# Patient Record
Sex: FEMALE | Race: BLACK OR AFRICAN AMERICAN | NOT HISPANIC OR LATINO | Employment: OTHER | URBAN - METROPOLITAN AREA
[De-identification: names, ages, dates, MRNs, and addresses within clinical notes are randomized per-mention and may not be internally consistent; named-entity substitution may affect disease eponyms.]

---

## 2018-01-05 ENCOUNTER — HOSPITAL ENCOUNTER (EMERGENCY)
Facility: HOSPITAL | Age: 73
Discharge: HOME | End: 2018-01-05
Attending: EMERGENCY MEDICINE
Payer: MEDICARE

## 2018-01-05 VITALS
HEART RATE: 70 BPM | RESPIRATION RATE: 18 BRPM | BODY MASS INDEX: 49.09 KG/M2 | DIASTOLIC BLOOD PRESSURE: 86 MMHG | SYSTOLIC BLOOD PRESSURE: 195 MMHG | HEIGHT: 61 IN | OXYGEN SATURATION: 96 % | WEIGHT: 260 LBS | TEMPERATURE: 98 F

## 2018-01-05 DIAGNOSIS — M54.12 CERVICAL RADICULAR PAIN: ICD-10-CM

## 2018-01-05 DIAGNOSIS — I16.0 HYPERTENSIVE URGENCY: Primary | ICD-10-CM

## 2018-01-05 DIAGNOSIS — N39.0 UTI (URINARY TRACT INFECTION) WITH PYURIA: ICD-10-CM

## 2018-01-05 LAB
ALBUMIN SERPL BCP-MCNC: 3.1 G/DL
ALP SERPL-CCNC: 143 U/L
ALT SERPL W/O P-5'-P-CCNC: 12 U/L
ANION GAP SERPL CALC-SCNC: 11 MMOL/L
AST SERPL-CCNC: 15 U/L
BACTERIA #/AREA URNS HPF: ABNORMAL /HPF
BASOPHILS # BLD AUTO: 0.03 K/UL
BASOPHILS NFR BLD: 0.3 %
BILIRUB SERPL-MCNC: 0.3 MG/DL
BILIRUB UR QL STRIP: NEGATIVE
BUN SERPL-MCNC: 24 MG/DL
CALCIUM SERPL-MCNC: 9.5 MG/DL
CHLORIDE SERPL-SCNC: 108 MMOL/L
CLARITY UR: ABNORMAL
CO2 SERPL-SCNC: 20 MMOL/L
COLOR UR: YELLOW
CREAT SERPL-MCNC: 1.6 MG/DL
DIFFERENTIAL METHOD: ABNORMAL
EOSINOPHIL # BLD AUTO: 0.5 K/UL
EOSINOPHIL NFR BLD: 4.5 %
ERYTHROCYTE [DISTWIDTH] IN BLOOD BY AUTOMATED COUNT: 14.4 %
EST. GFR  (AFRICAN AMERICAN): 37 ML/MIN/1.73 M^2
EST. GFR  (NON AFRICAN AMERICAN): 32 ML/MIN/1.73 M^2
GLUCOSE SERPL-MCNC: 189 MG/DL
GLUCOSE UR QL STRIP: ABNORMAL
HCT VFR BLD AUTO: 36.9 %
HGB BLD-MCNC: 12 G/DL
HGB UR QL STRIP: ABNORMAL
HYALINE CASTS #/AREA URNS LPF: 0 /LPF
KETONES UR QL STRIP: NEGATIVE
LEUKOCYTE ESTERASE UR QL STRIP: ABNORMAL
LYMPHOCYTES # BLD AUTO: 3.7 K/UL
LYMPHOCYTES NFR BLD: 33.9 %
MAGNESIUM SERPL-MCNC: 1.6 MG/DL
MCH RBC QN AUTO: 30.2 PG
MCHC RBC AUTO-ENTMCNC: 32.5 G/DL
MCV RBC AUTO: 93 FL
MICROSCOPIC COMMENT: ABNORMAL
MONOCYTES # BLD AUTO: 1 K/UL
MONOCYTES NFR BLD: 9.3 %
NEUTROPHILS # BLD AUTO: 5.7 K/UL
NEUTROPHILS NFR BLD: 51.7 %
NITRITE UR QL STRIP: POSITIVE
PH UR STRIP: 5 [PH] (ref 5–8)
PLATELET # BLD AUTO: 328 K/UL
PMV BLD AUTO: 9.4 FL
POCT GLUCOSE: 189 MG/DL (ref 70–110)
POTASSIUM SERPL-SCNC: 4.5 MMOL/L
PROT SERPL-MCNC: 7.9 G/DL
PROT UR QL STRIP: ABNORMAL
RBC # BLD AUTO: 3.97 M/UL
RBC #/AREA URNS HPF: 5 /HPF (ref 0–4)
SODIUM SERPL-SCNC: 139 MMOL/L
SP GR UR STRIP: 1.01 (ref 1–1.03)
TROPONIN I SERPL DL<=0.01 NG/ML-MCNC: 0.02 NG/ML
URN SPEC COLLECT METH UR: ABNORMAL
UROBILINOGEN UR STRIP-ACNC: NEGATIVE EU/DL
WBC # BLD AUTO: 10.94 K/UL
WBC #/AREA URNS HPF: 60 /HPF (ref 0–5)

## 2018-01-05 PROCEDURE — 99284 EMERGENCY DEPT VISIT MOD MDM: CPT | Mod: 25

## 2018-01-05 PROCEDURE — 93005 ELECTROCARDIOGRAM TRACING: CPT

## 2018-01-05 PROCEDURE — 96375 TX/PRO/DX INJ NEW DRUG ADDON: CPT

## 2018-01-05 PROCEDURE — 84484 ASSAY OF TROPONIN QUANT: CPT

## 2018-01-05 PROCEDURE — 96374 THER/PROPH/DIAG INJ IV PUSH: CPT

## 2018-01-05 PROCEDURE — 63600175 PHARM REV CODE 636 W HCPCS: Performed by: EMERGENCY MEDICINE

## 2018-01-05 PROCEDURE — 85025 COMPLETE CBC W/AUTO DIFF WBC: CPT

## 2018-01-05 PROCEDURE — 25000003 PHARM REV CODE 250: Performed by: EMERGENCY MEDICINE

## 2018-01-05 PROCEDURE — 80053 COMPREHEN METABOLIC PANEL: CPT

## 2018-01-05 PROCEDURE — 81000 URINALYSIS NONAUTO W/SCOPE: CPT

## 2018-01-05 PROCEDURE — 83735 ASSAY OF MAGNESIUM: CPT

## 2018-01-05 PROCEDURE — 93010 ELECTROCARDIOGRAM REPORT: CPT | Mod: ,,, | Performed by: INTERNAL MEDICINE

## 2018-01-05 RX ORDER — METHYLPREDNISOLONE 4 MG/1
TABLET ORAL
Qty: 1 PACKAGE | Refills: 0 | Status: ON HOLD | OUTPATIENT
Start: 2018-01-05 | End: 2018-06-01 | Stop reason: HOSPADM

## 2018-01-05 RX ORDER — KETOROLAC TROMETHAMINE 30 MG/ML
15 INJECTION, SOLUTION INTRAMUSCULAR; INTRAVENOUS ONCE
Status: COMPLETED | OUTPATIENT
Start: 2018-01-05 | End: 2018-01-05

## 2018-01-05 RX ORDER — DICLOFENAC SODIUM 75 MG/1
75 TABLET, DELAYED RELEASE ORAL 2 TIMES DAILY
Qty: 30 TABLET | Refills: 1 | Status: ON HOLD | OUTPATIENT
Start: 2018-01-05 | End: 2020-06-24 | Stop reason: HOSPADM

## 2018-01-05 RX ORDER — ENALAPRILAT 1.25 MG/ML
1.25 INJECTION INTRAVENOUS
Status: COMPLETED | OUTPATIENT
Start: 2018-01-05 | End: 2018-01-05

## 2018-01-05 RX ORDER — HYDRALAZINE HYDROCHLORIDE 20 MG/ML
10 INJECTION INTRAMUSCULAR; INTRAVENOUS
Status: DISCONTINUED | OUTPATIENT
Start: 2018-01-05 | End: 2018-01-05

## 2018-01-05 RX ORDER — ENALAPRILAT 1.25 MG/ML
2.5 INJECTION INTRAVENOUS
Status: COMPLETED | OUTPATIENT
Start: 2018-01-05 | End: 2018-01-05

## 2018-01-05 RX ORDER — CLONIDINE HYDROCHLORIDE 0.1 MG/1
0.1 TABLET ORAL
Status: COMPLETED | OUTPATIENT
Start: 2018-01-05 | End: 2018-01-05

## 2018-01-05 RX ORDER — ROSUVASTATIN CALCIUM 20 MG/1
20 TABLET, COATED ORAL DAILY
COMMUNITY
End: 2018-12-14 | Stop reason: SDUPTHER

## 2018-01-05 RX ADMIN — ENALAPRILAT 1.25 MG: 2.5 INJECTION INTRAVENOUS at 12:01

## 2018-01-05 RX ADMIN — CLONIDINE HYDROCHLORIDE 0.1 MG: 0.1 TABLET ORAL at 11:01

## 2018-01-05 RX ADMIN — ENALAPRILAT 2.5 MG: 2.5 INJECTION INTRAVENOUS at 11:01

## 2018-01-05 RX ADMIN — KETOROLAC TROMETHAMINE 15 MG: 30 INJECTION, SOLUTION INTRAMUSCULAR at 11:01

## 2018-01-05 NOTE — ED NOTES
"MD aware of pts BP, Dr. Leos states "pt can be discharged now, cancel the hydralazine, we do not want to blow her pressure anymore".   "

## 2018-01-05 NOTE — DISCHARGE INSTRUCTIONS
"1. your blood pressure was dangerously elevated upon presenting to the emergency department: Year chronic high blood pressures beginning to demonstrate kidneys.  It's important to taking blood pressure medicine every day.  Is also important to follow up to primary care provider sure that she'll blood pressure is tightly controlled in order to prevent further kidney and potential heart damage.    2.  Your left shoulder and left arm pain is a result of a "pinched nerve" in your neck.  Your prescribed a taper steroid dose and other anti-inflammatory medications in order to decrease pain inflammation.  Moist heat applied with a heating pad for 15-20 minute intervals every other hour will help with the pain.    3.Follow-up with your primary care doctor.  Your blood pressure and for potential referral for physical therapy for your right shoulder.  "

## 2018-01-05 NOTE — ED PROVIDER NOTES
Encounter Date: 1/5/2018    SCRIBE #1 NOTE: I, Franck Roselia, am scribing for, and in the presence of, Low Leos MD. Other sections scribed: HPI, ROS.       History     Chief Complaint   Patient presents with    Shoulder Pain     complaints of left shoulder pain radiating to back and to neck x 10 days.  denies trauma     CC: Shoulder Pain  HPI: This 72 y.o. morbidly obese female with Hx of HTN, DM, CF, CVA presents to the ED c/o severe constant L shoulder pain radiating down L arm to elbow and into L upper thoracic back that developed 10 days ago. Pt states that the pain is worse with ROM of L shoulder as well as laying down on that side. Pt denies taking her blood pressure medication this morning, but reports compliance otherwise. Pt denies fever, cough, chest pain, SOB.        The history is provided by the patient.     Review of patient's allergies indicates:   Allergen Reactions    Corticosteroids (glucocorticoids) Edema     Past Medical History:   Diagnosis Date    CHF (congestive heart failure)     Diabetes mellitus     Hypertension     Stroke     1986     Past Surgical History:   Procedure Laterality Date    right hand surgery      right knee surgery       Family History   Problem Relation Age of Onset    Heart disease Sister     Diabetes Maternal Aunt     Heart disease Maternal Aunt     Hypertension Maternal Aunt      Social History   Substance Use Topics    Smoking status: Never Smoker    Smokeless tobacco: Never Used    Alcohol use No     Review of Systems   Constitutional: Negative for chills and fever.   HENT: Negative for ear pain, rhinorrhea and sore throat.    Eyes: Negative for pain and visual disturbance.   Respiratory: Negative for cough and shortness of breath.    Cardiovascular: Negative for chest pain.   Gastrointestinal: Negative for abdominal pain, diarrhea, nausea and vomiting.   Genitourinary: Negative for dysuria and flank pain.   Musculoskeletal: Positive for back pain.         (+) L shoulder pain  (+) L upper arm pain   Skin: Negative for rash and wound.   Neurological: Negative for dizziness, syncope and headaches.       Physical Exam     Initial Vitals [01/05/18 1006]   BP Pulse Resp Temp SpO2   (!) 232/105 86 18 98.3 °F (36.8 °C) 96 %      MAP       147.33         Physical Exam    ED Course:   Procedures       Labs Reviewed   CBC W/ AUTO DIFFERENTIAL - Abnormal; Notable for the following:        Result Value    RBC 3.97 (*)     Hematocrit 36.9 (*)     All other components within normal limits   COMPREHENSIVE METABOLIC PANEL - Abnormal; Notable for the following:     CO2 20 (*)     Glucose 189 (*)     BUN, Bld 24 (*)     Creatinine 1.6 (*)     Albumin 3.1 (*)     Alkaline Phosphatase 143 (*)     eGFR if  37 (*)     eGFR if non  32 (*)     All other components within normal limits   URINALYSIS - Abnormal; Notable for the following:     Appearance, UA Hazy (*)     Protein, UA 2+ (*)     Glucose, UA 1+ (*)     Occult Blood UA 1+ (*)     Nitrite, UA Positive (*)     Leukocytes, UA 2+ (*)     All other components within normal limits   URINALYSIS MICROSCOPIC - Abnormal; Notable for the following:     RBC, UA 5 (*)     WBC, UA 60 (*)     Bacteria, UA Many (*)     All other components within normal limits   POCT GLUCOSE - Abnormal; Notable for the following:     POCT Glucose 189 (*)     All other components within normal limits   MAGNESIUM   TROPONIN I          X-Rays:   Independently Interpreted Readings:   Other Readings:  CT Cervical Spine Without Contrast   Status: Final result  [employer] Results Release     [email or web address] Status: Code Exp Results Release  PACS Images     Show images for CT Cervical Spine Without Contrast  External Result Report     External Result Report  Narrative       Cervical spine CT without contrast:    Technique: 2.5-mm axial images of the cervical spine were obtained without intravenous contrast.  Coronal and sagittal reconstructions  were generated.    Comparison: None.    FINDINGS:  Generalized osteopenia. There is nonspecific levocurvature with straightening of the usual cervical lordosis. Chronic appearing mild anterior wedge deformity of C5 vertebral body. Sagittal images demonstrate 3 mm grade 1 degenerative related retrolisthesis of C5 on 6. Well-corticated nonspecific nuchal calcifications near C6 and C7 spinous processes. No acute displaced fracture or dislocation. No prevertebral soft tissue swelling or paraspinal hematoma.    Mild degenerative change at the atlantodental intervalThere is multi-level age-related mild degenerative disc disease and uncovertebral and facet arthrosis, most prominent at C5-6 level.    C2-3: No significant spinal canal stenosis or neural foraminal narrowing.  C3-4: No significant spinal canal stenosis or neural foraminal narrowing.  C4-5: Minimal left neuroforaminal narrowing. No significant spinal canal stenosis or right neuroforaminal narrowing.  C5-6: Posterior disc osteophyte complex resulting in mild acquired canal stenosis. Mild right and moderate left neural foraminal narrowing.  C6-7: No significant spinal canal stenosis or neural foraminal narrowing.  C7-T1:   No significant spinal canal stenosis or neural foraminal narrowing.    The visualized lung apices are clear. The soft tissue structures incidentally surveyed in this noncontrast cervical spine CT demonstrates no significant findings.  Impression            1.  No acute cervical spine abnormality.    2. Mild cervical spondylosis most prominent at C5-6, as above.      Electronically signed by: SARAH NOVOA MD, MD  Date: 01/05/18  Time: 12:59           Medical Decision Making:   Initial Assessment:   My initial assessment is that her pain was myofascial or radicular in nature.  The history and physical exam was not consistent with ischemic chest pain referred to the shoulder or upper extremity.  Serendipitously discovered was urinary tract  infection and hypertensive urgency.  Differential Diagnosis:   Cervical radicular pain, myofascial pain of the shoulder, subluxation of the shoulder, occipital tendinitis  ED Management:  The patient's blood pressure was treated with a combination of calcium channel blockers, alpha blockade, beta 2 smooth muscle blockade.            Scribe Attestation:   Scribe #1: I performed the above scribed service and the documentation accurately describes the services I performed. I attest to the accuracy of the note.    Attending Attestation:           Physician Attestation for Scribe:  Physician Attestation Statement for Scribe #1: I, Low Leos MD, reviewed documentation, as scribed by Franck Veloz in my presence, and it is both accurate and complete.                 ED Course     1110 hrs.: Awaiting saline lock placement: Patient's difficult stick and IV placement.  1235 hrs.: Laboratory evaluation complete: Urinary tract infection with 60 white cells per high-power field, positive nitrites, positive leukocyte esterase is noted.  Protein wasting glomerulonephropathy as well noted.  Blood pressure continues to be treated in the department.  1320 hrs. CT next completed to show a mild to moderate C5-C6 spondyloarthropathy.  We'll treat with anti-inflammatories, heat, and referral.  Blood pressure at this time was 200/86--substantive reduction from the initial presenting blood pressure.      Clinical Impression:   The primary encounter diagnosis was Hypertensive urgency. Diagnoses of UTI (urinary tract infection) with pyuria and Cervical radicular pain were also pertinent to this visit.    Disposition:   Disposition: Discharged                        Low Leos MD  01/28/18 1919

## 2018-01-05 NOTE — ED TRIAGE NOTES
"Pt arrived to ED via personal transportation from home for c/o left sided shoulder and arms pain since the day after lloyd along with left hand numbness. Pt states "I been suppose to come to the emergency room". Pt rates pain 8 out of 10 and states its "constant and achy, I cant sleep on that side". Pt denies chest pain and SOB. Denies N/V/D/F. Denies any injuries. REU. AAO x 4. In no acute distress. Will continue to monitor.   "

## 2018-05-29 ENCOUNTER — HOSPITAL ENCOUNTER (INPATIENT)
Facility: HOSPITAL | Age: 73
LOS: 3 days | Discharge: HOME HEALTH | DRG: 872 | End: 2018-06-01
Attending: EMERGENCY MEDICINE | Admitting: HOSPITALIST
Payer: MEDICARE

## 2018-05-29 ENCOUNTER — OFFICE VISIT (OUTPATIENT)
Dept: URGENT CARE | Facility: CLINIC | Age: 73
End: 2018-05-29
Payer: MEDICARE

## 2018-05-29 VITALS
SYSTOLIC BLOOD PRESSURE: 80 MMHG | TEMPERATURE: 99 F | HEIGHT: 61 IN | HEART RATE: 74 BPM | RESPIRATION RATE: 12 BRPM | DIASTOLIC BLOOD PRESSURE: 38 MMHG | OXYGEN SATURATION: 98 % | BODY MASS INDEX: 49.09 KG/M2 | WEIGHT: 260 LBS

## 2018-05-29 DIAGNOSIS — N28.9 RENAL INSUFFICIENCY: ICD-10-CM

## 2018-05-29 DIAGNOSIS — R50.9 FEVER: ICD-10-CM

## 2018-05-29 DIAGNOSIS — R05.9 COUGH: ICD-10-CM

## 2018-05-29 DIAGNOSIS — A41.9 SEPSIS, DUE TO UNSPECIFIED ORGANISM: Primary | ICD-10-CM

## 2018-05-29 DIAGNOSIS — I95.9 HYPOTENSION, UNSPECIFIED HYPOTENSION TYPE: ICD-10-CM

## 2018-05-29 DIAGNOSIS — N39.0 URINARY TRACT INFECTION WITHOUT HEMATURIA, SITE UNSPECIFIED: ICD-10-CM

## 2018-05-29 DIAGNOSIS — R65.10 SIRS (SYSTEMIC INFLAMMATORY RESPONSE SYNDROME): ICD-10-CM

## 2018-05-29 DIAGNOSIS — R30.0 DYSURIA: Primary | ICD-10-CM

## 2018-05-29 LAB
ALBUMIN SERPL BCP-MCNC: 2.1 G/DL
ALP SERPL-CCNC: 94 U/L
ALT SERPL W/O P-5'-P-CCNC: 10 U/L
ANION GAP SERPL CALC-SCNC: 12 MMOL/L
AST SERPL-CCNC: 15 U/L
BACTERIA #/AREA URNS HPF: ABNORMAL /HPF
BASOPHILS # BLD AUTO: 0.05 K/UL
BASOPHILS NFR BLD: 0.3 %
BILIRUB SERPL-MCNC: 0.3 MG/DL
BILIRUB UR QL STRIP: NEGATIVE
BUN SERPL-MCNC: 31 MG/DL
CALCIUM SERPL-MCNC: 9.3 MG/DL
CHLORIDE SERPL-SCNC: 98 MMOL/L
CLARITY UR: ABNORMAL
CO2 SERPL-SCNC: 21 MMOL/L
COLOR UR: YELLOW
CREAT SERPL-MCNC: 2.9 MG/DL
DIFFERENTIAL METHOD: ABNORMAL
EOSINOPHIL # BLD AUTO: 0.2 K/UL
EOSINOPHIL NFR BLD: 1.4 %
ERYTHROCYTE [DISTWIDTH] IN BLOOD BY AUTOMATED COUNT: 13.4 %
EST. GFR  (AFRICAN AMERICAN): 18 ML/MIN/1.73 M^2
EST. GFR  (NON AFRICAN AMERICAN): 16 ML/MIN/1.73 M^2
GLUCOSE SERPL-MCNC: 316 MG/DL
GLUCOSE UR QL STRIP: NEGATIVE
HCT VFR BLD AUTO: 35 %
HGB BLD-MCNC: 11.5 G/DL
HGB UR QL STRIP: ABNORMAL
HYALINE CASTS #/AREA URNS LPF: 0 /LPF
KETONES UR QL STRIP: NEGATIVE
LACTATE SERPL-SCNC: 4.3 MMOL/L
LEUKOCYTE ESTERASE UR QL STRIP: ABNORMAL
LIPASE SERPL-CCNC: 65 U/L
LYMPHOCYTES # BLD AUTO: 4 K/UL
LYMPHOCYTES NFR BLD: 27.5 %
MAGNESIUM SERPL-MCNC: 1.6 MG/DL
MCH RBC QN AUTO: 29.4 PG
MCHC RBC AUTO-ENTMCNC: 32.9 G/DL
MCV RBC AUTO: 90 FL
MICROSCOPIC COMMENT: ABNORMAL
MONOCYTES # BLD AUTO: 1.7 K/UL
MONOCYTES NFR BLD: 11.5 %
NEUTROPHILS # BLD AUTO: 8.6 K/UL
NEUTROPHILS NFR BLD: 59.3 %
NITRITE UR QL STRIP: NEGATIVE
PH UR STRIP: 5 [PH] (ref 5–8)
PHOSPHATE SERPL-MCNC: 2.6 MG/DL
PLATELET # BLD AUTO: 429 K/UL
PMV BLD AUTO: 9.3 FL
POCT GLUCOSE: 282 MG/DL (ref 70–110)
POTASSIUM SERPL-SCNC: 3.9 MMOL/L
PROT SERPL-MCNC: 7.9 G/DL
PROT UR QL STRIP: ABNORMAL
RBC # BLD AUTO: 3.91 M/UL
RBC #/AREA URNS HPF: 55 /HPF (ref 0–4)
SODIUM SERPL-SCNC: 131 MMOL/L
SP GR UR STRIP: 1.02 (ref 1–1.03)
SQUAMOUS #/AREA URNS HPF: 4 /HPF
URN SPEC COLLECT METH UR: ABNORMAL
UROBILINOGEN UR STRIP-ACNC: NEGATIVE EU/DL
WBC # BLD AUTO: 14.56 K/UL
WBC #/AREA URNS HPF: >100 /HPF (ref 0–5)
WBC CLUMPS URNS QL MICRO: ABNORMAL
YEAST URNS QL MICRO: ABNORMAL

## 2018-05-29 PROCEDURE — 81000 URINALYSIS NONAUTO W/SCOPE: CPT

## 2018-05-29 PROCEDURE — 25000003 PHARM REV CODE 250: Performed by: EMERGENCY MEDICINE

## 2018-05-29 PROCEDURE — 87040 BLOOD CULTURE FOR BACTERIA: CPT | Mod: 59

## 2018-05-29 PROCEDURE — 85025 COMPLETE CBC W/AUTO DIFF WBC: CPT

## 2018-05-29 PROCEDURE — 87088 URINE BACTERIA CULTURE: CPT

## 2018-05-29 PROCEDURE — 3074F SYST BP LT 130 MM HG: CPT | Mod: CPTII,S$GLB,, | Performed by: NURSE PRACTITIONER

## 2018-05-29 PROCEDURE — 83605 ASSAY OF LACTIC ACID: CPT

## 2018-05-29 PROCEDURE — 87186 SC STD MICRODIL/AGAR DIL: CPT

## 2018-05-29 PROCEDURE — 99285 EMERGENCY DEPT VISIT HI MDM: CPT | Mod: 25

## 2018-05-29 PROCEDURE — 21400001 HC TELEMETRY ROOM

## 2018-05-29 PROCEDURE — 84100 ASSAY OF PHOSPHORUS: CPT

## 2018-05-29 PROCEDURE — 87086 URINE CULTURE/COLONY COUNT: CPT

## 2018-05-29 PROCEDURE — 3078F DIAST BP <80 MM HG: CPT | Mod: CPTII,S$GLB,, | Performed by: NURSE PRACTITIONER

## 2018-05-29 PROCEDURE — 87077 CULTURE AEROBIC IDENTIFY: CPT

## 2018-05-29 PROCEDURE — 83036 HEMOGLOBIN GLYCOSYLATED A1C: CPT

## 2018-05-29 PROCEDURE — 99204 OFFICE O/P NEW MOD 45 MIN: CPT | Mod: S$GLB,,, | Performed by: NURSE PRACTITIONER

## 2018-05-29 PROCEDURE — 99499 UNLISTED E&M SERVICE: CPT | Mod: S$GLB,,, | Performed by: NURSE PRACTITIONER

## 2018-05-29 PROCEDURE — 83690 ASSAY OF LIPASE: CPT

## 2018-05-29 PROCEDURE — 83735 ASSAY OF MAGNESIUM: CPT

## 2018-05-29 PROCEDURE — 96375 TX/PRO/DX INJ NEW DRUG ADDON: CPT

## 2018-05-29 PROCEDURE — 96365 THER/PROPH/DIAG IV INF INIT: CPT

## 2018-05-29 PROCEDURE — 83605 ASSAY OF LACTIC ACID: CPT | Mod: 91

## 2018-05-29 PROCEDURE — 82962 GLUCOSE BLOOD TEST: CPT

## 2018-05-29 PROCEDURE — 63600175 PHARM REV CODE 636 W HCPCS: Performed by: EMERGENCY MEDICINE

## 2018-05-29 PROCEDURE — 80053 COMPREHEN METABOLIC PANEL: CPT

## 2018-05-29 RX ORDER — LEVOTHYROXINE SODIUM 75 UG/1
75 TABLET ORAL DAILY
Status: DISCONTINUED | OUTPATIENT
Start: 2018-05-30 | End: 2018-06-01 | Stop reason: HOSPADM

## 2018-05-29 RX ORDER — CIPROFLOXACIN 500 MG/1
500 TABLET ORAL 2 TIMES DAILY
Qty: 14 TABLET | Refills: 0 | Status: SHIPPED | OUTPATIENT
Start: 2018-05-29 | End: 2018-05-29 | Stop reason: ALTCHOICE

## 2018-05-29 RX ORDER — BENZONATATE 100 MG/1
100 CAPSULE ORAL EVERY 6 HOURS PRN
Qty: 30 CAPSULE | Refills: 1 | Status: SHIPPED | OUTPATIENT
Start: 2018-05-29 | End: 2018-06-25 | Stop reason: ALTCHOICE

## 2018-05-29 RX ORDER — IBUPROFEN 200 MG
16 TABLET ORAL
Status: DISCONTINUED | OUTPATIENT
Start: 2018-05-29 | End: 2018-06-01 | Stop reason: HOSPADM

## 2018-05-29 RX ORDER — PHENAZOPYRIDINE HYDROCHLORIDE 200 MG/1
200 TABLET, FILM COATED ORAL 3 TIMES DAILY PRN
Qty: 20 TABLET | Refills: 0 | Status: SHIPPED | OUTPATIENT
Start: 2018-05-29 | End: 2018-05-29 | Stop reason: ALTCHOICE

## 2018-05-29 RX ORDER — GABAPENTIN 300 MG/1
300 CAPSULE ORAL 3 TIMES DAILY
Status: DISCONTINUED | OUTPATIENT
Start: 2018-05-30 | End: 2018-06-01 | Stop reason: HOSPADM

## 2018-05-29 RX ORDER — ACETAMINOPHEN 325 MG/1
650 TABLET ORAL EVERY 8 HOURS PRN
Status: DISCONTINUED | OUTPATIENT
Start: 2018-05-29 | End: 2018-06-01 | Stop reason: HOSPADM

## 2018-05-29 RX ORDER — ROSUVASTATIN CALCIUM 10 MG/1
20 TABLET, COATED ORAL DAILY
Status: DISCONTINUED | OUTPATIENT
Start: 2018-05-30 | End: 2018-06-01 | Stop reason: HOSPADM

## 2018-05-29 RX ORDER — IBUPROFEN 200 MG
24 TABLET ORAL
Status: DISCONTINUED | OUTPATIENT
Start: 2018-05-29 | End: 2018-06-01 | Stop reason: HOSPADM

## 2018-05-29 RX ORDER — LOSARTAN POTASSIUM 25 MG/1
50 TABLET ORAL DAILY
Status: DISCONTINUED | OUTPATIENT
Start: 2018-05-30 | End: 2018-05-30

## 2018-05-29 RX ORDER — CEFEPIME HYDROCHLORIDE 2 G/1
2 INJECTION, POWDER, FOR SOLUTION INTRAVENOUS
Status: COMPLETED | OUTPATIENT
Start: 2018-05-29 | End: 2018-05-29

## 2018-05-29 RX ORDER — INSULIN ASPART 100 [IU]/ML
0-5 INJECTION, SOLUTION INTRAVENOUS; SUBCUTANEOUS
Status: DISCONTINUED | OUTPATIENT
Start: 2018-05-29 | End: 2018-06-01 | Stop reason: HOSPADM

## 2018-05-29 RX ORDER — CEFEPIME HYDROCHLORIDE 2 G/1
2 INJECTION, POWDER, FOR SOLUTION INTRAVENOUS
Status: DISCONTINUED | OUTPATIENT
Start: 2018-05-30 | End: 2018-05-30 | Stop reason: DRUGHIGH

## 2018-05-29 RX ORDER — CETIRIZINE HYDROCHLORIDE 10 MG/1
10 TABLET ORAL DAILY
Status: DISCONTINUED | OUTPATIENT
Start: 2018-05-30 | End: 2018-06-01 | Stop reason: HOSPADM

## 2018-05-29 RX ORDER — ONDANSETRON 8 MG/1
8 TABLET, ORALLY DISINTEGRATING ORAL EVERY 8 HOURS PRN
Status: DISCONTINUED | OUTPATIENT
Start: 2018-05-29 | End: 2018-06-01 | Stop reason: HOSPADM

## 2018-05-29 RX ORDER — GLUCAGON 1 MG
1 KIT INJECTION
Status: DISCONTINUED | OUTPATIENT
Start: 2018-05-29 | End: 2018-06-01 | Stop reason: HOSPADM

## 2018-05-29 RX ORDER — CARVEDILOL 6.25 MG/1
25 TABLET ORAL 2 TIMES DAILY WITH MEALS
Status: DISCONTINUED | OUTPATIENT
Start: 2018-05-30 | End: 2018-05-30

## 2018-05-29 RX ORDER — FUROSEMIDE 40 MG/1
40 TABLET ORAL 2 TIMES DAILY
Status: DISCONTINUED | OUTPATIENT
Start: 2018-05-29 | End: 2018-05-30

## 2018-05-29 RX ORDER — NAPROXEN SODIUM 220 MG/1
81 TABLET, FILM COATED ORAL DAILY
Status: DISCONTINUED | OUTPATIENT
Start: 2018-05-30 | End: 2018-06-01 | Stop reason: HOSPADM

## 2018-05-29 RX ADMIN — SODIUM CHLORIDE 3402 ML: 0.9 INJECTION, SOLUTION INTRAVENOUS at 06:05

## 2018-05-29 RX ADMIN — FUROSEMIDE 40 MG: 40 TABLET ORAL at 11:05

## 2018-05-29 RX ADMIN — AZITHROMYCIN MONOHYDRATE 500 MG: 500 INJECTION, POWDER, LYOPHILIZED, FOR SOLUTION INTRAVENOUS at 06:05

## 2018-05-29 RX ADMIN — CEFEPIME HYDROCHLORIDE 2 G: 2 INJECTION, POWDER, FOR SOLUTION INTRAVENOUS at 05:05

## 2018-05-29 NOTE — ED TRIAGE NOTES
Patient presents from Urgent Care with c/o hypotension systolic in the 80s, along with blurred vision, light headedness, dizziness, and lower abdominal pain

## 2018-05-29 NOTE — PROGRESS NOTES
"Subjective:       Patient ID: Tasneem Lynn is a 72 y.o. female.    Vitals:  height is 5' 1" (1.549 m) and weight is 117.9 kg (260 lb). Her oral temperature is 98.5 °F (36.9 °C). Her blood pressure is 80/38 (abnormal) and her pulse is 74. Her respiration is 12 and oxygen saturation is 98%.     Chief Complaint: Abdominal Pain    Abdominal Pain   This is a new problem. Episode onset: 1 week. The onset quality is sudden. The problem occurs constantly. The problem has been unchanged. The pain is located in the suprapubic region. The pain is mild. The quality of the pain is burning and aching. The abdominal pain does not radiate. Associated symptoms include dysuria and frequency. Pertinent negatives include no fever, hematuria, nausea or vomiting. Nothing aggravates the pain. The pain is relieved by nothing. The treatment provided no relief.     Review of Systems   Constitution: Negative for chills and fever.   HENT: Positive for congestion.    Respiratory: Positive for cough and sputum production.    Skin: Negative for itching.   Musculoskeletal: Negative for back pain.   Gastrointestinal: Positive for abdominal pain. Negative for nausea and vomiting.   Genitourinary: Positive for dysuria, frequency and urgency. Negative for genital sores, hematuria, missed menses and non-menstrual bleeding.   All other systems reviewed and are negative.      Objective:      Physical Exam   Constitutional: She is oriented to person, place, and time. She appears well-developed and well-nourished. She is cooperative.  Non-toxic appearance. She appears ill. No distress.   PT drowsy   HENT:   Head: Normocephalic and atraumatic.   Right Ear: Hearing, tympanic membrane, external ear and ear canal normal.   Left Ear: Hearing, tympanic membrane, external ear and ear canal normal.   Nose: No mucosal edema, rhinorrhea or nasal deformity. No epistaxis. Right sinus exhibits no maxillary sinus tenderness and no frontal sinus tenderness. Left sinus " exhibits no maxillary sinus tenderness and no frontal sinus tenderness.   Mouth/Throat: Uvula is midline and mucous membranes are normal. No trismus in the jaw. Normal dentition. No uvula swelling. No posterior oropharyngeal erythema.   PND, cough   Eyes: Conjunctivae and lids are normal. No scleral icterus.   Sclera clear bilat   Neck: Trachea normal, full passive range of motion without pain and phonation normal. Neck supple.   Cardiovascular: Normal rate, regular rhythm, normal heart sounds, intact distal pulses and normal pulses.    Pulmonary/Chest: Effort normal and breath sounds normal. No respiratory distress. She has no decreased breath sounds. She has no wheezes. She has no rhonchi. She has no rales.   Abdominal: Soft. Normal appearance and bowel sounds are normal. She exhibits no distension. There is no tenderness.   Musculoskeletal: Normal range of motion. She exhibits no edema or deformity.   Neurological: She is alert and oriented to person, place, and time. She exhibits normal muscle tone. Coordination normal.   Skin: Skin is warm, dry and intact. She is not diaphoretic. No pallor.   Psychiatric: She has a normal mood and affect. Her speech is normal and behavior is normal. Judgment and thought content normal. Cognition and memory are normal.   Nursing note and vitals reviewed.      EXAMINATION:  XR CHEST PA AND LATERAL    CLINICAL HISTORY:  Cough    TECHNIQUE:  PA and lateral views of the chest were performed.    COMPARISON:  11/17/2016.    FINDINGS:  The cardiac silhouette and superior mediastinal structures are unremarkable.  Atherosclerotic calcification is present within the aortic arch.  Pulmonary vasculature is within normal limits. The lungs are well aerated and free of focal consolidations. There is no evidence for pneumothorax or pleural effusions. Bony structures are grossly intact.  There is a cervical rib identified on the right.   Impression       No acute chest disease identified.  No  detrimental changes noted when compared to prior examination dated 11/17/2016.       Assessment:       1. Dysuria    2. Cough    3. Hypotension, unspecified hypotension type        Plan:         Dysuria  -     POCT Urinalysis, Dipstick, Automated, W/O Scope    Cough  -     X-Ray Chest PA And Lateral; Future; Expected date: 05/29/2018    Hypotension, unspecified hypotension type  -     Refer to Emergency Dept.    Other orders  -     Cancel: Ambulatory referral to Orthopedics  -     Discontinue: ciprofloxacin HCl (CIPRO) 500 MG tablet; Take 1 tablet (500 mg total) by mouth 2 (two) times daily.  Dispense: 14 tablet; Refill: 0  -     Discontinue: phenazopyridine (PYRIDIUM) 200 MG tablet; Take 1 tablet (200 mg total) by mouth 3 (three) times daily as needed for Pain.  Dispense: 20 tablet; Refill: 0  -     benzonatate (TESSALON PERLES) 100 MG capsule; Take 1 capsule (100 mg total) by mouth every 6 (six) hours as needed for Cough.  Dispense: 30 capsule; Refill: 1      PT blood pressure most concerning for sepsis.  CXR to check for volume overload and infection was negative.  Pt blood pressure remains low after monitoring.  Pt unable to urinate to give sample for u/a.  Discussed plan to send pt to [facility] ED for possible sepsis.  Physician in agreement.  Report called at [id number] to [facility].      Dysuria with Uncertain Cause (Adult)    The urethra is the tube that allows urine to pass out of the body. In a woman, the urethra is the opening above the vagina. In men, the urethra is the opening on the tip of the penis. Dysuria is the feeling of pain or burning in the urethra when passing urine.  Dysuria can be caused by anything that irritates or inflames the urethra. An infection or chemical irritation can cause this reaction. A bladder infection is the most common cause of dysuria in adults. A urine test can diagnose this. A bladder infection needs antibiotic treatment.  Soaps, lotions, colognes and feminine hygiene  products can cause dysuria. So can birth control jellies, creams, and foams. It will go away 1 to 3 days after using these irritants.  Sexually transmitted diseases (STDs) such as chlamydia or gonorrhea can cause dysuria. Your healthcare provider may take a culture sample. Your provider may start you on antibiotic medicine before the culture test returns.  In women who have gone through menopause, dysuria can be from dryness in the lining of the urethra. This can be treated with hormones. Dysuria becomes long-term (chronic) when it lasts for weeks or months. You may need to see a specialist (urologist) to diagnose and treat chronic dysuria.  Home care  These home care tips may help:  · Don't use any chemicals or products that you think may be causing your symptoms.  · If you were given a prescription medicine, take as directed. Be sure to take it until it is all used up.  · If a culture was taken, don't have sex until you have been told that it is negative. This means you don't have an infection. Then follow your healthcare provider's advice to treat your condition.  If a culture was done and it is positive:  · Both you and your sexual partner may need to be treated. This is true even if your partner has no symptoms.  · Contact your healthcare provider or go to an urgent care clinic or the public health department to be looked at and treated.  · Don't have sex until both you and your partner(s) have finished all antibiotics and your healthcare provider says you are no longer contagious.  · Learn about and use safe sex practices. The safest sex is with a partner who has tested negative and only has sex with you. Condoms can prevent STDs from spreading, but they aren't a guarantee.  Follow-up care  Follow up with your healthcare provider, or as advised. If a culture was taken, you may call as directed for the results. If you have an STD, follow up with your provider or the public health department for a complete STD  screening, including HIV testing. For more information, contact CDC-INFO at 515-655-8718.  When to seek medical advice  Call your healthcare provider right away if any of these occur:  · You aren't better after 3 days of treatment  · Fever of 100.4ºF (38ºC) or higher, or as directed by your healthcare provider  · Back or belly pain that gets worse  · You can't urinate because of pain  · New discharge from the urethra, vagina, or penis  · Painful sores on the penis  · Rash or joint pain  · Painful lumps (lymph nodes) in the groin  · Testicle pain or swelling of the scrotum  Date Last Reviewed: 11/1/2016 © 2000-2017 [employer]. [address] [place] [address], [place] [address]. All rights reserved. This information is not intended as a substitute for professional medical care. Always follow your healthcare professional's instructions.        Dysuria with Uncertain Cause (Adult)    The urethra is the tube that allows urine to pass out of the body. In a woman, the urethra is the opening above the vagina. In men, the urethra is the opening on the tip of the penis. Dysuria is the feeling of pain or burning in the urethra when passing urine.  Dysuria can be caused by anything that irritates or inflames the urethra. An infection or chemical irritation can cause this reaction. A bladder infection is the most common cause of dysuria in adults. A urine test can diagnose this. A bladder infection needs antibiotic treatment.  Soaps, lotions, colognes and feminine hygiene products can cause dysuria. So can birth control jellies, creams, and foams. It will go away 1 to 3 days after using these irritants.  Sexually transmitted diseases (STDs) such as chlamydia or gonorrhea can cause dysuria. Your healthcare provider may take a culture sample. Your provider may start you on antibiotic medicine before the culture test returns.  In women who have gone through menopause, dysuria can be from dryness in the lining of the urethra.  This can be treated with hormones. Dysuria becomes long-term (chronic) when it lasts for weeks or months. You may need to see a specialist (urologist) to diagnose and treat chronic dysuria.  Home care  These home care tips may help:  · Don't use any chemicals or products that you think may be causing your symptoms.  · If you were given a prescription medicine, take as directed. Be sure to take it until it is all used up.  · If a culture was taken, don't have sex until you have been told that it is negative. This means you don't have an infection. Then follow your healthcare provider's advice to treat your condition.  If a culture was done and it is positive:  · Both you and your sexual partner may need to be treated. This is true even if your partner has no symptoms.  · Contact your healthcare provider or go to an urgent care clinic or the public health department to be looked at and treated.  · Don't have sex until both you and your partner(s) have finished all antibiotics and your healthcare provider says you are no longer contagious.  · Learn about and use safe sex practices. The safest sex is with a partner who has tested negative and only has sex with you. Condoms can prevent STDs from spreading, but they aren't a guarantee.  Follow-up care  Follow up with your healthcare provider, or as advised. If a culture was taken, you may call as directed for the results. If you have an STD, follow up with your provider or the public health department for a complete STD screening, including HIV testing. For more information, contact CDC-INFO at 722-278-6755.  When to seek medical advice  Call your healthcare provider right away if any of these occur:  · You aren't better after 3 days of treatment  · Fever of 100.4ºF (38ºC) or higher, or as directed by your healthcare provider  · Back or belly pain that gets worse  · You can't urinate because of pain  · New discharge from the urethra, vagina, or penis  · Painful sores on  the penis  · Rash or joint pain  · Painful lumps (lymph nodes) in the groin  · Testicle pain or swelling of the scrotum  Date Last Reviewed: 11/1/2016 © 2000-2017 [employer]. [address] [place] [address], [place] [address]. All rights reserved. This information is not intended as a substitute for professional medical care. Always follow your healthcare professional's instructions.    Please return here or go to the Emergency Department for any concerns or worsening of condition.  If you were prescribed antibiotics, please take them to completion.  If you were prescribed a narcotic medication, do not drive or operate heavy equipment or machinery while taking these medications.  Please follow up with your primary care doctor or specialist as needed.    If you  smoke, please stop smoking.

## 2018-05-29 NOTE — ED PROVIDER NOTES
Encounter Date: 5/29/2018  This is a SORT/MSE of a 72 y.o. female referred from urgent care for hypotension, possible sepsis from UTI.  Care will be transferred to an alternate provider when patient is roomed for a full evaluation and final disposition. Patient is aware that he/she is awaiting a room in the emergency department, where another provider will review results, evaluate and treat as needed. ANKUSH Santiago DNP  SCRIBE #1 NOTE: I, Cheryle Sands, am scribing for, and in the presence of,  Lai SANDS. I have scribed the following portions of the note - Other sections scribed: HPI, ROS, PE.       History     Chief Complaint   Patient presents with    Hypotension     reports abdominal pain x 1 wk and went to urgent care; sent for hypotension, 80 systolic    Abdominal Pain     with coughing; dysuria     CC: Hypotensive ; Abdominal Pain    71 y/o female with CHF, DM, HTN, and stroke presents to the ED for emergent evaluation of hypotension and 1 wk hx of RLQ abdominal pain. The patient presented to  [facility] c/o 1 wk hx of RLQ abdominal pain and nonproductive cough. The patient was hypotensive at the facility, so she was advised to present to the ER. The patient had a chest x-ray performed. The patient denies smoking cigarettes, illicit drug abuse, or drinking EtOH. The patient denies fever, chills, or rhinorrhea. No other symptoms reported.      The history is provided by the patient. No  was used.     Review of patient's allergies indicates:   Allergen Reactions    Corticosteroids (glucocorticoids) Edema     Past Medical History:   Diagnosis Date    CHF (congestive heart failure)     Diabetes mellitus     Hypertension     Stroke     1986     Past Surgical History:   Procedure Laterality Date    right hand surgery      right knee surgery       Family History   Problem Relation Age of Onset    Heart disease Sister     Diabetes Maternal Aunt     Heart disease Maternal Aunt      Hypertension Maternal Aunt      Social History   Substance Use Topics    Smoking status: Never Smoker    Smokeless tobacco: Never Used    Alcohol use No     Review of Systems   Constitutional: Negative for chills and fever.   HENT: Negative for congestion, ear pain, rhinorrhea and sore throat.    Eyes: Negative for redness.   Respiratory: Positive for cough (nonproductive). Negative for shortness of breath.    Cardiovascular: Negative for chest pain.   Gastrointestinal: Positive for abdominal pain (RLQ). Negative for diarrhea, nausea and vomiting.   Genitourinary: Negative for decreased urine volume, difficulty urinating, dysuria, frequency, hematuria and urgency.   Musculoskeletal: Negative for back pain and neck pain.   Skin: Negative for rash.   Neurological: Negative for headaches.       Physical Exam     Initial Vitals [05/29/18 1549]   BP Pulse Resp Temp SpO2   113/64 75 20 98.5 °F (36.9 °C) 99 %      MAP       80.33         Physical Exam    Nursing note and vitals reviewed.  Constitutional: She appears well-developed and well-nourished.   The patient is morbidly obese.   HENT:   Right Ear: External ear normal.   Left Ear: External ear normal.   Nose: Nose normal.   Mouth/Throat: Oropharynx is clear and moist.   Eyes: Conjunctivae and EOM are normal. Pupils are equal, round, and reactive to light.   Neck: Normal range of motion. Neck supple.   Cardiovascular: Normal rate, regular rhythm and normal heart sounds.   Pulmonary/Chest: Breath sounds normal.   Abdominal: Soft. Bowel sounds are normal.   Musculoskeletal: Normal range of motion.   Neurological: She is alert and oriented to person, place, and time. She has normal strength.   Skin: Skin is warm and dry.   Psychiatric: She has a normal mood and affect. Her behavior is normal. Judgment and thought content normal.         ED Course   Critical Care  Date/Time: 5/29/2018 7:48 PM  Performed by: HUMBERTO KURTZ  Authorized by: HUMBERTO KURTZ   Direct  patient critical care time: 10 minutes  Additional history critical care time: 5 minutes  Ordering / reviewing critical care time: 10 minutes  Documentation critical care time: 15 minutes  Consulting other physicians critical care time: 5 minutes  Total critical care time (exclusive of procedural time) : 45 minutes  Critical care was necessary to treat or prevent imminent or life-threatening deterioration of the following conditions: sepsis.        Labs Reviewed   CBC W/ AUTO DIFFERENTIAL - Abnormal; Notable for the following:        Result Value    WBC 14.56 (*)     RBC 3.91 (*)     Hemoglobin 11.5 (*)     Hematocrit 35.0 (*)     Platelets 429 (*)     Gran # (ANC) 8.6 (*)     Mono # 1.7 (*)     All other components within normal limits   COMPREHENSIVE METABOLIC PANEL - Abnormal; Notable for the following:     Sodium 131 (*)     CO2 21 (*)     Glucose 316 (*)     BUN, Bld 31 (*)     Creatinine 2.9 (*)     Albumin 2.1 (*)     eGFR if  18 (*)     eGFR if non  16 (*)     All other components within normal limits   LACTIC ACID, PLASMA - Abnormal; Notable for the following:     Lactate (Lactic Acid) 4.3 (*)     All other components within normal limits    Narrative:     LACTIC ACID   critical result(s) called and verbal readback obtained   from TAVARES KIRKPATRICK., 05/29/2018 17:26   URINALYSIS - Abnormal; Notable for the following:     Appearance, UA Cloudy (*)     Protein, UA 2+ (*)     Occult Blood UA 1+ (*)     Leukocytes, UA 1+ (*)     All other components within normal limits   LIPASE - Abnormal; Notable for the following:     Lipase 65 (*)     All other components within normal limits   URINALYSIS MICROSCOPIC - Abnormal; Notable for the following:     RBC, UA 55 (*)     WBC, UA >100 (*)     WBC Clumps, UA Many (*)     Bacteria, UA Many (*)     Yeast, UA Few (*)     All other components within normal limits   CULTURE, URINE   CULTURE, BLOOD   CULTURE, BLOOD             Medical Decision  Making:   History:   Old Medical Records: I decided to obtain old medical records.  Clinical Tests:   Lab Tests: Ordered and Reviewed       <> Summary of Lab: Results for FREDDY RUTLEDGE (MRN [id number]) as of 5/29/2018 19:17    1/5/2018 11:55  Specimen UA: Urine, Clean Catch  Color, UA: Yellow  pH, UA: 5.0  Specific Gravity, UA: 1.010  Appearance, UA: Hazy (A)  Protein, UA: 2+ (A)  Glucose, UA: 1+ (A)  Ketones, UA: Negative  Occult Blood UA: 1+ (A)  Nitrite, UA: Positive (A)  Urobilinogen, UA: Negative  Bilirubin (UA): Negative  Leukocytes, UA: 2+ (A)  RBC, UA: 5 (H)  WBC, UA: 60 (H)  Bacteria, UA: Many (A)  Hyaline Casts, UA: 0  Microscopic Comment: SEE COMMENT    5/29/2018 16:25  WBC: 14.56 (H)  RBC: 3.91 (L)  Hemoglobin: 11.5 (L)  Hematocrit: 35.0 (L)  MCV: 90  MCH: 29.4  MCHC: 32.9  RDW: 13.4  Platelets: 429 (H)  MPV: 9.3  Gran%: 59.3  Gran # (ANC): 8.6 (H)  Lymph%: 27.5  Lymph #: 4.0  Mono%: 11.5  Mono #: 1.7 (H)  Eosinophil%: 1.4  Eos #: 0.2  Basophil%: 0.3  Baso #: 0.05  Sodium: 131 (L)  Potassium: 3.9  Chloride: 98  CO2: 21 (L)  Anion Gap: 12  BUN, Bld: 31 (H)  Creatinine: 2.9 (H)  eGFR if non : 16 (A)  eGFR if : 18 (A)  Glucose: 316 (H)  Calcium: 9.3  Alkaline Phosphatase: 94  Total Protein: 7.9  Albumin: 2.1 (L)  Total Bilirubin: 0.3  AST: 15  ALT: 10  Lactate, Juvenal: 4.3 (HH)    5/29/2018 16:50  CULTURE, BLOOD: Rpt    5/29/2018 16:56  CULTURE, BLOOD: Rpt    5/29/2018 18:14  Specimen UA: Urine, Catheterized  Color, UA: Yellow  pH, UA: 5.0  Specific Gravity, UA: 1.020  Appearance, UA: Cloudy (A)  Protein, UA: 2+ (A)  Glucose, UA: Negative  Ketones, UA: Negative  Occult Blood UA: 1+ (A)  Nitrite, UA: Negative  Urobilinogen, UA: Negative  Bilirubin (UA): Negative  Leukocytes, UA: 1+ (A)  RBC, UA: 55 (H)  WBC, UA: >100 (H)  WBC Clumps, UA: Many (A)  Bacteria, UA: Many (A)  Yeast, UA: Few (A)  Squam Epithel, UA: 4  Hyaline Casts, UA: 0  Microscopic Comment: SEE COMMENT  CULTURE, URINE:  Rpt    Radiological Study: Ordered  ED Management:  1935h Dr. Farris admitted the patient for Dr. Orantes. As she is stable she will not be admitted to the unit this was discussed with Dr. Farris who concurred patient does not need a unit bed at this time.            Scribe Attestation:   Scribe #1: I performed the above scribed service and the documentation accurately describes the services I performed. I attest to the accuracy of the note.    Attending Attestation:           Physician Attestation for Scribe:  Physician Attestation Statement for Scribe #1: I, Lai SANDS, reviewed documentation, as scribed by Cheryle Sands in my presence, and it is both accurate and complete.           Imaging Results          X-Ray Chest AP Portable (Final result)  Result time 05/29/18 19:27:03    Final result by Anton Al MD (05/29/18 19:27:03)                 Impression:      As above      Electronically signed by: Anton Al MD  Date:    05/29/2018  Time:    19:27             Narrative:    EXAMINATION:  XR CHEST AP PORTABLE    CLINICAL HISTORY:  Fever, unspecified    TECHNIQUE:  Single frontal view of the chest was performed.    COMPARISON:  05/29/2018    FINDINGS:  There has been no significant interval detrimental change in the cardiopulmonary status since previous exam.  No pneumothorax.                               CT Abdomen Pelvis  Without Contrast (Final result)  Result time 05/29/18 17:36:30    Final result by Anton Al MD (05/29/18 17:36:30)                 Impression:      1. Bilateral periureteral inflammation noting urinary bladder wall thickening.  Correlation with urinalysis is recommended, cystitis with or without ascending infection is a consideration.  2. Possible posttraumatic or postsurgical changes of the kidneys bilaterally, as discussed above.  Lobular contour of the right kidney is nonspecific, underlying lesion is not excluded.  Consider nonemergent, outpatient  ultrasound of the kidneys for further evaluation.  3. Right retroperitoneal lymphadenopathy, nonspecific, could be reactive, correlation advised.  4. Uterine leiomyoma, and several additional findings as described above.      Electronically signed by: Anton Al MD  Date:    05/29/2018  Time:    17:36             Narrative:    EXAMINATION:  CT ABDOMEN PELVIS WITHOUT CONTRAST    CLINICAL HISTORY:  Abdominal pain, unspecified;    TECHNIQUE:  Low dose axial images, sagittal and coronal reformations were obtained from the lung bases to the pubic symphysis.  Oral contrast was not administered.    COMPARISON:  None    FINDINGS:  Images of the lower thorax are remarkable for bilateral dependent atelectasis.  There is calcification in the distribution of the coronary arteries.    The liver is hypoattenuating, suggesting steatosis, correlation with LFTs recommended.  Please note, overall evaluation of the abdomen and pelvis is limited secondary to patient motion.    The spleen, pancreas and left adrenal gland are grossly unremarkable.  There is cholelithiasis without biliary dilation.  There is a small hiatal hernia.  The left adrenal gland is suboptimally evaluated although appears grossly unremarkable.    There is a fat containing focus along the interpolar region of the left kidney, could reflect sequela of previous injury or insult, versus postsurgical change or possibly fat containing lesion such as a mL.  Calcification versus postsurgical change is noted within the kidney at the location, correlation with patient history is recommended.  There is no left hydronephrosis, and the left ureter is unremarkable along its visualized course.  There is a fat containing focus within the interpolar region of the right kidney, same differential as the left.  The right kidney has a somewhat lobular contour, solid lesion cannot be excluded noting a rounded focus in the interpolar region measuring approximately 1.9 cm.  No  convincing right nephrolithiasis.  The right ureter is grossly unremarkable along its visualized course although cannot be followed in its entirety to the urinary bladder.  There is inflammation about the ureters bilaterally.  The urinary bladder is decompressed noting a small amount of air anteriorly within the urinary bladder, correlation with any recent catheterization.  The urinary bladder walls are mildly thickened, correlation with urinalysis recommended, as this could reflect cystitis.  The uterus is multilobular in contour, noting scattered regions of calcification suggesting multiple leiomyoma.  The adnexa is otherwise grossly unremarkable.    There are several scattered colonic diverticula without inflammation or significant colonic wall thickening.  The appendix and terminal ileum are grossly unremarkable.  Scattered shotty periaortic and paracaval lymph nodes are noted.  There is an enlarged right paracaval lymph node, measuring approximately 2.2 x 1.3 cm.  Several additional prominent lymph nodes are noted along the pericaval regions, nonspecific.  There is atherosclerotic calcification of the aorta and its branches.  There is atrophy of the psoas muscles bilaterally, as well as the iliacus on the right.    Degenerative changes are noted of the lumbar spine, and sacroiliac joint without focal destructive process.  There is osteopenia.  No significant no significant inguinal lymphadenopathy.  There is a fat containing anterior abdominal wall hernia without inflammation.                                         Clinical Impression:   The primary encounter diagnosis was SIRS (systemic inflammatory response syndrome). Diagnoses of Fever, Urinary tract infection without hematuria, site unspecified, and Renal insufficiency were also pertinent to this visit.    Disposition:   Disposition: Admitted  Condition: Serious                        Lai Vela MD  05/29/18 1948       Lai Vela,  MD  05/29/18 1949

## 2018-05-29 NOTE — PROGRESS NOTES
Patient, Tasneem Lynn (MRN #[id number]), presented with a recorded BMI of 49.13 kg/m^2 consistent with the definition of morbid obesity (ICD-10 E66.01). The patient's morbid obesity was monitored, evaluated, addressed and/or treated. This addendum to the medical record is made on 05/29/2018.

## 2018-05-29 NOTE — PATIENT INSTRUCTIONS
Dysuria with Uncertain Cause (Adult)    The urethra is the tube that allows urine to pass out of the body. In a woman, the urethra is the opening above the vagina. In men, the urethra is the opening on the tip of the penis. Dysuria is the feeling of pain or burning in the urethra when passing urine.  Dysuria can be caused by anything that irritates or inflames the urethra. An infection or chemical irritation can cause this reaction. A bladder infection is the most common cause of dysuria in adults. A urine test can diagnose this. A bladder infection needs antibiotic treatment.  Soaps, lotions, colognes and feminine hygiene products can cause dysuria. So can birth control jellies, creams, and foams. It will go away 1 to 3 days after using these irritants.  Sexually transmitted diseases (STDs) such as chlamydia or gonorrhea can cause dysuria. Your healthcare provider may take a culture sample. Your provider may start you on antibiotic medicine before the culture test returns.  In women who have gone through menopause, dysuria can be from dryness in the lining of the urethra. This can be treated with hormones. Dysuria becomes long-term (chronic) when it lasts for weeks or months. You may need to see a specialist (urologist) to diagnose and treat chronic dysuria.  Home care  These home care tips may help:  · Don't use any chemicals or products that you think may be causing your symptoms.  · If you were given a prescription medicine, take as directed. Be sure to take it until it is all used up.  · If a culture was taken, don't have sex until you have been told that it is negative. This means you don't have an infection. Then follow your healthcare provider's advice to treat your condition.  If a culture was done and it is positive:  · Both you and your sexual partner may need to be treated. This is true even if your partner has no symptoms.  · Contact your healthcare provider or go to an urgent care clinic or the  public health department to be looked at and treated.  · Don't have sex until both you and your partner(s) have finished all antibiotics and your healthcare provider says you are no longer contagious.  · Learn about and use safe sex practices. The safest sex is with a partner who has tested negative and only has sex with you. Condoms can prevent STDs from spreading, but they aren't a guarantee.  Follow-up care  Follow up with your healthcare provider, or as advised. If a culture was taken, you may call as directed for the results. If you have an STD, follow up with your provider or the public health department for a complete STD screening, including HIV testing. For more information, contact CDC-INFO at 051-868-2646.  When to seek medical advice  Call your healthcare provider right away if any of these occur:  · You aren't better after 3 days of treatment  · Fever of 100.4ºF (38ºC) or higher, or as directed by your healthcare provider  · Back or belly pain that gets worse  · You can't urinate because of pain  · New discharge from the urethra, vagina, or penis  · Painful sores on the penis  · Rash or joint pain  · Painful lumps (lymph nodes) in the groin  · Testicle pain or swelling of the scrotum  Date Last Reviewed: 11/1/2016 © 2000-2017 [employer]. [address] [place] [address]. All rights reserved. This information is not intended as a substitute for professional medical care. Always follow your healthcare professional's instructions.        Dysuria with Uncertain Cause (Adult)    The urethra is the tube that allows urine to pass out of the body. In a woman, the urethra is the opening above the vagina. In men, the urethra is the opening on the tip of the penis. Dysuria is the feeling of pain or burning in the urethra when passing urine.  Dysuria can be caused by anything that irritates or inflames the urethra. An infection or chemical irritation can cause this reaction. A bladder  infection is the most common cause of dysuria in adults. A urine test can diagnose this. A bladder infection needs antibiotic treatment.  Soaps, lotions, colognes and feminine hygiene products can cause dysuria. So can birth control jellies, creams, and foams. It will go away 1 to 3 days after using these irritants.  Sexually transmitted diseases (STDs) such as chlamydia or gonorrhea can cause dysuria. Your healthcare provider may take a culture sample. Your provider may start you on antibiotic medicine before the culture test returns.  In women who have gone through menopause, dysuria can be from dryness in the lining of the urethra. This can be treated with hormones. Dysuria becomes long-term (chronic) when it lasts for weeks or months. You may need to see a specialist (urologist) to diagnose and treat chronic dysuria.  Home care  These home care tips may help:  · Don't use any chemicals or products that you think may be causing your symptoms.  · If you were given a prescription medicine, take as directed. Be sure to take it until it is all used up.  · If a culture was taken, don't have sex until you have been told that it is negative. This means you don't have an infection. Then follow your healthcare provider's advice to treat your condition.  If a culture was done and it is positive:  · Both you and your sexual partner may need to be treated. This is true even if your partner has no symptoms.  · Contact your healthcare provider or go to an urgent care clinic or the public health department to be looked at and treated.  · Don't have sex until both you and your partner(s) have finished all antibiotics and your healthcare provider says you are no longer contagious.  · Learn about and use safe sex practices. The safest sex is with a partner who has tested negative and only has sex with you. Condoms can prevent STDs from spreading, but they aren't a guarantee.  Follow-up care  Follow up with your healthcare  provider, or as advised. If a culture was taken, you may call as directed for the results. If you have an STD, follow up with your provider or the public health department for a complete STD screening, including HIV testing. For more information, contact CDC-INFO at 788-872-2404.  When to seek medical advice  Call your healthcare provider right away if any of these occur:  · You aren't better after 3 days of treatment  · Fever of 100.4ºF (38ºC) or higher, or as directed by your healthcare provider  · Back or belly pain that gets worse  · You can't urinate because of pain  · New discharge from the urethra, vagina, or penis  · Painful sores on the penis  · Rash or joint pain  · Painful lumps (lymph nodes) in the groin  · Testicle pain or swelling of the scrotum  Date Last Reviewed: 11/1/2016  © 0463-9404 [employer]. [address] [place] [address]. All rights reserved. This information is not intended as a substitute for professional medical care. Always follow your healthcare professional's instructions.    Please return here or go to the Emergency Department for any concerns or worsening of condition.  If you were prescribed antibiotics, please take them to completion.  If you were prescribed a narcotic medication, do not drive or operate heavy equipment or machinery while taking these medications.  Please follow up with your primary care doctor or specialist as needed.    If you  smoke, please stop smoking.

## 2018-05-30 PROBLEM — N17.9 ACUTE ON CHRONIC RENAL FAILURE: Status: ACTIVE | Noted: 2018-05-30

## 2018-05-30 PROBLEM — A41.9 SEPSIS SECONDARY TO UTI: Status: ACTIVE | Noted: 2018-05-30

## 2018-05-30 PROBLEM — N39.0 SEPSIS SECONDARY TO UTI: Status: ACTIVE | Noted: 2018-05-30

## 2018-05-30 PROBLEM — N18.9 ACUTE ON CHRONIC RENAL FAILURE: Status: ACTIVE | Noted: 2018-05-30

## 2018-05-30 LAB
ALBUMIN SERPL BCP-MCNC: 1.8 G/DL
ALP SERPL-CCNC: 99 U/L
ALT SERPL W/O P-5'-P-CCNC: 9 U/L
ANION GAP SERPL CALC-SCNC: 10 MMOL/L
AST SERPL-CCNC: 13 U/L
BASOPHILS # BLD AUTO: 0.04 K/UL
BASOPHILS NFR BLD: 0.2 %
BILIRUB SERPL-MCNC: 0.3 MG/DL
BUN SERPL-MCNC: 30 MG/DL
CALCIUM SERPL-MCNC: 8.6 MG/DL
CHLORIDE SERPL-SCNC: 105 MMOL/L
CHLORIDE UR-SCNC: 106 MMOL/L
CO2 SERPL-SCNC: 19 MMOL/L
CREAT SERPL-MCNC: 2.3 MG/DL
CREAT UR-MCNC: 37.4 MG/DL
CREAT UR-MCNC: 37.4 MG/DL
DIFFERENTIAL METHOD: ABNORMAL
EOSINOPHIL # BLD AUTO: 0.6 K/UL
EOSINOPHIL NFR BLD: 3.5 %
EOSINOPHIL URNS QL WRIGHT STN: ABNORMAL
ERYTHROCYTE [DISTWIDTH] IN BLOOD BY AUTOMATED COUNT: 13.3 %
EST. GFR  (AFRICAN AMERICAN): 24 ML/MIN/1.73 M^2
EST. GFR  (NON AFRICAN AMERICAN): 21 ML/MIN/1.73 M^2
ESTIMATED AVG GLUCOSE: 346 MG/DL
GLUCOSE SERPL-MCNC: 202 MG/DL
GRAM STN SPEC: NORMAL
HBA1C MFR BLD HPLC: 13.7 %
HCT VFR BLD AUTO: 35.4 %
HGB BLD-MCNC: 9.1 G/DL
HYPOCHROMIA BLD QL SMEAR: ABNORMAL
LACTATE SERPL-SCNC: 1.9 MMOL/L
LYMPHOCYTES # BLD AUTO: 3.7 K/UL
LYMPHOCYTES NFR BLD: 22.7 %
MCH RBC QN AUTO: 22.8 PG
MCHC RBC AUTO-ENTMCNC: 25.7 G/DL
MCV RBC AUTO: 89 FL
MONOCYTES # BLD AUTO: 1.7 K/UL
MONOCYTES NFR BLD: 10.4 %
NEUTROPHILS # BLD AUTO: 10 K/UL
NEUTROPHILS NFR BLD: 63.2 %
OSMOLALITY SERPL: 296 MOSM/KG
OSMOLALITY UR: 274 MOSM/KG
PLATELET # BLD AUTO: ABNORMAL K/UL
PMV BLD AUTO: ABNORMAL FL
POCT GLUCOSE: 176 MG/DL (ref 70–110)
POCT GLUCOSE: 200 MG/DL (ref 70–110)
POCT GLUCOSE: 229 MG/DL (ref 70–110)
POCT GLUCOSE: 300 MG/DL (ref 70–110)
POTASSIUM SERPL-SCNC: 4 MMOL/L
POTASSIUM UR-SCNC: 10 MMOL/L
PROT SERPL-MCNC: 6.9 G/DL
PROT UR-MCNC: 58 MG/DL
PROT/CREAT RATIO, UR: 1.55
RBC # BLD AUTO: 3.99 M/UL
SODIUM SERPL-SCNC: 134 MMOL/L
SODIUM UR-SCNC: 90 MMOL/L
UUN UR-MCNC: 125 MG/DL
WBC # BLD AUTO: 16.05 K/UL

## 2018-05-30 PROCEDURE — 63600175 PHARM REV CODE 636 W HCPCS: Performed by: HOSPITALIST

## 2018-05-30 PROCEDURE — 84540 ASSAY OF URINE/UREA-N: CPT

## 2018-05-30 PROCEDURE — 25000003 PHARM REV CODE 250: Performed by: HOSPITALIST

## 2018-05-30 PROCEDURE — 36415 COLL VENOUS BLD VENIPUNCTURE: CPT

## 2018-05-30 PROCEDURE — 80053 COMPREHEN METABOLIC PANEL: CPT

## 2018-05-30 PROCEDURE — 84300 ASSAY OF URINE SODIUM: CPT

## 2018-05-30 PROCEDURE — 87205 SMEAR GRAM STAIN: CPT

## 2018-05-30 PROCEDURE — 84133 ASSAY OF URINE POTASSIUM: CPT

## 2018-05-30 PROCEDURE — 84156 ASSAY OF PROTEIN URINE: CPT

## 2018-05-30 PROCEDURE — 83930 ASSAY OF BLOOD OSMOLALITY: CPT

## 2018-05-30 PROCEDURE — 63600175 PHARM REV CODE 636 W HCPCS: Performed by: EMERGENCY MEDICINE

## 2018-05-30 PROCEDURE — 83935 ASSAY OF URINE OSMOLALITY: CPT

## 2018-05-30 PROCEDURE — 82436 ASSAY OF URINE CHLORIDE: CPT

## 2018-05-30 PROCEDURE — 87205 SMEAR GRAM STAIN: CPT | Mod: 91

## 2018-05-30 PROCEDURE — 21400001 HC TELEMETRY ROOM

## 2018-05-30 PROCEDURE — 85025 COMPLETE CBC W/AUTO DIFF WBC: CPT

## 2018-05-30 PROCEDURE — 25000003 PHARM REV CODE 250: Performed by: EMERGENCY MEDICINE

## 2018-05-30 RX ORDER — HYDRALAZINE HYDROCHLORIDE 20 MG/ML
10 INJECTION INTRAMUSCULAR; INTRAVENOUS EVERY 8 HOURS PRN
Status: DISCONTINUED | OUTPATIENT
Start: 2018-05-30 | End: 2018-05-30

## 2018-05-30 RX ORDER — HYDRALAZINE HYDROCHLORIDE 20 MG/ML
10 INJECTION INTRAMUSCULAR; INTRAVENOUS EVERY 8 HOURS PRN
Status: DISCONTINUED | OUTPATIENT
Start: 2018-05-30 | End: 2018-06-01 | Stop reason: HOSPADM

## 2018-05-30 RX ORDER — BENZONATATE 100 MG/1
100 CAPSULE ORAL 3 TIMES DAILY PRN
Status: DISCONTINUED | OUTPATIENT
Start: 2018-05-30 | End: 2018-06-01 | Stop reason: HOSPADM

## 2018-05-30 RX ORDER — GUAIFENESIN 100 MG/5ML
100 SOLUTION ORAL ONCE
Status: COMPLETED | OUTPATIENT
Start: 2018-05-30 | End: 2018-05-30

## 2018-05-30 RX ORDER — CEFEPIME HYDROCHLORIDE 2 G/50ML
2 INJECTION, SOLUTION INTRAVENOUS
Status: DISCONTINUED | OUTPATIENT
Start: 2018-05-30 | End: 2018-06-01 | Stop reason: HOSPADM

## 2018-05-30 RX ORDER — SODIUM CHLORIDE 9 MG/ML
INJECTION, SOLUTION INTRAVENOUS CONTINUOUS
Status: DISCONTINUED | OUTPATIENT
Start: 2018-05-30 | End: 2018-06-01 | Stop reason: HOSPADM

## 2018-05-30 RX ORDER — CLONIDINE HYDROCHLORIDE 0.1 MG/1
0.1 TABLET ORAL EVERY 4 HOURS PRN
Status: DISCONTINUED | OUTPATIENT
Start: 2018-05-30 | End: 2018-06-01 | Stop reason: HOSPADM

## 2018-05-30 RX ORDER — HYDROCODONE BITARTRATE AND ACETAMINOPHEN 5; 325 MG/1; MG/1
1 TABLET ORAL EVERY 6 HOURS PRN
Status: DISCONTINUED | OUTPATIENT
Start: 2018-05-30 | End: 2018-06-01 | Stop reason: HOSPADM

## 2018-05-30 RX ORDER — HEPARIN SODIUM 5000 [USP'U]/ML
5000 INJECTION, SOLUTION INTRAVENOUS; SUBCUTANEOUS EVERY 8 HOURS
Status: DISCONTINUED | OUTPATIENT
Start: 2018-05-30 | End: 2018-06-01 | Stop reason: HOSPADM

## 2018-05-30 RX ORDER — GUAIFENESIN 100 MG/5ML
100 SOLUTION ORAL EVERY 6 HOURS PRN
Status: DISCONTINUED | OUTPATIENT
Start: 2018-05-30 | End: 2018-06-01 | Stop reason: HOSPADM

## 2018-05-30 RX ADMIN — CEFEPIME HYDROCHLORIDE 2 G: 2 INJECTION, SOLUTION INTRAVENOUS at 05:05

## 2018-05-30 RX ADMIN — ASPIRIN 81 MG 81 MG: 81 TABLET ORAL at 10:05

## 2018-05-30 RX ADMIN — HEPARIN SODIUM 5000 UNITS: 5000 INJECTION, SOLUTION INTRAVENOUS; SUBCUTANEOUS at 02:05

## 2018-05-30 RX ADMIN — HYDRALAZINE HYDROCHLORIDE 10 MG: 20 INJECTION INTRAMUSCULAR; INTRAVENOUS at 12:05

## 2018-05-30 RX ADMIN — INSULIN ASPART 3 UNITS: 100 INJECTION, SOLUTION INTRAVENOUS; SUBCUTANEOUS at 05:05

## 2018-05-30 RX ADMIN — ROSUVASTATIN CALCIUM 20 MG: 10 TABLET, FILM COATED ORAL at 10:05

## 2018-05-30 RX ADMIN — HEPARIN SODIUM 5000 UNITS: 5000 INJECTION, SOLUTION INTRAVENOUS; SUBCUTANEOUS at 10:05

## 2018-05-30 RX ADMIN — GABAPENTIN 300 MG: 300 CAPSULE ORAL at 02:05

## 2018-05-30 RX ADMIN — LEVOTHYROXINE SODIUM 75 MCG: 75 TABLET ORAL at 05:05

## 2018-05-30 RX ADMIN — SODIUM CHLORIDE: 0.9 INJECTION, SOLUTION INTRAVENOUS at 02:05

## 2018-05-30 RX ADMIN — HYDROCODONE BITARTRATE AND ACETAMINOPHEN 1 TABLET: 5; 325 TABLET ORAL at 02:05

## 2018-05-30 RX ADMIN — CETIRIZINE HYDROCHLORIDE 10 MG: 10 TABLET, FILM COATED ORAL at 10:05

## 2018-05-30 RX ADMIN — GABAPENTIN 300 MG: 300 CAPSULE ORAL at 10:05

## 2018-05-30 RX ADMIN — GUAIFENESIN 100 MG: 200 SOLUTION ORAL at 01:05

## 2018-05-30 NOTE — ED NOTES
Attempted to call report. Was told that nurse Madeline was not at station and that she will call back.

## 2018-05-30 NOTE — ED NOTES
PT is awake and alert. No s/s of obvious distress noted. Davis drainage bag attached to bedside drainage. Cloudy urine noted in bag. IVFs still infusing. Sinus rhythm on monitor. Updated pt on plan of care. Understanding verbalzied

## 2018-05-30 NOTE — PLAN OF CARE
Problem: Diabetes, Type 2 (Adult)  Intervention: Optimize Glycemic Control   05/30/18 0604   Nutrition Interventions   Glycemic Management blood glucose monitoring;oral hydration promoted         Problem: Fall Risk (Adult)  Goal: Absence of Falls  Patient will demonstrate the desired outcomes by discharge/transition of care.   Outcome: Ongoing (interventions implemented as appropriate)   05/30/18 0604   Fall Risk (Adult)   Absence of Falls making progress toward outcome

## 2018-05-30 NOTE — HPI
Tasneem Lynn is a 72 y.o. female that (in part)  has a past medical history of Arthritis; CHF (congestive heart failure); Diabetes mellitus; HLD (hyperlipidemia); Hypertension; Obese; Stroke; and Thyroid disease.  has a past surgical history that includes right hand surgery and right knee surgery (Right). Presents to [facility] complaining of weakness and fatigue and associated hypotension with abdominal pain and dysuria.  Subacute onset 1 week ago with progressive worsening.  She was seen in urgent care facility for hypotension and was directed to come to the emergency department for further evaluation.  She was given IV fluids with some relief.  Characterizes the suprapubic pain as burning and aching.  Without radiation.  Daily frequency.  Denies hematuria but endorses subjective fever, chills, and generalized malaise.     In the emergency department  routine laboratory studies and urinalysis were obtained.  There is evidence of sepsis secondary to urinary tract infection.   Cultures were obtained and she was started on IV antibiotics.  There is also evidence of acute on chronic renal failure.  Additional IV fluids were given.     Hospital medicine has been asked to admit for further evaluation and treatment.

## 2018-05-30 NOTE — PLAN OF CARE
Introduced myself to patient and filled out information on white board. Explained blue DC folder and identified help at home and that I would be assisting patient with managing care at home throughout hospitalization.  Discussed pharmacy preference as well.        05/30/18 1728   Discharge Assessment   Assessment Type Discharge Planning Assessment   Confirmed/corrected address and phone number on facesheet? Yes   Assessment information obtained from? Patient   Expected Length of Stay (days) 3   Communicated expected length of stay with patient/caregiver yes   Prior to hospitilization cognitive status: Alert/Oriented   Prior to hospitalization functional status: Assistive Equipment   Current cognitive status: Alert/Oriented   Current Functional Status: Assistive Equipment   Lives With child(jose), adult  (Noe daughter)   Able to Return to Prior Arrangements yes   Is patient able to care for self after discharge? Yes   Who are your caregiver(s) and their phone number(s)? noe   Patient's perception of discharge disposition admitted as an inpatient   Readmission Within The Last 30 Days no previous admission in last 30 days   Patient currently being followed by outpatient case management? No   Patient currently receives any other outside agency services? No   Equipment Currently Used at Home walker, rolling   Do you have any problems affording any of your prescribed medications? No   Is the patient taking medications as prescribed? yes   Does the patient have transportation home? Yes   Transportation Available family or friend will provide   Does the patient receive services at the [facility]? No   Discharge Plan A Home Health;Home with family   Discharge Plan B Home with family   Patient/Family In Agreement With Plan yes       [place] [id number]  [facility] [address] [facility] [place] [address]  Phone: [phone] Fax:  [phone]    Merrill brings to appts on mondays and tuesdays    spoek with kit at [facility] who will see pt on tomorrow

## 2018-05-30 NOTE — H&P
[facility] Hospital Medicine  History & Physical    Patient Name: Tasneem Lynn  MRN: [id number]  Admission Date: 05/30/2018  Attending Physician: Mian Farris MD, MPH      [facility]     [facility]    CC:     Chief Complaint   Patient presents with    Hypotension     reports abdominal pain x 1 wk and went to urgent care; sent for hypotension, 80 systolic    Abdominal Pain     with coughing; dysuria       HISTORY OF PRESENT ILLNESS:     Tasneem Lynn is a 72 y.o. female that (in part)  has a past medical history of Arthritis; CHF (congestive heart failure); Diabetes mellitus; HLD (hyperlipidemia); Hypertension; Obese; Stroke; and Thyroid disease.  has a past surgical history that includes right hand surgery and right knee surgery (Right). Presents to [facility] Emergency Department complaining of weakness and fatigue and associated hypotension with abdominal pain and dysuria.  Subacute onset 1 week ago with progressive worsening.  She was seen in urgent care facility for hypotension and was directed to come to the emergency department for further evaluation.  She was given IV fluids with some relief.  Characterizes the suprapubic pain as burning and aching.  Without radiation.  Daily frequency.  Denies hematuria but endorses subjective fever, chills, and generalized malaise.    In the emergency department  routine laboratory studies and urinalysis were obtained.  There is evidence of sepsis secondary to urinary tract infection.   Cultures were obtained and she was started on IV antibiotics.  There is also evidence of acute on chronic renal failure.  Additional IV fluids were given.    Hospital medicine has been asked to admit for further evaluation and treatment.       REVIEW OF SYSTEMS:     -- Constitutional: No fever or chills.  -- Eyes: No visual changes, diplopia, pain, tearing, blind spots, or discharge.   -- Ears, nose, mouth, throat, and face: congestion.   No  sore throat, epistaxis, d/c, bleeding gums, neck stiffness masses, or dental issues.  -- Respiratory:  cough with scant sputum production.  No shortness of breath, hemoptysis, stridor, wheezing, or night sweats.  -- Cardiovascular: No chest pain, WOLFE, syncope, PND, edema, cyanosis, or palpitations.   -- Gastrointestinal: suprapubic tenderness.  + No vomiting, hematemesis, melena, dyspepsia, or change in bowel habits.  -- Genitourinary: as above in history of present illness.  -- Integument/breast: No rash, pruritis, pigmentation changes, dryness, or changes in hair  -- Hematologic/lymphatic: No easy bruising or lymphadenopathy.   -- Musculoskeletal: Chronic arthralgias.   No acute arthralgias, acute myalgias, joint swelling, acute limitations of ROM, or acute muscular weakness.  -- Neurological: No seizures, headaches, incoordination, paraesthesias, ataxia, vertigo, or tremors.  -- Behavioral/Psych: No auditory or visual hallucinations, depression, or suicidal/homicidal ideations.  -- Endocrine: Elevated blood glucose levels.   No heat or cold intolerance, polydipsia, or unintentional weight gain / loss.  -- Allergy/Immunologic: No recurrent infections or adverse reaction to food, insects, or difficulty breathing.        PAST MEDICAL / SURGICAL HISTORY:     Past Medical History:   Diagnosis Date    Arthritis     Gout    CHF (congestive heart failure)     Diabetes mellitus     HLD (hyperlipidemia)     Hypertension     Obese     Stroke     1986    Thyroid disease      Past Surgical History:   Procedure Laterality Date    right hand surgery      right knee surgery Right     partial plate         FAMILY HISTORY:     Family History   Problem Relation Age of Onset    Heart disease Sister     Diabetes Maternal Aunt     Heart disease Maternal Aunt     Hypertension Maternal Aunt          SOCIAL HISTORY:     Social History     Social History    Marital status:      Spouse name: N/A    Number of  children: N/A    Years of education: N/A     Social History Main Topics    Smoking status: Never Smoker    Smokeless tobacco: Never Used    Alcohol use No    Drug use: No    Sexual activity: Not Asked     Other Topics Concern    None     Social History Narrative    None         ALLERGIES:       Review of patient's allergies indicates:   Allergen Reactions    Corticosteroids (glucocorticoids) Edema         HEALTH SCREENING:     Influenza vaccine not up-to-date for this season.  Prevnar 13 pneumonia vaccine = no evidence of previous vaccination found in the medical record      HOME MEDICATIONS:     Prior to Admission medications    Medication Sig Start Date End Date Taking? Authorizing Provider   aspirin 81 MG Chew Take 81 mg by mouth once daily.   Yes Historical Provider, MD   benzonatate (TESSALON PERLES) 100 MG capsule Take 1 capsule (100 mg total) by mouth every 6 (six) hours as needed for Cough. 5/29/18 5/29/19 Yes Ivan Chi NP   carvedilol (COREG) 25 MG tablet Take 25 mg by mouth 2 (two) times daily with meals.   Yes Historical Provider, MD   cetirizine 10 mg chewable tablet Take 10 mg by mouth once daily.   Yes Historical Provider, MD   furosemide (LASIX) 40 MG tablet Take 40 mg by mouth 2 (two) times daily.   Yes Historical Provider, MD   gabapentin (NEURONTIN) 300 MG capsule Take 1 capsule (300 mg total) by mouth 3 (three) times daily. 11/18/16 5/29/18 Yes MILES Camara   insulin NPH-insulin regular, 70/30, (NOVOLIN 70/30) 100 unit/mL (70-30) injection Inject 40 Units into the skin 2 (two) times daily.   Yes Historical Provider, MD   levothyroxine (SYNTHROID) 75 MCG tablet Take 75 mcg by mouth once daily.   Yes Historical Provider, MD   losartan (COZAAR) 50 MG tablet Take 50 mg by mouth once daily.   Yes Historical Provider, MD   metformin (GLUCOPHAGE) 1000 MG tablet Take 1,000 mg by mouth 2 (two) times daily with meals.   Yes Historical Provider, MD   allopurinol (ZYLOPRIM) 100 MG  "tablet Take 100 mg by mouth once daily.    Historical Provider, MD   diclofenac (VOLTAREN) 75 MG EC tablet Take 1 tablet (75 mg total) by mouth 2 (two) times daily. 1/5/18   Low Leos MD   methylPREDNISolone (MEDROL DOSEPACK) 4 mg tablet Take as described on the package labeling for the full course. 1/5/18   Low Leos MD   rosuvastatin (CRESTOR) 20 MG tablet Take 20 mg by mouth once daily.    Historical Provider, MD          HOSPITAL MEDICATIONS:     Scheduled Meds:    aspirin  81 mg Oral Daily    ceFEPime (MAXIPIME) IVPB  2 g Intravenous Q12H    cetirizine  10 mg Oral Daily    gabapentin  300 mg Oral TID    levothyroxine  75 mcg Oral Daily    rosuvastatin  20 mg Oral Daily     Continuous Infusions:   PRN Meds: acetaminophen, dextrose 50%, dextrose 50%, glucagon (human recombinant), glucose, glucose, hydrALAZINE, insulin aspart U-100, ondansetron      PHYSICAL EXAM:     Wt Readings from Last 1 Encounters:   05/29/18 2300 114.2 kg (251 lb 12.3 oz)   05/29/18 2015 113.4 kg (250 lb)   05/29/18 1549 113.4 kg (250 lb)     Body mass index is 46.05 kg/m².  Vitals:    05/29/18 2203 05/29/18 2206 05/29/18 2300 05/30/18 0002   BP:  (!) 216/87  (!) 207/100   BP Location:  Left arm  Left arm   Patient Position:  Lying  Lying   Pulse: 89 88  90   Resp:  18  18   Temp:  99.3 °F (37.4 °C)  99.4 °F (37.4 °C)   TempSrc:  Oral  Oral   SpO2:  97%  97%   Weight:   114.2 kg (251 lb 12.3 oz)    Height:   5' 2" (1.575 m)           -- General appearance: well developed. appears stated age   -- Head: normocephalic, atraumatic   -- Eyes: conjunctivae clear. Extraocular muscles intact  -- Nose: Nares normal. Septum midline.   -- Mouth/Throat: lips, mucosa, and tongue normal. no throat erythema.   -- Neck: supple, symmetrical, trachea midline, no JVD and thyroid not grossly enlarged, appears symmetric  -- Lungs: clear to auscultation bilaterally. normal respiratory effort. No use of accessory muscles.   -- Chest wall: no " tenderness. equal bilateral chest rise   -- Heart:  rapid rate and regular rhythm. S1, S2 normal.  no click, rub or gallop   -- Abdomen: suprapubic tenderness.  + soft, non-distended, non-tympanic; bowel sounds normal; no masses  -- Extremities: no cyanosis, clubbing or edema.   -- Pulses: bounding   2+ and symmetric   -- Skin:  decreased skin turgor.color normal, texture normal. No rashes or lesions.   -- Neurologic:  globally decreased muscle strength and tone. No focal numbness or weakness. CNII-XII intact. [place] coma scale: eyes open spontaneously-4, oriented & converses-5, obeys commands-6.      LABORATORY STUDIES:     Recent Results (from the past 36 hour(s))   CBC auto differential    Collection Time: 05/29/18  4:25 PM   Result Value Ref Range    WBC 14.56 (H) 3.90 - 12.70 K/uL    RBC 3.91 (L) 4.00 - 5.40 M/uL    Hemoglobin 11.5 (L) 12.0 - 16.0 g/dL    Hematocrit 35.0 (L) 37.0 - 48.5 %    MCV 90 82 - 98 fL    MCH 29.4 27.0 - 31.0 pg    MCHC 32.9 32.0 - 36.0 g/dL    RDW 13.4 11.5 - 14.5 %    Platelets 429 (H) 150 - 350 K/uL    MPV 9.3 9.2 - 12.9 fL    Gran # (ANC) 8.6 (H) 1.8 - 7.7 K/uL    Lymph # 4.0 1.0 - 4.8 K/uL    Mono # 1.7 (H) 0.3 - 1.0 K/uL    Eos # 0.2 0.0 - 0.5 K/uL    Baso # 0.05 0.00 - 0.20 K/uL    Gran% 59.3 38.0 - 73.0 %    Lymph% 27.5 18.0 - 48.0 %    Mono% 11.5 4.0 - 15.0 %    Eosinophil% 1.4 0.0 - 8.0 %    Basophil% 0.3 0.0 - 1.9 %    Differential Method Automated    Comprehensive metabolic panel    Collection Time: 05/29/18  4:25 PM   Result Value Ref Range    Sodium 131 (L) 136 - 145 mmol/L    Potassium 3.9 3.5 - 5.1 mmol/L    Chloride 98 95 - 110 mmol/L    CO2 21 (L) 23 - 29 mmol/L    Glucose 316 (H) 70 - 110 mg/dL    BUN, Bld 31 (H) 8 - 23 mg/dL    Creatinine 2.9 (H) 0.5 - 1.4 mg/dL    Calcium 9.3 8.7 - 10.5 mg/dL    Total Protein 7.9 6.0 - 8.4 g/dL    Albumin 2.1 (L) 3.5 - 5.2 g/dL    Total Bilirubin 0.3 0.1 - 1.0 mg/dL    Alkaline Phosphatase 94 55 - 135 U/L    AST 15 10 - 40 U/L     ALT 10 10 - 44 U/L    Anion Gap 12 8 - 16 mmol/L    eGFR if African American 18 (A) >60 mL/min/1.73 m^2    eGFR if non African American 16 (A) >60 mL/min/1.73 m^2   Lactic acid, plasma    Collection Time: 05/29/18  4:25 PM   Result Value Ref Range    Lactate (Lactic Acid) 4.3 (HH) 0.5 - 2.2 mmol/L   Lipase    Collection Time: 05/29/18  4:32 PM   Result Value Ref Range    Lipase 65 (H) 4 - 60 U/L   Phosphorus    Collection Time: 05/29/18  4:32 PM   Result Value Ref Range    Phosphorus 2.6 (L) 2.7 - 4.5 mg/dL   Magnesium    Collection Time: 05/29/18  4:32 PM   Result Value Ref Range    Magnesium 1.6 1.6 - 2.6 mg/dL   Blood Culture #1 **CANNOT BE ORDERED STAT**    Collection Time: 05/29/18  4:50 PM   Result Value Ref Range    Blood Culture, Routine No Growth to date    Blood Culture #2 **CANNOT BE ORDERED STAT**    Collection Time: 05/29/18  4:56 PM   Result Value Ref Range    Blood Culture, Routine No Growth to date    Urinalysis    Collection Time: 05/29/18  6:14 PM   Result Value Ref Range    Specimen UA Urine, Catheterized     Color, UA Yellow Yellow, Straw, Adriana    Appearance, UA Cloudy (A) Clear    pH, UA 5.0 5.0 - 8.0    Specific Gravity, UA 1.020 1.005 - 1.030    Protein, UA 2+ (A) Negative    Glucose, UA Negative Negative    Ketones, UA Negative Negative    Bilirubin (UA) Negative Negative    Occult Blood UA 1+ (A) Negative    Nitrite, UA Negative Negative    Urobilinogen, UA Negative <2.0 EU/dL    Leukocytes, UA 1+ (A) Negative   Urinalysis Microscopic    Collection Time: 05/29/18  6:14 PM   Result Value Ref Range    RBC, UA 55 (H) 0 - 4 /hpf    WBC, UA >100 (H) 0 - 5 /hpf    WBC Clumps, UA Many (A) None-Rare    Bacteria, UA Many (A) None-Occ /hpf    Yeast, UA Few (A) None    Squam Epithel, UA 4 /hpf    Hyaline Casts, UA 0 0-1/lpf /lpf    Microscopic Comment SEE COMMENT    POCT glucose    Collection Time: 05/29/18  8:36 PM   Result Value Ref Range    POCT Glucose 282 (H) 70 - 110 mg/dL   Lactic Acid, Plasma     Collection Time: 05/29/18 10:31 PM   Result Value Ref Range    Lactate (Lactic Acid) 1.9 0.5 - 2.2 mmol/L       Lab Results   Component Value Date    INR 1.1 11/17/2016     Lab Results   Component Value Date    HGBA1C 10.1 (H) 11/17/2016     Recent Labs      05/29/18 2036   POCTGLUCOSE  282*           MICROBIOLOGY DATA:     Urine Culture, Routine   Date Value Ref Range Status   06/07/2013 ESCHERICHIA COLI  Final     Comment:     >100,000 organisms/ml       Microbiology x 7d:   Microbiology Results (last 7 days)     Procedure Component Value Units Date/Time    Gram stain [[id number]]     Order Status:  No result Specimen:  Other from Urine     Blood Culture #2 **CANNOT BE ORDERED STAT** [[id number]] Collected:  05/29/18 1656    Order Status:  Completed Specimen:  Blood from Peripheral, Wrist, Left Updated:  05/30/18 0312     Blood Culture, Routine No Growth to date    Blood Culture #1 **CANNOT BE ORDERED STAT** [[id number]] Collected:  05/29/18 1650    Order Status:  Completed Specimen:  Blood from Peripheral, Hand, Right Updated:  05/30/18 0312     Blood Culture, Routine No Growth to date    Urine culture **CANNOT BE ORDERED STAT** [[id number]] Collected:  05/29/18 1635    Order Status:  Sent Specimen:  Urine from Urine, Catheterized Updated:  05/29/18 1942            IMAGING:     Imaging Results          X-Ray Chest AP Portable (Final result)  Result time 05/29/18 19:27:03    Final result by Anton Al MD (05/29/18 19:27:03)                 Impression:      As above      Electronically signed by: Anton Al MD  Date:    05/29/2018  Time:    19:27             Narrative:    EXAMINATION:  XR CHEST AP PORTABLE    CLINICAL HISTORY:  Fever, unspecified    TECHNIQUE:  Single frontal view of the chest was performed.    COMPARISON:  05/29/2018    FINDINGS:  There has been no significant interval detrimental change in the cardiopulmonary status since previous exam.  No pneumothorax.                                CT Abdomen Pelvis  Without Contrast (Final result)  Result time 05/29/18 17:36:30    Final result by Anton Al MD (05/29/18 17:36:30)                 Impression:      1. Bilateral periureteral inflammation noting urinary bladder wall thickening.  Correlation with urinalysis is recommended, cystitis with or without ascending infection is a consideration.  2. Possible posttraumatic or postsurgical changes of the kidneys bilaterally, as discussed above.  Lobular contour of the right kidney is nonspecific, underlying lesion is not excluded.  Consider nonemergent, outpatient ultrasound of the kidneys for further evaluation.  3. Right retroperitoneal lymphadenopathy, nonspecific, could be reactive, correlation advised.  4. Uterine leiomyoma, and several additional findings as described above.      Electronically signed by: Anton Al MD  Date:    05/29/2018  Time:    17:36             Narrative:    EXAMINATION:  CT ABDOMEN PELVIS WITHOUT CONTRAST    CLINICAL HISTORY:  Abdominal pain, unspecified;    TECHNIQUE:  Low dose axial images, sagittal and coronal reformations were obtained from the lung bases to the pubic symphysis.  Oral contrast was not administered.    COMPARISON:  None    FINDINGS:  Images of the lower thorax are remarkable for bilateral dependent atelectasis.  There is calcification in the distribution of the coronary arteries.    The liver is hypoattenuating, suggesting steatosis, correlation with LFTs recommended.  Please note, overall evaluation of the abdomen and pelvis is limited secondary to patient motion.    The spleen, pancreas and left adrenal gland are grossly unremarkable.  There is cholelithiasis without biliary dilation.  There is a small hiatal hernia.  The left adrenal gland is suboptimally evaluated although appears grossly unremarkable.    There is a fat containing focus along the interpolar region of the left kidney, could reflect sequela of previous injury or insult,  versus postsurgical change or possibly fat containing lesion such as a mL.  Calcification versus postsurgical change is noted within the kidney at the location, correlation with patient history is recommended.  There is no left hydronephrosis, and the left ureter is unremarkable along its visualized course.  There is a fat containing focus within the interpolar region of the right kidney, same differential as the left.  The right kidney has a somewhat lobular contour, solid lesion cannot be excluded noting a rounded focus in the interpolar region measuring approximately 1.9 cm.  No convincing right nephrolithiasis.  The right ureter is grossly unremarkable along its visualized course although cannot be followed in its entirety to the urinary bladder.  There is inflammation about the ureters bilaterally.  The urinary bladder is decompressed noting a small amount of air anteriorly within the urinary bladder, correlation with any recent catheterization.  The urinary bladder walls are mildly thickened, correlation with urinalysis recommended, as this could reflect cystitis.  The uterus is multilobular in contour, noting scattered regions of calcification suggesting multiple leiomyoma.  The adnexa is otherwise grossly unremarkable.    There are several scattered colonic diverticula without inflammation or significant colonic wall thickening.  The appendix and terminal ileum are grossly unremarkable.  Scattered shotty periaortic and paracaval lymph nodes are noted.  There is an enlarged right paracaval lymph node, measuring approximately 2.2 x 1.3 cm.  Several additional prominent lymph nodes are noted along the pericaval regions, nonspecific.  There is atherosclerotic calcification of the aorta and its branches.  There is atrophy of the psoas muscles bilaterally, as well as the iliacus on the right.    Degenerative changes are noted of the lumbar spine, and sacroiliac joint without focal destructive process.  There is  osteopenia.  No significant no significant inguinal lymphadenopathy.  There is a fat containing anterior abdominal wall hernia without inflammation.                                  CONSULTS:     IP CONSULT TO INTENSIVIST  IP CONSULT TO SOCIAL WORK/CASE MANAGEMENT       ASSESSMENT & PLAN:     Primary Diagnosis:  Sepsis secondary to UTI    Active Hospital Problems    Diagnosis  POA    *Sepsis secondary to UTI [A41.9, N39.0]  Yes     Priority: 1 - High    SIRS (systemic inflammatory response syndrome) [R65.10]  Yes     Priority: 2     Acute on chronic renal failure [N17.9, N18.9]  Yes     Priority: 3     Hypothyroidism [E03.9]  Yes     Chronic    Morbid obesity with BMI of 50.0-59.9, adult [E66.01, Z68.43]  Not Applicable     Chronic    History of CVA (cerebrovascular accident) [Z86.73]  Not Applicable     Chronic    Hyperlipidemia [E78.5]  Yes     Chronic    Type 2 diabetes mellitus, uncontrolled, with renal complications [E11.29, E11.65]  Yes     Chronic      Resolved Hospital Problems    Diagnosis Date Resolved POA   No resolved problems to display.         Sepsis secondary to Urinary tract infection, bacterial  · As evidenced by history and UA  · 3 out of 4 SIRS criteria with an initially elevated lactic acid of 4.3.  Lactate has improved with IV fluids  · Urine culture and Gram stain obtained prior to antibiotics  · Given empiric antibiotics  · Will tailor antibiotic regimen according to culture & sensitivity results  · Maintain euvolemic status with IV fluids  · Uncontrolled diabetes are likely contributory to his severe anemia or infection    Acute on chronic renal failure; likely ATN associated with sepsis and UTI as above  · As evidenced by decrease in GFR.  Baseline creatinine = approximately 1.5  · Will evaluate for pre-renal, intrarenal, and post-obstructive etiology.  · Obtain:  1.  protein/creatinine ratio  2. urine and serum osmolalities  3. urine electrolytes (Na, Cl,  K)  4. LDH  5. Complement  6. Murguia's stain for eosinophils  · Renal ultrasound  · Monitor with serial Cr / GFR levels closely with serial labs  · Avoid nephrotoxic medications such as NSAIDs, IV contrast, or RAAS blockade  · Nephrology consult in GFR does not improve with sepsis treatment and IV fluids as noted above    Uncontrolled Diabetes mellitus type 2  · BG is not in acceptable range at this time; insulin given  · Maintain w/ subcutaneous insulin management order set  · Hold oral diabetic meds  · ADA 1800 kcal diet  · BG goal while in patient is <180mg/dL  · HgA1c = Pending    Hyperlipidemia   · Lipid panel - as an outpatient  · Cardiac diet  · Continue statin    Hypertension  · Goal while inpatient is a systolic blood pressure less than 160mmHg  · BP in acceptable range at this time  · PRN antihypertensives available   Holding ACEi/ARB    Thyroid dysfunction   · Clinically, patient is euthyroid   · Chemically, undetermined  · Obtain TSH, free T3, and free T4  · Continue current regimen    Cerebrovascular disease   · No evidence of acute stroke at this time  · Maintain adequate blood pressure control  · Heart healthy diet  · Aspirin  · Statin        VTE Risk Mitigation         Ordered     Place MADISON hose  Until discontinued      05/29/18 1947     IP VTE HIGH RISK PATIENT  Once      05/29/18 1947            Adult PRN medications available   DVT prophylaxis given       DISPOSITION:     Will admit to the Hospital Medicine service for further evaluation and treatment.    Chart reviewed and updated where applicable.    High Risk Conditions:  Patient has a condition that poses threat to life and bodily function: Sepsis secondary urinary tract infection      ===============================================================    Mian Farris MD, MPH  Department of Hospital Medicine   [facility]  [phone]  (7pm - 6am)          This note is dictated using Dragon Medical 360 voice  recognition software.  There are word recognition mistakes that are occasionally missed on review.

## 2018-05-30 NOTE — PHYSICIAN QUERY
"PT Name: Tasneem Lynn  MR #: [id number]     Physician Query Form - Documentation Clarification      CDS: Toyin Cabrera RN, CCDS         Contact information :ext [phone])  [email or web address]       This form is a permanent document in the medical record.     Query Date: May 30, 2018    By submitting this query, we are merely seeking further clarification of documentation. Please utilize your independent clinical judgment when addressing the question(s) below.    The Medical record reflects the following:    Supporting Clinical Findings Location in Medical Record       "Uncontrolled Diabetes mellitus type 2  ·              BG is not in acceptable range at this time; insulin given  ·              Maintain w/ subcutaneous insulin management order set  ·              Hold oral diabetic meds  ·              ADA 1800 kcal diet  ·              BG goal while in patient is <180mg/dL  ·              HgA1c = Pending"    Glucose 316-201  HgbA1C 13.7       H&P 5/29/18                Lab 5/29-5/30/18                                                                                Doctor, Please specify diagnosis or diagnoses associated with above clinical findings.  Please clarify Uncontrolled Diabetes mellitus type 2.    Provider Use Only      _x__Type 2 Diabetes Mellitus with hyperglycemia    ___Type 2 Diabetes Mellitus with hypoglycemia    ___Other, ______                                                                                                               [  ] Clinically undetermined            "

## 2018-05-30 NOTE — PHYSICIAN QUERY
"PT Name: Tasneem Lynn  MR #: [id number]    Physician Query Form - Heart  Condition Clarification     CDS: Toyin Cabrera RN, CCDS         Contact information :ext [phone])  [email or web address]     This form is a permanent document in the medical record.     Query Date: May 30, 2018    By submitting this query, we are merely seeking further clarification of documentation. Please utilize your independent clinical judgment when addressing the question(s) below.    The medical record contains the following   Indicators     Supporting Clinical Findings Location in Medical Record    BNP      EF      Radiology findings      Echo Results      "Ascites" documented      "SOB" or "WOLFE" documented      "Hypoxia" documented     x Heart Failure documented PMH CHF(congestive hear failure) H&P    "Edema" documented     x Diuretics/Meds Lasix  Losartan  Coreg Hpme Meds per H&P    Treatment:      Other:          Provider, please specify diagnosis or diagnoses associated with above clinical findings.      [ x ] Chronic Diastolic Heart Failure (EF > 40)*    [  ] Chronic Systolic Heart Failure (EF < 40)*    [  ] Chronic Combined Systolic and Diastolic Heart Failure    [  ] Other Type of Heart Failure (please specify type): _________________________    [  ] Heart Failure Ruled Out    [  ] Other (please specify): ___________________________________    [  ] Clinically Undetermined            *American Heart Association                                                                                                          Please document in your progress notes daily for the duration of treatment until resolved and include in your discharge summary.    "

## 2018-05-30 NOTE — PHYSICIAN QUERY
"PT Name: Tasneem Lynn  MR #: [id number]    Physician Query Form - Hematology Clarification      CDS: Toyin Cabrera RN, CCDS         Contact information :ext [phone])  [email or web address]       This form is a permanent document in the medical record.      Query Date: May 30, 2018    By submitting this query, we are merely seeking further clarification of documentation. Please utilize your independent clinical judgment when addressing the question(s) below.    The Medical record contains the following:   Indicators  Supporting Clinical Findings Location in Medical Record   x "Anemia" documented "Uncontrolled diabetes are likely contributory to his severe anemia or infection" H&P   x H & H = H/H 11.5/35.0  H/H  9.1/35.4 Lab 5/29/18  Lab 5/30/28    BP =                     HR=      "GI bleeding" documented      Acute bleeding (Non GI site)      Transfusion(s)      Treatment:     x Other:  "Acute on chronic renal failure; likely ATN associated with sepsis and UTI as above  As evidenced by decrease in GFR.  Baseline creatinine = approximately 1.5" H&P      Provider, please specify diagnosis or diagnoses associated with above clinical findings.      [  ] Anemia of chronic disease ( Specify chronic disease)      [ x ] CKD (specify stage) __________________________     [  ] Other (Specify) _______________________________     [  ] Clinically Undetermined     [  ] Other Hematological Diagnosis (please specify): _________________________________    [  ] Clinically Undetermined       Please document in your progress notes daily for the duration of treatment, until resolved, and include in your discharge summary.                                                                                                      "

## 2018-05-31 PROBLEM — N18.30 ACUTE RENAL FAILURE SUPERIMPOSED ON STAGE 3 CHRONIC KIDNEY DISEASE: Status: ACTIVE | Noted: 2018-05-31

## 2018-05-31 PROBLEM — R33.9 URINARY RETENTION: Status: ACTIVE | Noted: 2018-05-31

## 2018-05-31 PROBLEM — N18.30 CKD (CHRONIC KIDNEY DISEASE), STAGE III: Status: ACTIVE | Noted: 2018-05-31

## 2018-05-31 PROBLEM — I50.32 CHRONIC DIASTOLIC CHF (CONGESTIVE HEART FAILURE): Status: ACTIVE | Noted: 2018-05-31

## 2018-05-31 PROBLEM — A41.9 SEPSIS: Status: ACTIVE | Noted: 2018-05-29

## 2018-05-31 PROBLEM — N17.9 ACUTE RENAL FAILURE SUPERIMPOSED ON STAGE 3 CHRONIC KIDNEY DISEASE: Status: ACTIVE | Noted: 2018-05-31

## 2018-05-31 LAB
ALBUMIN SERPL BCP-MCNC: 1.8 G/DL
ALP SERPL-CCNC: 92 U/L
ALT SERPL W/O P-5'-P-CCNC: 11 U/L
ANION GAP SERPL CALC-SCNC: 6 MMOL/L
AST SERPL-CCNC: 13 U/L
BACTERIA UR CULT: NORMAL
BASOPHILS # BLD AUTO: 0.03 K/UL
BASOPHILS NFR BLD: 0.3 %
BILIRUB SERPL-MCNC: 0.3 MG/DL
BUN SERPL-MCNC: 30 MG/DL
CALCIUM SERPL-MCNC: 8.5 MG/DL
CHLORIDE SERPL-SCNC: 105 MMOL/L
CO2 SERPL-SCNC: 23 MMOL/L
CREAT SERPL-MCNC: 2.1 MG/DL
DIFFERENTIAL METHOD: ABNORMAL
EOSINOPHIL # BLD AUTO: 0.4 K/UL
EOSINOPHIL NFR BLD: 3.1 %
ERYTHROCYTE [DISTWIDTH] IN BLOOD BY AUTOMATED COUNT: 13.7 %
EST. GFR  (AFRICAN AMERICAN): 27 ML/MIN/1.73 M^2
EST. GFR  (NON AFRICAN AMERICAN): 23 ML/MIN/1.73 M^2
GLUCOSE SERPL-MCNC: 284 MG/DL
HCT VFR BLD AUTO: 32.1 %
HGB BLD-MCNC: 10.4 G/DL
LYMPHOCYTES # BLD AUTO: 4.3 K/UL
LYMPHOCYTES NFR BLD: 36.4 %
MAGNESIUM SERPL-MCNC: 1.2 MG/DL
MCH RBC QN AUTO: 29.1 PG
MCHC RBC AUTO-ENTMCNC: 32.4 G/DL
MCV RBC AUTO: 90 FL
MONOCYTES # BLD AUTO: 1.3 K/UL
MONOCYTES NFR BLD: 11.4 %
NEUTROPHILS # BLD AUTO: 5.7 K/UL
NEUTROPHILS NFR BLD: 48.8 %
PHOSPHATE SERPL-MCNC: 2.9 MG/DL
PLATELET # BLD AUTO: 431 K/UL
PMV BLD AUTO: 9.4 FL
POCT GLUCOSE: 260 MG/DL (ref 70–110)
POCT GLUCOSE: 331 MG/DL (ref 70–110)
POCT GLUCOSE: 348 MG/DL (ref 70–110)
POCT GLUCOSE: 388 MG/DL (ref 70–110)
POTASSIUM SERPL-SCNC: 4.2 MMOL/L
PROT SERPL-MCNC: 6.7 G/DL
RBC # BLD AUTO: 3.57 M/UL
SODIUM SERPL-SCNC: 134 MMOL/L
WBC # BLD AUTO: 11.75 K/UL

## 2018-05-31 PROCEDURE — 97165 OT EVAL LOW COMPLEX 30 MIN: CPT

## 2018-05-31 PROCEDURE — 21400001 HC TELEMETRY ROOM

## 2018-05-31 PROCEDURE — 63600175 PHARM REV CODE 636 W HCPCS: Performed by: HOSPITALIST

## 2018-05-31 PROCEDURE — 97161 PT EVAL LOW COMPLEX 20 MIN: CPT

## 2018-05-31 PROCEDURE — G8979 MOBILITY GOAL STATUS: HCPCS | Mod: CJ

## 2018-05-31 PROCEDURE — 84100 ASSAY OF PHOSPHORUS: CPT

## 2018-05-31 PROCEDURE — 80053 COMPREHEN METABOLIC PANEL: CPT

## 2018-05-31 PROCEDURE — G8978 MOBILITY CURRENT STATUS: HCPCS | Mod: CK

## 2018-05-31 PROCEDURE — 25000003 PHARM REV CODE 250: Performed by: EMERGENCY MEDICINE

## 2018-05-31 PROCEDURE — G8989 SELF CARE D/C STATUS: HCPCS | Mod: CK

## 2018-05-31 PROCEDURE — 25000003 PHARM REV CODE 250: Performed by: HOSPITALIST

## 2018-05-31 PROCEDURE — G8987 SELF CARE CURRENT STATUS: HCPCS | Mod: CK

## 2018-05-31 PROCEDURE — G8988 SELF CARE GOAL STATUS: HCPCS | Mod: CI

## 2018-05-31 PROCEDURE — 85025 COMPLETE CBC W/AUTO DIFF WBC: CPT

## 2018-05-31 PROCEDURE — 83735 ASSAY OF MAGNESIUM: CPT

## 2018-05-31 RX ORDER — BETHANECHOL CHLORIDE 25 MG/1
50 TABLET ORAL 3 TIMES DAILY
Status: DISCONTINUED | OUTPATIENT
Start: 2018-05-31 | End: 2018-06-01

## 2018-05-31 RX ADMIN — LEVOTHYROXINE SODIUM 75 MCG: 75 TABLET ORAL at 05:05

## 2018-05-31 RX ADMIN — SODIUM CHLORIDE 100 ML: 0.9 INJECTION, SOLUTION INTRAVENOUS at 01:05

## 2018-05-31 RX ADMIN — ASPIRIN 81 MG 81 MG: 81 TABLET ORAL at 08:05

## 2018-05-31 RX ADMIN — HEPARIN SODIUM 5000 UNITS: 5000 INJECTION, SOLUTION INTRAVENOUS; SUBCUTANEOUS at 05:05

## 2018-05-31 RX ADMIN — GABAPENTIN 300 MG: 300 CAPSULE ORAL at 02:05

## 2018-05-31 RX ADMIN — INSULIN ASPART 3 UNITS: 100 INJECTION, SOLUTION INTRAVENOUS; SUBCUTANEOUS at 08:05

## 2018-05-31 RX ADMIN — INSULIN ASPART 4 UNITS: 100 INJECTION, SOLUTION INTRAVENOUS; SUBCUTANEOUS at 12:05

## 2018-05-31 RX ADMIN — GABAPENTIN 300 MG: 300 CAPSULE ORAL at 10:05

## 2018-05-31 RX ADMIN — ROSUVASTATIN CALCIUM 20 MG: 10 TABLET, FILM COATED ORAL at 08:05

## 2018-05-31 RX ADMIN — INSULIN ASPART 4 UNITS: 100 INJECTION, SOLUTION INTRAVENOUS; SUBCUTANEOUS at 05:05

## 2018-05-31 RX ADMIN — BETHANECHOL CHLORIDE 50 MG: 25 TABLET ORAL at 10:05

## 2018-05-31 RX ADMIN — HEPARIN SODIUM 5000 UNITS: 5000 INJECTION, SOLUTION INTRAVENOUS; SUBCUTANEOUS at 10:05

## 2018-05-31 RX ADMIN — SODIUM CHLORIDE: 0.9 INJECTION, SOLUTION INTRAVENOUS at 12:05

## 2018-05-31 RX ADMIN — CETIRIZINE HYDROCHLORIDE 10 MG: 10 TABLET, FILM COATED ORAL at 08:05

## 2018-05-31 RX ADMIN — CEFEPIME HYDROCHLORIDE 2 G: 2 INJECTION, SOLUTION INTRAVENOUS at 05:05

## 2018-05-31 RX ADMIN — INSULIN ASPART 3 UNITS: 100 INJECTION, SOLUTION INTRAVENOUS; SUBCUTANEOUS at 10:05

## 2018-05-31 RX ADMIN — GABAPENTIN 300 MG: 300 CAPSULE ORAL at 08:05

## 2018-05-31 RX ADMIN — BETHANECHOL CHLORIDE 50 MG: 25 TABLET ORAL at 02:05

## 2018-05-31 RX ADMIN — HEPARIN SODIUM 5000 UNITS: 5000 INJECTION, SOLUTION INTRAVENOUS; SUBCUTANEOUS at 02:05

## 2018-05-31 NOTE — PLAN OF CARE
Problem: Fall Risk (Adult)  Goal: Absence of Falls  Patient will demonstrate the desired outcomes by discharge/transition of care.    05/30/18 1933   Fall Risk (Adult)   Absence of Falls making progress toward outcome

## 2018-05-31 NOTE — SUBJECTIVE & OBJECTIVE
Interval History: afebrile at this time.    Review of Systems   Constitutional: Positive for appetite change. Negative for activity change.   HENT: Negative for congestion and dental problem.    Eyes: Negative for discharge and itching.   Respiratory: Negative for apnea and chest tightness.    Cardiovascular: Negative for chest pain and leg swelling.   Gastrointestinal: Negative for abdominal pain and anal bleeding.   Endocrine: Negative for cold intolerance and heat intolerance.   Genitourinary: Negative for difficulty urinating and dyspareunia.   Musculoskeletal: Negative for arthralgias and back pain.   Skin: Negative for color change and pallor.   Allergic/Immunologic: Negative for environmental allergies.   Neurological: Negative for dizziness.   Hematological: Negative for adenopathy.   Psychiatric/Behavioral: Negative for agitation and behavioral problems.     Objective:     Vital Signs (Most Recent):  Temp: 98 °F (36.7 °C) (05/31/18 0731)  Pulse: 95 (05/31/18 0731)  Resp: 16 (05/31/18 0731)  BP: (!) 146/67 (05/31/18 0731)  SpO2: (!) 93 % (05/31/18 0731) Vital Signs (24h Range):  Temp:  [98 °F (36.7 °C)-99.1 °F (37.3 °C)] 98 °F (36.7 °C)  Pulse:  [88-95] 95  Resp:  [16-18] 16  SpO2:  [93 %-98 %] 93 %  BP: (124-160)/(59-77) 146/67     Weight: 110.5 kg (243 lb 9.7 oz)  Body mass index is 44.56 kg/m².    Intake/Output Summary (Last 24 hours) at 05/31/18 0948  Last data filed at 05/31/18 0700   Gross per 24 hour   Intake          2008.33 ml   Output              600 ml   Net          1408.33 ml      Physical Exam   Constitutional: She is oriented to person, place, and time. No distress.   HENT:   Head: Normocephalic.   Eyes: EOM are normal. Pupils are equal, round, and reactive to light.   Neck: Normal range of motion. Neck supple.   Cardiovascular: Normal rate and regular rhythm.    Pulmonary/Chest: Effort normal and breath sounds normal.   Abdominal: Soft.   Genitourinary: Vagina normal.   Musculoskeletal:  Normal range of motion.   Neurological: She is alert and oriented to person, place, and time. No cranial nerve deficit. Coordination normal.   Skin: Skin is warm and dry. She is not diaphoretic.   Psychiatric: She has a normal mood and affect.       Significant Labs:   BMP:   Recent Labs  Lab 05/31/18  0535 05/31/18  0536   *  --    *  --    K 4.2  --      --    CO2 23  --    BUN 30*  --    CREATININE 2.1*  --    CALCIUM 8.5*  --    MG  --  1.2*     CBC:   Recent Labs  Lab 05/29/18  1625 05/30/18  0716 05/31/18  0535   WBC 14.56* 16.05* 11.75   HGB 11.5* 9.1* 10.4*   HCT 35.0* 35.4* 32.1*   * SEE COMMENT 431*       Significant Imaging:reviewed.

## 2018-05-31 NOTE — PLAN OF CARE
Problem: Diabetes, Type 2 (Adult)  Intervention: Optimize Glycemic Control   05/31/18 1401   Nutrition Interventions   Glycemic Management blood glucose monitoring;supplemental insulin given       Goal: Signs and Symptoms of Listed Potential Problems Will be Absent, Minimized or Managed (Diabetes, Type 2)  Signs and symptoms of listed potential problems will be absent, minimized or managed by discharge/transition of care (reference Diabetes, Type 2 (Adult) CPG).   Outcome: Ongoing (interventions implemented as appropriate)   05/31/18 1401   Diabetes, Type 2   Problems Assessed (Type 2 Diabetes) all   Problems Present (Type 2 Diabetes) hyperglycemia       Problem: Fall Risk (Adult)  Goal: Identify Related Risk Factors and Signs and Symptoms  Related risk factors and signs and symptoms are identified upon initiation of Human Response Clinical Practice Guideline (CPG)   Outcome: Ongoing (interventions implemented as appropriate)   05/31/18 1401   Fall Risk   Related Risk Factors (Fall Risk) environment unfamiliar;polypharmacy   Signs and Symptoms (Fall Risk) presence of risk factors     Goal: Absence of Falls  Patient will demonstrate the desired outcomes by discharge/transition of care.   Outcome: Ongoing (interventions implemented as appropriate)   05/31/18 1401   Fall Risk (Adult)   Absence of Falls making progress toward outcome

## 2018-05-31 NOTE — NURSING
Report received from night nurseAyesha. Pt. ambulating from restroom to bed. Evaluated pt. general condition.  Respirationseven and unlabored on room air. No apparent distress noted.  No verbalization of pain or discomfort. Safety measures maintained.

## 2018-05-31 NOTE — PLAN OF CARE
Problem: Occupational Therapy Goal  Goal: Occupational Therapy Goal  Goals to be met by: 6/7/2018     Patient will increase functional independence with ADLs by performing:    LE Dressing with Modified [place].  Grooming while standing at sink with Stand-by Assistance.  Toileting from toilet with Stand-by Assistance for hygiene and clothing management.   Upper extremity exercise program with assistance as needed.    Outcome: Ongoing (interventions implemented as appropriate)  Patient tolerated evaluation well, good participation.  Patient will benefit from skilled OT services to address the above mentioned goals. IGOR Carter, MS

## 2018-05-31 NOTE — HOSPITAL COURSE
Tasneem Lynn is a 72 y.o. female that (in part)  has a past medical history of Arthritis; CHF (congestive heart failure); Diabetes mellitus; HLD (hyperlipidemia); Hypertension; CKD 3,Obese; Stroke; and Thyroid disease.  has a past surgical history that includes right hand surgery and right knee surgery (Right). Presents to [facility] complaining of weakness and fatigue and associated hypotension with abdominal pain and dysuria.  Subacute onset 1 week ago with progressive worsening.  She was seen in urgent care facility for hypotension and was directed to come to the emergency department for further evaluation.  She was given IV fluids with some relief.  Characterizes the suprapubic pain as burning and aching.  Without radiation.  Daily frequency.  Denies hematuria but endorses subjective fever, chills, and generalized malaise.   In the emergency department  routine laboratory studies and urinalysis were obtained.  There is evidence of sepsis secondary to urinary tract infection.   Cultures were obtained and she was started on IV antibiotics.  There is also evidence of acute on chronic renal failure.  Additional IV fluids were given.   her CRT with IVF gradually  Improved.all nephrotoxic agents from home medication like metformin and allopurinol has be removed.daughter has been called and informed.  urine culture grow Klebsiella,  Had urinary retention,amin has been placed,later has been removed,passed voiding trial.  Patient did well with PT,Liliam nd HH at DC time has pk arranged.  Patient has marquis discharged home with Cipro and follow up with PCP.

## 2018-05-31 NOTE — PT/OT/SLP EVAL
Physical Therapy Evaluation    Patient Name:  Tasneem Lynn   MRN:  [id number]    Recommendations:     Discharge Recommendations:  home health PT (with family assistance as needed)   Discharge Equipment Recommendations: none   Barriers to discharge: limited functional mobility     Assessment:     Tasneem Lynn is a 72 y.o. female admitted with a medical diagnosis of Sepsis secondary to UTI.  She presents with the following impairments/functional limitations:  weakness, impaired endurance, impaired functional mobilty, gait instability, impaired balance, decreased lower extremity function, decreased safety awareness, pain.    Rehab Prognosis:  fair; patient would benefit from acute skilled PT services to address these deficits and reach maximum level of function.      Recent Surgery: * No surgery found *      Plan:     During this hospitalization, patient to be seen 6 x/week to address the above listed problems via gait training, therapeutic activities, therapeutic exercises  · Plan of Care Expires:  06/14/18   Plan of Care Reviewed with: patient    Subjective     Communicated with nurse Landis prior to session.  Patient found supine in bed upon PT entry to room, agreeable to evaluation.      Chief Complaint: Weakness and fatigue.   Patient comments/goals: To get stronger.   Pain/Comfort:  · Pain Rating 1: 6/10 (chronic)  · Location - Side 1: Left  · Location 1: hip  · Pain Addressed 1:  (denied need for pain medicine)    Living Environment:  Patient lives at home with her daughter. There are no concerns at the house. Prior to admission, patients level of function was ambulatory within the house with rollator. She states that she does not ambulate much within the community.  Patient has the following equipment: rollator, shower chair. Upon discharge, patient will have assistance from daughter.    Objective:     Patient found with: bed alarm, peripheral IV, amin catheter     General Precautions: Standard, fall    Orthopedic Precautions:N/A   Braces: N/A     Exams:  · Cognitive Exam:  Patient is oriented to Person, Place and Situation and follows 100% of multi step commands   · Gross Motor Coordination:  WFL  · Postural Exam:  Patient presented with the following abnormalities:    · -       Rounded shoulders  · -       Forward head  · Sensation:    · -       Intact  · Skin Integrity/Edema:      · -       Skin integrity: Visible skin intact  · RLE ROM: WFL  · RLE Strength: WFL  · LLE ROM: WFL  · LLE Strength: WFL    Functional Mobility:  · Bed Mobility:     · Supine to Sit: stand by assistance  · Sit to Supine: stand by assistance  · Transfers:     · Sit to Stand:  contact guard assistance from bed and minimum assistance from low bedside chiar with rolling walker  · Gait:  Patient ambulated 10ft x2 trials with seated rest break between and Rolling Walker and CGA using 3-point gait. Patient demonstrated decreased maggi, decreased velocity of limb motion and decreased step length during gait due to impaired balance, decreased strength and decreased endurance.    AM-PAC 6 CLICK MOBILITY  Total Score:19     Patient left supine with all lines intact, call button in reach, bed alarm on and nurse notified.    GOALS:    Physical Therapy Goals        Problem: Physical Therapy Goal    Goal Priority Disciplines Outcome Goal Variances Interventions   Physical Therapy Goal     PT/OT, PT      Description:  Goals to be met by: 18    Patient will increase functional independence with mobility by performin. Sit to stand transfer with Stand-by Assistance  2. Gait  x50 feet with Stand-by Assistance using Rolling Walker  3. Lower extremity exercise program x30 reps per handout, with supervision                    History:     Past Medical History:   Diagnosis Date    Arthritis     Gout    CHF (congestive heart failure)     Diabetes mellitus     HLD (hyperlipidemia)     Hypertension     Obese     Stroke     1986    Thyroid  disease        Past Surgical History:   Procedure Laterality Date    right hand surgery      right knee surgery Right     partial plate     Time Tracking:     PT Received On: 05/31/18  PT Start Time: 0958     PT Stop Time: 1017  PT Total Time (min): 19 min     Billable Minutes: Evaluation  19 with OT present    Makayla Thomas, PT  05/31/2018

## 2018-05-31 NOTE — PLAN OF CARE
Problem: Diabetes, Type 2 (Adult)  Intervention: Support/Optimize Psychosocial Response to Condition   05/30/18 2215   Coping/Psychosocial Interventions   Environmental Support calm environment promoted;environmental consistency promoted     Intervention: Optimize Glycemic Control   05/30/18 2215   Nutrition Interventions   Glycemic Management blood glucose monitoring;carbohydrate replacement provided;supplemental insulin given         Problem: Fall Risk (Adult)  Intervention: Reduce Risk/Promote Restraint Free Environment   05/31/18 0713   Safety Interventions   Safety Precautions emergency equipment at bedside     Intervention: Patient Rounds   05/31/18 0731   Safety Interventions   Patient Rounds bed in low position;bed wheels locked;call light in reach     Intervention: Safety Promotion/Fall Prevention   05/31/18 0731   Safety Interventions   Safety Promotion/Fall Prevention bed alarm set     Intervention: Safety Precautions   05/31/18 0713   Safety Interventions   Safety Precautions emergency equipment at bedside       Goal: Absence of Falls  Patient will demonstrate the desired outcomes by discharge/transition of care.    05/31/18 0815   Fall Risk (Adult)   Absence of Falls making progress toward outcome       Comments: No even over night. Received IV cont fluids. Covered for 300 blood sugar. No c/o pain. Able to reposition self with minimal assistance. Skin remains intact. Call light at side.

## 2018-05-31 NOTE — PLAN OF CARE
Problem: Physical Therapy Goal  Goal: Physical Therapy Goal  Goals to be met by: 18    Patient will increase functional independence with mobility by performin. Sit to stand transfer with Stand-by Assistance  2. Gait  x50 feet with Stand-by Assistance using Rolling Walker  3. Lower extremity exercise program x30 reps per handout, with supervision      Patient would continue to benefit from PT while in the hospital.

## 2018-05-31 NOTE — PT/OT/SLP EVAL
"Occupational Therapy   Evaluation    Name: Tasneem Lynn  MRN: [id number]  Admitting Diagnosis:  Sepsis secondary to UTI      Recommendations:     Discharge Recommendations: home  Discharge Equipment Recommendations:  none  Barriers to discharge:  None    History:     Occupational Profile:  Living Environment: lives with her daughter in a house with 0 step entry  Previous level of function: modified independent with rollator  Equipment Owned:  shower chair, rollator  Assistance upon Discharge: from her daughter    Past Medical History:   Diagnosis Date    Arthritis     Gout    CHF (congestive heart failure)     Diabetes mellitus     HLD (hyperlipidemia)     Hypertension     Obese     Stroke     1986    Thyroid disease        Past Surgical History:   Procedure Laterality Date    right hand surgery      right knee surgery Right     partial plate       Subjective     Chief Complaint: "I have some pain in my side."  Patient/Family stated goals: to go home  Communicated with: nurse prior to session.  Pain/Comfort:  · Pain Rating 1: 6/10  · Location - Side 1: Left  · Location 1: flank    Patients cultural, spiritual, [employer]al conflicts given the current situation:      Objective:     Patient found with:      General Precautions: Standard, fall   Orthopedic Precautions:N/A   Braces: N/A     Occupational Performance:    Bed Mobility:    · Patient completed Rolling/Turning to Left with  stand by assistance and with side rail  · Patient completed Scooting/Bridging with stand by assistance  · Patient completed Supine to Sit with stand by assistance and with side rail    Functional Mobility/Transfers:  · Patient completed Sit <> Stand Transfer with contact guard assistance  with  rolling walker   · Functional Mobility: transferred sit>stand x3 trials requiring CGA    Activities of Daily Living:  · Feeding:  independence    · UB Dressing: stand by assistance    · LB Dressing: moderate assistance      Cognitive/Visual " "Perceptual:  Cognitive/Psychosocial Skills:     -       Oriented to: Person, Place and Situation   -       Follows Commands/attention:Follows one-step commands  -       Communication: clear/fluent  -       Memory: No Deficits noted  -       Safety awareness/insight to disability: intact   -       Mood/Affect/Coping skills/emotional control: Appropriate to situation    Physical Exam:  Balance:    -       sit balance fair plus; standing balance fair plus  Postural examination/scapula alignment:    -       Rounded shoulders  Skin integrity: Visible skin intact  Upper Extremity Range of Motion:     -       Right Upper Extremity: WFL  -       Left Upper Extremity: WFL  Upper Extremity Strength:    -       Right Upper Extremity: WFL  -       Left Upper Extremity: WFL    Patient left supine with all lines intact, call button in reach and CNA present    [facility] 6 Click:  [facility] Total Score: 19    Treatment & Education:  Evaluation    Education:    Assessment:     Tasneem Lynn is a 72 y.o. female with a medical diagnosis of Sepsis secondary to UTI.  She presents with  independence for self-care and functional transfers.  Performance deficits affecting function are weakness, impaired endurance, impaired self care skills (self-limiting).      Rehab Prognosis:  Good; patient would benefit from acute skilled OT services to address these deficits and reach maximum level of function.         Clinical Decision Makin.  OT Low:  "Pt evaluation falls under low complexity for evaluation coding due to performance deficits noted in 1-3 areas as stated above and 0 co-morbities affecting current functional status. Data obtained from problem focused assessments. No modifications or assistance was required for completion of evaluation. Only brief occupational profile and history review completed."     Plan:     Patient to be seen 3 x/week to address the above listed problems via self-care/home management, therapeutic " exercises  · Plan of Care Expires: 06/07/18  · Plan of Care Reviewed with: patient    This Plan of care has been discussed with the patient who was involved in its development and understands and is in agreement with the identified goals and treatment plan    GOALS:    Occupational Therapy Goals        Problem: Occupational Therapy Goal    Goal Priority Disciplines Outcome Interventions   Occupational Therapy Goal     OT, PT/OT Ongoing (interventions implemented as appropriate)    Description:  Goals to be met by: 6/7/2018     Patient will increase functional independence with ADLs by performing:    LE Dressing with Modified [place].  Grooming while standing at sink with Stand-by Assistance.  Toileting from toilet with Stand-by Assistance for hygiene and clothing management.   Upper extremity exercise program with assistance as needed.                      Time Tracking:     OT Date of Treatment: 05/31/18  OT Start Time: 1000  OT Stop Time: 1017  OT Total Time (min): 17 min    Billable Minutes:Evaluation 17 minutes with PT    IGOR Carter, MS  5/31/2018

## 2018-06-01 VITALS
SYSTOLIC BLOOD PRESSURE: 142 MMHG | HEIGHT: 62 IN | WEIGHT: 252.63 LBS | TEMPERATURE: 99 F | RESPIRATION RATE: 18 BRPM | DIASTOLIC BLOOD PRESSURE: 86 MMHG | OXYGEN SATURATION: 99 % | BODY MASS INDEX: 46.49 KG/M2 | HEART RATE: 83 BPM

## 2018-06-01 LAB
ALBUMIN SERPL BCP-MCNC: 1.8 G/DL
ALP SERPL-CCNC: 99 U/L
ALT SERPL W/O P-5'-P-CCNC: 10 U/L
ANION GAP SERPL CALC-SCNC: 5 MMOL/L
AST SERPL-CCNC: 14 U/L
BASOPHILS # BLD AUTO: 0.04 K/UL
BASOPHILS NFR BLD: 0.4 %
BILIRUB SERPL-MCNC: 0.2 MG/DL
BUN SERPL-MCNC: 31 MG/DL
CALCIUM SERPL-MCNC: 8.7 MG/DL
CHLORIDE SERPL-SCNC: 108 MMOL/L
CO2 SERPL-SCNC: 26 MMOL/L
CREAT SERPL-MCNC: 2 MG/DL
DIFFERENTIAL METHOD: ABNORMAL
EOSINOPHIL # BLD AUTO: 0.4 K/UL
EOSINOPHIL NFR BLD: 4.2 %
ERYTHROCYTE [DISTWIDTH] IN BLOOD BY AUTOMATED COUNT: 13.6 %
EST. GFR  (AFRICAN AMERICAN): 28 ML/MIN/1.73 M^2
EST. GFR  (NON AFRICAN AMERICAN): 24 ML/MIN/1.73 M^2
GLUCOSE SERPL-MCNC: 325 MG/DL
HCT VFR BLD AUTO: 33.6 %
HGB BLD-MCNC: 10.7 G/DL
LYMPHOCYTES # BLD AUTO: 3.5 K/UL
LYMPHOCYTES NFR BLD: 33.9 %
MCH RBC QN AUTO: 29.2 PG
MCHC RBC AUTO-ENTMCNC: 31.8 G/DL
MCV RBC AUTO: 92 FL
MONOCYTES # BLD AUTO: 1.2 K/UL
MONOCYTES NFR BLD: 11.6 %
NEUTROPHILS # BLD AUTO: 5.2 K/UL
NEUTROPHILS NFR BLD: 49.9 %
PLATELET # BLD AUTO: 461 K/UL
PMV BLD AUTO: 9.6 FL
POCT GLUCOSE: 310 MG/DL (ref 70–110)
POCT GLUCOSE: 350 MG/DL (ref 70–110)
POCT GLUCOSE: 360 MG/DL (ref 70–110)
POTASSIUM SERPL-SCNC: 4.4 MMOL/L
PROT SERPL-MCNC: 7 G/DL
RBC # BLD AUTO: 3.67 M/UL
SODIUM SERPL-SCNC: 139 MMOL/L
WBC # BLD AUTO: 10.35 K/UL

## 2018-06-01 PROCEDURE — 97110 THERAPEUTIC EXERCISES: CPT

## 2018-06-01 PROCEDURE — 80053 COMPREHEN METABOLIC PANEL: CPT

## 2018-06-01 PROCEDURE — 36415 COLL VENOUS BLD VENIPUNCTURE: CPT

## 2018-06-01 PROCEDURE — 97530 THERAPEUTIC ACTIVITIES: CPT

## 2018-06-01 PROCEDURE — 85025 COMPLETE CBC W/AUTO DIFF WBC: CPT

## 2018-06-01 PROCEDURE — 25000003 PHARM REV CODE 250: Performed by: HOSPITALIST

## 2018-06-01 PROCEDURE — 63600175 PHARM REV CODE 636 W HCPCS: Performed by: HOSPITALIST

## 2018-06-01 PROCEDURE — 25000003 PHARM REV CODE 250: Performed by: EMERGENCY MEDICINE

## 2018-06-01 RX ORDER — CIPROFLOXACIN 250 MG/1
250 TABLET, FILM COATED ORAL 2 TIMES DAILY
Qty: 14 TABLET | Refills: 0 | Status: SHIPPED | OUTPATIENT
Start: 2018-06-01 | End: 2018-06-01

## 2018-06-01 RX ORDER — FUROSEMIDE 20 MG/1
20 TABLET ORAL DAILY
Qty: 30 TABLET | Refills: 0 | Status: SHIPPED | OUTPATIENT
Start: 2018-06-01 | End: 2018-08-28 | Stop reason: SDUPTHER

## 2018-06-01 RX ORDER — CIPROFLOXACIN 250 MG/1
250 TABLET, FILM COATED ORAL 2 TIMES DAILY
Qty: 14 TABLET | Refills: 0 | Status: SHIPPED | OUTPATIENT
Start: 2018-06-01 | End: 2018-06-08

## 2018-06-01 RX ADMIN — INSULIN DETEMIR 20 UNITS: 100 INJECTION, SOLUTION SUBCUTANEOUS at 10:06

## 2018-06-01 RX ADMIN — CETIRIZINE HYDROCHLORIDE 10 MG: 10 TABLET, FILM COATED ORAL at 08:06

## 2018-06-01 RX ADMIN — BETHANECHOL CHLORIDE 50 MG: 25 TABLET ORAL at 08:06

## 2018-06-01 RX ADMIN — CLONIDINE HYDROCHLORIDE 0.1 MG: 0.1 TABLET ORAL at 04:06

## 2018-06-01 RX ADMIN — SODIUM CHLORIDE: 0.9 INJECTION, SOLUTION INTRAVENOUS at 12:06

## 2018-06-01 RX ADMIN — ASPIRIN 81 MG 81 MG: 81 TABLET ORAL at 08:06

## 2018-06-01 RX ADMIN — ROSUVASTATIN CALCIUM 20 MG: 10 TABLET, FILM COATED ORAL at 08:06

## 2018-06-01 RX ADMIN — HEPARIN SODIUM 5000 UNITS: 5000 INJECTION, SOLUTION INTRAVENOUS; SUBCUTANEOUS at 05:06

## 2018-06-01 RX ADMIN — GABAPENTIN 300 MG: 300 CAPSULE ORAL at 08:06

## 2018-06-01 RX ADMIN — INSULIN ASPART 5 UNITS: 100 INJECTION, SOLUTION INTRAVENOUS; SUBCUTANEOUS at 12:06

## 2018-06-01 RX ADMIN — INSULIN ASPART 4 UNITS: 100 INJECTION, SOLUTION INTRAVENOUS; SUBCUTANEOUS at 08:06

## 2018-06-01 RX ADMIN — LEVOTHYROXINE SODIUM 75 MCG: 75 TABLET ORAL at 05:06

## 2018-06-01 NOTE — NURSING
Report given to night nurseMitzi. Pt. AAOX3, in bed. Respirations even and unlabored. NS infusing to R hand. No apparent distress noted at this time.Side rails up x 2. Bed alarm set. Call light within patient's reach. Safety measures maintained.

## 2018-06-01 NOTE — NURSING
House supervisor attempted to restart IV with ultrasound, unable to visualize at this time. Right Hand IV infiltration. Supplies at bedside.

## 2018-06-01 NOTE — PROGRESS NOTES
[facility]    WRITTEN HEALTHCARE AND DISCHARGE INFORMATION     Follow-up Information     Mary Carmen Lopez MD. Go on 6/4/2018.    Specialty:  Family Medicine  Why:  Monday at 10:00AM  Contact information:  [place] [id number]  [phone]             [facility] [place].    Specialties:  Home Health Services, Physical Therapy, Occupational Therapy  Why:  Home Health  Contact information:  [address]  [address]  [facility] [address]  [phone]                                    [facility]           [phone]  After discharge for assistance [facility] On Call Nurse Care Line 24/7  Assistance    Things You are responsible For To Manage Your Care At Home:  1.    Getting your prescriptions filled   2.    Taking your medications as directed, DO NOT MISS ANY DOSES!  3.    Going to your follow-up doctor appointment. This is important because it  allow the doctor to monitor your progress and determine if  any changes need to made to your treatment plan.     Thank you for choosing [facility] for your care.  Please answer any calls you may receive from [facility] we want to continue to support you as you manage your healthcare needs. [facility] is happy to have the opportunity to serve you.     Sincerely,  Your [facility] Team,  ENZO Morse, RN;  [phone]

## 2018-06-01 NOTE — PROGRESS NOTES
[facility] Hospital Medicine  Progress Note    Patient Name: Tasneem Lynn  MRN: [id number]  Patient Class: IP- Inpatient   Admission Date: 5/29/2018  Length of Stay: 3 days  Attending Physician: Prashanth Orantes MD  Primary Care Provider: [facility]        Subjective:     Principal Problem:Sepsis secondary to UTI    HPI:     Tasneem Lynn is a 72 y.o. female that (in part)  has a past medical history of Arthritis; CHF (congestive heart failure); Diabetes mellitus; HLD (hyperlipidemia); Hypertension; Obese; Stroke; and Thyroid disease.  has a past surgical history that includes right hand surgery and right knee surgery (Right). Presents to [facility] Emergency Department complaining of weakness and fatigue and associated hypotension with abdominal pain and dysuria.  Subacute onset 1 week ago with progressive worsening.  She was seen in urgent care facility for hypotension and was directed to come to the emergency department for further evaluation.  She was given IV fluids with some relief.  Characterizes the suprapubic pain as burning and aching.  Without radiation.  Daily frequency.  Denies hematuria but endorses subjective fever, chills, and generalized malaise.     In the emergency department  routine laboratory studies and urinalysis were obtained.  There is evidence of sepsis secondary to urinary tract infection.   Cultures were obtained and she was started on IV antibiotics.  There is also evidence of acute on chronic renal failure.  Additional IV fluids were given.     Hospital medicine has been asked to admit for further evaluation and treatment.       Hospital Course:     Tasneem Lynn is a 72 y.o. female that (in part)  has a past medical history of Arthritis; CHF (congestive heart failure); Diabetes mellitus; HLD (hyperlipidemia); Hypertension; Obese; Stroke; and Thyroid disease.  has a past surgical history that includes right hand surgery and right knee  surgery (Right). Presents to [facility] Emergency Department complaining of weakness and fatigue and associated hypotension with abdominal pain and dysuria.  Subacute onset 1 week ago with progressive worsening.  She was seen in urgent care facility for hypotension and was directed to come to the emergency department for further evaluation.  She was given IV fluids with some relief.  Characterizes the suprapubic pain as burning and aching.  Without radiation.  Daily frequency.  Denies hematuria but endorses subjective fever, chills, and generalized malaise.     In the emergency department  routine laboratory studies and urinalysis were obtained.  There is evidence of sepsis secondary to urinary tract infection.   Cultures were obtained and she was started on IV antibiotics.  There is also evidence of acute on chronic renal failure.  Additional IV fluids were given.   her CRT is improving.  Klebsiella on urine culture,  Has urinary retention,amin has been placed,DC amin,started on urecholine.      Interval History: afebrile at this time.    Review of Systems   Constitutional: Positive for appetite change. Negative for activity change.   HENT: Negative for congestion and dental problem.    Eyes: Negative for discharge and itching.   Respiratory: Negative for apnea and chest tightness.    Cardiovascular: Negative for chest pain and leg swelling.   Gastrointestinal: Negative for abdominal pain and anal bleeding.   Endocrine: Negative for cold intolerance and heat intolerance.   Genitourinary: Negative for difficulty urinating and dyspareunia.   Musculoskeletal: Negative for arthralgias and back pain.   Skin: Negative for color change and pallor.   Allergic/Immunologic: Negative for environmental allergies.   Neurological: Negative for dizziness.   Hematological: Negative for adenopathy.   Psychiatric/Behavioral: Negative for agitation and behavioral problems.     Objective:     Vital Signs  (Most Recent):  Temp: 98 °F (36.7 °C) (05/31/18 0731)  Pulse: 95 (05/31/18 0731)  Resp: 16 (05/31/18 0731)  BP: (!) 146/67 (05/31/18 0731)  SpO2: (!) 93 % (05/31/18 0731) Vital Signs (24h Range):  Temp:  [98 °F (36.7 °C)-99.1 °F (37.3 °C)] 98 °F (36.7 °C)  Pulse:  [88-95] 95  Resp:  [16-18] 16  SpO2:  [93 %-98 %] 93 %  BP: (124-160)/(59-77) 146/67     Weight: 110.5 kg (243 lb 9.7 oz)  Body mass index is 44.56 kg/m².    Intake/Output Summary (Last 24 hours) at 05/31/18 0948  Last data filed at 05/31/18 0700   Gross per 24 hour   Intake          2008.33 ml   Output              600 ml   Net          1408.33 ml      Physical Exam   Constitutional: She is oriented to person, place, and time. No distress.   HENT:   Head: Normocephalic.   Eyes: EOM are normal. Pupils are equal, round, and reactive to light.   Neck: Normal range of motion. Neck supple.   Cardiovascular: Normal rate and regular rhythm.    Pulmonary/Chest: Effort normal and breath sounds normal.   Abdominal: Soft.   Genitourinary: Vagina normal.   Musculoskeletal: Normal range of motion.   Neurological: She is alert and oriented to person, place, and time. No cranial nerve deficit. Coordination normal.   Skin: Skin is warm and dry. She is not diaphoretic.   Psychiatric: She has a normal mood and affect.       Significant Labs:   BMP:   Recent Labs  Lab 05/31/18  0535 05/31/18  0536   *  --    *  --    K 4.2  --      --    CO2 23  --    BUN 30*  --    CREATININE 2.1*  --    CALCIUM 8.5*  --    MG  --  1.2*     CBC:   Recent Labs  Lab 05/29/18  1625 05/30/18  0716 05/31/18  0535   WBC 14.56* 16.05* 11.75   HGB 11.5* 9.1* 10.4*   HCT 35.0* 35.4* 32.1*   * SEE COMMENT 431*       Significant Imaging:reviewed.    Assessment/Plan:      * Sepsis secondary to UTI    Klebsiella on urine culture,on IV Abx.          Urinary retention      On amin,will remove,voing trial,added urecholine.        CKD (chronic kidney disease), stage III    Will  be monitored.          Acute renal failure superimposed on stage 3 chronic kidney disease    On IVF and improving.          Chronic diastolic CHF (congestive heart failure)    Stable,mnot on ACE or ARB duo to ARF.          Acute on chronic renal failure    On iVF,improving.          Sepsis    With leucocytosis,elevated acid acid 4.3,on IVF and IV Abx,duo to UTI.          Hyperlipidemia      On statin.        History of CVA (cerebrovascular accident)      PT,OT secondary prevention.        Morbid obesity with BMI of 50.0-59.9, adult    Weight lose as out patient.          Hypothyroidism      On synthroid.        Type 2 diabetes mellitus, uncontrolled, with renal complications    On SSI.          VTE Risk Mitigation         Ordered     heparin (porcine) injection 5,000 Units  Every 8 hours      05/30/18 1114     Place MADISON hose  Until discontinued      05/29/18 1947     IP VTE HIGH RISK PATIENT  Once      05/29/18 1947              Prashanth Orantes MD  Department of Hospital Medicine   [facility]  5.31.18

## 2018-06-01 NOTE — PROGRESS NOTES
Called Shannan with [facility] to let her know that patient is discharging and get follow up.  She states that the patient has been noncompliant with follow up appointments.  Followed up with patient and her help at home, daughter, Merrill, and it seems that patient is not happy with [facility] and would like to switch providers (per Merrill and patient verifies).  Notified Shannan with [facility] and she stated that [facility] needs to be called and notified to have PCP changed.  She states that she has offered to do this prior with patient and patient declined.  Merrill states that they would like an [facility] MD, Dr. Mary Carmen Lopez, as PCP and patient confirms.  Called Vladislav in patient's room and placed on speakerphone.  PCP changed to Dr. Mary Carmen Lopez at the [facility] location.  Patient confirmed.  Change is active at this time.

## 2018-06-01 NOTE — PLAN OF CARE
Problem: Sepsis/Septic Shock (Adult)  Intervention: Promote Rest/Minimize Oxygen Consumption   05/30/18 2215 05/31/18 2100   Activity   Activity Type --  activity adjusted per tolerance   Coping/Psychosocial Interventions   Environmental Support calm environment promoted;environmental consistency promoted --      Intervention: Provide Oxygenation/Ventilation/Perfusion Support   05/31/18 2100 06/01/18 0509   Positioning   Head of Bed (HOB) --  HOB at 30-45 degrees   Activity   Activity Type activity adjusted per tolerance --      Intervention: Monitor/Manage Perfusion   06/01/18 0509   Safety Interventions   Medication Review/Management medications reviewed     Intervention: Prevent/Manage DVT/VTE Risk   05/31/18 2100 06/01/18 0509   OTHER   VTE Required Core Measure --  Pharmacological prophylaxis initiated/maintained   Minimize Embolism Risk   VTE Prevention/Management bleeding precautions maintained;dorsiflexion/plantar flexion performed;ROM (active) performed --      Intervention: Manage Bleeding Risk   06/01/18 0509   Safety Interventions   Bleeding Precautions blood pressure closely monitored;monitored for signs of bleeding     Intervention: Provide Initial Aggressive Fluid Resuscitation/Correction of Imbalance   06/01/18 0509   Nutrition Interventions   Fluid/Electrolyte Management fluids restricted     Intervention: Optimize Glycemic Control   06/01/18 0509   Nutrition Interventions   Glycemic Management blood glucose monitoring;supplemental insulin given       Goal: Signs and Symptoms of Listed Potential Problems Will be Absent, Minimized or Managed (Sepsis/Septic Shock)  Signs and symptoms of listed potential problems will be absent, minimized or managed by discharge/transition of care (reference Sepsis/Septic Shock (Adult) CPG).   Outcome: Ongoing (interventions implemented as appropriate)   06/01/18 0509   Sepsis/Septic Shock   Problems Assessed (Sepsis) all   Problems Present (Sepsis) glycemic control  impaired

## 2018-06-01 NOTE — PT/OT/SLP PROGRESS
Physical Therapy Treatment    Patient Name:  Tasneem Lynn   MRN:  [id number]    Recommendations:     Discharge Recommendations:  home health PT ( with family assistance )   Discharge Equipment Recommendations:   none  Barriers to discharge: decreased mobility     Assessment:     Tasneem Lynn is a 72 y.o. female admitted with a medical diagnosis of Sepsis secondary to UTI.  She presents with the following impairments/functional limitations:  weakness, impaired endurance, impaired self care skills, gait instability, impaired balance, decreased lower extremity function, decreased upper extremity function, decreased safety awareness .    Rehab Prognosis:  fair; patient would benefit from acute skilled PT services to address these deficits and reach maximum level of function.      Recent Surgery: * No surgery found *      Plan:     During this hospitalization, patient to be seen 6 x/week to address the above listed problems via gait training, therapeutic activities, therapeutic exercises  · Plan of Care Expires:  06/14/18   Plan of Care Reviewed with: patient    Subjective     Communicated with nurse Orozco prior to session.  Patient found supine in bed upon PT entry to room, agreeable to treatment.      Chief Complaint: fatigue   Patient comments/goals: to go home  Pain/Comfort:  · Pain Rating 1: 0/10  · Pain Rating Post-Intervention 1: 0/10    Patients cultural, spiritual, Church conflicts given the current situation:      Objective:     Patient found with: bed alarm, telemetry, peripheral IV     General Precautions: Standard, fall   Orthopedic Precautions:N/A   Braces: N/A     Functional Mobility:  · Bed Mobility:     · Rolling Right: stand by assistance  · Scooting: stand by assistance and contact guard assistance  · Supine to Sit: minimum assistance  · Transfers:     · Sit to Stand:  contact guard assistance with no AD and rolling walker  · Bed to Chair: contact guard assistance with  rolling walker  using  Stand  Pivot  · Toilet Transfer: contact guard assistance with  no AD  using  Stand Pivot  · Gait:  Pt ambulated ~ 4-5 steps ( no AD, CGA )  from bed <> bedside commode and bed> chair ( RW, CGA ). Noted with decreased step length , max slow maggi , R knee bucked 1 time during transfer , no LOB.  Pt refused gait training today despite max encouragement. Pt stated she is tired .   · Balance:  Good with sitting balance, fair with standing .       AM-PAC 6 CLICK MOBILITY  Turning over in bed (including adjusting bedclothes, sheets and blankets)?: 4  Sitting down on and standing up from a chair with arms (e.g., wheelchair, bedside commode, etc.): 3  Moving from lying on back to sitting on the side of the bed?: 3  Moving to and from a bed to a chair (including a wheelchair)?: 3  Need to walk in hospital room?: 3  Climbing 3-5 steps with a railing?: 3  Total Score: 19       Therapeutic Activities and Exercises:   Pt performed bed mobility, transfer and gait training as above.   Pt performed seated BLE x 15 reps : AP ,LAQ, HS, hip flexion and pillow squeezes. VC's for proper and sequence. Pt tolerated well.   Educated pt on safety awareness with all OOB mobility.     Patient left up in chair with all lines intact, call button in reach and nurse Sabiha notified..    GOALS:    Physical Therapy Goals        Problem: Physical Therapy Goal    Goal Priority Disciplines Outcome Goal Variances Interventions   Physical Therapy Goal     PT/OT, PT Ongoing (interventions implemented as appropriate)     Description:  Goals to be met by: 18    Patient will increase functional independence with mobility by performin. Sit to stand transfer with Stand-by Assistance  2. Gait  x50 feet with Stand-by Assistance using Rolling Walker  3. Lower extremity exercise program x30 reps per handout, with supervision                      Time Tracking:     PT Received On: 18  PT Start Time: 1034     PT Stop Time: 1057  PT Total Time (min):  23 min     Billable Minutes: Therapeutic Activity 11 and Therapeutic Exercise 12    Treatment Type: Treatment  PT/PTA: PTA     PTA Visit Number: 1     Serenity Montes PTA  06/01/2018

## 2018-06-01 NOTE — PLAN OF CARE
06/01/18 1402   Final Note   Assessment Type Final Discharge Note   Discharge Disposition Home-Health   What phone number can be called within the next 1-3 days to see how you are doing after discharge? [phone]   Hospital Follow Up  Appt(s) scheduled? Yes   Discharge plans and expectations educations in teach back method with documentation complete? Yes   Right Care Referral Info   Post Acute Recommendation Home-care   Referral Type    Facility Name Family      Patient accepted by Family     Introduced myself to patient and notified them that I would be reviewing the follow up appointments and educating them on signs and symptoms of sepsis and ways that they can manage the patient's care at home. Patient listening, but trying to sleep before she leaves so she is not answering many questions.  Reviewed follow up appointments and identified where the phone number was located for the 24/7 [facility] on call nurse as another resource for patient.      I provided written discharge education and patient declined teachback. Left voicemail for her daughter and help at home, Merrill     DC follow up and education placed in blue DC folder at bedside    No questions at this time. I thanked the patient for choosing [facility] and notified nurse, Sabiha, that my portion of the patient DC and teaching is completed and she can now complete the rest of the Dc teaching and Dc patient whenever she is ready and appropriate.

## 2018-06-01 NOTE — PLAN OF CARE
[facility]    HOME HEALTH ORDERS  FACE TO FACE ENCOUNTER    Patient Name: Tasneem Lynn  YOB: 1945    PCP: Kristen Singh   PCP Address: [address] [place]  PCP Phone Number: [phone]  PCP Fax: [phone]    Encounter Date: 06/01/2018    Admit to Home Health    Diagnoses:  Active Hospital Problems    Diagnosis  POA    *Sepsis secondary to UTI [A41.9, N39.0]  Yes    Chronic diastolic CHF (congestive heart failure) [I50.32]  Yes    Acute renal failure superimposed on stage 3 chronic kidney disease [N17.9, N18.3]  Yes    CKD (chronic kidney disease), stage III [N18.3]  Yes    Urinary retention [R33.9]  Yes    Acute on chronic renal failure [N17.9, N18.9]  Yes    Sepsis [A41.9]  Yes    Hypothyroidism [E03.9]  Yes     Chronic    Morbid obesity with BMI of 50.0-59.9, adult [E66.01, Z68.43]  Not Applicable     Chronic    History of CVA (cerebrovascular accident) [Z86.73]  Not Applicable     Chronic    Hyperlipidemia [E78.5]  Yes     Chronic    Type 2 diabetes mellitus, uncontrolled, with renal complications [E11.29, E11.65]  Yes     Chronic      Resolved Hospital Problems    Diagnosis Date Resolved POA   No resolved problems to display.       No future appointments.  Follow-up Information     [facility] In 1 week.    Contact information:  [id number]  [phone]                     I have seen and examined this patient face to face today. My clinical findings that support the need for the home health skilled services and home bound status are the following:  Weakness/numbness causing balance and gait disturbance due to Coronary Heart Disease, Infection and Weakness/Debility making it taxing to leave home.    Allergies:  Review of patient's allergies indicates:   Allergen Reactions    Corticosteroids (glucocorticoids) Edema       Diet: diabetic diet: 1800 calorie and 2 gram sodium diet    Activities: activity as  tolerated    Nursing:   SN to complete comprehensive assessment including routine vital signs. Instruct on disease process and s/s of complications to report to MD. Review/verify medication list sent home with the patient at time of discharge  and instruct patient/caregiver as needed. Frequency may be adjusted depending on start of care date.    Notify MD if SBP > 160 or < 90; DBP > 90 or < 50; HR > 120 or < 50; Temp > 101; Other:         CONSULTS:    Physical Therapy to evaluate and treat. Evaluate for home safety and equipment needs; Establish/upgrade home exercise program. Perform / instruct on therapeutic exercises, gait training, transfer training, and Range of Motion.  Occupational Therapy to evaluate and treat. Evaluate home environment for safety and equipment needs. Perform/Instruct on transfers, ADL training, ROM, and therapeutic exercises.    MISCELLANEOUS CARE:  Routine Skin for Bedridden Patients: Instruct patient/caregiver to apply moisture barrier cream to all skin folds and wet areas in perineal area daily and after baths and all bowel movements.    WOUND CARE ORDERS  n/a      Medications: Review discharge medications with patient and family and provide education.      Current Discharge Medication List      START taking these medications    Details   ciprofloxacin HCl (CIPRO) 250 MG tablet Take 1 tablet (250 mg total) by mouth 2 (two) times daily.  Qty: 14 tablet, Refills: 0         CONTINUE these medications which have CHANGED    Details   furosemide (LASIX) 20 MG tablet Take 1 tablet (20 mg total) by mouth once daily.  Qty: 30 tablet, Refills: 0         CONTINUE these medications which have NOT CHANGED    Details   aspirin 81 MG Chew Take 81 mg by mouth once daily.      benzonatate (TESSALON PERLES) 100 MG capsule Take 1 capsule (100 mg total) by mouth every 6 (six) hours as needed for Cough.  Qty: 30 capsule, Refills: 1      carvedilol (COREG) 25 MG tablet Take 25 mg by mouth 2 (two) times daily  with meals.      cetirizine 10 mg chewable tablet Take 10 mg by mouth once daily.      gabapentin (NEURONTIN) 300 MG capsule Take 1 capsule (300 mg total) by mouth 3 (three) times daily.  Qty: 90 capsule, Refills: 1      insulin NPH-insulin regular, 70/30, (NOVOLIN 70/30) 100 unit/mL (70-30) injection Inject 40 Units into the skin 2 (two) times daily.      levothyroxine (SYNTHROID) 75 MCG tablet Take 75 mcg by mouth once daily.      losartan (COZAAR) 50 MG tablet Take 50 mg by mouth once daily.      diclofenac (VOLTAREN) 75 MG EC tablet Take 1 tablet (75 mg total) by mouth 2 (two) times daily.  Qty: 30 tablet, Refills: 1      rosuvastatin (CRESTOR) 20 MG tablet Take 20 mg by mouth once daily.         STOP taking these medications       metformin (GLUCOPHAGE) 1000 MG tablet Comments:   Reason for Stopping:         allopurinol (ZYLOPRIM) 100 MG tablet Comments:   Reason for Stopping:         methylPREDNISolone (MEDROL DOSEPACK) 4 mg tablet Comments:   Reason for Stopping:               I certify that this patient is confined to her home and needs intermittent skilled nursing care, physical therapy and occupational therapy.

## 2018-06-01 NOTE — DISCHARGE SUMMARY
[facility] Hospital Medicine  Discharge Summary      Patient Name: Tasneem Lynn  MRN: [id number]  Admission Date: 5/29/2018  Hospital Length of Stay: 3 days  Discharge Date and Time:  06/01/2018 1:26 PM  Attending Physician: Prashanth Orantes MD   Discharging Provider: Prashanth Orantes MD  Primary Care Provider: [facility]      HPI:      Tasneem Lynn is a 72 y.o. female that (in part)  has a past medical history of Arthritis; CHF (congestive heart failure); Diabetes mellitus; HLD (hyperlipidemia); Hypertension; Obese; Stroke; and Thyroid disease.  has a past surgical history that includes right hand surgery and right knee surgery (Right). Presents to [facility] Emergency Department complaining of weakness and fatigue and associated hypotension with abdominal pain and dysuria.  Subacute onset 1 week ago with progressive worsening.  She was seen in urgent care facility for hypotension and was directed to come to the emergency department for further evaluation.  She was given IV fluids with some relief.  Characterizes the suprapubic pain as burning and aching.  Without radiation.  Daily frequency.  Denies hematuria but endorses subjective fever, chills, and generalized malaise.     In the emergency department  routine laboratory studies and urinalysis were obtained.  There is evidence of sepsis secondary to urinary tract infection.   Cultures were obtained and she was started on IV antibiotics.  There is also evidence of acute on chronic renal failure.  Additional IV fluids were given.     Hospital medicine has been asked to admit for further evaluation and treatment.       * No surgery found *      Hospital Course:      Tasneem Lynn is a 72 y.o. female that (in part)  has a past medical history of Arthritis; CHF (congestive heart failure); Diabetes mellitus; HLD (hyperlipidemia); Hypertension; CKD 3,Obese; Stroke; and Thyroid disease.  has a past surgical  history that includes right hand surgery and right knee surgery (Right). Presents to [facility] Emergency Department complaining of weakness and fatigue and associated hypotension with abdominal pain and dysuria.  Subacute onset 1 week ago with progressive worsening.  She was seen in urgent care facility for hypotension and was directed to come to the emergency department for further evaluation.  She was given IV fluids with some relief.  Characterizes the suprapubic pain as burning and aching.  Without radiation.  Daily frequency.  Denies hematuria but endorses subjective fever, chills, and generalized malaise.   In the emergency department  routine laboratory studies and urinalysis were obtained.  There is evidence of sepsis secondary to urinary tract infection.   Cultures were obtained and she was started on IV antibiotics.  There is also evidence of acute on chronic renal failure.  Additional IV fluids were given.   her CRT with IVF gradually  Improved.all nephrotoxic agents from home medication like metformin and allopurinol has be removed.daughter has been called and informed.  urine culture grow Klebsiella,  Had urinary retention,amin has been placed,later has been removed,passed voiding trial.  Patient did well with PT,[facility] nd HH at [place] time has pk arranged.  Patient has marquis discharged home with Cipro and follow up with PCP.       Consults:   Consults         Status Ordering Provider     Inpatient Consult to Critical Care  Once     Provider:  (Not yet assigned)    Acknowledged HUMBERTO KURTZ     IP consult to case management  Once     Provider:  (Not yet assigned)    Completed HUMBERTO KURTZ          No new Assessment & Plan notes have been filed under this hospital service since the last note was generated.  Service: Hospital Medicine    Final Active Diagnoses:    Diagnosis Date Noted POA    PRINCIPAL PROBLEM:  Sepsis secondary to UTI [A41.9, N39.0] 05/30/2018 Yes     Chronic diastolic CHF (congestive heart failure) [I50.32] 05/31/2018 Yes    Acute renal failure superimposed on stage 3 chronic kidney disease [N17.9, N18.3] 05/31/2018 Yes    CKD (chronic kidney disease), stage III [N18.3] 05/31/2018 Yes    Urinary retention [R33.9] 05/31/2018 Yes    Acute on chronic renal failure [N17.9, N18.9] 05/30/2018 Yes    Sepsis [A41.9] 05/29/2018 Yes    Hypothyroidism [E03.9] 11/17/2016 Yes     Chronic    Morbid obesity with BMI of 50.0-59.9, adult [E66.01, Z68.43] 11/17/2016 Not Applicable     Chronic    History of CVA (cerebrovascular accident) [Z86.73] 11/17/2016 Not Applicable     Chronic    Hyperlipidemia [E78.5] 11/17/2016 Yes     Chronic    Type 2 diabetes mellitus, uncontrolled, with renal complications [E11.29, E11.65] 05/07/2013 Yes     Chronic      Problems Resolved During this Admission:    Diagnosis Date Noted Date Resolved POA       Discharged Condition: stable    Disposition: Home or Self Care    Follow Up:  Follow-up Information     Mary Carmen Lopez MD. Go on 6/4/2018.    Specialty:  Family Medicine  Why:  Monday at 10:00AM  Contact information:  [place] [id number] [phone]                 Patient Instructions:     Activity as tolerated         Significant Diagnostic Studies: Labs:   BMP:   Recent Labs  Lab 05/31/18  0535 05/31/18  0536 06/01/18  0509   *  --  325*   *  --  139   K 4.2  --  4.4     --  108   CO2 23  --  26   BUN 30*  --  31*   CREATININE 2.1*  --  2.0*   CALCIUM 8.5*  --  8.7   MG  --  1.2*  --     and CBC   Recent Labs  Lab 05/31/18  0535 06/01/18  0509   WBC 11.75 10.35   HGB 10.4* 10.7*   HCT 32.1* 33.6*   * 461*     Microbiology:   Blood Culture   Lab Results   Component Value Date    LABBLOO No Growth to date 05/29/2018    LABBLOO No Growth to date 05/29/2018    LABBLOO No Growth to date 05/29/2018    and Urine Culture    Lab Results   Component Value Date    LABURIN KLEBSIELLA PNEUMONIAE  >100,000  cfu/ml   05/29/2018     Radiology: X-Ray: CXR: X-Ray Chest     Pending Diagnostic Studies:     None         Medications:  Reconciled Home Medications:      Medication List      START taking these medications    ciprofloxacin HCl 250 MG tablet  Commonly known as:  CIPRO  Take 1 tablet (250 mg total) by mouth 2 (two) times daily.        CHANGE how you take these medications    furosemide 20 MG tablet  Commonly known as:  LASIX  Take 1 tablet (20 mg total) by mouth once daily.  What changed:  · medication strength  · how much to take  · when to take this        CONTINUE taking these medications    aspirin 81 MG Chew  Take 81 mg by mouth once daily.     benzonatate 100 MG capsule  Commonly known as:  TESSALON PERLES  Take 1 capsule (100 mg total) by mouth every 6 (six) hours as needed for Cough.     carvedilol 25 MG tablet  Commonly known as:  COREG  Take 25 mg by mouth 2 (two) times daily with meals.     cetirizine 10 mg chewable tablet  Take 10 mg by mouth once daily.     diclofenac 75 MG EC tablet  Commonly known as:  VOLTAREN  Take 1 tablet (75 mg total) by mouth 2 (two) times daily.     gabapentin 300 MG capsule  Commonly known as:  NEURONTIN  Take 1 capsule (300 mg total) by mouth 3 (three) times daily.     insulin NPH-insulin regular (70/30) 100 unit/mL (70-30) injection  Commonly known as:  NOVOLIN 70/30  Inject 40 Units into the skin 2 (two) times daily.     levothyroxine 75 MCG tablet  Commonly known as:  SYNTHROID  Take 75 mcg by mouth once daily.     losartan 50 MG tablet  Commonly known as:  COZAAR  Take 50 mg by mouth once daily.     rosuvastatin 20 MG tablet  Commonly known as:  CRESTOR  Take 20 mg by mouth once daily.        STOP taking these medications    allopurinol 100 MG tablet  Commonly known as:  ZYLOPRIM     metFORMIN 1000 MG tablet  Commonly known as:  GLUCOPHAGE     methylPREDNISolone 4 mg tablet  Commonly known as:  MEDROL DOSEPACK            Indwelling Lines/Drains at time of discharge:    Lines/Drains/Airways          No matching active lines, drains, or airways          Time spent on the discharge of patient: more than 30  minutes  Patient was seen and examined on the date of discharge and determined to be suitable for discharge.         Prashanth Orantes MD  Department of Hospital Medicine  [facility]

## 2018-06-01 NOTE — PROGRESS NOTES
[facility]    WRITTEN HEALTHCARE AND DISCHARGE INFORMATION     Follow-up Information     Mary Carmen Lopez MD. Go on 6/4/2018.    Specialty:  Family Medicine  Why:  Monday at 10:00AM  Contact information:  [place] [id number]  [phone]                                        Help at Home           [phone]  After discharge for assistance [facility] On Call Nurse Care Line 24/7  Assistance    Things You are responsible For To Manage Your Care At Home:  1.    Getting your prescriptions filled   2.    Taking your medications as directed, DO NOT MISS ANY DOSES!  3.    Going to your follow-up doctor appointment. This is important because it  allow the doctor to monitor your progress and determine if  any changes need to made to your treatment plan.     Thank you for choosing [facility] for your care.  Please answer any calls you may receive from [facility] we want to continue to support you as you manage your healthcare needs. [facility] is happy to have the opportunity to serve you.     Sincerely,  Your [facility] Team,  ENZO Morse, RN;  [phone]

## 2018-06-01 NOTE — PROGRESS NOTES
HH PT ordered.  Patient reluctant but agreeable.  She states she doesn't care who referral goes to.  Clinicals and orders sent to Family HH via [facility]

## 2018-06-01 NOTE — NURSING
Discharge instructions given to patient and daughter at bedside. Patient verbalized understanding of instructions. Patient states willingness to comply. Saline lock removed. Tele monitoring removed.

## 2018-06-01 NOTE — PLAN OF CARE
Problem: Physical Therapy Goal  Goal: Physical Therapy Goal  Goals to be met by: 18    Patient will increase functional independence with mobility by performin. Sit to stand transfer with Stand-by Assistance  2. Gait  x50 feet with Stand-by Assistance using Rolling Walker  3. Lower extremity exercise program x30 reps per handout, with supervision     Outcome: Ongoing (interventions implemented as appropriate)  Pt will benefit from further therapy in order to get back to PLOF.

## 2018-06-03 LAB
BACTERIA BLD CULT: NORMAL
BACTERIA BLD CULT: NORMAL

## 2018-06-04 ENCOUNTER — PATIENT OUTREACH (OUTPATIENT)
Dept: ADMINISTRATIVE | Facility: CLINIC | Age: 73
End: 2018-06-04

## 2018-06-04 NOTE — PATIENT INSTRUCTIONS
Fall Prevention   Falls often occur due to slipping, tripping or losing your balance. Here are ways to reduce your risk of falling again.   Was there anything that caused your fall that can be fixed, removed or replaced?   Make your home safe by keeping walkways clear of objects you may trip over.   Use non-slip pads under rugs.   Do not walk in poorly lit areas.   Do not stand on chairs or wobbly ladders.   Use caution when reaching overhead or looking upward. This position can cause a loss of balance.   Be sure your shoes fit properly, have non-slip bottoms and are in good condition.   Be cautious when going up and down stairs, curbs, and when walking on uneven sidewalks.   If your balance is poor, consider using a cane or walker.   If your fall was related to alcohol use, stop or limit alcohol intake.   If your fall was related to use of sleeping medicines, talk to your doctor about this. You may need to reduce your dosage at bedtime if you awaken during the night to go to the bathroom.   To reduce the need for nighttime bathroom trips:   Avoid drinking fluids for several hours before going to bed   Empty your bladder before going to bed   Men can keep a urinal at the bedside  Stay as active as you can. Balance, flexibility, strength, and endurance all come from exercise. They all play a role in preventing falls. Ask your heathcare provider which types of activity are right for you.  © 7915-8269 [employer]. [address] [place] [address], [place] [address]. All rights reserved. This information is not intended as a substitute for professional medical care. Always follow your healthcare professional's instructions.

## 2018-06-07 NOTE — PHYSICIAN QUERY
PT Name: Tasneem Lynn  MR #: [id number]     Physician Query Form - Diagnosis Clarification      CDS/: Akosua Wang RN CCDS                Contact information: [email or web address]    This form is a permanent document in the medical record.     Query Date: June 7, 2018    By submitting this query, we are merely seeking further clarification of documentation.  Please utilize your independent clinical judgment when addressing the question(s) below.     The medical record contains the following:      Findings Supporting Clinical Information Location in Medical Record   ATN Acute on chronic renal failure; likely ATN associated with sepsis and UTI   · As evidenced by decrease in GFR.  Baseline creatinine = approximately 1.5  Nephrology consult in GFR does not improve with sepsis treatment and IV fluids as noted above    Acute renal failure superimposed on stage 3 chronic kidney disease     On IVF and improving.        ·  H&P 5/30                  PN 5/31     Please clarify if the_ATN_ diagnosis has been:    [  ] Ruled In  [  ] Ruled In, Now Resolved  [x  ] Ruled Out  [  ] Clinically insignificant  [  ] Clinically undetermined  [  ] Other/Clarification of findings (please specify)_______________________________    Please document in your progress notes daily for the duration of treatment, until resolved, and include in your discharge summary.

## 2018-06-07 NOTE — PHYSICIAN QUERY
PT Name: Tasneem Lynn  MR #: [id number]    Physician Query Form - Cause and Effect Relationship Clarification      CDS/: Akosua Wang RN CCDS               Contact information: [email or web address]    This form is a permanent document in the medical record.     Query Date: June 7, 2018    By submitting this query, we are merely seeking further clarification of documentation. Please utilize your independent clinical judgment when addressing the question(s) below.    The Medical record contains the following:  Supporting Clinical Findings   Location in record                                                                      Sepsis secondary to UTI                                                                                                                        DC sum 6/1     KLEBSIELLA PNEUMONIAE   >100,000 cfu/ml                                                                                                                                                                                          Urine culture 5/29         Provider, please clarify if there is any correlation between __Sepsis___ and _KLEBSIELLA PNEUMONIAE _.           Are the conditions:     [x  ] Due to or associated with each other     [  ] Unrelated to each other     [  ] Other (Please Specify): _________________________     [  ] Clinically Undetermined

## 2018-06-19 ENCOUNTER — TELEPHONE (OUTPATIENT)
Dept: FAMILY MEDICINE | Facility: CLINIC | Age: 73
End: 2018-06-19

## 2018-06-19 NOTE — TELEPHONE ENCOUNTER
Left message informing patient that due to an Emergency, Dr. Lopez will not be in tomorrow and we need  to reschedule her appt. Please contact the clinic to reschedule or reschedule on [facility]. Thanks

## 2018-06-25 ENCOUNTER — APPOINTMENT (OUTPATIENT)
Dept: RADIOLOGY | Facility: HOSPITAL | Age: 73
End: 2018-06-25
Attending: FAMILY MEDICINE
Payer: MEDICARE

## 2018-06-25 ENCOUNTER — OFFICE VISIT (OUTPATIENT)
Dept: FAMILY MEDICINE | Facility: CLINIC | Age: 73
End: 2018-06-25
Payer: MEDICARE

## 2018-06-25 VITALS
HEART RATE: 98 BPM | TEMPERATURE: 98 F | HEIGHT: 62 IN | SYSTOLIC BLOOD PRESSURE: 166 MMHG | DIASTOLIC BLOOD PRESSURE: 80 MMHG | OXYGEN SATURATION: 98 %

## 2018-06-25 DIAGNOSIS — I10 ESSENTIAL HYPERTENSION: Chronic | ICD-10-CM

## 2018-06-25 DIAGNOSIS — A41.9 SEPSIS SECONDARY TO UTI: Primary | ICD-10-CM

## 2018-06-25 DIAGNOSIS — N18.30 CKD (CHRONIC KIDNEY DISEASE), STAGE III: ICD-10-CM

## 2018-06-25 DIAGNOSIS — I50.32 CHRONIC DIASTOLIC CHF (CONGESTIVE HEART FAILURE): ICD-10-CM

## 2018-06-25 DIAGNOSIS — M15.9 OSTEOARTHRITIS INVOLVING MULTIPLE JOINTS ON BOTH SIDES OF BODY: ICD-10-CM

## 2018-06-25 DIAGNOSIS — N39.0 SEPSIS SECONDARY TO UTI: Primary | ICD-10-CM

## 2018-06-25 PROBLEM — N17.9 ACUTE RENAL FAILURE SUPERIMPOSED ON STAGE 3 CHRONIC KIDNEY DISEASE: Status: RESOLVED | Noted: 2018-05-31 | Resolved: 2018-06-25

## 2018-06-25 PROBLEM — N17.9 ACUTE ON CHRONIC RENAL FAILURE: Status: RESOLVED | Noted: 2018-05-30 | Resolved: 2018-06-25

## 2018-06-25 PROBLEM — N18.9 ACUTE ON CHRONIC RENAL FAILURE: Status: RESOLVED | Noted: 2018-05-30 | Resolved: 2018-06-25

## 2018-06-25 PROBLEM — R33.9 URINARY RETENTION: Status: RESOLVED | Noted: 2018-05-31 | Resolved: 2018-06-25

## 2018-06-25 PROCEDURE — 99999 PR PBB SHADOW E&M-EST. PATIENT-LVL IV: CPT | Mod: PBBFAC,,, | Performed by: FAMILY MEDICINE

## 2018-06-25 PROCEDURE — 99215 OFFICE O/P EST HI 40 MIN: CPT | Mod: S$GLB,,, | Performed by: FAMILY MEDICINE

## 2018-06-25 PROCEDURE — 73030 X-RAY EXAM OF SHOULDER: CPT | Mod: TC,FY,PN,LT

## 2018-06-25 PROCEDURE — 73030 X-RAY EXAM OF SHOULDER: CPT | Mod: 26,LT,, | Performed by: RADIOLOGY

## 2018-06-25 PROCEDURE — 3077F SYST BP >= 140 MM HG: CPT | Mod: CPTII,S$GLB,, | Performed by: FAMILY MEDICINE

## 2018-06-25 PROCEDURE — 3079F DIAST BP 80-89 MM HG: CPT | Mod: CPTII,S$GLB,, | Performed by: FAMILY MEDICINE

## 2018-06-25 PROCEDURE — 3046F HEMOGLOBIN A1C LEVEL >9.0%: CPT | Mod: CPTII,S$GLB,, | Performed by: FAMILY MEDICINE

## 2018-06-25 NOTE — PATIENT INSTRUCTIONS
Chronic Pain  Pain serves an important role. It lets you know something is wrong that needs your attention. When the body heals, pain normally goes away.  When pain lasts longer than six months, it is called chronic pain. This is pain that is present even after the body has healed. Chronic pain can cause mood problems and get in the way of your relationships and your daily life.  A number of conditions can cause chronic pain. Some of the more common include:  · Previous surgery  · An old injury  · Infection  · Diseases such as diabetes  · Nerve damage  · Back injury  · Arthritis  · Migraine or other headaches  · Fibromyalgia  · Cancer  Depression and stress can make chronic pain symptoms worse. In some cases, a cause for the pain cannot be found.   Treatment  Treatment can greatly reduce pain. In many cases, pain can become less severe, occur less often, and interfere less with your daily life. Chronic pain is often treated with a combination of medicines, therapies, and lifestyle changes. You will work closely with your healthcare provider to find a treatment plan that works best for you.  · Ask your healthcare provider for a referral to a pain management specialty center. These can provide the most recent and proven pain management strategies, along with emotional support and comprehensive services.  · Several different types of medicines may be prescribed for chronic pain. Work with your healthcare provider to develop a medicine plan that helps manage your pain.  · Physical therapy can be very effective in helping reduce certain types of chronic pain.  · Occupational therapy teaches you how to do routine tasks of daily living in ways that lessen your discomfort.  · Psychological therapy can help you cope better with stress and pain.  · Other therapies such as meditation, yoga, biofeedback, massage, and acupuncture can also help manage chronic pain.  · Changing certain habits can help reduce chronic pain. They  include:  ¨ Eating healthy  ¨ Developing an exercise routine  ¨ Getting enough sleep at night  ¨ Stopping smoking and limiting alcohol use  ¨ Losing excess weight  Follow-up care  Follow up with your healthcare provider as advised. Let your healthcare provider know if your current treatment plan is working or if changes are needed.  Resources  For more information, contact:  · [employer] Society www.achenet.org  · American Chronic Pain Association www.theacpa.org 657-912-3768  Date Last Reviewed: 7/26/2015 © 2000-2017 [employer]. [address] [place] [address]. All rights reserved. This information is not intended as a substitute for professional medical care. Always follow your healthcare professional's instructions.

## 2018-06-25 NOTE — PROGRESS NOTES
Chief Complaint   Patient presents with    Hospital Follow Up       HPI    Tasneem Lynn is 73 y.o. female. The primary encounter diagnosis was Sepsis secondary to UTI. Diagnoses of Essential hypertension, CKD (chronic kidney disease), stage III, Chronic diastolic CHF (congestive heart failure), Uncontrolled type 2 diabetes mellitus with stage 3 chronic kidney disease, with long-term current use of insulin, and Osteoarthritis involving multiple joints on both sides of body were also pertinent to this visit.    72-year-old female with hypertension, diabetes, heart failure, and chronic kidney disease comes to clinic for hospital follow-up.  Patient recalls the circumstances of her hospital admission.  She reports a week of dysuria.  She was evaluated and found to have unstable vital signs. She was the and admitted to the hospital with suspicion of sepsis due to urinary tract infection.    Since discharge patient has completed her antibiotic therapy.  She denies return of urinary tract symptoms.  She expresses concern regarding her blood pressure regimen.  Hypertension- patient denies checking blood pressures at home.  She notes being instructed to discontinue some blood pressure medications at discharge.  Kidney disease- patient reports that she is aware of this change.  She is concerned that it may have worsened since hospital discharge. She does not currently have a nephrologist who follows her kidney disease.  Heart failure- patient denies shortness of breath or lower extremity swelling. She does report taking a diuretic that was decreased at hospital discharge.  Diabetes - patient denies difficulty controlling her blood glucose levels.  She reports checking her levels randomly throughout the day.  She denies glucose levels greater than 150.  Arthritis- patient reports that she has a long history of chronic pain due to arthritis affecting multiple joints.  She reports back, bilateral leg, and bilateral shoulder  pain. She has been seen by an orthopedic doctor but refuses surgical intervention.  She previously saw a pain management doctor but did not agree with medication regimen use to treat her pain.      Review of Systems   Constitutional: Negative for activity change, chills, diaphoresis, fatigue and fever.   Respiratory: Positive for shortness of breath. Negative for cough, chest tightness and wheezing.    Cardiovascular: Negative for chest pain, palpitations and leg swelling.   Genitourinary: Negative for dysuria, frequency, pelvic pain and urgency.   Musculoskeletal: Positive for arthralgias, gait problem, joint swelling and myalgias.   Psychiatric/Behavioral: Negative for suicidal ideas.       Past Medical History:   Diagnosis Date    Arthritis     Gout    CHF (congestive heart failure)     Diabetes mellitus     HLD (hyperlipidemia)     Hypertension     Obese     Stroke     1986    Thyroid disease      Past Surgical History:   Procedure Laterality Date    right hand surgery      right knee surgery Right     partial plate     Family History   Problem Relation Age of Onset    Heart disease Sister     Diabetes Maternal Aunt     Heart disease Maternal Aunt     Hypertension Maternal Aunt       reports that she has never smoked. She has never used smokeless tobacco. She reports that she does not drink alcohol or use drugs.  Review of patient's allergies indicates:   Allergen Reactions    Corticosteroids (glucocorticoids) Edema         Current Outpatient Prescriptions:     aspirin 81 MG Chew, Take 81 mg by mouth once daily., Disp: , Rfl:     carvedilol (COREG) 25 MG tablet, Take 25 mg by mouth 2 (two) times daily with meals., Disp: , Rfl:     furosemide (LASIX) 20 MG tablet, Take 1 tablet (20 mg total) by mouth once daily., Disp: 30 tablet, Rfl: 0    gabapentin (NEURONTIN) 300 MG capsule, Take 1 capsule (300 mg total) by mouth 3 (three) times daily., Disp: 90 capsule, Rfl: 1    insulin NPH-insulin  "regular, 70/30, (NOVOLIN 70/30) 100 unit/mL (70-30) injection, Inject 40 Units into the skin 2 (two) times daily., Disp: , Rfl:     levothyroxine (SYNTHROID) 75 MCG tablet, Take 75 mcg by mouth once daily., Disp: , Rfl:     losartan (COZAAR) 50 MG tablet, Take 50 mg by mouth once daily., Disp: , Rfl:     rosuvastatin (CRESTOR) 20 MG tablet, Take 20 mg by mouth once daily., Disp: , Rfl:     cetirizine 10 mg chewable tablet, Take 10 mg by mouth once daily., Disp: , Rfl:     diclofenac (VOLTAREN) 75 MG EC tablet, Take 1 tablet (75 mg total) by mouth 2 (two) times daily., Disp: 30 tablet, Rfl: 1        Blood pressure (!) 166/80, pulse 98, temperature 98.1 °F (36.7 °C), temperature source Oral, height 5' 2" (1.575 m), SpO2 98 %.    Physical Exam   Constitutional: Vital signs are normal. She appears well-developed.   Cardiovascular: Normal heart sounds.    No murmur heard.  Pulmonary/Chest: Effort normal and breath sounds normal.   Psychiatric: She has a normal mood and affect. Her behavior is normal.       Lab Visit on 06/25/2018   Component Date Value Ref Range Status    Sodium 06/25/2018 141  136 - 145 mmol/L Final    Potassium 06/25/2018 4.1  3.5 - 5.1 mmol/L Final    Chloride 06/25/2018 108  95 - 110 mmol/L Final    CO2 06/25/2018 26  23 - 29 mmol/L Final    Glucose 06/25/2018 172* 70 - 110 mg/dL Final    BUN, Bld 06/25/2018 22  8 - 23 mg/dL Final    Creatinine 06/25/2018 1.7* 0.5 - 1.4 mg/dL Final    Calcium 06/25/2018 9.5  8.7 - 10.5 mg/dL Final    Total Protein 06/25/2018 7.8  6.0 - 8.4 g/dL Final    Albumin 06/25/2018 2.7* 3.5 - 5.2 g/dL Final    Total Bilirubin 06/25/2018 0.3  0.1 - 1.0 mg/dL Final    Alkaline Phosphatase 06/25/2018 97  55 - 135 U/L Final    AST 06/25/2018 16  10 - 40 U/L Final    ALT 06/25/2018 12  10 - 44 U/L Final    Anion Gap 06/25/2018 7* 8 - 16 mmol/L Final    eGFR if  06/25/2018 34* >60 mL/min/1.73 m^2 Final    eGFR if non  06/25/2018 " 30* >60 mL/min/1.73 m^2 Final   Admission on 05/29/2018, Discharged on 06/01/2018   No results displayed because visit has over 200 results.      Admission on 01/05/2018, Discharged on 01/05/2018   Component Date Value Ref Range Status    WBC 01/05/2018 10.94  3.90 - 12.70 K/uL Final    RBC 01/05/2018 3.97* 4.00 - 5.40 M/uL Final    Hemoglobin 01/05/2018 12.0  12.0 - 16.0 g/dL Final    Hematocrit 01/05/2018 36.9* 37.0 - 48.5 % Final    MCV 01/05/2018 93  82 - 98 fL Final    MCH 01/05/2018 30.2  27.0 - 31.0 pg Final    MCHC 01/05/2018 32.5  32.0 - 36.0 g/dL Final    RDW 01/05/2018 14.4  11.5 - 14.5 % Final    Platelets 01/05/2018 328  150 - 350 K/uL Final    MPV 01/05/2018 9.4  9.2 - 12.9 fL Final    Gran # (ANC) 01/05/2018 5.7  1.8 - 7.7 K/uL Final    Lymph # 01/05/2018 3.7  1.0 - 4.8 K/uL Final    Mono # 01/05/2018 1.0  0.3 - 1.0 K/uL Final    Eos # 01/05/2018 0.5  0.0 - 0.5 K/uL Final    Baso # 01/05/2018 0.03  0.00 - 0.20 K/uL Final    Gran% 01/05/2018 51.7  38.0 - 73.0 % Final    Lymph% 01/05/2018 33.9  18.0 - 48.0 % Final    Mono% 01/05/2018 9.3  4.0 - 15.0 % Final    Eosinophil% 01/05/2018 4.5  0.0 - 8.0 % Final    Basophil% 01/05/2018 0.3  0.0 - 1.9 % Final    Differential Method 01/05/2018 Automated   Final    Sodium 01/05/2018 139  136 - 145 mmol/L Final    Potassium 01/05/2018 4.5  3.5 - 5.1 mmol/L Final    Chloride 01/05/2018 108  95 - 110 mmol/L Final    CO2 01/05/2018 20* 23 - 29 mmol/L Final    Glucose 01/05/2018 189* 70 - 110 mg/dL Final    BUN, Bld 01/05/2018 24* 8 - 23 mg/dL Final    Creatinine 01/05/2018 1.6* 0.5 - 1.4 mg/dL Final    Calcium 01/05/2018 9.5  8.7 - 10.5 mg/dL Final    Total Protein 01/05/2018 7.9  6.0 - 8.4 g/dL Final    Albumin 01/05/2018 3.1* 3.5 - 5.2 g/dL Final    Total Bilirubin 01/05/2018 0.3  0.1 - 1.0 mg/dL Final    Alkaline Phosphatase 01/05/2018 143* 55 - 135 U/L Final    AST 01/05/2018 15  10 - 40 U/L Final    ALT 01/05/2018 12  10 - 44  U/L Final    Anion Gap 01/05/2018 11  8 - 16 mmol/L Final    eGFR if  01/05/2018 37* >60 mL/min/1.73 m^2 Final    eGFR if non African American 01/05/2018 32* >60 mL/min/1.73 m^2 Final    Magnesium 01/05/2018 1.6  1.6 - 2.6 mg/dL Final    Troponin I 01/05/2018 0.022  0.000 - 0.026 ng/mL Final    Specimen UA 01/05/2018 Urine, Clean Catch   Final    Color, UA 01/05/2018 Yellow  Yellow, Straw, Adriana Final    Appearance, UA 01/05/2018 Hazy* Clear Final    pH, UA 01/05/2018 5.0  5.0 - 8.0 Final    Specific Gravity, UA 01/05/2018 1.010  1.005 - 1.030 Final    Protein, UA 01/05/2018 2+* Negative Final    Glucose, UA 01/05/2018 1+* Negative Final    Ketones, UA 01/05/2018 Negative  Negative Final    Bilirubin (UA) 01/05/2018 Negative  Negative Final    Occult Blood UA 01/05/2018 1+* Negative Final    Nitrite, UA 01/05/2018 Positive* Negative Final    Urobilinogen, UA 01/05/2018 Negative  <2.0 EU/dL Final    Leukocytes, UA 01/05/2018 2+* Negative Final    RBC, UA 01/05/2018 5* 0 - 4 /hpf Final    WBC, UA 01/05/2018 60* 0 - 5 /hpf Final    Bacteria, UA 01/05/2018 Many* None-Occ /hpf Final    Hyaline Casts, UA 01/05/2018 0  0-1/lpf /lpf Final    Microscopic Comment 01/05/2018 SEE COMMENT   Final    POCT Glucose 01/05/2018 189* 70 - 110 mg/dL Final   ]    ASSESSMENT:    1. Sepsis secondary to UTI    2. Essential hypertension    3. CKD (chronic kidney disease), stage III    4. Chronic diastolic CHF (congestive heart failure)    5. Uncontrolled type 2 diabetes mellitus with stage 3 chronic kidney disease, with long-term current use of insulin    6. Osteoarthritis involving multiple joints on both sides of body        Tasneem was seen today for hospital follow up.    Diagnoses and all orders for this visit:    Sepsis secondary to UTI   -resolved.  Antibiotic therapy completed in no return of urinary tract symptoms.  Continue to monitor.    Essential hypertension  -     Comprehensive metabolic  panel; Future  - unstable.  Blood pressure elevated at today's visit.  Repeat creatinine and estimated GFR and adjust medication regimen    CKD (chronic kidney disease), stage III   -unstable.  Repeat CMP.  If severely decreased compared to previous will refer to Nephrology.    Chronic diastolic CHF (congestive heart failure)  -     2D Echo w/ Color Flow Doppler; Future  - stable.  Patient denies being informed of diagnosis.  Obtain echocardiogram to reassess heart function.    Uncontrolled type 2 diabetes mellitus with stage 3 chronic kidney disease, with long-term current use of insulin   -stable.  Continue current medication regimen.    Osteoarthritis involving multiple joints on both sides of body  -     X-Ray Shoulder 2 or More Views Left; Future  -     X-Ray Lumbar Spine Ap Lateral w/Flex Ext; Future  - unstable.  Obtain x-rays and refer back to Orthopedics based on severity or back to pain management.    A total of 50 minutes was spent with the patient during this encounter. More than 50% of the encounter was spent counseling the patient regarding treatment options, expected outcomes, and coordination of care.        FOLLOW UP: Follow-up in about 4 weeks (around 7/23/2018) for Follow up.

## 2018-07-13 DIAGNOSIS — Z12.39 BREAST CANCER SCREENING: ICD-10-CM

## 2018-07-13 DIAGNOSIS — E11.9 TYPE 2 DIABETES MELLITUS WITHOUT COMPLICATION: ICD-10-CM

## 2018-07-13 DIAGNOSIS — E11.9 TYPE 2 DIABETES MELLITUS WITHOUT COMPLICATION, UNSPECIFIED WHETHER LONG TERM INSULIN USE: ICD-10-CM

## 2018-07-26 DIAGNOSIS — Z12.11 COLON CANCER SCREENING: ICD-10-CM

## 2018-08-28 ENCOUNTER — OFFICE VISIT (OUTPATIENT)
Dept: FAMILY MEDICINE | Facility: CLINIC | Age: 73
End: 2018-08-28
Payer: MEDICARE

## 2018-08-28 VITALS
OXYGEN SATURATION: 98 % | RESPIRATION RATE: 14 BRPM | TEMPERATURE: 99 F | DIASTOLIC BLOOD PRESSURE: 77 MMHG | SYSTOLIC BLOOD PRESSURE: 162 MMHG | BODY MASS INDEX: 45.92 KG/M2 | WEIGHT: 249.56 LBS | HEART RATE: 86 BPM | HEIGHT: 62 IN

## 2018-08-28 DIAGNOSIS — I50.32 CHRONIC DIASTOLIC CHF (CONGESTIVE HEART FAILURE): ICD-10-CM

## 2018-08-28 DIAGNOSIS — M15.9 OSTEOARTHRITIS INVOLVING MULTIPLE JOINTS ON BOTH SIDES OF BODY: ICD-10-CM

## 2018-08-28 DIAGNOSIS — I10 ESSENTIAL HYPERTENSION: Chronic | ICD-10-CM

## 2018-08-28 DIAGNOSIS — N18.30 CKD (CHRONIC KIDNEY DISEASE), STAGE III: ICD-10-CM

## 2018-08-28 PROCEDURE — 99499 UNLISTED E&M SERVICE: CPT | Mod: S$GLB,,, | Performed by: FAMILY MEDICINE

## 2018-08-28 PROCEDURE — 99214 OFFICE O/P EST MOD 30 MIN: CPT | Mod: S$GLB,,, | Performed by: FAMILY MEDICINE

## 2018-08-28 PROCEDURE — 99999 PR PBB SHADOW E&M-EST. PATIENT-LVL V: CPT | Mod: PBBFAC,,, | Performed by: FAMILY MEDICINE

## 2018-08-28 PROCEDURE — 3078F DIAST BP <80 MM HG: CPT | Mod: CPTII,S$GLB,, | Performed by: FAMILY MEDICINE

## 2018-08-28 PROCEDURE — 3046F HEMOGLOBIN A1C LEVEL >9.0%: CPT | Mod: CPTII,S$GLB,, | Performed by: FAMILY MEDICINE

## 2018-08-28 PROCEDURE — 3077F SYST BP >= 140 MM HG: CPT | Mod: CPTII,S$GLB,, | Performed by: FAMILY MEDICINE

## 2018-08-28 RX ORDER — CLONIDINE HYDROCHLORIDE 0.1 MG/1
0.1 TABLET ORAL 3 TIMES DAILY
Qty: 270 TABLET | Refills: 1 | Status: SHIPPED | OUTPATIENT
Start: 2018-08-28 | End: 2018-12-03 | Stop reason: SDUPTHER

## 2018-08-28 RX ORDER — HYDROCODONE BITARTRATE AND ACETAMINOPHEN 5; 325 MG/1; MG/1
1 TABLET ORAL EVERY 12 HOURS PRN
Qty: 60 TABLET | Refills: 0 | Status: SHIPPED | OUTPATIENT
Start: 2018-08-28 | End: 2018-08-28 | Stop reason: SDUPTHER

## 2018-08-28 RX ORDER — ACETAMINOPHEN 500 MG
TABLET ORAL
Qty: 1 EACH | Refills: 0 | Status: SHIPPED | OUTPATIENT
Start: 2018-08-28 | End: 2018-08-28 | Stop reason: SDUPTHER

## 2018-08-28 RX ORDER — GABAPENTIN 300 MG/1
300 CAPSULE ORAL 3 TIMES DAILY
Qty: 270 CAPSULE | Refills: 1 | Status: SHIPPED | OUTPATIENT
Start: 2018-08-28 | End: 2018-10-12 | Stop reason: SDUPTHER

## 2018-08-28 RX ORDER — HYDROCODONE BITARTRATE AND ACETAMINOPHEN 5; 325 MG/1; MG/1
1 TABLET ORAL EVERY 12 HOURS PRN
Qty: 60 TABLET | Refills: 0 | Status: SHIPPED | OUTPATIENT
Start: 2018-08-28 | End: 2018-09-19 | Stop reason: SDUPTHER

## 2018-08-28 RX ORDER — CLONIDINE HYDROCHLORIDE 0.1 MG/1
0.1 TABLET ORAL 3 TIMES DAILY
Qty: 270 TABLET | Refills: 1 | Status: SHIPPED | OUTPATIENT
Start: 2018-08-28 | End: 2018-08-28 | Stop reason: SDUPTHER

## 2018-08-28 RX ORDER — FUROSEMIDE 20 MG/1
20 TABLET ORAL DAILY
Qty: 90 TABLET | Refills: 1 | Status: SHIPPED | OUTPATIENT
Start: 2018-08-28 | End: 2018-12-03 | Stop reason: SDUPTHER

## 2018-08-28 RX ORDER — ACETAMINOPHEN 500 MG
TABLET ORAL
Qty: 1 EACH | Refills: 0 | Status: SHIPPED | OUTPATIENT
Start: 2018-08-28 | End: 2018-08-30 | Stop reason: SDUPTHER

## 2018-08-28 NOTE — PATIENT INSTRUCTIONS
What is Arthritis?  Arthritis is a disease that affects the joints (the parts where bones meet and move). It can affect any joint in your body. There are many types of arthritis, including osteoarthritis and rheumatoid arthrtitis. If your symptoms are mild, medications may be enough to reduce pain and swelling. For more severe arthritis, surgery may be needed to improve the condition of the joint or replace the joint entirely.                  What causes arthritis?  Cartilage is a smooth substance that protects the ends of your bones and provides cushioning. When you have arthritis, this cartilage breaks down and can no longer protect your bones. The bones rub against each other, causing pain and swelling. Over time, bone spurs (small pieces of rough or splintered bone) may develop, and the joint's range of motion can become limited.  Symptoms  Some of the more common symptoms of arthritis include:  · Joint pain and stiffness. Pain and stiffness get worse with long periods of rest or using a joint too long or too hard.  · Joints that have lost normal shape and motion.  · Tender, inflamed joints. They may look red and feel warm.  · Grinding or popping noise with joint movement.   · Feeling tired all the time.  Reducing symptoms  Following a healthy lifestyle by losing weight and exercising can help reduce symptoms of osteoarthritis. Medicines can be very helpful for arthritis.     Date Last Reviewed: 9/10/2015  © 6061-5953 [employer]. [address] [place] [address], [place] [address]. All rights reserved. This information is not intended as a substitute for professional medical care. Always follow your healthcare professional's instructions.

## 2018-08-28 NOTE — PROGRESS NOTES
Chief Complaint   Patient presents with    Medication Refill    Follow-up       HPI    aTsneem Lynn is 73 y.o. female. The primary encounter diagnosis was Uncontrolled type 2 diabetes mellitus with stage 3 chronic kidney disease, with long-term current use of insulin. Diagnoses of CKD (chronic kidney disease), stage III, Osteoarthritis involving multiple joints on both sides of body, Chronic diastolic CHF (congestive heart failure), and Essential hypertension were also pertinent to this visit.     73 year old female with Diabetes, Chronic Kidney Disease, and Severe Arthritis comes to clinic for follow up.    Diabetes - patient reports eating smaller portions.  Patient reports morning glucose levels below 100.  Patient's daughter dose reports patient does continue to drink sodas.  HTN - patient reports that she has been compliant with her medication regimen.  She does not have blood pressure equipment at home.  CHF - patient denies history of heart issues.  She does admit to persistent swelling of her lower extremities.  Lasix resolves this issue reliably.  Patient does not confirm shortness of breath as mobility is decreased due to chronic joint pain.  CKD - patient reports that she has never seen Nephrology.  She is aware of decreased kidney function due to Diabetes and HTN.  Osteoarthritis - patient is aware of chronic arthritis.  She reports pain is most prominent in the lower back and bilateral shoulders.  She reports left shoulder is worse than right.  She has never been evaluated by pain management. Patient would like to pursue interventional and pharmacocologic therapy.      Review of Systems   Constitutional: Positive for fatigue. Negative for activity change, chills, diaphoresis and fever.   Respiratory: Negative for cough, chest tightness, shortness of breath and wheezing.    Cardiovascular: Positive for leg swelling. Negative for chest pain.   Musculoskeletal: Positive for arthralgias, back pain, joint  "swelling and myalgias. Negative for gait problem.   Psychiatric/Behavioral: Negative for suicidal ideas.           Current Outpatient Medications:     aspirin 81 MG Chew, Take 81 mg by mouth once daily., Disp: , Rfl:     carvedilol (COREG) 25 MG tablet, Take 25 mg by mouth 2 (two) times daily with meals., Disp: , Rfl:     furosemide (LASIX) 20 MG tablet, Take 1 tablet (20 mg total) by mouth once daily., Disp: 90 tablet, Rfl: 1    gabapentin (NEURONTIN) 300 MG capsule, Take 1 capsule (300 mg total) by mouth 3 (three) times daily., Disp: 270 capsule, Rfl: 1    losartan (COZAAR) 50 MG tablet, Take 50 mg by mouth once daily., Disp: , Rfl:     blood pressure test kit-large Kit, Use to check blood pressure daily and as needed., Disp: 1 each, Rfl: 0    cetirizine 10 mg chewable tablet, Take 10 mg by mouth once daily., Disp: , Rfl:     cloNIDine (CATAPRES) 0.1 MG tablet, Take 1 tablet (0.1 mg total) by mouth 3 (three) times daily. Blood pressure, Disp: 270 tablet, Rfl: 1    diclofenac (VOLTAREN) 75 MG EC tablet, Take 1 tablet (75 mg total) by mouth 2 (two) times daily., Disp: 30 tablet, Rfl: 1    HYDROcodone-acetaminophen (NORCO) 5-325 mg per tablet, Take 1 tablet by mouth every 12 (twelve) hours as needed (severe pain)., Disp: 60 tablet, Rfl: 0    insulin NPH-insulin regular, 70/30, (NOVOLIN 70/30) 100 unit/mL (70-30) injection, Inject 40 Units into the skin 2 (two) times daily., Disp: , Rfl:     levothyroxine (SYNTHROID) 75 MCG tablet, Take 75 mcg by mouth once daily., Disp: , Rfl:     rosuvastatin (CRESTOR) 20 MG tablet, Take 20 mg by mouth once daily., Disp: , Rfl:       Blood pressure (!) 162/77, pulse 86, temperature 98.6 °F (37 °C), temperature source Oral, resp. rate 14, height 5' 2" (1.575 m), weight 113.2 kg (249 lb 9 oz), SpO2 98 %.    Physical Exam   Constitutional: Vital signs are normal. She appears well-developed and well-nourished. She does not appear ill. No distress.   Musculoskeletal:        " Right shoulder: She exhibits decreased range of motion, crepitus, pain and decreased strength. She exhibits no swelling.        Left shoulder: She exhibits decreased range of motion, tenderness, crepitus, pain and decreased strength. She exhibits no swelling, no effusion, no deformity and no spasm.   Mobility signifcantly decreased and mostly wheelchair bound   Psychiatric: Her speech is normal. Judgment and thought content normal. Her mood appears not anxious. Her affect is blunt. She is withdrawn. Cognition and memory are normal. She does not exhibit a depressed mood.       Lab Visit on 06/25/2018   Component Date Value Ref Range Status    Sodium 06/25/2018 141  136 - 145 mmol/L Final    Potassium 06/25/2018 4.1  3.5 - 5.1 mmol/L Final    Chloride 06/25/2018 108  95 - 110 mmol/L Final    CO2 06/25/2018 26  23 - 29 mmol/L Final    Glucose 06/25/2018 172* 70 - 110 mg/dL Final    BUN, Bld 06/25/2018 22  8 - 23 mg/dL Final    Creatinine 06/25/2018 1.7* 0.5 - 1.4 mg/dL Final    Calcium 06/25/2018 9.5  8.7 - 10.5 mg/dL Final    Total Protein 06/25/2018 7.8  6.0 - 8.4 g/dL Final    Albumin 06/25/2018 2.7* 3.5 - 5.2 g/dL Final    Total Bilirubin 06/25/2018 0.3  0.1 - 1.0 mg/dL Final    Alkaline Phosphatase 06/25/2018 97  55 - 135 U/L Final    AST 06/25/2018 16  10 - 40 U/L Final    ALT 06/25/2018 12  10 - 44 U/L Final    Anion Gap 06/25/2018 7* 8 - 16 mmol/L Final    eGFR if  06/25/2018 34* >60 mL/min/1.73 m^2 Final    eGFR if non African American 06/25/2018 30* >60 mL/min/1.73 m^2 Final   Admission on 05/29/2018, Discharged on 06/01/2018   No results displayed because visit has over 200 results.      ]    Assessment:    1. Uncontrolled type 2 diabetes mellitus with stage 3 chronic kidney disease, with long-term current use of insulin    2. CKD (chronic kidney disease), stage III    3. Osteoarthritis involving multiple joints on both sides of body    4. Chronic diastolic CHF (congestive  heart failure)    5. Essential hypertension          Tasneem was seen today for medication refill and follow-up.    Diagnoses and all orders for this visit:    Uncontrolled type 2 diabetes mellitus with stage 3 chronic kidney disease, with long-term current use of insulin  -     Hemoglobin A1c; Future  - Stable. Obtain A1c prior to next 3 month follow up.  Continue portion control. Decrease soda intake.    CKD (chronic kidney disease), stage III  -     Ambulatory referral to Nephrology  -     Comprehensive metabolic panel; Future  -     Microalbumin/creatinine urine ratio  - Stable. Cr and eGFR remains stable.  Referral to Nephrology placed due to risk of progression.   - Obtain urine ACR for staging.      Osteoarthritis involving multiple joints on both sides of body  -     gabapentin (NEURONTIN) 300 MG capsule; Take 1 capsule (300 mg total) by mouth 3 (three) times daily.  -     Discontinue: HYDROcodone-acetaminophen (NORCO) 5-325 mg per tablet; Take 1 tablet by mouth every 12 (twelve) hours as needed (severe pain).  -     Ambulatory referral to Pain Clinic  - Unstable. Gabapentin refilled. Referral to Pain Management placed for evaluation.  - Patient on [place] BID.    Chronic diastolic CHF (congestive heart failure)  -     furosemide (LASIX) 20 MG tablet; Take 1 tablet (20 mg total) by mouth once daily.  - Stable. Obtain Echo to re-evaluate EF and severity of diastolic dysfunction.    Essential hypertension  -     Discontinue: blood pressure test kit-large Kit; Use to check blood pressure daily and as needed.  -     Discontinue: cloNIDine (CATAPRES) 0.1 MG tablet; Take 1 tablet (0.1 mg total) by mouth 3 (three) times daily. Blood pressure  -     blood pressure test kit-large Kit; Use to check blood pressure daily and as needed.  - Unstable. Unable to increase ARB due to CrCl.  Start low dose Clonidine and titrate as indicated.  - Blood pressure cuff ordered for home monitoring.          FOLLOW UP: Follow-up in about  3 months (around 11/28/2018) for Follow up.

## 2018-08-28 NOTE — TELEPHONE ENCOUNTER
----- Message from Staci Blanc sent at 8/28/2018  3:16 PM CDT -----  Contact: Self/ [phone]  Pt requesting HYDROCODONE and CLONIDINE Rx's be sent to [facility] on [address]. Please call with status. Thank you.    [id number] - [place] - [address]  [address]  [place] [address]  Phone: [phone] Fax: [phone]

## 2018-08-28 NOTE — TELEPHONE ENCOUNTER
Medications sent to patient' s requested pharmacy.  [facility] [place] [facility].

## 2018-08-30 DIAGNOSIS — I10 ESSENTIAL HYPERTENSION: Chronic | ICD-10-CM

## 2018-08-30 RX ORDER — ACETAMINOPHEN 500 MG
TABLET ORAL
Qty: 1 EACH | Refills: 0 | Status: SHIPPED | OUTPATIENT
Start: 2018-08-30

## 2018-09-04 ENCOUNTER — TELEPHONE (OUTPATIENT)
Dept: FAMILY MEDICINE | Facility: CLINIC | Age: 73
End: 2018-09-04

## 2018-09-04 NOTE — LETTER
September 4, 2018    Tasneem Lynn  [address]  [place] [address]             [facility]  [address] [place] [address]  Phone: [phone]  Fax: [phone] Dear Mrs. Lynn:    Sorry we were unable to contact you to schedule your Nephrology appointment. Please give the referral department a call at [phone].      If you have any questions or concerns, please don't hesitate to call.    Sincerely,        Bernardino Neves MA

## 2018-09-04 NOTE — TELEPHONE ENCOUNTER
Left message for pt to call back regarding her Nephrology and Pain Medicine referral.Letter mailed out

## 2018-09-19 ENCOUNTER — TELEPHONE (OUTPATIENT)
Dept: FAMILY MEDICINE | Facility: CLINIC | Age: 73
End: 2018-09-19

## 2018-09-19 DIAGNOSIS — M15.9 OSTEOARTHRITIS INVOLVING MULTIPLE JOINTS ON BOTH SIDES OF BODY: ICD-10-CM

## 2018-09-19 RX ORDER — HYDROCODONE BITARTRATE AND ACETAMINOPHEN 5; 325 MG/1; MG/1
1 TABLET ORAL EVERY 12 HOURS PRN
Qty: 60 TABLET | Refills: 0 | Status: SHIPPED | OUTPATIENT
Start: 2018-09-25 | End: 2018-10-31 | Stop reason: DRUGHIGH

## 2018-09-19 NOTE — TELEPHONE ENCOUNTER
Please inform patient that this medication has been filled in the last 30 days. She cannot fill it for another week.    Postdated prescription sent to [facility].

## 2018-09-19 NOTE — TELEPHONE ENCOUNTER
----- Message from Kristine Mills LPN sent at 9/19/2018  1:51 PM CDT -----  Contact: Merrill/Daughter/[phone]      ----- Message -----  From: Chaz Painter  Sent: 9/19/2018   1:21 PM  To: Jessica Lentz Staff    Refill:  HYDROcodone-acetaminophen (NORCO) 5-325 mg per tablet      [facility] [place] - [id number] [facility]  [place] [address]  Phone: [phone] Fax: [phone]    Thank you.

## 2018-10-12 DIAGNOSIS — M15.9 OSTEOARTHRITIS INVOLVING MULTIPLE JOINTS ON BOTH SIDES OF BODY: ICD-10-CM

## 2018-10-12 RX ORDER — GABAPENTIN 300 MG/1
300 CAPSULE ORAL 3 TIMES DAILY
Qty: 270 CAPSULE | Refills: 1 | Status: SHIPPED | OUTPATIENT
Start: 2018-10-12 | End: 2018-12-03 | Stop reason: SDUPTHER

## 2018-10-12 NOTE — TELEPHONE ENCOUNTER
----- Message from Katey Luong sent at 10/12/2018  2:18 PM CDT -----  Contact: Self   Patient's daughter says patient's pain medication is not working and would like a higher dose. She would also like to get a refill on her medication. Please call at [phone]      HYDROcodone-acetaminophen (NORCO) 5-325 mg per tablet  gabapentin (NEURONTIN) 300 MG capsule      [facility] [address] [place] - [address] [place] [address]

## 2018-10-12 NOTE — TELEPHONE ENCOUNTER
Please contact patient or patient's daughter and inform that it is too early to refill her pain medication.  I also cannot prescribe a higher dose.  I would recommend that she see pain management.

## 2018-10-19 ENCOUNTER — OUTPATIENT CASE MANAGEMENT (OUTPATIENT)
Dept: ADMINISTRATIVE | Facility: OTHER | Age: 73
End: 2018-10-19

## 2018-10-23 ENCOUNTER — OUTPATIENT CASE MANAGEMENT (OUTPATIENT)
Dept: ADMINISTRATIVE | Facility: OTHER | Age: 73
End: 2018-10-23

## 2018-10-24 ENCOUNTER — OUTPATIENT CASE MANAGEMENT (OUTPATIENT)
Dept: ADMINISTRATIVE | Facility: OTHER | Age: 73
End: 2018-10-24

## 2018-10-24 NOTE — LETTER
October 24, 2018    Tasneem Lynn  [address]  [place] [address]             [facility]  [address] [facility] [address] Dear MsRosalba Poncez Lynn,    Thank you for talking with me on Thursday October 24, 2018. You are enrolled in [facility].  My name is Rosalina and I am the  on your care team at [facility]. I will provide you with additional information about your diseases, medications, ant treatments. I have enclosed some information for you to read pertaining to your health.      If you have any questions or concerns please call the Office of Capitation Management at [phone]. If you choose to contact me directly my phone number is [phone].     Thank you,      Rosalina Alan, RN BSN  [facility]

## 2018-10-24 NOTE — PATIENT INSTRUCTIONS
Osteoarthritis: Coping with Pain    There are many ways to control your pain. Youre making a good start by learning about osteoarthritis and its treatments. Knowing more about this condition helps you work with your healthcare provider to find answers to problems. Keeping a positive outlook can help you manage pain from day to day. And making time each day to relax and enjoy yourself may help you control osteoarthritis pain, instead of letting it control you. Try these methods to help you cope with, and even reduce, your pain.  Take control  Relaxing may help relieve muscle aches that result from joint pain. To relax, try these techniques:  · Breathe slowly and calmly and think of a peaceful scene.  · Meditate by focusing your mind on one word, object, or idea.  Getting plenty of sleep can help reduce pain and let you function better. If pain is making it hard for you to sleep, ask your doctor about ways to control pain and ensure a good nights sleep. Cutting back on caffeine and alcohol can help you sleep better. So can going to bed and getting up at about the same time every day.  Use distraction  Getting your mind off the pain may seem hard to do. But it can actually help reduce pain. When you are in pain, try one of these ways of distracting yourself:  · Watch a funny movie with a friend.  · Listen to music you enjoy.  · Read a novel.  · Talk with friends or family.  · Go to a museum, park, or other favorite attraction.  · Arrange to do a regular activity, such as volunteer work.  Heat and cold  Using heat and cold treatments are simple ways to lessen arthritis symptoms:  · Heat soothes stiff joints and tired muscles. Heat works well before exercise, for example. Heat treatments include:  ¨ A warm shower or baths, or soak (for example, fill the sink with warm water and move your fingers, hands, and wrists around in the water)  ¨ A moist heating pad  ¨ A warm, moist wash cloth  ¨ An electric blanket or  throw  · Cold treatments help to numb painful areas and decrease swelling. Cold treatments include the following wrapped in a thin towel:  ¨ An ice pack or bag of ice  ¨ A gel-filled cold pack  ¨ A bag of frozen vegetables, like peas or corn  Be careful when using heat or cold. You can injure your skin. Each treatment should only last for 10 to 20 minutes. Your healthcare provider or therapist can give you specific instructions.  Acupuncture  Acupuncture is a 2000-year-old practice. Practitioners insert thin needles in specific parts of the body. Research shows that it can help to relieve the pain of arthritis.  For more information or to find a practitioner in your area, contact the American Academy of Medical Acupuncture. Its website is: http://www.medicalacupuncture.org/.  Massage  Therapeutic massage has many benefits. It may:  · Help you and your muscles relax  · Improve blood flow to muscles and joints  · Help joints stay more flexible  Look for a certified massage therapist. Many are trained to treat sore muscles and joint pain and stiffness.  Vitamins, supplements, and herbs  People with arthritis, or other long-term conditions that cause pain, often look for alternative ways to lessen pain. Vitamins, supplements, and herbs may or may not help you to feel better. Before you try any vitamin, supplement, or herb, make sure you ask your healthcare provider or pharmacist.  Physical therapy/occupational therapy  Evaluation by a physical therapist and or occupational therapist for assessment for limitations in activities of daily living  Assistance with developing an appropriate exercise routine for both muscle strengthening and cardiovascular health  Weight management  Studies have demonstrated that weight loss in overweight individuals can improve osteoarthritis symptoms.  Talk with your healthcare provider regarding your optimal weight and techniques for weight management if necessary.  Psychological  "treatments  Research shows that many psychological therapies or those that deal with thinking and emotions, help people cope with arthritis pain. Therapies include cognitive-behavioral therapy (CBT), pain coping skills training, biofeedback, stress management, and hypnosis. Ask your healthcare provider for more information about these therapies.  For more information about many of these methods, contact the National Center for Complementary and Alternative Medicine (NCCAM) at http://www.[facility].gov.   Date Last Reviewed: 2/14/2016 © 2000-2017 [employer]. [address] [place] [address]. All rights reserved. This information is not intended as a substitute for professional medical care. Always follow your healthcare professional's instructions.        Osteoarthritis: Tips for Daily Living     Lift items with both hands.   Making a few changes in your daily life can reduce stress on your joints. This helps protect the joints from further damage.  Your surroundings  Make your home work for you:  · Arrange cupboards, closets, desks, and drawers to reduce reaching and bending.  · Arrange furniture to make it safer and easier to get around.  · Secure or remove rugs, power cords, and other items that might make you slip or trip.  Think ahead  Plan in advance:  · Combine errands so that you make fewer trips up and down stairs.  · Break up packages so that you carry less weight with each trip. For example, ask cashiers to use more bags for your groceries.  · If you need help with chores or errands, try to arrange for it in advance.  · If you need to lift something heavy, ask for help.  · Try to use other parts of your body if you have pain in certain joints.  Use whats available  To rest your hands, back, and neck:  · Make sure that knives are sharp.  · Use a "reacher" or grasper to reach and grab.  · Use soap-on-a-rope and a long-handled scrubber in the shower or bath.  To rest your knees, " hips, and lower back:  · Wear shoes that feel good, fit well, and provide good support.  · Choose chairs with firm seats and armrests.  Assistive devices  Devices are available to help you:  · In the kitchen, use light-weight dishes, cook and bakeware.  · Attach larger handles to keys.  · Use helpful gripping devices for opening jars.   · For gardening, use a rolling bench to sit on or to hold your tools. Use tools with padded handles.  · In the bathroom, try using grab bars, a raised toilet seat, or a shower seat.  · A cane, brace, or walker may help you walk more easily. Make sure that its properly fitted and that youre trained to use it.  Date Last Reviewed: 2/14/2016 © 2000-2017 [employer]. [address] [place] [address]. All rights reserved. This information is not intended as a substitute for professional medical care. Always follow your healthcare professional's instructions.        Living with Osteoarthritis    Osteoarthritis is a chronic disease and the most common type of arthritis. But it doesnt have to keep you from leading an active life. You can help control symptoms by exercising and losing weight if you are overweight. Using special tools also helps make life easier. Be sure to see your healthcare provider for scheduled checkups and lab work. If you have questions or concerns between office visits, call your healthcare provider's office.  Make exercise part of your life  Gentle exercise can help lessen your pain. Keep the following in mind:  · Choose exercises that improve joint motion and make your muscles stronger. Your healthcare provider or a physical therapist may suggest a few.  · Stretching and flexibility activities such as yoga and katey chi may improve pain and joint motion.  · Try low-impact sports, such as walking, biking, or doing exercises in a warm pool.  · Most people should exercise for at least 30 minutes a day on most days of the week. This can be broken  up into shorter periods throughout the day.  · Dont push yourself too hard at first. Slowly build up over time.  · Make sure you warm up for 5 to 10 minutes before you exercise.  · If pain and stiffness increase, don't exercise as hard or as long.  Watch your weight  If you weigh more than you should, your weight-bearing joints are under extra pressure. This makes your symptoms worse. To reduce pain and stiffness, try shedding a few of those extra pounds. The tips below may help:  · Start a weight-loss program with the help of your healthcare provider.  · Ask your friends and family for support.  · Join a weight-loss group.  Use special tools  Even simple tasks can be hard to do when your joints hurt. Special tools called assistive devices can make things easier by reducing strain and protecting your joints. Ask your healthcare provider where to find these and other helpful tools:  · Long-handled reachers or grabbers  · Jar openers and button threaders  · Large  for pencils, garden tools, and other hand-held objects  Use mobility and other aids  People with arthritis and other joint problems often use mobility aids to help with walking. For example, they may use canes or walkers. They may also use splints or braces to support joints. Talk with your healthcare provider or physical therapist about these aids:  · A cane to reduce knee or hip pain and help prevent falls  · Splints for your wrists or other joints  · A brace to support a weak knee joint  · Orthotics for toe and foot involvement  Medical and surgical treatments  Discuss medical treatments with your healthcare provider to help reduce your pain and improve joint mobility.  · Topical medicines such as lidocaine, capsaicin, and diclofenac gel  · Oral medicines such as acetaminophen, NSAIDs (nonsteroidal anti-inflammatory medicines) such as ibuprofen and naproxen, or opioid narcotics  · Injections in affected joints such as corticosteroids in various  "joints, or hyaluronic acid in the knee joints  · Surgical repair or surgical joint replacement with artificial joints  · Complementary therapies such as heat and cold treatment, massage, acupuncture, supplements, cognitive training, meditation, and others. Discuss these options with your healthcare provider.  Date Last Reviewed: 2/14/2016  © 1349-3440 [employer]. [address] [place] [address], [place] [address]. All rights reserved. This information is not intended as a substitute for professional medical care. Always follow your healthcare professional's instructions.        Diabetes: Shopping for and Preparing Meals    Having diabetes doesnt mean you have to shop in a special aisle or look for special foods. But you will need to make choices. By comparing items and reading food labels, you can find the healthiest foods for you and your family.  Comparing items  When you shop, compare items to find the best ones for your needs. Keep these facts in mind:  · No sugar added does not mean a product is sugar-free.  · "Sugar-free" means less than 1/2 gram (g) of sugar per serving.  · Fat free means less than 1/2 g of fat per serving. This does not necessarily mean the product is low in calories.  · Low fat means 3 g fat or less per serving. Reduced fat or less fat means 25% less fat than the regular version. Some of this fat may be saturated or trans fat. And calories per serving may be similar to the regular version.  Making small changes  Dont try to change all of your eating habits at once. Here are some ideas to start with:  · Try fat-free or low-fat cheese, milk, and yogurt. Also try leaner cuts of meat. This will help you cut down on saturated fat.  · Try whole-grain breads, brown rice, and whole-wheat pasta.  · Load up on fresh or frozen vegetables. If you buy canned, choose low-sodium vegetables.  · Avoid processed foods as much as possible. They tend to be low in fiber and high in trans fats " and sodium.  · Try tofu, soymilk, or meat substitutes.?They can help you cut cholesterol and saturated fat out of your diet.  Learning to read food labels  To find healthy foods that help you control blood sugar, learn how to read food labels. Look for the Nutrition Facts label on packaged foods. It will tell you how much carbohydrate, sugar, fat, and fiber is in each serving. Then, you can decide whether or not the food fits into your meal plan.  Using the food label  So, once you have the food label, what do you do with it? The food label helps in many ways. Use it to:  · Compare items and decide which is the best for your health needs.  · Track the number of carbohydrates in your portions.  · Figure out how many servings of a food you can have and still stay within the number of carbohydrates for that meal.  Planning meals  For good blood sugar control, plan what and when youll eat. Start by creating a meal plan that includes all the food groups. Then, time your meals to help keep your blood sugar level steady. You may need to adjust your plan for special situations.  Eat from all the food groups  The basis of a healthy meal plan is variety (eating many different types of foods). Look for lean meats, fresh fruits and vegetables, whole grains, and low-fat or non-fat dairy products. Eating a wide variety of foods provides the nutrients your body needs. It can also keep you from getting bored with your meal plan.  Reduce liquid sugars  Extra calories from sodas, sports drinks, and fruit drinks make it hard to keep blood sugar in range. Cut as many liquid sugars from your meal plan as you can. This includes most fruit juices, which are often high in natural or added sugar. Instead, drink plenty of water and other sugar-free beverages.  Eat less fat  If you need to lose some weight, try to reduce the amount of fat in your diet. This can also help lower your cholesterol level to keep blood vessels healthier. Cut fat  by using only small amounts of liquid oil for cooking. Read food labels carefully to avoid foods with unhealthy trans fats.  Timing your meals  When it comes to blood sugar control, when you eat is as important as what you eat. You may need to eat several small meals spaced evenly throughout the day to stay in your target range. So dont skip breakfast or wait until late in the day to get most of your calories. Doing so can cause your blood sugar to rise too high or fall too low.  Cooking wisely  · Broil, steam, bake, or grill meats and vegetables, instead of frying.  · Instead of cream-based sauces or sugary glazes, flavor foods with vegetable purée, lemon or lime juice, or herb seasonings.  · Remove skin from chicken and turkey before serving.  · Look in cookbooks for easy, low-fat, low-sugar recipes. When making your usual recipes, cut sugar by 1/2 and fat by 1/3.  Date Last Reviewed: 8/1/2016 © 2000-2017 [employer]. [address] [place] [address]. All rights reserved. This information is not intended as a substitute for professional medical care. Always follow your healthcare professional's instructions.        Diabetes: Inspecting Your Feet    Diabetes increases your chances of developing foot problems. So inspect your feet every day. This helps you find small skin irritations before they become serious ulcers or infections. If you have trouble seeing the bottoms of your feet, use a mirror or ask a family member or friend to help.  How to check your feet  Below are tips to help you look for foot problems. Try to check your feet at the same time each day, such as when you get out of bed in the morning:  · Check the top of each foot. The tops of toes, back of the heel, and outer edge of the foot can get a lot of rubbing from poor-fitting shoes.  · Check the bottom of each foot. Daily wear and tear often leads to problems at pressure spots.  · Check the toes and nails. Fungal infections  often occur between toes. Toenail problems can also be a sign of fungal infections or lead to breaks in the skin.  · Check your shoes, too. Loose objects inside a shoe can injure the foot. Use your hand to feel inside your shoes for things like jennifer, loose stitching, or rough areas that could irritate your skin.  Warning signs  Look for any color changes in the foot. Redness with streaks can signal a severe infection, which needs immediate medical attention. Tell your healthcare provider right away if you have any of these problems:  · Swelling, sometimes with color changes, may be a sign of poor blood flow or infection. Symptoms include tenderness and an increase in the size of your foot.  · Warm or hot areas on your feet may be signs of infection. A foot that is cold may not be getting enough blood.  · Sensations such as burning, tingling, or pins and needles can be signs of a problem. Also check for areas that may be numb.  · Hot spots are caused by friction or pressure. Look for hot spots in areas that get a lot of rubbing. Hot spots can turn into blisters, calluses, or sores.  · Cracks and sores are caused by dry or irritated skin. They are a sign that the skin is breaking down, which can lead to infection.  · Toenail problems to watch for include nails growing into the skin (ingrown toenail) and causing redness or pain. Thick, yellow, or discolored nails can signal a fungal infection.  · Drainage and odor can develop from untreated sores and ulcers. Call your healthcare provider right away if you notice white or yellow drainage, bleeding, or unpleasant odor.   Date Last Reviewed: 6/1/2016 © 2000-2017 [employer]. [address] [place] [address]. All rights reserved. This information is not intended as a substitute for professional medical care. Always follow your healthcare professional's instructions.

## 2018-10-24 NOTE — PROGRESS NOTES
Summary: Pt reports that she gives permission to speak with her daughter Merrill. Pt reports that she considers her health to be poor due to the arthritis and DM. Pt reports that she considers her diabetes to be controlled. Pt reports that her blood sugars are 150 or below. Pt reports that she tries to eat two meals a day. Reviewed Hemoglobin A1c from 5/29/18 of 13.7. Pt reports that she has arthritis in knees, hip, feet, and shoulder. Pt reports that she requires assistance with some ADLs and iADLs.     Interventions:Reviewed with pt the s/s of DM.    Plan:Will review with pt diabetic diet with pt.    Todays [facility] Self-Management Care Plan was developed with the patients/caregivers input and was based on identified barriers from todays assessment.  Goals were written today with the patient/caregiver and the patient has agreed to work towards these goals to improve his/her overall well-being. Patient verbalized understanding of the care plan, goals, and all of today's instructions. Encouraged patient/caregiver to communicate with his/her physician and health care team about health conditions and the treatment plan.  Provided my contact information today and encouraged patient/caregiver to call me with any questions as needed.

## 2018-10-31 ENCOUNTER — OFFICE VISIT (OUTPATIENT)
Dept: FAMILY MEDICINE | Facility: CLINIC | Age: 73
End: 2018-10-31
Payer: MEDICARE

## 2018-10-31 VITALS
RESPIRATION RATE: 16 BRPM | HEIGHT: 62 IN | DIASTOLIC BLOOD PRESSURE: 83 MMHG | BODY MASS INDEX: 45.65 KG/M2 | TEMPERATURE: 98 F | SYSTOLIC BLOOD PRESSURE: 148 MMHG | HEART RATE: 86 BPM | OXYGEN SATURATION: 95 %

## 2018-10-31 DIAGNOSIS — Z23 NEED FOR STREPTOCOCCUS PNEUMONIAE VACCINATION: ICD-10-CM

## 2018-10-31 DIAGNOSIS — M15.9 OSTEOARTHRITIS INVOLVING MULTIPLE JOINTS ON BOTH SIDES OF BODY: ICD-10-CM

## 2018-10-31 DIAGNOSIS — Z11.59 NEED FOR HEPATITIS C SCREENING TEST: ICD-10-CM

## 2018-10-31 DIAGNOSIS — I10 ESSENTIAL HYPERTENSION: Chronic | ICD-10-CM

## 2018-10-31 DIAGNOSIS — M75.52 CHRONIC BURSITIS OF LEFT SHOULDER: Primary | ICD-10-CM

## 2018-10-31 PROCEDURE — 3046F HEMOGLOBIN A1C LEVEL >9.0%: CPT | Mod: CPTII,HCNC,S$GLB, | Performed by: FAMILY MEDICINE

## 2018-10-31 PROCEDURE — 99214 OFFICE O/P EST MOD 30 MIN: CPT | Mod: HCNC,S$GLB,, | Performed by: FAMILY MEDICINE

## 2018-10-31 PROCEDURE — 3079F DIAST BP 80-89 MM HG: CPT | Mod: CPTII,HCNC,S$GLB, | Performed by: FAMILY MEDICINE

## 2018-10-31 PROCEDURE — 99999 PR PBB SHADOW E&M-EST. PATIENT-LVL IV: CPT | Mod: PBBFAC,HCNC,, | Performed by: FAMILY MEDICINE

## 2018-10-31 PROCEDURE — 3077F SYST BP >= 140 MM HG: CPT | Mod: CPTII,HCNC,S$GLB, | Performed by: FAMILY MEDICINE

## 2018-10-31 PROCEDURE — 1101F PT FALLS ASSESS-DOCD LE1/YR: CPT | Mod: CPTII,HCNC,S$GLB, | Performed by: FAMILY MEDICINE

## 2018-10-31 RX ORDER — DEXAMETHASONE SODIUM PHOSPHATE 4 MG/ML
4 INJECTION, SOLUTION INTRA-ARTICULAR; INTRALESIONAL; INTRAMUSCULAR; INTRAVENOUS; SOFT TISSUE ONCE
Status: DISCONTINUED | OUTPATIENT
Start: 2018-10-31 | End: 2019-02-01

## 2018-10-31 RX ORDER — HYDROCODONE BITARTRATE AND ACETAMINOPHEN 7.5; 325 MG/1; MG/1
1 TABLET ORAL EVERY 12 HOURS PRN
Qty: 60 TABLET | Refills: 0 | Status: SHIPPED | OUTPATIENT
Start: 2018-10-31 | End: 2018-12-21 | Stop reason: SDUPTHER

## 2018-10-31 NOTE — PATIENT INSTRUCTIONS
What is Arthritis?  Arthritis is a disease that affects the joints (the parts where bones meet and move). It can affect any joint in your body. There are many types of arthritis, including osteoarthritis and rheumatoid arthrtitis. If your symptoms are mild, medications may be enough to reduce pain and swelling. For more severe arthritis, surgery may be needed to improve the condition of the joint or replace the joint entirely.                  What causes arthritis?  Cartilage is a smooth substance that protects the ends of your bones and provides cushioning. When you have arthritis, this cartilage breaks down and can no longer protect your bones. The bones rub against each other, causing pain and swelling. Over time, bone spurs (small pieces of rough or splintered bone) may develop, and the joint's range of motion can become limited.  Symptoms  Some of the more common symptoms of arthritis include:  · Joint pain and stiffness. Pain and stiffness get worse with long periods of rest or using a joint too long or too hard.  · Joints that have lost normal shape and motion.  · Tender, inflamed joints. They may look red and feel warm.  · Grinding or popping noise with joint movement.   · Feeling tired all the time.  Reducing symptoms  Following a healthy lifestyle by losing weight and exercising can help reduce symptoms of osteoarthritis. Medicines can be very helpful for arthritis.     Date Last Reviewed: 9/10/2015  © 1012-8552 [employer]. [address] [place] [address], [place] [address]. All rights reserved. This information is not intended as a substitute for professional medical care. Always follow your healthcare professional's instructions.

## 2018-10-31 NOTE — PROGRESS NOTES
Chief Complaint   Patient presents with    Annual Exam       HPI    Tasneem Lynn is 73 y.o. female. The primary encounter diagnosis was Chronic bursitis of left shoulder. Diagnoses of Osteoarthritis involving multiple joints on both sides of body, Essential hypertension, Type 2 diabetes mellitus, uncontrolled, with renal complications, Need for Streptococcus pneumoniae vaccination, and Need for hepatitis C screening test were also pertinent to this visit.    73 year old female comes to clinic for annual exam.  Patient reports that her joint pain remains the most limiting medical condition.  She reports pain in knees, low back, and bilateral shoulders.  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  She reports most concerning is severe pain and decreased range of motion of the left shoulder.  Patient denies recent or recurrent falls but admits to fear of falling due to severe pain and decreased mobility due to the same.    HTN - patient reports compliance with medications.  She does not check blood pressure at home.  Diabetes - patient reports compliance with medication regimen.  Patient's family members note patient has multiple dietary indiscretions.    Review of Systems   Constitutional: Negative for activity change.   HENT: Negative for congestion.    Respiratory: Negative for shortness of breath.    Cardiovascular: Negative for chest pain.   Gastrointestinal: Negative for abdominal pain.   Genitourinary: Negative for dysuria.   Musculoskeletal: Positive for arthralgias, back pain, gait problem, joint swelling, myalgias, neck pain and neck stiffness.   Skin: Negative for color change, rash and wound.   Neurological: Positive for weakness. Negative for dizziness, syncope, facial asymmetry, light-headedness and numbness.   Psychiatric/Behavioral: Negative for suicidal ideas.           Current Outpatient Medications:     aspirin 81 MG Chew, Take 81 mg by mouth once daily., Disp: , Rfl:     blood pressure test  "kit-large Kit, Use to check blood pressure daily and as needed., Disp: 1 each, Rfl: 0    carvedilol (COREG) 25 MG tablet, Take 25 mg by mouth 2 (two) times daily with meals., Disp: , Rfl:     cetirizine 10 mg chewable tablet, Take 10 mg by mouth once daily., Disp: , Rfl:     cloNIDine (CATAPRES) 0.1 MG tablet, Take 1 tablet (0.1 mg total) by mouth 3 (three) times daily. Blood pressure, Disp: 270 tablet, Rfl: 1    diclofenac (VOLTAREN) 75 MG EC tablet, Take 1 tablet (75 mg total) by mouth 2 (two) times daily., Disp: 30 tablet, Rfl: 1    furosemide (LASIX) 20 MG tablet, Take 1 tablet (20 mg total) by mouth once daily., Disp: 90 tablet, Rfl: 1    gabapentin (NEURONTIN) 300 MG capsule, Take 1 capsule (300 mg total) by mouth 3 (three) times daily., Disp: 270 capsule, Rfl: 1    HYDROcodone-acetaminophen (NORCO) 7.5-325 mg per tablet, Take 1 tablet by mouth every 12 (twelve) hours as needed (severe pain)., Disp: 60 tablet, Rfl: 0    insulin NPH-insulin regular, 70/30, (NOVOLIN 70/30) 100 unit/mL (70-30) injection, Inject 40 Units into the skin 2 (two) times daily., Disp: , Rfl:     levothyroxine (SYNTHROID) 75 MCG tablet, Take 75 mcg by mouth once daily., Disp: , Rfl:     losartan (COZAAR) 50 MG tablet, Take 50 mg by mouth once daily., Disp: , Rfl:     rosuvastatin (CRESTOR) 20 MG tablet, Take 20 mg by mouth once daily., Disp: , Rfl:     Current Facility-Administered Medications:     dexamethasone injection 4 mg, 4 mg, Intramuscular, Once, Mary Carmen Lopez MD      Blood pressure (!) 148/83, pulse 86, temperature 98.3 °F (36.8 °C), resp. rate 16, height 5' 2" (1.575 m), SpO2 95 %.    Physical Exam   Constitutional: Vital signs are normal. She appears well-developed.   Cardiovascular: Normal heart sounds.   No murmur heard.  Pulmonary/Chest: Effort normal and breath sounds normal.   Musculoskeletal:   Wheelchair bound   Psychiatric: She has a normal mood and affect. Her behavior is normal.       No visits with " results within 3 Month(s) from this visit.   Latest known visit with results is:   Lab Visit on 06/25/2018   Component Date Value Ref Range Status    Sodium 06/25/2018 141  136 - 145 mmol/L Final    Potassium 06/25/2018 4.1  3.5 - 5.1 mmol/L Final    Chloride 06/25/2018 108  95 - 110 mmol/L Final    CO2 06/25/2018 26  23 - 29 mmol/L Final    Glucose 06/25/2018 172* 70 - 110 mg/dL Final    BUN, Bld 06/25/2018 22  8 - 23 mg/dL Final    Creatinine 06/25/2018 1.7* 0.5 - 1.4 mg/dL Final    Calcium 06/25/2018 9.5  8.7 - 10.5 mg/dL Final    Total Protein 06/25/2018 7.8  6.0 - 8.4 g/dL Final    Albumin 06/25/2018 2.7* 3.5 - 5.2 g/dL Final    Total Bilirubin 06/25/2018 0.3  0.1 - 1.0 mg/dL Final    Alkaline Phosphatase 06/25/2018 97  55 - 135 U/L Final    AST 06/25/2018 16  10 - 40 U/L Final    ALT 06/25/2018 12  10 - 44 U/L Final    Anion Gap 06/25/2018 7* 8 - 16 mmol/L Final    eGFR if  06/25/2018 34* >60 mL/min/1.73 m^2 Final    eGFR if non African American 06/25/2018 30* >60 mL/min/1.73 m^2 Final   ]    Assessment:    1. Chronic bursitis of left shoulder    2. Osteoarthritis involving multiple joints on both sides of body    3. Essential hypertension    4. Type 2 diabetes mellitus, uncontrolled, with renal complications    5. Need for Streptococcus pneumoniae vaccination    6. Need for hepatitis C screening test          Tasneem was seen today for annual exam.    Diagnoses and all orders for this visit:    Chronic bursitis of left shoulder  -     HYDROcodone-acetaminophen (NORCO) 7.5-325 mg per tablet; Take 1 tablet by mouth every 12 (twelve) hours as needed (severe pain).  -     Ambulatory referral to Orthopedics  -     dexamethasone injection 4 mg  - New problem.  Steroid injection administered. Patient cautioned to monitor glucose levels.    - Pain medication increased and referral to Orthopedics placed.  - Patient urged to continue some ROM exercises to prevent frozen  shoulder.    Osteoarthritis involving multiple joints on both sides of body  -     HYDROcodone-acetaminophen (NORCO) 7.5-325 mg per tablet; Take 1 tablet by mouth every 12 (twelve) hours as needed (severe pain).  - Unstable. Medication increased.  Patient declines PT at this time.     Essential hypertension   -Unstable. Patient without blood pressure medication this AM. Take medication and return for nurse blood pressure check.   -Discuss pending cardiac work up at next visit.     Type 2 diabetes mellitus, uncontrolled, with renal complications   -Stable. Multiple dietary indiscretions reported.  Obtain labs and adjust therapy as indicated.    Need for Streptococcus pneumoniae vaccination  -     Pneumococcal Conjugate Vaccine (13 Valent) (IM)    Need for hepatitis C screening test  -     Hepatitis C antibody; Future          FOLLOW UP: Follow-up in about 3 months (around 1/31/2019) for Follow up.

## 2018-11-02 ENCOUNTER — OUTPATIENT CASE MANAGEMENT (OUTPATIENT)
Dept: ADMINISTRATIVE | Facility: OTHER | Age: 73
End: 2018-11-02

## 2018-11-02 NOTE — PROGRESS NOTES
Summary: Pt reports that she is just getting up and is unable to talk at this time. Reports that she will call this cm back.      Interventions:      Plan:

## 2018-11-07 ENCOUNTER — PATIENT OUTREACH (OUTPATIENT)
Dept: ADMINISTRATIVE | Facility: HOSPITAL | Age: 73
End: 2018-11-07

## 2018-11-07 NOTE — PROGRESS NOTES
Spoke with pt in regards to her overdue HM, pt declined her mammogram. Pt is willing to do the eye cam the day of her appointment

## 2018-11-12 DIAGNOSIS — M25.519 SHOULDER PAIN, UNSPECIFIED CHRONICITY, UNSPECIFIED LATERALITY: Primary | ICD-10-CM

## 2018-11-20 ENCOUNTER — OUTPATIENT CASE MANAGEMENT (OUTPATIENT)
Dept: ADMINISTRATIVE | Facility: OTHER | Age: 73
End: 2018-11-20

## 2018-11-20 NOTE — PROGRESS NOTES
Summary: Pt reports that she is doing well. Pt reports that her blood sugar was 125 this morning. Pt reports that it has been around 125 in the mornings. Pt reports that she can tell when her blood sugar is too low. Pt reports that she gets shaky when the blood sugar is too low. Pt reports that it is not often that her blood sugar is too low. Pt reports that she can not tell when the blood sugar is elevated. Pt reports that she received the information mailed per this cm.    Interventions:Reviewed with pt s/s of hyperglycemia.    Plan:Review hemoglobin A1c.    Todays OPCM Self-Management Care Plan was developed with the patients/caregivers input and was based on identified barriers from todays assessment.  Goals were written today with the patient/caregiver and the patient has agreed to work towards these goals to improve his/her overall well-being. Patient verbalized understanding of the care plan, goals, and all of today's instructions. Encouraged patient/caregiver to communicate with his/her physician and health care team about health conditions and the treatment plan.  Provided my contact information today and encouraged patient/caregiver to call me with any questions as needed.

## 2018-11-28 DIAGNOSIS — E11.9 TYPE 2 DIABETES MELLITUS WITHOUT COMPLICATION: ICD-10-CM

## 2018-12-02 ENCOUNTER — PATIENT MESSAGE (OUTPATIENT)
Dept: FAMILY MEDICINE | Facility: CLINIC | Age: 73
End: 2018-12-02

## 2018-12-03 DIAGNOSIS — M15.9 OSTEOARTHRITIS INVOLVING MULTIPLE JOINTS ON BOTH SIDES OF BODY: ICD-10-CM

## 2018-12-03 DIAGNOSIS — I50.32 CHRONIC DIASTOLIC CHF (CONGESTIVE HEART FAILURE): ICD-10-CM

## 2018-12-03 DIAGNOSIS — I10 ESSENTIAL HYPERTENSION: Chronic | ICD-10-CM

## 2018-12-03 RX ORDER — GABAPENTIN 300 MG/1
300 CAPSULE ORAL 3 TIMES DAILY
Qty: 270 CAPSULE | Refills: 1 | Status: SHIPPED | OUTPATIENT
Start: 2018-12-03 | End: 2019-03-20 | Stop reason: SDUPTHER

## 2018-12-03 RX ORDER — CLONIDINE HYDROCHLORIDE 0.1 MG/1
0.1 TABLET ORAL 3 TIMES DAILY
Qty: 270 TABLET | Refills: 1 | Status: ON HOLD | OUTPATIENT
Start: 2018-12-03 | End: 2020-06-24 | Stop reason: HOSPADM

## 2018-12-03 RX ORDER — GABAPENTIN 300 MG/1
300 CAPSULE ORAL 3 TIMES DAILY
Qty: 270 CAPSULE | Refills: 1 | Status: CANCELLED | OUTPATIENT
Start: 2018-12-03 | End: 2019-12-03

## 2018-12-03 RX ORDER — FUROSEMIDE 20 MG/1
20 TABLET ORAL DAILY
Qty: 90 TABLET | Refills: 1 | Status: ON HOLD | OUTPATIENT
Start: 2018-12-03 | End: 2020-06-24 | Stop reason: HOSPADM

## 2018-12-14 RX ORDER — ROSUVASTATIN CALCIUM 20 MG/1
20 TABLET, COATED ORAL DAILY
Qty: 90 TABLET | Refills: 3 | Status: SHIPPED | OUTPATIENT
Start: 2018-12-14

## 2018-12-14 RX ORDER — LOSARTAN POTASSIUM 50 MG/1
50 TABLET ORAL DAILY
Qty: 90 TABLET | Refills: 1 | Status: ON HOLD | OUTPATIENT
Start: 2018-12-14 | End: 2020-06-24 | Stop reason: HOSPADM

## 2018-12-14 NOTE — TELEPHONE ENCOUNTER
----- Message from Chaz Painter sent at 12/14/2018  1:02 PM CST -----  Contact: Merrill/Daughter/[phone]  Refill:  losartan (COZAAR) 50 MG tablet    rosuvastatin (CRESTOR) 20 MG tablet      [facility] [address] [place] - [address]  [address]  [place] [address]  Phone: [phone] Fax: [phone]    Thank you.

## 2018-12-17 DIAGNOSIS — Z11.59 NEED FOR HEPATITIS C SCREENING TEST: ICD-10-CM

## 2018-12-19 ENCOUNTER — OUTPATIENT CASE MANAGEMENT (OUTPATIENT)
Dept: ADMINISTRATIVE | Facility: OTHER | Age: 73
End: 2018-12-19

## 2018-12-19 DIAGNOSIS — E03.4 HYPOTHYROIDISM DUE TO ACQUIRED ATROPHY OF THYROID: Primary | Chronic | ICD-10-CM

## 2018-12-19 DIAGNOSIS — E55.9 VITAMIN D DEFICIENCY: ICD-10-CM

## 2018-12-19 DIAGNOSIS — J30.9 CHRONIC ALLERGIC RHINITIS: ICD-10-CM

## 2018-12-19 RX ORDER — LEVOTHYROXINE SODIUM 75 UG/1
75 TABLET ORAL
Qty: 90 TABLET | Refills: 1 | Status: SHIPPED | OUTPATIENT
Start: 2018-12-19

## 2018-12-19 RX ORDER — CETIRIZINE HYDROCHLORIDE 10 MG/1
10 TABLET, CHEWABLE ORAL DAILY
Qty: 90 TABLET | Refills: 3 | Status: SHIPPED | OUTPATIENT
Start: 2018-12-19 | End: 2018-12-21

## 2018-12-19 RX ORDER — ERGOCALCIFEROL 1.25 MG/1
50000 CAPSULE ORAL
Qty: 12 CAPSULE | Refills: 1 | Status: ON HOLD | OUTPATIENT
Start: 2018-12-19 | End: 2020-06-24 | Stop reason: HOSPADM

## 2018-12-19 NOTE — PROGRESS NOTES
Summary: Pt reports that she is doing well. Pt reports that her blood sugar this morning was 120. Pt reports that she is out of the levothyroxine 75 mcg. Pt reports that she has been out of the medication for one week. I called [facility] on [address] and was informed that the last time the pt filled the medicine was on 6/22/18.  Pt also reports that she is out of the certizine 10 mg and vitamin D. [facility] reports that new prescriptions are required for these medications.     Interventions: Call placed to [facility] for med clarification.                         Message sent to PCP's staff to notify of pt's prescription request.    Plan:    Todays OPCM Self-Management Care Plan was developed with the patients/caregivers input and was based on identified barriers from todays assessment.  Goals were written today with the patient/caregiver and the patient has agreed to work towards these goals to improve his/her overall well-being. Patient verbalized understanding of the care plan, goals, and all of today's instructions. Encouraged patient/caregiver to communicate with his/her physician and health care team about health conditions and the treatment plan.  Provided my contact information today and encouraged patient/caregiver to call me with any questions as needed.

## 2018-12-19 NOTE — TELEPHONE ENCOUNTER
----- Message from Rosalina Alan RN sent at 12/19/2018  2:28 PM CST -----  Contact: ENZO Sidhu this is Rosalina with [facility]. I just spoke with Ms. Lynn. She reports that she is out of the levothyroxine 75 mcg. Pt reports that she has been out of the medication for one week. I called [facility] on [address] and was informed that the last time the pt filled the medicine was on 6/22/18.  Pt also reports that she is out of the certizine 10 mg and vitamin D. [facility] reports that new prescriptions are required for these medications.    Please advise  Thank you

## 2018-12-21 ENCOUNTER — TELEPHONE (OUTPATIENT)
Dept: FAMILY MEDICINE | Facility: CLINIC | Age: 73
End: 2018-12-21

## 2018-12-21 DIAGNOSIS — M15.9 OSTEOARTHRITIS INVOLVING MULTIPLE JOINTS ON BOTH SIDES OF BODY: ICD-10-CM

## 2018-12-21 DIAGNOSIS — J30.9 CHRONIC ALLERGIC RHINITIS: Primary | ICD-10-CM

## 2018-12-21 DIAGNOSIS — M75.52 CHRONIC BURSITIS OF LEFT SHOULDER: ICD-10-CM

## 2018-12-21 RX ORDER — CETIRIZINE HYDROCHLORIDE 10 MG/1
10 TABLET ORAL NIGHTLY
Qty: 90 TABLET | Refills: 1 | Status: SHIPPED | OUTPATIENT
Start: 2018-12-21 | End: 2019-12-21

## 2018-12-21 RX ORDER — HYDROCODONE BITARTRATE AND ACETAMINOPHEN 7.5; 325 MG/1; MG/1
1 TABLET ORAL EVERY 12 HOURS PRN
Qty: 60 TABLET | Refills: 0 | Status: SHIPPED | OUTPATIENT
Start: 2018-12-21 | End: 2019-01-22 | Stop reason: SDUPTHER

## 2018-12-21 NOTE — TELEPHONE ENCOUNTER
----- Message from Angela Thompson sent at 12/21/2018  9:45 AM CST -----  Contact: [facility]/ [phone]  [facility] would like staff to give them a call regarding patients prescription cetirizine 10 mg chewable tablet  The cost is 287.96 it isn't covered on patients insurance. Thank you

## 2018-12-21 NOTE — TELEPHONE ENCOUNTER
----- Message from Chaz Painter sent at 12/21/2018  1:17 PM CST -----  Contact: Merrill/Daughter/[phone]  Refill:  HYDROcodone-acetaminophen (NORCO) 7.5-325 mg per tablet    .  [facility] [facility] - [place] [address]  [place] [address]  Phone: [phone] Fax: [phone]    Thank you.

## 2019-01-03 ENCOUNTER — OUTPATIENT CASE MANAGEMENT (OUTPATIENT)
Dept: ADMINISTRATIVE | Facility: OTHER | Age: 74
End: 2019-01-03

## 2019-01-03 NOTE — PROGRESS NOTES
Summary:Call placed to [phone] with message left containing contact information.      Interventions:na      Plan:na

## 2019-01-21 ENCOUNTER — OUTPATIENT CASE MANAGEMENT (OUTPATIENT)
Dept: ADMINISTRATIVE | Facility: OTHER | Age: 74
End: 2019-01-21

## 2019-01-21 NOTE — LETTER
January 21, 2019    Tasneem Lynn  [address]  [place] [address]             [facility] [address] [facility] [address] Dear MsRosalba Poncez Shane,      I attempted to reach you by telephone, but I was unsuccessful. Please call our department, [facility], so that I can review with you some of your health concerns. I will make another attempt to contact you by phone before closing your case. My phone number is [phone].    The Outpatient Case Management department can be reached at [phone] from 8:00am to 4:30pm on Monday thru Friday. [facility] also has a program where a nurse is available 24/7 to answer questions or provider medical advice. Their number is [phone].     Thanks,      Rosalina Alan, RN  Outpatient Case Management

## 2019-01-21 NOTE — PROGRESS NOTES
Summary:Call placed to [phone] with a message left containing contact information.      Interventions:Will mail a failure to contact letter.      Plan:na

## 2019-01-22 DIAGNOSIS — M75.52 CHRONIC BURSITIS OF LEFT SHOULDER: ICD-10-CM

## 2019-01-22 DIAGNOSIS — M15.9 OSTEOARTHRITIS INVOLVING MULTIPLE JOINTS ON BOTH SIDES OF BODY: ICD-10-CM

## 2019-01-22 RX ORDER — HYDROCODONE BITARTRATE AND ACETAMINOPHEN 7.5; 325 MG/1; MG/1
1 TABLET ORAL EVERY 12 HOURS PRN
Qty: 60 TABLET | Refills: 0 | Status: SHIPPED | OUTPATIENT
Start: 2019-01-22 | End: 2019-02-18 | Stop reason: SDUPTHER

## 2019-01-31 ENCOUNTER — TELEPHONE (OUTPATIENT)
Dept: FAMILY MEDICINE | Facility: CLINIC | Age: 74
End: 2019-01-31

## 2019-01-31 ENCOUNTER — OUTPATIENT CASE MANAGEMENT (OUTPATIENT)
Dept: ADMINISTRATIVE | Facility: OTHER | Age: 74
End: 2019-01-31

## 2019-01-31 NOTE — PROGRESS NOTES
Summary: Call placed to [phone] with a message left containing contact information.       Interventions: Message sent to PCP post chart review of last Hemoglobin A1c drawn on 5/29/18 with result of 13.7. Pt is scheduled for an appt on 2/1/19 with PCP.                         Will close case due to 3rd unsuccessful appt per phone.    Plan: na

## 2019-01-31 NOTE — TELEPHONE ENCOUNTER
----- Message from Rosalina Alan RN sent at 1/31/2019  3:09 PM CST -----  Contact: ENZO Sidhu this is Rosalina with [facility]. Upon chart review it was noticed that pt's last Hemoglobin A1c was on 5/29/18 with result of 13.7. Pt is scheduled for an appt on tomorrow with Dr. Lopez.    Please advise  Thank you

## 2019-02-01 ENCOUNTER — OFFICE VISIT (OUTPATIENT)
Dept: FAMILY MEDICINE | Facility: CLINIC | Age: 74
End: 2019-02-01
Payer: MEDICARE

## 2019-02-01 VITALS
DIASTOLIC BLOOD PRESSURE: 54 MMHG | HEART RATE: 87 BPM | BODY MASS INDEX: 45.56 KG/M2 | OXYGEN SATURATION: 97 % | HEIGHT: 62 IN | TEMPERATURE: 98 F | SYSTOLIC BLOOD PRESSURE: 98 MMHG | WEIGHT: 247.56 LBS

## 2019-02-01 DIAGNOSIS — M15.9 OSTEOARTHRITIS INVOLVING MULTIPLE JOINTS ON BOTH SIDES OF BODY: Primary | ICD-10-CM

## 2019-02-01 DIAGNOSIS — N18.30 CKD (CHRONIC KIDNEY DISEASE), STAGE III: ICD-10-CM

## 2019-02-01 DIAGNOSIS — H57.9 LESION OF EYE: ICD-10-CM

## 2019-02-01 DIAGNOSIS — I10 ESSENTIAL HYPERTENSION: Chronic | ICD-10-CM

## 2019-02-01 DIAGNOSIS — E66.01 MORBID OBESITY WITH BMI OF 50.0-59.9, ADULT: Chronic | ICD-10-CM

## 2019-02-01 PROCEDURE — 99499 RISK ADDL DX/OHS AUDIT: ICD-10-PCS | Mod: HCNC,S$GLB,, | Performed by: FAMILY MEDICINE

## 2019-02-01 PROCEDURE — 99499 UNLISTED E&M SERVICE: CPT | Mod: HCNC,S$GLB,, | Performed by: FAMILY MEDICINE

## 2019-02-01 PROCEDURE — 1101F PT FALLS ASSESS-DOCD LE1/YR: CPT | Mod: HCNC,CPTII,S$GLB, | Performed by: FAMILY MEDICINE

## 2019-02-01 PROCEDURE — 96372 PR INJECTION,THERAP/PROPH/DIAG2ST, IM OR SUBCUT: ICD-10-PCS | Mod: HCNC,S$GLB,, | Performed by: FAMILY MEDICINE

## 2019-02-01 PROCEDURE — 99999 PR PBB SHADOW E&M-EST. PATIENT-LVL IV: ICD-10-PCS | Mod: PBBFAC,HCNC,, | Performed by: FAMILY MEDICINE

## 2019-02-01 PROCEDURE — 3078F PR MOST RECENT DIASTOLIC BLOOD PRESSURE < 80 MM HG: ICD-10-PCS | Mod: HCNC,CPTII,S$GLB, | Performed by: FAMILY MEDICINE

## 2019-02-01 PROCEDURE — 99214 OFFICE O/P EST MOD 30 MIN: CPT | Mod: HCNC,25,S$GLB, | Performed by: FAMILY MEDICINE

## 2019-02-01 PROCEDURE — 3046F PR MOST RECENT HEMOGLOBIN A1C LEVEL > 9.0%: ICD-10-PCS | Mod: HCNC,CPTII,S$GLB, | Performed by: FAMILY MEDICINE

## 2019-02-01 PROCEDURE — 3078F DIAST BP <80 MM HG: CPT | Mod: HCNC,CPTII,S$GLB, | Performed by: FAMILY MEDICINE

## 2019-02-01 PROCEDURE — 3074F PR MOST RECENT SYSTOLIC BLOOD PRESSURE < 130 MM HG: ICD-10-PCS | Mod: HCNC,CPTII,S$GLB, | Performed by: FAMILY MEDICINE

## 2019-02-01 PROCEDURE — 3074F SYST BP LT 130 MM HG: CPT | Mod: HCNC,CPTII,S$GLB, | Performed by: FAMILY MEDICINE

## 2019-02-01 PROCEDURE — 1101F PR PT FALLS ASSESS DOC 0-1 FALLS W/OUT INJ PAST YR: ICD-10-PCS | Mod: HCNC,CPTII,S$GLB, | Performed by: FAMILY MEDICINE

## 2019-02-01 PROCEDURE — 3046F HEMOGLOBIN A1C LEVEL >9.0%: CPT | Mod: HCNC,CPTII,S$GLB, | Performed by: FAMILY MEDICINE

## 2019-02-01 PROCEDURE — 99999 PR PBB SHADOW E&M-EST. PATIENT-LVL IV: CPT | Mod: PBBFAC,HCNC,, | Performed by: FAMILY MEDICINE

## 2019-02-01 PROCEDURE — 99214 PR OFFICE/OUTPT VISIT, EST, LEVL IV, 30-39 MIN: ICD-10-PCS | Mod: HCNC,25,S$GLB, | Performed by: FAMILY MEDICINE

## 2019-02-01 PROCEDURE — 96372 THER/PROPH/DIAG INJ SC/IM: CPT | Mod: HCNC,S$GLB,, | Performed by: FAMILY MEDICINE

## 2019-02-01 RX ORDER — DEXAMETHASONE SODIUM PHOSPHATE 4 MG/ML
8 INJECTION, SOLUTION INTRA-ARTICULAR; INTRALESIONAL; INTRAMUSCULAR; INTRAVENOUS; SOFT TISSUE ONCE
Status: COMPLETED | OUTPATIENT
Start: 2019-02-01 | End: 2019-02-01

## 2019-02-01 RX ADMIN — DEXAMETHASONE SODIUM PHOSPHATE 8 MG: 4 INJECTION, SOLUTION INTRA-ARTICULAR; INTRALESIONAL; INTRAMUSCULAR; INTRAVENOUS; SOFT TISSUE at 12:02

## 2019-02-01 NOTE — PATIENT INSTRUCTIONS
What is Arthritis?  Arthritis is a disease that affects the joints (the parts where bones meet and move). It can affect any joint in your body. There are many types of arthritis, including osteoarthritis and rheumatoid arthrtitis. If your symptoms are mild, medications may be enough to reduce pain and swelling. For more severe arthritis, surgery may be needed to improve the condition of the joint or replace the joint entirely.                  What causes arthritis?  Cartilage is a smooth substance that protects the ends of your bones and provides cushioning. When you have arthritis, this cartilage breaks down and can no longer protect your bones. The bones rub against each other, causing pain and swelling. Over time, bone spurs (small pieces of rough or splintered bone) may develop, and the joint's range of motion can become limited.  Symptoms  Some of the more common symptoms of arthritis include:  · Joint pain and stiffness. Pain and stiffness get worse with long periods of rest or using a joint too long or too hard.  · Joints that have lost normal shape and motion.  · Tender, inflamed joints. They may look red and feel warm.  · Grinding or popping noise with joint movement.   · Feeling tired all the time.  Reducing symptoms  Following a healthy lifestyle by losing weight and exercising can help reduce symptoms of osteoarthritis. Medicines can be very helpful for arthritis.     Date Last Reviewed: 9/10/2015  © 7522-9664 [employer]. [address] [place] [address], [place] [address]. All rights reserved. This information is not intended as a substitute for professional medical care. Always follow your healthcare professional's instructions.

## 2019-02-01 NOTE — PROGRESS NOTES
Chief Complaint   Patient presents with    Hip Pain       HPI    Tasneem Lynn is 73 y.o. female. The primary encounter diagnosis was Osteoarthritis involving multiple joints on both sides of body. Diagnoses of Lesion of eye, Type 2 diabetes mellitus, uncontrolled, with renal complications, CKD (chronic kidney disease), stage III, Morbid obesity with BMI of 50.0-59.9, adult, and Essential hypertension were also pertinent to this visit.    73 year old female with HTN, Diabetes, and CKD Stage 3 comes to clinic with complaint of severe left hip pain.    Patient reports left hip pain has been chronic but significantly worse over an indeterminate amount of time.  She denies symptoms of radiculopathy.  She admits to left lower extremity weakness.  Patient also reports increased physical activity over the last several months.  She does reports leaning forward and to the right reduces her left hip pain.  She reports taking her pain medication without significant relief.  DM - patient reports checking her blood sugars infrequently.  Last glucose check was about 3 days ago and was 170.  She admits to hypoglycemic episodes 2-3 times per week.  Patient admits to dietary indiscretions.    HTN - patient expresses concern regarding her blood pressure today.  She reports taking medication as prescribed.      Eye - patient reports foreign body sensation for at least 1 week in the left eye only.  She reports recent or remote injury to the eye.  Patient's daughter reports a blood clot located on the outer edge of the eye.  She denies pain, tearing, or redness.  She admits to no eye exam for 3 years since her cataract extraction.      Review of Systems   Constitutional: Negative for activity change.   Eyes: Negative for pain, discharge, redness and itching.   Respiratory: Negative for shortness of breath.    Cardiovascular: Negative for chest pain.   Musculoskeletal: Positive for arthralgias, gait problem and myalgias.    Psychiatric/Behavioral: Negative for suicidal ideas.           Current Outpatient Medications:     aspirin 81 MG Chew, Take 81 mg by mouth once daily., Disp: , Rfl:     carvedilol (COREG) 25 MG tablet, Take 25 mg by mouth 2 (two) times daily with meals., Disp: , Rfl:     cloNIDine (CATAPRES) 0.1 MG tablet, Take 1 tablet (0.1 mg total) by mouth 3 (three) times daily. Blood pressure, Disp: 270 tablet, Rfl: 1    ergocalciferol (ERGOCALCIFEROL) 50,000 unit Cap, Take 1 capsule (50,000 Units total) by mouth every 7 days., Disp: 12 capsule, Rfl: 1    furosemide (LASIX) 20 MG tablet, Take 1 tablet (20 mg total) by mouth once daily., Disp: 90 tablet, Rfl: 1    gabapentin (NEURONTIN) 300 MG capsule, Take 1 capsule (300 mg total) by mouth 3 (three) times daily., Disp: 270 capsule, Rfl: 1    HYDROcodone-acetaminophen (NORCO) 7.5-325 mg per tablet, Take 1 tablet by mouth every 12 (twelve) hours as needed (severe pain)., Disp: 60 tablet, Rfl: 0    insulin NPH-insulin regular, 70/30, (NOVOLIN 70/30) 100 unit/mL (70-30) injection, Inject 40 Units into the skin 2 (two) times daily., Disp: 3 vial, Rfl: 6    levothyroxine (SYNTHROID) 75 MCG tablet, Take 1 tablet (75 mcg total) by mouth before breakfast., Disp: 90 tablet, Rfl: 1    losartan (COZAAR) 50 MG tablet, Take 1 tablet (50 mg total) by mouth once daily., Disp: 90 tablet, Rfl: 1    rosuvastatin (CRESTOR) 20 MG tablet, Take 1 tablet (20 mg total) by mouth once daily., Disp: 90 tablet, Rfl: 3    blood pressure test kit-large Kit, Use to check blood pressure daily and as needed., Disp: 1 each, Rfl: 0    cetirizine (ZYRTEC) 10 MG tablet, Take 1 tablet (10 mg total) by mouth every evening., Disp: 90 tablet, Rfl: 1    diclofenac (VOLTAREN) 75 MG EC tablet, Take 1 tablet (75 mg total) by mouth 2 (two) times daily., Disp: 30 tablet, Rfl: 1  No current facility-administered medications for this visit.       Blood pressure (!) 98/54, pulse 87, temperature 98.4 °F (36.9  "°C), temperature source Oral, height 5' 2" (1.575 m), weight 112.3 kg (247 lb 9.2 oz), SpO2 97 %.    Physical Exam   Eyes: No foreign body present in the right eye. Right conjunctiva is not injected. Right conjunctiva has no hemorrhage. Left conjunctiva is not injected. Left conjunctiva has no hemorrhage.   Light brown irregular tissue growth at the upper temporal area of the left eye.        No visits with results within 3 Month(s) from this visit.   Latest known visit with results is:   Lab Visit on 06/25/2018   Component Date Value Ref Range Status    Sodium 06/25/2018 141  136 - 145 mmol/L Final    Potassium 06/25/2018 4.1  3.5 - 5.1 mmol/L Final    Chloride 06/25/2018 108  95 - 110 mmol/L Final    CO2 06/25/2018 26  23 - 29 mmol/L Final    Glucose 06/25/2018 172* 70 - 110 mg/dL Final    BUN, Bld 06/25/2018 22  8 - 23 mg/dL Final    Creatinine 06/25/2018 1.7* 0.5 - 1.4 mg/dL Final    Calcium 06/25/2018 9.5  8.7 - 10.5 mg/dL Final    Total Protein 06/25/2018 7.8  6.0 - 8.4 g/dL Final    Albumin 06/25/2018 2.7* 3.5 - 5.2 g/dL Final    Total Bilirubin 06/25/2018 0.3  0.1 - 1.0 mg/dL Final    Alkaline Phosphatase 06/25/2018 97  55 - 135 U/L Final    AST 06/25/2018 16  10 - 40 U/L Final    ALT 06/25/2018 12  10 - 44 U/L Final    Anion Gap 06/25/2018 7* 8 - 16 mmol/L Final    eGFR if  06/25/2018 34* >60 mL/min/1.73 m^2 Final    eGFR if non African American 06/25/2018 30* >60 mL/min/1.73 m^2 Final   ]    Assessment:    1. Osteoarthritis involving multiple joints on both sides of body    2. Lesion of eye    3. Type 2 diabetes mellitus, uncontrolled, with renal complications    4. CKD (chronic kidney disease), stage III    5. Morbid obesity with BMI of 50.0-59.9, adult    6. Essential hypertension          Tasneem was seen today for hip pain.    Diagnoses and all orders for this visit:    Osteoarthritis involving multiple joints on both sides of body  -     Ambulatory referral to Pain " Clinic  -     dexamethasone injection 8 mg  - Unstable. Previous xray reviewed with patient indicating SI joint arthritis. Patient to avoid NSAIDs due to renal function.  - Steroids prescribed and risk of hyperglycemia prescribed.    Lesion of eye   -New problem.  Lesion most consistent with Pterygium.  Patient referred for evaluation.    Type 2 diabetes mellitus, uncontrolled, with renal complications  -     Hemoglobin A1c; Future  -     Ambulatory referral to Optometry  - Unstable. Previous A1c was reviewed with patient. Obtain labs. Patient referred for Diabetic eye exam.    CKD (chronic kidney disease), stage III  -     Comprehensive metabolic panel; Future  - Stable. Reviewed previous renal function tests.  Obtain labs for disease monitoring. Avoid NSAIDs for joint pain due to renal function.    Morbid obesity with BMI of 50.0-59.9, adult   -Stable. Briefly discussed weight loss to reduce joint pain.  Will consider PT referral once patient has established with Pain Management.    Essential hypertension  -     Comprehensive metabolic panel; Future  - Unstable. Blood pressure decreased. Blood pressure cuff prescribed and DME order sent to [facility].          FOLLOW UP: Follow-up in about 4 weeks (around 3/1/2019) for Follow up.

## 2019-02-13 ENCOUNTER — TELEPHONE (OUTPATIENT)
Dept: FAMILY MEDICINE | Facility: CLINIC | Age: 74
End: 2019-02-13

## 2019-02-13 NOTE — LETTER
February 13, 2019    [place] Shane  [address]  [place] [address]             [facility]  [address] [facility] [place] [address]  Phone: [phone]  Fax: [phone] Dear  Shane:    Sorry we were unable to contact you to schedule your Pain Medicine appointment. Please give the referral department a call at [phone].        If you have any questions or concerns, please don't hesitate to call.    Sincerely,        Bernardino Neves MA

## 2019-02-18 DIAGNOSIS — M15.9 OSTEOARTHRITIS INVOLVING MULTIPLE JOINTS ON BOTH SIDES OF BODY: ICD-10-CM

## 2019-02-18 DIAGNOSIS — M75.52 CHRONIC BURSITIS OF LEFT SHOULDER: ICD-10-CM

## 2019-02-19 RX ORDER — HYDROCODONE BITARTRATE AND ACETAMINOPHEN 7.5; 325 MG/1; MG/1
1 TABLET ORAL EVERY 12 HOURS PRN
Qty: 60 TABLET | Refills: 0 | Status: SHIPPED | OUTPATIENT
Start: 2019-02-21 | End: 2019-03-20 | Stop reason: SDUPTHER

## 2019-02-22 ENCOUNTER — PATIENT MESSAGE (OUTPATIENT)
Dept: ADMINISTRATIVE | Facility: HOSPITAL | Age: 74
End: 2019-02-22

## 2019-02-22 ENCOUNTER — PATIENT OUTREACH (OUTPATIENT)
Dept: ADMINISTRATIVE | Facility: HOSPITAL | Age: 74
End: 2019-02-22

## 2019-03-20 DIAGNOSIS — M15.9 OSTEOARTHRITIS INVOLVING MULTIPLE JOINTS ON BOTH SIDES OF BODY: ICD-10-CM

## 2019-03-20 DIAGNOSIS — M75.52 CHRONIC BURSITIS OF LEFT SHOULDER: ICD-10-CM

## 2019-03-20 RX ORDER — GABAPENTIN 300 MG/1
300 CAPSULE ORAL 3 TIMES DAILY
Qty: 270 CAPSULE | Refills: 1 | Status: ON HOLD | OUTPATIENT
Start: 2019-03-20 | End: 2020-06-24 | Stop reason: HOSPADM

## 2019-03-20 RX ORDER — HYDROCODONE BITARTRATE AND ACETAMINOPHEN 7.5; 325 MG/1; MG/1
1 TABLET ORAL EVERY 12 HOURS PRN
Qty: 60 TABLET | Refills: 0 | Status: ON HOLD | OUTPATIENT
Start: 2019-03-20 | End: 2020-06-24 | Stop reason: HOSPADM

## 2019-04-24 ENCOUNTER — TELEPHONE (OUTPATIENT)
Dept: FAMILY MEDICINE | Facility: CLINIC | Age: 74
End: 2019-04-24

## 2019-04-24 NOTE — TELEPHONE ENCOUNTER
I spoke to the pt daughter and advised that Dr. Lopez is no longer with [facility] and her mother would need to establish care with a new provider. Daughter requests to establish care with Dr. Reyes. Appointment made for 5/1/2019.

## 2019-04-24 NOTE — TELEPHONE ENCOUNTER
----- Message from Angela Thompson sent at 4/24/2019  2:32 PM CDT -----  Contact: Merrill/ Daughter/  [phone]  Type: RX Refill Request    Who Called: Merrill/ Daughter    Refill or New Rx:  Refill    RX Name and Strength:  HYDROcodone-acetaminophen (NORCO) 7.5-325 mg per tablet    Preferred Pharmacy with phone number:  [facility] [address]    Local or Mail Order:  Local      Best Call Back Number:  Kalyn Rick [phone]

## 2019-05-01 ENCOUNTER — OFFICE VISIT (OUTPATIENT)
Dept: FAMILY MEDICINE | Facility: CLINIC | Age: 74
End: 2019-05-01
Payer: MEDICARE

## 2019-05-01 VITALS
HEART RATE: 95 BPM | OXYGEN SATURATION: 98 % | DIASTOLIC BLOOD PRESSURE: 66 MMHG | RESPIRATION RATE: 18 BRPM | TEMPERATURE: 98 F | SYSTOLIC BLOOD PRESSURE: 146 MMHG

## 2019-05-01 DIAGNOSIS — M25.512 CHRONIC LEFT SHOULDER PAIN: ICD-10-CM

## 2019-05-01 DIAGNOSIS — E03.9 ACQUIRED HYPOTHYROIDISM: ICD-10-CM

## 2019-05-01 DIAGNOSIS — Z11.59 NEED FOR HEPATITIS C SCREENING TEST: ICD-10-CM

## 2019-05-01 DIAGNOSIS — G89.29 OTHER CHRONIC PAIN: ICD-10-CM

## 2019-05-01 DIAGNOSIS — Z86.73 HISTORY OF STROKE: ICD-10-CM

## 2019-05-01 DIAGNOSIS — I70.0 AORTIC ATHEROSCLEROSIS: ICD-10-CM

## 2019-05-01 DIAGNOSIS — M54.42 CHRONIC MIDLINE LOW BACK PAIN WITH LEFT-SIDED SCIATICA: ICD-10-CM

## 2019-05-01 DIAGNOSIS — E66.01 CLASS 3 SEVERE OBESITY DUE TO EXCESS CALORIES WITH SERIOUS COMORBIDITY AND BODY MASS INDEX (BMI) OF 45.0 TO 49.9 IN ADULT: ICD-10-CM

## 2019-05-01 DIAGNOSIS — Z74.09 LIMITED MOBILITY: ICD-10-CM

## 2019-05-01 DIAGNOSIS — E78.5 DYSLIPIDEMIA: ICD-10-CM

## 2019-05-01 DIAGNOSIS — I10 HYPERTENSION, BENIGN: ICD-10-CM

## 2019-05-01 DIAGNOSIS — Z12.11 SCREEN FOR COLON CANCER: ICD-10-CM

## 2019-05-01 DIAGNOSIS — N18.4 CKD (CHRONIC KIDNEY DISEASE) STAGE 4, GFR 15-29 ML/MIN: ICD-10-CM

## 2019-05-01 DIAGNOSIS — Z79.4 TYPE 2 DIABETES MELLITUS WITH HYPERGLYCEMIA, WITH LONG-TERM CURRENT USE OF INSULIN: Primary | ICD-10-CM

## 2019-05-01 DIAGNOSIS — G89.29 CHRONIC LEFT SHOULDER PAIN: ICD-10-CM

## 2019-05-01 DIAGNOSIS — Z12.39 SCREENING FOR BREAST CANCER: ICD-10-CM

## 2019-05-01 DIAGNOSIS — F11.90 OPIOID USE: ICD-10-CM

## 2019-05-01 DIAGNOSIS — G89.29 CHRONIC MIDLINE LOW BACK PAIN WITH LEFT-SIDED SCIATICA: ICD-10-CM

## 2019-05-01 DIAGNOSIS — E11.65 TYPE 2 DIABETES MELLITUS WITH HYPERGLYCEMIA, WITH LONG-TERM CURRENT USE OF INSULIN: Primary | ICD-10-CM

## 2019-05-01 PROCEDURE — 99999 PR PBB SHADOW E&M-EST. PATIENT-LVL V: ICD-10-PCS | Mod: PBBFAC,HCNC,, | Performed by: FAMILY MEDICINE

## 2019-05-01 PROCEDURE — 3046F HEMOGLOBIN A1C LEVEL >9.0%: CPT | Mod: HCNC,CPTII,S$GLB, | Performed by: FAMILY MEDICINE

## 2019-05-01 PROCEDURE — 1101F PT FALLS ASSESS-DOCD LE1/YR: CPT | Mod: HCNC,CPTII,S$GLB, | Performed by: FAMILY MEDICINE

## 2019-05-01 PROCEDURE — 3078F DIAST BP <80 MM HG: CPT | Mod: HCNC,CPTII,S$GLB, | Performed by: FAMILY MEDICINE

## 2019-05-01 PROCEDURE — 99215 OFFICE O/P EST HI 40 MIN: CPT | Mod: HCNC,S$GLB,, | Performed by: FAMILY MEDICINE

## 2019-05-01 PROCEDURE — 3078F PR MOST RECENT DIASTOLIC BLOOD PRESSURE < 80 MM HG: ICD-10-PCS | Mod: HCNC,CPTII,S$GLB, | Performed by: FAMILY MEDICINE

## 2019-05-01 PROCEDURE — 3046F PR MOST RECENT HEMOGLOBIN A1C LEVEL > 9.0%: ICD-10-PCS | Mod: HCNC,CPTII,S$GLB, | Performed by: FAMILY MEDICINE

## 2019-05-01 PROCEDURE — 3077F SYST BP >= 140 MM HG: CPT | Mod: HCNC,CPTII,S$GLB, | Performed by: FAMILY MEDICINE

## 2019-05-01 PROCEDURE — 99499 RISK ADDL DX/OHS AUDIT: ICD-10-PCS | Mod: S$GLB,,, | Performed by: FAMILY MEDICINE

## 2019-05-01 PROCEDURE — 3077F PR MOST RECENT SYSTOLIC BLOOD PRESSURE >= 140 MM HG: ICD-10-PCS | Mod: HCNC,CPTII,S$GLB, | Performed by: FAMILY MEDICINE

## 2019-05-01 PROCEDURE — 99999 PR PBB SHADOW E&M-EST. PATIENT-LVL V: CPT | Mod: PBBFAC,HCNC,, | Performed by: FAMILY MEDICINE

## 2019-05-01 PROCEDURE — 99215 PR OFFICE/OUTPT VISIT, EST, LEVL V, 40-54 MIN: ICD-10-PCS | Mod: HCNC,S$GLB,, | Performed by: FAMILY MEDICINE

## 2019-05-01 PROCEDURE — 99499 UNLISTED E&M SERVICE: CPT | Mod: S$GLB,,, | Performed by: FAMILY MEDICINE

## 2019-05-01 PROCEDURE — 1101F PR PT FALLS ASSESS DOC 0-1 FALLS W/OUT INJ PAST YR: ICD-10-PCS | Mod: HCNC,CPTII,S$GLB, | Performed by: FAMILY MEDICINE

## 2019-05-01 NOTE — PROGRESS NOTES
..FitKit was given to patient on 5/1/2019 2:04 PM   Patient expressed understanding but was reluctant and also re-scheduled labs stated she had to  someone.

## 2019-05-02 ENCOUNTER — PATIENT OUTREACH (OUTPATIENT)
Dept: ADMINISTRATIVE | Facility: HOSPITAL | Age: 74
End: 2019-05-02

## 2019-05-02 DIAGNOSIS — M89.9 DISORDER OF BONE AND CARTILAGE: Primary | ICD-10-CM

## 2019-05-02 DIAGNOSIS — M94.9 DISORDER OF BONE AND CARTILAGE: Primary | ICD-10-CM

## 2019-05-02 NOTE — PROGRESS NOTES
Labs scheduled for 05/02/19, mammogram and DEXA scheduled for 05/13/19. Fit kit dispensed 05/01/19.

## 2019-05-03 PROBLEM — I70.0 AORTIC ATHEROSCLEROSIS: Status: ACTIVE | Noted: 2019-05-03

## 2019-05-03 NOTE — PROGRESS NOTES
Routine Office Visit    Patient Name: Tasneem Lynn    : 1945  MRN: [id number]    Subjective:  Tasneem is a 73 y.o. female who presents today for:   Chief Complaint   Patient presents with    Medication Refill     73-year-old female with diabetes, hypertension, dyslipidemia, hypothyroidism, multiple chronic pains, and a stroke last year, comes in with a daughter for refill on opioid medication.  The patient was started on opioid medication for chronic low back pain with left-sided sciatica and for left shoulder pain last year by her previous PCP at this office, according to her.  The patient states that prior to that she had gone to pain management and was very unhappy with the treatment she was getting there.  She states that she had multiple procedures done a including injections but no that helped.  The the provider at that office had refused to give her medicines that helped.  She states that she has never seen an orthopedist or a neurosurgeon for her back, or an orthopedist for her shoulder.  She states that she does not feel like she needs to see 1 and all she needs is her medicine.  She also states that she does not want to see a pain specialist and she just wants continue on her medication.  She is also not open to having her medication changed.  There is no pain contract on file from her previous PCP, and there are no urine toxicologies on file.  When asked about this, she states that her previous PCP did not require this, and this is 1 of the reasons she did not like her previous pain management doctor, because he required 1 to be done month in both the urine and in the blood.  She felt this point less.    The patient is last lab test for her chronic medical problems were done in 2018.  The patient states that when she checks her sugars they are always good.  She states that they run in the low 100s.  However her A1c last year was 13.7.  When asked about her insulin regimen, she states that she  takes insulin 70/30, 4 units once a day and sometimes twice a day.  However, the prescription on file says 40 units.  When asked to clarify if she takes 4 units or 40 units, she states 4 units.  She does report that she often gets very low sugars.  When asked what she does for this, she states that she eats something sugary.  She does not go for the hospital for this.    Throughout the appointment, the patient's daughter kept walking in and out of the room answering different phone calls.    Past Medical History  Past Medical History:   Diagnosis Date    Arthritis     Gout    CHF (congestive heart failure)     Diabetes mellitus     HLD (hyperlipidemia)     Hypertension     Obese     Stroke     1986    Thyroid disease        Past Surgical History  Past Surgical History:   Procedure Laterality Date    right hand surgery      right knee surgery Right     partial plate        Family History  Family History   Problem Relation Age of Onset    Heart disease Sister     Diabetes Maternal Aunt     Heart disease Maternal Aunt     Hypertension Maternal Aunt        Social History  Social History     Socioeconomic History    Marital status:      Spouse name: Not on file    Number of children: Not on file    Years of education: Not on file    Highest education level: Not on file   Occupational History    Not on file   Social Needs    Financial resource strain: Not on file    Food insecurity:     Worry: Not on file     Inability: Not on file    Transportation needs:     Medical: Not on file     Non-medical: Not on file   Tobacco Use    Smoking status: Never Smoker    Smokeless tobacco: Never Used   Substance and Sexual Activity    Alcohol use: No    Drug use: No    Sexual activity: Not on file   Lifestyle    Physical activity:     Days per week: Not on file     Minutes per session: Not on file    Stress: Not on file   Relationships    Social connections:     Talks on phone: Not on file     Gets  together: Not on file     Attends Religion service: Not on file     Active member of club or organization: Not on file     Attends meetings of clubs or organizations: Not on file     Relationship status: Not on file   Other Topics Concern    Not on file   Social History Narrative    Not on file       Current Medications  Current Outpatient Medications on File Prior to Visit   Medication Sig Dispense Refill    aspirin 81 MG Chew Take 81 mg by mouth once daily.      blood pressure test kit-large Kit Use to check blood pressure daily and as needed. 1 each 0    carvedilol (COREG) 25 MG tablet Take 25 mg by mouth 2 (two) times daily with meals.      cetirizine (ZYRTEC) 10 MG tablet Take 1 tablet (10 mg total) by mouth every evening. 90 tablet 1    cloNIDine (CATAPRES) 0.1 MG tablet Take 1 tablet (0.1 mg total) by mouth 3 (three) times daily. Blood pressure 270 tablet 1    diclofenac (VOLTAREN) 75 MG EC tablet Take 1 tablet (75 mg total) by mouth 2 (two) times daily. 30 tablet 1    ergocalciferol (ERGOCALCIFEROL) 50,000 unit Cap Take 1 capsule (50,000 Units total) by mouth every 7 days. 12 capsule 1    gabapentin (NEURONTIN) 300 MG capsule Take 1 capsule (300 mg total) by mouth 3 (three) times daily. 270 capsule 1    HYDROcodone-acetaminophen (NORCO) 7.5-325 mg per tablet Take 1 tablet by mouth every 12 (twelve) hours as needed (severe pain). 60 tablet 0    insulin NPH-insulin regular, 70/30, (NOVOLIN 70/30) 100 unit/mL (70-30) injection Inject 40 Units into the skin 2 (two) times daily. 3 vial 6    levothyroxine (SYNTHROID) 75 MCG tablet Take 1 tablet (75 mcg total) by mouth before breakfast. 90 tablet 1    losartan (COZAAR) 50 MG tablet Take 1 tablet (50 mg total) by mouth once daily. 90 tablet 1    rosuvastatin (CRESTOR) 20 MG tablet Take 1 tablet (20 mg total) by mouth once daily. 90 tablet 3    furosemide (LASIX) 20 MG tablet Take 1 tablet (20 mg total) by mouth once daily. 90 tablet 1     No current  facility-administered medications on file prior to visit.        Allergies   Review of patient's allergies indicates:   Allergen Reactions    Corticosteroids (glucocorticoids) Edema     Review of Systems   Constitutional: Positive for fatigue. Negative for chills, diaphoresis and fever.   HENT: Negative for rhinorrhea, sneezing, sore throat and trouble swallowing.    Eyes: Negative for visual disturbance.   Respiratory: Negative for shortness of breath and wheezing.    Cardiovascular: Negative for chest pain and palpitations.   Gastrointestinal: Positive for constipation. Negative for abdominal pain, blood in stool, diarrhea and nausea.   Endocrine: Positive for polyuria.   Genitourinary: Positive for frequency. Negative for decreased urine volume, dysuria, hematuria, pelvic pain, urgency and vaginal discharge.   Musculoskeletal: Positive for arthralgias, back pain, gait problem (uses wheelchair), myalgias and neck pain.   Skin: Negative for rash.   Neurological: Positive for weakness. Negative for tremors, seizures, syncope, facial asymmetry and headaches.   Hematological: Does not bruise/bleed easily.   Psychiatric/Behavioral: Positive for sleep disturbance. Negative for suicidal ideas.     BP (!) 146/66 (BP Location: Left arm, Patient Position: Sitting, BP Method: Large (Manual))   Pulse 95   Temp 98.1 °F (36.7 °C) (Oral)   Resp 18   SpO2 98%     Physical Exam   Constitutional:   Morbidly obese, sitting in wheelchair   HENT:   Head: Normocephalic and atraumatic.   Right Ear: Tympanic membrane, external ear and ear canal normal.   Left Ear: Tympanic membrane, external ear and ear canal normal.   Nose: Nose normal.   Mouth/Throat: Uvula is midline, oropharynx is clear and moist and mucous membranes are normal.   Eyes: Pupils are equal, round, and reactive to light. Conjunctivae, EOM and lids are normal.   Neck: Trachea normal and normal range of motion. Neck supple. No thyroid mass present.   Cardiovascular:  Normal rate, regular rhythm and normal heart sounds.   Pulses:       Radial pulses are 2+ on the right side, and 2+ on the left side.   Pulmonary/Chest: Effort normal. She has no wheezes. She has no rhonchi. She has no rales.   Abdominal: Soft. Bowel sounds are normal. There is no tenderness. There is no rigidity, no guarding and no CVA tenderness.   Musculoskeletal:        Right shoulder: She exhibits decreased range of motion.        Left shoulder: She exhibits decreased range of motion and tenderness. She exhibits no swelling and no deformity.        Lumbar back: She exhibits decreased range of motion. She exhibits no spasm.        Right lower leg: She exhibits edema (1+).        Left lower leg: She exhibits edema (1+).   Lymphadenopathy:     She has no cervical adenopathy.   Neurological: She is alert.   Psychiatric: Her speech is not rapid and/or pressured, not delayed, not tangential and not slurred. She is agitated. She is not aggressive and not hyperactive. Thought content is not paranoid. She expresses no homicidal and no suicidal ideation. She is communicative.   Vitals reviewed.      Assessment/Plan:  Tasneem was seen today for medication refill.    Diagnoses and all orders for this visit:    Type 2 diabetes mellitus with hyperglycemia, with long-term current use of insulin  -     Comprehensive metabolic panel; Future  -     CBC auto differential; Future  -     Hemoglobin A1c; Future  -     Microalbumin/creatinine urine ratio; Future  -     Lipid panel; Future  Discussed with patient implications of uncontrolled diabetes.  Importance of a improving glucose control discussed.  Importance of a checking her fingersticks so we can probably adjust her medications discussed.  Patient's labs ordered.  The patient states that she has enough of her medications.  The tried to educate patient on proper insulin storage and administration however she states that she knows how to do this.    Hypertension,  benign  Discussed with patient that her blood pressure seems to be poorly controlled based on a her office visits.  She does not want her medication changes at this time as the main purpose for today's visit for her was for refill on her pain medication.  She states that she has enough of her blood pressure medications as she just filled them.  Discussed the patient dangers of uncontrolled blood pressure including heart attack, stroke, and death.    Dyslipidemia  Patient's lipids were ordered.    CKD (chronic kidney disease) stage 4, GFR 15-29 ml/min  -     Comprehensive metabolic panel; Future  -     CBC auto differential; Future  -     PTH, intact; Future  -     Vitamin D; Future  -     Ambulatory referral to Nephrology  Patient states that she was not aware of kidney disease.  I showed her her kidney function measured her on her several lab tests most recently.  Discussed with her that the best way to prevent progression is a to control her blood pressure and her sugars.  Furthermore discussed with her that I will be refer her to a nephrologist for an evaluation.  The patient did not to the point to see a nephrologist.    Acquired hypothyroidism  -     TSH; Future  Check TSH.    Other chronic pain  My evaluation of the patient has several concerns for a bare in behaviors including patient's unwillingness for toxicology testing and to see recommended specialists for evaluations and open to changing regimen.  Although I recognize that the use of nonsteroidal anti-inflammatory drugs is not possible given her kidney function, the risks associated with opioid medications are very high any to be done under a controlled environment.  As such, I explained to the patient that I would not be able to prescribe her opioid medication and I strongly urged her to see recommended specialists for her back, hip, and shoulders, as well as a pain management specialist.  I discussed with her that pain management will likely require  her to sign a pain management contract and require periodic toxicology testing.  I did advise them that I would get testing started so we can hopefully move things along for when she sees the required specialist.  The patient and the patient's daughter were very upset when I informed them that I would not be providing the patient opioid medications.    Chronic midline low back pain with left-sided sciatica  -     Toxicology screen, urine  -     Microalbumin/creatinine urine ratio; Future  -     Ambulatory consult to Orthopedics  -     Ambulatory referral to Pain Clinic  As above.    Chronic left shoulder pain  -     Toxicology screen, urine  -     Microalbumin/creatinine urine ratio; Future  -     Ambulatory consult to Orthopedics  -     Ambulatory referral to Pain Clinic  As above.    Aortic atherosclerosis  Patient had been prescribed a statin medication.    Screen for colon cancer  -     Fecal Immunochemical Test (iFOBT); Future  Patient states that she would not do a colonoscopy.  Offered patient fecal immunochemical testing for colon cancer screening.    Screening for breast cancer  -     Mammo Digital Screening Bilat w/ Stephon; Future    Need for hepatitis C screening test  -     Hepatitis C antibody; Future    Opioid use  See chronic pain    Class 3 severe obesity due to excess calories with serious comorbidity and body mass index (BMI) of 45.0 to 49.9 in adult  Discussed with patient that weight and immobility do worsen pain and small dietary changes can help improve weight which can potentially help with her diabetes management and her pain management.    Limited mobility  Patient dependent on wheelchair.    History of stroke  Discussed with patient importance of having her chronic medical concerns addressed to help decrease chance of recurring stroke.      After the visit was done, I was informed by my medical assistant that the patient and the patient's daughter decided not to wait to have her labs done and  left almost immediately.  They informed her that they would return the following day for the labs.        This office note has been dictated.  This dictation has been generated using [employer] Direct dictation; some phonetic errors may occur.

## 2019-05-11 ENCOUNTER — TELEPHONE (OUTPATIENT)
Dept: ADMINISTRATIVE | Facility: HOSPITAL | Age: 74
End: 2019-05-11

## 2019-11-11 ENCOUNTER — HOSPITAL ENCOUNTER (EMERGENCY)
Facility: HOSPITAL | Age: 74
Discharge: HOME | End: 2019-11-11
Attending: EMERGENCY MEDICINE
Payer: MEDICARE

## 2019-11-11 VITALS
HEART RATE: 90 BPM | RESPIRATION RATE: 29 BRPM | TEMPERATURE: 99 F | HEIGHT: 60 IN | SYSTOLIC BLOOD PRESSURE: 211 MMHG | WEIGHT: 260 LBS | OXYGEN SATURATION: 99 % | BODY MASS INDEX: 51.04 KG/M2 | DIASTOLIC BLOOD PRESSURE: 91 MMHG

## 2019-11-11 DIAGNOSIS — T14.8XXA MUSCLE STRAIN: ICD-10-CM
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
## 2019-11-11 DIAGNOSIS — S80.02XA CONTUSION OF LEFT KNEE, INITIAL ENCOUNTER: ICD-10-CM

## 2019-11-11 PROCEDURE — 99285 EMERGENCY DEPT VISIT HI MDM: CPT | Mod: 25,HCNC

## 2019-11-11 PROCEDURE — 25000003 PHARM REV CODE 250: Mod: HCNC | Performed by: EMERGENCY MEDICINE

## 2019-11-11 RX ORDER — ACETAMINOPHEN 325 MG/1
650 TABLET ORAL EVERY 6 HOURS PRN
Qty: 13 TABLET | Refills: 0 | Status: ON HOLD | OUTPATIENT
Start: 2019-11-11 | End: 2020-06-24 | Stop reason: SDUPTHER

## 2019-11-11 RX ORDER — CLONIDINE HYDROCHLORIDE 0.1 MG/1
0.1 TABLET ORAL
Status: COMPLETED | OUTPATIENT
Start: 2019-11-11 | End: 2019-11-11

## 2019-11-11 RX ORDER — BACLOFEN 5 MG/1
5 TABLET ORAL 3 TIMES DAILY
Qty: 11 TABLET | Refills: 0 | Status: ON HOLD | OUTPATIENT
Start: 2019-11-11 | End: 2020-06-24 | Stop reason: HOSPADM

## 2019-11-11 RX ORDER — ACETAMINOPHEN 325 MG/1
650 TABLET ORAL
Status: COMPLETED | OUTPATIENT
Start: 2019-11-11 | End: 2019-11-11

## 2019-11-11 RX ADMIN — CLONIDINE HYDROCHLORIDE 0.1 MG: 0.1 TABLET ORAL at 05:11

## 2019-11-11 RX ADMIN — ACETAMINOPHEN 650 MG: 325 TABLET ORAL at 04:11

## 2019-11-11 RX ADMIN — BACLOFEN 5 MG: 10 TABLET ORAL at 04:11

## 2019-11-11 NOTE — ED PROVIDER NOTES
"Encounter Date: 11/11/2019    SCRIBE #1 NOTE: I, Sharon Waller, am scribing for, and in the presence of,  Tip Ramsey MD. I have scribed the following portions of the note - Other sections scribed: HPI, ROS.       History     Chief Complaint   Patient presents with    Fatigue     EMS reports left leg pain, bilateral shoulder pain and weakness starting today after a fall while trying to ambulate to restroom. pt AAOx4 at this time, no slurred speech or dizziness reported. States "My leg just gave out"     74 year old female patient presents to the ED complaining of severe (9/10) left knee and left hip and left foot pain status post fall that occurred yesterday night when patient tried to ambulate without her cane or her walker. The patient attempted to leave her bed and walk to the bathroom without her walker and fell on her left knee. She states that she was on the ground for an hour before her brother assisted her back up. She reports that she took Tylenol for the knee and hip pain. She also complains of chronic weakness in her left knee, chronic back pain, and chronic bilateral shoulder pain. Patient states the back pain has not changed in the bilateral shoulder pain have not changed in their intensity that she has had for months.  States that she has had weakness in this left leg ever since having a stroke previously and there is no new weakness or new decreased sensation.  At baseline patient states that she has decreased sensation left lower extremity.  She denies any fever, chest pain, shortness of breath, loss of conciousness, appetite changes, or changes in bowel movements or urination. The patient reports a previous cerebrovascular accident. She denies smoking, alcohol use, or drug use. The patient reports compliance with her medications today.  No head trauma.  No neck pain.    The history is provided by the patient.     Review of patient's allergies indicates:   Allergen Reactions    Corticosteroids " (glucocorticoids) Edema     Past Medical History:   Diagnosis Date    Arthritis     Gout    CHF (congestive heart failure)     Coronary artery disease     Diabetes mellitus     HLD (hyperlipidemia)     Hypertension     Obese     Stroke     1986    Thyroid disease      Past Surgical History:   Procedure Laterality Date    right hand surgery      right knee surgery Right     partial plate     Family History   Problem Relation Age of Onset    Heart disease Sister     Diabetes Maternal Aunt     Heart disease Maternal Aunt     Hypertension Maternal Aunt      Social History     Tobacco Use    Smoking status: Never Smoker    Smokeless tobacco: Never Used   Substance Use Topics    Alcohol use: No    Drug use: No     Review of Systems   Constitutional: Negative for appetite change, chills, diaphoresis and fever.   HENT: Negative for ear pain and sore throat.    Eyes: Negative for visual disturbance.   Respiratory: Negative for cough and shortness of breath.    Cardiovascular: Negative for chest pain.   Gastrointestinal: Negative for abdominal pain, diarrhea and vomiting.   Genitourinary: Negative for decreased urine volume and dysuria.   Musculoskeletal: Positive for arthralgias (Acute left knee and left hip pain, chronic bilateral shoulder pain) and back pain (Chronic). Negative for myalgias.   Skin: Negative for rash.   Neurological: Positive for weakness (Left knee). Negative for syncope and headaches.   All other systems reviewed and are negative.      Physical Exam     Initial Vitals [11/11/19 1458]   BP Pulse Resp Temp SpO2   (!) 234/97 95 18 98.7 °F (37.1 °C) 96 %      MAP       --         Physical Exam    Nursing note and vitals reviewed.  Constitutional: She appears well-developed and well-nourished.   Morbidly obese   HENT:   Head: Normocephalic and atraumatic.   Right Ear: External ear normal.   Left Ear: External ear normal.   Mouth/Throat: Oropharynx is clear and moist.   No Orosco sign or  raccoon eyes or bleeding from the tympanic membrane   Eyes: EOM are normal. Pupils are equal, round, and reactive to light.   Neck: Normal range of motion.   No C, T, or L-spine tenderness or step-off or crepitus   Cardiovascular: Normal rate and regular rhythm.   Pulmonary/Chest: Breath sounds normal. No stridor. No respiratory distress.   Abdominal: Soft. Bowel sounds are normal.   Musculoskeletal: Normal range of motion. She exhibits no edema.   Tender to left hip, left knee, left ankle, left foot.  No major abrasions or major swelling or contusions noted.   Neurological: She is alert and oriented to person, place, and time.   Decreased strength of the left lower extremity with decreased sensation which patient states is her baseline.   Skin: Skin is warm and dry. Capillary refill takes less than 2 seconds.   Psychiatric: She has a normal mood and affect. Thought content normal.         ED Course   Procedures  Labs Reviewed - No data to display       Imaging Results          X-Ray Chest AP Portable (Final result)  Result time 11/11/19 17:33:53    Final result by Tish Marquez MD (11/11/19 17:33:53)                 Impression:      As above.      Electronically signed by: Tish Marquez MD  Date:    11/11/2019  Time:    17:33             Narrative:    EXAMINATION:  XR CHEST AP PORTABLE    CLINICAL HISTORY:  fall;    TECHNIQUE:  Single frontal view of the chest was performed.    COMPARISON:  May 2018.    FINDINGS:  Cardiac silhouette is stable in size.  Lungs are symmetrically expanded.  Increased interstitial attenuation with bilateral perihilar opacity is seen.  This is more pronounced within the lower lung zones.  Findings are nonspecific but may reflect pulmonary edema versus atypical pneumonia.  No evidence of focal consolidative process, pneumothorax, or significant effusion.  No acute osseous abnormality identified.                               X-Ray Hip 2 View Left (Final result)  Result time 11/11/19  17:29:22    Final result by Aba Guzman III, MD (11/11/19 17:29:22)                 Narrative:    EXAMINATION:  XR HIP 2 VIEW LEFT    CLINICAL HISTORY:  Unspecified fall, initial encounter    FINDINGS:  Two views left hip.    No fracture dislocation bone destruction seen.  There is baseline DJD and DISH.      Electronically signed by: Aba Guzman MD  Date:    11/11/2019  Time:    17:29                             X-Ray Knee 1 or 2 View Left (Final result)  Result time 11/11/19 17:29:51    Final result by Aba Guzman III, MD (11/11/19 17:29:51)                 Narrative:    EXAMINATION:  XR KNEE 1 OR 2 VIEW LEFT    CLINICAL HISTORY:  fall;    FINDINGS:  Two views left knee.    No fracture dislocation bone destruction seen.  There is moderate DJD and a mild varus deformity.      Electronically signed by: Aba Guzman MD  Date:    11/11/2019  Time:    17:29                             X-Ray Ankle Complete Left (Final result)  Result time 11/11/19 17:29:49    Final result by Tish Marquez MD (11/11/19 17:29:49)                 Impression:      No acute osseous abnormality identified.      Electronically signed by: Tish Marquez MD  Date:    11/11/2019  Time:    17:29             Narrative:    EXAMINATION:  XR ANKLE COMPLETE 3 VIEW LEFT; XR FOOT COMPLETE 3 VIEW LEFT    CLINICAL HISTORY:  Unspecified fall, initial encounter    TECHNIQUE:  AP, lateral and oblique views of the left ankle were performed.  Left foot three views.    COMPARISON:  None    FINDINGS:  No evidence of acute displaced fracture, dislocation, or osseous destructive process.  Ankle mortise is maintained.  There is mild hallux valgus.  Soft tissue swelling is seen at the dorsum of foot.  Posterior calcaneal spurring is noted.                               X-Ray Foot Complete Left (Final result)  Result time 11/11/19 17:29:49    Final result by Tsih Marquez MD (11/11/19 17:29:49)                 Impression:      No acute osseous  abnormality identified.      Electronically signed by: Tish Marquez MD  Date:    11/11/2019  Time:    17:29             Narrative:    EXAMINATION:  XR ANKLE COMPLETE 3 VIEW LEFT; XR FOOT COMPLETE 3 VIEW LEFT    CLINICAL HISTORY:  Unspecified fall, initial encounter    TECHNIQUE:  AP, lateral and oblique views of the left ankle were performed.  Left foot three views.    COMPARISON:  None    FINDINGS:  No evidence of acute displaced fracture, dislocation, or osseous destructive process.  Ankle mortise is maintained.  There is mild hallux valgus.  Soft tissue swelling is seen at the dorsum of foot.  Posterior calcaneal spurring is noted.                                 Medical Decision Making:   Initial Assessment:   73 yo patient presenting 2/2 fall. Patient with pain predominantly to the left lower extremity. Hemodynamically stable with a unchanged neurological exam. Given exam and history, low suspicion for major traumatic event. No Ct head due to no head trauma and no imaging of her C-spine due to no neck trauma and. Serial abdominal exams without tenderness. Observed in the ED for 2+ hours with no instability. Stable gait and tolerating po. Patient received xrays to evaluate for dislocation/fracture/other injuries. Patient received baclofen and Tylenol for pain. Patient to be discharged with baclofen and Tylenol.   Xrays showed nothing acute  Cautious return precautions discussed with patient and/or family with understanding. Prompt f/u with primary care physician discussed. All questions answered. Patient comfortable with plan.  Patient states that she is going to call her primary care to discuss her chronic shoulder and back pain. Patient states that she will try to improve using a walker.  Patient states that she will be more compliant with her blood pressure medications.  Patient to go home and take her blood pressure medications.  No signs and symptoms do think hypertensive emergency.    Clinical Tests:    Radiological Study: Ordered and Reviewed            Scribe Attestation:   Scribe #1: I performed the above scribed service and the documentation accurately describes the services I performed. I attest to the accuracy of the note.                          Clinical Impression:       ICD-10-CM ICD-9-CM   1. Fall, initial encounter W19.XXXA E888.9   2. Contusion of left knee, initial encounter S80.02XA 924.11   3. Muscle strain T14.8XXA 848.9            Scribe Attestation: I, Tip Ramsey, personally performed the services described in this documentation. All medical record entries made by the scribe were at my direction and in my presence. I have reviewed the chart and agree that the record reflects my personal performance and is accurate and complete.                 Tip Ramsey MD  11/11/19 [id number]

## 2019-11-11 NOTE — ED TRIAGE NOTES
Fell last night and left leg that is residual deficit from previous stroke, patient reports could not move the leg yesterday and is why she fell  Today could not move the leg again and fell again

## 2019-11-11 NOTE — ED NOTES
75 y/o BF present to ED with c/o s/p fall last night and this morning. Pt report she normally ambulates with a cane and attempt to go to the bathroom without it and fell. Denies dizziness before falling. Left sided weakness noted upon assessment. Pt report history of pervious stroke in the 80's. Limited range of motion noted to BLE. 3+ pitting edema noted to BLE. Pt denies SOB or chest pain. ED work up in progress. Assessment per event log. SR 90's per telemetry monitor. Safety maintain. Family at bedside. Will continue to monitor.

## 2019-12-11 DIAGNOSIS — G89.4 CHRONIC PAIN SYNDROME: Primary | ICD-10-CM

## 2019-12-11 DIAGNOSIS — M54.50 LUMBAGO: ICD-10-CM

## 2019-12-11 DIAGNOSIS — M15.9 GENERALIZED OSTEOARTHROSIS, INVOLVING MULTIPLE SITES: ICD-10-CM

## 2019-12-11 DIAGNOSIS — M25.569 PAIN IN JOINT, LOWER LEG: ICD-10-CM

## 2020-05-07 DIAGNOSIS — L97.409 DIABETIC ULCER OF HEEL ASSOCIATED WITH TYPE 2 DIABETES MELLITUS, UNSPECIFIED LATERALITY, UNSPECIFIED ULCER STAGE: Primary | ICD-10-CM

## 2020-05-07 DIAGNOSIS — E11.621 DIABETIC ULCER OF HEEL ASSOCIATED WITH TYPE 2 DIABETES MELLITUS, UNSPECIFIED LATERALITY, UNSPECIFIED ULCER STAGE: Primary | ICD-10-CM

## 2020-06-12 ENCOUNTER — HOSPITAL ENCOUNTER (INPATIENT)
Facility: HOSPITAL | Age: 75
LOS: 12 days | Discharge: NURSING, REHAB OR LONG-TERM CARE | DRG: 623 | End: 2020-06-24
Attending: EMERGENCY MEDICINE | Admitting: EMERGENCY MEDICINE
Payer: MEDICARE

## 2020-06-12 DIAGNOSIS — L97.411 DIABETIC ULCER OF RIGHT HEEL ASSOCIATED WITH DIABETES MELLITUS DUE TO UNDERLYING CONDITION, LIMITED TO BREAKDOWN OF SKIN: Primary | ICD-10-CM

## 2020-06-12 DIAGNOSIS — M86.471 CHRONIC OSTEOMYELITIS OF RIGHT FOOT WITH DRAINING SINUS: ICD-10-CM

## 2020-06-12 DIAGNOSIS — L97.509 DIABETIC FOOT ULCER: ICD-10-CM

## 2020-06-12 DIAGNOSIS — S91.301A OPEN WOUND OF RIGHT FOOT: ICD-10-CM

## 2020-06-12 DIAGNOSIS — Z86.73 HISTORY OF CVA (CEREBROVASCULAR ACCIDENT): Chronic | ICD-10-CM

## 2020-06-12 DIAGNOSIS — E11.621 DIABETIC FOOT ULCER: ICD-10-CM

## 2020-06-12 DIAGNOSIS — E08.621 DIABETIC ULCER OF RIGHT HEEL ASSOCIATED WITH DIABETES MELLITUS DUE TO UNDERLYING CONDITION, LIMITED TO BREAKDOWN OF SKIN: Primary | ICD-10-CM

## 2020-06-12 DIAGNOSIS — M86.171 OTHER ACUTE OSTEOMYELITIS OF RIGHT FOOT: ICD-10-CM

## 2020-06-12 LAB
ALBUMIN SERPL BCP-MCNC: 2.7 G/DL (ref 3.5–5.2)
ALP SERPL-CCNC: 130 U/L (ref 55–135)
ALT SERPL W/O P-5'-P-CCNC: 9 U/L (ref 10–44)
ANION GAP SERPL CALC-SCNC: 10 MMOL/L (ref 8–16)
AST SERPL-CCNC: 15 U/L (ref 10–40)
BASOPHILS # BLD AUTO: 0.06 K/UL (ref 0–0.2)
BASOPHILS NFR BLD: 0.6 % (ref 0–1.9)
BILIRUB SERPL-MCNC: 0.3 MG/DL (ref 0.1–1)
BUN SERPL-MCNC: 57 MG/DL (ref 8–23)
CALCIUM SERPL-MCNC: 9.1 MG/DL (ref 8.7–10.5)
CHLORIDE SERPL-SCNC: 105 MMOL/L (ref 95–110)
CK SERPL-CCNC: 32 U/L (ref 20–180)
CO2 SERPL-SCNC: 23 MMOL/L (ref 23–29)
CREAT SERPL-MCNC: 2.2 MG/DL (ref 0.5–1.4)
CRP SERPL-MCNC: 17.7 MG/L (ref 0–8.2)
DIFFERENTIAL METHOD: ABNORMAL
EOSINOPHIL # BLD AUTO: 0.4 K/UL (ref 0–0.5)
EOSINOPHIL NFR BLD: 3.6 % (ref 0–8)
ERYTHROCYTE [DISTWIDTH] IN BLOOD BY AUTOMATED COUNT: 14.5 % (ref 11.5–14.5)
ERYTHROCYTE [SEDIMENTATION RATE] IN BLOOD BY WESTERGREN METHOD: 108 MM/HR (ref 0–20)
EST. GFR  (AFRICAN AMERICAN): 25 ML/MIN/1.73 M^2
EST. GFR  (NON AFRICAN AMERICAN): 21 ML/MIN/1.73 M^2
GLUCOSE SERPL-MCNC: 145 MG/DL (ref 70–110)
HCT VFR BLD AUTO: 37.9 % (ref 37–48.5)
HGB BLD-MCNC: 11.2 G/DL (ref 12–16)
IMM GRANULOCYTES # BLD AUTO: 0.03 K/UL (ref 0–0.04)
IMM GRANULOCYTES NFR BLD AUTO: 0.3 % (ref 0–0.5)
LYMPHOCYTES # BLD AUTO: 5.1 K/UL (ref 1–4.8)
LYMPHOCYTES NFR BLD: 48 % (ref 18–48)
MCH RBC QN AUTO: 28.9 PG (ref 27–31)
MCHC RBC AUTO-ENTMCNC: 29.6 G/DL (ref 32–36)
MCV RBC AUTO: 98 FL (ref 82–98)
MONOCYTES # BLD AUTO: 0.8 K/UL (ref 0.3–1)
MONOCYTES NFR BLD: 7.7 % (ref 4–15)
NEUTROPHILS # BLD AUTO: 4.3 K/UL (ref 1.8–7.7)
NEUTROPHILS NFR BLD: 39.8 % (ref 38–73)
NRBC BLD-RTO: 0 /100 WBC
PLATELET # BLD AUTO: 439 K/UL (ref 150–350)
PMV BLD AUTO: 8.4 FL (ref 9.2–12.9)
POTASSIUM SERPL-SCNC: 4.5 MMOL/L (ref 3.5–5.1)
PROT SERPL-MCNC: 9.3 G/DL (ref 6–8.4)
RBC # BLD AUTO: 3.88 M/UL (ref 4–5.4)
SARS-COV-2 RDRP RESP QL NAA+PROBE: NEGATIVE
SODIUM SERPL-SCNC: 138 MMOL/L (ref 136–145)
TROPONIN I SERPL DL<=0.01 NG/ML-MCNC: <0.006 NG/ML (ref 0–0.03)
WBC # BLD AUTO: 10.68 K/UL (ref 3.9–12.7)

## 2020-06-12 PROCEDURE — 12000002 HC ACUTE/MED SURGE SEMI-PRIVATE ROOM: Mod: HCNC

## 2020-06-12 PROCEDURE — 86140 C-REACTIVE PROTEIN: CPT | Mod: HCNC

## 2020-06-12 PROCEDURE — U0002 COVID-19 LAB TEST NON-CDC: HCPCS | Mod: HCNC

## 2020-06-12 PROCEDURE — 25000003 PHARM REV CODE 250: Mod: HCNC | Performed by: EMERGENCY MEDICINE

## 2020-06-12 PROCEDURE — 85652 RBC SED RATE AUTOMATED: CPT | Mod: HCNC

## 2020-06-12 PROCEDURE — 82550 ASSAY OF CK (CPK): CPT | Mod: HCNC

## 2020-06-12 PROCEDURE — 63600175 PHARM REV CODE 636 W HCPCS: Mod: HCNC | Performed by: EMERGENCY MEDICINE

## 2020-06-12 PROCEDURE — 84484 ASSAY OF TROPONIN QUANT: CPT | Mod: HCNC

## 2020-06-12 PROCEDURE — 80053 COMPREHEN METABOLIC PANEL: CPT | Mod: HCNC

## 2020-06-12 PROCEDURE — 85025 COMPLETE CBC W/AUTO DIFF WBC: CPT | Mod: HCNC

## 2020-06-12 PROCEDURE — 99285 EMERGENCY DEPT VISIT HI MDM: CPT | Mod: 25,HCNC

## 2020-06-12 PROCEDURE — 87040 BLOOD CULTURE FOR BACTERIA: CPT | Mod: HCNC

## 2020-06-12 RX ORDER — CIPROFLOXACIN 500 MG/1
500 TABLET ORAL 2 TIMES DAILY
Qty: 28 TABLET | Refills: 0 | Status: SHIPPED | OUTPATIENT
Start: 2020-06-12 | End: 2020-06-12 | Stop reason: CLARIF

## 2020-06-12 RX ORDER — MUPIROCIN 20 MG/G
1 OINTMENT TOPICAL
Status: COMPLETED | OUTPATIENT
Start: 2020-06-12 | End: 2020-06-12

## 2020-06-12 RX ORDER — SULFAMETHOXAZOLE AND TRIMETHOPRIM 800; 160 MG/1; MG/1
1 TABLET ORAL 2 TIMES DAILY
Qty: 14 TABLET | Refills: 0 | Status: SHIPPED | OUTPATIENT
Start: 2020-06-12 | End: 2020-06-12 | Stop reason: SDUPTHER

## 2020-06-12 RX ORDER — SULFAMETHOXAZOLE AND TRIMETHOPRIM 800; 160 MG/1; MG/1
1 TABLET ORAL 2 TIMES DAILY
Qty: 28 TABLET | Refills: 0 | Status: SHIPPED | OUTPATIENT
Start: 2020-06-12 | End: 2020-06-12 | Stop reason: CLARIF

## 2020-06-12 RX ADMIN — MUPIROCIN 22 G: 20 OINTMENT TOPICAL at 10:06

## 2020-06-12 RX ADMIN — VANCOMYCIN HYDROCHLORIDE 2250 MG: 1.5 INJECTION, POWDER, LYOPHILIZED, FOR SOLUTION INTRAVENOUS at 11:06

## 2020-06-12 NOTE — ED TRIAGE NOTES
"Pt arrive to ED via EMS with chief complaint of diabetic ulcer to right heel and top of right foot. Pt reports ulcer to right heel has worsened over the last 3 months and states "I think it's infected". Pt denies pain, N/V/D. AAO x 4.   "

## 2020-06-13 PROBLEM — N18.4 CKD (CHRONIC KIDNEY DISEASE) STAGE 4, GFR 15-29 ML/MIN: Status: ACTIVE | Noted: 2018-05-31

## 2020-06-13 PROBLEM — S81.801A WOUND OF RIGHT LEG: Status: ACTIVE | Noted: 2020-06-13

## 2020-06-13 LAB
ALBUMIN SERPL BCP-MCNC: 2.6 G/DL (ref 3.5–5.2)
ALP SERPL-CCNC: 121 U/L (ref 55–135)
ALT SERPL W/O P-5'-P-CCNC: 8 U/L (ref 10–44)
ANION GAP SERPL CALC-SCNC: 8 MMOL/L (ref 8–16)
AST SERPL-CCNC: 13 U/L (ref 10–40)
BASOPHILS # BLD AUTO: 0.05 K/UL (ref 0–0.2)
BASOPHILS NFR BLD: 0.5 % (ref 0–1.9)
BILIRUB SERPL-MCNC: 0.3 MG/DL (ref 0.1–1)
BUN SERPL-MCNC: 56 MG/DL (ref 8–23)
CALCIUM SERPL-MCNC: 9.2 MG/DL (ref 8.7–10.5)
CHLORIDE SERPL-SCNC: 103 MMOL/L (ref 95–110)
CHOLEST SERPL-MCNC: 156 MG/DL (ref 120–199)
CHOLEST/HDLC SERPL: 4.9 {RATIO} (ref 2–5)
CO2 SERPL-SCNC: 26 MMOL/L (ref 23–29)
CREAT SERPL-MCNC: 2.1 MG/DL (ref 0.5–1.4)
DIFFERENTIAL METHOD: ABNORMAL
EOSINOPHIL # BLD AUTO: 0.3 K/UL (ref 0–0.5)
EOSINOPHIL NFR BLD: 3.1 % (ref 0–8)
ERYTHROCYTE [DISTWIDTH] IN BLOOD BY AUTOMATED COUNT: 14.4 % (ref 11.5–14.5)
EST. GFR  (AFRICAN AMERICAN): 26 ML/MIN/1.73 M^2
EST. GFR  (NON AFRICAN AMERICAN): 23 ML/MIN/1.73 M^2
ESTIMATED AVG GLUCOSE: 169 MG/DL (ref 68–131)
GLUCOSE SERPL-MCNC: 120 MG/DL (ref 70–110)
GRAM STN SPEC: NORMAL
GRAM STN SPEC: NORMAL
HBA1C MFR BLD HPLC: 7.5 % (ref 4–5.6)
HCT VFR BLD AUTO: 37.3 % (ref 37–48.5)
HDLC SERPL-MCNC: 32 MG/DL (ref 40–75)
HDLC SERPL: 20.5 % (ref 20–50)
HGB BLD-MCNC: 10.9 G/DL (ref 12–16)
IMM GRANULOCYTES # BLD AUTO: 0.06 K/UL (ref 0–0.04)
IMM GRANULOCYTES NFR BLD AUTO: 0.6 % (ref 0–0.5)
INR PPP: 1 (ref 0.8–1.2)
LDLC SERPL CALC-MCNC: 97.8 MG/DL (ref 63–159)
LYMPHOCYTES # BLD AUTO: 3.3 K/UL (ref 1–4.8)
LYMPHOCYTES NFR BLD: 31.7 % (ref 18–48)
MCH RBC QN AUTO: 28.5 PG (ref 27–31)
MCHC RBC AUTO-ENTMCNC: 29.2 G/DL (ref 32–36)
MCV RBC AUTO: 97 FL (ref 82–98)
MONOCYTES # BLD AUTO: 1 K/UL (ref 0.3–1)
MONOCYTES NFR BLD: 9.1 % (ref 4–15)
NEUTROPHILS # BLD AUTO: 5.8 K/UL (ref 1.8–7.7)
NEUTROPHILS NFR BLD: 55 % (ref 38–73)
NONHDLC SERPL-MCNC: 124 MG/DL
NRBC BLD-RTO: 0 /100 WBC
PLATELET # BLD AUTO: 427 K/UL (ref 150–350)
PMV BLD AUTO: 8.8 FL (ref 9.2–12.9)
POCT GLUCOSE: 145 MG/DL (ref 70–110)
POCT GLUCOSE: 184 MG/DL (ref 70–110)
POTASSIUM SERPL-SCNC: 4.7 MMOL/L (ref 3.5–5.1)
PROT SERPL-MCNC: 9.1 G/DL (ref 6–8.4)
PROTHROMBIN TIME: 10.7 SEC (ref 9–12.5)
RBC # BLD AUTO: 3.83 M/UL (ref 4–5.4)
SODIUM SERPL-SCNC: 137 MMOL/L (ref 136–145)
TRIGL SERPL-MCNC: 131 MG/DL (ref 30–150)
TSH SERPL DL<=0.005 MIU/L-ACNC: 1.21 UIU/ML (ref 0.4–4)
WBC # BLD AUTO: 10.44 K/UL (ref 3.9–12.7)

## 2020-06-13 PROCEDURE — 80053 COMPREHEN METABOLIC PANEL: CPT | Mod: HCNC

## 2020-06-13 PROCEDURE — 83036 HEMOGLOBIN GLYCOSYLATED A1C: CPT | Mod: HCNC

## 2020-06-13 PROCEDURE — 88305 TISSUE EXAM BY PATHOLOGIST: CPT | Mod: HCNC | Performed by: PATHOLOGY

## 2020-06-13 PROCEDURE — 88305 TISSUE EXAM BY PATHOLOGIST: CPT | Mod: 26,HCNC,, | Performed by: PATHOLOGY

## 2020-06-13 PROCEDURE — 11000001 HC ACUTE MED/SURG PRIVATE ROOM: Mod: HCNC

## 2020-06-13 PROCEDURE — 80061 LIPID PANEL: CPT | Mod: HCNC

## 2020-06-13 PROCEDURE — 99223 PR INITIAL HOSPITAL CARE,LEVL III: ICD-10-PCS | Mod: 25,HCNC,, | Performed by: PODIATRIST

## 2020-06-13 PROCEDURE — 87206 SMEAR FLUORESCENT/ACID STAI: CPT | Mod: HCNC

## 2020-06-13 PROCEDURE — 63600175 PHARM REV CODE 636 W HCPCS: Mod: HCNC | Performed by: NURSE PRACTITIONER

## 2020-06-13 PROCEDURE — 87116 MYCOBACTERIA CULTURE: CPT | Mod: HCNC

## 2020-06-13 PROCEDURE — 97161 PT EVAL LOW COMPLEX 20 MIN: CPT | Mod: HCNC

## 2020-06-13 PROCEDURE — 36415 COLL VENOUS BLD VENIPUNCTURE: CPT | Mod: HCNC

## 2020-06-13 PROCEDURE — 11042 PR DEBRIDEMENT, SKIN, SUB-Q TISSUE,=<20 SQ CM: ICD-10-PCS | Mod: 59,HCNC,, | Performed by: PODIATRIST

## 2020-06-13 PROCEDURE — 20220 PR BONE BIOPSY,TROCAR/NEEDLE SUPERF: ICD-10-PCS | Mod: HCNC,,, | Performed by: PODIATRIST

## 2020-06-13 PROCEDURE — 88311 PR  DECALCIFY TISSUE: ICD-10-PCS | Mod: 26,HCNC,, | Performed by: PATHOLOGY

## 2020-06-13 PROCEDURE — 88311 DECALCIFY TISSUE: CPT | Mod: HCNC | Performed by: PATHOLOGY

## 2020-06-13 PROCEDURE — 87070 CULTURE OTHR SPECIMN AEROBIC: CPT | Mod: HCNC

## 2020-06-13 PROCEDURE — 25000003 PHARM REV CODE 250: Mod: HCNC | Performed by: NURSE PRACTITIONER

## 2020-06-13 PROCEDURE — 97166 OT EVAL MOD COMPLEX 45 MIN: CPT | Mod: HCNC

## 2020-06-13 PROCEDURE — 85610 PROTHROMBIN TIME: CPT | Mod: HCNC

## 2020-06-13 PROCEDURE — 25000003 PHARM REV CODE 250: Mod: HCNC | Performed by: INTERNAL MEDICINE

## 2020-06-13 PROCEDURE — 99223 1ST HOSP IP/OBS HIGH 75: CPT | Mod: 25,HCNC,, | Performed by: PODIATRIST

## 2020-06-13 PROCEDURE — 87075 CULTR BACTERIA EXCEPT BLOOD: CPT | Mod: HCNC

## 2020-06-13 PROCEDURE — 88311 DECALCIFY TISSUE: CPT | Mod: 26,HCNC,, | Performed by: PATHOLOGY

## 2020-06-13 PROCEDURE — 87205 SMEAR GRAM STAIN: CPT | Mod: HCNC

## 2020-06-13 PROCEDURE — 84443 ASSAY THYROID STIM HORMONE: CPT | Mod: HCNC

## 2020-06-13 PROCEDURE — 85025 COMPLETE CBC W/AUTO DIFF WBC: CPT | Mod: HCNC

## 2020-06-13 PROCEDURE — 11042 DBRDMT SUBQ TIS 1ST 20SQCM/<: CPT | Mod: 59,HCNC,, | Performed by: PODIATRIST

## 2020-06-13 PROCEDURE — 20220 BONE BIOPSY TROCAR/NDL SUPFC: CPT | Mod: HCNC,,, | Performed by: PODIATRIST

## 2020-06-13 PROCEDURE — 88305 TISSUE EXAM BY PATHOLOGIST: ICD-10-PCS | Mod: 26,HCNC,, | Performed by: PATHOLOGY

## 2020-06-13 PROCEDURE — 25000003 PHARM REV CODE 250: Mod: HCNC | Performed by: EMERGENCY MEDICINE

## 2020-06-13 RX ORDER — INSULIN ASPART 100 [IU]/ML
0-5 INJECTION, SOLUTION INTRAVENOUS; SUBCUTANEOUS EVERY 6 HOURS PRN
Status: DISCONTINUED | OUTPATIENT
Start: 2020-06-13 | End: 2020-06-15

## 2020-06-13 RX ORDER — GABAPENTIN 300 MG/1
300 CAPSULE ORAL 3 TIMES DAILY
Status: DISCONTINUED | OUTPATIENT
Start: 2020-06-13 | End: 2020-06-23

## 2020-06-13 RX ORDER — NAPROXEN SODIUM 220 MG/1
81 TABLET, FILM COATED ORAL DAILY
Status: DISCONTINUED | OUTPATIENT
Start: 2020-06-13 | End: 2020-06-24 | Stop reason: HOSPADM

## 2020-06-13 RX ORDER — FUROSEMIDE 20 MG/1
20 TABLET ORAL DAILY
Status: DISCONTINUED | OUTPATIENT
Start: 2020-06-13 | End: 2020-06-18

## 2020-06-13 RX ORDER — ACETAMINOPHEN 325 MG/1
650 TABLET ORAL EVERY 8 HOURS PRN
Status: DISCONTINUED | OUTPATIENT
Start: 2020-06-13 | End: 2020-06-19

## 2020-06-13 RX ORDER — FAMOTIDINE 20 MG/1
20 TABLET, FILM COATED ORAL DAILY
Status: DISCONTINUED | OUTPATIENT
Start: 2020-06-13 | End: 2020-06-24 | Stop reason: HOSPADM

## 2020-06-13 RX ORDER — LEVOTHYROXINE SODIUM 75 UG/1
75 TABLET ORAL
Status: DISCONTINUED | OUTPATIENT
Start: 2020-06-13 | End: 2020-06-24 | Stop reason: HOSPADM

## 2020-06-13 RX ORDER — FAMOTIDINE 20 MG/1
20 TABLET, FILM COATED ORAL 2 TIMES DAILY
Status: DISCONTINUED | OUTPATIENT
Start: 2020-06-13 | End: 2020-06-13 | Stop reason: DRUGHIGH

## 2020-06-13 RX ORDER — AMOXICILLIN 250 MG
1 CAPSULE ORAL 2 TIMES DAILY
Status: DISCONTINUED | OUTPATIENT
Start: 2020-06-13 | End: 2020-06-24 | Stop reason: HOSPADM

## 2020-06-13 RX ORDER — HYDRALAZINE HYDROCHLORIDE 20 MG/ML
10 INJECTION INTRAMUSCULAR; INTRAVENOUS ONCE
Status: DISCONTINUED | OUTPATIENT
Start: 2020-06-13 | End: 2020-06-15

## 2020-06-13 RX ORDER — OXYCODONE HYDROCHLORIDE 5 MG/1
5 TABLET ORAL EVERY 4 HOURS PRN
Status: DISCONTINUED | OUTPATIENT
Start: 2020-06-13 | End: 2020-06-19

## 2020-06-13 RX ORDER — ONDANSETRON 2 MG/ML
4 INJECTION INTRAMUSCULAR; INTRAVENOUS EVERY 8 HOURS PRN
Status: DISCONTINUED | OUTPATIENT
Start: 2020-06-13 | End: 2020-06-24 | Stop reason: HOSPADM

## 2020-06-13 RX ORDER — ACETAMINOPHEN 325 MG/1
650 TABLET ORAL EVERY 8 HOURS PRN
Status: DISCONTINUED | OUTPATIENT
Start: 2020-06-13 | End: 2020-06-24 | Stop reason: HOSPADM

## 2020-06-13 RX ORDER — AMOXICILLIN 250 MG
1 CAPSULE ORAL DAILY PRN
Status: DISCONTINUED | OUTPATIENT
Start: 2020-06-13 | End: 2020-06-13

## 2020-06-13 RX ORDER — LOSARTAN POTASSIUM 25 MG/1
50 TABLET ORAL DAILY
Status: DISCONTINUED | OUTPATIENT
Start: 2020-06-13 | End: 2020-06-18

## 2020-06-13 RX ORDER — ROSUVASTATIN CALCIUM 10 MG/1
20 TABLET, COATED ORAL DAILY
Status: DISCONTINUED | OUTPATIENT
Start: 2020-06-13 | End: 2020-06-24 | Stop reason: HOSPADM

## 2020-06-13 RX ORDER — GLUCAGON 1 MG
1 KIT INJECTION
Status: DISCONTINUED | OUTPATIENT
Start: 2020-06-13 | End: 2020-06-15

## 2020-06-13 RX ORDER — SODIUM CHLORIDE 0.9 % (FLUSH) 0.9 %
10 SYRINGE (ML) INJECTION
Status: DISCONTINUED | OUTPATIENT
Start: 2020-06-13 | End: 2020-06-24 | Stop reason: HOSPADM

## 2020-06-13 RX ORDER — CARVEDILOL 6.25 MG/1
25 TABLET ORAL 2 TIMES DAILY WITH MEALS
Status: DISCONTINUED | OUTPATIENT
Start: 2020-06-13 | End: 2020-06-24 | Stop reason: HOSPADM

## 2020-06-13 RX ADMIN — LEVOTHYROXINE SODIUM 75 MCG: 75 TABLET ORAL at 05:06

## 2020-06-13 RX ADMIN — ACETAMINOPHEN 650 MG: 325 TABLET ORAL at 08:06

## 2020-06-13 RX ADMIN — CARVEDILOL 25 MG: 6.25 TABLET, FILM COATED ORAL at 05:06

## 2020-06-13 RX ADMIN — STANDARDIZED SENNA CONCENTRATE AND DOCUSATE SODIUM 1 TABLET: 8.6; 5 TABLET ORAL at 05:06

## 2020-06-13 RX ADMIN — FAMOTIDINE 20 MG: 20 TABLET ORAL at 05:06

## 2020-06-13 RX ADMIN — CEFTRIAXONE 1 G: 1 INJECTION, SOLUTION INTRAVENOUS at 05:06

## 2020-06-13 RX ADMIN — CARVEDILOL 25 MG: 6.25 TABLET, FILM COATED ORAL at 08:06

## 2020-06-13 RX ADMIN — GABAPENTIN 300 MG: 300 CAPSULE ORAL at 03:06

## 2020-06-13 RX ADMIN — GABAPENTIN 300 MG: 300 CAPSULE ORAL at 08:06

## 2020-06-13 RX ADMIN — STANDARDIZED SENNA CONCENTRATE AND DOCUSATE SODIUM 1 TABLET: 8.6; 5 TABLET ORAL at 08:06

## 2020-06-13 RX ADMIN — ROSUVASTATIN CALCIUM 20 MG: 10 TABLET, COATED ORAL at 08:06

## 2020-06-13 RX ADMIN — ASPIRIN 81 MG 81 MG: 81 TABLET ORAL at 08:06

## 2020-06-13 NOTE — CARE UPDATE
Reviewed H and P by my colleague and agree with A and P. Patient presenting with R heal wound.  X-ray suspicious for osteo. MRI ordered. Labs/vitals/other reviewed.  Stable.  Continue Abx.

## 2020-06-13 NOTE — HPI
This is a 74 y.o female with diabetes mellitus, hypertension, dyslipidemia, CHF, hypothyroidism, CKD stage 4, multiple chronic pains, obesity, and stroke (1986) who presents to the hospital with a chief complaint of worsening wound to the right heel for the last week. Patient reports her right heel wound has started 3 months ago and it seems got worse since last week with increasing bleeding. She states she believes the wound is infected. She reports she was prescribed an antibiotic (unsure name) 2 months ago by Angela James NP at an urgent care clinic but it didn't help much. She also reports another wound at her anterior right foot which is developed around 1 month ago. Patient reports she has been mostly staying on her bed and she has not been walking for 2 months due to her right heel wound. She reports the wound would bleed and have more pain if she put pressure on her right foot. She denies fever, chill, chest pain, SOB, bilateral calves/leg pain, N/V/D, cough or sick contacts at home. She reports she does not go to wound care clinic. She states the home health nurse visited her 3 times since she started having this heel wound. The last visit was today.     In ED, COVID negative. X-ray right foot can not exclude the possibility of osseous infectious involvement.  Labs with elevated sed rate and CRP, c/s with CKD stage 4 with GFR 25 and elevated BUN&sCr 57&2.2.     Patient is admitted to inpatient with hospital medicine to further evaluation and treatment of right foot wound and EMMA

## 2020-06-13 NOTE — H&P
[facility] Medicine  History & Physical    Patient Name: Tasneem Lynn  MRN: [id number]  Admission Date: 6/12/2020  Attending Physician: Carlos Sommers MD   Primary Care Provider: To Obtain Unable         Patient information was obtained from patient and ER records.     Subjective:     Principal Problem:Diabetic ulcer of right heel associated with diabetes mellitus due to underlying condition, limited to breakdown of skin    Chief Complaint:   Chief Complaint   Patient presents with    Wound Check     EMS reports pt c/o infected diabetic ulcer to the right heel, worsening x 3 months. denies pain        HPI: This is a 74 y.o female with diabetes mellitus, hypertension, dyslipidemia, CHF, hypothyroidism, CKD stage 4, multiple chronic pains, obesity, and stroke (1986) who presents to the hospital with a chief complaint of worsening wound to the right heel for the last week. Patient reports her right heel wound has started 3 months ago and it seems got worse since last week with increasing bleeding. She states she believes the wound is infected. She reports she was prescribed an antibiotic (unsure name) 2 months ago by Angela James NP at an urgent care clinic but it didn't help much. She also reports another wound at her anterior right foot which is developed around 1 month ago. Patient reports she has been mostly staying on her bed and she has not been walking for 2 months due to her right heel wound. She reports the wound would bleed and have more pain if she put pressure on her right foot. She denies fever, chill, chest pain, SOB, bilateral calves/leg pain, N/V/D, cough or sick contacts at home. She reports she does not go to wound care clinic. She states the home health nurse visited her 3 times since she started having this heel wound. The last visit was today.     In ED, COVID negative. X-ray right foot can not exclude the possibility of osseous infectious involvement.  Labs with  elevated sed rate and CRP, c/s with CKD stage 4 with GFR 25 and elevated BUN&sCr 57&2.2.     Patient is admitted to inpatient with hospital medicine to further evaluation and treatment of right foot wound and EMMA    Past Medical History:   Diagnosis Date    Arthritis     Gout    CHF (congestive heart failure)     Coronary artery disease     Diabetes mellitus     HLD (hyperlipidemia)     Hypertension     Obese     Stroke     1986    Thyroid disease        Past Surgical History:   Procedure Laterality Date    right hand surgery      right knee surgery Right     partial plate       Review of patient's allergies indicates:   Allergen Reactions    Corticosteroids (glucocorticoids) Edema       No current facility-administered medications on file prior to encounter.      Current Outpatient Medications on File Prior to Encounter   Medication Sig    carvedilol (COREG) 25 MG tablet Take 25 mg by mouth 2 (two) times daily with meals.    insulin NPH-insulin regular, 70/30, (NOVOLIN 70/30) 100 unit/mL (70-30) injection Inject 40 Units into the skin 2 (two) times daily.    levothyroxine (SYNTHROID) 75 MCG tablet Take 1 tablet (75 mcg total) by mouth before breakfast.    losartan (COZAAR) 50 MG tablet Take 1 tablet (50 mg total) by mouth once daily.    rosuvastatin (CRESTOR) 20 MG tablet Take 1 tablet (20 mg total) by mouth once daily.    acetaminophen (TYLENOL) 325 MG tablet Take 2 tablets (650 mg total) by mouth every 6 (six) hours as needed.    aspirin 81 MG Chew Take 81 mg by mouth once daily.    baclofen (LIORESAL) 5 mg Tab tablet Take 1 tablet (5 mg total) by mouth 3 (three) times daily.    blood pressure test kit-large Kit Use to check blood pressure daily and as needed.    cetirizine (ZYRTEC) 10 MG tablet Take 1 tablet (10 mg total) by mouth every evening.    cloNIDine (CATAPRES) 0.1 MG tablet Take 1 tablet (0.1 mg total) by mouth 3 (three) times daily. Blood pressure    diclofenac (VOLTAREN) 75 MG  EC tablet Take 1 tablet (75 mg total) by mouth 2 (two) times daily.    ergocalciferol (ERGOCALCIFEROL) 50,000 unit Cap Take 1 capsule (50,000 Units total) by mouth every 7 days.    furosemide (LASIX) 20 MG tablet Take 1 tablet (20 mg total) by mouth once daily.    gabapentin (NEURONTIN) 300 MG capsule Take 1 capsule (300 mg total) by mouth 3 (three) times daily.    HYDROcodone-acetaminophen (NORCO) 7.5-325 mg per tablet Take 1 tablet by mouth every 12 (twelve) hours as needed (severe pain).     Family History     Problem Relation (Age of Onset)    Diabetes Maternal Aunt    Heart disease Sister, Maternal Aunt    Hypertension Maternal Aunt        Tobacco Use    Smoking status: Never Smoker    Smokeless tobacco: Never Used   Substance and Sexual Activity    Alcohol use: No    Drug use: No    Sexual activity: Not Currently     Partners: Male     Review of Systems   Constitutional: Positive for activity change. Negative for appetite change, chills, diaphoresis, fatigue and fever.   HENT: Negative for congestion and sore throat.    Eyes: Negative for visual disturbance.   Respiratory: Negative for cough, chest tightness, shortness of breath and wheezing.    Cardiovascular: Negative for chest pain, palpitations and leg swelling.   Gastrointestinal: Negative for abdominal pain, blood in stool, diarrhea, nausea and vomiting.   Genitourinary: Positive for dysuria. Negative for flank pain.   Musculoskeletal: Negative for myalgias, neck pain and neck stiffness.   Skin: Positive for wound (Wound at right heel and anterior ankle).   Neurological: Negative for dizziness, tremors, seizures, syncope and headaches.   Psychiatric/Behavioral: Negative for confusion.     Objective:     Vital Signs (Most Recent):  Temp: 97.8 °F (36.6 °C) (06/12/20 1848)  Pulse: 93 (06/12/20 2132)  Resp: 18 (06/12/20 1848)  BP: (!) 161/70 (06/12/20 2132)  SpO2: 99 % (06/12/20 2132) Vital Signs (24h Range):  Temp:  [97.8 °F (36.6 °C)] 97.8 °F  (36.6 °C)  Pulse:  [92-97] 93  Resp:  [18] 18  SpO2:  [98 %-99 %] 99 %  BP: (135-161)/(63-70) 161/70     Weight: 117.9 kg (260 lb)  Body mass index is 47.55 kg/m².    Physical Exam  Constitutional:       General: She is not in acute distress.     Appearance: She is not diaphoretic.      Comments: obese   HENT:      Head: Normocephalic and atraumatic.   Eyes:      Pupils: Pupils are equal, round, and reactive to light.   Neck:      Musculoskeletal: Normal range of motion and neck supple.      Vascular: No JVD.   Cardiovascular:      Rate and Rhythm: Normal rate and regular rhythm.      Heart sounds: Normal heart sounds.   Pulmonary:      Effort: Pulmonary effort is normal. No respiratory distress.      Breath sounds: Normal breath sounds. No wheezing or rales.   Abdominal:      General: Bowel sounds are normal.      Palpations: Abdomen is soft.      Tenderness: There is no abdominal tenderness.   Musculoskeletal:         General: Tenderness (right foot) present. No edema.   Skin:     General: Skin is warm and dry.      Comments: Right heel with 9cm x 6cm wound red, beefy, granular and bleeding. 4cm x 4cm anterior ankle wound with a 2cm x 3cm laceration with necrotic in the middle, warmth, Minimal drainage (See media)  Thickening and dry skin shade LE   Neurological:      Mental Status: She is alert and oriented to person, place, and time.   Psychiatric:         Mood and Affect: Mood and affect normal.           CRANIAL NERVES     CN III, IV, VI   Pupils are equal, round, and reactive to light.       Significant Labs:   CBC:   Recent Labs   Lab 06/12/20 1917   WBC 10.68   HGB 11.2*   HCT 37.9   *     CMP:   Recent Labs   Lab 06/12/20 1917      K 4.5      CO2 23   *   BUN 57*   CREATININE 2.2*   CALCIUM 9.1   PROT 9.3*   ALBUMIN 2.7*   BILITOT 0.3   ALKPHOS 130   AST 15   ALT 9*   ANIONGAP 10   EGFRNONAA 21*     Cardiac Markers: No results for input(s): CKMB, MYOGLOBIN, BNP, TROPISTAT in the  last 48 hours.  POCT Glucose: No results for input(s): POCTGLUCOSE in the last 48 hours.  Prealbumin: No results for input(s): PREALBUMIN in the last 48 hours.  Troponin:   Recent Labs   Lab 06/12/20 1917   TROPONINI <0.006     Urine Studies: No results for input(s): COLORU, APPEARANCEUA, PHUR, SPECGRAV, PROTEINUA, GLUCUA, KETONESU, BILIRUBINUA, OCCULTUA, NITRITE, UROBILINOGEN, LEUKOCYTESUR, RBCUA, WBCUA, BACTERIA, SQUAMEPITHEL, HYALINECASTS in the last 48 hours.    Invalid input(s): WRIGHTSUR    Significant Imaging: I have reviewed and interpreted all pertinent imaging results/findings within the past 24 hours.    Assessment/Plan:     * Diabetic ulcer of right heel associated with diabetes mellitus due to underlying condition, limited to breakdown of skin  See media picture.  Reports right heel pressure wound not healing x 3 months. Worsen since last week with increasing bleeding. Hx DM.  Wound with red, beefy, granular and bleeding. Xray concerning osseous infection. No leukocytosis.  Afebrile. Elevated sed rate and CRP.  MRI pending    - Vancomycin started in ED - will continue with pharmacy dose  - Add Ceftriaxone  - Cultures pending (BC and Wound culture)  - ID, Wound Care, Podiatry consult          Wound of right leg  See media picture.  Right anterior ankle wound x 1 month.     - On antibiotic  - Culture pending  - See above.        CKD (chronic kidney disease) stage 4, GFR 15-29 ml/min  sCr and BUN elevated 2.2 and 57.  Baseline Cr around 2 with previous value of 1.7 on 6/2018. GFR 25 today.  Patient reports she was referred to Nephrologist long time ago but she hasn't seen anyone yet.     - Renal dose all medication. Avoid nephrotoxin if possible.   - Continue to monitor. Consider consult nephrologist if getting worse. Otherwise will see Nephrologist outpatient.    Type 2 diabetes mellitus, uncontrolled, with renal complications  Lab Results   Component Value Date    HGBA1C 13.7 (H) 05/29/2018     Patient  doesn't know recent A1C.  On insulin (70/30) 40 units BID at home. Patient reports she often has episodes of hypoglycemia of BS 60's.     - A1C pending  - POCT BS Ac/HS  - SSI with meals. Will adjust as needed    Essential hypertension  Uncontrolled. Continuel home meds BB,   - Hold ACEI due to worsening kidney function    Hyperlipidemia  Lab Results   Component Value Date    LDLCALC 97.8 06/13/2020     Continue statin      Hypothyroidism  Lab Results   Component Value Date    TSH 1.480 11/17/2016     TSH pending. Continue home med with levothyroxine    History of CVA (cerebrovascular accident)  Hx stroke 1986 with left side weakness per patient report. Reports not getting out of bed much since 4/2010 due to wound at right heel.  Patient is on ASA and statin. Continue ASA/Statin    - PO/OT evaluation      Morbid obesity with BMI of 50.0-59.9, adult  Body mass index is 46.98 kg/m².  Weight loss outpatient      Hold anticoagulants for VTE prophylactic in anticipation of surgery   VTE Risk Mitigation (From admission, onward)         Ordered     IP VTE HIGH RISK PATIENT  Once      06/13/20 0036     Place sequential compression device  Until discontinued      06/13/20 0036     Place MADISON hose  Until discontinued      06/13/20 0036                   Ulisses Mooney NP  Department of Hospital Medicine   [facility] [place]

## 2020-06-13 NOTE — PLAN OF CARE
Problem: Physical Therapy Goal  Goal: Physical Therapy Goal  Description: Goals to be met by: 20     Patient will increase functional independence with mobility by performin. Supine to sit with Set-up [place]  2. Sit to supine with Set-up [place]  3. Rolling to Left and Right with Modified [place].  4. Bed to chair transfer with Contact Guard Assistance using Slideboard  5. Wheelchair propulsion x30 feet with Supervision using bilateral uppper extremities  6. Sitting at edge of bed x20 minutes with Supervision    2020 1338 by Anton Gomez PT  Outcome: Ongoing, Progressing

## 2020-06-13 NOTE — ASSESSMENT & PLAN NOTE
Lab Results   Component Value Date    TSH 1.480 11/17/2016     TSH pending. Continue home med with levothyroxine

## 2020-06-13 NOTE — PT/OT/SLP EVAL
Physical Therapy Evaluation    Patient Name:  Tasneem Lynn   MRN:  [id number]    Recommendations:     Discharge Recommendations:  nursing facility, skilled   Discharge Equipment Recommendations: bedside commode, wheelchair, hospital bed   Barriers to discharge: Inaccessible home and Decreased caregiver support    Assessment:     Tasneem Lynn is a 74 y.o. female admitted with a medical diagnosis of Diabetic ulcer of right heel associated with diabetes mellitus due to underlying condition, limited to breakdown of skin.  She presents with the following impairments/functional limitations:  weakness, impaired endurance, impaired self care skills, impaired functional mobilty, gait instability, impaired balance, decreased coordination, decreased upper extremity function, decreased lower extremity function, decreased safety awareness, pain, decreased ROM, impaired skin, edema, impaired cardiopulmonary response to activity Due to impairments, pt is unable to perform transfers, perform bed mobility, ambulate and is at risk for falls and further skin breakdown.    Rehab Prognosis: Fair; patient would benefit from acute skilled PT services to address these deficits and reach maximum level of function.    Recent Surgery: * No surgery found *      Plan:     During this hospitalization, patient to be seen 5 x/week to address the identified rehab impairments via gait training, therapeutic activities, therapeutic exercises, neuromuscular re-education, wheelchair management/training and progress toward the following goals:    · Plan of Care Expires:  06/13/20    Subjective     Chief Complaint: pain / drainage in R heel  Patient/Family Comments/goals: Pt would like to regain strength to perform transfers/ bed mobility  Pain/Comfort:  · Pain Rating 1: 8/10  · Location - Side 1: Right  · Location 1: heel  · Pain Addressed 1: Pre-medicate for activity, Distraction, Reposition  · Pain Rating Post-Intervention 1: 8/10    Patients cultural,  spiritual, Congregation conflicts given the current situation: no    Living Environment:  Pt lives with her daughter and grandchildren in [facility]. Pt reports her daughter helps her with bathing and hygeine  Prior to admission, patients level of function was pt has been mostly bed bound at home, pt's daughter would help change her diaper and feed pt. Limited independence with ADLs and pt has not been able to walk/ perform transfers d/t foot pain Equipment used at home: walker, rolling.  DME owned (not currently used): rolling walker.  Upon discharge, patient will have assistance from daughter.    Objective:     Communicated with marilee Wright prior to session.  Patient found right sidelying with bed alarm, telemetry, peripheral IV  upon PT entry to room.    General Precautions: Standard, fall, NPO   Orthopedic Precautions:    Braces:       Exams:  · Cognitive Exam:  Patient is oriented to Person, Place, Time and Situation  · Postural Exam:  Patient presented with the following abnormalities:    · -       Rounded shoulders  · -       Forward head  · -       Kyphosis  · RLE ROM: WFL  · RLE Strength: Deficits: grossly weak: 3+/5  · LLE ROM: WFL  · LLE Strength: Deficits: grossly weak: 3+/5    Functional Mobility:  · Bed Mobility:     · Rolling Left:  moderate assistance  · Rolling Right: moderate assistance  · Scooting: maximal assistance  · Bridging: maximal assistance  · Supine to Sit: maximal assistance  · Sit to Supine: maximal assistance  · Transfers: pt unable to perform sit<>stand transfer at this time 2/2 foot pain  · Balance: fair - in sitting    Therapeutic Activities and Exercises:   Pt requires mod-max Ax2 for bed mobility and supine<>sit transfer. Pt tolerates 5 min sitting at EOB prior to c/o dizziness. Pt educated on importance of continued bed mobility to preserve skin integrity. Pt would benefit from therapy in the SNF setting to develop strength for transfers to prevent her being bed bound and to perform  bed<>commode transfer to reduce caregiver burden    AM-PAC 6 CLICK MOBILITY  Total Score:8     Patient left HOB elevated with all lines intact, call button in reach, bed alarm on and nsg notified.    GOALS:   Multidisciplinary Problems     Physical Therapy Goals        Problem: Physical Therapy Goal    Goal Priority Disciplines Outcome Goal Variances Interventions   Physical Therapy Goal     PT, PT/OT Ongoing, Progressing     Description: Goals to be met by: 20     Patient will increase functional independence with mobility by performin. Supine to sit with Set-up [place]  2. Sit to supine with Set-up [place]  3. Rolling to Left and Right with Modified [place].  4. Bed to chair transfer with Contact Guard Assistance using Slideboard  5. Wheelchair propulsion x30 feet with Supervision using bilateral uppper extremities  6. Sitting at edge of bed x20 minutes with Supervision                       History:     Past Medical History:   Diagnosis Date    Arthritis     Gout    CHF (congestive heart failure)     Coronary artery disease     Diabetes mellitus     HLD (hyperlipidemia)     Hypertension     Obese     Stroke         Thyroid disease        Past Surgical History:   Procedure Laterality Date    right hand surgery      right knee surgery Right     partial plate       Time Tracking:     PT Received On: 20  PT Start Time: 1136     PT Stop Time: 1150  PT Total Time (min): 14 min     Billable Minutes: Evaluation 14    Co-treat w/ OT    Anton Gomez, PT  2020

## 2020-06-13 NOTE — ED NOTES
Pt aware of urine specimen collection, states she does not have to void at this time. Pt instructed to use call bell for use of bedpan.

## 2020-06-13 NOTE — PT/OT/SLP EVAL
Occupational Therapy   Evaluation    Name: Tasneem Lynn  MRN: [id number]  Admitting Diagnosis:  Diabetic ulcer of right heel associated with diabetes mellitus due to underlying condition, limited to breakdown of skin      Recommendations:     Discharge Recommendations: nursing facility, skilled  Discharge Equipment Recommendations:  bedside commode, hospital bed, wheelchair  Barriers to discharge:  (has been non ambulatory since fall and right heel wound in April but would benefit from trial of therapy to see if she can get back to ambulating)    Assessment:     Tasneem Lynn is a 74 y.o. female with a medical diagnosis of Diabetic ulcer of right heel associated with diabetes mellitus due to underlying condition, limited to breakdown of skin.  She presents with decreased ability to perform ADL tasks since fall and development of right heel wound in April. Performance deficits affecting function: weakness, impaired endurance, impaired self care skills, impaired functional mobilty, gait instability, impaired balance, decreased upper extremity function, decreased lower extremity function, pain, impaired skin, impaired cardiopulmonary response to activity.     Rehab Prognosis: Fair; patient would benefit from acute skilled OT services to address these deficits and reach maximum level of function.       Plan:     Patient to be seen 4 x/week to address the above listed problems via self-care/home management, therapeutic activities, therapeutic exercises, wheelchair management/training  · Plan of Care Expires: 06/27/20  · Plan of Care Reviewed with: patient    Subjective     Chief Complaint: Right heel wound.   Patient/Family Comments/goals: To be able to get out of bed.     Occupational Profile:  Living Environment: Patient lives with her daughter and adult grandchildren in a [facility].   Previous level of function: Has been in bed since April when she fell and developed a right heel wound. Her daughter assists her with all ADL tasks  from bed level.   Roles and Routines: Mother. Grandmother.   Equipment Used at Home:  walker, rolling  Assistance upon Discharge: Daughter and grandchildren.     Pain/Comfort:  · Pain Rating 1: 8/10  · Location - Side 1: Right  · Location 1: heel(also complained on neck pain while seated on edge of bed but did not rate with number)    Patients cultural, spiritual, Mormonism conflicts given the current situation: no    Objective:     Communicated with: RN prior to session.  Patient found supine with bed alarm, peripheral IV, telemetry upon OT entry to room.    General Precautions: Standard, fall, NPO(NPO due to MRI)   Orthopedic Precautions:N/A   Braces: N/A     Occupational Performance:    Bed Mobility:    · Patient completed Rolling/Turning to Left with  moderate assistance  · Patient completed Rolling/Turning to Right with moderate assistance  · Patient completed Scooting/Bridging with maximal assistance  · Patient completed Supine to Sit with maximal assistance  · Patient completed Sit to Supine with maximal assistance    Functional Mobility/Transfers:  · Unable to perform due to right foot pain     Activities of Daily Living:  · Upper Body Dressing: moderate assistance to don second gown  · Lower Body Dressing: dependence to don socks    Cognitive/Visual Perceptual:  Cognitive/Psychosocial Skills:     -       Oriented to: Person, Place, Time and Situation   -       Follows Commands/attention:Follows one-step commands  -       Communication: clear/fluent  -       Memory: No Deficits noted  -       Safety awareness/insight to disability: intact   -       Mood/Affect/Coping skills/emotional control: Appropriate to situation    Physical Exam:  Skin integrity: dressing with drainage noted to right heel   Upper Extremity Range of Motion:   R shoulder limited more than L shoulder   -       Right Upper Extremity: WFL except limited at shoulders  -       Left Upper Extremity: WFL except  limited at shoulders  Upper  Extremity Strength:    -       Right Upper Extremity: grossly 3/5  -       Left Upper Extremity: grossly 3/5   Strength:    -       Right Upper Extremity: WFL  -       Left Upper Extremity: WFL    AMPAC 6 Click ADL:  AMPA Total Score: 12    Treatment & Education:  Patient educated OT role, POC, and goals.   Education:    Patient left supine with all lines intact, call button in reach, bed alarm on and RN notified    GOALS:   Multidisciplinary Problems     Occupational Therapy Goals        Problem: Occupational Therapy Goal    Goal Priority Disciplines Outcome Interventions   Occupational Therapy Goal     OT, PT/OT Ongoing, Progressing    Description: Goals to be met by: 6/27/2020    Patient will increase functional independence with ADLs by performing:    Grooming while seated at sink with Set-up Assistance.  Toileting from bedside commode with Minimal Assistance for hygiene and clothing management.   Sitting at edge of bed x20 minutes with Supervision.  Supine to sit with Minimal Assistance.  Stand pivot transfers with Moderate Assistance.  Toilet transfer to bedside commode with Moderate Assistance.                     History:     Past Medical History:   Diagnosis Date    Arthritis     Gout    CHF (congestive heart failure)     Coronary artery disease     Diabetes mellitus     HLD (hyperlipidemia)     Hypertension     Obese     Stroke     1986    Thyroid disease        Past Surgical History:   Procedure Laterality Date    right hand surgery      right knee surgery Right     partial plate       Time Tracking:     OT Date of Treatment: 06/13/20  OT Start Time: 1137  OT Stop Time: 1151  OT Total Time (min): 14 min    Billable Minutes:Evaluation 14    Makayla Thomas OT  6/13/2020

## 2020-06-13 NOTE — PLAN OF CARE
Patient tolerated OT session fairly. She was limited due to nausea and neck pain while seated on side of bed. She would benefit from SNF in order to get back to PLOF.     Problem: Occupational Therapy Goal  Goal: Occupational Therapy Goal  Description: Goals to be met by: 6/27/2020    Patient will increase functional independence with ADLs by performing:    Grooming while seated at sink with Set-up Assistance.  Toileting from bedside commode with Minimal Assistance for hygiene and clothing management.   Sitting at edge of bed x20 minutes with Supervision.  Supine to sit with Minimal Assistance.  Stand pivot transfers with Moderate Assistance.  Toilet transfer to bedside commode with Moderate Assistance.    Outcome: Ongoing, Progressing

## 2020-06-13 NOTE — ASSESSMENT & PLAN NOTE
Hx stroke 1986 with left side weakness per patient report. Reports not getting out of bed much since 4/2010 due to wound at right heel.  Patient is on ASA and statin. Continue ASA/Statin    - PO/OT evaluation

## 2020-06-13 NOTE — PROGRESS NOTES
Pharmacokinetic Initial Assessment: IV Vancomycin    Assessment/Plan:    Initiate intravenous vancomycin with loading dose of 2250 mg once with subsequent doses when random concentrations are less than 20 mcg/mL  Desired empiric serum trough concentration is 10 to 20 mcg/mL  Draw vancomycin random level on 6/14/2020 at 0400.  Pharmacy will continue to follow and monitor vancomycin.      Please contact pharmacy at extension [phone] with any questions regarding this assessment.     Thank you for the consult,   Keny Matute       Patient brief summary:  Tasneem Lynn is a 74 y.o. female initiated on antimicrobial therapy with IV Vancomycin for treatment of suspected skin & soft tissue infection    Drug Allergies:   Review of patient's allergies indicates:   Allergen Reactions    Corticosteroids (glucocorticoids) Edema       Actual Body Weight:   116.5 kg    Renal Function:   Estimated Creatinine Clearance: 27.2 mL/min (A) (based on SCr of 2.2 mg/dL (H)).,     Dialysis Method (if applicable):  N/A    CBC (last 72 hours):  Recent Labs   Lab Result Units 06/12/20  1917   WBC K/uL 10.68   Hemoglobin g/dL 11.2*   Hematocrit % 37.9   Platelets K/uL 439*   Gran% % 39.8   Lymph% % 48.0   Mono% % 7.7   Eosinophil% % 3.6   Basophil% % 0.6   Differential Method  Automated       Metabolic Panel (last 72 hours):  Recent Labs   Lab Result Units 06/12/20  1917   Sodium mmol/L 138   Potassium mmol/L 4.5   Chloride mmol/L 105   CO2 mmol/L 23   Glucose mg/dL 145*   BUN, Bld mg/dL 57*   Creatinine mg/dL 2.2*   Albumin g/dL 2.7*   Total Bilirubin mg/dL 0.3   Alkaline Phosphatase U/L 130   AST U/L 15   ALT U/L 9*       Drug levels (last 3 results):  No results for input(s): VANCOMYCINRA, VANCOMYCINPE, VANCOMYCINTR in the last 72 hours.    Microbiologic Results:  Microbiology Results (last 7 days)       Procedure Component Value Units Date/Time    Blood Culture #2 **CANNOT BE ORDERED STAT** [[id number]] Collected: 06/12/20 4762    Order  Status: Sent Specimen: Blood from Peripheral, Hand, Right Updated: 06/12/20 2352    Blood Culture #1 **CANNOT BE ORDERED STAT** [[id number]] Collected: 06/12/20 2325    Order Status: Sent Specimen: Blood from Peripheral, Antecubital, Right Updated: 06/12/20 2351    Blood culture [[id number]]     Order Status: Canceled Specimen: Blood     Aerobic culture [[id number]]     Order Status: No result Specimen: Wound from Foot, Right

## 2020-06-13 NOTE — ASSESSMENT & PLAN NOTE
Lab Results   Component Value Date    HGBA1C 13.7 (H) 05/29/2018     Patient doesn't know recent A1C.  On insulin (70/30) 40 units BID at home. Patient reports she often has episodes of hypoglycemia of BS 60's.     - A1C pending  - POCT BS Ac/HS  - SSI with meals. Will adjust as needed

## 2020-06-13 NOTE — SUBJECTIVE & OBJECTIVE
Past Medical History:   Diagnosis Date    Arthritis     Gout    CHF (congestive heart failure)     Coronary artery disease     Diabetes mellitus     HLD (hyperlipidemia)     Hypertension     Obese     Stroke     1986    Thyroid disease        Past Surgical History:   Procedure Laterality Date    right hand surgery      right knee surgery Right     partial plate       Review of patient's allergies indicates:   Allergen Reactions    Corticosteroids (glucocorticoids) Edema       No current facility-administered medications on file prior to encounter.      Current Outpatient Medications on File Prior to Encounter   Medication Sig    carvedilol (COREG) 25 MG tablet Take 25 mg by mouth 2 (two) times daily with meals.    insulin NPH-insulin regular, 70/30, (NOVOLIN 70/30) 100 unit/mL (70-30) injection Inject 40 Units into the skin 2 (two) times daily.    levothyroxine (SYNTHROID) 75 MCG tablet Take 1 tablet (75 mcg total) by mouth before breakfast.    losartan (COZAAR) 50 MG tablet Take 1 tablet (50 mg total) by mouth once daily.    rosuvastatin (CRESTOR) 20 MG tablet Take 1 tablet (20 mg total) by mouth once daily.    acetaminophen (TYLENOL) 325 MG tablet Take 2 tablets (650 mg total) by mouth every 6 (six) hours as needed.    aspirin 81 MG Chew Take 81 mg by mouth once daily.    baclofen (LIORESAL) 5 mg Tab tablet Take 1 tablet (5 mg total) by mouth 3 (three) times daily.    blood pressure test kit-large Kit Use to check blood pressure daily and as needed.    cetirizine (ZYRTEC) 10 MG tablet Take 1 tablet (10 mg total) by mouth every evening.    cloNIDine (CATAPRES) 0.1 MG tablet Take 1 tablet (0.1 mg total) by mouth 3 (three) times daily. Blood pressure    diclofenac (VOLTAREN) 75 MG EC tablet Take 1 tablet (75 mg total) by mouth 2 (two) times daily.    ergocalciferol (ERGOCALCIFEROL) 50,000 unit Cap Take 1 capsule (50,000 Units total) by mouth every 7 days.    furosemide (LASIX) 20 MG tablet  Take 1 tablet (20 mg total) by mouth once daily.    gabapentin (NEURONTIN) 300 MG capsule Take 1 capsule (300 mg total) by mouth 3 (three) times daily.    HYDROcodone-acetaminophen (NORCO) 7.5-325 mg per tablet Take 1 tablet by mouth every 12 (twelve) hours as needed (severe pain).     Family History     Problem Relation (Age of Onset)    Diabetes Maternal Aunt    Heart disease Sister, Maternal Aunt    Hypertension Maternal Aunt        Tobacco Use    Smoking status: Never Smoker    Smokeless tobacco: Never Used   Substance and Sexual Activity    Alcohol use: No    Drug use: No    Sexual activity: Not Currently     Partners: Male     Review of Systems   Constitutional: Positive for activity change. Negative for appetite change, chills, diaphoresis, fatigue and fever.   HENT: Negative for congestion and sore throat.    Eyes: Negative for visual disturbance.   Respiratory: Negative for cough, chest tightness, shortness of breath and wheezing.    Cardiovascular: Negative for chest pain, palpitations and leg swelling.   Gastrointestinal: Negative for abdominal pain, blood in stool, diarrhea, nausea and vomiting.   Genitourinary: Positive for dysuria. Negative for flank pain.   Musculoskeletal: Negative for myalgias, neck pain and neck stiffness.   Skin: Positive for wound (Wound at right heel and anterior ankle).   Neurological: Negative for dizziness, tremors, seizures, syncope and headaches.   Psychiatric/Behavioral: Negative for confusion.     Objective:     Vital Signs (Most Recent):  Temp: 97.8 °F (36.6 °C) (06/12/20 1848)  Pulse: 93 (06/12/20 2132)  Resp: 18 (06/12/20 1848)  BP: (!) 161/70 (06/12/20 2132)  SpO2: 99 % (06/12/20 2132) Vital Signs (24h Range):  Temp:  [97.8 °F (36.6 °C)] 97.8 °F (36.6 °C)  Pulse:  [92-97] 93  Resp:  [18] 18  SpO2:  [98 %-99 %] 99 %  BP: (135-161)/(63-70) 161/70     Weight: 117.9 kg (260 lb)  Body mass index is 47.55 kg/m².    Physical Exam  Constitutional:       General: She is  not in acute distress.     Appearance: She is not diaphoretic.      Comments: obese   HENT:      Head: Normocephalic and atraumatic.   Eyes:      Pupils: Pupils are equal, round, and reactive to light.   Neck:      Musculoskeletal: Normal range of motion and neck supple.      Vascular: No JVD.   Cardiovascular:      Rate and Rhythm: Normal rate and regular rhythm.      Heart sounds: Normal heart sounds.   Pulmonary:      Effort: Pulmonary effort is normal. No respiratory distress.      Breath sounds: Normal breath sounds. No wheezing or rales.   Abdominal:      General: Bowel sounds are normal.      Palpations: Abdomen is soft.      Tenderness: There is no abdominal tenderness.   Musculoskeletal:         General: Tenderness (right foot) present. No edema.   Skin:     General: Skin is warm and dry.      Comments: Right heel with 9cm x 6cm wound red, beefy, granular and bleeding. 4cm x 4cm anterior ankle wound with a 2cm x 3cm laceration with necrotic in the middle, warmth, Minimal drainage (See media)  Thickening and dry skin shade LE   Neurological:      Mental Status: She is alert and oriented to person, place, and time.   Psychiatric:         Mood and Affect: Mood and affect normal.           CRANIAL NERVES     CN III, IV, VI   Pupils are equal, round, and reactive to light.       Significant Labs:   CBC:   Recent Labs   Lab 06/12/20 1917   WBC 10.68   HGB 11.2*   HCT 37.9   *     CMP:   Recent Labs   Lab 06/12/20 1917      K 4.5      CO2 23   *   BUN 57*   CREATININE 2.2*   CALCIUM 9.1   PROT 9.3*   ALBUMIN 2.7*   BILITOT 0.3   ALKPHOS 130   AST 15   ALT 9*   ANIONGAP 10   EGFRNONAA 21*     Cardiac Markers: No results for input(s): CKMB, MYOGLOBIN, BNP, TROPISTAT in the last 48 hours.  POCT Glucose: No results for input(s): POCTGLUCOSE in the last 48 hours.  Prealbumin: No results for input(s): PREALBUMIN in the last 48 hours.  Troponin:   Recent Labs   Lab 06/12/20 1917   TROPONINI  <0.006     Urine Studies: No results for input(s): COLORU, APPEARANCEUA, PHUR, SPECGRAV, PROTEINUA, GLUCUA, KETONESU, BILIRUBINUA, OCCULTUA, NITRITE, UROBILINOGEN, LEUKOCYTESUR, RBCUA, WBCUA, BACTERIA, SQUAMEPITHEL, HYALINECASTS in the last 48 hours.    Invalid input(s): ELIS    Significant Imaging: I have reviewed and interpreted all pertinent imaging results/findings within the past 24 hours.

## 2020-06-13 NOTE — PLAN OF CARE
Problem: Physical Therapy Goal  Goal: Physical Therapy Goal  Description: Goals to be met by: 20     Patient will increase functional independence with mobility by performin. Supine to sit with Set-up [place]  2. Sit to supine with Set-up [place]  3. Rolling to Left and Right with Modified [place].  4. Bed to chair transfer with Contact Guard Assistance using Slideboard  5. Wheelchair propulsion x30 feet with Supervision using bilateral uppper extremities  6. Sitting at edge of bed x20 minutes with Supervision    2020 1338 by Anton Gomez, PT  Outcome: Ongoing, Progressing      Pt tolerated evaluation well. Pt limited in ability to roll and perform supine to sit transfer with Mod-max Ax1. Pt tolerated 5 min of sitting at EOB prior to dizziness. Pt educated on importance of continued bed mobility for skin integrity

## 2020-06-13 NOTE — NURSING
Patient arrived to floor via stretcher. AA&O x4. No s/s of pain/distress/sob. Able to make all needs known. Oriented to floor, call light, white board, and bed functions. No acute distress noted at this time. Will continue to monitor.

## 2020-06-13 NOTE — CONSULTS
[facility]  Podiatry  Consult Note    Patient Name: Tasneem Lynn  MRN: [id number]  Admission Date: 6/12/2020  Hospital Length of Stay: 1 days  Attending Physician: Carlos Sommers MD  Primary Care Provider: To Obtain Unable     Inpatient consult to Podiatry  Consult performed by: Bebeto Pruett MD  Consult ordered by: Ulisses Mooney NP        Subjective:     History of Present Illness:  Tasneem Lynn is a 74 y.o. female who  has a past medical history of Arthritis, CHF (congestive heart failure), Coronary artery disease, Diabetes mellitus, HLD (hyperlipidemia), Hypertension, Obese, Stroke, and Thyroid disease.    Patient presents for worsening R heel ulceration. Patient reports several month history of wound that appear after she had several falls. She has stopped walking since and only has worsening of the wound since. Denies any pain, F/N/V/C. She also has new onset anterior ankle wound. Denies smoking.    Reports seeing a provider for the wounds and he has advised her daughter to apply honey to the wounds daily. Patient is unclear of the providers name or home health.    Scheduled Meds:   aspirin  81 mg Oral Daily    carvediloL  25 mg Oral BID WM    cefTRIAXone (ROCEPHIN) IVPB  1 g Intravenous Q24H    famotidine  20 mg Oral Daily    furosemide  20 mg Oral Daily    gabapentin  300 mg Oral TID    hydrALAZINE  10 mg Intravenous Once    levothyroxine  75 mcg Oral Before breakfast    losartan  50 mg Oral Daily    rosuvastatin  20 mg Oral Daily    senna-docusate 8.6-50 mg  1 tablet Oral BID     Continuous Infusions:  PRN Meds:acetaminophen, acetaminophen, dextrose 50%, glucagon (human recombinant), insulin aspart U-100, ondansetron, oxyCODONE, sodium chloride 0.9%, Pharmacy to dose Vancomycin consult **AND** vancomycin - pharmacy to dose    Review of patient's allergies indicates:   Allergen Reactions    Corticosteroids (glucocorticoids) Edema        Past Medical History:   Diagnosis  Date    Arthritis     Gout    CHF (congestive heart failure)     Coronary artery disease     Diabetes mellitus     HLD (hyperlipidemia)     Hypertension     Obese     Stroke     1986    Thyroid disease      Past Surgical History:   Procedure Laterality Date    right hand surgery      right knee surgery Right     partial plate       Family History     Problem Relation (Age of Onset)    Diabetes Maternal Aunt    Heart disease Sister, Maternal Aunt    Hypertension Maternal Aunt        Tobacco Use    Smoking status: Never Smoker    Smokeless tobacco: Never Used   Substance and Sexual Activity    Alcohol use: No    Drug use: No    Sexual activity: Not Currently     Partners: Male     Review of Systems   Constitutional: Negative for chills and fever.   Cardiovascular: Negative for leg swelling.   Gastrointestinal: Negative for nausea and vomiting.   Musculoskeletal: Positive for gait problem.   Skin: Positive for color change and wound.     Objective:     Vital Signs (Most Recent):  Temp: 98 °F (36.7 °C) (06/13/20 1139)  Pulse: 72 (06/13/20 1139)  Resp: 18 (06/13/20 1139)  BP: (!) 142/85 (06/13/20 1139)  SpO2: 96 % (06/13/20 1139) Vital Signs (24h Range):  Temp:  [97.6 °F (36.4 °C)-98.8 °F (37.1 °C)] 98 °F (36.7 °C)  Pulse:  [72-97] 72  Resp:  [18-20] 18  SpO2:  [96 %-99 %] 96 %  BP: (135-194)/(63-88) 142/85     Weight: 116.5 kg (256 lb 13.4 oz)  Body mass index is 46.98 kg/m².    Foot Exam    Right Foot/Ankle     Inspection and Palpation  Ecchymosis: none  Tenderness: (Some tenderness to the heel)  Swelling: (Mild edema)  Skin Exam: ulcer;     Neurovascular  Dorsalis pedis: absent  Posterior tibial: 1+  Saphenous nerve sensation: diminished  Tibial nerve sensation: diminished  Superficial peroneal nerve sensation: diminished  Deep peroneal nerve sensation: diminished  Sural nerve sensation: diminished      Left Foot/Ankle      Inspection and Palpation  Ecchymosis: none  Tenderness: none   Swelling: none      Neurovascular  Dorsalis pedis: absent  Posterior tibial: 1+  Saphenous nerve sensation: diminished  Tibial nerve sensation: diminished  Superficial peroneal nerve sensation: diminished  Deep peroneal nerve sensation: diminished  Sural nerve sensation: diminished            Laboratory:  CBC:   Recent Labs   Lab 06/13/20 0513   WBC 10.44   RBC 3.83*   HGB 10.9*   HCT 37.3   *   MCV 97   MCH 28.5   MCHC 29.2*     CMP:   Recent Labs   Lab 06/13/20 0513   *   CALCIUM 9.2   ALBUMIN 2.6*   PROT 9.1*      K 4.7   CO2 26      BUN 56*   CREATININE 2.1*   ALKPHOS 121   ALT 8*   AST 13   BILITOT 0.3     CRP:   Recent Labs   Lab 06/12/20 1917   CRP 17.7*     ESR:   Recent Labs   Lab 06/12/20 1917   SEDRATE 108*       Diagnostic Results:  I have reviewed all pertinent imaging results/findings within the past 24 hours.  Imaging Results          X-Ray Foot Complete Right (Final result)  Result time 06/12/20 20:16:36   Procedure changed from X-Ray Foot Complete Left     Final result by Jacky Torres MD (06/12/20 20:16:36)                 Impression:      Findings consistent with soft tissue wound at the posterior aspect of the heel overlying the posterior calcaneus, there is subtle irregularity of the posterior and posterosuperior margin of the calcaneus underlying the aforementioned soft tissue abnormality, and the possibility of osseous infectious involvement would be difficult to exclude given this appearance.  Clinical and historical correlation otherwise needed.      Electronically signed by: Jacky Torres  Date:    06/12/2020  Time:    20:16             Narrative:    EXAMINATION:  XR FOOT COMPLETE 3 VIEW RIGHT    CLINICAL HISTORY:  diabetic foot ulcer;. Type 2 diabetes mellitus with foot ulcer    TECHNIQUE:  AP, lateral, and oblique views of the right foot were performed.    COMPARISON:  None    FINDINGS:  Radiographic examination of the right foot was performed.  3 radiographs are  submitted.  There is appearance of may relate to soft tissue injury of the soft tissues posterior to the calcaneus at the level of the heel, and there is general appearance suggesting soft tissue swelling about the foot.  On the lateral view there is some lucency associated with the posterior aspect of the calcaneus, the possibility of demineralization is considered although this lucency could relate to diminished soft tissue attenuation secondary to soft tissue wound or injury.  There is slight irregularity along the posterior and posterosuperior margin of the posterior calcaneus, given the history of diabetic foot ulcer, the possibility of osseous involvement along the posterior and posterosuperior edge of the calcaneus would be difficult to exclude, there are some curvilinear calcifications within the soft tissues adjacent to this, that may relate to osseous involvement as well.  The remainder of the visualized osseous structures demonstrate chronic change, there is no additional evidence for osseous destructive process, acute fracture or dislocation.  Vascular calcifications are noted.                                Clinical Findings:    RLE  6/12/2020 - Pictures taken prior to debridement of the anterior ankle and bone biopsy of the calcaneus.    There is a large posterior heel wound with some small portions of fibrosis. The wound is otherwise granular but is deep. No bone exposure, malodor or purulent drainage. There is some deep probing but appears to have soft tissue coverage of the bone.    Measures 8x6.4x0.5cm    The anterior ankle is granular with a loose, boggy escar.    Measures 5x4x0.2cm    LLE has no open lesions.                 Assessment/Plan:     * Diabetic ulcer of right heel associated with diabetes mellitus due to underlying condition, limited to breakdown of skin  MRI was reviewed with osteomyelitis to the majority of the right calcaneus. Options were presented to the patient including  amputation and debridement of the infected bone or bone biopsy with long term IV antibiotics. Patient is opting for antibiotics at this time.    Patient was consented and a bone biopsy was obtained today. Await cultures and pathology.  - ID consulted  - Vascular studies ordered. Patient may need Vasc Sx consult pending results.  - Continue local wound care as ordered.   - Z-flex boots given to patient. Z-flex boots at all times while in bed.  - She will need to establish follow up at  wound care upon discharge.       Diagnosis: Diabetic foot ulcers with osteomyelitis    Procedure: Excisional Debridement and bone biopsy    Supervising Provider: Dr. Elinor Coffey DPM  Performing Provider: Bebeto Pruett DPM PGY2    06/13/2020    With patient's verbal consent, a scissor and forcep was used to remove the loose eschar from the anterior right leg wound. There is still some residual fibrotic tissue. The wound was debrided to the level of subcutaneous tissue. eschar was removed to evaluate for underlying deep infection. There appears to be none. Attention to the posterior heel wound. 5 mL of 1% lidocaine was injected into the site of the bone biopsy. The area was prepped with betadine and a jam shidi needle was used to remove a portion for cultures and pathology. Patient tolerated the procedure well with no complications. The wounds were flushed with vashe before dressing with aquacel AG and DSD.    Right anterior foot wound.  Pre-op measurements: 5x4x0.2cm  Post-op measurements 5x4x0.2cm  70% debrided.    Thank you for your consult. I will follow-up with patient. Please contact us if you have any additional questions.    Bebeto Pruett MD  Podiatry  [facility]

## 2020-06-13 NOTE — ASSESSMENT & PLAN NOTE
See media picture.  Reports right heel pressure wound not healing x 3 months. Worsen since last week with increasing bleeding. Hx DM.  Wound with red, beefy, granular and bleeding. Xray concerning osseous infection. No leukocytosis.  Afebrile. Elevated sed rate and CRP.  MRI pending    - Vancomycin started in ED - will continue with pharmacy dose  - Add Ceftriaxone  - Cultures pending (BC and Wound culture)  - ID, Wound Care, Podiatry consult

## 2020-06-13 NOTE — ED PROVIDER NOTES
Encounter Date: 6/12/2020    SCRIBE #1 NOTE: IKristian, am scribing for, and in the presence of, Chun Mak MD.       History     Chief Complaint   Patient presents with    Wound Check     EMS reports pt c/o infected diabetic ulcer to the right heel, worsening x 3 months. denies pain     Tasneem Lynn is a 74 year old female who presents to the Emergency Department via EMS for bleeding of a pressure wound of her right heel worsening for the last week. The patient states that she believes the wound is infected. She states that she has not been walking, the last time she reports walking was 2 months ago. She denies fever, chills, diarrhea, or constipation. She denies any other complaints. The patient also reports that she does not go to wound care.     The history is provided by the patient. No  was used.     Review of patient's allergies indicates:   Allergen Reactions    Corticosteroids (glucocorticoids) Edema     Past Medical History:   Diagnosis Date    Arthritis     Gout    CHF (congestive heart failure)     Coronary artery disease     Diabetes mellitus     HLD (hyperlipidemia)     Hypertension     Obese     Stroke     1986    Thyroid disease      Past Surgical History:   Procedure Laterality Date    right hand surgery      right knee surgery Right     partial plate     Family History   Problem Relation Age of Onset    Heart disease Sister     Diabetes Maternal Aunt     Heart disease Maternal Aunt     Hypertension Maternal Aunt      Social History     Tobacco Use    Smoking status: Never Smoker    Smokeless tobacco: Never Used   Substance Use Topics    Alcohol use: No    Drug use: No     Review of Systems   Constitutional: Negative for chills and fever.   HENT: Negative for sore throat.    Eyes: Negative for visual disturbance.   Respiratory: Negative for shortness of breath.    Cardiovascular: Negative for chest pain.   Gastrointestinal: Negative for  constipation and diarrhea.   Genitourinary: Negative for dysuria.   Musculoskeletal: Negative for back pain.   Skin: Positive for wound.   Neurological: Negative for headaches.       Physical Exam     Initial Vitals [06/12/20 1848]   BP Pulse Resp Temp SpO2   (!) 142/68 92 18 97.8 °F (36.6 °C) 98 %      MAP       --         Physical Exam    Nursing note and vitals reviewed.  Constitutional: She appears well-developed and well-nourished. She is not diaphoretic. No distress.   HENT:   Head: Normocephalic and atraumatic.   Nose: Nose normal.   Eyes: Conjunctivae and EOM are normal. Pupils are equal, round, and reactive to light. No scleral icterus.   Neck: Normal range of motion. Neck supple.   Cardiovascular: Normal rate, regular rhythm, normal heart sounds and intact distal pulses. Exam reveals no gallop and no friction rub.    No murmur heard.  Pulmonary/Chest: Breath sounds normal. No stridor. No respiratory distress. She has no wheezes. She has no rhonchi. She has no rales.   Abdominal: Soft. Normal appearance and bowel sounds are normal. She exhibits no distension. There is no tenderness. There is no rebound and no guarding.   Musculoskeletal: Normal range of motion. She exhibits no edema or tenderness.   Neurological: She is alert and oriented to person, place, and time. She has normal strength. No cranial nerve deficit.   Skin: Skin is warm and dry. No rash noted.   9cm x 6cm wound of the heel. Wound is red, beefy, and granular. 4cm x 4cm anterior leg wound with a 2cm x 3cm laceration. No elsa-wound edema, warmth, or cellulitis.  Minimal drainage   Psychiatric: She has a normal mood and affect. Her behavior is normal.         ED Course   Procedures  Labs Reviewed   CBC W/ AUTO DIFFERENTIAL - Abnormal; Notable for the following components:       Result Value    RBC 3.88 (*)     Hemoglobin 11.2 (*)     Mean Corpuscular Hemoglobin Conc 29.6 (*)     Platelets 439 (*)     MPV 8.4 (*)     Lymph # 5.1 (*)     All  other components within normal limits   COMPREHENSIVE METABOLIC PANEL - Abnormal; Notable for the following components:    Glucose 145 (*)     BUN, Bld 57 (*)     Creatinine 2.2 (*)     Total Protein 9.3 (*)     Albumin 2.7 (*)     ALT 9 (*)     eGFR if  25 (*)     eGFR if non  21 (*)     All other components within normal limits   SEDIMENTATION RATE - Abnormal; Notable for the following components:    Sed Rate 108 (*)     All other components within normal limits   C-REACTIVE PROTEIN - Abnormal; Notable for the following components:    CRP 17.7 (*)     All other components within normal limits   CULTURE, AEROBIC  (SPECIFY SOURCE)   CULTURE, BLOOD   CULTURE, BLOOD   CK   TROPONIN I   SARS-COV-2 RNA AMPLIFICATION, QUAL   URINALYSIS, REFLEX TO URINE CULTURE          Imaging Results          X-Ray Foot Complete Right (Final result)  Result time 06/12/20 20:16:36   Procedure changed from X-Ray Foot Complete Left     Final result by Jacky Torres MD (06/12/20 20:16:36)                 Impression:      Findings consistent with soft tissue wound at the posterior aspect of the heel overlying the posterior calcaneus, there is subtle irregularity of the posterior and posterosuperior margin of the calcaneus underlying the aforementioned soft tissue abnormality, and the possibility of osseous infectious involvement would be difficult to exclude given this appearance.  Clinical and historical correlation otherwise needed.      Electronically signed by: Jacky Torres  Date:    06/12/2020  Time:    20:16             Narrative:    EXAMINATION:  XR FOOT COMPLETE 3 VIEW RIGHT    CLINICAL HISTORY:  diabetic foot ulcer;. Type 2 diabetes mellitus with foot ulcer    TECHNIQUE:  AP, lateral, and oblique views of the right foot were performed.    COMPARISON:  None    FINDINGS:  Radiographic examination of the right foot was performed.  3 radiographs are submitted.  There is appearance of may relate to  soft tissue injury of the soft tissues posterior to the calcaneus at the level of the heel, and there is general appearance suggesting soft tissue swelling about the foot.  On the lateral view there is some lucency associated with the posterior aspect of the calcaneus, the possibility of demineralization is considered although this lucency could relate to diminished soft tissue attenuation secondary to soft tissue wound or injury.  There is slight irregularity along the posterior and posterosuperior margin of the posterior calcaneus, given the history of diabetic foot ulcer, the possibility of osseous involvement along the posterior and posterosuperior edge of the calcaneus would be difficult to exclude, there are some curvilinear calcifications within the soft tissues adjacent to this, that may relate to osseous involvement as well.  The remainder of the visualized osseous structures demonstrate chronic change, there is no additional evidence for osseous destructive process, acute fracture or dislocation.  Vascular calcifications are noted.                                 Medical Decision Making:   Initial Assessment:   74-year-old female presenting with diabetic foot wound of the anterior and posterior heel.  Patient is morbidly obese.  She is otherwise well-appearing and in no acute distress.  She denies any systemic symptoms of infection.  The wound is beefy, granular, without significant evidence of periwound cellulitis or infection.  It does not probe to bone.  It is somewhat macerated and appears to be mostly due to pressure as the patient is lying in bed.  I discussed with both the patient and her daughter, Bola [phone] that the wound needs to be elevated.  Her daughter takes care of 2 elderly relatives, both of which seem to be with significant medical problems.  Her daughter has been performing wound care and has had a difficult time getting a wound care specialist or home health visits set up.  The  patient has minimal changes on x-ray which may be normal though osteomyelitis not ruled out.  There is no sign of acute infection.  It does not probe to bone however the Sed rate is significantly elevated making me concerned for acute osteomyelitis.  Patient will be started on vancomycin.  She will be admitted, MRI of the bone obtained, and ID consult placed.            Scribe Attestation:   Scribe #1: I performed the above scribed service and the documentation accurately describes the services I performed. I attest to the accuracy of the note.                          Clinical Impression:       ICD-10-CM ICD-9-CM   1. Diabetic ulcer of right heel associated with diabetes mellitus due to underlying condition, limited to breakdown of skin E08.621 249.80    L97.411 707.14   2. Diabetic foot ulcer E11.621 250.80    L97.509 707.15   3. History of CVA (cerebrovascular accident) Z86.73 V12.54   4. Other acute osteomyelitis of right foot M86.171 730.07             ED Disposition Condition    Admit            I, Chun Mak, personally performed the services described in this documentation. All medical record entries made by the scribe were at my direction and in my presence. I have reviewed the chart and agree that the record reflects my personal performance and is accurate and complete.       Chun Mak MD  06/12/20 2211

## 2020-06-13 NOTE — SUBJECTIVE & OBJECTIVE
Scheduled Meds:   aspirin  81 mg Oral Daily    carvediloL  25 mg Oral BID WM    cefTRIAXone (ROCEPHIN) IVPB  1 g Intravenous Q24H    famotidine  20 mg Oral Daily    furosemide  20 mg Oral Daily    gabapentin  300 mg Oral TID    hydrALAZINE  10 mg Intravenous Once    levothyroxine  75 mcg Oral Before breakfast    losartan  50 mg Oral Daily    rosuvastatin  20 mg Oral Daily    senna-docusate 8.6-50 mg  1 tablet Oral BID     Continuous Infusions:  PRN Meds:acetaminophen, acetaminophen, dextrose 50%, glucagon (human recombinant), insulin aspart U-100, ondansetron, oxyCODONE, sodium chloride 0.9%, Pharmacy to dose Vancomycin consult **AND** vancomycin - pharmacy to dose    Review of patient's allergies indicates:   Allergen Reactions    Corticosteroids (glucocorticoids) Edema        Past Medical History:   Diagnosis Date    Arthritis     Gout    CHF (congestive heart failure)     Coronary artery disease     Diabetes mellitus     HLD (hyperlipidemia)     Hypertension     Obese     Stroke     1986    Thyroid disease      Past Surgical History:   Procedure Laterality Date    right hand surgery      right knee surgery Right     partial plate       Family History     Problem Relation (Age of Onset)    Diabetes Maternal Aunt    Heart disease Sister, Maternal Aunt    Hypertension Maternal Aunt        Tobacco Use    Smoking status: Never Smoker    Smokeless tobacco: Never Used   Substance and Sexual Activity    Alcohol use: No    Drug use: No    Sexual activity: Not Currently     Partners: Male     Review of Systems   Constitutional: Negative for chills and fever.   Cardiovascular: Negative for leg swelling.   Gastrointestinal: Negative for nausea and vomiting.   Musculoskeletal: Positive for gait problem.   Skin: Positive for color change and wound.     Objective:     Vital Signs (Most Recent):  Temp: 98 °F (36.7 °C) (06/13/20 1139)  Pulse: 72 (06/13/20 1139)  Resp: 18 (06/13/20 1139)  BP: (!) 142/85  (06/13/20 1139)  SpO2: 96 % (06/13/20 1139) Vital Signs (24h Range):  Temp:  [97.6 °F (36.4 °C)-98.8 °F (37.1 °C)] 98 °F (36.7 °C)  Pulse:  [72-97] 72  Resp:  [18-20] 18  SpO2:  [96 %-99 %] 96 %  BP: (135-194)/(63-88) 142/85     Weight: 116.5 kg (256 lb 13.4 oz)  Body mass index is 46.98 kg/m².    Foot Exam    Right Foot/Ankle     Inspection and Palpation  Ecchymosis: none  Tenderness: (Some tenderness to the heel)  Swelling: (Mild edema)  Skin Exam: ulcer;     Neurovascular  Dorsalis pedis: absent  Posterior tibial: 1+  Saphenous nerve sensation: diminished  Tibial nerve sensation: diminished  Superficial peroneal nerve sensation: diminished  Deep peroneal nerve sensation: diminished  Sural nerve sensation: diminished      Left Foot/Ankle      Inspection and Palpation  Ecchymosis: none  Tenderness: none   Swelling: none     Neurovascular  Dorsalis pedis: absent  Posterior tibial: 1+  Saphenous nerve sensation: diminished  Tibial nerve sensation: diminished  Superficial peroneal nerve sensation: diminished  Deep peroneal nerve sensation: diminished  Sural nerve sensation: diminished            Laboratory:  CBC:   Recent Labs   Lab 06/13/20 0513   WBC 10.44   RBC 3.83*   HGB 10.9*   HCT 37.3   *   MCV 97   MCH 28.5   MCHC 29.2*     CMP:   Recent Labs   Lab 06/13/20 0513   *   CALCIUM 9.2   ALBUMIN 2.6*   PROT 9.1*      K 4.7   CO2 26      BUN 56*   CREATININE 2.1*   ALKPHOS 121   ALT 8*   AST 13   BILITOT 0.3     CRP:   Recent Labs   Lab 06/12/20 1917   CRP 17.7*     ESR:   Recent Labs   Lab 06/12/20 1917   SEDRATE 108*       Diagnostic Results:  I have reviewed all pertinent imaging results/findings within the past 24 hours.  Imaging Results          X-Ray Foot Complete Right (Final result)  Result time 06/12/20 20:16:36   Procedure changed from X-Ray Foot Complete Left     Final result by Jacky Torres MD (06/12/20 20:16:36)                 Impression:      Findings consistent  with soft tissue wound at the posterior aspect of the heel overlying the posterior calcaneus, there is subtle irregularity of the posterior and posterosuperior margin of the calcaneus underlying the aforementioned soft tissue abnormality, and the possibility of osseous infectious involvement would be difficult to exclude given this appearance.  Clinical and historical correlation otherwise needed.      Electronically signed by: Jacky Torres  Date:    06/12/2020  Time:    20:16             Narrative:    EXAMINATION:  XR FOOT COMPLETE 3 VIEW RIGHT    CLINICAL HISTORY:  diabetic foot ulcer;. Type 2 diabetes mellitus with foot ulcer    TECHNIQUE:  AP, lateral, and oblique views of the right foot were performed.    COMPARISON:  None    FINDINGS:  Radiographic examination of the right foot was performed.  3 radiographs are submitted.  There is appearance of may relate to soft tissue injury of the soft tissues posterior to the calcaneus at the level of the heel, and there is general appearance suggesting soft tissue swelling about the foot.  On the lateral view there is some lucency associated with the posterior aspect of the calcaneus, the possibility of demineralization is considered although this lucency could relate to diminished soft tissue attenuation secondary to soft tissue wound or injury.  There is slight irregularity along the posterior and posterosuperior margin of the posterior calcaneus, given the history of diabetic foot ulcer, the possibility of osseous involvement along the posterior and posterosuperior edge of the calcaneus would be difficult to exclude, there are some curvilinear calcifications within the soft tissues adjacent to this, that may relate to osseous involvement as well.  The remainder of the visualized osseous structures demonstrate chronic change, there is no additional evidence for osseous destructive process, acute fracture or dislocation.  Vascular calcifications are noted.                                 Clinical Findings:    RLE  6/12/2020 - Pictures taken prior to debridement of the anterior ankle and bone biopsy of the calcaneus.    There is a large posterior heel wound with some small portions of fibrosis. The wound is otherwise granular but is deep. No bone exposure, malodor or purulent drainage. There is some deep probing but appears to have soft tissue coverage of the bone.    Measures 8x6.4x0.5cm    The anterior ankle is granular with a loose, boggy escar.    Measures 5x4x0.2cm    LLE has no open lesions.

## 2020-06-13 NOTE — ASSESSMENT & PLAN NOTE
See media picture.  Right anterior ankle wound x 1 month.     - On antibiotic  - Culture pending  - See above.

## 2020-06-13 NOTE — HPI
Tasneem Lynn is a 74 y.o. female who  has a past medical history of Arthritis, CHF (congestive heart failure), Coronary artery disease, Diabetes mellitus, HLD (hyperlipidemia), Hypertension, Obese, Stroke, and Thyroid disease.    Patient presents for worsening R heel ulceration. Patient reports several month history of wound that appear after she had several falls. She has stopped walking since and only has worsening of the wound since. Denies any pain, F/N/V/C. She also has new onset anterior ankle wound. Denies smoking.    Reports seeing a provider for the wounds and he has advised her daughter to apply honey to the wounds daily. Patient is unclear of the providers name or home health.

## 2020-06-13 NOTE — ASSESSMENT & PLAN NOTE
MRI was reviewed with osteomyelitis to the majority of the right calcaneus. Options were presented to the patient including amputation and debridement of the infected bone or bone biopsy with long term IV antibiotics. Patient is opting for antibiotics at this time.    Patient was consented and a bone biopsy was obtained today. Await cultures and pathology.  - ID consulted  - Vascular studies ordered. Patient may need Vasc Sx consult pending results.  - Continue local wound care as ordered.   - Z-flex boots given to patient. Z-flex boots at all times while in bed.  - She will need to establish follow up at  wound care upon discharge.

## 2020-06-13 NOTE — PLAN OF CARE
"SW met with pt to complete discharge needs assessment.  SW utilized to identifiers: date of birth and home address for verification.  Pt reported that her daughter, Merrill Lynn, helps her at home. Pt self reported that she's unable to walk but she uses assistive equipment including a walker and wheelchair. Pt would like home health services prior to discharge to assist with improving her walking.  SW discussed the significance of the [facility] "My Health Packet".  Pt prefers to attend appointments in the morning.    PCP-Angela James      [id number] - [place] - [address] AT [facility]  [address]  [place] [address]  Phone: [phone] Fax: [phone]    [id number] - [place] - [address]  [address]  [place] [address]  Phone: [phone] Fax: [phone]        06/13/20 1434   Discharge Assessment   Assessment Type Discharge Planning Assessment   Confirmed/corrected address and phone number on facesheet? Yes   Assessment information obtained from? Patient   Prior to hospitilization cognitive status: Alert/Oriented   Prior to hospitalization functional status: Needs Assistance   Current cognitive status: Alert/Oriented   Current Functional Status: Needs Assistance   Lives With child(jose), adult;grandchild(jose)   Able to Return to Prior Arrangements yes   Is patient able to care for self after discharge? No   Who are your caregiver(s) and their phone number(s)? Merrill Lynn daughter [phone]   Patient's perception of discharge disposition home health   Readmission Within the Last 30 Days no previous admission in last 30 days   Patient currently being followed by outpatient case management? No   Patient currently receives any other outside agency services? No   Equipment Currently Used at Home walker, standard;cane, quad;wheelchair  (Pt reportedly uses her oldest daughter wheelchair.)   Do you have any problems affording " any of your prescribed medications? No   Is the patient taking medications as prescribed? yes   Does the patient have transportation home? No  (Pt repoted that she will require transportation home)   Does the patient receive services at the [facility]? No   DME Needed Upon Discharge  bedside commode   Patient/Family in Agreement with Plan yes

## 2020-06-13 NOTE — PROGRESS NOTES
Pharmacist Renal Dose Adjustment Note    Tasneem Lynn is a 74 y.o. female being treated with the medication Famotidine     Patient Data:    Vital Signs (Most Recent):  Temp: 97.8 °F (36.6 °C) (06/12/20 1848)  Pulse: 93 (06/12/20 2132)  Resp: 18 (06/12/20 1848)  BP: (!) 161/70 (06/12/20 2132)  SpO2: 99 % (06/12/20 2132)   Vital Signs (72h Range):  Temp:  [97.8 °F (36.6 °C)]   Pulse:  [92-97]   Resp:  [18]   BP: (135-161)/(63-70)   SpO2:  [98 %-99 %]      Recent Labs   Lab 06/12/20 1917   CREATININE 2.2*     Serum creatinine: 2.2 mg/dL (H) 06/12/20 1917  Estimated creatinine clearance: 27.3 mL/min (A)    Medication:Famotidine 20mg bid ordered.  As per Renal dose adjustment per pharmacy, dose will be adjusted to 20mg daily.    Pharmacist's Name: Keny Matute  Pharmacist's Extension: [phone]

## 2020-06-14 LAB
ANION GAP SERPL CALC-SCNC: 8 MMOL/L (ref 8–16)
BASOPHILS # BLD AUTO: 0.04 K/UL (ref 0–0.2)
BASOPHILS NFR BLD: 0.5 % (ref 0–1.9)
BUN SERPL-MCNC: 60 MG/DL (ref 8–23)
CALCIUM SERPL-MCNC: 8.5 MG/DL (ref 8.7–10.5)
CHLORIDE SERPL-SCNC: 105 MMOL/L (ref 95–110)
CO2 SERPL-SCNC: 24 MMOL/L (ref 23–29)
CREAT SERPL-MCNC: 2.2 MG/DL (ref 0.5–1.4)
DIFFERENTIAL METHOD: ABNORMAL
EOSINOPHIL # BLD AUTO: 0.4 K/UL (ref 0–0.5)
EOSINOPHIL NFR BLD: 4.2 % (ref 0–8)
ERYTHROCYTE [DISTWIDTH] IN BLOOD BY AUTOMATED COUNT: 14.3 % (ref 11.5–14.5)
EST. GFR  (AFRICAN AMERICAN): 25 ML/MIN/1.73 M^2
EST. GFR  (NON AFRICAN AMERICAN): 21 ML/MIN/1.73 M^2
GLUCOSE SERPL-MCNC: 162 MG/DL (ref 70–110)
HCT VFR BLD AUTO: 33.4 % (ref 37–48.5)
HGB BLD-MCNC: 9.8 G/DL (ref 12–16)
IMM GRANULOCYTES # BLD AUTO: 0.03 K/UL (ref 0–0.04)
IMM GRANULOCYTES NFR BLD AUTO: 0.4 % (ref 0–0.5)
LYMPHOCYTES # BLD AUTO: 3.8 K/UL (ref 1–4.8)
LYMPHOCYTES NFR BLD: 43.9 % (ref 18–48)
MCH RBC QN AUTO: 28.2 PG (ref 27–31)
MCHC RBC AUTO-ENTMCNC: 29.3 G/DL (ref 32–36)
MCV RBC AUTO: 96 FL (ref 82–98)
MONOCYTES # BLD AUTO: 0.8 K/UL (ref 0.3–1)
MONOCYTES NFR BLD: 9.8 % (ref 4–15)
NEUTROPHILS # BLD AUTO: 3.5 K/UL (ref 1.8–7.7)
NEUTROPHILS NFR BLD: 41.2 % (ref 38–73)
NRBC BLD-RTO: 0 /100 WBC
PLATELET # BLD AUTO: 385 K/UL (ref 150–350)
PMV BLD AUTO: 8.9 FL (ref 9.2–12.9)
POCT GLUCOSE: 154 MG/DL (ref 70–110)
POCT GLUCOSE: 205 MG/DL (ref 70–110)
POCT GLUCOSE: 228 MG/DL (ref 70–110)
POTASSIUM SERPL-SCNC: 4.8 MMOL/L (ref 3.5–5.1)
RBC # BLD AUTO: 3.47 M/UL (ref 4–5.4)
SODIUM SERPL-SCNC: 137 MMOL/L (ref 136–145)
VANCOMYCIN SERPL-MCNC: 23.1 UG/ML
WBC # BLD AUTO: 8.55 K/UL (ref 3.9–12.7)

## 2020-06-14 PROCEDURE — 25000003 PHARM REV CODE 250: Mod: HCNC | Performed by: EMERGENCY MEDICINE

## 2020-06-14 PROCEDURE — 36415 COLL VENOUS BLD VENIPUNCTURE: CPT | Mod: HCNC

## 2020-06-14 PROCEDURE — 25000003 PHARM REV CODE 250: Mod: HCNC | Performed by: NURSE PRACTITIONER

## 2020-06-14 PROCEDURE — 85025 COMPLETE CBC W/AUTO DIFF WBC: CPT | Mod: HCNC

## 2020-06-14 PROCEDURE — 86580 TB INTRADERMAL TEST: CPT | Mod: HCNC | Performed by: INTERNAL MEDICINE

## 2020-06-14 PROCEDURE — 25000003 PHARM REV CODE 250: Mod: HCNC | Performed by: INTERNAL MEDICINE

## 2020-06-14 PROCEDURE — 80202 ASSAY OF VANCOMYCIN: CPT | Mod: HCNC

## 2020-06-14 PROCEDURE — 80048 BASIC METABOLIC PNL TOTAL CA: CPT | Mod: HCNC

## 2020-06-14 PROCEDURE — 63600175 PHARM REV CODE 636 W HCPCS: Mod: HCNC | Performed by: NURSE PRACTITIONER

## 2020-06-14 PROCEDURE — 11000001 HC ACUTE MED/SURG PRIVATE ROOM: Mod: HCNC

## 2020-06-14 PROCEDURE — 30200315 PPD INTRADERMAL TEST REV CODE 302: Mod: HCNC | Performed by: INTERNAL MEDICINE

## 2020-06-14 RX ADMIN — LEVOTHYROXINE SODIUM 75 MCG: 75 TABLET ORAL at 05:06

## 2020-06-14 RX ADMIN — LOSARTAN POTASSIUM 50 MG: 25 TABLET ORAL at 08:06

## 2020-06-14 RX ADMIN — INSULIN ASPART 1 UNITS: 100 INJECTION, SOLUTION INTRAVENOUS; SUBCUTANEOUS at 12:06

## 2020-06-14 RX ADMIN — FUROSEMIDE 20 MG: 20 TABLET ORAL at 08:06

## 2020-06-14 RX ADMIN — CEFTRIAXONE 1 G: 1 INJECTION, SOLUTION INTRAVENOUS at 12:06

## 2020-06-14 RX ADMIN — FAMOTIDINE 20 MG: 20 TABLET ORAL at 08:06

## 2020-06-14 RX ADMIN — STANDARDIZED SENNA CONCENTRATE AND DOCUSATE SODIUM 1 TABLET: 8.6; 5 TABLET ORAL at 09:06

## 2020-06-14 RX ADMIN — INSULIN ASPART 2 UNITS: 100 INJECTION, SOLUTION INTRAVENOUS; SUBCUTANEOUS at 05:06

## 2020-06-14 RX ADMIN — GABAPENTIN 300 MG: 300 CAPSULE ORAL at 03:06

## 2020-06-14 RX ADMIN — GABAPENTIN 300 MG: 300 CAPSULE ORAL at 08:06

## 2020-06-14 RX ADMIN — STANDARDIZED SENNA CONCENTRATE AND DOCUSATE SODIUM 1 TABLET: 8.6; 5 TABLET ORAL at 08:06

## 2020-06-14 RX ADMIN — TUBERCULIN PURIFIED PROTEIN DERIVATIVE 5 UNITS: 5 INJECTION, SOLUTION INTRADERMAL at 10:06

## 2020-06-14 RX ADMIN — ASPIRIN 81 MG 81 MG: 81 TABLET ORAL at 08:06

## 2020-06-14 RX ADMIN — CARVEDILOL 25 MG: 6.25 TABLET, FILM COATED ORAL at 08:06

## 2020-06-14 RX ADMIN — ROSUVASTATIN CALCIUM 20 MG: 10 TABLET, COATED ORAL at 08:06

## 2020-06-14 RX ADMIN — GABAPENTIN 300 MG: 300 CAPSULE ORAL at 09:06

## 2020-06-14 NOTE — ASSESSMENT & PLAN NOTE
Lab Results   Component Value Date    HGBA1C 7.5 (H) 06/13/2020     Patient doesn't know recent A1C.  On insulin (70/30) 40 units BID at home. Patient reports she often has episodes of hypoglycemia of BS 60's.     - A1C pending  - POCT BS Ac/HS  - SSI with meals. Will adjust as needed

## 2020-06-14 NOTE — NURSING
Per handoff received from Adriana HERNÁNDEZ LPN. Patient off unit to testing. Awaiting patients arrival to the unit.

## 2020-06-14 NOTE — PROGRESS NOTES
Pharmacokinetic Assessment Follow Up: IV Vancomycin    Vancomycin serum concentration assessment(s):    The random level was drawn correctly and can be used to guide therapy at this time. The measurement is above the desired definitive target range of 10 to 20 mcg/mL.    Vancomycin Regimen Plan:    Re-dose when the random level is less than 20 mcg/mL, next level to be drawn at 0400 on 6/15/2020    Drug levels (last 3 results):  Recent Labs   Lab Result Units 06/14/20  0450   Vancomycin, Random ug/mL 23.1       Pharmacy will continue to follow and monitor vancomycin.    Please contact pharmacy at extension [phone] for questions regarding this assessment.    Thank you for the consult,   Keny Matute       Patient brief summary:  Tasneem Lynn is a 74 y.o. female initiated on antimicrobial therapy with IV Vancomycin for treatment of skin & soft tissue infection    The patient's current regimen is pulse dosing with a Vanco 15mg/kg dose being given when daily random Vanco level is < 20.    Drug Allergies:   Review of patient's allergies indicates:   Allergen Reactions    Corticosteroids (glucocorticoids) Edema       Actual Body Weight:   116.5 kg    Renal Function:   Estimated Creatinine Clearance: 27.2 mL/min (A) (based on SCr of 2.2 mg/dL (H)).,     Dialysis Method (if applicable):  N/A    CBC (last 72 hours):  Recent Labs   Lab Result Units 06/12/20 1917 06/13/20 0513 06/14/20 0450   WBC K/uL 10.68 10.44 8.55   Hemoglobin g/dL 11.2* 10.9* 9.8*   Hemoglobin A1C %  --  7.5*  --    Hematocrit % 37.9 37.3 33.4*   Platelets K/uL 439* 427* 385*   Gran% % 39.8 55.0 41.2   Lymph% % 48.0 31.7 43.9   Mono% % 7.7 9.1 9.8   Eosinophil% % 3.6 3.1 4.2   Basophil% % 0.6 0.5 0.5   Differential Method  Automated Automated Automated       Metabolic Panel (last 72 hours):  Recent Labs   Lab Result Units 06/12/20 1917 06/13/20 0513 06/14/20  0450   Sodium mmol/L 138 137 137   Potassium mmol/L 4.5 4.7 4.8   Chloride mmol/L 105  103 105   CO2 mmol/L 23 26 24   Glucose mg/dL 145* 120* 162*   BUN, Bld mg/dL 57* 56* 60*   Creatinine mg/dL 2.2* 2.1* 2.2*   Albumin g/dL 2.7* 2.6*  --    Total Bilirubin mg/dL 0.3 0.3  --    Alkaline Phosphatase U/L 130 121  --    AST U/L 15 13  --    ALT U/L 9* 8*  --        Vancomycin Administrations:  vancomycin given in the last 96 hours                     vancomycin (VANCOCIN) 2,250 mg in dextrose 5 % 500 mL IVPB (mg) 2,250 mg New Bag 06/12/20 2345                    Microbiologic Results:  Microbiology Results (last 7 days)       Procedure Component Value Units Date/Time    Blood Culture #1 **CANNOT BE ORDERED STAT** [[id number]] Collected: 06/12/20 2325    Order Status: Completed Specimen: Blood from Peripheral, Antecubital, Right Updated: 06/14/20 0503     Blood Culture, Routine No Growth to date      No Growth to date    Blood Culture #2 **CANNOT BE ORDERED STAT** [[id number]] Collected: 06/12/20 2339    Order Status: Completed Specimen: Blood from Peripheral, Hand, Right Updated: 06/13/20 2243     Blood Culture, Routine Gram stain aer bottle: Gram positive cocci in clusters resembling Staph       Results called to and read back by: Nataliia Weber  06/13/2020  22:41    Gram stain [[id number]] Collected: 06/13/20 1405    Order Status: Completed Specimen: Wound from Foot, Right Updated: 06/13/20 1459     Gram Stain Result No WBC's      No organisms seen    Culture, Anaerobe [[id number]] Collected: 06/13/20 1405    Order Status: Sent Specimen: Wound from Foot, Right Updated: 06/13/20 1427    Aerobic culture [[id number]] Collected: 06/13/20 1405    Order Status: Sent Specimen: Wound from Foot, Right Updated: 06/13/20 1427    AFB Culture & Smear [[id number]] Collected: 06/13/20 1405    Order Status: Sent Specimen: Wound from Foot, Right Updated: 06/13/20 1427    Aerobic culture [[id number]] Collected: 06/13/20 1405    Order Status: Canceled Specimen: Wound from Foot, Right     Blood culture [[id number]]     Order  Status: Canceled Specimen: Blood

## 2020-06-14 NOTE — PROGRESS NOTES
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 Patient reports her right heel wound has started 3 months ago and it seems got worse since last week with increasing bleeding. She states she believes the wound is infected. She reports she was prescribed an antibiotic (unsure name) 2 months ago by Angela James NP at an urgent care clinic but it didn't help much. She also reports another wound at her anterior right foot which is developed around 1 month ago. Patient reports she has been mostly staying on her bed and she has not been walking for 2 months due to her right heel wound. She reports the wound would bleed and have more pain if she put pressure on her right foot. She denies fever, chill, chest pain, SOB, bilateral calves/leg pain, N/V/D, cough or sick contacts at home. She reports she does not go to wound care clinic. She states the home health nurse visited her 3 times since she started having this heel wound. The last visit was today.     In ED, COVID negative. X-ray right foot can not exclude the possibility of osseous infectious involvement.  Labs with elevated sed rate and CRP, c/s with CKD stage 4 with GFR 25 and elevated BUN&sCr 57&2.2.     Patient is admitted to inpatient with hospital medicine to further evaluation and treatment of right  foot wound and EMMA    Overview/Hospital Course:  Patient admitted to the hospital for eval and treatment of R heal diabetic ulcer. MRI showed osteo.  Podiatry consulted. Patient had debridement and bone biopsy on 6/13.      Interval History: No new issues     Review of Systems   Constitutional: Negative for activity change.   HENT: Negative for congestion.    Respiratory: Negative for chest tightness.    Cardiovascular: Negative for chest pain.   Gastrointestinal: Negative for abdominal distention.   Genitourinary: Negative for difficulty urinating.   Musculoskeletal: Negative for arthralgias.   Psychiatric/Behavioral: Negative for agitation and behavioral problems.     Objective:     Vital Signs (Most Recent):  Temp: 98.5 °F (36.9 °C) (06/14/20 0417)  Pulse: 80 (06/14/20 0417)  Resp: 16 (06/14/20 0417)  BP: 129/69 (06/14/20 0417)  SpO2: 96 % (06/14/20 0417) Vital Signs (24h Range):  Temp:  [98 °F (36.7 °C)-98.8 °F (37.1 °C)] 98.5 °F (36.9 °C)  Pulse:  [70-84] 80  Resp:  [16-20] 16  SpO2:  [95 %-98 %] 96 %  BP: (129-190)/(65-85) 129/69     Weight: 116.5 kg (256 lb 13.4 oz)  Body mass index is 46.98 kg/m².    Intake/Output Summary (Last 24 hours) at 6/14/2020 0633  Last data filed at 6/13/2020 1713  Gross per 24 hour   Intake 360 ml   Output --   Net 360 ml      Physical Exam  Vitals signs reviewed.   Constitutional:       Appearance: She is well-developed and well-nourished.   HENT:      Head: Normocephalic and atraumatic.   Pulmonary:      Effort: No respiratory distress.   Skin:     General: Skin is warm and dry.   Psychiatric:         Mood and Affect: Mood and affect normal.         Behavior: Behavior normal.         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  Reports right heel pressure wound not healing x 3 months. Worsen since last week with increasing bleeding. Hx DM.  Wound with red, beefy, granular and bleeding. Xray concerning osseous infection. No leukocytosis.  Afebrile. Elevated sed rate and CRP.  MRI pending    - Vancomycin started in ED - will continue with pharmacy dose  - Add Ceftriaxone  - Cultures pending (BC and Wound culture)  - ID, Wound Care, Podiatry consult          Wound of right leg  See media picture.  Right anterior ankle wound x 1 month.     - On antibiotic  - Culture pending  - See above.        CKD (chronic kidney disease) stage 4, GFR 15-29 ml/min  sCr and BUN elevated 2.2 and 57.  Baseline Cr around 2 with previous value of 1.7 on 6/2018. GFR 25 today.  Patient reports she was referred to Nephrologist long time ago but she hasn't seen anyone yet.     - Renal dose all medication. Avoid nephrotoxin if possible.   - Continue to monitor. Consider consult nephrologist if getting worse. Otherwise will see Nephrologist outpatient.    Hyperlipidemia  Lab Results   Component Value Date    LDLCALC 97.8 06/13/2020     Continue statin      History of CVA (cerebrovascular accident)  Hx stroke 1986 with left side weakness per patient report. Reports not getting out of bed much since 4/2010 due to wound at right heel.  Patient is on ASA and statin. Continue ASA/Statin    - PO/OT evaluation      Morbid obesity with BMI of 50.0-59.9, adult  Body mass index is 46.98 kg/m².  Weight loss outpatient      Hypothyroidism  Lab Results   Component Value Date    TSH 1.480 11/17/2016     TSH pending. Continue home med with levothyroxine    Essential hypertension  Uncontrolled. Continuel home meds BB,   - Hold ACEI due to worsening kidney function    Type 2  diabetes mellitus, uncontrolled, with renal complications  Lab Results   Component Value Date    HGBA1C 7.5 (H) 06/13/2020     Patient doesn't know recent A1C.  On insulin (70/30) 40 units BID at home. Patient reports she often has episodes of hypoglycemia of BS 60's.     - A1C pending  - POCT BS Ac/HS  - SSI with meals. Will adjust as needed    Debility- PT/OT rec: SNF.     Bacteremia- Called lab on 6/14/20-  1/4 bottles- likely contaminant- on Vanc in any case.      VTE Risk Mitigation (From admission, onward)         Ordered     IP VTE HIGH RISK PATIENT  Once      06/13/20 0036     Place sequential compression device  Until discontinued      06/13/20 0036     Place MADISON hose  Until discontinued      06/13/20 0036                      Carlos Guevara MD  Department of Hospital Medicine   [facility] [place]

## 2020-06-14 NOTE — CONSULTS
[facility] Hospital Medicine  Telemedicine Consult Note    Patient Name: Tasneem Lynn  MRN: [id number]  Admission Date: 6/12/2020  Hospital Length of Stay: 2 days  Attending Physician: Carlos Sommers MD   Primary Care Provider: To Obtain Unable         Tasneem Lynn has been accepted for transfer to [facility] and will be followed through telemedicine services beginning 06/15/20 at 6 AM.        Lesley Myles MD  Department of Hospital Medicine   [facility]

## 2020-06-14 NOTE — HOSPITAL COURSE
Patient admitted to the hospital for eval and treatment of R heal diabetic ulcer. MRI showed osteo.  Podiatry consulted. Patient had debridement and bone biopsy on 6/13.

## 2020-06-14 NOTE — NURSING
Wound care preformed on sacral wound, Foot dressing due to be changed tomorrow. Bed bath given. No distress during shift.

## 2020-06-14 NOTE — SUBJECTIVE & OBJECTIVE
Interval History: No new issues     Review of Systems   Constitutional: Negative for activity change.   HENT: Negative for congestion.    Respiratory: Negative for chest tightness.    Cardiovascular: Negative for chest pain.   Gastrointestinal: Negative for abdominal distention.   Genitourinary: Negative for difficulty urinating.   Musculoskeletal: Negative for arthralgias.   Psychiatric/Behavioral: Negative for agitation and behavioral problems.     Objective:     Vital Signs (Most Recent):  Temp: 98.5 °F (36.9 °C) (06/14/20 0417)  Pulse: 80 (06/14/20 0417)  Resp: 16 (06/14/20 0417)  BP: 129/69 (06/14/20 0417)  SpO2: 96 % (06/14/20 0417) Vital Signs (24h Range):  Temp:  [98 °F (36.7 °C)-98.8 °F (37.1 °C)] 98.5 °F (36.9 °C)  Pulse:  [70-84] 80  Resp:  [16-20] 16  SpO2:  [95 %-98 %] 96 %  BP: (129-190)/(65-85) 129/69     Weight: 116.5 kg (256 lb 13.4 oz)  Body mass index is 46.98 kg/m².    Intake/Output Summary (Last 24 hours) at 6/14/2020 0633  Last data filed at 6/13/2020 1713  Gross per 24 hour   Intake 360 ml   Output --   Net 360 ml      Physical Exam  Vitals signs reviewed.   Constitutional:       Appearance: She is well-developed and well-nourished.   HENT:      Head: Normocephalic and atraumatic.   Pulmonary:      Effort: No respiratory distress.   Skin:     General: Skin is warm and dry.   Psychiatric:         Mood and Affect: Mood and affect normal.         Behavior: Behavior normal.         Significant Labs:   BMP:   Recent Labs   Lab 06/13/20  0513   *      K 4.7      CO2 26   BUN 56*   CREATININE 2.1*   CALCIUM 9.2     CBC:   Recent Labs   Lab 06/12/20  1917 06/13/20  0513 06/14/20  0450   WBC 10.68 10.44 8.55   HGB 11.2* 10.9* 9.8*   HCT 37.9 37.3 33.4*   * 427* 385*       Significant Imaging:

## 2020-06-14 NOTE — NURSING
Patient back to unit from scheduled testing. Complaints of headache pain noted and to be treated patient in NAD. Assessment initiated and vital signs obtained. Patient stable at this time and will continue to be monitored.

## 2020-06-15 PROBLEM — I70.234 ATHEROSCLEROSIS OF NATIVE ARTERIES OF RIGHT LEG WITH ULCERATION OF HEEL AND MIDFOOT: Status: ACTIVE | Noted: 2020-06-15

## 2020-06-15 PROBLEM — M86.471 CHRONIC OSTEOMYELITIS OF RIGHT FOOT WITH DRAINING SINUS: Status: ACTIVE | Noted: 2020-06-15

## 2020-06-15 LAB
ANION GAP SERPL CALC-SCNC: 8 MMOL/L (ref 8–16)
BASOPHILS # BLD AUTO: 0.04 K/UL (ref 0–0.2)
BASOPHILS NFR BLD: 0.4 % (ref 0–1.9)
BUN SERPL-MCNC: 68 MG/DL (ref 8–23)
CALCIUM SERPL-MCNC: 8.5 MG/DL (ref 8.7–10.5)
CHLORIDE SERPL-SCNC: 105 MMOL/L (ref 95–110)
CO2 SERPL-SCNC: 23 MMOL/L (ref 23–29)
CREAT SERPL-MCNC: 2.6 MG/DL (ref 0.5–1.4)
DIFFERENTIAL METHOD: ABNORMAL
EOSINOPHIL # BLD AUTO: 0.5 K/UL (ref 0–0.5)
EOSINOPHIL NFR BLD: 4.8 % (ref 0–8)
ERYTHROCYTE [DISTWIDTH] IN BLOOD BY AUTOMATED COUNT: 14.3 % (ref 11.5–14.5)
EST. GFR  (AFRICAN AMERICAN): 20 ML/MIN/1.73 M^2
EST. GFR  (NON AFRICAN AMERICAN): 18 ML/MIN/1.73 M^2
GLUCOSE SERPL-MCNC: 179 MG/DL (ref 70–110)
HCT VFR BLD AUTO: 32.4 % (ref 37–48.5)
HGB BLD-MCNC: 9.8 G/DL (ref 12–16)
IMM GRANULOCYTES # BLD AUTO: 0.03 K/UL (ref 0–0.04)
IMM GRANULOCYTES NFR BLD AUTO: 0.3 % (ref 0–0.5)
LYMPHOCYTES # BLD AUTO: 4.5 K/UL (ref 1–4.8)
LYMPHOCYTES NFR BLD: 48 % (ref 18–48)
MCH RBC QN AUTO: 28.7 PG (ref 27–31)
MCHC RBC AUTO-ENTMCNC: 30.2 G/DL (ref 32–36)
MCV RBC AUTO: 95 FL (ref 82–98)
MONOCYTES # BLD AUTO: 0.9 K/UL (ref 0.3–1)
MONOCYTES NFR BLD: 9.7 % (ref 4–15)
NEUTROPHILS # BLD AUTO: 3.4 K/UL (ref 1.8–7.7)
NEUTROPHILS NFR BLD: 36.8 % (ref 38–73)
NRBC BLD-RTO: 0 /100 WBC
PLATELET # BLD AUTO: 368 K/UL (ref 150–350)
PMV BLD AUTO: 9.1 FL (ref 9.2–12.9)
POCT GLUCOSE: 177 MG/DL (ref 70–110)
POCT GLUCOSE: 190 MG/DL (ref 70–110)
POCT GLUCOSE: 192 MG/DL (ref 70–110)
POCT GLUCOSE: 194 MG/DL (ref 70–110)
POCT GLUCOSE: 231 MG/DL (ref 70–110)
POCT GLUCOSE: 247 MG/DL (ref 70–110)
POTASSIUM SERPL-SCNC: 4.8 MMOL/L (ref 3.5–5.1)
RBC # BLD AUTO: 3.42 M/UL (ref 4–5.4)
SODIUM SERPL-SCNC: 136 MMOL/L (ref 136–145)
VANCOMYCIN SERPL-MCNC: 18.6 UG/ML
WBC # BLD AUTO: 9.35 K/UL (ref 3.9–12.7)

## 2020-06-15 PROCEDURE — 87040 BLOOD CULTURE FOR BACTERIA: CPT | Mod: HCNC

## 2020-06-15 PROCEDURE — 63600175 PHARM REV CODE 636 W HCPCS: Mod: HCNC | Performed by: NURSE PRACTITIONER

## 2020-06-15 PROCEDURE — 25000003 PHARM REV CODE 250: Mod: HCNC | Performed by: INTERNAL MEDICINE

## 2020-06-15 PROCEDURE — 36415 COLL VENOUS BLD VENIPUNCTURE: CPT | Mod: HCNC

## 2020-06-15 PROCEDURE — 99231 PR SUBSEQUENT HOSPITAL CARE,LEVL I: ICD-10-PCS | Mod: HCNC,,, | Performed by: INTERNAL MEDICINE

## 2020-06-15 PROCEDURE — 80048 BASIC METABOLIC PNL TOTAL CA: CPT | Mod: HCNC

## 2020-06-15 PROCEDURE — 25000003 PHARM REV CODE 250: Mod: HCNC | Performed by: NURSE PRACTITIONER

## 2020-06-15 PROCEDURE — 99223 PR INITIAL HOSPITAL CARE,LEVL III: ICD-10-PCS | Mod: HCNC,,, | Performed by: SURGERY

## 2020-06-15 PROCEDURE — 99231 SBSQ HOSP IP/OBS SF/LOW 25: CPT | Mod: HCNC,,, | Performed by: INTERNAL MEDICINE

## 2020-06-15 PROCEDURE — 99223 1ST HOSP IP/OBS HIGH 75: CPT | Mod: HCNC,,, | Performed by: SURGERY

## 2020-06-15 PROCEDURE — 85025 COMPLETE CBC W/AUTO DIFF WBC: CPT | Mod: HCNC

## 2020-06-15 PROCEDURE — 99232 PR SUBSEQUENT HOSPITAL CARE,LEVL II: ICD-10-PCS | Mod: 24,HCNC,, | Performed by: PODIATRIST

## 2020-06-15 PROCEDURE — 80202 ASSAY OF VANCOMYCIN: CPT | Mod: HCNC

## 2020-06-15 PROCEDURE — 99223 PR INITIAL HOSPITAL CARE,LEVL III: ICD-10-PCS | Mod: HCNC,,, | Performed by: INTERNAL MEDICINE

## 2020-06-15 PROCEDURE — 63600175 PHARM REV CODE 636 W HCPCS: Mod: HCNC | Performed by: INTERNAL MEDICINE

## 2020-06-15 PROCEDURE — 99223 1ST HOSP IP/OBS HIGH 75: CPT | Mod: HCNC,,, | Performed by: INTERNAL MEDICINE

## 2020-06-15 PROCEDURE — 11000001 HC ACUTE MED/SURG PRIVATE ROOM: Mod: HCNC

## 2020-06-15 PROCEDURE — 99232 SBSQ HOSP IP/OBS MODERATE 35: CPT | Mod: 24,HCNC,, | Performed by: PODIATRIST

## 2020-06-15 PROCEDURE — 25000003 PHARM REV CODE 250: Mod: HCNC | Performed by: EMERGENCY MEDICINE

## 2020-06-15 PROCEDURE — 25000003 PHARM REV CODE 250: Mod: HCNC | Performed by: STUDENT IN AN ORGANIZED HEALTH CARE EDUCATION/TRAINING PROGRAM

## 2020-06-15 RX ORDER — IBUPROFEN 200 MG
16 TABLET ORAL
Status: DISCONTINUED | OUTPATIENT
Start: 2020-06-15 | End: 2020-06-24 | Stop reason: HOSPADM

## 2020-06-15 RX ORDER — INSULIN ASPART 100 [IU]/ML
2 INJECTION, SOLUTION INTRAVENOUS; SUBCUTANEOUS
Status: DISCONTINUED | OUTPATIENT
Start: 2020-06-16 | End: 2020-06-16

## 2020-06-15 RX ORDER — IBUPROFEN 200 MG
24 TABLET ORAL
Status: DISCONTINUED | OUTPATIENT
Start: 2020-06-15 | End: 2020-06-24 | Stop reason: HOSPADM

## 2020-06-15 RX ORDER — INSULIN ASPART 100 [IU]/ML
0-5 INJECTION, SOLUTION INTRAVENOUS; SUBCUTANEOUS
Status: DISCONTINUED | OUTPATIENT
Start: 2020-06-15 | End: 2020-06-24 | Stop reason: HOSPADM

## 2020-06-15 RX ORDER — GLUCAGON 1 MG
1 KIT INJECTION
Status: DISCONTINUED | OUTPATIENT
Start: 2020-06-15 | End: 2020-06-24 | Stop reason: HOSPADM

## 2020-06-15 RX ADMIN — VANCOMYCIN HYDROCHLORIDE 500 MG: 500 INJECTION, POWDER, LYOPHILIZED, FOR SOLUTION INTRAVENOUS at 09:06

## 2020-06-15 RX ADMIN — CARVEDILOL 25 MG: 6.25 TABLET, FILM COATED ORAL at 05:06

## 2020-06-15 RX ADMIN — FAMOTIDINE 20 MG: 20 TABLET ORAL at 12:06

## 2020-06-15 RX ADMIN — ASPIRIN 81 MG 81 MG: 81 TABLET ORAL at 12:06

## 2020-06-15 RX ADMIN — LEVOTHYROXINE SODIUM 75 MCG: 75 TABLET ORAL at 06:06

## 2020-06-15 RX ADMIN — ROSUVASTATIN CALCIUM 20 MG: 10 TABLET, COATED ORAL at 12:06

## 2020-06-15 RX ADMIN — STANDARDIZED SENNA CONCENTRATE AND DOCUSATE SODIUM 1 TABLET: 8.6; 5 TABLET ORAL at 12:06

## 2020-06-15 RX ADMIN — INSULIN ASPART 2 UNITS: 100 INJECTION, SOLUTION INTRAVENOUS; SUBCUTANEOUS at 05:06

## 2020-06-15 RX ADMIN — CEFTRIAXONE 1 G: 1 INJECTION, SOLUTION INTRAVENOUS at 02:06

## 2020-06-15 RX ADMIN — FUROSEMIDE 20 MG: 20 TABLET ORAL at 12:06

## 2020-06-15 RX ADMIN — GABAPENTIN 300 MG: 300 CAPSULE ORAL at 12:06

## 2020-06-15 RX ADMIN — LOSARTAN POTASSIUM 50 MG: 25 TABLET ORAL at 12:06

## 2020-06-15 RX ADMIN — STANDARDIZED SENNA CONCENTRATE AND DOCUSATE SODIUM 1 TABLET: 8.6; 5 TABLET ORAL at 08:06

## 2020-06-15 RX ADMIN — INSULIN ASPART 1 UNITS: 100 INJECTION, SOLUTION INTRAVENOUS; SUBCUTANEOUS at 08:06

## 2020-06-15 RX ADMIN — COLLAGENASE SANTYL: 250 OINTMENT TOPICAL at 12:06

## 2020-06-15 RX ADMIN — CARVEDILOL 25 MG: 6.25 TABLET, FILM COATED ORAL at 12:06

## 2020-06-15 RX ADMIN — GABAPENTIN 300 MG: 300 CAPSULE ORAL at 08:06

## 2020-06-15 NOTE — SUBJECTIVE & OBJECTIVE
Medications Prior to Admission   Medication Sig Dispense Refill Last Dose    carvedilol (COREG) 25 MG tablet Take 25 mg by mouth 2 (two) times daily with meals.   6/12/2020    insulin NPH-insulin regular, 70/30, (NOVOLIN 70/30) 100 unit/mL (70-30) injection Inject 40 Units into the skin 2 (two) times daily. 3 vial 6 6/12/2020    levothyroxine (SYNTHROID) 75 MCG tablet Take 1 tablet (75 mcg total) by mouth before breakfast. 90 tablet 1 6/12/2020    losartan (COZAAR) 50 MG tablet Take 1 tablet (50 mg total) by mouth once daily. 90 tablet 1 6/12/2020    rosuvastatin (CRESTOR) 20 MG tablet Take 1 tablet (20 mg total) by mouth once daily. 90 tablet 3 6/12/2020    acetaminophen (TYLENOL) 325 MG tablet Take 2 tablets (650 mg total) by mouth every 6 (six) hours as needed. 13 tablet 0     aspirin 81 MG Chew Take 81 mg by mouth once daily.       baclofen (LIORESAL) 5 mg Tab tablet Take 1 tablet (5 mg total) by mouth 3 (three) times daily. 11 tablet 0     blood pressure test kit-large Kit Use to check blood pressure daily and as needed. 1 each 0     cetirizine (ZYRTEC) 10 MG tablet Take 1 tablet (10 mg total) by mouth every evening. 90 tablet 1     cloNIDine (CATAPRES) 0.1 MG tablet Take 1 tablet (0.1 mg total) by mouth 3 (three) times daily. Blood pressure 270 tablet 1     diclofenac (VOLTAREN) 75 MG EC tablet Take 1 tablet (75 mg total) by mouth 2 (two) times daily. 30 tablet 1     ergocalciferol (ERGOCALCIFEROL) 50,000 unit Cap Take 1 capsule (50,000 Units total) by mouth every 7 days. 12 capsule 1     furosemide (LASIX) 20 MG tablet Take 1 tablet (20 mg total) by mouth once daily. 90 tablet 1     gabapentin (NEURONTIN) 300 MG capsule Take 1 capsule (300 mg total) by mouth 3 (three) times daily. 270 capsule 1     HYDROcodone-acetaminophen (NORCO) 7.5-325 mg per tablet Take 1 tablet by mouth every 12 (twelve) hours as needed (severe pain). 60 tablet 0        Review of patient's allergies indicates:   Allergen  Reactions    Corticosteroids (glucocorticoids) Edema       Past Medical History:   Diagnosis Date    Arthritis     Gout    CHF (congestive heart failure)     Coronary artery disease     Diabetes mellitus     HLD (hyperlipidemia)     Hypertension     Obese     Stroke     1986    Thyroid disease      Past Surgical History:   Procedure Laterality Date    right hand surgery      right knee surgery Right     partial plate     Family History     Problem Relation (Age of Onset)    Diabetes Maternal Aunt    Heart disease Sister, Maternal Aunt    Hypertension Maternal Aunt        Tobacco Use    Smoking status: Never Smoker    Smokeless tobacco: Never Used   Substance and Sexual Activity    Alcohol use: No    Drug use: No    Sexual activity: Not Currently     Partners: Male     Review of Systems   Constitutional: Negative for chills.   HENT: Negative for congestion.    Eyes: Negative for visual disturbance.   Respiratory: Negative for shortness of breath.    Cardiovascular: Negative for chest pain.   Gastrointestinal: Negative for abdominal distention.   Endocrine: Negative for cold intolerance.   Genitourinary: Negative for flank pain.   Musculoskeletal: Negative for back pain.   Skin: Positive for color change and wound. Negative for pallor and rash.   Allergic/Immunologic: Negative for immunocompromised state.   Neurological: Negative for dizziness.   Hematological: Does not bruise/bleed easily.   Psychiatric/Behavioral: Negative for agitation.     Objective:     Vital Signs (Most Recent):  Temp: 97.9 °F (36.6 °C) (06/15/20 1131)  Pulse: 80 (06/15/20 1131)  Resp: 18 (06/15/20 1131)  BP: (!) 151/69 (06/15/20 1131)  SpO2: 99 % (06/15/20 1131) Vital Signs (24h Range):  Temp:  [97.9 °F (36.6 °C)-99.3 °F (37.4 °C)] 97.9 °F (36.6 °C)  Pulse:  [73-80] 80  Resp:  [16-20] 18  SpO2:  [92 %-99 %] 99 %  BP: (116-154)/(53-69) 151/69     Weight: 116.5 kg (256 lb 13.4 oz)  Body mass index is 46.98 kg/m².    Physical  Exam  Vitals signs reviewed.   Constitutional:       General: She is not in acute distress.     Appearance: She is well-developed and well-nourished. She is not diaphoretic.   HENT:      Head: Normocephalic and atraumatic.   Eyes:      Conjunctiva/sclera: Conjunctivae normal.   Neck:      Musculoskeletal: Neck supple.   Cardiovascular:      Rate and Rhythm: Normal rate.      Pulses:           Femoral pulses are 2+ on the right side and 2+ on the left side.       Dorsalis pedis pulses are detected w/ Doppler on the right side and detected w/ Doppler on the left side.        Posterior tibial pulses are detected w/ Doppler on the right side and detected w/ Doppler on the left side.   Pulmonary:      Effort: Pulmonary effort is normal. No respiratory distress.   Abdominal:      General: There is no distension.      Palpations: Abdomen is soft. There is no mass.      Tenderness: There is no abdominal tenderness. There is no guarding or rebound.      Hernia: No hernia is present.   Musculoskeletal: Normal range of motion.         General: Tenderness and edema present. No deformity.   Feet:      Right foot:      Skin integrity: Ulcer and skin breakdown present.   Skin:     General: Skin is warm and dry.      Capillary Refill: Capillary refill takes 2 to 3 seconds.      Coloration: Skin is not pale.      Findings: No erythema or rash.   Neurological:      Mental Status: She is alert and oriented to person, place, and time.      Sensory: Sensory deficit present.   Psychiatric:         Mood and Affect: Mood and affect normal.         Significant Labs:  All pertinent labs from the last 24 hours have been reviewed.    Significant Diagnostics:  I have reviewed all pertinent imaging results/findings within the past 24 hours.

## 2020-06-15 NOTE — HPI
Regulo Bunch MD VI                       [facility]                         06/15/2020    HPI:  Tasneem Lynn is a 74 y.o. female with   Patient Active Problem List   Diagnosis    Type 2 diabetes mellitus, uncontrolled, with renal complications    Essential hypertension    Hypothyroidism    Morbid obesity with BMI of 50.0-59.9, adult    History of CVA (cerebrovascular accident)    Chronic gout    Hyperlipidemia    CKD (chronic kidney disease) stage 4, GFR 15-29 ml/min    Osteoarthritis involving multiple joints on both sides of body    Chronic allergic rhinitis    Vitamin D deficiency    Aortic atherosclerosis    Diabetic ulcer of right heel associated with diabetes mellitus due to underlying condition, limited to breakdown of skin    Wound of right leg    Chronic osteomyelitis of right foot with draining sinus    being managed by PCP and specialists who is here today for evaluation of R heel wound.  Patient states location is R heel occurring for 3 months.  Has not ambulated or transferred since then although prev was ambulating.  Associated signs and symptoms include pain.  Quality is aching and severity is 6/10.  Symptoms began 3 mo ago.  Alleviating factors include wound care and offloading.  Worsening factors include pressure.    no MI  no Stroke  Tobacco use: denies    Past Medical History:   Diagnosis Date    Arthritis     Gout    CHF (congestive heart failure)     Coronary artery disease     Diabetes mellitus     HLD (hyperlipidemia)     Hypertension     Obese     Stroke     1986    Thyroid disease      Past Surgical History:   Procedure Laterality Date    right hand surgery      right knee surgery Right     partial plate     Family History   Problem Relation Age of Onset    Heart disease Sister     Diabetes Maternal Aunt     Heart disease Maternal Aunt     Hypertension Maternal Aunt      Social History     Socioeconomic History    Marital  status:      Spouse name: Not on file    Number of children: Not on file    Years of education: Not on file    Highest education level: Not on file   Occupational History    Not on file   Social Needs    Financial resource strain: Not on file    Food insecurity     Worry: Not on file     Inability: Not on file    Transportation needs     Medical: Not on file     Non-medical: Not on file   Tobacco Use    Smoking status: Never Smoker    Smokeless tobacco: Never Used   Substance and Sexual Activity    Alcohol use: No    Drug use: No    Sexual activity: Not Currently     Partners: Male   Lifestyle    Physical activity     Days per week: Not on file     Minutes per session: Not on file    Stress: Not on file   Relationships    Social connections     Talks on phone: Not on file     Gets together: Not on file     Attends Zoroastrianism service: Not on file     Active member of club or organization: Not on file     Attends meetings of clubs or organizations: Not on file     Relationship status: Not on file   Other Topics Concern    Not on file   Social History Narrative    Not on file       Current Facility-Administered Medications:     acetaminophen tablet 650 mg, 650 mg, Oral, Q8H PRN, Chun Mak MD    acetaminophen tablet 650 mg, 650 mg, Oral, Q8H PRN, Chun Mak MD, 650 mg at 06/13/20 2039    aspirin chewable tablet 81 mg, 81 mg, Oral, Daily, Ulisses Mooney NP, 81 mg at 06/15/20 1239    carvediloL tablet 25 mg, 25 mg, Oral, BID WM, Ulisses Mooney NP, 25 mg at 06/15/20 1223    cefTRIAXone (ROCEPHIN) 1 g/50 mL D5W IVPB, 1 g, Intravenous, Q24H, Ulisses Mooney NP, 1 g at 06/15/20 0226    collagenase ointment, , Topical (Top), Daily, Bebeto Pruett MD    dextrose 50% injection 12.5 g, 12.5 g, Intravenous, PRN, Ulisses Mooney NP    famotidine tablet 20 mg, 20 mg, Oral, Daily, hCun Mak MD, 20 mg at 06/15/20 1223    furosemide tablet 20 mg, 20 mg, Oral, Daily,  Carlos Sommers MD, 20 mg at 06/15/20 1223    gabapentin capsule 300 mg, 300 mg, Oral, TID, Carlos Sommers MD, 300 mg at 06/15/20 1222    glucagon (human recombinant) injection 1 mg, 1 mg, Intramuscular, PRN, Ulisses Mooney NP    hydrALAZINE injection 10 mg, 10 mg, Intravenous, Once, Skyla Faulkner MD, Stopped at 06/13/20 0545    insulin aspart U-100 pen 0-5 Units, 0-5 Units, Subcutaneous, Q6H PRN, Ulisses Mooney NP, 2 Units at 06/14/20 1705    levothyroxine tablet 75 mcg, 75 mcg, Oral, Before breakfast, Ulisses Mooney NP, 75 mcg at 06/15/20 0642    losartan tablet 50 mg, 50 mg, Oral, Daily, Carlos Sommers MD, 50 mg at 06/15/20 1223    ondansetron injection 4 mg, 4 mg, Intravenous, Q8H PRN, Chun Mak MD    oxyCODONE immediate release tablet 5 mg, 5 mg, Oral, Q4H PRN, Chun Mak MD    rosuvastatin tablet 20 mg, 20 mg, Oral, Daily, Ulisses Mooney NP, 20 mg at 06/15/20 1222    senna-docusate 8.6-50 mg per tablet 1 tablet, 1 tablet, Oral, BID, Chun Mak MD, 1 tablet at 06/15/20 1223    sodium chloride 0.9% flush 10 mL, 10 mL, Intravenous, PRN, Chun Mak MD    Pharmacy to dose Vancomycin consult, , , Once **AND** vancomycin - pharmacy to dose, , Intravenous, pharmacy to manage frequency, Ulisses Mooney NP

## 2020-06-15 NOTE — PT/OT/SLP PROGRESS
Physical Therapy      Patient Name:  Tasneem Lynn   MRN:  [id number]    Patient not seen today secondary to Unavailable 2 attempts  (pt is off the unit for testing ). Will follow-up as able later hour/day .    Serenity Montes, PTA

## 2020-06-15 NOTE — ASSESSMENT & PLAN NOTE
-rec toe pressures.  Risks of angiogram/revascularization discussed with pt including dialysis dependence renal failure.  Will review toe pressures and healing potential and discuss treatment options.  Wound appears to be bleeding well on images during debridement.  Alternative treatment is major amputation.  Pt desires to consider her options and discuss further with me prior to making a decision  -Cont offloading, glycemic control and Abx  -cont wound care by RN and Podiatry

## 2020-06-15 NOTE — PROGRESS NOTES
[facility] Hospital Medicine  Telemedicine Progress Note    Patient Name: Tasneem Lynn  MRN: [id number]  Patient Class: IP- Inpatient   Admission Date: 6/12/2020  Length of Stay: 3 days  Attending Physician: Lesley Myles MD  Primary Care Provider: To Obtain [facility] Medicine Team: [facility] VIRTUAL TEAM 1 Lesley Myles MD    This service was provided through telemedicine.  Start time: 1515  Chief complaint: Chronic osteomyelitis of right foot with draining sinus  The patient location is: W422/W422 A  Present with the patient at the time of the telemed/virtual assessment: telepresenter  End time: 1522  Total time spent with patient: 7 min  The attending portion of this evaluation, treatment, and documentation was performed per Lesley Myles MD via audiovisual.  I have assessed findings virtually using a telemedicine platform and with assistance of the bedside nurse or telemedicine presenter.  Additional time spent in care of the patient: coordinating care including communicating with case management, reviewing records, discussing case with consultants, or discussing the plan of care with the patient or family.    Total time involved in the care of the patient:  18 minutes.    Patient was transferred to the telemedicine service on: 6/15/2020    Subjective:     Principal Problem:Chronic osteomyelitis of right foot with draining sinus    HPI:  This is a 74 y.o female with diabetes mellitus, hypertension, dyslipidemia, CHF, hypothyroidism, CKD stage 4, multiple chronic pains, obesity, and stroke (1986) who presents to the hospital with a chief complaint of worsening wound to the right heel for the last week. Patient reports her right heel wound has started 3 months ago and it seems got worse since last week with increasing bleeding. She states she believes the wound is infected. She reports she was prescribed an antibiotic (unsure name) 2 months ago by Angela James NP at an  urgent care clinic but it didn't help much. She also reports another wound at her anterior right foot which is developed around 1 month ago. Patient reports she has been mostly staying on her bed and she has not been walking for 2 months due to her right heel wound. She reports the wound would bleed and have more pain if she put pressure on her right foot. She denies fever, chill, chest pain, SOB, bilateral calves/leg pain, N/V/D, cough or sick contacts at home. She reports she does not go to wound care clinic. She states the home health nurse visited her 3 times since she started having this heel wound. The last visit was today.     In ED, COVID negative. X-ray right foot can not exclude the possibility of osseous infectious involvement.  Labs with elevated sed rate and CRP, c/s with CKD stage 4 with GFR 25 and elevated BUN&sCr 57&2.2.     Patient is admitted to inpatient with hospital medicine to further evaluation and treatment of right foot wound and EMMA    Overview/Hospital Course:  Patient admitted to the hospital for eval and treatment of R heal diabetic ulcer. MRI showed osteo.  Podiatry consulted. Patient had debridement and bone biopsy on 6/13.      Admission CC:   Chief Complaint   Patient presents with    Wound Check     EMS reports pt c/o infected diabetic ulcer to the right heel, worsening x 3 months. denies pain     Follow up visit for: Chronic osteomyelitis of right foot with draining sinus    Interval History / Events Overnight:   The patient is able to provide adequate history. Additional history was obtained from past medical records. No significant events reported by Nursing.  Patient complains of no specific complaints. Symptoms have been unchanged since yesterday. Associated symptoms include: fatigue and malaise .. Alleviating factors include: nothing. Response to treatment since yesterday: Not measurable.    Data reviewed 6/15/2020: Lab test(s) reviewed: H&H stable    Review of Systems    Constitutional: Negative for fever.   Respiratory: Negative for shortness of breath.      Objective:     Vital Signs (Most Recent):  Temp: 97.9 °F (36.6 °C) (06/15/20 1711)  Pulse: 73 (06/15/20 1711)  Resp: 18 (06/15/20 1711)  BP: 135/64 (06/15/20 1711)  SpO2: 100 % (06/15/20 1711) Vital Signs (24h Range):  Temp:  [97.9 °F (36.6 °C)-98.6 °F (37 °C)] 97.9 °F (36.6 °C)  Pulse:  [73-80] 73  Resp:  [16-20] 18  SpO2:  [94 %-100 %] 100 %  BP: (135-154)/(60-69) 135/64     Weight: 116.5 kg (256 lb 13.4 oz)  Body mass index is 46.98 kg/m².    Intake/Output Summary (Last 24 hours) at 6/15/2020 1758  Last data filed at 6/15/2020 1300  Gross per 24 hour   Intake 240 ml   Output --   Net 240 ml      Physical Exam  Constitutional:       General: She is not in acute distress.     Appearance: She is morbidly obese. She is not diaphoretic.   Eyes:      General: Lids are normal. No scleral icterus.        Right eye: No discharge.         Left eye: No discharge.      Conjunctiva/sclera: Conjunctivae normal.   Cardiovascular:      Rate and Rhythm: Regular rhythm.   Pulmonary:      Effort: Pulmonary effort is normal. No tachypnea, accessory muscle usage or respiratory distress.   Abdominal:      General: Abdomen is protuberant. There is no distension.   Neurological:      Mental Status: She is alert and oriented to person, place, and time. She is not disoriented.   Psychiatric:         Behavior: Behavior is cooperative.       Significant Labs:   Recent Labs   Lab 06/13/20  0513 06/14/20  0450 06/15/20  0624   WBC 10.44 8.55 9.35   HGB 10.9* 9.8* 9.8*   HCT 37.3 33.4* 32.4*   * 385* 368*     Recent Labs   Lab 06/13/20  0513 06/14/20  0450 06/15/20  0624   GRAN 55.0  5.8 41.2  3.5 36.8*  3.4   LYMPH 31.7  3.3 43.9  3.8 48.0  4.5   MONO 9.1  1.0 9.8  0.8 9.7  0.9   EOS 0.3 0.4 0.5     Recent Labs   Lab 06/12/20  1917 06/13/20  0513 06/14/20  0450 06/15/20  0624    137 137 136   K 4.5 4.7 4.8 4.8    103 105 105    CO2 23 26 24 23   BUN 57* 56* 60* 68*   CREATININE 2.2* 2.1* 2.2* 2.6*   * 120* 162* 179*   CALCIUM 9.1 9.2 8.5* 8.5*   ALBUMIN 2.7* 2.6*  --   --      Recent Labs   Lab 06/12/20 1917 06/13/20  0513   ALKPHOS 130 121   ALT 9* 8*   AST 15 13   PROT 9.3* 9.1*   BILITOT 0.3 0.3   INR  --  1.0   CRP 17.7*  --      Recent Labs   Lab 06/12/20 1917   CPK 32   TROPONINI <0.006     Recent Labs   Lab 06/12/20 1917   SEDRATE 108*     SARS-CoV-2 RNA, Amplification, Qual (no units)   Date Value   06/12/2020 Negative     Blood Culture, Routine (no units)   Date Value   06/15/2020 No Growth to date   06/12/2020     Gram stain aer bottle: Gram positive cocci in clusters resembling Staph    06/12/2020     Results called to and read back by: Nataliia Weber  06/13/2020  22:41   06/12/2020 (A)    COAGULASE-NEGATIVE STAPHYLOCOCCUS SPECIES  Organism is a probable contaminant     06/12/2020 No Growth to date   06/12/2020 No Growth to date   06/12/2020 No Growth to date     Recent Labs   Lab 06/13/20  0513   HGBA1C 7.5*     Recent Labs   Lab 06/15/20  0757 06/15/20  1135 06/15/20  1715   POCTGLUCOSE 177* 192* 231*     Recent Labs   Lab 06/13/20  0513   TSH 1.213       Significant Imaging:       Assessment/Plan:      Active Hospital Problems    Diagnosis  POA    *Chronic osteomyelitis of right foot with draining sinus [M86.471]  Yes    Atherosclerosis of native arteries of right leg with ulceration of heel and midfoot [I70.234]  Yes    Wound of right leg [S81.801A]  Yes    Diabetic ulcer of right heel associated with diabetes mellitus due to underlying condition, limited to breakdown of skin [E08.621, L97.411]  Yes    CKD (chronic kidney disease) stage 4, GFR 15-29 ml/min [N18.4]  Yes    Hypothyroidism [E03.9]  Yes     Chronic    Morbid obesity with BMI of 50.0-59.9, adult [E66.01, Z68.43]  Not Applicable     Chronic    History of CVA (cerebrovascular accident) [Z86.73]  Not Applicable     Chronic    Hyperlipidemia  [E78.5]  Yes     Chronic    Essential hypertension [I10]  Yes     Chronic    Type 2 diabetes mellitus, uncontrolled, with renal complications [E11.29, E11.65]  Yes     Chronic      Resolved Hospital Problems   No resolved problems to display.       Wound of right leg  See media picture.  Right anterior ankle wound x 1 month.   - On antibiotics  - Culture pending    Diabetic ulcer of right heel associated with diabetes mellitus due to underlying condition, limited to breakdown of skin  See media picture.  Reports right heel pressure wound not healing x 3 months. Worsened since last week with increasing bleeding. Hx DM.  Wound with red, beefy, granular tissue and bleeding. Xray concerning for osseous infection. No leukocytosis.  Afebrile. Elevated sed rate and CRP.  MRI: Findings consistent with osteomyelitis of the calcaneus with complete to near complete disruption of the Achilles tendon.   - Vancomycin started in ED - continue with pharmacy dose  - Added Ceftriaxone  - Cultures pending (BC and Wound culture)  - ID, Wound Care, and Podiatry consulted    CKD (chronic kidney disease) stage 4, GFR 15-29 ml/min  sCr and BUN elevated 2.2 and 57.  Baseline sCr around 2 with previous value of 1.7 on 6/2018.   Patient reports she was referred to Nephrologist long time ago but she hasn't seen anyone yet.   - Renal dose all medication. Avoid nephrotoxin if possible.   - Continue to monitor. Consider consult nephrologist if sCr getting worse. Otherwise will see Nephrologist outpatient.    Hyperlipidemia  Continue statin    History of CVA (cerebrovascular accident)  Hx stroke 1986 with left side weakness per patient report. Reports not getting out of bed much since 4/2010 due to wound at right heel.  Patient is on ASA and statin. Continue ASA/Statin  - PT/OT evaluation    Morbid obesity with BMI of 50.0-59.9, adult  Body mass index is 46.98 kg/m².  Weight loss outpatient    Hypothyroidism  TSH wnl. Continue home management  with levothyroxine    Essential hypertension  Uncontrolled. Continuel home meds BB  - Hold ARB due to worsening kidney function    Type 2 diabetes mellitus, uncontrolled, with renal complications  On insulin (70/30) 40 units BID at home (2018). Patient reports she often has episodes of hypoglycemia of BS 60's.   The patient's hyperglycemia while IP  is managed with a SQ prandial and correction dose insulin regimen. Monitoring BGs.      Overview / ICU Course:    Tasneem Lynn is a 74 y.o. female admitted for Chronic osteomyelitis of right foot with draining sinus.    Inpatient Medications Prescribed for Management of Current Problems:     Scheduled Meds:    aspirin  81 mg Oral Daily    carvediloL  25 mg Oral BID WM    cefTRIAXone (ROCEPHIN) IVPB  1 g Intravenous Q24H    collagenase   Topical (Top) Daily    famotidine  20 mg Oral Daily    furosemide  20 mg Oral Daily    gabapentin  300 mg Oral TID    [START ON 6/16/2020] insulin aspart U-100  2 Units Subcutaneous TIDWM    levothyroxine  75 mcg Oral Before breakfast    losartan  50 mg Oral Daily    rosuvastatin  20 mg Oral Daily    senna-docusate 8.6-50 mg  1 tablet Oral BID     Continuous Infusions:   As Needed: acetaminophen, acetaminophen, dextrose 50%, dextrose 50%, glucagon (human recombinant), glucose, glucose, insulin aspart U-100, ondansetron, oxyCODONE, sodium chloride 0.9%, Pharmacy to dose Vancomycin consult **AND** vancomycin - pharmacy to dose    HIGH RISK CONDITION(S):   Patient is currently on drug therapy requiring intensive monitoring for toxicity: Vancomycin     Discharge plan and follow up  senior care Facility  [facility]   Previous admission:  11/11/19    Goals of Care:  Code Status: Full Code  Comfort Only: No  Hospice: No    Diet: Diet diabetic [facility]; 2000 Calorie; Renal; Isolation Tray - Regular [place]  GI Prophylaxis: Not indicated  Significant LDAs:   IV Access Type: Peripheral  Urinary Catheter Indication if present: Patient  Does Not Have Urinary Catheter  Other Lines/Tubes/Drains:    VTE Risk Mitigation (From admission, onward)         Ordered     IP VTE HIGH RISK PATIENT  Once      06/13/20 0036     Place sequential compression device  Until discontinued      06/13/20 0036     Place MADISON hose  Until discontinued      06/13/20 0036              The attending portion of this evaluation, treatment, and documentation was performed per Lesley Myles MD via audiovisual.      Lesley Myles MD  Department of Hospital Medicine   [facility]

## 2020-06-15 NOTE — CONSULTS
Consulted per ER for unstageable right heel; concern for acute osteo  A 74 year old female admitted 6/12/20 from home with diabetic ulcer of right heel associated with DM due to underlying condition; wound of right leg; CKD Stage 4; DM II uncontrolled; essential hypertension; HLD; hypothyroidism; history CVA-1986 with left sided weakness; morbid obesity  PMH: Arthritis (gout); CHF; CAD; DM II; HLD; HTN; obese; CVA 1986; hypothyroid  Reports right heel wound started 3 months ago and worsened last week; wound on dorsal foot developed around 1 month ago- has received HHN visits x 3 since developing wounds with last visit day of admission; reports history of several falls and then stopped walking with worsening of wound  6/15 WBC 9.35 Hgb 9.8 Hct 32.4   6/13 Alb 2.6 A1C 7.5   MRI - osteomyelitis to majority of right calcaneus  Podiatry consult- Bone biopsy and local wound care ordered; EHOB boot; ID consult; vascular studies  Wound care to foot daily with Santyl per nursing.   Photodocumentation:    Right posterior heel (imported to Media 6/15)    Right dorsal foot (imported to Media 6/15)  Plan: Will assist nursing as needed. Wound care treatment plan has been developed per Podiatry.   1530 Wearing EHOB boots bilaterally and talking on telephone.

## 2020-06-15 NOTE — CONSULTS
[facility]  Infectious Disease  Consult Note    Patient Name: Tasneem Lynn  MRN: [id number]  Admission Date: 6/12/2020  Hospital Length of Stay: 3 days  Attending Physician: Lesley Myles MD  Primary Care Provider: To Obtain Unable     Isolation Status: No active isolations    Patient information was obtained from patient, past medical records and ER records.      Inpatient consult to Infectious Diseases  Consult performed by: Adarsh Dash MD  Consult ordered by: Ulisses Mooney NP        Assessment/Plan:     * Chronic osteomyelitis of right foot with draining sinus  - culture is NGTD  - MRI c/w osteomyelitis of the calcaneus  - pathology is pending  - if cultures remain sterile, would treat with ceftriaxone and vancomycin for 6 weeks, following weekly labs      Thank you for your consult. I will follow-up with patient. Please contact us if you have any additional questions.    Adarsh Dash MD  Infectious Disease  [facility]    Subjective:     Principal Problem: Chronic osteomyelitis of right foot with draining sinus    HPI: This is a 74 y.o female with diabetes mellitus, hypertension, dyslipidemia, CHF, hypothyroidism, CKD stage 4, multiple chronic pains, obesity, and stroke (1986) who presents to the hospital with a chief complaint of worsening wound to the right heel for the last week. Patient reports her right heel wound has started 3 months ago and it seems got worse since last week with increasing bleeding. She states she believes the wound is infected. She reports she was prescribed an antibiotic (unsure name) 2 months ago by Angela James NP at an urgent care clinic but it didn't help much. She also reports another wound at her anterior right foot which is developed around 1 month ago. Patient reports she has been mostly staying on her bed and she has not been walking for 2 months due to her right heel wound. She reports the wound would bleed and have  more pain if she put pressure on her right foot. She denies fever, chill, chest pain, SOB, bilateral calves/leg pain, N/V/D, cough or sick contacts at home. She reports she does not go to wound care clinic. She states the home health nurse visited her 3 times since she started having this heel wound. The last visit was today.      In ED, COVID negative. X-ray right foot can not exclude the possibility of osseous infectious involvement.  Labs with elevated sed rate and CRP, c/s with CKD stage 4 with GFR 25 and elevated BUN&sCr 57&2.2.      Patient was admitted for further evaluation and treatment of right foot wound and EMMA. She was seen by Podiatry and an MRI demonstrated osteomyelitis of the calcaneous bone. A biopsy was performed for histopathology and culture. The culture is NGTD whilst the pathology is pending. I am consulted for abx recommendations. This morning, the patient feels well. She lives at home with her daughter. Her tetanus immunization status is unknown.       Past Medical History:   Diagnosis Date    Arthritis     Gout    CHF (congestive heart failure)     Coronary artery disease     Diabetes mellitus     HLD (hyperlipidemia)     Hypertension     Obese     Stroke     1986    Thyroid disease        Past Surgical History:   Procedure Laterality Date    right hand surgery      right knee surgery Right     partial plate       Review of patient's allergies indicates:   Allergen Reactions    Corticosteroids (glucocorticoids) Edema       Medications:  Medications Prior to Admission   Medication Sig    carvedilol (COREG) 25 MG tablet Take 25 mg by mouth 2 (two) times daily with meals.    insulin NPH-insulin regular, 70/30, (NOVOLIN 70/30) 100 unit/mL (70-30) injection Inject 40 Units into the skin 2 (two) times daily.    levothyroxine (SYNTHROID) 75 MCG tablet Take 1 tablet (75 mcg total) by mouth before breakfast.    losartan (COZAAR) 50 MG tablet Take 1 tablet (50 mg total) by mouth once  daily.    rosuvastatin (CRESTOR) 20 MG tablet Take 1 tablet (20 mg total) by mouth once daily.    acetaminophen (TYLENOL) 325 MG tablet Take 2 tablets (650 mg total) by mouth every 6 (six) hours as needed.    aspirin 81 MG Chew Take 81 mg by mouth once daily.    baclofen (LIORESAL) 5 mg Tab tablet Take 1 tablet (5 mg total) by mouth 3 (three) times daily.    blood pressure test kit-large Kit Use to check blood pressure daily and as needed.    cetirizine (ZYRTEC) 10 MG tablet Take 1 tablet (10 mg total) by mouth every evening.    cloNIDine (CATAPRES) 0.1 MG tablet Take 1 tablet (0.1 mg total) by mouth 3 (three) times daily. Blood pressure    diclofenac (VOLTAREN) 75 MG EC tablet Take 1 tablet (75 mg total) by mouth 2 (two) times daily.    ergocalciferol (ERGOCALCIFEROL) 50,000 unit Cap Take 1 capsule (50,000 Units total) by mouth every 7 days.    furosemide (LASIX) 20 MG tablet Take 1 tablet (20 mg total) by mouth once daily.    gabapentin (NEURONTIN) 300 MG capsule Take 1 capsule (300 mg total) by mouth 3 (three) times daily.    HYDROcodone-acetaminophen (NORCO) 7.5-325 mg per tablet Take 1 tablet by mouth every 12 (twelve) hours as needed (severe pain).     Antibiotics (From admission, onward)    Start     Stop Route Frequency Ordered    06/15/20 0915  vancomycin 500 mg in dextrose 5 % 100 mL IVPB (ready to mix system)      -- IV Once 06/15/20 0811    06/13/20 0145  cefTRIAXone (ROCEPHIN) 1 g/50 mL D5W IVPB      -- IV Every 24 hours (non-standard times) 06/13/20 0044    06/13/20 0141  vancomycin - pharmacy to dose  (vancomycin IVPB)      -- IV pharmacy to manage frequency 06/13/20 0041        Antifungals (From admission, onward)    None        Antivirals (From admission, onward)    None           Immunization History   Administered Date(s) Administered    PPD Test 06/14/2020       Family History     Problem Relation (Age of Onset)    Diabetes Maternal Aunt    Heart disease Sister, Maternal Aunt     Hypertension Maternal Aunt        Social History     Socioeconomic History    Marital status:      Spouse name: Not on file    Number of children: Not on file    Years of education: Not on file    Highest education level: Not on file   Occupational History    Not on file   Social Needs    Financial resource strain: Not on file    Food insecurity     Worry: Not on file     Inability: Not on file    Transportation needs     Medical: Not on file     Non-medical: Not on file   Tobacco Use    Smoking status: Never Smoker    Smokeless tobacco: Never Used   Substance and Sexual Activity    Alcohol use: No    Drug use: No    Sexual activity: Not Currently     Partners: Male   Lifestyle    Physical activity     Days per week: Not on file     Minutes per session: Not on file    Stress: Not on file   Relationships    Social connections     Talks on phone: Not on file     Gets together: Not on file     Attends [employer] service: Not on file     Active member of club or organization: Not on file     Attends meetings of clubs or organizations: Not on file     Relationship status: Not on file   Other Topics Concern    Not on file   Social History Narrative    Not on file     Review of Systems   Constitutional: Negative for chills and fever.   Skin: Positive for wound.   All other systems reviewed and are negative.    Objective:     Vital Signs (Most Recent):  Temp: 98.2 °F (36.8 °C) (06/15/20 0756)  Pulse: 75 (06/15/20 0756)  Resp: 16 (06/15/20 0756)  BP: (!) 154/68 (06/15/20 0756)  SpO2: (!) 94 % (06/15/20 0756) Vital Signs (24h Range):  Temp:  [98.2 °F (36.8 °C)-99.3 °F (37.4 °C)] 98.2 °F (36.8 °C)  Pulse:  [73-82] 75  Resp:  [16-20] 16  SpO2:  [92 %-98 %] 94 %  BP: (109-154)/(53-69) 154/68     Weight: 116.5 kg (256 lb 13.4 oz)  Body mass index is 46.98 kg/m².    Estimated Creatinine Clearance: 23 mL/min (A) (based on SCr of 2.6 mg/dL (H)).    Physical Exam  Vitals signs and nursing note reviewed.    Constitutional:       General: She is not in acute distress.     Appearance: She is well-developed and well-nourished. She is not diaphoretic.   HENT:      Head: Normocephalic and atraumatic.      Right Ear: External ear normal.      Left Ear: External ear normal.   Eyes:      Extraocular Movements: EOM normal.      Conjunctiva/sclera: Conjunctivae normal.      Pupils: Pupils are equal, round, and reactive to light.   Cardiovascular:      Rate and Rhythm: Normal rate and regular rhythm.      Pulses: Intact distal pulses.      Heart sounds: Murmur present.   Pulmonary:      Effort: Pulmonary effort is normal. No respiratory distress.      Breath sounds: Normal breath sounds. No stridor.   Abdominal:      General: Bowel sounds are normal. There is no distension.      Palpations: Abdomen is soft.      Tenderness: There is no abdominal tenderness.   Musculoskeletal: Normal range of motion.      Comments: Right foot dressed   Neurological:      Mental Status: She is alert and oriented to person, place, and time.      Cranial Nerves: No cranial nerve deficit.   Psychiatric:         Mood and Affect: Mood and affect normal.         Behavior: Behavior normal.         Thought Content: Thought content normal.         Significant Labs:   Blood Culture:   Recent Labs   Lab 06/12/20  2325 06/12/20  2339   LABBLOO No Growth to date  No Growth to date  No Growth to date Gram stain aer bottle: Gram positive cocci in clusters resembling Staph   Results called to and read back by: Nataliia Weber  06/13/2020  22:41  COAGULASE-NEGATIVE STAPHYLOCOCCUS SPECIES  Organism is a probable contaminant  *     CBC:   Recent Labs   Lab 06/14/20  0450 06/15/20  0624   WBC 8.55 9.35   HGB 9.8* 9.8*   HCT 33.4* 32.4*   * 368*     CMP:   Recent Labs   Lab 06/14/20  0450 06/15/20  0624    136   K 4.8 4.8    105   CO2 24 23   * 179*   BUN 60* 68*   CREATININE 2.2* 2.6*   CALCIUM 8.5* 8.5*   ANIONGAP 8 8   EGFRNONAA 21*  18*     Wound Culture:   Recent Labs   Lab 06/13/20  1405   LABAERO No growth       Significant Imaging: I have reviewed all pertinent imaging results/findings within the past 24 hours.

## 2020-06-15 NOTE — ASSESSMENT & PLAN NOTE
On insulin (70/30) 40 units BID at home (2018). Patient reports she often has episodes of hypoglycemia of BS 60's.   The patient's hyperglycemia while IP  is managed with a SQ prandial and correction dose insulin regimen. Monitoring BGs.

## 2020-06-15 NOTE — SUBJECTIVE & OBJECTIVE
Admission CC:   Chief Complaint   Patient presents with    Wound Check     EMS reports pt c/o infected diabetic ulcer to the right heel, worsening x 3 months. denies pain     Follow up visit for: Chronic osteomyelitis of right foot with draining sinus    Interval History / Events Overnight:   The patient is able to provide adequate history. Additional history was obtained from past medical records. No significant events reported by Nursing.  Patient complains of no specific complaints. Symptoms have been unchanged since yesterday. Associated symptoms include: fatigue and malaise .. Alleviating factors include: nothing. Response to treatment since yesterday: Not measurable.    Data reviewed 6/15/2020: Lab test(s) reviewed: H&H stable    Review of Systems   Constitutional: Negative for fever.   Respiratory: Negative for shortness of breath.      Objective:     Vital Signs (Most Recent):  Temp: 97.9 °F (36.6 °C) (06/15/20 1711)  Pulse: 73 (06/15/20 1711)  Resp: 18 (06/15/20 1711)  BP: 135/64 (06/15/20 1711)  SpO2: 100 % (06/15/20 1711) Vital Signs (24h Range):  Temp:  [97.9 °F (36.6 °C)-98.6 °F (37 °C)] 97.9 °F (36.6 °C)  Pulse:  [73-80] 73  Resp:  [16-20] 18  SpO2:  [94 %-100 %] 100 %  BP: (135-154)/(60-69) 135/64     Weight: 116.5 kg (256 lb 13.4 oz)  Body mass index is 46.98 kg/m².    Intake/Output Summary (Last 24 hours) at 6/15/2020 0838  Last data filed at 6/15/2020 1300  Gross per 24 hour   Intake 240 ml   Output --   Net 240 ml      Physical Exam  Constitutional:       General: She is not in acute distress.     Appearance: She is morbidly obese. She is not diaphoretic.   Eyes:      General: Lids are normal. No scleral icterus.        Right eye: No discharge.         Left eye: No discharge.      Conjunctiva/sclera: Conjunctivae normal.   Cardiovascular:      Rate and Rhythm: Regular rhythm.   Pulmonary:      Effort: Pulmonary effort is normal. No tachypnea, accessory muscle usage or respiratory distress.    Abdominal:      General: Abdomen is protuberant. There is no distension.   Neurological:      Mental Status: She is alert and oriented to person, place, and time. She is not disoriented.   Psychiatric:         Behavior: Behavior is cooperative.       Significant Labs:   Recent Labs   Lab 06/13/20  0513 06/14/20  0450 06/15/20  0624   WBC 10.44 8.55 9.35   HGB 10.9* 9.8* 9.8*   HCT 37.3 33.4* 32.4*   * 385* 368*     Recent Labs   Lab 06/13/20  0513 06/14/20  0450 06/15/20  0624   GRAN 55.0  5.8 41.2  3.5 36.8*  3.4   LYMPH 31.7  3.3 43.9  3.8 48.0  4.5   MONO 9.1  1.0 9.8  0.8 9.7  0.9   EOS 0.3 0.4 0.5     Recent Labs   Lab 06/12/20  1917 06/13/20  0513 06/14/20  0450 06/15/20  0624    137 137 136   K 4.5 4.7 4.8 4.8    103 105 105   CO2 23 26 24 23   BUN 57* 56* 60* 68*   CREATININE 2.2* 2.1* 2.2* 2.6*   * 120* 162* 179*   CALCIUM 9.1 9.2 8.5* 8.5*   ALBUMIN 2.7* 2.6*  --   --      Recent Labs   Lab 06/12/20 1917 06/13/20  0513   ALKPHOS 130 121   ALT 9* 8*   AST 15 13   PROT 9.3* 9.1*   BILITOT 0.3 0.3   INR  --  1.0   CRP 17.7*  --      Recent Labs   Lab 06/12/20 1917   CPK 32   TROPONINI <0.006     Recent Labs   Lab 06/12/20 1917   SEDRATE 108*     SARS-CoV-2 RNA, Amplification, Qual (no units)   Date Value   06/12/2020 Negative     Blood Culture, Routine (no units)   Date Value   06/15/2020 No Growth to date   06/12/2020     Gram stain aer bottle: Gram positive cocci in clusters resembling Staph    06/12/2020     Results called to and read back by: Nataliia Weber  06/13/2020  22:41   06/12/2020 (A)    COAGULASE-NEGATIVE STAPHYLOCOCCUS SPECIES  Organism is a probable contaminant     06/12/2020 No Growth to date   06/12/2020 No Growth to date   06/12/2020 No Growth to date     Recent Labs   Lab 06/13/20 0513   HGBA1C 7.5*     Recent Labs   Lab 06/15/20  0757 06/15/20  1135 06/15/20  1715   POCTGLUCOSE 177* 192* 231*     Recent Labs   Lab 06/13/20 0513   TSH 1.213        Significant Imaging:

## 2020-06-15 NOTE — SUBJECTIVE & OBJECTIVE
Past Medical History:   Diagnosis Date    Arthritis     Gout    CHF (congestive heart failure)     Coronary artery disease     Diabetes mellitus     HLD (hyperlipidemia)     Hypertension     Obese     Stroke     1986    Thyroid disease        Past Surgical History:   Procedure Laterality Date    right hand surgery      right knee surgery Right     partial plate       Review of patient's allergies indicates:   Allergen Reactions    Corticosteroids (glucocorticoids) Edema       Medications:  Medications Prior to Admission   Medication Sig    carvedilol (COREG) 25 MG tablet Take 25 mg by mouth 2 (two) times daily with meals.    insulin NPH-insulin regular, 70/30, (NOVOLIN 70/30) 100 unit/mL (70-30) injection Inject 40 Units into the skin 2 (two) times daily.    levothyroxine (SYNTHROID) 75 MCG tablet Take 1 tablet (75 mcg total) by mouth before breakfast.    losartan (COZAAR) 50 MG tablet Take 1 tablet (50 mg total) by mouth once daily.    rosuvastatin (CRESTOR) 20 MG tablet Take 1 tablet (20 mg total) by mouth once daily.    acetaminophen (TYLENOL) 325 MG tablet Take 2 tablets (650 mg total) by mouth every 6 (six) hours as needed.    aspirin 81 MG Chew Take 81 mg by mouth once daily.    baclofen (LIORESAL) 5 mg Tab tablet Take 1 tablet (5 mg total) by mouth 3 (three) times daily.    blood pressure test kit-large Kit Use to check blood pressure daily and as needed.    cetirizine (ZYRTEC) 10 MG tablet Take 1 tablet (10 mg total) by mouth every evening.    cloNIDine (CATAPRES) 0.1 MG tablet Take 1 tablet (0.1 mg total) by mouth 3 (three) times daily. Blood pressure    diclofenac (VOLTAREN) 75 MG EC tablet Take 1 tablet (75 mg total) by mouth 2 (two) times daily.    ergocalciferol (ERGOCALCIFEROL) 50,000 unit Cap Take 1 capsule (50,000 Units total) by mouth every 7 days.    furosemide (LASIX) 20 MG tablet Take 1 tablet (20 mg total) by mouth once daily.    gabapentin (NEURONTIN) 300 MG capsule  Take 1 capsule (300 mg total) by mouth 3 (three) times daily.    HYDROcodone-acetaminophen (NORCO) 7.5-325 mg per tablet Take 1 tablet by mouth every 12 (twelve) hours as needed (severe pain).     Antibiotics (From admission, onward)    Start     Stop Route Frequency Ordered    06/15/20 0915  vancomycin 500 mg in dextrose 5 % 100 mL IVPB (ready to mix system)      -- IV Once 06/15/20 0811    06/13/20 0145  cefTRIAXone (ROCEPHIN) 1 g/50 mL D5W IVPB      -- IV Every 24 hours (non-standard times) 06/13/20 0044    06/13/20 0141  vancomycin - pharmacy to dose  (vancomycin IVPB)      -- IV pharmacy to manage frequency 06/13/20 0041        Antifungals (From admission, onward)    None        Antivirals (From admission, onward)    None           Immunization History   Administered Date(s) Administered    PPD Test 06/14/2020       Family History     Problem Relation (Age of Onset)    Diabetes Maternal Aunt    Heart disease Sister, Maternal Aunt    Hypertension Maternal Aunt        Social History     Socioeconomic History    Marital status:      Spouse name: Not on file    Number of children: Not on file    Years of education: Not on file    Highest education level: Not on file   Occupational History    Not on file   Social Needs    Financial resource strain: Not on file    Food insecurity     Worry: Not on file     Inability: Not on file    Transportation needs     Medical: Not on file     Non-medical: Not on file   Tobacco Use    Smoking status: Never Smoker    Smokeless tobacco: Never Used   Substance and Sexual Activity    Alcohol use: No    Drug use: No    Sexual activity: Not Currently     Partners: Male   Lifestyle    Physical activity     Days per week: Not on file     Minutes per session: Not on file    Stress: Not on file   Relationships    Social connections     Talks on phone: Not on file     Gets together: Not on file     Attends [employer] service: Not on file     Active member of club or  organization: Not on file     Attends meetings of clubs or organizations: Not on file     Relationship status: Not on file   Other Topics Concern    Not on file   Social History Narrative    Not on file     Review of Systems   Constitutional: Negative for chills and fever.   Skin: Positive for wound.   All other systems reviewed and are negative.    Objective:     Vital Signs (Most Recent):  Temp: 98.2 °F (36.8 °C) (06/15/20 0756)  Pulse: 75 (06/15/20 0756)  Resp: 16 (06/15/20 0756)  BP: (!) 154/68 (06/15/20 0756)  SpO2: (!) 94 % (06/15/20 0756) Vital Signs (24h Range):  Temp:  [98.2 °F (36.8 °C)-99.3 °F (37.4 °C)] 98.2 °F (36.8 °C)  Pulse:  [73-82] 75  Resp:  [16-20] 16  SpO2:  [92 %-98 %] 94 %  BP: (109-154)/(53-69) 154/68     Weight: 116.5 kg (256 lb 13.4 oz)  Body mass index is 46.98 kg/m².    Estimated Creatinine Clearance: 23 mL/min (A) (based on SCr of 2.6 mg/dL (H)).    Physical Exam  Vitals signs and nursing note reviewed.   Constitutional:       General: She is not in acute distress.     Appearance: She is well-developed and well-nourished. She is not diaphoretic.   HENT:      Head: Normocephalic and atraumatic.      Right Ear: External ear normal.      Left Ear: External ear normal.   Eyes:      Extraocular Movements: EOM normal.      Conjunctiva/sclera: Conjunctivae normal.      Pupils: Pupils are equal, round, and reactive to light.   Cardiovascular:      Rate and Rhythm: Normal rate and regular rhythm.      Pulses: Intact distal pulses.      Heart sounds: Murmur present.   Pulmonary:      Effort: Pulmonary effort is normal. No respiratory distress.      Breath sounds: Normal breath sounds. No stridor.   Abdominal:      General: Bowel sounds are normal. There is no distension.      Palpations: Abdomen is soft.      Tenderness: There is no abdominal tenderness.   Musculoskeletal: Normal range of motion.      Comments: Right foot dressed   Neurological:      Mental Status: She is alert and oriented to  person, place, and time.      Cranial Nerves: No cranial nerve deficit.   Psychiatric:         Mood and Affect: Mood and affect normal.         Behavior: Behavior normal.         Thought Content: Thought content normal.         Significant Labs:   Blood Culture:   Recent Labs   Lab 06/12/20  2325 06/12/20  2339   LABBLOO No Growth to date  No Growth to date  No Growth to date Gram stain aer bottle: Gram positive cocci in clusters resembling Staph   Results called to and read back by: Nataliia Weber  06/13/2020  22:41  COAGULASE-NEGATIVE STAPHYLOCOCCUS SPECIES  Organism is a probable contaminant  *     CBC:   Recent Labs   Lab 06/14/20  0450 06/15/20  0624   WBC 8.55 9.35   HGB 9.8* 9.8*   HCT 33.4* 32.4*   * 368*     CMP:   Recent Labs   Lab 06/14/20  0450 06/15/20  0624    136   K 4.8 4.8    105   CO2 24 23   * 179*   BUN 60* 68*   CREATININE 2.2* 2.6*   CALCIUM 8.5* 8.5*   ANIONGAP 8 8   EGFRNONAA 21* 18*     Wound Culture:   Recent Labs   Lab 06/13/20  1405   LABAERO No growth       Significant Imaging: I have reviewed all pertinent imaging results/findings within the past 24 hours.

## 2020-06-15 NOTE — ASSESSMENT & PLAN NOTE
- culture is NGTD  - MRI c/w osteomyelitis of the calcaneus  - pathology is pending  - if cultures remain sterile, would treat with ceftriaxone and vancomycin for 6 weeks, following weekly labs

## 2020-06-15 NOTE — NURSING
Turn q2 hours, reposition for comfort; vss, afebrile.  No c/o pain or distress.  Plan of care reviewed with pt; verbalized understanding.

## 2020-06-15 NOTE — HPI
This is a 74 y.o female with diabetes mellitus, hypertension, dyslipidemia, CHF, hypothyroidism, CKD stage 4, multiple chronic pains, obesity, and stroke (1986) who presents to the hospital with a chief complaint of worsening wound to the right heel for the last week. Patient reports her right heel wound has started 3 months ago and it seems got worse since last week with increasing bleeding. She states she believes the wound is infected. She reports she was prescribed an antibiotic (unsure name) 2 months ago by Angela James NP at an urgent care clinic but it didn't help much. She also reports another wound at her anterior right foot which is developed around 1 month ago. Patient reports she has been mostly staying on her bed and she has not been walking for 2 months due to her right heel wound. She reports the wound would bleed and have more pain if she put pressure on her right foot. She denies fever, chill, chest pain, SOB, bilateral calves/leg pain, N/V/D, cough or sick contacts at home. She reports she does not go to wound care clinic. She states the home health nurse visited her 3 times since she started having this heel wound. The last visit was today.      In ED, COVID negative. X-ray right foot can not exclude the possibility of osseous infectious involvement.  Labs with elevated sed rate and CRP, c/s with CKD stage 4 with GFR 25 and elevated BUN&sCr 57&2.2.      Patient was admitted for further evaluation and treatment of right foot wound and EMMA. She was seen by Podiatry and an MRI demonstrated osteomyelitis of the calcaneous bone. A biopsy was performed for histopathology and culture. The culture is NGTD whilst the pathology is pending. I am consulted for abx recommendations. This morning, the patient feels well. She lives at home with her daughter. Her tetanus immunization status is unknown.

## 2020-06-15 NOTE — CONSULTS
[facility] [place]  Vascular Surgery  Consult Note    Inpatient consult to Vascular Surgery  Consult performed by: Regulo Bunch MD  Consult ordered by: Bebeto Pruett MD        Subjective:     Chief Complaint/Reason for Admission: R heel wound    History of Present Illness:             Regulo Bunch MD VI                       [facility]                         06/15/2020    HPI:  Tasneem Lynn is a 74 y.o. female with   Patient Active Problem List   Diagnosis    Type 2 diabetes mellitus, uncontrolled, with renal complications    Essential hypertension    Hypothyroidism    Morbid obesity with BMI of 50.0-59.9, adult    History of CVA (cerebrovascular accident)    Chronic gout    Hyperlipidemia    CKD (chronic kidney disease) stage 4, GFR 15-29 ml/min    Osteoarthritis involving multiple joints on both sides of body    Chronic allergic rhinitis    Vitamin D deficiency    Aortic atherosclerosis    Diabetic ulcer of right heel associated with diabetes mellitus due to underlying condition, limited to breakdown of skin    Wound of right leg    Chronic osteomyelitis of right foot with draining sinus    being managed by PCP and specialists who is here today for evaluation of R heel wound.  Patient states location is R heel occurring for 3 months.  Has not ambulated or transferred since then although prev was ambulating.  Associated signs and symptoms include pain.  Quality is aching and severity is 6/10.  Symptoms began 3 mo ago.  Alleviating factors include wound care and offloading.  Worsening factors include pressure.    no MI  no Stroke  Tobacco use: denies    Past Medical History:   Diagnosis Date    Arthritis     Gout    CHF (congestive heart failure)     Coronary artery disease     Diabetes mellitus     HLD (hyperlipidemia)     Hypertension     Obese     Stroke     1986    Thyroid disease      Past Surgical History:   Procedure Laterality Date     right hand surgery      right knee surgery Right     partial plate     Family History   Problem Relation Age of Onset    Heart disease Sister     Diabetes Maternal Aunt     Heart disease Maternal Aunt     Hypertension Maternal Aunt      Social History     Socioeconomic History    Marital status:      Spouse name: Not on file    Number of children: Not on file    Years of education: Not on file    Highest education level: Not on file   Occupational History    Not on file   Social Needs    Financial resource strain: Not on file    Food insecurity     Worry: Not on file     Inability: Not on file    Transportation needs     Medical: Not on file     Non-medical: Not on file   Tobacco Use    Smoking status: Never Smoker    Smokeless tobacco: Never Used   Substance and Sexual Activity    Alcohol use: No    Drug use: No    Sexual activity: Not Currently     Partners: Male   Lifestyle    Physical activity     Days per week: Not on file     Minutes per session: Not on file    Stress: Not on file   Relationships    Social connections     Talks on phone: Not on file     Gets together: Not on file     Attends Yarsanism service: Not on file     Active member of club or organization: Not on file     Attends meetings of clubs or organizations: Not on file     Relationship status: Not on file   Other Topics Concern    Not on file   Social History Narrative    Not on file       Current Facility-Administered Medications:     acetaminophen tablet 650 mg, 650 mg, Oral, Q8H PRN, Chun Mak MD    acetaminophen tablet 650 mg, 650 mg, Oral, Q8H PRN, Chun Mak MD, 650 mg at 06/13/20 2039    aspirin chewable tablet 81 mg, 81 mg, Oral, Daily, Ulisses Mooney NP, 81 mg at 06/15/20 1239    carvediloL tablet 25 mg, 25 mg, Oral, BID WM, Ulisses Mooney NP, 25 mg at 06/15/20 1223    cefTRIAXone (ROCEPHIN) 1 g/50 mL D5W IVPB, 1 g, Intravenous, Q24H, Ulisses Mooney NP, 1 g at 06/15/20 0226     collagenase ointment, , Topical (Top), Daily, Bebeto Pruett MD    dextrose 50% injection 12.5 g, 12.5 g, Intravenous, PRN, Ulisses Mooney NP    famotidine tablet 20 mg, 20 mg, Oral, Daily, Chun Mak MD, 20 mg at 06/15/20 1223    furosemide tablet 20 mg, 20 mg, Oral, Daily, Carlos Sommers MD, 20 mg at 06/15/20 1223    gabapentin capsule 300 mg, 300 mg, Oral, TID, Carlos Sommers MD, 300 mg at 06/15/20 1222    glucagon (human recombinant) injection 1 mg, 1 mg, Intramuscular, PRN, Ulisses Mooney NP    hydrALAZINE injection 10 mg, 10 mg, Intravenous, Once, Skyla Faulkner MD, Stopped at 06/13/20 0545    insulin aspart U-100 pen 0-5 Units, 0-5 Units, Subcutaneous, Q6H PRN, Ulisses Mooney NP, 2 Units at 06/14/20 1705    levothyroxine tablet 75 mcg, 75 mcg, Oral, Before breakfast, Ulisses Mooney NP, 75 mcg at 06/15/20 0642    losartan tablet 50 mg, 50 mg, Oral, Daily, Carlos Sommers MD, 50 mg at 06/15/20 1223    ondansetron injection 4 mg, 4 mg, Intravenous, Q8H PRN, Chun Mak MD    oxyCODONE immediate release tablet 5 mg, 5 mg, Oral, Q4H PRN, Chun Mak MD    rosuvastatin tablet 20 mg, 20 mg, Oral, Daily, Ulisses Mooney NP, 20 mg at 06/15/20 1222    senna-docusate 8.6-50 mg per tablet 1 tablet, 1 tablet, Oral, BID, Chun Mak MD, 1 tablet at 06/15/20 1223    sodium chloride 0.9% flush 10 mL, 10 mL, Intravenous, PRN, Chun Mak MD    Pharmacy to dose Vancomycin consult, , , Once **AND** vancomycin - pharmacy to dose, , Intravenous, pharmacy to manage frequency, Ulisses Mooney NP      Medications Prior to Admission   Medication Sig Dispense Refill Last Dose    carvedilol (COREG) 25 MG tablet Take 25 mg by mouth 2 (two) times daily with meals.   6/12/2020    insulin NPH-insulin regular, 70/30, (NOVOLIN 70/30) 100 unit/mL (70-30) injection Inject 40 Units into the skin 2 (two) times daily. 3 vial 6 6/12/2020    levothyroxine  (SYNTHROID) 75 MCG tablet Take 1 tablet (75 mcg total) by mouth before breakfast. 90 tablet 1 6/12/2020    losartan (COZAAR) 50 MG tablet Take 1 tablet (50 mg total) by mouth once daily. 90 tablet 1 6/12/2020    rosuvastatin (CRESTOR) 20 MG tablet Take 1 tablet (20 mg total) by mouth once daily. 90 tablet 3 6/12/2020    acetaminophen (TYLENOL) 325 MG tablet Take 2 tablets (650 mg total) by mouth every 6 (six) hours as needed. 13 tablet 0     aspirin 81 MG Chew Take 81 mg by mouth once daily.       baclofen (LIORESAL) 5 mg Tab tablet Take 1 tablet (5 mg total) by mouth 3 (three) times daily. 11 tablet 0     blood pressure test kit-large Kit Use to check blood pressure daily and as needed. 1 each 0     cetirizine (ZYRTEC) 10 MG tablet Take 1 tablet (10 mg total) by mouth every evening. 90 tablet 1     cloNIDine (CATAPRES) 0.1 MG tablet Take 1 tablet (0.1 mg total) by mouth 3 (three) times daily. Blood pressure 270 tablet 1     diclofenac (VOLTAREN) 75 MG EC tablet Take 1 tablet (75 mg total) by mouth 2 (two) times daily. 30 tablet 1     ergocalciferol (ERGOCALCIFEROL) 50,000 unit Cap Take 1 capsule (50,000 Units total) by mouth every 7 days. 12 capsule 1     furosemide (LASIX) 20 MG tablet Take 1 tablet (20 mg total) by mouth once daily. 90 tablet 1     gabapentin (NEURONTIN) 300 MG capsule Take 1 capsule (300 mg total) by mouth 3 (three) times daily. 270 capsule 1     HYDROcodone-acetaminophen (NORCO) 7.5-325 mg per tablet Take 1 tablet by mouth every 12 (twelve) hours as needed (severe pain). 60 tablet 0        Review of patient's allergies indicates:   Allergen Reactions    Corticosteroids (glucocorticoids) Edema       Past Medical History:   Diagnosis Date    Arthritis     Gout    CHF (congestive heart failure)     Coronary artery disease     Diabetes mellitus     HLD (hyperlipidemia)     Hypertension     Obese     Stroke     1986    Thyroid disease      Past Surgical History:   Procedure  Laterality Date    right hand surgery      right knee surgery Right     partial plate     Family History     Problem Relation (Age of Onset)    Diabetes Maternal Aunt    Heart disease Sister, Maternal Aunt    Hypertension Maternal Aunt        Tobacco Use    Smoking status: Never Smoker    Smokeless tobacco: Never Used   Substance and Sexual Activity    Alcohol use: No    Drug use: No    Sexual activity: Not Currently     Partners: Male     Review of Systems   Constitutional: Negative for chills.   HENT: Negative for congestion.    Eyes: Negative for visual disturbance.   Respiratory: Negative for shortness of breath.    Cardiovascular: Negative for chest pain.   Gastrointestinal: Negative for abdominal distention.   Endocrine: Negative for cold intolerance.   Genitourinary: Negative for flank pain.   Musculoskeletal: Negative for back pain.   Skin: Positive for color change and wound. Negative for pallor and rash.   Allergic/Immunologic: Negative for immunocompromised state.   Neurological: Negative for dizziness.   Hematological: Does not bruise/bleed easily.   Psychiatric/Behavioral: Negative for agitation.     Objective:     Vital Signs (Most Recent):  Temp: 97.9 °F (36.6 °C) (06/15/20 1131)  Pulse: 80 (06/15/20 1131)  Resp: 18 (06/15/20 1131)  BP: (!) 151/69 (06/15/20 1131)  SpO2: 99 % (06/15/20 1131) Vital Signs (24h Range):  Temp:  [97.9 °F (36.6 °C)-99.3 °F (37.4 °C)] 97.9 °F (36.6 °C)  Pulse:  [73-80] 80  Resp:  [16-20] 18  SpO2:  [92 %-99 %] 99 %  BP: (116-154)/(53-69) 151/69     Weight: 116.5 kg (256 lb 13.4 oz)  Body mass index is 46.98 kg/m².    Physical Exam  Vitals signs reviewed.   Constitutional:       General: She is not in acute distress.     Appearance: She is well-developed and well-nourished. She is not diaphoretic.   HENT:      Head: Normocephalic and atraumatic.   Eyes:      Conjunctiva/sclera: Conjunctivae normal.   Neck:      Musculoskeletal: Neck supple.   Cardiovascular:      Rate  and Rhythm: Normal rate.      Pulses:           Femoral pulses are 2+ on the right side and 2+ on the left side.       Dorsalis pedis pulses are detected w/ Doppler on the right side and detected w/ Doppler on the left side.        Posterior tibial pulses are detected w/ Doppler on the right side and detected w/ Doppler on the left side.   Pulmonary:      Effort: Pulmonary effort is normal. No respiratory distress.   Abdominal:      General: There is no distension.      Palpations: Abdomen is soft. There is no mass.      Tenderness: There is no abdominal tenderness. There is no guarding or rebound.      Hernia: No hernia is present.   Musculoskeletal: Normal range of motion.         General: Tenderness and edema present. No deformity.   Feet:      Right foot:      Skin integrity: Ulcer and skin breakdown present.   Skin:     General: Skin is warm and dry.      Capillary Refill: Capillary refill takes 2 to 3 seconds.      Coloration: Skin is not pale.      Findings: No erythema or rash.   Neurological:      Mental Status: She is alert and oriented to person, place, and time.      Sensory: Sensory deficit present.   Psychiatric:         Mood and Affect: Mood and affect normal.         Significant Labs:  All pertinent labs from the last 24 hours have been reviewed.    Significant Diagnostics:  I have reviewed all pertinent imaging results/findings within the past 24 hours.    Assessment/Plan:     Atherosclerosis of native arteries of right leg with ulceration of heel and midfoot  -rec toe pressures.  Risks of angiogram/revascularization discussed with pt including dialysis dependence renal failure.  Will review toe pressures and healing potential and discuss treatment options.  Wound appears to be bleeding well on images during debridement.  Alternative treatment is major amputation.  Pt desires to consider her options and discuss further with me prior to making a decision  -Cont offloading, glycemic control and  Abx  -cont wound care by RN and Podiatry         Thank you for your consult. I will follow-up with patient. Please contact us if you have any additional questions.    Regulo Bunch MD  Vascular Surgery  [facility]

## 2020-06-15 NOTE — PROGRESS NOTES
Pharmacokinetic Assessment Follow Up: IV Vancomycin    Vancomycin serum concentration assessment(s):    The random level was drawn correctly and can be used to guide therapy at this time. The measurement is within the desired definitive target range of 10 to 20 mcg/mL.    Vancomycin Regimen Plan:    Give 500  mg today.  Re-dose when the random level is less than 20 mcg/mL, next level to be drawn at 0400 on 6/16/2020    Drug levels (last 3 results):  Recent Labs   Lab Result Units 06/14/20  0450 06/15/20  0624   Vancomycin, Random ug/mL 23.1 18.6       Pharmacy will continue to follow and monitor vancomycin.    Please contact pharmacy at extension [phone] for questions regarding this assessment.    Thank you for the consult,   Can Samuel Jr       Patient brief summary:  Tasneem Lynn is a 74 y.o. female initiated on antimicrobial therapy with IV Vancomycin for treatment of skin & soft tissue infection    Drug Allergies:   Review of patient's allergies indicates:   Allergen Reactions    Corticosteroids (glucocorticoids) Edema       Actual Body Weight:   116.5 kg    Renal Function:   Estimated Creatinine Clearance: 23 mL/min (A) (based on SCr of 2.6 mg/dL (H)).,     Dialysis Method (if applicable):  N/A    CBC (last 72 hours):  Recent Labs   Lab Result Units 06/12/20 1917 06/13/20  0513 06/14/20  0450 06/15/20  0624   WBC K/uL 10.68 10.44 8.55 9.35   Hemoglobin g/dL 11.2* 10.9* 9.8* 9.8*   Hemoglobin A1C %  --  7.5*  --   --    Hematocrit % 37.9 37.3 33.4* 32.4*   Platelets K/uL 439* 427* 385* 368*   Gran% % 39.8 55.0 41.2 36.8*   Lymph% % 48.0 31.7 43.9 48.0   Mono% % 7.7 9.1 9.8 9.7   Eosinophil% % 3.6 3.1 4.2 4.8   Basophil% % 0.6 0.5 0.5 0.4   Differential Method  Automated Automated Automated Automated       Metabolic Panel (last 72 hours):  Recent Labs   Lab Result Units 06/12/20 1917 06/13/20  0513 06/14/20  0450 06/15/20  0624   Sodium mmol/L 138 137 137 136   Potassium mmol/L 4.5 4.7 4.8 4.8   Chloride  mmol/L 105 103 105 105   CO2 mmol/L 23 26 24 23   Glucose mg/dL 145* 120* 162* 179*   BUN, Bld mg/dL 57* 56* 60* 68*   Creatinine mg/dL 2.2* 2.1* 2.2* 2.6*   Albumin g/dL 2.7* 2.6*  --   --    Total Bilirubin mg/dL 0.3 0.3  --   --    Alkaline Phosphatase U/L 130 121  --   --    AST U/L 15 13  --   --    ALT U/L 9* 8*  --   --        Vancomycin Administrations:  vancomycin given in the last 96 hours                     vancomycin (VANCOCIN) 2,250 mg in dextrose 5 % 500 mL IVPB (mg) 2,250 mg New Bag 06/12/20 2345                    Microbiologic Results:  Microbiology Results (last 7 days)       Procedure Component Value Units Date/Time    Aerobic culture [[id number]] Collected: 06/13/20 1405    Order Status: Completed Specimen: Wound from Foot, Right Updated: 06/15/20 0753     Aerobic Bacterial Culture No growth    Blood culture [[id number]] Collected: 06/15/20 0624    Order Status: Sent Specimen: Blood from Antecubital, Right Updated: 06/15/20 0657    Blood Culture #1 **CANNOT BE ORDERED STAT** [[id number]] Collected: 06/12/20 2325    Order Status: Completed Specimen: Blood from Peripheral, Antecubital, Right Updated: 06/15/20 0503     Blood Culture, Routine No Growth to date      No Growth to date      No Growth to date    AFB Culture & Smear [[id number]] Collected: 06/13/20 1405    Order Status: Sent Specimen: Wound from Foot, Right Updated: 06/14/20 1348    Blood Culture #2 **CANNOT BE ORDERED STAT** [[id number]]  (Abnormal) Collected: 06/12/20 2339    Order Status: Completed Specimen: Blood from Peripheral, Hand, Right Updated: 06/14/20 1331     Blood Culture, Routine Gram stain aer bottle: Gram positive cocci in clusters resembling Staph       Results called to and read back by: Nataliia Weber  06/13/2020  22:41      COAGULASE-NEGATIVE STAPHYLOCOCCUS SPECIES  Organism is a probable contaminant      Gram stain [[id number]] Collected: 06/13/20 1405    Order Status: Completed Specimen: Wound from Foot, Right Updated:  06/13/20 1459     Gram Stain Result No WBC's      No organisms seen    Culture, Anaerobe [[id number]] Collected: 06/13/20 1405    Order Status: Sent Specimen: Wound from Foot, Right Updated: 06/13/20 1427    Aerobic culture [[id number]] Collected: 06/13/20 1405    Order Status: Canceled Specimen: Wound from Foot, Right     Blood culture [[id number]]     Order Status: Canceled Specimen: Blood

## 2020-06-15 NOTE — PT/OT/SLP PROGRESS
Occupational Therapy      Patient Name:  Tasneem Lynn   MRN:  [id number]    Patient not seen today secondary to Unavailable (Comment)(pt off the floor for medical testing). Will follow-up later as able.    Marie Beltre OT  6/15/2020

## 2020-06-16 ENCOUNTER — TELEPHONE (OUTPATIENT)
Dept: PODIATRY | Facility: CLINIC | Age: 75
End: 2020-06-16

## 2020-06-16 LAB
ALBUMIN SERPL BCP-MCNC: 2.4 G/DL (ref 3.5–5.2)
ALP SERPL-CCNC: 104 U/L (ref 55–135)
ALT SERPL W/O P-5'-P-CCNC: 11 U/L (ref 10–44)
ANION GAP SERPL CALC-SCNC: 7 MMOL/L (ref 8–16)
AST SERPL-CCNC: 11 U/L (ref 10–40)
BACTERIA BLD CULT: ABNORMAL
BASOPHILS # BLD AUTO: 0.06 K/UL (ref 0–0.2)
BASOPHILS NFR BLD: 0.6 % (ref 0–1.9)
BILIRUB SERPL-MCNC: 0.2 MG/DL (ref 0.1–1)
BUN SERPL-MCNC: 71 MG/DL (ref 8–23)
CALCIUM SERPL-MCNC: 8.6 MG/DL (ref 8.7–10.5)
CHLORIDE SERPL-SCNC: 105 MMOL/L (ref 95–110)
CO2 SERPL-SCNC: 23 MMOL/L (ref 23–29)
CREAT SERPL-MCNC: 2.6 MG/DL (ref 0.5–1.4)
DIFFERENTIAL METHOD: ABNORMAL
EOSINOPHIL # BLD AUTO: 0.4 K/UL (ref 0–0.5)
EOSINOPHIL NFR BLD: 4.5 % (ref 0–8)
ERYTHROCYTE [DISTWIDTH] IN BLOOD BY AUTOMATED COUNT: 14.3 % (ref 11.5–14.5)
EST. GFR  (AFRICAN AMERICAN): 20 ML/MIN/1.73 M^2
EST. GFR  (NON AFRICAN AMERICAN): 18 ML/MIN/1.73 M^2
FINAL PATHOLOGIC DIAGNOSIS: NORMAL
GLUCOSE SERPL-MCNC: 252 MG/DL (ref 70–110)
GROSS: NORMAL
HCT VFR BLD AUTO: 32.1 % (ref 37–48.5)
HGB BLD-MCNC: 9.5 G/DL (ref 12–16)
IMM GRANULOCYTES # BLD AUTO: 0.04 K/UL (ref 0–0.04)
IMM GRANULOCYTES NFR BLD AUTO: 0.4 % (ref 0–0.5)
LYMPHOCYTES # BLD AUTO: 3.9 K/UL (ref 1–4.8)
LYMPHOCYTES NFR BLD: 39.8 % (ref 18–48)
MCH RBC QN AUTO: 28.4 PG (ref 27–31)
MCHC RBC AUTO-ENTMCNC: 29.6 G/DL (ref 32–36)
MCV RBC AUTO: 96 FL (ref 82–98)
MONOCYTES # BLD AUTO: 1.1 K/UL (ref 0.3–1)
MONOCYTES NFR BLD: 11.6 % (ref 4–15)
NEUTROPHILS # BLD AUTO: 4.2 K/UL (ref 1.8–7.7)
NEUTROPHILS NFR BLD: 43.1 % (ref 38–73)
NRBC BLD-RTO: 0 /100 WBC
PLATELET # BLD AUTO: 371 K/UL (ref 150–350)
PMV BLD AUTO: 9.2 FL (ref 9.2–12.9)
POCT GLUCOSE: 262 MG/DL (ref 70–110)
POCT GLUCOSE: 268 MG/DL (ref 70–110)
POCT GLUCOSE: 281 MG/DL (ref 70–110)
POCT GLUCOSE: 288 MG/DL (ref 70–110)
POCT GLUCOSE: 333 MG/DL (ref 70–110)
POTASSIUM SERPL-SCNC: 5.2 MMOL/L (ref 3.5–5.1)
PROT SERPL-MCNC: 8 G/DL (ref 6–8.4)
RBC # BLD AUTO: 3.35 M/UL (ref 4–5.4)
SODIUM SERPL-SCNC: 135 MMOL/L (ref 136–145)
VANCOMYCIN SERPL-MCNC: 20.9 UG/ML
WBC # BLD AUTO: 9.78 K/UL (ref 3.9–12.7)

## 2020-06-16 PROCEDURE — 80053 COMPREHEN METABOLIC PANEL: CPT | Mod: HCNC

## 2020-06-16 PROCEDURE — 99231 PR SUBSEQUENT HOSPITAL CARE,LEVL I: ICD-10-PCS | Mod: HCNC,,, | Performed by: INTERNAL MEDICINE

## 2020-06-16 PROCEDURE — 25000003 PHARM REV CODE 250: Mod: HCNC | Performed by: INTERNAL MEDICINE

## 2020-06-16 PROCEDURE — 99231 SBSQ HOSP IP/OBS SF/LOW 25: CPT | Mod: HCNC,,, | Performed by: INTERNAL MEDICINE

## 2020-06-16 PROCEDURE — 85025 COMPLETE CBC W/AUTO DIFF WBC: CPT | Mod: HCNC

## 2020-06-16 PROCEDURE — 36415 COLL VENOUS BLD VENIPUNCTURE: CPT | Mod: HCNC

## 2020-06-16 PROCEDURE — C9399 UNCLASSIFIED DRUGS OR BIOLOG: HCPCS | Mod: HCNC | Performed by: INTERNAL MEDICINE

## 2020-06-16 PROCEDURE — 25000003 PHARM REV CODE 250: Mod: HCNC | Performed by: EMERGENCY MEDICINE

## 2020-06-16 PROCEDURE — A4216 STERILE WATER/SALINE, 10 ML: HCPCS | Mod: HCNC | Performed by: EMERGENCY MEDICINE

## 2020-06-16 PROCEDURE — 25000003 PHARM REV CODE 250: Mod: HCNC | Performed by: NURSE PRACTITIONER

## 2020-06-16 PROCEDURE — 97530 THERAPEUTIC ACTIVITIES: CPT | Mod: HCNC,CQ

## 2020-06-16 PROCEDURE — 99232 SBSQ HOSP IP/OBS MODERATE 35: CPT | Mod: HCNC,,, | Performed by: PODIATRIST

## 2020-06-16 PROCEDURE — 11000001 HC ACUTE MED/SURG PRIVATE ROOM: Mod: HCNC

## 2020-06-16 PROCEDURE — 99232 PR SUBSEQUENT HOSPITAL CARE,LEVL II: ICD-10-PCS | Mod: HCNC,,, | Performed by: PODIATRIST

## 2020-06-16 PROCEDURE — 63600175 PHARM REV CODE 636 W HCPCS: Mod: HCNC | Performed by: INTERNAL MEDICINE

## 2020-06-16 PROCEDURE — 63600175 PHARM REV CODE 636 W HCPCS: Mod: HCNC | Performed by: NURSE PRACTITIONER

## 2020-06-16 PROCEDURE — 80202 ASSAY OF VANCOMYCIN: CPT | Mod: HCNC

## 2020-06-16 PROCEDURE — 97535 SELF CARE MNGMENT TRAINING: CPT | Mod: HCNC

## 2020-06-16 RX ORDER — INSULIN ASPART 100 [IU]/ML
3 INJECTION, SOLUTION INTRAVENOUS; SUBCUTANEOUS
Status: DISCONTINUED | OUTPATIENT
Start: 2020-06-17 | End: 2020-06-17

## 2020-06-16 RX ADMIN — ROSUVASTATIN CALCIUM 20 MG: 10 TABLET, COATED ORAL at 08:06

## 2020-06-16 RX ADMIN — INSULIN ASPART 3 UNITS: 100 INJECTION, SOLUTION INTRAVENOUS; SUBCUTANEOUS at 05:06

## 2020-06-16 RX ADMIN — STANDARDIZED SENNA CONCENTRATE AND DOCUSATE SODIUM 1 TABLET: 8.6; 5 TABLET ORAL at 08:06

## 2020-06-16 RX ADMIN — LEVOTHYROXINE SODIUM 75 MCG: 75 TABLET ORAL at 05:06

## 2020-06-16 RX ADMIN — GABAPENTIN 300 MG: 300 CAPSULE ORAL at 05:06

## 2020-06-16 RX ADMIN — GABAPENTIN 300 MG: 300 CAPSULE ORAL at 08:06

## 2020-06-16 RX ADMIN — CARVEDILOL 25 MG: 6.25 TABLET, FILM COATED ORAL at 08:06

## 2020-06-16 RX ADMIN — FAMOTIDINE 20 MG: 20 TABLET ORAL at 08:06

## 2020-06-16 RX ADMIN — CARVEDILOL 25 MG: 6.25 TABLET, FILM COATED ORAL at 05:06

## 2020-06-16 RX ADMIN — INSULIN ASPART 1 UNITS: 100 INJECTION, SOLUTION INTRAVENOUS; SUBCUTANEOUS at 08:06

## 2020-06-16 RX ADMIN — INSULIN ASPART 2 UNITS: 100 INJECTION, SOLUTION INTRAVENOUS; SUBCUTANEOUS at 08:06

## 2020-06-16 RX ADMIN — LOSARTAN POTASSIUM 50 MG: 25 TABLET ORAL at 08:06

## 2020-06-16 RX ADMIN — CEFTRIAXONE 1 G: 1 INJECTION, SOLUTION INTRAVENOUS at 01:06

## 2020-06-16 RX ADMIN — INSULIN ASPART 3 UNITS: 100 INJECTION, SOLUTION INTRAVENOUS; SUBCUTANEOUS at 08:06

## 2020-06-16 RX ADMIN — INSULIN ASPART 2 UNITS: 100 INJECTION, SOLUTION INTRAVENOUS; SUBCUTANEOUS at 05:06

## 2020-06-16 RX ADMIN — INSULIN ASPART 2 UNITS: 100 INJECTION, SOLUTION INTRAVENOUS; SUBCUTANEOUS at 12:06

## 2020-06-16 RX ADMIN — ASPIRIN 81 MG 81 MG: 81 TABLET ORAL at 08:06

## 2020-06-16 RX ADMIN — OXYCODONE 5 MG: 5 TABLET ORAL at 08:06

## 2020-06-16 RX ADMIN — INSULIN DETEMIR 6 UNITS: 100 INJECTION, SOLUTION SUBCUTANEOUS at 08:06

## 2020-06-16 RX ADMIN — Medication 10 ML: at 08:06

## 2020-06-16 RX ADMIN — FUROSEMIDE 20 MG: 20 TABLET ORAL at 08:06

## 2020-06-16 NOTE — ASSESSMENT & PLAN NOTE
See media picture.  Reports right heel pressure wound not healing x 3 months. Worsened since last week with increasing bleeding. Hx DM.  Wound with red, beefy, granular tissue and bleeding. Xray concerning for osseous infection. No leukocytosis.  Afebrile. Elevated sed rate and CRP.  MRI: Findings consistent with osteomyelitis of the calcaneus with complete to near complete disruption of the Achilles tendon.   - Vancomycin started in ED - continue with pharmacy dose  - Added Ceftriaxone  - Cultures pending (BC and Wound culture)  - ID, Wound Care, and Podiatry consulted

## 2020-06-16 NOTE — PROGRESS NOTES
Pharmacokinetic Assessment Follow Up: IV Vancomycin    Vancomycin serum concentration assessment(s):    The random level was drawn correctly and can be used to guide therapy at this time. The measurement is above the desired definitive target range of 10 to 20 mcg/mL.    Vancomycin Regimen Plan:    No dose today.  Re-dose when the random level is less than 20 mcg/mL, next level to be drawn at 0400 on 6/17/2020    Drug levels (last 3 results):  Recent Labs   Lab Result Units 06/14/20  0450 06/15/20  0624 06/16/20  0406   Vancomycin, Random ug/mL 23.1 18.6 20.9       Pharmacy will continue to follow and monitor vancomycin.    Please contact pharmacy at extension [phone] for questions regarding this assessment.    Thank you for the consult,   Can Samuel Jr       Patient brief summary:  Tasneem Lynn is a 74 y.o. female initiated on antimicrobial therapy with IV Vancomycin for treatment of bone/joint infection    Drug Allergies:   Review of patient's allergies indicates:   Allergen Reactions    Corticosteroids (glucocorticoids) Edema       Actual Body Weight:   116.5 kg    Renal Function:   Estimated Creatinine Clearance: 23 mL/min (A) (based on SCr of 2.6 mg/dL (H)).,     Dialysis Method (if applicable):  N/A    CBC (last 72 hours):  Recent Labs   Lab Result Units 06/14/20  0450 06/15/20  0624 06/16/20  0406   WBC K/uL 8.55 9.35 9.78   Hemoglobin g/dL 9.8* 9.8* 9.5*   Hematocrit % 33.4* 32.4* 32.1*   Platelets K/uL 385* 368* 371*   Gran% % 41.2 36.8* 43.1   Lymph% % 43.9 48.0 39.8   Mono% % 9.8 9.7 11.6   Eosinophil% % 4.2 4.8 4.5   Basophil% % 0.5 0.4 0.6   Differential Method  Automated Automated Automated       Metabolic Panel (last 72 hours):  Recent Labs   Lab Result Units 06/14/20  0450 06/15/20  0624 06/16/20  0406   Sodium mmol/L 137 136 135*   Potassium mmol/L 4.8 4.8 5.2*   Chloride mmol/L 105 105 105   CO2 mmol/L 24 23 23   Glucose mg/dL 162* 179* 252*   BUN, Bld mg/dL 60* 68* 71*   Creatinine mg/dL 2.2*  2.6* 2.6*   Albumin g/dL  --   --  2.4*   Total Bilirubin mg/dL  --   --  0.2   Alkaline Phosphatase U/L  --   --  104   AST U/L  --   --  11   ALT U/L  --   --  11       Vancomycin Administrations:  vancomycin given in the last 96 hours                     vancomycin 500 mg in dextrose 5 % 100 mL IVPB (ready to mix system) (mg) 500 mg New Bag 06/15/20 0913    vancomycin (VANCOCIN) 2,250 mg in dextrose 5 % 500 mL IVPB (mg) 2,250 mg New Bag 06/12/20 2345                    Microbiologic Results:  Microbiology Results (last 7 days)       Procedure Component Value Units Date/Time    Blood culture [[id number]] Collected: 06/15/20 0624    Order Status: Completed Specimen: Blood from Antecubital, Right Updated: 06/16/20 0703     Blood Culture, Routine No Growth to date      No Growth to date    Blood Culture #1 **CANNOT BE ORDERED STAT** [[id number]] Collected: 06/12/20 2325    Order Status: Completed Specimen: Blood from Peripheral, Antecubital, Right Updated: 06/16/20 0503     Blood Culture, Routine No Growth to date      No Growth to date      No Growth to date      No Growth to date    AFB Culture & Smear [[id number]] Collected: 06/13/20 1405    Order Status: Completed Specimen: Wound from Foot, Right Updated: 06/15/20 2127     AFB Culture & Smear Culture in progress     AFB CULTURE STAIN No acid fast bacilli seen.    Culture, Anaerobe [[id number]] Collected: 06/13/20 1405    Order Status: Completed Specimen: Wound from Foot, Right Updated: 06/15/20 0846     Anaerobic Culture Culture in progress    Aerobic culture [[id number]] Collected: 06/13/20 1405    Order Status: Completed Specimen: Wound from Foot, Right Updated: 06/15/20 0753     Aerobic Bacterial Culture No growth    Blood Culture #2 **CANNOT BE ORDERED STAT** [[id number]]  (Abnormal) Collected: 06/12/20 2339    Order Status: Completed Specimen: Blood from Peripheral, Hand, Right Updated: 06/14/20 1331     Blood Culture, Routine Gram stain aer bottle: Gram positive  cocci in clusters resembling Staph       Results called to and read back by: Nataliia Weber  06/13/2020  22:41      COAGULASE-NEGATIVE STAPHYLOCOCCUS SPECIES  Organism is a probable contaminant      Gram stain [[id number]] Collected: 06/13/20 1405    Order Status: Completed Specimen: Wound from Foot, Right Updated: 06/13/20 1459     Gram Stain Result No WBC's      No organisms seen    Aerobic culture [[id number]] Collected: 06/13/20 1405    Order Status: Canceled Specimen: Wound from Foot, Right     Blood culture [[id number]]     Order Status: Canceled Specimen: Blood

## 2020-06-16 NOTE — ASSESSMENT & PLAN NOTE
Uncontrolled. Continue home meds BB  - ARB held initially due to worsening kidney function  - losartan and Lasix held due to worsening renal function.

## 2020-06-16 NOTE — PLAN OF CARE
Patient is awake alert oriented. Denies pain N/V, SOB. Right foot dressing CDI, offloading boots in place. Blood glucose monitoring. Incontinence care, turn q2 with wedge and pillows, foam dressing in place on sacrum. Free of falls, bed alarm on call light in reach. Continue with plan of care as ordered. No distress noted.

## 2020-06-16 NOTE — PROGRESS NOTES
Progress Note    Admit Date: 6/12/2020   LOS: 4 days     SUBJECTIVE:     Follow-up For:  Patient seen bedside. Heel protector boot in place to right foot only.     6/16/20: Plan for wound VAC application today. Heel protector boots in place B/L     Scheduled Meds:   aspirin  81 mg Oral Daily    carvediloL  25 mg Oral BID WM    cefTRIAXone (ROCEPHIN) IVPB  1 g Intravenous Q24H    collagenase   Topical (Top) Daily    famotidine  20 mg Oral Daily    furosemide  20 mg Oral Daily    gabapentin  300 mg Oral TID    insulin aspart U-100  2 Units Subcutaneous TIDWM    levothyroxine  75 mcg Oral Before breakfast    losartan  50 mg Oral Daily    rosuvastatin  20 mg Oral Daily    senna-docusate 8.6-50 mg  1 tablet Oral BID     Continuous Infusions:  PRN Meds:acetaminophen, acetaminophen, dextrose 50%, dextrose 50%, glucagon (human recombinant), glucose, glucose, insulin aspart U-100, ondansetron, oxyCODONE, sodium chloride 0.9%, Pharmacy to dose Vancomycin consult **AND** vancomycin - pharmacy to dose    Review of patient's allergies indicates:   Allergen Reactions    Corticosteroids (glucocorticoids) Edema       Review of Systems  Constitutional: Negative for chills and fever.   Cardiovascular: Negative for leg swelling.   Gastrointestinal: Negative for nausea and vomiting.   Musculoskeletal: Positive for gait problem.   Skin: Positive for color change and wound.     OBJECTIVE:     Vital Signs (Most Recent)  Temp: 97.5 °F (36.4 °C) (06/16/20 1623)  Pulse: 70 (06/16/20 1623)  Resp: 18 (06/16/20 1623)  BP: (!) 141/64 (06/16/20 1623)  SpO2: 100 % (06/16/20 1623)    Vital Signs Range (Last 24H):  Temp:  [97.5 °F (36.4 °C)-98.7 °F (37.1 °C)]   Pulse:  [69-79]   Resp:  [16-18]   BP: (121-164)/(60-77)   SpO2:  [96 %-100 %]     I & O (Last 24H):    Intake/Output Summary (Last 24 hours) at 6/16/2020 1705  Last data filed at 6/16/2020 1243  Gross per 24 hour   Intake 600 ml   Output --   Net 600 ml     Physical Exam:  Foot  Exam     Right Foot/Ankle      Inspection and Palpation  Ecchymosis: none  Tenderness: (Some tenderness to the heel)  Swelling: (Mild edema)  Skin Exam: ulcer;      Neurovascular  Dorsalis pedis: absent  Posterior tibial: 1+  Saphenous nerve sensation: diminished  Tibial nerve sensation: diminished  Superficial peroneal nerve sensation: diminished  Deep peroneal nerve sensation: diminished  Sural nerve sensation: diminished        Left Foot/Ankle       Inspection and Palpation  Ecchymosis: none  Tenderness: none   Swelling: none      Neurovascular  Dorsalis pedis: absent  Posterior tibial: 1+  Saphenous nerve sensation: diminished  Tibial nerve sensation: diminished  Superficial peroneal nerve sensation: diminished  Deep peroneal nerve sensation: diminished  Sural nerve sensation: diminished      6/15/20:    Wound 1: Right medial heel ulcer  Measurement: 8.0jlp7lyh4.5cm  Base: fibrogranular   Periwound skin: Rolled borders.   Drainage: sanguinous   Erythema: none   Probe: probe to bone.             Wound 2: Right dorsal foot   Measurement: 2phw1ojl6.2cm  Base: fibrogranular   Periwound skin:rolled borders   Drainage: none  Erythema: mild  Probe: none, no bone exposed                  Laboratory:  CBC:   Recent Labs   Lab 06/16/20  0406   WBC 9.78   RBC 3.35*   HGB 9.5*   HCT 32.1*   *   MCV 96   MCH 28.4   MCHC 29.6*     CMP:   Recent Labs   Lab 06/16/20  0406   *   CALCIUM 8.6*   ALBUMIN 2.4*   PROT 8.0   *   K 5.2*   CO2 23      BUN 71*   CREATININE 2.6*   ALKPHOS 104   ALT 11   AST 11   BILITOT 0.2       Diagnostic Results:  X-Ray Foot Complete Right (Final result)  Result time 06/12/20 20:16:36   Procedure changed from X-Ray Foot Complete Left                 Final result by Jacky Torres MD (06/12/20 20:16:36)                               Impression:        Findings consistent with soft tissue wound at the posterior aspect of the heel overlying the posterior calcaneus, there is  subtle irregularity of the posterior and posterosuperior margin of the calcaneus underlying the aforementioned soft tissue abnormality, and the possibility of osseous infectious involvement would be difficult to exclude given this appearance.  Clinical and historical correlation otherwise needed.        Electronically signed by:     Jacky Torres  Date:                                            06/12/2020  Time:                                            20:16                         Narrative:     EXAMINATION:  XR FOOT COMPLETE 3 VIEW RIGHT     CLINICAL HISTORY:  diabetic foot ulcer;. Type 2 diabetes mellitus with foot ulcer     TECHNIQUE:  AP, lateral, and oblique views of the right foot were performed.     COMPARISON:  None     FINDINGS:  Radiographic examination of the right foot was performed.  3 radiographs are submitted.  There is appearance of may relate to soft tissue injury of the soft tissues posterior to the calcaneus at the level of the heel, and there is general appearance suggesting soft tissue swelling about the foot.  On the lateral view there is some lucency associated with the posterior aspect of the calcaneus, the possibility of demineralization is considered although this lucency could relate to diminished soft tissue attenuation secondary to soft tissue wound or injury.  There is slight irregularity along the posterior and posterosuperior margin of the posterior calcaneus, given the history of diabetic foot ulcer, the possibility of osseous involvement along the posterior and posterosuperior edge of the calcaneus would be difficult to exclude, there are some curvilinear calcifications within the soft tissues adjacent to this, that may relate to osseous involvement as well.  The remainder of the visualized osseous structures demonstrate chronic change, there is no additional evidence for osseous destructive process, acute fracture or dislocation.  Vascular calcifications are noted.                         ASSESSMENT/PLAN:     Right heel OM  CKD stage 4  Plan:       Wound VAC to right  heel ulceration. Applied without problems with seal, adequate suction noted. Covered with dry sterile dressing. Wound vac change every 2-3 days. Set to 125 mmhg. Wound care orders below. Santyl followed by adaptic applied to right dorsal foot.     S/p bone biopsy, debridement - 6/13/20.     Vascular surgery reccs pending.     Plan for wound VAC application tomorrow.     Recommend SNF vs. LTAC for IV abx, wound care.     Abx per ID.     DSD applied.     Podiatry will follow.     Heel protector boots in place B/L     Wound care orders right foot- to be done every 2-3 days.   1. Cleanse wound with saline. Pat dry  2. Apply wound VAC to right heel. Set to 125 mmhg.   3. Apply iodosorb to right dorsal foot ulceration cover with non adherent foam.   4. Wrap with kerlix. Secure with tape.     F/u WB wound care within one week of discharge first available appt.

## 2020-06-16 NOTE — PROGRESS NOTES
Progress Note    Admit Date: 6/12/2020   LOS: 4 days     SUBJECTIVE:     Follow-up For:  Patient seen bedside. Heel protector boot in place to right foot only.     Scheduled Meds:   aspirin  81 mg Oral Daily    carvediloL  25 mg Oral BID WM    cefTRIAXone (ROCEPHIN) IVPB  1 g Intravenous Q24H    collagenase   Topical (Top) Daily    famotidine  20 mg Oral Daily    furosemide  20 mg Oral Daily    gabapentin  300 mg Oral TID    insulin aspart U-100  2 Units Subcutaneous TIDWM    levothyroxine  75 mcg Oral Before breakfast    losartan  50 mg Oral Daily    rosuvastatin  20 mg Oral Daily    senna-docusate 8.6-50 mg  1 tablet Oral BID     Continuous Infusions:  PRN Meds:acetaminophen, acetaminophen, dextrose 50%, dextrose 50%, glucagon (human recombinant), glucose, glucose, insulin aspart U-100, ondansetron, oxyCODONE, sodium chloride 0.9%, Pharmacy to dose Vancomycin consult **AND** vancomycin - pharmacy to dose    Review of patient's allergies indicates:   Allergen Reactions    Corticosteroids (glucocorticoids) Edema       Review of Systems  Constitutional: Negative for chills and fever.   Cardiovascular: Negative for leg swelling.   Gastrointestinal: Negative for nausea and vomiting.   Musculoskeletal: Positive for gait problem.   Skin: Positive for color change and wound.     OBJECTIVE:     Vital Signs (Most Recent)  Temp: 97.5 °F (36.4 °C) (06/16/20 1623)  Pulse: 70 (06/16/20 1623)  Resp: 18 (06/16/20 1623)  BP: (!) 141/64 (06/16/20 1623)  SpO2: 100 % (06/16/20 1623)    Vital Signs Range (Last 24H):  Temp:  [97.5 °F (36.4 °C)-98.7 °F (37.1 °C)]   Pulse:  [69-79]   Resp:  [16-18]   BP: (121-164)/(60-77)   SpO2:  [96 %-100 %]     I & O (Last 24H):    Intake/Output Summary (Last 24 hours) at 6/16/2020 1656  Last data filed at 6/16/2020 1243  Gross per 24 hour   Intake 600 ml   Output --   Net 600 ml     Physical Exam:  Foot Exam     Right Foot/Ankle      Inspection and Palpation  Ecchymosis:  none  Tenderness: (Some tenderness to the heel)  Swelling: (Mild edema)  Skin Exam: ulcer;      Neurovascular  Dorsalis pedis: absent  Posterior tibial: 1+  Saphenous nerve sensation: diminished  Tibial nerve sensation: diminished  Superficial peroneal nerve sensation: diminished  Deep peroneal nerve sensation: diminished  Sural nerve sensation: diminished        Left Foot/Ankle       Inspection and Palpation  Ecchymosis: none  Tenderness: none   Swelling: none      Neurovascular  Dorsalis pedis: absent  Posterior tibial: 1+  Saphenous nerve sensation: diminished  Tibial nerve sensation: diminished  Superficial peroneal nerve sensation: diminished  Deep peroneal nerve sensation: diminished  Sural nerve sensation: diminished      6/15/20:    Wound 1: Right medial heel ulcer  Measurement: 8.3egd2ltu3.5cm  Base: fibrogranular   Periwound skin: Rolled borders.   Drainage: sanguinous   Erythema: none   Probe: probe to bone.             Wound 2: Right dorsal foot   Measurement: 9xvy9iil7.2cm  Base: fibrogranular   Periwound skin:rolled borders   Drainage: none  Erythema: mild  Probe: none, no bone exposed                  Laboratory:  CBC:   Recent Labs   Lab 06/16/20  0406   WBC 9.78   RBC 3.35*   HGB 9.5*   HCT 32.1*   *   MCV 96   MCH 28.4   MCHC 29.6*     CMP:   Recent Labs   Lab 06/16/20  0406   *   CALCIUM 8.6*   ALBUMIN 2.4*   PROT 8.0   *   K 5.2*   CO2 23      BUN 71*   CREATININE 2.6*   ALKPHOS 104   ALT 11   AST 11   BILITOT 0.2       Diagnostic Results:  X-Ray Foot Complete Right (Final result)  Result time 06/12/20 20:16:36   Procedure changed from X-Ray Foot Complete Left                 Final result by Jacky Torres MD (06/12/20 20:16:36)                               Impression:        Findings consistent with soft tissue wound at the posterior aspect of the heel overlying the posterior calcaneus, there is subtle irregularity of the posterior and posterosuperior margin of the  calcaneus underlying the aforementioned soft tissue abnormality, and the possibility of osseous infectious involvement would be difficult to exclude given this appearance.  Clinical and historical correlation otherwise needed.        Electronically signed by:     Jacky Torres  Date:                                            06/12/2020  Time:                                            20:16                         Narrative:     EXAMINATION:  XR FOOT COMPLETE 3 VIEW RIGHT     CLINICAL HISTORY:  diabetic foot ulcer;. Type 2 diabetes mellitus with foot ulcer     TECHNIQUE:  AP, lateral, and oblique views of the right foot were performed.     COMPARISON:  None     FINDINGS:  Radiographic examination of the right foot was performed.  3 radiographs are submitted.  There is appearance of may relate to soft tissue injury of the soft tissues posterior to the calcaneus at the level of the heel, and there is general appearance suggesting soft tissue swelling about the foot.  On the lateral view there is some lucency associated with the posterior aspect of the calcaneus, the possibility of demineralization is considered although this lucency could relate to diminished soft tissue attenuation secondary to soft tissue wound or injury.  There is slight irregularity along the posterior and posterosuperior margin of the posterior calcaneus, given the history of diabetic foot ulcer, the possibility of osseous involvement along the posterior and posterosuperior edge of the calcaneus would be difficult to exclude, there are some curvilinear calcifications within the soft tissues adjacent to this, that may relate to osseous involvement as well.  The remainder of the visualized osseous structures demonstrate chronic change, there is no additional evidence for osseous destructive process, acute fracture or dislocation.  Vascular calcifications are noted.                        ASSESSMENT/PLAN:     Right heel OM  CKD stage 4  Plan:      Discussed two options with patient.  R BKA  vs IV abx for six weeks with aggressive wound care for several months with no guarantee of wound resolution.  Patient would like to try IV abx at this time.     S/p bone biopsy, debridement - 6/13/20.     Vascular surgery reccs pending.     Plan for wound VAC application tomorrow.     Recommend SNF vs. LTAC for IV abx, wound care.     Abx per ID.     DSD applied.     Heel protector boots applied B/L     Podiatry will follow.

## 2020-06-16 NOTE — NURSING
Clarification with Dr Myles in reference to order for PICC versus Midline and kidney function. Provided Picc line/[phone] nurse/Alexandre with Dr Myles as requested. Discontinue orders for Picc and midline as ordered per Dr Myles. Lesley/charge nurse and nursing supervisor/Jermaine aware of the above.

## 2020-06-16 NOTE — ASSESSMENT & PLAN NOTE
Hx stroke 1986 with left side weakness per patient report. Reports not getting out of bed much since 4/2010 due to wound at right heel.  Patient is on ASA and statin. Continue ASA/Statin  - PT/OT evaluation

## 2020-06-16 NOTE — TELEPHONE ENCOUNTER
----- Message from Lakisha Lanier sent at 6/16/2020  7:40 AM CDT -----  Type: Patient Call Back       What is the request in detail:  joellen calling to speak to dr castelan regarding pt      Can the clinic reply by [facility]? No       Would the patient rather a call back or a response via [facility]? Call back       Best call back number: [phone]

## 2020-06-16 NOTE — PT/OT/SLP PROGRESS
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 poor mobility; high risk for morbidity and unplanned readmission)    Assessment:     Tasneem Lynn is a 74 y.o. female with a medical diagnosis of Chronic osteomyelitis of right foot with draining sinus. Performance deficits affecting function are weakness, impaired endurance, impaired self care skills, impaired balance, decreased upper extremity function, decreased lower extremity function, decreased safety awareness, pain, decreased ROM, impaired skin, orthopedic precautions, impaired functional mobilty.     Pt required max encouragement seated EOB for functional tasks; pt showed initiation for supine> sit, requiring MIN A.     Rehab Prognosis:  Fair +; patient would benefit from acute skilled OT services to address these deficits and reach maximum level of function.       Plan:     Patient to be seen 5 x/week to address the above listed problems via self-care/home management, therapeutic activities, therapeutic exercises  · Plan of Care Expires: 06/27/20  · Plan of Care Reviewed with: patient    Subjective     Chief Complaint: feeling tired  Patients Comments/Goals: wanting to lay down after prolonged time EOB     Pain/Comfort:  · Pain Rating 1: 0/10  · Pain Rating Post-Intervention 1: 0/10    Objective:     Communicated with: NurseLesley, prior to session.  Patient found HOB elevated with SCD, pressure relief boots, bed alarm, peripheral IV, wound vac upon OT entry to room.    General Precautions: Standard, diabetic, fall   Orthopedic Precautions: treating RLE non-weight bearing 2* foot wounds   Braces:    N/A    Occupational Performance:     Bed Mobility:    · Patient completed Rolling/Turning to Left with moderate assistance and 2 persons  · Patient completed Rolling/Turning to Right with moderate assistance and 2 persons  · Patient completed Scooting anteriorly at EOB with contact guard assistance   · Bridging with dependence and of 2 persons with bed in Trendelenburg position   · Patient completed Supine to Sit with minimum assistance with HOB elevated using side rails  · Patient completed Sit to Supine with dependent and 2 persons     Functional Mobility/Transfers:  · Patient attempted Sit <> Stand Transfer with dependence and of 2 persons  with  rolling walker from elevated bed with inability to clear buttocks off bed; pt required max encouragement and verbal cueing for proper body mechanics, RLE NWB, and RW management  · Functional Mobility: Pt unable to stand at this session. Pt fatigued with time seated EOB, demo'ing lateral lean to the R requiring MIN A for trunk correction to midline. Seated EOB with time, pt c/o neck weakness. OT encouraged pt to perform cervical ROM and pt attempted x2 reps of neck flex/ext.       Activities of Daily Living:  · Grooming: SBA/CGA for facial hygiene while seated at EOB    · Upper Body Dressing: maximal assistance to don back gown while seated at EOB        [facility] 6 Click ADL:  12    Treatment & Education:  · Pt re-educ of role of OT and POC  · Performed 1 x5 reps of BUE forward punches in unsupported sit EOB; pt required demo and max cueing; fatigued quickly   · Re-edu on importance of turning every 2 hours; wear of pressure relief boots; RLE NWB 2* wounds   · Pt required max encouragement for static/dynamic sitting tolerance at EOB   · Calling for staff assistance for bed mobility purposes    Patient left right sidelying HOB elevated with all lines intact, call button in reach, bed alarm on, pressure relief boots, SCD on LLE, wound vac in place, and nurse, Lesley,  notifiedEducation:      GOALS:   Multidisciplinary Problems     Occupational Therapy Goals        Problem: Occupational Therapy Goal    Goal Priority Disciplines Outcome Interventions   Occupational Therapy Goal     OT, PT/OT Ongoing, Progressing    Description: Goals to be met by: 6/27/2020    Patient will increase functional independence with ADLs by performing:    Grooming while seated at sink with Set-up Assistance.  Toileting from bedside commode with Minimal Assistance for hygiene and clothing management.   Sitting at edge of bed x20 minutes with Supervision.  Supine to sit with Minimal Assistance.  Stand pivot transfers with Moderate Assistance.  Toilet transfer to bedside commode with Moderate Assistance.                     Time Tracking:     OT Date of Treatment: 06/16/20  OT Start Time: 1446  OT Stop Time: 1513  OT Total Time (min): 27 min    Billable Minutes:Self Care/Home Management 13 mins  Total Time 27 mins with PTA co-treat    Marie Beltre OT  6/17/2020

## 2020-06-16 NOTE — ASSESSMENT & PLAN NOTE
sCr and BUN elevated 2.2 and 57.  Baseline sCr around 2 with previous value of 1.7 on 6/2018.   Patient reports she was referred to Nephrologist long time ago but she hasn't seen anyone yet.   - Renal dose all medication. Avoid nephrotoxin if possible.   - Continue to monitor. Consider consult nephrologist if sCr getting worse. Otherwise will see Nephrologist outpatient.

## 2020-06-16 NOTE — PLAN OF CARE
Problem: Physical Therapy Goal  Goal: Physical Therapy Goal  Description: Goals to be met by: 20     Patient will increase functional independence with mobility by performin. Supine to sit with Set-up [place]  2. Sit to supine with Set-up [place]  3. Rolling to Left and Right with Modified [place].  4. Bed to chair transfer with Contact Guard Assistance using Slideboard  5. Wheelchair propulsion x30 feet with Supervision using bilateral uppper extremities  6. Sitting at edge of bed x20 minutes with Supervision      Outcome: Ongoing, Progressing   Pt will benefit from further skilled therapy in order to return to PLOF.

## 2020-06-16 NOTE — PT/OT/SLP PROGRESS
Physical Therapy Treatment    Patient Name:  Tasneem Lynn   MRN:  [id number]    Recommendations:     Discharge Recommendations:  nursing facility, skilled   Discharge Equipment Recommendations: bedside commode, wheelchair, hospital bed   Barriers to discharge: Inaccessible home and Decreased caregiver support    Assessment:     Tasneem Lynn is a 74 y.o. 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  She presents with the following impairments/functional limitations:  weakness, impaired endurance, impaired self care skills, impaired functional mobilty, gait instability, impaired balance, decreased coordination, decreased upper extremity function, decreased lower extremity function, decreased safety awareness, pain, decreased ROM, edema, impaired skin .    Rehab Prognosis: Fair; patient would benefit from acute skilled PT services to address these deficits and reach maximum level of function.    Recent Surgery: * No surgery found *      Plan:     During this hospitalization, patient to be seen 5 x/week to address the identified rehab impairments via gait training, therapeutic activities, therapeutic exercises, neuromuscular re-education, wheelchair management/training and progress toward the following goals:    · Plan of Care Expires:  06/13/20    Subjective     Chief Complaint: sleepy  Patient/Family Comments/goals: pt agreeable to treatment with encouragement   Pain/Comfort:  · Pain Rating 1: 0/10  · Pain Rating Post-Intervention 1: 0/10      Objective:     Communicated with nurse Alfaro prior to session.  Patient found HOB elevated with pressure relief boots, wound vac, bed alarm, SCD upon PT entry to room.     General Precautions: Standard, diabetic, fall   Orthopedic Precautions: pt was treated as RLE non weight bearing 2* R foot wounds   Braces:  surgical dressing on R foot     Functional Mobility:  · Bed Mobility:     · Rolling Left:  moderate assistance and of  2 persons  · Rolling Right: moderate assistance and of 2 persons  · Scooting: contact guard assistance to scoot anteriorly EOB   · Bridging: dependence and of 2 persons with bed head rails and trendelenburg position   · Supine to Sit: minimum assistance, HOB elevated , bedside rail   · Sit to Supine: dependence  and of 2 persons   · Transfers:     · Sit to Stand: from elevated bed dependence and of 2 persons with rolling walker. Unable to clear buttock, pt required max V/T cues for safety, proper technique, walker management and RLE NWB.   · Balance: pt with inconsistent sitting balance, required from SBA- MIN A to maintain sitting balance , pt with R lateral leaning, c/o neck weakness and unable to hold her head up. Pt required max V/T cues to maintain upright posture.        AM-PAC 6 CLICK MOBILITY  Turning over in bed (including adjusting bedclothes, sheets and blankets)?: 2  Sitting down on and standing up from a chair with arms (e.g., wheelchair, bedside commode, etc.): 1  Moving from lying on back to sitting on the side of the bed?: 2  Moving to and from a bed to a chair (including a wheelchair)?: 1  Need to walk in hospital room?: 1  Climbing 3-5 steps with a railing?: 1  Basic Mobility Total Score: 8       Therapeutic Activities and Exercises:   pt performed seated BLE x 10 reps : AP (shanks protocol ), LAQ. VC's for proper technique and sequence.     Patient left right sidelying with purple foam wedge , pressure relief boots placed to B heels , SCD placed to LLE  all lines intact, call button in reach, bed alarm on and nurse notified..    GOALS:   Multidisciplinary Problems     Physical Therapy Goals        Problem: Physical Therapy Goal    Goal Priority Disciplines Outcome Goal Variances Interventions   Physical Therapy Goal     PT, PT/OT Ongoing, Progressing     Description: Goals to be met by: 20     Patient will increase functional independence with mobility by performin. Supine to sit with  Set-up [place]  2. Sit to supine with Set-up [place]  3. Rolling to Left and Right with Modified [place].  4. Bed to chair transfer with Contact Guard Assistance using Slideboard  5. Wheelchair propulsion x30 feet with Supervision using bilateral uppper extremities  6. Sitting at edge of bed x20 minutes with Supervision                       Time Tracking:     PT Received On: 06/16/20  PT Start Time: 1444     PT Stop Time: 1511  PT Total Time (min): 27 min     Billable Minutes: Therapeutic Activity 14 and Total Time 27 min co-treat with OT     Treatment Type: Treatment  PT/PTA: PTA     PTA Visit Number: 1     Tram PRERNA Montes, PTA  06/16/2020

## 2020-06-16 NOTE — PLAN OF CARE
Problem: Adult Inpatient Plan of Care  Goal: Plan of Care Review  Outcome: Ongoing, Progressing  Flowsheets (Taken 6/16/2020 0355)  Plan of Care Reviewed With: patient    Patient remains free from injury and falls. Dressing to right foot changed on day shift. Patient turned every 2 hours with heels elevated in Z-flex boots. Stage 2 pressure ulcer to sacrum. Foam dressing changed. Incontinence care provided as needed. IV antibiotic administered as ordered. Blood glucose monitored ACHS and covered with sliding scale. Plan of care continued.

## 2020-06-16 NOTE — PROGRESS NOTES
Call placed to pts daughter Merrill to discuss d/c plans for pt.  At this time pts daughter states that she wants the pt to go to [facility] at time of discharge.  TN advised that IRF is not recommended nor does she have the criteria for IRF at this time.  Daughter reports she does not understand how that is because she has a family member there currently who has a trach and not able to walk and they took her.    1015- TN sent referral to [facility] as requested by pts daughter.    1023- received a denial in [facility] [employer] from [facility] stating that they are not in network with the pts insurance.    1024- call placed to pts daughter to advise of response from [facility] and to further discuss d/c plans for pt.  TN inquired if she is interested in the pt going to SNF at time of discharge.  At this time Merrill states that she does want her mother to go to a SNF and risk the chance of getting sick with Covid or anything else.  TN advised that we could set up home health and outpatient wound care if needed.  TN addressed transportation with daughter at this time and she reports that she will be able to bring her and that she can be taught and can do wound care on the other days needed.    1030- TN updated MD on conversation with daughter and d/c plan as of this time.

## 2020-06-16 NOTE — ASSESSMENT & PLAN NOTE
Hx stroke 1986 with left side weakness per patient report. Reports not getting out of bed much since 4/2010 due to wound at right heel.    - Patient is on ASA and statin. Continue ASA/Statin  - PT/OT evaluation

## 2020-06-16 NOTE — ASSESSMENT & PLAN NOTE
sCr and BUN elevated 2.2 and 57.  Baseline sCr around 2 with previous value of 1.7 on 6/2018.   Patient reports she was referred to Nephrologist a long time ago but she hasn't seen anyone yet.   - Renal dose all medication. Avoid nephrotoxin if possible.   - nephrology consult due to rising BUN/Cr.  Hold ARB and lasix; repeat urinalysis.   - Vein mapping ordered prior to line placement recommendations by Vascular Surgery

## 2020-06-16 NOTE — SUBJECTIVE & OBJECTIVE
Admission CC:   Chief Complaint   Patient presents with    Wound Check     EMS reports pt c/o infected diabetic ulcer to the right heel, worsening x 3 months. denies pain     Follow up visit for: Chronic osteomyelitis of right foot with draining sinus    Interval History / Events Overnight:   Patient denies pain to right foot currently but will have discomfort every now and again - pain med is working; patient hard of hearing      The patient is able to provide adequate history. Additional history was obtained from chart review. No significant events reported by Nursing.  Patient complains of no specific complaints. Symptoms have been unchanged since yesterday. Associated symptoms include: fatigue and malaise .. Alleviating factors include: nothing. Response to treatment since yesterday: Not measurable. Wound vac placed by Podiatry.    Data reviewed 6/17/2020: Lab test(s) reviewed: H&H stable. Cr increased    Review of Systems   Constitutional: Negative for fever.   Respiratory: Negative for cough and shortness of breath.    Cardiovascular: Negative for chest pain.   Gastrointestinal: Negative for nausea and vomiting.   Genitourinary: Negative for dysuria and hematuria.     Objective:     Vital Signs (Most Recent):  Temp: 97.6 °F (36.4 °C) (06/17/20 1653)  Pulse: 70 (06/17/20 1653)  Resp: 18 (06/17/20 1653)  BP: (!) 131/98 (06/17/20 1653)  SpO2: 98 % (06/17/20 1653) Vital Signs (24h Range):  Temp:  [97.6 °F (36.4 °C)-98.2 °F (36.8 °C)] 97.6 °F (36.4 °C)  Pulse:  [69-76] 70  Resp:  [17-19] 18  SpO2:  [96 %-100 %] 98 %  BP: (100-184)/(51-98) 131/98     Weight: 116.5 kg (256 lb 13.4 oz)  Body mass index is 46.98 kg/m².    Intake/Output Summary (Last 24 hours) at 6/17/2020 1743  Last data filed at 6/17/2020 1226  Gross per 24 hour   Intake 360 ml   Output 150 ml   Net 210 ml      Physical Exam  Constitutional:       General: She is not in acute distress.     Appearance: She is morbidly obese. She is not diaphoretic.    Eyes:      General: Lids are normal. No scleral icterus.        Right eye: No discharge.         Left eye: No discharge.      Conjunctiva/sclera: Conjunctivae normal.   Pulmonary:      Effort: Pulmonary effort is normal. No tachypnea, accessory muscle usage or respiratory distress.   Abdominal:      General: Abdomen is protuberant. There is no distension.   Musculoskeletal:         General: No edema (per telepresenter nurse).   Neurological:      Mental Status: She is alert and oriented to person, place, and time. She is not disoriented.   Psychiatric:         Mood and Affect: Mood and affect normal.         Behavior: Behavior is cooperative.       Significant Labs:   Recent Labs   Lab 06/15/20  0624 06/16/20  0406 06/17/20  0420   WBC 9.35 9.78 9.13   HGB 9.8* 9.5* 10.2*   HCT 32.4* 32.1* 34.9*   * 371* 343     Recent Labs   Lab 06/15/20  0624 06/16/20  0406 06/17/20  0420   GRAN 36.8*  3.4 43.1  4.2 39.5  3.6   LYMPH 48.0  4.5 39.8  3.9 44.2  4.0   MONO 9.7  0.9 11.6  1.1* 10.7  1.0   EOS 0.5 0.4 0.4     Recent Labs   Lab 06/16/20 0406 06/17/20  0420 06/18/20  0330   * 135* 132*   K 5.2* 5.4* 5.7*    105 102   CO2 23 22* 23   BUN 71* 72* 77*   CREATININE 2.6* 2.7* 3.3*   * 259* 330*   CALCIUM 8.6* 8.8 8.5*   ALBUMIN 2.4* 2.5* 2.4*   PHOS  --   --  5.0*     Recent Labs   Lab 06/12/20 1917 06/13/20  0513 06/16/20 0406   ALKPHOS 130 121 104   ALT 9* 8* 11   AST 15 13 11   PROT 9.3* 9.1* 8.0   BILITOT 0.3 0.3 0.2   INR  --  1.0  --    CRP 17.7*  --   --      Recent Labs   Lab 06/12/20 1917   CPK 32   TROPONINI <0.006     Recent Labs   Lab 06/12/20  1917   SEDRATE 108*     SARS-CoV-2 RNA, Amplification, Qual (no units)   Date Value   06/12/2020 Negative     Blood Culture, Routine (no units)   Date Value   06/15/2020 No Growth to date   06/15/2020 No Growth to date   06/12/2020     Gram stain aer bottle: Gram positive cocci in clusters resembling Staph    06/12/2020     Results  called to and read back by: Nataliia Weber  06/13/2020  22:41   06/12/2020 (A)    COAGULASE-NEGATIVE STAPHYLOCOCCUS SPECIES  Organism is a probable contaminant       Recent Labs   Lab 06/13/20 0513   HGBA1C 7.5*     Recent Labs   Lab 06/17/20  0742 06/17/20  1132 06/17/20  1650   POCTGLUCOSE 248* 326* 339*     Recent Labs   Lab 06/13/20 0513   TSH 1.213       Significant Imaging:

## 2020-06-16 NOTE — ASSESSMENT & PLAN NOTE
See media picture.  Reports right heel pressure wound not healing x 3 months. Worsened since last week with increasing bleeding. Hx DM.  Wound with red, beefy, granular tissue and bleeding. Xray concerning for osseous infection. No leukocytosis.  Afebrile. Elevated sed rate and CRP.  MRI: Findings consistent with osteomyelitis of the calcaneus with complete to near complete disruption of the Achilles tendon.   - Vancomycin started in ED - continue with pharmacy dose  - Added Ceftriaxone  - Cultures pending (BC and Wound culture)  - ID, Wound Care, and Podiatry consulted  - plan for IV antibiotics x 6 weeks if bone cultures negative. Wound vac.   - ID recommendations: Discharge antibiotics:           Ceftriaxone 1 gram IV q 24 hours plus  Vancomycin 500 mg IV q 48 hours  - End date of IV antibiotics: 07/27/2020  - Needs IV access for discharge; likely with IR for tunneled PICC line.

## 2020-06-16 NOTE — NURSING
Called in a re-consult to Dr Bunch's office/[phone]/Chilo in reference to tunneled IV access by Dr Myles. chilo states he is in surgery at [facility] and will give message.   (Clarified with Dr Myles that she is re-consulting Dr Bunch for port a cath, carbajal or some other line of his choosing for 6 weeks of IV antibiotics due to patient unable to get PICC or Midline due to CKD)

## 2020-06-16 NOTE — PROGRESS NOTES
[facility] Hospital Medicine  Telemedicine Progress Note    Patient Name: Tasneem Lynn  MRN: [id number]  Patient Class: IP- Inpatient   Admission Date: 6/12/2020  Length of Stay: 4 days  Attending Physician: Lesley Myles MD  Primary Care Provider: To Obtain [facility] Medicine Team: [facility] VIRTUAL TEAM 1 Lesley Myles MD    This service was provided through telemedicine.  Start time: 0923  Chief complaint: Chronic osteomyelitis of right foot with draining sinus  The patient location is: W422/W422 A  Present with the patient at the time of the telemed/virtual assessment: telepresenter  End time: 0928  Total time spent with patient: 5 min  The attending portion of this evaluation, treatment, and documentation was performed per Lesley Myles MD via audiovisual.  I have assessed findings virtually using a telemedicine platform and with assistance of the bedside nurse or telemedicine presenter.  Additional time spent in care of the patient: coordinating care including communicating with case management, reviewing records, discussing case with consultants, or discussing the plan of care with the patient or family.    Total time involved in the care of the patient:  20 minutes.    Patient was transferred to the telemedicine service on: 6/15/2020    Subjective:     Principal Problem:Chronic osteomyelitis of right foot with draining sinus    HPI:  This is a 74 y.o female with diabetes mellitus, hypertension, dyslipidemia, CHF, hypothyroidism, CKD stage 4, multiple chronic pains, obesity, and stroke (1986) who presents to the hospital with a chief complaint of worsening wound to the right heel for the last week. Patient reports her right heel wound has started 3 months ago and it seems got worse since last week with increasing bleeding. She states she believes the wound is infected. She reports she was prescribed an antibiotic (unsure name) 2 months ago by Angela James NP at an urgent  care clinic but it didn't help much. She also reports another wound at her anterior right foot which is developed around 1 month ago. Patient reports she has been mostly staying on her bed and she has not been walking for 2 months due to her right heel wound. She reports the wound would bleed and have more pain if she put pressure on her right foot. She denies fever, chill, chest pain, SOB, bilateral calves/leg pain, N/V/D, cough or sick contacts at home. She reports she does not go to wound care clinic. She states the home health nurse visited her 3 times since she started having this heel wound. The last visit was today.     In ED, COVID negative. X-ray right foot can not exclude the possibility of osseous infectious involvement.  Labs with elevated sed rate and CRP, c/s with CKD stage 4 with GFR 25 and elevated BUN&sCr 57&2.2.     Patient is admitted to inpatient with hospital medicine to further evaluation and treatment of right foot wound and EMMA    Overview/Hospital Course:  Patient admitted to the hospital for eval and treatment of R heal diabetic ulcer. MRI showed osteo.  Podiatry consulted. Patient had debridement and bone biopsy on 6/13.      Admission CC:   Chief Complaint   Patient presents with    Wound Check     EMS reports pt c/o infected diabetic ulcer to the right heel, worsening x 3 months. denies pain     Follow up visit for: Chronic osteomyelitis of right foot with draining sinus    Interval History / Events Overnight:   The patient is able to provide adequate history. Additional history was obtained from chart review. No significant events reported by Nursing.  Patient complains of no specific complaints. Symptoms have been unchanged since yesterday. Associated symptoms include: fatigue and malaise .. Alleviating factors include: nothing. Response to treatment since yesterday: Not measurable.    Data reviewed 6/16/2020: Lab test(s) reviewed: H&H stable. Hyperglycemia    Review of Systems    Constitutional: Negative for fever.   Respiratory: Negative for shortness of breath.      Objective:     Vital Signs (Most Recent):  Temp: 97.5 °F (36.4 °C) (06/16/20 1623)  Pulse: 70 (06/16/20 1623)  Resp: 18 (06/16/20 1623)  BP: (!) 141/64 (06/16/20 1623)  SpO2: 100 % (06/16/20 1623) Vital Signs (24h Range):  Temp:  [97.5 °F (36.4 °C)-98.7 °F (37.1 °C)] 97.5 °F (36.4 °C)  Pulse:  [69-79] 70  Resp:  [16-18] 18  SpO2:  [96 %-100 %] 100 %  BP: (121-164)/(60-77) 141/64     Weight: 116.5 kg (256 lb 13.4 oz)  Body mass index is 46.98 kg/m².    Intake/Output Summary (Last 24 hours) at 6/16/2020 1733  Last data filed at 6/16/2020 1243  Gross per 24 hour   Intake 600 ml   Output --   Net 600 ml      Physical Exam  Constitutional:       General: She is not in acute distress.     Appearance: She is morbidly obese. She is not diaphoretic.   Eyes:      General: Lids are normal. No scleral icterus.        Right eye: No discharge.         Left eye: No discharge.      Conjunctiva/sclera: Conjunctivae normal.   Cardiovascular:      Rate and Rhythm: Regular rhythm.   Pulmonary:      Effort: Pulmonary effort is normal. No tachypnea, accessory muscle usage or respiratory distress.   Abdominal:      General: Abdomen is protuberant. There is no distension.   Neurological:      Mental Status: She is alert and oriented to person, place, and time. She is not disoriented.   Psychiatric:         Behavior: Behavior is cooperative.       Significant Labs:   Recent Labs   Lab 06/14/20  0450 06/15/20  0624 06/16/20  0406   WBC 8.55 9.35 9.78   HGB 9.8* 9.8* 9.5*   HCT 33.4* 32.4* 32.1*   * 368* 371*     Recent Labs   Lab 06/14/20  0450 06/15/20  0624 06/16/20  0406   GRAN 41.2  3.5 36.8*  3.4 43.1  4.2   LYMPH 43.9  3.8 48.0  4.5 39.8  3.9   MONO 9.8  0.8 9.7  0.9 11.6  1.1*   EOS 0.4 0.5 0.4     Recent Labs   Lab 06/12/20  1917 06/13/20  0513 06/14/20  0450 06/15/20  0624 06/16/20  0406    137 137 136 135*   K 4.5 4.7  4.8 4.8 5.2*    103 105 105 105   CO2 23 26 24 23 23   BUN 57* 56* 60* 68* 71*   CREATININE 2.2* 2.1* 2.2* 2.6* 2.6*   * 120* 162* 179* 252*   CALCIUM 9.1 9.2 8.5* 8.5* 8.6*   ALBUMIN 2.7* 2.6*  --   --  2.4*     Recent Labs   Lab 06/12/20 1917 06/13/20  0513 06/16/20  0406   ALKPHOS 130 121 104   ALT 9* 8* 11   AST 15 13 11   PROT 9.3* 9.1* 8.0   BILITOT 0.3 0.3 0.2   INR  --  1.0  --    CRP 17.7*  --   --      Recent Labs   Lab 06/12/20 1917   CPK 32   TROPONINI <0.006     Recent Labs   Lab 06/12/20 1917   SEDRATE 108*     SARS-CoV-2 RNA, Amplification, Qual (no units)   Date Value   06/12/2020 Negative     Blood Culture, Routine (no units)   Date Value   06/15/2020 No Growth to date   06/15/2020 No Growth to date   06/12/2020     Gram stain aer bottle: Gram positive cocci in clusters resembling Staph    06/12/2020     Results called to and read back by: Nataliia Weber  06/13/2020  22:41   06/12/2020 (A)    COAGULASE-NEGATIVE STAPHYLOCOCCUS SPECIES  Organism is a probable contaminant       Recent Labs   Lab 06/13/20  0513   HGBA1C 7.5*     Recent Labs   Lab 06/16/20  0751 06/16/20  1106 06/16/20  1622   POCTGLUCOSE 268* 262* 288*     Recent Labs   Lab 06/13/20  0513   TSH 1.213       Significant Imaging:       Assessment/Plan:      Active Hospital Problems    Diagnosis  POA    *Chronic osteomyelitis of right foot with draining sinus [M86.471]  Yes    Atherosclerosis of native arteries of right leg with ulceration of heel and midfoot [I70.234]  Yes    Wound of right leg [S81.801A]  Yes    Diabetic ulcer of right heel associated with diabetes mellitus due to underlying condition, limited to breakdown of skin [E08.621, L97.411]  Yes    CKD (chronic kidney disease) stage 4, GFR 15-29 ml/min [N18.4]  Yes    Hypothyroidism [E03.9]  Yes     Chronic    Morbid obesity with BMI of 50.0-59.9, adult [E66.01, Z68.43]  Not Applicable     Chronic    History of CVA (cerebrovascular accident) [Z86.73]  Not  Applicable     Chronic    Hyperlipidemia [E78.5]  Yes     Chronic    Essential hypertension [I10]  Yes     Chronic    Type 2 diabetes mellitus, uncontrolled, with renal complications [E11.29, E11.65]  Yes     Chronic      Resolved Hospital Problems   No resolved problems to display.       Wound of right leg  See media picture.  Right anterior ankle wound x 1 month.   - On antibiotics  - Culture pending    Diabetic ulcer of right heel associated with diabetes mellitus due to underlying condition, limited to breakdown of skin  See media picture.  Reports right heel pressure wound not healing x 3 months. Worsened since last week with increasing bleeding. Hx DM.  Wound with red, beefy, granular tissue and bleeding. Xray concerning for osseous infection. No leukocytosis.  Afebrile. Elevated sed rate and CRP.  MRI: Findings consistent with osteomyelitis of the calcaneus with complete to near complete disruption of the Achilles tendon.   - Vancomycin started in ED - continue with pharmacy dose  - Added Ceftriaxone  - Cultures pending (BC and Wound culture)  - ID, Wound Care, and Podiatry consulted  - plan for IV antibiotics x 6 weeks if bone cultures negative. Wound vac. Needs tunneled IV access for discharge. (PICC contraindicated in CKD-4, and midline unsuitable for vancomycin)    CKD (chronic kidney disease) stage 4, GFR 15-29 ml/min  sCr and BUN elevated 2.2 and 57.  Baseline sCr around 2 with previous value of 1.7 on 6/2018.   Patient reports she was referred to Nephrologist long time ago but she hasn't seen anyone yet.   - Renal dose all medication. Avoid nephrotoxin if possible.   - Continue to monitor. Consider consult nephrologist if sCr worsens. Otherwise will see Nephrologist outpatient.  - Avoid PICCs    Hyperlipidemia  Continue statin    History of CVA (cerebrovascular accident)  Hx stroke 1986 with left side weakness per patient report. Reports not getting out of bed much since 4/2010 due to wound at right  heel.  Patient is on ASA and statin. Continue ASA/Statin  - PT/OT evaluation    Morbid obesity with BMI of 50.0-59.9, adult  Body mass index is 46.98 kg/m².  Weight loss outpatient    Hypothyroidism  TSH wnl. Continue home management with levothyroxine    Essential hypertension  Uncontrolled. Continuel home meds BB  - Hold ARB due to worsening kidney function    Type 2 diabetes mellitus, uncontrolled, with renal complications  On insulin (70/30) 40 units BID at home (2018). Patient reports she often has episodes of hypoglycemia of BS 60's.   The patient's hyperglycemia while IP is managed with a SQ prandial and correction dose insulin regimen. Monitoring BGs.  Increasing aspart and adding basal insulin 6/16      Overview / ICU Course:    Tasneem Lynn is a 74 y.o. female admitted for Chronic osteomyelitis of right foot with draining sinus.    Inpatient Medications Prescribed for Management of Current Problems:     Scheduled Meds:    aspirin  81 mg Oral Daily    carvediloL  25 mg Oral BID WM    cefTRIAXone (ROCEPHIN) IVPB  1 g Intravenous Q24H    collagenase   Topical (Top) Daily    famotidine  20 mg Oral Daily    furosemide  20 mg Oral Daily    gabapentin  300 mg Oral TID    [START ON 6/17/2020] insulin aspart U-100  3 Units Subcutaneous TIDWM    insulin detemir U-100  6 Units Subcutaneous QHS    levothyroxine  75 mcg Oral Before breakfast    losartan  50 mg Oral Daily    rosuvastatin  20 mg Oral Daily    senna-docusate 8.6-50 mg  1 tablet Oral BID     Continuous Infusions:   As Needed: acetaminophen, acetaminophen, dextrose 50%, dextrose 50%, glucagon (human recombinant), glucose, glucose, insulin aspart U-100, ondansetron, oxyCODONE, sodium chloride 0.9%, Pharmacy to dose Vancomycin consult **AND** vancomycin - pharmacy to dose    HIGH RISK CONDITION(S):   Patient is currently on drug therapy requiring intensive monitoring for toxicity: Vancomycin     Discharge plan and follow up  Skilled Nursing  Facility  [facility]   Previous admission:  11/11/19    Goals of Care:  Code Status: Full Code  Comfort Only: No  Hospice: No    Diet: Diet diabetic [facility]; 2000 Calorie; Renal; Isolation Tray - Regular [place]  GI Prophylaxis: Not indicated  Significant LDAs:   IV Access Type: Peripheral  Urinary Catheter Indication if present: Patient Does Not Have Urinary Catheter  Other Lines/Tubes/Drains:    VTE Risk Mitigation (From admission, onward)         Ordered     IP VTE HIGH RISK PATIENT  Once      06/13/20 0036     Place sequential compression device  Until discontinued      06/13/20 0036     Place MADISON hose  Until discontinued      06/13/20 0036              The attending portion of this evaluation, treatment, and documentation was performed per Lesley Myles MD via audiovisual.      Lesley Myles MD  Department of Hospital Medicine   [facility]

## 2020-06-16 NOTE — PROGRESS NOTES
Toe pressures reviewed. Patient appears to have adequate perfusion to heal wound. Recommend continued offloading, debridement and wound care per podiatry and optimization of comorbidities. Risks outweigh the benefits of contrast use due to dialysis dependence risk. Will continue to discuss treatment options with patient if wound worsens or does not heal. Continue Abx per ID.

## 2020-06-16 NOTE — ASSESSMENT & PLAN NOTE
On insulin (70/30) 40 units BID at home (2018). Patient reports she often has episodes of hypoglycemia of BS 60's.   The patient's hyperglycemia while IP is managed with a SQ prandial and correction dose insulin regimen. Monitoring BGs.  Increased aspart and adding basal insulin 6/16  Increased basal and prandial insulin 6/17, 6/18

## 2020-06-17 LAB
ALBUMIN SERPL BCP-MCNC: 2.5 G/DL (ref 3.5–5.2)
ALP SERPL-CCNC: 115 U/L (ref 55–135)
ALT SERPL W/O P-5'-P-CCNC: 15 U/L (ref 10–44)
ANION GAP SERPL CALC-SCNC: 8 MMOL/L (ref 8–16)
AST SERPL-CCNC: 13 U/L (ref 10–40)
BACTERIA BLD CULT: NORMAL
BACTERIA SPEC AEROBE CULT: NO GROWTH
BACTERIA SPEC ANAEROBE CULT: NORMAL
BASOPHILS # BLD AUTO: 0.04 K/UL (ref 0–0.2)
BASOPHILS NFR BLD: 0.4 % (ref 0–1.9)
BILIRUB SERPL-MCNC: 0.2 MG/DL (ref 0.1–1)
BUN SERPL-MCNC: 72 MG/DL (ref 8–23)
CALCIUM SERPL-MCNC: 8.8 MG/DL (ref 8.7–10.5)
CHLORIDE SERPL-SCNC: 105 MMOL/L (ref 95–110)
CO2 SERPL-SCNC: 22 MMOL/L (ref 23–29)
CREAT SERPL-MCNC: 2.7 MG/DL (ref 0.5–1.4)
DIFFERENTIAL METHOD: ABNORMAL
EOSINOPHIL # BLD AUTO: 0.4 K/UL (ref 0–0.5)
EOSINOPHIL NFR BLD: 4.5 % (ref 0–8)
ERYTHROCYTE [DISTWIDTH] IN BLOOD BY AUTOMATED COUNT: 14.4 % (ref 11.5–14.5)
EST. GFR  (AFRICAN AMERICAN): 19 ML/MIN/1.73 M^2
EST. GFR  (NON AFRICAN AMERICAN): 17 ML/MIN/1.73 M^2
GLUCOSE SERPL-MCNC: 259 MG/DL (ref 70–110)
HCT VFR BLD AUTO: 34.9 % (ref 37–48.5)
HGB BLD-MCNC: 10.2 G/DL (ref 12–16)
IMM GRANULOCYTES # BLD AUTO: 0.06 K/UL (ref 0–0.04)
IMM GRANULOCYTES NFR BLD AUTO: 0.7 % (ref 0–0.5)
LEFT TBI: 0.99
LEFT TOE PRESSURE: 135 MMHG
LYMPHOCYTES # BLD AUTO: 4 K/UL (ref 1–4.8)
LYMPHOCYTES NFR BLD: 44.2 % (ref 18–48)
MCH RBC QN AUTO: 28.8 PG (ref 27–31)
MCHC RBC AUTO-ENTMCNC: 29.2 G/DL (ref 32–36)
MCV RBC AUTO: 99 FL (ref 82–98)
MONOCYTES # BLD AUTO: 1 K/UL (ref 0.3–1)
MONOCYTES NFR BLD: 10.7 % (ref 4–15)
NEUTROPHILS # BLD AUTO: 3.6 K/UL (ref 1.8–7.7)
NEUTROPHILS NFR BLD: 39.5 % (ref 38–73)
NRBC BLD-RTO: 0 /100 WBC
PLATELET # BLD AUTO: 343 K/UL (ref 150–350)
PLATELET BLD QL SMEAR: ABNORMAL
PMV BLD AUTO: 9.6 FL (ref 9.2–12.9)
POCT GLUCOSE: 248 MG/DL (ref 70–110)
POCT GLUCOSE: 326 MG/DL (ref 70–110)
POCT GLUCOSE: 330 MG/DL (ref 70–110)
POCT GLUCOSE: 339 MG/DL (ref 70–110)
POTASSIUM SERPL-SCNC: 5.4 MMOL/L (ref 3.5–5.1)
PROT SERPL-MCNC: 8.4 G/DL (ref 6–8.4)
RBC # BLD AUTO: 3.54 M/UL (ref 4–5.4)
RIGHT ARM BP: 137 MMHG
RIGHT TBI: 0.85
RIGHT TOE PRESSURE: 116 MMHG
SODIUM SERPL-SCNC: 135 MMOL/L (ref 136–145)
TB INDURATION 48 - 72 HR READ: 0 MM
VANCOMYCIN SERPL-MCNC: 16.5 UG/ML
WBC # BLD AUTO: 9.13 K/UL (ref 3.9–12.7)

## 2020-06-17 PROCEDURE — 36415 COLL VENOUS BLD VENIPUNCTURE: CPT | Mod: HCNC

## 2020-06-17 PROCEDURE — 80053 COMPREHEN METABOLIC PANEL: CPT | Mod: HCNC

## 2020-06-17 PROCEDURE — 99231 PR SUBSEQUENT HOSPITAL CARE,LEVL I: ICD-10-PCS | Mod: HCNC,,, | Performed by: INTERNAL MEDICINE

## 2020-06-17 PROCEDURE — 25000003 PHARM REV CODE 250: Mod: HCNC | Performed by: INTERNAL MEDICINE

## 2020-06-17 PROCEDURE — 85025 COMPLETE CBC W/AUTO DIFF WBC: CPT | Mod: HCNC

## 2020-06-17 PROCEDURE — 99233 SBSQ HOSP IP/OBS HIGH 50: CPT | Mod: HCNC,,, | Performed by: SURGERY

## 2020-06-17 PROCEDURE — 97110 THERAPEUTIC EXERCISES: CPT | Mod: HCNC,CQ

## 2020-06-17 PROCEDURE — 63600175 PHARM REV CODE 636 W HCPCS: Mod: HCNC | Performed by: INTERNAL MEDICINE

## 2020-06-17 PROCEDURE — 99233 PR SUBSEQUENT HOSPITAL CARE,LEVL III: ICD-10-PCS | Mod: HCNC,,, | Performed by: SURGERY

## 2020-06-17 PROCEDURE — 97535 SELF CARE MNGMENT TRAINING: CPT | Mod: HCNC,CO

## 2020-06-17 PROCEDURE — 80202 ASSAY OF VANCOMYCIN: CPT | Mod: HCNC

## 2020-06-17 PROCEDURE — 99233 PR SUBSEQUENT HOSPITAL CARE,LEVL III: ICD-10-PCS | Mod: HCNC,,, | Performed by: INTERNAL MEDICINE

## 2020-06-17 PROCEDURE — 25000003 PHARM REV CODE 250: Mod: HCNC | Performed by: NURSE PRACTITIONER

## 2020-06-17 PROCEDURE — 11000001 HC ACUTE MED/SURG PRIVATE ROOM: Mod: HCNC

## 2020-06-17 PROCEDURE — 25000003 PHARM REV CODE 250: Mod: HCNC | Performed by: EMERGENCY MEDICINE

## 2020-06-17 PROCEDURE — 99231 SBSQ HOSP IP/OBS SF/LOW 25: CPT | Mod: HCNC,,, | Performed by: INTERNAL MEDICINE

## 2020-06-17 PROCEDURE — 99233 SBSQ HOSP IP/OBS HIGH 50: CPT | Mod: HCNC,,, | Performed by: INTERNAL MEDICINE

## 2020-06-17 PROCEDURE — 63600175 PHARM REV CODE 636 W HCPCS: Mod: HCNC | Performed by: NURSE PRACTITIONER

## 2020-06-17 RX ORDER — INSULIN ASPART 100 [IU]/ML
5 INJECTION, SOLUTION INTRAVENOUS; SUBCUTANEOUS
Status: DISCONTINUED | OUTPATIENT
Start: 2020-06-18 | End: 2020-06-18

## 2020-06-17 RX ADMIN — ROSUVASTATIN CALCIUM 20 MG: 10 TABLET, COATED ORAL at 08:06

## 2020-06-17 RX ADMIN — CARVEDILOL 25 MG: 6.25 TABLET, FILM COATED ORAL at 04:06

## 2020-06-17 RX ADMIN — FUROSEMIDE 20 MG: 20 TABLET ORAL at 08:06

## 2020-06-17 RX ADMIN — ASPIRIN 81 MG 81 MG: 81 TABLET ORAL at 08:06

## 2020-06-17 RX ADMIN — LOSARTAN POTASSIUM 50 MG: 25 TABLET ORAL at 08:06

## 2020-06-17 RX ADMIN — VANCOMYCIN HYDROCHLORIDE 500 MG: 500 INJECTION, POWDER, LYOPHILIZED, FOR SOLUTION INTRAVENOUS at 08:06

## 2020-06-17 RX ADMIN — STANDARDIZED SENNA CONCENTRATE AND DOCUSATE SODIUM 1 TABLET: 8.6; 5 TABLET ORAL at 08:06

## 2020-06-17 RX ADMIN — INSULIN ASPART 3 UNITS: 100 INJECTION, SOLUTION INTRAVENOUS; SUBCUTANEOUS at 04:06

## 2020-06-17 RX ADMIN — CARVEDILOL 25 MG: 6.25 TABLET, FILM COATED ORAL at 08:06

## 2020-06-17 RX ADMIN — COLLAGENASE SANTYL: 250 OINTMENT TOPICAL at 08:06

## 2020-06-17 RX ADMIN — CEFTRIAXONE 1 G: 1 INJECTION, SOLUTION INTRAVENOUS at 01:06

## 2020-06-17 RX ADMIN — INSULIN ASPART 3 UNITS: 100 INJECTION, SOLUTION INTRAVENOUS; SUBCUTANEOUS at 08:06

## 2020-06-17 RX ADMIN — GABAPENTIN 300 MG: 300 CAPSULE ORAL at 03:06

## 2020-06-17 RX ADMIN — INSULIN ASPART 3 UNITS: 100 INJECTION, SOLUTION INTRAVENOUS; SUBCUTANEOUS at 11:06

## 2020-06-17 RX ADMIN — INSULIN ASPART 2 UNITS: 100 INJECTION, SOLUTION INTRAVENOUS; SUBCUTANEOUS at 08:06

## 2020-06-17 RX ADMIN — GABAPENTIN 300 MG: 300 CAPSULE ORAL at 08:06

## 2020-06-17 RX ADMIN — FAMOTIDINE 20 MG: 20 TABLET ORAL at 08:06

## 2020-06-17 RX ADMIN — LEVOTHYROXINE SODIUM 75 MCG: 75 TABLET ORAL at 05:06

## 2020-06-17 RX ADMIN — INSULIN ASPART 4 UNITS: 100 INJECTION, SOLUTION INTRAVENOUS; SUBCUTANEOUS at 11:06

## 2020-06-17 RX ADMIN — OXYCODONE 5 MG: 5 TABLET ORAL at 08:06

## 2020-06-17 RX ADMIN — OXYCODONE 5 MG: 5 TABLET ORAL at 01:06

## 2020-06-17 NOTE — PLAN OF CARE
Patient is awake alert oriented. Medicated once for right foot pain with po narcotic as ordered; dressing to wound vac CDI no output in canister, dark red drainage in line. Tolerating po intake, turn q2 wedge, incontinence care, foam dressing on buttocks. Free of falls, call light in reach, bed alarm on. Continue with plan of care as ordered. No distress noted.

## 2020-06-17 NOTE — PT/OT/SLP PROGRESS
Occupational Therapy   Treatment    Name: Tasneem Lynn  MRN: [id number]  Admitting Diagnosis:  Chronic osteomyelitis of right foot with draining sinus       Recommendations:     Discharge Recommendations: nursing facility, skilled  Discharge Equipment Recommendations:  bedside commode, walker, rolling, hospital bed  Barriers to discharge:  Decreased caregiver support    Assessment:     Tasneem Lynn is a 74 y.o. female with a medical diagnosis of Chronic osteomyelitis of right foot with draining sinus.  She presents with the following performance deficits affecting function: weakness, impaired functional mobilty, decreased safety awareness, impaired coordination, impaired cardiopulmonary response to activity, impaired endurance, impaired self care skills, decreased lower extremity function, decreased ROM, edema, orthopedic precautions, impaired skin, impaired balance, gait instability, decreased coordination, pain.     Pt tolerated today's Tx session Fair-. Pt requiring MaxAx2 for bed mobility. Pt w/ poor EOB sitting balance and posture. Pt w/ c/o back pain while seated at EOB. Pt would cont to benefit from skilled OT to address noted goals.        Rehab Prognosis:  Fair+; patient would benefit from acute skilled OT services to address these deficits and reach maximum level of function.       Plan:     Patient to be seen 5 x/week to address the above listed problems via self-care/home management, therapeutic activities, therapeutic exercises  · Plan of Care Expires: 06/27/20  · Plan of Care Reviewed with: patient    Subjective     Pain/Comfort:  · Pain Rating 1: 0/10(Pt did not rate pain)  · Location - Side 1: Bilateral  · Pain Addressed 1: Nurse notified, Distraction, Reposition    Objective:     Communicated with: nurse Garcia prior to session.  Patient found HOB elevated with peripheral IV, pressure relief boots, wound vac, SCD upon OT entry to room.    General Precautions: Standard, diabetic, fall   Orthopedic  Precautions:(RLE non weight bearing)   Braces: N/A     Occupational Performance:     Bed Mobility:    · Patient completed Rolling/Turning to Left with  maximal assistance  · Patient completed Scooting/Bridging with Mehnaz to scoot to EOB for proper hip and feet placement. Scooting to HOB w/ LLE to assist w/ trendelenburg MaxAx2  · Patient completed Supine to Sit with maximal assistance and 2 persons  · Patient completed Sit to Supine with maximal assistance and 2 persons   · Pt w/ fair- sitting balance while seated @ EOB. 2* to back pain. Occasional CGA required    Functional Mobility/Transfers:  · N/A to today's Tx session  · Please refer to PT note(s)    Activities of Daily Living:  · Grooming: modified independence w/ setup while seated @ EOB      [facility] 6 Click ADL: 12    Treatment & Education:  Pt participated in the noted Tx session  Pt sat EOB for ~15 minutes with CGA-Min A with R lateral leaning.  Pt req max motivation throughout today's Tx session to participate    Pt re ed on role of OT per POC  Pt ed on impt of bed mobility and OOB mobility w/ assist    Patient left HOB elevated with all lines intact, call button in reach, bed alarm on, nurse Radha notified and w/ BLE pressure boots, L SCD and lunch trayEducation:      GOALS:   Multidisciplinary Problems     Occupational Therapy Goals        Problem: Occupational Therapy Goal    Goal Priority Disciplines Outcome Interventions   Occupational Therapy Goal     OT, PT/OT Ongoing, Progressing    Description: Goals to be met by: 6/27/2020    Patient will increase functional independence with ADLs by performing:    Grooming while seated at sink with Set-up Assistance.  Toileting from bedside commode with Minimal Assistance for hygiene and clothing management.   Sitting at edge of bed x20 minutes with Supervision.  Supine to sit with Minimal Assistance.  Stand pivot transfers with Moderate Assistance.  Toilet transfer to bedside commode with Moderate  Assistance.                     Time Tracking:     OT Date of Treatment: 06/17/20  OT Start Time: 1145  OT Stop Time: 1210  OT Total Time (min): 25 min    Billable Minutes:Self Care/Home Management 10  Therapeutic Activity 15 CoTx w/ PTA    TASHA Farrar  6/17/2020

## 2020-06-17 NOTE — ASSESSMENT & PLAN NOTE
-Imaging reviewed. Patient appears to have adequate perfusion to heal wound. Recommend continued offloading, debridement and wound care per podiatry and optimization of comorbidities. Risks outweigh the benefits of contrast use due to dialysis dependence risk. Will continue to discuss treatment options with patient (angiogram vs amputation)  if wound worsens or does not heal. Discussed with patient who states understanding and does not desire surgery at this time.  Continue Abx per ID.

## 2020-06-17 NOTE — PLAN OF CARE
06/17/20 1302   Post-Acute Status   Post-Acute Authorization Other  (IV Infusion)   Discharge Plan   Discharge Plan A Home Health   Patient will need abx at home at time of DC.  TN sent referral to [facility] via [employer].

## 2020-06-17 NOTE — PLAN OF CARE
Problem: Physical Therapy Goal  Goal: Physical Therapy Goal  Description: Goals to be met by: 20     Patient will increase functional independence with mobility by performin. Supine to sit with Set-up [place]  2. Sit to supine with Set-up [place]  3. Rolling to Left and Right with Modified [place].  4. Bed to chair transfer with Contact Guard Assistance using Slideboard  5. Wheelchair propulsion x30 feet with Supervision using bilateral uppper extremities  6. Sitting at edge of bed x20 minutes with Supervision      Outcome: Ongoing, Progressing   Pt making slow progress towards goals. Pt required Max A x2 for supine<>sit and sat EOB for ~15 minutes with CGA-Min A due to R lateral leaning. Pt with increased complaints of back pain sitting EOB and requesting to return supine. Pt requires encouragement throughout session. Pt would continue to benefit from skilled PT services to address pt's deficits and improve current level of function.

## 2020-06-17 NOTE — PLAN OF CARE
06/17/20 1447   Medicare Message   Important Message from Medicare regarding Discharge Appeal Rights Given to patient/caregiver;Explained to patient/caregiver;Signed/date by patient/caregiver

## 2020-06-17 NOTE — SUBJECTIVE & OBJECTIVE
Interval History: Events noted. No complaints. Awaiting line placement.    Review of Systems   Constitutional: Negative for chills and fever.   All other systems reviewed and are negative.    Objective:     Vital Signs (Most Recent):  Temp: 98.2 °F (36.8 °C) (06/17/20 0740)  Pulse: 73 (06/17/20 0740)  Resp: 17 (06/17/20 0740)  BP: (!) 100/51 (06/17/20 0740)  SpO2: 96 % (06/17/20 0740) Vital Signs (24h Range):  Temp:  [97.5 °F (36.4 °C)-98.7 °F (37.1 °C)] 98.2 °F (36.8 °C)  Pulse:  [69-79] 73  Resp:  [16-18] 17  SpO2:  [96 %-100 %] 96 %  BP: (100-184)/(51-82) 100/51     Weight: 116.5 kg (256 lb 13.4 oz)  Body mass index is 46.98 kg/m².    Estimated Creatinine Clearance: 22.1 mL/min (A) (based on SCr of 2.7 mg/dL (H)).    Physical Exam  Vitals signs and nursing note reviewed.   Constitutional:       General: She is not in acute distress.     Appearance: She is well-developed and well-nourished. She is not diaphoretic.   HENT:      Head: Normocephalic and atraumatic.      Right Ear: External ear normal.      Left Ear: External ear normal.   Eyes:      Extraocular Movements: EOM normal.      Pupils: Pupils are equal, round, and reactive to light.   Cardiovascular:      Rate and Rhythm: Normal rate and regular rhythm.      Pulses: Pulses are palpable.      Heart sounds: Normal heart sounds. No murmur.   Pulmonary:      Effort: Pulmonary effort is normal. No respiratory distress.      Breath sounds: Normal breath sounds.   Abdominal:      General: Bowel sounds are normal. There is no distension.      Palpations: Abdomen is soft.   Musculoskeletal: Normal range of motion.         General: No tenderness or deformity.   Skin:     General: Skin is warm and dry.      Findings: No erythema.   Neurological:      Mental Status: She is alert and oriented to person, place, and time.   Psychiatric:         Mood and Affect: Mood and affect normal.         Behavior: Behavior normal.         Thought Content: Thought content normal.          Judgment: Judgment normal.         Significant Labs:   Blood Culture:   Recent Labs   Lab 06/12/20  2325 06/12/20  2339 06/15/20  0624   LABBLOO No Growth after 4 days.  Gram stain aer bottle: Gram positive cocci in clusters resembling Staph   Results called to and read back by: Nataliia Weber  06/13/2020  22:41  COAGULASE-NEGATIVE STAPHYLOCOCCUS SPECIES  Organism is a probable contaminant  * No Growth to date  No Growth to date  No Growth to date     CBC:   Recent Labs   Lab 06/16/20  0406 06/17/20  0420   WBC 9.78 9.13   HGB 9.5* 10.2*   HCT 32.1* 34.9*   * 343     CMP:   Recent Labs   Lab 06/16/20  0406 06/17/20  0420   * 135*   K 5.2* 5.4*    105   CO2 23 22*   * 259*   BUN 71* 72*   CREATININE 2.6* 2.7*   CALCIUM 8.6* 8.8   PROT 8.0 8.4   ALBUMIN 2.4* 2.5*   BILITOT 0.2 0.2   ALKPHOS 104 115   AST 11 13   ALT 11 15   ANIONGAP 7* 8   EGFRNONAA 18* 17*     Wound Culture:   Recent Labs   Lab 06/13/20  1405   LABAERO No growth       Significant Imaging: I have reviewed all pertinent imaging results/findings within the past 24 hours.

## 2020-06-17 NOTE — PLAN OF CARE
Problem: Occupational Therapy Goal  Goal: Occupational Therapy Goal  Description: Goals to be met by: 6/27/2020    Patient will increase functional independence with ADLs by performing:    Grooming while seated at sink with Set-up Assistance.  Toileting from bedside commode with Minimal Assistance for hygiene and clothing management.   Sitting at edge of bed x20 minutes with Supervision.  Supine to sit with Minimal Assistance.  Stand pivot transfers with Moderate Assistance.  Toilet transfer to bedside commode with Moderate Assistance.    Outcome: Ongoing, Progressing       Pt tolerated today's Tx session Fair-. Pt requiring MaxAx2 for bed mobility. Pt w/ poor EOB sitting balance and posture. Pt w/ c/o back pain while seated at EOB. Pt would cont to benefit from skilled OT to address noted goals.

## 2020-06-17 NOTE — PROGRESS NOTES
[facility]  Infectious Disease  Progress Note    Patient Name: Tasneem Lynn  MRN: [id number]  Admission Date: 6/12/2020  Length of Stay: 5 days  Attending Physician: Lesley Myles MD  Primary Care Provider: To Obtain Unable    Isolation Status: No active isolations     Assessment/Plan:      * Chronic osteomyelitis of right foot with draining sinus  - culture is NGTD  - MRI c/w osteomyelitis of the calcaneus  - pathology confirms chronic infection  - rec treat with ceftriaxone and vancomycin for 6 weeks, following weekly labs    Discharge antibiotics:   Ceftriaxone 1 gram IV q 24 hours plus  Vancomycin 500 mg IV q 48 hours    End date of IV antibiotics: 07/27/2020    Weekly outpatient laboratory on Monday or Tuesday while on IV antibiotics.    CBC   CMP or BMP   ESR and CRP   Vancomycin trough. Target 15-20    If vancomycin trough is not at target (15-20) prior to discharge, the please perform vancomycin trough before their fourth outpatient dose.    Fax laboratory results to [facility] at [phone] with attn: Dr. Villasenor.    Outpatient Infectious Diseases clinic follow up will be arranged and found in patient calendar.    Prior to discharge, please ensure the patient's follow-up has been scheduled.  If there is still no follow-up scheduled in Infectious Diseases clinic, please send an EPIC message to Scarlett Thomas LPN in Infectious Diseases.                   I will sign off. Please contact us if you have any additional questions.    Adarsh Dash MD  Infectious Disease  [facility]    Subjective:     Principal Problem:Chronic osteomyelitis of right foot with draining sinus    HPI: This is a 74 y.o female with diabetes mellitus, hypertension, dyslipidemia, CHF, hypothyroidism, CKD stage 4, multiple chronic pains, obesity, and stroke (1986) who presents to the hospital with a chief complaint of worsening wound to the right heel for the last week. Patient  reports her right heel wound has started 3 months ago and it seems got worse since last week with increasing bleeding. She states she believes the wound is infected. She reports she was prescribed an antibiotic (unsure name) 2 months ago by Angela James NP at an urgent care clinic but it didn't help much. She also reports another wound at her anterior right foot which is developed around 1 month ago. Patient reports she has been mostly staying on her bed and she has not been walking for 2 months due to her right heel wound. She reports the wound would bleed and have more pain if she put pressure on her right foot. She denies fever, chill, chest pain, SOB, bilateral calves/leg pain, N/V/D, cough or sick contacts at home. She reports she does not go to wound care clinic. She states the home health nurse visited her 3 times since she started having this heel wound. The last visit was today.      In ED, COVID negative. X-ray right foot can not exclude the possibility of osseous infectious involvement.  Labs with elevated sed rate and CRP, c/s with CKD stage 4 with GFR 25 and elevated BUN&sCr 57&2.2.      Patient was admitted for further evaluation and treatment of right foot wound and EMMA. She was seen by Podiatry and an MRI demonstrated osteomyelitis of the calcaneous bone. A biopsy was performed for histopathology and culture. The culture is NGTD whilst the pathology is pending. I am consulted for abx recommendations. This morning, the patient feels well. She lives at home with her daughter. Her tetanus immunization status is unknown.     Interval History: Events noted. No complaints. Awaiting line placement.    Review of Systems   Constitutional: Negative for chills and fever.   All other systems reviewed and are negative.    Objective:     Vital Signs (Most Recent):  Temp: 98.2 °F (36.8 °C) (06/17/20 0740)  Pulse: 73 (06/17/20 0740)  Resp: 17 (06/17/20 0740)  BP: (!) 100/51 (06/17/20 0740)  SpO2: 96 % (06/17/20  0740) Vital Signs (24h Range):  Temp:  [97.5 °F (36.4 °C)-98.7 °F (37.1 °C)] 98.2 °F (36.8 °C)  Pulse:  [69-79] 73  Resp:  [16-18] 17  SpO2:  [96 %-100 %] 96 %  BP: (100-184)/(51-82) 100/51     Weight: 116.5 kg (256 lb 13.4 oz)  Body mass index is 46.98 kg/m².    Estimated Creatinine Clearance: 22.1 mL/min (A) (based on SCr of 2.7 mg/dL (H)).    Physical Exam  Vitals signs and nursing note reviewed.   Constitutional:       General: She is not in acute distress.     Appearance: She is well-developed and well-nourished. She is not diaphoretic.   HENT:      Head: Normocephalic and atraumatic.      Right Ear: External ear normal.      Left Ear: External ear normal.   Eyes:      Extraocular Movements: EOM normal.      Pupils: Pupils are equal, round, and reactive to light.   Cardiovascular:      Rate and Rhythm: Normal rate and regular rhythm.      Pulses: Pulses are palpable.      Heart sounds: Normal heart sounds. No murmur.   Pulmonary:      Effort: Pulmonary effort is normal. No respiratory distress.      Breath sounds: Normal breath sounds.   Abdominal:      General: Bowel sounds are normal. There is no distension.      Palpations: Abdomen is soft.   Musculoskeletal: Normal range of motion.         General: No tenderness or deformity.   Skin:     General: Skin is warm and dry.      Findings: No erythema.   Neurological:      Mental Status: She is alert and oriented to person, place, and time.   Psychiatric:         Mood and Affect: Mood and affect normal.         Behavior: Behavior normal.         Thought Content: Thought content normal.         Judgment: Judgment normal.         Significant Labs:   Blood Culture:   Recent Labs   Lab 06/12/20  2325 06/12/20  2339 06/15/20  0624   LABBLOO No Growth after 4 days.  Gram stain aer bottle: Gram positive cocci in clusters resembling Staph   Results called to and read back by: Nataliia Weber  06/13/2020  22:41  COAGULASE-NEGATIVE STAPHYLOCOCCUS SPECIES  Organism is a  probable contaminant  * No Growth to date  No Growth to date  No Growth to date     CBC:   Recent Labs   Lab 06/16/20  0406 06/17/20  0420   WBC 9.78 9.13   HGB 9.5* 10.2*   HCT 32.1* 34.9*   * 343     CMP:   Recent Labs   Lab 06/16/20  0406 06/17/20  0420   * 135*   K 5.2* 5.4*    105   CO2 23 22*   * 259*   BUN 71* 72*   CREATININE 2.6* 2.7*   CALCIUM 8.6* 8.8   PROT 8.0 8.4   ALBUMIN 2.4* 2.5*   BILITOT 0.2 0.2   ALKPHOS 104 115   AST 11 13   ALT 11 15   ANIONGAP 7* 8   EGFRNONAA 18* 17*     Wound Culture:   Recent Labs   Lab 06/13/20  1405   LABAERO No growth       Significant Imaging: I have reviewed all pertinent imaging results/findings within the past 24 hours.

## 2020-06-17 NOTE — CONSULTS
Currently following patient for right lower extremity wound.  Concern for long-term antibiotics.  Would recommend monitoring blood cultures and Infectious Disease recommendations to confirm need for long-term IV antibiotics.  If needed, recommend vein mapping for future dialysis long-term access and will make recommendations for PICC line placement at that.  Tunneled dialysis catheter presents similar risks of interfering and creating central venous stenosis for future dialysis access and the risks appear to outweigh benefits at this time.  Will continue follow patient and her vein mapping results.

## 2020-06-17 NOTE — ASSESSMENT & PLAN NOTE
- culture is NGTD  - MRI c/w osteomyelitis of the calcaneus  - pathology confirms chronic infection  - rec treat with ceftriaxone and vancomycin for 6 weeks, following weekly labs    Discharge antibiotics:   Ceftriaxone 2 grams IV q 24 hours plus  Vancomycin 500 mg IV q 48 hours    End date of IV antibiotics: 07/27/2020    Weekly outpatient laboratory on Monday or Tuesday while on IV antibiotics.    CBC   CMP or BMP   ESR and CRP   Vancomycin trough. Target 15-20    If vancomycin trough is not at target (15-20) prior to discharge, the please perform vancomycin trough before their fourth outpatient dose.    Fax laboratory results to [facility] at [phone] with attn: Dr. Villasenor.    Outpatient Infectious Diseases clinic follow up will be arranged and found in patient calendar.    Prior to discharge, please ensure the patient's follow-up has been scheduled.  If there is still no follow-up scheduled in Infectious Diseases clinic, please send an EPIC message to Scarlett Thomas LPN in Infectious Diseases.

## 2020-06-17 NOTE — SUBJECTIVE & OBJECTIVE
Medications:  Continuous Infusions:  Scheduled Meds:   aspirin  81 mg Oral Daily    carvediloL  25 mg Oral BID WM    cefTRIAXone (ROCEPHIN) IVPB  1 g Intravenous Q24H    collagenase   Topical (Top) Daily    famotidine  20 mg Oral Daily    furosemide  20 mg Oral Daily    gabapentin  300 mg Oral TID    insulin aspart U-100  3 Units Subcutaneous TIDWM    insulin detemir U-100  6 Units Subcutaneous QHS    levothyroxine  75 mcg Oral Before breakfast    losartan  50 mg Oral Daily    rosuvastatin  20 mg Oral Daily    senna-docusate 8.6-50 mg  1 tablet Oral BID    vancomycin (VANCOCIN) IVPB  500 mg Intravenous Once     PRN Meds:acetaminophen, acetaminophen, dextrose 50%, dextrose 50%, glucagon (human recombinant), glucose, glucose, insulin aspart U-100, ondansetron, oxyCODONE, sodium chloride 0.9%, Pharmacy to dose Vancomycin consult **AND** vancomycin - pharmacy to dose     Objective:     Vital Signs (Most Recent):  Temp: 98.2 °F (36.8 °C) (06/17/20 0740)  Pulse: 73 (06/17/20 0740)  Resp: 17 (06/17/20 0740)  BP: (!) 100/51 (06/17/20 0740)  SpO2: 96 % (06/17/20 0740) Vital Signs (24h Range):  Temp:  [97.5 °F (36.4 °C)-98.7 °F (37.1 °C)] 98.2 °F (36.8 °C)  Pulse:  [69-79] 73  Resp:  [16-18] 17  SpO2:  [96 %-100 %] 96 %  BP: (100-184)/(51-82) 100/51     Date 06/17/20 0700 - 06/18/20 0659   Shift 2657-2317 0336-1193 8087-2865 24 Hour Total   INTAKE   P.O. 120   120   Shift Total(mL/kg) 120(1)   120(1)   OUTPUT   Shift Total(mL/kg)       Weight (kg) 116.5 116.5 116.5 116.5       Physical Exam  Vitals signs reviewed.   Constitutional:       General: She is not in acute distress.     Appearance: She is well-developed and well-nourished. She is not diaphoretic.   HENT:      Head: Normocephalic and atraumatic.   Eyes:      Conjunctiva/sclera: Conjunctivae normal.   Neck:      Musculoskeletal: Neck supple.   Cardiovascular:      Rate and Rhythm: Normal rate.      Pulses:           Femoral pulses are 2+ on the right  side and 2+ on the left side.       Dorsalis pedis pulses are detected w/ Doppler on the right side and detected w/ Doppler on the left side.        Posterior tibial pulses are detected w/ Doppler on the right side and detected w/ Doppler on the left side.   Pulmonary:      Effort: Pulmonary effort is normal. No respiratory distress.   Abdominal:      General: There is no distension.      Palpations: Abdomen is soft. There is no mass.      Tenderness: There is no abdominal tenderness. There is no guarding or rebound.      Hernia: No hernia is present.   Musculoskeletal: Normal range of motion.         General: Tenderness and edema present. No deformity.   Feet:      Right foot:      Skin integrity: Ulcer and skin breakdown present.   Skin:     General: Skin is warm and dry.      Capillary Refill: Capillary refill takes 2 to 3 seconds.      Coloration: Skin is not pale.      Findings: No erythema or rash.   Neurological:      Mental Status: She is alert and oriented to person, place, and time.      Sensory: Sensory deficit present.   Psychiatric:         Mood and Affect: Mood and affect normal.         Significant Labs:  All pertinent labs from the last 24 hours have been reviewed.    Significant Diagnostics:  I have reviewed all pertinent imaging results/findings within the past 24 hours.

## 2020-06-17 NOTE — PLAN OF CARE
06/17/20 1237   Discharge Reassessment   Assessment Type Discharge Planning Reassessment   Provided patient/caregiver education on the expected discharge date and the discharge plan Yes   [facility] order form received and faxed to [facility] at [phone] (customer service [phone]) along with H&P and podiatry note explaining why wound vac is needed.    Patient discussed in MDT's this am.  Patient will need a tunneled cath for IV administration for home delivery.  MD placed consult on 6/16 requesting cath placement    Patient will also need a .BSC for home use.    1627:  Call received from Abbey at [facility] stating that patient is out of network

## 2020-06-17 NOTE — PLAN OF CARE
Problem: Occupational Therapy Goal  Goal: Occupational Therapy Goal  Description: Goals to be met by: 6/27/2020    Patient will increase functional independence with ADLs by performing:    Grooming while seated at sink with Set-up Assistance.  Toileting from bedside commode with Minimal Assistance for hygiene and clothing management.   Sitting at edge of bed x20 minutes with Supervision.  Supine <> sit with Minimal Assistance.  Stand pivot transfers with Moderate Assistance.  Toilet transfer to bedside commode with Moderate Assistance.    Outcome: Ongoing, Progressing     Pt required max encouragement seated EOB for functional tasks; pt showed initiation for supine> sit, requiring MIN A.

## 2020-06-17 NOTE — PLAN OF CARE
Problem: Adult Inpatient Plan of Care  Goal: Plan of Care Review  Outcome: Ongoing, Progressing  Flowsheets (Taken 6/17/2020 0552)  Plan of Care Reviewed With: patient   Patient remains free from injury and falls. Wound vac to right foot with seal intact, sanguinous drainage in cannister, unable to accurately record output.  Patient turned every 2 hours with heels elevated in Z-flex boots. Incontinence care provided as needed. Pure wick placed to aid with moisture prevention. Dressing to sacrum intact. IV antibiotic administered as ordered. Plan of care continued.

## 2020-06-17 NOTE — PLAN OF CARE
In the event patient is discharged home ,wound VAC paperwork filled out placed in chart. Otherwise VAC can be applied at SNF vs. LTAC pending placement.     Teresa Vega DPM

## 2020-06-17 NOTE — PT/OT/SLP PROGRESS
Physical Therapy Treatment    Patient Name:  Tasneem Lynn   MRN:  [id number]    Recommendations:     Discharge Recommendations:  nursing facility, skilled   Discharge Equipment Recommendations: bedside commode, walker, rolling, hospital bed   Barriers to discharge: Inaccessible home and Decreased caregiver support    Assessment:     Tasneem Lynn is a 74 y.o. 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  She presents with the following impairments/functional limitations:  weakness, impaired functional mobilty, decreased safety awareness, impaired coordination, impaired cardiopulmonary response to activity, impaired endurance, impaired self care skills, decreased lower extremity function, decreased ROM, edema, orthopedic precautions, impaired skin, impaired balance, gait instability, decreased coordination, pain.    Pt making slow progress towards goals. Pt required Max A x2 for supine<>sit and sat EOB for ~15 minutes with CGA-Min A due to R lateral leaning. Pt with increased complaints of back pain sitting EOB and requesting to return supine. Pt requires encouragement throughout session. Pt would continue to benefit from skilled PT services to address pt's deficits and improve current level of function.    Rehab Prognosis: Fair; patient would benefit from acute skilled PT services to address these deficits and reach maximum level of function.    Recent Surgery: * No surgery found *      Plan:     During this hospitalization, patient to be seen 5 x/week to address the identified rehab impairments via gait training, therapeutic activities, therapeutic exercises, neuromuscular re-education, wheelchair management/training and progress toward the following goals:    · Plan of Care Expires:  06/27/20    Subjective     Chief Complaint: back pain  Patient/Family Comments/goals: Required encouragement/education for participation.  Pain/Comfort:  · Pain Rating 1: (yes, did not  rate)  · Location - Side 1: Bilateral  · Location 1: (back)  · Pain Addressed 1: Nurse notified, Reposition, Distraction      Objective:     Communicated with pt's nurseRadha, prior to session.  Patient found HOB elevated with peripheral IV, pressure relief boots, wound vac, SCD upon PT entry to room.     General Precautions: Standard, diabetic, fall   Orthopedic Precautions:RLE non weight bearing  (treated as NWB due to wound vac)  Braces: N/A     Functional Mobility:  · Bed Mobility:     · Rolling Left:  maximal assistance  · Scooting: scooting to EOB for foot placement, Min A; scooting to HOB in trendelenburg Max A of 2 people  · Supine to Sit: maximal assistance and of 2 persons  · Sit to Supine: maximal assistance and of 2 persons  · Balance: fair-  (pt with R lateral leaning due to back pain requiring min A. Pt occasionally requiring CGA.)      AM-PAC 6 CLICK MOBILITY  Turning over in bed (including adjusting bedclothes, sheets and blankets)?: 2  Sitting down on and standing up from a chair with arms (e.g., wheelchair, bedside commode, etc.): 1  Moving from lying on back to sitting on the side of the bed?: 2  Moving to and from a bed to a chair (including a wheelchair)?: 1  Need to walk in hospital room?: 1  Climbing 3-5 steps with a railing?: 1  Basic Mobility Total Score: 8       Therapeutic Activities and Exercises:   Pt sat EOB for ~15 minutes with CGA-Min A with R lateral leaning.  Pt performed therex to BLEs x15 reps: glut sets, ankle pumps, quad sets, LAQs, marching  Requires encouragement throughout session for participation.    Patient left HOB fully elevated with lunch tray, B pressure boots, L SCD with all lines intact, call button in reach, bed alarm on and pt's nurseRadha, notified..    GOALS:   Multidisciplinary Problems     Physical Therapy Goals        Problem: Physical Therapy Goal    Goal Priority Disciplines Outcome Goal Variances Interventions   Physical Therapy Goal     PT, PT/OT  Ongoing, Progressing     Description: Goals to be met by: 20     Patient will increase functional independence with mobility by performin. Supine to sit with Set-up [place]  2. Sit to supine with Set-up [place]  3. Rolling to Left and Right with Modified [place].  4. Bed to chair transfer with Contact Guard Assistance using Slideboard  5. Wheelchair propulsion x30 feet with Supervision using bilateral uppper extremities  6. Sitting at edge of bed x20 minutes with Supervision                       Time Tracking:     PT Received On: 20  PT Start Time: 1144     PT Stop Time: 1210  PT Total Time (min): 26 min     Billable Minutes: Therapeutic Exercise 13 (Cotx with MARC)     Treatment Type: Treatment  PT/PTA: PTA     PTA Visit Number: 2     Rin Mckeon, PTA  2020

## 2020-06-17 NOTE — PROGRESS NOTES
[facility]  Vascular Surgery  Progress Note    Patient Name: Tasneem Lynn  MRN: [id number]  Admission Date: 6/12/2020  Primary Care Provider: To Obtain Unable    Subjective:     Interval History: Resting comfortably today.    Post-Op Info:  * No surgery found *           Medications:  Continuous Infusions:  Scheduled Meds:   aspirin  81 mg Oral Daily    carvediloL  25 mg Oral BID WM    cefTRIAXone (ROCEPHIN) IVPB  1 g Intravenous Q24H    collagenase   Topical (Top) Daily    famotidine  20 mg Oral Daily    furosemide  20 mg Oral Daily    gabapentin  300 mg Oral TID    insulin aspart U-100  3 Units Subcutaneous TIDWM    insulin detemir U-100  6 Units Subcutaneous QHS    levothyroxine  75 mcg Oral Before breakfast    losartan  50 mg Oral Daily    rosuvastatin  20 mg Oral Daily    senna-docusate 8.6-50 mg  1 tablet Oral BID    vancomycin (VANCOCIN) IVPB  500 mg Intravenous Once     PRN Meds:acetaminophen, acetaminophen, dextrose 50%, dextrose 50%, glucagon (human recombinant), glucose, glucose, insulin aspart U-100, ondansetron, oxyCODONE, sodium chloride 0.9%, Pharmacy to dose Vancomycin consult **AND** vancomycin - pharmacy to dose     Objective:     Vital Signs (Most Recent):  Temp: 98.2 °F (36.8 °C) (06/17/20 0740)  Pulse: 73 (06/17/20 0740)  Resp: 17 (06/17/20 0740)  BP: (!) 100/51 (06/17/20 0740)  SpO2: 96 % (06/17/20 0740) Vital Signs (24h Range):  Temp:  [97.5 °F (36.4 °C)-98.7 °F (37.1 °C)] 98.2 °F (36.8 °C)  Pulse:  [69-79] 73  Resp:  [16-18] 17  SpO2:  [96 %-100 %] 96 %  BP: (100-184)/(51-82) 100/51     Date 06/17/20 0700 - 06/18/20 0659   Shift [id number] 1740-4763 0206-3391 24 Hour Total   INTAKE   P.O. 120   120   Shift Total(mL/kg) 120(1)   120(1)   OUTPUT   Shift Total(mL/kg)       Weight (kg) 116.5 116.5 116.5 116.5       Physical Exam  Vitals signs reviewed.   Constitutional:       General: She is not in acute distress.     Appearance: She is well-developed and  well-nourished. She is not diaphoretic.   HENT:      Head: Normocephalic and atraumatic.   Eyes:      Conjunctiva/sclera: Conjunctivae normal.   Neck:      Musculoskeletal: Neck supple.   Cardiovascular:      Rate and Rhythm: Normal rate.      Pulses:           Femoral pulses are 2+ on the right side and 2+ on the left side.       Dorsalis pedis pulses are detected w/ Doppler on the right side and detected w/ Doppler on the left side.        Posterior tibial pulses are detected w/ Doppler on the right side and detected w/ Doppler on the left side.   Pulmonary:      Effort: Pulmonary effort is normal. No respiratory distress.   Abdominal:      General: There is no distension.      Palpations: Abdomen is soft. There is no mass.      Tenderness: There is no abdominal tenderness. There is no guarding or rebound.      Hernia: No hernia is present.   Musculoskeletal: Normal range of motion.         General: Tenderness and edema present. No deformity.   Feet:      Right foot:      Skin integrity: Ulcer and skin breakdown present.   Skin:     General: Skin is warm and dry.      Capillary Refill: Capillary refill takes 2 to 3 seconds.      Coloration: Skin is not pale.      Findings: No erythema or rash.   Neurological:      Mental Status: She is alert and oriented to person, place, and time.      Sensory: Sensory deficit present.   Psychiatric:         Mood and Affect: Mood and affect normal.         Significant Labs:  All pertinent labs from the last 24 hours have been reviewed.    Significant Diagnostics:  I have reviewed all pertinent imaging results/findings within the past 24 hours.    Assessment/Plan:     Atherosclerosis of native arteries of right leg with ulceration of heel and midfoot  -Imaging reviewed. Patient appears to have adequate perfusion to heal wound. Recommend continued offloading, debridement and wound care per podiatry and optimization of comorbidities. Risks outweigh the benefits of contrast use due  to dialysis dependence risk. Will continue to discuss treatment options with patient (angiogram vs amputation)  if wound worsens or does not heal. Discussed with patient who states understanding and does not desire surgery at this time.  Continue Abx per ID.        Regulo Bunch MD  Vascular Surgery  [facility]

## 2020-06-17 NOTE — PROGRESS NOTES
Pharmacokinetic Assessment Follow Up: IV Vancomycin    Vancomycin serum concentration assessment(s):    The random level was drawn correctly and can be used to guide therapy at this time. The measurement is within the desired definitive target range of 10 to 20 mcg/mL.    Vancomycin Regimen Plan:    Give 500 mg today.  Re-dose when the random level is less than 20 mcg/mL, next level to be drawn at 0400 on 6/18/2020    Drug levels (last 3 results):  Recent Labs   Lab Result Units 06/15/20  0624 06/16/20  0406 06/17/20  0421   Vancomycin, Random ug/mL 18.6 20.9 16.5       Pharmacy will continue to follow and monitor vancomycin.    Please contact pharmacy at extension [phone] for questions regarding this assessment.    Thank you for the consult,   Can Samuel Jr       Patient brief summary:  Tasneem Lynn is a 74 y.o. female initiated on antimicrobial therapy with IV Vancomycin for treatment of bone/joint infection    Drug Allergies:   Review of patient's allergies indicates:   Allergen Reactions    Corticosteroids (glucocorticoids) Edema       Actual Body Weight:   116.5 kg    Renal Function:   Estimated Creatinine Clearance: 22.1 mL/min (A) (based on SCr of 2.7 mg/dL (H)).,     Dialysis Method (if applicable):  N/A    CBC (last 72 hours):  Recent Labs   Lab Result Units 06/15/20  0624 06/16/20  0406 06/17/20  0420   WBC K/uL 9.35 9.78 9.13   Hemoglobin g/dL 9.8* 9.5* 10.2*   Hematocrit % 32.4* 32.1* 34.9*   Platelets K/uL 368* 371* 343   Gran% % 36.8* 43.1 39.5   Lymph% % 48.0 39.8 44.2   Mono% % 9.7 11.6 10.7   Eosinophil% % 4.8 4.5 4.5   Basophil% % 0.4 0.6 0.4   Differential Method  Automated Automated Automated       Metabolic Panel (last 72 hours):  Recent Labs   Lab Result Units 06/15/20  0624 06/16/20  0406 06/17/20  0420   Sodium mmol/L 136 135* 135*   Potassium mmol/L 4.8 5.2* 5.4*   Chloride mmol/L 105 105 105   CO2 mmol/L 23 23 22*   Glucose mg/dL 179* 252* 259*   BUN, Bld mg/dL 68* 71* 72*   Creatinine  mg/dL 2.6* 2.6* 2.7*   Albumin g/dL  --  2.4* 2.5*   Total Bilirubin mg/dL  --  0.2 0.2   Alkaline Phosphatase U/L  --  104 115   AST U/L  --  11 13   ALT U/L  --  11 15       Vancomycin Administrations:  vancomycin given in the last 96 hours                     vancomycin 500 mg in dextrose 5 % 100 mL IVPB (ready to mix system) (mg) 500 mg New Bag 06/15/20 0913                    Microbiologic Results:  Microbiology Results (last 7 days)       Procedure Component Value Units Date/Time    Blood culture [[id number]] Collected: 06/15/20 0624    Order Status: Completed Specimen: Blood from Antecubital, Right Updated: 06/17/20 0703     Blood Culture, Routine No Growth to date      No Growth to date      No Growth to date    Blood Culture #1 **CANNOT BE ORDERED STAT** [[id number]] Collected: 06/12/20 2325    Order Status: Completed Specimen: Blood from Peripheral, Antecubital, Right Updated: 06/17/20 0503     Blood Culture, Routine No Growth after 4 days.     Aerobic culture [[id number]] Collected: 06/13/20 1405    Order Status: Completed Specimen: Wound from Foot, Right Updated: 06/16/20 1048     Aerobic Bacterial Culture No growth    Blood Culture #2 **CANNOT BE ORDERED STAT** [[id number]]  (Abnormal) Collected: 06/12/20 2339    Order Status: Completed Specimen: Blood from Peripheral, Hand, Right Updated: 06/16/20 0748     Blood Culture, Routine Gram stain aer bottle: Gram positive cocci in clusters resembling Staph       Results called to and read back by: Nataliia Weber  06/13/2020  22:41      COAGULASE-NEGATIVE STAPHYLOCOCCUS SPECIES  Organism is a probable contaminant      AFB Culture & Smear [[id number]] Collected: 06/13/20 1405    Order Status: Completed Specimen: Wound from Foot, Right Updated: 06/15/20 2127     AFB Culture & Smear Culture in progress     AFB CULTURE STAIN No acid fast bacilli seen.    Culture, Anaerobe [[id number]] Collected: 06/13/20 1405    Order Status: Completed Specimen: Wound from Foot, Right  Updated: 06/15/20 0846     Anaerobic Culture Culture in progress    Gram stain [[id number]] Collected: 06/13/20 1405    Order Status: Completed Specimen: Wound from Foot, Right Updated: 06/13/20 1459     Gram Stain Result No WBC's      No organisms seen    Aerobic culture [[id number]] Collected: 06/13/20 1405    Order Status: Canceled Specimen: Wound from Foot, Right     Blood culture [[id number]]     Order Status: Canceled Specimen: Blood

## 2020-06-18 ENCOUNTER — TELEPHONE (OUTPATIENT)
Dept: PODIATRY | Facility: CLINIC | Age: 75
End: 2020-06-18

## 2020-06-18 PROBLEM — E87.5 HYPERKALEMIA: Status: ACTIVE | Noted: 2020-06-18

## 2020-06-18 LAB
ALBUMIN SERPL BCP-MCNC: 2.4 G/DL (ref 3.5–5.2)
ALP SERPL-CCNC: 104 U/L (ref 55–135)
ALT SERPL W/O P-5'-P-CCNC: 11 U/L (ref 10–44)
ANION GAP SERPL CALC-SCNC: 7 MMOL/L (ref 8–16)
AST SERPL-CCNC: 11 U/L (ref 10–40)
BACTERIA #/AREA URNS HPF: ABNORMAL /HPF
BASOPHILS # BLD AUTO: 0.05 K/UL (ref 0–0.2)
BASOPHILS NFR BLD: 0.5 % (ref 0–1.9)
BILIRUB SERPL-MCNC: 0.2 MG/DL (ref 0.1–1)
BILIRUB UR QL STRIP: NEGATIVE
BUN SERPL-MCNC: 77 MG/DL (ref 8–23)
CALCIUM SERPL-MCNC: 8.5 MG/DL (ref 8.7–10.5)
CHLORIDE SERPL-SCNC: 102 MMOL/L (ref 95–110)
CLARITY UR: ABNORMAL
CO2 SERPL-SCNC: 23 MMOL/L (ref 23–29)
COLOR UR: YELLOW
CREAT SERPL-MCNC: 3.3 MG/DL (ref 0.5–1.4)
CREAT UR-MCNC: 127.5 MG/DL (ref 15–325)
DIFFERENTIAL METHOD: ABNORMAL
EOSINOPHIL # BLD AUTO: 0.4 K/UL (ref 0–0.5)
EOSINOPHIL NFR BLD: 4.4 % (ref 0–8)
EOSINOPHIL URNS QL WRIGHT STN: NORMAL
ERYTHROCYTE [DISTWIDTH] IN BLOOD BY AUTOMATED COUNT: 14.6 % (ref 11.5–14.5)
EST. GFR  (AFRICAN AMERICAN): 15 ML/MIN/1.73 M^2
EST. GFR  (NON AFRICAN AMERICAN): 13 ML/MIN/1.73 M^2
GLUCOSE SERPL-MCNC: 330 MG/DL (ref 70–110)
GLUCOSE UR QL STRIP: ABNORMAL
HCT VFR BLD AUTO: 31.9 % (ref 37–48.5)
HGB BLD-MCNC: 9.2 G/DL (ref 12–16)
HGB UR QL STRIP: ABNORMAL
HYALINE CASTS #/AREA URNS LPF: 0 /LPF
IMM GRANULOCYTES # BLD AUTO: 0.04 K/UL (ref 0–0.04)
IMM GRANULOCYTES NFR BLD AUTO: 0.4 % (ref 0–0.5)
KETONES UR QL STRIP: ABNORMAL
LEUKOCYTE ESTERASE UR QL STRIP: ABNORMAL
LYMPHOCYTES # BLD AUTO: 3.9 K/UL (ref 1–4.8)
LYMPHOCYTES NFR BLD: 43.1 % (ref 18–48)
MCH RBC QN AUTO: 28.3 PG (ref 27–31)
MCHC RBC AUTO-ENTMCNC: 28.8 G/DL (ref 32–36)
MCV RBC AUTO: 98 FL (ref 82–98)
MICROSCOPIC COMMENT: ABNORMAL
MONOCYTES # BLD AUTO: 1 K/UL (ref 0.3–1)
MONOCYTES NFR BLD: 10.6 % (ref 4–15)
NEUTROPHILS # BLD AUTO: 3.8 K/UL (ref 1.8–7.7)
NEUTROPHILS NFR BLD: 41 % (ref 38–73)
NITRITE UR QL STRIP: NEGATIVE
NRBC BLD-RTO: 0 /100 WBC
PH UR STRIP: 5 [PH] (ref 5–8)
PHOSPHATE SERPL-MCNC: 5 MG/DL (ref 2.7–4.5)
PLATELET # BLD AUTO: 360 K/UL (ref 150–350)
PMV BLD AUTO: 9.3 FL (ref 9.2–12.9)
POCT GLUCOSE: 264 MG/DL (ref 70–110)
POCT GLUCOSE: 285 MG/DL (ref 70–110)
POCT GLUCOSE: 307 MG/DL (ref 70–110)
POCT GLUCOSE: 327 MG/DL (ref 70–110)
POTASSIUM SERPL-SCNC: 5 MMOL/L (ref 3.5–5.1)
POTASSIUM SERPL-SCNC: 5.7 MMOL/L (ref 3.5–5.1)
POTASSIUM SERPL-SCNC: 5.9 MMOL/L (ref 3.5–5.1)
PROT SERPL-MCNC: 7.8 G/DL (ref 6–8.4)
PROT UR QL STRIP: ABNORMAL
RBC # BLD AUTO: 3.25 M/UL (ref 4–5.4)
RBC #/AREA URNS HPF: >100 /HPF (ref 0–4)
SODIUM SERPL-SCNC: 132 MMOL/L (ref 136–145)
SODIUM UR-SCNC: 25 MMOL/L (ref 20–250)
SP GR UR STRIP: 1.01 (ref 1–1.03)
SQUAMOUS #/AREA URNS HPF: 4 /HPF
URN SPEC COLLECT METH UR: ABNORMAL
UROBILINOGEN UR STRIP-ACNC: NEGATIVE EU/DL
VANCOMYCIN SERPL-MCNC: 19.3 UG/ML
WBC # BLD AUTO: 9.15 K/UL (ref 3.9–12.7)
WBC #/AREA URNS HPF: >100 /HPF (ref 0–5)

## 2020-06-18 PROCEDURE — 87205 SMEAR GRAM STAIN: CPT | Mod: HCNC

## 2020-06-18 PROCEDURE — 63600175 PHARM REV CODE 636 W HCPCS: Mod: HCNC | Performed by: NURSE PRACTITIONER

## 2020-06-18 PROCEDURE — 84132 ASSAY OF SERUM POTASSIUM: CPT | Mod: 91,HCNC

## 2020-06-18 PROCEDURE — 99232 SBSQ HOSP IP/OBS MODERATE 35: CPT | Mod: GT,HCNC,, | Performed by: INTERNAL MEDICINE

## 2020-06-18 PROCEDURE — A4217 STERILE WATER/SALINE, 500 ML: HCPCS | Mod: HCNC | Performed by: INTERNAL MEDICINE

## 2020-06-18 PROCEDURE — 63600175 PHARM REV CODE 636 W HCPCS: Mod: HCNC | Performed by: INTERNAL MEDICINE

## 2020-06-18 PROCEDURE — 25000003 PHARM REV CODE 250: Mod: HCNC | Performed by: INTERNAL MEDICINE

## 2020-06-18 PROCEDURE — 36415 COLL VENOUS BLD VENIPUNCTURE: CPT | Mod: HCNC

## 2020-06-18 PROCEDURE — 84300 ASSAY OF URINE SODIUM: CPT | Mod: HCNC

## 2020-06-18 PROCEDURE — 82570 ASSAY OF URINE CREATININE: CPT | Mod: HCNC

## 2020-06-18 PROCEDURE — 87077 CULTURE AEROBIC IDENTIFY: CPT | Mod: HCNC

## 2020-06-18 PROCEDURE — 25000003 PHARM REV CODE 250: Mod: HCNC | Performed by: NURSE PRACTITIONER

## 2020-06-18 PROCEDURE — 97530 THERAPEUTIC ACTIVITIES: CPT | Mod: HCNC,CO

## 2020-06-18 PROCEDURE — 11000001 HC ACUTE MED/SURG PRIVATE ROOM: Mod: HCNC

## 2020-06-18 PROCEDURE — 85025 COMPLETE CBC W/AUTO DIFF WBC: CPT | Mod: HCNC

## 2020-06-18 PROCEDURE — 80053 COMPREHEN METABOLIC PANEL: CPT | Mod: HCNC

## 2020-06-18 PROCEDURE — 87186 SC STD MICRODIL/AGAR DIL: CPT | Mod: HCNC

## 2020-06-18 PROCEDURE — 87088 URINE BACTERIA CULTURE: CPT | Mod: HCNC

## 2020-06-18 PROCEDURE — 87086 URINE CULTURE/COLONY COUNT: CPT | Mod: HCNC

## 2020-06-18 PROCEDURE — 84100 ASSAY OF PHOSPHORUS: CPT | Mod: HCNC

## 2020-06-18 PROCEDURE — 80202 ASSAY OF VANCOMYCIN: CPT | Mod: HCNC

## 2020-06-18 PROCEDURE — 97110 THERAPEUTIC EXERCISES: CPT | Mod: HCNC,CQ

## 2020-06-18 PROCEDURE — 99232 PR SUBSEQUENT HOSPITAL CARE,LEVL II: ICD-10-PCS | Mod: GT,HCNC,, | Performed by: INTERNAL MEDICINE

## 2020-06-18 PROCEDURE — 25000003 PHARM REV CODE 250: Mod: HCNC | Performed by: EMERGENCY MEDICINE

## 2020-06-18 PROCEDURE — 81000 URINALYSIS NONAUTO W/SCOPE: CPT | Mod: HCNC

## 2020-06-18 RX ORDER — FUROSEMIDE 20 MG/1
20 TABLET ORAL DAILY
Status: DISCONTINUED | OUTPATIENT
Start: 2020-06-19 | End: 2020-06-18

## 2020-06-18 RX ORDER — SODIUM POLYSTYRENE SULFONATE 4.1 MEQ/G
30 POWDER, FOR SUSPENSION ORAL; RECTAL ONCE
Status: COMPLETED | OUTPATIENT
Start: 2020-06-18 | End: 2020-06-18

## 2020-06-18 RX ORDER — INSULIN ASPART 100 [IU]/ML
7 INJECTION, SOLUTION INTRAVENOUS; SUBCUTANEOUS
Status: DISCONTINUED | OUTPATIENT
Start: 2020-06-18 | End: 2020-06-20

## 2020-06-18 RX ORDER — LOSARTAN POTASSIUM 25 MG/1
50 TABLET ORAL DAILY
Status: DISCONTINUED | OUTPATIENT
Start: 2020-06-20 | End: 2020-06-18

## 2020-06-18 RX ADMIN — INSULIN ASPART 2 UNITS: 100 INJECTION, SOLUTION INTRAVENOUS; SUBCUTANEOUS at 09:06

## 2020-06-18 RX ADMIN — GABAPENTIN 300 MG: 300 CAPSULE ORAL at 09:06

## 2020-06-18 RX ADMIN — VANCOMYCIN HYDROCHLORIDE 500 MG: 500 INJECTION, POWDER, LYOPHILIZED, FOR SOLUTION INTRAVENOUS at 06:06

## 2020-06-18 RX ADMIN — SODIUM BICARBONATE: 84 INJECTION, SOLUTION INTRAVENOUS at 03:06

## 2020-06-18 RX ADMIN — INSULIN ASPART 7 UNITS: 100 INJECTION, SOLUTION INTRAVENOUS; SUBCUTANEOUS at 04:06

## 2020-06-18 RX ADMIN — CARVEDILOL 25 MG: 6.25 TABLET, FILM COATED ORAL at 09:06

## 2020-06-18 RX ADMIN — INSULIN ASPART 7 UNITS: 100 INJECTION, SOLUTION INTRAVENOUS; SUBCUTANEOUS at 11:06

## 2020-06-18 RX ADMIN — INSULIN ASPART 2 UNITS: 100 INJECTION, SOLUTION INTRAVENOUS; SUBCUTANEOUS at 06:06

## 2020-06-18 RX ADMIN — SODIUM BICARBONATE: 84 INJECTION, SOLUTION INTRAVENOUS at 11:06

## 2020-06-18 RX ADMIN — SODIUM POLYSTYRENE SULFONATE 30 G: 1 POWDER ORAL; RECTAL at 11:06

## 2020-06-18 RX ADMIN — ROSUVASTATIN CALCIUM 20 MG: 10 TABLET, COATED ORAL at 09:06

## 2020-06-18 RX ADMIN — STANDARDIZED SENNA CONCENTRATE AND DOCUSATE SODIUM 1 TABLET: 8.6; 5 TABLET ORAL at 09:06

## 2020-06-18 RX ADMIN — GABAPENTIN 300 MG: 300 CAPSULE ORAL at 03:06

## 2020-06-18 RX ADMIN — ASPIRIN 81 MG 81 MG: 81 TABLET ORAL at 09:06

## 2020-06-18 RX ADMIN — CARVEDILOL 25 MG: 6.25 TABLET, FILM COATED ORAL at 05:06

## 2020-06-18 RX ADMIN — CEFTRIAXONE 1 G: 1 INJECTION, SOLUTION INTRAVENOUS at 01:06

## 2020-06-18 RX ADMIN — FAMOTIDINE 20 MG: 20 TABLET ORAL at 09:06

## 2020-06-18 RX ADMIN — INSULIN ASPART 5 UNITS: 100 INJECTION, SOLUTION INTRAVENOUS; SUBCUTANEOUS at 07:06

## 2020-06-18 RX ADMIN — LEVOTHYROXINE SODIUM 75 MCG: 75 TABLET ORAL at 06:06

## 2020-06-18 RX ADMIN — SODIUM BICARBONATE: 84 INJECTION, SOLUTION INTRAVENOUS at 01:06

## 2020-06-18 NOTE — PROGRESS NOTES
[facility] Hospital Medicine  Telemedicine Progress Note    Patient Name: Tasneem Lynn  MRN: [id number]  Patient Class: IP- Inpatient   Admission Date: 6/12/2020  Length of Stay: 6 days  Attending Physician: Funmilayo Guillermo MD  Primary Care Provider: To Obtain Unable        Start time: 855a  Chief complaint: 955a  The patient location is: W422/W422 A  Present with the patient at the time of the telemed/virtual assessment: telemed presenter    Subjective:     Principal Problem:Chronic osteomyelitis of right foot with draining sinus        HPI:  This is a 74 y.o female with diabetes mellitus, hypertension, dyslipidemia, CHF, hypothyroidism, CKD stage 4, multiple chronic pains, obesity, and stroke (1986) who presents to the hospital with a chief complaint of worsening wound to the right heel for the last week. Patient reports her right heel wound has started 3 months ago and it seems got worse since last week with increasing bleeding. She states she believes the wound is infected. She reports she was prescribed an antibiotic (unsure name) 2 months ago by Angela James NP at an urgent care clinic but it didn't help much. She also reports another wound at her anterior right foot which is developed around 1 month ago. Patient reports she has been mostly staying on her bed and she has not been walking for 2 months due to her right heel wound. She reports the wound would bleed and have more pain if she put pressure on her right foot. She denies fever, chill, chest pain, SOB, bilateral calves/leg pain, N/V/D, cough or sick contacts at home. She reports she does not go to wound care clinic. She states the home health nurse visited her 3 times since she started having this heel wound. The last visit was today.     In ED, COVID negative. X-ray right foot can not exclude the possibility of osseous infectious involvement.  Labs with elevated sed rate and CRP, c/s with CKD stage 4 with GFR 25 and elevated  BUN&sCr 57&2.2.     Patient is admitted to inpatient with hospital medicine to further evaluation and treatment of right foot wound and EMMA    Overview/Hospital Course:  Patient admitted to the hospital for eval and treatment of R heal diabetic ulcer. MRI showed osteo.  Podiatry consulted. Patient had debridement and bone biopsy on 6/13.      Admission CC:   Chief Complaint   Patient presents with    Wound Check     EMS reports pt c/o infected diabetic ulcer to the right heel, worsening x 3 months. denies pain     Follow up visit for: Chronic osteomyelitis of right foot with draining sinus    Interval History / Events Overnight:   Patient denies pain to right foot currently but will have discomfort every now and again - pain med is working; patient hard of hearing      The patient is able to provide adequate history. Additional history was obtained from chart review. No significant events reported by Nursing.  Patient complains of no specific complaints. Symptoms have been unchanged since yesterday. Associated symptoms include: fatigue and malaise .. Alleviating factors include: nothing. Response to treatment since yesterday: Not measurable. Wound vac placed by Podiatry.    Data reviewed 6/17/2020: Lab test(s) reviewed: H&H stable. Cr increased    Review of Systems   Constitutional: Negative for fever.   Respiratory: Negative for cough and shortness of breath.    Cardiovascular: Negative for chest pain.   Gastrointestinal: Negative for nausea and vomiting.   Genitourinary: Negative for dysuria and hematuria.     Objective:     Vital Signs (Most Recent):  Temp: 97.6 °F (36.4 °C) (06/17/20 1653)  Pulse: 70 (06/17/20 1653)  Resp: 18 (06/17/20 1653)  BP: (!) 131/98 (06/17/20 1653)  SpO2: 98 % (06/17/20 1653) Vital Signs (24h Range):  Temp:  [97.6 °F (36.4 °C)-98.2 °F (36.8 °C)] 97.6 °F (36.4 °C)  Pulse:  [69-76] 70  Resp:  [17-19] 18  SpO2:  [96 %-100 %] 98 %  BP: (100-184)/(51-98) 131/98     Weight: 116.5 kg (256 lb  13.4 oz)  Body mass index is 46.98 kg/m².    Intake/Output Summary (Last 24 hours) at 6/17/2020 1743  Last data filed at 6/17/2020 1226  Gross per 24 hour   Intake 360 ml   Output 150 ml   Net 210 ml      Physical Exam  Constitutional:       General: She is not in acute distress.     Appearance: She is morbidly obese. She is not diaphoretic.   Eyes:      General: Lids are normal. No scleral icterus.        Right eye: No discharge.         Left eye: No discharge.      Conjunctiva/sclera: Conjunctivae normal.   Pulmonary:      Effort: Pulmonary effort is normal. No tachypnea, accessory muscle usage or respiratory distress.   Abdominal:      General: Abdomen is protuberant. There is no distension.   Musculoskeletal:         General: No edema (per telepresenter nurse).   Neurological:      Mental Status: She is alert and oriented to person, place, and time. She is not disoriented.   Psychiatric:         Mood and Affect: Mood and affect normal.         Behavior: Behavior is cooperative.       Significant Labs:   Recent Labs   Lab 06/15/20  0624 06/16/20  0406 06/17/20  0420   WBC 9.35 9.78 9.13   HGB 9.8* 9.5* 10.2*   HCT 32.4* 32.1* 34.9*   * 371* 343     Recent Labs   Lab 06/15/20  0624 06/16/20  0406 06/17/20  0420   GRAN 36.8*  3.4 43.1  4.2 39.5  3.6   LYMPH 48.0  4.5 39.8  3.9 44.2  4.0   MONO 9.7  0.9 11.6  1.1* 10.7  1.0   EOS 0.5 0.4 0.4     Recent Labs   Lab 06/16/20  0406 06/17/20  0420 06/18/20  0330   * 135* 132*   K 5.2* 5.4* 5.7*    105 102   CO2 23 22* 23   BUN 71* 72* 77*   CREATININE 2.6* 2.7* 3.3*   * 259* 330*   CALCIUM 8.6* 8.8 8.5*   ALBUMIN 2.4* 2.5* 2.4*   PHOS  --   --  5.0*     Recent Labs   Lab 06/12/20 1917 06/13/20  0513 06/16/20  0406   ALKPHOS 130 121 104   ALT 9* 8* 11   AST 15 13 11   PROT 9.3* 9.1* 8.0   BILITOT 0.3 0.3 0.2   INR  --  1.0  --    CRP 17.7*  --   --      Recent Labs   Lab 06/12/20 1917   CPK 32   TROPONINI <0.006     Recent Labs   Lab  06/12/20  1917   SEDRATE 108*     SARS-CoV-2 RNA, Amplification, Qual (no units)   Date Value   06/12/2020 Negative     Blood Culture, Routine (no units)   Date Value   06/15/2020 No Growth to date   06/15/2020 No Growth to date   06/12/2020     Gram stain aer bottle: Gram positive cocci in clusters resembling Staph    06/12/2020     Results called to and read back by: Nataliia Weber  06/13/2020  22:41   06/12/2020 (A)    COAGULASE-NEGATIVE STAPHYLOCOCCUS SPECIES  Organism is a probable contaminant       Recent Labs   Lab 06/13/20  0513   HGBA1C 7.5*     Recent Labs   Lab 06/17/20  0742 06/17/20  1132 06/17/20  1650   POCTGLUCOSE 248* 326* 339*     Recent Labs   Lab 06/13/20  0513   TSH 1.213       Significant Imaging:       Assessment/Plan:      Wound of right leg  See media picture.  Right anterior ankle wound x 1 month.   - On antibiotics  - Cultures NGTD    Diabetic ulcer of right heel associated with diabetes mellitus due to underlying condition, limited to breakdown of skin  See media picture.  Reports right heel pressure wound not healing x 3 months. Worsened since last week with increasing bleeding. Hx DM.  Wound with red, beefy, granular tissue and bleeding. Xray concerning for osseous infection. No leukocytosis.  Afebrile. Elevated sed rate and CRP.  MRI: Findings consistent with osteomyelitis of the calcaneus with complete to near complete disruption of the Achilles tendon.   - Vancomycin started in ED - continue with pharmacy dose  - Added Ceftriaxone  - Cultures pending (BC and Wound culture)  - ID, Wound Care, and Podiatry consulted  - plan for IV antibiotics x 6 weeks if bone cultures negative. Wound vac.   - ID recommendations: Discharge antibiotics:           Ceftriaxone 1 gram IV q 24 hours plus  Vancomycin 500 mg IV q 48 hours  - End date of IV antibiotics: 07/27/2020  - Needs IV access for discharge; likely with IR for tunneled PICC line.    CKD (chronic kidney disease) stage 4, GFR 15-29  ml/min  sCr and BUN elevated 2.2 and 57.  Baseline sCr around 2 with previous value of 1.7 on 6/2018.   Patient reports she was referred to Nephrologist a long time ago but she hasn't seen anyone yet.   - Renal dose all medication. Avoid nephrotoxin if possible.   - nephrology consult due to rising BUN/Cr.  Hold ARB and lasix; repeat urinalysis.   - Vein mapping ordered prior to line placement recommendations by Vascular Surgery    Hyperlipidemia  Continue statin    History of CVA (cerebrovascular accident)  Hx stroke 1986 with left side weakness per patient report. Reports not getting out of bed much since 4/2010 due to wound at right heel.    - Patient is on ASA and statin. Continue ASA/Statin  - PT/OT evaluation    Morbid obesity with BMI of 50.0-59.9, adult  Body mass index is 46.98 kg/m².  Weight loss outpatient    Hypothyroidism  TSH wnl. Continue home management with levothyroxine    Essential hypertension  Uncontrolled. Continue home meds BB  - ARB held initially due to worsening kidney function  - losartan and Lasix held due to worsening renal function.     Type 2 diabetes mellitus, uncontrolled, with renal complications  On insulin (70/30) 40 units BID at home (2018). Patient reports she often has episodes of hypoglycemia of BS 60's.   The patient's hyperglycemia while IP is managed with a SQ prandial and correction dose insulin regimen. Monitoring BGs.  Increased aspart and adding basal insulin 6/16  Increased basal and prandial insulin 6/17, 6/18      VTE Risk Mitigation (From admission, onward)         Ordered     IP VTE HIGH RISK PATIENT  Once      06/13/20 0036     Place sequential compression device  Until discontinued      06/13/20 0036     Place MADISON hose  Until discontinued      06/13/20 0036                      I have assessed these finding virtually using telemed platform and with assistance of bedside nurse       End time:  903q    Total time spent with patient: 10 min    The attending portion  of this evaluation, treatment, and documentation was performed per Funmilayo Guillermo MD via audiovisual.      Level 2 35737 Total visit time was 25 minutes or greater with greater than 50% of time spent in counseling and coordination of care.        Funmilayo Guillermo MD  Department of Hospital Medicine   [facility]

## 2020-06-18 NOTE — PROGRESS NOTES
[facility] vac form sent to Dr Vega today to complete and sign for home vac.  Received signed form and faxed to [facility] at [phone] along with H&P, Med list, wound dementions and podiatry progress note.    1525:  Call received from [facility] stating they had everything they needed and vac will be delivered tomorrow pending insurance acceptance.

## 2020-06-18 NOTE — PT/OT/SLP PROGRESS
Occupational Therapy   Treatment    Name: Tasneem Lynn  MRN: [id number]  Admitting Diagnosis:  Chronic osteomyelitis of right foot with draining sinus       Recommendations:     Discharge Recommendations: nursing facility, skilled  Discharge Equipment Recommendations:  bedside commode, walker, rolling, hospital bed  Barriers to discharge:       Assessment:     Tasneem Lynn is a 74 y.o. female with a medical diagnosis of Chronic osteomyelitis of right foot with draining sinus.  She presents with the following performance deficits affecting function: weakness, impaired endurance, impaired self care skills, impaired functional mobilty, gait instability, impaired balance, decreased coordination, decreased upper extremity function, decreased lower extremity function, decreased safety awareness, pain, decreased ROM.     Rehab Prognosis:  Fair; patient would benefit from acute skilled OT services to address these deficits and reach maximum level of function.       Plan:     Patient to be seen 5 x/week to address the above listed problems via self-care/home management, therapeutic activities, therapeutic exercises  · Plan of Care Expires: 06/27/20  · Plan of Care Reviewed with: patient    Subjective     Pain/Comfort:  · Pain Rating 1: 0/10    Objective:     Communicated with: Nurse prior to session.  Patient found HOB elevated with bed alarm, SCD, telemetry, wound vac upon OT entry to room with PTA present    General Precautions: Standard, diabetic, fall   Orthopedic Precautions:(RLE non weight bearing)   Braces:N/A       Occupational Performance:     Bed Mobility:    · Patient completed Rolling/Turning to Right with maximal assistance  · Patient completed Scooting/Bridging with total assistance and 2 persons     Functional Mobility/Transfers: Pt declined EOB/OOB activity; assisted pt with repositioning in bed.     Activities of Daily Living:  · Grooming: set-up A to wash face with washcloth at bed level      [facility] 6 Click ADL:  12    Treatment & Education:  · BUE AAROM x 10 reps shoulder flex/ext, forward punches, forearm pronation/supination, digit flex/ext. Encouraged pt to mobilize BUE throughout day to increase strength, ROM, and endurance needed for functional transfers and ADL's  · Pt declined EOB/OOB activity this date despite education and encouragement. Educated pt on the importance of mobilizing and participation with therapy to maximize functional independence. Pt verbalized understanding and reports she will try tomorrow. She reports fatigue from medical testing today.  · All questions/concerns answered within OT scope of practice.    Patient left right sidelying on wedge, B pressure relief boots placed, with all lines intact, call button in reach, bed alarm on and nurse notifiedEducation:      GOALS:   Multidisciplinary Problems     Occupational Therapy Goals        Problem: Occupational Therapy Goal    Goal Priority Disciplines Outcome Interventions   Occupational Therapy Goal     OT, PT/OT Ongoing, Progressing    Description: Goals to be met by: 6/27/2020    Patient will increase functional independence with ADLs by performing:    Grooming while seated at sink with Set-up Assistance.  Toileting from bedside commode with Minimal Assistance for hygiene and clothing management.   Sitting at edge of bed x20 minutes with Supervision.  Supine to sit with Minimal Assistance.  Stand pivot transfers with Moderate Assistance.  Toilet transfer to bedside commode with Moderate Assistance.                     Time Tracking:     OT Date of Treatment: 06/18/20  OT Start Time: 1521  OT Stop Time: 1529  OT Total Time (min): 8 min    Billable Minutes:Therapeutic Activity 8    TASHA Barreto  6/18/2020

## 2020-06-18 NOTE — CONSULTS
Reason for consultation:  Renal failure    HPI:  75 yo AA lady with h/o HTN, DM type 2, hypothyroidism, CKD stg 4 was admitted to the hospital for chroni osteomyelitis of the (R) foot s/p debridement with wound VAC.  Her serum creatinine has been progressively worsening, nephrology is consulted for evaluation.  At this time she denies any problems with chest pain, SOB, fever, chills, N/V.    PMH:  As above    Scheduled Meds:   aspirin  81 mg Oral Daily    carvediloL  25 mg Oral BID WM    cefTRIAXone (ROCEPHIN) IVPB  1 g Intravenous Q24H    collagenase   Topical (Top) Daily    famotidine  20 mg Oral Daily    [START ON 6/19/2020] furosemide  20 mg Oral Daily    gabapentin  300 mg Oral TID    insulin aspart U-100  7 Units Subcutaneous TIDWM    insulin detemir U-100  12 Units Subcutaneous BID    levothyroxine  75 mcg Oral Before breakfast    rosuvastatin  20 mg Oral Daily    senna-docusate 8.6-50 mg  1 tablet Oral BID       Review of patient's allergies indicates:   Allergen Reactions    Corticosteroids (glucocorticoids) Edema         Vital Signs Range (Last 24H):  Temp:  [97.6 °F (36.4 °C)-98 °F (36.7 °C)]   Pulse:  [66-73]   Resp:  [16-18]   BP: (131-173)/(67-98)   SpO2:  [94 %-100 %]     I & O (Last 24H):    Intake/Output Summary (Last 24 hours) at 6/18/2020 1205  Last data filed at 6/18/2020 0646  Gross per 24 hour   Intake 690 ml   Output 600 ml   Net 90 ml           Physical Exam:  General appearance: well developed, well nourished, no distress  Lungs:  clear to auscultation bilaterally and normal respiratory effort  Heart: regular rate and rhythm  Abdomen: soft, non-tender non-distented; bowel sounds normal; no masses,  no organomegaly  Extremities: no cyanosis or edema, or clubbing    Laboratory:  CBC:   Recent Labs   Lab 06/18/20  0330   WBC 9.15   RBC 3.25*   HGB 9.2*   HCT 31.9*   *   MCV 98   MCH 28.3   MCHC 28.8*     CMP:   Recent Labs   Lab 06/18/20  0330   *   CALCIUM 8.5*    ALBUMIN 2.4*   PROT 7.8   *   K 5.7*   CO2 23      BUN 77*   CREATININE 3.3*   ALKPHOS 104   ALT 11   AST 11   BILITOT 0.2       Impression:    EMMA on CKD stg 4 - clinically appears prerenal, possible ATN/AIN related to the infection  Hyperkalemia - related to renal failure  Chronic (R) foot osteomyelitis  HTN  DM type 2  Anemia of CKD    Discussion/Recommendations:    Continue patient with IV with bicarb, hold diuretic.  Send urine for Na, creatinine and EOS.  Obtain renal US.  Watch renal function and electrolytes closely.  We'll follow.    Thank you for the courtesy of the consultation and allowing us to participate in the patient's care.    Baylee T Patricia  6/18/2020

## 2020-06-18 NOTE — PT/OT/SLP PROGRESS
Physical Therapy Treatment    Patient Name:  Tasneem Lynn   MRN:  [id number]    Recommendations:     Discharge Recommendations:  nursing facility, skilled   Discharge Equipment Recommendations: bedside commode, walker, rolling, hospital bed   Barriers to discharge: Inaccessible home and Decreased caregiver support    Assessment:     Tasneem Lynn is a 74 y.o. 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  She presents with the following impairments/functional limitations:  weakness, impaired endurance, impaired self care skills, impaired functional mobilty, gait instability, impaired balance, decreased coordination, decreased upper extremity function, decreased lower extremity function, decreased safety awareness, pain, decreased ROM, edema, impaired skin, orthopedic precautions .    Rehab Prognosis: Fair; patient would benefit from acute skilled PT services to address these deficits and reach maximum level of function.    Recent Surgery: * No surgery found *      Plan:     During this hospitalization, patient to be seen 5 x/week to address the identified rehab impairments via gait training, therapeutic activities, therapeutic exercises, neuromuscular re-education, wheelchair management/training and progress toward the following goals:    · Plan of Care Expires:  06/27/20    Subjective     Chief Complaint: fatigue from a long test , just returned to room   Patient/Family Comments/goals:  Pt agreeable to supine BLE ex's with encouragement   Pain/Comfort:  · Pain Rating 1: 0/10  · Pain Rating Post-Intervention 1: 0/10      Objective:     Communicated with nurse Macias prior to session.  Patient found HOB elevated with SCD, wound vac, pressure relief boots, bed alarm upon PT entry to room.     General Precautions: Standard, diabetic, fall   Orthopedic Precautions: pt was treated as RLE non weight bearing 2* foot wounds   Braces: N/A       AM-PAC 6 CLICK  MOBILITY  Turning over in bed (including adjusting bedclothes, sheets and blankets)?: 2  Sitting down on and standing up from a chair with arms (e.g., wheelchair, bedside commode, etc.): 2  Moving from lying on back to sitting on the side of the bed?: 2  Moving to and from a bed to a chair (including a wheelchair)?: 1  Need to walk in hospital room?: 1  Climbing 3-5 steps with a railing?: 1  Basic Mobility Total Score: 9       Therapeutic Activities and Exercises:   Pt performed in bed BLE 10 reps (AAROM-AROM BLE with heels protector on  ):  AP, QS ,GS ,   Hip abd/add .  V/T's cues for technique and sequence. Encouraged  pt to perform BLE ex's as above throughout the day, pt verbalized understanding.     Patient left HOB elevated with pressure relief boots BLE , SCD LLE  all lines intact, call button in reach, bed alarm on and nurse notified..    GOALS:   Multidisciplinary Problems     Physical Therapy Goals        Problem: Physical Therapy Goal    Goal Priority Disciplines Outcome Goal Variances Interventions   Physical Therapy Goal     PT, PT/OT Ongoing, Progressing     Description: Goals to be met by: 20     Patient will increase functional independence with mobility by performin. Supine to sit with Set-up [place]  2. Sit to supine with Set-up Hale  3. Rolling to Left and Right with Modified Hale.  4. Bed to chair transfer with Contact Guard Assistance using Slideboard  5. Wheelchair propulsion x30 feet with Supervision using bilateral uppper extremities  6. Sitting at edge of bed x20 minutes with Supervision                       Time Tracking:     PT Received On: 20  PT Start Time: 1513     PT Stop Time: 1521  PT Total Time (min): 8 min     Billable Minutes: Therapeutic Exercise 8    Treatment Type: Treatment  PT/PTA: PTA     PTA Visit Number: 3     Serenity Montes, PTA  2020

## 2020-06-18 NOTE — PROGRESS NOTES
[facility] Hospital Medicine  Telemedicine Progress Note    Patient Name: Tasneem Lynn  MRN: [id number]  Patient Class: IP- Inpatient   Admission Date: 6/12/2020  Length of Stay: 5 days  Attending Physician: Lesley Myles MD  Primary Care Provider: To Obtain [facility] Medicine Team: [facility] VIRTUAL TEAM 1 Lesley Myles MD    This service was provided through telemedicine.  Start time: 0942  Chief complaint: Chronic osteomyelitis of right foot with draining sinus  The patient location is: W422/W422 A  Present with the patient at the time of the telemed/virtual assessment: telepresenter  End time: 0947  Total time spent with patient: 5 min  The attending portion of this evaluation, treatment, and documentation was performed per Lesley Myles MD via audiovisual.  I have assessed findings virtually using a telemedicine platform and with assistance of the bedside nurse or telemedicine presenter.  Additional time spent in care of the patient: coordinating care including communicating with case management, reviewing records, discussing case with consultants, or discussing the plan of care with the patient or family.    Total time involved in the care of the patient:  18 minutes.    Patient was transferred to the telemedicine service on: 6/15/2020    Subjective:     Principal Problem:Chronic osteomyelitis of right foot with draining sinus    HPI:  This is a 74 y.o female with diabetes mellitus, hypertension, dyslipidemia, CHF, hypothyroidism, CKD stage 4, multiple chronic pains, obesity, and stroke (1986) who presents to the hospital with a chief complaint of worsening wound to the right heel for the last week. Patient reports her right heel wound has started 3 months ago and it seems got worse since last week with increasing bleeding. She states she believes the wound is infected. She reports she was prescribed an antibiotic (unsure name) 2 months ago by Angela James NP at an urgent  care clinic but it didn't help much. She also reports another wound at her anterior right foot which is developed around 1 month ago. Patient reports she has been mostly staying on her bed and she has not been walking for 2 months due to her right heel wound. She reports the wound would bleed and have more pain if she put pressure on her right foot. She denies fever, chill, chest pain, SOB, bilateral calves/leg pain, N/V/D, cough or sick contacts at home. She reports she does not go to wound care clinic. She states the home health nurse visited her 3 times since she started having this heel wound. The last visit was today.     In ED, COVID negative. X-ray right foot can not exclude the possibility of osseous infectious involvement.  Labs with elevated sed rate and CRP, c/s with CKD stage 4 with GFR 25 and elevated BUN&sCr 57&2.2.     Patient is admitted to inpatient with hospital medicine to further evaluation and treatment of right foot wound and EMMA    Overview/Hospital Course:  Patient admitted to the hospital for eval and treatment of R heal diabetic ulcer. MRI showed osteo.  Podiatry consulted. Patient had debridement and bone biopsy on 6/13.      Admission CC:   Chief Complaint   Patient presents with    Wound Check     EMS reports pt c/o infected diabetic ulcer to the right heel, worsening x 3 months. denies pain     Follow up visit for: Chronic osteomyelitis of right foot with draining sinus    Interval History / Events Overnight:   The patient is able to provide adequate history. Additional history was obtained from chart review. No significant events reported by Nursing.  Patient complains of no specific complaints. Symptoms have been unchanged since yesterday. Associated symptoms include: fatigue and malaise .. Alleviating factors include: nothing. Response to treatment since yesterday: Not measurable. Wound vac placed by Podiatry.    Data reviewed 6/17/2020: Lab test(s) reviewed: H&H stable.  Hyperglycemia. Hyperkalemia    Review of Systems   Constitutional: Negative for fever.   Respiratory: Negative for shortness of breath.      Objective:     Vital Signs (Most Recent):  Temp: 97.6 °F (36.4 °C) (06/17/20 1653)  Pulse: 70 (06/17/20 1653)  Resp: 18 (06/17/20 1653)  BP: (!) 131/98 (06/17/20 1653)  SpO2: 98 % (06/17/20 1653) Vital Signs (24h Range):  Temp:  [97.6 °F (36.4 °C)-98.2 °F (36.8 °C)] 97.6 °F (36.4 °C)  Pulse:  [69-76] 70  Resp:  [17-19] 18  SpO2:  [96 %-100 %] 98 %  BP: (100-184)/(51-98) 131/98     Weight: 116.5 kg (256 lb 13.4 oz)  Body mass index is 46.98 kg/m².    Intake/Output Summary (Last 24 hours) at 6/17/2020 1743  Last data filed at 6/17/2020 1226  Gross per 24 hour   Intake 360 ml   Output 150 ml   Net 210 ml      Physical Exam  Constitutional:       General: She is not in acute distress.     Appearance: She is morbidly obese. She is not diaphoretic.   Eyes:      General: Lids are normal. No scleral icterus.        Right eye: No discharge.         Left eye: No discharge.      Conjunctiva/sclera: Conjunctivae normal.   Cardiovascular:      Rate and Rhythm: Regular rhythm.   Pulmonary:      Effort: Pulmonary effort is normal. No tachypnea, accessory muscle usage or respiratory distress.   Abdominal:      General: Abdomen is protuberant. There is no distension.   Neurological:      Mental Status: She is alert and oriented to person, place, and time. She is not disoriented.   Psychiatric:         Behavior: Behavior is cooperative.       Significant Labs:   Recent Labs   Lab 06/15/20  0624 06/16/20  0406 06/17/20  0420   WBC 9.35 9.78 9.13   HGB 9.8* 9.5* 10.2*   HCT 32.4* 32.1* 34.9*   * 371* 343     Recent Labs   Lab 06/15/20  0624 06/16/20  0406 06/17/20  0420   GRAN 36.8*  3.4 43.1  4.2 39.5  3.6   LYMPH 48.0  4.5 39.8  3.9 44.2  4.0   MONO 9.7  0.9 11.6  1.1* 10.7  1.0   EOS 0.5 0.4 0.4     Recent Labs   Lab 06/13/20  0513  06/15/20  0624 06/16/20  0406 06/17/20  0420       < > 136 135* 135*   K 4.7   < > 4.8 5.2* 5.4*      < > 105 105 105   CO2 26   < > 23 23 22*   BUN 56*   < > 68* 71* 72*   CREATININE 2.1*   < > 2.6* 2.6* 2.7*   *   < > 179* 252* 259*   CALCIUM 9.2   < > 8.5* 8.6* 8.8   ALBUMIN 2.6*  --   --  2.4* 2.5*    < > = values in this interval not displayed.     Recent Labs   Lab 06/12/20 1917 06/13/20  0513 06/16/20  0406   ALKPHOS 130 121 104   ALT 9* 8* 11   AST 15 13 11   PROT 9.3* 9.1* 8.0   BILITOT 0.3 0.3 0.2   INR  --  1.0  --    CRP 17.7*  --   --      Recent Labs   Lab 06/12/20 1917   CPK 32   TROPONINI <0.006     Recent Labs   Lab 06/12/20 1917   SEDRATE 108*     SARS-CoV-2 RNA, Amplification, Qual (no units)   Date Value   06/12/2020 Negative     Blood Culture, Routine (no units)   Date Value   06/15/2020 No Growth to date   06/15/2020 No Growth to date   06/12/2020     Gram stain aer bottle: Gram positive cocci in clusters resembling Staph    06/12/2020     Results called to and read back by: Nataliia Weber  06/13/2020  22:41   06/12/2020 (A)    COAGULASE-NEGATIVE STAPHYLOCOCCUS SPECIES  Organism is a probable contaminant       Recent Labs   Lab 06/13/20  0513   HGBA1C 7.5*     Recent Labs   Lab 06/17/20  0742 06/17/20  1132 06/17/20  1650   POCTGLUCOSE 248* 326* 339*     Recent Labs   Lab 06/13/20  0513   TSH 1.213       Significant Imaging:       Assessment/Plan:      Active Hospital Problems    Diagnosis  POA    *Chronic osteomyelitis of right foot with draining sinus [M86.471]  Yes    Atherosclerosis of native arteries of right leg with ulceration of heel and midfoot [I70.234]  Yes    Wound of right leg [S81.801A]  Yes    Diabetic ulcer of right heel associated with diabetes mellitus due to underlying condition, limited to breakdown of skin [E08.621, L97.411]  Yes    CKD (chronic kidney disease) stage 4, GFR 15-29 ml/min [N18.4]  Yes    Hypothyroidism [E03.9]  Yes     Chronic    Morbid obesity with BMI of 50.0-59.9, adult  [E66.01, Z68.43]  Not Applicable     Chronic    History of CVA (cerebrovascular accident) [Z86.73]  Not Applicable     Chronic    Hyperlipidemia [E78.5]  Yes     Chronic    Essential hypertension [I10]  Yes     Chronic    Type 2 diabetes mellitus, uncontrolled, with renal complications [E11.29, E11.65]  Yes     Chronic      Resolved Hospital Problems   No resolved problems to display.       Wound of right leg  See media picture.  Right anterior ankle wound x 1 month.   - On antibiotics  - Cultures NGTD    Diabetic ulcer of right heel associated with diabetes mellitus due to underlying condition, limited to breakdown of skin  See media picture.  Reports right heel pressure wound not healing x 3 months. Worsened since last week with increasing bleeding. Hx DM.  Wound with red, beefy, granular tissue and bleeding. Xray concerning for osseous infection. No leukocytosis.  Afebrile. Elevated sed rate and CRP.  MRI: Findings consistent with osteomyelitis of the calcaneus with complete to near complete disruption of the Achilles tendon.   - Vancomycin started in ED - continue with pharmacy dose  - Added Ceftriaxone  - Cultures pending (BC and Wound culture)  - ID, Wound Care, and Podiatry consulted  - plan for IV antibiotics x 6 weeks if bone cultures negative. Wound vac.   - ID recommendations: Discharge antibiotics:           Ceftriaxone 1 gram IV q 24 hours plus  Vancomycin 500 mg IV q 48 hours  - End date of IV antibiotics: 07/27/2020  - Needs IV access for discharge.     CKD (chronic kidney disease) stage 4, GFR 15-29 ml/min  sCr and BUN elevated 2.2 and 57.  Baseline sCr around 2 with previous value of 1.7 on 6/2018.   Patient reports she was referred to Nephrologist a long time ago but she hasn't seen anyone yet.   - Renal dose all medication. Avoid nephrotoxin if possible.   - Continue to monitor. Consider consult nephrologist if sCr worsens. Otherwise will see Nephrologist outpatient.  - Vein mapping ordered  prior to line placement recommendations by Vascular Surgery    Hyperlipidemia  Continue statin    History of CVA (cerebrovascular accident)  Hx stroke 1986 with left side weakness per patient report. Reports not getting out of bed much since 4/2010 due to wound at right heel.    - Patient is on ASA and statin. Continue ASA/Statin  - PT/OT evaluation    Morbid obesity with BMI of 50.0-59.9, adult  Body mass index is 46.98 kg/m².  Weight loss outpatient    Hypothyroidism  TSH wnl. Continue home management with levothyroxine    Essential hypertension  Uncontrolled. Continuel home meds BB  - ARB held initially due to worsening kidney function  - losartan and Lasix continued    Type 2 diabetes mellitus, uncontrolled, with renal complications  On insulin (70/30) 40 units BID at home (2018). Patient reports she often has episodes of hypoglycemia of BS 60's.   The patient's hyperglycemia while IP is managed with a SQ prandial and correction dose insulin regimen. Monitoring BGs.  Increased aspart and adding basal insulin 6/16  Increased basal and prandial insulin 6/17      Overview / ICU Course:    Tasneem Lynn is a 74 y.o. female admitted for Chronic osteomyelitis of right foot with draining sinus.    Inpatient Medications Prescribed for Management of Current Problems:     Scheduled Meds:    aspirin  81 mg Oral Daily    carvediloL  25 mg Oral BID WM    cefTRIAXone (ROCEPHIN) IVPB  1 g Intravenous Q24H    collagenase   Topical (Top) Daily    famotidine  20 mg Oral Daily    furosemide  20 mg Oral Daily    gabapentin  300 mg Oral TID    [START ON 6/18/2020] insulin aspart U-100  5 Units Subcutaneous TIDWM    insulin detemir U-100  8 Units Subcutaneous BID    levothyroxine  75 mcg Oral Before breakfast    losartan  50 mg Oral Daily    rosuvastatin  20 mg Oral Daily    senna-docusate 8.6-50 mg  1 tablet Oral BID     Continuous Infusions:   As Needed: acetaminophen, acetaminophen, dextrose 50%, dextrose 50%, glucagon  (human recombinant), glucose, glucose, insulin aspart U-100, ondansetron, oxyCODONE, sodium chloride 0.9%, Pharmacy to dose Vancomycin consult **AND** vancomycin - pharmacy to dose    HIGH RISK CONDITION(S):   Patient is currently on drug therapy requiring intensive monitoring for toxicity: Vancomycin     Discharge plan and follow up  IV Therapy Provider  STEPHEN   Previous admission:  11/11/19    Goals of Care:  Code Status: Full Code  Comfort Only: No  Hospice: No    Diet: Diet diabetic [facility]; 2000 Calorie; Renal, Low Potassium; Isolation Tray - Regular [place]  GI Prophylaxis: Not indicated  Significant LDAs:   IV Access Type: Peripheral  Urinary Catheter Indication if present: Patient Does Not Have Urinary Catheter  Other Lines/Tubes/Drains:  Wound Vac    VTE Risk Mitigation (From admission, onward)         Ordered     IP VTE HIGH RISK PATIENT  Once      06/13/20 0036     Place sequential compression device  Until discontinued      06/13/20 0036     Place MADISON hose  Until discontinued      06/13/20 0036              Lesley Myles MD  Department of Hospital Medicine   [facility]

## 2020-06-18 NOTE — PLAN OF CARE
Problem: Occupational Therapy Goal  Goal: Occupational Therapy Goal  Description: Goals to be met by: 6/27/2020    Patient will increase functional independence with ADLs by performing:    Grooming while seated at sink with Set-up Assistance.  Toileting from bedside commode with Minimal Assistance for hygiene and clothing management.   Sitting at edge of bed x20 minutes with Supervision.  Supine to sit with Minimal Assistance.  Stand pivot transfers with Moderate Assistance.  Toilet transfer to bedside commode with Moderate Assistance.    Outcome: Ongoing, Progressing   Pt declined OOB mobility today despite encouragement/education. Pt tolerated bed mobility and therex at bed level. Continue OT POC as indicated.

## 2020-06-18 NOTE — TELEPHONE ENCOUNTER
----- Message from Angela Thompson sent at 6/18/2020 12:10 PM CDT -----  Regarding: [employer]  /  [phone]  ext [phone]  Type: Patient Call Back    Who called:  Heike    What is the request in detail:  [employer] would like to know id wound vac is still needed for patient.  Thank you    Would the patient rather a call back or a response via [facility]?   Call back    Best call back number:  [phone]  ext [phone]

## 2020-06-18 NOTE — PT/OT/SLP PROGRESS
Physical Therapy      Patient Name:  Tasneem Lynn   MRN:  [id number]    Patient not seen today secondary to Unavailable (pt is off the unit for testing ). Will follow-up as able later hour/day .    Serenity Montes, PTA

## 2020-06-18 NOTE — PLAN OF CARE
Problem: Adult Inpatient Plan of Care  Goal: Plan of Care Review  Outcome: Ongoing, Progressing  Flowsheets (Taken 6/18/2020 0306)  Plan of Care Reviewed With: patient     Patient remains free from injury and falls. Denies pain. Turned throughout shift. Patient needs a lot of encouragement. Wound vac to right foot intact with sanguinous output. IV antibiotic administered as ordered. Plan of care continued.

## 2020-06-18 NOTE — PROGRESS NOTES
Pharmacokinetic Assessment Follow Up: IV Vancomycin    Vancomycin serum concentration assessment(s):    The random level was drawn correctly and can be used to guide therapy at this time. The measurement is within the desired definitive target range of 10 to 20 mcg/mL.    Vancomycin Regimen Plan:    Give Vanco 500mg today.    Re-dose when the random level is less than 20 mcg/mL, next level to be drawn at 0400 on 6/19/2020    Drug levels (last 3 results):  Recent Labs   Lab Result Units 06/16/20  0406 06/17/20  0421 06/18/20  0330   Vancomycin, Random ug/mL 20.9 16.5 19.3       Pharmacy will continue to follow and monitor vancomycin.    Please contact pharmacy at extension [phone] for questions regarding this assessment.    Thank you for the consult,   Keny Matute       Patient brief summary:  Tasneem Lynn is a 74 y.o. female initiated on antimicrobial therapy with IV Vancomycin for treatment of skin & soft tissue infection/bone/joint infection    The patient's current regimen is random with a dose being administered when Random level is < 20.    Drug Allergies:   Review of patient's allergies indicates:   Allergen Reactions    Corticosteroids (glucocorticoids) Edema       Actual Body Weight:   116.5 kg    Renal Function:   Estimated Creatinine Clearance: 18.1 mL/min (A) (based on SCr of 3.3 mg/dL (H)).,     Dialysis Method (if applicable):  N/A    CBC (last 72 hours):  Recent Labs   Lab Result Units 06/15/20  0624 06/16/20  0406 06/17/20  0420 06/18/20  0330   WBC K/uL 9.35 9.78 9.13 9.15   Hemoglobin g/dL 9.8* 9.5* 10.2* 9.2*   Hematocrit % 32.4* 32.1* 34.9* 31.9*   Platelets K/uL 368* 371* 343 360*   Gran% % 36.8* 43.1 39.5 41.0   Lymph% % 48.0 39.8 44.2 43.1   Mono% % 9.7 11.6 10.7 10.6   Eosinophil% % 4.8 4.5 4.5 4.4   Basophil% % 0.4 0.6 0.4 0.5   Differential Method  Automated Automated Automated Automated       Metabolic Panel (last 72 hours):  Recent Labs   Lab Result Units 06/15/20  0624  06/16/20  0406 06/17/20  0420 06/18/20  0330   Sodium mmol/L 136 135* 135* 132*   Potassium mmol/L 4.8 5.2* 5.4* 5.7*   Chloride mmol/L 105 105 105 102   CO2 mmol/L 23 23 22* 23   Glucose mg/dL 179* 252* 259* 330*   BUN, Bld mg/dL 68* 71* 72* 77*   Creatinine mg/dL 2.6* 2.6* 2.7* 3.3*   Albumin g/dL  --  2.4* 2.5* 2.4*   Total Bilirubin mg/dL  --  0.2 0.2 0.2   Alkaline Phosphatase U/L  --  104 115 104   AST U/L  --  11 13 11   ALT U/L  --  11 15 11   Phosphorus mg/dL  --   --   --  5.0*       Vancomycin Administrations:  vancomycin given in the last 96 hours                     vancomycin 500 mg in dextrose 5 % 100 mL IVPB (ready to mix system) (mg) 500 mg New Bag 06/17/20 0830    vancomycin 500 mg in dextrose 5 % 100 mL IVPB (ready to mix system) (mg) 500 mg New Bag 06/15/20 0913                    Microbiologic Results:  Microbiology Results (last 7 days)       Procedure Component Value Units Date/Time    Aerobic culture [[id number]] Collected: 06/13/20 1405    Order Status: Completed Specimen: Wound from Foot, Right Updated: 06/17/20 1108     Aerobic Bacterial Culture No growth    Culture, Anaerobe [[id number]] Collected: 06/13/20 1405    Order Status: Completed Specimen: Wound from Foot, Right Updated: 06/17/20 0810     Anaerobic Culture No anaerobes isolated    Blood culture [[id number]] Collected: 06/15/20 0624    Order Status: Completed Specimen: Blood from Antecubital, Right Updated: 06/17/20 0703     Blood Culture, Routine No Growth to date      No Growth to date      No Growth to date    Blood Culture #1 **CANNOT BE ORDERED STAT** [[id number]] Collected: 06/12/20 2325    Order Status: Completed Specimen: Blood from Peripheral, Antecubital, Right Updated: 06/17/20 0503     Blood Culture, Routine No Growth after 4 days.     Blood Culture #2 **CANNOT BE ORDERED STAT** [[id number]]  (Abnormal) Collected: 06/12/20 2339    Order Status: Completed Specimen: Blood from Peripheral, Hand, Right Updated: 06/16/20 0748      Blood Culture, Routine Gram stain aer bottle: Gram positive cocci in clusters resembling Staph       Results called to and read back by: Nataliia Weber  06/13/2020  22:41      COAGULASE-NEGATIVE STAPHYLOCOCCUS SPECIES  Organism is a probable contaminant      AFB Culture & Smear [[id number]] Collected: 06/13/20 1405    Order Status: Completed Specimen: Wound from Foot, Right Updated: 06/15/20 2127     AFB Culture & Smear Culture in progress     AFB CULTURE STAIN No acid fast bacilli seen.    Gram stain [[id number]] Collected: 06/13/20 1405    Order Status: Completed Specimen: Wound from Foot, Right Updated: 06/13/20 1459     Gram Stain Result No WBC's      No organisms seen    Aerobic culture [[id number]] Collected: 06/13/20 1405    Order Status: Canceled Specimen: Wound from Foot, Right     Blood culture [[id number]]     Order Status: Canceled Specimen: Blood

## 2020-06-19 LAB
ALBUMIN SERPL BCP-MCNC: 2.4 G/DL (ref 3.5–5.2)
ALBUMIN SERPL BCP-MCNC: 2.4 G/DL (ref 3.5–5.2)
ALP SERPL-CCNC: 121 U/L (ref 55–135)
ALT SERPL W/O P-5'-P-CCNC: 11 U/L (ref 10–44)
ANION GAP SERPL CALC-SCNC: 9 MMOL/L (ref 8–16)
ANION GAP SERPL CALC-SCNC: 9 MMOL/L (ref 8–16)
AST SERPL-CCNC: 12 U/L (ref 10–40)
BACTERIA BLD CULT: NORMAL
BASOPHILS # BLD AUTO: 0.05 K/UL (ref 0–0.2)
BASOPHILS NFR BLD: 0.5 % (ref 0–1.9)
BILIRUB SERPL-MCNC: 0.2 MG/DL (ref 0.1–1)
BUN SERPL-MCNC: 84 MG/DL (ref 8–23)
BUN SERPL-MCNC: 84 MG/DL (ref 8–23)
CALCIUM SERPL-MCNC: 8.3 MG/DL (ref 8.7–10.5)
CALCIUM SERPL-MCNC: 8.3 MG/DL (ref 8.7–10.5)
CHLORIDE SERPL-SCNC: 100 MMOL/L (ref 95–110)
CHLORIDE SERPL-SCNC: 100 MMOL/L (ref 95–110)
CO2 SERPL-SCNC: 23 MMOL/L (ref 23–29)
CO2 SERPL-SCNC: 23 MMOL/L (ref 23–29)
CREAT SERPL-MCNC: 3.1 MG/DL (ref 0.5–1.4)
CREAT SERPL-MCNC: 3.1 MG/DL (ref 0.5–1.4)
DIFFERENTIAL METHOD: ABNORMAL
EOSINOPHIL # BLD AUTO: 0.5 K/UL (ref 0–0.5)
EOSINOPHIL NFR BLD: 4.9 % (ref 0–8)
ERYTHROCYTE [DISTWIDTH] IN BLOOD BY AUTOMATED COUNT: 14.4 % (ref 11.5–14.5)
EST. GFR  (AFRICAN AMERICAN): 16 ML/MIN/1.73 M^2
EST. GFR  (AFRICAN AMERICAN): 16 ML/MIN/1.73 M^2
EST. GFR  (NON AFRICAN AMERICAN): 14 ML/MIN/1.73 M^2
EST. GFR  (NON AFRICAN AMERICAN): 14 ML/MIN/1.73 M^2
GLUCOSE SERPL-MCNC: 241 MG/DL (ref 70–110)
GLUCOSE SERPL-MCNC: 241 MG/DL (ref 70–110)
HCT VFR BLD AUTO: 31.7 % (ref 37–48.5)
HGB BLD-MCNC: 9.7 G/DL (ref 12–16)
IMM GRANULOCYTES # BLD AUTO: 0.05 K/UL (ref 0–0.04)
IMM GRANULOCYTES NFR BLD AUTO: 0.5 % (ref 0–0.5)
LYMPHOCYTES # BLD AUTO: 4.9 K/UL (ref 1–4.8)
LYMPHOCYTES NFR BLD: 47.5 % (ref 18–48)
MCH RBC QN AUTO: 28.6 PG (ref 27–31)
MCHC RBC AUTO-ENTMCNC: 30.6 G/DL (ref 32–36)
MCV RBC AUTO: 94 FL (ref 82–98)
MONOCYTES # BLD AUTO: 1.1 K/UL (ref 0.3–1)
MONOCYTES NFR BLD: 10.5 % (ref 4–15)
NEUTROPHILS # BLD AUTO: 3.7 K/UL (ref 1.8–7.7)
NEUTROPHILS NFR BLD: 36.1 % (ref 38–73)
NRBC BLD-RTO: 0 /100 WBC
PHOSPHATE SERPL-MCNC: 4.9 MG/DL (ref 2.7–4.5)
PHOSPHATE SERPL-MCNC: 4.9 MG/DL (ref 2.7–4.5)
PLATELET # BLD AUTO: 378 K/UL (ref 150–350)
PMV BLD AUTO: 9.4 FL (ref 9.2–12.9)
POCT GLUCOSE: 199 MG/DL (ref 70–110)
POCT GLUCOSE: 273 MG/DL (ref 70–110)
POCT GLUCOSE: 322 MG/DL (ref 70–110)
POTASSIUM SERPL-SCNC: 4.9 MMOL/L (ref 3.5–5.1)
POTASSIUM SERPL-SCNC: 4.9 MMOL/L (ref 3.5–5.1)
PROT SERPL-MCNC: 7.9 G/DL (ref 6–8.4)
RBC # BLD AUTO: 3.39 M/UL (ref 4–5.4)
SODIUM SERPL-SCNC: 132 MMOL/L (ref 136–145)
SODIUM SERPL-SCNC: 132 MMOL/L (ref 136–145)
VANCOMYCIN SERPL-MCNC: 20.5 UG/ML
WBC # BLD AUTO: 10.35 K/UL (ref 3.9–12.7)

## 2020-06-19 PROCEDURE — 97530 THERAPEUTIC ACTIVITIES: CPT | Mod: HCNC,CO

## 2020-06-19 PROCEDURE — 80202 ASSAY OF VANCOMYCIN: CPT | Mod: HCNC

## 2020-06-19 PROCEDURE — 99232 PR SUBSEQUENT HOSPITAL CARE,LEVL II: ICD-10-PCS | Mod: 25,HCNC,, | Performed by: PODIATRIST

## 2020-06-19 PROCEDURE — 25000003 PHARM REV CODE 250: Mod: HCNC | Performed by: INTERNAL MEDICINE

## 2020-06-19 PROCEDURE — 97112 NEUROMUSCULAR REEDUCATION: CPT | Mod: HCNC,CQ

## 2020-06-19 PROCEDURE — 85025 COMPLETE CBC W/AUTO DIFF WBC: CPT | Mod: HCNC

## 2020-06-19 PROCEDURE — 97110 THERAPEUTIC EXERCISES: CPT | Mod: HCNC,CQ

## 2020-06-19 PROCEDURE — 99233 SBSQ HOSP IP/OBS HIGH 50: CPT | Mod: GT,HCNC,, | Performed by: INTERNAL MEDICINE

## 2020-06-19 PROCEDURE — 25000003 PHARM REV CODE 250: Mod: HCNC | Performed by: NURSE PRACTITIONER

## 2020-06-19 PROCEDURE — A4217 STERILE WATER/SALINE, 500 ML: HCPCS | Mod: HCNC | Performed by: INTERNAL MEDICINE

## 2020-06-19 PROCEDURE — 99233 SBSQ HOSP IP/OBS HIGH 50: CPT | Mod: HCNC,,, | Performed by: INTERNAL MEDICINE

## 2020-06-19 PROCEDURE — 99232 SBSQ HOSP IP/OBS MODERATE 35: CPT | Mod: 25,HCNC,, | Performed by: PODIATRIST

## 2020-06-19 PROCEDURE — 63600175 PHARM REV CODE 636 W HCPCS: Mod: HCNC | Performed by: INTERNAL MEDICINE

## 2020-06-19 PROCEDURE — 36415 COLL VENOUS BLD VENIPUNCTURE: CPT | Mod: HCNC

## 2020-06-19 PROCEDURE — 25000003 PHARM REV CODE 250: Mod: HCNC | Performed by: EMERGENCY MEDICINE

## 2020-06-19 PROCEDURE — 97605 NEG PRS WND THER DME<=50SQCM: CPT | Mod: HCNC,,, | Performed by: PODIATRIST

## 2020-06-19 PROCEDURE — 80053 COMPREHEN METABOLIC PANEL: CPT | Mod: HCNC

## 2020-06-19 PROCEDURE — 63600175 PHARM REV CODE 636 W HCPCS: Mod: HCNC | Performed by: NURSE PRACTITIONER

## 2020-06-19 PROCEDURE — 97605 PR NEG PRESS WOUND THERAPY (NPWT) W/NON-DISPOSABLE WOUND VAC DEVICE (DME), <=50 CM: ICD-10-PCS | Mod: HCNC,,, | Performed by: PODIATRIST

## 2020-06-19 PROCEDURE — 80069 RENAL FUNCTION PANEL: CPT | Mod: HCNC

## 2020-06-19 PROCEDURE — 99233 PR SUBSEQUENT HOSPITAL CARE,LEVL III: ICD-10-PCS | Mod: HCNC,,, | Performed by: INTERNAL MEDICINE

## 2020-06-19 PROCEDURE — 11000001 HC ACUTE MED/SURG PRIVATE ROOM: Mod: HCNC

## 2020-06-19 PROCEDURE — 99233 PR SUBSEQUENT HOSPITAL CARE,LEVL III: ICD-10-PCS | Mod: GT,HCNC,, | Performed by: INTERNAL MEDICINE

## 2020-06-19 RX ORDER — SODIUM CHLORIDE 9 MG/ML
INJECTION, SOLUTION INTRAVENOUS CONTINUOUS
Status: ACTIVE | OUTPATIENT
Start: 2020-06-19 | End: 2020-06-22

## 2020-06-19 RX ORDER — HYDRALAZINE HYDROCHLORIDE 20 MG/ML
10 INJECTION INTRAMUSCULAR; INTRAVENOUS EVERY 6 HOURS PRN
Status: DISCONTINUED | OUTPATIENT
Start: 2020-06-19 | End: 2020-06-23

## 2020-06-19 RX ORDER — ACETAMINOPHEN 325 MG/1
650 TABLET ORAL EVERY 6 HOURS PRN
Status: DISCONTINUED | OUTPATIENT
Start: 2020-06-19 | End: 2020-06-24 | Stop reason: HOSPADM

## 2020-06-19 RX ORDER — OXYCODONE HYDROCHLORIDE 5 MG/1
5 TABLET ORAL EVERY 8 HOURS PRN
Status: DISCONTINUED | OUTPATIENT
Start: 2020-06-19 | End: 2020-06-23

## 2020-06-19 RX ADMIN — SODIUM BICARBONATE: 84 INJECTION, SOLUTION INTRAVENOUS at 06:06

## 2020-06-19 RX ADMIN — SODIUM CHLORIDE: 0.9 INJECTION, SOLUTION INTRAVENOUS at 09:06

## 2020-06-19 RX ADMIN — GABAPENTIN 300 MG: 300 CAPSULE ORAL at 09:06

## 2020-06-19 RX ADMIN — GABAPENTIN 300 MG: 300 CAPSULE ORAL at 03:06

## 2020-06-19 RX ADMIN — CARVEDILOL 25 MG: 6.25 TABLET, FILM COATED ORAL at 09:06

## 2020-06-19 RX ADMIN — CEFTRIAXONE 1 G: 1 INJECTION, SOLUTION INTRAVENOUS at 01:06

## 2020-06-19 RX ADMIN — STANDARDIZED SENNA CONCENTRATE AND DOCUSATE SODIUM 1 TABLET: 8.6; 5 TABLET ORAL at 09:06

## 2020-06-19 RX ADMIN — STANDARDIZED SENNA CONCENTRATE AND DOCUSATE SODIUM 1 TABLET: 8.6; 5 TABLET ORAL at 08:06

## 2020-06-19 RX ADMIN — OXYCODONE 5 MG: 5 TABLET ORAL at 12:06

## 2020-06-19 RX ADMIN — GABAPENTIN 300 MG: 300 CAPSULE ORAL at 08:06

## 2020-06-19 RX ADMIN — INSULIN ASPART 1 UNITS: 100 INJECTION, SOLUTION INTRAVENOUS; SUBCUTANEOUS at 08:06

## 2020-06-19 RX ADMIN — CARVEDILOL 25 MG: 6.25 TABLET, FILM COATED ORAL at 06:06

## 2020-06-19 RX ADMIN — ASPIRIN 81 MG 81 MG: 81 TABLET ORAL at 09:06

## 2020-06-19 RX ADMIN — INSULIN ASPART 7 UNITS: 100 INJECTION, SOLUTION INTRAVENOUS; SUBCUTANEOUS at 11:06

## 2020-06-19 RX ADMIN — INSULIN ASPART 7 UNITS: 100 INJECTION, SOLUTION INTRAVENOUS; SUBCUTANEOUS at 09:06

## 2020-06-19 RX ADMIN — FAMOTIDINE 20 MG: 20 TABLET ORAL at 09:06

## 2020-06-19 RX ADMIN — LEVOTHYROXINE SODIUM 75 MCG: 75 TABLET ORAL at 06:06

## 2020-06-19 RX ADMIN — INSULIN ASPART 4 UNITS: 100 INJECTION, SOLUTION INTRAVENOUS; SUBCUTANEOUS at 11:06

## 2020-06-19 RX ADMIN — INSULIN ASPART 7 UNITS: 100 INJECTION, SOLUTION INTRAVENOUS; SUBCUTANEOUS at 06:06

## 2020-06-19 RX ADMIN — COLLAGENASE SANTYL: 250 OINTMENT TOPICAL at 09:06

## 2020-06-19 RX ADMIN — ROSUVASTATIN CALCIUM 20 MG: 10 TABLET, COATED ORAL at 09:06

## 2020-06-19 NOTE — PROGRESS NOTES
Tasneem Lynn is a 74 y.o. female patient.    Follow for EMMA, CKD stg 4    No new c/o, comfortable      Scheduled Meds:   aspirin  81 mg Oral Daily    carvediloL  25 mg Oral BID WM    cefTRIAXone (ROCEPHIN) IVPB  1 g Intravenous Q24H    collagenase   Topical (Top) Daily    famotidine  20 mg Oral Daily    gabapentin  300 mg Oral TID    insulin aspart U-100  7 Units Subcutaneous TIDWM    insulin detemir U-100  12 Units Subcutaneous BID    levothyroxine  75 mcg Oral Before breakfast    rosuvastatin  20 mg Oral Daily    senna-docusate 8.6-50 mg  1 tablet Oral BID       Review of patient's allergies indicates:   Allergen Reactions    Corticosteroids (glucocorticoids) Edema         Vital Signs Range (Last 24H):  Temp:  [98 °F (36.7 °C)-98.7 °F (37.1 °C)]   Pulse:  [76-81]   Resp:  [16-18]   BP: (120-200)/(58-88)   SpO2:  [96 %-100 %]     I & O (Last 24H):    Intake/Output Summary (Last 24 hours) at 6/19/2020 1018  Last data filed at 6/18/2020 1740  Gross per 24 hour   Intake 360 ml   Output 100 ml   Net 260 ml           Physical Exam:  General appearance: well developed, well nourished, no distress  Lungs:  clear to auscultation bilaterally and normal respiratory effort  Heart: regular rate and rhythm  Abdomen: soft, non-tender non-distented; bowel sounds normal; no masses,  no organomegaly  Extremities: edema trace    Laboratory:  CBC:   Recent Labs   Lab 06/19/20  0622   WBC 10.35   RBC 3.39*   HGB 9.7*   HCT 31.7*   *   MCV 94   MCH 28.6   MCHC 30.6*     CMP:   Recent Labs   Lab 06/19/20  0622   *  241*   CALCIUM 8.3*  8.3*   ALBUMIN 2.4*  2.4*   PROT 7.9   *  132*   K 4.9  4.9   CO2 23  23     100   BUN 84*  84*   CREATININE 3.1*  3.1*   ALKPHOS 121   ALT 11   AST 12   BILITOT 0.2       Imp/Plan    EMMA - creatinine unchanged, appears plateau  CKD stg 4  (R) foot chronic osteomyelitis  HTN  DM type 2  Anemia of CKD    Continue present Rx  Watch renal  function/electrolytes  Continue IV hydration      Baylee Gomez  6/19/2020

## 2020-06-19 NOTE — PROGRESS NOTES
Vein mapping reviewed. Recommend IR consult for small caliber IJ line for IV Abx if needed long term to preserve BUE veins for future long term dialysis access.

## 2020-06-19 NOTE — PT/OT/SLP PROGRESS
Physical Therapy Treatment    Patient Name:  Tasneem Lynn   MRN:  [id number]    Recommendations:     Discharge Recommendations:  nursing facility, skilled   Discharge Equipment Recommendations: bedside commode, walker, rolling, hospital bed   Barriers to discharge: Inaccessible home and Decreased caregiver support    Assessment:     Tasneem Lynn is a 74 y.o. 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  She presents with the following impairments/functional limitations:  weakness, impaired self care skills, impaired functional mobilty, gait instability, impaired balance, decreased upper extremity function, decreased lower extremity function, decreased safety awareness, pain, decreased ROM, impaired skin, edema, orthopedic precautions, impaired endurance .    Rehab Prognosis: Fair -; patient would benefit from acute skilled PT services to address these deficits and reach maximum level of function.    Recent Surgery: * No surgery found *      Plan:     During this hospitalization, patient to be seen 5 x/week to address the identified rehab impairments via gait training, therapeutic activities, therapeutic exercises, neuromuscular re-education, wheelchair management/training and progress toward the following goals:    · Plan of Care Expires:  06/27/20    Subjective     Chief Complaint: weakness and neck pain   Patient/Family Comments/goals: pt agreeable to treatment with max encouragement and education   Pain/Comfort:  · Pain Rating 1: 0/10  · Pain Rating 2: 0/10      Objective:     Communicated with nurse  prior to session.  Patient found HOB elevated with bed alarm, wound vac, SCD, peripheral IV upon PT entry to room.     General Precautions: Standard, diabetic, fall   Orthopedic Precautions: treated as RLE non weight bearing  2* foot wounds   Braces:   n/a    Functional Mobility:  · Bed Mobility:     · Rolling Left:  moderate assistance  · Rolling Right: moderate  assistance  · Scooting: max A  to scoot anteriorly EOB   · Bridging: dependence and of 2 persons with bed head rails and trendelenburg position   · Supine to Sit: mod A x 2  HOB elevated , bedside rail   · Sit to Supine: dependence  and of 2 persons   · Balance: pt with inconsistent sitting balance, required from SBA- MIN A to maintain sitting balance , pt with R lateral/posteior  leaning, c/o neck pain  Pt required max V/T cues to maintain upright posture.      AM-PAC 6 CLICK MOBILITY  Turning over in bed (including adjusting bedclothes, sheets and blankets)?: 2  Sitting down on and standing up from a chair with arms (e.g., wheelchair, bedside commode, etc.): 2  Moving from lying on back to sitting on the side of the bed?: 2  Moving to and from a bed to a chair (including a wheelchair)?: 1  Need to walk in hospital room?: 1  Climbing 3-5 steps with a railing?: 1  Basic Mobility Total Score: 9       Therapeutic Activities and Exercises:   Pt performed seated BLE 10 reps x 2 trials:    LAQ,  Hip abd/add  V/T's cues for technique and sequence. Pt required encouragement to participate in TE   Educated pt on safety awareness with all OOB mobility    Patient left with bed in chair position with pressure relief boots placed to B heels , SCD placed to LLE  all lines intact, call button in reach, bed alarm on and NURSE present..    GOALS:   Multidisciplinary Problems     Physical Therapy Goals        Problem: Physical Therapy Goal    Goal Priority Disciplines Outcome Goal Variances Interventions   Physical Therapy Goal     PT, PT/OT Ongoing, Progressing     Description: Goals to be met by: 20     Patient will increase functional independence with mobility by performin. Supine to sit with Set-up [place]  2. Sit to supine with Set-up [place]  3. Rolling to Left and Right with Modified [place].  4. Bed to chair transfer with Contact Guard Assistance using Slideboard  5. Wheelchair propulsion x30 feet  with Supervision using bilateral uppper extremities  6. Sitting at edge of bed x20 minutes with Supervision                       Time Tracking:     PT Received On: 06/19/20  PT Start Time: 1140     PT Stop Time: 1206  PT Total Time (min): 26 min     Billable Minutes: Neuromuscular Re-education 13 and Total Time 26 MIN co-treat with OT      Treatment Type: Treatment  PT/PTA: PTA     PTA Visit Number: 4     Fleming PRERNA [facility], PTA  06/19/2020

## 2020-06-19 NOTE — PLAN OF CARE
Problem: Physical Therapy Goal  Goal: Physical Therapy Goal  Description: Goals to be met by: 20     Patient will increase functional independence with mobility by performin. Supine to sit with Set-up [place]  2. Sit to supine with Set-up [place]  3. Rolling to Left and Right with Modified [place].  4. Bed to chair transfer with Contact Guard Assistance using Slideboard  5. Wheelchair propulsion x30 feet with Supervision using bilateral uppper extremities  6. Sitting at edge of bed x20 minutes with Supervision      Outcome: Ongoing, Progressing

## 2020-06-19 NOTE — PT/OT/SLP PROGRESS
Occupational Therapy   Treatment    Name: Tasneem Lynn  MRN: [id number]  Admitting Diagnosis:  Chronic osteomyelitis of right foot with draining sinus       Recommendations:     Discharge Recommendations: nursing facility, skilled  Discharge Equipment Recommendations:  bedside commode, walker, rolling, hospital bed  Barriers to discharge:       Assessment:     Tasneem Lynn is a 74 y.o. female with a medical diagnosis of Chronic osteomyelitis of right foot with draining sinus.  She presents with the following performance deficits affecting function: weakness, impaired endurance, impaired self care skills, impaired balance, gait instability, impaired functional mobilty, decreased coordination, decreased upper extremity function, decreased lower extremity function, decreased ROM, impaired skin, decreased safety awareness, edema, orthopedic precautions.     Pt tolerated sitting EOB ~ 12 mins with inconsistent sitting balance requiring SBA<>Min A to maintain. Pt required encouragement throughout therapy session.   Pt will benefit from continued OT services to address functional deficits and maximize level of functional independence.     Rehab Prognosis:  Fair; patient would benefit from acute skilled OT services to address these deficits and reach maximum level of function.       Plan:     Patient to be seen 5 x/week to address the above listed problems via self-care/home management, therapeutic activities, therapeutic exercises  · Plan of Care Expires: 06/27/20  · Plan of Care Reviewed with: patient    Subjective     Pain/Comfort:  · Pain Rating 1: 0/10    Objective:     Communicated with: Nurse Manuel prior to session.  Patient found supine with bed alarm, wound vac, peripheral IV, SCD, telemetry, pressure relief boots upon OT entry to room.    General Precautions: Standard, diabetic, fall   Orthopedic Precautions:(RLE non weight bearing)   Braces: N/A     Occupational Performance:     Bed Mobility:    · Patient completed  Rolling/Turning to Left with  moderate assistance  · Patient completed Rolling/Turning to Right with moderate assistance  · Patient completed Scooting with maximal assistance to scoot anteriorly EOB and Bridging dependence and of 2 persons with bed in trendelenburg to HOB  · Patient completed Supine to Sit with moderate assistance and 2 persons, HOB elevated  · Patient completed Sit to Supine with dependent and 2 persons     Functional Mobility/Transfers: Not performed    Activities of Daily Living:  · Grooming: set-up A to wash face with washcloth seated EOB. Pt declined simple oral care   · Lower Body Dressing: dependence to sergo L sock  · Patient with lunch tray present, encouraged pt to eat while sitting upright EOB, however, pt refused.      [facility] 6 Click ADL: 12    Treatment & Education:   Pt educated on OT role/POC; educated pt on the importance of mobilizing and participation with therapy to maximize functional independence.   Pt tolerated ~ 12 mins sitting EOB during therapy activities with encouragement, pt requesting to return to supine within 8 mins of duration. Pt required SBA-MIN  A to maintain static/dynamic sitting balance 2* R lateral and posterior leaning. Max verbal/tactile cues for upright posture,   Pt c/o neck pain; performed x 5 reps of neck ROM in all planes while seated EOB.    Pt verbalizes understanding of all education provided this date. All questions/concerns answered within OT scope of practice      Patient left with bed in chair position , pillow underneath left hip to offload pressure, B pressure relief boots placed, SCD to LLE, with all lines intact, call button in reach, bed alarm on and nurse Mar presentEducation:      GOALS:   Multidisciplinary Problems     Occupational Therapy Goals        Problem: Occupational Therapy Goal    Goal Priority Disciplines Outcome Interventions   Occupational Therapy Goal     OT, PT/OT Ongoing, Progressing    Description: Goals to be met by:  6/27/2020    Patient will increase functional independence with ADLs by performing:    Grooming while seated at sink with Set-up Assistance.  Toileting from bedside commode with Minimal Assistance for hygiene and clothing management.   Sitting at edge of bed x20 minutes with Supervision.  Supine to sit with Minimal Assistance.  Stand pivot transfers with Moderate Assistance.  Toilet transfer to bedside commode with Moderate Assistance.                     Time Tracking:     OT Date of Treatment: 06/19/20  OT Start Time: 1140  OT Stop Time: 1206  OT Total Time (min): 26 min    Billable Minutes:Therapeutic Activity 13 (co-tx with PTA)    TASHA Barreto  6/19/2020

## 2020-06-19 NOTE — SUBJECTIVE & OBJECTIVE
Interval History: Doing well. Awaiting line placement. Denies fever or chills. Eating okay. BM today? (Usually, qod at home.). Vac on foot.    Review of Systems  Objective:     Vital Signs (Most Recent):  Temp: 98 °F (36.7 °C) (06/19/20 0912)  Pulse: 77 (06/19/20 0912)  Resp: 18 (06/19/20 0912)  BP: (!) 120/58 (06/19/20 0912)  SpO2: 96 % (06/19/20 0912) Vital Signs (24h Range):  Temp:  [98 °F (36.7 °C)-98.7 °F (37.1 °C)] 98 °F (36.7 °C)  Pulse:  [76-81] 77  Resp:  [16-18] 18  SpO2:  [96 %-100 %] 96 %  BP: (120-200)/(58-88) 120/58     Weight: 116.5 kg (256 lb 13.4 oz)  Body mass index is 46.98 kg/m².    Estimated Creatinine Clearance: 19.3 mL/min (A) (based on SCr of 3.1 mg/dL (H)).    Physical Exam  Vitals signs and nursing note reviewed.   Constitutional:       General: She is not in acute distress.     Appearance: She is well-developed and well-nourished. She is not diaphoretic.   HENT:      Head: Normocephalic and atraumatic.      Right Ear: External ear normal.      Left Ear: External ear normal.   Eyes:      Extraocular Movements: EOM normal.      Conjunctiva/sclera: Conjunctivae normal.      Pupils: Pupils are equal, round, and reactive to light.   Cardiovascular:      Rate and Rhythm: Normal rate.      Pulses: Pulses are palpable.      Heart sounds: Normal heart sounds, S1 normal and S2 normal.   Pulmonary:      Effort: Pulmonary effort is normal. No respiratory distress.      Breath sounds: Normal breath sounds.   Abdominal:      General: Bowel sounds are normal.      Palpations: Abdomen is soft.      Tenderness: There is no abdominal tenderness. There is no guarding.   Musculoskeletal: Normal range of motion.   Skin:     General: Skin is warm.      Findings: No erythema.   Neurological:      Mental Status: She is alert and oriented to person, place, and time.   Psychiatric:         Mood and Affect: Mood and affect normal.         Behavior: Behavior normal.         Thought Content: Thought content normal.          Judgment: Judgment normal.         Significant Labs:   Blood Culture:   Recent Labs   Lab 06/12/20  2325 06/12/20  2339 06/15/20  0624   LABBLOO No Growth after 4 days.  Gram stain aer bottle: Gram positive cocci in clusters resembling Staph   Results called to and read back by: Nataliia Weber  06/13/2020  22:41  COAGULASE-NEGATIVE STAPHYLOCOCCUS SPECIES  Organism is a probable contaminant  * No Growth after 4 days.      CBC:   Recent Labs   Lab 06/18/20  0330 06/19/20  0622   WBC 9.15 10.35   HGB 9.2* 9.7*   HCT 31.9* 31.7*   * 378*     CMP:   Recent Labs   Lab 06/18/20  0330 06/18/20  1407 06/18/20  1818 06/19/20  0622   *  --   --  132*  132*   K 5.7* 5.9* 5.0 4.9  4.9     --   --  100  100   CO2 23  --   --  23  23   *  --   --  241*  241*   BUN 77*  --   --  84*  84*   CREATININE 3.3*  --   --  3.1*  3.1*   CALCIUM 8.5*  --   --  8.3*  8.3*   PROT 7.8  --   --  7.9   ALBUMIN 2.4*  --   --  2.4*  2.4*   BILITOT 0.2  --   --  0.2   ALKPHOS 104  --   --  121   AST 11  --   --  12   ALT 11  --   --  11   ANIONGAP 7*  --   --  9  9   EGFRNONAA 13*  --   --  14*  14*     Wound Culture:   Recent Labs   Lab 06/13/20  1405   LABAERO No growth       Significant Imaging: I have reviewed all pertinent imaging results/findings within the past 24 hours.

## 2020-06-19 NOTE — ASSESSMENT & PLAN NOTE
- culture is NGTD  - MRI c/w osteomyelitis of the calcaneus  - pathology confirms chronic infection  - rec treat with ceftriaxone and vancomycin for 6 weeks, following weekly labs  - needs PT and wound care    Discharge antibiotics:   Ceftriaxone 2 grams IV q 24 hours plus  Vancomycin 500 mg IV q 48 hours    End date of IV antibiotics: 07/27/2020    Weekly outpatient laboratory on Monday or Tuesday while on IV antibiotics.    CBC   CMP or BMP   ESR and CRP   Vancomycin trough. Target 15-20    If vancomycin trough is not at target (15-20) prior to discharge, the please perform vancomycin trough before their fourth outpatient dose.    Fax laboratory results to [facility] at [phone] with attn: Dr. Villasenor.    Outpatient Infectious Diseases clinic follow up will be arranged and found in patient calendar.    Prior to discharge, please ensure the patient's follow-up has been scheduled.  If there is still no follow-up scheduled in Infectious Diseases clinic, please send an EPIC message to Scarlett Thomas LPN in Infectious Diseases.

## 2020-06-19 NOTE — PROGRESS NOTES
Bedside report given to oncoming nurse KEENAN albrecht noted. Pt lying in bed, Respirations even and unlabored. Safety maintained, Call light within reach.     24 hr chart check completed.

## 2020-06-19 NOTE — PROGRESS NOTES
Bedside report received from day nurse Donato, Patient care assumed. NAD noted. Respirations even and unlabored. Safety maintained. Call light within reach.

## 2020-06-19 NOTE — PROGRESS NOTES
Progress Note    Admit Date: 6/12/2020   LOS: 7 days     SUBJECTIVE:     Follow-up For:  Patient seen bedside. Heel protector boot in place to right foot only.     6/16/20: Plan for wound VAC application today. Heel protector boots in place B/L     06/19/2020  Patient seem at bedside resting comfortably.  VAC in place.  Complains of occasional heel pain and some decreased appetite.  No new pedal complaints    Scheduled Meds:   aspirin  81 mg Oral Daily    carvediloL  25 mg Oral BID WM    cefTRIAXone (ROCEPHIN) IVPB  1 g Intravenous Q24H    collagenase   Topical (Top) Daily    famotidine  20 mg Oral Daily    gabapentin  300 mg Oral TID    insulin aspart U-100  7 Units Subcutaneous TIDWM    insulin detemir U-100  12 Units Subcutaneous BID    levothyroxine  75 mcg Oral Before breakfast    rosuvastatin  20 mg Oral Daily    senna-docusate 8.6-50 mg  1 tablet Oral BID     Continuous Infusions:   sodium chloride 0.9% 75 mL/hr at 06/19/20 0925     PRN Meds:acetaminophen, acetaminophen, dextrose 50%, dextrose 50%, glucagon (human recombinant), glucose, glucose, hydrALAZINE, insulin aspart U-100, ondansetron, oxyCODONE, sodium chloride 0.9%, Pharmacy to dose Vancomycin consult **AND** vancomycin - pharmacy to dose    Review of patient's allergies indicates:   Allergen Reactions    Corticosteroids (glucocorticoids) Edema       Review of Systems  Constitutional: Negative for chills and fever.   Cardiovascular: Negative for leg swelling.   Gastrointestinal: Negative for nausea and vomiting.   Musculoskeletal: Positive for gait problem.   Skin: Positive for color change and wound.     OBJECTIVE:     Vital Signs (Most Recent)  Temp: 97.9 °F (36.6 °C) (06/19/20 1138)  Pulse: 70 (06/19/20 1138)  Resp: 18 (06/19/20 1138)  BP: (!) 114/55 (06/19/20 1138)  SpO2: 96 % (06/19/20 1138)    Vital Signs Range (Last 24H):  Temp:  [97.9 °F (36.6 °C)-98.7 °F (37.1 °C)]   Pulse:  [70-81]   Resp:  [16-18]   BP: (114-200)/(55-88)    SpO2:  [96 %-100 %]     I & O (Last 24H):    Intake/Output Summary (Last 24 hours) at 6/19/2020 1312  Last data filed at 6/18/2020 1740  Gross per 24 hour   Intake 240 ml   Output --   Net 240 ml     Foot Exam     Right Foot/Ankle      Inspection and Palpation  Ecchymosis: none  Tenderness: (Some tenderness to the heel)  Swelling: (Mild edema)  Skin Exam: ulcer;      Neurovascular  Dorsalis pedis: absent  Posterior tibial: 1+  Saphenous nerve sensation: diminished  Tibial nerve sensation: diminished  Superficial peroneal nerve sensation: diminished  Deep peroneal nerve sensation: diminished  Sural nerve sensation: diminished        Left Foot/Ankle       Inspection and Palpation  Ecchymosis: none  Tenderness: none   Swelling: none      Neurovascular  Dorsalis pedis: absent  Posterior tibial: 1+  Saphenous nerve sensation: diminished  Tibial nerve sensation: diminished  Superficial peroneal nerve sensation: diminished  Deep peroneal nerve sensation: diminished  Sural nerve sensation: diminished         Wound 1: Right medial heel ulcer  Measurement: 8.5irt9rmq3.5cm  Base: fibrogranular   Periwound skin: Rolled borders.   Drainage: sanguinous   Erythema: none   Probe: probe to bone.     Wound 2: Right dorsal foot   Measurement: 4hmh8qua2.2cm  Base: fibrogranular   Periwound skin:rolled borders   Drainage: none  Erythema: mild  Probe: none, no bone exposed      06/19/2020                 6/15/20:                  Laboratory:  CBC:   Recent Labs   Lab 06/19/20  0622   WBC 10.35   RBC 3.39*   HGB 9.7*   HCT 31.7*   *   MCV 94   MCH 28.6   MCHC 30.6*     CMP:   Recent Labs   Lab 06/19/20  0622   *  241*   CALCIUM 8.3*  8.3*   ALBUMIN 2.4*  2.4*   PROT 7.9   *  132*   K 4.9  4.9   CO2 23  23     100   BUN 84*  84*   CREATININE 3.1*  3.1*   ALKPHOS 121   ALT 11   AST 12   BILITOT 0.2     Microbiology Results (last 7 days)     Procedure Component Value Units Date/Time    Urine culture  [[id number]]  (Abnormal) Collected: 06/18/20 1115    Order Status: Completed Specimen: Urine Updated: 06/19/20 1039     Urine Culture, Routine GRAM NEGATIVE BETY  >100,000 cfu/ml  Identification and susceptibility pending      Narrative:      Preferred Collection Type->Urine, Clean Catch  Specimen Source->Urine    Blood culture [[id number]] Collected: 06/15/20 0624    Order Status: Completed Specimen: Blood from Antecubital, Right Updated: 06/19/20 0703     Blood Culture, Routine No Growth after 4 days.     Aerobic culture [[id number]] Collected: 06/13/20 1405    Order Status: Completed Specimen: Wound from Foot, Right Updated: 06/17/20 1108     Aerobic Bacterial Culture No growth    Culture, Anaerobe [[id number]] Collected: 06/13/20 1405    Order Status: Completed Specimen: Wound from Foot, Right Updated: 06/17/20 0810     Anaerobic Culture No anaerobes isolated    Blood Culture #1 **CANNOT BE ORDERED STAT** [[id number]] Collected: 06/12/20 2325    Order Status: Completed Specimen: Blood from Peripheral, Antecubital, Right Updated: 06/17/20 0503     Blood Culture, Routine No Growth after 4 days.     Blood Culture #2 **CANNOT BE ORDERED STAT** [[id number]]  (Abnormal) Collected: 06/12/20 2339    Order Status: Completed Specimen: Blood from Peripheral, Hand, Right Updated: 06/16/20 0748     Blood Culture, Routine Gram stain aer bottle: Gram positive cocci in clusters resembling Staph       Results called to and read back by: Nataliia Weber  06/13/2020  22:41      COAGULASE-NEGATIVE STAPHYLOCOCCUS SPECIES  Organism is a probable contaminant      AFB Culture & Smear [[id number]] Collected: 06/13/20 1405    Order Status: Completed Specimen: Wound from Foot, Right Updated: 06/15/20 2127     AFB Culture & Smear Culture in progress     AFB CULTURE STAIN No acid fast bacilli seen.    Gram stain [[id number]] Collected: 06/13/20 1405    Order Status: Completed Specimen: Wound from Foot, Right Updated: 06/13/20 1459     Gram Stain  Result No WBC's      No organisms seen    Aerobic culture [[id number]] Collected: 06/13/20 1405    Order Status: Canceled Specimen: Wound from Foot, Right     Blood culture [[id number]]     Order Status: Canceled Specimen: Blood       ,   Specimen (12h ago, onward)    None            Diagnostic Results:  X-Ray Foot Complete Right (Final result)  Result time 06/12/20 20:16:36   Procedure changed from X-Ray Foot Complete Left                 Final result by Jacky Torres MD (06/12/20 20:16:36)                               Impression:        Findings consistent with soft tissue wound at the posterior aspect of the heel overlying the posterior calcaneus, there is subtle irregularity of the posterior and posterosuperior margin of the calcaneus underlying the aforementioned soft tissue abnormality, and the possibility of osseous infectious involvement would be difficult to exclude given this appearance.  Clinical and historical correlation otherwise needed.        Electronically signed by:     Jacky Torres  Date:                                            06/12/2020  Time:                                            20:16                         Narrative:     EXAMINATION:  XR FOOT COMPLETE 3 VIEW RIGHT     CLINICAL HISTORY:  diabetic foot ulcer;. Type 2 diabetes mellitus with foot ulcer     TECHNIQUE:  AP, lateral, and oblique views of the right foot were performed.     COMPARISON:  None     FINDINGS:  Radiographic examination of the right foot was performed.  3 radiographs are submitted.  There is appearance of may relate to soft tissue injury of the soft tissues posterior to the calcaneus at the level of the heel, and there is general appearance suggesting soft tissue swelling about the foot.  On the lateral view there is some lucency associated with the posterior aspect of the calcaneus, the possibility of demineralization is considered although this lucency could relate to diminished soft tissue attenuation  secondary to soft tissue wound or injury.  There is slight irregularity along the posterior and posterosuperior margin of the posterior calcaneus, given the history of diabetic foot ulcer, the possibility of osseous involvement along the posterior and posterosuperior edge of the calcaneus would be difficult to exclude, there are some curvilinear calcifications within the soft tissues adjacent to this, that may relate to osseous involvement as well.  The remainder of the visualized osseous structures demonstrate chronic change, there is no additional evidence for osseous destructive process, acute fracture or dislocation.  Vascular calcifications are noted.                        ASSESSMENT/PLAN:     Right heel OM  CKD stage 4    Plan:     Wound VAC to right heel and dorsal foot ulcerations. Applied without problems with seal, adequate suction noted. Covered with dry sterile dressing. Wound vac change every 2-3 days. Set to 125 mmhg. Wound care orders below.    S/p bone biopsy, debridement - 6/13/20.     Per Vascular surgery: Patient appears to have adequate perfusion to heal wound.     Abx per ID: ceftriaxone and vancomycin for 6 weeks    SNF vs. LTAC vs  for IV abx, wound care.     Podiatry will follow.     Heel protector boots in place B/L     Wounds can be managed outpatient      Wound care orders right foot- to be done every 2-3 days.   1. Cleanse wound with saline. Pat dry  2. Apply wound VAC to right heel. Set to 125 mmhg.   3. Apply iodosorb to right dorsal foot ulceration cover with non adherent foam.   4. Wrap with kerlix. Secure with tape.     F/u WB wound care within one week of discharge first available appt.

## 2020-06-19 NOTE — PLAN OF CARE
Problem: Occupational Therapy Goal  Goal: Occupational Therapy Goal  Description: Goals to be met by: 6/27/2020    Patient will increase functional independence with ADLs by performing:    Grooming while seated at sink with Set-up Assistance.  Toileting from bedside commode with Minimal Assistance for hygiene and clothing management.   Sitting at edge of bed x20 minutes with Supervision.  Supine to sit with Minimal Assistance.  Stand pivot transfers with Moderate Assistance.  Toilet transfer to bedside commode with Moderate Assistance.    Outcome: Ongoing, Progressing     Pt tolerated sitting EOB ~ 12 mins with inconsistent sitting balance requiring SBA<>Min A to maintain. Pt required encouragement throughout therapy session. Continue OT POC as indicated.

## 2020-06-19 NOTE — PLAN OF CARE
06/19/20 1151   Discharge Reassessment   Assessment Type Discharge Planning Reassessment   Provided patient/caregiver education on the expected discharge date and the discharge plan Yes   Patient to DC home on Monday 6/22 with  (to be assigned),  [facility] wound vac and [facility] infusion for IV abx.  Per Sonam at [facility] patient's daughter taught IV administration.  Per Toyin wound vac to be delivered today.

## 2020-06-19 NOTE — PROGRESS NOTES
[facility] Hospital Medicine  Telemedicine Progress Note    Patient Name: Tasneem Lynn  MRN: [id number]  Patient Class: IP- Inpatient   Admission Date: 6/12/2020  Length of Stay: 6 days  Attending Physician: Funmilayo Guillermo MD  Primary Care Provider: To Obtain Unable      Start time: 0840a  Chief complaint: Chronic osteomyelitis of right foot with draining sinus  The patient location is: W422/W422 A  Present with the patient at the time of the telemed/virtual assessment: telemed presenter    Subjective:     Principal Problem:Chronic osteomyelitis of right foot with draining sinus        HPI:  This is a 74 y.o female with diabetes mellitus, hypertension, dyslipidemia, CHF, hypothyroidism, CKD stage 4, multiple chronic pains, obesity, and stroke (1986) who presents to the hospital with a chief complaint of worsening wound to the right heel for the last week. Patient reports her right heel wound has started 3 months ago and it seems got worse since last week with increasing bleeding. She states she believes the wound is infected. She reports she was prescribed an antibiotic (unsure name) 2 months ago by Angela James NP at an urgent care clinic but it didn't help much. She also reports another wound at her anterior right foot which is developed around 1 month ago. Patient reports she has been mostly staying on her bed and she has not been walking for 2 months due to her right heel wound. She reports the wound would bleed and have more pain if she put pressure on her right foot. She denies fever, chill, chest pain, SOB, bilateral calves/leg pain, N/V/D, cough or sick contacts at home. She reports she does not go to wound care clinic. She states the home health nurse visited her 3 times since she started having this heel wound. The last visit was today.     In ED, COVID negative. X-ray right foot can not exclude the possibility of osseous infectious involvement.  Labs with elevated sed rate and CRP,  c/s with CKD stage 4 with GFR 25 and elevated BUN&sCr 57&2.2.     Patient is admitted to inpatient with hospital medicine to further evaluation and treatment of right foot wound and EMMA    Overview/Hospital Course:  Patient admitted to the hospital for eval and treatment of R heal diabetic ulcer. MRI showed osteo.  Podiatry consulted. Patient had debridement and bone biopsy on 6/13.      Admission CC:   Chief Complaint   Patient presents with    Wound Check     EMS reports pt c/o infected diabetic ulcer to the right heel, worsening x 3 months. denies pain     Follow up visit for: Chronic osteomyelitis of right foot with draining sinus    Interval History / Events Overnight:   Patient states pain to right foot is adequately controlled; she states she is eating adequately and denies loss of appetite; patient hard of hearing; labs reviewed with creatinine mildly improved to 3.1 but BUN remains high at 84    The patient is able to provide adequate history. Additional history was obtained from chart review. No significant events reported by Nursing.  Patient complains of no specific complaints. Symptoms have been unchanged since yesterday. Associated symptoms include: fatigue and malaise .. Alleviating factors include: nothing. Response to treatment since yesterday: Not measurable. Wound vac placed by Podiatry.    Data reviewed 6/17/2020: Lab test(s) reviewed: H&H stable. Cr increased but stable    Review of Systems   Constitutional: Negative for fever.   Respiratory: Negative for cough and shortness of breath.    Cardiovascular: Negative for chest pain.   Gastrointestinal: Negative for nausea and vomiting.   Genitourinary: Negative for dysuria and hematuria.     Objective:     Vital Signs (Most Recent):  Temp: 97.6 °F (36.4 °C) (06/17/20 1653)  Pulse: 70 (06/17/20 1653)  Resp: 18 (06/17/20 1653)  BP: (!) 131/98 (06/17/20 1653)  SpO2: 98 % (06/17/20 1653) Vital Signs (24h Range):  Temp:  [97.6 °F (36.4 °C)-98.2 °F (36.8  °C)] 97.6 °F (36.4 °C)  Pulse:  [69-76] 70  Resp:  [17-19] 18  SpO2:  [96 %-100 %] 98 %  BP: (100-184)/(51-98) 131/98     Weight: 116.5 kg (256 lb 13.4 oz)  Body mass index is 46.98 kg/m².    Intake/Output Summary (Last 24 hours) at 6/17/2020 1743  Last data filed at 6/17/2020 1226  Gross per 24 hour   Intake 360 ml   Output 150 ml   Net 210 ml      Physical Exam  Constitutional:       General: She is not in acute distress.     Appearance: She is morbidly obese. She is not diaphoretic.   Eyes:      General: Lids are normal. No scleral icterus.        Right eye: No discharge.         Left eye: No discharge.      Conjunctiva/sclera: Conjunctivae normal.   Pulmonary:      Effort: Pulmonary effort is normal. No tachypnea, accessory muscle usage or respiratory distress.   Abdominal:      General: Abdomen is protuberant. There is no distension.   Musculoskeletal:         General: No edema (per telepresenter nurse).   Neurological:      Mental Status: She is alert and oriented to person, place, and time. She is not disoriented.   Psychiatric:         Mood and Affect: Mood and affect normal.         Behavior: Behavior is cooperative.       Significant Labs:   Recent Results (from the past 24 hour(s))   POCT glucose    Collection Time: 06/18/20  8:21 PM   Result Value Ref Range    POCT Glucose 327 (H) 70 - 110 mg/dL   Vancomycin, random    Collection Time: 06/19/20  6:22 AM   Result Value Ref Range    Vancomycin, Random 20.5 Not established ug/mL   CBC auto differential    Collection Time: 06/19/20  6:22 AM   Result Value Ref Range    WBC 10.35 3.90 - 12.70 K/uL    RBC 3.39 (L) 4.00 - 5.40 M/uL    Hemoglobin 9.7 (L) 12.0 - 16.0 g/dL    Hematocrit 31.7 (L) 37.0 - 48.5 %    Mean Corpuscular Volume 94 82 - 98 fL    Mean Corpuscular Hemoglobin 28.6 27.0 - 31.0 pg    Mean Corpuscular Hemoglobin Conc 30.6 (L) 32.0 - 36.0 g/dL    RDW 14.4 11.5 - 14.5 %    Platelets 378 (H) 150 - 350 K/uL    MPV 9.4 9.2 - 12.9 fL    Immature  Granulocytes 0.5 0.0 - 0.5 %    Gran # (ANC) 3.7 1.8 - 7.7 K/uL    Immature Grans (Abs) 0.05 (H) 0.00 - 0.04 K/uL    Lymph # 4.9 (H) 1.0 - 4.8 K/uL    Mono # 1.1 (H) 0.3 - 1.0 K/uL    Eos # 0.5 0.0 - 0.5 K/uL    Baso # 0.05 0.00 - 0.20 K/uL    nRBC 0 0 /100 WBC    Gran% 36.1 (L) 38.0 - 73.0 %    Lymph% 47.5 18.0 - 48.0 %    Mono% 10.5 4.0 - 15.0 %    Eosinophil% 4.9 0.0 - 8.0 %    Basophil% 0.5 0.0 - 1.9 %    Differential Method Automated    Phosphorus    Collection Time: 06/19/20  6:22 AM   Result Value Ref Range    Phosphorus 4.9 (H) 2.7 - 4.5 mg/dL   Renal function panel    Collection Time: 06/19/20  6:22 AM   Result Value Ref Range    Glucose 241 (H) 70 - 110 mg/dL    Sodium 132 (L) 136 - 145 mmol/L    Potassium 4.9 3.5 - 5.1 mmol/L    Chloride 100 95 - 110 mmol/L    CO2 23 23 - 29 mmol/L    BUN, Bld 84 (H) 8 - 23 mg/dL    Calcium 8.3 (L) 8.7 - 10.5 mg/dL    Creatinine 3.1 (H) 0.5 - 1.4 mg/dL    Albumin 2.4 (L) 3.5 - 5.2 g/dL    Phosphorus 4.9 (H) 2.7 - 4.5 mg/dL    eGFR if African American 16 (A) >60 mL/min/1.73 m^2    eGFR if non African American 14 (A) >60 mL/min/1.73 m^2    Anion Gap 9 8 - 16 mmol/L   Comprehensive metabolic panel    Collection Time: 06/19/20  6:22 AM   Result Value Ref Range    Sodium 132 (L) 136 - 145 mmol/L    Potassium 4.9 3.5 - 5.1 mmol/L    Chloride 100 95 - 110 mmol/L    CO2 23 23 - 29 mmol/L    Glucose 241 (H) 70 - 110 mg/dL    BUN, Bld 84 (H) 8 - 23 mg/dL    Creatinine 3.1 (H) 0.5 - 1.4 mg/dL    Calcium 8.3 (L) 8.7 - 10.5 mg/dL    Total Protein 7.9 6.0 - 8.4 g/dL    Albumin 2.4 (L) 3.5 - 5.2 g/dL    Total Bilirubin 0.2 0.1 - 1.0 mg/dL    Alkaline Phosphatase 121 55 - 135 U/L    AST 12 10 - 40 U/L    ALT 11 10 - 44 U/L    Anion Gap 9 8 - 16 mmol/L    eGFR if African American 16 (A) >60 mL/min/1.73 m^2    eGFR if non African American 14 (A) >60 mL/min/1.73 m^2   POCT glucose    Collection Time: 06/19/20  4:02 PM   Result Value Ref Range    POCT Glucose 199 (H) 70 - 110 mg/dL      Recent Labs   Lab 06/17/20  0420 06/18/20  0330 06/18/20  1407 06/18/20  1818 06/19/20  0622   * 132*  --   --  132*  132*   K 5.4* 5.7* 5.9* 5.0 4.9  4.9    102  --   --  100  100   CO2 22* 23  --   --  23  23   BUN 72* 77*  --   --  84*  84*   CREATININE 2.7* 3.3*  --   --  3.1*  3.1*   CALCIUM 8.8 8.5*  --   --  8.3*  8.3*   PROT 8.4 7.8  --   --  7.9   BILITOT 0.2 0.2  --   --  0.2   ALKPHOS 115 104  --   --  121   ALT 15 11  --   --  11   AST 13 11  --   --  12     Recent Labs   Lab 06/17/20  0420 06/18/20  0330 06/19/20  0622   WBC 9.13 9.15 10.35   HGB 10.2* 9.2* 9.7*   HCT 34.9* 31.9* 31.7*    360* 378*   MONO 10.7  1.0 10.6  1.0 10.5  1.1*         Significant Imaging:       Assessment/Plan:      Wound of right leg  See media picture.  Right anterior ankle wound x 1 month.   - On antibiotics  - Cultures NGTD    Diabetic ulcer of right heel associated with diabetes mellitus due to underlying condition, limited to breakdown of skin  Chronic osteomyelitis of the right foot with a draining sinus  See media picture.  Reports right heel pressure wound not healing x 3 months. Worsened since last week with increasing bleeding. Hx DM.  Wound with red, beefy, granular tissue and bleeding. Xray concerning for osseous infection. No leukocytosis.  Afebrile. Elevated sed rate and CRP.  MRI: Findings consistent with osteomyelitis of the calcaneus with complete to near complete disruption of the Achilles tendon.   - Vancomycin started in ED - continue with pharmacy dose  - Added Ceftriaxone  - Cultures pending (BC and Wound culture)  - ID, Wound Care, and Podiatry consulted  - plan for IV antibiotics x 6 weeks if bone cultures negative. Wound vac.   - ID recommendations: Discharge antibiotics:           Ceftriaxone 1 gram IV q 24 hours plus  Vancomycin 500 mg IV q 48 hours  - End date of IV antibiotics: 07/27/2020  - Needs IV access for discharge; likely with IR for tunneled PICC  line.    CKD (chronic kidney disease) stage 4, GFR 15-29 ml/min  sCr and BUN elevated 2.2 and 57.  Baseline sCr around 2 with previous value of 1.7 on 6/2018.   Patient reports she was referred to Nephrologist a long time ago but she hasn't seen anyone yet.   - Renal dose all medication. Avoid nephrotoxin if possible.   - nephrology consult due to rising BUN/Cr.  Hold ARB and lasix; repeat urinalysis reflexed to culture with GNR present.   - Vein mapping ordered prior to line placement recommendations by Vascular Surgery    Hyperlipidemia  Continue statin    History of CVA (cerebrovascular accident)  Hx stroke 1986 with left side weakness per patient report. Reports not getting out of bed much since 4/2010 due to wound at right heel.    - Patient is on ASA and statin. Continue ASA/Statin  - PT/OT evaluation    Morbid obesity   Body mass index is 46.98 kg/m²..  Weight loss outpatient    Hypothyroidism  TSH wnl. Continue home management with levothyroxine    Essential hypertension  Uncontrolled. Continue home meds BB  - ARB held initially due to worsening kidney function  - losartan and Lasix held due to worsening renal function.     Type 2 diabetes mellitus, uncontrolled, with renal complications  On insulin (70/30) 40 units BID at home (2018). Patient reports she often has episodes of hypoglycemia of BS 60's.   The patient's hyperglycemia while IP is managed with a SQ prandial and correction dose insulin regimen. Monitoring BGs.  Increased aspart and adding basal insulin 6/16  Increased basal and prandial insulin 6/17, 6/18    High Risk Conditions:    Patient is currently on drug therapy requiring intensive monitoring for toxicity: Vancomycin    VTE Risk Mitigation (From admission, onward)         Ordered     IP VTE HIGH RISK PATIENT  Once      06/13/20 0036     Place sequential compression device  Until discontinued      06/13/20 0036     Place MADISON hose  Until discontinued      06/13/20 0036                  I have  assessed these finding virtually using telemed platform and with assistance of bedside nurse       End time:  850a    Total time spent with patient: 10 min    The attending portion of this evaluation, treatment, and documentation was performed per Funmilayo Guillermo MD via audiovisual.      Funmilayo Guillermo MD  Department of Hospital Medicine   [facility]

## 2020-06-20 LAB
ALBUMIN SERPL BCP-MCNC: 2.3 G/DL (ref 3.5–5.2)
ALBUMIN SERPL BCP-MCNC: 2.3 G/DL (ref 3.5–5.2)
ALP SERPL-CCNC: 109 U/L (ref 55–135)
ALT SERPL W/O P-5'-P-CCNC: 12 U/L (ref 10–44)
ANION GAP SERPL CALC-SCNC: 7 MMOL/L (ref 8–16)
ANION GAP SERPL CALC-SCNC: 7 MMOL/L (ref 8–16)
AST SERPL-CCNC: 13 U/L (ref 10–40)
BACTERIA UR CULT: ABNORMAL
BACTERIA UR CULT: ABNORMAL
BASOPHILS # BLD AUTO: 0.05 K/UL (ref 0–0.2)
BASOPHILS NFR BLD: 0.5 % (ref 0–1.9)
BILIRUB SERPL-MCNC: 0.1 MG/DL (ref 0.1–1)
BUN SERPL-MCNC: 86 MG/DL (ref 8–23)
BUN SERPL-MCNC: 86 MG/DL (ref 8–23)
CALCIUM SERPL-MCNC: 8.1 MG/DL (ref 8.7–10.5)
CALCIUM SERPL-MCNC: 8.1 MG/DL (ref 8.7–10.5)
CHLORIDE SERPL-SCNC: 100 MMOL/L (ref 95–110)
CHLORIDE SERPL-SCNC: 100 MMOL/L (ref 95–110)
CO2 SERPL-SCNC: 25 MMOL/L (ref 23–29)
CO2 SERPL-SCNC: 25 MMOL/L (ref 23–29)
CREAT SERPL-MCNC: 2.9 MG/DL (ref 0.5–1.4)
CREAT SERPL-MCNC: 2.9 MG/DL (ref 0.5–1.4)
DIFFERENTIAL METHOD: ABNORMAL
EOSINOPHIL # BLD AUTO: 0.5 K/UL (ref 0–0.5)
EOSINOPHIL NFR BLD: 4.5 % (ref 0–8)
ERYTHROCYTE [DISTWIDTH] IN BLOOD BY AUTOMATED COUNT: 14.4 % (ref 11.5–14.5)
EST. GFR  (AFRICAN AMERICAN): 18 ML/MIN/1.73 M^2
EST. GFR  (AFRICAN AMERICAN): 18 ML/MIN/1.73 M^2
EST. GFR  (NON AFRICAN AMERICAN): 15 ML/MIN/1.73 M^2
EST. GFR  (NON AFRICAN AMERICAN): 15 ML/MIN/1.73 M^2
GLUCOSE SERPL-MCNC: 226 MG/DL (ref 70–110)
GLUCOSE SERPL-MCNC: 226 MG/DL (ref 70–110)
HCT VFR BLD AUTO: 28.9 % (ref 37–48.5)
HGB BLD-MCNC: 8.8 G/DL (ref 12–16)
IMM GRANULOCYTES # BLD AUTO: 0.05 K/UL (ref 0–0.04)
IMM GRANULOCYTES NFR BLD AUTO: 0.5 % (ref 0–0.5)
LYMPHOCYTES # BLD AUTO: 4.4 K/UL (ref 1–4.8)
LYMPHOCYTES NFR BLD: 43.1 % (ref 18–48)
MCH RBC QN AUTO: 29 PG (ref 27–31)
MCHC RBC AUTO-ENTMCNC: 30.4 G/DL (ref 32–36)
MCV RBC AUTO: 95 FL (ref 82–98)
MONOCYTES # BLD AUTO: 1 K/UL (ref 0.3–1)
MONOCYTES NFR BLD: 9.9 % (ref 4–15)
NEUTROPHILS # BLD AUTO: 4.2 K/UL (ref 1.8–7.7)
NEUTROPHILS NFR BLD: 41.5 % (ref 38–73)
NRBC BLD-RTO: 0 /100 WBC
PHOSPHATE SERPL-MCNC: 5 MG/DL (ref 2.7–4.5)
PHOSPHATE SERPL-MCNC: 5 MG/DL (ref 2.7–4.5)
PLATELET # BLD AUTO: 366 K/UL (ref 150–350)
PMV BLD AUTO: 9.1 FL (ref 9.2–12.9)
POCT GLUCOSE: 217 MG/DL (ref 70–110)
POCT GLUCOSE: 225 MG/DL (ref 70–110)
POCT GLUCOSE: 235 MG/DL (ref 70–110)
POCT GLUCOSE: 285 MG/DL (ref 70–110)
POTASSIUM SERPL-SCNC: 4.2 MMOL/L (ref 3.5–5.1)
POTASSIUM SERPL-SCNC: 4.2 MMOL/L (ref 3.5–5.1)
PROT SERPL-MCNC: 7.3 G/DL (ref 6–8.4)
RBC # BLD AUTO: 3.03 M/UL (ref 4–5.4)
SODIUM SERPL-SCNC: 132 MMOL/L (ref 136–145)
SODIUM SERPL-SCNC: 132 MMOL/L (ref 136–145)
VANCOMYCIN SERPL-MCNC: 18.1 UG/ML
WBC # BLD AUTO: 10.09 K/UL (ref 3.9–12.7)

## 2020-06-20 PROCEDURE — 85025 COMPLETE CBC W/AUTO DIFF WBC: CPT | Mod: HCNC

## 2020-06-20 PROCEDURE — 25000003 PHARM REV CODE 250: Mod: HCNC | Performed by: NURSE PRACTITIONER

## 2020-06-20 PROCEDURE — 99233 PR SUBSEQUENT HOSPITAL CARE,LEVL III: ICD-10-PCS | Mod: GT,HCNC,, | Performed by: INTERNAL MEDICINE

## 2020-06-20 PROCEDURE — 11000001 HC ACUTE MED/SURG PRIVATE ROOM: Mod: HCNC

## 2020-06-20 PROCEDURE — 25000003 PHARM REV CODE 250: Mod: HCNC | Performed by: INTERNAL MEDICINE

## 2020-06-20 PROCEDURE — 63600175 PHARM REV CODE 636 W HCPCS: Mod: HCNC | Performed by: INTERNAL MEDICINE

## 2020-06-20 PROCEDURE — 80069 RENAL FUNCTION PANEL: CPT | Mod: HCNC

## 2020-06-20 PROCEDURE — 36415 COLL VENOUS BLD VENIPUNCTURE: CPT | Mod: HCNC

## 2020-06-20 PROCEDURE — 99233 SBSQ HOSP IP/OBS HIGH 50: CPT | Mod: GT,HCNC,, | Performed by: INTERNAL MEDICINE

## 2020-06-20 PROCEDURE — 80053 COMPREHEN METABOLIC PANEL: CPT | Mod: HCNC

## 2020-06-20 PROCEDURE — 63600175 PHARM REV CODE 636 W HCPCS: Mod: HCNC | Performed by: NURSE PRACTITIONER

## 2020-06-20 PROCEDURE — 80202 ASSAY OF VANCOMYCIN: CPT | Mod: HCNC

## 2020-06-20 PROCEDURE — 25000003 PHARM REV CODE 250: Mod: HCNC | Performed by: EMERGENCY MEDICINE

## 2020-06-20 RX ORDER — INSULIN ASPART 100 [IU]/ML
9 INJECTION, SOLUTION INTRAVENOUS; SUBCUTANEOUS
Status: DISCONTINUED | OUTPATIENT
Start: 2020-06-20 | End: 2020-06-22

## 2020-06-20 RX ADMIN — LEVOTHYROXINE SODIUM 75 MCG: 75 TABLET ORAL at 05:06

## 2020-06-20 RX ADMIN — GABAPENTIN 300 MG: 300 CAPSULE ORAL at 08:06

## 2020-06-20 RX ADMIN — INSULIN ASPART 2 UNITS: 100 INJECTION, SOLUTION INTRAVENOUS; SUBCUTANEOUS at 05:06

## 2020-06-20 RX ADMIN — INSULIN ASPART 3 UNITS: 100 INJECTION, SOLUTION INTRAVENOUS; SUBCUTANEOUS at 12:06

## 2020-06-20 RX ADMIN — OXYCODONE 5 MG: 5 TABLET ORAL at 03:06

## 2020-06-20 RX ADMIN — FAMOTIDINE 20 MG: 20 TABLET ORAL at 08:06

## 2020-06-20 RX ADMIN — ROSUVASTATIN CALCIUM 20 MG: 10 TABLET, COATED ORAL at 08:06

## 2020-06-20 RX ADMIN — CARVEDILOL 25 MG: 6.25 TABLET, FILM COATED ORAL at 08:06

## 2020-06-20 RX ADMIN — CARVEDILOL 25 MG: 6.25 TABLET, FILM COATED ORAL at 05:06

## 2020-06-20 RX ADMIN — INSULIN ASPART 2 UNITS: 100 INJECTION, SOLUTION INTRAVENOUS; SUBCUTANEOUS at 09:06

## 2020-06-20 RX ADMIN — GABAPENTIN 300 MG: 300 CAPSULE ORAL at 04:06

## 2020-06-20 RX ADMIN — STANDARDIZED SENNA CONCENTRATE AND DOCUSATE SODIUM 1 TABLET: 8.6; 5 TABLET ORAL at 08:06

## 2020-06-20 RX ADMIN — CEFTRIAXONE 1 G: 1 INJECTION, SOLUTION INTRAVENOUS at 03:06

## 2020-06-20 RX ADMIN — INSULIN ASPART 9 UNITS: 100 INJECTION, SOLUTION INTRAVENOUS; SUBCUTANEOUS at 12:06

## 2020-06-20 RX ADMIN — INSULIN ASPART 9 UNITS: 100 INJECTION, SOLUTION INTRAVENOUS; SUBCUTANEOUS at 05:06

## 2020-06-20 RX ADMIN — ASPIRIN 81 MG 81 MG: 81 TABLET ORAL at 08:06

## 2020-06-20 RX ADMIN — COLLAGENASE SANTYL: 250 OINTMENT TOPICAL at 09:06

## 2020-06-20 RX ADMIN — VANCOMYCIN HYDROCHLORIDE 500 MG: 500 INJECTION, POWDER, LYOPHILIZED, FOR SOLUTION INTRAVENOUS at 09:06

## 2020-06-20 RX ADMIN — SODIUM CHLORIDE: 0.9 INJECTION, SOLUTION INTRAVENOUS at 09:06

## 2020-06-20 NOTE — PROGRESS NOTES
Pharmacokinetic Assessment Follow Up: IV Vancomycin    Vancomycin serum concentration assessment(s):    The random level was drawn correctly and can be used to guide therapy at this time. The measurement is within the desired definitive target range of 10 to 20 mcg/mL.    Vancomycin Regimen Plan:    Re-dose when the random level is less than 20 mcg/mL, next level to be drawn at 0400 on 6/21/2020    Drug levels (last 3 results):  Recent Labs   Lab Result Units 06/18/20  0330 06/19/20  0622 06/20/20  0538   Vancomycin, Random ug/mL 19.3 20.5 18.1       Pharmacy will continue to follow and monitor vancomycin.    Please contact pharmacy at extension [phone] for questions regarding this assessment.    Thank you for the consult,   Keny Matute       Patient brief summary:  Tasneem Lynn is a 74 y.o. female initiated on antimicrobial therapy with IV Vancomycin for treatment of skin & soft tissue infection    The patient's current regimen is Random based upon random levels    Drug Allergies:   Review of patient's allergies indicates:   Allergen Reactions    Corticosteroids (glucocorticoids) Edema       Actual Body Weight:   116.5 kg    Renal Function:   Estimated Creatinine Clearance: 19.3 mL/min (A) (based on SCr of 3.1 mg/dL (H)).,     Dialysis Method (if applicable):  N/A    CBC (last 72 hours):  Recent Labs   Lab Result Units 06/18/20  0330 06/19/20  0622 06/20/20  0538   WBC K/uL 9.15 10.35 10.09   Hemoglobin g/dL 9.2* 9.7* 8.8*   Hematocrit % 31.9* 31.7* 28.9*   Platelets K/uL 360* 378* 366*   Gran% % 41.0 36.1* 41.5   Lymph% % 43.1 47.5 43.1   Mono% % 10.6 10.5 9.9   Eosinophil% % 4.4 4.9 4.5   Basophil% % 0.5 0.5 0.5   Differential Method  Automated Automated Automated       Metabolic Panel (last 72 hours):  Recent Labs   Lab Result Units 06/18/20  0330 06/18/20  1115 06/18/20  1407 06/18/20  1818 06/19/20  0622   Sodium mmol/L 132*  --   --   --  132*  132*   Sodium Urine Random mmol/L  --  25  --   --   --     Potassium mmol/L 5.7*  --  5.9* 5.0 4.9  4.9   Chloride mmol/L 102  --   --   --  100  100   CO2 mmol/L 23  --   --   --  23  23   Glucose mg/dL 330*  --   --   --  241*  241*   Glucose, UA   --  1+*  --   --   --    BUN, Bld mg/dL 77*  --   --   --  84*  84*   Creatinine mg/dL 3.3*  --   --   --  3.1*  3.1*   Creatinine, Random Ur mg/dL  --  127.5  --   --   --    Albumin g/dL 2.4*  --   --   --  2.4*  2.4*   Total Bilirubin mg/dL 0.2  --   --   --  0.2   Alkaline Phosphatase U/L 104  --   --   --  121   AST U/L 11  --   --   --  12   ALT U/L 11  --   --   --  11   Phosphorus mg/dL 5.0*  --   --   --  4.9*  4.9*       Vancomycin Administrations:  vancomycin given in the last 96 hours                     vancomycin 500 mg in dextrose 5 % 100 mL IVPB (ready to mix system) (mg) 500 mg New Bag 06/18/20 0646    vancomycin 500 mg in dextrose 5 % 100 mL IVPB (ready to mix system) (mg) 500 mg New Bag 06/17/20 0830                    Microbiologic Results:  Microbiology Results (last 7 days)       Procedure Component Value Units Date/Time    Urine culture [[id number]]  (Abnormal) Collected: 06/18/20 1115    Order Status: Completed Specimen: Urine Updated: 06/19/20 1039     Urine Culture, Routine GRAM NEGATIVE BETY  >100,000 cfu/ml  Identification and susceptibility pending      Narrative:      Preferred Collection Type->Urine, Clean Catch  Specimen Source->Urine    Blood culture [[id number]] Collected: 06/15/20 0624    Order Status: Completed Specimen: Blood from Antecubital, Right Updated: 06/19/20 0703     Blood Culture, Routine No Growth after 4 days.     Aerobic culture [[id number]] Collected: 06/13/20 1405    Order Status: Completed Specimen: Wound from Foot, Right Updated: 06/17/20 1108     Aerobic Bacterial Culture No growth    Culture, Anaerobe [[id number]] Collected: 06/13/20 1405    Order Status: Completed Specimen: Wound from Foot, Right Updated: 06/17/20 0810     Anaerobic Culture No anaerobes isolated     Blood Culture #1 **CANNOT BE ORDERED STAT** [[id number]] Collected: 06/12/20 2325    Order Status: Completed Specimen: Blood from Peripheral, Antecubital, Right Updated: 06/17/20 0503     Blood Culture, Routine No Growth after 4 days.     Blood Culture #2 **CANNOT BE ORDERED STAT** [[id number]]  (Abnormal) Collected: 06/12/20 2339    Order Status: Completed Specimen: Blood from Peripheral, Hand, Right Updated: 06/16/20 0748     Blood Culture, Routine Gram stain aer bottle: Gram positive cocci in clusters resembling Staph       Results called to and read back by: Nataliia Weber  06/13/2020  22:41      COAGULASE-NEGATIVE STAPHYLOCOCCUS SPECIES  Organism is a probable contaminant      AFB Culture & Smear [[id number]] Collected: 06/13/20 1405    Order Status: Completed Specimen: Wound from Foot, Right Updated: 06/15/20 2127     AFB Culture & Smear Culture in progress     AFB CULTURE STAIN No acid fast bacilli seen.    Gram stain [[id number]] Collected: 06/13/20 1405    Order Status: Completed Specimen: Wound from Foot, Right Updated: 06/13/20 1459     Gram Stain Result No WBC's      No organisms seen    Aerobic culture [[id number]] Collected: 06/13/20 1405    Order Status: Canceled Specimen: Wound from Foot, Right

## 2020-06-20 NOTE — PROGRESS NOTES
[facility] Hospital Medicine  Telemedicine Progress Note    Patient Name: Tasneem Lynn  MRN: [id number]  Patient Class: IP- Inpatient   Admission Date: 6/12/2020  Length of Stay: 6 days  Attending Physician: Funmilayo Guillermo MD  Primary Care Provider: To Obtain Unable      Start time: 0840a  Chief complaint: Chronic osteomyelitis of right foot with draining sinus  The patient location is: W422/W422 A  Present with the patient at the time of the telemed/virtual assessment: telemed presenter    Subjective:     Principal Problem:Chronic osteomyelitis of right foot with draining sinus        HPI:  This is a 74 y.o female with diabetes mellitus, hypertension, dyslipidemia, CHF, hypothyroidism, CKD stage 4, multiple chronic pains, obesity, and stroke (1986) who presents to the hospital with a chief complaint of worsening wound to the right heel for the last week. Patient reports her right heel wound has started 3 months ago and it seems got worse since last week with increasing bleeding. She states she believes the wound is infected. She reports she was prescribed an antibiotic (unsure name) 2 months ago by Angela James NP at an urgent care clinic but it didn't help much. She also reports another wound at her anterior right foot which is developed around 1 month ago. Patient reports she has been mostly staying on her bed and she has not been walking for 2 months due to her right heel wound. She reports the wound would bleed and have more pain if she put pressure on her right foot. She denies fever, chill, chest pain, SOB, bilateral calves/leg pain, N/V/D, cough or sick contacts at home. She reports she does not go to wound care clinic. She states the home health nurse visited her 3 times since she started having this heel wound. The last visit was today.     In ED, COVID negative. X-ray right foot can not exclude the possibility of osseous infectious involvement.  Labs with elevated sed rate and CRP,  c/s with CKD stage 4 with GFR 25 and elevated BUN&sCr 57&2.2.     Patient is admitted to inpatient with hospital medicine to further evaluation and treatment of right foot wound and EMMA    Overview/Hospital Course:  Patient admitted to the hospital for eval and treatment of R heal diabetic ulcer. MRI showed osteo.  Podiatry consulted. Patient had debridement and bone biopsy on 6/13.      Admission CC:   Chief Complaint   Patient presents with    Wound Check     EMS reports pt c/o infected diabetic ulcer to the right heel, worsening x 3 months. denies pain     Follow up visit for: Chronic osteomyelitis of right foot with draining sinus    Interval History / Events Overnight:   Creatinine improved to 0.9 BUN remains elevated; patient denies complaints; appetite has been improving.    The patient is able to provide adequate history. Additional history was obtained from chart review. No significant events reported by Nursing.  Patient complains of no specific complaints. Symptoms have been unchanged since yesterday. Associated symptoms include: fatigue and malaise .. Alleviating factors include: nothing. Response to treatment since yesterday: Not measurable. Wound vac placed by Podiatry.      Review of Systems   Constitutional: Negative for fever.   Respiratory: Negative for cough and shortness of breath.    Cardiovascular: Negative for chest pain.   Gastrointestinal: Negative for nausea and vomiting.   Genitourinary: Negative for dysuria and hematuria.     Objective:     Vital Signs (Most Recent):  Temp: 97.6 °F (36.4 °C) (06/17/20 1653)  Pulse: 70 (06/17/20 1653)  Resp: 18 (06/17/20 1653)  BP: (!) 131/98 (06/17/20 1653)  SpO2: 98 % (06/17/20 1653) Vital Signs (24h Range):  Temp:  [97.6 °F (36.4 °C)-98.2 °F (36.8 °C)] 97.6 °F (36.4 °C)  Pulse:  [69-76] 70  Resp:  [17-19] 18  SpO2:  [96 %-100 %] 98 %  BP: (100-184)/(51-98) 131/98     Weight: 116.5 kg (256 lb 13.4 oz)  Body mass index is 46.98 kg/m².    Intake/Output  Summary (Last 24 hours) at 6/17/2020 0593  Last data filed at 6/17/2020 1226  Gross per 24 hour   Intake 360 ml   Output 150 ml   Net 210 ml      Physical Exam  Constitutional:       General: She is not in acute distress.     Appearance: She is morbidly obese. She is not diaphoretic.   Eyes:      General: Lids are normal. No scleral icterus.        Right eye: No discharge.         Left eye: No discharge.      Conjunctiva/sclera: Conjunctivae normal.   Pulmonary:      Effort: Pulmonary effort is normal. No tachypnea, accessory muscle usage or respiratory distress.   Abdominal:      General: Abdomen is protuberant. There is no distension.   Musculoskeletal:         General: No edema (per telepresenter nurse).   Neurological:      Mental Status: She is alert and oriented to person, place, and time. She is not disoriented.   Psychiatric:         Mood and Affect: Mood and affect normal.         Behavior: Behavior is cooperative.       Significant Labs:   Recent Results (from the past 24 hour(s))   POCT glucose    Collection Time: 06/19/20  7:22 PM   Result Value Ref Range    POCT Glucose 273 (H) 70 - 110 mg/dL   Phosphorus    Collection Time: 06/20/20  5:37 AM   Result Value Ref Range    Phosphorus 5.0 (H) 2.7 - 4.5 mg/dL   Renal function panel    Collection Time: 06/20/20  5:37 AM   Result Value Ref Range    Glucose 226 (H) 70 - 110 mg/dL    Sodium 132 (L) 136 - 145 mmol/L    Potassium 4.2 3.5 - 5.1 mmol/L    Chloride 100 95 - 110 mmol/L    CO2 25 23 - 29 mmol/L    BUN, Bld 86 (H) 8 - 23 mg/dL    Calcium 8.1 (L) 8.7 - 10.5 mg/dL    Creatinine 2.9 (H) 0.5 - 1.4 mg/dL    Albumin 2.3 (L) 3.5 - 5.2 g/dL    Phosphorus 5.0 (H) 2.7 - 4.5 mg/dL    eGFR if African American 18 (A) >60 mL/min/1.73 m^2    eGFR if non African American 15 (A) >60 mL/min/1.73 m^2    Anion Gap 7 (L) 8 - 16 mmol/L   Comprehensive metabolic panel    Collection Time: 06/20/20  5:37 AM   Result Value Ref Range    Sodium 132 (L) 136 - 145 mmol/L     Potassium 4.2 3.5 - 5.1 mmol/L    Chloride 100 95 - 110 mmol/L    CO2 25 23 - 29 mmol/L    Glucose 226 (H) 70 - 110 mg/dL    BUN, Bld 86 (H) 8 - 23 mg/dL    Creatinine 2.9 (H) 0.5 - 1.4 mg/dL    Calcium 8.1 (L) 8.7 - 10.5 mg/dL    Total Protein 7.3 6.0 - 8.4 g/dL    Albumin 2.3 (L) 3.5 - 5.2 g/dL    Total Bilirubin 0.1 0.1 - 1.0 mg/dL    Alkaline Phosphatase 109 55 - 135 U/L    AST 13 10 - 40 U/L    ALT 12 10 - 44 U/L    Anion Gap 7 (L) 8 - 16 mmol/L    eGFR if African American 18 (A) >60 mL/min/1.73 m^2    eGFR if non African American 15 (A) >60 mL/min/1.73 m^2   Vancomycin, random    Collection Time: 06/20/20  5:38 AM   Result Value Ref Range    Vancomycin, Random 18.1 Not established ug/mL   CBC auto differential    Collection Time: 06/20/20  5:38 AM   Result Value Ref Range    WBC 10.09 3.90 - 12.70 K/uL    RBC 3.03 (L) 4.00 - 5.40 M/uL    Hemoglobin 8.8 (L) 12.0 - 16.0 g/dL    Hematocrit 28.9 (L) 37.0 - 48.5 %    Mean Corpuscular Volume 95 82 - 98 fL    Mean Corpuscular Hemoglobin 29.0 27.0 - 31.0 pg    Mean Corpuscular Hemoglobin Conc 30.4 (L) 32.0 - 36.0 g/dL    RDW 14.4 11.5 - 14.5 %    Platelets 366 (H) 150 - 350 K/uL    MPV 9.1 (L) 9.2 - 12.9 fL    Immature Granulocytes 0.5 0.0 - 0.5 %    Gran # (ANC) 4.2 1.8 - 7.7 K/uL    Immature Grans (Abs) 0.05 (H) 0.00 - 0.04 K/uL    Lymph # 4.4 1.0 - 4.8 K/uL    Mono # 1.0 0.3 - 1.0 K/uL    Eos # 0.5 0.0 - 0.5 K/uL    Baso # 0.05 0.00 - 0.20 K/uL    nRBC 0 0 /100 WBC    Gran% 41.5 38.0 - 73.0 %    Lymph% 43.1 18.0 - 48.0 %    Mono% 9.9 4.0 - 15.0 %    Eosinophil% 4.5 0.0 - 8.0 %    Basophil% 0.5 0.0 - 1.9 %    Differential Method Automated    POCT glucose    Collection Time: 06/20/20  7:47 AM   Result Value Ref Range    POCT Glucose 217 (H) 70 - 110 mg/dL   POCT glucose    Collection Time: 06/20/20 11:54 AM   Result Value Ref Range    POCT Glucose 285 (H) 70 - 110 mg/dL   POCT glucose    Collection Time: 06/20/20  5:08 PM   Result Value Ref Range    POCT Glucose 235  (H) 70 - 110 mg/dL     Recent Labs   Lab 06/18/20  0330  06/18/20  1818 06/19/20  0622 06/20/20  0537   *  --   --  132*  132* 132*  132*   K 5.7*   < > 5.0 4.9  4.9 4.2  4.2     --   --  100  100 100  100   CO2 23  --   --  23  23 25  25   BUN 77*  --   --  84*  84* 86*  86*   CREATININE 3.3*  --   --  3.1*  3.1* 2.9*  2.9*   CALCIUM 8.5*  --   --  8.3*  8.3* 8.1*  8.1*   PROT 7.8  --   --  7.9 7.3   BILITOT 0.2  --   --  0.2 0.1   ALKPHOS 104  --   --  121 109   ALT 11  --   --  11 12   AST 11  --   --  12 13    < > = values in this interval not displayed.     Recent Labs   Lab 06/18/20  0330 06/19/20  0622 06/20/20  0538   WBC 9.15 10.35 10.09   HGB 9.2* 9.7* 8.8*   HCT 31.9* 31.7* 28.9*   * 378* 366*   MONO 10.6  1.0 10.5  1.1* 9.9  1.0         Significant Imaging:       Assessment/Plan:      Wound of right leg  See media picture.  Right anterior ankle wound x 1 month.   - On antibiotics  - Cultures NGTD    Diabetic ulcer of right heel associated with diabetes mellitus due to underlying condition, limited to breakdown of skin  Chronic osteomyelitis of the right foot with a draining sinus  See media picture.  Reports right heel pressure wound not healing x 3 months. Worsened since last week with increasing bleeding. Hx DM.  Wound with red, beefy, granular tissue and bleeding. Xray concerning for osseous infection. No leukocytosis.  Afebrile. Elevated sed rate and CRP.  MRI: Findings consistent with osteomyelitis of the calcaneus with complete to near complete disruption of the Achilles tendon.   - Vancomycin started in ED - continue with pharmacy dose  - Added Ceftriaxone  - Cultures pending (BC and Wound culture)  - ID, Wound Care, and Podiatry consulted  - plan for IV antibiotics x 6 weeks if bone cultures negative. Wound vac.   - ID recommendations: Discharge antibiotics:           Ceftriaxone 1 gram IV q 24 hours plus  Vancomycin 500 mg IV q 48 hours  - End date of IV  antibiotics: 07/27/2020  - Needs IV access for discharge; likely with IR for tunneled PICC line.    CKD (chronic kidney disease) stage 4, GFR 15-29 ml/min  sCr and BUN elevated 2.2 and 57.  Baseline sCr around 2 with previous value of 1.7 on 6/2018.   Patient reports she was referred to Nephrologist a long time ago but she hasn't seen anyone yet.   - Renal dose all medication. Avoid nephrotoxin if possible.   - nephrology consult due to rising BUN/Cr.  Hold ARB and lasix; repeat urinalysis reflexed to culture with E. Coli ESBL not sensistive to rocephin - will discuss abarca antibiotics with ID   - Vein mapping ordered prior to line placement recommendations by Vascular Surgery  -patient receiving IVF - monitor for volume overload    Hyperlipidemia  Continue statin    History of CVA (cerebrovascular accident)  Hx stroke 1986 with left side weakness per patient report. Reports not getting out of bed much since 4/2010 due to wound at right heel.    - Patient is on ASA and statin. Continue ASA/Statin  - PT/OT evaluation    Morbid obesity   Body mass index is 46.98 kg/m²..  Weight loss outpatient    Hypothyroidism  TSH wnl. Continue home management with levothyroxine    Essential hypertension  Uncontrolled. Continue home meds BB  - ARB held initially due to worsening kidney function  - losartan and Lasix held due to worsening renal function.     Type 2 diabetes mellitus, uncontrolled, with renal complications  On insulin (70/30) 40 units BID at home (2018). Patient reports she often has episodes of hypoglycemia of BS 60's.   The patient's hyperglycemia while IP is managed with a SQ prandial and correction dose insulin regimen. Monitoring BGs.  Increased aspart and adding basal insulin 6/16  Increased basal and prandial insulin 6/17, 6/18, 6/20    High Risk Conditions:    Patient is currently on drug therapy requiring intensive monitoring for toxicity: Vancomycin    VTE Risk Mitigation (From admission, onward)          Ordered     IP VTE HIGH RISK PATIENT  Once      06/13/20 0036     Place sequential compression device  Until discontinued      06/13/20 0036     Place MADISON hose  Until discontinued      06/13/20 0036                  I have assessed these finding virtually using telemed platform and with assistance of bedside nurse       End time:  834a    Total time spent with patient: 11 min    The attending portion of this evaluation, treatment, and documentation was performed per Funmilayo Guillermo MD via audiovisual.      Funmilayo Guillermo MD  Department of Hospital Medicine   [facility]

## 2020-06-20 NOTE — PLAN OF CARE
Problem: Fall Injury Risk  Goal: Absence of Fall and Fall-Related Injury  Outcome: Ongoing, Progressing     Problem: Skin Injury Risk Increased  Goal: Skin Health and Integrity  Outcome: Ongoing, Progressing     Problem: Adult Inpatient Plan of Care  Goal: Plan of Care Review  Outcome: Ongoing, Progressing  Goal: Patient-Specific Goal (Individualization)  Outcome: Ongoing, Progressing  Goal: Absence of Hospital-Acquired Illness or Injury  Outcome: Ongoing, Progressing  Goal: Optimal Comfort and Wellbeing  Outcome: Ongoing, Progressing  Goal: Readiness for Transition of Care  Outcome: Ongoing, Progressing  Goal: Rounds/Family Conference  Outcome: Ongoing, Progressing     Problem: Bariatric Environmental Safety  Goal: Safety Maintained with Care  Outcome: Ongoing, Progressing     Problem: Wound  Goal: Optimal Wound Healing  Outcome: Ongoing, Progressing     Problem: Diabetes Comorbidity  Goal: Blood Glucose Level Within Desired Range  Outcome: Ongoing, Progressing     Problem: Infection  Goal: Infection Symptom Resolution  Outcome: Ongoing, Progressing   No falls this shift, safety maintained. Wound vac changed today by Dr. Ballesteros. Wound vac supplies delivered to room (for DC readiness). Pain managed with prn narcotic, given once before wound care.  BG managed with basal and SS novolog.

## 2020-06-20 NOTE — PROGRESS NOTES
Tasneem Lynn is a 74 y.o. female patient.    Follow for EMMA, CKD stg 4    No new c/o, reportedly feeling OK  Comfortable    Scheduled Meds:   aspirin  81 mg Oral Daily    carvediloL  25 mg Oral BID WM    cefTRIAXone (ROCEPHIN) IVPB  1 g Intravenous Q24H    collagenase   Topical (Top) Daily    famotidine  20 mg Oral Daily    gabapentin  300 mg Oral TID    insulin aspart U-100  9 Units Subcutaneous TIDWM    insulin detemir U-100  15 Units Subcutaneous BID    levothyroxine  75 mcg Oral Before breakfast    rosuvastatin  20 mg Oral Daily    senna-docusate 8.6-50 mg  1 tablet Oral BID       Review of patient's allergies indicates:   Allergen Reactions    Corticosteroids (glucocorticoids) Edema         Vital Signs Range (Last 24H):  Temp:  [97.9 °F (36.6 °C)-98.6 °F (37 °C)]   Pulse:  [70-78]   Resp:  [18]   BP: (127-158)/(60-68)   SpO2:  [96 %-100 %]     I & O (Last 24H):    Intake/Output Summary (Last 24 hours) at 6/20/2020 1310  Last data filed at 6/20/2020 0824  Gross per 24 hour   Intake 240 ml   Output 500 ml   Net -260 ml           Physical Exam:  General appearance: well developed, well nourished, no distress  Lungs:  clear to auscultation bilaterally and normal respiratory effort  Heart: regular rate and rhythm  Abdomen: soft, non-tender non-distented; bowel sounds normal; no masses,  no organomegaly  Extremities: no cyanosis or edema, or clubbing    Laboratory:  CBC:   Recent Labs   Lab 06/20/20  0538   WBC 10.09   RBC 3.03*   HGB 8.8*   HCT 28.9*   *   MCV 95   MCH 29.0   MCHC 30.4*     CMP:   Recent Labs   Lab 06/20/20  0537   *  226*   CALCIUM 8.1*  8.1*   ALBUMIN 2.3*  2.3*   PROT 7.3   *  132*   K 4.2  4.2   CO2 25  25     100   BUN 86*  86*   CREATININE 2.9*  2.9*   ALKPHOS 109   ALT 12   AST 13   BILITOT 0.1     Recent Labs   Lab 06/18/20  1115   COLORU Yellow   SPECGRAV 1.015   PHUR 5.0   PROTEINUA 2+*   BACTERIA Moderate*   NITRITE Negative    LEUKOCYTESUR 2+*   UROBILINOGEN Negative   HYALINECASTS 0       Imp/Plan    EMMA - continue improving  CKD stg 4  (R) foot chronic cellulitis  HTN  DM type 2  Anemia of CKD    Continue present Rx  CMP in am      Trac T Le  6/20/2020

## 2020-06-21 LAB
ALBUMIN SERPL BCP-MCNC: 2.5 G/DL (ref 3.5–5.2)
ALP SERPL-CCNC: 108 U/L (ref 55–135)
ALT SERPL W/O P-5'-P-CCNC: 13 U/L (ref 10–44)
ANION GAP SERPL CALC-SCNC: 9 MMOL/L (ref 8–16)
AST SERPL-CCNC: 13 U/L (ref 10–40)
BASOPHILS # BLD AUTO: 0.04 K/UL (ref 0–0.2)
BASOPHILS NFR BLD: 0.4 % (ref 0–1.9)
BILIRUB SERPL-MCNC: 0.1 MG/DL (ref 0.1–1)
BUN SERPL-MCNC: 81 MG/DL (ref 8–23)
CALCIUM SERPL-MCNC: 8.5 MG/DL (ref 8.7–10.5)
CHLORIDE SERPL-SCNC: 104 MMOL/L (ref 95–110)
CO2 SERPL-SCNC: 20 MMOL/L (ref 23–29)
CREAT SERPL-MCNC: 2.8 MG/DL (ref 0.5–1.4)
DACRYOCYTES BLD QL SMEAR: ABNORMAL
DIFFERENTIAL METHOD: ABNORMAL
EOSINOPHIL # BLD AUTO: 0.4 K/UL (ref 0–0.5)
EOSINOPHIL NFR BLD: 4.3 % (ref 0–8)
ERYTHROCYTE [DISTWIDTH] IN BLOOD BY AUTOMATED COUNT: 14.3 % (ref 11.5–14.5)
EST. GFR  (AFRICAN AMERICAN): 18 ML/MIN/1.73 M^2
EST. GFR  (NON AFRICAN AMERICAN): 16 ML/MIN/1.73 M^2
GLUCOSE SERPL-MCNC: 237 MG/DL (ref 70–110)
HCT VFR BLD AUTO: 34.3 % (ref 37–48.5)
HGB BLD-MCNC: 10.2 G/DL (ref 12–16)
HYPOCHROMIA BLD QL SMEAR: ABNORMAL
IMM GRANULOCYTES # BLD AUTO: 0.03 K/UL (ref 0–0.04)
IMM GRANULOCYTES NFR BLD AUTO: 0.3 % (ref 0–0.5)
LYMPHOCYTES # BLD AUTO: 3.6 K/UL (ref 1–4.8)
LYMPHOCYTES NFR BLD: 38.8 % (ref 18–48)
MCH RBC QN AUTO: 28.1 PG (ref 27–31)
MCHC RBC AUTO-ENTMCNC: 29.7 G/DL (ref 32–36)
MCV RBC AUTO: 95 FL (ref 82–98)
MONOCYTES # BLD AUTO: 0.8 K/UL (ref 0.3–1)
MONOCYTES NFR BLD: 8.6 % (ref 4–15)
NEUTROPHILS # BLD AUTO: 4.4 K/UL (ref 1.8–7.7)
NEUTROPHILS NFR BLD: 47.6 % (ref 38–73)
NRBC BLD-RTO: 0 /100 WBC
PHOSPHATE SERPL-MCNC: 4.7 MG/DL (ref 2.7–4.5)
PLATELET # BLD AUTO: ABNORMAL K/UL (ref 150–350)
PLATELET BLD QL SMEAR: ABNORMAL
PMV BLD AUTO: ABNORMAL FL (ref 9.2–12.9)
POCT GLUCOSE: 163 MG/DL (ref 70–110)
POCT GLUCOSE: 183 MG/DL (ref 70–110)
POCT GLUCOSE: 200 MG/DL (ref 70–110)
POCT GLUCOSE: 211 MG/DL (ref 70–110)
POIKILOCYTOSIS BLD QL SMEAR: SLIGHT
POLYCHROMASIA BLD QL SMEAR: ABNORMAL
POTASSIUM SERPL-SCNC: 4.6 MMOL/L (ref 3.5–5.1)
PROT SERPL-MCNC: 7.7 G/DL (ref 6–8.4)
RBC # BLD AUTO: 3.63 M/UL (ref 4–5.4)
SODIUM SERPL-SCNC: 133 MMOL/L (ref 136–145)
VANCOMYCIN SERPL-MCNC: 20.5 UG/ML
WBC # BLD AUTO: 9.17 K/UL (ref 3.9–12.7)

## 2020-06-21 PROCEDURE — 25000003 PHARM REV CODE 250: Mod: HCNC | Performed by: NURSE PRACTITIONER

## 2020-06-21 PROCEDURE — 36415 COLL VENOUS BLD VENIPUNCTURE: CPT | Mod: HCNC

## 2020-06-21 PROCEDURE — 25000003 PHARM REV CODE 250: Mod: HCNC | Performed by: INTERNAL MEDICINE

## 2020-06-21 PROCEDURE — 11000001 HC ACUTE MED/SURG PRIVATE ROOM: Mod: HCNC

## 2020-06-21 PROCEDURE — 99232 PR SUBSEQUENT HOSPITAL CARE,LEVL II: ICD-10-PCS | Mod: GT,HCNC,, | Performed by: INTERNAL MEDICINE

## 2020-06-21 PROCEDURE — 80202 ASSAY OF VANCOMYCIN: CPT | Mod: HCNC

## 2020-06-21 PROCEDURE — 63600175 PHARM REV CODE 636 W HCPCS: Mod: HCNC | Performed by: HOSPITALIST

## 2020-06-21 PROCEDURE — 99232 SBSQ HOSP IP/OBS MODERATE 35: CPT | Mod: GT,HCNC,, | Performed by: INTERNAL MEDICINE

## 2020-06-21 PROCEDURE — 85025 COMPLETE CBC W/AUTO DIFF WBC: CPT | Mod: HCNC

## 2020-06-21 PROCEDURE — 80053 COMPREHEN METABOLIC PANEL: CPT | Mod: HCNC

## 2020-06-21 PROCEDURE — 25000003 PHARM REV CODE 250: Mod: HCNC | Performed by: EMERGENCY MEDICINE

## 2020-06-21 PROCEDURE — 84100 ASSAY OF PHOSPHORUS: CPT | Mod: HCNC

## 2020-06-21 PROCEDURE — 63600175 PHARM REV CODE 636 W HCPCS: Mod: HCNC | Performed by: NURSE PRACTITIONER

## 2020-06-21 RX ORDER — LIDOCAINE 50 MG/G
2 PATCH TOPICAL
Status: DISCONTINUED | OUTPATIENT
Start: 2020-06-21 | End: 2020-06-24 | Stop reason: HOSPADM

## 2020-06-21 RX ORDER — AMLODIPINE BESYLATE 2.5 MG/1
2.5 TABLET ORAL DAILY
Status: DISCONTINUED | OUTPATIENT
Start: 2020-06-21 | End: 2020-06-24 | Stop reason: HOSPADM

## 2020-06-21 RX ADMIN — INSULIN ASPART 9 UNITS: 100 INJECTION, SOLUTION INTRAVENOUS; SUBCUTANEOUS at 12:06

## 2020-06-21 RX ADMIN — HYDRALAZINE HYDROCHLORIDE 10 MG: 20 INJECTION INTRAMUSCULAR; INTRAVENOUS at 08:06

## 2020-06-21 RX ADMIN — GABAPENTIN 300 MG: 300 CAPSULE ORAL at 08:06

## 2020-06-21 RX ADMIN — CARVEDILOL 25 MG: 6.25 TABLET, FILM COATED ORAL at 05:06

## 2020-06-21 RX ADMIN — GABAPENTIN 300 MG: 300 CAPSULE ORAL at 02:06

## 2020-06-21 RX ADMIN — OXYCODONE 5 MG: 5 TABLET ORAL at 02:06

## 2020-06-21 RX ADMIN — STANDARDIZED SENNA CONCENTRATE AND DOCUSATE SODIUM 1 TABLET: 8.6; 5 TABLET ORAL at 09:06

## 2020-06-21 RX ADMIN — ASPIRIN 81 MG 81 MG: 81 TABLET ORAL at 08:06

## 2020-06-21 RX ADMIN — LEVOTHYROXINE SODIUM 75 MCG: 75 TABLET ORAL at 05:06

## 2020-06-21 RX ADMIN — LIDOCAINE 2 PATCH: 50 PATCH TOPICAL at 03:06

## 2020-06-21 RX ADMIN — STANDARDIZED SENNA CONCENTRATE AND DOCUSATE SODIUM 1 TABLET: 8.6; 5 TABLET ORAL at 08:06

## 2020-06-21 RX ADMIN — INSULIN ASPART 9 UNITS: 100 INJECTION, SOLUTION INTRAVENOUS; SUBCUTANEOUS at 05:06

## 2020-06-21 RX ADMIN — FAMOTIDINE 20 MG: 20 TABLET ORAL at 08:06

## 2020-06-21 RX ADMIN — INSULIN ASPART 9 UNITS: 100 INJECTION, SOLUTION INTRAVENOUS; SUBCUTANEOUS at 08:06

## 2020-06-21 RX ADMIN — HYDRALAZINE HYDROCHLORIDE 10 MG: 20 INJECTION INTRAMUSCULAR; INTRAVENOUS at 02:06

## 2020-06-21 RX ADMIN — SODIUM CHLORIDE: 0.9 INJECTION, SOLUTION INTRAVENOUS at 01:06

## 2020-06-21 RX ADMIN — AMLODIPINE BESYLATE 2.5 MG: 2.5 TABLET ORAL at 03:06

## 2020-06-21 RX ADMIN — COLLAGENASE SANTYL: 250 OINTMENT TOPICAL at 08:06

## 2020-06-21 RX ADMIN — CARVEDILOL 25 MG: 6.25 TABLET, FILM COATED ORAL at 08:06

## 2020-06-21 RX ADMIN — SODIUM CHLORIDE: 0.9 INJECTION, SOLUTION INTRAVENOUS at 02:06

## 2020-06-21 RX ADMIN — INSULIN ASPART 2 UNITS: 100 INJECTION, SOLUTION INTRAVENOUS; SUBCUTANEOUS at 08:06

## 2020-06-21 RX ADMIN — CEFTRIAXONE 1 G: 1 INJECTION, SOLUTION INTRAVENOUS at 01:06

## 2020-06-21 RX ADMIN — ROSUVASTATIN CALCIUM 20 MG: 10 TABLET, COATED ORAL at 08:06

## 2020-06-21 NOTE — PLAN OF CARE
Problem: Fall Injury Risk  Goal: Absence of Fall and Fall-Related Injury  Outcome: Ongoing, Progressing     Problem: Skin Injury Risk Increased  Goal: Skin Health and Integrity  Outcome: Ongoing, Progressing     Problem: Adult Inpatient Plan of Care  Goal: Plan of Care Review  Outcome: Ongoing, Progressing  Goal: Patient-Specific Goal (Individualization)  Outcome: Ongoing, Progressing  Goal: Absence of Hospital-Acquired Illness or Injury  Outcome: Ongoing, Progressing  Goal: Optimal Comfort and Wellbeing  Outcome: Ongoing, Progressing  Goal: Readiness for Transition of Care  Outcome: Ongoing, Progressing  Goal: Rounds/Family Conference  Outcome: Ongoing, Progressing     Problem: Bariatric Environmental Safety  Goal: Safety Maintained with Care  Outcome: Ongoing, Progressing     NPO after midnight for possible tunnel cath placement

## 2020-06-21 NOTE — PROGRESS NOTES
Tasneem Lynn is a 74 y.o. female patient.    Follow for EMMA, CKD stg 4    No new c/o, comfortable    Scheduled Meds:   aspirin  81 mg Oral Daily    carvediloL  25 mg Oral BID WM    cefTRIAXone (ROCEPHIN) IVPB  1 g Intravenous Q24H    collagenase   Topical (Top) Daily    famotidine  20 mg Oral Daily    gabapentin  300 mg Oral TID    insulin aspart U-100  9 Units Subcutaneous TIDWM    insulin detemir U-100  15 Units Subcutaneous BID    levothyroxine  75 mcg Oral Before breakfast    rosuvastatin  20 mg Oral Daily    senna-docusate 8.6-50 mg  1 tablet Oral BID       Review of patient's allergies indicates:   Allergen Reactions    Corticosteroids (glucocorticoids) Edema         Vital Signs Range (Last 24H):  Temp:  [97.8 °F (36.6 °C)-98.3 °F (36.8 °C)]   Pulse:  [70-80]   Resp:  [17-19]   BP: (116-185)/(56-78)   SpO2:  [95 %-100 %]     I & O (Last 24H):    Intake/Output Summary (Last 24 hours) at 6/21/2020 0826  Last data filed at 6/21/2020 0600  Gross per 24 hour   Intake 530 ml   Output 825 ml   Net -295 ml           Physical Exam:  General appearance: well developed, well nourished, no distress  Lungs:  clear to auscultation bilaterally and normal respiratory effort  Heart: regular rate and rhythm  Abdomen: soft, non-tender non-distented; bowel sounds normal; no masses,  no organomegaly  Extremities: no cyanosis or edema, or clubbing    Laboratory:  CBC:   Recent Labs   Lab 06/21/20  0538   WBC 9.17   RBC 3.63*   HGB 10.2*   HCT 34.3*   PLT SEE COMMENT   MCV 95   MCH 28.1   MCHC 29.7*     CMP:   Recent Labs   Lab 06/21/20  0538   *   CALCIUM 8.5*   ALBUMIN 2.5*   PROT 7.7   *   K 4.6   CO2 20*      BUN 81*   CREATININE 2.8*   ALKPHOS 108   ALT 13   AST 13   BILITOT 0.1       Imp/Plan    EMMA - creatinine stable  CKD stg 4  (R) foot chronic osteomyelitis  HTN  DM type 2  Anemia of cKD    Continue present Rx  Watch renal function/electrolytes  CMP in am      Trac T Patricia  6/21/2020

## 2020-06-21 NOTE — PLAN OF CARE
06/21/20 0902   Medicare Message   Important Message from Medicare regarding Discharge Appeal Rights Given to patient/caregiver;Explained to patient/caregiver;Signed/date by patient/caregiver   Date IMM was signed 06/21/20   Time IMM was signed 0852

## 2020-06-21 NOTE — PLAN OF CARE
Problem: Fall Injury Risk  Goal: Absence of Fall and Fall-Related Injury  Outcome: Ongoing, Progressing  Intervention: Identify and Manage Contributors to Fall Injury Risk  Flowsheets (Taken 6/21/2020 0207)  Self-Care Promotion: independence encouraged  Medication Review/Management: medications reviewed  Intervention: Promote Injury-Free Environment  Flowsheets (Taken 6/21/2020 0207)  Safety Promotion/Fall Prevention:   assistive device/personal item within reach   bed alarm set   Fall Risk reviewed with patient/family   Fall Risk signage in place   in recliner, wheels locked   lighting adjusted   nonskid shoes/socks when out of bed   room near unit station   side rails raised x 2   medications reviewed   instructed to call staff for mobility  Environmental Safety Modification:   assistive device/personal items within reach   clutter free environment maintained   lighting adjusted   room near unit station   room organization consistent     Problem: Skin Injury Risk Increased  Goal: Skin Health and Integrity  Outcome: Ongoing, Progressing  Intervention: Optimize Skin Protection  Flowsheets (Taken 6/21/2020 0207)  Pressure Reduction Techniques: weight shift assistance provided  Pressure Reduction Devices:   foam padding utilized   heel offloading device utilized   positioning supports utilized  Skin Protection:   incontinence pads utilized   silicone foam dressing in place   tubing/devices free from skin contact  Head of Bed (HOB): HOB at 30-45 degrees     Problem: Adult Inpatient Plan of Care  Goal: Plan of Care Review  Outcome: Ongoing, Progressing  Goal: Patient-Specific Goal (Individualization)  Outcome: Ongoing, Progressing  Goal: Absence of Hospital-Acquired Illness or Injury  Outcome: Ongoing, Progressing  Goal: Optimal Comfort and Wellbeing  Outcome: Ongoing, Progressing  Goal: Readiness for Transition of Care  Outcome: Ongoing, Progressing  Goal: Rounds/Family Conference  Outcome: Ongoing, Progressing      Problem: Bariatric Environmental Safety  Goal: Safety Maintained with Care  Outcome: Ongoing, Progressing     Problem: Wound  Goal: Optimal Wound Healing  Outcome: Ongoing, Progressing  Intervention: Promote Effective Wound Healing  Flowsheets (Taken 6/21/2020 0207)  Oral Nutrition Promotion: rest periods promoted  Sleep/Rest Enhancement: regular sleep/rest pattern promoted  Pain Management Interventions:   care clustered   pillow support provided   position adjusted     Problem: Diabetes Comorbidity  Goal: Blood Glucose Level Within Desired Range  Outcome: Ongoing, Progressing  Intervention: Maintain Glycemic Control  Flowsheets (Taken 6/21/2020 0207)  Glycemic Management:   blood glucose monitoring   supplemental insulin given     Problem: Infection  Goal: Infection Symptom Resolution  Outcome: Ongoing, Progressing

## 2020-06-21 NOTE — PROGRESS NOTES
[facility] Hospital Medicine  Telemedicine Progress Note    Patient Name: Tasneem Lynn  MRN: [id number]  Patient Class: IP- Inpatient   Admission Date: 6/12/2020  Length of Stay: 6 days  Attending Physician: Funmilayo Guillermo MD  Primary Care Provider: To Obtain Unable      Start time: 915a  Chief complaint: Chronic osteomyelitis of right foot with draining sinus  The patient location is: W422/W422 A  Present with the patient at the time of the telemed/virtual assessment: telemed presenter    Subjective:     Principal Problem:Chronic osteomyelitis of right foot with draining sinus        HPI:  This is a 74 y.o female with diabetes mellitus, hypertension, dyslipidemia, CHF, hypothyroidism, CKD stage 4, multiple chronic pains, obesity, and stroke (1986) who presents to the hospital with a chief complaint of worsening wound to the right heel for the last week. Patient reports her right heel wound has started 3 months ago and it seems got worse since last week with increasing bleeding. She states she believes the wound is infected. She reports she was prescribed an antibiotic (unsure name) 2 months ago by Angela James NP at an urgent care clinic but it didn't help much. She also reports another wound at her anterior right foot which is developed around 1 month ago. Patient reports she has been mostly staying on her bed and she has not been walking for 2 months due to her right heel wound. She reports the wound would bleed and have more pain if she put pressure on her right foot. She denies fever, chill, chest pain, SOB, bilateral calves/leg pain, N/V/D, cough or sick contacts at home. She reports she does not go to wound care clinic. She states the home health nurse visited her 3 times since she started having this heel wound. The last visit was today.     In ED, COVID negative. X-ray right foot can not exclude the possibility of osseous infectious involvement.  Labs with elevated sed rate and CRP,  c/s with CKD stage 4 with GFR 25 and elevated BUN&sCr 57&2.2.     Patient is admitted to inpatient with hospital medicine to further evaluation and treatment of right foot wound and EMMA    Overview/Hospital Course:  Patient admitted to the hospital for eval and treatment of R heal diabetic ulcer. MRI showed osteo.  Podiatry consulted. Patient had debridement and bone biopsy on 6/13.      Admission CC:   Chief Complaint   Patient presents with    Wound Check     EMS reports pt c/o infected diabetic ulcer to the right heel, worsening x 3 months. denies pain     Follow up visit for: Chronic osteomyelitis of right foot with draining sinus    Interval History / Events Overnight:   Creatinine improving slowly; she denies complaints to provider but tells her daughter about foot pain and shoulder pain; her daughter states she gets confused with oxycodone so patient with tylenol first     Patient's daughter updated about plan of care.     The patient is able to provide adequate history. Additional history was obtained from chart review. No significant events reported by Nursing.  Patient complains of no specific complaints. Symptoms have been unchanged since yesterday. Associated symptoms include: fatigue and malaise . Alleviating factors include: nothing. Response to treatment since yesterday: Not measurable. Wound vac placed by Podiatry.      Review of Systems   Constitutional: Negative for fever.   Respiratory: Negative for cough and shortness of breath.    Cardiovascular: Negative for chest pain.   Gastrointestinal: Negative for nausea and vomiting.   Genitourinary: Negative for dysuria and hematuria.     Objective:     Vital Signs (Most Recent):  Temp: 97.6 °F (36.4 °C) (06/17/20 1653)  Pulse: 70 (06/17/20 1653)  Resp: 18 (06/17/20 1653)  BP: (!) 131/98 (06/17/20 1653)  SpO2: 98 % (06/17/20 1653) Vital Signs (24h Range):  Temp:  [97.6 °F (36.4 °C)-98.2 °F (36.8 °C)] 97.6 °F (36.4 °C)  Pulse:  [69-76] 70  Resp:  [17-19]  18  SpO2:  [96 %-100 %] 98 %  BP: (100-184)/(51-98) 131/98     Weight: 116.5 kg (256 lb 13.4 oz)  Body mass index is 46.98 kg/m².    Intake/Output Summary (Last 24 hours) at 6/17/2020 1743  Last data filed at 6/17/2020 1226  Gross per 24 hour   Intake 360 ml   Output 150 ml   Net 210 ml      Physical Exam  Constitutional:       General: She is not in acute distress.     Appearance: She is morbidly obese. She is not diaphoretic.   Eyes:      General: Lids are normal. No scleral icterus.        Right eye: No discharge.         Left eye: No discharge.      Conjunctiva/sclera: Conjunctivae normal.   Pulmonary:      Effort: Pulmonary effort is normal. No tachypnea, accessory muscle usage or respiratory distress.   Abdominal:      General: Abdomen is protuberant. There is no distension.   Musculoskeletal:         General: No edema (per telepresenter nurse).   Neurological:      Mental Status: She is alert and oriented to person, place, and time. She is not disoriented.   Psychiatric:         Mood and Affect: Mood and affect normal.         Behavior: Behavior is cooperative.       Significant Labs:   Recent Results (from the past 24 hour(s))   POCT glucose    Collection Time: 06/20/20  5:08 PM   Result Value Ref Range    POCT Glucose 235 (H) 70 - 110 mg/dL   POCT glucose    Collection Time: 06/20/20  8:31 PM   Result Value Ref Range    POCT Glucose 225 (H) 70 - 110 mg/dL   CBC auto differential    Collection Time: 06/21/20  5:38 AM   Result Value Ref Range    WBC 9.17 3.90 - 12.70 K/uL    RBC 3.63 (L) 4.00 - 5.40 M/uL    Hemoglobin 10.2 (L) 12.0 - 16.0 g/dL    Hematocrit 34.3 (L) 37.0 - 48.5 %    Mean Corpuscular Volume 95 82 - 98 fL    Mean Corpuscular Hemoglobin 28.1 27.0 - 31.0 pg    Mean Corpuscular Hemoglobin Conc 29.7 (L) 32.0 - 36.0 g/dL    RDW 14.3 11.5 - 14.5 %    Platelets SEE COMMENT 150 - 350 K/uL    MPV SEE COMMENT 9.2 - 12.9 fL    Immature Granulocytes 0.3 0.0 - 0.5 %    Gran # (ANC) 4.4 1.8 - 7.7 K/uL     Immature Grans (Abs) 0.03 0.00 - 0.04 K/uL    Lymph # 3.6 1.0 - 4.8 K/uL    Mono # 0.8 0.3 - 1.0 K/uL    Eos # 0.4 0.0 - 0.5 K/uL    Baso # 0.04 0.00 - 0.20 K/uL    nRBC 0 0 /100 WBC    Gran% 47.6 38.0 - 73.0 %    Lymph% 38.8 18.0 - 48.0 %    Mono% 8.6 4.0 - 15.0 %    Eosinophil% 4.3 0.0 - 8.0 %    Basophil% 0.4 0.0 - 1.9 %    Platelet Estimate Clumped (A)     Poik Slight     Poly Occasional     Hypo Occasional     Tear Drop Cells Occasional     Differential Method Automated    Comprehensive metabolic panel    Collection Time: 06/21/20  5:38 AM   Result Value Ref Range    Sodium 133 (L) 136 - 145 mmol/L    Potassium 4.6 3.5 - 5.1 mmol/L    Chloride 104 95 - 110 mmol/L    CO2 20 (L) 23 - 29 mmol/L    Glucose 237 (H) 70 - 110 mg/dL    BUN, Bld 81 (H) 8 - 23 mg/dL    Creatinine 2.8 (H) 0.5 - 1.4 mg/dL    Calcium 8.5 (L) 8.7 - 10.5 mg/dL    Total Protein 7.7 6.0 - 8.4 g/dL    Albumin 2.5 (L) 3.5 - 5.2 g/dL    Total Bilirubin 0.1 0.1 - 1.0 mg/dL    Alkaline Phosphatase 108 55 - 135 U/L    AST 13 10 - 40 U/L    ALT 13 10 - 44 U/L    Anion Gap 9 8 - 16 mmol/L    eGFR if African American 18 (A) >60 mL/min/1.73 m^2    eGFR if non African American 16 (A) >60 mL/min/1.73 m^2   Phosphorus    Collection Time: 06/21/20  5:38 AM   Result Value Ref Range    Phosphorus 4.7 (H) 2.7 - 4.5 mg/dL   Vancomycin, random    Collection Time: 06/21/20  5:39 AM   Result Value Ref Range    Vancomycin, Random 20.5 Not established ug/mL   POCT glucose    Collection Time: 06/21/20  7:19 AM   Result Value Ref Range    POCT Glucose 211 (H) 70 - 110 mg/dL   POCT glucose    Collection Time: 06/21/20 11:24 AM   Result Value Ref Range    POCT Glucose 200 (H) 70 - 110 mg/dL     Recent Labs   Lab 06/19/20  0622 06/20/20  0537 06/21/20  0538   *  132* 132*  132* 133*   K 4.9  4.9 4.2  4.2 4.6     100 100  100 104   CO2 23  23 25  25 20*   BUN 84*  84* 86*  86* 81*   CREATININE 3.1*  3.1* 2.9*  2.9* 2.8*   CALCIUM 8.3*  8.3* 8.1*   8.1* 8.5*   PROT 7.9 7.3 7.7   BILITOT 0.2 0.1 0.1   ALKPHOS 121 109 108   ALT 11 12 13   AST 12 13 13     Recent Labs   Lab 06/19/20  0622 06/20/20  0538 06/21/20  0538   WBC 10.35 10.09 9.17   HGB 9.7* 8.8* 10.2*   HCT 31.7* 28.9* 34.3*   * 366* SEE COMMENT   MONO 10.5  1.1* 9.9  1.0 8.6  0.8         Significant Imaging:       Assessment/Plan:      Wound of right leg  See media picture.  Right anterior ankle wound x 1 month.   - On antibiotics  - Cultures NGTD    Diabetic ulcer of right heel associated with diabetes mellitus due to underlying condition, limited to breakdown of skin  Chronic osteomyelitis of the right foot with a draining sinus  See media picture.  Reports right heel pressure wound not healing x 3 months. Worsened since last week with increasing bleeding. Hx DM.  Wound with red, beefy, granular tissue and bleeding. Xray concerning for osseous infection. No leukocytosis.  Afebrile. Elevated sed rate and CRP.  MRI: Findings consistent with osteomyelitis of the calcaneus with complete to near complete disruption of the Achilles tendon.   - Vancomycin started in ED - continue with pharmacy dose  - Added Ceftriaxone  - Cultures pending (BC and Wound culture)  - ID, Wound Care, and Podiatry consulted  - plan for IV antibiotics x 6 weeks if bone cultures negative. Wound vac.   - ID recommendations: Discharge antibiotics:           Ceftriaxone 1 gram IV q 24 hours plus  Vancomycin 500 mg IV q 48 hours  - End date of IV antibiotics: 07/27/2020  - Needs IV access for discharge; likely with IR for tunneled catheter for 6/22; patient NPO.    CKD (chronic kidney disease) stage 4, GFR 15-29 ml/min  sCr and BUN elevated 2.2 and 57.  Baseline sCr around 2 with previous value of 1.7 on 6/2018.   Patient reports she was referred to Nephrologist a long time ago but she hasn't seen anyone yet.   - Renal dose all medication. Avoid nephrotoxin if possible.   - nephrology consult due to rising BUN/Cr.  Hold ARB  and lasix; repeat urinalysis reflexed to culture with E. Coli ESBL not sensistive to rocephin - will discuss abarca antibiotics with ID   - Vein mapping ordered prior to line placement recommendations by Vascular Surgery  -patient receiving IVF - monitor for volume overload    Hyperlipidemia  Continue statin    History of CVA (cerebrovascular accident)  Hx stroke 1986 with left side weakness per patient report. Reports not getting out of bed much since 4/2010 due to wound at right heel.    - Patient is on ASA and statin. Continue ASA/Statin  - PT/OT evaluation    Morbid obesity   Body mass index is 46.98 kg/m²..  Weight loss outpatient    Hypothyroidism  TSH wnl. Continue home management with levothyroxine    Essential hypertension  Uncontrolled. Continue home meds BB  - ARB held initially due to worsening kidney function  - losartan and Lasix held due to worsening renal function.   -add norvasce 6/21 due to elevated BP    Type 2 diabetes mellitus, uncontrolled, with renal complications  On insulin (70/30) 40 units BID at home (2018). Patient reports she often has episodes of hypoglycemia of BS 60's.   The patient's hyperglycemia while IP is managed with a SQ prandial and correction dose insulin regimen. Monitoring BGs.  Increased aspart and adding basal insulin 6/16  Increased basal and prandial insulin 6/17, 6/18, 6/20    High Risk Conditions:    Patient is currently on drug therapy requiring intensive monitoring for toxicity: Vancomycin    VTE Risk Mitigation (From admission, onward)         Ordered     IP VTE HIGH RISK PATIENT  Once      06/13/20 0036     Place sequential compression device  Until discontinued      06/13/20 0036     Place MADISON hose  Until discontinued      06/13/20 0036                  I have assessed these finding virtually using telemed platform and with assistance of bedside nurse       End time:  809l    Total time spent with patient: 10 min    The attending portion of this evaluation,  treatment, and documentation was performed per Funmilayo Guillermo MD via audiovisual.      Funmilayo Guillermo MD  Department of [place] [facility]   [facility]  [phone]

## 2020-06-22 LAB
ALBUMIN SERPL BCP-MCNC: 2.4 G/DL (ref 3.5–5.2)
ALP SERPL-CCNC: 97 U/L (ref 55–135)
ALP SERPL-CCNC: 97 U/L (ref 55–135)
ALT SERPL W/O P-5'-P-CCNC: 11 U/L (ref 10–44)
ALT SERPL W/O P-5'-P-CCNC: 11 U/L (ref 10–44)
ANION GAP SERPL CALC-SCNC: 8 MMOL/L (ref 8–16)
ANION GAP SERPL CALC-SCNC: 8 MMOL/L (ref 8–16)
AST SERPL-CCNC: 12 U/L (ref 10–40)
AST SERPL-CCNC: 12 U/L (ref 10–40)
BASOPHILS # BLD AUTO: 0.04 K/UL (ref 0–0.2)
BASOPHILS NFR BLD: 0.4 % (ref 0–1.9)
BILIRUB DIRECT SERPL-MCNC: 0.1 MG/DL (ref 0.1–0.3)
BILIRUB SERPL-MCNC: 0.1 MG/DL (ref 0.1–1)
BILIRUB SERPL-MCNC: 0.1 MG/DL (ref 0.1–1)
BUN SERPL-MCNC: 77 MG/DL (ref 8–23)
BUN SERPL-MCNC: 77 MG/DL (ref 8–23)
CALCIUM SERPL-MCNC: 8.5 MG/DL (ref 8.7–10.5)
CALCIUM SERPL-MCNC: 8.5 MG/DL (ref 8.7–10.5)
CHLORIDE SERPL-SCNC: 108 MMOL/L (ref 95–110)
CHLORIDE SERPL-SCNC: 108 MMOL/L (ref 95–110)
CO2 SERPL-SCNC: 21 MMOL/L (ref 23–29)
CO2 SERPL-SCNC: 21 MMOL/L (ref 23–29)
CREAT SERPL-MCNC: 2.5 MG/DL (ref 0.5–1.4)
CREAT SERPL-MCNC: 2.5 MG/DL (ref 0.5–1.4)
DIFFERENTIAL METHOD: ABNORMAL
EOSINOPHIL # BLD AUTO: 0.4 K/UL (ref 0–0.5)
EOSINOPHIL NFR BLD: 4.7 % (ref 0–8)
ERYTHROCYTE [DISTWIDTH] IN BLOOD BY AUTOMATED COUNT: 15 % (ref 11.5–14.5)
EST. GFR  (AFRICAN AMERICAN): 21 ML/MIN/1.73 M^2
EST. GFR  (AFRICAN AMERICAN): 21 ML/MIN/1.73 M^2
EST. GFR  (NON AFRICAN AMERICAN): 18 ML/MIN/1.73 M^2
EST. GFR  (NON AFRICAN AMERICAN): 18 ML/MIN/1.73 M^2
GLUCOSE SERPL-MCNC: 186 MG/DL (ref 70–110)
GLUCOSE SERPL-MCNC: 186 MG/DL (ref 70–110)
HCT VFR BLD AUTO: 31 % (ref 37–48.5)
HGB BLD-MCNC: 9.3 G/DL (ref 12–16)
IMM GRANULOCYTES # BLD AUTO: 0.02 K/UL (ref 0–0.04)
IMM GRANULOCYTES NFR BLD AUTO: 0.2 % (ref 0–0.5)
LYMPHOCYTES # BLD AUTO: 3.7 K/UL (ref 1–4.8)
LYMPHOCYTES NFR BLD: 40.8 % (ref 18–48)
MCH RBC QN AUTO: 28.8 PG (ref 27–31)
MCHC RBC AUTO-ENTMCNC: 30 G/DL (ref 32–36)
MCV RBC AUTO: 96 FL (ref 82–98)
MONOCYTES # BLD AUTO: 0.9 K/UL (ref 0.3–1)
MONOCYTES NFR BLD: 10.2 % (ref 4–15)
NEUTROPHILS # BLD AUTO: 3.9 K/UL (ref 1.8–7.7)
NEUTROPHILS NFR BLD: 43.7 % (ref 38–73)
NRBC BLD-RTO: 0 /100 WBC
PHOSPHATE SERPL-MCNC: 4.7 MG/DL (ref 2.7–4.5)
PLATELET # BLD AUTO: 408 K/UL (ref 150–350)
PMV BLD AUTO: 9.2 FL (ref 9.2–12.9)
POCT GLUCOSE: 200 MG/DL (ref 70–110)
POCT GLUCOSE: 214 MG/DL (ref 70–110)
POCT GLUCOSE: 242 MG/DL (ref 70–110)
POCT GLUCOSE: 295 MG/DL (ref 70–110)
POTASSIUM SERPL-SCNC: 4.4 MMOL/L (ref 3.5–5.1)
POTASSIUM SERPL-SCNC: 4.4 MMOL/L (ref 3.5–5.1)
PROT SERPL-MCNC: 7.3 G/DL (ref 6–8.4)
PROT SERPL-MCNC: 7.3 G/DL (ref 6–8.4)
RBC # BLD AUTO: 3.23 M/UL (ref 4–5.4)
SODIUM SERPL-SCNC: 137 MMOL/L (ref 136–145)
SODIUM SERPL-SCNC: 137 MMOL/L (ref 136–145)
VANCOMYCIN SERPL-MCNC: 16 UG/ML
WBC # BLD AUTO: 8.98 K/UL (ref 3.9–12.7)

## 2020-06-22 PROCEDURE — 99231 SBSQ HOSP IP/OBS SF/LOW 25: CPT | Mod: GT,HCNC,, | Performed by: INTERNAL MEDICINE

## 2020-06-22 PROCEDURE — 11042 PR DEBRIDEMENT, SKIN, SUB-Q TISSUE,=<20 SQ CM: ICD-10-PCS | Mod: HCNC,,, | Performed by: PODIATRIST

## 2020-06-22 PROCEDURE — 80202 ASSAY OF VANCOMYCIN: CPT | Mod: HCNC

## 2020-06-22 PROCEDURE — 80069 RENAL FUNCTION PANEL: CPT | Mod: HCNC

## 2020-06-22 PROCEDURE — 99231 PR SUBSEQUENT HOSPITAL CARE,LEVL I: ICD-10-PCS | Mod: GT,HCNC,, | Performed by: INTERNAL MEDICINE

## 2020-06-22 PROCEDURE — 82247 BILIRUBIN TOTAL: CPT | Mod: HCNC

## 2020-06-22 PROCEDURE — 85025 COMPLETE CBC W/AUTO DIFF WBC: CPT | Mod: HCNC

## 2020-06-22 PROCEDURE — 25000003 PHARM REV CODE 250: Mod: HCNC | Performed by: NURSE PRACTITIONER

## 2020-06-22 PROCEDURE — 63600175 PHARM REV CODE 636 W HCPCS: Mod: HCNC | Performed by: NURSE PRACTITIONER

## 2020-06-22 PROCEDURE — 11042 DBRDMT SUBQ TIS 1ST 20SQCM/<: CPT | Mod: HCNC,,, | Performed by: PODIATRIST

## 2020-06-22 PROCEDURE — 63600175 PHARM REV CODE 636 W HCPCS: Mod: HCNC | Performed by: INTERNAL MEDICINE

## 2020-06-22 PROCEDURE — 25000003 PHARM REV CODE 250: Mod: HCNC | Performed by: INTERNAL MEDICINE

## 2020-06-22 PROCEDURE — 25000003 PHARM REV CODE 250: Mod: HCNC | Performed by: EMERGENCY MEDICINE

## 2020-06-22 PROCEDURE — 11000001 HC ACUTE MED/SURG PRIVATE ROOM: Mod: HCNC

## 2020-06-22 PROCEDURE — 63600175 PHARM REV CODE 636 W HCPCS: Mod: HCNC | Performed by: HOSPITALIST

## 2020-06-22 PROCEDURE — 63600175 PHARM REV CODE 636 W HCPCS: Mod: HCNC | Performed by: RADIOLOGY

## 2020-06-22 PROCEDURE — 36415 COLL VENOUS BLD VENIPUNCTURE: CPT | Mod: HCNC

## 2020-06-22 PROCEDURE — 84075 ASSAY ALKALINE PHOSPHATASE: CPT | Mod: HCNC

## 2020-06-22 RX ORDER — FENTANYL CITRATE 50 UG/ML
INJECTION, SOLUTION INTRAMUSCULAR; INTRAVENOUS CODE/TRAUMA/SEDATION MEDICATION
Status: COMPLETED | OUTPATIENT
Start: 2020-06-22 | End: 2020-06-22

## 2020-06-22 RX ORDER — INSULIN ASPART 100 [IU]/ML
10 INJECTION, SOLUTION INTRAVENOUS; SUBCUTANEOUS
Status: DISCONTINUED | OUTPATIENT
Start: 2020-06-22 | End: 2020-06-24 | Stop reason: HOSPADM

## 2020-06-22 RX ORDER — MIDAZOLAM HYDROCHLORIDE 1 MG/ML
INJECTION INTRAMUSCULAR; INTRAVENOUS CODE/TRAUMA/SEDATION MEDICATION
Status: COMPLETED | OUTPATIENT
Start: 2020-06-22 | End: 2020-06-22

## 2020-06-22 RX ADMIN — STANDARDIZED SENNA CONCENTRATE AND DOCUSATE SODIUM 1 TABLET: 8.6; 5 TABLET ORAL at 10:06

## 2020-06-22 RX ADMIN — ROSUVASTATIN CALCIUM 20 MG: 10 TABLET, COATED ORAL at 10:06

## 2020-06-22 RX ADMIN — LEVOTHYROXINE SODIUM 75 MCG: 75 TABLET ORAL at 05:06

## 2020-06-22 RX ADMIN — CEFTRIAXONE 1 G: 1 INJECTION, SOLUTION INTRAVENOUS at 04:06

## 2020-06-22 RX ADMIN — INSULIN ASPART 3 UNITS: 100 INJECTION, SOLUTION INTRAVENOUS; SUBCUTANEOUS at 05:06

## 2020-06-22 RX ADMIN — MIDAZOLAM HYDROCHLORIDE 1 MG: 1 INJECTION, SOLUTION INTRAMUSCULAR; INTRAVENOUS at 09:06

## 2020-06-22 RX ADMIN — COLLAGENASE SANTYL: 250 OINTMENT TOPICAL at 11:06

## 2020-06-22 RX ADMIN — AMLODIPINE BESYLATE 2.5 MG: 2.5 TABLET ORAL at 10:06

## 2020-06-22 RX ADMIN — CARVEDILOL 25 MG: 6.25 TABLET, FILM COATED ORAL at 05:06

## 2020-06-22 RX ADMIN — FENTANYL CITRATE 50 MCG: 50 INJECTION, SOLUTION INTRAMUSCULAR; INTRAVENOUS at 09:06

## 2020-06-22 RX ADMIN — VANCOMYCIN HYDROCHLORIDE 500 MG: 500 INJECTION, POWDER, LYOPHILIZED, FOR SOLUTION INTRAVENOUS at 02:06

## 2020-06-22 RX ADMIN — CARVEDILOL 25 MG: 6.25 TABLET, FILM COATED ORAL at 10:06

## 2020-06-22 RX ADMIN — FAMOTIDINE 20 MG: 20 TABLET ORAL at 10:06

## 2020-06-22 RX ADMIN — GABAPENTIN 300 MG: 300 CAPSULE ORAL at 02:06

## 2020-06-22 RX ADMIN — OXYCODONE 5 MG: 5 TABLET ORAL at 05:06

## 2020-06-22 RX ADMIN — INSULIN ASPART 9 UNITS: 100 INJECTION, SOLUTION INTRAVENOUS; SUBCUTANEOUS at 12:06

## 2020-06-22 RX ADMIN — INSULIN ASPART 10 UNITS: 100 INJECTION, SOLUTION INTRAVENOUS; SUBCUTANEOUS at 05:06

## 2020-06-22 RX ADMIN — GABAPENTIN 300 MG: 300 CAPSULE ORAL at 09:06

## 2020-06-22 RX ADMIN — SODIUM CHLORIDE: 0.9 INJECTION, SOLUTION INTRAVENOUS at 04:06

## 2020-06-22 RX ADMIN — ASPIRIN 81 MG 81 MG: 81 TABLET ORAL at 10:06

## 2020-06-22 RX ADMIN — INSULIN ASPART 1 UNITS: 100 INJECTION, SOLUTION INTRAVENOUS; SUBCUTANEOUS at 09:06

## 2020-06-22 RX ADMIN — STANDARDIZED SENNA CONCENTRATE AND DOCUSATE SODIUM 1 TABLET: 8.6; 5 TABLET ORAL at 09:06

## 2020-06-22 RX ADMIN — GABAPENTIN 300 MG: 300 CAPSULE ORAL at 10:06

## 2020-06-22 RX ADMIN — HYDRALAZINE HYDROCHLORIDE 10 MG: 20 INJECTION INTRAMUSCULAR; INTRAVENOUS at 04:06

## 2020-06-22 RX ADMIN — LIDOCAINE 2 PATCH: 50 PATCH TOPICAL at 02:06

## 2020-06-22 NOTE — PT/OT/SLP PROGRESS
Physical Therapy      Patient Name:  Tasneem Lynn   MRN:  [id number]    Patient not seen today secondary to Bed rest following prcedure in AM and Nursing(wound) care in PM. Will follow-up as time allows.    Hansa Benavides, PT

## 2020-06-22 NOTE — PLAN OF CARE
Problem: Fall Injury Risk  Goal: Absence of Fall and Fall-Related Injury  Outcome: Ongoing, Progressing     Problem: Skin Injury Risk Increased  Goal: Skin Health and Integrity  Outcome: Ongoing, Progressing     Problem: Adult Inpatient Plan of Care  Goal: Plan of Care Review  Outcome: Ongoing, Progressing     Problem: Adult Inpatient Plan of Care  Goal: Absence of Hospital-Acquired Illness or Injury  Outcome: Ongoing, Progressing     Problem: Infection  Goal: Infection Symptom Resolution  Outcome: Ongoing, Progressing     Problem: Diabetes Comorbidity  Goal: Blood Glucose Level Within Desired Range  Outcome: Ongoing, Progressing

## 2020-06-22 NOTE — NURSING
Patient refused 4am vital signs, lab draw and to be turned. Patient was educated on the need for all three measures but again refused

## 2020-06-22 NOTE — SEDATION DOCUMENTATION
Pt tolerated procedure well. No s/s of complications noted. Vss throughout procedure. Report given to Lesli.

## 2020-06-22 NOTE — PROGRESS NOTES
Awake alert oriented NAD    Denies CNS ENT CP GI  RHEUM OR DERM SX  Past Medical History:   Diagnosis Date    Arthritis     Gout    CHF (congestive heart failure)     Coronary artery disease     Diabetes mellitus     HLD (hyperlipidemia)     Hypertension     Obese     Stroke     1986    Thyroid disease      Review of patient's allergies indicates:   Allergen Reactions    Corticosteroids (glucocorticoids) Edema       Current Facility-Administered Medications   Medication    acetaminophen tablet 650 mg    acetaminophen tablet 650 mg    amLODIPine tablet 2.5 mg    aspirin chewable tablet 81 mg    carvediloL tablet 25 mg    cefTRIAXone (ROCEPHIN) 1 g/50 mL D5W IVPB    collagenase ointment    dextrose 50% injection 12.5 g    dextrose 50% injection 25 g    famotidine tablet 20 mg    gabapentin capsule 300 mg    glucagon (human recombinant) injection 1 mg    glucose chewable tablet 16 g    glucose chewable tablet 24 g    hydrALAZINE injection 10 mg    insulin aspart U-100 pen 0-5 Units    insulin aspart U-100 pen 9 Units    insulin detemir U-100 pen 15 Units    levothyroxine tablet 75 mcg    lidocaine 5 % patch 2 patch    ondansetron injection 4 mg    oxyCODONE immediate release tablet 5 mg    rosuvastatin tablet 20 mg    senna-docusate 8.6-50 mg per tablet 1 tablet    sodium chloride 0.9% flush 10 mL    vancomycin - pharmacy to dose       LABS    Recent Results (from the past 24 hour(s))   POCT glucose    Collection Time: 06/21/20  4:46 PM   Result Value Ref Range    POCT Glucose 163 (H) 70 - 110 mg/dL   POCT glucose    Collection Time: 06/21/20  7:10 PM   Result Value Ref Range    POCT Glucose 183 (H) 70 - 110 mg/dL   POCT glucose    Collection Time: 06/22/20  7:24 AM   Result Value Ref Range    POCT Glucose 214 (H) 70 - 110 mg/dL   ]    I/O last 3 completed shifts:  In: 1815 [P.O.:720; I.V.:995; IV Piggyback:100]  Out: 2885 [Urine:2885]    Vitals:    06/22/20 0925 06/22/20 0930  06/22/20 0935 06/22/20 1034   BP: (!) 148/65 (!) 158/67 (!) 153/77 (!) 164/69   Pulse: 60 (!) 59 68 80   Resp: 18 18 18 18   Temp:    98 °F (36.7 °C)   TempSrc:    Oral   SpO2: 100% 100% 100% 100%   Weight:       Height:           No Jvd, Thyromegaly or Lymphadenopathy  Lungs: Fairly clear anteriorly and laterally  Cor: RRR no G or rubs  Abd: Soft benign good bowel sounds non tender  Ext: Trace edema    A)  Non Oliguric EMMA with great UO and creat down a notch  CKD 4  (R) foot chronic osteomyelitis  HTN  DMT2  Anemia of cKD    P)  Renal Diet  Home meds  Adjust all meds to the degree of renal fx  Close follow up I/O and weights  Maintain Hydration

## 2020-06-22 NOTE — PROGRESS NOTES
[facility] Hospital Medicine  Telemedicine Progress Note    Patient Name: Tasneem Lynn  MRN: [id number]  Patient Class: IP- Inpatient   Admission Date: 6/12/2020  Length of Stay: 6 days  Attending Physician: Funmilayo Guillermo MD  Primary Care Provider: To Obtain Unable      Start time: 915a  Chief complaint: Chronic osteomyelitis of right foot with draining sinus  The patient location is: W422/W422 A  Present with the patient at the time of the telemed/virtual assessment: telemed presenter    Subjective:     Principal Problem:Chronic osteomyelitis of right foot with draining sinus        HPI:  This is a 74 y.o female with diabetes mellitus, hypertension, dyslipidemia, CHF, hypothyroidism, CKD stage 4, multiple chronic pains, obesity, and stroke (1986) who presents to the hospital with a chief complaint of worsening wound to the right heel for the last week. Patient reports her right heel wound has started 3 months ago and it seems got worse since last week with increasing bleeding. She states she believes the wound is infected. She reports she was prescribed an antibiotic (unsure name) 2 months ago by Angela James NP at an urgent care clinic but it didn't help much. She also reports another wound at her anterior right foot which is developed around 1 month ago. Patient reports she has been mostly staying on her bed and she has not been walking for 2 months due to her right heel wound. She reports the wound would bleed and have more pain if she put pressure on her right foot. She denies fever, chill, chest pain, SOB, bilateral calves/leg pain, N/V/D, cough or sick contacts at home. She reports she does not go to wound care clinic. She states the home health nurse visited her 3 times since she started having this heel wound. The last visit was today.   In ED, COVID negative. X-ray right foot can not exclude the possibility of osseous infectious involvement.  Labs with elevated sed rate and CRP,  c/s with CKD stage 4 with GFR 25 and elevated BUN&sCr 57&2.2.   Patient is admitted to inpatient with hospital medicine to further evaluation and treatment of right foot wound and EMMA    Overview/Hospital Course:  Patient admitted to the hospital for eval and treatment of R heal diabetic ulcer. MRI showed osteo.  Podiatry consulted. Patient had debridement and bone biopsy on 6/13.  IVF adminstered with improvement of BUN/Cr.     Admission CC:   Chief Complaint   Patient presents with    Wound Check     EMS reports pt c/o infected diabetic ulcer to the right heel, worsening x 3 months. denies pain     Follow up visit for: Chronic osteomyelitis of right foot with draining sinus    Interval History / Events Overnight:   Patient denies complaints in state shoulder and foot pain are controlled; she wishes to go to a skilled nursing facility for daily therapy her daughter wishes for her to come home due to COVID risk in a nursing home; they are discussing with case management; encouraged patient to continue oral hydration and will discontinue IV fluids today      Review of Systems   Constitutional: Negative for fever.   Respiratory: Negative for cough and shortness of breath.    Cardiovascular: Negative for chest pain.   Gastrointestinal: Negative for nausea and vomiting.   Genitourinary: Negative for dysuria and hematuria.     Objective:     Vital Signs (Most Recent):  Temp: 97.6 °F (36.4 °C) (06/17/20 1653)  Pulse: 70 (06/17/20 1653)  Resp: 18 (06/17/20 1653)  BP: (!) 131/98 (06/17/20 1653)  SpO2: 98 % (06/17/20 1653) Vital Signs (24h Range):  Temp:  [97.6 °F (36.4 °C)-98.2 °F (36.8 °C)] 97.6 °F (36.4 °C)  Pulse:  [69-76] 70  Resp:  [17-19] 18  SpO2:  [96 %-100 %] 98 %  BP: (100-184)/(51-98) 131/98     Weight: 116.5 kg (256 lb 13.4 oz)  Body mass index is 46.98 kg/m².    Intake/Output Summary (Last 24 hours) at 6/17/2020 0976  Last data filed at 6/17/2020 1226  Gross per 24 hour   Intake 360 ml   Output 150 ml   Net  210 ml      Physical Exam  Constitutional:       General: She is not in acute distress.     Appearance: She is morbidly obese. She is not diaphoretic.   Eyes:      General: Lids are normal. No scleral icterus.        Right eye: No discharge.         Left eye: No discharge.      Conjunctiva/sclera: Conjunctivae normal.   Pulmonary:      Effort: Pulmonary effort is normal. No tachypnea, accessory muscle usage or respiratory distress.   Abdominal:      General: Abdomen is protuberant. There is no distension.   Musculoskeletal:         General: No edema (per telepresenter nurse).   Neurological:      Mental Status: She is alert and oriented to person, place, and time. She is not disoriented.   Psychiatric:         Mood and Affect: Mood and affect normal.         Behavior: Behavior is cooperative.       Significant Labs:   Recent Results (from the past 24 hour(s))   POCT glucose    Collection Time: 06/21/20  4:46 PM   Result Value Ref Range    POCT Glucose 163 (H) 70 - 110 mg/dL   POCT glucose    Collection Time: 06/21/20  7:10 PM   Result Value Ref Range    POCT Glucose 183 (H) 70 - 110 mg/dL   POCT glucose    Collection Time: 06/22/20  7:24 AM   Result Value Ref Range    POCT Glucose 214 (H) 70 - 110 mg/dL   Vancomycin, random    Collection Time: 06/22/20 11:54 AM   Result Value Ref Range    Vancomycin, Random 16.0 Not established ug/mL   CBC auto differential    Collection Time: 06/22/20 11:54 AM   Result Value Ref Range    WBC 8.98 3.90 - 12.70 K/uL    RBC 3.23 (L) 4.00 - 5.40 M/uL    Hemoglobin 9.3 (L) 12.0 - 16.0 g/dL    Hematocrit 31.0 (L) 37.0 - 48.5 %    Mean Corpuscular Volume 96 82 - 98 fL    Mean Corpuscular Hemoglobin 28.8 27.0 - 31.0 pg    Mean Corpuscular Hemoglobin Conc 30.0 (L) 32.0 - 36.0 g/dL    RDW 15.0 (H) 11.5 - 14.5 %    Platelets 408 (H) 150 - 350 K/uL    MPV 9.2 9.2 - 12.9 fL    Immature Granulocytes 0.2 0.0 - 0.5 %    Gran # (ANC) 3.9 1.8 - 7.7 K/uL    Immature Grans (Abs) 0.02 0.00 - 0.04 K/uL     Lymph # 3.7 1.0 - 4.8 K/uL    Mono # 0.9 0.3 - 1.0 K/uL    Eos # 0.4 0.0 - 0.5 K/uL    Baso # 0.04 0.00 - 0.20 K/uL    nRBC 0 0 /100 WBC    Gran% 43.7 38.0 - 73.0 %    Lymph% 40.8 18.0 - 48.0 %    Mono% 10.2 4.0 - 15.0 %    Eosinophil% 4.7 0.0 - 8.0 %    Basophil% 0.4 0.0 - 1.9 %    Differential Method Automated    Hepatic function panel    Collection Time: 06/22/20 11:54 AM   Result Value Ref Range    Total Protein 7.3 6.0 - 8.4 g/dL    Albumin 2.4 (L) 3.5 - 5.2 g/dL    Total Bilirubin 0.1 0.1 - 1.0 mg/dL    Bilirubin, Direct 0.1 0.1 - 0.3 mg/dL    AST 12 10 - 40 U/L    ALT 11 10 - 44 U/L    Alkaline Phosphatase 97 55 - 135 U/L   Renal function panel    Collection Time: 06/22/20 11:54 AM   Result Value Ref Range    Glucose 186 (H) 70 - 110 mg/dL    Sodium 137 136 - 145 mmol/L    Potassium 4.4 3.5 - 5.1 mmol/L    Chloride 108 95 - 110 mmol/L    CO2 21 (L) 23 - 29 mmol/L    BUN, Bld 77 (H) 8 - 23 mg/dL    Calcium 8.5 (L) 8.7 - 10.5 mg/dL    Creatinine 2.5 (H) 0.5 - 1.4 mg/dL    Albumin 2.4 (L) 3.5 - 5.2 g/dL    Phosphorus 4.7 (H) 2.7 - 4.5 mg/dL    eGFR if African American 21 (A) >60 mL/min/1.73 m^2    eGFR if non African American 18 (A) >60 mL/min/1.73 m^2    Anion Gap 8 8 - 16 mmol/L   Comprehensive metabolic panel    Collection Time: 06/22/20 11:54 AM   Result Value Ref Range    Sodium 137 136 - 145 mmol/L    Potassium 4.4 3.5 - 5.1 mmol/L    Chloride 108 95 - 110 mmol/L    CO2 21 (L) 23 - 29 mmol/L    Glucose 186 (H) 70 - 110 mg/dL    BUN, Bld 77 (H) 8 - 23 mg/dL    Creatinine 2.5 (H) 0.5 - 1.4 mg/dL    Calcium 8.5 (L) 8.7 - 10.5 mg/dL    Total Protein 7.3 6.0 - 8.4 g/dL    Albumin 2.4 (L) 3.5 - 5.2 g/dL    Total Bilirubin 0.1 0.1 - 1.0 mg/dL    Alkaline Phosphatase 97 55 - 135 U/L    AST 12 10 - 40 U/L    ALT 11 10 - 44 U/L    Anion Gap 8 8 - 16 mmol/L    eGFR if African American 21 (A) >60 mL/min/1.73 m^2    eGFR if non African American 18 (A) >60 mL/min/1.73 m^2   POCT glucose    Collection Time: 06/22/20  12:27 PM   Result Value Ref Range    POCT Glucose 200 (H) 70 - 110 mg/dL     Recent Labs   Lab 06/20/20  0537 06/21/20  0538 06/22/20  1154   *  132* 133* 137  137   K 4.2  4.2 4.6 4.4  4.4     100 104 108  108   CO2 25  25 20* 21*  21*   BUN 86*  86* 81* 77*  77*   CREATININE 2.9*  2.9* 2.8* 2.5*  2.5*   CALCIUM 8.1*  8.1* 8.5* 8.5*  8.5*   PROT 7.3 7.7 7.3  7.3   BILITOT 0.1 0.1 0.1  0.1   ALKPHOS 109 108 97  97   ALT 12 13 11  11   AST 13 13 12  12     Recent Labs   Lab 06/20/20  0538 06/21/20  0538 06/22/20  1154   WBC 10.09 9.17 8.98   HGB 8.8* 10.2* 9.3*   HCT 28.9* 34.3* 31.0*   * SEE COMMENT 408*   MONO 9.9  1.0 8.6  0.8 10.2  0.9         Significant Imaging:       Assessment/Plan:      Wound of right leg  See media picture.  Right anterior ankle wound x 1 month.   - On antibiotics  - Cultures NGTD    Diabetic ulcer of right heel associated with diabetes mellitus due to underlying condition, limited to breakdown of skin  Chronic osteomyelitis of the right foot with a draining sinus  See media picture.  Reports right heel pressure wound not healing x 3 months. Worsened since last week with increasing bleeding. Hx DM.  Wound with red, beefy, granular tissue and bleeding. Xray concerning for osseous infection. No leukocytosis.  Afebrile. Elevated sed rate and CRP.  MRI: Findings consistent with osteomyelitis of the calcaneus with complete to near complete disruption of the Achilles tendon.   - Vancomycin started in ED - continue with pharmacy dose  - Added Ceftriaxone  - Cultures pending (BC and Wound culture)  - ID, Wound Care, and Podiatry consulted  - plan for IV antibiotics x 6 weeks if bone cultures negative. Wound vac.   - ID recommendations: Discharge antibiotics:           Ceftriaxone 1 gram IV q 24 hours plus  Vancomycin 500 mg IV q 48 hours  - End date of IV antibiotics: 07/27/2020  - tunnel catheter placed for IV antibiotic administration on 01/06/22 by  IR    CKD (chronic kidney disease) stage 4, GFR 15-29 ml/min  sCr and BUN elevated 2.2 and 57.  Baseline sCr around 2 with previous value of 1.7 on 6/2018.   Patient reports she was referred to Nephrologist a long time ago but she hasn't seen anyone yet.   - Renal dose all medication. Avoid nephrotoxin if possible.   - nephrology consult due to rising BUN/Cr.  Hold ARB and lasix; repeat urinalysis reflexed to culture with E. Coli ESBL not sensistive to rocephin - discuss with ID who feel it is an asymptomatic bacteriuria  - Vein mapping done prior to line placement recommendations by Vascular Surgery      Hyperlipidemia  Continue statin    History of CVA (cerebrovascular accident)  Hx stroke 1986 with left side weakness per patient report. Reports not getting out of bed much since 4/2010 due to wound at right heel.    - Patient is on ASA and statin. Continue ASA/Statin  - PT/OT evaluation    Morbid obesity   Body mass index is 46.98 kg/m²..  Weight loss outpatient    Hypothyroidism  TSH wnl. Continue home management with levothyroxine    Essential hypertension  Uncontrolled. Continue home meds BB  - ARB held initially due to worsening kidney function  - losartan and Lasix held due to worsening renal function.   -add norvasc 6/21 due to elevated BP    Type 2 diabetes mellitus, uncontrolled, with renal complications  On insulin (70/30) 40 units BID at home (2018). Patient reports she often has episodes of hypoglycemia of BS 60's.   The patient's hyperglycemia while IP is managed with a SQ prandial and correction dose insulin regimen. Monitoring BGs.  Increased aspart and adding basal insulin 6/16  Increased basal and prandial insulin 6/17, 6/18, 6/20, 6/21    High Risk Conditions:    Patient is currently on drug therapy requiring intensive monitoring for toxicity: Vancomycin    VTE Risk Mitigation (From admission, onward)         Ordered     IP VTE HIGH RISK PATIENT  Once      06/13/20 0036     Place sequential  compression device  Until discontinued      06/13/20 0036     Place MADISON hose  Until discontinued      06/13/20 0036                  I have assessed these finding virtually using telemed platform and with assistance of bedside nurse       End time:  120p    Total time spent with patient: 5 min    The attending portion of this evaluation, treatment, and documentation was performed per Funmilayo Guillermo MD via audiovisual.    Discharge disposition:  Home health versus skilled nursing facility with a wound VAC; patient is medically ready for discharge    Level 1 11165 Total visit time was 15 minutes or greater with greater than 50% of time spent in counseling and coordination of care.      Funmilayo Guillermo MD  Department of Hospital Medicine   [facility]  [phone]

## 2020-06-22 NOTE — PLAN OF CARE
"Chart reviewed to address discharge needs and to update discharge plan; current discharge plan is for patient to discharge home with home health and IV infusion per daughter; does not want SNF placement out of fear for Covid; TN spoke with patient to give update on plan; reiterated difference in care between HH and SNF; informed that in SNF, care, therapy and IV abx will be done daily by professionals vs Home Health where care would need to be taught; pt stated that she prefers SNF for daily care; called daughter on phone who still insist on Home because of Covid; TN spoke with daughter while on phone; acknowledged her concerns and educated on probability of "catching covid" is present in all situations, hospital, community, SNF and even home with home health; TN informed patient that she will allow her to decide wth her daughter on best plan for patient and will revisit later for final decision; MD made aware 0 changes in previously given information      Dispo: pending patient's decision on HH vs SNF; referrals sent [facility]; pending review       06/22/20 4744   Discharge Reassessment   Assessment Type Discharge Planning Reassessment   Provided patient/caregiver education on the expected discharge date and the discharge plan Yes   Do you have any problems affording any of your prescribed medications? TBD   Discharge Plan A Skilled Nursing Facility   Discharge Plan B Home Health   DME Needed Upon Discharge  walker, rolling;bedside commode;hospital bed   Patient choice form signed by patient/caregiver Yes   Anticipated Discharge Disposition   (TBD)   Can the patient/caregiver answer the patient profile reliably? Yes, cognitively intact   How does the patient rate their overall health at the present time? Fair   Describe the patient's ability to walk at the present time. Major restrictions/daily assistance from another person   How often would a person be available to care for the patient? Whenever needed "   During the past month, has the patient often been bothered by feeling down, depressed or hopeless? No   During the past month, has the patient often been bothered by little interest or pleasure in doing things? No   Post-Acute Status   Post-Acute Authorization Placement  (SNF)   Post-Acute Placement Status Referrals Sent   Patient choice form signed by patient/caregiver List from System Post-Acute Care   Discharge Delays None known at this time

## 2020-06-22 NOTE — PROGRESS NOTES
Progress Note    Admit Date: 6/12/2020   LOS: 10 days     SUBJECTIVE:     Follow-up For:  Patient seen bedside. Heel protector boot in place to right foot only.     6/16/20: Plan for wound VAC application today. Heel protector boots in place B/L     06/19/2020  Patient seem at bedside resting comfortably.  VAC in place.  Complains of occasional heel pain and some decreased appetite.  No new pedal complaints    6/22/20: Patient seen bedside. Heel protector boots in place.     Scheduled Meds:   amLODIPine  2.5 mg Oral Daily    aspirin  81 mg Oral Daily    carvediloL  25 mg Oral BID WM    cefTRIAXone (ROCEPHIN) IVPB  1 g Intravenous Q24H    collagenase   Topical (Top) Daily    famotidine  20 mg Oral Daily    gabapentin  300 mg Oral TID    insulin aspart U-100  10 Units Subcutaneous TIDWM    insulin detemir U-100  16 Units Subcutaneous BID    levothyroxine  75 mcg Oral Before breakfast    lidocaine  2 patch Transdermal Q24H    rosuvastatin  20 mg Oral Daily    senna-docusate 8.6-50 mg  1 tablet Oral BID     Continuous Infusions:    PRN Meds:acetaminophen, acetaminophen, dextrose 50%, dextrose 50%, glucagon (human recombinant), glucose, glucose, hydrALAZINE, insulin aspart U-100, ondansetron, oxyCODONE, sodium chloride 0.9%, Pharmacy to dose Vancomycin consult **AND** vancomycin - pharmacy to dose    Review of patient's allergies indicates:   Allergen Reactions    Corticosteroids (glucocorticoids) Edema       Review of Systems  Constitutional: Negative for chills and fever.   Cardiovascular: Negative for leg swelling.   Gastrointestinal: Negative for nausea and vomiting.   Musculoskeletal: Positive for gait problem.   Skin: Positive for color change and wound.     OBJECTIVE:     Vital Signs (Most Recent)  Temp: 98.4 °F (36.9 °C) (06/22/20 1657)  Pulse: 77 (06/22/20 1657)  Resp: 17 (06/22/20 1657)  BP: (!) 142/59 (06/22/20 1657)  SpO2: (!) 93 % (06/22/20 1657)    Vital Signs Range (Last 24H):  Temp:  [97.1  °F (36.2 °C)-98.6 °F (37 °C)]   Pulse:  [59-87]   Resp:  [16-22]   BP: (100-178)/(46-89)   SpO2:  [93 %-100 %]     I & O (Last 24H):    Intake/Output Summary (Last 24 hours) at 6/22/2020 1715  Last data filed at 6/22/2020 1500  Gross per 24 hour   Intake 1525 ml   Output 1460 ml   Net 65 ml     Foot Exam     Right Foot/Ankle      Inspection and Palpation  Ecchymosis: none  Tenderness: (Some tenderness to the heel)  Swelling: (Mild edema)  Skin Exam: ulcer;      Neurovascular  Dorsalis pedis: absent  Posterior tibial: 1+  Saphenous nerve sensation: diminished  Tibial nerve sensation: diminished  Superficial peroneal nerve sensation: diminished  Deep peroneal nerve sensation: diminished  Sural nerve sensation: diminished        Left Foot/Ankle       Inspection and Palpation  Ecchymosis: none  Tenderness: none   Swelling: none      Neurovascular  Dorsalis pedis: absent  Posterior tibial: 1+  Saphenous nerve sensation: diminished  Tibial nerve sensation: diminished  Superficial peroneal nerve sensation: diminished  Deep peroneal nerve sensation: diminished  Sural nerve sensation: diminished         Wound 1: Right medial heel ulcer  Measurement: 8.7rhb5hgq5.5cm  Base: fibrogranular   Periwound skin: Rolled borders.   Drainage: sanguinous   Erythema: none   Probe: probe to bone.     Wound 2: Right dorsal foot   Measurement: 1fcb7thy2.2cm  Base: fibrogranular   Periwound skin:rolled borders   Drainage: none  Erythema: mild  Probe: none, no bone exposed    6/22/20:    S/p debridement                   06/19/2020                 6/15/20:                  Laboratory:  CBC:   Recent Labs   Lab 06/22/20  1154   WBC 8.98   RBC 3.23*   HGB 9.3*   HCT 31.0*   *   MCV 96   MCH 28.8   MCHC 30.0*     CMP:   Recent Labs   Lab 06/22/20  1154   *  186*   CALCIUM 8.5*  8.5*   ALBUMIN 2.4*  2.4*  2.4*   PROT 7.3  7.3     137   K 4.4  4.4   CO2 21*  21*     108   BUN 77*  77*   CREATININE 2.5*  2.5*    ALKPHOS 97  97   ALT 11  11   AST 12  12   BILITOT 0.1  0.1     Microbiology Results (last 7 days)     Procedure Component Value Units Date/Time    Urine culture [[id number]]  (Abnormal)  (Susceptibility) Collected: 06/18/20 1115    Order Status: Completed Specimen: Urine Updated: 06/20/20 1120     Urine Culture, Routine ESBL Results called to and read back by:ELY SOTO 06/20/2020  09:33      KLEBSIELLA PNEUMONIAE ESBL  >100,000 cfu/ml      Narrative:      Preferred Collection Type->Urine, Clean Catch  Specimen Source->Urine    Blood culture [[id number]] Collected: 06/15/20 0624    Order Status: Completed Specimen: Blood from Antecubital, Right Updated: 06/19/20 0703     Blood Culture, Routine No Growth after 4 days.     Aerobic culture [[id number]] Collected: 06/13/20 1405    Order Status: Completed Specimen: Wound from Foot, Right Updated: 06/17/20 1108     Aerobic Bacterial Culture No growth    Culture, Anaerobe [[id number]] Collected: 06/13/20 1405    Order Status: Completed Specimen: Wound from Foot, Right Updated: 06/17/20 0810     Anaerobic Culture No anaerobes isolated    Blood Culture #1 **CANNOT BE ORDERED STAT** [[id number]] Collected: 06/12/20 2325    Order Status: Completed Specimen: Blood from Peripheral, Antecubital, Right Updated: 06/17/20 0503     Blood Culture, Routine No Growth after 4 days.     Blood Culture #2 **CANNOT BE ORDERED STAT** [[id number]]  (Abnormal) Collected: 06/12/20 2339    Order Status: Completed Specimen: Blood from Peripheral, Hand, Right Updated: 06/16/20 0748     Blood Culture, Routine Gram stain aer bottle: Gram positive cocci in clusters resembling Staph       Results called to and read back by: Nataliia Weber  06/13/2020  22:41      COAGULASE-NEGATIVE STAPHYLOCOCCUS SPECIES  Organism is a probable contaminant      AFB Culture & Smear [[id number]] Collected: 06/13/20 1405    Order Status: Completed Specimen: Wound from Foot, Right Updated: 06/15/20 2127     AFB Culture &  Smear Culture in progress     AFB CULTURE STAIN No acid fast bacilli seen.      ,   Specimen (12h ago, onward)    None            Diagnostic Results:  X-Ray Foot Complete Right (Final result)  Result time 06/12/20 20:16:36   Procedure changed from X-Ray Foot Complete Left                 Final result by Jacky Torres MD (06/12/20 20:16:36)                               Impression:        Findings consistent with soft tissue wound at the posterior aspect of the heel overlying the posterior calcaneus, there is subtle irregularity of the posterior and posterosuperior margin of the calcaneus underlying the aforementioned soft tissue abnormality, and the possibility of osseous infectious involvement would be difficult to exclude given this appearance.  Clinical and historical correlation otherwise needed.        Electronically signed by:     Jacky Torres  Date:                                            06/12/2020  Time:                                            20:16                         Narrative:     EXAMINATION:  XR FOOT COMPLETE 3 VIEW RIGHT     CLINICAL HISTORY:  diabetic foot ulcer;. Type 2 diabetes mellitus with foot ulcer     TECHNIQUE:  AP, lateral, and oblique views of the right foot were performed.     COMPARISON:  None     FINDINGS:  Radiographic examination of the right foot was performed.  3 radiographs are submitted.  There is appearance of may relate to soft tissue injury of the soft tissues posterior to the calcaneus at the level of the heel, and there is general appearance suggesting soft tissue swelling about the foot.  On the lateral view there is some lucency associated with the posterior aspect of the calcaneus, the possibility of demineralization is considered although this lucency could relate to diminished soft tissue attenuation secondary to soft tissue wound or injury.  There is slight irregularity along the posterior and posterosuperior margin of the posterior calcaneus, given the  history of diabetic foot ulcer, the possibility of osseous involvement along the posterior and posterosuperior edge of the calcaneus would be difficult to exclude, there are some curvilinear calcifications within the soft tissues adjacent to this, that may relate to osseous involvement as well.  The remainder of the visualized osseous structures demonstrate chronic change, there is no additional evidence for osseous destructive process, acute fracture or dislocation.  Vascular calcifications are noted.                        ASSESSMENT/PLAN:     Right heel OM  CKD stage 4    Plan:      After verbal consent, a sterile curette  was used to excisionally debride viable and non viable tissue on the plantar aspect of the right heel and dorsal foot. Tissue debrided through epidermis, dermis, and subcutaneous tissue. Tissue excised included epidermis, dermis, sucutaneous tissue, fibrin, biofilm, and callus.. The wound was cleansed with saline.     Wound VAC to right heel and dorsal foot ulcerations. Applied without problems with seal, adequate suction noted. Covered with dry sterile dressing. Wound vac change every 2-3 days. Set to 125 mmhg. Wound care orders below.    S/p bone biopsy, debridement - 6/13/20.     Per Vascular surgery: Patient appears to have adequate perfusion to heal wound.     Abx per ID: ceftriaxone and vancomycin for 6 weeks    SNF vs. LTAC vs HH for IV abx, wound care.     Podiatry will follow.     Heel protector boots in place B/L     Wounds can be managed outpatient    Plan for wound VAC change on 6/24/20    Wound care orders right foot- to be done every 2-3 days.   1. Cleanse wound with saline. Pat dry  2. Apply wound VAC to right heel. Set to 125 mmhg.   3. Apply iodosorb to right dorsal foot ulceration cover with non adherent foam.   4. Wrap with kerlix. Secure with tape.     F/u WB wound care within one week of discharge first available appt.

## 2020-06-22 NOTE — PROGRESS NOTES
Pharmacokinetic Assessment Follow Up: IV Vancomycin    Vancomycin serum concentration assessment(s):    The random level was drawn correctly and can be used to guide therapy at this time. The measurement is within the desired definitive target range of 10 to 20 mcg/mL.    Vancomycin Regimen Plan:    Re-dose when the random level is less than 20 mcg/mL, next level to be drawn at AM Labs on 6/23/2020    Drug levels (last 3 results):  Recent Labs   Lab Result Units 06/20/20  0538 06/21/20  0539 06/22/20  1154   Vancomycin, Random ug/mL 18.1 20.5 16.0       Pharmacy will continue to follow and monitor vancomycin.    Please contact pharmacy at extension [phone] for questions regarding this assessment.    Thank you for the consult,   Kasie Mooney       Patient brief summary:  Tasneem Lynn is a 74 y.o. female initiated on antimicrobial therapy with IV Vancomycin for treatment of skin & soft tissue infection    The patient's current regimen is pulse dose     Drug Allergies:   Review of patient's allergies indicates:   Allergen Reactions    Corticosteroids (glucocorticoids) Edema       Actual Body Weight:   116.5 kg     Renal Function:   Estimated Creatinine Clearance: 23.9 mL/min (A) (based on SCr of 2.5 mg/dL (H)).,     Dialysis Method (if applicable):  N/A    CBC (last 72 hours):  Recent Labs   Lab Result Units 06/20/20  0538 06/21/20  0538 06/22/20  1154   WBC K/uL 10.09 9.17 8.98   Hemoglobin g/dL 8.8* 10.2* 9.3*   Hematocrit % 28.9* 34.3* 31.0*   Platelets K/uL 366* SEE COMMENT 408*   Gran% % 41.5 47.6 43.7   Lymph% % 43.1 38.8 40.8   Mono% % 9.9 8.6 10.2   Eosinophil% % 4.5 4.3 4.7   Basophil% % 0.5 0.4 0.4   Differential Method  Automated Automated Automated       Metabolic Panel (last 72 hours):  Recent Labs   Lab Result Units 06/20/20  0537 06/21/20  0538 06/22/20  1154   Sodium mmol/L 132*  132* 133* 137  137   Potassium mmol/L 4.2  4.2 4.6 4.4  4.4   Chloride mmol/L 100  100 104 108  108   CO2 mmol/L 25   25 20* 21*  21*   Glucose mg/dL 226*  226* 237* 186*  186*   BUN, Bld mg/dL 86*  86* 81* 77*  77*   Creatinine mg/dL 2.9*  2.9* 2.8* 2.5*  2.5*   Albumin g/dL 2.3*  2.3* 2.5* 2.4*  2.4*  2.4*   Total Bilirubin mg/dL 0.1 0.1 0.1  0.1   Alkaline Phosphatase U/L 109 108 97  97   AST U/L 13 13 12  12   ALT U/L 12 13 11  11   Phosphorus mg/dL 5.0*  5.0* 4.7* 4.7*       Vancomycin Administrations:  vancomycin given in the last 96 hours                   vancomycin 500 mg in dextrose 5 % 100 mL IVPB (ready to mix system) (mg) 500 mg New Bag 06/20/20 0952                Microbiologic Results:  Microbiology Results (last 7 days)     Procedure Component Value Units Date/Time    Urine culture [[id number]]  (Abnormal)  (Susceptibility) Collected: 06/18/20 1115    Order Status: Completed Specimen: Urine Updated: 06/20/20 1120     Urine Culture, Routine ESBL Results called to and read back by:ELY SOTO 06/20/2020  09:33      KLEBSIELLA PNEUMONIAE ESBL  >100,000 cfu/ml      Narrative:      Preferred Collection Type->Urine, Clean Catch  Specimen Source->Urine    Blood culture [[id number]] Collected: 06/15/20 0624    Order Status: Completed Specimen: Blood from Antecubital, Right Updated: 06/19/20 0703     Blood Culture, Routine No Growth after 4 days.     Aerobic culture [[id number]] Collected: 06/13/20 1405    Order Status: Completed Specimen: Wound from Foot, Right Updated: 06/17/20 1108     Aerobic Bacterial Culture No growth    Culture, Anaerobe [[id number]] Collected: 06/13/20 1405    Order Status: Completed Specimen: Wound from Foot, Right Updated: 06/17/20 0810     Anaerobic Culture No anaerobes isolated    Blood Culture #1 **CANNOT BE ORDERED STAT** [[id number]] Collected: 06/12/20 2325    Order Status: Completed Specimen: Blood from Peripheral, Antecubital, Right Updated: 06/17/20 0503     Blood Culture, Routine No Growth after 4 days.     Blood Culture #2 **CANNOT BE ORDERED STAT** [[id number]]  (Abnormal)  Collected: 06/12/20 2339    Order Status: Completed Specimen: Blood from Peripheral, Hand, Right Updated: 06/16/20 0748     Blood Culture, Routine Gram stain aer bottle: Gram positive cocci in clusters resembling Staph       Results called to and read back by: Nataliia Weber  06/13/2020  22:41      COAGULASE-NEGATIVE STAPHYLOCOCCUS SPECIES  Organism is a probable contaminant      AFB Culture & Smear [[id number]] Collected: 06/13/20 1405    Order Status: Completed Specimen: Wound from Foot, Right Updated: 06/15/20 2417     AFB Culture & Smear Culture in progress     AFB CULTURE STAIN No acid fast bacilli seen.

## 2020-06-22 NOTE — PT/OT/SLP PROGRESS
Occupational Therapy      Patient Name:  Tasneem Lynn   MRN:  [id number]    Patient not seen today secondary to Unavailable (Pt off unit to IR for procedure). Will follow-up as able.    13:36 2nd attempt: Pt unavailable 2* wound care. Will follow-up as able.    TASHA Barreto  6/22/2020

## 2020-06-22 NOTE — BRIEF OP NOTE
Radiology Post-Procedure Note    Pre Op Diagnosis: Chronic osteomyelitis  Post Op Diagnosis: Same    Procedure: US and fluoroscopic-guided placement of a 26.0-cm RIJV-approach TCVC    Procedure performed by: Dre De La Rosa MD    Written Informed Consent Obtained: Yes  Specimen Removed: NO  Estimated Blood Loss: Minimal    Findings:   Successful US and fluoroscopic-guided placement of a 26.0-cm RIJV-approach TCVC under moderate conscious sedation. Patient tolerated the procedure well. No immediate post-procedural complications noted.     Keep HOB elevated 30 degrees or more for 2 hours post-op bed rest. Catheter is ready for use immediately.     Thank you for considering IR for the care of your patient.     Dre De La Rosa MD  Interventional Radiology

## 2020-06-22 NOTE — PLAN OF CARE
Pt AAOx4, VSS, denies pain, remains free of fall, voids per purewick, cardiac monitoring, wound vac set on cont. Intact.    Problem: Fall Injury Risk  Goal: Absence of Fall and Fall-Related Injury  Outcome: Ongoing, Progressing  Intervention: Identify and Manage Contributors to Fall Injury Risk  Flowsheets (Taken 6/22/2020 1530)  Self-Care Promotion:   independence encouraged   BADL personal objects within reach   BADL personal routines maintained  Medication Review/Management: medications reviewed  Intervention: Promote Injury-Free Environment  Flowsheets (Taken 6/22/2020 1530)  Safety Promotion/Fall Prevention:   assistive device/personal item within reach   bed alarm set   side rails raised x 3   room near unit station   medications reviewed   nonskid shoes/socks when out of bed   Fall Risk reviewed with patient/family  Environmental Safety Modification:   assistive device/personal items within reach   clutter free environment maintained     Problem: Wound  Goal: Optimal Wound Healing  Outcome: Ongoing, Progressing  Intervention: Promote Effective Wound Healing  Flowsheets (Taken 6/22/2020 1530)  Pain Management Interventions:   quiet environment facilitated   pillow support provided   pain management plan reviewed with patient/caregiver   care clustered     Problem: Diabetes Comorbidity  Goal: Blood Glucose Level Within Desired Range  Outcome: Ongoing, Progressing  Intervention: Maintain Glycemic Control  Flowsheets (Taken 6/22/2020 1530)  Glycemic Management: blood glucose monitoring     Problem: Infection  Goal: Infection Symptom Resolution  Outcome: Ongoing, Progressing  Intervention: Prevent or Manage Infection  Flowsheets (Taken 6/22/2020 1530)  Fever Reduction/Comfort Measures: medication administered  Infection Management: aseptic technique maintained

## 2020-06-22 NOTE — CONSULTS
Inpatient Radiology Pre-procedure Note    History of Present Illness:  Tasneem Lynn is a 74 y.o. female with PMHx of HLPD, CHF, HTN, DMII and stage IV CKD and admitted with chronic osteomyelitis of right calcaneous requiring long-term central venous access for 6 wks outapatient IV ABx therapy recommended by ID. Peripheral venous access being avoiding given stage IV CKD in order to preserve future access for HD if needed.     A new inpatient IR consult placed to assess for placcement of a tunneled CVC.    Admission H&P reviewed.  Past Medical History:   Diagnosis Date    Arthritis     Gout    CHF (congestive heart failure)     Coronary artery disease     Diabetes mellitus     HLD (hyperlipidemia)     Hypertension     Obese     Stroke     1986    Thyroid disease      Past Surgical History:   Procedure Laterality Date    right hand surgery      right knee surgery Right     partial plate     Review of Systems:   As documented in primary team H&P    Home Meds:   Prior to Admission medications    Medication Sig Start Date End Date Taking? Authorizing Provider   carvedilol (COREG) 25 MG tablet Take 25 mg by mouth 2 (two) times daily with meals.   Yes Historical Provider, MD   insulin NPH-insulin regular, 70/30, (NOVOLIN 70/30) 100 unit/mL (70-30) injection Inject 40 Units into the skin 2 (two) times daily. 12/3/18  Yes Mary Carmen Lopez MD   levothyroxine (SYNTHROID) 75 MCG tablet Take 1 tablet (75 mcg total) by mouth before breakfast. 12/19/18  Yes Mary Carmen Lopez MD   losartan (COZAAR) 50 MG tablet Take 1 tablet (50 mg total) by mouth once daily. 12/14/18  Yes Mary Carmen Lopez MD   rosuvastatin (CRESTOR) 20 MG tablet Take 1 tablet (20 mg total) by mouth once daily. 12/14/18  Yes Mary Carmen Lopez MD   acetaminophen (TYLENOL) 325 MG tablet Take 2 tablets (650 mg total) by mouth every 6 (six) hours as needed. 11/11/19   Tip Ramsey MD   aspirin 81 MG Chew Take 81 mg by mouth once daily.    Historical Provider,  MD   baclofen (LIORESAL) 5 mg Tab tablet Take 1 tablet (5 mg total) by mouth 3 (three) times daily. 11/11/19   Tip Ramsey MD   blood pressure test kit-large Kit Use to check blood pressure daily and as needed. 8/30/18   Mary Carmen Lopez MD   cetirizine (ZYRTEC) 10 MG tablet Take 1 tablet (10 mg total) by mouth every evening. 12/21/18 12/21/19  Mary Carmen Lopez MD   cloNIDine (CATAPRES) 0.1 MG tablet Take 1 tablet (0.1 mg total) by mouth 3 (three) times daily. Blood pressure 12/3/18 12/3/19  Mary Carmen Lopez MD   diclofenac (VOLTAREN) 75 MG EC tablet Take 1 tablet (75 mg total) by mouth 2 (two) times daily. 1/5/18   Low Leos MD   ergocalciferol (ERGOCALCIFEROL) 50,000 unit Cap Take 1 capsule (50,000 Units total) by mouth every 7 days. 12/19/18   Mary Carmen Lopez MD   furosemide (LASIX) 20 MG tablet Take 1 tablet (20 mg total) by mouth once daily. 12/3/18 11/11/19  Mary Carmen Lopez MD   gabapentin (NEURONTIN) 300 MG capsule Take 1 capsule (300 mg total) by mouth 3 (three) times daily. 3/20/19 3/19/20  Mary Carmen Lopez MD   HYDROcodone-acetaminophen (NORCO) 7.5-325 mg per tablet Take 1 tablet by mouth every 12 (twelve) hours as needed (severe pain). 3/20/19   Mary Carmen Lopez MD     Scheduled Meds:    amLODIPine  2.5 mg Oral Daily    aspirin  81 mg Oral Daily    carvediloL  25 mg Oral BID WM    cefTRIAXone (ROCEPHIN) IVPB  1 g Intravenous Q24H    collagenase   Topical (Top) Daily    famotidine  20 mg Oral Daily    gabapentin  300 mg Oral TID    insulin aspart U-100  9 Units Subcutaneous TIDWM    insulin detemir U-100  15 Units Subcutaneous BID    levothyroxine  75 mcg Oral Before breakfast    lidocaine  2 patch Transdermal Q24H    rosuvastatin  20 mg Oral Daily    senna-docusate 8.6-50 mg  1 tablet Oral BID     Continuous Infusions:   PRN Meds:acetaminophen, acetaminophen, dextrose 50%, dextrose 50%, glucagon (human recombinant), glucose, glucose, hydrALAZINE, insulin aspart U-100,  ondansetron, oxyCODONE, sodium chloride 0.9%, Pharmacy to dose Vancomycin consult **AND** vancomycin - pharmacy to dose     Anticoagulants/Antiplatelets: no anticoagulation    Allergies:   Review of patient's allergies indicates:   Allergen Reactions    Corticosteroids (glucocorticoids) Edema     Sedation Hx: have not been any systemic reactions    Labs:  No results for input(s): INR in the last 168 hours.    Invalid input(s):  PT,  PTT    Recent Labs   Lab 06/21/20 0538   WBC 9.17   HGB 10.2*   HCT 34.3*   MCV 95   PLT SEE COMMENT      Recent Labs   Lab 06/21/20 0538   *   *   K 4.6      CO2 20*   BUN 81*   CREATININE 2.8*   CALCIUM 8.5*   ALT 13   AST 13   ALBUMIN 2.5*   BILITOT 0.1     Vitals:  Temp: 98.3 °F (36.8 °C) (06/22/20 0724)  Pulse: 87 (06/22/20 0724)  Resp: 18 (06/22/20 0724)  BP: (!) 156/67 (06/22/20 0724)  SpO2: 100 % (06/22/20 0724)     Physical Exam:  ASA: III  Mallampati: II    General: no acute distress  Mental Status: alert and oriented to person, place and time  HEENT: normocephalic, atraumatic  Chest: unlabored breathing  Heart: regular heart rate  Abdomen: nondistended  Extremity: moves all extremities    A/P:  74 y.o. female with PMHx of HLPD, CHF, HTN, DMII and stage IV CKD and admitted with chronic osteomyelitis of right calcaneous requiring long-term central venous access for 6 wks outapatient IV ABx therapy recommended by ID. Peripheral venous access being avoiding given stage IV CKD in order to preserve future access for HD if needed.     1. Chronic osteomyelitis with plans for outpatient IV ABx therapy - Will attempt placcement of a RIJV-approach tunneled CVC under moderate conscious sedation.    Risks (including, but not limited to, pain, bleeding, infection, damage to nearby structures, failure to obtain sufficient material for a diagnosis, the need for additional procedures, and death), benefits, and alternatives were discussed with the patient. All questions  were answered to the best of my abilities. The patient wishes to proceed with the procedure. Written informed consent was obtained.    Thank you for considering IR for the care of your patient.     Dre De La Rosa MD  Interventional Radiology

## 2020-06-23 PROBLEM — E87.5 HYPERKALEMIA: Status: RESOLVED | Noted: 2020-06-18 | Resolved: 2020-06-23

## 2020-06-23 LAB
ALBUMIN SERPL BCP-MCNC: 2.2 G/DL (ref 3.5–5.2)
ANION GAP SERPL CALC-SCNC: 5 MMOL/L (ref 8–16)
BASOPHILS # BLD AUTO: 0.04 K/UL (ref 0–0.2)
BASOPHILS NFR BLD: 0.4 % (ref 0–1.9)
BUN SERPL-MCNC: 82 MG/DL (ref 8–23)
CALCIUM SERPL-MCNC: 8.2 MG/DL (ref 8.7–10.5)
CHLORIDE SERPL-SCNC: 107 MMOL/L (ref 95–110)
CO2 SERPL-SCNC: 24 MMOL/L (ref 23–29)
CREAT SERPL-MCNC: 2.8 MG/DL (ref 0.5–1.4)
DIFFERENTIAL METHOD: ABNORMAL
EOSINOPHIL # BLD AUTO: 0.4 K/UL (ref 0–0.5)
EOSINOPHIL NFR BLD: 4.3 % (ref 0–8)
ERYTHROCYTE [DISTWIDTH] IN BLOOD BY AUTOMATED COUNT: 15.2 % (ref 11.5–14.5)
EST. GFR  (AFRICAN AMERICAN): 18 ML/MIN/1.73 M^2
EST. GFR  (NON AFRICAN AMERICAN): 16 ML/MIN/1.73 M^2
GLUCOSE SERPL-MCNC: 126 MG/DL (ref 70–110)
HCT VFR BLD AUTO: 27.8 % (ref 37–48.5)
HGB BLD-MCNC: 8.2 G/DL (ref 12–16)
IMM GRANULOCYTES # BLD AUTO: 0.02 K/UL (ref 0–0.04)
IMM GRANULOCYTES NFR BLD AUTO: 0.2 % (ref 0–0.5)
LYMPHOCYTES # BLD AUTO: 4.1 K/UL (ref 1–4.8)
LYMPHOCYTES NFR BLD: 44.9 % (ref 18–48)
MCH RBC QN AUTO: 28.2 PG (ref 27–31)
MCHC RBC AUTO-ENTMCNC: 29.5 G/DL (ref 32–36)
MCV RBC AUTO: 96 FL (ref 82–98)
MONOCYTES # BLD AUTO: 1.1 K/UL (ref 0.3–1)
MONOCYTES NFR BLD: 12.3 % (ref 4–15)
NEUTROPHILS # BLD AUTO: 3.5 K/UL (ref 1.8–7.7)
NEUTROPHILS NFR BLD: 37.9 % (ref 38–73)
NRBC BLD-RTO: 0 /100 WBC
PHOSPHATE SERPL-MCNC: 4.9 MG/DL (ref 2.7–4.5)
PLATELET # BLD AUTO: 355 K/UL (ref 150–350)
PMV BLD AUTO: 9.4 FL (ref 9.2–12.9)
POCT GLUCOSE: 109 MG/DL (ref 70–110)
POCT GLUCOSE: 154 MG/DL (ref 70–110)
POCT GLUCOSE: 199 MG/DL (ref 70–110)
POCT GLUCOSE: 226 MG/DL (ref 70–110)
POTASSIUM SERPL-SCNC: 4.7 MMOL/L (ref 3.5–5.1)
RBC # BLD AUTO: 2.91 M/UL (ref 4–5.4)
SODIUM SERPL-SCNC: 136 MMOL/L (ref 136–145)
VANCOMYCIN SERPL-MCNC: 21.9 UG/ML
WBC # BLD AUTO: 9.13 K/UL (ref 3.9–12.7)

## 2020-06-23 PROCEDURE — 99233 SBSQ HOSP IP/OBS HIGH 50: CPT | Mod: HCNC,,, | Performed by: SURGERY

## 2020-06-23 PROCEDURE — 99233 PR SUBSEQUENT HOSPITAL CARE,LEVL III: ICD-10-PCS | Mod: HCNC,,, | Performed by: SURGERY

## 2020-06-23 PROCEDURE — 97803 MED NUTRITION INDIV SUBSEQ: CPT | Mod: HCNC

## 2020-06-23 PROCEDURE — 99233 PR SUBSEQUENT HOSPITAL CARE,LEVL III: ICD-10-PCS | Mod: GT,HCNC,, | Performed by: INTERNAL MEDICINE

## 2020-06-23 PROCEDURE — 25000003 PHARM REV CODE 250: Mod: HCNC | Performed by: INTERNAL MEDICINE

## 2020-06-23 PROCEDURE — 25000003 PHARM REV CODE 250: Mod: HCNC | Performed by: EMERGENCY MEDICINE

## 2020-06-23 PROCEDURE — 80069 RENAL FUNCTION PANEL: CPT | Mod: HCNC

## 2020-06-23 PROCEDURE — 25000003 PHARM REV CODE 250: Mod: HCNC | Performed by: NURSE PRACTITIONER

## 2020-06-23 PROCEDURE — 97110 THERAPEUTIC EXERCISES: CPT | Mod: HCNC,CQ

## 2020-06-23 PROCEDURE — 11000001 HC ACUTE MED/SURG PRIVATE ROOM: Mod: HCNC

## 2020-06-23 PROCEDURE — 85025 COMPLETE CBC W/AUTO DIFF WBC: CPT | Mod: HCNC

## 2020-06-23 PROCEDURE — 80202 ASSAY OF VANCOMYCIN: CPT | Mod: HCNC

## 2020-06-23 PROCEDURE — 97110 THERAPEUTIC EXERCISES: CPT | Mod: HCNC

## 2020-06-23 PROCEDURE — 63600175 PHARM REV CODE 636 W HCPCS: Mod: HCNC | Performed by: NURSE PRACTITIONER

## 2020-06-23 PROCEDURE — 99233 SBSQ HOSP IP/OBS HIGH 50: CPT | Mod: GT,HCNC,, | Performed by: INTERNAL MEDICINE

## 2020-06-23 RX ORDER — OXYCODONE HCL 5 MG/5 ML
2.5 SOLUTION, ORAL ORAL EVERY 8 HOURS PRN
Status: DISCONTINUED | OUTPATIENT
Start: 2020-06-23 | End: 2020-06-24 | Stop reason: HOSPADM

## 2020-06-23 RX ORDER — SODIUM CHLORIDE 9 MG/ML
INJECTION, SOLUTION INTRAVENOUS CONTINUOUS
Status: ACTIVE | OUTPATIENT
Start: 2020-06-23 | End: 2020-06-23

## 2020-06-23 RX ADMIN — ACETAMINOPHEN 650 MG: 325 TABLET ORAL at 06:06

## 2020-06-23 RX ADMIN — CEFTRIAXONE 1 G: 1 INJECTION, SOLUTION INTRAVENOUS at 01:06

## 2020-06-23 RX ADMIN — CARVEDILOL 25 MG: 6.25 TABLET, FILM COATED ORAL at 08:06

## 2020-06-23 RX ADMIN — ROSUVASTATIN CALCIUM 20 MG: 10 TABLET, COATED ORAL at 08:06

## 2020-06-23 RX ADMIN — GABAPENTIN 300 MG: 300 CAPSULE ORAL at 08:06

## 2020-06-23 RX ADMIN — FAMOTIDINE 20 MG: 20 TABLET ORAL at 08:06

## 2020-06-23 RX ADMIN — SODIUM CHLORIDE: 0.9 INJECTION, SOLUTION INTRAVENOUS at 11:06

## 2020-06-23 RX ADMIN — COLLAGENASE SANTYL: 250 OINTMENT TOPICAL at 08:06

## 2020-06-23 RX ADMIN — LEVOTHYROXINE SODIUM 75 MCG: 75 TABLET ORAL at 06:06

## 2020-06-23 RX ADMIN — INSULIN ASPART 10 UNITS: 100 INJECTION, SOLUTION INTRAVENOUS; SUBCUTANEOUS at 05:06

## 2020-06-23 RX ADMIN — INSULIN ASPART 10 UNITS: 100 INJECTION, SOLUTION INTRAVENOUS; SUBCUTANEOUS at 08:06

## 2020-06-23 RX ADMIN — INSULIN ASPART 10 UNITS: 100 INJECTION, SOLUTION INTRAVENOUS; SUBCUTANEOUS at 01:06

## 2020-06-23 RX ADMIN — INSULIN ASPART 2 UNITS: 100 INJECTION, SOLUTION INTRAVENOUS; SUBCUTANEOUS at 05:06

## 2020-06-23 RX ADMIN — SODIUM CHLORIDE: 0.9 INJECTION, SOLUTION INTRAVENOUS at 05:06

## 2020-06-23 RX ADMIN — STANDARDIZED SENNA CONCENTRATE AND DOCUSATE SODIUM 1 TABLET: 8.6; 5 TABLET ORAL at 08:06

## 2020-06-23 RX ADMIN — ASPIRIN 81 MG 81 MG: 81 TABLET ORAL at 08:06

## 2020-06-23 RX ADMIN — AMLODIPINE BESYLATE 2.5 MG: 2.5 TABLET ORAL at 08:06

## 2020-06-23 RX ADMIN — LIDOCAINE 2 PATCH: 50 PATCH TOPICAL at 04:06

## 2020-06-23 NOTE — PROGRESS NOTES
[facility] Hospital Medicine  Telemedicine Progress Note    Patient Name: Tasneem Lynn  MRN: [id number]  Patient Class: IP- Inpatient   Admission Date: 6/12/2020  Length of Stay: 6 days  Attending Physician: Funmilayo Guillermo MD  Primary Care Provider: To Obtain Unable      Start time: 915a End time:  1056a   Total time spent with patient: 11 min  Chief complaint: Chronic osteomyelitis of right foot with draining sinus  The patient location is: W422/W422 A  Present with the patient at the time of the telemed/virtual assessment: telemed presenter  I have assessed these finding virtually using telemed platform and with assistance of bedside nurse   The attending portion of this evaluation, treatment, and documentation was performed per Funmilayo Guillermo MD via audiovisual.    Subjective:     Principal Problem:Chronic osteomyelitis of right foot with draining sinus    HPI:  This is a 74 y.o female with diabetes mellitus, hypertension, dyslipidemia, CHF, hypothyroidism, CKD stage 4, multiple chronic pains, obesity, and stroke (1986) who presents to the hospital with a chief complaint of worsening wound to the right heel for the last week. Patient reports her right heel wound has started 3 months ago and it seems got worse since last week with increasing bleeding. She states she believes the wound is infected. She reports she was prescribed an antibiotic (unsure name) 2 months ago by Angela James NP at an urgent care clinic but it didn't help much. She also reports another wound at her anterior right foot which is developed around 1 month ago. Patient reports she has been mostly staying on her bed and she has not been walking for 2 months due to her right heel wound. She reports the wound would bleed and have more pain if she put pressure on her right foot. She denies fever, chill, chest pain, SOB, bilateral calves/leg pain, N/V/D, cough or sick contacts at home. She reports she does not go to wound  care clinic. She states the home health nurse visited her 3 times since she started having this heel wound. The last visit was today.   In ED, COVID negative. X-ray right foot can not exclude the possibility of osseous infectious involvement.  Labs with elevated sed rate and CRP, c/s with CKD stage 4 with GFR 25 and elevated BUN&sCr 57&2.2.   Patient is admitted to inpatient with hospital medicine to further evaluation and treatment of right foot wound and EMMA    Overview/Hospital Course:  Patient admitted to the hospital for eval and treatment of R heal diabetic ulcer. MRI showed osteo.  Podiatry consulted. Patient had debridement and bone biopsy on 6/13.  IVF adminstered with improvement of BUN/Cr.     Admission CC:   Chief Complaint   Patient presents with    Wound Check     EMS reports pt c/o infected diabetic ulcer to the right heel, worsening x 3 months. denies pain     Follow up visit for: Chronic osteomyelitis of right foot with draining sinus    Interval History / Events Overnight:   Confused after oxycodone overnight and does not wish to take again; continue with tylenol for pain  encouraged patient to continue oral hydration and will resume IV fluids today due to BUN/Cr elevation  -she wishes to go to a skilled nursing facility for daily therapy her daughter wishes for her to come home due to COVID risk in a nursing home; they are discussing with case management      Review of Systems   Constitutional: Negative for fever.   Respiratory: Negative for cough and shortness of breath.    Cardiovascular: Negative for chest pain.   Gastrointestinal: Negative for nausea and vomiting.   Genitourinary: Negative for dysuria and hematuria.     Objective:   Temp:  [97.8 °F (36.6 °C)-98.4 °F (36.9 °C)] 98.4 °F (36.9 °C)  Pulse:  [70-77] 70  Resp:  [17-19] 19  SpO2:  [93 %-100 %] 93 %  BP: ()/(50-81) 117/55     Wt Readings from Last 1 Encounters:   06/13/20 1300 116.5 kg (256 lb 13.4 oz)   06/13/20 0150 116.5 kg  (256 lb 13.4 oz)   06/12/20 1848 117.9 kg (260 lb)       Intake/Output Summary (Last 24 hours) at 6/23/2020 1505  Last data filed at 6/23/2020 1253  Gross per 24 hour   Intake 770 ml   Output 410 ml   Net 360 ml        Physical Exam  Constitutional:       General: She is not in acute distress.     Appearance: She is morbidly obese. She is not diaphoretic.   Eyes:      General: Lids are normal. No scleral icterus.        Right eye: No discharge.         Left eye: No discharge.      Conjunctiva/sclera: Conjunctivae normal.   Pulmonary:      Effort: Pulmonary effort is normal. No tachypnea, accessory muscle usage or respiratory distress.   Abdominal:      General: Abdomen is protuberant. There is no distension.   Musculoskeletal:         General: No edema to LE or buttocks/thighs (per telepresenter nurse).   Neurological:      Mental Status: She is alert and oriented to person, place, and time. She is not disoriented.   Psychiatric:         Mood and Affect: Mood and affect normal.         Behavior: Behavior is cooperative.       Significant Labs:   Recent Results (from the past 24 hour(s))   POCT glucose    Collection Time: 06/22/20  4:57 PM   Result Value Ref Range    POCT Glucose 295 (H) 70 - 110 mg/dL   POCT glucose    Collection Time: 06/22/20  8:31 PM   Result Value Ref Range    POCT Glucose 242 (H) 70 - 110 mg/dL   Vancomycin, random    Collection Time: 06/23/20  5:11 AM   Result Value Ref Range    Vancomycin, Random 21.9 Not established ug/mL   CBC auto differential    Collection Time: 06/23/20  5:11 AM   Result Value Ref Range    WBC 9.13 3.90 - 12.70 K/uL    RBC 2.91 (L) 4.00 - 5.40 M/uL    Hemoglobin 8.2 (L) 12.0 - 16.0 g/dL    Hematocrit 27.8 (L) 37.0 - 48.5 %    Mean Corpuscular Volume 96 82 - 98 fL    Mean Corpuscular Hemoglobin 28.2 27.0 - 31.0 pg    Mean Corpuscular Hemoglobin Conc 29.5 (L) 32.0 - 36.0 g/dL    RDW 15.2 (H) 11.5 - 14.5 %    Platelets 355 (H) 150 - 350 K/uL    MPV 9.4 9.2 - 12.9 fL     Immature Granulocytes 0.2 0.0 - 0.5 %    Gran # (ANC) 3.5 1.8 - 7.7 K/uL    Immature Grans (Abs) 0.02 0.00 - 0.04 K/uL    Lymph # 4.1 1.0 - 4.8 K/uL    Mono # 1.1 (H) 0.3 - 1.0 K/uL    Eos # 0.4 0.0 - 0.5 K/uL    Baso # 0.04 0.00 - 0.20 K/uL    nRBC 0 0 /100 WBC    Gran% 37.9 (L) 38.0 - 73.0 %    Lymph% 44.9 18.0 - 48.0 %    Mono% 12.3 4.0 - 15.0 %    Eosinophil% 4.3 0.0 - 8.0 %    Basophil% 0.4 0.0 - 1.9 %    Differential Method Automated    Renal function panel    Collection Time: 06/23/20  5:11 AM   Result Value Ref Range    Glucose 126 (H) 70 - 110 mg/dL    Sodium 136 136 - 145 mmol/L    Potassium 4.7 3.5 - 5.1 mmol/L    Chloride 107 95 - 110 mmol/L    CO2 24 23 - 29 mmol/L    BUN, Bld 82 (H) 8 - 23 mg/dL    Calcium 8.2 (L) 8.7 - 10.5 mg/dL    Creatinine 2.8 (H) 0.5 - 1.4 mg/dL    Albumin 2.2 (L) 3.5 - 5.2 g/dL    Phosphorus 4.9 (H) 2.7 - 4.5 mg/dL    eGFR if African American 18 (A) >60 mL/min/1.73 m^2    eGFR if non African American 16 (A) >60 mL/min/1.73 m^2    Anion Gap 5 (L) 8 - 16 mmol/L   POCT glucose    Collection Time: 06/23/20  7:33 AM   Result Value Ref Range    POCT Glucose 109 70 - 110 mg/dL   POCT glucose    Collection Time: 06/23/20 11:50 AM   Result Value Ref Range    POCT Glucose 199 (H) 70 - 110 mg/dL     Recent Labs   Lab 06/20/20  0537 06/21/20  0538 06/22/20  1154 06/23/20  0511   *  132* 133* 137  137 136   K 4.2  4.2 4.6 4.4  4.4 4.7     100 104 108  108 107   CO2 25  25 20* 21*  21* 24   BUN 86*  86* 81* 77*  77* 82*   CREATININE 2.9*  2.9* 2.8* 2.5*  2.5* 2.8*   CALCIUM 8.1*  8.1* 8.5* 8.5*  8.5* 8.2*   PROT 7.3 7.7 7.3  7.3  --    BILITOT 0.1 0.1 0.1  0.1  --    ALKPHOS 109 108 97  97  --    ALT 12 13 11  11  --    AST 13 13 12  12  --      Recent Labs   Lab 06/21/20  0538 06/22/20  1154 06/23/20  0511   WBC 9.17 8.98 9.13   HGB 10.2* 9.3* 8.2*   HCT 34.3* 31.0* 27.8*   PLT SEE COMMENT 408* 355*   MONO 8.6  0.8 10.2  0.9 12.3  1.1*         Significant  Imaging:       Assessment/Plan:        Patient Active Problem List   Diagnosis    Type 2 diabetes mellitus, uncontrolled, with renal complications    Essential hypertension    Hypothyroidism    Morbid obesity with BMI of 50.0-59.9, adult    History of CVA (cerebrovascular accident)    Chronic gout    Hyperlipidemia    CKD (chronic kidney disease) stage 4, GFR 15-29 ml/min    Osteoarthritis involving multiple joints on both sides of body    Chronic allergic rhinitis    Vitamin D deficiency    Aortic atherosclerosis    Diabetic ulcer of right heel associated with diabetes mellitus due to underlying condition, limited to breakdown of skin    Wound of right leg    Chronic osteomyelitis of right foot with draining sinus    Atherosclerosis of native arteries of right leg with ulceration of heel and midfoot    Hyperkalemia       Wound of right leg  See media picture.  Right anterior ankle wound x 1 month.   - On antibiotics  - Cultures NGTD    Diabetic ulcer of right heel associated with diabetes mellitus due to underlying condition, limited to breakdown of skin  Chronic osteomyelitis of the right foot with a draining sinus  See media picture.  Reports right heel pressure wound not healing x 3 months. Worsened since last week with increasing bleeding. Hx DM.  Wound with red, beefy, granular tissue and bleeding. Xray concerning for osseous infection. No leukocytosis.  Afebrile. Elevated sed rate and CRP.  MRI: Findings consistent with osteomyelitis of the calcaneus with complete to near complete disruption of the Achilles tendon.   - Vancomycin started in ED - continue with pharmacy dose  - Added Ceftriaxone  - Cultures pending (BC and Wound culture)  - ID, Wound Care, and Podiatry consulted  - plan for IV antibiotics x 6 weeks if bone cultures negative. Wound vac.   - ID recommendations: Discharge antibiotics:           Ceftriaxone 1 gram IV q 24 hours plus  Vancomycin 500 mg IV q 48 hours  - End date of  IV antibiotics: 07/27/2020  - tunnel catheter placed for IV antibiotic administration on 01/06/22 by IR    EMMA on CKD (chronic kidney disease) stage 4, GFR 15-29 ml/min  sCr and BUN elevated 2.2 and 57.  Baseline sCr around 2 with previous value of 1.7 on 6/2018.   Patient reports she was referred to Nephrologist a long time ago but she hasn't seen anyone yet.   - Renal dose all medication. Avoid nephrotoxin if possible.   - Vein mapping done prior to line placement recommendations by Vascular Surgery  - nephrology consult due to rising BUN/Cr.  Hold ARB and lasix; maintain hydration; received IVF with improvement but worsened when stopped so additional 1 liter given; patient states she is trying to drink; avoid hypotension  -repeat urinalysis reflexed to culture with E. Coli ESBL not sensistive to rocephin - discuss with ID who feel it is an asymptomatic bacteriuria    Hyperlipidemia  Continue statin    History of CVA (cerebrovascular accident)  Hx stroke 1986 with left side weakness per patient report. Reports not getting out of bed much since 4/2010 due to wound at right heel.    - Patient is on ASA and statin. Continue ASA/Statin  - PT/OT evaluation    Morbid obesity   Body mass index is 46.98 kg/m²..  Weight loss outpatient    Hypothyroidism  TSH wnl. Continue home management with levothyroxine    Essential hypertension  Uncontrolled. Continue home meds BB  - ARB held initially due to worsening kidney function  - losartan and Lasix held due to worsening renal function.   -add norvasc 6/21 due to elevated BP  -hold BP meds for SBP < 130    Type 2 diabetes mellitus, uncontrolled, with renal complications  On insulin (70/30) 40 units BID at home (2018). Patient reports she often has episodes of hypoglycemia of BS 60's.   The patient's hyperglycemia while IP is managed with a SQ prandial and correction dose insulin regimen. Monitoring BGs.  Increased aspart and adding basal insulin 6/16  Increased basal and  prandial insulin 6/17, 6/18, 6/20, 6/21 with improvement during stay    High Risk Conditions:  Patient is currently on drug therapy requiring intensive monitoring for toxicity: Vancomycin    VTE Risk Mitigation (From admission, onward)         Ordered     IP VTE HIGH RISK PATIENT  Once      06/13/20 0036     Place sequential compression device  Until discontinued      06/13/20 0036     Place MADISON hose  Until discontinued      06/13/20 0036                Discharge disposition:  Home health versus skilled nursing facility with a wound VAC; patient is not medically ready for discharge due to EMMA        Funmilayo Guillermo MD  Department of Hospital Medicine   [facility]  [phone]

## 2020-06-23 NOTE — PLAN OF CARE
Important Message from Medicare and discharge appeal process reviewed with pt; pt was given opportunity to ask questions; after which, verbalized understanding of rights; copy was placed in medical record chart and one copy given to pt for her review and records        06/23/20 8596   Medicare Message   Important Message from Medicare regarding Discharge Appeal Rights Given to patient/caregiver;Explained to patient/caregiver;Signed/date by patient/caregiver   Date IMM was signed 06/23/20   Time IMM was signed 1100

## 2020-06-23 NOTE — PLAN OF CARE
"  Problem: Occupational Therapy Goal  Goal: Occupational Therapy Goal  Description: Goals to be met by: 6/27/2020    Patient will increase functional independence with ADLs by performing:    Grooming while seated at sink with Set-up Assistance.  Toileting from bedside commode with Minimal Assistance for hygiene and clothing management.   Sitting at edge of bed x20 minutes with Supervision.  Supine to sit with Minimal Assistance.  Stand pivot transfers with Moderate Assistance.  Toilet transfer to bedside commode with Moderate Assistance.    Outcome: Ongoing, Not Progressing     Self-limiting behavior 2* low back pain and pain "all over." Pt sat EOB with sitting balance advancing from MAX A with posterior lean to SBA/CGA- for 4.5 min.   "

## 2020-06-23 NOTE — PROGRESS NOTES
"[facility]  Adult Nutrition  Progress Note    SUMMARY       Recommendations    1. Add 30 mL promod BID, Add 2 packets of Jarek daily  2. Offer Boost Glucose Control if intake < 50%.   2. Continue current diabetic diet.   3. Encourage good diet compliance.    Goals: Pt to consume > 50% of meals.  Nutrition Goal Status: new  Communication of RUPAL Recs: (plan of care)    Reason for Assessment    Reason For Assessment: length of stay  Diagnosis: (Diabetic foot ulcer, osteomyelitis of R foot)  Relevant Medical History: Gout, CHF, CAD, DM, HLD, HTN, Obese, Stroke, Thyroid disease  General Information Comments: Pt admitted to hospital with non-healing wounds. Pt reports fair appetite and intake consuming 50-75% of meals. Possible discharge tomorrow. NFPE 6/23-Pt appears nourished but at risk 2/2 wounds. BMI 47.  Nutrition Discharge Planning: Adequate po intake via diabetic diet.    Nutrition Risk Screen    Nutrition Risk Screen: no indicators present    Nutrition/Diet History    Spiritual, Cultural Beliefs, Holiness Practices, Values that Affect Care: no  Factors Affecting Nutritional Intake: decreased appetite    Anthropometrics    Temp: 98.4 °F (36.9 °C)  Height Method: Stated  Height: 5' 2" (157.5 cm)  Height (inches): 62 in  Weight Method: Bed Scale  Weight: 116.5 kg (256 lb 13.4 oz)  Weight (lb): 256.84 lb  Ideal Body Weight (IBW), Female: 110 lb  % Ideal Body Weight, Female (lb): 233.49 %  BMI (Calculated): 47  BMI Grade: greater than 40 - morbid obesity     Lab/Procedures/Meds    Pertinent Labs Comments: BUN 77, Crea 2.5, GFR 21, Glu 186, Codey 8.5, Phos 4.7, Alb 2.4, A1C 7.5  Pertinent Medications Comments: famotidine, insulin, levothyroxine, statin, senna, vancomycin    Estimated/Assessed Needs    Weight Used For Calorie Calculations: 116.5 kg (256 lb 13.4 oz)  Energy Calorie Requirements (kcal): 1618 kcal daily  Energy Need Method: [place]-St Jeor(x 1.0 for obesity)  Protein Requirements: 140 gm " daily  Weight Used For Protein Calculations: 116.5 kg (256 lb 13.4 oz)(1.2 gm/kg)  Fluid Requirements (mL): 1 mL/kcal or per MD  Estimated Fluid Requirement Method: RDA Method  RDA Method (mL): 1618  CHO Requirement: 202 gm daily    Nutrition Prescription Ordered    Current Diet Order: Diabetic    Evaluation of Received Nutrient/Fluid Intake    Comments: LBM 6/21  Tolerance: tolerating  % Intake of Estimated Energy Needs: 50 - 75 %  % Meal Intake: 50 - 75 %    Nutrition Risk    Level of Risk/Frequency of Follow-up: low(f/u 1x/weekly)     Assessment and Plan    Nutrition Problem  Increased nutrient needs, protein    Related to (etiology):   Wound healing    Signs and Symptoms (as evidenced by):   Non-healing wounds    Interventions(treatment strategy):  Collaboration with other providers    Nutrition Diagnosis Status:   New    Monitor and Evaluation    Food and Nutrient Intake: energy intake  Food and Nutrient Adminstration: diet order  Knowledge/Beliefs/Attitudes: food and nutrition knowledge/skill  Physical Activity and Function: nutrition-related ADLs and IADLs  Anthropometric Measurements: weight change, weight  Biochemical Data, Medical Tests and Procedures: electrolyte and renal panel, gastrointestinal profile, glucose/endocrine profile, inflammatory profile, lipid profile  Nutrition-Focused Physical Findings: overall appearance     Nutrition Follow-Up    RD Follow-up?: Yes

## 2020-06-23 NOTE — PLAN OF CARE
Recommendations    1. Add 30 mL promod BID, Add 2 packets of Jarek daily  2. Offer Boost Glucose Control if intake < 50%.   2. Continue current diabetic diet.   3. Encourage good diet compliance.    Goals: Pt to consume > 50% of meals.  Nutrition Goal Status: new

## 2020-06-23 NOTE — PROGRESS NOTES
Pharmacokinetic Assessment Follow Up: IV Vancomycin    Vancomycin serum concentration assessment(s):    The random level was drawn correctly and can be used to guide therapy at this time. The measurement is above the desired definitive target range of 10 to 20 mcg/mL.    Vancomycin Regimen Plan:    No dose today.  Re-dose when the random level is less than 20 mcg/mL, next level to be drawn at 0400 on 6/24/2020    Drug levels (last 3 results):  Recent Labs   Lab Result Units 06/21/20  0539 06/22/20  1154 06/23/20  0511   Vancomycin, Random ug/mL 20.5 16.0 21.9       Pharmacy will continue to follow and monitor vancomycin.    Please contact pharmacy at extension [phone] for questions regarding this assessment.    Thank you for the consult,   Can Samuel Jr       Patient brief summary:  Tasneem Lynn is a 74 y.o. female initiated on antimicrobial therapy with IV Vancomycin for treatment of bone/joint infection    Drug Allergies:   Review of patient's allergies indicates:   Allergen Reactions    Corticosteroids (glucocorticoids) Edema       Actual Body Weight:   116.5 kg    Renal Function:   Estimated Creatinine Clearance: 21.3 mL/min (A) (based on SCr of 2.8 mg/dL (H)).,     Dialysis Method (if applicable):  N/A    CBC (last 72 hours):  Recent Labs   Lab Result Units 06/21/20  0538 06/22/20  1154 06/23/20  0511   WBC K/uL 9.17 8.98 9.13   Hemoglobin g/dL 10.2* 9.3* 8.2*   Hematocrit % 34.3* 31.0* 27.8*   Platelets K/uL SEE COMMENT 408* 355*   Gran% % 47.6 43.7 37.9*   Lymph% % 38.8 40.8 44.9   Mono% % 8.6 10.2 12.3   Eosinophil% % 4.3 4.7 4.3   Basophil% % 0.4 0.4 0.4   Differential Method  Automated Automated Automated       Metabolic Panel (last 72 hours):  Recent Labs   Lab Result Units 06/21/20  0538 06/22/20  1154 06/23/20  0511   Sodium mmol/L 133* 137  137 136   Potassium mmol/L 4.6 4.4  4.4 4.7   Chloride mmol/L 104 108  108 107   CO2 mmol/L 20* 21*  21* 24   Glucose mg/dL 237* 186*  186* 126*   BUN, Bld  mg/dL 81* 77*  77* 82*   Creatinine mg/dL 2.8* 2.5*  2.5* 2.8*   Albumin g/dL 2.5* 2.4*  2.4*  2.4* 2.2*   Total Bilirubin mg/dL 0.1 0.1  0.1  --    Alkaline Phosphatase U/L 108 97  97  --    AST U/L 13 12  12  --    ALT U/L 13 11  11  --    Phosphorus mg/dL 4.7* 4.7* 4.9*       Vancomycin Administrations:  vancomycin given in the last 96 hours                     vancomycin 500 mg in dextrose 5 % 100 mL IVPB (ready to mix system) (mg) 500 mg New Bag 06/22/20 1448    vancomycin 500 mg in dextrose 5 % 100 mL IVPB (ready to mix system) (mg) 500 mg New Bag 06/20/20 0952                    Microbiologic Results:  Microbiology Results (last 7 days)       Procedure Component Value Units Date/Time    Urine culture [[id number]]  (Abnormal)  (Susceptibility) Collected: 06/18/20 1115    Order Status: Completed Specimen: Urine Updated: 06/20/20 1120     Urine Culture, Routine ESBL Results called to and read back by:ELY SOTO 06/20/2020  09:33      KLEBSIELLA PNEUMONIAE ESBL  >100,000 cfu/ml      Narrative:      Preferred Collection Type->Urine, Clean Catch  Specimen Source->Urine    Blood culture [[id number]] Collected: 06/15/20 0624    Order Status: Completed Specimen: Blood from Antecubital, Right Updated: 06/19/20 0703     Blood Culture, Routine No Growth after 4 days.     Aerobic culture [[id number]] Collected: 06/13/20 1405    Order Status: Completed Specimen: Wound from Foot, Right Updated: 06/17/20 1108     Aerobic Bacterial Culture No growth    Culture, Anaerobe [[id number]] Collected: 06/13/20 1405    Order Status: Completed Specimen: Wound from Foot, Right Updated: 06/17/20 0810     Anaerobic Culture No anaerobes isolated    Blood Culture #1 **CANNOT BE ORDERED STAT** [[id number]] Collected: 06/12/20 2325    Order Status: Completed Specimen: Blood from Peripheral, Antecubital, Right Updated: 06/17/20 0503     Blood Culture, Routine No Growth after 4 days.     Blood Culture #2 **CANNOT BE ORDERED STAT**  [[id number]]  (Abnormal) Collected: 06/12/20 8276    Order Status: Completed Specimen: Blood from Peripheral, Hand, Right Updated: 06/16/20 0748     Blood Culture, Routine Gram stain aer bottle: Gram positive cocci in clusters resembling Staph       Results called to and read back by: Nataliia Weber  06/13/2020  22:41      COAGULASE-NEGATIVE STAPHYLOCOCCUS SPECIES  Organism is a probable contaminant

## 2020-06-23 NOTE — PT/OT/SLP PROGRESS
Physical Therapy Treatment    Patient Name:  Tasneem Lynn   MRN:  [id number]    Recommendations:     Discharge Recommendations:  nursing facility, skilled   Discharge Equipment Recommendations: bedside commode, walker, rolling, hospital bed   Barriers to discharge: Inaccessible home and Decreased caregiver support, decreased mobility (required 3 people assistance for bed mobility today )    Assessment:     Tasneem Lynn is a 74 y.o. 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  She presents with the following impairments/functional limitations:  weakness, impaired endurance, impaired self care skills, impaired functional mobilty, gait instability, impaired balance, impaired cognition, decreased coordination, decreased upper extremity function, decreased lower extremity function, decreased safety awareness, pain, decreased ROM, impaired skin, edema, orthopedic precautions .    Rehab Prognosis: Fair-; patient would benefit from acute skilled PT services to address these deficits and reach maximum level of function.    Recent Surgery: * No surgery found *      Plan:     During this hospitalization, patient to be seen 5 x/week to address the identified rehab impairments via gait training, therapeutic activities, therapeutic exercises, neuromuscular re-education, wheelchair management/training and progress toward the following goals:    · Plan of Care Expires:  06/27/20    Subjective     Chief Complaint: fatigue and lower back pain   Patient/Family Comments/goals: pt agreeable to treatment with max encouragement and education   Pain/Comfort:  · Location - Orientation 1: lower  · Location 1: back  · Pain Addressed 1: Pre-medicate for activity, Reposition, Cessation of Activity, Nurse notified      Objective:     Communicated with nurse Ballesteros prior to session.  Patient found HOB elevated with pressure relief boots, wound vac upon PT entry to room.     General Precautions:  Standard, diabetic, fall   Orthopedic Precautions:RLE non weight bearing   Braces: N/A     Functional Mobility:  · Bed Mobility:     · Rolling Left:  max A x 2   · Rolling Right: max A x 2   · Scooting: max A x 2 to scoot anteriorly EOB , dependent x 3 to scoot laterally to HOB and to HOB in trendelenburg position   · Supine to Sit: max A x 2  HOB elevated , bedside rail   · Sit to Supine: dependence  and of 3 persons   · Balance: pt with inconsistent sitting balance, required MAX A x 2 upon sitting EOB however improve with time  (only sat for 4.5 min ). Pt required max V/T cues to maintain upright posture.        AM-PAC 6 CLICK MOBILITY  Turning over in bed (including adjusting bedclothes, sheets and blankets)?: 2  Sitting down on and standing up from a chair with arms (e.g., wheelchair, bedside commode, etc.): 1  Moving from lying on back to sitting on the side of the bed?: 2  Moving to and from a bed to a chair (including a wheelchair)?: 1  Need to walk in hospital room?: 1  Climbing 3-5 steps with a railing?: 1  Basic Mobility Total Score: 8       Therapeutic Activities and Exercises:   Pt performed seated BLE 10 reps x 2 trials:   AP, LAQ, Hip abd/add , Hip flexion (PROM BLE x 10 reps) . V/T's cues for technique and sequence.   Performed supine BLE x 10 reps : QS, AP (with heels protector on). Encourage pt to perform BLE ex's while in bed throughout the day.   Educated pt on safety awareness with all OOB mobility .    Patient left right sidelying with foam wedge , pressure relief boots placed, SCD placed to LLE  all lines intact, call button in reach, bed alarm on and nurse notified..    GOALS:   Multidisciplinary Problems     Physical Therapy Goals        Problem: Physical Therapy Goal    Goal Priority Disciplines Outcome Goal Variances Interventions   Physical Therapy Goal     PT, PT/OT Ongoing, Progressing     Description: Goals to be met by: 6/27/20     Patient will increase functional independence with  mobility by performin. Supine to sit with Set-up [place]  2. Sit to supine with Set-up [place]  3. Rolling to Left and Right with Modified [place].  4. Bed to chair transfer with Contact Guard Assistance using Slideboard  5. Wheelchair propulsion x30 feet with Supervision using bilateral uppper extremities  6. Sitting at edge of bed x20 minutes with Supervision                       Time Tracking:     PT Received On: 20  PT Start Time: 1335     PT Stop Time: 1400  PT Total Time (min): 25 min     Billable Minutes: Therapeutic Exercise 13 and Total Time 25 min co-treat with OT    Treatment Type: Treatment  PT/PTA: PTA     PTA Visit Number: 5     Tram T Jyoti, PTA  2020

## 2020-06-23 NOTE — PLAN OF CARE
Pt AAOx4, VSS, pain management reviewed with pt, voids per purewick, cardiac monitoring, RLE wound vac intact, LLE SCDs in place, remains free of fall.     Problem: Fall Injury Risk  Goal: Absence of Fall and Fall-Related Injury  Outcome: Ongoing, Progressing  Intervention: Identify and Manage Contributors to Fall Injury Risk  Flowsheets (Taken 6/23/2020 1731)  Self-Care Promotion:   independence encouraged   BADL personal objects within reach   BADL personal routines maintained  Medication Review/Management: medications reviewed  Intervention: Promote Injury-Free Environment  Flowsheets (Taken 6/23/2020 1731)  Safety Promotion/Fall Prevention:   assistive device/personal item within reach   bed alarm set   side rails raised x 3   room near unit station   nonskid shoes/socks when out of bed   medications reviewed   Fall Risk reviewed with patient/family  Environmental Safety Modification:   assistive device/personal items within reach   clutter free environment maintained     Problem: Skin Injury Risk Increased  Goal: Skin Health and Integrity  Outcome: Ongoing, Progressing  Intervention: Optimize Skin Protection  Flowsheets (Taken 6/23/2020 1731)  Pressure Reduction Techniques: weight shift assistance provided  Pressure Reduction Devices:   heel offloading device utilized   positioning supports utilized  Skin Protection:   adhesive use limited   electrode sites changed   incontinence pads utilized  Head of Bed (HOB): HOB at 20-30 degrees     Problem: Wound  Goal: Optimal Wound Healing  Outcome: Ongoing, Progressing  Intervention: Promote Effective Wound Healing  Flowsheets (Taken 6/23/2020 1731)  Pain Management Interventions:   quiet environment facilitated   pain management plan reviewed with patient/caregiver   pillow support provided     Problem: Diabetes Comorbidity  Goal: Blood Glucose Level Within Desired Range  Outcome: Ongoing, Progressing  Intervention: Maintain Glycemic Control  Flowsheets (Taken  6/23/2020 1731)  Glycemic Management: blood glucose monitoring     Problem: Infection  Goal: Infection Symptom Resolution  Outcome: Ongoing, Progressing  Intervention: Prevent or Manage Infection  Flowsheets (Taken 6/23/2020 1731)  Infection Management: aseptic technique maintained

## 2020-06-23 NOTE — PROGRESS NOTES
Awake alert oriented NAD    Denies CNS ENT CP GI  RHEUM OR DERM SX  Past Medical History:   Diagnosis Date    Arthritis     Gout    CHF (congestive heart failure)     Coronary artery disease     Diabetes mellitus     HLD (hyperlipidemia)     Hypertension     Obese     Stroke     1986    Thyroid disease      Review of patient's allergies indicates:   Allergen Reactions    Corticosteroids (glucocorticoids) Edema       Current Facility-Administered Medications   Medication    acetaminophen tablet 650 mg    acetaminophen tablet 650 mg    amLODIPine tablet 2.5 mg    aspirin chewable tablet 81 mg    carvediloL tablet 25 mg    cefTRIAXone (ROCEPHIN) 1 g/50 mL D5W IVPB    collagenase ointment    dextrose 50% injection 12.5 g    dextrose 50% injection 25 g    famotidine tablet 20 mg    gabapentin capsule 300 mg    glucagon (human recombinant) injection 1 mg    glucose chewable tablet 16 g    glucose chewable tablet 24 g    hydrALAZINE injection 10 mg    insulin aspart U-100 pen 0-5 Units    insulin aspart U-100 pen 10 Units    insulin detemir U-100 pen 16 Units    levothyroxine tablet 75 mcg    lidocaine 5 % patch 2 patch    ondansetron injection 4 mg    oxyCODONE immediate release tablet 5 mg    rosuvastatin tablet 20 mg    senna-docusate 8.6-50 mg per tablet 1 tablet    sodium chloride 0.9% flush 10 mL    vancomycin - pharmacy to dose       LABS    Recent Results (from the past 24 hour(s))   Vancomycin, random    Collection Time: 06/22/20 11:54 AM   Result Value Ref Range    Vancomycin, Random 16.0 Not established ug/mL   CBC auto differential    Collection Time: 06/22/20 11:54 AM   Result Value Ref Range    WBC 8.98 3.90 - 12.70 K/uL    RBC 3.23 (L) 4.00 - 5.40 M/uL    Hemoglobin 9.3 (L) 12.0 - 16.0 g/dL    Hematocrit 31.0 (L) 37.0 - 48.5 %    Mean Corpuscular Volume 96 82 - 98 fL    Mean Corpuscular Hemoglobin 28.8 27.0 - 31.0 pg    Mean Corpuscular Hemoglobin Conc 30.0 (L) 32.0 -  36.0 g/dL    RDW 15.0 (H) 11.5 - 14.5 %    Platelets 408 (H) 150 - 350 K/uL    MPV 9.2 9.2 - 12.9 fL    Immature Granulocytes 0.2 0.0 - 0.5 %    Gran # (ANC) 3.9 1.8 - 7.7 K/uL    Immature Grans (Abs) 0.02 0.00 - 0.04 K/uL    Lymph # 3.7 1.0 - 4.8 K/uL    Mono # 0.9 0.3 - 1.0 K/uL    Eos # 0.4 0.0 - 0.5 K/uL    Baso # 0.04 0.00 - 0.20 K/uL    nRBC 0 0 /100 WBC    Gran% 43.7 38.0 - 73.0 %    Lymph% 40.8 18.0 - 48.0 %    Mono% 10.2 4.0 - 15.0 %    Eosinophil% 4.7 0.0 - 8.0 %    Basophil% 0.4 0.0 - 1.9 %    Differential Method Automated    Hepatic function panel    Collection Time: 06/22/20 11:54 AM   Result Value Ref Range    Total Protein 7.3 6.0 - 8.4 g/dL    Albumin 2.4 (L) 3.5 - 5.2 g/dL    Total Bilirubin 0.1 0.1 - 1.0 mg/dL    Bilirubin, Direct 0.1 0.1 - 0.3 mg/dL    AST 12 10 - 40 U/L    ALT 11 10 - 44 U/L    Alkaline Phosphatase 97 55 - 135 U/L   Renal function panel    Collection Time: 06/22/20 11:54 AM   Result Value Ref Range    Glucose 186 (H) 70 - 110 mg/dL    Sodium 137 136 - 145 mmol/L    Potassium 4.4 3.5 - 5.1 mmol/L    Chloride 108 95 - 110 mmol/L    CO2 21 (L) 23 - 29 mmol/L    BUN, Bld 77 (H) 8 - 23 mg/dL    Calcium 8.5 (L) 8.7 - 10.5 mg/dL    Creatinine 2.5 (H) 0.5 - 1.4 mg/dL    Albumin 2.4 (L) 3.5 - 5.2 g/dL    Phosphorus 4.7 (H) 2.7 - 4.5 mg/dL    eGFR if African American 21 (A) >60 mL/min/1.73 m^2    eGFR if non African American 18 (A) >60 mL/min/1.73 m^2    Anion Gap 8 8 - 16 mmol/L   Comprehensive metabolic panel    Collection Time: 06/22/20 11:54 AM   Result Value Ref Range    Sodium 137 136 - 145 mmol/L    Potassium 4.4 3.5 - 5.1 mmol/L    Chloride 108 95 - 110 mmol/L    CO2 21 (L) 23 - 29 mmol/L    Glucose 186 (H) 70 - 110 mg/dL    BUN, Bld 77 (H) 8 - 23 mg/dL    Creatinine 2.5 (H) 0.5 - 1.4 mg/dL    Calcium 8.5 (L) 8.7 - 10.5 mg/dL    Total Protein 7.3 6.0 - 8.4 g/dL    Albumin 2.4 (L) 3.5 - 5.2 g/dL    Total Bilirubin 0.1 0.1 - 1.0 mg/dL    Alkaline Phosphatase 97 55 - 135 U/L    AST 12  10 - 40 U/L    ALT 11 10 - 44 U/L    Anion Gap 8 8 - 16 mmol/L    eGFR if African American 21 (A) >60 mL/min/1.73 m^2    eGFR if non African American 18 (A) >60 mL/min/1.73 m^2   POCT glucose    Collection Time: 06/22/20 12:27 PM   Result Value Ref Range    POCT Glucose 200 (H) 70 - 110 mg/dL   POCT glucose    Collection Time: 06/22/20  4:57 PM   Result Value Ref Range    POCT Glucose 295 (H) 70 - 110 mg/dL   POCT glucose    Collection Time: 06/22/20  8:31 PM   Result Value Ref Range    POCT Glucose 242 (H) 70 - 110 mg/dL   Vancomycin, random    Collection Time: 06/23/20  5:11 AM   Result Value Ref Range    Vancomycin, Random 21.9 Not established ug/mL   CBC auto differential    Collection Time: 06/23/20  5:11 AM   Result Value Ref Range    WBC 9.13 3.90 - 12.70 K/uL    RBC 2.91 (L) 4.00 - 5.40 M/uL    Hemoglobin 8.2 (L) 12.0 - 16.0 g/dL    Hematocrit 27.8 (L) 37.0 - 48.5 %    Mean Corpuscular Volume 96 82 - 98 fL    Mean Corpuscular Hemoglobin 28.2 27.0 - 31.0 pg    Mean Corpuscular Hemoglobin Conc 29.5 (L) 32.0 - 36.0 g/dL    RDW 15.2 (H) 11.5 - 14.5 %    Platelets 355 (H) 150 - 350 K/uL    MPV 9.4 9.2 - 12.9 fL    Immature Granulocytes 0.2 0.0 - 0.5 %    Gran # (ANC) 3.5 1.8 - 7.7 K/uL    Immature Grans (Abs) 0.02 0.00 - 0.04 K/uL    Lymph # 4.1 1.0 - 4.8 K/uL    Mono # 1.1 (H) 0.3 - 1.0 K/uL    Eos # 0.4 0.0 - 0.5 K/uL    Baso # 0.04 0.00 - 0.20 K/uL    nRBC 0 0 /100 WBC    Gran% 37.9 (L) 38.0 - 73.0 %    Lymph% 44.9 18.0 - 48.0 %    Mono% 12.3 4.0 - 15.0 %    Eosinophil% 4.3 0.0 - 8.0 %    Basophil% 0.4 0.0 - 1.9 %    Differential Method Automated    Renal function panel    Collection Time: 06/23/20  5:11 AM   Result Value Ref Range    Glucose 126 (H) 70 - 110 mg/dL    Sodium 136 136 - 145 mmol/L    Potassium 4.7 3.5 - 5.1 mmol/L    Chloride 107 95 - 110 mmol/L    CO2 24 23 - 29 mmol/L    BUN, Bld 82 (H) 8 - 23 mg/dL    Calcium 8.2 (L) 8.7 - 10.5 mg/dL    Creatinine 2.8 (H) 0.5 - 1.4 mg/dL    Albumin 2.2 (L)  3.5 - 5.2 g/dL    Phosphorus 4.9 (H) 2.7 - 4.5 mg/dL    eGFR if African American 18 (A) >60 mL/min/1.73 m^2    eGFR if non African American 16 (A) >60 mL/min/1.73 m^2    Anion Gap 5 (L) 8 - 16 mmol/L   POCT glucose    Collection Time: 06/23/20  7:33 AM   Result Value Ref Range    POCT Glucose 109 70 - 110 mg/dL   ]    I/O last 3 completed shifts:  In: 1815 [P.O.:720; I.V.:995; IV Piggyback:100]  Out: 1670 [Urine:1660; Other:10]    Vitals:    06/22/20 2034 06/23/20 0029 06/23/20 0400 06/23/20 0700   BP: (!) 91/50 (!) 158/70 (!) 122/58 123/81   Pulse: 73 74 74 76   Resp: 17 17 17 18   Temp: 97.8 °F (36.6 °C) 98.4 °F (36.9 °C) 98.3 °F (36.8 °C) 98.2 °F (36.8 °C)   TempSrc: Oral Oral Oral Oral   SpO2: (!) 93% (!) 94% 100% (!) 93%   Weight:       Height:           No Jvd, Thyromegaly or Lymphadenopathy  Lungs: Fairly clear anteriorly and laterally  Cor: RRR no G or rubs  Abd: Soft benign good bowel sounds non tender  Ext: Pos edema    A)    Non Oliguric EMMA with great UO but creat up a notch  CKD 4  (R) foot chronic osteomyelitis  HTN lowish now would hold all bp meds for systolic < 130  DMT2  Anemia of cKD     P)  Renal Diet  Home meds  Adjust all meds to the degree of renal fx  Close follow up I/O and weights  Maintain Hydration   Would hold gabapentin for now

## 2020-06-23 NOTE — PT/OT/SLP PROGRESS
"Occupational Therapy   Treatment    Name: Tasneem Lynn  MRN: [id number]  Admitting Diagnosis:  Chronic osteomyelitis of right foot with draining sinus       Recommendations:     Discharge Recommendations: nursing facility, skilled  Discharge Equipment Recommendations:  hospital bed, wheelchair, bedside commode(cindy lift)  Barriers to discharge:  Decreased caregiver support(RLE NWB; dependent x2 or 3 staff for bed mobility)    Assessment:     Tasneem Lynn is a 74 y.o. female with a medical diagnosis of Chronic osteomyelitis of right foot with draining sinus.  Performance deficits affecting function are weakness, impaired endurance, decreased ROM, decreased coordination, orthopedic precautions, impaired self care skills, decreased upper extremity function, impaired functional mobilty, decreased lower extremity function, impaired skin, gait instability, decreased safety awareness, edema, impaired balance, pain.     Self-limiting behavior 2* low back pain and pain "all over." Pt sat EOB with sitting balance advancing from MAX A with posterior lean to SBA/CGA- for 4.5 min.     Rehab Prognosis:  Fair -; patient would benefit from acute skilled OT services to address these deficits and reach maximum level of function.       Plan:     Patient to be seen 5 x/week to address the above listed problems via self-care/home management, therapeutic exercises, therapeutic activities  · Plan of Care Expires: 06/27/20  · Plan of Care Reviewed with: patient    Subjective     Chief complaint: pain all over and wanting to lay back down despite max encouragement   Patient's comments/goals: "I talked to 3 doctors and they said I need to sit up in the chair" -time taken to edu pt on goals and first goal is to tolerate sitting EOB and scooting with ultimate goal to safely transfer to and from a chair.     Pain/Comfort:  · Pain Rating 1: ("pain all over" - did not rate)  · Pain Addressed 1: Reposition, Distraction  · Pain Rating Post-Intervention " 1: (pt appeared comfortable at rest)    Objective:     Communicated with: nurseLesli, prior to session.  Patient found HOB elevated with bed alarm, wound vac, peripheral IV, SCD, telemetry, pressure relief boots, PureWick, central line upon OT entry to room.    General Precautions: Standard, diabetic, fall   Orthopedic Precautions:RLE non weight bearing   Braces: N/A     Occupational Performance:     Bed Mobility:    · Patient completed Rolling/Turning to Left with  dependent, 2 persons and with side rail  · Patient completed Rolling/Turning to Right with dependent, 2 persons and with side rail  · Patient completed Scooting laterally at EOB with dependent and 3 persons  · Patient completed Bridging with dependent and 3 persons with bed in Trendelenburg position  · Patient completed Supine to Sit with maximal assistance, 2 persons, with side rail and HOB elevated  · Patient completed Sit to Supine with dependent and 3 persons     Functional Mobility/Transfers:  · Functional Mobility: Not appropriate to assess at this time.     Activities of Daily Living:  · Grooming: attempted to distract pt while seated EOB to brush her teeth, but pt declined        AMPAC 6 Click ADL: 12    Treatment & Education:  · Pt re-educated on OT role/POC.   · Importance of EOB activity with staff assistance.  · Safety during bed mobility   · Pt presents with BUE mild to moderate BUE edema; OT edu pt on edema management and BUE AROM  · Pt completed 1x15 BUE AROM: digits flexion/extension, 1x10 elbow flexion/extension, 1x5 LUE shoulder flexion/extension, pt resistive with R shoulder movement d/t central line placement yesterday/pain in the area. Pt encouraged to complete these exercises throughout the day, daily   · Multiple self-care tasks/functional mobility completed- assistance level noted above   · All questions/concerns answered within OT scope of practice       Patient left HOB elevated L sidelying with RUE elevated on pillow, SCDs  on, pressure relief boots on, with Purewick in place, wound vac in place, all lines intact, call button in reach, bed alarm on, nurseLesli, notified and all needs met.Education:      GOALS:   Multidisciplinary Problems     Occupational Therapy Goals        Problem: Occupational Therapy Goal    Goal Priority Disciplines Outcome Interventions   Occupational Therapy Goal     OT, PT/OT Ongoing, Not Progressing    Description: Goals to be met by: 6/27/2020    Patient will increase functional independence with ADLs by performing:    Grooming while seated at sink with Set-up Assistance.  Toileting from bedside commode with Minimal Assistance for hygiene and clothing management.   Sitting at edge of bed x20 minutes with Supervision.  Supine to sit with Minimal Assistance.  Stand pivot transfers with Moderate Assistance.  Toilet transfer to bedside commode with Moderate Assistance.                     Time Tracking:     OT Date of Treatment: 06/23/20  OT Start Time: 1327  OT Stop Time: 1350  OT Total Time (min): 23 min    Billable Minutes:Therapeutic Exercise 10 min  Total Time 23 min (co-treat with PTA)    Marie Beltre OT  6/23/2020

## 2020-06-23 NOTE — PLAN OF CARE
Problem: Physical Therapy Goal  Goal: Physical Therapy Goal  Description: Goals to be met by: 20     Patient will increase functional independence with mobility by performin. Supine to sit with Set-up [place]  2. Sit to supine with Set-up [place]  3. Rolling to Left and Right with Modified [place].  4. Bed to chair transfer with Contact Guard Assistance using Slideboard  5. Wheelchair propulsion x30 feet with Supervision using bilateral uppper extremities  6. Sitting at edge of bed x20 minutes with Supervision      Outcome: Ongoing, Not Progressing

## 2020-06-23 NOTE — PLAN OF CARE
Problem: Fall Injury Risk  Goal: Absence of Fall and Fall-Related Injury  Outcome: Ongoing, Progressing     Problem: Skin Injury Risk Increased  Goal: Skin Health and Integrity  Outcome: Ongoing, Progressing     Problem: Adult Inpatient Plan of Care  Goal: Plan of Care Review  Outcome: Ongoing, Progressing     Problem: Wound  Goal: Optimal Wound Healing  Outcome: Ongoing, Progressing     Problem: Diabetes Comorbidity  Goal: Blood Glucose Level Within Desired Range  Outcome: Ongoing, Progressing     Problem: Infection  Goal: Infection Symptom Resolution  Outcome: Ongoing, Progressing   No falls, trauma or injury this shift. Blood glucose monitored before meals and at hour of sleep. Wound vac in place with suction at 125. Continue with plan of care.

## 2020-06-24 VITALS
OXYGEN SATURATION: 97 % | SYSTOLIC BLOOD PRESSURE: 174 MMHG | DIASTOLIC BLOOD PRESSURE: 78 MMHG | RESPIRATION RATE: 20 BRPM | BODY MASS INDEX: 50.91 KG/M2 | WEIGHT: 276.69 LBS | HEIGHT: 62 IN | TEMPERATURE: 99 F | HEART RATE: 79 BPM

## 2020-06-24 LAB
ALBUMIN SERPL BCP-MCNC: 2.2 G/DL (ref 3.5–5.2)
ANION GAP SERPL CALC-SCNC: 5 MMOL/L (ref 8–16)
BASOPHILS # BLD AUTO: 0.04 K/UL (ref 0–0.2)
BASOPHILS NFR BLD: 0.4 % (ref 0–1.9)
BUN SERPL-MCNC: 81 MG/DL (ref 8–23)
CALCIUM SERPL-MCNC: 8.2 MG/DL (ref 8.7–10.5)
CHLORIDE SERPL-SCNC: 109 MMOL/L (ref 95–110)
CO2 SERPL-SCNC: 22 MMOL/L (ref 23–29)
CREAT SERPL-MCNC: 2.7 MG/DL (ref 0.5–1.4)
DIFFERENTIAL METHOD: ABNORMAL
EOSINOPHIL # BLD AUTO: 0.4 K/UL (ref 0–0.5)
EOSINOPHIL NFR BLD: 4.3 % (ref 0–8)
ERYTHROCYTE [DISTWIDTH] IN BLOOD BY AUTOMATED COUNT: 15 % (ref 11.5–14.5)
EST. GFR  (AFRICAN AMERICAN): 19 ML/MIN/1.73 M^2
EST. GFR  (NON AFRICAN AMERICAN): 17 ML/MIN/1.73 M^2
GLUCOSE SERPL-MCNC: 205 MG/DL (ref 70–110)
HCT VFR BLD AUTO: 28.1 % (ref 37–48.5)
HGB BLD-MCNC: 8.3 G/DL (ref 12–16)
IMM GRANULOCYTES # BLD AUTO: 0.03 K/UL (ref 0–0.04)
IMM GRANULOCYTES NFR BLD AUTO: 0.3 % (ref 0–0.5)
LYMPHOCYTES # BLD AUTO: 4.1 K/UL (ref 1–4.8)
LYMPHOCYTES NFR BLD: 41.4 % (ref 18–48)
MCH RBC QN AUTO: 28.3 PG (ref 27–31)
MCHC RBC AUTO-ENTMCNC: 29.5 G/DL (ref 32–36)
MCV RBC AUTO: 96 FL (ref 82–98)
MONOCYTES # BLD AUTO: 0.9 K/UL (ref 0.3–1)
MONOCYTES NFR BLD: 9.5 % (ref 4–15)
NEUTROPHILS # BLD AUTO: 4.4 K/UL (ref 1.8–7.7)
NEUTROPHILS NFR BLD: 44.1 % (ref 38–73)
NRBC BLD-RTO: 0 /100 WBC
PHOSPHATE SERPL-MCNC: 4.4 MG/DL (ref 2.7–4.5)
PLATELET # BLD AUTO: 349 K/UL (ref 150–350)
PMV BLD AUTO: 9.3 FL (ref 9.2–12.9)
POCT GLUCOSE: 179 MG/DL (ref 70–110)
POCT GLUCOSE: 200 MG/DL (ref 70–110)
POCT GLUCOSE: 221 MG/DL (ref 70–110)
POTASSIUM SERPL-SCNC: 4.8 MMOL/L (ref 3.5–5.1)
RBC # BLD AUTO: 2.93 M/UL (ref 4–5.4)
SODIUM SERPL-SCNC: 136 MMOL/L (ref 136–145)
VANCOMYCIN SERPL-MCNC: 17.8 UG/ML
WBC # BLD AUTO: 9.93 K/UL (ref 3.9–12.7)

## 2020-06-24 PROCEDURE — 97530 THERAPEUTIC ACTIVITIES: CPT | Mod: HCNC

## 2020-06-24 PROCEDURE — 99239 HOSP IP/OBS DSCHRG MGMT >30: CPT | Mod: HCNC,,, | Performed by: INTERNAL MEDICINE

## 2020-06-24 PROCEDURE — 97535 SELF CARE MNGMENT TRAINING: CPT | Mod: HCNC,CO

## 2020-06-24 PROCEDURE — 25000003 PHARM REV CODE 250: Mod: HCNC | Performed by: EMERGENCY MEDICINE

## 2020-06-24 PROCEDURE — 99232 PR SUBSEQUENT HOSPITAL CARE,LEVL II: ICD-10-PCS | Mod: HCNC,,, | Performed by: PODIATRIST

## 2020-06-24 PROCEDURE — 25000003 PHARM REV CODE 250: Mod: HCNC | Performed by: INTERNAL MEDICINE

## 2020-06-24 PROCEDURE — 94761 N-INVAS EAR/PLS OXIMETRY MLT: CPT | Mod: HCNC

## 2020-06-24 PROCEDURE — 63600175 PHARM REV CODE 636 W HCPCS: Mod: HCNC | Performed by: NURSE PRACTITIONER

## 2020-06-24 PROCEDURE — 85025 COMPLETE CBC W/AUTO DIFF WBC: CPT | Mod: HCNC

## 2020-06-24 PROCEDURE — 25000003 PHARM REV CODE 250: Mod: HCNC | Performed by: NURSE PRACTITIONER

## 2020-06-24 PROCEDURE — 80202 ASSAY OF VANCOMYCIN: CPT | Mod: HCNC

## 2020-06-24 PROCEDURE — 80069 RENAL FUNCTION PANEL: CPT | Mod: HCNC

## 2020-06-24 PROCEDURE — 99239 PR HOSPITAL DISCHARGE DAY,>30 MIN: ICD-10-PCS | Mod: HCNC,,, | Performed by: INTERNAL MEDICINE

## 2020-06-24 PROCEDURE — 99232 SBSQ HOSP IP/OBS MODERATE 35: CPT | Mod: HCNC,,, | Performed by: PODIATRIST

## 2020-06-24 RX ORDER — AMOXICILLIN 250 MG
1 CAPSULE ORAL 2 TIMES DAILY
Start: 2020-06-24

## 2020-06-24 RX ORDER — ACETAMINOPHEN 325 MG/1
650 TABLET ORAL EVERY 6 HOURS PRN
Qty: 13 TABLET | Refills: 0
Start: 2020-06-24

## 2020-06-24 RX ORDER — LIDOCAINE 50 MG/G
2 PATCH TOPICAL DAILY
Refills: 0 | Status: ON HOLD
Start: 2020-06-24 | End: 2020-07-24 | Stop reason: HOSPADM

## 2020-06-24 RX ORDER — AMLODIPINE BESYLATE 2.5 MG/1
2.5 TABLET ORAL DAILY
Qty: 30 TABLET | Refills: 11
Start: 2020-06-25 | End: 2021-06-25

## 2020-06-24 RX ORDER — FAMOTIDINE 20 MG/1
20 TABLET, FILM COATED ORAL DAILY
Qty: 30 TABLET | Refills: 11 | Status: ON HOLD
Start: 2020-06-25 | End: 2020-07-24 | Stop reason: HOSPADM

## 2020-06-24 RX ORDER — INSULIN ASPART 100 [IU]/ML
10 INJECTION, SOLUTION INTRAVENOUS; SUBCUTANEOUS
Refills: 0 | Status: ON HOLD
Start: 2020-06-24 | End: 2020-07-24 | Stop reason: HOSPADM

## 2020-06-24 RX ADMIN — STANDARDIZED SENNA CONCENTRATE AND DOCUSATE SODIUM 1 TABLET: 8.6; 5 TABLET ORAL at 08:06

## 2020-06-24 RX ADMIN — CEFTRIAXONE 1 G: 1 INJECTION, SOLUTION INTRAVENOUS at 02:06

## 2020-06-24 RX ADMIN — INSULIN ASPART 10 UNITS: 100 INJECTION, SOLUTION INTRAVENOUS; SUBCUTANEOUS at 11:06

## 2020-06-24 RX ADMIN — ACETAMINOPHEN 650 MG: 325 TABLET ORAL at 07:06

## 2020-06-24 RX ADMIN — AMLODIPINE BESYLATE 2.5 MG: 2.5 TABLET ORAL at 08:06

## 2020-06-24 RX ADMIN — INSULIN ASPART 2 UNITS: 100 INJECTION, SOLUTION INTRAVENOUS; SUBCUTANEOUS at 05:06

## 2020-06-24 RX ADMIN — LIDOCAINE 2 PATCH: 50 PATCH TOPICAL at 05:06

## 2020-06-24 RX ADMIN — CARVEDILOL 25 MG: 6.25 TABLET, FILM COATED ORAL at 08:06

## 2020-06-24 RX ADMIN — INSULIN ASPART 10 UNITS: 100 INJECTION, SOLUTION INTRAVENOUS; SUBCUTANEOUS at 05:06

## 2020-06-24 RX ADMIN — INSULIN ASPART 10 UNITS: 100 INJECTION, SOLUTION INTRAVENOUS; SUBCUTANEOUS at 08:06

## 2020-06-24 RX ADMIN — FAMOTIDINE 20 MG: 20 TABLET ORAL at 08:06

## 2020-06-24 RX ADMIN — LEVOTHYROXINE SODIUM 75 MCG: 75 TABLET ORAL at 05:06

## 2020-06-24 RX ADMIN — ASPIRIN 81 MG 81 MG: 81 TABLET ORAL at 08:06

## 2020-06-24 RX ADMIN — CARVEDILOL 25 MG: 6.25 TABLET, FILM COATED ORAL at 05:06

## 2020-06-24 RX ADMIN — COLLAGENASE SANTYL: 250 OINTMENT TOPICAL at 08:06

## 2020-06-24 RX ADMIN — ROSUVASTATIN CALCIUM 20 MG: 10 TABLET, COATED ORAL at 08:06

## 2020-06-24 NOTE — CONSULTS
SW referral received for SNF [facility]; [place] had [facility] [place] and was [facility]; pt has been accepted to [facility] thus far; information was discussed with patient and daughter Merrill

## 2020-06-24 NOTE — PLAN OF CARE
Problem: Fall Injury Risk  Goal: Absence of Fall and Fall-Related Injury  Outcome: Ongoing, Progressing     Problem: Skin Injury Risk Increased  Goal: Skin Health and Integrity  Outcome: Ongoing, Progressing     Problem: Adult Inpatient Plan of Care  Goal: Plan of Care Review  Outcome: Ongoing, Progressing     Problem: Bariatric Environmental Safety  Goal: Safety Maintained with Care  Outcome: Ongoing, Progressing     Problem: Wound  Goal: Optimal Wound Healing  Outcome: Ongoing, Progressing     Problem: Diabetes Comorbidity  Goal: Blood Glucose Level Within Desired Range  Outcome: Ongoing, Progressing     Problem: Infection  Goal: Infection Symptom Resolution  Outcome: Ongoing, Progressing

## 2020-06-24 NOTE — UM SECONDARY REVIEW
Physician Advisor Internal    IP Extended Stay > 10       Verbally discussed during LLOS meeting for hospital day 8 or greater.    LOS: approved an agreement with D/C plan          Pt prefers SNF placement at discharge, preferably not located in [place]. [facility] declined.  Accepted to 2 SNFs, only 1 will take with wound vac.  SW to discuss accepting SNF with patient, if she refuses, plan for Home with HH, IVABx and Wound Vac.  Will need to obtain auth from insurance if pt opts for SNF placement.

## 2020-06-24 NOTE — PLAN OF CARE
Problem: Occupational Therapy Goal  Goal: Occupational Therapy Goal  Description: Goals to be met by: 6/27/2020    Patient will increase functional independence with ADLs by performing:    Grooming while seated at sink with Set-up Assistance.  Toileting from bedside commode with Minimal Assistance for hygiene and clothing management.   Sitting at edge of bed x20 minutes with Supervision.  Supine to sit with Minimal Assistance.  Stand pivot transfers with Moderate Assistance.  Toilet transfer to bedside commode with Moderate Assistance.    Outcome: Ongoing, Progressing   Pt tolerated increased EOB activity today ~15 mins with SBA. Pt progressing and will benefit from continued OT services to address functional deficits. Continue OT POC as indicated.

## 2020-06-24 NOTE — PROGRESS NOTES
Awake alert oriented NAD    Denies CNS ENT CP GI  RHEUM OR DERM SX  Past Medical History:   Diagnosis Date    Arthritis     Gout    CHF (congestive heart failure)     Coronary artery disease     Diabetes mellitus     HLD (hyperlipidemia)     Hypertension     Obese     Stroke     1986    Thyroid disease      Review of patient's allergies indicates:   Allergen Reactions    Corticosteroids (glucocorticoids) Edema       Current Facility-Administered Medications   Medication    acetaminophen tablet 650 mg    acetaminophen tablet 650 mg    amLODIPine tablet 2.5 mg    aspirin chewable tablet 81 mg    carvediloL tablet 25 mg    cefTRIAXone (ROCEPHIN) 1 g/50 mL D5W IVPB    collagenase ointment    dextrose 50% injection 12.5 g    dextrose 50% injection 25 g    famotidine tablet 20 mg    glucagon (human recombinant) injection 1 mg    glucose chewable tablet 16 g    glucose chewable tablet 24 g    insulin aspart U-100 pen 0-5 Units    insulin aspart U-100 pen 10 Units    insulin detemir U-100 pen 15 Units    levothyroxine tablet 75 mcg    lidocaine 5 % patch 2 patch    ondansetron injection 4 mg    oxyCODONE 5 mg/5 mL solution 2.5 mg    rosuvastatin tablet 20 mg    senna-docusate 8.6-50 mg per tablet 1 tablet    sodium chloride 0.9% flush 10 mL    vancomycin - pharmacy to dose    vancomycin 500 mg in dextrose 5 % 100 mL IVPB (ready to mix system)       LABS    Recent Results (from the past 24 hour(s))   POCT glucose    Collection Time: 06/23/20 11:50 AM   Result Value Ref Range    POCT Glucose 199 (H) 70 - 110 mg/dL   POCT glucose    Collection Time: 06/23/20  5:14 PM   Result Value Ref Range    POCT Glucose 226 (H) 70 - 110 mg/dL   POCT glucose    Collection Time: 06/23/20  8:01 PM   Result Value Ref Range    POCT Glucose 154 (H) 70 - 110 mg/dL   Vancomycin, random    Collection Time: 06/24/20  4:59 AM   Result Value Ref Range    Vancomycin, Random 17.8 Not established ug/mL   CBC auto  differential    Collection Time: 06/24/20  4:59 AM   Result Value Ref Range    WBC 9.93 3.90 - 12.70 K/uL    RBC 2.93 (L) 4.00 - 5.40 M/uL    Hemoglobin 8.3 (L) 12.0 - 16.0 g/dL    Hematocrit 28.1 (L) 37.0 - 48.5 %    Mean Corpuscular Volume 96 82 - 98 fL    Mean Corpuscular Hemoglobin 28.3 27.0 - 31.0 pg    Mean Corpuscular Hemoglobin Conc 29.5 (L) 32.0 - 36.0 g/dL    RDW 15.0 (H) 11.5 - 14.5 %    Platelets 349 150 - 350 K/uL    MPV 9.3 9.2 - 12.9 fL    Immature Granulocytes 0.3 0.0 - 0.5 %    Gran # (ANC) 4.4 1.8 - 7.7 K/uL    Immature Grans (Abs) 0.03 0.00 - 0.04 K/uL    Lymph # 4.1 1.0 - 4.8 K/uL    Mono # 0.9 0.3 - 1.0 K/uL    Eos # 0.4 0.0 - 0.5 K/uL    Baso # 0.04 0.00 - 0.20 K/uL    nRBC 0 0 /100 WBC    Gran% 44.1 38.0 - 73.0 %    Lymph% 41.4 18.0 - 48.0 %    Mono% 9.5 4.0 - 15.0 %    Eosinophil% 4.3 0.0 - 8.0 %    Basophil% 0.4 0.0 - 1.9 %    Differential Method Automated    Renal function panel    Collection Time: 06/24/20  4:59 AM   Result Value Ref Range    Glucose 205 (H) 70 - 110 mg/dL    Sodium 136 136 - 145 mmol/L    Potassium 4.8 3.5 - 5.1 mmol/L    Chloride 109 95 - 110 mmol/L    CO2 22 (L) 23 - 29 mmol/L    BUN, Bld 81 (H) 8 - 23 mg/dL    Calcium 8.2 (L) 8.7 - 10.5 mg/dL    Creatinine 2.7 (H) 0.5 - 1.4 mg/dL    Albumin 2.2 (L) 3.5 - 5.2 g/dL    Phosphorus 4.4 2.7 - 4.5 mg/dL    eGFR if African American 19 (A) >60 mL/min/1.73 m^2    eGFR if non African American 17 (A) >60 mL/min/1.73 m^2    Anion Gap 5 (L) 8 - 16 mmol/L   POCT glucose    Collection Time: 06/24/20  7:48 AM   Result Value Ref Range    POCT Glucose 179 (H) 70 - 110 mg/dL   ]    I/O last 3 completed shifts:  In: 1453.3 [P.O.:720; I.V.:683.3; IV Piggyback:50]  Out: 1520 [Urine:1500; Other:20]    Vitals:    06/24/20 0526 06/24/20 0751 06/24/20 0849 06/24/20 1001   BP: (!) 171/74 (!) 177/74 (!) 159/57    Pulse: 83 89 87    Resp: 18 20     Temp: 98.4 °F (36.9 °C) 98.5 °F (36.9 °C)     TempSrc: Oral Oral     SpO2: (!) 94% 95%  97%   Weight:  125.5 kg (276 lb 10.8 oz)      Height:           No Jvd, Thyromegaly or Lymphadenopathy  Lungs: Fairly clear anteriorly and laterally  Cor: RRR no G or rubs  Abd: Soft benign good bowel sounds non tender  Ext: No E C C    A)    Non Oliguric EMMA with great UO but creat up and down  CKD 4  (R) foot chronic osteomyelitis  HTN lowish now would hold all bp meds for systolic < 130  DMT2  Anemia of cKD     P)  Renal Diet  Home meds  Adjust all meds to the degree of renal fx  Close follow up I/O and weights  Maintain Hydration   Would hold gabapentin for now

## 2020-06-24 NOTE — PROGRESS NOTES
[facility] called for an ETA for pt pickup for discharge. Dispatcher states that due to a high call volume they are approximately 1.5 hours behind.

## 2020-06-24 NOTE — PROGRESS NOTES
ADT 30 order placed for [employer].  Requested  time: 1800  If transportation does not arrive at ETA time nurse will be instructed to follow protocol for transportation below:   How can I get in touch directly with dispatch, if needed?                 · Non-emergent (stretcher): [phone]    · Emergent dispatch: [phone]    ++NURSING:  If Stretcher does not arrive at requested time please call the above Non Emergent Dispatcher.  If issue not resolved please escalate to your charge nurse for further instructions.    Patient to be transferred to [place]  Will be admitted to Room # 145A  Nurse to call report to [address] floor nurse station @ [phone]  Transportation time scheduled 1800

## 2020-06-24 NOTE — PROGRESS NOTES
[facility]  Vascular Surgery  Progress Note    Patient Name: Tasneem Lynn  MRN: [id number]  Admission Date: 6/12/2020  Primary Care Provider: To Obtain Unable    Subjective:     Interval History: s/p debridement R heel wound.    Post-Op Info:  * No surgery found *           Medications:  Continuous Infusions:  Scheduled Meds:   amLODIPine  2.5 mg Oral Daily    aspirin  81 mg Oral Daily    carvediloL  25 mg Oral BID WM    cefTRIAXone (ROCEPHIN) IVPB  1 g Intravenous Q24H    collagenase   Topical (Top) Daily    famotidine  20 mg Oral Daily    insulin aspart U-100  10 Units Subcutaneous TIDWM    insulin detemir U-100  15 Units Subcutaneous BID    levothyroxine  75 mcg Oral Before breakfast    lidocaine  2 patch Transdermal Q24H    rosuvastatin  20 mg Oral Daily    senna-docusate 8.6-50 mg  1 tablet Oral BID     PRN Meds:acetaminophen, acetaminophen, dextrose 50%, dextrose 50%, glucagon (human recombinant), glucose, glucose, insulin aspart U-100, ondansetron, oxyCODONE, sodium chloride 0.9%, Pharmacy to dose Vancomycin consult **AND** vancomycin - pharmacy to dose     Objective:     Vital Signs (Most Recent):  Temp: 97.9 °F (36.6 °C) (06/23/20 2000)  Pulse: 73 (06/23/20 2000)  Resp: 18 (06/23/20 2000)  BP: 127/60 (06/23/20 2000)  SpO2: 96 % (06/23/20 2000) Vital Signs (24h Range):  Temp:  [97.9 °F (36.6 °C)-98.4 °F (36.9 °C)] 97.9 °F (36.6 °C)  Pulse:  [70-78] 73  Resp:  [17-19] 18  SpO2:  [93 %-100 %] 96 %  BP: (117-158)/(55-81) 127/60     Date 06/23/20 0700 - 06/24/20 0659   Shift 1118-2352 9407-0435 9592-1439 24 Hour Total   INTAKE   P.O. 480 240  720   I.V.(mL/kg) 160(1.4) 523.3(4.5)  683.3(5.9)   Shift Total(mL/kg) 640(5.5) 763.3(6.6)  1403.3(12)   OUTPUT   Urine(mL/kg/hr) 200(0.2) 400  600   Other  10  10   Shift Total(mL/kg) 200(1.7) 410(3.5)  610(5.2)   Weight (kg) 116.5 116.5 116.5 116.5       Physical Exam  Vitals signs reviewed.   Constitutional:       General: She is not in acute  distress.     Appearance: She is well-developed and well-nourished. She is not diaphoretic.   HENT:      Head: Normocephalic and atraumatic.   Eyes:      Conjunctiva/sclera: Conjunctivae normal.   Neck:      Musculoskeletal: Neck supple.   Cardiovascular:      Rate and Rhythm: Normal rate.      Pulses:           Femoral pulses are 2+ on the right side and 2+ on the left side.       Dorsalis pedis pulses are detected w/ Doppler on the right side and detected w/ Doppler on the left side.        Posterior tibial pulses are detected w/ Doppler on the right side and detected w/ Doppler on the left side.   Pulmonary:      Effort: Pulmonary effort is normal. No respiratory distress.   Abdominal:      General: There is no distension.      Palpations: Abdomen is soft. There is no mass.      Tenderness: There is no abdominal tenderness. There is no guarding or rebound.      Hernia: No hernia is present.   Musculoskeletal: Normal range of motion.         General: Tenderness and edema present. No deformity.   Feet:      Right foot:      Skin integrity: Ulcer and skin breakdown present.   Skin:     General: Skin is warm and dry.      Capillary Refill: Capillary refill takes 2 to 3 seconds.      Coloration: Skin is not pale.      Findings: No erythema or rash.   Neurological:      Mental Status: She is alert and oriented to person, place, and time.      Sensory: Sensory deficit present.   Psychiatric:         Mood and Affect: Mood and affect normal.         Significant Labs:  All pertinent labs from the last 24 hours have been reviewed.    Significant Diagnostics:  I have reviewed all pertinent imaging results/findings within the past 24 hours.    Assessment/Plan:     Atherosclerosis of native arteries of right leg with ulceration of heel and midfoot  -Imaging reviewed. Patient appears to have adequate perfusion to heal wound. Recommend continued offloading, debridement and wound care per podiatry and optimization of  comorbidities. Risks outweigh the benefits of contrast use due to dialysis dependence risk. Will continue to discuss treatment options with patient (angiogram vs amputation)  if wound worsens or does not heal. Discussed with patient who states understanding.  Continue Abx per ID.        Regulo Bunch MD  Vascular Surgery  [facility]

## 2020-06-24 NOTE — PLAN OF CARE
Problem: Physical Therapy Goal  Goal: Physical Therapy Goal  Description: Goals to be met by: 20     Patient will increase functional independence with mobility by performin. Supine to sit with Set-up [place]  2. Sit to supine with Set-up [place]  3. Rolling to Left and Right with Modified [place].  4. Sitting at edge of bed x 30 minutes Modified Independent      Outcome: Ongoing, Progressing    5x. SNF.

## 2020-06-24 NOTE — ASSESSMENT & PLAN NOTE
-Imaging reviewed. Patient appears to have adequate perfusion to heal wound. Recommend continued offloading, debridement and wound care per podiatry and optimization of comorbidities. Risks outweigh the benefits of contrast use due to dialysis dependence risk. Will continue to discuss treatment options with patient (angiogram vs amputation)  if wound worsens or does not heal. Discussed with patient who states understanding.  Continue Abx per ID.

## 2020-06-24 NOTE — PLAN OF CARE
Patient ordered to transfer to [facility]; [place] confirmed that all orders and clinicals have been received by facility; follow up appointment list forwarded to facility via [facility]; call report information forwarded to nurse Magallanes; transportation has been arranged for 1800; Nurse Magallanes informed that all discharge planning needs have been met and patient can discharge from Case Management standpoint       06/24/20 1263   Final Note   Assessment Type Final Discharge Note   Anticipated Discharge Disposition SNF   Hospital Follow Up  Appt(s) scheduled? Yes   Discharge plans and expectations educations in teach back method with documentation complete? Yes   Right Care Referral Info   Post Acute Recommendation SNF / Sub-Acute Rehab   Referral Type SNF   Facility Name [facility]   Post-Acute Status   Post-Acute Authorization Placement   Post-Acute Placement Status Set-up Complete   Discharge Delays None known at this time

## 2020-06-24 NOTE — PT/OT/SLP PROGRESS
Occupational Therapy   Treatment    Name: Tasneem Lynn  MRN: [id number]  Admitting Diagnosis:  Chronic osteomyelitis of right foot with draining sinus       Recommendations:     Discharge Recommendations: nursing facility, skilled  Discharge Equipment Recommendations:  hospital bed, wheelchair, bedside commode(cindy lift)  Barriers to discharge:       Assessment:     Tasneem Lynn is a 74 y.o. female with a medical diagnosis of Chronic osteomyelitis of right foot with draining sinus.  She presents with the following performance deficits affecting function: weakness, impaired endurance, impaired self care skills, impaired functional mobilty, decreased upper extremity function, decreased lower extremity function, decreased ROM, edema, impaired skin, pain, orthopedic precautions, decreased safety awareness, gait instability, impaired balance.     Pt demos increased activity tolerance and participation today. Pt able to tolerate ~15 mins sitting EOB during ADL's with SBA for static/dynamic sitting balance. Pt progressing slowly and will benefit from continued OT services to address functional deficits and maximize level of functional independence.      Rehab Prognosis:  Fair; patient would benefit from acute skilled OT services to address these deficits and reach maximum level of function.       Plan:     Patient to be seen 5 x/week to address the above listed problems via self-care/home management, therapeutic exercises, therapeutic activities  · Plan of Care Expires: 06/27/20  · Plan of Care Reviewed with: patient    Subjective   Pt agreeable to therapy.    Pain/Comfort:  · Pain Rating 1: (did not rate with number)  · Location 1: neck, no other significant pain complaints  · Pain Addressed 1: Distraction, Reposition    Objective:     Communicated with: Nurse prior to session.  Patient found HOB elevated with PICC line, PureWick, pressure relief boots, telemetry, SCD, wound vac upon OT entry to room.    General Precautions:  Standard, diabetic, fall   Orthopedic Precautions:RLE non weight bearing   Braces:N/A       Occupational Performance:     · Bed Mobility:  Pt required verbal/tactile cues for proper techniques and hand placement to assist with bed mobility tasks  · Patient completed Rolling/Turning to Left with  maximal assistance  · Patient completed Rolling/Turning to Right with maximal assistance  · Patient completed Scooting/Bridging with maximal assistance to scoot anteriorly to EOB, dependent x 2 persons to scoot to HOB with bed in trendelenburg  · Patient completed Supine to Sit with moderate assistance  · Patient completed Sit to Supine with moderate assistance      Functional Mobility/Transfers: Not performed    Activities of Daily Living:  · Grooming: stand by assistance with set-up to perform oral care and wash face seated EOB, SBA for dynamic sitting balance during task  · Upper Body Dressing: maximal assistance to sergo/doff front and back gowns seated EOB      AMPA 6 Click ADL: 12    Treatment & Education:   Pt educated on OT role/POC and importance/benefits of participation with acute therapy services and EOB activity as able.   Importance of performing UE/LE therex throughout day to increase strength and endurance   Neck stretches performed in all planes 2* c/o pain and tightness, x 10 reps scapular protraction/retraction, shoulder shrugs, and fist pumps   Pt tolerated static/dynamic sitting balance sitting EOB ~ 15 with SBA during ADL's and therex. No LOB   Multiple self-care tasks/functional mobility completed- assistance level noted above    Pt verbalizes understanding of all education provided this date. All questions/concerns answered within OT scope of practice      Patient left right sidelying on wedge, B pressure relief boots placed, L SCD, pillow underneath RUE with all lines intact, call button in reach, bed alarm on and Nurse notifiedEducation:      GOALS:   Multidisciplinary Problems      Occupational Therapy Goals        Problem: Occupational Therapy Goal    Goal Priority Disciplines Outcome Interventions   Occupational Therapy Goal     OT, PT/OT Ongoing, Progressing    Description: Goals to be met by: 6/27/2020    Patient will increase functional independence with ADLs by performing:    Grooming while seated at sink with Set-up Assistance.  Toileting from bedside commode with Minimal Assistance for hygiene and clothing management.   Sitting at edge of bed x20 minutes with Supervision.  Supine to sit with Minimal Assistance.  Stand pivot transfers with Moderate Assistance.  Toilet transfer to bedside commode with Moderate Assistance.                     Time Tracking:     OT Date of Treatment: 06/24/20  OT Start Time: 0944  OT Stop Time: 1012  OT Total Time (min): 28 min    Billable Minutes:Self Care/Home Management 14 (co-tx with PT)    TASHA Barreto  6/24/2020

## 2020-06-24 NOTE — PROGRESS NOTES
Pharmacokinetic Assessment Follow Up: IV Vancomycin    Vancomycin serum concentration assessment(s):    The random level was drawn correctly and can be used to guide therapy at this time. The measurement is within the desired definitive target range of 10 to 20 mcg/mL.    Vancomycin Regimen Plan:    Give 500 mg today.  Re-dose when the random level is less than 20 mcg/mL, next level to be drawn at 0400 on 6/25/2020    Drug levels (last 3 results):  Recent Labs   Lab Result Units 06/22/20  1154 06/23/20  0511 06/24/20  0459   Vancomycin, Random ug/mL 16.0 21.9 17.8       Pharmacy will continue to follow and monitor vancomycin.    Please contact pharmacy at extension [phone] for questions regarding this assessment.    Thank you for the consult,   Can Samuel Jr       Patient brief summary:  Tasneem Lynn is a 74 y.o. female initiated on antimicrobial therapy with IV Vancomycin for treatment of bone/joint infection    Drug Allergies:   Review of patient's allergies indicates:   Allergen Reactions    Corticosteroids (glucocorticoids) Edema       Actual Body Weight:   125.5 kg    Renal Function:   Estimated Creatinine Clearance: 22.3 mL/min (A) (based on SCr of 2.8 mg/dL (H)).,     Dialysis Method (if applicable):  N/A    CBC (last 72 hours):  Recent Labs   Lab Result Units 06/22/20  1154 06/23/20  0511 06/24/20  0459   WBC K/uL 8.98 9.13 9.93   Hemoglobin g/dL 9.3* 8.2* 8.3*   Hematocrit % 31.0* 27.8* 28.1*   Platelets K/uL 408* 355* 349   Gran% % 43.7 37.9* 44.1   Lymph% % 40.8 44.9 41.4   Mono% % 10.2 12.3 9.5   Eosinophil% % 4.7 4.3 4.3   Basophil% % 0.4 0.4 0.4   Differential Method  Automated Automated Automated       Metabolic Panel (last 72 hours):  Recent Labs   Lab Result Units 06/22/20  1154 06/23/20  0511   Sodium mmol/L 137  137 136   Potassium mmol/L 4.4  4.4 4.7   Chloride mmol/L 108  108 107   CO2 mmol/L 21*  21* 24   Glucose mg/dL 186*  186* 126*   BUN, Bld mg/dL 77*  77* 82*   Creatinine mg/dL  2.5*  2.5* 2.8*   Albumin g/dL 2.4*  2.4*  2.4* 2.2*   Total Bilirubin mg/dL 0.1  0.1  --    Alkaline Phosphatase U/L 97  97  --    AST U/L 12  12  --    ALT U/L 11  11  --    Phosphorus mg/dL 4.7* 4.9*       Vancomycin Administrations:  vancomycin given in the last 96 hours                     vancomycin 500 mg in dextrose 5 % 100 mL IVPB (ready to mix system) (mg) 500 mg New Bag 06/22/20 1448    vancomycin 500 mg in dextrose 5 % 100 mL IVPB (ready to mix system) (mg) 500 mg New Bag 06/20/20 0952                    Microbiologic Results:  Microbiology Results (last 7 days)       Procedure Component Value Units Date/Time    Urine culture [[id number]]  (Abnormal)  (Susceptibility) Collected: 06/18/20 1115    Order Status: Completed Specimen: Urine Updated: 06/20/20 1120     Urine Culture, Routine ESBL Results called to and read back by:ELY SOTO 06/20/2020  09:33      KLEBSIELLA PNEUMONIAE ESBL  >100,000 cfu/ml      Narrative:      Preferred Collection Type->Urine, Clean Catch  Specimen Source->Urine    Blood culture [[id number]] Collected: 06/15/20 0624    Order Status: Completed Specimen: Blood from Antecubital, Right Updated: 06/19/20 0703     Blood Culture, Routine No Growth after 4 days.     Aerobic culture [[id number]] Collected: 06/13/20 1405    Order Status: Completed Specimen: Wound from Foot, Right Updated: 06/17/20 1108     Aerobic Bacterial Culture No growth    Culture, Anaerobe [[id number]] Collected: 06/13/20 1405    Order Status: Completed Specimen: Wound from Foot, Right Updated: 06/17/20 0810     Anaerobic Culture No anaerobes isolated

## 2020-06-24 NOTE — PROGRESS NOTES
Progress Note    Admit Date: 6/12/2020   LOS: 12 days     SUBJECTIVE:     Follow-up For:  Patient seen bedside. Heel protector boot in place to right foot only.     6/16/20: Plan for wound VAC application today. Heel protector boots in place B/L     06/19/2020  Patient seem at bedside resting comfortably.  VAC in place.  Complains of occasional heel pain and some decreased appetite.  No new pedal complaints    6/22/20: Patient seen bedside. Heel protector boots in place.     6/24/20: Patient seen bedside. Heel protector boots in place. VAC intact no leaks noted.     Scheduled Meds:   amLODIPine  2.5 mg Oral Daily    aspirin  81 mg Oral Daily    carvediloL  25 mg Oral BID WM    cefTRIAXone (ROCEPHIN) IVPB  1 g Intravenous Q24H    collagenase   Topical (Top) Daily    famotidine  20 mg Oral Daily    insulin aspart U-100  10 Units Subcutaneous TIDWM    insulin detemir U-100  15 Units Subcutaneous BID    levothyroxine  75 mcg Oral Before breakfast    lidocaine  2 patch Transdermal Q24H    rosuvastatin  20 mg Oral Daily    senna-docusate 8.6-50 mg  1 tablet Oral BID    vancomycin (VANCOCIN) IVPB  500 mg Intravenous Once     Continuous Infusions:    PRN Meds:acetaminophen, acetaminophen, dextrose 50%, dextrose 50%, glucagon (human recombinant), glucose, glucose, insulin aspart U-100, ondansetron, oxyCODONE, sodium chloride 0.9%, Pharmacy to dose Vancomycin consult **AND** vancomycin - pharmacy to dose    Review of patient's allergies indicates:   Allergen Reactions    Corticosteroids (glucocorticoids) Edema       Review of Systems  Constitutional: Negative for chills and fever.   Cardiovascular: Negative for leg swelling.   Gastrointestinal: Negative for nausea and vomiting.   Musculoskeletal: Positive for gait problem.   Skin: Positive for color change and wound.     OBJECTIVE:     Vital Signs (Most Recent)  Temp: 98.5 °F (36.9 °C) (06/24/20 0751)  Pulse: 89 (06/24/20 0751)  Resp: 20 (06/24/20 0751)  BP: (!)  177/74 (06/24/20 0751)  SpO2: 95 % (06/24/20 0751)    Vital Signs Range (Last 24H):  Temp:  [97.9 °F (36.6 °C)-98.5 °F (36.9 °C)]   Pulse:  [70-89]   Resp:  [18-20]   BP: (117-177)/(55-74)   SpO2:  [93 %-96 %]     I & O (Last 24H):    Intake/Output Summary (Last 24 hours) at 6/24/2020 0756  Last data filed at 6/24/2020 0618  Gross per 24 hour   Intake 1403.33 ml   Output 1310 ml   Net 93.33 ml     Foot Exam     Right Foot/Ankle      Inspection and Palpation  Ecchymosis: none  Tenderness: (Some tenderness to the heel)  Swelling: (Mild edema)  Skin Exam: ulcer;      Neurovascular  Dorsalis pedis: absent  Posterior tibial: 1+  Saphenous nerve sensation: diminished  Tibial nerve sensation: diminished  Superficial peroneal nerve sensation: diminished  Deep peroneal nerve sensation: diminished  Sural nerve sensation: diminished        Left Foot/Ankle       Inspection and Palpation  Ecchymosis: none  Tenderness: none   Swelling: none      Neurovascular  Dorsalis pedis: absent  Posterior tibial: 1+  Saphenous nerve sensation: diminished  Tibial nerve sensation: diminished  Superficial peroneal nerve sensation: diminished  Deep peroneal nerve sensation: diminished  Sural nerve sensation: diminished         Wound 1: Right medial heel ulcer  Measurement: 8.2zqx3quh0.5cm  Base: fibrogranular   Periwound skin: Rolled borders.   Drainage: sanguinous   Erythema: none   Probe: probe to bone.     Wound 2: Right dorsal foot   Measurement: 6dgg0efl3.2cm  Base: fibrogranular   Periwound skin:rolled borders   Drainage: none  Erythema: mild  Probe: none, no bone exposed    6/24/20:                6/22/20:    S/p debridement                   06/19/2020                 6/15/20:                  Laboratory:  CBC:   Recent Labs   Lab 06/24/20  0459   WBC 9.93   RBC 2.93*   HGB 8.3*   HCT 28.1*      MCV 96   MCH 28.3   MCHC 29.5*     CMP:   Recent Labs   Lab 06/22/20  1154 06/23/20  0511   *  186* 126*   CALCIUM 8.5*  8.5*  8.2*   ALBUMIN 2.4*  2.4*  2.4* 2.2*   PROT 7.3  7.3  --      137 136   K 4.4  4.4 4.7   CO2 21*  21* 24     108 107   BUN 77*  77* 82*   CREATININE 2.5*  2.5* 2.8*   ALKPHOS 97  97  --    ALT 11  11  --    AST 12  12  --    BILITOT 0.1  0.1  --      Microbiology Results (last 7 days)     Procedure Component Value Units Date/Time    Urine culture [[id number]]  (Abnormal)  (Susceptibility) Collected: 06/18/20 1115    Order Status: Completed Specimen: Urine Updated: 06/20/20 1120     Urine Culture, Routine ESBL Results called to and read back by:ELY SOTO 06/20/2020  09:33      KLEBSIELLA PNEUMONIAE ESBL  >100,000 cfu/ml      Narrative:      Preferred Collection Type->Urine, Clean Catch  Specimen Source->Urine    Blood culture [[id number]] Collected: 06/15/20 0624    Order Status: Completed Specimen: Blood from Antecubital, Right Updated: 06/19/20 0703     Blood Culture, Routine No Growth after 4 days.     Aerobic culture [[id number]] Collected: 06/13/20 1405    Order Status: Completed Specimen: Wound from Foot, Right Updated: 06/17/20 1108     Aerobic Bacterial Culture No growth    Culture, Anaerobe [[id number]] Collected: 06/13/20 1405    Order Status: Completed Specimen: Wound from Foot, Right Updated: 06/17/20 0810     Anaerobic Culture No anaerobes isolated      ,   Specimen (12h ago, onward)    None            Diagnostic Results:  X-Ray Foot Complete Right (Final result)  Result time 06/12/20 20:16:36   Procedure changed from X-Ray Foot Complete Left                 Final result by Jacky Torres MD (06/12/20 20:16:36)                               Impression:        Findings consistent with soft tissue wound at the posterior aspect of the heel overlying the posterior calcaneus, there is subtle irregularity of the posterior and posterosuperior margin of the calcaneus underlying the aforementioned soft tissue abnormality, and the possibility of osseous infectious involvement would be  difficult to exclude given this appearance.  Clinical and historical correlation otherwise needed.        Electronically signed by:     Jacky Torres  Date:                                            06/12/2020  Time:                                            20:16                         Narrative:     EXAMINATION:  XR FOOT COMPLETE 3 VIEW RIGHT     CLINICAL HISTORY:  diabetic foot ulcer;. Type 2 diabetes mellitus with foot ulcer     TECHNIQUE:  AP, lateral, and oblique views of the right foot were performed.     COMPARISON:  None     FINDINGS:  Radiographic examination of the right foot was performed.  3 radiographs are submitted.  There is appearance of may relate to soft tissue injury of the soft tissues posterior to the calcaneus at the level of the heel, and there is general appearance suggesting soft tissue swelling about the foot.  On the lateral view there is some lucency associated with the posterior aspect of the calcaneus, the possibility of demineralization is considered although this lucency could relate to diminished soft tissue attenuation secondary to soft tissue wound or injury.  There is slight irregularity along the posterior and posterosuperior margin of the posterior calcaneus, given the history of diabetic foot ulcer, the possibility of osseous involvement along the posterior and posterosuperior edge of the calcaneus would be difficult to exclude, there are some curvilinear calcifications within the soft tissues adjacent to this, that may relate to osseous involvement as well.  The remainder of the visualized osseous structures demonstrate chronic change, there is no additional evidence for osseous destructive process, acute fracture or dislocation.  Vascular calcifications are noted.                        ASSESSMENT/PLAN:     Right heel OM  CKD stage 4    Plan:     Wound VAC to right heel and dorsal foot ulcerations. Applied without problems with seal, adequate suction noted. Covered with  dry sterile dressing. Wound vac change every 2-3 days. Set to 125 mmhg. Wound care orders below.    S/p bone biopsy, debridement - 6/13/20.     Per Vascular surgery: Patient appears to have adequate perfusion to heal wound.     Abx per ID: ceftriaxone and vancomycin for 6 weeks    SNF vs. LTAC vs HH for IV abx, wound care.     Podiatry will follow.     Heel protector boots in place B/L     Wounds can be managed outpatient    Plan for wound VAC change on 6/26/20    Wound care orders right foot- to be done every 2-3 days.   1. Cleanse wound with saline. Pat dry  2. Apply wound VAC to right heel. Set to 125 mmhg.   3. Apply santyl to right dorsal foot ulceration cover with adaptic, cover with ABD pad.   4. Wrap with kerlix. Secure with tape.     F/u WB wound care within one week of discharge first available appt.

## 2020-06-24 NOTE — SUBJECTIVE & OBJECTIVE
Medications:  Continuous Infusions:  Scheduled Meds:   amLODIPine  2.5 mg Oral Daily    aspirin  81 mg Oral Daily    carvediloL  25 mg Oral BID WM    cefTRIAXone (ROCEPHIN) IVPB  1 g Intravenous Q24H    collagenase   Topical (Top) Daily    famotidine  20 mg Oral Daily    insulin aspart U-100  10 Units Subcutaneous TIDWM    insulin detemir U-100  15 Units Subcutaneous BID    levothyroxine  75 mcg Oral Before breakfast    lidocaine  2 patch Transdermal Q24H    rosuvastatin  20 mg Oral Daily    senna-docusate 8.6-50 mg  1 tablet Oral BID     PRN Meds:acetaminophen, acetaminophen, dextrose 50%, dextrose 50%, glucagon (human recombinant), glucose, glucose, insulin aspart U-100, ondansetron, oxyCODONE, sodium chloride 0.9%, Pharmacy to dose Vancomycin consult **AND** vancomycin - pharmacy to dose     Objective:     Vital Signs (Most Recent):  Temp: 97.9 °F (36.6 °C) (06/23/20 2000)  Pulse: 73 (06/23/20 2000)  Resp: 18 (06/23/20 2000)  BP: 127/60 (06/23/20 2000)  SpO2: 96 % (06/23/20 2000) Vital Signs (24h Range):  Temp:  [97.9 °F (36.6 °C)-98.4 °F (36.9 °C)] 97.9 °F (36.6 °C)  Pulse:  [70-78] 73  Resp:  [17-19] 18  SpO2:  [93 %-100 %] 96 %  BP: (117-158)/(55-81) 127/60     Date 06/23/20 0700 - 06/24/20 0659   Shift 9901-5522 6050-5860 6490-1845 24 Hour Total   INTAKE   P.O. 480 240  720   I.V.(mL/kg) 160(1.4) 523.3(4.5)  683.3(5.9)   Shift Total(mL/kg) 640(5.5) 763.3(6.6)  1403.3(12)   OUTPUT   Urine(mL/kg/hr) 200(0.2) 400  600   Other  10  10   Shift Total(mL/kg) 200(1.7) 410(3.5)  610(5.2)   Weight (kg) 116.5 116.5 116.5 116.5       Physical Exam  Vitals signs reviewed.   Constitutional:       General: She is not in acute distress.     Appearance: She is well-developed and well-nourished. She is not diaphoretic.   HENT:      Head: Normocephalic and atraumatic.   Eyes:      Conjunctiva/sclera: Conjunctivae normal.   Neck:      Musculoskeletal: Neck supple.   Cardiovascular:      Rate and Rhythm: Normal  rate.      Pulses:           Femoral pulses are 2+ on the right side and 2+ on the left side.       Dorsalis pedis pulses are detected w/ Doppler on the right side and detected w/ Doppler on the left side.        Posterior tibial pulses are detected w/ Doppler on the right side and detected w/ Doppler on the left side.   Pulmonary:      Effort: Pulmonary effort is normal. No respiratory distress.   Abdominal:      General: There is no distension.      Palpations: Abdomen is soft. There is no mass.      Tenderness: There is no abdominal tenderness. There is no guarding or rebound.      Hernia: No hernia is present.   Musculoskeletal: Normal range of motion.         General: Tenderness and edema present. No deformity.   Feet:      Right foot:      Skin integrity: Ulcer and skin breakdown present.   Skin:     General: Skin is warm and dry.      Capillary Refill: Capillary refill takes 2 to 3 seconds.      Coloration: Skin is not pale.      Findings: No erythema or rash.   Neurological:      Mental Status: She is alert and oriented to person, place, and time.      Sensory: Sensory deficit present.   Psychiatric:         Mood and Affect: Mood and affect normal.         Significant Labs:  All pertinent labs from the last 24 hours have been reviewed.    Significant Diagnostics:  I have reviewed all pertinent imaging results/findings within the past 24 hours.

## 2020-06-24 NOTE — PLAN OF CARE
[facility]     Department of Hospital Medicine            NURSING HOME ORDERS    06/24/2020    Admit to Nursing Home:      Skilled Bed                                       Diagnoses:  Active Hospital Problems    Diagnosis  POA    *Chronic osteomyelitis of right foot with draining sinus [M86.471]  Yes    Atherosclerosis of native arteries of right leg with ulceration of heel and midfoot [I70.234]  Yes    Wound of right leg [S81.801A]  Yes    Diabetic ulcer of right heel associated with diabetes mellitus due to underlying condition, limited to breakdown of skin [E08.621, L97.411]  Yes    CKD (chronic kidney disease) stage 4, GFR 15-29 ml/min [N18.4]  Yes    Hypothyroidism [E03.9]  Yes     Chronic    Morbid obesity with BMI of 50.0-59.9, adult [E66.01, Z68.43]  Not Applicable     Chronic    History of CVA (cerebrovascular accident) [Z86.73]  Not Applicable     Chronic    Hyperlipidemia [E78.5]  Yes     Chronic    Essential hypertension [I10]  Yes     Chronic    Type 2 diabetes mellitus, uncontrolled, with renal complications [E11.29, E11.65]  Yes     Chronic      Resolved Hospital Problems    Diagnosis Date Resolved POA    Hyperkalemia [E87.5] 06/23/2020 Yes       Patient is homebound due to:  Chronic osteomyelitis of right foot with draining sinus    Allergies:  Review of patient's allergies indicates:   Allergen Reactions    Corticosteroids (glucocorticoids) Edema       Vitals:       Every shift (Skilled Nursing patients)    Diet: 2000 jenny diabetic, low Na   Supplement:  1 can every three times a day with meals                         Type:    Glucerna       Acitivities:      - Up in a chair each morning as tolerated   - Ambulate with assistance to bathroom   - Scheduled walks once each shift (every 8 hours)   - May ambulate independently   - May use walker, cane, or self-propelled wheelchair   - Weight bearing: as tolerated    LABS:  -CBC, BMP, mag, phos twice  weekly   -Hepatic function panel, ESR and CRP every Monday   -Vancomycin trough twice weekly with Target level 15-20     -Fax laboratory results to [facility] at [phone] with attn: Dr. Villasenor.    Nursing Precautions:     - Aspiration precautions:             - Total assistance with meals            -  Upright 90 degrees before during and after meals      - Fall precautions per nursing home protocol   - Decubitus precautions:        -  for positioning   - Pressure reducing foam mattress   - Turn patient every two hours. Use wedge pillows to anchor patient    CONSULTS:      Physical Therapy to evaluate and treat     Occupational Therapy to evaluate and treat     Speech Therapy  to evaluate and treat     Nutrition to evaluate and recommend diet     Wound care      MISCELLANEOUS CARE:      Tunneled catheter care daily             Colostomy Care:  Empty bag every shift and prn                                             Change and clean site every 48 hours     PEG Care:  Clean site every 24 hours     Davis Care: Empty Davis bag every shift.  Change Davis every month     Routine Skin for Bedridden Patients:  Apply moisture barrier cream to all    skin folds and wet areas in perineal area daily and after baths and                           all bowel movements.    -Float heels or z-flex boots except for ambulation or transfers.    WOUND CARE:  Wound spray or saline for wound cleaning with all dressing changes.    All wounds to be measured with first dressing changes and every week.    Wound care orders right foot- to be done every M,W,F beginning 6/26.  1. Cleanse wound with saline. Pat dry  2. Apply wound VAC to right heel. Set to 125 mmhg.   3. Apply santyl to right dorsal foot ulceration cover with adaptic, cover with ABD pad.   4. Wrap with kerlix. Secure with tape.                      DIABETES CARE:      Check blood sugar:       Fingerstick blood sugar AC and HS   Fingerstick blood sugar every 6  hours if unable to eat      Report CBG < 60 or > 400 to physician.                                          Insulin Sliding Scale          Glucose  Novolog Insulin Subcutaneous        0 - 60   Orange juice or glucose tablet, hold insulin      No insulin   201-250  2 units   251-300  4 units   301-350  6 units   351-400  8 units   >400   10 units then call physician      Medications: Discontinue all previous medication orders, if any. See new list below.      Tasneem Lynn   Home Medication Instructions BRIAN:[id number]    Printed on:06/24/20 6365   Medication Information                      acetaminophen (TYLENOL) 325 MG tablet  Take 2 tablets (650 mg total) by mouth every 6 (six) hours as needed for Pain or Temperature greater than (100.5).             amLODIPine (NORVASC) 2.5 MG tablet  Take 1 tablet (2.5 mg total) by mouth once daily.             aspirin 81 MG Chew  Take 81 mg by mouth once daily.             blood pressure test kit-large Kit  Use to check blood pressure daily and as needed.             carvedilol (COREG) 25 MG tablet  Take 25 mg by mouth 2 (two) times daily with meals.             cefTRIAXone (ROCEPHIN) 1 g/50 mL PgBk IVPB  Inject 50 mLs (1 g total) into the vein once daily. To end date of 7/27             cetirizine (ZYRTEC) 10 MG tablet  Take 1 tablet (10 mg total) by mouth every evening.             collagenase (SANTYL) ointment  Apply topically once daily.             famotidine (PEPCID) 20 MG tablet  Take 1 tablet (20 mg total) by mouth once daily.             insulin aspart U-100 (NOVOLOG) 100 unit/mL (3 mL) InPn pen  Inject 10 Units into the skin 3 (three) times daily with meals.             insulin detemir U-100 (LEVEMIR FLEXTOUCH) 100 unit/mL (3 mL) SubQ InPn pen  Inject 15 Units into the skin 2 (two) times daily.             levothyroxine (SYNTHROID) 75 MCG tablet  Take 1 tablet (75 mcg total) by mouth before breakfast.             lidocaine (LIDODERM) 5 %  Place 2 patches onto  the skin once daily. Remove & Discard patch within 12 hours or as directed by MD to right shoulder             rosuvastatin (CRESTOR) 20 MG tablet  Take 1 tablet (20 mg total) by mouth once daily.             senna-docusate 8.6-50 mg (PERICOLACE) 8.6-50 mg per tablet  Take 1 tablet by mouth 2 (two) times daily.             vancomycin HCl (VANCOMYCIN 500 MG/100 ML D5W, READY TO MIX SYSTEM,)  Inject 100 mLs (500 mg total) into the vein every 48 hours.                         _________________________________  Funmilayo Guillermo MD  06/24/2020

## 2020-06-25 NOTE — NURSING
Patient transported off floor via [facility] to [facility]. All supplies with patient.

## 2020-06-25 NOTE — PT/OT/SLP DISCHARGE
Occupational Therapy Discharge Summary    Tasneem Lynn  MRN: [id number]   Principal Problem: Chronic osteomyelitis of right foot with draining sinus      Patient Discharged from acute Occupational Therapy on 06/24/20.  Please refer to prior OT notes for functional status.    Assessment:      Patient appropriate for care in another setting.    Objective:     GOALS:   Multidisciplinary Problems     Occupational Therapy Goals        Problem: Occupational Therapy Goal    Goal Priority Disciplines Outcome Interventions   Occupational Therapy Goal     OT, PT/OT Ongoing, Progressing    Description: Goals to be met by: 6/27/2020    Patient will increase functional independence with ADLs by performing:    Grooming while seated at sink with Set-up Assistance.  Toileting from bedside commode with Minimal Assistance for hygiene and clothing management.   Sitting at edge of bed x20 minutes with Supervision.  Supine to sit with Minimal Assistance.  Stand pivot transfers with Moderate Assistance.  Toilet transfer to bedside commode with Moderate Assistance.                     Reasons for Discontinuation of Therapy Services  Transfer to alternate level of care.      Plan:     Patient Discharged to: Skilled Nursing Facility ([facility])     Marie Beltre OT  6/25/2020

## 2020-06-25 NOTE — PROGRESS NOTES
Wound vac and dressing removed and wet to dry dressing applied to right heel. Portable wound vac and supplies  to go with patient to [place] for vac placement upon arrival.

## 2020-06-25 NOTE — DISCHARGE SUMMARY
[facility] Hospital Medicine  Discharge Summary    This visit was performed using telemed modalities.  Total time spent with patient: 11 min  Chief complaint: Chronic osteomyelitis of right foot with draining sinus  The patient location is: W422/W422 A  Present with the patient at the time of the telemed/virtual assessment: telemed presenter  I have assessed these finding virtually using telemed platform and with assistance of bedside nurse   The attending portion of this evaluation, treatment, and documentation was performed per Funmilayo Guillermo MD via audiovisual.       Patient Name: Tasneem Lynn  MRN: [id number]  Admission Date: 6/12/2020  Hospital Length of Stay: 12 days  Discharge Date and Time: 6/24/2020  9:00 PM  Attending Physician: No att. providers found   Discharging Provider: Funmilayo Guillermo MD  Primary Care Provider: To Obtain Unable        HPI:   This is a 74 y.o female with diabetes mellitus, hypertension, dyslipidemia, CHF, hypothyroidism, CKD stage 4, multiple chronic pains, obesity, and stroke (1986) who presents to the hospital with a chief complaint of worsening wound to the right heel for the last week. Patient reports her right heel wound has started 3 months ago and it seems got worse since last week with increasing bleeding. She states she believes the wound is infected. She reports she was prescribed an antibiotic (unsure name) 2 months ago by Angela James NP at an urgent care clinic but it didn't help much. She also reports another wound at her anterior right foot which is developed around 1 month ago. Patient reports she has been mostly staying on her bed and she has not been walking for 2 months due to her right heel wound. She reports the wound would bleed and have more pain if she put pressure on her right foot. She denies fever, chill, chest pain, SOB, bilateral calves/leg pain, N/V/D, cough or sick contacts at home. She reports she does not go to wound care clinic. She  states the home health nurse visited her 3 times since she started having this heel wound. The last visit was today.      In ED, COVID negative. X-ray right foot can not exclude the possibility of osseous infectious involvement.  Labs with elevated sed rate and CRP, c/s with CKD stage 4 with GFR 25 and elevated BUN&sCr 57&2.2.      Patient is admitted to inpatient with hospital medicine to further evaluation and treatment of right foot wound and EMMA       * No surgery found *      Hospital Course:   Wound of right leg  See media picture.  Right anterior ankle wound x 1 month.   - On antibiotics  - Cultures NGTD     Diabetic ulcer of right heel associated with diabetes mellitus due to underlying condition, limited to breakdown of skin  Chronic osteomyelitis of the right foot with a draining sinus  See media picture.  Reports right heel pressure wound not healing x 3 months. Worsened since last week with increasing bleeding. Hx DM.  Wound with red, beefy, granular tissue and bleeding. Xray concerning for osseous infection. No leukocytosis.  Afebrile. Elevated sed rate and CRP.  MRI: Findings consistent with osteomyelitis of the calcaneus with complete to near complete disruption of the Achilles tendon.   - Vancomycin started in ED - continue with pharmacy dose  - Added Ceftriaxone  - Cultures pending (BC and Wound culture)  - ID, Wound Care, and Podiatry consulted  - plan for IV antibiotics x 6 weeks if bone cultures negative. Wound vac.   - ID recommendations: Discharge antibiotics:           Ceftriaxone 1 gram IV q 24 hours plus  Vancomycin 500 mg IV q 48 hours (goal trough is 15-20)  - End date of IV antibiotics: 07/27/2020  - tunnel catheter placed for IV antibiotic administration on 01/06/22 by IR     EMMA on CKD (chronic kidney disease) stage 4, GFR 15-29 ml/min  sCr and BUN elevated 2.2 and 57.  Baseline sCr around 2 with previous value of 1.7 on 6/2018.   Patient reports she was referred to Nephrologist a long  time ago but she hasn't seen anyone yet.   - Renal dose all medication. Avoid nephrotoxin if possible.   - Vein mapping done prior to line placement recommendations by Vascular Surgery  - nephrology consult due to rising BUN/Cr.  Hold ARB and lasix; maintain hydration; received IVF with improvement but worsened when stopped so additional 1 liter given; patient states she is trying to drink; avoid hypotension  -repeat urinalysis reflexed to culture with E. Coli ESBL not sensistive to rocephin - discuss with ID who feel it is an asymptomatic bacteriuria  -patient with stable Cr on discharge but higher than usual baseline - cr will need to be followed closely and Vanc levels monitored     Hyperlipidemia  Continue statin     History of CVA (cerebrovascular accident)  Hx stroke 1986 with left side weakness per patient report. Reports not getting out of bed much since 4/2010 due to wound at right heel.    - Patient is on ASA and statin. Continue ASA/Statin  - PT/OT evaluation     Morbid obesity   Body mass index is 46.98 kg/m²..  Weight loss outpatient     Hypothyroidism  TSH wnl. Continue home management with levothyroxine     Essential hypertension  Uncontrolled. Continue home meds BB  - ARB held initially due to worsening kidney function  - losartan and Lasix held due to worsening renal function.   -add norvasc 6/21 due to elevated BP  -hold BP meds for SBP < 130     Type 2 diabetes mellitus, uncontrolled, with renal complications  On insulin (70/30) 40 units BID at home (2018). Patient reports she often has episodes of hypoglycemia of BS 60's.   The patient's hyperglycemia while IP is managed with a SQ prandial and correction dose insulin regimen. Monitoring BGs.  Increased aspart and adding basal insulin 6/16  Increased basal and prandial insulin 6/17, 6/18, 6/20, 6/21 with improvement during stay         Consults:   Consults (From admission, onward)        Status Ordering Provider     Inpatient consult to Infectious  Diseases  Once     Provider:  Helen Carpio MD    Completed MORIAH VITALE     Inpatient consult to Nephrology  Once     Provider:  Baylee Gomez MD    Completed WILL DAMON     Inpatient consult to Podiatry  Once     Provider:  Dru Kc DPM    Completed MORIAH VITALE     Inpatient consult to [facility]  Once     Provider:  (Not yet assigned)    Completed WILL DAMON     Inpatient consult to Social Work  Once     Provider:  (Not yet assigned)    Completed SHEY KAY     Inpatient consult to Social Work  Once     Provider:  (Not yet assigned)    Completed EMILE TALAMANTES     Inpatient consult to Vascular Surgery  Once     Provider:  Regulo Bunch MD    Completed RAMÍREZ DAVIS     Inpatient consult to Vascular Surgery  Once     Provider:  Regulo Bunch MD    Completed YASMEEN PARNELL     Inpatient virtual consult to Hospital Medicine  Once     Provider:  (Not yet assigned)    Completed EMILE TALAMANTES          Final Active Diagnoses:    Diagnosis Date Noted POA    PRINCIPAL PROBLEM:  Chronic osteomyelitis of right foot with draining sinus [M86.471] 06/15/2020 Yes    Atherosclerosis of native arteries of right leg with ulceration of heel and midfoot [I70.234] 06/15/2020 Yes    Wound of right leg [S81.801A] 06/13/2020 Yes    Diabetic ulcer of right heel associated with diabetes mellitus due to underlying condition, limited to breakdown of skin [E08.621, L97.411] 06/12/2020 Yes    CKD (chronic kidney disease) stage 4, GFR 15-29 ml/min [N18.4] 05/31/2018 Yes    Hypothyroidism [E03.9] 11/17/2016 Yes     Chronic    Morbid obesity with BMI of 50.0-59.9, adult [E66.01, Z68.43] 11/17/2016 Not Applicable     Chronic    History of CVA (cerebrovascular accident) [Z86.73] 11/17/2016 Not Applicable     Chronic    Hyperlipidemia [E78.5] 11/17/2016 Yes     Chronic    Essential hypertension [I10] 05/07/2013 Yes     Chronic    Type 2 diabetes mellitus, uncontrolled, with  renal complications [E11.29, E11.65] 05/07/2013 Yes     Chronic      Problems Resolved During this Admission:    Diagnosis Date Noted Date Resolved POA    Hyperkalemia [E87.5] 06/18/2020 06/23/2020 Yes      Discharged Condition: stable    Disposition: Skilled Nursing Facility    Follow Up:  Follow-up Information     Teresa Vega DPM.    Specialties: Podiatry, Wound Care  Why: Patient needs to followup in outpt wound care clinic per Dr Vega (Podiatry) when discharged from SNF; please call [phone] to make appt   Contact information:  [address] [facility] [place] [address]  [phone]             Baylee Gomez MD.    Specialty: Nephrology  Why: Please have patient to call office to arrange follow up visit with Nephrology once discharged from SNF  Contact information:  [address] [place] [address] [address]  [place] [address]  [phone]             Regulo Bunch MD.    Specialties: Vascular Surgery, Surgery  Why: Please have patient/family call to arrange follow up visit with Vascular Surgery once discharged from SNF  Contact information:  [address]  [address]  [place] [address]  [phone]             Adarsh Dash MD.    Specialties: Infectious Diseases, Hospice and Palliative Medicine  Why: Have patient/family to arrange follow up appointment with Infectious Disease once discharged from SNF  Contact information:  [address]  [facility] [address]  [phone]                 Patient Instructions:   No discharge procedures on file.  Medications:  Reconciled Home Medications:      Medication List      START taking these medications    amLODIPine 2.5 MG tablet  Commonly known as: NORVASC  Take 1 tablet (2.5 mg total) by mouth once daily.     cefTRIAXone 1 g/50 mL Pgbk IVPB  Commonly known as: ROCEPHIN  Inject 50 mLs (1 g total) into the vein once daily. To end date of 7/27     collagenase ointment  Commonly known as: SANTYL  Apply topically once daily.     famotidine 20 MG tablet  Commonly  known as: PEPCID  Take 1 tablet (20 mg total) by mouth once daily.     insulin aspart U-100 100 unit/mL (3 mL) Inpn pen  Commonly known as: NovoLOG  Inject 10 Units into the skin 3 (three) times daily with meals.     insulin detemir U-100 100 unit/mL (3 mL) Inpn pen  Commonly known as: LEVEMIR FLEXTOUCH  Inject 15 Units into the skin 2 (two) times daily.     lidocaine 5 %  Commonly known as: LIDODERM  Place 2 patches onto the skin once daily. Remove & Discard patch within 12 hours or as directed by MD to right shoulder     senna-docusate 8.6-50 mg 8.6-50 mg per tablet  Commonly known as: PERICOLACE  Take 1 tablet by mouth 2 (two) times daily.     VANCOMYCIN 500 MG/100 ML D5W (READY TO MIX SYSTEM)  Inject 100 mLs (500 mg total) into the vein every 48 hours.  Start taking on: June 26, 2020        CHANGE how you take these medications    acetaminophen 325 MG tablet  Commonly known as: TYLENOL  Take 2 tablets (650 mg total) by mouth every 6 (six) hours as needed for Pain or Temperature greater than (100.5).  What changed: reasons to take this        CONTINUE taking these medications    aspirin 81 MG Chew  Take 81 mg by mouth once daily.     blood pressure test kit-large Kit  Use to check blood pressure daily and as needed.     carvediloL 25 MG tablet  Commonly known as: COREG  Take 25 mg by mouth 2 (two) times daily with meals.     cetirizine 10 MG tablet  Commonly known as: ZYRTEC  Take 1 tablet (10 mg total) by mouth every evening.     levothyroxine 75 MCG tablet  Commonly known as: SYNTHROID  Take 1 tablet (75 mcg total) by mouth before breakfast.     rosuvastatin 20 MG tablet  Commonly known as: CRESTOR  Take 1 tablet (20 mg total) by mouth once daily.        STOP taking these medications    baclofen 5 mg Tab tablet  Commonly known as: LIORESAL     cloNIDine 0.1 MG tablet  Commonly known as: CATAPRES     diclofenac 75 MG EC tablet  Commonly known as: VOLTAREN     ergocalciferol 50,000 unit Cap  Commonly known as:  ERGOCALCIFEROL     furosemide 20 MG tablet  Commonly known as: LASIX     gabapentin 300 MG capsule  Commonly known as: NEURONTIN     HYDROcodone-acetaminophen 7.5-325 mg per tablet  Commonly known as: NORCO     insulin NPH-insulin regular (70/30) 100 unit/mL (70-30) injection  Commonly known as: NOVOLIN 70/30     losartan 50 MG tablet  Commonly known as: COZAAR            Significant Diagnostic Studies:   Recent Labs   Lab 06/20/20  0537 06/21/20  0538 06/22/20  1154 06/23/20  0511 06/24/20  0459   *  132* 133* 137  137 136 136   K 4.2  4.2 4.6 4.4  4.4 4.7 4.8     100 104 108  108 107 109   CO2 25  25 20* 21*  21* 24 22*   BUN 86*  86* 81* 77*  77* 82* 81*   CREATININE 2.9*  2.9* 2.8* 2.5*  2.5* 2.8* 2.7*   CALCIUM 8.1*  8.1* 8.5* 8.5*  8.5* 8.2* 8.2*   PROT 7.3 7.7 7.3  7.3  --   --    BILITOT 0.1 0.1 0.1  0.1  --   --    ALKPHOS 109 108 97  97  --   --    ALT 12 13 11  11  --   --    AST 13 13 12  12  --   --          Pending Diagnostic Studies:     None        Indwelling Lines/Drains at time of discharge:   Lines/Drains/Airways     Central Venous Catheter Line            Tunneled Central Line Insertion/Assessment - Double Lumen  06/22/20 0920 right internal jugular 3 days          Drain            Female External Urinary Catheter 06/17/20 0020 8 days                Time spent on the discharge of patient: 45 minutes  Patient was seen on the date of discharge and determined to be suitable for discharge. The patient ultimately decided she wished to go to Skilled nursing even though her daughter wished for her to go home with home health.  Skilled nursing was recommended by PT/OT.      Funmilayo Guillermo MD  Department of Hospital Medicine  [facility]

## 2020-06-25 NOTE — PROGRESS NOTES
Follow-up Information     Teresa Vega DPM.    Specialties: Podiatry, Wound Care  Why: Patient needs to followup in outpt wound care clinic per Dr Vega (Podiatry) when discharged from SNF; please call [phone] to make appt   Contact information:  [address] [facility] [place] [address]  [phone]             Baylee Gomez MD.    Specialty: Nephrology  Why: Please have patient to call office to arrange follow up visit with Nephrology once discharged from SNF  Contact information:  [address] [place] [address] [address]  [place] [id number] [phone]             Regulo Bunch MD.    Specialties: Vascular Surgery, Surgery  Why: Please have patient/family call to arrange follow up visit with Vascular Surgery once discharged from SNF  Contact information:  [address]  [address]  [place] [id number] [phone]             Adarsh Dash MD.    Specialties: Infectious Diseases, Hospice and Palliative Medicine  Why: Have patient/family to arrange follow up appointment with Infectious Disease once discharged from SNF  Contact information:  [address]  [facility] [address]  [phone]                 Information forwarded to Catalina via [employer] message sent to Scarlett Thomas (ID) to inform that no appointment was noted in calendar per instructions

## 2020-06-25 NOTE — PT/OT/SLP DISCHARGE
Physical Therapy Discharge Summary    Name: Tasneem Lynn  MRN: [id number]   Principal Problem: Chronic osteomyelitis of right foot with draining sinus     Patient Discharged from acute Physical Therapy on .  Please refer to prior PT noted date on  for functional status.     Assessment:     Patient appropriate for care in another setting.    Objective:     GOALS:   Multidisciplinary Problems     Physical Therapy Goals        Problem: Physical Therapy Goal    Goal Priority Disciplines Outcome Goal Variances Interventions   Physical Therapy Goal     PT, PT/OT Ongoing, Progressing     Description: Goals to be met by: 20     Patient will increase functional independence with mobility by performin. Supine to sit with Set-up [place]  2. Sit to supine with Set-up [place]  3. Rolling to Left and Right with Modified [place].  4. Bed to chair transfer with Contact Guard Assistance using Slideboard  5. Wheelchair propulsion x30 feet with Supervision using bilateral uppper extremities  6. Sitting at edge of bed x20 minutes with Supervision                       Reasons for Discontinuation of Therapy Services  Transfer to alternate level of care.      Plan:     Patient Discharged to: Skilled Nursing Facility.    Funmilayo Rush, PT  2020

## 2020-07-06 ENCOUNTER — TELEPHONE (OUTPATIENT)
Dept: INFECTIOUS DISEASES | Facility: CLINIC | Age: 75
End: 2020-07-06

## 2020-07-09 ENCOUNTER — TELEPHONE (OUTPATIENT)
Dept: INFECTIOUS DISEASES | Facility: CLINIC | Age: 75
End: 2020-07-09

## 2020-07-20 ENCOUNTER — HOSPITAL ENCOUNTER (INPATIENT)
Facility: HOSPITAL | Age: 75
LOS: 4 days | Discharge: NURSING, REHAB OR LONG-TERM CARE | DRG: 871 | End: 2020-07-24
Attending: EMERGENCY MEDICINE | Admitting: HOSPITALIST
Payer: MEDICARE

## 2020-07-20 DIAGNOSIS — D62 ACUTE BLOOD LOSS ANEMIA: ICD-10-CM

## 2020-07-20 DIAGNOSIS — N39.0 URINARY TRACT INFECTION WITHOUT HEMATURIA, SITE UNSPECIFIED: ICD-10-CM

## 2020-07-20 DIAGNOSIS — N18.9 CHRONIC KIDNEY DISEASE, UNSPECIFIED CKD STAGE: ICD-10-CM

## 2020-07-20 DIAGNOSIS — R46.89 ALTERED BEHAVIOR: ICD-10-CM

## 2020-07-20 DIAGNOSIS — R09.02 HYPOXIA: Primary | ICD-10-CM

## 2020-07-20 DIAGNOSIS — E87.5 HYPERKALEMIA: ICD-10-CM

## 2020-07-20 DIAGNOSIS — I50.9 CHF (CONGESTIVE HEART FAILURE): ICD-10-CM

## 2020-07-20 DIAGNOSIS — E08.621 DIABETIC ULCER OF RIGHT HEEL ASSOCIATED WITH DIABETES MELLITUS DUE TO UNDERLYING CONDITION, LIMITED TO BREAKDOWN OF SKIN: ICD-10-CM

## 2020-07-20 DIAGNOSIS — J18.9 PNEUMONIA DUE TO INFECTIOUS ORGANISM, UNSPECIFIED LATERALITY, UNSPECIFIED PART OF LUNG: ICD-10-CM

## 2020-07-20 DIAGNOSIS — J96.02 ACUTE RESPIRATORY FAILURE WITH HYPOXIA AND HYPERCARBIA: ICD-10-CM

## 2020-07-20 DIAGNOSIS — L97.411 DIABETIC ULCER OF RIGHT HEEL ASSOCIATED WITH DIABETES MELLITUS DUE TO UNDERLYING CONDITION, LIMITED TO BREAKDOWN OF SKIN: ICD-10-CM

## 2020-07-20 DIAGNOSIS — J96.01 ACUTE RESPIRATORY FAILURE WITH HYPOXIA AND HYPERCARBIA: ICD-10-CM

## 2020-07-20 LAB
ALBUMIN SERPL BCP-MCNC: 2.8 G/DL (ref 3.5–5.2)
ALLENS TEST: ABNORMAL
ALP SERPL-CCNC: 99 U/L (ref 55–135)
ALT SERPL W/O P-5'-P-CCNC: 15 U/L (ref 10–44)
ANION GAP SERPL CALC-SCNC: 15 MMOL/L (ref 8–16)
ANION GAP SERPL CALC-SCNC: 6 MMOL/L (ref 8–16)
AST SERPL-CCNC: 14 U/L (ref 10–40)
AV INDEX (PROSTH): 0.58
AV MEAN GRADIENT: 5 MMHG
AV PEAK GRADIENT: 10 MMHG
AV VALVE AREA: 2.38 CM2
AV VELOCITY RATIO: 0.61
BACTERIA #/AREA URNS HPF: ABNORMAL /HPF
BASOPHILS # BLD AUTO: 0.01 K/UL (ref 0–0.2)
BASOPHILS NFR BLD: 0.1 % (ref 0–1.9)
BILIRUB SERPL-MCNC: 0.2 MG/DL (ref 0.1–1)
BILIRUB UR QL STRIP: NEGATIVE
BNP SERPL-MCNC: 387 PG/ML (ref 0–99)
BSA FOR ECHO PROCEDURE: 2.44 M2
BUN SERPL-MCNC: 113 MG/DL (ref 8–23)
BUN SERPL-MCNC: 125 MG/DL (ref 6–30)
CALCIUM SERPL-MCNC: 8.9 MG/DL (ref 8.7–10.5)
CHLORIDE SERPL-SCNC: 110 MMOL/L (ref 95–110)
CHLORIDE SERPL-SCNC: 110 MMOL/L (ref 95–110)
CK SERPL-CCNC: 84 U/L (ref 20–180)
CLARITY UR: ABNORMAL
CO2 SERPL-SCNC: 25 MMOL/L (ref 23–29)
COLOR UR: YELLOW
CREAT SERPL-MCNC: 1.8 MG/DL (ref 0.5–1.4)
CREAT SERPL-MCNC: 1.9 MG/DL (ref 0.5–1.4)
CRP SERPL-MCNC: 97.1 MG/L (ref 0–8.2)
CV ECHO LV RWT: 0.34 CM
D DIMER PPP IA.FEU-MCNC: 2.02 MG/L FEU
DELSYS: ABNORMAL
DIFFERENTIAL METHOD: ABNORMAL
DOP CALC AO PEAK VEL: 1.62 M/S
DOP CALC AO VTI: 36.54 CM
DOP CALC LVOT AREA: 4.1 CM2
DOP CALC LVOT DIAMETER: 2.28 CM
DOP CALC LVOT PEAK VEL: 0.99 M/S
DOP CALC LVOT STROKE VOLUME: 86.96 CM3
DOP CALCLVOT PEAK VEL VTI: 21.31 CM
E WAVE DECELERATION TIME: 251.71 MSEC
E/A RATIO: 1.14
ECHO LV POSTERIOR WALL: 0.81 CM (ref 0.6–1.1)
EOSINOPHIL # BLD AUTO: 0 K/UL (ref 0–0.5)
EOSINOPHIL NFR BLD: 0.5 % (ref 0–8)
EP: 5
EP: 6
ERYTHROCYTE [DISTWIDTH] IN BLOOD BY AUTOMATED COUNT: 15.5 % (ref 11.5–14.5)
ERYTHROCYTE [SEDIMENTATION RATE] IN BLOOD BY WESTERGREN METHOD: 5 MM/H
EST. GFR  (AFRICAN AMERICAN): 29 ML/MIN/1.73 M^2
EST. GFR  (NON AFRICAN AMERICAN): 25 ML/MIN/1.73 M^2
FERRITIN SERPL-MCNC: 200 NG/ML (ref 20–300)
FIO2: 28
FIO2: 35
FRACTIONAL SHORTENING: 19 % (ref 28–44)
GLUCOSE SERPL-MCNC: 142 MG/DL (ref 70–110)
GLUCOSE SERPL-MCNC: 146 MG/DL (ref 70–110)
GLUCOSE UR QL STRIP: NEGATIVE
HCO3 UR-SCNC: 23.9 MMOL/L (ref 24–28)
HCO3 UR-SCNC: 26.3 MMOL/L (ref 24–28)
HCO3 UR-SCNC: 26.5 MMOL/L (ref 24–28)
HCT VFR BLD AUTO: 26.3 % (ref 37–48.5)
HCT VFR BLD CALC: 26 %PCV (ref 36–54)
HGB BLD-MCNC: 7.6 G/DL (ref 12–16)
HGB UR QL STRIP: ABNORMAL
HYALINE CASTS #/AREA URNS LPF: 0 /LPF
IMM GRANULOCYTES # BLD AUTO: 0.02 K/UL (ref 0–0.04)
IMM GRANULOCYTES NFR BLD AUTO: 0.2 % (ref 0–0.5)
INTERVENTRICULAR SEPTUM: 0.84 CM (ref 0.6–1.1)
IP: 10
IP: 18
IRON SERPL-MCNC: 19 UG/DL (ref 30–160)
IVRT: 76.12 MSEC
KETONES UR QL STRIP: ABNORMAL
LACTATE SERPL-SCNC: 0.6 MMOL/L (ref 0.5–2.2)
LACTATE SERPL-SCNC: 0.9 MMOL/L (ref 0.5–2.2)
LACTATE SERPL-SCNC: 1.1 MMOL/L (ref 0.5–2.2)
LDH SERPL L TO P-CCNC: 299 U/L (ref 110–260)
LEFT ATRIUM SIZE: 3 CM
LEFT INTERNAL DIMENSION IN SYSTOLE: 3.79 CM (ref 2.1–4)
LEFT VENTRICLE DIASTOLIC VOLUME INDEX: 45.03 ML/M2
LEFT VENTRICLE DIASTOLIC VOLUME: 102.27 ML
LEFT VENTRICLE MASS INDEX: 56 G/M2
LEFT VENTRICLE SYSTOLIC VOLUME INDEX: 27.1 ML/M2
LEFT VENTRICLE SYSTOLIC VOLUME: 61.55 ML
LEFT VENTRICULAR INTERNAL DIMENSION IN DIASTOLE: 4.7 CM (ref 3.5–6)
LEFT VENTRICULAR MASS: 127.25 G
LEUKOCYTE ESTERASE UR QL STRIP: ABNORMAL
LYMPHOCYTES # BLD AUTO: 1.9 K/UL (ref 1–4.8)
LYMPHOCYTES NFR BLD: 21.6 % (ref 18–48)
MCH RBC QN AUTO: 28.5 PG (ref 27–31)
MCHC RBC AUTO-ENTMCNC: 28.9 G/DL (ref 32–36)
MCV RBC AUTO: 99 FL (ref 82–98)
MICROSCOPIC COMMENT: ABNORMAL
MODE: ABNORMAL
MONOCYTES # BLD AUTO: 0.6 K/UL (ref 0.3–1)
MONOCYTES NFR BLD: 6.5 % (ref 4–15)
MV PEAK A VEL: 1.05 M/S
MV PEAK E VEL: 1.2 M/S
MV STENOSIS PRESSURE HALF TIME: 65.97 MS
MV VALVE AREA P 1/2 METHOD: 3.33 CM2
NEUTROPHILS # BLD AUTO: 6.2 K/UL (ref 1.8–7.7)
NEUTROPHILS NFR BLD: 71.1 % (ref 38–73)
NITRITE UR QL STRIP: NEGATIVE
NRBC BLD-RTO: 0 /100 WBC
PCO2 BLDA: 55.4 MMHG (ref 35–45)
PCO2 BLDA: 61.6 MMHG (ref 35–45)
PCO2 BLDA: 65.6 MMHG (ref 35–45)
PH SMN: 7.21 [PH] (ref 7.35–7.45)
PH SMN: 7.24 [PH] (ref 7.35–7.45)
PH SMN: 7.24 [PH] (ref 7.35–7.45)
PH UR STRIP: 5 [PH] (ref 5–8)
PISA TR MAX VEL: 2.52 M/S
PLATELET # BLD AUTO: 298 K/UL (ref 150–350)
PMV BLD AUTO: 9.5 FL (ref 9.2–12.9)
PO2 BLDA: 65 MMHG (ref 80–100)
PO2 BLDA: 94 MMHG (ref 80–100)
PO2 BLDA: 97 MMHG (ref 80–100)
POC BE: -1 MMOL/L
POC BE: -2 MMOL/L
POC BE: -3 MMOL/L
POC IONIZED CALCIUM: 1.35 MMOL/L (ref 1.06–1.42)
POC SATURATED O2: 88 % (ref 95–100)
POC SATURATED O2: 95 % (ref 95–100)
POC SATURATED O2: 96 % (ref 95–100)
POC TCO2 (MEASURED): 26 MMOL/L (ref 23–29)
POC TCO2: 26 MMOL/L (ref 23–27)
POC TCO2: 28 MMOL/L (ref 23–27)
POC TCO2: 28 MMOL/L (ref 23–27)
POCT GLUCOSE: 162 MG/DL (ref 70–110)
POCT GLUCOSE: 171 MG/DL (ref 70–110)
POCT GLUCOSE: 181 MG/DL (ref 70–110)
POCT GLUCOSE: 259 MG/DL (ref 70–110)
POTASSIUM BLD-SCNC: 6.2 MMOL/L (ref 3.5–5.1)
POTASSIUM SERPL-SCNC: 6.7 MMOL/L (ref 3.5–5.1)
PROCALCITONIN SERPL IA-MCNC: 0.2 NG/ML
PROT SERPL-MCNC: 8.7 G/DL (ref 6–8.4)
PROT UR QL STRIP: ABNORMAL
RBC # BLD AUTO: 2.67 M/UL (ref 4–5.4)
RBC #/AREA URNS HPF: 1 /HPF (ref 0–4)
RETICS/RBC NFR AUTO: 3.4 % (ref 0.5–2.5)
SAMPLE: ABNORMAL
SARS-COV-2 RDRP RESP QL NAA+PROBE: NEGATIVE
SATURATED IRON: 8 % (ref 20–50)
SINUS: 2.69 CM
SITE: ABNORMAL
SODIUM BLD-SCNC: 143 MMOL/L (ref 136–145)
SODIUM SERPL-SCNC: 141 MMOL/L (ref 136–145)
SP GR UR STRIP: 1.01 (ref 1–1.03)
SP02: 96
SP02: 99
SPONT RATE: 16
STJ: 2.57 CM
TOTAL IRON BINDING CAPACITY: 253 UG/DL (ref 250–450)
TR MAX PG: 25 MMHG
TRANSFERRIN SERPL-MCNC: 171 MG/DL (ref 200–375)
TRICUSPID ANNULAR PLANE SYSTOLIC EXCURSION: 1.85 CM
TROPONIN I SERPL DL<=0.01 NG/ML-MCNC: <0.006 NG/ML (ref 0–0.03)
TSH SERPL DL<=0.005 MIU/L-ACNC: 1.33 UIU/ML (ref 0.4–4)
URN SPEC COLLECT METH UR: ABNORMAL
UROBILINOGEN UR STRIP-ACNC: NEGATIVE EU/DL
WBC # BLD AUTO: 8.65 K/UL (ref 3.9–12.7)
WBC #/AREA URNS HPF: 50 /HPF (ref 0–5)
WBC CLUMPS URNS QL MICRO: ABNORMAL

## 2020-07-20 PROCEDURE — 83880 ASSAY OF NATRIURETIC PEPTIDE: CPT | Mod: HCNC

## 2020-07-20 PROCEDURE — 25000003 PHARM REV CODE 250: Mod: HCNC | Performed by: INTERNAL MEDICINE

## 2020-07-20 PROCEDURE — 82803 BLOOD GASES ANY COMBINATION: CPT | Mod: HCNC

## 2020-07-20 PROCEDURE — 93010 ELECTROCARDIOGRAM REPORT: CPT | Mod: HCNC,,, | Performed by: INTERNAL MEDICINE

## 2020-07-20 PROCEDURE — 36415 COLL VENOUS BLD VENIPUNCTURE: CPT | Mod: HCNC

## 2020-07-20 PROCEDURE — 63600175 PHARM REV CODE 636 W HCPCS: Mod: HCNC | Performed by: HOSPITALIST

## 2020-07-20 PROCEDURE — 99900035 HC TECH TIME PER 15 MIN (STAT): Mod: HCNC

## 2020-07-20 PROCEDURE — 85379 FIBRIN DEGRADATION QUANT: CPT | Mod: HCNC

## 2020-07-20 PROCEDURE — 93010 EKG 12-LEAD: ICD-10-PCS | Mod: HCNC,,, | Performed by: INTERNAL MEDICINE

## 2020-07-20 PROCEDURE — 84145 PROCALCITONIN (PCT): CPT | Mod: HCNC

## 2020-07-20 PROCEDURE — 85025 COMPLETE CBC W/AUTO DIFF WBC: CPT | Mod: HCNC

## 2020-07-20 PROCEDURE — 84484 ASSAY OF TROPONIN QUANT: CPT | Mod: HCNC

## 2020-07-20 PROCEDURE — 36600 WITHDRAWAL OF ARTERIAL BLOOD: CPT | Mod: HCNC

## 2020-07-20 PROCEDURE — 96365 THER/PROPH/DIAG IV INF INIT: CPT | Mod: HCNC

## 2020-07-20 PROCEDURE — 96367 TX/PROPH/DG ADDL SEQ IV INF: CPT | Mod: HCNC

## 2020-07-20 PROCEDURE — 94761 N-INVAS EAR/PLS OXIMETRY MLT: CPT | Mod: HCNC

## 2020-07-20 PROCEDURE — 82330 ASSAY OF CALCIUM: CPT | Mod: HCNC

## 2020-07-20 PROCEDURE — 80053 COMPREHEN METABOLIC PANEL: CPT | Mod: HCNC

## 2020-07-20 PROCEDURE — 99292 CRITICAL CARE ADDL 30 MIN: CPT | Mod: HCNC

## 2020-07-20 PROCEDURE — 25000003 PHARM REV CODE 250: Mod: HCNC | Performed by: EMERGENCY MEDICINE

## 2020-07-20 PROCEDURE — 27000221 HC OXYGEN, UP TO 24 HOURS: Mod: HCNC

## 2020-07-20 PROCEDURE — 96375 TX/PRO/DX INJ NEW DRUG ADDON: CPT | Mod: XS,HCNC

## 2020-07-20 PROCEDURE — 87186 SC STD MICRODIL/AGAR DIL: CPT | Mod: HCNC

## 2020-07-20 PROCEDURE — 82728 ASSAY OF FERRITIN: CPT | Mod: HCNC

## 2020-07-20 PROCEDURE — 51702 INSERT TEMP BLADDER CATH: CPT | Mod: HCNC

## 2020-07-20 PROCEDURE — 84443 ASSAY THYROID STIM HORMONE: CPT | Mod: HCNC

## 2020-07-20 PROCEDURE — 87040 BLOOD CULTURE FOR BACTERIA: CPT | Mod: HCNC

## 2020-07-20 PROCEDURE — 87077 CULTURE AEROBIC IDENTIFY: CPT | Mod: HCNC

## 2020-07-20 PROCEDURE — 86140 C-REACTIVE PROTEIN: CPT | Mod: HCNC

## 2020-07-20 PROCEDURE — 87086 URINE CULTURE/COLONY COUNT: CPT | Mod: HCNC

## 2020-07-20 PROCEDURE — 87088 URINE BACTERIA CULTURE: CPT | Mod: HCNC

## 2020-07-20 PROCEDURE — 99223 1ST HOSP IP/OBS HIGH 75: CPT | Mod: HCNC,,, | Performed by: INTERNAL MEDICINE

## 2020-07-20 PROCEDURE — U0002 COVID-19 LAB TEST NON-CDC: HCPCS | Mod: HCNC

## 2020-07-20 PROCEDURE — 93005 ELECTROCARDIOGRAM TRACING: CPT | Mod: HCNC

## 2020-07-20 PROCEDURE — 85045 AUTOMATED RETICULOCYTE COUNT: CPT | Mod: HCNC

## 2020-07-20 PROCEDURE — 27000190 HC CPAP FULL FACE MASK W/VALVE: Mod: HCNC

## 2020-07-20 PROCEDURE — 84132 ASSAY OF SERUM POTASSIUM: CPT | Mod: HCNC

## 2020-07-20 PROCEDURE — C9113 INJ PANTOPRAZOLE SODIUM, VIA: HCPCS | Mod: HCNC | Performed by: HOSPITALIST

## 2020-07-20 PROCEDURE — 81000 URINALYSIS NONAUTO W/SCOPE: CPT | Mod: HCNC

## 2020-07-20 PROCEDURE — 96374 THER/PROPH/DIAG INJ IV PUSH: CPT | Mod: XS,HCNC

## 2020-07-20 PROCEDURE — 83605 ASSAY OF LACTIC ACID: CPT | Mod: 91,HCNC

## 2020-07-20 PROCEDURE — 83010 ASSAY OF HAPTOGLOBIN QUANT: CPT | Mod: HCNC

## 2020-07-20 PROCEDURE — 25000003 PHARM REV CODE 250: Mod: HCNC | Performed by: HOSPITALIST

## 2020-07-20 PROCEDURE — 82962 GLUCOSE BLOOD TEST: CPT | Mod: HCNC

## 2020-07-20 PROCEDURE — C9399 UNCLASSIFIED DRUGS OR BIOLOG: HCPCS | Mod: HCNC | Performed by: HOSPITALIST

## 2020-07-20 PROCEDURE — 99291 CRITICAL CARE FIRST HOUR: CPT | Mod: 25,HCNC

## 2020-07-20 PROCEDURE — 99223 PR INITIAL HOSPITAL CARE,LEVL III: ICD-10-PCS | Mod: HCNC,,, | Performed by: INTERNAL MEDICINE

## 2020-07-20 PROCEDURE — 84295 ASSAY OF SERUM SODIUM: CPT | Mod: HCNC

## 2020-07-20 PROCEDURE — 85014 HEMATOCRIT: CPT | Mod: HCNC

## 2020-07-20 PROCEDURE — 82550 ASSAY OF CK (CPK): CPT | Mod: HCNC

## 2020-07-20 PROCEDURE — 80202 ASSAY OF VANCOMYCIN: CPT | Mod: HCNC

## 2020-07-20 PROCEDURE — 83615 LACTATE (LD) (LDH) ENZYME: CPT | Mod: HCNC

## 2020-07-20 PROCEDURE — 63600175 PHARM REV CODE 636 W HCPCS: Mod: HCNC | Performed by: EMERGENCY MEDICINE

## 2020-07-20 PROCEDURE — 20000000 HC ICU ROOM: Mod: HCNC

## 2020-07-20 PROCEDURE — 82565 ASSAY OF CREATININE: CPT | Mod: HCNC

## 2020-07-20 PROCEDURE — 83540 ASSAY OF IRON: CPT | Mod: HCNC

## 2020-07-20 PROCEDURE — 94660 CPAP INITIATION&MGMT: CPT | Mod: HCNC

## 2020-07-20 RX ORDER — GLUCAGON 1 MG
1 KIT INJECTION
Status: DISCONTINUED | OUTPATIENT
Start: 2020-07-20 | End: 2020-07-21

## 2020-07-20 RX ORDER — DEXTROSE 50 % IN WATER (D50W) INTRAVENOUS SYRINGE
50
Status: COMPLETED | OUTPATIENT
Start: 2020-07-20 | End: 2020-07-20

## 2020-07-20 RX ORDER — FUROSEMIDE 10 MG/ML
80 INJECTION INTRAMUSCULAR; INTRAVENOUS
Status: COMPLETED | OUTPATIENT
Start: 2020-07-20 | End: 2020-07-20

## 2020-07-20 RX ORDER — CARVEDILOL 12.5 MG/1
25 TABLET ORAL 2 TIMES DAILY WITH MEALS
Status: DISCONTINUED | OUTPATIENT
Start: 2020-07-20 | End: 2020-07-25 | Stop reason: HOSPADM

## 2020-07-20 RX ORDER — INDOMETHACIN 25 MG/1
50 CAPSULE ORAL
Status: COMPLETED | OUTPATIENT
Start: 2020-07-20 | End: 2020-07-20

## 2020-07-20 RX ORDER — FUROSEMIDE 10 MG/ML
40 INJECTION INTRAMUSCULAR; INTRAVENOUS
Status: DISCONTINUED | OUTPATIENT
Start: 2020-07-20 | End: 2020-07-20

## 2020-07-20 RX ORDER — ROSUVASTATIN CALCIUM 10 MG/1
20 TABLET, COATED ORAL DAILY
Status: DISCONTINUED | OUTPATIENT
Start: 2020-07-21 | End: 2020-07-25 | Stop reason: HOSPADM

## 2020-07-20 RX ORDER — PANTOPRAZOLE SODIUM 40 MG/10ML
40 INJECTION, POWDER, LYOPHILIZED, FOR SOLUTION INTRAVENOUS 2 TIMES DAILY
Status: DISCONTINUED | OUTPATIENT
Start: 2020-07-20 | End: 2020-07-25 | Stop reason: HOSPADM

## 2020-07-20 RX ORDER — SODIUM CHLORIDE 0.9 % (FLUSH) 0.9 %
10 SYRINGE (ML) INJECTION
Status: DISCONTINUED | OUTPATIENT
Start: 2020-07-20 | End: 2020-07-25 | Stop reason: HOSPADM

## 2020-07-20 RX ORDER — NALOXONE HCL 0.4 MG/ML
2 VIAL (ML) INJECTION
Status: COMPLETED | OUTPATIENT
Start: 2020-07-20 | End: 2020-07-20

## 2020-07-20 RX ORDER — DEXMEDETOMIDINE HYDROCHLORIDE 4 UG/ML
0.2 INJECTION, SOLUTION INTRAVENOUS CONTINUOUS
Status: DISCONTINUED | OUTPATIENT
Start: 2020-07-20 | End: 2020-07-21

## 2020-07-20 RX ORDER — SODIUM CHLORIDE 9 MG/ML
1000 INJECTION, SOLUTION INTRAVENOUS CONTINUOUS
Status: DISCONTINUED | OUTPATIENT
Start: 2020-07-20 | End: 2020-07-20

## 2020-07-20 RX ORDER — INSULIN ASPART 100 [IU]/ML
1-10 INJECTION, SOLUTION INTRAVENOUS; SUBCUTANEOUS
Status: DISCONTINUED | OUTPATIENT
Start: 2020-07-20 | End: 2020-07-21

## 2020-07-20 RX ORDER — FUROSEMIDE 10 MG/ML
80 INJECTION INTRAMUSCULAR; INTRAVENOUS
Status: DISCONTINUED | OUTPATIENT
Start: 2020-07-21 | End: 2020-07-22

## 2020-07-20 RX ORDER — INDOMETHACIN 25 MG/1
50 CAPSULE ORAL ONCE
Status: COMPLETED | OUTPATIENT
Start: 2020-07-20 | End: 2020-07-20

## 2020-07-20 RX ORDER — NAPROXEN SODIUM 220 MG/1
81 TABLET, FILM COATED ORAL DAILY
Status: DISCONTINUED | OUTPATIENT
Start: 2020-07-21 | End: 2020-07-20

## 2020-07-20 RX ORDER — AMLODIPINE BESYLATE 2.5 MG/1
2.5 TABLET ORAL DAILY
Status: DISCONTINUED | OUTPATIENT
Start: 2020-07-21 | End: 2020-07-25 | Stop reason: HOSPADM

## 2020-07-20 RX ORDER — IBUPROFEN 200 MG
16 TABLET ORAL
Status: DISCONTINUED | OUTPATIENT
Start: 2020-07-20 | End: 2020-07-21

## 2020-07-20 RX ORDER — ONDANSETRON 2 MG/ML
4 INJECTION INTRAMUSCULAR; INTRAVENOUS EVERY 6 HOURS PRN
Status: DISCONTINUED | OUTPATIENT
Start: 2020-07-20 | End: 2020-07-25 | Stop reason: HOSPADM

## 2020-07-20 RX ORDER — MEROPENEM AND SODIUM CHLORIDE 1 G/50ML
1 INJECTION, SOLUTION INTRAVENOUS
Status: DISCONTINUED | OUTPATIENT
Start: 2020-07-20 | End: 2020-07-23

## 2020-07-20 RX ORDER — HYDRALAZINE HYDROCHLORIDE 20 MG/ML
10 INJECTION INTRAMUSCULAR; INTRAVENOUS EVERY 8 HOURS PRN
Status: DISCONTINUED | OUTPATIENT
Start: 2020-07-20 | End: 2020-07-25 | Stop reason: HOSPADM

## 2020-07-20 RX ORDER — IBUPROFEN 200 MG
24 TABLET ORAL
Status: DISCONTINUED | OUTPATIENT
Start: 2020-07-20 | End: 2020-07-21

## 2020-07-20 RX ORDER — LEVOTHYROXINE SODIUM 75 UG/1
75 TABLET ORAL
Status: DISCONTINUED | OUTPATIENT
Start: 2020-07-21 | End: 2020-07-25 | Stop reason: HOSPADM

## 2020-07-20 RX ORDER — ACETAMINOPHEN 325 MG/1
650 TABLET ORAL EVERY 6 HOURS PRN
Status: DISCONTINUED | OUTPATIENT
Start: 2020-07-20 | End: 2020-07-25 | Stop reason: HOSPADM

## 2020-07-20 RX ORDER — FAMOTIDINE 20 MG/50ML
20 INJECTION, SOLUTION INTRAVENOUS DAILY
Status: DISCONTINUED | OUTPATIENT
Start: 2020-07-21 | End: 2020-07-20

## 2020-07-20 RX ORDER — SODIUM POLYSTYRENE SULFONATE 15 G/60ML
15 SUSPENSION ORAL; RECTAL ONCE
Status: COMPLETED | OUTPATIENT
Start: 2020-07-20 | End: 2020-07-20

## 2020-07-20 RX ADMIN — SODIUM CHLORIDE 1000 ML: 0.9 INJECTION, SOLUTION INTRAVENOUS at 02:07

## 2020-07-20 RX ADMIN — INSULIN ASPART 6 UNITS: 100 INJECTION, SOLUTION INTRAVENOUS; SUBCUTANEOUS at 04:07

## 2020-07-20 RX ADMIN — PANTOPRAZOLE SODIUM 40 MG: 40 INJECTION, POWDER, FOR SOLUTION INTRAVENOUS at 09:07

## 2020-07-20 RX ADMIN — CALCIUM GLUCONATE 1 G: 98 INJECTION, SOLUTION INTRAVENOUS at 12:07

## 2020-07-20 RX ADMIN — FUROSEMIDE 40 MG: 10 INJECTION, SOLUTION INTRAMUSCULAR; INTRAVENOUS at 04:07

## 2020-07-20 RX ADMIN — DEXTROSE MONOHYDRATE 50 G: 25 INJECTION, SOLUTION INTRAVENOUS at 01:07

## 2020-07-20 RX ADMIN — INSULIN HUMAN 6 UNITS: 100 INJECTION, SOLUTION PARENTERAL at 01:07

## 2020-07-20 RX ADMIN — MEROPENEM AND SODIUM CHLORIDE 1 G: 1 INJECTION, SOLUTION INTRAVENOUS at 04:07

## 2020-07-20 RX ADMIN — MEROPENEM AND SODIUM CHLORIDE 1 G: 1 INJECTION, SOLUTION INTRAVENOUS at 11:07

## 2020-07-20 RX ADMIN — SODIUM BICARBONATE 50 MEQ: 84 INJECTION, SOLUTION INTRAVENOUS at 01:07

## 2020-07-20 RX ADMIN — FUROSEMIDE 80 MG: 10 INJECTION, SOLUTION INTRAVENOUS at 11:07

## 2020-07-20 RX ADMIN — NALOXONE HYDROCHLORIDE 2 MG: 0.4 INJECTION, SOLUTION INTRAMUSCULAR; INTRAVENOUS; SUBCUTANEOUS at 09:07

## 2020-07-20 RX ADMIN — PIPERACILLIN AND TAZOBACTAM 4.5 G: 4; .5 INJECTION, POWDER, LYOPHILIZED, FOR SOLUTION INTRAVENOUS; PARENTERAL at 11:07

## 2020-07-20 RX ADMIN — INSULIN ASPART 1 UNITS: 100 INJECTION, SOLUTION INTRAVENOUS; SUBCUTANEOUS at 09:07

## 2020-07-20 RX ADMIN — INSULIN DETEMIR 15 UNITS: 100 INJECTION, SOLUTION SUBCUTANEOUS at 09:07

## 2020-07-20 RX ADMIN — SODIUM POLYSTYRENE SULFONATE 15 G: 15 SUSPENSION ORAL; RECTAL at 12:07

## 2020-07-20 RX ADMIN — DEXMEDETOMIDINE HYDROCHLORIDE 0.2 MCG/KG/HR: 4 INJECTION, SOLUTION INTRAVENOUS at 05:07

## 2020-07-20 RX ADMIN — VANCOMYCIN HYDROCHLORIDE 2000 MG: 10 INJECTION, POWDER, LYOPHILIZED, FOR SOLUTION INTRAVENOUS at 03:07

## 2020-07-20 RX ADMIN — SODIUM BICARBONATE 50 MEQ: 84 INJECTION, SOLUTION INTRAVENOUS at 04:07

## 2020-07-20 NOTE — ASSESSMENT & PLAN NOTE
Hemoglobin A1C   Date Value Ref Range Status   06/13/2020 7.5 (H) 4.0 - 5.6 % Final   Will hold outpatient regimen  Start low-dose basal insulin 15 units detemir and sliding scale  Will further adjust insulin regimen as necessary to achieve goals between 140 - 180

## 2020-07-20 NOTE — ASSESSMENT & PLAN NOTE
/trop negative on admission.     --Echo with EF 55%; limited echo d/t body habitus  --continue diuresis prn

## 2020-07-20 NOTE — ASSESSMENT & PLAN NOTE
Noted drop in H&H compared to previous levels along with Hemoccult-positive stool  Also uremia out of proportion with creatinine concerning for GI bleed  Will stop aspirin and start Protonix IV q.12  Monitor with serial H&Hs  Consult GI

## 2020-07-20 NOTE — HPI
75 year old female with CHF, HTN, HLP, DM2, hypothyroidism, CKD stage 4, chronic pain, obesity, and h/o recent admission from 6/12 - 6/24/20 for right foot osteomyelitis treated with Rocephin and vancomycin planned for until 7/27/20 and CVA (1986) who presented from nursing home for altered mental status that started sometime yesterday.  EMS was called out, noted sats of 85% on room air upon their arrival and status post treatment with Narcan with no improvement in her mentation.  In the ER, she was noted to be hypertensive and was hypoxic with treatment rendered with BiPAP.  Workup with head CT that was unremarkable, chest x-ray with pulmonary edema along with questionable right infiltrate, UA showed 3+ leukocyte many bacteria, H&H 7.6 and 26.3 and hemoccult that was positive.  BUN and creatinine 125/1.8. CMP remarkable for a potassium of 6.2 which was repeated - potassium 6.7.  Emergent treatment rendered with nebulizer treatment, insulin, calcium gluconate, and bicarb.  Procalcitonin 0.2, lactic acid 0.6, COVID-19 negative, D-dimer 2.2, .

## 2020-07-20 NOTE — H&P
[facility]  Pulmonology  H&P    Patient Name: Tasneem Lynn  MRN: [id number]  Admission Date: 7/20/2020  Code Status: Prior  Primary Care Provider: To Obtain Unable   Principal Problem: Acute respiratory failure with hypoxia and hypercarbia    Subjective:     HPI:  Ms Lynn is a 74 yo w/ h/o DMII, HTN, morbid obesity (BMI 54), CHF? (no recent TTE), CKD IV, and chronic osteo on IV Vanc/ceftriaxone via tunneled RIJ catheter who presented to  ED today w/ mental status change over the last 24 hours. Medics found patient saturating 86% on room air and responsive only to pain. History is limited as patient is on Bipap at the time of exam.     In the ED, labwork showed an elevated d-dimer, elevated BNP, K 6.7 without EKG changes, UA c/w UTI and a pH of 7.24/pcO2 of 55 on a VBG, and a Hgb of 7.6 which is down a gram from 1mn/a. She was placed on Bipap. CTA held 2/2 CKD. She was given Zosyn, lasix, and hyperkalemia cocktail. Hemoccult positive. Mental status improved while in the ED, but a repeat ABG showed a pH of 7.21/pCO2 65. She was then admitted to the ICU for further management.    Past Medical History:   Diagnosis Date    Arthritis     Gout    CHF (congestive heart failure)     Coronary artery disease     Diabetes mellitus     HLD (hyperlipidemia)     Hypertension     Obese     Stroke     1986    Thyroid disease        Past Surgical History:   Procedure Laterality Date    right hand surgery      right knee surgery Right     partial plate       Review of patient's allergies indicates:   Allergen Reactions    Corticosteroids (glucocorticoids) Edema       Family History     Problem Relation (Age of Onset)    Diabetes Maternal Aunt    Heart disease Sister, Maternal Aunt    Hypertension Maternal Aunt        Tobacco Use    Smoking status: Never Smoker    Smokeless tobacco: Never Used   Substance and Sexual Activity    Alcohol use: No    Drug use: No    Sexual activity: Not Currently      Partners: Male         Review of Systems   Unable to perform ROS: Patient unresponsive     Objective:     Vital Signs (Most Recent):  Temp: 97.4 °F (36.3 °C) (07/20/20 1145)  Pulse: 70 (07/20/20 1452)  Resp: (!) 22 (07/20/20 1452)  BP: (!) 178/72 (07/20/20 1452)  SpO2: 99 % (07/20/20 1452) Vital Signs (24h Range):  Temp:  [97.4 °F (36.3 °C)-98.4 °F (36.9 °C)] 97.4 °F (36.3 °C)  Pulse:  [53-74] 70  Resp:  [21-26] 22  SpO2:  [95 %-100 %] 99 %  BP: (153-185)/(69-86) 178/72     Weight: 136.1 kg (300 lb)  Body mass index is 54.87 kg/m².      Intake/Output Summary (Last 24 hours) at 7/20/2020 1559  Last data filed at 7/20/2020 1250  Gross per 24 hour   Intake 200 ml   Output --   Net 200 ml       Physical Exam  Vitals signs and nursing note reviewed.   Constitutional:       Appearance: She is obese. She is not diaphoretic.   HENT:      Head: Normocephalic and atraumatic.      Mouth/Throat:      Comments: Bipap mask in place  Eyes:      General: No scleral icterus.     Extraocular Movements: Extraocular movements intact.   Neck:      Musculoskeletal: No neck rigidity.   Cardiovascular:      Rate and Rhythm: Normal rate and regular rhythm.      Pulses: Normal pulses.      Heart sounds: Normal heart sounds. No murmur. No friction rub. No gallop.    Pulmonary:      Effort: No respiratory distress.      Breath sounds: No stridor. Rales present. No wheezing or rhonchi.   Abdominal:      Tenderness: There is no abdominal tenderness. There is no guarding.   Musculoskeletal:      Right lower leg: Edema present.      Left lower leg: Edema present.   Skin:     General: Skin is warm and dry.      Capillary Refill: Capillary refill takes less than 2 seconds.      Findings: No erythema or rash.   Neurological:      Comments: Responds to voice   Psychiatric:      Comments: Unable to obtain 2/2 clinical condition         Vents:  Oxygen Concentration (%): 35 (07/20/20 1446)    Lines/Drains/Airways     Central Venous Catheter Line             Tunneled Central Line Insertion/Assessment - Double Lumen  06/22/20 0920 right internal jugular 28 days          Drain            Female External Urinary Catheter 06/17/20 0020 33 days         Urethral Catheter 07/20/20 1030 Non-latex 16 Fr. less than 1 day          Peripheral Intravenous Line                 Peripheral IV - Single Lumen 07/20/20 1440 18 G Right Forearm less than 1 day         Peripheral IV - Single Lumen 07/20/20 20 G Left Forearm less than 1 day                Significant Labs:    CBC/Anemia Profile:  Recent Labs   Lab 07/20/20  0951 07/20/20  1140   WBC 8.65  --    HGB 7.6*  --    HCT 26.3* 26*     --    MCV 99*  --    RDW 15.5*  --    IRON 19*  --    FERRITIN 200  --    RETIC 3.4*  --         Chemistries:  Recent Labs   Lab 07/20/20  0951      K 6.7*      CO2 25   *   CREATININE 1.9*   CALCIUM 8.9   ALBUMIN 2.8*   PROT 8.7*   BILITOT 0.2   ALKPHOS 99   ALT 15   AST 14       All pertinent labs within the past 24 hours have been reviewed.    Significant Imaging:   I have reviewed and interpreted all pertinent imaging results/findings within the past 24 hours.     CXR 7/20/20- Images personally reviewed.   Bilateral, dense opacities extending from the lillian with cephalization of the vasculature. Poor quality film due to body habitus and small inspiratory effort. RIJ catheter in place.       Assessment/Plan:     * Acute respiratory failure with hypoxia and hypercarbia  Patient with hx of morbid obesity, no known pulmonary d/o's. Receives home O2 per RT. Mental status improved while in the ED, but a repeat ABG showed a pH of 7.21/pCO2 65.    --continue BiPAP with prn ABGs; wean BiPAP as tolerated.  --continue diuresis; 80 mg Lasix IV x 1 (7/20) on admission  --no wheezing present  --CXR (7/20 on admission) with opacity in the right lung base, suspicious for a right basilar pneumonia or atelectasis with evidence of pulmonary edema  --COVID-19 test negative  --sputum cxs  sent; plan to f/up  --currently on meropenem and vanc for UTI; adequate coverage for CAP.    Acute on chronic congestive heart failure  /trop negative on admission.     --Echo with EF 55%; limited echo d/t body habitus  --continue diuresis prn    CKD (chronic kidney disease) stage 4, GFR 15-29 ml/min  Cr on admission 1.9. Baseline Cr 1.7    --place amin  --strict I/o's  --daily BMP  --diurese PRN for net negative goal             Kapil Weiss NP  Pulmonology  [facility] [place]

## 2020-07-20 NOTE — PROGRESS NOTES
Pharmacokinetic Initial Assessment: IV Vancomycin    Assessment/Plan:    Initiate intravenous vancomycin with loading dose of 2000 mg once with subsequent doses when random concentrations are less than 20 mcg/mL  Desired empiric serum trough concentration is 10 to 20 mcg/mL  Draw vancomycin random level on 7/21/2020 at 0400.  Pharmacy will continue to follow and monitor vancomycin.      Please contact pharmacy at extension [phone] with any questions regarding this assessment.     Thank you for the consult,   Kasie Mooney       Patient brief summary:  Tasneem Lynn is a 75 y.o. female initiated on antimicrobial therapy with IV Vancomycin for treatment of suspected skin & soft tissue infection    Drug Allergies:   Review of patient's allergies indicates:   Allergen Reactions    Corticosteroids (glucocorticoids) Edema       Actual Body Weight:   136.1 kg     Renal Function:   Estimated Creatinine Clearance: 34.1 mL/min (A) (based on SCr of 1.9 mg/dL (H)).,     Dialysis Method (if applicable):  N/A    CBC (last 72 hours):  Recent Labs   Lab Result Units 07/20/20  0951   WBC K/uL 8.65   Hemoglobin g/dL 7.6*   Hematocrit % 26.3*   Platelets K/uL 298   Gran% % 71.1   Lymph% % 21.6   Mono% % 6.5   Eosinophil% % 0.5   Basophil% % 0.1   Differential Method  Automated       Metabolic Panel (last 72 hours):  Recent Labs   Lab Result Units 07/20/20  0951 07/20/20  1031   Sodium mmol/L 141  --    Potassium mmol/L 6.7*  --    Chloride mmol/L 110  --    CO2 mmol/L 25  --    Glucose mg/dL 146*  --    Glucose, UA   --  Negative   BUN, Bld mg/dL 113*  --    Creatinine mg/dL 1.9*  --    Albumin g/dL 2.8*  --    Total Bilirubin mg/dL 0.2  --    Alkaline Phosphatase U/L 99  --    AST U/L 14  --    ALT U/L 15  --        Drug levels (last 3 results):  No results for input(s): VANCOMYCINRA, VANCOMYCINPE, VANCOMYCINTR in the last 72 hours.    Microbiologic Results:  Microbiology Results (last 7 days)       Procedure Component Value Units  Date/Time    Culture, Respiratory with Gram Stain [[id number]]     Order Status: No result Specimen: Respiratory from Endotracheal Aspirate     Blood culture x two cultures. Draw prior to antibiotics. [[id number]] Collected: 07/20/20 1122    Order Status: Sent Specimen: Blood from Line, Subclavian, Right Updated: 07/20/20 1138    Urine culture [[id number]] Collected: 07/20/20 1031    Order Status: No result Specimen: Urine Updated: 07/20/20 1126    Blood culture [[id number]] Collected: 07/20/20 1025    Order Status: Sent Specimen: Blood from Peripheral, Hand, Right Updated: 07/20/20 1056    Blood culture x two cultures. Draw prior to antibiotics. [[id number]] Collected: 07/20/20 0951    Order Status: Sent Specimen: Blood from Peripheral, Forearm, Right Updated: 07/20/20 1017

## 2020-07-20 NOTE — ASSESSMENT & PLAN NOTE
Chronic right heel pressure wound not healing x 3 months.   MRI showed osteomyelitis of the calcaneus with complete to near complete disruption of the Achilles tendon  Patient was seen by Podiatry, Vascular and ID, along with wound care on last hospitalization  She was discharged to SNF on regimen of Ceftriaxone 1 gram IV q 24 hours plus Vancomycin 500 mg IV q 48 hours (goal trough is 15-20) with end date of 7/27/2020  (Tunnel catheter placed for IV antibiotic administration on 01/06/22 by IR)  Antibiotics adjusted as above  Will consult podiatry, wound care, and ID

## 2020-07-20 NOTE — ASSESSMENT & PLAN NOTE
Patient unlikely p.o. at this time  Will resume home regimen of amlodipine, carvedilol when able  P.r.n. hydralazine

## 2020-07-20 NOTE — H&P
[facility] Medicine  History & Physical    Patient Name: Tasneem Lynn  MRN: [id number]  Admission Date: 7/20/2020  Attending Physician: Emilia Hendrix MD   Primary Care Provider: To Obtain Unable         Patient information was obtained from past medical records and ER records.     Subjective:     Principal Problem:Acute respiratory failure with hypoxia and hypercarbia    Chief Complaint:   Chief Complaint   Patient presents with    Altered Mental Status     EMS reports pt with altered mental status starting sometime yesterday. upon arrival pt RA O2 85%. placed on 2L O2  via nasal cannula with improvement. 1mg narcan given with EMS with no change.         HPI: 75 year old female with CHF, HTN, HLP, DM2, hypothyroidism, CKD stage 4, chronic pain, obesity, and h/o recent admission from 6/12 - 6/24/20 for right foot osteomyelitis treated with Rocephin and vancomycin planned for until 7/27/20 and CVA (1986) who presented from nursing home for altered mental status that started sometime yesterday.  EMS was called out, noted sats of 85% on room air upon their arrival and status post treatment with Narcan with no improvement in her mentation.  In the ER, she was noted to be hypertensive and was hypoxic with treatment rendered with BiPAP.  Workup with head CT that was unremarkable, chest x-ray with pulmonary edema along with questionable right infiltrate, UA showed 3+ leukocyte many bacteria, H&H 7.6 and 26.3 and hemoccult that was positive.  BUN and creatinine 125/1.8. CMP remarkable for a potassium of 6.2 which was repeated - potassium 6.7.  Emergent treatment rendered with nebulizer treatment, insulin, calcium gluconate, and bicarb.  Procalcitonin 0.2, lactic acid 0.6, COVID-19 negative, D-dimer 2.2, .    Past Medical History:   Diagnosis Date    Arthritis     Gout    CHF (congestive heart failure)     Coronary artery disease     Diabetes mellitus     HLD (hyperlipidemia)      Hypertension     Obese     Stroke     1986    Thyroid disease        Past Surgical History:   Procedure Laterality Date    right hand surgery      right knee surgery Right     partial plate       Review of patient's allergies indicates:   Allergen Reactions    Corticosteroids (glucocorticoids) Edema       No current facility-administered medications on file prior to encounter.      Current Outpatient Medications on File Prior to Encounter   Medication Sig    acetaminophen (TYLENOL) 325 MG tablet Take 2 tablets (650 mg total) by mouth every 6 (six) hours as needed for Pain or Temperature greater than (100.5).    amLODIPine (NORVASC) 2.5 MG tablet Take 1 tablet (2.5 mg total) by mouth once daily.    aspirin 81 MG Chew Take 81 mg by mouth once daily.    blood pressure test kit-large Kit Use to check blood pressure daily and as needed.    carvedilol (COREG) 25 MG tablet Take 25 mg by mouth 2 (two) times daily with meals.    cefTRIAXone (ROCEPHIN) 1 g/50 mL PgBk IVPB Inject 50 mLs (1 g total) into the vein once daily. To end date of 7/27    cetirizine (ZYRTEC) 10 MG tablet Take 1 tablet (10 mg total) by mouth every evening.    collagenase (SANTYL) ointment Apply topically once daily.    famotidine (PEPCID) 20 MG tablet Take 1 tablet (20 mg total) by mouth once daily.    insulin aspart U-100 (NOVOLOG) 100 unit/mL (3 mL) InPn pen Inject 10 Units into the skin 3 (three) times daily with meals.    insulin detemir U-100 (LEVEMIR FLEXTOUCH) 100 unit/mL (3 mL) SubQ InPn pen Inject 15 Units into the skin 2 (two) times daily.    levothyroxine (SYNTHROID) 75 MCG tablet Take 1 tablet (75 mcg total) by mouth before breakfast.    lidocaine (LIDODERM) 5 % Place 2 patches onto the skin once daily. Remove & Discard patch within 12 hours or as directed by MD to right shoulder    rosuvastatin (CRESTOR) 20 MG tablet Take 1 tablet (20 mg total) by mouth once daily.    senna-docusate 8.6-50 mg (PERICOLACE) 8.6-50 mg per  tablet Take 1 tablet by mouth 2 (two) times daily.    vancomycin HCl (VANCOMYCIN 500 MG/100 ML D5W, READY TO MIX SYSTEM,) Inject 100 mLs (500 mg total) into the vein every 48 hours.     Family History     Problem Relation (Age of Onset)    Diabetes Maternal Aunt    Heart disease Sister, Maternal Aunt    Hypertension Maternal Aunt        Tobacco Use    Smoking status: Never Smoker    Smokeless tobacco: Never Used   Substance and Sexual Activity    Alcohol use: No    Drug use: No    Sexual activity: Not Currently     Partners: Male     Review of Systems   Unable to perform ROS: Mental status change     Objective:     Vital Signs (Most Recent):  Temp: 97.4 °F (36.3 °C) (07/20/20 1145)  Pulse: 70 (07/20/20 1452)  Resp: (!) 22 (07/20/20 1452)  BP: (!) 178/72 (07/20/20 1452)  SpO2: 99 % (07/20/20 1452) Vital Signs (24h Range):  Temp:  [97.4 °F (36.3 °C)-98.4 °F (36.9 °C)] 97.4 °F (36.3 °C)  Pulse:  [53-74] 70  Resp:  [21-26] 22  SpO2:  [95 %-100 %] 99 %  BP: (153-185)/(69-86) 178/72     Weight: 136.1 kg (300 lb)  Body mass index is 54.87 kg/m².    Physical Exam  Constitutional:       General: She is in acute distress.      Appearance: She is obese.      Comments: Lethargic   HENT:      Head: Normocephalic.      Comments: BIPAP mask in place     Nose: Nose normal.   Eyes:      Conjunctiva/sclera: Conjunctivae normal.   Neck:      Musculoskeletal: Normal range of motion.      Vascular: No JVD.   Cardiovascular:      Rate and Rhythm: Normal rate and regular rhythm.      Heart sounds: Normal heart sounds.      Comments: R subclavian tunneled line present without signs of infection  Pulmonary:      Effort: Respiratory distress present.      Comments: Tachypneic, increased work of breathing, breath sounds are distant and overall decreased but worse in dependent portions, no appreciable wheezing  Abdominal:      General: Bowel sounds are normal.      Palpations: Abdomen is soft.      Tenderness: There is no abdominal  tenderness.   Genitourinary:     Rectum: Guaiac result positive.      Comments: Davis catheter in place with yellow urine  Musculoskeletal:         General: Swelling and tenderness (right foot) present.   Skin:     General: Skin is dry.      Capillary Refill: Capillary refill takes 2 to 3 seconds.      Comments: Right heel wound about 9cm x 6cm wound with granulation tissue; approximately 4cm x 4cm anterior ankle wound with foul odor and dressing adherent. Bilateral LE with trace edema, dry scaly skin with hyperpigmentation from foot to below knees. Dystrophic toenails.   Neurological:      Comments: Response to verbal and tactile stimulus, lethargic, moves all extremities spontaneously   Psychiatric:         Attention and Perception: She is inattentive.         Speech: She is noncommunicative.         Behavior: Behavior is slowed.         Cognition and Memory: Cognition is impaired. Memory is impaired.             Significant Labs: All pertinent labs within the past 24 hours have been reviewed.    Significant Imaging: I have reviewed and interpreted all pertinent imaging results/findings within the past 24 hours.    Assessment/Plan:     * Acute respiratory failure with hypoxia and hypercarbia  Patient with acute respiratory decompensation likely might multifactorial in nature  Signs of volume overload with pulmonary edema noted on chest x-ray along with high BNP  There is also concern for possible pneumonia given questionable right infiltrate which is plausible for possible aspiration pneumonia given her change in mental status  Additionally, patient likely has CHARITY/OHS greatly contributing to her retain pCO2  And possible acute drop in H&H resulting in some component of symptomatic anemia may be playing a factor  D-dimer elevated but suspicion low for PE, will obtain bilateral lower extremity ultrasound  Will treat empirically for infectious process with meropenem and vancomycin given recent hospitalization with  known ESBL organism and osteomyelitis  Will diurese with IV Lasix 40 mg IV q.12 and obtained ECHO  Will monitor with daily weights and strict I&Os  Will monitor with serial CBCs and transfuse if necessary  Consult placed to Pulmonary and GI  Continue supportive care with BiPAP for now and reassess if patient will need intubation    Acute on chronic congestive heart failure  Patient with signs of volume overload with pulmonary edema noted on exam  BNP elevated in a patient with morbid obesity which decreases sensitivity  Will diurese as discussed above with IV Lasix  And obtain a stat echo    Acute blood loss anemia  Noted drop in H&H compared to previous levels along with Hemoccult-positive stool  Also uremia out of proportion with creatinine concerning for GI bleed  Will stop aspirin and start Protonix IV q.12  Monitor with serial H&Hs  Consult GI    CKD (chronic kidney disease) stage 4, GFR 15-29 ml/min  Baseline creatinine approximately 2.0  Creatinine better than baseline, but patient more uremic and with hyperkalemia  Labs were repeated twice in the ER which confirmed hyperkalemia was not and our along with elevated BUN  Patient with hemoccult positive stools along with drop in H/H, concerning GI bleed contributing to uremia  Treatment rendered for hyperkalemia with calcium gluconate, insulin, Lasix, nebulizer treatments   Given patient's complicated medical condition, will consult Nephrology   Will repeat labs again later today to reassess    Hyperkalemia  Status post emergency treatment rendered in the ER  Will repeat laboratory testing    Chronic osteomyelitis of right foot with draining sinus  Chronic right heel pressure wound not healing x 3 months.   MRI showed osteomyelitis of the calcaneus with complete to near complete disruption of the Achilles tendon  Patient was seen by Podiatry, Vascular and ID, along with wound care on last hospitalization  She was discharged to SNF on regimen of Ceftriaxone 1  gram IV q 24 hours plus Vancomycin 500 mg IV q 48 hours (goal trough is 15-20) with end date of 7/27/2020  (Tunnel catheter placed for IV antibiotic administration on 01/06/22 by IR)  Antibiotics adjusted as above  Will consult podiatry, wound care, and ID    Type 2 diabetes mellitus, uncontrolled, with renal complications  Hemoglobin A1C   Date Value Ref Range Status   06/13/2020 7.5 (H) 4.0 - 5.6 % Final   Will hold outpatient regimen  Start low-dose basal insulin 15 units detemir and sliding scale  Will further adjust insulin regimen as necessary to achieve goals between 140 - 180    Wound of right leg  Discussed above    Hyperlipidemia  Continue rosuvastatin    History of CVA (cerebrovascular accident)  CVA in 1986 with left side weakness per patient report.   Reports not getting out of bed much since 4/2010 due to wound at right heel.    Continue statin, but aspirin hold due to GI bleed    Morbid obesity with BMI of 50.0-59.9, adult  To be addressed as outpatient after acute issues are addressed    Hypothyroidism  Continue levothyroxine  Recent TSH was normal and will not be repeated    Essential hypertension  Patient unlikely p.o. at this time  Will resume home regimen of amlodipine, carvedilol when able  P.r.n. hydralazine      VTE Risk Mitigation (From admission, onward)    None        Critical care time spent on the evaluation and treatment of severe organ dysfunction, review of pertinent labs and imaging studies, discussions with consulting providers and discussions with patient/family: 90 minutes.     Emilia Hendrix MD  Department of Hospital Medicine   [facility]

## 2020-07-20 NOTE — ASSESSMENT & PLAN NOTE
Patient with hx of morbid obesity, no known pulmonary d/o's. Receives home O2 per RT. Mental status improved while in the ED, but a repeat ABG showed a pH of 7.21/pCO2 65.    --continue BiPAP with prn ABGs; wean BiPAP as tolerated.  --continue diuresis; 80 mg Lasix IV x 1 (7/20) on admission  --no wheezing present  --CXR (7/20 on admission) with opacity in the right lung base, suspicious for a right basilar pneumonia or atelectasis with evidence of pulmonary edema  --COVID-19 test negative  --sputum cxs sent; plan to f/up  --currently on meropenem and vanc for UTI; adequate coverage for CAP.

## 2020-07-20 NOTE — ASSESSMENT & PLAN NOTE
Patient with signs of volume overload with pulmonary edema noted on exam  BNP elevated in a patient with morbid obesity which decreases sensitivity  Will diurese as discussed above with IV Lasix  And obtain a stat echo

## 2020-07-20 NOTE — ASSESSMENT & PLAN NOTE
Cr on admission 1.9. Baseline Cr 1.7    --place amin  --strict I/o's  --daily BMP  --diurese PRN for net negative goal

## 2020-07-20 NOTE — SUBJECTIVE & OBJECTIVE
Past Medical History:   Diagnosis Date    Arthritis     Gout    CHF (congestive heart failure)     Coronary artery disease     Diabetes mellitus     HLD (hyperlipidemia)     Hypertension     Obese     Stroke     1986    Thyroid disease        Past Surgical History:   Procedure Laterality Date    right hand surgery      right knee surgery Right     partial plate       Review of patient's allergies indicates:   Allergen Reactions    Corticosteroids (glucocorticoids) Edema       No current facility-administered medications on file prior to encounter.      Current Outpatient Medications on File Prior to Encounter   Medication Sig    acetaminophen (TYLENOL) 325 MG tablet Take 2 tablets (650 mg total) by mouth every 6 (six) hours as needed for Pain or Temperature greater than (100.5).    amLODIPine (NORVASC) 2.5 MG tablet Take 1 tablet (2.5 mg total) by mouth once daily.    aspirin 81 MG Chew Take 81 mg by mouth once daily.    blood pressure test kit-large Kit Use to check blood pressure daily and as needed.    carvedilol (COREG) 25 MG tablet Take 25 mg by mouth 2 (two) times daily with meals.    cefTRIAXone (ROCEPHIN) 1 g/50 mL PgBk IVPB Inject 50 mLs (1 g total) into the vein once daily. To end date of 7/27    cetirizine (ZYRTEC) 10 MG tablet Take 1 tablet (10 mg total) by mouth every evening.    collagenase (SANTYL) ointment Apply topically once daily.    famotidine (PEPCID) 20 MG tablet Take 1 tablet (20 mg total) by mouth once daily.    insulin aspart U-100 (NOVOLOG) 100 unit/mL (3 mL) InPn pen Inject 10 Units into the skin 3 (three) times daily with meals.    insulin detemir U-100 (LEVEMIR FLEXTOUCH) 100 unit/mL (3 mL) SubQ InPn pen Inject 15 Units into the skin 2 (two) times daily.    levothyroxine (SYNTHROID) 75 MCG tablet Take 1 tablet (75 mcg total) by mouth before breakfast.    lidocaine (LIDODERM) 5 % Place 2 patches onto the skin once daily. Remove & Discard patch within 12 hours or  as directed by MD to right shoulder    rosuvastatin (CRESTOR) 20 MG tablet Take 1 tablet (20 mg total) by mouth once daily.    senna-docusate 8.6-50 mg (PERICOLACE) 8.6-50 mg per tablet Take 1 tablet by mouth 2 (two) times daily.    vancomycin HCl (VANCOMYCIN 500 MG/100 ML D5W, READY TO MIX SYSTEM,) Inject 100 mLs (500 mg total) into the vein every 48 hours.     Family History     Problem Relation (Age of Onset)    Diabetes Maternal Aunt    Heart disease Sister, Maternal Aunt    Hypertension Maternal Aunt        Tobacco Use    Smoking status: Never Smoker    Smokeless tobacco: Never Used   Substance and Sexual Activity    Alcohol use: No    Drug use: No    Sexual activity: Not Currently     Partners: Male     Review of Systems   Unable to perform ROS: Mental status change     Objective:     Vital Signs (Most Recent):  Temp: 97.4 °F (36.3 °C) (07/20/20 1145)  Pulse: 70 (07/20/20 1452)  Resp: (!) 22 (07/20/20 1452)  BP: (!) 178/72 (07/20/20 1452)  SpO2: 99 % (07/20/20 1452) Vital Signs (24h Range):  Temp:  [97.4 °F (36.3 °C)-98.4 °F (36.9 °C)] 97.4 °F (36.3 °C)  Pulse:  [53-74] 70  Resp:  [21-26] 22  SpO2:  [95 %-100 %] 99 %  BP: (153-185)/(69-86) 178/72     Weight: 136.1 kg (300 lb)  Body mass index is 54.87 kg/m².    Physical Exam  Constitutional:       General: She is in acute distress.      Appearance: She is obese.      Comments: Lethargic   HENT:      Head: Normocephalic.      Comments: BIPAP mask in place     Nose: Nose normal.   Eyes:      Conjunctiva/sclera: Conjunctivae normal.   Neck:      Musculoskeletal: Normal range of motion.      Vascular: No JVD.   Cardiovascular:      Rate and Rhythm: Normal rate and regular rhythm.      Heart sounds: Normal heart sounds.      Comments: R subclavian tunneled line present without signs of infection  Pulmonary:      Effort: Respiratory distress present.      Comments: Tachypneic, increased work of breathing, breath sounds are distant and overall decreased but  worse in dependent portions, no appreciable wheezing  Abdominal:      General: Bowel sounds are normal.      Palpations: Abdomen is soft.      Tenderness: There is no abdominal tenderness.   Genitourinary:     Rectum: Guaiac result positive.      Comments: Davis catheter in place with yellow urine  Musculoskeletal:         General: Swelling and tenderness (right foot) present.   Skin:     General: Skin is dry.      Capillary Refill: Capillary refill takes 2 to 3 seconds.      Comments: Right heel wound about 9cm x 6cm wound with granulation tissue; approximately 4cm x 4cm anterior ankle wound with foul odor and dressing adherent. Bilateral LE with trace edema, dry scaly skin with hyperpigmentation from foot to below knees. Dystrophic toenails.   Neurological:      Comments: Response to verbal and tactile stimulus, lethargic, moves all extremities spontaneously   Psychiatric:         Attention and Perception: She is inattentive.         Speech: She is noncommunicative.         Behavior: Behavior is slowed.         Cognition and Memory: Cognition is impaired. Memory is impaired.             Significant Labs: All pertinent labs within the past 24 hours have been reviewed.    Significant Imaging: I have reviewed and interpreted all pertinent imaging results/findings within the past 24 hours.

## 2020-07-20 NOTE — ASSESSMENT & PLAN NOTE
Patient with acute respiratory decompensation likely might multifactorial in nature  Signs of volume overload with pulmonary edema noted on chest x-ray along with high BNP  There is also concern for possible pneumonia given questionable right infiltrate which is plausible for possible aspiration pneumonia given her change in mental status  Additionally, patient likely has CHARITY/OHS greatly contributing to her retain pCO2  And possible acute drop in H&H resulting in some component of symptomatic anemia may be playing a factor  D-dimer elevated but suspicion low for PE, will obtain bilateral lower extremity ultrasound  Will treat empirically for infectious process with meropenem and vancomycin given recent hospitalization with known ESBL organism and osteomyelitis  Will diurese with IV Lasix 40 mg IV q.12 and obtained ECHO  Will monitor with daily weights and strict I&Os  Will monitor with serial CBCs and transfuse if necessary  Consult placed to Pulmonary and GI  Continue supportive care with BiPAP for now and reassess if patient will need intubation

## 2020-07-20 NOTE — ED PROVIDER NOTES
Encounter Date: 7/20/2020       History     Chief Complaint   Patient presents with    Altered Mental Status     EMS reports pt with altered mental status starting sometime yesterday. upon arrival pt RA O2 85%. placed on 2L O2  via nasal cannula with improvement. 1mg narcan given with EMS with no change.      75 y.o. female Past Medical History:  No date: Arthritis      Comment:  Gout  No date: CHF (congestive heart failure)  No date: Coronary artery disease  No date: Diabetes mellitus  No date: HLD (hyperlipidemia)  No date: Hypertension  No date: Obese  No date: Stroke      Comment:  1986  No date: Thyroid disease     Chronic osteo    Currently being treated for osteo R heel with IV vanc/ceftriaxone through R chest central line, CKD4, sent in by RN facility for altered mental status since yesterday per medic report. Medics state they found pt with o2 sat 86% on room air, responsive to pain. Medics gave 1mg narcan IV without improvement        Review of patient's allergies indicates:   Allergen Reactions    Corticosteroids (glucocorticoids) Edema     Past Medical History:   Diagnosis Date    Arthritis     Gout    CHF (congestive heart failure)     Coronary artery disease     Diabetes mellitus     HLD (hyperlipidemia)     Hypertension     Obese     Stroke     1986    Thyroid disease      Past Surgical History:   Procedure Laterality Date    right hand surgery      right knee surgery Right     partial plate     Family History   Problem Relation Age of Onset    Heart disease Sister     Diabetes Maternal Aunt     Heart disease Maternal Aunt     Hypertension Maternal Aunt      Social History     Tobacco Use    Smoking status: Never Smoker    Smokeless tobacco: Never Used   Substance Use Topics    Alcohol use: No    Drug use: No     Review of Systems   Constitutional: Negative for fever.   HENT: Negative for sore throat.    Respiratory: Negative for shortness of breath.    Cardiovascular: Negative  for chest pain.   Gastrointestinal: Negative for nausea.   Genitourinary: Negative for dysuria.   Musculoskeletal: Negative for back pain.   Skin: Negative for rash.   Neurological: Negative for weakness.   Hematological: Does not bruise/bleed easily.   All other systems reviewed and are negative.      Physical Exam     Initial Vitals [07/20/20 0917]   BP Pulse Resp Temp SpO2   (!) 153/69 (!) 53 (!) 26 -- 100 %      MAP       --         Physical Exam    Nursing note and vitals reviewed.  Constitutional: She appears well-developed and well-nourished.   HENT:   Head: Normocephalic and atraumatic.   Eyes: Conjunctivae and EOM are normal. Pupils are equal, round, and reactive to light.   Neck: Normal range of motion.   Cardiovascular:   Slightly yenny   Pulmonary/Chest: No respiratory distress.   Abdominal: Soft. She exhibits no distension. There is no abdominal tenderness. There is no rebound and no guarding.   Morbidly obese   Musculoskeletal: Normal range of motion.   Neurological: She is alert. No cranial nerve deficit. GCS score is 15. GCS eye subscore is 4. GCS verbal subscore is 5. GCS motor subscore is 6.   Skin: Skin is warm and dry.   Psychiatric: She has a normal mood and affect. Thought content normal.     +coarse breath sounds  No jvd  R heel wound, clean, granular  R dorsal foot wound: +purulence  R chest central line: clean, no purulence or erythema  Pinpoint pupils    Rectal done with female RN chaperon in room: stool in vault, brown trace heme + stool  ED Course   Procedures  Labs Reviewed   CULTURE, BLOOD   CULTURE, BLOOD   CBC W/ AUTO DIFFERENTIAL   COMPREHENSIVE METABOLIC PANEL   LACTIC ACID, PLASMA   URINALYSIS, REFLEX TO URINE CULTURE   C-REACTIVE PROTEIN   FERRITIN   LACTATE DEHYDROGENASE   CK   SARS-COV-2 RNA AMPLIFICATION, QUAL   TROPONIN I   PROCALCITONIN   B-TYPE NATRIURETIC PEPTIDE   VANCOMYCIN, RANDOM   POCT GLUCOSE MONITORING CONTINUOUS     EKG Readings: (Independently Interpreted)   Hr 63,  sinus, nl axis/intervals, no bambi, isolated twi III, no stemi.       Imaging Results    None                       Attending Attestation:         Attending Critical Care:   Critical Care Times:   Direct Patient Care (initial evaluation, reassessments, and time considering the case)................................................................60 minutes.   Additional History from reviewing old medical records or taking additional history from the family, EMS, PCP, etc.......................10 minutes.   Ordering, Reviewing, and Interpreting Diagnostic Studies...............................................................................................................10 minutes.   Documentation..................................................................................................................................................................................10 minutes.   Consultation with other Physicians. .................................................................................................................................................10 minutes.   ==============================================================  · Total Critical Care Time - exclusive of procedural time: 100 minutes.  ==============================================================                screening labs, ct head, cxr, covid test, eval for pna/sepsis. Pt is already on broad abx. Will give a dose of zosyn.       hgb has dropped 1 point in the last 30 days    D-dimer is elevated. Cannot get cta due to CKD and hesitant to anticoagulate given trace heme+stool and hgb drop. Pt has multiple infectious sources which could explain elevated d-dimer, have d/w medicine team and will hold off on imaging. Awaiting ct head.    Potassium is elevated, may represent hemolysis as Cr is not very elevated. No peaked T waves on ekg. Have ordered istat chem 8 to verity    Pt is now starting to wake up and answer questions.    Trial of removal  of bipap attempted and pt desat to 88% on 2L nc  Clinical Impression:       ICD-10-CM ICD-9-CM   1. Hypoxia  R09.02 799.02   2. Altered behavior  R46.89 312.9   3. Pneumonia due to infectious organism, unspecified laterality, unspecified part of lung  J18.9 136.9     484.8   4. Urinary tract infection without hematuria, site unspecified  N39.0 599.0   5. Hyperkalemia  E87.5 276.7   6. Chronic kidney disease, unspecified CKD stage  N18.9 585.9                                 Shamar Seo MD  07/20/20 1435       Shamar Seo MD  07/20/20 1439

## 2020-07-20 NOTE — SUBJECTIVE & OBJECTIVE
Past Medical History:   Diagnosis Date    Arthritis     Gout    CHF (congestive heart failure)     Coronary artery disease     Diabetes mellitus     HLD (hyperlipidemia)     Hypertension     Obese     Stroke     1986    Thyroid disease        Past Surgical History:   Procedure Laterality Date    right hand surgery      right knee surgery Right     partial plate       Review of patient's allergies indicates:   Allergen Reactions    Corticosteroids (glucocorticoids) Edema       Family History     Problem Relation (Age of Onset)    Diabetes Maternal Aunt    Heart disease Sister, Maternal Aunt    Hypertension Maternal Aunt        Tobacco Use    Smoking status: Never Smoker    Smokeless tobacco: Never Used   Substance and Sexual Activity    Alcohol use: No    Drug use: No    Sexual activity: Not Currently     Partners: Male         Review of Systems   Unable to perform ROS: Patient unresponsive     Objective:     Vital Signs (Most Recent):  Temp: 97.4 °F (36.3 °C) (07/20/20 1145)  Pulse: 70 (07/20/20 1452)  Resp: (!) 22 (07/20/20 1452)  BP: (!) 178/72 (07/20/20 1452)  SpO2: 99 % (07/20/20 1452) Vital Signs (24h Range):  Temp:  [97.4 °F (36.3 °C)-98.4 °F (36.9 °C)] 97.4 °F (36.3 °C)  Pulse:  [53-74] 70  Resp:  [21-26] 22  SpO2:  [95 %-100 %] 99 %  BP: (153-185)/(69-86) 178/72     Weight: 136.1 kg (300 lb)  Body mass index is 54.87 kg/m².      Intake/Output Summary (Last 24 hours) at 7/20/2020 1559  Last data filed at 7/20/2020 1250  Gross per 24 hour   Intake 200 ml   Output --   Net 200 ml       Physical Exam  Vitals signs and nursing note reviewed.   Constitutional:       Appearance: She is obese. She is not diaphoretic.   HENT:      Head: Normocephalic and atraumatic.      Mouth/Throat:      Comments: Bipap mask in place  Eyes:      General: No scleral icterus.     Extraocular Movements: Extraocular movements intact.   Neck:      Musculoskeletal: No neck rigidity.   Cardiovascular:      Rate and  Rhythm: Normal rate and regular rhythm.      Pulses: Normal pulses.      Heart sounds: Normal heart sounds. No murmur. No friction rub. No gallop.    Pulmonary:      Effort: No respiratory distress.      Breath sounds: No stridor. Rales present. No wheezing or rhonchi.   Abdominal:      Tenderness: There is no abdominal tenderness. There is no guarding.   Musculoskeletal:      Right lower leg: Edema present.      Left lower leg: Edema present.   Skin:     General: Skin is warm and dry.      Capillary Refill: Capillary refill takes less than 2 seconds.      Findings: No erythema or rash.   Neurological:      Comments: Responds to voice   Psychiatric:      Comments: Unable to obtain 2/2 clinical condition         Vents:  Oxygen Concentration (%): 35 (07/20/20 1446)    Lines/Drains/Airways     Central Venous Catheter Line            Tunneled Central Line Insertion/Assessment - Double Lumen  06/22/20 0920 right internal jugular 28 days          Drain            Female External Urinary Catheter 06/17/20 0020 33 days         Urethral Catheter 07/20/20 1030 Non-latex 16 Fr. less than 1 day          Peripheral Intravenous Line                 Peripheral IV - Single Lumen 07/20/20 1440 18 G Right Forearm less than 1 day         Peripheral IV - Single Lumen 07/20/20 20 G Left Forearm less than 1 day                Significant Labs:    CBC/Anemia Profile:  Recent Labs   Lab 07/20/20  0951 07/20/20  1140   WBC 8.65  --    HGB 7.6*  --    HCT 26.3* 26*     --    MCV 99*  --    RDW 15.5*  --    IRON 19*  --    FERRITIN 200  --    RETIC 3.4*  --         Chemistries:  Recent Labs   Lab 07/20/20  0951      K 6.7*      CO2 25   *   CREATININE 1.9*   CALCIUM 8.9   ALBUMIN 2.8*   PROT 8.7*   BILITOT 0.2   ALKPHOS 99   ALT 15   AST 14       All pertinent labs within the past 24 hours have been reviewed.    Significant Imaging:   I have reviewed and interpreted all pertinent imaging results/findings within the  past 24 hours.     CXR 7/20/20- Images personally reviewed.   Bilateral, dense opacities extending from the lillian with cephalization of the vasculature. Poor quality film due to body habitus and small inspiratory effort. RIJ catheter in place.

## 2020-07-20 NOTE — ASSESSMENT & PLAN NOTE
Patient with hx of morbid obesity, no known pulmonary d/o's. Receives home O2 per RT. Mental status improved while in the ED, but a repeat ABG showed a pH of 7.21/pCO2 65.    --continue BiPAP with prn ABGs; wean BiPAP as tolerated.  --continue diuresis; 80 mg Lasix IV BID; aim for net negative to euvolemic status.  --no wheezing present  --CXR (7/20 on admission) with opacity in the right lung base, suspicious for a right basilar pneumonia or atelectasis with evidence of pulmonary edema.  --COVID-19 test negative  --sputum cxs ordered, not yet obtained; plan to f/up  --currently on meropenem and vanc for UTI; adequate coverage for CAP.

## 2020-07-20 NOTE — ASSESSMENT & PLAN NOTE
Baseline creatinine approximately 2.0  Creatinine better than baseline, but patient more uremic and with hyperkalemia  Labs were repeated twice in the ER which confirmed hyperkalemia was not and our along with elevated BUN  Patient with hemoccult positive stools along with drop in H/H, concerning GI bleed contributing to uremia  Treatment rendered for hyperkalemia with calcium gluconate, insulin, Lasix, nebulizer treatments   Given patient's complicated medical condition, will consult Nephrology   Will repeat labs again later today to reassess

## 2020-07-20 NOTE — PLAN OF CARE
Disharge planning assessment completed with assistance from patient's daughter, Merrill Lynn, via telephone.  Prior to admit, patient was receiving IVABX at [facility].  Patient's IVBX began on 6/24/2020 and is to end on 7/27/2020.  Per patient's daughter, patient was non-weight bearing at SNF and was using a wheelchair.  Patient will be admitted today. Case management will continue to assess and assist with discharge planning.       07/20/20 1529   Discharge Assessment   Assessment Type Discharge Planning Assessment   Confirmed/corrected address and phone number on facesheet? Yes   Assessment information obtained from? Caregiver  (Daughter; Lavern Lynn;  [phone])   Expected Length of Stay (days) 2   Communicated expected length of stay with patient/caregiver no  (Assessment and treatment ongoing.)   Prior to hospitilization cognitive status: Alert/Oriented   Prior to hospitalization functional status: Assistive Equipment;Needs Assistance  (SNF/Non-weight bearing status/wheelchair use.)   Current cognitive status: Unable to Assess   Current Functional Status: Needs Assistance   Facility Arrived From: [facility]   Lives With other (see comments)  (Receiving skilled therapy (IVABX) @ [facility].)   Able to Return to Prior Arrangements other (see comments)  (IVABX end date:  07/27/20)   Is patient able to care for self after discharge? No   Who are your caregiver(s) and their phone number(s)? Merrill Lynn; [phone]   Patient's perception of discharge disposition skilled nursing facility   Readmission Within the Last 30 Days no previous admission in last 30 days   Patient currently being followed by outpatient case management? No   Patient currently receives any other outside agency services? No   Equipment Currently Used at Home wheelchair  (Patient non-weight bearing per daughter.)   Part D Coverage [facility] Medicare O   Do you have any problems affording any of your prescribed  medications? No   Is the patient taking medications as prescribed? yes   Does the patient have transportation home?   (To Be Arranged depending on discharge disposition.)   Dialysis Name and Scheduled days n/a   Does the patient receive services at the [facility]? No   Discharge Plan A Skilled Nursing Facility  ([facility])   Discharge Plan B Other  (TBD)   DME Needed Upon Discharge  none  (No DME needed if patient is returning to SNF.)   Patient/Family in Agreement with Plan yes   Carolann Coyle LMSW, CCM  7/20/20

## 2020-07-20 NOTE — HPI
Ms Lynn is a 76 yo w/ h/o DMII, HTN, morbid obesity (BMI 54), CHF? (no recent TTE), CKD IV, and chronic osteo on IV Vanc/ceftriaxone via tunneled RIJ catheter who presented to [facility] ED today w/ mental status change over the last 24 hours. Medics found patient saturating 86% on room air and responsive only to pain. History is limited as patient is on Bipap at the time of exam.     In the ED, labwork showed an elevated d-dimer, elevated BNP, K 6.7 without EKG changes, UA c/w UTI and a pH of 7.24/pcO2 of 55 on a VBG, and a Hgb of 7.6 which is down a gram from 1mn/a. She was placed on Bipap. CTA held 2/2 CKD. She was given Zosyn, lasix, and hyperkalemia cocktail. Hemoccult positive. Mental status improved while in the ED, but a repeat ABG showed a pH of 7.21/pCO2 65. She was then admitted to the ICU for further management.

## 2020-07-21 PROBLEM — J18.9 PNEUMONIA OF RIGHT LOWER LOBE DUE TO INFECTIOUS ORGANISM: Status: ACTIVE | Noted: 2020-07-21

## 2020-07-21 PROBLEM — N30.00 ACUTE CYSTITIS WITHOUT HEMATURIA: Status: ACTIVE | Noted: 2020-07-21

## 2020-07-21 LAB
ALBUMIN SERPL BCP-MCNC: 2.5 G/DL (ref 3.5–5.2)
ALBUMIN SERPL BCP-MCNC: 2.5 G/DL (ref 3.5–5.2)
ALLENS TEST: ABNORMAL
ALLENS TEST: ABNORMAL
ALP SERPL-CCNC: 77 U/L (ref 55–135)
ALP SERPL-CCNC: 82 U/L (ref 55–135)
ALT SERPL W/O P-5'-P-CCNC: 10 U/L (ref 10–44)
ALT SERPL W/O P-5'-P-CCNC: 12 U/L (ref 10–44)
ANION GAP SERPL CALC-SCNC: 7 MMOL/L (ref 8–16)
ANION GAP SERPL CALC-SCNC: 8 MMOL/L (ref 8–16)
AST SERPL-CCNC: 11 U/L (ref 10–40)
AST SERPL-CCNC: 12 U/L (ref 10–40)
BASOPHILS # BLD AUTO: 0.01 K/UL (ref 0–0.2)
BASOPHILS NFR BLD: 0.1 % (ref 0–1.9)
BILIRUB SERPL-MCNC: 0.2 MG/DL (ref 0.1–1)
BILIRUB SERPL-MCNC: 0.2 MG/DL (ref 0.1–1)
BUN SERPL-MCNC: 108 MG/DL (ref 8–23)
BUN SERPL-MCNC: 111 MG/DL (ref 8–23)
CALCIUM SERPL-MCNC: 8.5 MG/DL (ref 8.7–10.5)
CALCIUM SERPL-MCNC: 8.6 MG/DL (ref 8.7–10.5)
CHLORIDE SERPL-SCNC: 110 MMOL/L (ref 95–110)
CHLORIDE SERPL-SCNC: 111 MMOL/L (ref 95–110)
CO2 SERPL-SCNC: 27 MMOL/L (ref 23–29)
CO2 SERPL-SCNC: 28 MMOL/L (ref 23–29)
CREAT SERPL-MCNC: 2 MG/DL (ref 0.5–1.4)
CREAT SERPL-MCNC: 2.3 MG/DL (ref 0.5–1.4)
CREAT UR-MCNC: 26.9 MG/DL (ref 15–325)
DELSYS: ABNORMAL
DELSYS: ABNORMAL
DIFFERENTIAL METHOD: ABNORMAL
EOSINOPHIL # BLD AUTO: 0.2 K/UL (ref 0–0.5)
EOSINOPHIL NFR BLD: 1.8 % (ref 0–8)
EP: 6
ERYTHROCYTE [DISTWIDTH] IN BLOOD BY AUTOMATED COUNT: 15.4 % (ref 11.5–14.5)
ERYTHROCYTE [SEDIMENTATION RATE] IN BLOOD BY WESTERGREN METHOD: 25 MM/H
ERYTHROCYTE [SEDIMENTATION RATE] IN BLOOD BY WESTERGREN METHOD: 8 MM/H
EST. GFR  (AFRICAN AMERICAN): 23 ML/MIN/1.73 M^2
EST. GFR  (AFRICAN AMERICAN): 28 ML/MIN/1.73 M^2
EST. GFR  (NON AFRICAN AMERICAN): 20 ML/MIN/1.73 M^2
EST. GFR  (NON AFRICAN AMERICAN): 24 ML/MIN/1.73 M^2
FIO2: 28
FLOW: 7
GLUCOSE SERPL-MCNC: 104 MG/DL (ref 70–110)
GLUCOSE SERPL-MCNC: 97 MG/DL (ref 70–110)
HAPTOGLOB SERPL-MCNC: 300 MG/DL (ref 30–250)
HCO3 UR-SCNC: 27.6 MMOL/L (ref 24–28)
HCO3 UR-SCNC: 28.9 MMOL/L (ref 24–28)
HCT VFR BLD AUTO: 24.1 % (ref 37–48.5)
HGB BLD-MCNC: 7 G/DL (ref 12–16)
IMM GRANULOCYTES # BLD AUTO: 0.02 K/UL (ref 0–0.04)
IMM GRANULOCYTES NFR BLD AUTO: 0.2 % (ref 0–0.5)
IP: 18
LYMPHOCYTES # BLD AUTO: 1.1 K/UL (ref 1–4.8)
LYMPHOCYTES NFR BLD: 13.2 % (ref 18–48)
MAGNESIUM SERPL-MCNC: 2.3 MG/DL (ref 1.6–2.6)
MCH RBC QN AUTO: 28.2 PG (ref 27–31)
MCHC RBC AUTO-ENTMCNC: 29 G/DL (ref 32–36)
MCV RBC AUTO: 97 FL (ref 82–98)
MIN VOL: 10
MODE: ABNORMAL
MODE: ABNORMAL
MONOCYTES # BLD AUTO: 0.9 K/UL (ref 0.3–1)
MONOCYTES NFR BLD: 11.1 % (ref 4–15)
NEUTROPHILS # BLD AUTO: 6 K/UL (ref 1.8–7.7)
NEUTROPHILS NFR BLD: 73.6 % (ref 38–73)
NRBC BLD-RTO: 0 /100 WBC
PCO2 BLDA: 49.3 MMHG (ref 35–45)
PCO2 BLDA: 51.1 MMHG (ref 35–45)
PH SMN: 7.34 [PH] (ref 7.35–7.45)
PH SMN: 7.38 [PH] (ref 7.35–7.45)
PHOSPHATE SERPL-MCNC: 4.1 MG/DL (ref 2.7–4.5)
PHOSPHATE SERPL-MCNC: 4.4 MG/DL (ref 2.7–4.5)
PLATELET # BLD AUTO: 274 K/UL (ref 150–350)
PMV BLD AUTO: 8.9 FL (ref 9.2–12.9)
PO2 BLDA: 175 MMHG (ref 80–100)
PO2 BLDA: 90 MMHG (ref 80–100)
POC BE: 2 MMOL/L
POC BE: 3 MMOL/L
POC SATURATED O2: 100 % (ref 95–100)
POC SATURATED O2: 96 % (ref 95–100)
POC TCO2: 29 MMOL/L (ref 23–27)
POC TCO2: 30 MMOL/L (ref 23–27)
POCT GLUCOSE: 102 MG/DL (ref 70–110)
POCT GLUCOSE: 106 MG/DL (ref 70–110)
POCT GLUCOSE: 113 MG/DL (ref 70–110)
POCT GLUCOSE: 95 MG/DL (ref 70–110)
POTASSIUM SERPL-SCNC: 4.9 MMOL/L (ref 3.5–5.1)
POTASSIUM SERPL-SCNC: 5.4 MMOL/L (ref 3.5–5.1)
PROT SERPL-MCNC: 7.6 G/DL (ref 6–8.4)
PROT SERPL-MCNC: 7.8 G/DL (ref 6–8.4)
PROT UR-MCNC: 69 MG/DL
PROT/CREAT UR: 2.57 MG/G{CREAT} (ref 0–0.2)
RBC # BLD AUTO: 2.48 M/UL (ref 4–5.4)
SAMPLE: ABNORMAL
SAMPLE: ABNORMAL
SITE: ABNORMAL
SITE: ABNORMAL
SODIUM SERPL-SCNC: 145 MMOL/L (ref 136–145)
SODIUM SERPL-SCNC: 146 MMOL/L (ref 136–145)
SP02: 100
SP02: 99
SPONT RATE: 13
VANCOMYCIN SERPL-MCNC: 13.1 UG/ML
VANCOMYCIN SERPL-MCNC: 34.8 UG/ML
WBC # BLD AUTO: 8.21 K/UL (ref 3.9–12.7)

## 2020-07-21 PROCEDURE — 84100 ASSAY OF PHOSPHORUS: CPT | Mod: HCNC

## 2020-07-21 PROCEDURE — 84100 ASSAY OF PHOSPHORUS: CPT | Mod: 91,HCNC

## 2020-07-21 PROCEDURE — 80202 ASSAY OF VANCOMYCIN: CPT | Mod: HCNC

## 2020-07-21 PROCEDURE — 83970 ASSAY OF PARATHORMONE: CPT | Mod: HCNC

## 2020-07-21 PROCEDURE — 97802 MEDICAL NUTRITION INDIV IN: CPT | Mod: HCNC

## 2020-07-21 PROCEDURE — 36415 COLL VENOUS BLD VENIPUNCTURE: CPT | Mod: HCNC

## 2020-07-21 PROCEDURE — 36600 WITHDRAWAL OF ARTERIAL BLOOD: CPT | Mod: HCNC

## 2020-07-21 PROCEDURE — 99222 PR INITIAL HOSPITAL CARE,LEVL II: ICD-10-PCS | Mod: HCNC,,, | Performed by: PODIATRIST

## 2020-07-21 PROCEDURE — 99223 1ST HOSP IP/OBS HIGH 75: CPT | Mod: HCNC,,, | Performed by: INTERNAL MEDICINE

## 2020-07-21 PROCEDURE — 63600175 PHARM REV CODE 636 W HCPCS: Mod: HCNC | Performed by: HOSPITALIST

## 2020-07-21 PROCEDURE — 63600175 PHARM REV CODE 636 W HCPCS: Mod: HCNC | Performed by: INTERNAL MEDICINE

## 2020-07-21 PROCEDURE — 99900035 HC TECH TIME PER 15 MIN (STAT): Mod: HCNC

## 2020-07-21 PROCEDURE — 85025 COMPLETE CBC W/AUTO DIFF WBC: CPT | Mod: HCNC

## 2020-07-21 PROCEDURE — 84156 ASSAY OF PROTEIN URINE: CPT | Mod: HCNC

## 2020-07-21 PROCEDURE — 99232 SBSQ HOSP IP/OBS MODERATE 35: CPT | Mod: HCNC,,, | Performed by: NURSE PRACTITIONER

## 2020-07-21 PROCEDURE — 99223 PR INITIAL HOSPITAL CARE,LEVL III: ICD-10-PCS | Mod: HCNC,,, | Performed by: INTERNAL MEDICINE

## 2020-07-21 PROCEDURE — 94761 N-INVAS EAR/PLS OXIMETRY MLT: CPT | Mod: HCNC

## 2020-07-21 PROCEDURE — 94660 CPAP INITIATION&MGMT: CPT | Mod: HCNC

## 2020-07-21 PROCEDURE — 27000221 HC OXYGEN, UP TO 24 HOURS: Mod: HCNC

## 2020-07-21 PROCEDURE — 99232 PR SUBSEQUENT HOSPITAL CARE,LEVL II: ICD-10-PCS | Mod: HCNC,,, | Performed by: NURSE PRACTITIONER

## 2020-07-21 PROCEDURE — 20000000 HC ICU ROOM: Mod: HCNC

## 2020-07-21 PROCEDURE — 25000003 PHARM REV CODE 250: Mod: HCNC | Performed by: HOSPITALIST

## 2020-07-21 PROCEDURE — C9113 INJ PANTOPRAZOLE SODIUM, VIA: HCPCS | Mod: HCNC | Performed by: HOSPITALIST

## 2020-07-21 PROCEDURE — 99222 1ST HOSP IP/OBS MODERATE 55: CPT | Mod: HCNC,,, | Performed by: PODIATRIST

## 2020-07-21 PROCEDURE — 80053 COMPREHEN METABOLIC PANEL: CPT | Mod: 91,HCNC

## 2020-07-21 PROCEDURE — 82803 BLOOD GASES ANY COMBINATION: CPT | Mod: HCNC

## 2020-07-21 PROCEDURE — 83735 ASSAY OF MAGNESIUM: CPT | Mod: HCNC

## 2020-07-21 RX ADMIN — ACETAMINOPHEN 650 MG: 325 TABLET ORAL at 09:07

## 2020-07-21 RX ADMIN — CARVEDILOL 25 MG: 12.5 TABLET, FILM COATED ORAL at 05:07

## 2020-07-21 RX ADMIN — FUROSEMIDE 80 MG: 10 INJECTION, SOLUTION INTRAVENOUS at 03:07

## 2020-07-21 RX ADMIN — INSULIN DETEMIR 15 UNITS: 100 INJECTION, SOLUTION SUBCUTANEOUS at 09:07

## 2020-07-21 RX ADMIN — HYDRALAZINE HYDROCHLORIDE 10 MG: 20 INJECTION INTRAMUSCULAR; INTRAVENOUS at 02:07

## 2020-07-21 RX ADMIN — MEROPENEM AND SODIUM CHLORIDE 1 G: 1 INJECTION, SOLUTION INTRAVENOUS at 05:07

## 2020-07-21 RX ADMIN — MEROPENEM AND SODIUM CHLORIDE 1 G: 1 INJECTION, SOLUTION INTRAVENOUS at 09:07

## 2020-07-21 RX ADMIN — FUROSEMIDE 80 MG: 10 INJECTION, SOLUTION INTRAVENOUS at 05:07

## 2020-07-21 RX ADMIN — PANTOPRAZOLE SODIUM 40 MG: 40 INJECTION, POWDER, FOR SOLUTION INTRAVENOUS at 09:07

## 2020-07-21 RX ADMIN — EPOETIN ALFA-EPBX 10000 UNITS: 10000 INJECTION, SOLUTION INTRAVENOUS; SUBCUTANEOUS at 06:07

## 2020-07-21 NOTE — CARE UPDATE
[facility]  ICU Shift Summary  Date: 7/20/2020      COVID Test: (--)  Isolation: No active isolations     Prehospitalization: Nursing Home  Admit Date / LOS : 7/20/2020/ 0 days    Diagnosis: Acute respiratory failure with hypoxia and hypercarbia    Consults:        Active: GI, ID, Nephro, Pulm CC and Wound Care Podiatry        Needed: N/A     Code Status: Full Code   Advanced Directive: <no information>    LDA: Robin Hugger, BIPAP, Davis, PICC and PIV       Central Lines/Site/Justification:Unable to Obtain/Maintain PIV       Urinary Cath/Order/Justification:Critically Ill in the ICU and requiring intensive monitoring    Vasopressors/Infusions:    dexmedetomidine (PRECEDEX) infusion 0.2 mcg/kg/hr (07/20/20 1900)          GOALS: Volume/ Hemodynamic: MAP >65                     RASS: -1  awakes to voice (eye opening/contact) > 10 seconds    Pain Management: none       Pain Controlled: not applicable     Rhythm: NSR    Respiratory Device: Bipap    Oxygen Concentration (%):  [35] 35             Most Recent SBT/ SAT: Did not perform       MOVE Screen: N/A    VTE Prophylaxis: N/A  Mobility: Bedrest  Stress Ulcer Prophylaxis: Yes    Dietary: NPO  Tolerance: not applicable  /  Advancement: no    I & O (24h):    Intake/Output Summary (Last 24 hours) at 7/20/2020 1947  Last data filed at 7/20/2020 1900  Gross per 24 hour   Intake 760.77 ml   Output 1085 ml   Net -324.23 ml        Restraints: Yes    Significant Dates:  Post Op Date: N/A  Rescue Date: N/A  Imaging/ Diagnostics: N/A    Noteworthy Labs:  K- 6.7    CBC/Anemia Labs: Coags:    Recent Labs   Lab 07/20/20  0951 07/20/20  1140   WBC 8.65  --    HGB 7.6*  --    HCT 26.3* 26*     --    MCV 99*  --    RDW 15.5*  --    IRON 19*  --    FERRITIN 200  --    RETIC 3.4*  --     No results for input(s): PT, INR, APTT in the last 168 hours.     Chemistries:   Recent Labs   Lab 07/20/20  0951      K 6.7*      CO2 25   *   CREATININE 1.9*    CALCIUM 8.9   PROT 8.7*   BILITOT 0.2   ALKPHOS 99   ALT 15   AST 14        Cardiac Enzymes: Ejection Fractions:    Recent Labs     07/20/20  0951   CPK 84   TROPONINI <0.006    No results found for: EF     POCT Glucose: HbA1c:    Recent Labs   Lab 07/20/20  1008 07/20/20  1336 07/20/20  1639   POCTGLUCOSE 162* 181* 259*    Hemoglobin A1C   Date Value Ref Range Status   06/13/2020 7.5 (H) 4.0 - 5.6 % Final     Comment:     ADA Screening Guidelines:  5.7-6.4%  Consistent with prediabetes  >or=6.5%  Consistent with diabetes  High levels of fetal hemoglobin interfere with the HbA1C  assay. Heterozygous hemoglobin variants (HbS, HgC, etc)do  not significantly interfere with this assay.   However, presence of multiple variants may affect accuracy.             ICU LOS 3h  Level of Care: Critical Care    Shift Summary/Plan for the shift: Patient is disoriented x4 & currently on Bipap. Currently infusing with Precedex. Patient has an Ultrasound on lower extremities. Also has received lasix & antibiotics during shift.

## 2020-07-21 NOTE — ASSESSMENT & PLAN NOTE
Wound appear stable. No gross evidence of bony exposure. Chronic osteomyelitis not usually cured by long term antibiotics.   · Local care as per Podiatry recommendations.

## 2020-07-21 NOTE — RESPIRATORY THERAPY
Results for FREDDY RUTLEDGE (MRN [id number]) as of 7/21/2020 05:55   Ref. Range 7/21/2020 04:30   POC PH Latest Ref Range: 7.35 - 7.45  7.341 (L)   POC PCO2 Latest Ref Range: 35 - 45 mmHg 51.1 (H)   POC PO2 Latest Ref Range: 80 - 100 mmHg 90   POC BE Latest Ref Range: -2 to 2 mmol/L 2   POC HCO3 Latest Ref Range: 24 - 28 mmol/L 27.6   POC SATURATED O2 Latest Ref Range: 95 - 100 % 96   POC TCO2 Latest Ref Range: 23 - 27 mmol/L 29 (H)   FiO2 Unknown 28   Sample Unknown ARTERIAL   DelSys Unknown CPAP/BiPAP   Allens Test Unknown Pass   Site Unknown RR   Mode Unknown BiPAP   Rate Unknown 8

## 2020-07-21 NOTE — CONSULTS
[facility]  Infectious Disease  Consult Note    Patient Name: Tasneem Lynn  MRN: [id number]  Admission Date: 7/20/2020  Hospital Length of Stay: 1 days  Attending Physician: Ariana Stoner MD  Primary Care Provider: To Obtain Unable     Isolation Status: No active isolations    Patient information was obtained from past medical records and ER records.      Inpatient consult to Infectious Diseases  Consult performed by: Ksenia Sanabria MD  Consult ordered by: Shamar Seo MD        Assessment/Plan:     * Acute respiratory failure with hypoxia and hypercarbia  On NIPPV.   · Defer management to ICU.     Possible pneumonia of right lower lobe due to infectious organism  RLL pneumonia on CXR vs other etiology for airspace disease. No past sputum cultures however urine cultures in past with ESBL Klebsiella.   · Agree with empiric meropenem and vancomycin for now.   If able to get samples today this would help guide antibiotic therapy.    Chronic osteomyelitis of right foot with draining sinus  Wound appear stable. No gross evidence of bony exposure. Chronic osteomyelitis not usually cured by long term antibiotics.   · Local care as per Podiatry recommendations.     Acute cystitis without hematuria  Unclear if patient had LUTS. Has mild pyuria and urine culture with GNR.  · Continue meropenem for now.   · Recommend contact isolation precautions as patient has GNR and prior ESBL in urine.       Thank you for your consult. I will follow-up with patient. Please contact us if you have any additional questions.    Ksenia Sanabria MD  Infectious Disease  [facility]    Subjective:     Principal Problem: Acute respiratory failure with hypoxia and hypercarbia    HPI: A 75-year-old man with HTN, HFpEF 55%, past stroke, hypothyroidism, CKD 4, chronic osteomyelitis of the right foot with draining sinus, and on a 6 week course of ceftriaxone plus vancomycin who was noted to be  altered or lethargic while at his nursing home. Upon EMS evaluation he was noted to be hypoxemic with oxygen saturation rate of 86%. He was given Narcan and transferred to  ED.  Upon evaluation he was hypertensive, hypoxemic, and afebrile. Laboratory work up revealed normal WBC, and elevated creatinine however consistent with his baseline. Urinalysis had pyuria of 50 WBC. He was admitted to Hospital Medicine Service for further management and started on meropenem plus vancomycin.     Infectious Diseases consulted for management recommendations.     Past Medical History:   Diagnosis Date    Arthritis     Gout    CHF (congestive heart failure)     Coronary artery disease     Diabetes mellitus     HLD (hyperlipidemia)     Hypertension     Obese     Stroke     1986    Thyroid disease        Past Surgical History:   Procedure Laterality Date    right hand surgery      right knee surgery Right     partial plate       Review of patient's allergies indicates:   Allergen Reactions    Corticosteroids (glucocorticoids) Edema       Medications:  Medications Prior to Admission   Medication Sig    acetaminophen (TYLENOL) 325 MG tablet Take 2 tablets (650 mg total) by mouth every 6 (six) hours as needed for Pain or Temperature greater than (100.5).    amLODIPine (NORVASC) 2.5 MG tablet Take 1 tablet (2.5 mg total) by mouth once daily.    aspirin 81 MG Chew Take 81 mg by mouth once daily.    blood pressure test kit-large Kit Use to check blood pressure daily and as needed.    carvedilol (COREG) 25 MG tablet Take 25 mg by mouth 2 (two) times daily with meals.    cefTRIAXone (ROCEPHIN) 1 g/50 mL PgBk IVPB Inject 50 mLs (1 g total) into the vein once daily. To end date of 7/27    cetirizine (ZYRTEC) 10 MG tablet Take 1 tablet (10 mg total) by mouth every evening.    collagenase (SANTYL) ointment Apply topically once daily.    famotidine (PEPCID) 20 MG tablet Take 1 tablet (20 mg total) by mouth once daily.     insulin aspart U-100 (NOVOLOG) 100 unit/mL (3 mL) InPn pen Inject 10 Units into the skin 3 (three) times daily with meals.    insulin detemir U-100 (LEVEMIR FLEXTOUCH) 100 unit/mL (3 mL) SubQ InPn pen Inject 15 Units into the skin 2 (two) times daily.    levothyroxine (SYNTHROID) 75 MCG tablet Take 1 tablet (75 mcg total) by mouth before breakfast.    lidocaine (LIDODERM) 5 % Place 2 patches onto the skin once daily. Remove & Discard patch within 12 hours or as directed by MD to right shoulder    rosuvastatin (CRESTOR) 20 MG tablet Take 1 tablet (20 mg total) by mouth once daily.    senna-docusate 8.6-50 mg (PERICOLACE) 8.6-50 mg per tablet Take 1 tablet by mouth 2 (two) times daily.    vancomycin HCl (VANCOMYCIN 500 MG/100 ML D5W, READY TO MIX SYSTEM,) Inject 100 mLs (500 mg total) into the vein every 48 hours.     Antibiotics (From admission, onward)    Start     Stop Route Frequency Ordered    07/20/20 1615  meropenem-0.9% sodium chloride 1 g/50 mL IVPB      -- IV Every 8 hours (non-standard times) 07/20/20 1502    07/20/20 1602  vancomycin - pharmacy to dose  (vancomycin IVPB)      -- IV pharmacy to manage frequency 07/20/20 1502        Antifungals (From admission, onward)    None        Antivirals (From admission, onward)    None           Immunization History   Administered Date(s) Administered    PPD Test 06/14/2020       Family History     Problem Relation (Age of Onset)    Diabetes Maternal Aunt    Heart disease Sister, Maternal Aunt    Hypertension Maternal Aunt        Social History     Socioeconomic History    Marital status:      Spouse name: Not on file    Number of children: Not on file    Years of education: Not on file    Highest education level: Not on file   Occupational History    Not on file   Social Needs    Financial resource strain: Not on file    Food insecurity     Worry: Not on file     Inability: Not on file    Transportation needs     Medical: Not on file      Non-medical: Not on file   Tobacco Use    Smoking status: Never Smoker    Smokeless tobacco: Never Used   Substance and Sexual Activity    Alcohol use: No    Drug use: No    Sexual activity: Not Currently     Partners: Male   Lifestyle    Physical activity     Days per week: Not on file     Minutes per session: Not on file    Stress: Not on file   Relationships    Social connections     Talks on phone: Not on file     Gets together: Not on file     Attends [employer] service: Not on file     Active member of club or organization: Not on file     Attends meetings of clubs or organizations: Not on file     Relationship status: Not on file   Other Topics Concern    Not on file   Social History Narrative    Not on file     Review of Systems   Unable to perform ROS: Severe respiratory distress     Objective:     Vital Signs (Most Recent):  Temp: 99.5 °F (37.5 °C) (07/21/20 0715)  Pulse: 73 (07/21/20 0930)  Resp: 16 (07/21/20 0930)  BP: 136/60 (07/21/20 0930)  SpO2: 98 % (07/21/20 0930) Vital Signs (24h Range):  Temp:  [94 °F (34.4 °C)-99.5 °F (37.5 °C)] 99.5 °F (37.5 °C)  Pulse:  [56-76] 73  Resp:  [10-27] 16  SpO2:  [94 %-100 %] 98 %  BP: (115-185)/() 136/60     Weight: 125.2 kg (276 lb 0.3 oz)  Body mass index is 50.47 kg/m².    Estimated Creatinine Clearance: 30.7 mL/min (A) (based on SCr of 2 mg/dL (H)).    Physical Exam  Vitals signs and nursing note reviewed.   Constitutional:       General: She is not in acute distress.     Appearance: She is well-developed. She is not diaphoretic.      Interventions: Face mask in place.      Comments: On NIPPV   HENT:      Head: Normocephalic and atraumatic.      Right Ear: Hearing and external ear normal.      Left Ear: Hearing and external ear normal.      Mouth/Throat:      Pharynx: Uvula midline.   Eyes:      General: No scleral icterus.        Right eye: No discharge.         Left eye: No discharge.      Conjunctiva/sclera: Conjunctivae normal.   Neck:       Musculoskeletal: Normal range of motion and neck supple.      Thyroid: No thyromegaly.      Trachea: No tracheal deviation.   Cardiovascular:      Rate and Rhythm: Normal rate and regular rhythm.      Heart sounds: Normal heart sounds. No murmur. No friction rub. No gallop.    Pulmonary:      Effort: Pulmonary effort is normal. No respiratory distress.      Breath sounds: No stridor. Examination of the right-upper field reveals rales. Examination of the left-upper field reveals rales. Examination of the right-middle field reveals decreased breath sounds. Examination of the left-middle field reveals decreased breath sounds. Examination of the right-lower field reveals decreased breath sounds. Examination of the left-lower field reveals decreased breath sounds. Decreased breath sounds and rales present. No wheezing.   Chest:      Chest wall: No tenderness.   Abdominal:      General: Bowel sounds are normal. There is no distension.      Palpations: Abdomen is soft.      Tenderness: There is no abdominal tenderness. There is no guarding or rebound.   Musculoskeletal:         General: Swelling present. No tenderness.      Comments: Right foot covered with gauze dressing.    Lymphadenopathy:      Cervical: No cervical adenopathy.   Skin:     General: Skin is warm and dry.      Coloration: Skin is not pale.      Findings: No erythema or rash.   Neurological:      Mental Status: She is oriented to person, place, and time and easily aroused. She is lethargic.         Significant Labs:   Blood Culture:   Recent Labs   Lab 06/12/20  2339 06/15/20  0624 07/20/20  0951 07/20/20  1025 07/20/20  1122   LABBLOO Gram stain aer bottle: Gram positive cocci in clusters resembling Staph   Results called to and read back by: Nataliia Weber  06/13/2020  22:41  COAGULASE-NEGATIVE STAPHYLOCOCCUS SPECIES  Organism is a probable contaminant  * No Growth after 4 days.  Gram stain aer bottle: Gram positive cocci in clusters resembling Staph    Results called to and read back by: Reshma Quiles 07/21/2020  11:15 No Growth to date No Growth to date     BMP:   Recent Labs   Lab 07/21/20  0320         K 5.4*      CO2 27   *   CREATININE 2.0*   CALCIUM 8.5*   MG 2.3     CBC:   Recent Labs   Lab 07/20/20  0951 07/20/20  1140 07/21/20  0320   WBC 8.65  --  8.21   HGB 7.6*  --  7.0*   HCT 26.3* 26* 24.1*     --  274     Respiratory Culture: No results for input(s): GSRESP, RESPIRATORYC in the last 4320 hours.  Urine Culture:   Recent Labs   Lab 06/18/20  1115 07/20/20  1031   LABURIN ESBL Results called to and read back by:ELY SOTO 06/20/2020  09:33  KLEBSIELLA PNEUMONIAE ESBL  >100,000 cfu/ml  * GRAM NEGATIVE BETY  >100,000 cfu/ml  Identification and susceptibility pending  *     Urine Studies:   Recent Labs   Lab 06/18/20  1115 07/20/20  1031   COLORU Yellow Yellow   APPEARANCEUA Cloudy* Cloudy*   PHUR 5.0 5.0   SPECGRAV 1.015 1.015   PROTEINUA 2+* 2+*   GLUCUA 1+* Negative   KETONESU Trace* Trace*   BILIRUBINUA Negative Negative   OCCULTUA 2+* 1+*   NITRITE Negative Negative   UROBILINOGEN Negative Negative   LEUKOCYTESUR 2+* 3+*   RBCUA >100* 1   WBCUA >100* 50*   BACTERIA Moderate* Many*   SQUAMEPITHEL 4  --    HYALINECASTS 0 0     Wound Culture:   Recent Labs   Lab 06/13/20  1405   LABAERO No growth       Significant Imaging: I have reviewed all pertinent imaging results/findings within the past 24 hours.

## 2020-07-21 NOTE — PLAN OF CARE
Patient is disoriented x4 & currently on Bipap. Currently infusing with Precedex. Patient has an Ultrasound on lower extremities. Also has received lasix & antibiotics during shift.

## 2020-07-21 NOTE — NURSING
[facility]  ICU Shift Summary  Date: 7/21/2020      COVID Test: (--)  Isolation: Contact     Prehospitalization: Rehab  Admit Date / LOS : 7/20/2020/ 1 days    Diagnosis: Acute respiratory failure with hypoxia and hypercarbia    Consults:        Active: Nephro, Pulm CC and Wound Care       Needed: N/A     Code Status: Full Code   Advanced Directive: <no information>    LDA: BIPAP, Davis and PIV       Central Lines/Site/Justification:Multiple GTTS       Urinary Cath/Order/Justification:Critically Ill in the ICU and requiring intensive monitoring    Vasopressors/Infusions:    dexmedetomidine (PRECEDEX) infusion Stopped (07/20/20 2004)          GOALS: Volume/ Hemodynamic: N/A                     RASS: N/A    Pain Management: none       Pain Controlled: yes     Rhythm: NSR    Respiratory Device: Nasal Cannula    Oxygen Concentration (%):  [28] 28             Most Recent SBT/ SAT: Does not meet criteria       MOVE Screen: PT Consult    VTE Prophylaxis: Mechanical  Mobility: Bedrest  Stress Ulcer Prophylaxis: Yes    Dietary: NPO  Tolerance: not applicable  /  Advancement:     I & O (24h):    Intake/Output Summary (Last 24 hours) at 7/21/2020 1840  Last data filed at 7/21/2020 1800  Gross per 24 hour   Intake 163.6 ml   Output 2290 ml   Net -2126.4 ml        Restraints: NO     Significant Dates:  Post Op Date:   Rescue Date:   Imaging/ Diagnostics:     Noteworthy Labs:  none    CBC/Anemia Labs: Coags:    Recent Labs   Lab 07/20/20  0951 07/20/20  1140 07/21/20  0320   WBC 8.65  --  8.21   HGB 7.6*  --  7.0*   HCT 26.3* 26* 24.1*     --  274   MCV 99*  --  97   RDW 15.5*  --  15.4*   IRON 19*  --   --    FERRITIN 200  --   --    RETIC 3.4*  --   --     No results for input(s): PT, INR, APTT in the last 168 hours.     Chemistries:   Recent Labs   Lab 07/20/20  0951 07/21/20  0320 07/21/20  1552    145  --    K 6.7* 5.4*  --     110  --    CO2 25 27  --    * 111*  --    CREATININE  1.9* 2.0*  --    CALCIUM 8.9 8.5*  --    PROT 8.7* 7.8  --    BILITOT 0.2 0.2  --    ALKPHOS 99 82  --    ALT 15 12  --    AST 14 12  --    MG  --  2.3  --    PHOS  --  4.4 4.1        Cardiac Enzymes: Ejection Fractions:    Recent Labs     07/20/20  0951   CPK 84   TROPONINI <0.006    No results found for: EF     POCT Glucose: HbA1c:    Recent Labs   Lab 07/20/20  2124 07/21/20  0609 07/21/20  1437   POCTGLUCOSE 171* 113* 102    Hemoglobin A1C   Date Value Ref Range Status   06/13/2020 7.5 (H) 4.0 - 5.6 % Final     Comment:     ADA Screening Guidelines:  5.7-6.4%  Consistent with prediabetes  >or=6.5%  Consistent with diabetes  High levels of fetal hemoglobin interfere with the HbA1C  assay. Heterozygous hemoglobin variants (HbS, HgC, etc)do  not significantly interfere with this assay.   However, presence of multiple variants may affect accuracy.             ICU LOS 1d 2h  Level of Care: Critical Care    Shift Summary/Plan for the shift: Transition from bipap to high flow, continue plan of care including wound care, abx and bipap PRN - consider swallow study before diet.

## 2020-07-21 NOTE — PLAN OF CARE
Recommendations    1. When medically able, order renal diet.   2. If intake < 50% of meal order novasource renal BID for added kcal and protein   3. weigh pt weekly.    Goals: Initiate diet order within 72 hours  Nutrition Goal Status: new  Communication of RD Recs: (plan of care)    Thanks for the consult.

## 2020-07-21 NOTE — ASSESSMENT & PLAN NOTE
RLL pneumonia on CXR vs other etiology for airspace disease. No past sputum cultures however urine cultures in past with ESBL Klebsiella.   · Agree with empiric meropenem and vancomycin for now.   If able to get samples today this would help guide antibiotic therapy.   Metronidazole Counseling:  I discussed with the patient the risks of metronidazole including but not limited to seizures, nausea/vomiting, a metallic taste in the mouth, nausea/vomiting and severe allergy.

## 2020-07-21 NOTE — SUBJECTIVE & OBJECTIVE
Interval History: No acute events overnight. Patient remains on BiPAP, continuing diuresis.    Objective:     Vital Signs (Most Recent):  Temp: 99.5 °F (37.5 °C) (07/21/20 0715)  Pulse: 73 (07/21/20 0930)  Resp: 16 (07/21/20 0930)  BP: 136/60 (07/21/20 0930)  SpO2: 98 % (07/21/20 0930) Vital Signs (24h Range):  Temp:  [94 °F (34.4 °C)-99.5 °F (37.5 °C)] 99.5 °F (37.5 °C)  Pulse:  [56-76] 73  Resp:  [10-27] 16  SpO2:  [94 %-100 %] 98 %  BP: (115-182)/() 136/60     Weight: 125.2 kg (276 lb 0.3 oz)  Body mass index is 50.47 kg/m².      Intake/Output Summary (Last 24 hours) at 7/21/2020 1151  Last data filed at 7/21/2020 0916  Gross per 24 hour   Intake 817.57 ml   Output 2215 ml   Net -1397.43 ml       Physical Exam  Vitals signs and nursing note reviewed.   Constitutional:       General: She is not in acute distress.     Appearance: She is well-developed. She is obese. She is not diaphoretic.      Interventions: Face mask in place.      Comments: On NIPPV   HENT:      Head: Normocephalic and atraumatic.      Right Ear: Hearing and external ear normal.      Left Ear: Hearing and external ear normal.      Mouth/Throat:      Pharynx: Uvula midline.      Comments: Bipap mask in place  Eyes:      General: No scleral icterus.        Right eye: No discharge.         Left eye: No discharge.      Extraocular Movements: Extraocular movements intact.      Conjunctiva/sclera: Conjunctivae normal.   Neck:      Musculoskeletal: Normal range of motion and neck supple. No neck rigidity.      Thyroid: No thyromegaly.      Trachea: No tracheal deviation.   Cardiovascular:      Rate and Rhythm: Normal rate and regular rhythm.      Heart sounds: Normal heart sounds. No murmur. No friction rub. No gallop.    Pulmonary:      Effort: Pulmonary effort is normal. No respiratory distress.      Breath sounds: No stridor. Examination of the right-middle field reveals decreased breath sounds. Examination of the left-middle field reveals  decreased breath sounds. Examination of the right-lower field reveals decreased breath sounds and rales. Examination of the left-lower field reveals decreased breath sounds and rales. Decreased breath sounds and rales present. No wheezing or rhonchi.   Chest:      Chest wall: No tenderness.   Abdominal:      General: Bowel sounds are normal. There is no distension.      Palpations: Abdomen is soft.      Tenderness: There is no abdominal tenderness. There is no guarding or rebound.   Musculoskeletal:         General: Swelling present. No tenderness.      Right lower leg: Edema present.      Left lower leg: Edema present.      Comments: Right foot covered with gauze dressing.    Lymphadenopathy:      Cervical: No cervical adenopathy.   Skin:     General: Skin is warm and dry.      Capillary Refill: Capillary refill takes less than 2 seconds.      Coloration: Skin is not pale.      Findings: No erythema or rash.   Neurological:      Mental Status: She is oriented to person, place, and time and easily aroused. She is lethargic.      Comments: Responds to voice. More alert than previous.   Psychiatric:      Comments: Unable to obtain 2/2 clinical condition         Vents:  Oxygen Concentration (%): 28 (07/21/20 0800)    Lines/Drains/Airways     Central Venous Catheter Line            Tunneled Central Line Insertion/Assessment - Double Lumen  06/22/20 right subclavian 29 days          Drain                 Urethral Catheter 07/20/20 1030 Non-latex 16 Fr. 1 day          Peripheral Intravenous Line                 Peripheral IV - Single Lumen 07/20/20 1440 18 G Right Forearm less than 1 day                Significant Labs:    CBC/Anemia Profile:  Recent Labs   Lab 07/20/20  0951 07/20/20  1140 07/21/20  0320   WBC 8.65  --  8.21   HGB 7.6*  --  7.0*   HCT 26.3* 26* 24.1*     --  274   MCV 99*  --  97   RDW 15.5*  --  15.4*   IRON 19*  --   --    FERRITIN 200  --   --    RETIC 3.4*  --   --         Chemistries:  Recent  Labs   Lab 07/20/20  0951 07/21/20  0320    145   K 6.7* 5.4*    110   CO2 25 27   * 111*   CREATININE 1.9* 2.0*   CALCIUM 8.9 8.5*   ALBUMIN 2.8* 2.5*   PROT 8.7* 7.8   BILITOT 0.2 0.2   ALKPHOS 99 82   ALT 15 12   AST 14 12   MG  --  2.3   PHOS  --  4.4       All pertinent labs within the past 24 hours have been reviewed.    Significant Imaging:  I have reviewed all pertinent imaging results/findings within the past 24 hours.

## 2020-07-21 NOTE — CONSULTS
[facility]  Podiatry  Consult Note    Patient Name: Tasneem Lynn  MRN: [id number]  Admission Date: 7/20/2020  Hospital Length of Stay: 1 days  Attending Physician: Ariana Stoner MD  Primary Care Provider: To Obtain Unable     Inpatient consult to Podiatry  Consult performed by: Annalisa Ballesteros DPM  Consult ordered by: Emilia Hendrix MD  Reason for consult: Foot osteomyelitis  Assessment/Recommendations:   Reviewed all previous imaging and photo documentation.  Wound is stable and improved from prior admission.  Heel ulcer no longer probes to bone.  Wound bed is granular.    No surgical intervention or procedure required.    All sites cleansed with sterile normal saline and dry sterile dressing applied.  Verbal and written nursing wound care orders for vashe wet to dry twice daily while inpatient.    We will keep this patient on our census and  chart check however we will sign off on daily care (nursing will do daily dressing changes).         Subjective:     History of Present Illness:   Tasneem Lynn is a 75 y.o. female with  has a past medical history of Arthritis, CHF (congestive heart failure), Coronary artery disease, Diabetes mellitus, HLD (hyperlipidemia), Hypertension, Obese, Stroke, and Thyroid disease.  Consult to Podiatry for evaluation and treatment of right foot wounds.   Location:  Posterior heel and dorsal foot.  Patient is familiar to our service.  She was seen for this wound on her previous admit.  Bone biopsy was performed on 06/13/2020 and patient was being treated with wound VAC.    Chief Complaint   Patient presents with    Altered Mental Status     EMS reports pt with altered mental status starting sometime yesterday. upon arrival pt RA O2 85%. placed on 2L O2  via nasal cannula with improvement. 1mg narcan given with EMS with no change.        Hemoglobin A1C   Date Value Ref Range Status   06/13/2020 7.5 (H) 4.0 - 5.6 % Final     Comment:     ADA Screening Guidelines:  5.7-6.4%   Consistent with prediabetes  >or=6.5%  Consistent with diabetes  High levels of fetal hemoglobin interfere with the HbA1C  assay. Heterozygous hemoglobin variants (HbS, HgC, etc)do  not significantly interfere with this assay.   However, presence of multiple variants may affect accuracy.     05/29/2018 13.7 (H) 4.0 - 5.6 % Final     Comment:     ADA Screening Guidelines:  5.7-6.4%  Consistent with prediabetes  >or=6.5%  Consistent with diabetes  High levels of fetal hemoglobin interfere with the HbA1C  assay. Heterozygous hemoglobin variants (HbS, HgC, etc)do  not significantly interfere with this assay.   However, presence of multiple variants may affect accuracy.     11/17/2016 10.1 (H) 4.5 - 6.2 % Final     Comment:     According to ADA guidelines, hemoglobin A1C <7.0% represents  optimal control in non-pregnant diabetic patients.  Different  metrics may apply to specific populations.   Standards of Medical Care in Diabetes - 2016.  For the purpose of screening for the presence of diabetes:  <5.7%     Consistent with the absence of diabetes  5.7-6.4%  Consistent with increasing risk for diabetes   (prediabetes)  >or=6.5%  Consistent with diabetes  Currently no consensus exists for use of hemoglobin A1C  for diagnosis of diabetes for children.             Scheduled Meds:   amLODIPine  2.5 mg Oral Daily    carvediloL  25 mg Oral BID WM    furosemide (LASIX) IV  80 mg Intravenous Q12H    insulin detemir U-100  15 Units Subcutaneous BID    levothyroxine  75 mcg Oral Before breakfast    meropenem (MERREM) IVPB  1 g Intravenous Q8H    pantoprazole  40 mg Intravenous BID    rosuvastatin  20 mg Oral Daily     Continuous Infusions:   dexmedetomidine (PRECEDEX) infusion Stopped (07/20/20 2004)     PRN Meds:acetaminophen, [COMPLETED] calcium gluconate IVPB **AND** calcium gluconate IVPB, dextrose 50%, dextrose 50%, glucagon (human recombinant), glucose, glucose, hydrALAZINE, insulin aspart U-100, ondansetron,  sodium chloride 0.9%, Pharmacy to dose Vancomycin consult **AND** vancomycin - pharmacy to dose    Review of patient's allergies indicates:   Allergen Reactions    Corticosteroids (glucocorticoids) Edema        Past Medical History:   Diagnosis Date    Arthritis     Gout    CHF (congestive heart failure)     Coronary artery disease     Diabetes mellitus     HLD (hyperlipidemia)     Hypertension     Obese     Stroke     1986    Thyroid disease      Past Surgical History:   Procedure Laterality Date    right hand surgery      right knee surgery Right     partial plate       Family History     Problem Relation (Age of Onset)    Diabetes Maternal Aunt    Heart disease Sister, Maternal Aunt    Hypertension Maternal Aunt        Tobacco Use    Smoking status: Never Smoker    Smokeless tobacco: Never Used   Substance and Sexual Activity    Alcohol use: No    Drug use: No    Sexual activity: Not Currently     Partners: Male     Review of Systems   Unable to perform ROS: Mental status change     Objective:     Vital Signs (Most Recent):  Temp: 97.3 °F (36.3 °C) (07/21/20 0315)  Pulse: 68 (07/21/20 0630)  Resp: 16 (07/21/20 0630)  BP: (!) 143/64 (07/21/20 0630)  SpO2: 98 % (07/21/20 0630) Vital Signs (24h Range):  Temp:  [94 °F (34.4 °C)-98.4 °F (36.9 °C)] 97.3 °F (36.3 °C)  Pulse:  [53-74] 68  Resp:  [10-27] 16  SpO2:  [94 %-100 %] 98 %  BP: (115-185)/() 143/64     Weight: 125.2 kg (276 lb 0.3 oz)  Body mass index is 50.47 kg/m².      Foot Exam     Right Foot/Ankle      Inspection and Palpation  Ecchymosis: none  Tenderness: (Some tenderness to the heel)  Swelling: (Mild edema)  Skin Exam: ulcer;      Neurovascular  Dorsalis pedis: absent  Posterior tibial: 1+  Saphenous nerve sensation: diminished  Tibial nerve sensation: diminished  Superficial peroneal nerve sensation: diminished  Deep peroneal nerve sensation: diminished  Sural nerve sensation: diminished        Left Foot/Ankle       Inspection and  Palpation  Ecchymosis: none  Tenderness: none   Swelling: none      Neurovascular  Dorsalis pedis: absent  Posterior tibial: 1+  Saphenous nerve sensation: diminished  Tibial nerve sensation: diminished  Superficial peroneal nerve sensation: diminished  Deep peroneal nerve sensation: diminished  Sural nerve sensation: diminished         Wound 1: Right medial heel ulcer  Measurement: 7.1 cmx 4cmx 0.3 cm  Base: fibrogranular   Periwound skin: Rolled borders.   Drainage: sanguinous   Erythema: none   Probe: none, no bone exposed      Wound 2: Right dorsal foot   Measurement: 3.8 cmx3.2 cmx0.2cm  Base: fibrogranular   Periwound skin:rolled borders   Drainage: none  Erythema: mild  Probe: none, no bone exposed     07/21/2020 6/24/20:                  Laboratory:  A1C:   Recent Labs   Lab 06/13/20  0513   HGBA1C 7.5*     CBC:   Recent Labs   Lab 07/21/20  0320   WBC 8.21   RBC 2.48*   HGB 7.0*   HCT 24.1*      MCV 97   MCH 28.2   MCHC 29.0*     CMP:   Recent Labs   Lab 07/21/20  0320      CALCIUM 8.5*   ALBUMIN 2.5*   PROT 7.8      K 5.4*   CO2 27      *   CREATININE 2.0*   ALKPHOS 82   ALT 12   AST 12   BILITOT 0.2     CRP:   Recent Labs   Lab 07/20/20  0951   CRP 97.1*     ESR: No results for input(s): SEDRATE in the last 168 hours.  Microbiology Results (last 7 days)     Procedure Component Value Units Date/Time    Blood culture x two cultures. Draw prior to antibiotics. [[id number]] Collected: 07/20/20 1122    Order Status: Completed Specimen: Blood from Line, Subclavian, Right Updated: 07/20/20 1912     Blood Culture, Routine No Growth to date    Narrative:      Aerobic and anaerobic    Blood culture [[id number]] Collected: 07/20/20 1025    Order Status: Completed Specimen: Blood from Peripheral, Hand, Right Updated: 07/20/20 1912     Blood Culture, Routine No Growth to date    Narrative:      From central line R chest    Blood culture x two cultures. Draw prior to  antibiotics. [[id number]] Collected: 07/20/20 0951    Order Status: Completed Specimen: Blood from Peripheral, Forearm, Right Updated: 07/20/20 1712     Blood Culture, Routine No Growth to date    Narrative:      Aerobic and anaerobic    Culture, Respiratory with Gram Stain [[id number]]     Order Status: No result Specimen: Respiratory from Endotracheal Aspirate     Urine culture [[id number]] Collected: 07/20/20 1031    Order Status: No result Specimen: Urine Updated: 07/20/20 1126          Diagnostic Results:  Imaging Results          US Lower Extremity Veins Bilateral (Final result)  Result time 07/20/20 18:50:29    Final result by Tish Marquez MD (07/20/20 18:50:29)                 Impression:      No evidence of lower extremity deep venous thrombosis.      Electronically signed by: Tish Marquez MD  Date:    07/20/2020  Time:    18:50             Narrative:    EXAMINATION:  US LOWER EXTREMITY VEINS BILATERAL    CLINICAL HISTORY:  r/o DVT;    TECHNIQUE:  Duplex and color flow Doppler evaluation of the bilateral lower extremity veins was performed.    COMPARISON:  None    FINDINGS:  No evidence of clot involving the bilateral common femoral, greater saphenous, femoral, popliteal, peroneal, anterior and posterior tibial veins.  All venous structures demonstrate normal respiratory phasicity and augment adequately.  No evidence of soft tissue mass or Baker's cyst.                               CT Head Without Contrast (Final result)  Result time 07/20/20 13:53:52    Final result by Suhail Deshpande MD (07/20/20 13:53:52)                 Impression:      No acute large vascular territory infarct or intracranial hemorrhage identified.  Further evaluation/follow-up as warranted.    Generalized cerebral volume loss and mild chronic small vessel ischemic change.      Electronically signed by: Suhail Deshpande MD  Date:    07/20/2020  Time:    13:53             Narrative:    EXAMINATION:  CT HEAD WITHOUT CONTRAST    CLINICAL  HISTORY:  Altered mental status; Pneumonia, unspecified organism    TECHNIQUE:  Low dose axial CT images obtained throughout the head without intravenous contrast. Sagittal and coronal reconstructions were performed.    COMPARISON:  Head CT 04/29/2009    FINDINGS:  Patient is slightly rotated and tilted within the scanner.  The study is somewhat limited by patient motion with beam hardening and streak artifact which mostly resolved with repeat scanning.    Intracranial compartment: Age-appropriate mild generalized cerebral volume loss.    The ventricles are midline and mildly prominent without distortion by mass effect or acute hydrocephalus noting cavum septum pellucidum.  No extra-axial blood or fluid collections.    Mild degree of patchy and confluent hypoattenuation of the subcortical and periventricular white matter, likely sequela of chronic small vessel ischemic change, somewhat progressed 04/22/2009 exam.  No parenchymal mass, hemorrhage, edema or major vascular distribution infarct.    Skull/extracranial contents (limited evaluation): No fracture. Mastoid air cells and paranasal sinuses are essentially clear.  Grossly stable small polypoid type soft tissue density at the paramedian right frontal scalp which could represent a mole.  Correlate clinically.                               X-Ray Chest AP Portable (Final result)  Result time 07/20/20 11:12:06    Final result by Russell Torrez MD (07/20/20 11:12:06)                 Impression:      1. Airspace opacity in the right lung base, suspicious for a right basilar pneumonia or atelectasis.  2. Pulmonary vascular congestion associated with interstitial edema as above.      Electronically signed by: Russell Torrez MD  Date:    07/20/2020  Time:    11:12             Narrative:    EXAMINATION:  XR CHEST AP PORTABLE    CLINICAL HISTORY:  Sepsis;    TECHNIQUE:  Single frontal view of the chest was performed.    COMPARISON:  Chest 11/11/2019    FINDINGS:  Patient is  rotated to the left.  The radiograph is under penetrated, likely related to the body habitus of the patient.    Since the previous study there is a right IJ catheter the tip of which extends into the right atrium.    There is poor inspiratory effort when compared to the previous study.  There is pulmonary vascular congestion and interstitial edema.  In addition there is also an airspace opacity in the right lung base, likely representing a superimposed pneumonia or atelectasis.  Questionable layering of right pleural effusion.  Apices are clear.    Heart size is not evaluated well due to the obliquity of the film.  There are cardiac monitoring leads over the chest.                                  Assessment/Plan:     Active Diagnoses:    Diagnosis Date Noted POA    PRINCIPAL PROBLEM:  Acute respiratory failure with hypoxia and hypercarbia [J96.01, J96.02] 07/20/2020 Yes    Acute on chronic congestive heart failure [I50.9] 07/20/2020 Yes    Acute blood loss anemia [D62] 07/20/2020 Yes    Hyperkalemia [E87.5] 06/18/2020 Yes    Chronic osteomyelitis of right foot with draining sinus [M86.471] 06/15/2020 Yes    Wound of right leg [S81.801A] 06/13/2020 Yes    CKD (chronic kidney disease) stage 4, GFR 15-29 ml/min [N18.4] 05/31/2018 Yes    Hypothyroidism [E03.9] 11/17/2016 Yes     Chronic    Morbid obesity with BMI of 50.0-59.9, adult [E66.01, Z68.43] 11/17/2016 Not Applicable     Chronic    History of CVA (cerebrovascular accident) [Z86.73] 11/17/2016 Not Applicable     Chronic    Hyperlipidemia [E78.5] 11/17/2016 Yes     Chronic    Type 2 diabetes mellitus, uncontrolled, with renal complications [E11.29, E11.65] 05/07/2013 Yes     Chronic    Essential hypertension [I10] 05/07/2013 Yes     Chronic      Problems Resolved During this Admission:       Reviewed all previous imaging and photo documentation.  Wound is stable and improved from prior admission.  Heel ulcer no longer probes to bone.  Wound bed is  granular.    No surgical intervention or procedure required.    All sites cleansed with sterile normal saline and dry sterile dressing applied.  Verbal and written nursing wound care orders for vashe wet to dry twice daily while inpatient.    We will keep this patient on our census and  chart check however we will sign off on daily care (nursing will do daily dressing changes).     We continue to work in partnership with treatment team on how to best treat patient concern and diagnosis.      Thank you for your consult.   Annalisa Ballesteros DPM  Podiatry  [facility]

## 2020-07-21 NOTE — ASSESSMENT & PLAN NOTE
Unclear if patient had LUTS. Has mild pyuria and urine culture with GNR.  · Continue meropenem for now.   · Recommend contact isolation precautions as patient has GNR and prior ESBL in urine.

## 2020-07-21 NOTE — CONSULTS
.[facility]  Gastroenterology  Consult Note    Patient Name: Tasneem Lynn  MRN: [id number]  Admission Date: 7/20/2020  Hospital Length of Stay: 1 days  Code Status: Full Code   Primary Care Physician: To Obtain Unable  Principal Problem:Acute respiratory failure with hypoxia and hypercarbia    Consults  Subjective:     Reason for consult: Worsening anemia.    HPI: The patient is a 75 year old female with a history of HTN, DM, CKD, CHF, CVA, hypothyroidism and hyperlipidemia presenting with acute respiratory failure, altered mental status, chronic osteomyelitis and acute on chronic anemia.  History is obtained from the chart and nursing staff as the patient is on BIPAP and does not respond to questioning.  She presented from the nursing home for recent change in her mental status.  She was found to be hypoxic on exam and was placed on BIPAP.  GI consulted for worsening normocytic anemia over the last 3-4 weeks with heme positive stool.  No overt bleeding, such as melena or hematochezia.  It is unclear if she takes NSAIDs regularly.  Her endoscopic history is unknown.    Past medical history:  Hypertension.  Diabetes mellitus, type 2.  Chronic kidney disease, stage 4.  Congestive heart failure.  Cerebrovascular accident.  Hypothyroidism.  Hyperlipidemia.    Past surgical history:  Right knee surgery.  Right hand surgery.    Social history:  No tobacco, alcohol or illicit drug use.    Family history:  Unable to obtain.    Medications:  Medications Prior to Admission   Medication Sig Dispense Refill Last Dose    acetaminophen (TYLENOL) 325 MG tablet Take 2 tablets (650 mg total) by mouth every 6 (six) hours as needed for Pain or Temperature greater than (100.5). 13 tablet 0     amLODIPine (NORVASC) 2.5 MG tablet Take 1 tablet (2.5 mg total) by mouth once daily. 30 tablet 11     aspirin 81 MG Chew Take 81 mg by mouth once daily.       blood pressure test kit-large Kit Use to check blood pressure daily  and as needed. 1 each 0     carvedilol (COREG) 25 MG tablet Take 25 mg by mouth 2 (two) times daily with meals.       cefTRIAXone (ROCEPHIN) 1 g/50 mL PgBk IVPB Inject 50 mLs (1 g total) into the vein once daily. To end date of 7/27 30 each 1     cetirizine (ZYRTEC) 10 MG tablet Take 1 tablet (10 mg total) by mouth every evening. 90 tablet 1     collagenase (SANTYL) ointment Apply topically once daily.  0     famotidine (PEPCID) 20 MG tablet Take 1 tablet (20 mg total) by mouth once daily. 30 tablet 11     insulin aspart U-100 (NOVOLOG) 100 unit/mL (3 mL) InPn pen Inject 10 Units into the skin 3 (three) times daily with meals.  0     insulin detemir U-100 (LEVEMIR FLEXTOUCH) 100 unit/mL (3 mL) SubQ InPn pen Inject 15 Units into the skin 2 (two) times daily.  0     levothyroxine (SYNTHROID) 75 MCG tablet Take 1 tablet (75 mcg total) by mouth before breakfast. 90 tablet 1     lidocaine (LIDODERM) 5 % Place 2 patches onto the skin once daily. Remove & Discard patch within 12 hours or as directed by MD to right shoulder  0     rosuvastatin (CRESTOR) 20 MG tablet Take 1 tablet (20 mg total) by mouth once daily. 90 tablet 3     senna-docusate 8.6-50 mg (PERICOLACE) 8.6-50 mg per tablet Take 1 tablet by mouth 2 (two) times daily.       vancomycin HCl (VANCOMYCIN 500 MG/100 ML D5W, READY TO MIX SYSTEM,) Inject 100 mLs (500 mg total) into the vein every 48 hours. 1600 mL 0        Allergies:  Corticosteroids.    Review of systems:  Unable to obtain due to clinical condition.    Objective:     Vital Signs (Most Recent):  Temp: 99.5 °F (37.5 °C) (07/21/20 0715)  Pulse: 71 (07/21/20 0830)  Resp: 12 (07/21/20 0830)  BP: (!) 126/58 (07/21/20 0830)  SpO2: 98 % (07/21/20 0830) Vital Signs (24h Range):  Temp:  [94 °F (34.4 °C)-99.5 °F (37.5 °C)] 99.5 °F (37.5 °C)  Pulse:  [56-75] 71  Resp:  [10-27] 12  SpO2:  [94 %-100 %] 98 %  BP: (115-185)/() 126/58     Physical examination:  General: Obese AAF in no acute  distress. On BIPAP.  HENT: NCAT, atraumatic, hearing grossly intact, no visible or palpable thyroid mass  Eyes: PERRL, EOMI, anicteric sclera  Cardiovascular: Regular rate and rhythm. No peripheral edema.   Lungs: Tachypneic. Breath sounds equal.   Abdomen: soft, NTND, normoactive BS  Extremities: No C/C, 2+ dorsalis pedis pulses bilaterally  Neuro: Unresponsive.  Psych: Unable to assess.  Skin: No jaundice, rashes or lesions  Musculoskeletal: 5/5 strength bilaterally    CBC:   Recent Labs   Lab 07/20/20  0951 07/20/20  1140 07/21/20  0320   WBC 8.65  --  8.21   HGB 7.6*  --  7.0*   HCT 26.3* 26* 24.1*     --  274     CMP:   Recent Labs   Lab 07/21/20  0320      CALCIUM 8.5*   ALBUMIN 2.5*   PROT 7.8      K 5.4*   CO2 27      *   CREATININE 2.0*   ALKPHOS 82   ALT 12   AST 12   BILITOT 0.2       Imaging:  Chest x-ray (7/20/20):  Impression:  1. Airspace opacity in the right lung base, suspicious for a right basilar pneumonia or atelectasis.  2. Pulmonary vascular congestion associated with interstitial edema as above.      Assessment:   75 year old female with a history of HTN, DM, CKD, CHF, CVA, hypothyroidism and hyperlipidemia presenting with acute respiratory failure, altered mental status, chronic osteomyelitis and acute on chronic anemia.  H/H slowly trending downward over the last month, though no overt blood loss reported.  Anemia is likely multifactorial, in part, related to renal insufficiency.  BUN above baseline but vitals stable, suggesting against active/brisk UGIB.    Plan:   1.  Monitor CBC & transfuse if Hb < 7g/dL.  2.  Empiric PPI BID.  3.  EGD can be pursued once patient is off BIPAP and K is <5 mmol/L (discussed with anesthesiology).  4.  Will follow.    Thank you for your consult.     Lesly Woods PA-C  Gastroenterology  [facility]

## 2020-07-21 NOTE — CARE UPDATE
[facility]  ICU Shift Summary  Date: 7/21/2020      COVID Test: (--)  Isolation: No active isolations     Prehospitalization: Nursing Home  Admit Date / LOS : 7/20/2020/ 1 days    Diagnosis: Acute respiratory failure with hypoxia and hypercarbia    Consults:        Active: GI, ID, Nephro and Wound Care       Needed: N/A     Code Status: Full Code   Advanced Directive: <no information>    LDA: BIPAP, Davis and PICC       Central Lines/Site/Justification:tunneled PICC present upon admit for IV antibx at nursing home       Urinary Cath/Order/Justification:Critically Ill in the ICU and requiring intensive monitoring    Vasopressors/Infusions:    dexmedetomidine (PRECEDEX) infusion Stopped (07/20/20 2004)          GOALS: Volume/ Hemodynamic: N/A                     RASS: -1  awakes to voice (eye opening/contact) > 10 seconds    Pain Management: none       Pain Controlled: not applicable     Rhythm: NSR and SB    Respiratory Device: Bipap    Oxygen Concentration (%):  [28-35] 28             Most Recent SBT/ SAT: N/A       MOVE Screen: PASS    VTE Prophylaxis: Mechanical  Mobility: Bedrest  Stress Ulcer Prophylaxis: Yes    Dietary: NPO    I & O (24h):    Intake/Output Summary (Last 24 hours) at 7/21/2020 0451  Last data filed at 7/21/2020 0400  Gross per 24 hour   Intake 817.57 ml   Output 1690 ml   Net -872.43 ml        Restraints: No    Significant Dates:  Post Op Date: N/A  Rescue Date: N/A  Imaging/ Diagnostics: N/A    Noteworthy Labs:  none    CBC/Anemia Labs: Coags:    Recent Labs   Lab 07/20/20  0951 07/20/20  1140 07/21/20  0320   WBC 8.65  --  8.21   HGB 7.6*  --  7.0*   HCT 26.3* 26* 24.1*     --  274   MCV 99*  --  97   RDW 15.5*  --  15.4*   IRON 19*  --   --    FERRITIN 200  --   --    RETIC 3.4*  --   --     No results for input(s): PT, INR, APTT in the last 168 hours.     Chemistries:   Recent Labs   Lab 07/20/20  0951 07/21/20  0320    145   K 6.7* 5.4*    110   CO2 25  27   * 111*   CREATININE 1.9* 2.0*   CALCIUM 8.9 8.5*   PROT 8.7* 7.8   BILITOT 0.2 0.2   ALKPHOS 99 82   ALT 15 12   AST 14 12   MG  --  2.3   PHOS  --  4.4        Cardiac Enzymes: Ejection Fractions:    Recent Labs     07/20/20  0951   CPK 84   TROPONINI <0.006    No results found for: EF     POCT Glucose: HbA1c:    Recent Labs   Lab 07/20/20  1336 07/20/20  1639 07/20/20  2124   POCTGLUCOSE 181* 259* 171*    Hemoglobin A1C   Date Value Ref Range Status   06/13/2020 7.5 (H) 4.0 - 5.6 % Final     Comment:     ADA Screening Guidelines:  5.7-6.4%  Consistent with prediabetes  >or=6.5%  Consistent with diabetes  High levels of fetal hemoglobin interfere with the HbA1C  assay. Heterozygous hemoglobin variants (HbS, HgC, etc)do  not significantly interfere with this assay.   However, presence of multiple variants may affect accuracy.             ICU LOS 12h  Level of Care: OK to Transfer    Shift Summary/Plan for the shift: Pt remains drowsy but awakens to voice, oriented to self only, follows commands occasionally.  Precedex off.  Restraints safely removed.  Vitals stable.  Davis intact with good urine output.  No BM overnight.  Wound to right foot cleansed with normal saline and dressed with wet-to-dry gauze until wound care nurse able to evaluate.  No further skin breakdown observed, safety precautions maintained.

## 2020-07-21 NOTE — SUBJECTIVE & OBJECTIVE
Past Medical History:   Diagnosis Date    Arthritis     Gout    CHF (congestive heart failure)     Coronary artery disease     Diabetes mellitus     HLD (hyperlipidemia)     Hypertension     Obese     Stroke     1986    Thyroid disease        Past Surgical History:   Procedure Laterality Date    right hand surgery      right knee surgery Right     partial plate       Review of patient's allergies indicates:   Allergen Reactions    Corticosteroids (glucocorticoids) Edema       Medications:  Medications Prior to Admission   Medication Sig    acetaminophen (TYLENOL) 325 MG tablet Take 2 tablets (650 mg total) by mouth every 6 (six) hours as needed for Pain or Temperature greater than (100.5).    amLODIPine (NORVASC) 2.5 MG tablet Take 1 tablet (2.5 mg total) by mouth once daily.    aspirin 81 MG Chew Take 81 mg by mouth once daily.    blood pressure test kit-large Kit Use to check blood pressure daily and as needed.    carvedilol (COREG) 25 MG tablet Take 25 mg by mouth 2 (two) times daily with meals.    cefTRIAXone (ROCEPHIN) 1 g/50 mL PgBk IVPB Inject 50 mLs (1 g total) into the vein once daily. To end date of 7/27    cetirizine (ZYRTEC) 10 MG tablet Take 1 tablet (10 mg total) by mouth every evening.    collagenase (SANTYL) ointment Apply topically once daily.    famotidine (PEPCID) 20 MG tablet Take 1 tablet (20 mg total) by mouth once daily.    insulin aspart U-100 (NOVOLOG) 100 unit/mL (3 mL) InPn pen Inject 10 Units into the skin 3 (three) times daily with meals.    insulin detemir U-100 (LEVEMIR FLEXTOUCH) 100 unit/mL (3 mL) SubQ InPn pen Inject 15 Units into the skin 2 (two) times daily.    levothyroxine (SYNTHROID) 75 MCG tablet Take 1 tablet (75 mcg total) by mouth before breakfast.    lidocaine (LIDODERM) 5 % Place 2 patches onto the skin once daily. Remove & Discard patch within 12 hours or as directed by MD to right shoulder    rosuvastatin (CRESTOR) 20 MG tablet Take 1 tablet  (20 mg total) by mouth once daily.    senna-docusate 8.6-50 mg (PERICOLACE) 8.6-50 mg per tablet Take 1 tablet by mouth 2 (two) times daily.    vancomycin HCl (VANCOMYCIN 500 MG/100 ML D5W, READY TO MIX SYSTEM,) Inject 100 mLs (500 mg total) into the vein every 48 hours.     Antibiotics (From admission, onward)    Start     Stop Route Frequency Ordered    07/20/20 1615  meropenem-0.9% sodium chloride 1 g/50 mL IVPB      -- IV Every 8 hours (non-standard times) 07/20/20 1502    07/20/20 1602  vancomycin - pharmacy to dose  (vancomycin IVPB)      -- IV pharmacy to manage frequency 07/20/20 1502        Antifungals (From admission, onward)    None        Antivirals (From admission, onward)    None           Immunization History   Administered Date(s) Administered    PPD Test 06/14/2020       Family History     Problem Relation (Age of Onset)    Diabetes Maternal Aunt    Heart disease Sister, Maternal Aunt    Hypertension Maternal Aunt        Social History     Socioeconomic History    Marital status:      Spouse name: Not on file    Number of children: Not on file    Years of education: Not on file    Highest education level: Not on file   Occupational History    Not on file   Social Needs    Financial resource strain: Not on file    Food insecurity     Worry: Not on file     Inability: Not on file    Transportation needs     Medical: Not on file     Non-medical: Not on file   Tobacco Use    Smoking status: Never Smoker    Smokeless tobacco: Never Used   Substance and Sexual Activity    Alcohol use: No    Drug use: No    Sexual activity: Not Currently     Partners: Male   Lifestyle    Physical activity     Days per week: Not on file     Minutes per session: Not on file    Stress: Not on file   Relationships    Social connections     Talks on phone: Not on file     Gets together: Not on file     Attends Sabianism service: Not on file     Active member of club or organization: Not on file      Attends meetings of clubs or organizations: Not on file     Relationship status: Not on file   Other Topics Concern    Not on file   Social History Narrative    Not on file     Review of Systems   Unable to perform ROS: Severe respiratory distress     Objective:     Vital Signs (Most Recent):  Temp: 99.5 °F (37.5 °C) (07/21/20 0715)  Pulse: 73 (07/21/20 0930)  Resp: 16 (07/21/20 0930)  BP: 136/60 (07/21/20 0930)  SpO2: 98 % (07/21/20 0930) Vital Signs (24h Range):  Temp:  [94 °F (34.4 °C)-99.5 °F (37.5 °C)] 99.5 °F (37.5 °C)  Pulse:  [56-76] 73  Resp:  [10-27] 16  SpO2:  [94 %-100 %] 98 %  BP: (115-185)/() 136/60     Weight: 125.2 kg (276 lb 0.3 oz)  Body mass index is 50.47 kg/m².    Estimated Creatinine Clearance: 30.7 mL/min (A) (based on SCr of 2 mg/dL (H)).    Physical Exam  Vitals signs and nursing note reviewed.   Constitutional:       General: She is not in acute distress.     Appearance: She is well-developed. She is not diaphoretic.      Interventions: Face mask in place.      Comments: On NIPPV   HENT:      Head: Normocephalic and atraumatic.      Right Ear: Hearing and external ear normal.      Left Ear: Hearing and external ear normal.      Mouth/Throat:      Pharynx: Uvula midline.   Eyes:      General: No scleral icterus.        Right eye: No discharge.         Left eye: No discharge.      Conjunctiva/sclera: Conjunctivae normal.   Neck:      Musculoskeletal: Normal range of motion and neck supple.      Thyroid: No thyromegaly.      Trachea: No tracheal deviation.   Cardiovascular:      Rate and Rhythm: Normal rate and regular rhythm.      Heart sounds: Normal heart sounds. No murmur. No friction rub. No gallop.    Pulmonary:      Effort: Pulmonary effort is normal. No respiratory distress.      Breath sounds: No stridor. Examination of the right-upper field reveals rales. Examination of the left-upper field reveals rales. Examination of the right-middle field reveals decreased breath sounds.  Examination of the left-middle field reveals decreased breath sounds. Examination of the right-lower field reveals decreased breath sounds. Examination of the left-lower field reveals decreased breath sounds. Decreased breath sounds and rales present. No wheezing.   Chest:      Chest wall: No tenderness.   Abdominal:      General: Bowel sounds are normal. There is no distension.      Palpations: Abdomen is soft.      Tenderness: There is no abdominal tenderness. There is no guarding or rebound.   Musculoskeletal:         General: Swelling present. No tenderness.      Comments: Right foot covered with gauze dressing.    Lymphadenopathy:      Cervical: No cervical adenopathy.   Skin:     General: Skin is warm and dry.      Coloration: Skin is not pale.      Findings: No erythema or rash.   Neurological:      Mental Status: She is oriented to person, place, and time and easily aroused. She is lethargic.         Significant Labs:   Blood Culture:   Recent Labs   Lab 06/12/20  2339 06/15/20  0624 07/20/20  0951 07/20/20  1025 07/20/20  1122   LABBLOO Gram stain aer bottle: Gram positive cocci in clusters resembling Staph   Results called to and read back by: Nataliia Weber  06/13/2020  22:41  COAGULASE-NEGATIVE STAPHYLOCOCCUS SPECIES  Organism is a probable contaminant  * No Growth after 4 days.  Gram stain aer bottle: Gram positive cocci in clusters resembling Staph   Results called to and read back by: Reshma Quiles 07/21/2020  11:15 No Growth to date No Growth to date     BMP:   Recent Labs   Lab 07/21/20  0320         K 5.4*      CO2 27   *   CREATININE 2.0*   CALCIUM 8.5*   MG 2.3     CBC:   Recent Labs   Lab 07/20/20  0951 07/20/20  1140 07/21/20  0320   WBC 8.65  --  8.21   HGB 7.6*  --  7.0*   HCT 26.3* 26* 24.1*     --  274     Respiratory Culture: No results for input(s): GSRESP, RESPIRATORYC in the last 4320 hours.  Urine Culture:   Recent Labs   Lab 06/18/20  1115  07/20/20  1031   LABURIN ESBL Results called to and read back by:ELY SOTO 06/20/2020  09:33  KLEBSIELLA PNEUMONIAE ESBL  >100,000 cfu/ml  * GRAM NEGATIVE BETY  >100,000 cfu/ml  Identification and susceptibility pending  *     Urine Studies:   Recent Labs   Lab 06/18/20  1115 07/20/20  1031   COLORU Yellow Yellow   APPEARANCEUA Cloudy* Cloudy*   PHUR 5.0 5.0   SPECGRAV 1.015 1.015   PROTEINUA 2+* 2+*   GLUCUA 1+* Negative   KETONESU Trace* Trace*   BILIRUBINUA Negative Negative   OCCULTUA 2+* 1+*   NITRITE Negative Negative   UROBILINOGEN Negative Negative   LEUKOCYTESUR 2+* 3+*   RBCUA >100* 1   WBCUA >100* 50*   BACTERIA Moderate* Many*   SQUAMEPITHEL 4  --    HYALINECASTS 0 0     Wound Culture:   Recent Labs   Lab 06/13/20  1405   LABAERO No growth       Significant Imaging: I have reviewed all pertinent imaging results/findings within the past 24 hours.

## 2020-07-21 NOTE — PROGRESS NOTES
Pharmacokinetic Assessment Follow Up: IV Vancomycin    Vancomycin serum concentration assessment(s):    The random level was drawn correctly and can be used to guide therapy at this time. The measurement is above the desired definitive target range of 10 to 20 mcg/mL.    Vancomycin Regimen Plan:    No dose today.  Re-dose when the random level is less than 20 mcg/mL, next level to be drawn at 0300 on 7/22/2020    Drug levels (last 3 results):  Recent Labs   Lab Result Units 07/20/20  0951 07/21/20  0320   Vancomycin, Random ug/mL 13.1 34.8       Pharmacy will continue to follow and monitor vancomycin.    Please contact pharmacy at extension [phone] for questions regarding this assessment.    Thank you for the consult,   Can Samuel Jr       Patient brief summary:  Tasneem Lynn is a 75 y.o. female initiated on antimicrobial therapy with IV Vancomycin for treatment of  pneumonia    Drug Allergies:   Review of patient's allergies indicates:   Allergen Reactions    Corticosteroids (glucocorticoids) Edema       Actual Body Weight:   125.2 kg    Renal Function:   Estimated Creatinine Clearance: 30.7 mL/min (A) (based on SCr of 2 mg/dL (H)).,     Dialysis Method (if applicable):  N/A    CBC (last 72 hours):  Recent Labs   Lab Result Units 07/20/20  0951 07/21/20  0320   WBC K/uL 8.65 8.21   Hemoglobin g/dL 7.6* 7.0*   Hematocrit % 26.3* 24.1*   Platelets K/uL 298 274   Gran% % 71.1 73.6*   Lymph% % 21.6 13.2*   Mono% % 6.5 11.1   Eosinophil% % 0.5 1.8   Basophil% % 0.1 0.1   Differential Method  Automated Automated       Metabolic Panel (last 72 hours):  Recent Labs   Lab Result Units 07/20/20  0951 07/20/20  1031 07/21/20  0320   Sodium mmol/L 141  --  145   Potassium mmol/L 6.7*  --  5.4*   Chloride mmol/L 110  --  110   CO2 mmol/L 25  --  27   Glucose mg/dL 146*  --  104   Glucose, UA   --  Negative  --    BUN, Bld mg/dL 113*  --  111*   Creatinine mg/dL 1.9*  --  2.0*   Albumin g/dL 2.8*  --  2.5*   Total Bilirubin  mg/dL 0.2  --  0.2   Alkaline Phosphatase U/L 99  --  82   AST U/L 14  --  12   ALT U/L 15  --  12   Magnesium mg/dL  --   --  2.3   Phosphorus mg/dL  --   --  4.4       Vancomycin Administrations:  vancomycin given in the last 96 hours                     vancomycin (VANCOCIN) 2,000 mg in dextrose 5 % 500 mL IVPB (mg) 2,000 mg New Bag 07/20/20 1558                    Microbiologic Results:  Microbiology Results (last 7 days)       Procedure Component Value Units Date/Time    Blood culture x two cultures. Draw prior to antibiotics. [[id number]] Collected: 07/20/20 1122    Order Status: Completed Specimen: Blood from Line, Subclavian, Right Updated: 07/20/20 1912     Blood Culture, Routine No Growth to date    Narrative:      Aerobic and anaerobic    Blood culture [[id number]] Collected: 07/20/20 1025    Order Status: Completed Specimen: Blood from Peripheral, Hand, Right Updated: 07/20/20 1912     Blood Culture, Routine No Growth to date    Narrative:      From central line R chest    Blood culture x two cultures. Draw prior to antibiotics. [[id number]] Collected: 07/20/20 0951    Order Status: Completed Specimen: Blood from Peripheral, Forearm, Right Updated: 07/20/20 1712     Blood Culture, Routine No Growth to date    Narrative:      Aerobic and anaerobic    Culture, Respiratory with Gram Stain [[id number]]     Order Status: No result Specimen: Respiratory from Endotracheal Aspirate     Urine culture [[id number]] Collected: 07/20/20 1031    Order Status: No result Specimen: Urine Updated: 07/20/20 1126

## 2020-07-21 NOTE — PLAN OF CARE
Pt remains drowsy but awakens to voice, oriented to self only, intermittently follows commands.  Precedex off.  Restraints safely removed.  Vitals stable.  Davis intact with good urine output.  No BM overnight.  Wound to right foot cleansed with normal saline and dressed with wet-to-dry gauze until wound care nurse able to evaluate.  No further skin breakdown observed, safety precautions maintained.

## 2020-07-21 NOTE — CONSULTS
"  [facility] [place]  Adult Nutrition  Consult Note    SUMMARY     Recommendations    1. When medically able, order renal diet.   2. If intake < 50% of meal order novasource renal BID for added kcal and protein   3. weigh pt weekly.    Goals: Initiate diet order within 72 hours  Nutrition Goal Status: new  Communication of RD Recs: (plan of care)    Thanks for the consult.     Reason for Assessment    Reason For Assessment: consult(prompt by epic)  Diagnosis: pulmonary disease(acute respiratory failure with hypoxia and hypercarbia)  Relevant Medical History: T2DM, HTN, CHF, stroke, thyroid disease, arthritis, obesity, HLD, CAD  Interdisciplinary Rounds: did not attend    General Information Comments: 75 y.o. female represented to the ED from [facility] with reports of altered mental status. Pt lying in bed sleeping at visit on bipap for respiratory support. Pt NPO. Noted 6 % weight loss in 7 months. Pt appears well-nourished, no NFPE warranted.     Nutrition Discharge Planning: Adequate intake on renal diet    Nutrition Risk Screen    Nutrition Risk Screen: large or nonhealing wound, burn or pressure injury    Nutrition/Diet History    Patient Reported Diet/Restrictions/Preferences: diabetic diet  Spiritual, Cultural Beliefs, Gnosticism Practices, Values that Affect Care: no  Food Allergies: NKFA  Factors Affecting Nutritional Intake: NPO    Anthropometrics    Temp: 99.5 °F (37.5 °C)  Height Method: Estimated  Height: 5' 2.01" (157.5 cm)  Height (inches): 62.01 in  Weight Method: Estimated  Weight: 125.2 kg (276 lb 0.3 oz)  Weight (lb): 276.02 lb  Ideal Body Weight (IBW), Female: 110.05 lb  % Ideal Body Weight, Female (lb): 250.81 %  BMI (Calculated): 50.5  BMI Grade: greater than 40 - morbid obesity  Weight Loss: unintentional  Usual Body Weight (UBW), k.9 kg(2019)  % Usual Body Weight: 106.41  % Weight Change From Usual Weight: 6.19 %    Lab/Procedures/Meds    Pertinent Labs Reviewed: " reviewed  Pertinent Labs Comments: H/H 7/24.1, K 5.4, , Creat 2, Ca 8.5, Alb 2.5, trace ketones  Pertinent Medications Reviewed: reviewed  Pertinent Medications Comments: furosemide, insulin, pantoprazole    Estimated/Assessed Needs    Weight Used For Calorie Calculations: 125.2 kg (276 lb 0.3 oz)  Energy Calorie Requirements (kcal): 1700 kcal  Energy Need Method: Durham-St Jeor  Protein Requirements:  g (0.6-0.8 g/kg)  Weight Used For Protein Calculations: 125.2 kg (276 lb 0.3 oz)  Fluid Requirements (mL): 1 ml/kcal or per MD  Estimated Fluid Requirement Method: RDA Method  RDA Method (mL): 1700  CHO Requirement: 210 g daily    Nutrition Prescription Ordered    Current Diet Order: NPO    Evaluation of Received Nutrient/Fluid Intake    I/O: 211/9580  Comments: Last BM 7/20  % Intake of Estimated Energy Needs: 0 %  % Meal Intake: NPO    Nutrition Risk    Level of Risk/Frequency of Follow-up: moderate - high(2x/week)     Assessment and Plan  Nutrition Problem  Inadequate oral intake    Related to (etiology):   NPO     Signs and Symptoms (as evidenced by):   NPO with no other means of nutrition     Interventions (treatment strategy):  Nutrient modified diet  Collaboration with other providers    Nutrition Diagnosis Status:   New    Monitor and Evaluation    Food and Nutrient Intake: energy intake  Food and Nutrient Adminstration: diet order  Anthropometric Measurements: weight change  Biochemical Data, Medical Tests and Procedures: electrolyte and renal panel  Nutrition-Focused Physical Findings: overall appearance, skin     Malnutrition Assessment  Energy Intake: (NPO day 2)  Skin (Micronutrient): (Mo score-11, diabetic ulcer on right heel and anterior)  Teeth (Micronutrient): (mising some)       Weight Loss (Malnutrition):   11/11/19 117.9 kg   7/21/20 125.2 kg - 6% weight loss      Edema (Fluid Accumulation): 2-->mild   Subcutaneous Fat Loss (Final Summary): well nourished  Muscle Loss Evaluation  (Final Summary): well nourished       Nutrition Follow-Up    RD Follow-up?: Yes

## 2020-07-21 NOTE — CONSULTS
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

## 2020-07-21 NOTE — HPI
A 75-year-old man with HTN, HFpEF 55%, past stroke, hypothyroidism, CKD 4, chronic osteomyelitis of the right foot with draining sinus, and on a 6 week course of ceftriaxone plus vancomycin who was noted to be altered or lethargic while at his nursing home. Upon EMS evaluation he was noted to be hypoxemic with oxygen saturation rate of 86%. He was given Narcan and transferred to  ED.  Upon evaluation he was hypertensive, hypoxemic, and afebrile. Laboratory work up revealed normal WBC, and elevated creatinine however consistent with his baseline. Urinalysis had pyuria of 50 WBC. He was admitted to Hospital Medicine Service for further management and started on meropenem plus vancomycin.     Infectious Diseases consulted for management recommendations.

## 2020-07-21 NOTE — CONSULTS
Unfortunate 75 y o AAF admitted with AMS and resp failure. She has hx of chf, htn, HLP, T2DM, hypothyroid and CKD4 for which renal consult was requested.    Pt on BiPap unab;le to provide an adequate HPI/ROS    Past Medical History:   Diagnosis Date    Arthritis     Gout    CHF (congestive heart failure)     Coronary artery disease     Diabetes mellitus     HLD (hyperlipidemia)     Hypertension     Obese     Stroke     1986    Thyroid disease      Past Surgical History:   Procedure Laterality Date    right hand surgery      right knee surgery Right     partial plate     Social History     Socioeconomic History    Marital status:      Spouse name: Not on file    Number of children: Not on file    Years of education: Not on file    Highest education level: Not on file   Occupational History    Not on file   Social Needs    Financial resource strain: Not on file    Food insecurity     Worry: Not on file     Inability: Not on file    Transportation needs     Medical: Not on file     Non-medical: Not on file   Tobacco Use    Smoking status: Never Smoker    Smokeless tobacco: Never Used   Substance and Sexual Activity    Alcohol use: No    Drug use: No    Sexual activity: Not Currently     Partners: Male   Lifestyle    Physical activity     Days per week: Not on file     Minutes per session: Not on file    Stress: Not on file   Relationships    Social connections     Talks on phone: Not on file     Gets together: Not on file     Attends [employer]ism service: Not on file     Active member of club or organization: Not on file     Attends meetings of clubs or organizations: Not on file     Relationship status: Not on file   Other Topics Concern    Not on file   Social History Narrative    Not on file     Family History   Problem Relation Age of Onset    Heart disease Sister     Diabetes Maternal Aunt     Heart disease Maternal Aunt     Hypertension Maternal Aunt        Review of patient's  allergies indicates:   Allergen Reactions    Corticosteroids (glucocorticoids) Edema       Current Facility-Administered Medications   Medication    acetaminophen tablet 650 mg    amLODIPine tablet 2.5 mg    calcium gluconate 1g in dextrose 5% 100mL (ready to mix system)    carvediloL tablet 25 mg    dexmedetomidine (PRECEDEX) 400mcg/100mL 0.9% NaCL infusion    furosemide injection 80 mg    hydrALAZINE injection 10 mg    insulin detemir U-100 pen 15 Units    levothyroxine tablet 75 mcg    meropenem-0.9% sodium chloride 1 g/50 mL IVPB    ondansetron injection 4 mg    pantoprazole injection 40 mg    rosuvastatin tablet 20 mg    sodium chloride 0.9% flush 10 mL    vancomycin - pharmacy to dose       LABS    Recent Results (from the past 24 hour(s))   Lactic acid, plasma    Collection Time: 07/20/20  2:35 PM   Result Value Ref Range    Lactate (Lactic Acid) 1.1 0.5 - 2.2 mmol/L   ISTAT PROCEDURE    Collection Time: 07/20/20  2:46 PM   Result Value Ref Range    POC PH 7.211 (LL) 7.35 - 7.45    POC PCO2 65.6 (HH) 35 - 45 mmHg    POC PO2 97 80 - 100 mmHg    POC HCO3 26.3 24 - 28 mmol/L    POC BE -2 -2 to 2 mmol/L    POC SATURATED O2 95 95 - 100 %    POC TCO2 28 (H) 23 - 27 mmol/L    Rate 5     Sample ARTERIAL     Site RR     Allens Test Pass     DelSys CPAP/BiPAP     Mode BiPAP     FiO2 35     IP 10     EP 5    Echo Color Flow Doppler? Yes; Bubble Contrast? No    Collection Time: 07/20/20  3:46 PM   Result Value Ref Range    BSA 2.44 m2    LVIDD 4.70 3.5 - 6.0 cm    IVS 0.84 0.6 - 1.1 cm    PW 0.81 0.6 - 1.1 cm    LVIDS 3.79 2.1 - 4.0 cm    FS 19 28 - 44 %    Sinus 2.69 cm    STJ 2.57 cm    LV mass 127.25 g    LA size 3.00 cm    TAPSE 1.85 cm    Left Ventricle Relative Wall Thickness 0.34 cm    AV mean gradient 5 mmHg    AV valve area 2.38 cm2    AV Velocity Ratio 0.61     AV index (prosthetic) 0.58     MV valve area p 1/2 method 3.33 cm2    E/A ratio 1.14     E wave decelartion time 251.71 msec    IVRT  76.12 msec    LVOT diameter 2.28 cm    LVOT area 4.1 cm2    LVOT peak tung 0.99 m/s    LVOT peak VTI 21.31 cm    Ao peak tung 1.62 m/s    Ao VTI 36.54 cm    LVOT stroke volume 86.96 cm3    AV peak gradient 10 mmHg    MV Peak E Tung 1.20 m/s    TR Max Tung 2.52 m/s    MV stenosis pressure 1/2 time 65.97 ms    MV Peak A Tung 1.05 m/s    LV Systolic Volume 61.55 mL    LV Systolic Volume Index 27.1 mL/m2    LV Diastolic Volume 102.27 mL    LV Diastolic Volume Index 45.03 mL/m2    LV Mass Index 56 g/m2    Triscuspid Valve Regurgitation Peak Gradient 25 mmHg   POCT glucose    Collection Time: 07/20/20  4:39 PM   Result Value Ref Range    POCT Glucose 259 (H) 70 - 110 mg/dL   ISTAT PROCEDURE    Collection Time: 07/20/20  5:52 PM   Result Value Ref Range    POC PH 7.241 (LL) 7.35 - 7.45    POC PCO2 61.6 (HH) 35 - 45 mmHg    POC PO2 94 80 - 100 mmHg    POC HCO3 26.5 24 - 28 mmol/L    POC BE -1 -2 to 2 mmol/L    POC SATURATED O2 96 95 - 100 %    POC TCO2 28 (H) 23 - 27 mmol/L    Sample ARTERIAL     Site LB     Allens Test Pass     DelSys CPAP/BiPAP     Mode BiPAP     FiO2 28     Spont Rate 16     Sp02 99     IP 18     EP 6    Lactic Acid, Plasma    Collection Time: 07/20/20  6:16 PM   Result Value Ref Range    Lactate (Lactic Acid) 0.9 0.5 - 2.2 mmol/L   POCT glucose    Collection Time: 07/20/20  9:24 PM   Result Value Ref Range    POCT Glucose 171 (H) 70 - 110 mg/dL   Comprehensive metabolic panel    Collection Time: 07/21/20  3:20 AM   Result Value Ref Range    Sodium 145 136 - 145 mmol/L    Potassium 5.4 (H) 3.5 - 5.1 mmol/L    Chloride 110 95 - 110 mmol/L    CO2 27 23 - 29 mmol/L    Glucose 104 70 - 110 mg/dL    BUN, Bld 111 (H) 8 - 23 mg/dL    Creatinine 2.0 (H) 0.5 - 1.4 mg/dL    Calcium 8.5 (L) 8.7 - 10.5 mg/dL    Total Protein 7.8 6.0 - 8.4 g/dL    Albumin 2.5 (L) 3.5 - 5.2 g/dL    Total Bilirubin 0.2 0.1 - 1.0 mg/dL    Alkaline Phosphatase 82 55 - 135 U/L    AST 12 10 - 40 U/L    ALT 12 10 - 44 U/L    Anion Gap 8 8 - 16  mmol/L    eGFR if African American 28 (A) >60 mL/min/1.73 m^2    eGFR if non African American 24 (A) >60 mL/min/1.73 m^2   Magnesium    Collection Time: 07/21/20  3:20 AM   Result Value Ref Range    Magnesium 2.3 1.6 - 2.6 mg/dL   Phosphorus    Collection Time: 07/21/20  3:20 AM   Result Value Ref Range    Phosphorus 4.4 2.7 - 4.5 mg/dL   CBC auto differential    Collection Time: 07/21/20  3:20 AM   Result Value Ref Range    WBC 8.21 3.90 - 12.70 K/uL    RBC 2.48 (L) 4.00 - 5.40 M/uL    Hemoglobin 7.0 (L) 12.0 - 16.0 g/dL    Hematocrit 24.1 (L) 37.0 - 48.5 %    Mean Corpuscular Volume 97 82 - 98 fL    Mean Corpuscular Hemoglobin 28.2 27.0 - 31.0 pg    Mean Corpuscular Hemoglobin Conc 29.0 (L) 32.0 - 36.0 g/dL    RDW 15.4 (H) 11.5 - 14.5 %    Platelets 274 150 - 350 K/uL    MPV 8.9 (L) 9.2 - 12.9 fL    Immature Granulocytes 0.2 0.0 - 0.5 %    Gran # (ANC) 6.0 1.8 - 7.7 K/uL    Immature Grans (Abs) 0.02 0.00 - 0.04 K/uL    Lymph # 1.1 1.0 - 4.8 K/uL    Mono # 0.9 0.3 - 1.0 K/uL    Eos # 0.2 0.0 - 0.5 K/uL    Baso # 0.01 0.00 - 0.20 K/uL    nRBC 0 0 /100 WBC    Gran% 73.6 (H) 38.0 - 73.0 %    Lymph% 13.2 (L) 18.0 - 48.0 %    Mono% 11.1 4.0 - 15.0 %    Eosinophil% 1.8 0.0 - 8.0 %    Basophil% 0.1 0.0 - 1.9 %    Differential Method Automated    Vancomycin, random    Collection Time: 07/21/20  3:20 AM   Result Value Ref Range    Vancomycin, Random 34.8 Not established ug/mL   ISTAT PROCEDURE    Collection Time: 07/21/20  4:30 AM   Result Value Ref Range    POC PH 7.341 (L) 7.35 - 7.45    POC PCO2 51.1 (H) 35 - 45 mmHg    POC PO2 90 80 - 100 mmHg    POC HCO3 27.6 24 - 28 mmol/L    POC BE 2 -2 to 2 mmol/L    POC SATURATED O2 96 95 - 100 %    POC TCO2 29 (H) 23 - 27 mmol/L    Rate 8     Sample ARTERIAL     Site RR     Allens Test Pass     DelSys CPAP/BiPAP     Mode BiPAP     FiO2 28     Spont Rate 13     Min Vol 10     Sp02 99     IP 18     EP 6    ]    I/O last 3 completed shifts:  In: 817.6 [I.V.:17.6; IV  Piggyback:800]  Out: 1965 [Urine:1965]    Vitals:    07/21/20 1145 07/21/20 1200 07/21/20 1308 07/21/20 1340   BP:  (!) 170/72     Pulse: 77 78  73   Resp: 13 12  14   Temp:       TempSrc:       SpO2: 98% 99%  99%   Weight:   125.2 kg (276 lb 0.3 oz)    Height:           No Jvd, Thyromegaly or Lymphadenopathy  Lungs: Fairly clear anteriorly and laterally  Cor: RRR no G or rubs  Abd: Soft benign good bowel sounds non tender  Ext: Pos edema  Noted Right leg wound dressed at this time.    A)  Advanced renal disease likely related to DM,HTN,CHF  Marquis on ckd4 with small kidneys  Resp insuf  Anemia of acute and chronic disease  HyperK  DM with porteinuria  CHF  Protein malnutrition POA  Bladder infection with GNR  Obesity  DJD  and gout  CAD  Hx of cva    P)  Renal Diet  Home meds  Protect non dominant arm for future access  EPO   Binders PRN  Check pthi  Document degree of proteinruia  Adjust all meds to the degree of renal fx  Close follow up I/O and weights  Maintain Hydration   No need for HD or K bx at this time

## 2020-07-22 PROBLEM — B96.89 URINARY TRACT INFECTION DUE TO EXTENDED-SPECTRUM BETA LACTAMASE (ESBL)-PRODUCING KLEBSIELLA: Status: ACTIVE | Noted: 2020-07-21

## 2020-07-22 PROBLEM — N39.0 URINARY TRACT INFECTION DUE TO EXTENDED-SPECTRUM BETA LACTAMASE (ESBL)-PRODUCING KLEBSIELLA: Status: ACTIVE | Noted: 2020-07-21

## 2020-07-22 LAB
ABO + RH BLD: NORMAL
ALBUMIN SERPL BCP-MCNC: 2.3 G/DL (ref 3.5–5.2)
ALP SERPL-CCNC: 79 U/L (ref 55–135)
ALT SERPL W/O P-5'-P-CCNC: 7 U/L (ref 10–44)
ANION GAP SERPL CALC-SCNC: 8 MMOL/L (ref 8–16)
AST SERPL-CCNC: 12 U/L (ref 10–40)
BACTERIA BLD CULT: ABNORMAL
BACTERIA UR CULT: ABNORMAL
BASOPHILS # BLD AUTO: 0.02 K/UL (ref 0–0.2)
BASOPHILS NFR BLD: 0.2 % (ref 0–1.9)
BILIRUB SERPL-MCNC: 0.2 MG/DL (ref 0.1–1)
BLD GP AB SCN CELLS X3 SERPL QL: NORMAL
BLD PROD TYP BPU: NORMAL
BLOOD UNIT EXPIRATION DATE: NORMAL
BLOOD UNIT TYPE CODE: 7300
BLOOD UNIT TYPE: NORMAL
BUN SERPL-MCNC: 112 MG/DL (ref 8–23)
C DIFF GDH STL QL: NEGATIVE
C DIFF TOX A+B STL QL IA: NEGATIVE
CALCIUM SERPL-MCNC: 8.3 MG/DL (ref 8.7–10.5)
CHLORIDE SERPL-SCNC: 110 MMOL/L (ref 95–110)
CO2 SERPL-SCNC: 28 MMOL/L (ref 23–29)
CODING SYSTEM: NORMAL
CREAT SERPL-MCNC: 2.3 MG/DL (ref 0.5–1.4)
DIFFERENTIAL METHOD: ABNORMAL
DISPENSE STATUS: NORMAL
EOSINOPHIL # BLD AUTO: 0.3 K/UL (ref 0–0.5)
EOSINOPHIL NFR BLD: 3.1 % (ref 0–8)
ERYTHROCYTE [DISTWIDTH] IN BLOOD BY AUTOMATED COUNT: 15.8 % (ref 11.5–14.5)
EST. GFR  (AFRICAN AMERICAN): 23 ML/MIN/1.73 M^2
EST. GFR  (NON AFRICAN AMERICAN): 20 ML/MIN/1.73 M^2
GLUCOSE SERPL-MCNC: 87 MG/DL (ref 70–110)
HCT VFR BLD AUTO: 23.9 % (ref 37–48.5)
HGB BLD-MCNC: 6.8 G/DL (ref 12–16)
IMM GRANULOCYTES # BLD AUTO: 0.03 K/UL (ref 0–0.04)
IMM GRANULOCYTES NFR BLD AUTO: 0.3 % (ref 0–0.5)
LYMPHOCYTES # BLD AUTO: 2.8 K/UL (ref 1–4.8)
LYMPHOCYTES NFR BLD: 28.8 % (ref 18–48)
MAGNESIUM SERPL-MCNC: 2.2 MG/DL (ref 1.6–2.6)
MCH RBC QN AUTO: 28 PG (ref 27–31)
MCHC RBC AUTO-ENTMCNC: 28.5 G/DL (ref 32–36)
MCV RBC AUTO: 98 FL (ref 82–98)
MONOCYTES # BLD AUTO: 1 K/UL (ref 0.3–1)
MONOCYTES NFR BLD: 10.5 % (ref 4–15)
NEUTROPHILS # BLD AUTO: 5.5 K/UL (ref 1.8–7.7)
NEUTROPHILS NFR BLD: 57.1 % (ref 38–73)
NRBC BLD-RTO: 0 /100 WBC
PLATELET # BLD AUTO: 284 K/UL (ref 150–350)
PMV BLD AUTO: 9 FL (ref 9.2–12.9)
POCT GLUCOSE: 110 MG/DL (ref 70–110)
POCT GLUCOSE: 135 MG/DL (ref 70–110)
POCT GLUCOSE: 176 MG/DL (ref 70–110)
POCT GLUCOSE: 80 MG/DL (ref 70–110)
POTASSIUM SERPL-SCNC: 4.9 MMOL/L (ref 3.5–5.1)
PROT SERPL-MCNC: 7.5 G/DL (ref 6–8.4)
PTH-INTACT SERPL-MCNC: 191.1 PG/ML (ref 9–77)
RBC # BLD AUTO: 2.43 M/UL (ref 4–5.4)
SODIUM SERPL-SCNC: 146 MMOL/L (ref 136–145)
TRANS ERYTHROCYTES VOL PATIENT: NORMAL ML
VANCOMYCIN SERPL-MCNC: 27.1 UG/ML
WBC # BLD AUTO: 9.55 K/UL (ref 3.9–12.7)

## 2020-07-22 PROCEDURE — 83735 ASSAY OF MAGNESIUM: CPT | Mod: HCNC

## 2020-07-22 PROCEDURE — 99233 SBSQ HOSP IP/OBS HIGH 50: CPT | Mod: HCNC,,, | Performed by: INTERNAL MEDICINE

## 2020-07-22 PROCEDURE — 80202 ASSAY OF VANCOMYCIN: CPT | Mod: HCNC

## 2020-07-22 PROCEDURE — 94761 N-INVAS EAR/PLS OXIMETRY MLT: CPT | Mod: HCNC

## 2020-07-22 PROCEDURE — 63600175 PHARM REV CODE 636 W HCPCS: Mod: HCNC | Performed by: HOSPITALIST

## 2020-07-22 PROCEDURE — C9113 INJ PANTOPRAZOLE SODIUM, VIA: HCPCS | Mod: HCNC | Performed by: HOSPITALIST

## 2020-07-22 PROCEDURE — 25000003 PHARM REV CODE 250: Mod: HCNC | Performed by: HOSPITALIST

## 2020-07-22 PROCEDURE — 99900035 HC TECH TIME PER 15 MIN (STAT): Mod: HCNC

## 2020-07-22 PROCEDURE — 21400001 HC TELEMETRY ROOM: Mod: HCNC

## 2020-07-22 PROCEDURE — 36430 TRANSFUSION BLD/BLD COMPNT: CPT

## 2020-07-22 PROCEDURE — P9021 RED BLOOD CELLS UNIT: HCPCS | Mod: HCNC

## 2020-07-22 PROCEDURE — 87449 NOS EACH ORGANISM AG IA: CPT | Mod: HCNC

## 2020-07-22 PROCEDURE — 85025 COMPLETE CBC W/AUTO DIFF WBC: CPT | Mod: HCNC

## 2020-07-22 PROCEDURE — 80053 COMPREHEN METABOLIC PANEL: CPT | Mod: HCNC

## 2020-07-22 PROCEDURE — 99233 PR SUBSEQUENT HOSPITAL CARE,LEVL III: ICD-10-PCS | Mod: HCNC,,, | Performed by: INTERNAL MEDICINE

## 2020-07-22 PROCEDURE — 86850 RBC ANTIBODY SCREEN: CPT | Mod: HCNC

## 2020-07-22 PROCEDURE — 36415 COLL VENOUS BLD VENIPUNCTURE: CPT | Mod: HCNC

## 2020-07-22 PROCEDURE — 63600175 PHARM REV CODE 636 W HCPCS: Mod: HCNC | Performed by: INTERNAL MEDICINE

## 2020-07-22 PROCEDURE — 87324 CLOSTRIDIUM AG IA: CPT | Mod: HCNC

## 2020-07-22 PROCEDURE — 94660 CPAP INITIATION&MGMT: CPT | Mod: HCNC

## 2020-07-22 PROCEDURE — 86920 COMPATIBILITY TEST SPIN: CPT | Mod: HCNC

## 2020-07-22 RX ORDER — HYDROCODONE BITARTRATE AND ACETAMINOPHEN 500; 5 MG/1; MG/1
TABLET ORAL
Status: DISCONTINUED | OUTPATIENT
Start: 2020-07-22 | End: 2020-07-25 | Stop reason: HOSPADM

## 2020-07-22 RX ADMIN — PANTOPRAZOLE SODIUM 40 MG: 40 INJECTION, POWDER, FOR SOLUTION INTRAVENOUS at 09:07

## 2020-07-22 RX ADMIN — FUROSEMIDE 80 MG: 10 INJECTION, SOLUTION INTRAVENOUS at 04:07

## 2020-07-22 RX ADMIN — AMLODIPINE BESYLATE 2.5 MG: 2.5 TABLET ORAL at 09:07

## 2020-07-22 RX ADMIN — CARVEDILOL 25 MG: 12.5 TABLET, FILM COATED ORAL at 05:07

## 2020-07-22 RX ADMIN — LEVOTHYROXINE SODIUM 75 MCG: 75 TABLET ORAL at 06:07

## 2020-07-22 RX ADMIN — MEROPENEM AND SODIUM CHLORIDE 1 G: 1 INJECTION, SOLUTION INTRAVENOUS at 12:07

## 2020-07-22 RX ADMIN — PANTOPRAZOLE SODIUM 40 MG: 40 INJECTION, POWDER, FOR SOLUTION INTRAVENOUS at 10:07

## 2020-07-22 RX ADMIN — MEROPENEM AND SODIUM CHLORIDE 1 G: 1 INJECTION, SOLUTION INTRAVENOUS at 09:07

## 2020-07-22 RX ADMIN — HYDRALAZINE HYDROCHLORIDE 10 MG: 20 INJECTION INTRAMUSCULAR; INTRAVENOUS at 09:07

## 2020-07-22 RX ADMIN — ROSUVASTATIN CALCIUM 20 MG: 10 TABLET, COATED ORAL at 09:07

## 2020-07-22 RX ADMIN — MEROPENEM AND SODIUM CHLORIDE 1 G: 1 INJECTION, SOLUTION INTRAVENOUS at 04:07

## 2020-07-22 RX ADMIN — CARVEDILOL 25 MG: 12.5 TABLET, FILM COATED ORAL at 08:07

## 2020-07-22 NOTE — ASSESSMENT & PLAN NOTE
Does are responding to IV diuresis however with hyperkalemia and uremia  Hyperkalemia has improved but I believe uremia is symptomatic  Patient does require IV diuresis given respiratory failure  Cardiac monitoring.  Repeat CMP now and in a.m.  Continue strict I&Os  Nephrology on board.  Appreciate further recommendations

## 2020-07-22 NOTE — ASSESSMENT & PLAN NOTE
Chronic right heel pressure wound not healing x 3 months.   MRI showed osteomyelitis of the calcaneus with complete to near complete disruption of the Achilles tendon  She was discharged to SNF on regimen of Ceftriaxone 1 gram IV q 24 hours plus Vancomycin 500 mg IV q 48 hours (goal trough is 15-20) with end date of 7/27/2020. Now on meropenem with vancomycin.   (Tunnel catheter placed for IV antibiotic administration on 01/06/22 by IR)

## 2020-07-22 NOTE — SUBJECTIVE & OBJECTIVE
Interval History: oxygenation improved. Now on high flow NC and stable but appears to have jerking movements and somewhat disoriented.     Review of Systems   Unable to perform ROS: Mental status change     Objective:     Vital Signs (Most Recent):  Temp: 98.8 °F (37.1 °C) (07/21/20 1515)  Pulse: 74 (07/21/20 1900)  Resp: 19 (07/21/20 1900)  BP: (!) 151/67 (07/21/20 1900)  SpO2: 100 % (07/21/20 1900) Vital Signs (24h Range):  Temp:  [96.5 °F (35.8 °C)-99.5 °F (37.5 °C)] 98.8 °F (37.1 °C)  Pulse:  [56-83] 74  Resp:  [10-40] 19  SpO2:  [94 %-100 %] 100 %  BP: (124-182)/(58-79) 151/67     Weight: 125.2 kg (276 lb 0.3 oz)  Body mass index is 50.47 kg/m².    Intake/Output Summary (Last 24 hours) at 7/21/2020 1946  Last data filed at 7/21/2020 1800  Gross per 24 hour   Intake 156.8 ml   Output 2030 ml   Net -1873.2 ml      Physical Exam  Vitals signs and nursing note reviewed.   Constitutional:       Appearance: She is obese. She is ill-appearing.   Cardiovascular:      Rate and Rhythm: Normal rate and regular rhythm.   Pulmonary:      Effort: Pulmonary effort is normal.      Breath sounds: No wheezing or rales.   Abdominal:      General: Abdomen is flat.   Musculoskeletal:         General: Swelling present.   Neurological:      Comments: Drowsy but easily arousable         Significant Labs: All pertinent labs within the past 24 hours have been reviewed.    Significant Imaging: I have reviewed all pertinent imaging results/findings within the past 24 hours.  I have reviewed and interpreted all pertinent imaging results/findings within the past 24 hours.

## 2020-07-22 NOTE — ASSESSMENT & PLAN NOTE
Noted drop in H&H compared to previous levels along with Hemoccult-positive stool  Also uremia out of proportion with creatinine concerning for GI bleed  Stopped aspirin and continuing on Protonix IV q.12  No melena nor blood in stool while in ICU  Mechanical DVT prophylaxis  Tentative plan for EGD.

## 2020-07-22 NOTE — ASSESSMENT & PLAN NOTE
RLL pneumonia on CXR vs other etiology for airspace disease. Is afebrile and without leukocytosis. Urine cultures with ESBL Klebsiella.   · Continue meropenem for now.   · Consider discontinuing vancomycin.    · If able to get samples today this would help guide antibiotic therapy.

## 2020-07-22 NOTE — PROGRESS NOTES
[facility] Hospital Medicine  Progress Note    Patient Name: Tasneem Lynn  MRN: [id number]  Patient Class: IP- Inpatient   Admission Date: 7/20/2020  Length of Stay: 2 days  Attending Physician: Ariana Stoner MD  Primary Care Provider: To Obtain Unable        Subjective:     Principal Problem:Acute respiratory failure with hypoxia and hypercarbia        HPI:  75 year old female with CHF, HTN, HLP, DM2, hypothyroidism, CKD stage 4, chronic pain, obesity, and h/o recent admission from 6/12 - 6/24/20 for right foot osteomyelitis treated with Rocephin and vancomycin planned for until 7/27/20 and CVA (1986) who presented from nursing home for altered mental status that started sometime yesterday.  EMS was called out, noted sats of 85% on room air upon their arrival and status post treatment with Narcan with no improvement in her mentation.  In the ER, she was noted to be hypertensive and was hypoxic with treatment rendered with BiPAP.  Workup with head CT that was unremarkable, chest x-ray with pulmonary edema along with questionable right infiltrate, UA showed 3+ leukocyte many bacteria, H&H 7.6 and 26.3 and hemoccult that was positive.  BUN and creatinine 125/1.8. CMP remarkable for a potassium of 6.2 which was repeated - potassium 6.7.  Emergent treatment rendered with nebulizer treatment, insulin, calcium gluconate, and bicarb.  Procalcitonin 0.2, lactic acid 0.6, COVID-19 negative, D-dimer 2.2, .    Overview/Hospital Course:  Ms Lynn presented with acute respiratory failure with hypoxia and acute encephalopathy. Admitted to ICU, placed on BiPAP and on precedex for hyperactive encephalopathy, and initiated on IV diuresis. Hgb noted to be lower than baseline with a positive guaiac stool in the ED and BUN higher than expected for level of Cr. Placed on pantoprazole IV and gastroenterology consulted. No melena or blood in stool while in the ICU. Did not require vasopressor support. Did  require one unit of PRBCs for Hgb that slowly trended to < 7 g/dL. Vancomycin continued for known osteomyelitis of the calcaneus (with complete to near complete disruption of the Achilles tendon) as well as on ceftriaxone. Bone biopsy when diagnosed was negative. Ceftriaxone stopped and placed on meropenem for known history ESBL urinary tract infection. Continued on vancomycin. Could not obtain sputum sample. Urine culture grew > 100,000 cfu/mL Klebsiella pneumoniae ESBL. 1 out of 6 blood culture bottles with coag negative Staph likely contaminant. Infectious disease recommends continuing meropenem for now with plans for ertapenem on 7/23. In general, patient clinically improved from respiratory standpoint and de-escalated from BiPAP to high flow then ultimately low flow. Started on a diet. Holding furosemide IV temporarily. Tentative plan for endoscopy prior to discharge. PT/OT consulted. Stepped down to floor on 7/22.    Interval History: improved from respiratory standpoint. No GI bleed noted. Hgb low. Getting one unit of blood now.     Review of Systems   Respiratory: Negative.    Cardiovascular: Negative.    Gastrointestinal: Negative.      Objective:     Vital Signs (Most Recent):  Temp: 97.8 °F (36.6 °C) (07/22/20 0700)  Pulse: 72 (07/22/20 0700)  Resp: 13 (07/22/20 0700)  BP: (!) 167/76 (07/22/20 0700)  SpO2: 100 % (07/22/20 0700) Vital Signs (24h Range):  Temp:  [97.8 °F (36.6 °C)-98.8 °F (37.1 °C)] 97.8 °F (36.6 °C)  Pulse:  [67-83] 72  Resp:  [11-40] 13  SpO2:  [94 %-100 %] 100 %  BP: (122-184)/(56-84) 167/76     Weight: 125.2 kg (276 lb 0.3 oz)  Body mass index is 50.47 kg/m².    Intake/Output Summary (Last 24 hours) at 7/22/2020 1059  Last data filed at 7/22/2020 0900  Gross per 24 hour   Intake 340 ml   Output 2610 ml   Net -2270 ml      Physical Exam  Vitals signs and nursing note reviewed.   Constitutional:       General: She is not in acute distress.     Appearance: She is not toxic-appearing.    HENT:      Head: Normocephalic and atraumatic.   Cardiovascular:      Rate and Rhythm: Normal rate and regular rhythm.   Pulmonary:      Effort: Pulmonary effort is normal.      Breath sounds: No wheezing or rales.   Abdominal:      Palpations: Abdomen is soft.   Skin:     Capillary Refill: Capillary refill takes less than 2 seconds.      Comments: Dressing applied to foot   Neurological:      Mental Status: She is alert.      Comments: Oriented to person and place. Able to interact. I bit slow to answer but thought process is adequate   Psychiatric:         Cognition and Memory: She exhibits impaired recent memory.         Significant Labs: All pertinent labs within the past 24 hours have been reviewed.    Significant Imaging: I have reviewed all pertinent imaging results/findings within the past 24 hours.  I have reviewed and interpreted all pertinent imaging results/findings within the past 24 hours.      Assessment/Plan:      * Acute respiratory failure with hypoxia and hypercarbia  Likely 2/2 pulmonary edema as she improved with IV diuresis.   Pneumonia could not be ruled out. No sputum sample obtained as none was produced for sampling  Is on meropenem for ESBL Klebsiella UTI and vancomycin for osteomyelitis  On low flow nasal cannula and BiPAP nightly as she may have underlying obesity hypoventilation syndrome  PT/OT consulted for mobilization  Start diet. Aspiration precautions.         Urinary tract infection due to extended-spectrum beta lactamase (ESBL)-producing Klebsiella  ESBL Klebsiella  On meropenem. Plan to de-escalate to ertapenem in AM, per ID  Appreciate further recs  Will remove amin catheter today      Possible pneumonia of right lower lobe due to infectious organism  As above      Acute blood loss anemia  Noted drop in H&H compared to previous levels along with Hemoccult-positive stool  Also uremia out of proportion with creatinine concerning for GI bleed  Stopped aspirin and continuing on  Protonix IV q.12  No melena nor blood in stool while in ICU  Mechanical DVT prophylaxis  Tentative plan for EGD.     Acute on chronic congestive heart failure  Patient with signs of volume overload with pulmonary edema noted on exam  As above    Hyperkalemia  Resolved     Chronic osteomyelitis of right foot with draining sinus  Chronic right heel pressure wound not healing x 3 months.   MRI showed osteomyelitis of the calcaneus with complete to near complete disruption of the Achilles tendon  She was discharged to SNF on regimen of Ceftriaxone 1 gram IV q 24 hours plus Vancomycin 500 mg IV q 48 hours (goal trough is 15-20) with end date of 7/27/2020. Now on meropenem with vancomycin.   (Tunnel catheter placed for IV antibiotic administration on 01/06/22 by IR)      Wound of right leg  Discussed above    CKD (chronic kidney disease) stage 4, GFR 15-29 ml/min  Does are responding to IV diuresis however with hyperkalemia and uremia  Hyperkalemia resolved. Hyperuremia still present but asymptomatic right now  Will hold IV furosemide temporarily but will need to restart if requiring more supplemental O2  Cardiac monitoring.  Remove amin (has a UTI)  Nephrology on board.  Appreciate further recommendations    Hyperlipidemia  Continue rosuvastatin    History of CVA (cerebrovascular accident)  CVA in 1986 with left side weakness per patient report.   Reports not getting out of bed much since 4/2010 due to wound at right heel.    Continue statin, but aspirin hold due to GI bleed    Morbid obesity with BMI of 50.0-59.9, adult  To be addressed as outpatient after acute issues are addressed    Hypothyroidism  Continue levothyroxine  Recent TSH was normal and will not be repeated    Essential hypertension  Continue amlodipine and carvedilol for now  P.r.n. hydralazine    Type 2 diabetes mellitus, uncontrolled, with renal complications  Hemoglobin A1C   Date Value Ref Range Status   06/13/2020 7.5 (H) 4.0 - 5.6 % Final   Will  hold outpatient regimen  Decrease long acting insulin to avoid hypoglycemic event  PO intake as tolerated given mental state  Will further adjust insulin regimen as necessary to achieve goals between 140 - 180    VTE Risk Mitigation (From admission, onward)         Ordered     IP VTE HIGH RISK PATIENT  Once      07/20/20 1923     Place sequential compression device  Until discontinued      07/20/20 1923              Will call family for an update. Stable for step down to floor today.     Critical care time spent on the evaluation and treatment of severe organ dysfunction, review of pertinent labs and imaging studies, discussions with consulting providers and discussions with patient/family: > 45 minutes.      Ariana Hirsch MD  Department of Hospital Medicine   [facility]

## 2020-07-22 NOTE — ASSESSMENT & PLAN NOTE
Unclear if patient had LUTS. Has mild pyuria and urine culture with GNR.  · Continue meropenem for now.   · Continue isolation precautions.   · Will plan to de-escalate to ertapenem tomorrow.

## 2020-07-22 NOTE — HOSPITAL COURSE
Ms Lynn presented with acute respiratory failure with hypoxia and acute encephalopathy. Admitted to ICU, placed on BiPAP and on precedex for hyperactive encephalopathy, and initiated on IV diuresis as pulmonary edema highly suspected 2/2 diastolic HF. Hgb noted to be lower than baseline with a positive guaiac stool in the ED and BUN higher than expected for level of Cr. Placed on pantoprazole IV and gastroenterology consulted. No melena or blood in stool while in the ICU. Did not require vasopressor support. Did require one unit of PRBCs for Hgb that slowly trended to < 7 g/dL. Vancomycin continued for known osteomyelitis of the calcaneus (with complete to near complete disruption of the Achilles tendon) as well as on ceftriaxone. Bone biopsy when diagnosed was negative. Ceftriaxone stopped and placed on meropenem for known history ESBL urinary tract infection. Continued on vancomycin. Could not obtain sputum sample. Urine culture grew > 100,000 cfu/mL Klebsiella pneumoniae ESBL. 1 out of 6 blood culture bottles with coag negative Staph likely contaminant. Infectious disease recommends continuing meropenem for now with plans for ertapenem on 7/23. Vanc and Rocephin have been stopped.  In general, patient clinically improved from respiratory standpoint and de-escalated from BiPAP to high flow then ultimately low flow. Started on a diet. Holding furosemide IV temporarily. Tentative plan for endoscopy prior to discharge. PT/OT consulted. Stepped down to floor on 7/22.  Patient underwent EGD on 7/23 that was unremarkable with no evidence of bleeding.  H/H has remained stable post transfusion.  Patient has remained afebrile and hemodynamically stable.  She is currently doing well on RA.  She will be discharged back to SNF to complete 6 more days of Ertapenem.

## 2020-07-22 NOTE — ASSESSMENT & PLAN NOTE
Likely 2/2 pulmonary edema as she improved with IV diuresis.   Pneumonia could not be ruled out. No sputum sample obtained as none was produced for sampling  Is on meropenem for ESBL Klebsiella UTI and vancomycin for osteomyelitis  On low flow nasal cannula and BiPAP nightly as she may have underlying obesity hypoventilation syndrome  PT/OT consulted for mobilization  Start diet. Aspiration precautions.

## 2020-07-22 NOTE — PROGRESS NOTES
[facility]  Infectious Disease  Progress Note    Patient Name: Tasneem Lynn  MRN: [id number]  Admission Date: 7/20/2020  Length of Stay: 2 days  Attending Physician: Ariana Stoner MD  Primary Care Provider: To Obtain Unable    Isolation Status: Contact  Assessment/Plan:      * Acute respiratory failure with hypoxia and hypercarbia  On NIPPV.   · Defer management to ICU.     Possible pneumonia of right lower lobe due to infectious organism  RLL pneumonia on CXR vs other etiology for airspace disease. Is afebrile and without leukocytosis. Urine cultures with ESBL Klebsiella.   · Continue meropenem for now.   · Consider discontinuing vancomycin.    · If able to get samples today this would help guide antibiotic therapy.    Chronic osteomyelitis of right foot with draining sinus  Wound appear stable. No gross evidence of bony exposure. Chronic osteomyelitis not usually cured by long term antibiotics.   · Local care as per Podiatry recommendations.     Urinary tract infection due to extended-spectrum beta lactamase (ESBL)-producing Klebsiella  Unclear if patient had LUTS. Has mild pyuria and urine culture with GNR.  · Continue meropenem for now.   · Continue isolation precautions.   · Will plan to de-escalate to ertapenem tomorrow.      Anticipated Disposition: per primary    Thank you for your consult. I will follow-up with patient. Please contact us if you have any additional questions.    Ksenia Sanabria MD  Infectious Disease  [facility]    Subjective:     Principal Problem:Acute respiratory failure with hypoxia and hypercarbia    HPI: A 75-year-old man with HTN, HFpEF 55%, past stroke, hypothyroidism, CKD 4, chronic osteomyelitis of the right foot with draining sinus, and on a 6 week course of ceftriaxone plus vancomycin who was noted to be altered or lethargic while at his nursing home. Upon EMS evaluation he was noted to be hypoxemic with oxygen saturation rate of 86%. He  "was given Narcan and transferred to  ED.  Upon evaluation he was hypertensive, hypoxemic, and afebrile. Laboratory work up revealed normal WBC, and elevated creatinine however consistent with his baseline. Urinalysis had pyuria of 50 WBC. He was admitted to Hospital Medicine Service for further management and started on meropenem plus vancomycin.     Infectious Diseases consulted for management recommendations.   Interval History: "my legs hurt". Patient admitted for encephalopathy due to possible RLL pneumonia vs UTI. Patient unable to provide symptom history. Urinalysis with pyuria and culture with ESBL Klebsiella. On meropenem.     Review of Systems   Constitutional: Negative for chills, fatigue, fever and unexpected weight change.   HENT: Negative for ear pain, facial swelling, hearing loss, mouth sores, nosebleeds, rhinorrhea, sinus pressure, sore throat, tinnitus, trouble swallowing and voice change.    Eyes: Negative for photophobia, pain, redness and visual disturbance.   Respiratory: Positive for shortness of breath. Negative for cough, chest tightness and wheezing.    Cardiovascular: Negative for chest pain, palpitations and leg swelling.   Gastrointestinal: Negative for abdominal pain, blood in stool, constipation, diarrhea, nausea and vomiting.   Endocrine: Negative for cold intolerance, heat intolerance, polydipsia, polyphagia and polyuria.   Genitourinary: Negative for decreased urine volume, dysuria, flank pain, frequency, hematuria, menstrual problem, urgency, vaginal bleeding, vaginal discharge and vaginal pain.   Musculoskeletal: Positive for myalgias. Negative for arthralgias, back pain, joint swelling and neck pain.   Skin: Positive for wound. Negative for rash.   Allergic/Immunologic: Negative for environmental allergies, food allergies and immunocompromised state.   Neurological: Negative for dizziness, seizures, syncope, weakness, light-headedness, numbness and headaches.   Hematological: " Negative for adenopathy. Does not bruise/bleed easily.   Psychiatric/Behavioral: Negative for confusion, hallucinations, self-injury, sleep disturbance and suicidal ideas. The patient is not nervous/anxious.      Objective:     Vital Signs (Most Recent):  Temp: 97.8 °F (36.6 °C) (07/22/20 0700)  Pulse: 72 (07/22/20 0700)  Resp: 13 (07/22/20 0700)  BP: (!) 167/76 (07/22/20 0700)  SpO2: 100 % (07/22/20 0700) Vital Signs (24h Range):  Temp:  [97.8 °F (36.6 °C)-98.8 °F (37.1 °C)] 97.8 °F (36.6 °C)  Pulse:  [67-83] 72  Resp:  [11-40] 13  SpO2:  [94 %-100 %] 100 %  BP: (122-184)/(56-84) 167/76     Weight: 125.2 kg (276 lb 0.3 oz)  Body mass index is 50.47 kg/m².    Estimated Creatinine Clearance: 26.7 mL/min (A) (based on SCr of 2.3 mg/dL (H)).    Physical Exam  Vitals signs and nursing note reviewed.   Constitutional:       General: She is not in acute distress.     Appearance: She is well-developed. She is obese. She is not diaphoretic.      Comments: On NIPPV   HENT:      Head: Normocephalic and atraumatic.      Right Ear: Hearing and external ear normal.      Left Ear: Hearing and external ear normal.      Mouth/Throat:      Pharynx: Uvula midline.   Eyes:      General: No scleral icterus.        Right eye: No discharge.         Left eye: No discharge.      Conjunctiva/sclera: Conjunctivae normal.   Neck:      Musculoskeletal: Normal range of motion and neck supple.      Thyroid: No thyromegaly.      Trachea: No tracheal deviation.   Cardiovascular:      Rate and Rhythm: Normal rate and regular rhythm.      Heart sounds: Normal heart sounds. No murmur. No friction rub. No gallop.    Pulmonary:      Effort: Pulmonary effort is normal. No respiratory distress.      Breath sounds: No stridor. Examination of the right-middle field reveals decreased breath sounds. Examination of the left-middle field reveals decreased breath sounds. Examination of the right-lower field reveals decreased breath sounds. Examination of the  left-lower field reveals decreased breath sounds. Decreased breath sounds present. No wheezing or rales.   Chest:      Chest wall: No tenderness.   Abdominal:      General: Bowel sounds are normal. There is no distension.      Palpations: Abdomen is soft.      Tenderness: There is no abdominal tenderness. There is no guarding or rebound.   Musculoskeletal:         General: Swelling present. No tenderness.      Comments: Right foot covered with gauze dressing.    Lymphadenopathy:      Cervical: No cervical adenopathy.   Skin:     General: Skin is warm and dry.      Coloration: Skin is not pale.      Findings: No erythema or rash.   Neurological:      Mental Status: She is alert, oriented to person, place, and time and easily aroused.         Significant Labs:   Blood Culture:   Recent Labs   Lab 06/12/20  2339 06/15/20  0624 07/20/20  0951 07/20/20  1025 07/20/20  1122   LABBLOO Gram stain aer bottle: Gram positive cocci in clusters resembling Staph   Results called to and read back by: Nataliia Weber  06/13/2020  22:41  COAGULASE-NEGATIVE STAPHYLOCOCCUS SPECIES  Organism is a probable contaminant  * No Growth after 4 days.  Gram stain aer bottle: Gram positive cocci in clusters resembling Staph   Results called to and read back by: Reshma Quiles 07/21/2020  11:15  COAGULASE-NEGATIVE STAPHYLOCOCCUS SPECIES  Organism is a probable contaminant  * No Growth to date  No Growth to date No Growth to date  No Growth to date     BMP:   Recent Labs   Lab 07/22/20  0215   GLU 87   *   K 4.9      CO2 28   *   CREATININE 2.3*   CALCIUM 8.3*   MG 2.2     CBC:   Recent Labs   Lab 07/20/20  1140 07/21/20  0320 07/22/20  0215   WBC  --  8.21 9.55   HGB  --  7.0* 6.8*   HCT 26* 24.1* 23.9*   PLT  --  274 284     Respiratory Culture: No results for input(s): GSRESP, RESPIRATORYC in the last 4320 hours.  Urine Culture:   Recent Labs   Lab 06/18/20  1115 07/20/20  1031   LABURIN ESBL Results called to and read  back by:4W ALFONSO SOTO 06/20/2020  09:33  KLEBSIELLA PNEUMONIAE ESBL  >100,000 cfu/ml  * KLEBSIELLA PNEUMONIAE ESBL  >100,000 cfu/ml  *     Urine Studies:   Recent Labs   Lab 06/18/20  1115 07/20/20  1031   COLORU Yellow Yellow   APPEARANCEUA Cloudy* Cloudy*   PHUR 5.0 5.0   SPECGRAV 1.015 1.015   PROTEINUA 2+* 2+*   GLUCUA 1+* Negative   KETONESU Trace* Trace*   BILIRUBINUA Negative Negative   OCCULTUA 2+* 1+*   NITRITE Negative Negative   UROBILINOGEN Negative Negative   LEUKOCYTESUR 2+* 3+*   RBCUA >100* 1   WBCUA >100* 50*   BACTERIA Moderate* Many*   SQUAMEPITHEL 4  --    HYALINECASTS 0 0       Significant Imaging: I have reviewed all pertinent imaging results/findings within the past 24 hours.

## 2020-07-22 NOTE — SUBJECTIVE & OBJECTIVE
"Interval History: "my legs hurt". Patient admitted for encephalopathy due to possible RLL pneumonia vs UTI. Patient unable to provide symptom history. Urinalysis with pyuria and culture with ESBL Klebsiella. On meropenem.     Review of Systems   Constitutional: Negative for chills, fatigue, fever and unexpected weight change.   HENT: Negative for ear pain, facial swelling, hearing loss, mouth sores, nosebleeds, rhinorrhea, sinus pressure, sore throat, tinnitus, trouble swallowing and voice change.    Eyes: Negative for photophobia, pain, redness and visual disturbance.   Respiratory: Positive for shortness of breath. Negative for cough, chest tightness and wheezing.    Cardiovascular: Negative for chest pain, palpitations and leg swelling.   Gastrointestinal: Negative for abdominal pain, blood in stool, constipation, diarrhea, nausea and vomiting.   Endocrine: Negative for cold intolerance, heat intolerance, polydipsia, polyphagia and polyuria.   Genitourinary: Negative for decreased urine volume, dysuria, flank pain, frequency, hematuria, menstrual problem, urgency, vaginal bleeding, vaginal discharge and vaginal pain.   Musculoskeletal: Positive for myalgias. Negative for arthralgias, back pain, joint swelling and neck pain.   Skin: Positive for wound. Negative for rash.   Allergic/Immunologic: Negative for environmental allergies, food allergies and immunocompromised state.   Neurological: Negative for dizziness, seizures, syncope, weakness, light-headedness, numbness and headaches.   Hematological: Negative for adenopathy. Does not bruise/bleed easily.   Psychiatric/Behavioral: Negative for confusion, hallucinations, self-injury, sleep disturbance and suicidal ideas. The patient is not nervous/anxious.      Objective:     Vital Signs (Most Recent):  Temp: 97.8 °F (36.6 °C) (07/22/20 0700)  Pulse: 72 (07/22/20 0700)  Resp: 13 (07/22/20 0700)  BP: (!) 167/76 (07/22/20 0700)  SpO2: 100 % (07/22/20 0700) Vital Signs " (24h Range):  Temp:  [97.8 °F (36.6 °C)-98.8 °F (37.1 °C)] 97.8 °F (36.6 °C)  Pulse:  [67-83] 72  Resp:  [11-40] 13  SpO2:  [94 %-100 %] 100 %  BP: (122-184)/(56-84) 167/76     Weight: 125.2 kg (276 lb 0.3 oz)  Body mass index is 50.47 kg/m².    Estimated Creatinine Clearance: 26.7 mL/min (A) (based on SCr of 2.3 mg/dL (H)).    Physical Exam  Vitals signs and nursing note reviewed.   Constitutional:       General: She is not in acute distress.     Appearance: She is well-developed. She is obese. She is not diaphoretic.      Comments: On NIPPV   HENT:      Head: Normocephalic and atraumatic.      Right Ear: Hearing and external ear normal.      Left Ear: Hearing and external ear normal.      Mouth/Throat:      Pharynx: Uvula midline.   Eyes:      General: No scleral icterus.        Right eye: No discharge.         Left eye: No discharge.      Conjunctiva/sclera: Conjunctivae normal.   Neck:      Musculoskeletal: Normal range of motion and neck supple.      Thyroid: No thyromegaly.      Trachea: No tracheal deviation.   Cardiovascular:      Rate and Rhythm: Normal rate and regular rhythm.      Heart sounds: Normal heart sounds. No murmur. No friction rub. No gallop.    Pulmonary:      Effort: Pulmonary effort is normal. No respiratory distress.      Breath sounds: No stridor. Examination of the right-middle field reveals decreased breath sounds. Examination of the left-middle field reveals decreased breath sounds. Examination of the right-lower field reveals decreased breath sounds. Examination of the left-lower field reveals decreased breath sounds. Decreased breath sounds present. No wheezing or rales.   Chest:      Chest wall: No tenderness.   Abdominal:      General: Bowel sounds are normal. There is no distension.      Palpations: Abdomen is soft.      Tenderness: There is no abdominal tenderness. There is no guarding or rebound.   Musculoskeletal:         General: Swelling present. No tenderness.      Comments:  Right foot covered with gauze dressing.    Lymphadenopathy:      Cervical: No cervical adenopathy.   Skin:     General: Skin is warm and dry.      Coloration: Skin is not pale.      Findings: No erythema or rash.   Neurological:      Mental Status: She is alert, oriented to person, place, and time and easily aroused.         Significant Labs:   Blood Culture:   Recent Labs   Lab 06/12/20  2339 06/15/20  0624 07/20/20  0951 07/20/20  1025 07/20/20  1122   LABBLOO Gram stain aer bottle: Gram positive cocci in clusters resembling Staph   Results called to and read back by: Nataliia Weber  06/13/2020  22:41  COAGULASE-NEGATIVE STAPHYLOCOCCUS SPECIES  Organism is a probable contaminant  * No Growth after 4 days.  Gram stain aer bottle: Gram positive cocci in clusters resembling Staph   Results called to and read back by: Reshma Quiles 07/21/2020  11:15  COAGULASE-NEGATIVE STAPHYLOCOCCUS SPECIES  Organism is a probable contaminant  * No Growth to date  No Growth to date No Growth to date  No Growth to date     BMP:   Recent Labs   Lab 07/22/20  0215   GLU 87   *   K 4.9      CO2 28   *   CREATININE 2.3*   CALCIUM 8.3*   MG 2.2     CBC:   Recent Labs   Lab 07/20/20  1140 07/21/20  0320 07/22/20  0215   WBC  --  8.21 9.55   HGB  --  7.0* 6.8*   HCT 26* 24.1* 23.9*   PLT  --  274 284     Respiratory Culture: No results for input(s): GSRESP, RESPIRATORYC in the last 4320 hours.  Urine Culture:   Recent Labs   Lab 06/18/20  1115 07/20/20  1031   LABURIN ESBL Results called to and read back by:LEY SOTO 06/20/2020  09:33  KLEBSIELLA PNEUMONIAE ESBL  >100,000 cfu/ml  * KLEBSIELLA PNEUMONIAE ESBL  >100,000 cfu/ml  *     Urine Studies:   Recent Labs   Lab 06/18/20  1115 07/20/20  1031   COLORU Yellow Yellow   APPEARANCEUA Cloudy* Cloudy*   PHUR 5.0 5.0   SPECGRAV 1.015 1.015   PROTEINUA 2+* 2+*   GLUCUA 1+* Negative   KETONESU Trace* Trace*   BILIRUBINUA Negative Negative   OCCULTUA 2+* 1+*   NITRITE  Negative Negative   UROBILINOGEN Negative Negative   LEUKOCYTESUR 2+* 3+*   RBCUA >100* 1   WBCUA >100* 50*   BACTERIA Moderate* Many*   SQUAMEPITHEL 4  --    HYALINECASTS 0 0       Significant Imaging: I have reviewed all pertinent imaging results/findings within the past 24 hours.

## 2020-07-22 NOTE — PROGRESS NOTES
[facility]  Gastroenterology  Progress Note    Patient Name: Tasneem Lynn  MRN: [id number]  Admission Date: 7/20/2020  Hospital Length of Stay: 2 days  Code Status: Full Code   Attending Provider: Ariana Stoner MD  Primary Care Physician: To Obtain Unable  Principal Problem: Acute respiratory failure with hypoxia and hypercarbia    Subjective:     CC= anemia    Interval History:     Patient has remained off of BIPAP since 6 pm yesterday, per RN.  More alert this morning, though slow to answer questions.  No overt bleeding reported from the nursing staff.  Patient denies abdominal pain, nausea or vomiting.    Review of systems:  General: Negative for fevers, chills.  Cardiovascular: Negative for chest pain, shortness of breath     Objective:     Vital Signs (Most Recent):  Temp: 97.8 °F (36.6 °C) (07/22/20 0700)  Pulse: 72 (07/22/20 0700)  Resp: 13 (07/22/20 0700)  BP: (!) 167/76 (07/22/20 0700)  SpO2: 100 % (07/22/20 0700) Vital Signs (24h Range):  Temp:  [97.8 °F (36.6 °C)-98.8 °F (37.1 °C)] 97.8 °F (36.6 °C)  Pulse:  [67-83] 72  Resp:  [11-40] 13  SpO2:  [94 %-100 %] 100 %  BP: (122-184)/(56-84) 167/76     Physical examination:  GEN: Obese AAF in no acute distress.  HENT: Normocephalic, anicteric sclera   Cardiovascular: Regular rate and rhythm. No murmurs appreciated.   Chest: Non-labored respirations. Breath sounds equal   Abdomen: Soft, NTND, normoactive BS  Psych: Appropriate mood and affect.   Extermities: No C/C/E. 2+ dorsalis pedis pulses bilaterally    CBC:   Recent Labs   Lab 07/20/20  0951 07/20/20  1140 07/21/20  0320 07/22/20  0215   WBC 8.65  --  8.21 9.55   HGB 7.6*  --  7.0* 6.8*   HCT 26.3* 26* 24.1* 23.9*     --  274 284     BMP:   Recent Labs   Lab 07/22/20  0215   GLU 87   *   K 4.9      CO2 28   *   CREATININE 2.3*   CALCIUM 8.3*   MG 2.2         Imaging:  Chest x-ray (7/20/20):  Impression:  1. Airspace opacity in the right lung base, suspicious for  a right basilar pneumonia or atelectasis.  2. Pulmonary vascular congestion associated with interstitial edema as above.    Assessment:   75 year old female with a history of HTN, DM, CKD, CHF, CVA, hypothyroidism and hyperlipidemia presenting with acute respiratory failure, altered mental status, chronic osteomyelitis, a UTI and acute on chronic anemia.  H/H slowly trending downward over the last month, though no history of overt bleeding. Appears to be anemia of chronic anemia, likely exacerbated by recent infection.    Plan:   1.  Transfuse 1 unit PRBC today to achieve Hb > 7 g/dL.  2.  Monitor H/H closely.  3.  Continue IV PPI BID.  4.  Tentative EGD tomorrow if remains off BIPAP and H/H corrects.  5.  NPO after MN.    Lesly Woods PA-C  Gastroenterology  [facility] [place]

## 2020-07-22 NOTE — PROGRESS NOTES
Pharmacokinetic Assessment Follow Up: IV Vancomycin    Vancomycin serum concentration assessment(s):    The random level was drawn correctly and can be used to guide therapy at this time. The measurement is above the desired definitive target range of 10 to 20 mcg/mL.    Vancomycin Regimen Plan:    No dose today.  Re-dose when the random level is less than 20 mcg/mL, next level to be drawn at 0300 on 7/23/2020    Drug levels (last 3 results):  Recent Labs   Lab Result Units 07/20/20  0951 07/21/20  0320 07/22/20  0215   Vancomycin, Random ug/mL 13.1 34.8 27.1       Pharmacy will continue to follow and monitor vancomycin.    Please contact pharmacy at extension [phone] for questions regarding this assessment.    Thank you for the consult,   Can Samuel Jr       Patient brief summary:  Tasneem Lynn is a 75 y.o. female initiated on antimicrobial therapy with IV Vancomycin for treatment of  pneumonia    Drug Allergies:   Review of patient's allergies indicates:   Allergen Reactions    Corticosteroids (glucocorticoids) Edema       Actual Body Weight:   125.2 kg    Renal Function:   Estimated Creatinine Clearance: 26.7 mL/min (A) (based on SCr of 2.3 mg/dL (H)).,     Dialysis Method (if applicable):  N/A    CBC (last 72 hours):  Recent Labs   Lab Result Units 07/20/20  0951 07/21/20  0320 07/22/20  0215   WBC K/uL 8.65 8.21 9.55   Hemoglobin g/dL 7.6* 7.0* 6.8*   Hematocrit % 26.3* 24.1* 23.9*   Platelets K/uL 298 274 284   Gran% % 71.1 73.6* 57.1   Lymph% % 21.6 13.2* 28.8   Mono% % 6.5 11.1 10.5   Eosinophil% % 0.5 1.8 3.1   Basophil% % 0.1 0.1 0.2   Differential Method  Automated Automated Automated       Metabolic Panel (last 72 hours):  Recent Labs   Lab Result Units 07/20/20  0951 07/20/20  1031 07/21/20  0320 07/21/20  1430 07/21/20  1552 07/21/20  2105 07/22/20  0215   Sodium mmol/L 141  --  145  --   --  146* 146*   Potassium mmol/L 6.7*  --  5.4*  --   --  4.9 4.9   Chloride mmol/L 110  --  110  --   --  111*  110   CO2 mmol/L 25  --  27  --   --  28 28   Glucose mg/dL 146*  --  104  --   --  97 87   Glucose, UA   --  Negative  --   --   --   --   --    BUN, Bld mg/dL 113*  --  111*  --   --  108* 112*   Creatinine mg/dL 1.9*  --  2.0*  --   --  2.3* 2.3*   Creatinine, Random Ur mg/dL  --   --   --  26.9  --   --   --    Albumin g/dL 2.8*  --  2.5*  --   --  2.5* 2.3*   Total Bilirubin mg/dL 0.2  --  0.2  --   --  0.2 0.2   Alkaline Phosphatase U/L 99  --  82  --   --  77 79   AST U/L 14  --  12  --   --  11 12   ALT U/L 15  --  12  --   --  10 7*   Magnesium mg/dL  --   --  2.3  --   --   --  2.2   Phosphorus mg/dL  --   --  4.4  --  4.1  --   --        Vancomycin Administrations:  vancomycin given in the last 96 hours                     vancomycin (VANCOCIN) 2,000 mg in dextrose 5 % 500 mL IVPB (mg) 2,000 mg New Bag 07/20/20 1559                    Microbiologic Results:  Microbiology Results (last 7 days)       Procedure Component Value Units Date/Time    Blood culture [[id number]] Collected: 07/20/20 1025    Order Status: Completed Specimen: Blood from Peripheral, Hand, Right Updated: 07/21/20 1303     Blood Culture, Routine No Growth to date      No Growth to date    Narrative:      From central line R chest    Blood culture x two cultures. Draw prior to antibiotics. [[id number]] Collected: 07/20/20 1122    Order Status: Completed Specimen: Blood from Line, Subclavian, Right Updated: 07/21/20 1303     Blood Culture, Routine No Growth to date      No Growth to date    Narrative:      Aerobic and anaerobic    Blood culture x two cultures. Draw prior to antibiotics. [[id number]] Collected: 07/20/20 0951    Order Status: Completed Specimen: Blood from Peripheral, Forearm, Right Updated: 07/21/20 1116     Blood Culture, Routine Gram stain aer bottle: Gram positive cocci in clusters resembling Staph       Results called to and read back by: Reshma Quiles 07/21/2020  11:15    Narrative:      Aerobic and anaerobic    Urine  culture [[id number]]  (Abnormal) Collected: 07/20/20 1031    Order Status: Completed Specimen: Urine Updated: 07/21/20 0957     Urine Culture, Routine GRAM NEGATIVE BETY  >100,000 cfu/ml  Identification and susceptibility pending      Narrative:      Specimen Source->Urine    Culture, Respiratory with Gram Stain [[id number]]     Order Status: No result Specimen: Respiratory from Endotracheal Aspirate

## 2020-07-22 NOTE — NURSING
Received patient from ICU via stretcher. Patient is alert and oriented to self, place, and situation. Forgetful. Reoriented to time/date. Denies any pain. Respirations 24 minute unlabored. Oxygen 2l nasal carlos intact. Right chest wall double lumen intact. involuntary tremors noted to left upper extremityRemains on contact isolation. Relative at bedside and instructed on contact isolation precautions. Room orientation done.

## 2020-07-22 NOTE — ASSESSMENT & PLAN NOTE
ESBL Klebsiella  On meropenem. Plan to de-escalate to ertapenem in AM, per ID  Appreciate further recs  Will remove amin catheter today

## 2020-07-22 NOTE — PLAN OF CARE
Pt awake, alert, and oriented, following commands.  Vitals stable.  Refused to wear bipap during night, but tolerating 2L O2 nasal cannula without distress.  Denies SOB or pain.  Davis intact with good urine output.  No BM overnight.  H/H low this AM without overt signs of bleeding - 1 unit PRBCs ordered to be transfused, awaiting unit to be prepared by lab.  Wound care done to right foot as ordered, no new skin breakdown observed.  Contact precautions maintained.

## 2020-07-22 NOTE — SUBJECTIVE & OBJECTIVE
Interval History: improved from respiratory standpoint. No GI bleed noted. Hgb low. Getting one unit of blood now.     Review of Systems   Respiratory: Negative.    Cardiovascular: Negative.    Gastrointestinal: Negative.      Objective:     Vital Signs (Most Recent):  Temp: 97.8 °F (36.6 °C) (07/22/20 0700)  Pulse: 72 (07/22/20 0700)  Resp: 13 (07/22/20 0700)  BP: (!) 167/76 (07/22/20 0700)  SpO2: 100 % (07/22/20 0700) Vital Signs (24h Range):  Temp:  [97.8 °F (36.6 °C)-98.8 °F (37.1 °C)] 97.8 °F (36.6 °C)  Pulse:  [67-83] 72  Resp:  [11-40] 13  SpO2:  [94 %-100 %] 100 %  BP: (122-184)/(56-84) 167/76     Weight: 125.2 kg (276 lb 0.3 oz)  Body mass index is 50.47 kg/m².    Intake/Output Summary (Last 24 hours) at 7/22/2020 1059  Last data filed at 7/22/2020 0900  Gross per 24 hour   Intake 340 ml   Output 2610 ml   Net -2270 ml      Physical Exam  Vitals signs and nursing note reviewed.   Constitutional:       General: She is not in acute distress.     Appearance: She is not toxic-appearing.   HENT:      Head: Normocephalic and atraumatic.   Cardiovascular:      Rate and Rhythm: Normal rate and regular rhythm.   Pulmonary:      Effort: Pulmonary effort is normal.      Breath sounds: No wheezing or rales.   Abdominal:      Palpations: Abdomen is soft.   Skin:     Capillary Refill: Capillary refill takes less than 2 seconds.      Comments: Dressing applied to foot   Neurological:      Mental Status: She is alert.      Comments: Oriented to person and place. Able to interact. I bit slow to answer but thought process is adequate   Psychiatric:         Cognition and Memory: She exhibits impaired recent memory.         Significant Labs: All pertinent labs within the past 24 hours have been reviewed.    Significant Imaging: I have reviewed all pertinent imaging results/findings within the past 24 hours.  I have reviewed and interpreted all pertinent imaging results/findings within the past 24 hours.

## 2020-07-22 NOTE — ASSESSMENT & PLAN NOTE
As above     “You can access the [facility] Patient Portal, offered by [facility], by registering with the following website: [email or web address]”

## 2020-07-22 NOTE — RESPIRATORY THERAPY
Patient placed on bipap with charted settings 18/6, rate 8, FI02 28% with all alarms on, set and functioning. sp02 100%. Patient resting well. Will continue to monitor.

## 2020-07-22 NOTE — ASSESSMENT & PLAN NOTE
Hemoglobin A1C   Date Value Ref Range Status   06/13/2020 7.5 (H) 4.0 - 5.6 % Final   Will hold outpatient regimen  Decrease long acting insulin to avoid hypoglycemic event  PO intake as tolerated given mental state  Will further adjust insulin regimen as necessary to achieve goals between 140 - 180

## 2020-07-22 NOTE — ASSESSMENT & PLAN NOTE
Noted drop in H&H compared to previous levels along with Hemoccult-positive stool  Also uremia out of proportion with creatinine concerning for GI bleed  Stopped aspirin and start Protonix IV q.12  Mechanical DVT prophylaxis  Management as above

## 2020-07-22 NOTE — ASSESSMENT & PLAN NOTE
Patient with acute respiratory decompensation likely might multifactorial in nature  Signs of volume overload with pulmonary edema noted on chest x-ray along with high BNP  There is also concern for possible pneumonia given questionable right infiltrate which is plausible for possible aspiration pneumonia given her change in mental status  Additionally, patient likely has CHARITY/OHS greatly contributing to her retain pCO2  And possible acute drop in H&H resulting in some component of symptomatic anemia may be playing a factor  D-dimer elevated but suspicion low for PE. LE US negative for DVT  Will treat empirically for infectious process with meropenem and vancomycin given recent hospitalization with known ESBL organism and osteomyelitis. ID in agreement. Contact isolation in place  Transfuse if Hgb < 7 g/dL. Continue Abx. De-escalated to high flow NC. Continue BiPAP nightly  Pantoprazole IV in case of GI although not evident at this time. Possible endoscopy once respiratory status optimized.   Diuresis as guided by nephrology. Appreciate further recs from Pulm, GI and ID

## 2020-07-22 NOTE — PROGRESS NOTES
[facility] Hospital Medicine  Progress Note    Patient Name: Tasneem Lynn  MRN: [id number]  Patient Class: IP- Inpatient   Admission Date: 7/20/2020  Length of Stay: 1 days  Attending Physician: Ariana Stoner MD  Primary Care Provider: To Obtain Unable        Subjective:     Principal Problem:Acute respiratory failure with hypoxia and hypercarbia        HPI:  75 year old female with CHF, HTN, HLP, DM2, hypothyroidism, CKD stage 4, chronic pain, obesity, and h/o recent admission from 6/12 - 6/24/20 for right foot osteomyelitis treated with Rocephin and vancomycin planned for until 7/27/20 and CVA (1986) who presented from nursing home for altered mental status that started sometime yesterday.  EMS was called out, noted sats of 85% on room air upon their arrival and status post treatment with Narcan with no improvement in her mentation.  In the ER, she was noted to be hypertensive and was hypoxic with treatment rendered with BiPAP.  Workup with head CT that was unremarkable, chest x-ray with pulmonary edema along with questionable right infiltrate, UA showed 3+ leukocyte many bacteria, H&H 7.6 and 26.3 and hemoccult that was positive.  BUN and creatinine 125/1.8. CMP remarkable for a potassium of 6.2 which was repeated - potassium 6.7.  Emergent treatment rendered with nebulizer treatment, insulin, calcium gluconate, and bicarb.  Procalcitonin 0.2, lactic acid 0.6, COVID-19 negative, D-dimer 2.2, .    Overview/Hospital Course:  No notes on file    Interval History: oxygenation improved. Now on high flow NC and stable but appears to have jerking movements and somewhat disoriented.     Review of Systems   Unable to perform ROS: Mental status change     Objective:     Vital Signs (Most Recent):  Temp: 98.8 °F (37.1 °C) (07/21/20 1515)  Pulse: 74 (07/21/20 1900)  Resp: 19 (07/21/20 1900)  BP: (!) 151/67 (07/21/20 1900)  SpO2: 100 % (07/21/20 1900) Vital Signs (24h Range):  Temp:  [96.5 °F (35.8  °C)-99.5 °F (37.5 °C)] 98.8 °F (37.1 °C)  Pulse:  [56-83] 74  Resp:  [10-40] 19  SpO2:  [94 %-100 %] 100 %  BP: (124-182)/(58-79) 151/67     Weight: 125.2 kg (276 lb 0.3 oz)  Body mass index is 50.47 kg/m².    Intake/Output Summary (Last 24 hours) at 7/21/2020 1946  Last data filed at 7/21/2020 1800  Gross per 24 hour   Intake 156.8 ml   Output 2030 ml   Net -1873.2 ml      Physical Exam  Vitals signs and nursing note reviewed.   Constitutional:       Appearance: She is obese. She is ill-appearing.   Cardiovascular:      Rate and Rhythm: Normal rate and regular rhythm.   Pulmonary:      Effort: Pulmonary effort is normal.      Breath sounds: No wheezing or rales.   Abdominal:      General: Abdomen is flat.   Musculoskeletal:         General: Swelling present.   Neurological:      Comments: Drowsy but easily arousable         Significant Labs: All pertinent labs within the past 24 hours have been reviewed.    Significant Imaging: I have reviewed all pertinent imaging results/findings within the past 24 hours.  I have reviewed and interpreted all pertinent imaging results/findings within the past 24 hours.      Assessment/Plan:      * Acute respiratory failure with hypoxia and hypercarbia  Patient with acute respiratory decompensation likely might multifactorial in nature  Signs of volume overload with pulmonary edema noted on chest x-ray along with high BNP  There is also concern for possible pneumonia given questionable right infiltrate which is plausible for possible aspiration pneumonia given her change in mental status  Additionally, patient likely has CHARITY/OHS greatly contributing to her retain pCO2  And possible acute drop in H&H resulting in some component of symptomatic anemia may be playing a factor  D-dimer elevated but suspicion low for PE. LE US negative for DVT  Will treat empirically for infectious process with meropenem and vancomycin given recent hospitalization with known ESBL organism and  osteomyelitis. ID in agreement. Contact isolation in place  Transfuse if Hgb < 7 g/dL. Continue Abx. De-escalated to high flow NC. Continue BiPAP nightly  Pantoprazole IV in case of GI although not evident at this time. Possible endoscopy once respiratory status optimized.   Diuresis as guided by nephrology. Appreciate further recs from Pulm, GI and ID      Acute cystitis without hematuria  Prior ESBL   On empiric treatment  F/u cultures      Possible pneumonia of right lower lobe due to infectious organism  As above      Acute blood loss anemia  Noted drop in H&H compared to previous levels along with Hemoccult-positive stool  Also uremia out of proportion with creatinine concerning for GI bleed  Stopped aspirin and start Protonix IV q.12  Mechanical DVT prophylaxis  Management as above    Acute on chronic congestive heart failure  Patient with signs of volume overload with pulmonary edema noted on exam  As above    Hyperkalemia  As above    Chronic osteomyelitis of right foot with draining sinus  Chronic right heel pressure wound not healing x 3 months.   MRI showed osteomyelitis of the calcaneus with complete to near complete disruption of the Achilles tendon  Patient was seen by Podiatry, Vascular and ID, along with wound care on last hospitalization  She was discharged to SNF on regimen of Ceftriaxone 1 gram IV q 24 hours plus Vancomycin 500 mg IV q 48 hours (goal trough is 15-20) with end date of 7/27/2020  (Tunnel catheter placed for IV antibiotic administration on 01/06/22 by IR)  Antibiotics adjusted as above      Wound of right leg  Discussed above    CKD (chronic kidney disease) stage 4, GFR 15-29 ml/min  Does are responding to IV diuresis however with hyperkalemia and uremia  Hyperkalemia has improved but I believe uremia is symptomatic  Patient does require IV diuresis given respiratory failure  Cardiac monitoring.  Repeat CMP now and in a.m.  Continue strict I&Os  Nephrology on board.  Appreciate  further recommendations    Hyperlipidemia  Continue rosuvastatin    History of CVA (cerebrovascular accident)  CVA in 1986 with left side weakness per patient report.   Reports not getting out of bed much since 4/2010 due to wound at right heel.    Continue statin, but aspirin hold due to GI bleed    Morbid obesity with BMI of 50.0-59.9, adult  To be addressed as outpatient after acute issues are addressed    Hypothyroidism  Continue levothyroxine  Recent TSH was normal and will not be repeated    Essential hypertension  Continue amlodipine and carvedilol for now  P.r.n. hydralazine    Type 2 diabetes mellitus, uncontrolled, with renal complications  Hemoglobin A1C   Date Value Ref Range Status   06/13/2020 7.5 (H) 4.0 - 5.6 % Final   Will hold outpatient regimen  Decrease long acting insulin to avoid hypoglycemic event  PO intake as tolerated given mental state  Will further adjust insulin regimen as necessary to achieve goals between 140 - 180      VTE Risk Mitigation (From admission, onward)         Ordered     IP VTE HIGH RISK PATIENT  Once      07/20/20 1923     Place sequential compression device  Until discontinued      07/20/20 1923              Pulm service has updated family over the phone    Critical care time spent on the evaluation and treatment of severe organ dysfunction, review of pertinent labs and imaging studies, discussions with consulting providers and discussions with patient/family: > 35 minutes.      Ariana Hirsch MD  Department of Hospital Medicine   [facility]

## 2020-07-22 NOTE — PROGRESS NOTES
Awake alert oriented x 2 (maybe) NAD    Denies CNS ENT CP GI  RHEUM OR DERM SX  Past Medical History:   Diagnosis Date    Arthritis     Gout    CHF (congestive heart failure)     Coronary artery disease     Diabetes mellitus     HLD (hyperlipidemia)     Hypertension     Obese     Stroke     1986    Thyroid disease      Review of patient's allergies indicates:   Allergen Reactions    Corticosteroids (glucocorticoids) Edema       Current Facility-Administered Medications   Medication    0.9%  NaCl infusion (for blood administration)    acetaminophen tablet 650 mg    amLODIPine tablet 2.5 mg    calcium gluconate 1g in dextrose 5% 100mL (ready to mix system)    carvediloL tablet 25 mg    epoetin juan-epbx injection 10,000 Units    hydrALAZINE injection 10 mg    insulin detemir U-100 pen 10 Units    levothyroxine tablet 75 mcg    meropenem-0.9% sodium chloride 1 g/50 mL IVPB    ondansetron injection 4 mg    pantoprazole injection 40 mg    rosuvastatin tablet 20 mg    sodium chloride 0.9% flush 10 mL    vancomycin - pharmacy to dose       LABS    Recent Results (from the past 24 hour(s))   Protein / creatinine ratio, urine    Collection Time: 07/21/20  2:30 PM   Result Value Ref Range    Protein, Urine Random 69 mg/dL    Creatinine, Random Ur 26.9 15.0 - 325.0 mg/dL    Prot/Creat Ratio, Ur 2.57 (H) 0.00 - 0.20   POCT glucose    Collection Time: 07/21/20  2:37 PM   Result Value Ref Range    POCT Glucose 102 70 - 110 mg/dL   Phosphorus    Collection Time: 07/21/20  3:52 PM   Result Value Ref Range    Phosphorus 4.1 2.7 - 4.5 mg/dL   PTH, intact    Collection Time: 07/21/20  3:52 PM   Result Value Ref Range    PTH, Intact 191.1 (H) 9.0 - 77.0 pg/mL   ISTAT PROCEDURE    Collection Time: 07/21/20  4:13 PM   Result Value Ref Range    POC PH 7.376 7.35 - 7.45    POC PCO2 49.3 (H) 35 - 45 mmHg    POC PO2 175 (H) 80 - 100 mmHg    POC HCO3 28.9 (H) 24 - 28 mmol/L    POC BE 3 -2 to 2 mmol/L    POC  SATURATED O2 100 95 - 100 %    POC TCO2 30 (H) 23 - 27 mmol/L    Rate 25     Sample ARTERIAL     Site RR     Allens Test Pass     DelSys Nasal Can     Mode SPONT     Flow 7     Sp02 100    POCT glucose    Collection Time: 07/21/20  6:51 PM   Result Value Ref Range    POCT Glucose 95 70 - 110 mg/dL   Comprehensive metabolic panel    Collection Time: 07/21/20  9:05 PM   Result Value Ref Range    Sodium 146 (H) 136 - 145 mmol/L    Potassium 4.9 3.5 - 5.1 mmol/L    Chloride 111 (H) 95 - 110 mmol/L    CO2 28 23 - 29 mmol/L    Glucose 97 70 - 110 mg/dL    BUN, Bld 108 (H) 8 - 23 mg/dL    Creatinine 2.3 (H) 0.5 - 1.4 mg/dL    Calcium 8.6 (L) 8.7 - 10.5 mg/dL    Total Protein 7.6 6.0 - 8.4 g/dL    Albumin 2.5 (L) 3.5 - 5.2 g/dL    Total Bilirubin 0.2 0.1 - 1.0 mg/dL    Alkaline Phosphatase 77 55 - 135 U/L    AST 11 10 - 40 U/L    ALT 10 10 - 44 U/L    Anion Gap 7 (L) 8 - 16 mmol/L    eGFR if African American 23 (A) >60 mL/min/1.73 m^2    eGFR if non African American 20 (A) >60 mL/min/1.73 m^2   POCT glucose    Collection Time: 07/21/20  9:07 PM   Result Value Ref Range    POCT Glucose 106 70 - 110 mg/dL   Comprehensive metabolic panel    Collection Time: 07/22/20  2:15 AM   Result Value Ref Range    Sodium 146 (H) 136 - 145 mmol/L    Potassium 4.9 3.5 - 5.1 mmol/L    Chloride 110 95 - 110 mmol/L    CO2 28 23 - 29 mmol/L    Glucose 87 70 - 110 mg/dL    BUN, Bld 112 (H) 8 - 23 mg/dL    Creatinine 2.3 (H) 0.5 - 1.4 mg/dL    Calcium 8.3 (L) 8.7 - 10.5 mg/dL    Total Protein 7.5 6.0 - 8.4 g/dL    Albumin 2.3 (L) 3.5 - 5.2 g/dL    Total Bilirubin 0.2 0.1 - 1.0 mg/dL    Alkaline Phosphatase 79 55 - 135 U/L    AST 12 10 - 40 U/L    ALT 7 (L) 10 - 44 U/L    Anion Gap 8 8 - 16 mmol/L    eGFR if African American 23 (A) >60 mL/min/1.73 m^2    eGFR if non African American 20 (A) >60 mL/min/1.73 m^2   Magnesium    Collection Time: 07/22/20  2:15 AM   Result Value Ref Range    Magnesium 2.2 1.6 - 2.6 mg/dL   CBC auto differential     Collection Time: 07/22/20  2:15 AM   Result Value Ref Range    WBC 9.55 3.90 - 12.70 K/uL    RBC 2.43 (L) 4.00 - 5.40 M/uL    Hemoglobin 6.8 (L) 12.0 - 16.0 g/dL    Hematocrit 23.9 (L) 37.0 - 48.5 %    Mean Corpuscular Volume 98 82 - 98 fL    Mean Corpuscular Hemoglobin 28.0 27.0 - 31.0 pg    Mean Corpuscular Hemoglobin Conc 28.5 (L) 32.0 - 36.0 g/dL    RDW 15.8 (H) 11.5 - 14.5 %    Platelets 284 150 - 350 K/uL    MPV 9.0 (L) 9.2 - 12.9 fL    Immature Granulocytes 0.3 0.0 - 0.5 %    Gran # (ANC) 5.5 1.8 - 7.7 K/uL    Immature Grans (Abs) 0.03 0.00 - 0.04 K/uL    Lymph # 2.8 1.0 - 4.8 K/uL    Mono # 1.0 0.3 - 1.0 K/uL    Eos # 0.3 0.0 - 0.5 K/uL    Baso # 0.02 0.00 - 0.20 K/uL    nRBC 0 0 /100 WBC    Gran% 57.1 38.0 - 73.0 %    Lymph% 28.8 18.0 - 48.0 %    Mono% 10.5 4.0 - 15.0 %    Eosinophil% 3.1 0.0 - 8.0 %    Basophil% 0.2 0.0 - 1.9 %    Differential Method Automated    Vancomycin, random    Collection Time: 07/22/20  2:15 AM   Result Value Ref Range    Vancomycin, Random 27.1 Not established ug/mL   Type & Screen    Collection Time: 07/22/20  2:15 AM   Result Value Ref Range    Group & Rh B POS     Indirect Espinoza NEG    Prepare RBC 1 Unit    Collection Time: 07/22/20  2:15 AM   Result Value Ref Range    UNIT NUMBER [id number]     Product Code F8034X67     DISPENSE STATUS ISSUED     CODING SYSTEM [id number]     Unit Blood Type Code 7300     Unit Blood Type B POS     Unit Expiration [id number]    ]    I/O last 3 completed shifts:  In: 446.8 [P.O.:240; I.V.:6.8; IV Piggyback:200]  Out: 3290 [Urine:3290]    Vitals:    07/22/20 0945 07/22/20 1000 07/22/20 1045 07/22/20 1100   BP:  (!) 148/76 (!) 155/71 (!) 168/74   Pulse: 84 96 85 86   Resp: (!) 23 19 19 16   Temp:    98.2 °F (36.8 °C)   TempSrc:    Oral   SpO2: 100% (!) 90% 99% 99%   Weight:       Height:           No Jvd, Thyromegaly or Lymphadenopathy  Lungs: Fairly clear anteriorly and laterally  Cor: RRR no G or rubs  Abd: Soft benign good bowel sounds non  tender  Ext: No E C C    A)    Advanced renal disease likely related to DM,HTN,CHF  Marquis on ckd4 with small kidneys but with great UO stable creat  Resp insuf  Anemia of acute and chronic disease  HyperK  DM with porteinuria  CHF  Protein malnutrition POA  Bladder infection with KLEBSIELLA PNEUMONIAE ESBL   Obesity  DJD  and gout  CAD  Hx of cva     P)  Renal Diet  Home meds and antibiotics  Protect non dominant arm for future access  EPO   Binders PRN  Check pthi  Document degree of proteinruia  Adjust all meds to the degree of renal fx  Close follow up I/O and weights  Maintain Hydration   No need for HD or K bx at this time

## 2020-07-22 NOTE — ASSESSMENT & PLAN NOTE
Does are responding to IV diuresis however with hyperkalemia and uremia  Hyperkalemia resolved. Hyperuremia still present but asymptomatic right now  Will hold IV furosemide temporarily but will need to restart if requiring more supplemental O2  Cardiac monitoring.  Remove eloisa (has a UTI)  Nephrology on board.  Appreciate further recommendations

## 2020-07-23 ENCOUNTER — ANESTHESIA (OUTPATIENT)
Dept: ENDOSCOPY | Facility: HOSPITAL | Age: 75
DRG: 871 | End: 2020-07-23
Payer: MEDICARE

## 2020-07-23 ENCOUNTER — ANESTHESIA EVENT (OUTPATIENT)
Dept: ENDOSCOPY | Facility: HOSPITAL | Age: 75
DRG: 871 | End: 2020-07-23
Payer: MEDICARE

## 2020-07-23 PROBLEM — E87.5 HYPERKALEMIA: Status: RESOLVED | Noted: 2020-06-18 | Resolved: 2020-07-23

## 2020-07-23 LAB
ALBUMIN SERPL BCP-MCNC: 2.4 G/DL (ref 3.5–5.2)
ALP SERPL-CCNC: 82 U/L (ref 55–135)
ALT SERPL W/O P-5'-P-CCNC: 10 U/L (ref 10–44)
ANION GAP SERPL CALC-SCNC: 7 MMOL/L (ref 8–16)
AST SERPL-CCNC: 16 U/L (ref 10–40)
BASOPHILS # BLD AUTO: 0.01 K/UL (ref 0–0.2)
BASOPHILS NFR BLD: 0.1 % (ref 0–1.9)
BILIRUB SERPL-MCNC: 0.2 MG/DL (ref 0.1–1)
BUN SERPL-MCNC: 104 MG/DL (ref 8–23)
CALCIUM SERPL-MCNC: 8.1 MG/DL (ref 8.7–10.5)
CHLORIDE SERPL-SCNC: 107 MMOL/L (ref 95–110)
CO2 SERPL-SCNC: 28 MMOL/L (ref 23–29)
CREAT SERPL-MCNC: 2.5 MG/DL (ref 0.5–1.4)
DIFFERENTIAL METHOD: ABNORMAL
EOSINOPHIL # BLD AUTO: 0.3 K/UL (ref 0–0.5)
EOSINOPHIL NFR BLD: 2.8 % (ref 0–8)
ERYTHROCYTE [DISTWIDTH] IN BLOOD BY AUTOMATED COUNT: 15.4 % (ref 11.5–14.5)
EST. GFR  (AFRICAN AMERICAN): 21 ML/MIN/1.73 M^2
EST. GFR  (NON AFRICAN AMERICAN): 18 ML/MIN/1.73 M^2
GLUCOSE SERPL-MCNC: 131 MG/DL (ref 70–110)
HCT VFR BLD AUTO: 28.5 % (ref 37–48.5)
HGB BLD-MCNC: 8.2 G/DL (ref 12–16)
IMM GRANULOCYTES # BLD AUTO: 0.05 K/UL (ref 0–0.04)
IMM GRANULOCYTES NFR BLD AUTO: 0.5 % (ref 0–0.5)
LYMPHOCYTES # BLD AUTO: 2.7 K/UL (ref 1–4.8)
LYMPHOCYTES NFR BLD: 24.9 % (ref 18–48)
MAGNESIUM SERPL-MCNC: 2.2 MG/DL (ref 1.6–2.6)
MCH RBC QN AUTO: 27.8 PG (ref 27–31)
MCHC RBC AUTO-ENTMCNC: 28.8 G/DL (ref 32–36)
MCV RBC AUTO: 97 FL (ref 82–98)
MONOCYTES # BLD AUTO: 1.2 K/UL (ref 0.3–1)
MONOCYTES NFR BLD: 10.8 % (ref 4–15)
NEUTROPHILS # BLD AUTO: 6.5 K/UL (ref 1.8–7.7)
NEUTROPHILS NFR BLD: 60.9 % (ref 38–73)
NRBC BLD-RTO: 0 /100 WBC
PLATELET # BLD AUTO: 284 K/UL (ref 150–350)
PMV BLD AUTO: 9.4 FL (ref 9.2–12.9)
POCT GLUCOSE: 112 MG/DL (ref 70–110)
POCT GLUCOSE: 122 MG/DL (ref 70–110)
POCT GLUCOSE: 138 MG/DL (ref 70–110)
POCT GLUCOSE: 181 MG/DL (ref 70–110)
POTASSIUM SERPL-SCNC: 4.7 MMOL/L (ref 3.5–5.1)
PROT SERPL-MCNC: 7.5 G/DL (ref 6–8.4)
RBC # BLD AUTO: 2.95 M/UL (ref 4–5.4)
SODIUM SERPL-SCNC: 142 MMOL/L (ref 136–145)
VANCOMYCIN SERPL-MCNC: 23.5 UG/ML
WBC # BLD AUTO: 10.65 K/UL (ref 3.9–12.7)

## 2020-07-23 PROCEDURE — 21400001 HC TELEMETRY ROOM: Mod: HCNC

## 2020-07-23 PROCEDURE — 94761 N-INVAS EAR/PLS OXIMETRY MLT: CPT | Mod: HCNC

## 2020-07-23 PROCEDURE — 63600175 PHARM REV CODE 636 W HCPCS: Mod: HCNC | Performed by: HOSPITALIST

## 2020-07-23 PROCEDURE — 99232 PR SUBSEQUENT HOSPITAL CARE,LEVL II: ICD-10-PCS | Mod: HCNC,,, | Performed by: INTERNAL MEDICINE

## 2020-07-23 PROCEDURE — 25000003 PHARM REV CODE 250: Mod: HCNC | Performed by: INTERNAL MEDICINE

## 2020-07-23 PROCEDURE — 63600175 PHARM REV CODE 636 W HCPCS: Mod: HCNC | Performed by: NURSE ANESTHETIST, CERTIFIED REGISTERED

## 2020-07-23 PROCEDURE — 83735 ASSAY OF MAGNESIUM: CPT | Mod: HCNC

## 2020-07-23 PROCEDURE — 63600175 PHARM REV CODE 636 W HCPCS: Mod: HCNC | Performed by: INTERNAL MEDICINE

## 2020-07-23 PROCEDURE — C9113 INJ PANTOPRAZOLE SODIUM, VIA: HCPCS | Mod: HCNC | Performed by: INTERNAL MEDICINE

## 2020-07-23 PROCEDURE — 85025 COMPLETE CBC W/AUTO DIFF WBC: CPT | Mod: HCNC

## 2020-07-23 PROCEDURE — 27000221 HC OXYGEN, UP TO 24 HOURS: Mod: HCNC

## 2020-07-23 PROCEDURE — 25000003 PHARM REV CODE 250: Mod: HCNC

## 2020-07-23 PROCEDURE — 97166 OT EVAL MOD COMPLEX 45 MIN: CPT | Mod: HCNC

## 2020-07-23 PROCEDURE — 37000008 HC ANESTHESIA 1ST 15 MINUTES: Mod: HCNC | Performed by: INTERNAL MEDICINE

## 2020-07-23 PROCEDURE — D9220A PRA ANESTHESIA: ICD-10-PCS | Mod: HCNC,CRNA,, | Performed by: NURSE ANESTHETIST, CERTIFIED REGISTERED

## 2020-07-23 PROCEDURE — D9220A PRA ANESTHESIA: Mod: HCNC,CRNA,, | Performed by: NURSE ANESTHETIST, CERTIFIED REGISTERED

## 2020-07-23 PROCEDURE — 37000009 HC ANESTHESIA EA ADD 15 MINS: Mod: HCNC | Performed by: INTERNAL MEDICINE

## 2020-07-23 PROCEDURE — 99232 SBSQ HOSP IP/OBS MODERATE 35: CPT | Mod: HCNC,,, | Performed by: INTERNAL MEDICINE

## 2020-07-23 PROCEDURE — D9220A PRA ANESTHESIA: Mod: HCNC,ANES,, | Performed by: ANESTHESIOLOGY

## 2020-07-23 PROCEDURE — 80053 COMPREHEN METABOLIC PANEL: CPT | Mod: HCNC

## 2020-07-23 PROCEDURE — 97161 PT EVAL LOW COMPLEX 20 MIN: CPT | Mod: HCNC

## 2020-07-23 PROCEDURE — 80202 ASSAY OF VANCOMYCIN: CPT | Mod: HCNC

## 2020-07-23 PROCEDURE — 43235 EGD DIAGNOSTIC BRUSH WASH: CPT | Mod: HCNC | Performed by: INTERNAL MEDICINE

## 2020-07-23 PROCEDURE — D9220A PRA ANESTHESIA: ICD-10-PCS | Mod: HCNC,ANES,, | Performed by: ANESTHESIOLOGY

## 2020-07-23 PROCEDURE — 25000003 PHARM REV CODE 250: Mod: HCNC | Performed by: NURSE ANESTHETIST, CERTIFIED REGISTERED

## 2020-07-23 RX ORDER — SODIUM CHLORIDE 9 MG/ML
INJECTION, SOLUTION INTRAVENOUS CONTINUOUS PRN
Status: DISCONTINUED | OUTPATIENT
Start: 2020-07-23 | End: 2020-07-23

## 2020-07-23 RX ORDER — INSULIN ASPART 100 [IU]/ML
1-10 INJECTION, SOLUTION INTRAVENOUS; SUBCUTANEOUS
Status: DISCONTINUED | OUTPATIENT
Start: 2020-07-23 | End: 2020-07-25 | Stop reason: HOSPADM

## 2020-07-23 RX ORDER — IBUPROFEN 200 MG
16 TABLET ORAL
Status: DISCONTINUED | OUTPATIENT
Start: 2020-07-23 | End: 2020-07-25 | Stop reason: HOSPADM

## 2020-07-23 RX ORDER — PROPOFOL 10 MG/ML
INJECTION, EMULSION INTRAVENOUS
Status: DISCONTINUED
Start: 2020-07-23 | End: 2020-07-23 | Stop reason: WASHOUT

## 2020-07-23 RX ORDER — IBUPROFEN 200 MG
24 TABLET ORAL
Status: DISCONTINUED | OUTPATIENT
Start: 2020-07-23 | End: 2020-07-25 | Stop reason: HOSPADM

## 2020-07-23 RX ORDER — PROPOFOL 10 MG/ML
INJECTION, EMULSION INTRAVENOUS
Status: COMPLETED
Start: 2020-07-23 | End: 2020-07-23

## 2020-07-23 RX ORDER — GLUCAGON 1 MG
1 KIT INJECTION
Status: DISCONTINUED | OUTPATIENT
Start: 2020-07-23 | End: 2020-07-25 | Stop reason: HOSPADM

## 2020-07-23 RX ORDER — GLYCOPYRROLATE 0.2 MG/ML
INJECTION INTRAMUSCULAR; INTRAVENOUS
Status: COMPLETED
Start: 2020-07-23 | End: 2020-07-23

## 2020-07-23 RX ORDER — LIDOCAINE HYDROCHLORIDE 20 MG/ML
INJECTION, SOLUTION INFILTRATION; PERINEURAL
Status: COMPLETED
Start: 2020-07-23 | End: 2020-07-23

## 2020-07-23 RX ORDER — PROPOFOL 10 MG/ML
VIAL (ML) INTRAVENOUS
Status: DISCONTINUED | OUTPATIENT
Start: 2020-07-23 | End: 2020-07-23

## 2020-07-23 RX ORDER — LIDOCAINE HYDROCHLORIDE 20 MG/ML
INJECTION INTRAVENOUS
Status: DISCONTINUED | OUTPATIENT
Start: 2020-07-23 | End: 2020-07-23

## 2020-07-23 RX ADMIN — LIDOCAINE HYDROCHLORIDE: 20 INJECTION, SOLUTION INFILTRATION; PERINEURAL at 01:07

## 2020-07-23 RX ADMIN — CARVEDILOL 25 MG: 12.5 TABLET, FILM COATED ORAL at 05:07

## 2020-07-23 RX ADMIN — SODIUM CHLORIDE: 0.9 INJECTION, SOLUTION INTRAVENOUS at 01:07

## 2020-07-23 RX ADMIN — MEROPENEM AND SODIUM CHLORIDE 1 G: 1 INJECTION, SOLUTION INTRAVENOUS at 12:07

## 2020-07-23 RX ADMIN — PANTOPRAZOLE SODIUM 40 MG: 40 INJECTION, POWDER, FOR SOLUTION INTRAVENOUS at 09:07

## 2020-07-23 RX ADMIN — MEROPENEM AND SODIUM CHLORIDE 1 G: 1 INJECTION, SOLUTION INTRAVENOUS at 09:07

## 2020-07-23 RX ADMIN — PROPOFOL 50 MG: 10 INJECTION, EMULSION INTRAVENOUS at 01:07

## 2020-07-23 RX ADMIN — INSULIN ASPART 1 UNITS: 100 INJECTION, SOLUTION INTRAVENOUS; SUBCUTANEOUS at 09:07

## 2020-07-23 RX ADMIN — Medication 100 MG: at 01:07

## 2020-07-23 RX ADMIN — GLYCOPYRROLATE: 0.2 INJECTION INTRAMUSCULAR; INTRAVENOUS at 01:07

## 2020-07-23 RX ADMIN — ERTAPENEM 0.5 G: 1 INJECTION INTRAMUSCULAR; INTRAVENOUS at 05:07

## 2020-07-23 RX ADMIN — EPOETIN ALFA-EPBX 10000 UNITS: 10000 INJECTION, SOLUTION INTRAVENOUS; SUBCUTANEOUS at 07:07

## 2020-07-23 NOTE — SUBJECTIVE & OBJECTIVE
"Interval History: "OK". Patient admitted for encephalopathy due to possible RLL pneumonia vs UTI. Patient unable to provide symptom history. Urinalysis with pyuria and culture with ESBL Klebsiella. On meropenem. Vancomycin discontinued on 7/22.     Review of Systems   Constitutional: Negative for chills, fatigue, fever and unexpected weight change.   HENT: Negative for ear pain, facial swelling, hearing loss, mouth sores, nosebleeds, rhinorrhea, sinus pressure, sore throat, tinnitus, trouble swallowing and voice change.    Eyes: Negative for photophobia, pain, redness and visual disturbance.   Respiratory: Negative for cough, chest tightness, shortness of breath and wheezing.    Cardiovascular: Negative for chest pain, palpitations and leg swelling.   Gastrointestinal: Negative for abdominal pain, blood in stool, constipation, diarrhea, nausea and vomiting.   Endocrine: Negative for cold intolerance, heat intolerance, polydipsia, polyphagia and polyuria.   Genitourinary: Negative for decreased urine volume, dysuria, flank pain, frequency, hematuria, menstrual problem, urgency, vaginal bleeding, vaginal discharge and vaginal pain.   Musculoskeletal: Positive for myalgias. Negative for arthralgias, back pain, joint swelling and neck pain.   Skin: Positive for wound. Negative for rash.   Allergic/Immunologic: Negative for environmental allergies, food allergies and immunocompromised state.   Neurological: Negative for dizziness, seizures, syncope, weakness, light-headedness, numbness and headaches.   Hematological: Negative for adenopathy. Does not bruise/bleed easily.   Psychiatric/Behavioral: Negative for confusion, hallucinations, self-injury, sleep disturbance and suicidal ideas. The patient is not nervous/anxious.      Objective:     Vital Signs (Most Recent):  Temp: 98.1 °F (36.7 °C) (07/23/20 0452)  Pulse: 78 (07/23/20 0452)  Resp: 20 (07/23/20 0452)  BP: (!) 150/70 (07/23/20 0500)  SpO2: 98 % (07/23/20 0500) " Vital Signs (24h Range):  Temp:  [97.8 °F (36.6 °C)-98.2 °F (36.8 °C)] 98.1 °F (36.7 °C)  Pulse:  [71-96] 78  Resp:  [13-26] 20  SpO2:  [90 %-100 %] 98 %  BP: (128-200)/(62-84) 150/70     Weight: 128 kg (282 lb 3 oz)  Body mass index is 51.6 kg/m².    Estimated Creatinine Clearance: 27.1 mL/min (A) (based on SCr of 2.3 mg/dL (H)).    Physical Exam  Vitals signs and nursing note reviewed.   Constitutional:       General: She is not in acute distress.     Appearance: She is well-developed. She is obese. She is not diaphoretic.      Comments: On NIPPV   HENT:      Head: Normocephalic and atraumatic.      Right Ear: Hearing and external ear normal.      Left Ear: Hearing and external ear normal.      Mouth/Throat:      Pharynx: Uvula midline.   Eyes:      General: No scleral icterus.        Right eye: No discharge.         Left eye: No discharge.      Conjunctiva/sclera: Conjunctivae normal.   Neck:      Musculoskeletal: Normal range of motion and neck supple.      Thyroid: No thyromegaly.      Trachea: No tracheal deviation.   Cardiovascular:      Rate and Rhythm: Normal rate and regular rhythm.      Heart sounds: Normal heart sounds. No murmur. No friction rub. No gallop.    Pulmonary:      Effort: Pulmonary effort is normal. No respiratory distress.      Breath sounds: No stridor. Examination of the right-middle field reveals decreased breath sounds. Examination of the left-middle field reveals decreased breath sounds. Examination of the right-lower field reveals decreased breath sounds. Examination of the left-lower field reveals decreased breath sounds. Decreased breath sounds present. No wheezing or rales.   Chest:      Chest wall: No tenderness.   Abdominal:      General: Bowel sounds are normal. There is no distension.      Palpations: Abdomen is soft.      Tenderness: There is no abdominal tenderness. There is no guarding or rebound.   Musculoskeletal:         General: Swelling present. No tenderness.       Comments: Right foot covered with gauze dressing.    Lymphadenopathy:      Cervical: No cervical adenopathy.   Skin:     General: Skin is warm and dry.      Coloration: Skin is not pale.      Findings: No erythema or rash.   Neurological:      Mental Status: She is alert, oriented to person, place, and time and easily aroused.         Significant Labs:   Blood Culture:   Recent Labs   Lab 06/12/20  2339 06/15/20  0624 07/20/20  0951 07/20/20  1025 07/20/20  1122   LABBLOO Gram stain aer bottle: Gram positive cocci in clusters resembling Staph   Results called to and read back by: Nataliia Weber  06/13/2020  22:41  COAGULASE-NEGATIVE STAPHYLOCOCCUS SPECIES  Organism is a probable contaminant  * No Growth after 4 days.  Gram stain aer bottle: Gram positive cocci in clusters resembling Staph   Results called to and read back by: Reshma Quiles 07/21/2020  11:15  COAGULASE-NEGATIVE STAPHYLOCOCCUS SPECIES  Organism is a probable contaminant  * No Growth to date  No Growth to date  No Growth to date No Growth to date  No Growth to date  No Growth to date     BMP:   Recent Labs   Lab 07/22/20  0215   GLU 87   *   K 4.9      CO2 28   *   CREATININE 2.3*   CALCIUM 8.3*   MG 2.2     CBC:   Recent Labs   Lab 07/22/20  0215 07/23/20  0411   WBC 9.55 10.65   HGB 6.8* 8.2*   HCT 23.9* 28.5*    284     Respiratory Culture: No results for input(s): GSRESP, RESPIRATORYC in the last 4320 hours.  Urine Culture:   Recent Labs   Lab 06/18/20  1115 07/20/20  1031   LABURIN ESBL Results called to and read back by:ELY SOTO 06/20/2020  09:33  KLEBSIELLA PNEUMONIAE ESBL  >100,000 cfu/ml  * KLEBSIELLA PNEUMONIAE ESBL  >100,000 cfu/ml  *     Urine Studies:   Recent Labs   Lab 06/18/20  1115 07/20/20  1031   COLORU Yellow Yellow   APPEARANCEUA Cloudy* Cloudy*   PHUR 5.0 5.0   SPECGRAV 1.015 1.015   PROTEINUA 2+* 2+*   GLUCUA 1+* Negative   KETONESU Trace* Trace*   BILIRUBINUA Negative Negative   OCCULTUA  2+* 1+*   NITRITE Negative Negative   UROBILINOGEN Negative Negative   LEUKOCYTESUR 2+* 3+*   RBCUA >100* 1   WBCUA >100* 50*   BACTERIA Moderate* Many*   SQUAMEPITHEL 4  --    HYALINECASTS 0 0       Significant Imaging: I have reviewed all pertinent imaging results/findings within the past 24 hours.

## 2020-07-23 NOTE — ASSESSMENT & PLAN NOTE
Likely 2/2 pulmonary edema as she improved with IV diuresis.   Pneumonia could not be ruled out. No sputum sample obtained as none was produced for sampling  Is on meropenem for ESBL Klebsiella UTI and vancomycin for osteomyelitis  On low flow nasal cannula and BiPAP nightly as she may have underlying obesity hypoventilation syndrome  PT/OT consulted for mobilization  Started diet. Aspiration precautions. Refusing nightly Bipap.

## 2020-07-23 NOTE — PROGRESS NOTES
[facility]  Infectious Disease  Progress Note    Patient Name: Tasneem Lynn  MRN: [id number]  Admission Date: 7/20/2020  Length of Stay: 3 days  Attending Physician: Neel Trejo MD  Primary Care Provider: To Obtain Unable    Isolation Status: Special Contact  Assessment/Plan:      * Acute respiratory failure with hypoxia and hypercarbia  Currently off oxygen.  · Defer management to primary service.     Possible pneumonia of right lower lobe due to infectious organism  RLL pneumonia on CXR vs other etiology for airspace disease. Is afebrile and without leukocytosis. Urine cultures with ESBL Klebsiella.   · Discontinue meropenem for now.   · Start ertapenem 1 gm daily.   · Treat for at least 7 days.    Chronic osteomyelitis of right foot with draining sinus  Wound appear stable. No gross evidence of bony exposure. Chronic osteomyelitis not usually cured by long term antibiotics.   · Local care as per Podiatry recommendations.     Urinary tract infection due to extended-spectrum beta lactamase (ESBL)-producing Klebsiella  Unclear if patient had LUTS. Has mild pyuria and urine culture with GNR.  · De-escalate to ertapenem.   · Treat for 7 days from today.       Anticipated Disposition: per primary    Thank you for your consult. I will sign off. Please contact us if you have any additional questions.    Ksenia Sanabria MD  Infectious Disease  [facility]    Subjective:     Principal Problem:Acute respiratory failure with hypoxia and hypercarbia    HPI: A 75-year-old man with HTN, HFpEF 55%, past stroke, hypothyroidism, CKD 4, chronic osteomyelitis of the right foot with draining sinus, and on a 6 week course of ceftriaxone plus vancomycin who was noted to be altered or lethargic while at his nursing home. Upon EMS evaluation he was noted to be hypoxemic with oxygen saturation rate of 86%. He was given Narcan and transferred to  ED.  Upon evaluation he was hypertensive,  "hypoxemic, and afebrile. Laboratory work up revealed normal WBC, and elevated creatinine however consistent with his baseline. Urinalysis had pyuria of 50 WBC. He was admitted to Hospital Medicine Service for further management and started on meropenem plus vancomycin.     Infectious Diseases consulted for management recommendations.   Interval History: "OK". Patient admitted for encephalopathy due to possible RLL pneumonia vs UTI. Patient unable to provide symptom history. Urinalysis with pyuria and culture with ESBL Klebsiella. On meropenem. Vancomycin discontinued on 7/22.     Review of Systems   Constitutional: Negative for chills, fatigue, fever and unexpected weight change.   HENT: Negative for ear pain, facial swelling, hearing loss, mouth sores, nosebleeds, rhinorrhea, sinus pressure, sore throat, tinnitus, trouble swallowing and voice change.    Eyes: Negative for photophobia, pain, redness and visual disturbance.   Respiratory: Negative for cough, chest tightness, shortness of breath and wheezing.    Cardiovascular: Negative for chest pain, palpitations and leg swelling.   Gastrointestinal: Negative for abdominal pain, blood in stool, constipation, diarrhea, nausea and vomiting.   Endocrine: Negative for cold intolerance, heat intolerance, polydipsia, polyphagia and polyuria.   Genitourinary: Negative for decreased urine volume, dysuria, flank pain, frequency, hematuria, menstrual problem, urgency, vaginal bleeding, vaginal discharge and vaginal pain.   Musculoskeletal: Positive for myalgias. Negative for arthralgias, back pain, joint swelling and neck pain.   Skin: Positive for wound. Negative for rash.   Allergic/Immunologic: Negative for environmental allergies, food allergies and immunocompromised state.   Neurological: Negative for dizziness, seizures, syncope, weakness, light-headedness, numbness and headaches.   Hematological: Negative for adenopathy. Does not bruise/bleed easily. "   Psychiatric/Behavioral: Negative for confusion, hallucinations, self-injury, sleep disturbance and suicidal ideas. The patient is not nervous/anxious.      Objective:     Vital Signs (Most Recent):  Temp: 98.1 °F (36.7 °C) (07/23/20 0452)  Pulse: 78 (07/23/20 0452)  Resp: 20 (07/23/20 0452)  BP: (!) 150/70 (07/23/20 0500)  SpO2: 98 % (07/23/20 0500) Vital Signs (24h Range):  Temp:  [97.8 °F (36.6 °C)-98.2 °F (36.8 °C)] 98.1 °F (36.7 °C)  Pulse:  [71-96] 78  Resp:  [13-26] 20  SpO2:  [90 %-100 %] 98 %  BP: (128-200)/(62-84) 150/70     Weight: 128 kg (282 lb 3 oz)  Body mass index is 51.6 kg/m².    Estimated Creatinine Clearance: 27.1 mL/min (A) (based on SCr of 2.3 mg/dL (H)).    Physical Exam  Vitals signs and nursing note reviewed.   Constitutional:       General: She is not in acute distress.     Appearance: She is well-developed. She is obese. She is not diaphoretic.      Comments: On NIPPV   HENT:      Head: Normocephalic and atraumatic.      Right Ear: Hearing and external ear normal.      Left Ear: Hearing and external ear normal.      Mouth/Throat:      Pharynx: Uvula midline.   Eyes:      General: No scleral icterus.        Right eye: No discharge.         Left eye: No discharge.      Conjunctiva/sclera: Conjunctivae normal.   Neck:      Musculoskeletal: Normal range of motion and neck supple.      Thyroid: No thyromegaly.      Trachea: No tracheal deviation.   Cardiovascular:      Rate and Rhythm: Normal rate and regular rhythm.      Heart sounds: Normal heart sounds. No murmur. No friction rub. No gallop.    Pulmonary:      Effort: Pulmonary effort is normal. No respiratory distress.      Breath sounds: No stridor. Examination of the right-middle field reveals decreased breath sounds. Examination of the left-middle field reveals decreased breath sounds. Examination of the right-lower field reveals decreased breath sounds. Examination of the left-lower field reveals decreased breath sounds. Decreased  breath sounds present. No wheezing or rales.   Chest:      Chest wall: No tenderness.   Abdominal:      General: Bowel sounds are normal. There is no distension.      Palpations: Abdomen is soft.      Tenderness: There is no abdominal tenderness. There is no guarding or rebound.   Musculoskeletal:         General: Swelling present. No tenderness.      Comments: Right foot covered with gauze dressing.    Lymphadenopathy:      Cervical: No cervical adenopathy.   Skin:     General: Skin is warm and dry.      Coloration: Skin is not pale.      Findings: No erythema or rash.   Neurological:      Mental Status: She is alert, oriented to person, place, and time and easily aroused.         Significant Labs:   Blood Culture:   Recent Labs   Lab 06/12/20  2339 06/15/20  0624 07/20/20  0951 07/20/20  1025 07/20/20  1122   LABBLOO Gram stain aer bottle: Gram positive cocci in clusters resembling Staph   Results called to and read back by: Nataliia Weber  06/13/2020  22:41  COAGULASE-NEGATIVE STAPHYLOCOCCUS SPECIES  Organism is a probable contaminant  * No Growth after 4 days.  Gram stain aer bottle: Gram positive cocci in clusters resembling Staph   Results called to and read back by: Reshma Quilse 07/21/2020  11:15  COAGULASE-NEGATIVE STAPHYLOCOCCUS SPECIES  Organism is a probable contaminant  * No Growth to date  No Growth to date  No Growth to date No Growth to date  No Growth to date  No Growth to date     BMP:   Recent Labs   Lab 07/22/20  0215   GLU 87   *   K 4.9      CO2 28   *   CREATININE 2.3*   CALCIUM 8.3*   MG 2.2     CBC:   Recent Labs   Lab 07/22/20  0215 07/23/20  0411   WBC 9.55 10.65   HGB 6.8* 8.2*   HCT 23.9* 28.5*    284     Respiratory Culture: No results for input(s): GSRESP, RESPIRATORYC in the last 4320 hours.  Urine Culture:   Recent Labs   Lab 06/18/20  1115 07/20/20  1031   LABURIN ESBL Results called to and read back by:ELY SOTO 06/20/2020  09:33  KLEBSIELLA  PNEUMONIAE ESBL  >100,000 cfu/ml  * KLEBSIELLA PNEUMONIAE ESBL  >100,000 cfu/ml  *     Urine Studies:   Recent Labs   Lab 06/18/20  1115 07/20/20  1031   COLORU Yellow Yellow   APPEARANCEUA Cloudy* Cloudy*   PHUR 5.0 5.0   SPECGRAV 1.015 1.015   PROTEINUA 2+* 2+*   GLUCUA 1+* Negative   KETONESU Trace* Trace*   BILIRUBINUA Negative Negative   OCCULTUA 2+* 1+*   NITRITE Negative Negative   UROBILINOGEN Negative Negative   LEUKOCYTESUR 2+* 3+*   RBCUA >100* 1   WBCUA >100* 50*   BACTERIA Moderate* Many*   SQUAMEPITHEL 4  --    HYALINECASTS 0 0       Significant Imaging: I have reviewed all pertinent imaging results/findings within the past 24 hours.     (0) understands/communicates without difficulty

## 2020-07-23 NOTE — SUBJECTIVE & OBJECTIVE
Interval History: No acute events overnight.    Review of Systems   HENT: Negative for ear discharge and ear pain.    Eyes: Negative for discharge and itching.   Endocrine: Negative for cold intolerance and heat intolerance.   Neurological: Negative for seizures and syncope.     Objective:     Vital Signs (Most Recent):  Temp: 97.6 °F (36.4 °C) (07/23/20 1120)  Pulse: 75 (07/23/20 1120)  Resp: 18 (07/23/20 1120)  BP: (!) 152/67 (07/23/20 1120)  SpO2: 97 % (07/23/20 1120) Vital Signs (24h Range):  Temp:  [97.6 °F (36.4 °C)-98.6 °F (37 °C)] 97.6 °F (36.4 °C)  Pulse:  [74-85] 75  Resp:  [17-26] 18  SpO2:  [92 %-100 %] 97 %  BP: (126-181)/() 152/67     Weight: 128 kg (282 lb 3 oz)  Body mass index is 51.6 kg/m².    Intake/Output Summary (Last 24 hours) at 7/23/2020 1247  Last data filed at 7/23/2020 0452  Gross per 24 hour   Intake 0 ml   Output 200 ml   Net -200 ml      Physical Exam  Vitals signs and nursing note reviewed.   Constitutional:       General: She is not in acute distress.     Appearance: She is not toxic-appearing.   HENT:      Head: Normocephalic and atraumatic.   Cardiovascular:      Rate and Rhythm: Normal rate and regular rhythm.   Pulmonary:      Effort: Pulmonary effort is normal.      Breath sounds: No wheezing or rales.   Abdominal:      Palpations: Abdomen is soft.   Skin:     Capillary Refill: Capillary refill takes less than 2 seconds.      Comments: Dressing applied to foot   Neurological:      Mental Status: She is alert.      Comments: Oriented to person and place. Able to interact. I bit slow to answer but thought process is adequate   Psychiatric:         Cognition and Memory: She exhibits impaired recent memory.         Significant Labs:   BMP:   Recent Labs   Lab 07/23/20 0411   *      K 4.7      CO2 28   *   CREATININE 2.5*   CALCIUM 8.1*   MG 2.2     CBC:   Recent Labs   Lab 07/22/20  0215 07/23/20  0411   WBC 9.55 10.65   HGB 6.8* 8.2*   HCT 23.9*  28.5*    881       Significant Imaging: I have reviewed all pertinent imaging results/findings within the past 24 hours.

## 2020-07-23 NOTE — NURSING
1901 Received bedside report from ENZO Jorgensen. Patient alert and disoriented to time and situation. No pain. No sign of distress. Comfortably lying in bed.Cardiac monitor in place. On oxygen at 2L. Call bell given on her reach, instructed to call for nurse attention all the time. Bed on lowest position. Bed alarm on. Safety maintained.

## 2020-07-23 NOTE — PROGRESS NOTES
Awake alert oriented x 2 NAD    Denies CNS ENT CP GI  RHEUM OR DERM SX  Past Medical History:   Diagnosis Date    Arthritis     Gout    CHF (congestive heart failure)     Coronary artery disease     Diabetes mellitus     HLD (hyperlipidemia)     Hypertension     Obese     Stroke     1986    Thyroid disease      Review of patient's allergies indicates:   Allergen Reactions    Corticosteroids (glucocorticoids) Edema       Current Facility-Administered Medications   Medication    0.9%  NaCl infusion (for blood administration)    acetaminophen tablet 650 mg    amLODIPine tablet 2.5 mg    calcium gluconate 1g in dextrose 5% 100mL (ready to mix system)    carvediloL tablet 25 mg    epoetin juan-epbx injection 10,000 Units    ertapenem (INVANZ) 0.5 g in sodium chloride 0.9% 100 mL IVPB    hydrALAZINE injection 10 mg    insulin detemir U-100 pen 5 Units    levothyroxine tablet 75 mcg    ondansetron injection 4 mg    pantoprazole injection 40 mg    rosuvastatin tablet 20 mg    sodium chloride 0.9% flush 10 mL       LABS    Recent Results (from the past 24 hour(s))   POCT glucose    Collection Time: 07/22/20 12:44 PM   Result Value Ref Range    POCT Glucose 135 (H) 70 - 110 mg/dL   POCT glucose    Collection Time: 07/22/20  4:06 PM   Result Value Ref Range    POCT Glucose 110 70 - 110 mg/dL   POCT glucose    Collection Time: 07/22/20  8:25 PM   Result Value Ref Range    POCT Glucose 176 (H) 70 - 110 mg/dL   Comprehensive metabolic panel    Collection Time: 07/23/20  4:11 AM   Result Value Ref Range    Sodium 142 136 - 145 mmol/L    Potassium 4.7 3.5 - 5.1 mmol/L    Chloride 107 95 - 110 mmol/L    CO2 28 23 - 29 mmol/L    Glucose 131 (H) 70 - 110 mg/dL    BUN, Bld 104 (H) 8 - 23 mg/dL    Creatinine 2.5 (H) 0.5 - 1.4 mg/dL    Calcium 8.1 (L) 8.7 - 10.5 mg/dL    Total Protein 7.5 6.0 - 8.4 g/dL    Albumin 2.4 (L) 3.5 - 5.2 g/dL    Total Bilirubin 0.2 0.1 - 1.0 mg/dL    Alkaline Phosphatase 82 55 - 135  U/L    AST 16 10 - 40 U/L    ALT 10 10 - 44 U/L    Anion Gap 7 (L) 8 - 16 mmol/L    eGFR if African American 21 (A) >60 mL/min/1.73 m^2    eGFR if non African American 18 (A) >60 mL/min/1.73 m^2   Magnesium    Collection Time: 07/23/20  4:11 AM   Result Value Ref Range    Magnesium 2.2 1.6 - 2.6 mg/dL   CBC auto differential    Collection Time: 07/23/20  4:11 AM   Result Value Ref Range    WBC 10.65 3.90 - 12.70 K/uL    RBC 2.95 (L) 4.00 - 5.40 M/uL    Hemoglobin 8.2 (L) 12.0 - 16.0 g/dL    Hematocrit 28.5 (L) 37.0 - 48.5 %    Mean Corpuscular Volume 97 82 - 98 fL    Mean Corpuscular Hemoglobin 27.8 27.0 - 31.0 pg    Mean Corpuscular Hemoglobin Conc 28.8 (L) 32.0 - 36.0 g/dL    RDW 15.4 (H) 11.5 - 14.5 %    Platelets 284 150 - 350 K/uL    MPV 9.4 9.2 - 12.9 fL    Immature Granulocytes 0.5 0.0 - 0.5 %    Gran # (ANC) 6.5 1.8 - 7.7 K/uL    Immature Grans (Abs) 0.05 (H) 0.00 - 0.04 K/uL    Lymph # 2.7 1.0 - 4.8 K/uL    Mono # 1.2 (H) 0.3 - 1.0 K/uL    Eos # 0.3 0.0 - 0.5 K/uL    Baso # 0.01 0.00 - 0.20 K/uL    nRBC 0 0 /100 WBC    Gran% 60.9 38.0 - 73.0 %    Lymph% 24.9 18.0 - 48.0 %    Mono% 10.8 4.0 - 15.0 %    Eosinophil% 2.8 0.0 - 8.0 %    Basophil% 0.1 0.0 - 1.9 %    Differential Method Automated    Vancomycin, random    Collection Time: 07/23/20  4:11 AM   Result Value Ref Range    Vancomycin, Random 23.5 Not established ug/mL   POCT glucose    Collection Time: 07/23/20  7:52 AM   Result Value Ref Range    POCT Glucose 138 (H) 70 - 110 mg/dL   POCT glucose    Collection Time: 07/23/20 11:19 AM   Result Value Ref Range    POCT Glucose 112 (H) 70 - 110 mg/dL   ]    I/O last 3 completed shifts:  In: 541.3 [P.O.:240; Blood:201.3; IV Piggyback:100]  Out: 2035 [Urine:2035]    Vitals:    07/23/20 0500 07/23/20 0720 07/23/20 0757 07/23/20 1120   BP: (!) 150/70  (!) 126/105 (!) 152/67   Pulse:   81 75   Resp:   17 18   Temp:   98.6 °F (37 °C) 97.6 °F (36.4 °C)   TempSrc:   Oral Oral   SpO2: 98% 97% 96% 97%   Weight:        Height:           No Jvd, Thyromegaly or Lymphadenopathy  Lungs: Fairly clear anteriorly and laterally  Cor: RRR no G or rubs  Abd: Soft benign good bowel sounds non tender  Ext: No E C C    A)    Advanced renal disease likely related to DM,HTN,CHF  Marquis on ckd4 with small kidneys but with great UO stable creat  Resp insuf  Anemia of acute and chronic disease  HyperK better  DM with porteinuria  CHF  Protein malnutrition POA  Bladder infection with KLEBSIELLA PNEUMONIAE ESBL   Obesity  DJD  and gout  CAD  Hx of cva     P)  Renal Diet  Home meds and antibiotics  Protect non dominant arm for future access  EPO   Binders PRN  Adjust all meds to the degree of renal fx  Close follow up I/O and weights  Maintain Hydration   No need for HD or K bx at this time  Antibiotx

## 2020-07-23 NOTE — ASSESSMENT & PLAN NOTE
RLL pneumonia on CXR vs other etiology for airspace disease. Is afebrile and without leukocytosis. Urine cultures with ESBL Klebsiella.   · Discontinue meropenem for now.   · Start ertapenem 1 gm daily.   · Treat for at least 7 days.

## 2020-07-23 NOTE — PLAN OF CARE
Problem: Wound  Goal: Optimal Wound Healing  Outcome: Ongoing, Progressing  Intervention: Promote Effective Wound Healing  Flowsheets (Taken 7/23/2020 5536)  Oral Nutrition Promotion: rest periods promoted  Sleep/Rest Enhancement: awakenings minimized  Pain Management Interventions:   pillow support provided   position adjusted

## 2020-07-23 NOTE — PT/OT/SLP EVAL
Occupational Therapy   Evaluation    Name: Tasneem Lynn  MRN: [id number]  Admitting Diagnosis:  Acute respiratory failure with hypoxia and hypercarbia Day of Surgery    Recommendations:     Discharge Recommendations: (detention NH vs. HHOT with 24 hour care)  Discharge Equipment Recommendations:  (hospital bed, w/c, cindy lift if not already in place)  Barriers to discharge:  (dependent x2 supine<>sit)    Assessment:     Tasneem Lynn is a 75 y.o. female with a medical diagnosis of Acute respiratory failure with hypoxia and hypercarbia.Performance deficits affecting function: weakness, gait instability, decreased upper extremity function, decreased ROM, impaired cardiopulmonary response to activity, impaired endurance, impaired balance, decreased lower extremity function, decreased safety awareness, impaired self care skills, impaired cognition, pain, impaired fine motor, impaired functional mobilty, edema, decreased coordination.      MAX A x2 to dependent for bed mobility. Pt known to rehab dept from last admission with discharge to SNF 6/24/20- pt was self-limiting then and OT discussed with nurse at [facility], Ms. Jaramillo, pt is known to refuse therapy at SNF. OT rec detention NH placement vs. HHOT with 24 hour care with hospital bed, cindy lift, and wheelchair    Rehab Prognosis: Fair -; patient would benefit from acute skilled OT services to address these deficits and reach maximum level of function.       Plan:     Patient to be seen (2-3x/week) to address the above listed problems via self-care/home management, therapeutic activities, therapeutic exercises  · Plan of Care Expires: 08/06/20  · Plan of Care Reviewed with: patient    Subjective     Chief Complaint: confused at the end asking about a test   Patient/Family Comments/goals: agreeable to sit EOB     Occupational Profile: Pt is a poor historian- per last eval on 06/13/20:   Living Environment: Prior to last admission, pt lived in a SS house with her  daughter and adult grandchildren- pt discharged from [facility]- 6/24/20 to [facility] and t/f from there back to [facility] for this admit  Previous level of function: Prior to 6/13/20, pt was bed-bound since April 2020 d/t R heel wound. Pt was self-limiting with therapy sessions that admission. OT called pt's nurse at [facility], Ms. Jaramillo, who reported that pt is known to refuse therapy at [facility]. Pt feeds herself and completes grooming, but she is unsure of her mobility level with therapy.   Roles and Routines: ?  Equipment Used at Home:  (per chart, OT rec hospital bed, BSC, w/c last admit)  Assistance upon Discharge: pt will require 24 hour care (family vs. NH staff)     Pain/Comfort:  Pain Rating 1: 0/10    Patients cultural, spiritual, Evangelical conflicts given the current situation: no    Objective:     Communicated with: nurse, Nellie, prior to session.  Patient found HOB elevated with bed alarm, bowel management system, telemetry, SCD, peripheral IV, oxygen, pressure relief boots, PureWick, central line upon OT entry to room. Pt was not wearing her 3L O2 NC upon entry (on her bed). Therapy assist donning back on with edu on reasoning for why to wear it.     General Precautions: Standard, fall, respiratory   Orthopedic Precautions:RLE non weight bearing   Braces: N/A     Occupational Performance:    Bed Mobility:    · Patient completed Scooting anteriorly with dependent x2  · Patient completed Bridging with bed in Trendelenburg position with dependent x2  · Patient completed Supine to Sit with maximal assistance and 2 persons  · Patient completed Sit to Supine with dependent and 2 persons    Functional Mobility/Transfers:  · Functional Mobility: Not appropriate to assess at this time     Activities of Daily Living:  · Upper Body Dressing: maximal assistance to don gown     Cognitive/Visual Perceptual:  Cognitive/Psychosocial Skills:     -       Oriented to: Person    -       Follows  Commands/attention:confused/distracted at times; followed some simple commands  -       Communication: clear  -       Memory: Impaired STM, Impaired LTM and Poor immediate recall  -       Safety awareness/insight to disability: impaired   -       Mood/Affect/Coping skills/emotional control: Appropriate to situation  Visual/Perceptual:      -NT; limited 2* confusion      Physical Exam:  Balance:    -       static sit: MIN A   Postural examination/scapula alignment:    -       Rounded shoulders  -       Forward head  -       Obese  Skin integrity: dressing to R foot intact  Edema:  Mild to moderate BUE   Upper Extremity Range of Motion:  B/L UE elbow flexion AROM WFL; shoulder flexion AROM gravity eliminated ~115*  Upper Extremity Strength: elbow flexion 3/5; shoulder flexion 2/5   Strength: B/L WFL  Gross motor coordination: impaired 2* weakness and confusion    AMPAC 6 Click ADL:  AMPAC Total Score: 10    Treatment & Education:  · Pt educated on OT role/POC.   · Importance of EOB activity with staff assistance.  · Safety during bed mobility while incorporating RLE NWB   · White board updated   · Multiple self-care tasks/functional mobility completed- assistance level noted above   · All questions/concerns answered within OT scope of practice     Education:    Patient left with bed in chair position with pressure relief boots on, SCDs on, all lines intact, call button in reach, bed alarm on and nurse, yumiko Balladr    GOALS:   Multidisciplinary Problems     Occupational Therapy Goals        Problem: Occupational Therapy Goal    Goal Priority Disciplines Outcome Interventions   Occupational Therapy Goal     OT, PT/OT Ongoing, Progressing    Description: Goals to be met by: 08/06/20    Patient will increase functional independence with ADLs by performing:    Feeding with Set-up Assistance.  UE Dressing with Minimal Assistance.  Grooming while supported position with Set-up Assistance.  Sitting at edge of bed x10  minutes with Stand-by Assistance.  Rolling to Bilateral with Moderate Assistance.   Supine to sit with Moderate Assistance.  Upper extremity exercise program x10 reps per handout, with assistance as needed.                     History:     Past Medical History:   Diagnosis Date    Arthritis     Gout    CHF (congestive heart failure)     Coronary artery disease     Diabetes mellitus     HLD (hyperlipidemia)     Hypertension     Obese     Stroke     1986    Thyroid disease          Past Surgical History:   Procedure Laterality Date    right hand surgery      right knee surgery Right     partial plate       Time Tracking:     OT Date of Treatment: 07/23/20  OT Start Time: 0925  OT Stop Time: 0944  OT Total Time (min): 19 min    Billable Minutes:Evaluation 19 min (co-eval with PT)    Marie Beltre OT  7/23/2020

## 2020-07-23 NOTE — PLAN OF CARE
Pt admitted from [facility]; per previous SW note, was there under SNF care for IV abx; plan for patient to return requiring 7 days IV abx; TN forwarded updated clinicals and abx recs to [facility] via  to determine eligibility to return; pending review        07/23/20 1023   Post-Acute Status   Post-Acute Authorization Placement  (SNF)   Post-Acute Placement Status Referrals Sent  ([facility])   Discharge Delays None known at this time   Discharge Plan   Discharge Plan A Skilled Nursing Facility

## 2020-07-23 NOTE — ASSESSMENT & PLAN NOTE
Chronic right heel pressure wound not healing x 3 months.   MRI showed osteomyelitis of the calcaneus with complete to near complete disruption of the Achilles tendon  She was discharged to SNF on regimen of Ceftriaxone 1 gram IV q 24 hours plus Vancomycin 500 mg IV q 48 hours (goal trough is 15-20) with end date of 7/27/2020.  (Tunnel catheter placed for IV antibiotic administration on 01/06/22 by IR).  Appreciate ID input.  Vanc stopped.

## 2020-07-23 NOTE — PLAN OF CARE
Problem: Occupational Therapy Goal  Goal: Occupational Therapy Goal  Description: Goals to be met by: 08/06/20    Patient will increase functional independence with ADLs by performing:    Feeding with Set-up Assistance.  UE Dressing with Minimal Assistance.  Grooming while supported position with Set-up Assistance.  Sitting at edge of bed x10 minutes with Stand-by Assistance.  Rolling to Bilateral with Moderate Assistance.   Supine to sit with Moderate Assistance.  Upper extremity exercise program x10 reps per handout, with assistance as needed.    Outcome: Ongoing, Progressing     MAX A x2 for most aspects of bed mobility. Pt known to rehab dept from last admission with discharge to SNF 6/24/20- pt was self-limiting then and OT discussed with nurse at [facility], Ms. Jaramillo, pt is known to refuse therapy at SNF. OT rec halfway NH placement vs. HHOT with 24 hour care with hospital bed, cindy lift, and wheelchair.

## 2020-07-23 NOTE — ASSESSMENT & PLAN NOTE
Unclear if patient had LUTS. Has mild pyuria and urine culture with GNR.  · De-escalate to ertapenem.   · Treat for 7 days from today.

## 2020-07-23 NOTE — PROGRESS NOTES
[facility] Hospital Medicine  Progress Note    Patient Name: Tasneem Lynn  MRN: [id number]  Patient Class: IP- Inpatient   Admission Date: 7/20/2020  Length of Stay: 3 days  Attending Physician: Neel Trejo MD  Primary Care Provider: To Obtain Unable        Subjective:     Principal Problem:Acute respiratory failure with hypoxia and hypercarbia        HPI:  75 year old female with CHF, HTN, HLP, DM2, hypothyroidism, CKD stage 4, chronic pain, obesity, and h/o recent admission from 6/12 - 6/24/20 for right foot osteomyelitis treated with Rocephin and vancomycin planned for until 7/27/20 and CVA (1986) who presented from nursing home for altered mental status that started sometime yesterday.  EMS was called out, noted sats of 85% on room air upon their arrival and status post treatment with Narcan with no improvement in her mentation.  In the ER, she was noted to be hypertensive and was hypoxic with treatment rendered with BiPAP.  Workup with head CT that was unremarkable, chest x-ray with pulmonary edema along with questionable right infiltrate, UA showed 3+ leukocyte many bacteria, H&H 7.6 and 26.3 and hemoccult that was positive.  BUN and creatinine 125/1.8. CMP remarkable for a potassium of 6.2 which was repeated - potassium 6.7.  Emergent treatment rendered with nebulizer treatment, insulin, calcium gluconate, and bicarb.  Procalcitonin 0.2, lactic acid 0.6, COVID-19 negative, D-dimer 2.2, .    Overview/Hospital Course:  Ms Lynn presented with acute respiratory failure with hypoxia and acute encephalopathy. Admitted to ICU, placed on BiPAP and on precedex for hyperactive encephalopathy, and initiated on IV diuresis as pulmonary edema highly suspected 2/2 diastolic HF. Hgb noted to be lower than baseline with a positive guaiac stool in the ED and BUN higher than expected for level of Cr. Placed on pantoprazole IV and gastroenterology consulted. No melena or blood in stool while in  the ICU. Did not require vasopressor support. Did require one unit of PRBCs for Hgb that slowly trended to < 7 g/dL. Vancomycin continued for known osteomyelitis of the calcaneus (with complete to near complete disruption of the Achilles tendon) as well as on ceftriaxone. Bone biopsy when diagnosed was negative. Ceftriaxone stopped and placed on meropenem for known history ESBL urinary tract infection. Continued on vancomycin. Could not obtain sputum sample. Urine culture grew > 100,000 cfu/mL Klebsiella pneumoniae ESBL. 1 out of 6 blood culture bottles with coag negative Staph likely contaminant. Infectious disease recommends continuing meropenem for now with plans for ertapenem on 7/23. In general, patient clinically improved from respiratory standpoint and de-escalated from BiPAP to high flow then ultimately low flow. Started on a diet. Holding furosemide IV temporarily. Tentative plan for endoscopy prior to discharge. PT/OT consulted. Stepped down to floor on 7/22.    Interval History: No acute events overnight.    Review of Systems   HENT: Negative for ear discharge and ear pain.    Eyes: Negative for discharge and itching.   Endocrine: Negative for cold intolerance and heat intolerance.   Neurological: Negative for seizures and syncope.     Objective:     Vital Signs (Most Recent):  Temp: 97.6 °F (36.4 °C) (07/23/20 1120)  Pulse: 75 (07/23/20 1120)  Resp: 18 (07/23/20 1120)  BP: (!) 152/67 (07/23/20 1120)  SpO2: 97 % (07/23/20 1120) Vital Signs (24h Range):  Temp:  [97.6 °F (36.4 °C)-98.6 °F (37 °C)] 97.6 °F (36.4 °C)  Pulse:  [74-85] 75  Resp:  [17-26] 18  SpO2:  [92 %-100 %] 97 %  BP: (126-181)/() 152/67     Weight: 128 kg (282 lb 3 oz)  Body mass index is 51.6 kg/m².    Intake/Output Summary (Last 24 hours) at 7/23/2020 2770  Last data filed at 7/23/2020 0452  Gross per 24 hour   Intake 0 ml   Output 200 ml   Net -200 ml      Physical Exam  Vitals signs and nursing note reviewed.   Constitutional:        General: She is not in acute distress.     Appearance: She is not toxic-appearing.   HENT:      Head: Normocephalic and atraumatic.   Cardiovascular:      Rate and Rhythm: Normal rate and regular rhythm.   Pulmonary:      Effort: Pulmonary effort is normal.      Breath sounds: No wheezing or rales.   Abdominal:      Palpations: Abdomen is soft.   Skin:     Capillary Refill: Capillary refill takes less than 2 seconds.      Comments: Dressing applied to foot   Neurological:      Mental Status: She is alert.      Comments: Oriented to person and place. Able to interact. I bit slow to answer but thought process is adequate   Psychiatric:         Cognition and Memory: She exhibits impaired recent memory.         Significant Labs:   BMP:   Recent Labs   Lab 07/23/20  0411   *      K 4.7      CO2 28   *   CREATININE 2.5*   CALCIUM 8.1*   MG 2.2     CBC:   Recent Labs   Lab 07/22/20  0215 07/23/20  0411   WBC 9.55 10.65   HGB 6.8* 8.2*   HCT 23.9* 28.5*    284       Significant Imaging: I have reviewed all pertinent imaging results/findings within the past 24 hours.      Assessment/Plan:      * Acute respiratory failure with hypoxia and hypercarbia  Likely 2/2 pulmonary edema as she improved with IV diuresis.   Pneumonia could not be ruled out. No sputum sample obtained as none was produced for sampling  Is on meropenem for ESBL Klebsiella UTI and vancomycin for osteomyelitis  On low flow nasal cannula and BiPAP nightly as she may have underlying obesity hypoventilation syndrome  PT/OT consulted for mobilization  Started diet. Aspiration precautions. Refusing nightly Bipap.        Urinary tract infection due to extended-spectrum beta lactamase (ESBL)-producing Klebsiella  ESBL Klebsiella  ABx changed to Ertapenem.  Appreciate ID recs.      Possible pneumonia of right lower lobe due to infectious organism        Acute blood loss anemia  Noted drop in H&H compared to previous levels along  with Hemoccult-positive stool  Also uremia out of proportion with creatinine concerning for GI bleed  Stopped aspirin and continuing on Protonix IV q.12  No melena nor blood in stool while in ICU  Mechanical DVT prophylaxis  Responded appropriately to transfusion.  EGD today.    Acute on chronic congestive heart failure  Patient with signs of volume overload with pulmonary edema noted on exam  Resolved with IV diuresis.    Chronic osteomyelitis of right foot with draining sinus  Chronic right heel pressure wound not healing x 3 months.   MRI showed osteomyelitis of the calcaneus with complete to near complete disruption of the Achilles tendon  She was discharged to SNF on regimen of Ceftriaxone 1 gram IV q 24 hours plus Vancomycin 500 mg IV q 48 hours (goal trough is 15-20) with end date of 7/27/2020.  (Tunnel catheter placed for IV antibiotic administration on 01/06/22 by IR).  Appreciate ID input.  Vanc stopped.      Wound of right leg  Discussed above    CKD (chronic kidney disease) stage 4, GFR 15-29 ml/min  Hyperkalemia resolved. Hyperuremia still present but asymptomatic right now  Will hold IV furosemide temporarily but will need to restart if requiring more supplemental O2  Cardiac monitoring.    Nephrology on board.  Appreciate further recommendations.  Slight increase in Creat.  Stop Vanc.    Hyperlipidemia  Continue rosuvastatin    History of CVA (cerebrovascular accident)  CVA in 1986 with left side weakness per patient report.   Reports not getting out of bed much since 4/2010 due to wound at right heel.    Continue statin, but aspirin hold due to possible GI bleed    Morbid obesity with BMI of 50.0-59.9, adult  To be addressed as outpatient after acute issues are addressed    Hypothyroidism  Continue levothyroxine  Recent TSH was normal and will not be repeated    Essential hypertension  Continue amlodipine and carvedilol for now  P.r.n. hydralazine    Type 2 diabetes mellitus, uncontrolled, with renal  complications  Hemoglobin A1C   Date Value Ref Range Status   06/13/2020 7.5 (H) 4.0 - 5.6 % Final   Will hold outpatient regimen  Decrease long acting insulin to avoid hypoglycemic event  PO intake as tolerated given mental state  Will further adjust insulin regimen as necessary to achieve goals between 140 - 180      VTE Risk Mitigation (From admission, onward)         Ordered     IP VTE HIGH RISK PATIENT  Once      07/20/20 1923     Place sequential compression device  Until discontinued      07/20/20 1923                      Neel Trejo MD  Department of Hospital Medicine   [facility]

## 2020-07-23 NOTE — ASSESSMENT & PLAN NOTE
Noted drop in H&H compared to previous levels along with Hemoccult-positive stool  Also uremia out of proportion with creatinine concerning for GI bleed  Stopped aspirin and continuing on Protonix IV q.12  No melena nor blood in stool while in ICU  Mechanical DVT prophylaxis  Responded appropriately to transfusion.  EGD today.

## 2020-07-23 NOTE — RESPIRATORY THERAPY
Pt. Constantly taking off BIPAP mask.  Pt. States that she does not want to wear BIPAP.  Pt. Was explained the importance of BIPAP therapy however she still refused. Pt. Placed back on NC 3LPM; SpO2 98%, RR 17.  PTs. RN notified. BIPAP on standby in pts. Room.

## 2020-07-23 NOTE — NURSING
Dressing changed over Rt foot - anterior side  and heels. Minimal drainage noted - yellowish and clear with no odor. Cleansed with N. Saline. VAshe applied wet to dry and kerlix to hold.

## 2020-07-23 NOTE — [employer] PATIENT INSTRUCTIONS
Discharge Summary/Instructions after an Endoscopic Procedure  Patient Name: Tasneem Lynn  Patient MRN: [id number]  Patient YOB: 1945 Thursday, July 23, 2020  Halle Hughes MD  RESTRICTIONS:  During your procedure today, you received medications for sedation.  These   medications may affect your judgment, balance and coordination.  Therefore,   for 24 hours, you have the following restrictions:   - DO NOT drive a car, operate machinery, make legal/financial decisions,   sign important papers or drink alcohol.    ACTIVITY:  Today: no heavy lifting, straining or running due to procedural   sedation/anesthesia.  The following day: return to full activity including work.  DIET:  Eat and drink normally unless instructed otherwise.     TREATMENT FOR COMMON SIDE EFFECTS:  - Mild abdominal pain, nausea, belching, bloating or excessive gas:  rest,   eat lightly and use a heating pad.  - Sore Throat: treat with throat lozenges and/or gargle with warm salt   water.  - Because air was used during the procedure, expelling large amounts of air   from your rectum or belching is normal.  - If a bowel prep was taken, you may not have a bowel movement for 1-3 days.    This is normal.  SYMPTOMS TO WATCH FOR AND REPORT TO YOUR PHYSICIAN:  1. Abdominal pain or bloating, other than gas cramps.  2. Chest pain.  3. Back pain.  4. Signs of infection such as: chills or fever occurring within 24 hours   after the procedure.  5. Rectal bleeding, which would show as bright red, maroon, or black stools.   (A tablespoon of blood from the rectum is not serious, especially if   hemorrhoids are present.)  6. Vomiting.  7. Weakness or dizziness.  GO DIRECTLY TO THE NEAREST EMERGENCY ROOM IF YOU HAVE ANY OF THE FOLLOWING:      Difficulty breathing              Chills and/or fever over 101 F   Persistent vomiting and/or vomiting blood   Severe abdominal pain   Severe chest pain   Black, tarry stools   Bleeding- more than one  tablespoon   Any other symptom or condition that you feel may need urgent attention  Your doctor recommends these additional instructions:  If any biopsies were taken, your doctors clinic will contact you in 1 to 2   weeks with any results.  - Return patient to hospital [facility] for ongoing care.   - Resume previous diet.   - Elective colonoscopy.   - F/U Office in 2- 3 weeks ([phone])  For questions, problems or results please call your physician - Halle Hughes MD at Work:  [phone].  [facility] can be reached at [phone]     IF A COMPLICATION OR EMERGENCY SITUATION ARISES AND YOU ARE UNABLE TO REACH   YOUR PHYSICIAN - GO DIRECTLY TO THE EMERGENCY ROOM.  Halle Hughes MD  7/23/2020 1:50:17 PM  This report has been verified and signed electronically.  [employer]

## 2020-07-23 NOTE — ASSESSMENT & PLAN NOTE
Patient with signs of volume overload with pulmonary edema noted on exam  Resolved with IV diuresis.

## 2020-07-23 NOTE — PROGRESS NOTES
Pharmacokinetic Assessment Follow Up: IV Vancomycin    Vancomycin serum concentration assessment(s):    The random level was drawn correctly and can be used to guide therapy at this time. The measurement is above the desired definitive target range of 10 to 20 mcg/mL.    Vancomycin Regimen Plan:    No dose today.  Re-dose when the random level is less than 20 mcg/mL, next level to be drawn at 0400 on 7/24/2020    Drug levels (last 3 results):  Recent Labs   Lab Result Units 07/21/20  0320 07/22/20  0215 07/23/20  0411   Vancomycin, Random ug/mL 34.8 27.1 23.5       Pharmacy will continue to follow and monitor vancomycin.    Please contact pharmacy at extension [phone] for questions regarding this assessment.    Thank you for the consult,   Can Samuel Jr       Patient brief summary:  Tasneem Lynn is a 75 y.o. female initiated on antimicrobial therapy with IV Vancomycin for treatment of  pneumonia    Drug Allergies:   Review of patient's allergies indicates:   Allergen Reactions    Corticosteroids (glucocorticoids) Edema       Actual Body Weight:   128 kg    Renal Function:   Estimated Creatinine Clearance: 25 mL/min (A) (based on SCr of 2.5 mg/dL (H)).,     Dialysis Method (if applicable):  N/A    CBC (last 72 hours):  Recent Labs   Lab Result Units 07/20/20  0951 07/21/20  0320 07/22/20  0215 07/23/20  0411   WBC K/uL 8.65 8.21 9.55 10.65   Hemoglobin g/dL 7.6* 7.0* 6.8* 8.2*   Hematocrit % 26.3* 24.1* 23.9* 28.5*   Platelets K/uL 298 274 284 284   Gran% % 71.1 73.6* 57.1 60.9   Lymph% % 21.6 13.2* 28.8 24.9   Mono% % 6.5 11.1 10.5 10.8   Eosinophil% % 0.5 1.8 3.1 2.8   Basophil% % 0.1 0.1 0.2 0.1   Differential Method  Automated Automated Automated Automated       Metabolic Panel (last 72 hours):  Recent Labs   Lab Result Units 07/20/20  0951 07/20/20  1031 07/21/20  0320 07/21/20  1430 07/21/20  1552 07/21/20  2105 07/22/20  0215 07/23/20  0411   Sodium mmol/L 141  --  145  --   --  146* 146* 142   Potassium  mmol/L 6.7*  --  5.4*  --   --  4.9 4.9 4.7   Chloride mmol/L 110  --  110  --   --  111* 110 107   CO2 mmol/L 25  --  27  --   --  28 28 28   Glucose mg/dL 146*  --  104  --   --  97 87 131*   Glucose, UA   --  Negative  --   --   --   --   --   --    BUN, Bld mg/dL 113*  --  111*  --   --  108* 112* 104*   Creatinine mg/dL 1.9*  --  2.0*  --   --  2.3* 2.3* 2.5*   Creatinine, Random Ur mg/dL  --   --   --  26.9  --   --   --   --    Albumin g/dL 2.8*  --  2.5*  --   --  2.5* 2.3* 2.4*   Total Bilirubin mg/dL 0.2  --  0.2  --   --  0.2 0.2 0.2   Alkaline Phosphatase U/L 99  --  82  --   --  77 79 82   AST U/L 14  --  12  --   --  11 12 16   ALT U/L 15  --  12  --   --  10 7* 10   Magnesium mg/dL  --   --  2.3  --   --   --  2.2 2.2   Phosphorus mg/dL  --   --  4.4  --  4.1  --   --   --        Vancomycin Administrations:  vancomycin given in the last 96 hours                     vancomycin (VANCOCIN) 2,000 mg in dextrose 5 % 500 mL IVPB (mg) 2,000 mg New Bag 07/20/20 2497                    Microbiologic Results:  Microbiology Results (last 7 days)       Procedure Component Value Units Date/Time    Clostridium difficile EIA [[id number]] Collected: 07/22/20 1119    Order Status: Completed Specimen: Stool Updated: 07/22/20 1343     C. diff Antigen Negative     C difficile Toxins A+B, EIA Negative     Comment: Testing not recommended for children <24 months old.       Blood culture x two cultures. Draw prior to antibiotics. [[id number]] Collected: 07/20/20 1122    Order Status: Completed Specimen: Blood from Line, Subclavian, Right Updated: 07/22/20 1303     Blood Culture, Routine No Growth to date      No Growth to date      No Growth to date    Narrative:      Aerobic and anaerobic    Blood culture [[id number]] Collected: 07/20/20 1025    Order Status: Completed Specimen: Blood from Peripheral, Hand, Right Updated: 07/22/20 1303     Blood Culture, Routine No Growth to date      No Growth to date      No Growth to date     Narrative:      From central line R chest    Urine culture [[id number]]  (Abnormal)  (Susceptibility) Collected: 07/20/20 1031    Order Status: Completed Specimen: Urine Updated: 07/22/20 0817     Urine Culture, Routine KLEBSIELLA PNEUMONIAE ESBL  >100,000 cfu/ml      Narrative:      Specimen Source->Urine    Blood culture x two cultures. Draw prior to antibiotics. [[id number]]  (Abnormal) Collected: 07/20/20 0951    Order Status: Completed Specimen: Blood from Peripheral, Forearm, Right Updated: 07/22/20 0751     Blood Culture, Routine Gram stain aer bottle: Gram positive cocci in clusters resembling Staph       Results called to and read back by: Reshma Quiles 07/21/2020  11:15      COAGULASE-NEGATIVE STAPHYLOCOCCUS SPECIES  Organism is a probable contaminant      Narrative:      Aerobic and anaerobic    Culture, Respiratory with Gram Stain [[id number]]     Order Status: No result Specimen: Respiratory from Endotracheal Aspirate

## 2020-07-23 NOTE — PLAN OF CARE
Chart reviewed to address discharge needs and to update discharge plan; pt in with acute respiratory failure and UTI; currently requiring IV abx; pt admitted from [facility] and needs to return for additional IV abx; pending response from [facility] to see if patient can return under skilled care; [place] called and spoke with daughter Merrill [phone] informed of plans and need for additional days of IV abx; in agreement for patient to return back to [facility]; 0 changes in previously given information      Dispo: Transfer back to [facility] once clinically stable       07/23/20 1036   Discharge Assessment   Assessment Type Discharge Planning Reassessment   Confirmed/corrected address and phone number on facesheet? Yes   Assessment information obtained from? Caregiver   Expected Length of Stay (days) 2   Communicated expected length of stay with patient/caregiver yes   Prior to hospitilization cognitive status: Alert/Oriented   Prior to hospitalization functional status: Assistive Equipment;Needs Assistance   Current cognitive status: Unable to Assess   Current Functional Status: Needs Assistance;Assistive Equipment   Facility Arrived From: [facility]   Lives With child(jose), adult   Able to Return to Prior Arrangements yes   Is patient able to care for self after discharge? Unable to determine at this time (comments)   Who are your caregiver(s) and their phone number(s)? Merrill Lnyn [phone]   Patient's perception of discharge disposition skilled nursing facility   Patient currently being followed by outpatient case management? No   Patient currently receives any other outside agency services? No   Equipment Currently Used at Home wheelchair   Part D Coverage [employer]   Do you have any problems affording any of your prescribed medications? No   Is the patient taking medications as prescribed? yes   Does the patient have transportation home? Yes   Transportation Anticipated agency   Does the  patient receive services at the [facility]? No   Discharge Plan A Skilled Nursing Facility   DME Needed Upon Discharge  none   Patient/Family in Agreement with Plan yes

## 2020-07-23 NOTE — ASSESSMENT & PLAN NOTE
Hyperkalemia resolved. Hyperuremia still present but asymptomatic right now  Will hold IV furosemide temporarily but will need to restart if requiring more supplemental O2  Cardiac monitoring.    Nephrology on board.  Appreciate further recommendations.  Slight increase in Creat.  Stop Vanc.

## 2020-07-23 NOTE — ASSESSMENT & PLAN NOTE
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   Reports not getting out of bed much since 4/2010 due to wound at right heel.    Continue statin, but aspirin hold due to possible GI bleed

## 2020-07-23 NOTE — PT/OT/SLP EVAL
Physical Therapy Evaluation    Patient Name:  Tasneem Lynn   MRN:  [id number]    Recommendations:     Discharge Recommendations:  home health PT, nursing facility, basic   Discharge Equipment Recommendations: wheelchair, lift device, hospital bed   Barriers to discharge: None    Assessment:     Tasneem Lynn is a 75 y.o. female admitted with a medical diagnosis of Acute respiratory failure with hypoxia and hypercarbia.  She presents with the following impairments/functional limitations:  weakness, gait instability, decreased upper extremity function, decreased ROM, impaired cardiopulmonary response to activity, impaired endurance, impaired balance, decreased lower extremity function, impaired coordination, impaired joint extensibility, decreased safety awareness, impaired fine motor, impaired muscle length, impaired self care skills, impaired cognition, impaired skin, orthopedic precautions, impaired functional mobilty, decreased coordination, edema .    Pt was bedbound in June prior to last admit due to R foot pain and now bedbound due to osteo in R foot and NWB status. She will need cindy lift for w/c transfers at NH or home.  She is currently confused. Weighs 282 lbs (majority of it around midsection which renders moving to sit on edge of bed problematic for pt and therapy staff). Historically has made no progress with PT and per SNF pt did not participate much in therapy (she also needed max encouragement to participate during last acute admit). Progressive Mobility Level 1: In-bed activity. Positioning, ROM, and turning as tolerated.     Rehab Prognosis: Poor; patient would benefit from acute skilled PT services to address these deficits and reach maximum level of function.    Recent Surgery: Procedure(s) (LRB):  EGD (ESOPHAGOGASTRODUODENOSCOPY) (N/A)      Plan:     During this hospitalization, patient to be seen 3 x/week to address the identified rehab impairments via therapeutic activities, therapeutic exercises,  neuromuscular re-education and progress toward the following goals:    · Plan of Care Expires:  08/06/20    Subjective     Chief Complaint: none  Patient/Family Comments/goals: does not state  Pain/Comfort:  · Pain Rating 1: 0/10    Patients cultural, spiritual, Quaker conflicts given the current situation: no    Living Environment:  From [facility]. Prior to that was mostly bedbound at home with family.  Prior to admission, patients level of function was uknown. Assume a cindy transfer to / due to NWB RLE and morbid obesity combined with gross generalized weakness.  Equipment used at home:  .  DME owned (not currently used): none.  Upon discharge, patient will have assistance from n/a.    Objective:     Communicated with RN prior to session.  Patient found with gown off, HOB elevated. with bed alarm, telemetry, peripheral IV, oxygen, pressure relief boots, PICC line, bowel management system  upon PT entry to room.  Pt found with O2 NC removed from nares. Educated pt on need for O2 and replaced in her nares.     General Precautions: Standard, fall, respiratory   Orthopedic Precautions:RLE non weight bearing   Braces: N/A     Exams:  · answers simple questions. follows simple motor commands  · Cognitive Exam:  Patient is oriented to Person and Place; confused to age, time, and situation  · Gross Motor Coordination:  WFL  · Postural Exam:  Patient presented with the following abnormalities:    · -       Rounded shoulders  · -       Forward head  · Skin Integrity/Edema:      · -       Skin integrity: Wound R foot wrapped with kerlex  · RLE ROM: Deficits: decreased hip and knee ROM 2/2 adipose.   · RLE Strength: Deficits: grossly 3/5  · LLE ROM: Deficits: as RLE  · LLE Strength: as RLE    Functional Mobility:  · Bed Mobility:     · Supine to Sit: dependence and of 2 persons  · Pt initiates via advancing legs and reaching for PT's HHA   · Sit to Supine: maximal assistance and of 2 persons  · Balance: fair  seated at EOB; unable to safely scoot to EOB to place feet on floor  · Sat EOB <5 min with Ever for static balance  · Unable to lean forward to achieve neutral sitting posture despite cues and assist (due to protuberant abdomen vs confusion?)  · Pt uncomfortable appearing at EOB, grimacing. Attempts to scoot to EOB unsuccessful     Therapeutic Activities and Exercises:   n/a    AM-PAC 6 CLICK MOBILITY  Total Score:7     Patient left HOB elevated with call button in reach and bed alarm on.    GOALS:   Multidisciplinary Problems     Physical Therapy Goals        Problem: Physical Therapy Goal    Goal Priority Disciplines Outcome Goal Variances Interventions   Physical Therapy Goal     PT, PT/OT Adequate for Care Transition     Description: Goals to be met by:      Patient will increase functional independence with mobility by performin. Supine to sit with Moderate Assistance  2. Sit to supine with Moderate Assistance  3. Rolling to Left and Right with Stand-by Assistance.  4. Lower extremity exercise program x20 reps per handout, with supervision                     History:     Past Medical History:   Diagnosis Date    Arthritis     Gout    CHF (congestive heart failure)     Coronary artery disease     Diabetes mellitus     HLD (hyperlipidemia)     Hypertension     Obese     Stroke     1986    Thyroid disease        Past Surgical History:   Procedure Laterality Date    right hand surgery      right knee surgery Right     partial plate       Time Tracking:     PT Received On: 20  PT Start Time: 925     PT Stop Time: 945  PT Total Time (min): 20 min     Billable Minutes: Evaluation 20      Funmilayo Rush, PT  2020

## 2020-07-23 NOTE — TRANSFER OF CARE
"Anesthesia Transfer of Care Note    Patient: Tasneem Lynn    Procedure(s) Performed: Procedure(s) (LRB):  EGD (ESOPHAGOGASTRODUODENOSCOPY) (N/A)    Patient location: GI    Anesthesia Type: general    Transport from OR: Transported from OR on room air with adequate spontaneous ventilation    Post pain: adequate analgesia    Post assessment: no apparent anesthetic complications    Post vital signs: stable    Level of consciousness: alert, awake and responds to stimulation    Nausea/Vomiting: no nausea/vomiting    Complications: none    Transfer of care protocol was followed      Last vitals:   Visit Vitals  BP (!) 132/92 (BP Location: Right arm, Patient Position: Lying)   Pulse 82   Temp 36.7 °C (98.1 °F) (Oral)   Resp 16   Ht 5' 2.01" (1.575 m)   Wt 128 kg (282 lb 3 oz)   SpO2 100%   Breastfeeding No   BMI 51.60 kg/m²     "

## 2020-07-23 NOTE — PLAN OF CARE
Important Message from Medicare and discharge appeal process reviewed with patient's daughter, Merrill Lynn via phone due to patient's cognitive limitations; Ms Lynn was allowed the opportunity to ask questions, after which verbalized understanding; Ms Lynn requested her copy be emailed to [email or web address]       07/23/20 1033   Medicare Message   Important Message from Medicare regarding Discharge Appeal Rights Given to patient/caregiver;Explained to patient/caregiver;Signed/date by patient/caregiver   Date IMM was signed 07/23/20   Time IMM was signed 1031

## 2020-07-24 VITALS
HEART RATE: 83 BPM | DIASTOLIC BLOOD PRESSURE: 72 MMHG | WEIGHT: 285.5 LBS | BODY MASS INDEX: 52.54 KG/M2 | HEIGHT: 62 IN | OXYGEN SATURATION: 98 % | TEMPERATURE: 98 F | SYSTOLIC BLOOD PRESSURE: 164 MMHG | RESPIRATION RATE: 18 BRPM

## 2020-07-24 PROBLEM — D62 ACUTE BLOOD LOSS ANEMIA: Status: RESOLVED | Noted: 2020-07-20 | Resolved: 2020-07-24

## 2020-07-24 PROBLEM — J96.02 ACUTE RESPIRATORY FAILURE WITH HYPOXIA AND HYPERCARBIA: Status: RESOLVED | Noted: 2020-07-20 | Resolved: 2020-07-24

## 2020-07-24 PROBLEM — D63.8 ANEMIA OF CHRONIC DISORDER: Chronic | Status: ACTIVE | Noted: 2020-07-24

## 2020-07-24 PROBLEM — J96.01 ACUTE RESPIRATORY FAILURE WITH HYPOXIA AND HYPERCARBIA: Status: RESOLVED | Noted: 2020-07-20 | Resolved: 2020-07-24

## 2020-07-24 PROBLEM — I50.9 ACUTE ON CHRONIC CONGESTIVE HEART FAILURE: Status: RESOLVED | Noted: 2020-07-20 | Resolved: 2020-07-24

## 2020-07-24 PROBLEM — J18.9 PNEUMONIA OF RIGHT LOWER LOBE DUE TO INFECTIOUS ORGANISM: Status: RESOLVED | Noted: 2020-07-21 | Resolved: 2020-07-24

## 2020-07-24 LAB
ALBUMIN SERPL BCP-MCNC: 2.5 G/DL (ref 3.5–5.2)
ALP SERPL-CCNC: 85 U/L (ref 55–135)
ALT SERPL W/O P-5'-P-CCNC: 13 U/L (ref 10–44)
ANION GAP SERPL CALC-SCNC: 5 MMOL/L (ref 8–16)
AST SERPL-CCNC: 12 U/L (ref 10–40)
BASOPHILS # BLD AUTO: 0.04 K/UL (ref 0–0.2)
BASOPHILS NFR BLD: 0.4 % (ref 0–1.9)
BILIRUB SERPL-MCNC: 0.3 MG/DL (ref 0.1–1)
BUN SERPL-MCNC: 92 MG/DL (ref 8–23)
CALCIUM SERPL-MCNC: 8.8 MG/DL (ref 8.7–10.5)
CHLORIDE SERPL-SCNC: 108 MMOL/L (ref 95–110)
CO2 SERPL-SCNC: 29 MMOL/L (ref 23–29)
CREAT SERPL-MCNC: 2.3 MG/DL (ref 0.5–1.4)
DIFFERENTIAL METHOD: ABNORMAL
EOSINOPHIL # BLD AUTO: 0.4 K/UL (ref 0–0.5)
EOSINOPHIL NFR BLD: 3.4 % (ref 0–8)
ERYTHROCYTE [DISTWIDTH] IN BLOOD BY AUTOMATED COUNT: 15.6 % (ref 11.5–14.5)
EST. GFR  (AFRICAN AMERICAN): 23 ML/MIN/1.73 M^2
EST. GFR  (NON AFRICAN AMERICAN): 20 ML/MIN/1.73 M^2
GLUCOSE SERPL-MCNC: 111 MG/DL (ref 70–110)
HCT VFR BLD AUTO: 30.7 % (ref 37–48.5)
HGB BLD-MCNC: 8.9 G/DL (ref 12–16)
IMM GRANULOCYTES # BLD AUTO: 0.07 K/UL (ref 0–0.04)
IMM GRANULOCYTES NFR BLD AUTO: 0.7 % (ref 0–0.5)
LYMPHOCYTES # BLD AUTO: 2.4 K/UL (ref 1–4.8)
LYMPHOCYTES NFR BLD: 22.5 % (ref 18–48)
MAGNESIUM SERPL-MCNC: 2.2 MG/DL (ref 1.6–2.6)
MCH RBC QN AUTO: 28.2 PG (ref 27–31)
MCHC RBC AUTO-ENTMCNC: 29 G/DL (ref 32–36)
MCV RBC AUTO: 97 FL (ref 82–98)
MONOCYTES # BLD AUTO: 1.1 K/UL (ref 0.3–1)
MONOCYTES NFR BLD: 10.5 % (ref 4–15)
NEUTROPHILS # BLD AUTO: 6.7 K/UL (ref 1.8–7.7)
NEUTROPHILS NFR BLD: 62.5 % (ref 38–73)
NRBC BLD-RTO: 0 /100 WBC
PLATELET # BLD AUTO: 303 K/UL (ref 150–350)
PMV BLD AUTO: 9.2 FL (ref 9.2–12.9)
POCT GLUCOSE: 108 MG/DL (ref 70–110)
POCT GLUCOSE: 140 MG/DL (ref 70–110)
POCT GLUCOSE: 141 MG/DL (ref 70–110)
POTASSIUM SERPL-SCNC: 4.8 MMOL/L (ref 3.5–5.1)
PROT SERPL-MCNC: 8.1 G/DL (ref 6–8.4)
RBC # BLD AUTO: 3.16 M/UL (ref 4–5.4)
SODIUM SERPL-SCNC: 142 MMOL/L (ref 136–145)
WBC # BLD AUTO: 10.69 K/UL (ref 3.9–12.7)

## 2020-07-24 PROCEDURE — 27000221 HC OXYGEN, UP TO 24 HOURS: Mod: HCNC

## 2020-07-24 PROCEDURE — 85025 COMPLETE CBC W/AUTO DIFF WBC: CPT | Mod: HCNC

## 2020-07-24 PROCEDURE — 83735 ASSAY OF MAGNESIUM: CPT | Mod: HCNC

## 2020-07-24 PROCEDURE — 63600175 PHARM REV CODE 636 W HCPCS: Mod: HCNC | Performed by: INTERNAL MEDICINE

## 2020-07-24 PROCEDURE — 36415 COLL VENOUS BLD VENIPUNCTURE: CPT | Mod: HCNC

## 2020-07-24 PROCEDURE — 94761 N-INVAS EAR/PLS OXIMETRY MLT: CPT | Mod: HCNC

## 2020-07-24 PROCEDURE — C9113 INJ PANTOPRAZOLE SODIUM, VIA: HCPCS | Mod: HCNC | Performed by: INTERNAL MEDICINE

## 2020-07-24 PROCEDURE — 80053 COMPREHEN METABOLIC PANEL: CPT | Mod: HCNC

## 2020-07-24 PROCEDURE — 25000003 PHARM REV CODE 250: Mod: HCNC | Performed by: INTERNAL MEDICINE

## 2020-07-24 RX ORDER — LOPERAMIDE HYDROCHLORIDE 2 MG/1
2 CAPSULE ORAL 4 TIMES DAILY PRN
Refills: 0
Start: 2020-07-24 | End: 2020-08-03

## 2020-07-24 RX ORDER — PANTOPRAZOLE SODIUM 40 MG/1
40 TABLET, DELAYED RELEASE ORAL DAILY
Qty: 30 TABLET | Refills: 11
Start: 2020-07-24 | End: 2021-07-24

## 2020-07-24 RX ADMIN — PANTOPRAZOLE SODIUM 40 MG: 40 INJECTION, POWDER, FOR SOLUTION INTRAVENOUS at 09:07

## 2020-07-24 RX ADMIN — CARVEDILOL 25 MG: 12.5 TABLET, FILM COATED ORAL at 06:07

## 2020-07-24 RX ADMIN — LEVOTHYROXINE SODIUM 75 MCG: 75 TABLET ORAL at 06:07

## 2020-07-24 RX ADMIN — ROSUVASTATIN CALCIUM 20 MG: 10 TABLET, COATED ORAL at 09:07

## 2020-07-24 RX ADMIN — AMLODIPINE BESYLATE 2.5 MG: 2.5 TABLET ORAL at 09:07

## 2020-07-24 RX ADMIN — ERTAPENEM 0.5 G: 1 INJECTION INTRAMUSCULAR; INTRAVENOUS at 04:07

## 2020-07-24 RX ADMIN — CARVEDILOL 25 MG: 12.5 TABLET, FILM COATED ORAL at 09:07

## 2020-07-24 NOTE — PROGRESS NOTES
WRITTEN HEALTHCARE DISCHARGE INFORMATION      Things that YOU are responsible for to Manage Your Care At Home:  1. Getting your prescriptions filled.  2. Taking you medications as directed. DO NOT MISS ANY DOSES!  3. Going to your follow-up doctor appointments. This is important because it allows the doctor to monitor your progress and to determine if any changes need to be made to your treatment plan.     If you are unable to make your follow up appointments, please call the number listed and reschedule this appointment.      After discharge, if you need assistance, you can call [facility] On Call Nurse Care Line for 24/7 assistance at [phone]     If you are experience any signs or symptoms, Call your doctor or Call 911 and come to your nearest Emergency Room.     Thank you for choosing [facility] and allowing us to care for you.        You should receive a call from [facility] Discharge Department within 48-72 hours to help manage your care after discharge. Please try to make sure that you answer your phone for this important phone call.     Follow-up Information     Annalisa Ballesteros DPM On 7/30/2020.    Specialties: Podiatry, Wound Care  Why: Hospital discharge follow up, appointment scheduled July 30th @ 0930  Contact information:  [address] [place] [id number]  [phone]

## 2020-07-24 NOTE — PHYSICIAN QUERY
PT Name: Tasneem Lynn  MR #: [id number]     Diabetic Condition Clarification     CDS/: Demetrice Walker RN CDI      Contact information: [email or web address]  This form is a permanent document in the medical record.     Query Date: July 24, 2020    By submitting this query, we are merely seeking further clarification of documentation to reflect the severity of illness of your patient. Please utilize your independent clinical judgment when addressing the question(s) below.    The medical record reflects the following:     Indicators   Supporting Clinical Findings Location in Medical Record   X Diabetes uncontrolled documented  Type 2 diabetes mellitus, uncontrolled, with renal complications    Hemoglobin A1C  06/13/2020 7.5 (H)      Hospital medicine 7/20   E Simeon NICOLAS   X Lab Value(s), POCT glucose value(s)               glucose  7/20     146  7/21     104  7/22      87  7/23     131  7/24     111 Lab results   X Treatment/Medication Will hold outpatient regimen  Start low-dose basal insulin 15 units detemir and sliding scale  Will further adjust insulin regimen as necessary to achieve goals between 140 - 180 [facility] medicine 7/20   REBEL Hendrix MD    Other       Provider, please specify the meaning of the term uncontrolled:    [  x ] Diabetes mellitus Type 2 with hyperglycemia     [   ] Other diabetes complication (please specify): ____________     [   ]  Clinically Undetermined     Present on admission (POA) status:   [   ] Yes (Y)                          [  ] Clinically Undetermined (W)  [   ] No (N)                            [   ] Documentation insufficient to determine if condition is POA (U)     Please document in your progress notes daily for the duration of treatment until resolved, and include in your discharge summary.

## 2020-07-24 NOTE — PROGRESS NOTES
Awake alert oriented x 1 NAD    Denies CNS ENT CP GI  RHEUM OR DERM SX ( how accurate is the ros??)  Past Medical History:   Diagnosis Date    Arthritis     Gout    CHF (congestive heart failure)     Coronary artery disease     Diabetes mellitus     HLD (hyperlipidemia)     Hypertension     Obese     Stroke     1986    Thyroid disease      Review of patient's allergies indicates:   Allergen Reactions    Corticosteroids (glucocorticoids) Edema       Current Facility-Administered Medications   Medication    0.9%  NaCl infusion (for blood administration)    acetaminophen tablet 650 mg    amLODIPine tablet 2.5 mg    calcium gluconate 1g in dextrose 5% 100mL (ready to mix system)    carvediloL tablet 25 mg    dextrose 50% injection 12.5 g    dextrose 50% injection 25 g    epoetin juan-epbx injection 10,000 Units    ertapenem (INVANZ) 0.5 g in sodium chloride 0.9% 100 mL IVPB    glucagon (human recombinant) injection 1 mg    glucose chewable tablet 16 g    glucose chewable tablet 24 g    hydrALAZINE injection 10 mg    insulin aspart U-100 pen 1-10 Units    levothyroxine tablet 75 mcg    ondansetron injection 4 mg    pantoprazole injection 40 mg    rosuvastatin tablet 20 mg    sodium chloride 0.9% flush 10 mL       LABS    Recent Results (from the past 24 hour(s))   POCT glucose    Collection Time: 07/23/20  4:46 PM   Result Value Ref Range    POCT Glucose 122 (H) 70 - 110 mg/dL   POCT glucose    Collection Time: 07/23/20  8:37 PM   Result Value Ref Range    POCT Glucose 181 (H) 70 - 110 mg/dL   Comprehensive metabolic panel    Collection Time: 07/24/20  6:30 AM   Result Value Ref Range    Sodium 142 136 - 145 mmol/L    Potassium 4.8 3.5 - 5.1 mmol/L    Chloride 108 95 - 110 mmol/L    CO2 29 23 - 29 mmol/L    Glucose 111 (H) 70 - 110 mg/dL    BUN, Bld 92 (H) 8 - 23 mg/dL    Creatinine 2.3 (H) 0.5 - 1.4 mg/dL    Calcium 8.8 8.7 - 10.5 mg/dL    Total Protein 8.1 6.0 - 8.4 g/dL    Albumin 2.5  (L) 3.5 - 5.2 g/dL    Total Bilirubin 0.3 0.1 - 1.0 mg/dL    Alkaline Phosphatase 85 55 - 135 U/L    AST 12 10 - 40 U/L    ALT 13 10 - 44 U/L    Anion Gap 5 (L) 8 - 16 mmol/L    eGFR if African American 23 (A) >60 mL/min/1.73 m^2    eGFR if non African American 20 (A) >60 mL/min/1.73 m^2   Magnesium    Collection Time: 07/24/20  6:30 AM   Result Value Ref Range    Magnesium 2.2 1.6 - 2.6 mg/dL   CBC auto differential    Collection Time: 07/24/20  6:30 AM   Result Value Ref Range    WBC 10.69 3.90 - 12.70 K/uL    RBC 3.16 (L) 4.00 - 5.40 M/uL    Hemoglobin 8.9 (L) 12.0 - 16.0 g/dL    Hematocrit 30.7 (L) 37.0 - 48.5 %    Mean Corpuscular Volume 97 82 - 98 fL    Mean Corpuscular Hemoglobin 28.2 27.0 - 31.0 pg    Mean Corpuscular Hemoglobin Conc 29.0 (L) 32.0 - 36.0 g/dL    RDW 15.6 (H) 11.5 - 14.5 %    Platelets 303 150 - 350 K/uL    MPV 9.2 9.2 - 12.9 fL    Immature Granulocytes 0.7 (H) 0.0 - 0.5 %    Gran # (ANC) 6.7 1.8 - 7.7 K/uL    Immature Grans (Abs) 0.07 (H) 0.00 - 0.04 K/uL    Lymph # 2.4 1.0 - 4.8 K/uL    Mono # 1.1 (H) 0.3 - 1.0 K/uL    Eos # 0.4 0.0 - 0.5 K/uL    Baso # 0.04 0.00 - 0.20 K/uL    nRBC 0 0 /100 WBC    Gran% 62.5 38.0 - 73.0 %    Lymph% 22.5 18.0 - 48.0 %    Mono% 10.5 4.0 - 15.0 %    Eosinophil% 3.4 0.0 - 8.0 %    Basophil% 0.4 0.0 - 1.9 %    Differential Method Automated    POCT glucose    Collection Time: 07/24/20  7:50 AM   Result Value Ref Range    POCT Glucose 108 70 - 110 mg/dL   POCT glucose    Collection Time: 07/24/20 11:45 AM   Result Value Ref Range    POCT Glucose 141 (H) 70 - 110 mg/dL   ]    I/O last 3 completed shifts:  In: 300 [P.O.:200; I.V.:100]  Out: 650 [Urine:650]    Vitals:    07/24/20 0443 07/24/20 0752 07/24/20 0853 07/24/20 1147   BP: (!) 148/66 (!) 148/67  (!) 121/94   Pulse: 84 82  80   Resp: 18 18  18   Temp: 97.6 °F (36.4 °C) 98.1 °F (36.7 °C)  98.1 °F (36.7 °C)   TempSrc:  Oral  Oral   SpO2: 100% 100% 100% 100%   Weight:       Height:           No Jvd, Thyromegaly  or Lymphadenopathy  Lungs: Fairly clear anteriorly and laterally  Cor: RRR no G or rubs  Abd: Soft benign good bowel sounds non tender  Ext: trace edema    A)    Advanced renal disease likely related to DM,HTN,CHF  Marquis on ckd4 with small kidneys but with great UO creat down  Resp insuf   Anemia of acute and chronic disease  HyperK better  DM with porteinuria  CHF  Protein malnutrition POA  Bladder infection with KLEBSIELLA PNEUMONIAE ESBL   Obesity  DJD  and gout  CAD  Hx of cva     P)  Renal Diet  Home meds and antibiotics  Protect non dominant arm for future access  EPO   Binders PRN  Adjust all meds to the degree of renal fx  Close follow up I/O and weights  Maintain Hydration   No need for HD or K bx at this time  Antibiotx

## 2020-07-24 NOTE — PLAN OF CARE
Problem: Infection  Goal: Infection Symptom Resolution  Outcome: Ongoing, Progressing  Intervention: Prevent or Manage Infection  Flowsheets (Taken 7/24/2020 0406)  Infection Management: aseptic technique maintained  Isolation Precautions: contact precautions maintained     Problem: Fall Injury Risk  Goal: Absence of Fall and Fall-Related Injury  Outcome: Ongoing, Progressing  Intervention: Identify and Manage Contributors to Fall Injury Risk  Flowsheets (Taken 7/24/2020 0406)  Self-Care Promotion:   independence encouraged   BADL personal objects within reach  Medication Review/Management: medications reviewed  Intervention: Promote Injury-Free Environment  Flowsheets (Taken 7/24/2020 0406)  Safety Promotion/Fall Prevention:   assistive device/personal item within reach   bed alarm set   room near unit station   side rails raised x 2   instructed to call staff for mobility  Environmental Safety Modification:   assistive device/personal items within reach   clutter free environment maintained   room near unit station     Problem: Bariatric Environmental Safety  Goal: Safety Maintained with Care  Outcome: Ongoing, Progressing  Intervention: Promote Safety and Comfort  Flowsheets (Taken 7/24/2020 0406)  Bariatric Safety: specialty bed utilized     Problem: Diabetes Comorbidity  Goal: Blood Glucose Level Within Desired Range  Outcome: Ongoing, Progressing  Intervention: Maintain Glycemic Control  Flowsheets (Taken 7/24/2020 0406)  Glycemic Management:   blood glucose monitoring   supplemental insulin given     Problem: Noninvasive Ventilation Acute  Goal: Effective Unassisted Ventilation and Oxygenation  Outcome: Ongoing, Progressing  Intervention: Monitor and Manage Noninvasive Ventilation  Flowsheets (Taken 7/24/2020 0406)  Airway/Ventilation Management: humidification applied  NPPV/CPAP Maintenance: nasal prongs adjusted

## 2020-07-24 NOTE — PLAN OF CARE
[facility] [address] [place] [address]  [phone]      Facility Transfer Orders                        07/24/2020    Admit to: SNF    Diagnoses:  Active Hospital Problems    Diagnosis  POA    Anemia of chronic disorder [D63.8]  Yes     Chronic    Urinary tract infection due to extended-spectrum beta lactamase (ESBL)-producing Klebsiella [N39.0, B96.89]  Yes    Chronic osteomyelitis of right foot with draining sinus [M86.471]  Yes    Wound of right leg [S81.801A]  Yes    CKD (chronic kidney disease) stage 4, GFR 15-29 ml/min [N18.4]  Yes    Hypothyroidism [E03.9]  Yes     Chronic    Morbid obesity with BMI of 50.0-59.9, adult [E66.01, Z68.43]  Not Applicable     Chronic    History of CVA (cerebrovascular accident) [Z86.73]  Not Applicable     Chronic    Hyperlipidemia [E78.5]  Yes     Chronic    Type 2 diabetes mellitus, uncontrolled, with renal complications [E11.29, E11.65]  Yes     Chronic    Essential hypertension [I10]  Yes     Chronic      Resolved Hospital Problems    Diagnosis Date Resolved POA    *Acute respiratory failure with hypoxia and hypercarbia [J96.01, J96.02] 07/24/2020 Yes     Priority: 1 - High    Possible pneumonia of right lower lobe due to infectious organism [J18.1] 07/24/2020 Yes    Acute on chronic congestive heart failure [I50.9] 07/24/2020 Yes    Acute blood loss anemia [D62] 07/24/2020 Yes    Hyperkalemia [E87.5] 07/23/2020 Yes       Allergies:  Review of patient's allergies indicates:   Allergen Reactions    Corticosteroids (glucocorticoids) Edema       Vitals:   Every shift (Skilled Nursing patients)    Diet: 2000 jenny ADA Renal mechanical soft diet       Activity:     - Up in a chair each morning as tolerated   - Ambulate with assistance to bathroom       Nursing Precautions:    - Aspiration precautions:             - Total assistance with meals            -  Upright 90 degrees befor during and after meals             -  Suction  at bedside          - Fall precautions   - Seizure precaution   - Decubitus precautions:        -  for positioning   - Pressure reducing foam mattress   - Turn patient every two hours. Use wedge pillows to anchor patient    Wound Care: Change dressing twice daily to right heel and right anterior foot  1.  Saline flush to both wound sites.  2. Pat completely dry  3.  Vashe wet to dry to both wound sites  4.  Apply Kerlix and paper tape  5.  Patient should be in heel protector boots at all times while in bed      CONSULTS:    PT to evaluate and treat - five times a week     OT to evaluate and treat - five times a week     ST to evaluate and treat     Nutrition to evaluate and recommend diet    Routine central line care.  Patient has a tunneled central line that will need to be removed at some point once IV ABx's are finished.      DIABETES CARE:      Check blood sugar:      Fingerstick blood sugar AC and HS        Report CBG < 60 or > 400 to physician.                                          Insulin Sliding Scale          Glucose  Novolog Insulin Subcutaneous        0 - 60   Orange juice or glucose tablet, hold insulin      No insulin   201-250  2 units   251-300  4 units   301-350  6 units   351-400  8 units   >400   10 units then call physician      Medications: Discontinue all previous medication orders, if any. See new list below.        Tasneem Lynn   Home Medication Instructions BRIAN:[id number]    Printed on:07/24/20 1236   Medication Information                      acetaminophen (TYLENOL) 325 MG tablet  Take 2 tablets (650 mg total) by mouth every 6 (six) hours as needed for Pain or Temperature greater than (100.5).             amLODIPine (NORVASC) 2.5 MG tablet  Take 1 tablet (2.5 mg total) by mouth once daily.             aspirin 81 MG Chew  Take 81 mg by mouth once daily.                          carvedilol (COREG) 25 MG tablet  Take 25 mg by mouth 2 (two) times daily with meals.              cetirizine (ZYRTEC) 10 MG tablet  Take 1 tablet (10 mg total) by mouth every evening.                        insulin detemir U-100 (LEVEMIR FLEXTOUCH) 100 unit/mL (3 mL) SubQ InPn pen  Inject 10 Units into the skin every evening.             levothyroxine (SYNTHROID) 75 MCG tablet  Take 1 tablet (75 mcg total) by mouth before breakfast.             loperamide (IMODIUM) 2 mg capsule  Take 1 capsule (2 mg total) by mouth 4 (four) times daily as needed for Diarrhea.             pantoprazole (PROTONIX) 40 MG tablet  Take 1 tablet (40 mg total) by mouth once daily.             rosuvastatin (CRESTOR) 20 MG tablet  Take 1 tablet (20 mg total) by mouth once daily.             senna-docusate 8.6-50 mg (PERICOLACE) 8.6-50 mg per tablet  Take 1 tablet by mouth 2 (two) times daily.             sodium chloride 0.9% SolP 100 mL with ertapenem 1 gram SolR 0.5 g  Inject 0.5 g into the vein once daily. for 6 days                       _________________________________  Neel Trejo MD  07/24/2020

## 2020-07-24 NOTE — PROGRESS NOTES
ADT 30 order placed for Stretcher Transportation.  Requested  time:  If transportation does not arrive at ETA time nurse will be instructed to follow protocol for transportation below:   How can I get in touch directly with dispatch, if needed?                 · Non-emergent (stretcher): [phone]    · Emergent dispatch: [phone]    ++NURSING:  If Stretcher does not arrive at requested time please call the above Non Emergent Dispatcher.  If issue not resolved please escalate to your charge nurse for further instructions.    Patient to be transferred to [facility]  Will be admitted to Room # 142  Nurse to call report to nurse Gamino @ [phone]  Transportation time scheduled 1530    Call report information forwarded to Nurse Oropeza

## 2020-07-24 NOTE — PLAN OF CARE
Patient ordered to transfer to [facility]; TN confirmed that all orders and clinicals have been received by facility; follow up appointment list forwarded to facility via [facility]; call report information forwarded to nurse Oropeza; transportation has been arranged for 1530; Nurse Oropeza informed that all discharge planning needs have been met and patient can discharge from Case Management standpoint        07/24/20 1429   Final Note   Assessment Type Final Discharge Note   Anticipated Discharge Disposition SNF   What phone number can be called within the next 1-3 days to see how you are doing after discharge? [phone]   Hospital Follow Up  Appt(s) scheduled? Yes   [facility] Referral Info   Post Acute Recommendation SNF / Sub-Acute Rehab   Referral Type Skilled Nursing   [place] [address]   Post-Acute Status   Post-Acute Authorization Home Health   Post-Acute Placement Status Discharge Plan Changed   Home Health Status Set-up Complete   Patient choice form signed by patient/caregiver List from System Post-Acute Care   Discharge Delays None known at this time

## 2020-07-24 NOTE — DISCHARGE SUMMARY
[facility] Hospital Medicine  Discharge Summary      Patient Name: Tasneem Lynn  MRN: [id number]  Admission Date: 7/20/2020  Hospital Length of Stay: 4 days  Discharge Date and Time:  07/24/2020 12:35 PM  Attending Physician: Neel Trejo MD   Discharging Provider: Neel Trejo MD  Primary Care Provider: To Obtain Unable      HPI:   75 year old female with CHF, HTN, HLP, DM2, hypothyroidism, CKD stage 4, chronic pain, obesity, and h/o recent admission from 6/12 - 6/24/20 for right foot osteomyelitis treated with Rocephin and vancomycin planned for until 7/27/20 and CVA (1986) who presented from nursing home for altered mental status that started sometime yesterday.  EMS was called out, noted sats of 85% on room air upon their arrival and status post treatment with Narcan with no improvement in her mentation.  In the ER, she was noted to be hypertensive and was hypoxic with treatment rendered with BiPAP.  Workup with head CT that was unremarkable, chest x-ray with pulmonary edema along with questionable right infiltrate, UA showed 3+ leukocyte many bacteria, H&H 7.6 and 26.3 and hemoccult that was positive.  BUN and creatinine 125/1.8. CMP remarkable for a potassium of 6.2 which was repeated - potassium 6.7.  Emergent treatment rendered with nebulizer treatment, insulin, calcium gluconate, and bicarb.  Procalcitonin 0.2, lactic acid 0.6, COVID-19 negative, D-dimer 2.2, .    Procedure(s) (LRB):  EGD (ESOPHAGOGASTRODUODENOSCOPY) (N/A)      Hospital Course:   Ms Lynn presented with acute respiratory failure with hypoxia and acute encephalopathy. Admitted to ICU, placed on BiPAP and on precedex for hyperactive encephalopathy, and initiated on IV diuresis as pulmonary edema highly suspected 2/2 diastolic HF. Hgb noted to be lower than baseline with a positive guaiac stool in the ED and BUN higher than expected for level of Cr. Placed on pantoprazole IV and gastroenterology consulted. No  melena or blood in stool while in the ICU. Did not require vasopressor support. Did require one unit of PRBCs for Hgb that slowly trended to < 7 g/dL. Vancomycin continued for known osteomyelitis of the calcaneus (with complete to near complete disruption of the Achilles tendon) as well as on ceftriaxone. Bone biopsy when diagnosed was negative. Ceftriaxone stopped and placed on meropenem for known history ESBL urinary tract infection. Continued on vancomycin. Could not obtain sputum sample. Urine culture grew > 100,000 cfu/mL Klebsiella pneumoniae ESBL. 1 out of 6 blood culture bottles with coag negative Staph likely contaminant. Infectious disease recommends continuing meropenem for now with plans for ertapenem on 7/23. Vanc and Rocephin have been stopped.  In general, patient clinically improved from respiratory standpoint and de-escalated from BiPAP to high flow then ultimately low flow. Started on a diet. Holding furosemide IV temporarily. Tentative plan for endoscopy prior to discharge. PT/OT consulted. Stepped down to floor on 7/22.  Patient underwent EGD on 7/23 that was unremarkable with no evidence of bleeding.  H/H has remained stable post transfusion.  Patient has remained afebrile and hemodynamically stable.  She is currently doing well on RA.  She will be discharged back to SNF to complete 6 more days of Ertapenem.      Consults:   Consults (From admission, onward)        Status Ordering Provider     Inpatient consult to Gastroenterology  Once     Provider:  Dinh Hunt MD    Completed MIGUE CARRASCO     Inpatient consult to Infectious Diseases  Once     Provider:  (Not yet assigned)    Completed JOHN NEWBY     Inpatient consult to Nephrology  Once     Provider:  Baylee Gomez MD    Completed MIGUE CARRASCO.     Inpatient consult to Podiatry  Once     Provider:  Teresa Vega DPM    Completed MIGUE CARRASCO     Inpatient consult to Pulmonology  Once     Provider:  Tan Seymour MD     Completed MIGUE CARRASCO     Inpatient consult to Registered Dietitian/Nutritionist  Once     Provider:  (Not yet assigned)    Completed MIGUE CARRASCO          No new Assessment & Plan notes have been filed under this hospital service since the last note was generated.  Service: Hospital Medicine    Final Active Diagnoses:    Diagnosis Date Noted POA    Anemia of chronic disorder [D63.8] 07/24/2020 Yes     Chronic    Urinary tract infection due to extended-spectrum beta lactamase (ESBL)-producing Klebsiella [N39.0, B96.89] 07/21/2020 Yes    Chronic osteomyelitis of right foot with draining sinus [M86.471] 06/15/2020 Yes    Wound of right leg [S81.801A] 06/13/2020 Yes    CKD (chronic kidney disease) stage 4, GFR 15-29 ml/min [N18.4] 05/31/2018 Yes    Hypothyroidism [E03.9] 11/17/2016 Yes     Chronic    Morbid obesity with BMI of 50.0-59.9, adult [E66.01, Z68.43] 11/17/2016 Not Applicable     Chronic    History of CVA (cerebrovascular accident) [Z86.73] 11/17/2016 Not Applicable     Chronic    Hyperlipidemia [E78.5] 11/17/2016 Yes     Chronic    Type 2 diabetes mellitus, uncontrolled, with renal complications [E11.29, E11.65] 05/07/2013 Yes     Chronic    Essential hypertension [I10] 05/07/2013 Yes     Chronic      Problems Resolved During this Admission:    Diagnosis Date Noted Date Resolved POA    PRINCIPAL PROBLEM:  Acute respiratory failure with hypoxia and hypercarbia [J96.01, J96.02] 07/20/2020 07/24/2020 Yes    Possible pneumonia of right lower lobe due to infectious organism [J18.1] 07/21/2020 07/24/2020 Yes    Acute on chronic congestive heart failure [I50.9] 07/20/2020 07/24/2020 Yes    Acute blood loss anemia [D62] 07/20/2020 07/24/2020 Yes    Hyperkalemia [E87.5] 06/18/2020 07/23/2020 Yes       Discharged Condition: stable    Disposition: Skilled Nursing Facility    Follow Up:  Follow-up Information     Annalisa Ballesteros DPM In 1 week.    Specialties: Podiatry, Wound Care  Contact  information:  [address]  [place] [address]  [phone]                 Patient Instructions:      Diet diabetic     Diet renal     Notify your health care provider if you experience any of the following:  temperature >100.4     Notify your health care provider if you experience any of the following:  persistent nausea and vomiting or diarrhea     Notify your health care provider if you experience any of the following:  severe uncontrolled pain     Notify your health care provider if you experience any of the following:  difficulty breathing or increased cough     Notify your health care provider if you experience any of the following:  persistent dizziness, light-headedness, or visual disturbances     Notify your health care provider if you experience any of the following:  increased confusion or weakness     Activity as tolerated         Pending Diagnostic Studies:     None         Medications:  Reconciled Home Medications:      Medication List      START taking these medications    loperamide 2 mg capsule  Commonly known as: IMODIUM  Take 1 capsule (2 mg total) by mouth 4 (four) times daily as needed for Diarrhea.     pantoprazole 40 MG tablet  Commonly known as: PROTONIX  Take 1 tablet (40 mg total) by mouth once daily.     sodium chloride 0.9% SolP 100 mL with ertapenem 1 gram SolR 0.5 g  Inject 0.5 g into the vein once daily. for 6 days        CHANGE how you take these medications    insulin detemir U-100 100 unit/mL (3 mL) Inpn pen  Commonly known as: LEVEMIR FLEXTOUCH  Inject 10 Units into the skin every evening.  What changed:   · how much to take  · when to take this        CONTINUE taking these medications    acetaminophen 325 MG tablet  Commonly known as: TYLENOL  Take 2 tablets (650 mg total) by mouth every 6 (six) hours as needed for Pain or Temperature greater than (100.5).     amLODIPine 2.5 MG tablet  Commonly known as: NORVASC  Take 1 tablet (2.5 mg total) by mouth once daily.     aspirin  81 MG Chew  Take 81 mg by mouth once daily.     blood pressure test kit-large Kit  Use to check blood pressure daily and as needed.     carvediloL 25 MG tablet  Commonly known as: COREG  Take 25 mg by mouth 2 (two) times daily with meals.     cetirizine 10 MG tablet  Commonly known as: ZYRTEC  Take 1 tablet (10 mg total) by mouth every evening.     levothyroxine 75 MCG tablet  Commonly known as: SYNTHROID  Take 1 tablet (75 mcg total) by mouth before breakfast.     rosuvastatin 20 MG tablet  Commonly known as: CRESTOR  Take 1 tablet (20 mg total) by mouth once daily.     senna-docusate 8.6-50 mg 8.6-50 mg per tablet  Commonly known as: PERICOLACE  Take 1 tablet by mouth 2 (two) times daily.        STOP taking these medications    cefTRIAXone 1 g/50 mL Pgbk IVPB  Commonly known as: ROCEPHIN     collagenase ointment  Commonly known as: SANTYL     famotidine 20 MG tablet  Commonly known as: PEPCID     insulin aspart U-100 100 unit/mL (3 mL) Inpn pen  Commonly known as: NovoLOG     lidocaine 5 %  Commonly known as: LIDODERM     VANCOMYCIN 500 MG/100 ML D5W (READY TO MIX SYSTEM)            Indwelling Lines/Drains at time of discharge:   Lines/Drains/Airways     Central Venous Catheter Line            Tunneled Central Line Insertion/Assessment - Double Lumen  06/22/20 right subclavian 32 days          Drain                                  Time spent on the discharge of patient: >30 minutes  Patient was seen and examined on the date of discharge and determined to be suitable for discharge.         Neel Trejo MD  Department of Hospital Medicine  [facility]

## 2020-07-25 LAB
BACTERIA BLD CULT: NORMAL
BACTERIA BLD CULT: NORMAL

## 2020-07-25 NOTE — PT/OT/SLP DISCHARGE
Occupational Therapy Discharge Summary    Tasneem Lynn  MRN: [id number]   Principal Problem: Acute respiratory failure with hypoxia and hypercarbia      Patient Discharged from acute Occupational Therapy on 07/24/20.  Please refer to prior OT notes for functional status.    Assessment:      Patient appropriate for care in another setting.    Objective:     GOALS:   Multidisciplinary Problems     Occupational Therapy Goals        Problem: Occupational Therapy Goal    Goal Priority Disciplines Outcome Interventions   Occupational Therapy Goal     OT, PT/OT Ongoing, Progressing    Description: Goals to be met by: 08/06/20    Patient will increase functional independence with ADLs by performing:    Feeding with Set-up Assistance.  UE Dressing with Minimal Assistance.  Grooming while supported position with Set-up Assistance.  Sitting at edge of bed x10 minutes with Stand-by Assistance.  Rolling to Bilateral with Moderate Assistance.   Supine to sit with Moderate Assistance.  Upper extremity exercise program x10 reps per handout, with assistance as needed.                     Reasons for Discontinuation of Therapy Services  Transfer to alternate level of care.      Plan:     Patient Discharged to: OT rec nursing home NH vs. HHOT with 24 hr care. pt d/c SNF ([facility])    Marie Beltre OT  7/25/2020

## 2020-07-25 NOTE — NURSING
[id number] Patient's daughter Merrill called asking where her mother was. Per CM note, Patient ordered to transfer to [facility]. Daughter updated on plan.

## 2020-07-26 NOTE — ANESTHESIA POSTPROCEDURE EVALUATION
Anesthesia Post Evaluation    Patient: Tasneem Lynn    Procedure(s) Performed: Procedure(s) (LRB):  EGD (ESOPHAGOGASTRODUODENOSCOPY) (N/A)    Final Anesthesia Type: general    Patient location during evaluation: GI PACU  Patient participation: Yes- Able to Participate  Level of consciousness: awake and alert  Post-procedure vital signs: reviewed and stable  Pain management: adequate  Airway patency: patent    PONV status at discharge: No PONV  Anesthetic complications: no      Cardiovascular status: blood pressure returned to baseline and hemodynamically stable  Respiratory status: unassisted and spontaneous ventilation  Hydration status: euvolemic  Follow-up not needed.          Vitals Value Taken Time   /72 07/24/20 1741   Temp 36.8 °C (98.3 °F) 07/24/20 1741   Pulse 83 07/24/20 1741   Resp 18 07/24/20 1741   SpO2 98 % 07/24/20 1741         Event Time   Out of Recovery 07/23/2020 13:24:00         Pain/Clay Score: No data recorded

## 2020-07-28 NOTE — PT/OT/SLP DISCHARGE
Physical Therapy Discharge Summary    Name: Tasneem Lynn  MRN: [id number]   Principal Problem: Acute respiratory failure with hypoxia and hypercarbia     Patient Discharged from acute Physical Therapy on .  Please refer to prior PT noted date on  for functional status.     Assessment:     Patient is no longer making progress.    Objective:     GOALS:   Multidisciplinary Problems     Physical Therapy Goals        Problem: Physical Therapy Goal    Goal Priority Disciplines Outcome Goal Variances Interventions   Physical Therapy Goal     PT, PT/OT Adequate for Care Transition     Description: Goals to be met by:      Patient will increase functional independence with mobility by performin. Supine to sit with Moderate Assistance  2. Sit to supine with Moderate Assistance  3. Rolling to Left and Right with Stand-by Assistance.  4. Lower extremity exercise program x20 reps per handout, with supervision                     Reasons for Discontinuation of Therapy Services  Transfer to alternate level of care.      Plan:     Patient Discharged to: Skilled Nursing Facility for IV abx.    Funmilayo Rush, PT  2020

## 2020-07-30 ENCOUNTER — TELEPHONE (OUTPATIENT)
Dept: PODIATRY | Facility: CLINIC | Age: 75
End: 2020-07-30

## 2020-07-30 NOTE — TELEPHONE ENCOUNTER
----- Message from Denise Flannery sent at 7/30/2020  2:13 PM CDT -----  Contact: [facility] [phone]  .[facility]  Reason for Visit/Symptoms 1 week follow up  Best Contact Number or Confirm if Mychart Preferred [phone]  Preferred Date/Time of Appointment Need a 1 week follow up   Interested in Virtual Visit (yes/no) No

## 2020-07-31 ENCOUNTER — TELEPHONE (OUTPATIENT)
Dept: INFECTIOUS DISEASES | Facility: CLINIC | Age: 75
End: 2020-07-31

## 2020-07-31 NOTE — TELEPHONE ENCOUNTER
----- Message from Diya Christin sent at 7/31/2020 11:04 AM CDT -----  Contact: Hanny [phone]  Caller: Hanny rodriguez/  [facility]  tel:  [phone] ext. [phone]     /   Caller says she is asking if the IJ can be removed since the antibiotics have been finished.   Can you make an appt. For her to have this removed.   They do not removed these.       Pls call today.     Pls call to discuss.

## 2020-08-03 ENCOUNTER — HOSPITAL ENCOUNTER (EMERGENCY)
Facility: HOSPITAL | Age: 75
Discharge: HOME | End: 2020-08-03
Attending: EMERGENCY MEDICINE
Payer: MEDICARE

## 2020-08-03 VITALS
TEMPERATURE: 98 F | RESPIRATION RATE: 16 BRPM | BODY MASS INDEX: 52.44 KG/M2 | DIASTOLIC BLOOD PRESSURE: 72 MMHG | HEIGHT: 62 IN | HEART RATE: 73 BPM | OXYGEN SATURATION: 94 % | WEIGHT: 285 LBS | SYSTOLIC BLOOD PRESSURE: 182 MMHG

## 2020-08-03 DIAGNOSIS — Z45.2 PIC LINE (PERIPHERALLY INSERTED CENTRAL CATHETER) REMOVAL: Primary | ICD-10-CM

## 2020-08-03 PROCEDURE — 99283 EMERGENCY DEPT VISIT LOW MDM: CPT | Mod: HCNC

## 2020-08-03 NOTE — ED TRIAGE NOTES
Pt arrived to ED via EMS from [facility] for removal of RIGHT subclavian central line. Dressing is clean and dry, no pain, swelling, or redness at site. Pt denies any pain at this time. AAO x 4. In no acute distress. Pt placed on automatic BP cuff and continuous pulse oximeter.

## 2020-08-03 NOTE — DISCHARGE INSTRUCTIONS
Return for any fever, chest pain, shortness of breath, a redness or drainage to the line removal site.  Wash line removal area with soap and water twice daily and keep covered and apply topical antibiotic twice daily until healed.

## 2020-08-03 NOTE — BRIEF OP NOTE
Radiology Post-Procedure Note     Pre Op Diagnosis: RIJV-approach TCVC, no longer in use  Post Op Diagnosis: Same     Procedure: Fluoroscopic-guided removal of a 26.0-cm RIJV-approach TCVC     Procedure performed by: Dre De La Rosa MD     Written Informed Consent Obtained: Yes  Specimen Removed: NO  Estimated Blood Loss: Minimal     Findings:   Successful fluoroscopic-guided removal of a 26.0-cm RIJV-approach TCVC. Patient tolerated the procedure well. No immediate post-procedural complications noted.      Pt ready for discharge immediately.    Thank you for considering IR for the care of your patient.      Dre De La Rosa MD  Interventional Radiology

## 2020-08-03 NOTE — ED PROVIDER NOTES
Encounter Date: 8/3/2020       History     Chief Complaint   Patient presents with    Vascular access removal     EMS reports pt sent from [facility] for removal of venous access catheter     75-year-old female with no acute complaints.  Sent from [facility] to have her tunneled internal jugular IV removed.  It was placed by Dr. De La Rosa with interventional radiology on June 22nd.  She was receiving IV antibiotics for an osteomyelitis to her right foot.  There is an order from the [facility] home to send her to the ER to have the line removed.  Chart review shows a note from infectious disease stating it is okay for the line to be removed.        Review of patient's allergies indicates:   Allergen Reactions    Corticosteroids (glucocorticoids) Edema     Past Medical History:   Diagnosis Date    Arthritis     Gout    CHF (congestive heart failure)     Coronary artery disease     Diabetes mellitus     HLD (hyperlipidemia)     Hypertension     Obese     Stroke     1986    Thyroid disease      Past Surgical History:   Procedure Laterality Date    ESOPHAGOGASTRODUODENOSCOPY N/A 7/23/2020    Procedure: EGD (ESOPHAGOGASTRODUODENOSCOPY);  Surgeon: Halle Hughes MD;  Location: [facility];  Service: Endoscopy;  Laterality: N/A;    right hand surgery      right knee surgery Right     partial plate     Family History   Problem Relation Age of Onset    Heart disease Sister     Diabetes Maternal Aunt     Heart disease Maternal Aunt     Hypertension Maternal Aunt      Social History     Tobacco Use    Smoking status: Never Smoker    Smokeless tobacco: Never Used   Substance Use Topics    Alcohol use: No    Drug use: No     Review of Systems   Constitutional: Negative for fever.   HENT: Negative for sore throat.    Eyes: Negative for visual disturbance.   Respiratory: Negative for shortness of breath.    Cardiovascular: Negative for chest pain.   Gastrointestinal: Negative for abdominal pain.    Genitourinary: Negative for difficulty urinating.   Musculoskeletal: Negative for back pain.   Skin: Negative for rash.   Neurological: Negative for headaches.       Physical Exam     Initial Vitals [08/03/20 1439]   BP Pulse Resp Temp SpO2   (!) 182/72 73 16 98.2 °F (36.8 °C) (!) 94 %      MAP       --         Physical Exam    Nursing note and vitals reviewed.  Constitutional: She appears well-developed and well-nourished.   Eyes: EOM are normal. Pupils are equal, round, and reactive to light.   Neck: Normal range of motion. Neck supple. No thyromegaly present. No JVD present.   Cardiovascular: Normal rate, regular rhythm, normal heart sounds and intact distal pulses. Exam reveals no gallop and no friction rub.    No murmur heard.  Pulmonary/Chest: Breath sounds normal. No respiratory distress.   Abdominal: Soft. Bowel sounds are normal.   Musculoskeletal: Normal range of motion. No tenderness or edema.   Neurological: She is alert and oriented to person, place, and time. She has normal strength.   Skin: Skin is warm and dry.   Right subclavian tunneled catheter/IV in place.  No drainage or erythema surrounding it.  No tenderness.         ED Course   Procedures  Labs Reviewed - No data to display       Imaging Results    None      Discussed the case with Dr. De La Rosa with interventional radiology.  He states that the line is cuffed and recommends that the patient come over to the interventional radiology suite and he will remove it.  Once removed patient is stable for discharge.                                     Clinical Impression:       ICD-10-CM ICD-9-CM   1. PIC line (peripherally inserted central catheter) removal  Z45.2 V58.81             ED Disposition Condition    Discharge Stable        ED Prescriptions     None        Follow-up Information     Follow up With Specialties Details Why Contact Info    [facility]  As needed [address]  [facility]  [id number]  [phone]                                     Mendez Dominguez MD  08/03/20 6648

## 2020-08-03 NOTE — PROGRESS NOTES
Report called to ENZO Gonzalez in ED.   
Tunneled central line removed per MD with no anesthetic needed. Tolerated well. Catheter tip intact. Site to RCW with bandaid, CDI, no redness, swelling or hematoma noted. CXR completed after removal to confirm catheter removal. No evidence of catheter on CXR.   
parents

## 2020-08-03 NOTE — CONSULTS
Radiology History & Physical      SUBJECTIVE:     Chief Complaint: Indwelling RIJV-approach tunneled central venous catheter, no longer needed    History of Present Illness:  Tasneem Lynn is a 74 y.o. female with pertinent PMHx of chronic osteomyelitis of right calcaneous requiring long-term central venous access for 6 wks outapatient IV ABx therapy recommended by ID, s/p IR placement of a RIJV-approach 26.0-cm TCVC.    Pt has completed IV Abx course and no longer requires TCVC with consult received for fluoroscopic-guided removal.      Past Medical History:   Diagnosis Date    Arthritis     Gout    CHF (congestive heart failure)     Coronary artery disease     Diabetes mellitus     HLD (hyperlipidemia)     Hypertension     Obese     Stroke     1986    Thyroid disease      Past Surgical History:   Procedure Laterality Date    ESOPHAGOGASTRODUODENOSCOPY N/A 7/23/2020    Procedure: EGD (ESOPHAGOGASTRODUODENOSCOPY);  Surgeon: Halle Hughes MD;  Location: [facility];  Service: Endoscopy;  Laterality: N/A;    right hand surgery      right knee surgery Right     partial plate     Home Meds:   Prior to Admission medications    Medication Sig Start Date End Date Taking? Authorizing Provider   acetaminophen (TYLENOL) 325 MG tablet Take 2 tablets (650 mg total) by mouth every 6 (six) hours as needed for Pain or Temperature greater than (100.5). 6/24/20   Funmilayo Guillermo MD   amLODIPine (NORVASC) 2.5 MG tablet Take 1 tablet (2.5 mg total) by mouth once daily. 6/25/20 6/25/21  Funmilayo Guillermo MD   aspirin 81 MG Chew Take 81 mg by mouth once daily.    Historical Provider, MD   blood pressure test kit-large Kit Use to check blood pressure daily and as needed. 8/30/18   Mary Carmen Lopez MD   carvedilol (COREG) 25 MG tablet Take 25 mg by mouth 2 (two) times daily with meals.    Historical Provider, MD   cetirizine (ZYRTEC) 10 MG tablet Take 1 tablet (10 mg total) by mouth every evening. 12/21/18 12/21/19  Mary Carmen STONE  MD Jessica   insulin detemir U-100 (LEVEMIR FLEXTOUCH) 100 unit/mL (3 mL) SubQ InPn pen Inject 10 Units into the skin every evening. 7/24/20 7/24/21  Neel Trejo MD   levothyroxine (SYNTHROID) 75 MCG tablet Take 1 tablet (75 mcg total) by mouth before breakfast. 12/19/18   Mary Carmen Lopez MD   loperamide (IMODIUM) 2 mg capsule Take 1 capsule (2 mg total) by mouth 4 (four) times daily as needed for Diarrhea. 7/24/20 8/3/20  Neel Trejo MD   pantoprazole (PROTONIX) 40 MG tablet Take 1 tablet (40 mg total) by mouth once daily. 7/24/20 7/24/21  Neel Trejo MD   rosuvastatin (CRESTOR) 20 MG tablet Take 1 tablet (20 mg total) by mouth once daily. 12/14/18   Mary Carmen Lopez MD   senna-docusate 8.6-50 mg (PERICOLACE) 8.6-50 mg per tablet Take 1 tablet by mouth 2 (two) times daily. 6/24/20   Funmilayo Guillermo MD     Anticoagulants/Antiplatelets: no anticoagulation    Allergies:   Review of patient's allergies indicates:   Allergen Reactions    Corticosteroids (glucocorticoids) Edema     Sedation History:  no adverse reactions    Review of Systems:   Hematological: no known coagulopathies  Respiratory: no cough, shortness of breath, or wheezing  Cardiovascular: no chest pain or dyspnea on exertion  Gastrointestinal: no abdominal pain, change in bowel habits, or black or bloody stools  Genito-Urinary: no dysuria, trouble voiding, or hematuria  Musculoskeletal: negative  Neurological: no TIA or stroke symptoms       OBJECTIVE:     Vital Signs (Most Recent)  Temp: 98.2 °F (36.8 °C) (08/03/20 1439)  Pulse: 73 (08/03/20 1439)  Resp: 16 (08/03/20 1439)  BP: (!) 182/72 (08/03/20 1439)  SpO2: (!) 94 % (08/03/20 1439)    Physical Exam:  General: no acute distress  Mental Status: alert and oriented to person, place and time  HEENT: normocephalic, atraumatic  Chest: unlabored breathing  Heart: regular heart rate  Abdomen: nondistended  Extremity: moves all extremities    Laboratory  Lab Results   Component Value Date     INR 1.0 06/13/2020       Lab Results   Component Value Date    WBC 10.69 07/24/2020    HGB 8.9 (L) 07/24/2020    HCT 30.7 (L) 07/24/2020    MCV 97 07/24/2020     07/24/2020      Lab Results   Component Value Date     (H) 07/24/2020     07/24/2020    K 4.8 07/24/2020     07/24/2020    CO2 29 07/24/2020    BUN 92 (H) 07/24/2020    CREATININE 2.3 (H) 07/24/2020    CALCIUM 8.8 07/24/2020    MG 2.2 07/24/2020    ALT 13 07/24/2020    AST 12 07/24/2020    ALBUMIN 2.5 (L) 07/24/2020    BILITOT 0.3 07/24/2020    BILIDIR 0.1 06/22/2020     ASSESSMENT/PLAN:     74 y.o. female with indwelling RIJV-approach 26.0-cm TCVC, no longer required.    1. TCVC, no longer required - Will attempt fluoroscopic-guided removal of RIJV-approach TCVC with local anesthetic only.     Risks (including, but not limited to, pain, bleeding, infection, damage to nearby structures, failure to obtain sufficient material for a diagnosis, the need for additional procedures, and death), benefits, and alternatives were discussed with the patient. All questions were answered to the best of my abilities. The patient wishes to proceed with the procedure. Written informed consent was obtained.     Thank you for considering IR for the care of your patient.      Dre De La Rosa MD  Interventional Radiology

## 2020-08-16 LAB
ACID FAST MOD KINY STN SPEC: NORMAL
MYCOBACTERIUM SPEC QL CULT: NORMAL

## 2021-01-21 NOTE — PROGRESS NOTES
[facility]  Pulmonology  Progress Note    Patient Name: Tasneem Lynn  MRN: [id number]  Admission Date: 7/20/2020  Hospital Length of Stay: 1 days  Code Status: Full Code  Attending Provider: Ariana Stoner MD  Primary Care Provider: To Obtain Unable   Principal Problem: Acute respiratory failure with hypoxia and hypercarbia    Subjective:     Interval History: No acute events overnight. Patient remains on BiPAP, continuing diuresis.    Objective:     Vital Signs (Most Recent):  Temp: 99.5 °F (37.5 °C) (07/21/20 0715)  Pulse: 73 (07/21/20 0930)  Resp: 16 (07/21/20 0930)  BP: 136/60 (07/21/20 0930)  SpO2: 98 % (07/21/20 0930) Vital Signs (24h Range):  Temp:  [94 °F (34.4 °C)-99.5 °F (37.5 °C)] 99.5 °F (37.5 °C)  Pulse:  [56-76] 73  Resp:  [10-27] 16  SpO2:  [94 %-100 %] 98 %  BP: (115-182)/() 136/60     Weight: 125.2 kg (276 lb 0.3 oz)  Body mass index is 50.47 kg/m².      Intake/Output Summary (Last 24 hours) at 7/21/2020 1151  Last data filed at 7/21/2020 0916  Gross per 24 hour   Intake 817.57 ml   Output 2215 ml   Net -1397.43 ml       Physical Exam  Vitals signs and nursing note reviewed.   Constitutional:       General: She is not in acute distress.     Appearance: She is well-developed. She is obese. She is not diaphoretic.      Interventions: Face mask in place.      Comments: On NIPPV   HENT:      Head: Normocephalic and atraumatic.      Right Ear: Hearing and external ear normal.      Left Ear: Hearing and external ear normal.      Mouth/Throat:      Pharynx: Uvula midline.      Comments: Bipap mask in place  Eyes:      General: No scleral icterus.        Right eye: No discharge.         Left eye: No discharge.      Extraocular Movements: Extraocular movements intact.      Conjunctiva/sclera: Conjunctivae normal.   Neck:      Musculoskeletal: Normal range of motion and neck supple. No neck rigidity.      Thyroid: No thyromegaly.      Trachea: No tracheal deviation.   Cardiovascular:       Rate and Rhythm: Normal rate and regular rhythm.      Heart sounds: Normal heart sounds. No murmur. No friction rub. No gallop.    Pulmonary:      Effort: Pulmonary effort is normal. No respiratory distress.      Breath sounds: No stridor. Examination of the right-middle field reveals decreased breath sounds. Examination of the left-middle field reveals decreased breath sounds. Examination of the right-lower field reveals decreased breath sounds and rales. Examination of the left-lower field reveals decreased breath sounds and rales. Decreased breath sounds and rales present. No wheezing or rhonchi.   Chest:      Chest wall: No tenderness.   Abdominal:      General: Bowel sounds are normal. There is no distension.      Palpations: Abdomen is soft.      Tenderness: There is no abdominal tenderness. There is no guarding or rebound.   Musculoskeletal:         General: Swelling present. No tenderness.      Right lower leg: Edema present.      Left lower leg: Edema present.      Comments: Right foot covered with gauze dressing.    Lymphadenopathy:      Cervical: No cervical adenopathy.   Skin:     General: Skin is warm and dry.      Capillary Refill: Capillary refill takes less than 2 seconds.      Coloration: Skin is not pale.      Findings: No erythema or rash.   Neurological:      Mental Status: She is oriented to person, place, and time and easily aroused. She is lethargic.      Comments: Responds to voice. More alert than previous.   Psychiatric:      Comments: Unable to obtain 2/2 clinical condition         Vents:  Oxygen Concentration (%): 28 (07/21/20 0800)    Lines/Drains/Airways     Central Venous Catheter Line            Tunneled Central Line Insertion/Assessment - Double Lumen  06/22/20 right subclavian 29 days          Drain                 Urethral Catheter 07/20/20 1030 Non-latex 16 Fr. 1 day          Peripheral Intravenous Line                 Peripheral IV - Single Lumen 07/20/20 1440 18 G Right  Forearm less than 1 day                Significant Labs:    CBC/Anemia Profile:  Recent Labs   Lab 07/20/20  0951 07/20/20  1140 07/21/20  0320   WBC 8.65  --  8.21   HGB 7.6*  --  7.0*   HCT 26.3* 26* 24.1*     --  274   MCV 99*  --  97   RDW 15.5*  --  15.4*   IRON 19*  --   --    FERRITIN 200  --   --    RETIC 3.4*  --   --         Chemistries:  Recent Labs   Lab 07/20/20  0951 07/21/20  0320    145   K 6.7* 5.4*    110   CO2 25 27   * 111*   CREATININE 1.9* 2.0*   CALCIUM 8.9 8.5*   ALBUMIN 2.8* 2.5*   PROT 8.7* 7.8   BILITOT 0.2 0.2   ALKPHOS 99 82   ALT 15 12   AST 14 12   MG  --  2.3   PHOS  --  4.4       All pertinent labs within the past 24 hours have been reviewed.    Significant Imaging:  I have reviewed all pertinent imaging results/findings within the past 24 hours.    Assessment/Plan:     * Acute respiratory failure with hypoxia and hypercarbia  Patient with hx of morbid obesity, no known pulmonary d/o's. Receives home O2 per RT. Mental status improved while in the ED, but a repeat ABG showed a pH of 7.21/pCO2 65.    --continue BiPAP with prn ABGs; wean BiPAP as tolerated.  --continue diuresis; 80 mg Lasix IV BID; aim for net negative to euvolemic status.  --no wheezing present  --CXR (7/20 on admission) with opacity in the right lung base, suspicious for a right basilar pneumonia or atelectasis with evidence of pulmonary edema.  --COVID-19 test negative  --sputum cxs ordered, not yet obtained; plan to f/up  --currently on meropenem and vanc for UTI; adequate coverage for CAP.           Kapil Weiss NP  Pulmonology  [facility]     Stable

## 2021-04-12 ENCOUNTER — PATIENT MESSAGE (OUTPATIENT)
Dept: RESEARCH | Facility: HOSPITAL | Age: 76
End: 2021-04-12

## 2021-05-11 ENCOUNTER — HOSPITAL ENCOUNTER (EMERGENCY)
Facility: HOSPITAL | Age: 76
Discharge: HOME | End: 2021-05-11
Attending: EMERGENCY MEDICINE
Payer: MEDICARE

## 2021-05-11 VITALS
TEMPERATURE: 97 F | DIASTOLIC BLOOD PRESSURE: 77 MMHG | BODY MASS INDEX: 52.44 KG/M2 | RESPIRATION RATE: 18 BRPM | SYSTOLIC BLOOD PRESSURE: 163 MMHG | HEIGHT: 62 IN | OXYGEN SATURATION: 99 % | HEART RATE: 76 BPM | WEIGHT: 285 LBS

## 2021-05-11 DIAGNOSIS — E16.2 HYPOGLYCEMIA: Primary | ICD-10-CM

## 2021-05-11 LAB
POCT GLUCOSE: 122 MG/DL (ref 70–110)
POCT GLUCOSE: 86 MG/DL (ref 70–110)
POCT GLUCOSE: 98 MG/DL (ref 70–110)

## 2021-05-11 PROCEDURE — 82962 GLUCOSE BLOOD TEST: CPT | Mod: 91

## 2021-05-11 PROCEDURE — 99282 EMERGENCY DEPT VISIT SF MDM: CPT | Mod: 25

## 2022-01-19 ENCOUNTER — PATIENT OUTREACH (OUTPATIENT)
Dept: ADMINISTRATIVE | Facility: HOSPITAL | Age: 77
End: 2022-01-19
Payer: MEDICARE

## 2022-01-19 NOTE — PROGRESS NOTES
[facility] Medication Review gap report - no current medication review, last one done in 2019.

## 2022-06-15 NOTE — CARE UPDATE
[facility]  ICU Shift Summary  Date: 7/22/2020      COVID Test: (--)  Isolation: Contact     Prehospitalization: Nursing Home  Admit Date / LOS : 7/20/2020/ 2 days    Diagnosis: Acute respiratory failure with hypoxia and hypercarbia    Consults:        Active: GI, ID, Nephro, Pulm CC and Wound Care       Needed: N/A     Code Status: Full Code   Advanced Directive: <no information>    LDA: Davis, PICC and PIV       Central Lines/Site/Justification:long term antibx       Urinary Cath/Order/Justification:Critically Ill in the ICU and requiring intensive monitoring    Vasopressors/Infusions:        GOALS: Volume/ Hemodynamic: N/A                     RASS: N/A    Pain Management: none       Pain Controlled: not applicable     Rhythm: NSR    Respiratory Device: Nasal Cannula    Oxygen Concentration (%):  [28] 28                VTE Prophylaxis: Mechanical  Mobility: Bedrest  Stress Ulcer Prophylaxis: Yes    Dietary: NPO -- needs a diet, can swallow pills without difficulty   Tolerance: yes  /  Advancement: yes    I & O (24h):    Intake/Output Summary (Last 24 hours) at 7/22/2020 0601  Last data filed at 7/22/2020 0500  Gross per 24 hour   Intake 390 ml   Output 2260 ml   Net -1870 ml        Restraints: No    Significant Dates:  Post Op Date: N/A  Rescue Date: N/A  Imaging/ Diagnostics: N/A    Noteworthy Labs:  H/H low - 1u PRBCs ordered    CBC/Anemia Labs: Coags:    Recent Labs   Lab 07/20/20  0951  07/21/20  0320 07/22/20  0215   WBC 8.65  --  8.21 9.55   HGB 7.6*  --  7.0* 6.8*   HCT 26.3*   < > 24.1* 23.9*     --  274 284   MCV 99*  --  97 98   RDW 15.5*  --  15.4* 15.8*   IRON 19*  --   --   --    FERRITIN 200  --   --   --    RETIC 3.4*  --   --   --     < > = values in this interval not displayed.    No results for input(s): PT, INR, APTT in the last 168 hours.     Chemistries:   Recent Labs   Lab 07/21/20  0320 07/21/20  1552 07/21/20  2105 07/22/20  0215     --  146* 146*   K 5.4*   --  4.9 4.9     --  111* 110   CO2 27  --  28 28   *  --  108* 112*   CREATININE 2.0*  --  2.3* 2.3*   CALCIUM 8.5*  --  8.6* 8.3*   PROT 7.8  --  7.6 7.5   BILITOT 0.2  --  0.2 0.2   ALKPHOS 82  --  77 79   ALT 12  --  10 7*   AST 12  --  11 12   MG 2.3  --   --  2.2   PHOS 4.4 4.1  --   --         Cardiac Enzymes: Ejection Fractions:    Recent Labs     07/20/20  0951   CPK 84   TROPONINI <0.006    No results found for: EF     POCT Glucose: HbA1c:    Recent Labs   Lab 07/21/20  1437 07/21/20  1851 07/21/20  2107   POCTGLUCOSE 102 95 106    Hemoglobin A1C   Date Value Ref Range Status   06/13/2020 7.5 (H) 4.0 - 5.6 % Final     Comment:     ADA Screening Guidelines:  5.7-6.4%  Consistent with prediabetes  >or=6.5%  Consistent with diabetes  High levels of fetal hemoglobin interfere with the HbA1C  assay. Heterozygous hemoglobin variants (HbS, HgC, etc)do  not significantly interfere with this assay.   However, presence of multiple variants may affect accuracy.             ICU LOS 1d 13h  Level of Care: OK to Transfer    Shift Summary/Plan for the shift: Pt awake, alert, and oriented, following commands.  Vitals stable.  Refused to wear bipap during night, but tolerating 2L O2 nasal cannula without distress.  Denies SOB or pain.  Davis intact with good urine output.  No BM overnight.  H/H low this AM without overt signs of bleeding - 1 unit PRBCs ordered to be transfused, awaiting unit to be prepared by lab.  Wound care done to right foot as ordered, no new skin breakdown observed.  Contact precautions maintained.    Detail Level: Detailed Quality 110: Preventive Care And Screening: Influenza Immunization: Influenza Immunization previously received during influenza season Quality 111:Pneumonia Vaccination Status For Older Adults: Pneumococcal Vaccination Previously Received

## 2022-07-19 ENCOUNTER — PATIENT MESSAGE (OUTPATIENT)
Dept: RESEARCH | Facility: CLINIC | Age: 77
End: 2022-07-19
Payer: MEDICARE

## 2022-09-25 NOTE — PLAN OF CARE
Problem: Physical Therapy Goal  Goal: Physical Therapy Goal  Description: Goals to be met by: 20     Patient will increase functional independence with mobility by performin. Supine to sit with Set-up [place]  2. Sit to supine with Set-up [place]  3. Rolling to Left and Right with Modified [place].  4. Bed to chair transfer with Contact Guard Assistance using Slideboard  5. Wheelchair propulsion x30 feet with Supervision using bilateral uppper extremities  6. Sitting at edge of bed x20 minutes with Supervision      Outcome: Ongoing, Progressing   Pt is progressing slowly toward treatment goals.    No

## 2023-07-28 NOTE — PROGRESS NOTES
First balloon inflation max pressure = 12 sam. First balloon inflation duration = 20 seconds. Second inflation of balloon - Max pressure = 14 sam. 2nd Inflation of balloon - Duration = 10 seconds. 2nd inflation was done at 11:58 EDT. Third inflation of balloon - Max pressure = 16 sam. 3rd Inflation of balloon - Duration = 10 seconds. 3rd inflation was done at 11:59 EDT. Please note the following patient has been assigned to Rosalina Alan RN in Outpatient Case Management for Diabetes Disease Management with a hbA1C greater than 10.0.    Please contact [facility] at Ext. [phone] with any questions.    Thank you,    Myra Hodge    Outpatient Case Management

## 2025-03-19 NOTE — ANESTHESIA PREPROCEDURE EVALUATION
07/23/2020  Tasneem Lynn is a 75 y.o., female.    Anesthesia Evaluation     I have reviewed the Nursing Notes.       Review of Systems  Social:  Non-Smoker   Hematology/Oncology:         -- Anemia:   Cardiovascular:   Denies Pacemaker. Hypertension CAD    CHF hyperlipidemia    Renal/:   Chronic Renal Disease, CRI    Hepatic/GI:   No Bowel Prep. Denies PUD. GERD Denies Liver Disease. Denies Hepatitis.    Musculoskeletal:   Arthritis     Neurological:   CVA Denies Seizures.    Endocrine:   Diabetes, type 2, using insulin Hypothyroidism        Physical Exam  General:  Morbid Obesity    Airway/Jaw/Neck:  AIRWAY FINDINGS: Normal      Chest/Lungs:  Chest/Lungs Clear    Heart/Vascular:  Heart Findings: Normal            Anesthesia Plan  Type of Anesthesia, risks & benefits discussed:  Anesthesia Type:  general  Patient's Preference:   Intra-op Monitoring Plan: standard ASA monitors  Intra-op Monitoring Plan Comments:   Post Op Pain Control Plan:   Post Op Pain Control Plan Comments:   Induction:   IV  Beta Blocker:  Patient is on a Beta-Blocker and has received one dose within the past 24 hours (No further documentation required).       Informed Consent: Patient understands risks and agrees with Anesthesia plan.  Questions answered. Anesthesia consent signed with patient.  ASA Score: 3     Day of Surgery Review of History & Physical:  There are no significant changes.  H&P update referred to the provider.         Ready For Surgery From Anesthesia Perspective.        Occupational Therapy  OT tx attempt this afternoon; however, pt supine in bed and sleeping.  No s/s of pain or distress. Rise/fall of chest of observed and pt on telemetry. Will f/u tomorrow per POC. Thank you, YUSRA Rich, OTR/L, [id number].

## 2025-04-29 ENCOUNTER — HOSPITAL ENCOUNTER (INPATIENT)
Facility: HOSPITAL | Age: 80
LOS: 7 days | Discharge: ANOTHER HOSPITAL | DRG: 871 | End: 2025-05-06
Attending: EMERGENCY MEDICINE | Admitting: HOSPITALIST
Payer: MEDICARE

## 2025-04-29 DIAGNOSIS — R56.9 SEIZURE: ICD-10-CM

## 2025-04-29 DIAGNOSIS — L03.116 CELLULITIS OF LEFT LOWER EXTREMITY: Primary | ICD-10-CM

## 2025-04-29 DIAGNOSIS — L98.499: ICD-10-CM

## 2025-04-29 DIAGNOSIS — I63.9 ACUTE STROKE DUE TO ISCHEMIA: ICD-10-CM

## 2025-04-29 DIAGNOSIS — D72.825 BANDEMIA: ICD-10-CM

## 2025-04-29 DIAGNOSIS — N17.9 AKI (ACUTE KIDNEY INJURY): ICD-10-CM

## 2025-04-29 PROBLEM — I10 HYPERTENSION: Status: ACTIVE | Noted: 2025-04-29

## 2025-04-29 PROBLEM — A41.9 SEPSIS WITH ACUTE ORGAN DYSFUNCTION WITHOUT SEPTIC SHOCK: Status: ACTIVE | Noted: 2025-04-29

## 2025-04-29 PROBLEM — E11.9 DIABETES MELLITUS: Status: ACTIVE | Noted: 2025-04-29

## 2025-04-29 PROBLEM — D64.9 ANEMIA: Status: ACTIVE | Noted: 2025-04-29

## 2025-04-29 PROBLEM — I25.10 CAD (CORONARY ARTERY DISEASE): Status: ACTIVE | Noted: 2025-04-29

## 2025-04-29 PROBLEM — N18.4 ACUTE KIDNEY INJURY SUPERIMPOSED ON STAGE 4 CHRONIC KIDNEY DISEASE: Status: ACTIVE | Noted: 2025-04-29

## 2025-04-29 PROBLEM — R65.20 SEPSIS WITH ACUTE ORGAN DYSFUNCTION WITHOUT SEPTIC SHOCK: Status: ACTIVE | Noted: 2025-04-29

## 2025-04-29 PROBLEM — I21.A1 TYPE 2 MYOCARDIAL INFARCTION: Status: ACTIVE | Noted: 2025-04-29

## 2025-04-29 LAB
ABSOLUTE EOSINOPHIL (OHS): 0.06 K/UL
ABSOLUTE MONOCYTE (OHS): 1.41 K/UL (ref 0.3–1)
ABSOLUTE NEUTROPHIL COUNT (OHS): 25.39 K/UL (ref 1.8–7.7)
ALBUMIN SERPL BCP-MCNC: 2.1 G/DL (ref 3.5–5.2)
ALP SERPL-CCNC: 106 UNIT/L (ref 40–150)
ALT SERPL W/O P-5'-P-CCNC: <5 UNIT/L (ref 10–44)
ANION GAP (OHS): 19 MMOL/L (ref 8–16)
AST SERPL-CCNC: 9 UNIT/L (ref 11–45)
BASOPHILS # BLD AUTO: 0.05 K/UL
BASOPHILS NFR BLD AUTO: 0.2 %
BILIRUB SERPL-MCNC: 0.3 MG/DL (ref 0.1–1)
BUN SERPL-MCNC: 79 MG/DL (ref 8–23)
CALCIUM SERPL-MCNC: 7.8 MG/DL (ref 8.7–10.5)
CHLORIDE SERPL-SCNC: 105 MMOL/L (ref 95–110)
CO2 SERPL-SCNC: 11 MMOL/L (ref 23–29)
CREAT SERPL-MCNC: 5.4 MG/DL (ref 0.5–1.4)
ERYTHROCYTE [DISTWIDTH] IN BLOOD BY AUTOMATED COUNT: 15.3 % (ref 11.5–14.5)
FERRITIN SERPL-MCNC: 693.3 NG/ML (ref 20–300)
GFR SERPLBLD CREATININE-BSD FMLA CKD-EPI: 8 ML/MIN/1.73/M2
GLUCOSE SERPL-MCNC: 62 MG/DL (ref 70–110)
HCT VFR BLD AUTO: 30 % (ref 37–48.5)
HGB BLD-MCNC: 9.3 GM/DL (ref 12–16)
IMM GRANULOCYTES # BLD AUTO: 0.28 K/UL (ref 0–0.04)
IMM GRANULOCYTES NFR BLD AUTO: 1 % (ref 0–0.5)
IRON SATN MFR SERPL: 35 % (ref 20–50)
IRON SERPL-MCNC: 32 UG/DL (ref 30–160)
LACTATE SERPL-SCNC: 0.9 MMOL/L (ref 0.5–2.2)
LYMPHOCYTES # BLD AUTO: 1.92 K/UL (ref 1–4.8)
MCH RBC QN AUTO: 30.3 PG (ref 27–31)
MCHC RBC AUTO-ENTMCNC: 31 G/DL (ref 32–36)
MCV RBC AUTO: 98 FL (ref 82–98)
NUCLEATED RBC (/100WBC) (OHS): 0 /100 WBC
PLATELET # BLD AUTO: 552 K/UL (ref 150–450)
PMV BLD AUTO: 8.9 FL (ref 9.2–12.9)
POCT GLUCOSE: 63 MG/DL (ref 70–110)
POCT GLUCOSE: 87 MG/DL (ref 70–110)
POCT GLUCOSE: 96 MG/DL (ref 70–110)
POTASSIUM SERPL-SCNC: 4.1 MMOL/L (ref 3.5–5.1)
PROCALCITONIN SERPL-MCNC: 25.23 NG/ML
PROT SERPL-MCNC: 7.7 GM/DL (ref 6–8.4)
RBC # BLD AUTO: 3.07 M/UL (ref 4–5.4)
RELATIVE EOSINOPHIL (OHS): 0.2 %
RELATIVE LYMPHOCYTE (OHS): 6.6 % (ref 18–48)
RELATIVE MONOCYTE (OHS): 4.8 % (ref 4–15)
RELATIVE NEUTROPHIL (OHS): 87.2 % (ref 38–73)
SODIUM SERPL-SCNC: 135 MMOL/L (ref 136–145)
TIBC SERPL-MCNC: 92 UG/DL (ref 250–450)
TRANSFERRIN SERPL-MCNC: 62 MG/DL (ref 200–375)
TROPONIN I SERPL DL<=0.01 NG/ML-MCNC: 0.02 NG/ML
TROPONIN I SERPL DL<=0.01 NG/ML-MCNC: 0.03 NG/ML
WBC # BLD AUTO: 29.11 K/UL (ref 3.9–12.7)

## 2025-04-29 PROCEDURE — 36415 COLL VENOUS BLD VENIPUNCTURE: CPT | Mod: HCNC | Performed by: EMERGENCY MEDICINE

## 2025-04-29 PROCEDURE — 84484 ASSAY OF TROPONIN QUANT: CPT | Mod: HCNC

## 2025-04-29 PROCEDURE — 99285 EMERGENCY DEPT VISIT HI MDM: CPT | Mod: 25,HCNC

## 2025-04-29 PROCEDURE — 87040 BLOOD CULTURE FOR BACTERIA: CPT | Mod: HCNC | Performed by: EMERGENCY MEDICINE

## 2025-04-29 PROCEDURE — 85025 COMPLETE CBC W/AUTO DIFF WBC: CPT | Mod: HCNC | Performed by: EMERGENCY MEDICINE

## 2025-04-29 PROCEDURE — 84466 ASSAY OF TRANSFERRIN: CPT | Mod: HCNC

## 2025-04-29 PROCEDURE — 82962 GLUCOSE BLOOD TEST: CPT | Mod: HCNC

## 2025-04-29 PROCEDURE — 63600175 PHARM REV CODE 636 W HCPCS: Mod: HCNC | Performed by: NURSE PRACTITIONER

## 2025-04-29 PROCEDURE — 21400001 HC TELEMETRY ROOM: Mod: HCNC

## 2025-04-29 PROCEDURE — 25000003 PHARM REV CODE 250: Mod: HCNC | Performed by: EMERGENCY MEDICINE

## 2025-04-29 PROCEDURE — 84145 PROCALCITONIN (PCT): CPT | Mod: HCNC | Performed by: EMERGENCY MEDICINE

## 2025-04-29 PROCEDURE — 63600175 PHARM REV CODE 636 W HCPCS: Mod: HCNC

## 2025-04-29 PROCEDURE — 82728 ASSAY OF FERRITIN: CPT | Mod: HCNC

## 2025-04-29 PROCEDURE — 83605 ASSAY OF LACTIC ACID: CPT | Mod: HCNC | Performed by: EMERGENCY MEDICINE

## 2025-04-29 PROCEDURE — 96374 THER/PROPH/DIAG INJ IV PUSH: CPT | Mod: HCNC

## 2025-04-29 PROCEDURE — 84484 ASSAY OF TROPONIN QUANT: CPT | Mod: HCNC | Performed by: EMERGENCY MEDICINE

## 2025-04-29 PROCEDURE — 63600175 PHARM REV CODE 636 W HCPCS: Mod: HCNC | Performed by: HOSPITALIST

## 2025-04-29 PROCEDURE — 36415 COLL VENOUS BLD VENIPUNCTURE: CPT | Mod: HCNC

## 2025-04-29 PROCEDURE — 80053 COMPREHEN METABOLIC PANEL: CPT | Mod: HCNC | Performed by: EMERGENCY MEDICINE

## 2025-04-29 PROCEDURE — 63600175 PHARM REV CODE 636 W HCPCS: Mod: HCNC | Performed by: EMERGENCY MEDICINE

## 2025-04-29 PROCEDURE — 25000003 PHARM REV CODE 250: Mod: HCNC | Performed by: HOSPITALIST

## 2025-04-29 PROCEDURE — 25000003 PHARM REV CODE 250: Mod: HCNC

## 2025-04-29 RX ORDER — IBUPROFEN 200 MG
16 TABLET ORAL
Status: DISCONTINUED | OUTPATIENT
Start: 2025-04-29 | End: 2025-05-06 | Stop reason: HOSPADM

## 2025-04-29 RX ORDER — INSULIN ASPART 100 [IU]/ML
0-5 INJECTION, SOLUTION INTRAVENOUS; SUBCUTANEOUS
Status: DISCONTINUED | OUTPATIENT
Start: 2025-04-29 | End: 2025-04-30

## 2025-04-29 RX ORDER — CARVEDILOL 12.5 MG/1
25 TABLET ORAL 2 TIMES DAILY WITH MEALS
Status: DISCONTINUED | OUTPATIENT
Start: 2025-04-29 | End: 2025-04-29

## 2025-04-29 RX ORDER — TALC
6 POWDER (GRAM) TOPICAL NIGHTLY PRN
Status: DISCONTINUED | OUTPATIENT
Start: 2025-04-29 | End: 2025-05-06 | Stop reason: HOSPADM

## 2025-04-29 RX ORDER — OXYCODONE HYDROCHLORIDE 5 MG/1
5 TABLET ORAL EVERY 6 HOURS PRN
Refills: 0 | Status: DISCONTINUED | OUTPATIENT
Start: 2025-04-29 | End: 2025-05-02

## 2025-04-29 RX ORDER — CEFEPIME HYDROCHLORIDE 1 G/1
1 INJECTION, POWDER, FOR SOLUTION INTRAMUSCULAR; INTRAVENOUS
Status: DISCONTINUED | OUTPATIENT
Start: 2025-04-29 | End: 2025-04-29

## 2025-04-29 RX ORDER — ONDANSETRON HYDROCHLORIDE 2 MG/ML
4 INJECTION, SOLUTION INTRAVENOUS EVERY 8 HOURS PRN
Status: DISCONTINUED | OUTPATIENT
Start: 2025-04-29 | End: 2025-05-06 | Stop reason: HOSPADM

## 2025-04-29 RX ORDER — GLUCAGON 1 MG
1 KIT INJECTION
Status: DISCONTINUED | OUTPATIENT
Start: 2025-04-29 | End: 2025-05-06 | Stop reason: HOSPADM

## 2025-04-29 RX ORDER — IBUPROFEN 200 MG
24 TABLET ORAL
Status: DISCONTINUED | OUTPATIENT
Start: 2025-04-29 | End: 2025-04-30

## 2025-04-29 RX ORDER — CEFEPIME HYDROCHLORIDE 1 G/1
1 INJECTION, POWDER, FOR SOLUTION INTRAMUSCULAR; INTRAVENOUS
Status: DISCONTINUED | OUTPATIENT
Start: 2025-04-30 | End: 2025-04-30

## 2025-04-29 RX ORDER — AMLODIPINE BESYLATE 2.5 MG/1
2.5 TABLET ORAL DAILY
Status: DISCONTINUED | OUTPATIENT
Start: 2025-04-30 | End: 2025-04-29

## 2025-04-29 RX ORDER — ACETAMINOPHEN 500 MG
1000 TABLET ORAL
Status: DISCONTINUED | OUTPATIENT
Start: 2025-04-29 | End: 2025-04-29

## 2025-04-29 RX ORDER — LEVOTHYROXINE SODIUM 75 UG/1
75 TABLET ORAL
Status: DISCONTINUED | OUTPATIENT
Start: 2025-04-30 | End: 2025-05-06 | Stop reason: HOSPADM

## 2025-04-29 RX ORDER — SODIUM CHLORIDE 9 MG/ML
1000 INJECTION, SOLUTION INTRAVENOUS
Status: COMPLETED | OUTPATIENT
Start: 2025-04-29 | End: 2025-04-29

## 2025-04-29 RX ORDER — LIDOCAINE HYDROCHLORIDE 10 MG/ML
10 INJECTION, SOLUTION INFILTRATION; PERINEURAL
Status: COMPLETED | OUTPATIENT
Start: 2025-04-29 | End: 2025-04-29

## 2025-04-29 RX ORDER — SODIUM CHLORIDE 0.9 % (FLUSH) 0.9 %
10 SYRINGE (ML) INJECTION EVERY 12 HOURS PRN
Status: DISCONTINUED | OUTPATIENT
Start: 2025-04-29 | End: 2025-05-06 | Stop reason: HOSPADM

## 2025-04-29 RX ORDER — HYDRALAZINE HYDROCHLORIDE 20 MG/ML
10 INJECTION INTRAMUSCULAR; INTRAVENOUS EVERY 4 HOURS PRN
Status: DISCONTINUED | OUTPATIENT
Start: 2025-04-29 | End: 2025-05-06 | Stop reason: HOSPADM

## 2025-04-29 RX ORDER — NAPROXEN SODIUM 220 MG/1
81 TABLET, FILM COATED ORAL DAILY
Status: DISCONTINUED | OUTPATIENT
Start: 2025-04-30 | End: 2025-05-06 | Stop reason: HOSPADM

## 2025-04-29 RX ORDER — ATORVASTATIN CALCIUM 40 MG/1
40 TABLET, FILM COATED ORAL NIGHTLY
Status: DISCONTINUED | OUTPATIENT
Start: 2025-04-29 | End: 2025-05-06 | Stop reason: HOSPADM

## 2025-04-29 RX ORDER — HEPARIN SODIUM 5000 [USP'U]/ML
5000 INJECTION, SOLUTION INTRAVENOUS; SUBCUTANEOUS EVERY 8 HOURS
Status: DISCONTINUED | OUTPATIENT
Start: 2025-04-29 | End: 2025-05-06 | Stop reason: HOSPADM

## 2025-04-29 RX ORDER — ACETAMINOPHEN 325 MG/1
650 TABLET ORAL EVERY 4 HOURS PRN
Status: DISCONTINUED | OUTPATIENT
Start: 2025-04-29 | End: 2025-05-06 | Stop reason: HOSPADM

## 2025-04-29 RX ORDER — DEXTROSE 50 % IN WATER (D50W) INTRAVENOUS SYRINGE
12.5
Status: COMPLETED | OUTPATIENT
Start: 2025-04-29 | End: 2025-04-29

## 2025-04-29 RX ADMIN — CEFEPIME 1 G: 1 INJECTION, POWDER, FOR SOLUTION INTRAMUSCULAR; INTRAVENOUS at 07:04

## 2025-04-29 RX ADMIN — DEXTROSE MONOHYDRATE 12.5 G: 25 INJECTION, SOLUTION INTRAVENOUS at 02:04

## 2025-04-29 RX ADMIN — SODIUM CHLORIDE, POTASSIUM CHLORIDE, SODIUM LACTATE AND CALCIUM CHLORIDE 1000 ML: 600; 310; 30; 20 INJECTION, SOLUTION INTRAVENOUS at 10:04

## 2025-04-29 RX ADMIN — HEPARIN SODIUM 5000 UNITS: 5000 INJECTION INTRAVENOUS; SUBCUTANEOUS at 10:04

## 2025-04-29 RX ADMIN — LIDOCAINE HYDROCHLORIDE 10 ML: 10 INJECTION, SOLUTION INFILTRATION; PERINEURAL at 01:04

## 2025-04-29 RX ADMIN — SODIUM CHLORIDE 1000 ML: 9 INJECTION, SOLUTION INTRAVENOUS at 05:04

## 2025-04-29 RX ADMIN — PIPERACILLIN AND TAZOBACTAM 4.5 G: 4; .5 INJECTION, POWDER, LYOPHILIZED, FOR SOLUTION INTRAVENOUS; PARENTERAL at 05:04

## 2025-04-29 RX ADMIN — ATORVASTATIN CALCIUM 40 MG: 40 TABLET, FILM COATED ORAL at 08:04

## 2025-04-29 RX ADMIN — VANCOMYCIN HYDROCHLORIDE 1250 MG: 1.25 INJECTION, POWDER, LYOPHILIZED, FOR SOLUTION INTRAVENOUS at 08:04

## 2025-04-29 NOTE — ASSESSMENT & PLAN NOTE
Hx of LT inguinal carbuncle progressively enlarging with ulceration  Obtain wound and blood cultures  Obtain noncontrast CT abdomen and left upper thigh to evaluate for occult abscesses and deep extensions  Given immunosuppression (CKD and type 2 DM); start empiric renally dosed vancomycin and cefepime  Deescalate antibiotics as appropriate

## 2025-04-29 NOTE — ED TRIAGE NOTES
Pt BIB EMS with complaints of an abscess to her right thigh going into her groin area. Abscess covered with a mepilex, dry and intact. Pt rates pain 7/10 at this time. Pt states she has pain under her left berast when she yawns and coughs. Pt denies SOB, NVD and any other issues at this time. Pt reports that she is a diabetic.

## 2025-04-29 NOTE — ED PROVIDER NOTES
Encounter Date: 4/29/2025    SCRIBE #1 NOTE: I, Teresa House, am scribing for, and in the presence of,  Mian Shrestha MD. I have scribed the following portions of the note - Other sections scribed: HPI, ROS, PE.       History     Chief Complaint   Patient presents with    Abscess     Pt arrived via ems, pt chief complaint is an Abscess. Pt has a left leg abscess that family is concerned is infected.      Tasneem Lynn is a 79 y.o. female with PMHx of CAD, CHF, T2DM, HTN, HLD, and others who presents to the ED via EMS with a possible abscess to her left upper thigh/groin area. Pt rates associated pain 7/10 at this time. Per EMS, patient's family is concerned the area is infected. Abscess is covered with a mepilex. No other exacerbating or alleviating factors. Denies fever, chest pain, SOB or other associated symptoms. Patient has no other complaints at the present time.     The history is provided by the patient and the EMS personnel. No  was used.     Review of patient's allergies indicates:   Allergen Reactions    Corticosteroids (glucocorticoids) Edema     Past Medical History:   Diagnosis Date    Arthritis     Gout    CHF (congestive heart failure)     Coronary artery disease     Diabetes mellitus     HLD (hyperlipidemia)     Hypertension     Obese     Stroke     1986    Thyroid disease      Past Surgical History:   Procedure Laterality Date    ESOPHAGOGASTRODUODENOSCOPY N/A 7/23/2020    Procedure: EGD (ESOPHAGOGASTRODUODENOSCOPY);  Surgeon: Halle Hughes MD;  Location: [facility];  Service: Endoscopy;  Laterality: N/A;    right hand surgery      right knee surgery Right     partial plate     Family History   Problem Relation Name Age of Onset    Heart disease Sister      Diabetes Maternal Aunt      Heart disease Maternal Aunt      Hypertension Maternal Aunt       Social History[1]  Review of Systems   Constitutional: Negative.  Negative for fever.   HENT: Negative.  Negative for  sore throat.    Eyes: Negative.    Respiratory: Negative.  Negative for shortness of breath.    Cardiovascular: Negative.  Negative for chest pain.   Gastrointestinal: Negative.  Negative for nausea and vomiting.   Endocrine: Negative.    Genitourinary: Negative.  Negative for dysuria.   Musculoskeletal: Negative.  Negative for myalgias.   Skin:  Positive for wound (Painful abscess to L thigh/groin area). Negative for rash.   Allergic/Immunologic: Negative.    Neurological: Negative.  Negative for headaches.   Hematological: Negative.  Negative for adenopathy.   Psychiatric/Behavioral: Negative.  Negative for behavioral problems.    All other systems reviewed and are negative.      Physical Exam     Initial Vitals [04/29/25 1301]   BP Pulse Resp Temp SpO2   (!) 183/74 106 -- 98.3 °F (36.8 °C) 100 %      MAP       --         Physical Exam    Nursing note and vitals reviewed.  Constitutional: She appears well-developed and well-nourished.   HENT:   Head: Normocephalic and atraumatic.   Eyes: Conjunctivae and EOM are normal. Pupils are equal, round, and reactive to light.   Neck: Neck supple. No thyroid mass present.   Cardiovascular:  Normal rate, regular rhythm, S1 normal, S2 normal, normal heart sounds and intact distal pulses.           Pulmonary/Chest: Effort normal and breath sounds normal. No respiratory distress.   Abdominal: Abdomen is soft. Bowel sounds are normal.   Musculoskeletal:         General: No tenderness. Normal range of motion.      Cervical back: Neck supple.     Lymphadenopathy:     She has no axillary adenopathy.   Neurological: She is alert and oriented to person, place, and time. GCS eye subscore is 4. GCS verbal subscore is 5. GCS motor subscore is 6.   Skin: Skin is warm and dry.   Left thigh/groin with a 2 cm x 5 cm area of induration, skin breakdown, and superficial ulceration. No abscess appreciated.    Psychiatric: She has a normal mood and affect. Her speech is normal. Judgment normal.  Cognition and memory are normal.         ED Course   Critical Care    Date/Time: 4/29/2025 4:34 PM    Performed by: Mian Shrestha MD  Authorized by: Mian Shrestha MD  Direct patient critical care time: 9 minutes  Additional history critical care time: 10 minutes  Ordering / reviewing critical care time: 9 minutes  Documentation critical care time: 11 minutes  Consulting other physicians critical care time: 4 minutes  Total critical care time (exclusive of procedural time) : 43 minutes  Critical care time was exclusive of separately billable procedures and treating other patients and teaching time.  Critical care was time spent personally by me on the following activities: evaluation of patient's response to treatment, examination of patient, ordering and performing treatments and interventions, ordering and review of laboratory studies, ordering and review of radiographic studies, pulse oximetry, re-evaluation of patient's condition, review of old charts and discussions with consultants.        Labs Reviewed   COMPREHENSIVE METABOLIC PANEL - Abnormal       Result Value    Sodium 135 (*)     Potassium 4.1      Chloride 105      CO2 11 (*)     Glucose 62 (*)     BUN 79 (*)     Creatinine 5.4 (*)     Calcium 7.8 (*)     Protein Total 7.7      Albumin 2.1 (*)     Bilirubin Total 0.3            AST 9 (*)     ALT <5 (*)     Anion Gap 19 (*)     eGFR 8 (*)     Narrative:     Specimen slightly hemolyzed.   CBC WITH DIFFERENTIAL - Abnormal    WBC 29.11 (*)     RBC 3.07 (*)     HGB 9.3 (*)     HCT 30.0 (*)     MCV 98      MCH 30.3      MCHC 31.0 (*)     RDW 15.3 (*)     Platelet Count 552 (*)     MPV 8.9 (*)     Nucleated RBC 0      Neut % 87.2 (*)     Lymph % 6.6 (*)     Mono % 4.8      Eos % 0.2      Basophil % 0.2      Imm Grans % 1.0 (*)     Neut # 25.39 (*)     Lymph # 1.92      Mono # 1.41 (*)     Eos # 0.06      Baso # 0.05      Imm Grans # 0.28 (*)    TROPONIN I - Abnormal    Troponin-I 0.031  (*)    POCT GLUCOSE - Abnormal    POCT Glucose 63 (*)    LACTIC ACID, PLASMA - Normal    Lactic Acid Level 0.9      Narrative:     Falsely low lactic acid results can be found in samples containing >=13.0 mg/dL total bilirubin and/or >=3.5 mg/dL direct bilirubin.    CULTURE, BLOOD   CULTURE, BLOOD   CBC W/ AUTO DIFFERENTIAL    Narrative:     The following orders were created for panel order CBC auto differential.  Procedure                               Abnormality         Status                     ---------                               -----------         ------                     CBC with Differential[[id number]]       Abnormal            Final result                 Please view results for these tests on the individual orders.   PROCALCITONIN   POCT GLUCOSE    POCT Glucose 96     POCT GLUCOSE MONITORING CONTINUOUS          Imaging Results              X-Ray Chest AP Portable (Final result)  Result time 04/29/25 15:54:41      Final result by Aba Guzman III, MD (04/29/25 15:54:41)                   Impression:      No acute process seen.      Electronically signed by: Aba Guzman MD  Date:    04/29/2025  Time:    15:54               Narrative:    EXAMINATION:  XR CHEST AP PORTABLE    CLINICAL HISTORY:  Pain with cough;    FINDINGS:  Chest one view portable.    Heart size is normal.  There is aortic plaque.  Bones showed DJD.  There is subcoracoid dislocation of the right shoulder.                                       Medications   piperacillin-tazobactam (ZOSYN) 4.5 g in D5W 100 mL IVPB (MB+) (has no administration in time range)   0.9% NaCl infusion (has no administration in time range)   LIDOcaine HCL 10 mg/ml (1%) injection 10 mL (10 mLs Infiltration Given 4/29/25 1353)   dextrose 50 % in water (D50W) injection 12.5 g (12.5 g Intravenous Given 4/29/25 1437)     Medical Decision Making  79-year-old woman who presented to the emergency department with complaints of an abscess her left upper leg groin  area. Patient does indeed have cellulitis in the area with some induration however I do not feel any fluctuance consistent with an abscess. However the patient does have EMMA with a worsening of creatinine from 2.3-5.4 today. Patient also has [place] [address]. I feel the patient warrants admission to the hospital currently with labs on standing for procalcitonin the patient's lactic acid level is 0.9.  Patient will require antibiotic therapy.  She has been provided with Zosyn 4.5 mg.    Amount and/or Complexity of Data Reviewed  Independent Historian: EMS     Details: See HPI.  Labs: ordered. Decision-making details documented in ED Course.  Radiology: ordered.    Risk  Prescription drug management.  Decision regarding hospitalization.            Scribe Attestation:   Scribe #1: I performed the above scribed service and the documentation accurately describes the services I performed. I attest to the accuracy of the note.        ED Course as of 04/29/25 1635   Tue Apr 29, 2025   1551 Glucose 62 BUN 79 creatinine 5.4 which is significantly different compared to prior.  Patient has EMMA. [MI]   1552 [place] [address] with a left shift. [MI]      ED Course User Index  [MI] Mian Shrestha MD                         This document was produced by a scribe under my direction and in my presence. I agree with the content of the note and have made any necessary edits.     Mian Shrestha MD    04/29/2025 4:35 PM    Clinical Impression:  Final diagnoses:  [L03.116] Cellulitis of left lower extremity (Primary)  [N17.9] EMMA (acute kidney injury)  [D72.825] Bandemia          ED Disposition Condition    Admit                     [1]   Social History  Tobacco Use    Smoking status: Never    Smokeless tobacco: Never   Substance Use Topics    Alcohol use: No    Drug use: No        Mian Shrestha MD  04/29/25 5915

## 2025-04-29 NOTE — SUBJECTIVE & OBJECTIVE
Past Medical History:   Diagnosis Date    Arthritis     Gout    CHF (congestive heart failure)     Coronary artery disease     Diabetes mellitus     HLD (hyperlipidemia)     Hypertension     Obese     Stroke     1986    Thyroid disease        Past Surgical History:   Procedure Laterality Date    ESOPHAGOGASTRODUODENOSCOPY N/A 7/23/2020    Procedure: EGD (ESOPHAGOGASTRODUODENOSCOPY);  Surgeon: Halle Hughes MD;  Location: [facility];  Service: Endoscopy;  Laterality: N/A;    right hand surgery      right knee surgery Right     partial plate       Review of patient's allergies indicates:   Allergen Reactions    Corticosteroids (glucocorticoids) Edema       No current facility-administered medications on file prior to encounter.     Current Outpatient Medications on File Prior to Encounter   Medication Sig    acetaminophen (TYLENOL) 325 MG tablet Take 2 tablets (650 mg total) by mouth every 6 (six) hours as needed for Pain or Temperature greater than (100.5).    amLODIPine (NORVASC) 2.5 MG tablet Take 1 tablet (2.5 mg total) by mouth once daily.    aspirin 81 MG Chew Take 81 mg by mouth once daily.    blood pressure test kit-large Kit Use to check blood pressure daily and as needed.    carvedilol (COREG) 25 MG tablet Take 25 mg by mouth 2 (two) times daily with meals.    cetirizine (ZYRTEC) 10 MG tablet Take 1 tablet (10 mg total) by mouth every evening.    insulin detemir U-100 (LEVEMIR FLEXTOUCH) 100 unit/mL (3 mL) SubQ InPn pen Inject 10 Units into the skin every evening.    levothyroxine (SYNTHROID) 75 MCG tablet Take 1 tablet (75 mcg total) by mouth before breakfast.    pantoprazole (PROTONIX) 40 MG tablet Take 1 tablet (40 mg total) by mouth once daily.    rosuvastatin (CRESTOR) 20 MG tablet Take 1 tablet (20 mg total) by mouth once daily.    senna-docusate 8.6-50 mg (PERICOLACE) 8.6-50 mg per tablet Take 1 tablet by mouth 2 (two) times daily.     Family History       Problem Relation (Age of Onset)     Diabetes Maternal Aunt    Heart disease Sister, Maternal Aunt    Hypertension Maternal Aunt          Tobacco Use    Smoking status: Never    Smokeless tobacco: Never   Substance and Sexual Activity    Alcohol use: No    Drug use: No    Sexual activity: Not Currently     Partners: Male     Review of Systems   Constitutional:  Positive for chills and fatigue. Negative for diaphoresis and fever.   Respiratory:  Negative for cough, choking and shortness of breath.    Cardiovascular:  Negative for chest pain, palpitations and leg swelling.   Endocrine: Negative for polydipsia, polyphagia and polyuria.   Genitourinary:  Negative for decreased urine volume, dysuria, flank pain and hematuria.   Musculoskeletal:  Negative for arthralgias, back pain and joint swelling.     Objective:     Vital Signs (Most Recent):  Temp: 98.3 °F (36.8 °C) (04/29/25 1301)  Pulse: 106 (04/29/25 1301)  BP: (!) 183/74 (04/29/25 1301)  SpO2: 100 % (04/29/25 1301) Vital Signs (24h Range):  Temp:  [98.3 °F (36.8 °C)] 98.3 °F (36.8 °C)  Pulse:  [106] 106  SpO2:  [100 %] 100 %  BP: (183)/(74) 183/74        There is no height or weight on file to calculate BMI.     Physical Exam  Constitutional:       General: She is not in acute distress.     Appearance: She is not ill-appearing, toxic-appearing or diaphoretic.   Cardiovascular:      Rate and Rhythm: Normal rate and regular rhythm.      Pulses: Normal pulses.      Heart sounds: Normal heart sounds.   Pulmonary:      Effort: Pulmonary effort is normal. No respiratory distress.      Breath sounds: Normal breath sounds. Stridor present.   Abdominal:      General: Bowel sounds are normal. There is no distension.      Palpations: Abdomen is soft. There is no mass.   Skin:     Comments: LT Inguinal desquamated ulcer 8cm x 5cm with exudative base,and sloughing edges   Neurological:      General: No focal deficit present.      Mental Status: She is oriented to person, place, and time.                 Significant Labs: CBC:   Recent Labs   Lab 04/29/25  1353   WBC 29.11*   HGB 9.3*   HCT 30.0*   *     CMP:   Recent Labs   Lab 04/29/25  1353   *   K 4.1      CO2 11*   GLU 62*   BUN 79*   CREATININE 5.4*   CALCIUM 7.8*   PROT 7.7   ALBUMIN 2.1*   BILITOT 0.3   ALKPHOS 106   AST 9*   ALT <5*   ANIONGAP 19*       Significant Imaging: I have reviewed all pertinent imaging results/findings within the past 24 hours.

## 2025-04-29 NOTE — CLINICAL REVIEW
IP Sepsis Screen (most recent)       Sepsis Screen (IP) - 04/29/25 1720       Is the patient's history or complaint suggestive of a possible infection? Yes  -    Are there at least two of the following signs and symptoms present? Yes  -    Sepsis signs/symptoms - Tachycardia Tachycardia     >90  -    Sepsis signs/symptoms - WBC WBC < 4,000 or WBC > 12,000  -    Are any of the following organ dysfunction criteria present and not considered to be due to a chronic condition? Yes  -    Organ Dysfunction Criteria Creatinine > 2.0  -    Initiate Sepsis Protocol No  -    Reason sepsis not considered Pt. receiving appropriate management   bc in process, abx in ED -              User Key  (r) = Recorded By, (t) = Taken By, (c) = Cosigned By      Initials Name    Shannan Chiu RN

## 2025-04-29 NOTE — ED NOTES
Telebox [id number] placed on [facility]

## 2025-04-30 PROBLEM — N18.5 CKD (CHRONIC KIDNEY DISEASE) STAGE 5, GFR LESS THAN 15 ML/MIN: Status: ACTIVE | Noted: 2025-04-29

## 2025-04-30 LAB
ABSOLUTE EOSINOPHIL (OHS): 0.05 K/UL
ABSOLUTE MONOCYTE (OHS): 1.45 K/UL (ref 0.3–1)
ABSOLUTE NEUTROPHIL COUNT (OHS): 24.47 K/UL (ref 1.8–7.7)
ALBUMIN SERPL BCP-MCNC: 1.8 G/DL (ref 3.5–5.2)
ALP SERPL-CCNC: 98 UNIT/L (ref 40–150)
ALT SERPL W/O P-5'-P-CCNC: <5 UNIT/L (ref 10–44)
ANION GAP (OHS): 17 MMOL/L (ref 8–16)
AST SERPL-CCNC: 12 UNIT/L (ref 11–45)
BASOPHILS # BLD AUTO: 0.05 K/UL
BASOPHILS NFR BLD AUTO: 0.2 %
BILIRUB SERPL-MCNC: 0.3 MG/DL (ref 0.1–1)
BUN SERPL-MCNC: 75 MG/DL (ref 8–23)
CALCIUM SERPL-MCNC: 7.4 MG/DL (ref 8.7–10.5)
CHLORIDE SERPL-SCNC: 107 MMOL/L (ref 95–110)
CO2 SERPL-SCNC: 12 MMOL/L (ref 23–29)
CREAT SERPL-MCNC: 5.1 MG/DL (ref 0.5–1.4)
ERYTHROCYTE [DISTWIDTH] IN BLOOD BY AUTOMATED COUNT: 15.2 % (ref 11.5–14.5)
GFR SERPLBLD CREATININE-BSD FMLA CKD-EPI: 8 ML/MIN/1.73/M2
GLUCOSE SERPL-MCNC: 50 MG/DL (ref 70–110)
HCT VFR BLD AUTO: 27.7 % (ref 37–48.5)
HGB BLD-MCNC: 8.5 GM/DL (ref 12–16)
IMM GRANULOCYTES # BLD AUTO: 0.25 K/UL (ref 0–0.04)
IMM GRANULOCYTES NFR BLD AUTO: 0.9 % (ref 0–0.5)
LYMPHOCYTES # BLD AUTO: 2.17 K/UL (ref 1–4.8)
MAGNESIUM SERPL-MCNC: 1.3 MG/DL (ref 1.6–2.6)
MCH RBC QN AUTO: 30 PG (ref 27–31)
MCHC RBC AUTO-ENTMCNC: 30.7 G/DL (ref 32–36)
MCV RBC AUTO: 98 FL (ref 82–98)
NUCLEATED RBC (/100WBC) (OHS): 0 /100 WBC
PHOSPHATE SERPL-MCNC: 5.9 MG/DL (ref 2.7–4.5)
PLATELET # BLD AUTO: 537 K/UL (ref 150–450)
PLATELET BLD QL SMEAR: ABNORMAL
PMV BLD AUTO: 9 FL (ref 9.2–12.9)
POCT GLUCOSE: 64 MG/DL (ref 70–110)
POCT GLUCOSE: 87 MG/DL (ref 70–110)
POCT GLUCOSE: 99 MG/DL (ref 70–110)
POTASSIUM SERPL-SCNC: 3.7 MMOL/L (ref 3.5–5.1)
PROT SERPL-MCNC: 6.5 GM/DL (ref 6–8.4)
RBC # BLD AUTO: 2.83 M/UL (ref 4–5.4)
RELATIVE EOSINOPHIL (OHS): 0.2 %
RELATIVE LYMPHOCYTE (OHS): 7.6 % (ref 18–48)
RELATIVE MONOCYTE (OHS): 5.1 % (ref 4–15)
RELATIVE NEUTROPHIL (OHS): 86 % (ref 38–73)
SODIUM SERPL-SCNC: 136 MMOL/L (ref 136–145)
TOXIC GRANULES BLD QL SMEAR: PRESENT
TROPONIN I SERPL DL<=0.01 NG/ML-MCNC: 0.04 NG/ML
TROPONIN I SERPL DL<=0.01 NG/ML-MCNC: 0.04 NG/ML
VANCOMYCIN SERPL-MCNC: 16.8 UG/ML (ref ?–80)
WBC # BLD AUTO: 28.44 K/UL (ref 3.9–12.7)

## 2025-04-30 PROCEDURE — 99223 1ST HOSP IP/OBS HIGH 75: CPT | Mod: HCNC,,, | Performed by: STUDENT IN AN ORGANIZED HEALTH CARE EDUCATION/TRAINING PROGRAM

## 2025-04-30 PROCEDURE — 21400001 HC TELEMETRY ROOM: Mod: HCNC

## 2025-04-30 PROCEDURE — 25000003 PHARM REV CODE 250: Mod: HCNC | Performed by: HOSPITALIST

## 2025-04-30 PROCEDURE — 85025 COMPLETE CBC W/AUTO DIFF WBC: CPT | Mod: HCNC

## 2025-04-30 PROCEDURE — A4217 STERILE WATER/SALINE, 500 ML: HCPCS | Mod: HCNC | Performed by: STUDENT IN AN ORGANIZED HEALTH CARE EDUCATION/TRAINING PROGRAM

## 2025-04-30 PROCEDURE — 80202 ASSAY OF VANCOMYCIN: CPT | Mod: HCNC | Performed by: HOSPITALIST

## 2025-04-30 PROCEDURE — 84484 ASSAY OF TROPONIN QUANT: CPT | Mod: HCNC

## 2025-04-30 PROCEDURE — 25000003 PHARM REV CODE 250: Mod: HCNC | Performed by: STUDENT IN AN ORGANIZED HEALTH CARE EDUCATION/TRAINING PROGRAM

## 2025-04-30 PROCEDURE — 84100 ASSAY OF PHOSPHORUS: CPT | Mod: HCNC

## 2025-04-30 PROCEDURE — 63600175 PHARM REV CODE 636 W HCPCS: Mod: HCNC

## 2025-04-30 PROCEDURE — 80053 COMPREHEN METABOLIC PANEL: CPT | Mod: HCNC

## 2025-04-30 PROCEDURE — 25000003 PHARM REV CODE 250: Mod: HCNC | Performed by: NURSE PRACTITIONER

## 2025-04-30 PROCEDURE — 25000003 PHARM REV CODE 250: Mod: HCNC

## 2025-04-30 PROCEDURE — 36415 COLL VENOUS BLD VENIPUNCTURE: CPT | Mod: HCNC

## 2025-04-30 PROCEDURE — 63600175 PHARM REV CODE 636 W HCPCS: Mod: HCNC | Performed by: HOSPITALIST

## 2025-04-30 PROCEDURE — 83735 ASSAY OF MAGNESIUM: CPT | Mod: HCNC

## 2025-04-30 RX ORDER — CEFEPIME HYDROCHLORIDE 1 G/1
1 INJECTION, POWDER, FOR SOLUTION INTRAMUSCULAR; INTRAVENOUS
Status: DISCONTINUED | OUTPATIENT
Start: 2025-04-30 | End: 2025-05-03

## 2025-04-30 RX ORDER — SEVELAMER CARBONATE 800 MG/1
800 TABLET, FILM COATED ORAL
Status: DISCONTINUED | OUTPATIENT
Start: 2025-04-30 | End: 2025-05-05

## 2025-04-30 RX ORDER — SODIUM BICARBONATE 325 MG/1
1300 TABLET ORAL 2 TIMES DAILY
Status: DISCONTINUED | OUTPATIENT
Start: 2025-04-30 | End: 2025-04-30

## 2025-04-30 RX ORDER — LANOLIN ALCOHOL/MO/W.PET/CERES
800 CREAM (GRAM) TOPICAL EVERY 4 HOURS
Status: COMPLETED | OUTPATIENT
Start: 2025-04-30 | End: 2025-04-30

## 2025-04-30 RX ORDER — SODIUM BICARBONATE 325 MG/1
1300 TABLET ORAL 3 TIMES DAILY
Status: DISCONTINUED | OUTPATIENT
Start: 2025-04-30 | End: 2025-05-05

## 2025-04-30 RX ORDER — IBUPROFEN 200 MG
24 TABLET ORAL ONCE
Status: DISCONTINUED | OUTPATIENT
Start: 2025-04-30 | End: 2025-05-05

## 2025-04-30 RX ADMIN — HEPARIN SODIUM 5000 UNITS: 5000 INJECTION INTRAVENOUS; SUBCUTANEOUS at 02:04

## 2025-04-30 RX ADMIN — VANCOMYCIN HYDROCHLORIDE 750 MG: 750 INJECTION, POWDER, LYOPHILIZED, FOR SOLUTION INTRAVENOUS at 08:04

## 2025-04-30 RX ADMIN — ASPIRIN 81 MG CHEWABLE TABLET 81 MG: 81 TABLET CHEWABLE at 08:04

## 2025-04-30 RX ADMIN — SEVELAMER CARBONATE 800 MG: 800 TABLET, FILM COATED ORAL at 12:04

## 2025-04-30 RX ADMIN — Medication 16 G: at 07:04

## 2025-04-30 RX ADMIN — ACETAMINOPHEN 650 MG: 325 TABLET ORAL at 09:04

## 2025-04-30 RX ADMIN — ATORVASTATIN CALCIUM 40 MG: 40 TABLET, FILM COATED ORAL at 09:04

## 2025-04-30 RX ADMIN — SODIUM BICARBONATE 1300 MG: 325 TABLET ORAL at 09:04

## 2025-04-30 RX ADMIN — LEVOTHYROXINE SODIUM 75 MCG: 75 TABLET ORAL at 05:04

## 2025-04-30 RX ADMIN — SODIUM BICARBONATE: 84 INJECTION, SOLUTION INTRAVENOUS at 08:04

## 2025-04-30 RX ADMIN — SODIUM BICARBONATE 1300 MG: 325 TABLET ORAL at 08:04

## 2025-04-30 RX ADMIN — CEFEPIME 1 G: 1 INJECTION, POWDER, FOR SOLUTION INTRAMUSCULAR; INTRAVENOUS at 09:04

## 2025-04-30 RX ADMIN — SEVELAMER CARBONATE 800 MG: 800 TABLET, FILM COATED ORAL at 04:04

## 2025-04-30 RX ADMIN — Medication 6 MG: at 09:04

## 2025-04-30 RX ADMIN — SODIUM BICARBONATE 1300 MG: 325 TABLET ORAL at 02:04

## 2025-04-30 RX ADMIN — Medication 800 MG: at 08:04

## 2025-04-30 RX ADMIN — HEPARIN SODIUM 5000 UNITS: 5000 INJECTION INTRAVENOUS; SUBCUTANEOUS at 09:04

## 2025-04-30 RX ADMIN — HEPARIN SODIUM 5000 UNITS: 5000 INJECTION INTRAVENOUS; SUBCUTANEOUS at 05:04

## 2025-04-30 RX ADMIN — Medication 800 MG: at 12:04

## 2025-04-30 NOTE — PROGRESS NOTES
Wound care done to left anterior thigh. Foul odor noted to wound. Area first cleaned with vache then clean dressing applied.

## 2025-04-30 NOTE — ASSESSMENT & PLAN NOTE
Patient's FSGs are controlled on current medication regimen.  Last A1c reviewed-   Lab Results   Component Value Date    HGBA1C 7.5 (H) 06/13/2020     Most recent fingerstick glucose reviewed-   Recent Labs   Lab 04/29/25  1325 04/29/25  1411 04/29/25 2028 04/30/25  0724   POCTGLUCOSE 96 63* 87 64*   Hypoglycemia- infection contributing  Obtain HgbA1c

## 2025-04-30 NOTE — ASSESSMENT & PLAN NOTE
Hx of LT upper thigh carbuncle progressively enlarging with ulceration  Obtain Blood cultures  Noncontrast CT abdomen and left upper thigh to evaluate for occult abscesses and deep extensions-no abscess   Procal elevated, lactate normal  Given immunosuppression (CKD and type 2 DM); start empiric renally dosed vancomycin and cefepime  Wound does not have expose viable tissue to culture- will discuss with wound care team   Wound care ordered temporarily until seen by Wound care team

## 2025-04-30 NOTE — NURSING
Pt arrived to unit @ 2015 via transport. Attempted to remove pads from pt she said no she rather it stay.

## 2025-04-30 NOTE — PROGRESS NOTES
[facility] Medicine  Progress Note    Patient Name: Tasneem Lynn  MRN: [id number]  Patient Class: IP- Inpatient   Admission Date: 4/29/2025  Length of Stay: 1 days  Attending Physician: Neel Trejo MD  Primary Care Provider: Unable, To Obtain        Subjective     Principal Problem:Inguinal ulcer        HPI:  79-year-old F with medical history significant for type 2 DM, HTN, CHF, hypothyroidism, and CKD 4 who presented to the ED with a chief complaint of LT upper  groin pain boil of one week duration      Patient was in her baseline state of health until which initially started as a small pea-sized lesion but progressively grew and ulcerated.  Patient started having pain and this prompted her to come to the ED. she endorsed some subjective chills but denied any fever, nausea, or vomiting.  No diarrhea or loss of appetite.  Patient had no history of bloodstream infection, surgical implants, valvular heart disease.  However, retrospective chart review indicated that patient had chronic osteomyelitis of the right foot and ESBL Klebsiella UTI 5 years ago.  Patient has a known CKD 4 but no routine follow up with Nephrology noted; review of home medication did not show any diuretics or other nephrotoxic medications.    On presentation to the ED, patient was hypertensive to 183/74, , oxygen saturation 100% on room air.Laboratory investigations were pertinent for WBC 29.1, HGB 9.3, , sodium 135, BUN 79, creatinine of 5.4, EGFR 8, procalcitonin 25.23, troponin 0.03, lactic acid 0.9.  Patient received NS 1 L in the ED and IV Zosyn.  Admission was requested for further management.      Overview/Hospital Course:  No notes on file    Interval History: states blood sugar runs very low 30's , states wound been there 4-5 days    Review of Systems   Constitutional:  Positive for chills and fatigue. Negative for diaphoresis and fever.   Respiratory:  Negative for cough, choking and shortness of  breath.    Cardiovascular:  Negative for chest pain, palpitations and leg swelling.   Endocrine: Negative for polydipsia, polyphagia and polyuria.   Genitourinary:  Negative for decreased urine volume, dysuria, flank pain and hematuria.   Musculoskeletal:  Negative for arthralgias, back pain and joint swelling.     Objective:     Vital Signs (Most Recent):  Temp: 98.1 °F (36.7 °C) (04/30/25 0727)  Pulse: 98 (04/30/25 0727)  Resp: 18 (04/30/25 0727)  BP: (!) 135/54 (04/30/25 0727)  SpO2: 100 % (04/30/25 0727) Vital Signs (24h Range):  Temp:  [98.1 °F (36.7 °C)-98.9 °F (37.2 °C)] 98.1 °F (36.7 °C)  Pulse:  [] 98  Resp:  [18-20] 18  SpO2:  [98 %-100 %] 100 %  BP: ()/(54-87) 135/54     Weight: 84.7 kg (186 lb 12.8 oz)  Body mass index is 34.17 kg/m².    Intake/Output Summary (Last 24 hours) at 4/30/2025 0841  Last data filed at 4/30/2025 0613  Gross per 24 hour   Intake --   Output 300 ml   Net -300 ml         Physical Exam  Constitutional:       General: She is not in acute distress.     Appearance: She is not ill-appearing, toxic-appearing or diaphoretic.   Cardiovascular:      Rate and Rhythm: Normal rate and regular rhythm.      Pulses: Normal pulses.      Heart sounds: Normal heart sounds.   Pulmonary:      Effort: Pulmonary effort is normal. No respiratory distress.      Breath sounds: Normal breath sounds. No stridor.      Comments: Diminished . Difficult to assess.   Abdominal:      General: Bowel sounds are normal. There is no distension.      Palpations: Abdomen is soft. There is no mass.   Musculoskeletal:         General: Deformity present.      Right lower leg: Edema (does not move right leg) present.      Left lower leg: Edema present.   Skin:     Comments: LT Inguinal desquamated ulcer 8cm x 5cm with exudative base,and sloughing edges   Neurological:      General: No focal deficit present.      Mental Status: She is oriented to person, place, and time.      Comments: Bedbound x 3 years          "      Significant Labs: All pertinent labs within the past 24 hours have been reviewed.    Significant Imaging: I have reviewed all pertinent imaging results/findings within the past 24 hours.      Assessment & Plan  Left upper thigh wound/Cellulitis  Hx of LT upper thigh carbuncle progressively enlarging with ulceration  Obtain Blood cultures  Noncontrast CT abdomen and left upper thigh to evaluate for occult abscesses and deep extensions-no abscess   Procal elevated, lactate normal  Given immunosuppression (CKD and type 2 DM); start empiric renally dosed vancomycin and cefepime  Wound does not have expose viable tissue to culture- will discuss with wound care team   Wound care ordered temporarily until seen by Wound care team     Sepsis with acute organ dysfunction without septic shock  This patient does have evidence of infective focus  My overall impression is sepsis.  Source: Skin and Soft Tissue (location LT inguinal cellulitis)  Antibiotics given-   Antibiotics (72h ago, onward)      Start     Stop Route Frequency Ordered    04/30/25 1900  ceFEPIme injection 1 g         -- IV Every 24 hours (non-standard times) 04/29/25 2008 04/30/25 0800  vancomycin 750 mg in 0.9% NaCl 250 mL IVPB (admixture device)         -- IV Once 04/30/25 0643    04/29/25 1909  vancomycin - pharmacy to dose  (vancomycin IVPB (PEDS and ADULTS))        Placed in "And" Linked Group    -- IV pharmacy to manage frequency 04/29/25 1815          Latest lactate reviewed-  Recent Labs   Lab 04/29/25  1528   LACTATE 0.9     Organ dysfunction indicated by Acute kidney injury    Fluid challenge Fluid Not Needed - Patient is not hypotensive and/or lactate is less than 4.0.     Post- resuscitation assessment Yes - I attest a sepsis perfusion exam was performed within 6 hours of sepsis, severe sepsis, or septic shock presentation, following fluid resuscitation.      Will Not start Pressors- Levophed for MAP of 65  Source control achieved by: IV " antibiotics  Acute kidney injury superimposed on stage 4 chronic kidney disease  EMMA is likely due to acute tubular necrosis caused by sepsis . Baseline creatinine is  2.3 to 2.5 . Most recent creatinine and eGFR are listed below.  Recent Labs     04/29/25  1353 04/30/25  0430   CREATININE 5.4* 5.1*   EGFRNORACEVR 8* 8*     -Last sCr 2020  - Monitor urine output, serial BMP, and adjust therapy as needed  - Urine sodium, urine creatinine and retroperitoneal ultrasound  - Nephrology consult  Hyperlipidemia  Continue statins    Diabetes mellitus  Patient's FSGs are controlled on current medication regimen.  Last A1c reviewed-   Lab Results   Component Value Date    HGBA1C 7.5 (H) 06/13/2020     Most recent fingerstick glucose reviewed-   Recent Labs   Lab 04/29/25  1325 04/29/25  1411 04/29/25 2028 04/30/25  0724   POCTGLUCOSE 96 63* 87 64*   Hypoglycemia- infection contributing  Obtain HgbA1c    Type 2 myocardial infarction  Type 2 secondary to sepsis and EMMA  Noted elevated troponin with underlying history of CAD on presentation  EKG showed no acute ischemic change  Continue to trend troponin    Hypertension  Patient currently borderline hypotensive; home medications held  Restart as clinically appropriate  PRNs ordered  CAD (coronary artery disease)  Patient with known CAD s/p which is controlled Will continue ASA and Statin and monitor for S/Sx of angina/ACS. Continue to monitor on telemetry.   Hypothyroidism  Continue home levothyroxine    Anemia  Anemia is likely due to chronic disease due to Chronic Kidney Disease. Most recent hemoglobin and hematocrit are listed below.  Recent Labs     04/29/25  1353 04/30/25  0430   HGB 9.3* 8.5*   HCT 30.0* 27.7*     Plan  - Monitor serial CBC: Daily  - Transfuse PRBC if patient becomes hemodynamically unstable, symptomatic or H/H drops below 7/21.  - Patient has not received any PRBC transfusions to date  - Patient's anemia is currently stable  - Obtain iron studies and  ferritin  VTE Risk Mitigation (From admission, onward)           Ordered     heparin (porcine) injection 5,000 Units  Every 8 hours         04/29/25 1815     IP VTE HIGH RISK PATIENT  Once         04/29/25 1815     Place sequential compression device  Until discontinued         04/29/25 1815                    Discharge Planning   STEHPEN:      Code Status: Full Code   Medical Readiness for Discharge Date:                  Alice Mooney NP  Department of Hospital Medicine   [facility]

## 2025-04-30 NOTE — CONSULTS
[facility] [place] - Telemetry  Nephrology  Consult Note    Patient Name: Tasneem Lynn  MRN: [id number]  Admission Date: 4/29/2025  Hospital Length of Stay: 1 days  Attending Provider: Neel Trejo MD   Primary Care Physician: Unable, To Obtain  Principal Problem:Inguinal ulcer    Inpatient consult to Nephrology  Consult performed by: Clinton Aguilar MD  Consult ordered by: Laron Klein MD        Subjective:     HPI: 79 year old female with a history of T2DM, HTN, HF, hypothyroidism presents with left upper groin abscess for the last week.     She developed an upper leg abscess which prompted evaluation in the ED as management at home failed.    Apropos kidney function: she has never established with a kidney doctor and the last nephrologist evaluation inpatient was in 2020 with a creatinine of 2.3. On presentation today she had a creatinine of 5.4 with an eGFR of 8. Other labs on presentation with sodium of 135, K of 4.1, bicarb of 11, BUN of 79.     Past Medical History:   Diagnosis Date    Arthritis     Gout    CHF (congestive heart failure)     Coronary artery disease     Diabetes mellitus     HLD (hyperlipidemia)     Hypertension     Obese     Stroke     1986    Thyroid disease        Past Surgical History:   Procedure Laterality Date    ESOPHAGOGASTRODUODENOSCOPY N/A 7/23/2020    Procedure: EGD (ESOPHAGOGASTRODUODENOSCOPY);  Surgeon: Halle Hughes MD;  Location: [facility];  Service: Endoscopy;  Laterality: N/A;    right hand surgery      right knee surgery Right     partial plate       Review of patient's allergies indicates:   Allergen Reactions    Corticosteroids (glucocorticoids) Edema     Current Facility-Administered Medications   Medication Frequency    acetaminophen tablet 650 mg Q4H PRN    aspirin chewable tablet 81 mg Daily    atorvastatin tablet 40 mg QHS    ceFEPIme injection 1 g Q24H    dextrose 50% injection 12.5 g PRN    dextrose 50% injection 25 g PRN    glucagon (human recombinant) injection 1  mg PRN    glucose chewable tablet 16 g PRN    glucose chewable tablet 24 g Once    heparin (porcine) injection 5,000 Units Q8H    hydrALAZINE injection 10 mg Q4H PRN    levothyroxine tablet 75 mcg Before breakfast    magnesium oxide tablet 800 mg Q4H    melatonin tablet 6 mg Nightly PRN    ondansetron injection 4 mg Q8H PRN    oxyCODONE immediate release tablet 5 mg Q6H PRN    sodium bicarbonate 150 mEq in sterile water 1,000 mL infusion Continuous    sodium bicarbonate tablet 1,300 mg BID    sodium chloride 0.9% flush 10 mL Q12H PRN    vancomycin - pharmacy to dose pharmacy to manage frequency     Family History       Problem Relation (Age of Onset)    Diabetes Maternal Aunt    Heart disease Sister, Maternal Aunt    Hypertension Maternal Aunt          Tobacco Use    Smoking status: Never    Smokeless tobacco: Never   Substance and Sexual Activity    Alcohol use: No    Drug use: No    Sexual activity: Not Currently     Partners: Male     Review of Systems   Constitutional:  Positive for activity change. Negative for chills and fever.   HENT:  Negative for rhinorrhea and sore throat.    Eyes:  Negative for pain and visual disturbance.   Respiratory:  Negative for cough and shortness of breath.    Cardiovascular:  Negative for chest pain and palpitations.   Gastrointestinal:  Negative for abdominal pain, constipation, diarrhea and nausea.   Endocrine: Negative for cold intolerance, heat intolerance and polyuria.   Genitourinary:  Negative for dysuria and flank pain.   Musculoskeletal:  Negative for back pain, gait problem and joint swelling.        Left upper thigh pain/ulcer   Allergic/Immunologic: Negative for immunocompromised state.   Neurological:  Negative for light-headedness, numbness and headaches.     Objective:     Vital Signs (Most Recent):  Temp: 98.3 °F (36.8 °C) (04/30/25 1116)  Pulse: 95 (04/30/25 1116)  Resp: 19 (04/30/25 1116)  BP: (!) 168/68 (04/30/25 1116)  SpO2: 98 % (04/30/25 1116) Vital Signs  (24h Range):  Temp:  [98.1 °F (36.7 °C)-98.9 °F (37.2 °C)] 98.3 °F (36.8 °C)  Pulse:  [] 95  Resp:  [18-20] 19  SpO2:  [98 %-100 %] 98 %  BP: ()/(54-87) 168/68     Weight: 84.7 kg (186 lb 12.8 oz) (04/29/25 1946)  Body mass index is 34.17 kg/m².  Body surface area is 1.92 meters squared.    I/O last 3 completed shifts:  In: -   Out: 300 [Urine:300]     Physical Exam  Constitutional:       General: She is not in acute distress.     Appearance: She is well-developed. She is obese. She is not ill-appearing or toxic-appearing.   HENT:      Head: Normocephalic and atraumatic.      Nose: Nose normal. No congestion.   Eyes:      Pupils: Pupils are equal, round, and reactive to light.   Cardiovascular:      Rate and Rhythm: Normal rate.      Heart sounds: Normal heart sounds.   Pulmonary:      Effort: Pulmonary effort is normal.   Abdominal:      General: Bowel sounds are normal.      Palpations: Abdomen is soft.      Tenderness: There is no abdominal tenderness.   Musculoskeletal:         General: Tenderness (left upper thigh) present. Normal range of motion.      Cervical back: Normal range of motion and neck supple.   Skin:     General: Skin is warm and dry.      Capillary Refill: Capillary refill takes less than 2 seconds.   Neurological:      Mental Status: She is alert and oriented to person, place, and time.          Significant Labs:  All labs within the past 24 hours have been reviewed.    Significant Imaging:  Labs: Reviewed  Assessment/Plan:     Renal/  CKD (chronic kidney disease) stage 5, GFR less than 15 ml/min  Previous sCr in 2020 2.3  Gap in records and presented with a creatinine of 5.4, eGFR 8    Suspect this is her baseline kidney function as she has never seen a nephrologist nor appears to has had any evaluation by any healthcare provider since 2021.     RP US: 8.2 cm right and 7.5 cm left. Poor imaging though appears echogenic    Plan/Recommendation  No apparent needs for RRT at this  time, will follow up labs and will need establishment in outpatient.  Follow up UA, UPCR, PTH  1 liter isotonic bicarb  Increase sodium bicarb from 1300 BID to TID.  -Keep MAP > 65  -Keep hemoglobin > 7  -Strict ins and outs  -Avoid nephrotoxic agents if possible and renally dose medications  -Avoid drastic hemodynamic changes if possible      #Anemia  HGB   Date Value Ref Range Status   04/30/2025 8.5 (L) 12.0 - 16.0 gm/dL Final     Iron Level   Date Value Ref Range Status   04/29/2025 32 30 - 160 ug/dL Final     Transferrin   Date Value Ref Range Status   04/29/2025 62 (L) 200 - 375 mg/dL Final     Iron Binding Capacity Total   Date Value Ref Range Status   04/29/2025 92 (L) 250 - 450 ug/dL Final     Saturated Iron   Date Value Ref Range Status   07/20/2020 8 (L) 20 - 50 % Final     Ferritin   Date Value Ref Range Status   04/29/2025 693.3 (H) 20.0 - 300.0 ng/mL Final   Iron stores adequate  JOYA once BP better controlled    #Secondary hyperparathyroidism  Calcium   Date Value Ref Range Status   04/30/2025 7.4 (L) 8.7 - 10.5 mg/dL Final     Phosphorus Level   Date Value Ref Range Status   04/30/2025 5.9 (H) 2.7 - 4.5 mg/dL Final     PTH, Intact   Date Value Ref Range Status   07/21/2020 191.1 (H) 9.0 - 77.0 pg/mL Final   Repeat PTH  Start sevelamer 800 TID with meals      Orthopedic  * Left upper thigh wound/Cellulitis  Surgery following        Thank you for your consult. I will follow-up with patient. Please contact us if you have any additional questions.    Clinton Aguilar MD  Nephrology  [facility] [place] - Telemetry

## 2025-04-30 NOTE — ASSESSMENT & PLAN NOTE
"This patient does have evidence of infective focus  My overall impression is sepsis.  Source: Skin and Soft Tissue (location LT inguinal cellulitis)  Antibiotics given-   Antibiotics (72h ago, onward)      Start     Stop Route Frequency Ordered    04/30/25 1900  ceFEPIme injection 1 g         -- IV Every 24 hours (non-standard times) 04/29/25 2008 04/30/25 0800  vancomycin 750 mg in 0.9% NaCl 250 mL IVPB (admixture device)         -- IV Once 04/30/25 0643    04/29/25 1909  vancomycin - pharmacy to dose  (vancomycin IVPB (PEDS and ADULTS))        Placed in "And" Linked Group    -- IV pharmacy to manage frequency 04/29/25 1815          Latest lactate reviewed-  Recent Labs   Lab 04/29/25  1528   LACTATE 0.9     Organ dysfunction indicated by Acute kidney injury    Fluid challenge Fluid Not Needed - Patient is not hypotensive and/or lactate is less than 4.0.     Post- resuscitation assessment Yes - I attest a sepsis perfusion exam was performed within 6 hours of sepsis, severe sepsis, or septic shock presentation, following fluid resuscitation.      Will Not start Pressors- Levophed for MAP of 65  Source control achieved by: IV antibiotics  "

## 2025-04-30 NOTE — ASSESSMENT & PLAN NOTE
Anemia is likely due to chronic disease due to Chronic Kidney Disease. Most recent hemoglobin and hematocrit are listed below.  Recent Labs     04/29/25  1353   HGB 9.3*   HCT 30.0*     Plan  - Monitor serial CBC: Daily  - Transfuse PRBC if patient becomes hemodynamically unstable, symptomatic or H/H drops below 7/21.  - Patient has not received any PRBC transfusions to date  - Patient's anemia is currently stable  - Obtain iron studies and ferritin

## 2025-04-30 NOTE — PROGRESS NOTES
Pharmacokinetic Assessment Follow Up: IV Vancomycin    Vancomycin serum concentration assessment(s):    The random level was drawn correctly and can be used to guide therapy at this time. The measurement is within the desired definitive target range of 10 to 20 mcg/mL.    Vancomycin Regimen Plan:    Dose 750 mg x 1 4/30/25    Re-dose when the random level is less than 20 mcg/mL, next level to be drawn at 0400 on 5/1/25    Drug levels (last 3 results):  Recent Labs   Lab Result Units 04/30/25  0430   Vancomycin Random ug/ml 16.8       Pharmacy will continue to follow and monitor vancomycin.    Please contact pharmacy at extension [phone] for questions regarding this assessment.    Thank you for the consult,   Yennifer Horton       Patient brief summary:  Tasneem Lynn is a 79 y.o. female initiated on antimicrobial therapy with IV Vancomycin for treatment of skin & soft tissue infection        Drug Allergies:   Review of patient's allergies indicates:   Allergen Reactions    Corticosteroids (glucocorticoids) Edema       Actual Body Weight:   84.7    Renal Function:   Estimated Creatinine Clearance: 9 mL/min (A) (based on SCr of 5.1 mg/dL (H)).,     Dialysis Method (if applicable):  N/A    CBC (last 72 hours):  Recent Labs   Lab Result Units 04/29/25  1353   WBC K/uL 29.11*   HGB gm/dL 9.3*   HCT % 30.0*   Platelet Count K/uL 552*   Lymph % % 6.6*   Mono % % 4.8   Eos % % 0.2   Basophil % % 0.2       Metabolic Panel (last 72 hours):  Recent Labs   Lab Result Units 04/29/25  1353 04/30/25  0430   Sodium mmol/L 135* 136   Potassium mmol/L 4.1 3.7   Chloride mmol/L 105 107   CO2 mmol/L 11* 12*   Glucose mg/dL 62* 50*   BUN mg/dL 79* 75*   Creatinine mg/dL 5.4* 5.1*   Albumin g/dL 2.1* 1.8*   Bilirubin Total mg/dL 0.3 0.3   ALP unit/L 106 98   AST unit/L 9* 12   ALT unit/L <5* <5*   Magnesium  mg/dL  --  1.3*   Phosphorus Level mg/dL  --  5.9*       Vancomycin Administrations:  vancomycin given in the last 96 hours                      vancomycin 1,250 mg in 0.9% NaCl 250 mL IVPB (admixture device) (mg) 1,250 mg New Bag 04/29/25 2047                    Microbiologic Results:  Microbiology Results (last 7 days)       Procedure Component Value Units Date/Time    Blood culture #1 [[id number]]  (Normal) Collected: 04/29/25 1528    Order Status: Completed Specimen: Blood Updated: 04/29/25 2300     Blood Culture No Growth After 6 Hours    Blood culture #2 [[id number]]  (Normal) Collected: 04/29/25 1528    Order Status: Completed Specimen: Blood Updated: 04/29/25 2300     Blood Culture No Growth After 6 Hours

## 2025-04-30 NOTE — ASSESSMENT & PLAN NOTE
EMMA is likely due to acute tubular necrosis caused by sepsis. Baseline creatinine is 2.3 to 2.5. Most recent creatinine and eGFR are listed below.  Recent Labs     04/29/25  1353 04/30/25  0430   CREATININE 5.4* 5.1*   EGFRNORACEVR 8* 8*     -Last sCr 2020  - Monitor urine output, serial BMP, and adjust therapy as needed  - Urine sodium, urine creatinine and retroperitoneal ultrasound  - Nephrology consult

## 2025-04-30 NOTE — ASSESSMENT & PLAN NOTE
Previous sCr in 2020 2.3  Gap in records and presented with a creatinine of 5.4, eGFR 8    Suspect this is her baseline kidney function as she has never seen a nephrologist nor appears to has had any evaluation by any healthcare provider since 2021.     RP US: 8.2 cm right and 7.5 cm left. Poor imaging though appears echogenic    Plan/Recommendation  Follow up UA, UPCR, PTH  1 liter isotonic bicarb  Increase sodium bicarb from 1300 BID to TID.  -Keep MAP > 65  -Keep hemoglobin > 7  -Strict ins and outs  -Avoid nephrotoxic agents if possible and renally dose medications  -Avoid drastic hemodynamic changes if possible      #Anemia  HGB   Date Value Ref Range Status   04/30/2025 8.5 (L) 12.0 - 16.0 gm/dL Final     Iron Level   Date Value Ref Range Status   04/29/2025 32 30 - 160 ug/dL Final     Transferrin   Date Value Ref Range Status   04/29/2025 62 (L) 200 - 375 mg/dL Final     Iron Binding Capacity Total   Date Value Ref Range Status   04/29/2025 92 (L) 250 - 450 ug/dL Final     Saturated Iron   Date Value Ref Range Status   07/20/2020 8 (L) 20 - 50 % Final     Ferritin   Date Value Ref Range Status   04/29/2025 693.3 (H) 20.0 - 300.0 ng/mL Final   Iron stores adequate  JOYA once BP better controlled    #Secondary hyperparathyroidism  Calcium   Date Value Ref Range Status   04/30/2025 7.4 (L) 8.7 - 10.5 mg/dL Final     Phosphorus Level   Date Value Ref Range Status   04/30/2025 5.9 (H) 2.7 - 4.5 mg/dL Final     PTH, Intact   Date Value Ref Range Status   07/21/2020 191.1 (H) 9.0 - 77.0 pg/mL Final   Repeat PTH  Start sevelamer 800 TID with meals

## 2025-04-30 NOTE — ASSESSMENT & PLAN NOTE
"This patient does have evidence of infective focus  My overall impression is sepsis.  Source: Skin and Soft Tissue (location LT inguinal cellulitis)  Antibiotics given-   Antibiotics (72h ago, onward)      Start     Stop Route Frequency Ordered    04/29/25 1909  vancomycin - pharmacy to dose  (vancomycin IVPB (PEDS and ADULTS))        Placed in "And" Linked Group    -- IV pharmacy to manage frequency 04/29/25 1815    04/29/25 1830  ceFEPIme injection 1 g         -- IV Every 12 hours (non-standard times) 04/29/25 1816          Latest lactate reviewed-  Recent Labs   Lab 04/29/25  1528   LACTATE 0.9     Organ dysfunction indicated by Acute kidney injury    Fluid challenge Fluid Not Needed - Patient is not hypotensive and/or lactate is less than 4.0.     Post- resuscitation assessment Yes - I attest a sepsis perfusion exam was performed within 6 hours of sepsis, severe sepsis, or septic shock presentation, following fluid resuscitation.      Will Not start Pressors- Levophed for MAP of 65  Source control achieved by: IV antibiotics  "

## 2025-04-30 NOTE — PROGRESS NOTES
[facility] [place]  Nurses Note -- 4 Eyes      4/30/2025       Skin assessed on: Q Shift      [] No Pressure Injuries Present    []Prevention Measures Documented    [x] Yes LDA  for Pressure Injury Previously documented     [] Yes New Pressure Injury Discovered   [] LDA for New Pressure Injury Added      Attending RN:  Wei Dickerson RN     Second RN:  Gunnar Garcia LPN

## 2025-04-30 NOTE — H&P
[facility] Medicine  History & Physical    Patient Name: Tasneem Lynn  MRN: [id number]  Patient Class: IP- Inpatient  Admission Date: 4/29/2025  Attending Physician: Neel Trejo MD   Primary Care Provider: Unable, To Obtain         Patient information was obtained from patient, past medical records, and ER records.     Subjective:     Principal Problem:Inguinal ulcer    Chief Complaint:   Chief Complaint   Patient presents with    Abscess     Pt arrived via ems, pt chief complaint is an Abscess. Pt has a left leg abscess that family is concerned is infected.         HPI: 79-year-old F with medical history significant for type 2 DM, HTN, CHF, hypothyroidism, and CKD 4 who presented to the ED with a chief complaint of LT upper  groin pain boil of one week duration      Patient was in her baseline state of health until which initially started as a small pea-sized lesion but progressively grew and ulcerated.  Patient started having pain and this prompted her to come to the ED. she endorsed some subjective chills but denied any fever, nausea, or vomiting.  No diarrhea or loss of appetite.  Patient had no history of bloodstream infection, surgical implants, valvular heart disease.  However, retrospective chart review indicated that patient had chronic osteomyelitis of the right foot and ESBL Klebsiella UTI 5 years ago.  Patient has a known CKD 4 but no routine follow up with Nephrology noted; review of home medication did not show any diuretics or other nephrotoxic medications.    On presentation to the ED, patient was hypertensive to 183/74, , oxygen saturation 100% on room air.Laboratory investigations were pertinent for WBC 29.1, HGB 9.3, , sodium 135, BUN 79, creatinine of 5.4, EGFR 8, procalcitonin 25.23, troponin 0.03, lactic acid 0.9.  Patient received NS 1 L in the ED and IV Zosyn.  Admission was requested for further management.      Past Medical History:   Diagnosis  Date    Arthritis     Gout    CHF (congestive heart failure)     Coronary artery disease     Diabetes mellitus     HLD (hyperlipidemia)     Hypertension     Obese     Stroke     1986    Thyroid disease        Past Surgical History:   Procedure Laterality Date    ESOPHAGOGASTRODUODENOSCOPY N/A 7/23/2020    Procedure: EGD (ESOPHAGOGASTRODUODENOSCOPY);  Surgeon: Halle Hughes MD;  Location: [facility];  Service: Endoscopy;  Laterality: N/A;    right hand surgery      right knee surgery Right     partial plate       Review of patient's allergies indicates:   Allergen Reactions    Corticosteroids (glucocorticoids) Edema       No current facility-administered medications on file prior to encounter.     Current Outpatient Medications on File Prior to Encounter   Medication Sig    acetaminophen (TYLENOL) 325 MG tablet Take 2 tablets (650 mg total) by mouth every 6 (six) hours as needed for Pain or Temperature greater than (100.5).    amLODIPine (NORVASC) 2.5 MG tablet Take 1 tablet (2.5 mg total) by mouth once daily.    aspirin 81 MG Chew Take 81 mg by mouth once daily.    blood pressure test kit-large Kit Use to check blood pressure daily and as needed.    carvedilol (COREG) 25 MG tablet Take 25 mg by mouth 2 (two) times daily with meals.    cetirizine (ZYRTEC) 10 MG tablet Take 1 tablet (10 mg total) by mouth every evening.    insulin detemir U-100 (LEVEMIR FLEXTOUCH) 100 unit/mL (3 mL) SubQ InPn pen Inject 10 Units into the skin every evening.    levothyroxine (SYNTHROID) 75 MCG tablet Take 1 tablet (75 mcg total) by mouth before breakfast.    pantoprazole (PROTONIX) 40 MG tablet Take 1 tablet (40 mg total) by mouth once daily.    rosuvastatin (CRESTOR) 20 MG tablet Take 1 tablet (20 mg total) by mouth once daily.    senna-docusate 8.6-50 mg (PERICOLACE) 8.6-50 mg per tablet Take 1 tablet by mouth 2 (two) times daily.     Family History       Problem Relation (Age of Onset)    Diabetes Maternal Aunt    Heart disease  Sister, Maternal Aunt    Hypertension Maternal Aunt          Tobacco Use    Smoking status: Never    Smokeless tobacco: Never   Substance and Sexual Activity    Alcohol use: No    Drug use: No    Sexual activity: Not Currently     Partners: Male     Review of Systems   Constitutional:  Positive for chills and fatigue. Negative for diaphoresis and fever.   Respiratory:  Negative for cough, choking and shortness of breath.    Cardiovascular:  Negative for chest pain, palpitations and leg swelling.   Endocrine: Negative for polydipsia, polyphagia and polyuria.   Genitourinary:  Negative for decreased urine volume, dysuria, flank pain and hematuria.   Musculoskeletal:  Negative for arthralgias, back pain and joint swelling.     Objective:     Vital Signs (Most Recent):  Temp: 98.3 °F (36.8 °C) (04/29/25 1301)  Pulse: 106 (04/29/25 1301)  BP: (!) 183/74 (04/29/25 1301)  SpO2: 100 % (04/29/25 1301) Vital Signs (24h Range):  Temp:  [98.3 °F (36.8 °C)] 98.3 °F (36.8 °C)  Pulse:  [106] 106  SpO2:  [100 %] 100 %  BP: (183)/(74) 183/74        There is no height or weight on file to calculate BMI.     Physical Exam  Constitutional:       General: She is not in acute distress.     Appearance: She is not ill-appearing, toxic-appearing or diaphoretic.   Cardiovascular:      Rate and Rhythm: Normal rate and regular rhythm.      Pulses: Normal pulses.      Heart sounds: Normal heart sounds.   Pulmonary:      Effort: Pulmonary effort is normal. No respiratory distress.      Breath sounds: Normal breath sounds. Stridor present.   Abdominal:      General: Bowel sounds are normal. There is no distension.      Palpations: Abdomen is soft. There is no mass.   Skin:     Comments: LT Inguinal desquamated ulcer 8cm x 5cm with exudative base,and sloughing edges   Neurological:      General: No focal deficit present.      Mental Status: She is oriented to person, place, and time.                Significant Labs: CBC:   Recent Labs   Lab  "04/29/25  1353   WBC 29.11*   HGB 9.3*   HCT 30.0*   *     CMP:   Recent Labs   Lab 04/29/25  1353   *   K 4.1      CO2 11*   GLU 62*   BUN 79*   CREATININE 5.4*   CALCIUM 7.8*   PROT 7.7   ALBUMIN 2.1*   BILITOT 0.3   ALKPHOS 106   AST 9*   ALT <5*   ANIONGAP 19*       Significant Imaging: I have reviewed all pertinent imaging results/findings within the past 24 hours.  Assessment/Plan:     Assessment & Plan  LT Inguinal ulcer with Cellulitis  Hx of LT inguinal carbuncle progressively enlarging with ulceration  Obtain wound and blood cultures  Obtain noncontrast CT abdomen and left upper thigh to evaluate for occult abscesses and deep extensions  Given immunosuppression (CKD and type 2 DM); start empiric renally dosed vancomycin and cefepime  Deescalate antibiotics as appropriate    Sepsis with acute organ dysfunction without septic shock  This patient does have evidence of infective focus  My overall impression is sepsis.  Source: Skin and Soft Tissue (location LT inguinal cellulitis)  Antibiotics given-   Antibiotics (72h ago, onward)      Start     Stop Route Frequency Ordered    04/29/25 1909  vancomycin - pharmacy to dose  (vancomycin IVPB (PEDS and ADULTS))        Placed in "And" Linked Group    -- IV pharmacy to manage frequency 04/29/25 1815    04/29/25 1830  ceFEPIme injection 1 g         -- IV Every 12 hours (non-standard times) 04/29/25 1816          Latest lactate reviewed-  Recent Labs   Lab 04/29/25  1528   LACTATE 0.9     Organ dysfunction indicated by Acute kidney injury    Fluid challenge Fluid Not Needed - Patient is not hypotensive and/or lactate is less than 4.0.     Post- resuscitation assessment Yes - I attest a sepsis perfusion exam was performed within 6 hours of sepsis, severe sepsis, or septic shock presentation, following fluid resuscitation.      Will Not start Pressors- Levophed for MAP of 65  Source control achieved by: IV antibiotics  Acute kidney injury " superimposed on stage 4 chronic kidney disease  EMMA is likely due to acute tubular necrosis caused by sepsis . Baseline creatinine is  2.3 to 2.5 . Most recent creatinine and eGFR are listed below.  Recent Labs     04/29/25  1353   CREATININE 5.4*   EGFRNORACEVR 8*      Plan  - EMMA is worsening  - Avoid nephrotoxins and renally dose meds for GFR listed above  - Monitor urine output, serial BMP, and adjust therapy as needed  - obtain urine sodium, urine creatinine and retroperitoneal ultrasound  -obtain Nephrology consult  Hyperlipidemia  Continue statins    Diabetes mellitus  Patient's FSGs are controlled on current medication regimen.  Last A1c reviewed-   Lab Results   Component Value Date    HGBA1C 7.5 (H) 06/13/2020     Most recent fingerstick glucose reviewed-   Recent Labs   Lab 04/29/25  1325 04/29/25  1411   POCTGLUCOSE 96 63*     Current correctional scale  Low  Maintain anti-hyperglycemic dose as follows-   Antihyperglycemics (From admission, onward)      Start     Stop Route Frequency Ordered    04/29/25 1921  insulin aspart U-100 pen 0-5 Units         -- SubQ Before meals & nightly PRN 04/29/25 1821          Hold Oral hypoglycemics while patient is in the hospital.  Type 2 myocardial infarction  Type 2 secondary to sepsis and EMMA  Noted elevated troponin with underlying history of CAD on presentation  EKG showed no acute ischemic change  Continue to trend troponin    Hypertension  Patient currently borderline hypotensive; home medications held  Restart as clinically appropriate  PRNs ordered  CAD (coronary artery disease)  Patient with known CAD s/p which is controlled Will continue ASA and Statin and monitor for S/Sx of angina/ACS. Continue to monitor on telemetry.   Hypothyroidism  Continue home levothyroxine    Anemia  Anemia is likely due to chronic disease due to Chronic Kidney Disease. Most recent hemoglobin and hematocrit are listed below.  Recent Labs     04/29/25  1353   HGB 9.3*   HCT 30.0*      Plan  - Monitor serial CBC: Daily  - Transfuse PRBC if patient becomes hemodynamically unstable, symptomatic or H/H drops below 7/21.  - Patient has not received any PRBC transfusions to date  - Patient's anemia is currently stable  - Obtain iron studies and ferritin  VTE Risk Mitigation (From admission, onward)           Ordered     heparin (porcine) injection 5,000 Units  Every 8 hours         04/29/25 1815     IP VTE HIGH RISK PATIENT  Once         04/29/25 1815     Place sequential compression device  Until discontinued         04/29/25 1815                                    Laron Klein MD  Department of Hospital Medicine  [facility] [place] - Emergency Dept

## 2025-04-30 NOTE — ASSESSMENT & PLAN NOTE
Patient currently borderline hypotensive; home medications held  Restart as clinically appropriate  PRNs ordered

## 2025-04-30 NOTE — SUBJECTIVE & OBJECTIVE
Past Medical History:   Diagnosis Date    Arthritis     Gout    CHF (congestive heart failure)     Coronary artery disease     Diabetes mellitus     HLD (hyperlipidemia)     Hypertension     Obese     Stroke     1986    Thyroid disease        Past Surgical History:   Procedure Laterality Date    ESOPHAGOGASTRODUODENOSCOPY N/A 7/23/2020    Procedure: EGD (ESOPHAGOGASTRODUODENOSCOPY);  Surgeon: Halle Hughes MD;  Location: [facility];  Service: Endoscopy;  Laterality: N/A;    right hand surgery      right knee surgery Right     partial plate       Review of patient's allergies indicates:   Allergen Reactions    Corticosteroids (glucocorticoids) Edema     Current Facility-Administered Medications   Medication Frequency    acetaminophen tablet 650 mg Q4H PRN    aspirin chewable tablet 81 mg Daily    atorvastatin tablet 40 mg QHS    ceFEPIme injection 1 g Q24H    dextrose 50% injection 12.5 g PRN    dextrose 50% injection 25 g PRN    glucagon (human recombinant) injection 1 mg PRN    glucose chewable tablet 16 g PRN    glucose chewable tablet 24 g Once    heparin (porcine) injection 5,000 Units Q8H    hydrALAZINE injection 10 mg Q4H PRN    levothyroxine tablet 75 mcg Before breakfast    magnesium oxide tablet 800 mg Q4H    melatonin tablet 6 mg Nightly PRN    ondansetron injection 4 mg Q8H PRN    oxyCODONE immediate release tablet 5 mg Q6H PRN    sodium bicarbonate 150 mEq in sterile water 1,000 mL infusion Continuous    sodium bicarbonate tablet 1,300 mg BID    sodium chloride 0.9% flush 10 mL Q12H PRN    vancomycin - pharmacy to dose pharmacy to manage frequency     Family History       Problem Relation (Age of Onset)    Diabetes Maternal Aunt    Heart disease Sister, Maternal Aunt    Hypertension Maternal Aunt          Tobacco Use    Smoking status: Never    Smokeless tobacco: Never   Substance and Sexual Activity    Alcohol use: No    Drug use: No    Sexual activity: Not Currently     Partners: Male     Review of  Systems   Constitutional:  Positive for activity change. Negative for chills and fever.   HENT:  Negative for rhinorrhea and sore throat.    Eyes:  Negative for pain and visual disturbance.   Respiratory:  Negative for cough and shortness of breath.    Cardiovascular:  Negative for chest pain and palpitations.   Gastrointestinal:  Negative for abdominal pain, constipation, diarrhea and nausea.   Endocrine: Negative for cold intolerance, heat intolerance and polyuria.   Genitourinary:  Negative for dysuria and flank pain.   Musculoskeletal:  Negative for back pain, gait problem and joint swelling.        Left upper thigh pain/ulcer   Allergic/Immunologic: Negative for immunocompromised state.   Neurological:  Negative for light-headedness, numbness and headaches.     Objective:     Vital Signs (Most Recent):  Temp: 98.3 °F (36.8 °C) (04/30/25 1116)  Pulse: 95 (04/30/25 1116)  Resp: 19 (04/30/25 1116)  BP: (!) 168/68 (04/30/25 1116)  SpO2: 98 % (04/30/25 1116) Vital Signs (24h Range):  Temp:  [98.1 °F (36.7 °C)-98.9 °F (37.2 °C)] 98.3 °F (36.8 °C)  Pulse:  [] 95  Resp:  [18-20] 19  SpO2:  [98 %-100 %] 98 %  BP: ()/(54-87) 168/68     Weight: 84.7 kg (186 lb 12.8 oz) (04/29/25 1946)  Body mass index is 34.17 kg/m².  Body surface area is 1.92 meters squared.    I/O last 3 completed shifts:  In: -   Out: 300 [Urine:300]     Physical Exam  Constitutional:       General: She is not in acute distress.     Appearance: She is well-developed. She is obese. She is not ill-appearing or toxic-appearing.   HENT:      Head: Normocephalic and atraumatic.      Nose: Nose normal. No congestion.   Eyes:      Pupils: Pupils are equal, round, and reactive to light.   Cardiovascular:      Rate and Rhythm: Normal rate.      Heart sounds: Normal heart sounds.   Pulmonary:      Effort: Pulmonary effort is normal.   Abdominal:      General: Bowel sounds are normal.      Palpations: Abdomen is soft.      Tenderness: There is no  abdominal tenderness.   Musculoskeletal:         General: Tenderness (left upper thigh) present. Normal range of motion.      Cervical back: Normal range of motion and neck supple.   Skin:     General: Skin is warm and dry.      Capillary Refill: Capillary refill takes less than 2 seconds.   Neurological:      Mental Status: She is alert and oriented to person, place, and time.          Significant Labs:  All labs within the past 24 hours have been reviewed.    Significant Imaging:  Labs: Reviewed

## 2025-04-30 NOTE — ASSESSMENT & PLAN NOTE
Patient with known CAD s/p which is controlled Will continue ASA and Statin and monitor for S/Sx of angina/ACS. Continue to monitor on telemetry.

## 2025-04-30 NOTE — SUBJECTIVE & OBJECTIVE
Interval History: states blood sugar runs very low 30's , states wound been there 4-5 days    Review of Systems   Constitutional:  Positive for chills and fatigue. Negative for diaphoresis and fever.   Respiratory:  Negative for cough, choking and shortness of breath.    Cardiovascular:  Negative for chest pain, palpitations and leg swelling.   Endocrine: Negative for polydipsia, polyphagia and polyuria.   Genitourinary:  Negative for decreased urine volume, dysuria, flank pain and hematuria.   Musculoskeletal:  Negative for arthralgias, back pain and joint swelling.     Objective:     Vital Signs (Most Recent):  Temp: 98.1 °F (36.7 °C) (04/30/25 0727)  Pulse: 98 (04/30/25 0727)  Resp: 18 (04/30/25 0727)  BP: (!) 135/54 (04/30/25 0727)  SpO2: 100 % (04/30/25 0727) Vital Signs (24h Range):  Temp:  [98.1 °F (36.7 °C)-98.9 °F (37.2 °C)] 98.1 °F (36.7 °C)  Pulse:  [] 98  Resp:  [18-20] 18  SpO2:  [98 %-100 %] 100 %  BP: ()/(54-87) 135/54     Weight: 84.7 kg (186 lb 12.8 oz)  Body mass index is 34.17 kg/m².    Intake/Output Summary (Last 24 hours) at 4/30/2025 0841  Last data filed at 4/30/2025 0613  Gross per 24 hour   Intake --   Output 300 ml   Net -300 ml         Physical Exam  Constitutional:       General: She is not in acute distress.     Appearance: She is not ill-appearing, toxic-appearing or diaphoretic.   Cardiovascular:      Rate and Rhythm: Normal rate and regular rhythm.      Pulses: Normal pulses.      Heart sounds: Normal heart sounds.   Pulmonary:      Effort: Pulmonary effort is normal. No respiratory distress.      Breath sounds: Normal breath sounds. No stridor.      Comments: Diminished . Difficult to assess.   Abdominal:      General: Bowel sounds are normal. There is no distension.      Palpations: Abdomen is soft. There is no mass.   Musculoskeletal:         General: Deformity present.      Right lower leg: Edema (does not move right leg) present.      Left lower leg: Edema present.    Skin:     Comments: LT Inguinal desquamated ulcer 8cm x 5cm with exudative base,and sloughing edges   Neurological:      General: No focal deficit present.      Mental Status: She is oriented to person, place, and time.      Comments: Bedbound x 3 years               Significant Labs: All pertinent labs within the past 24 hours have been reviewed.    Significant Imaging: I have reviewed all pertinent imaging results/findings within the past 24 hours.

## 2025-04-30 NOTE — CONSULTS
[facility] - [place], [place] - Telemetry  Wound Care  WOC PATITO    Patient Name:  Tasneem Lynn   MRN:  [id number]  Date: 4/30/2025  Diagnosis: Inguinal ulcer    History:     Past Medical History:   Diagnosis Date    Arthritis     Gout    CHF (congestive heart failure)     Coronary artery disease     Diabetes mellitus     HLD (hyperlipidemia)     Hypertension     Obese     Stroke     1986    Thyroid disease        Social History[1]    Precautions:     Allergies as of 04/29/2025 - Reviewed 04/29/2025   Allergen Reaction Noted    Corticosteroids (glucocorticoids) Edema 07/05/2012       WO Assessment Details/Treatment     Active Problem List with Overview Notes    Diagnosis Date Noted    Left upper thigh wound/Cellulitis 04/29/2025    Sepsis with acute organ dysfunction without septic shock 04/29/2025    CKD (chronic kidney disease) stage 5, GFR less than 15 ml/min 04/29/2025    Hypertension 04/29/2025    Diabetes mellitus 04/29/2025    CAD (coronary artery disease) 04/29/2025    Type 2 myocardial infarction 04/29/2025    Anemia 04/29/2025    Anemia of chronic disorder 07/24/2020    Urinary tract infection due to extended-spectrum beta lactamase (ESBL)-producing Klebsiella 07/21/2020    Chronic osteomyelitis of right foot with draining sinus 06/15/2020    Atherosclerosis of native arteries of right leg with ulceration of heel and midfoot 06/15/2020    Wound of right leg 06/13/2020    Diabetic ulcer of right heel associated with diabetes mellitus due to underlying condition, limited to breakdown of skin 06/12/2020    Aortic atherosclerosis 05/03/2019    Chronic allergic rhinitis 12/19/2018    Vitamin D deficiency 12/19/2018    Osteoarthritis involving multiple joints on both sides of body 06/25/2018    CKD (chronic kidney disease) stage 4, GFR 15-29 ml/min 05/31/2018    Hypothyroidism 11/17/2016    Morbid obesity with BMI of 50.0-59.9, adult 11/17/2016    History of CVA (cerebrovascular accident) 11/17/2016    Chronic gout 11/17/2016     Hyperlipidemia 11/17/2016    Type 2 diabetes mellitus, uncontrolled, with renal complications 05/07/2013    Essential hypertension 05/07/2013     Nursing consult for cellulitis left groin  A 79 year old female admitted 4/29/25 from home with complaint of left leg abscess that family is concerned is infected  4/30 WBC 28.44 Hgb 8.5 Hct 27.7 Alb 1.8 Weight 84.7 kg   6/13/20 A1C 7.5  On Isoflex mattress; Mo score 12  4/29 2201 4 Eyes Skin Assessment- New suspected pressure injury - No LDA for pressure injury documented  Photodocumentation (from Media 4/29)    Left groin wound  Plan:  Wound care orders written for dressing change with Vashe every shift per nursing.   Reconsult WOC nurse as needed.    04/30/2025       [1]   Social History  Socioeconomic History    Marital status:    Tobacco Use    Smoking status: Never    Smokeless tobacco: Never   Substance and Sexual Activity    Alcohol use: No    Drug use: No    Sexual activity: Not Currently     Partners: Male     Social Drivers of Health     Financial Resource Strain: Low Risk  (4/29/2025)    Overall Financial Resource Strain (CARDIA)     Difficulty of Paying Living Expenses: Not hard at all   Food Insecurity: No Food Insecurity (4/29/2025)    Hunger Vital Sign     Worried About Running Out of Food in the Last Year: Never true     Ran Out of Food in the Last Year: Never true   Transportation Needs: No Transportation Needs (4/29/2025)    PRAPARE - Transportation     Lack of Transportation (Medical): No     Lack of Transportation (Non-Medical): No   Stress: No Stress Concern Present (4/29/2025)    [employer] [place] [employer] - Occupational Stress Questionnaire     Feeling of Stress : Not at all   Housing Stability: Low Risk  (4/29/2025)    Housing Stability Vital Sign     Unable to Pay for Housing in the Last Year: No     Homeless in the Last Year: No

## 2025-04-30 NOTE — ED NOTES
Pt is hypotensive, previous nurse informed MD. Continue monitoring, Continue IV fluid as ordered. On going OMAR at bedside.

## 2025-04-30 NOTE — ASSESSMENT & PLAN NOTE
EMMA is likely due to acute tubular necrosis caused by sepsis. Baseline creatinine is 2.3 to 2.5. Most recent creatinine and eGFR are listed below.  Recent Labs     04/29/25  1353   CREATININE 5.4*   EGFRNORACEVR 8*      Plan  - EMMA is worsening  - Avoid nephrotoxins and renally dose meds for GFR listed above  - Monitor urine output, serial BMP, and adjust therapy as needed  - obtain urine sodium, urine creatinine and retroperitoneal ultrasound  -obtain Nephrology consult

## 2025-04-30 NOTE — ASSESSMENT & PLAN NOTE
Type 2 secondary to sepsis and EMMA  Noted elevated troponin with underlying history of CAD on presentation  EKG showed no acute ischemic change  Continue to trend troponin

## 2025-04-30 NOTE — PROGRESS NOTES
"Pharmacokinetic Initial Assessment: IV Vancomycin    Assessment/Plan:    Initiate intravenous vancomycin with loading dose of 1250 mg once with subsequent doses when random concentrations are less than 20 mcg/mL  Desired empiric serum trough concentration is 10 to 20 mcg/mL  Draw vancomycin random level on 4/30/25 at 0400.  Pharmacy will continue to follow and monitor vancomycin.      Please contact pharmacy at extension [phone] with any questions regarding this assessment.     Thank you for the consult,   Keny Matute       Patient brief summary:  Tasneem Lynn is a 79 y.o. female initiated on antimicrobial therapy with IV Vancomycin for treatment of suspected skin & soft tissue infection    Drug Allergies:   Review of patient's allergies indicates:   Allergen Reactions    Corticosteroids (glucocorticoids) Edema       Actual Body Weight:   84.7 kg    Renal Function:   Estimated Creatinine Clearance: 8.5 mL/min (A) (based on SCr of 5.4 mg/dL (H)).,     Dialysis Method (if applicable):  N/A    CBC (last 72 hours):  Recent Labs   Lab Result Units 04/29/25  1353   WBC K/uL 29.11*   HGB gm/dL 9.3*   HCT % 30.0*   Platelet Count K/uL 552*   Lymph % % 6.6*   Mono % % 4.8   Eos % % 0.2   Basophil % % 0.2       Metabolic Panel (last 72 hours):  Recent Labs   Lab Result Units 04/29/25  1353   Sodium mmol/L 135*   Potassium mmol/L 4.1   Chloride mmol/L 105   CO2 mmol/L 11*   Glucose mg/dL 62*   BUN mg/dL 79*   Creatinine mg/dL 5.4*   Albumin g/dL 2.1*   Bilirubin Total mg/dL 0.3   ALP unit/L 106   AST unit/L 9*   ALT unit/L <5*       Drug levels (last 3 results):  No results for input(s): "VANCOMYCINRA", "VANCORANDOM", "VANCOMYCINPE", "VANCOPEAK", "VANCOMYCINTR", "VANCOTROUGH" in the last 72 hours.    Microbiologic Results:  Microbiology Results (last 7 days)       Procedure Component Value Units Date/Time    Blood culture #1 [[id number]] Collected: 04/29/25 1528    Order Status: Resulted Specimen: Blood Updated: 04/29/25 " 1547    Blood culture #2 [[id number]] Collected: 04/29/25 1528    Order Status: Resulted Specimen: Blood Updated: 04/29/25 1547

## 2025-04-30 NOTE — PLAN OF CARE
Problem: Adult Inpatient Plan of Care  Goal: Plan of Care Review  Outcome: Progressing  Goal: Patient-Specific Goal (Individualized)  Outcome: Progressing  Goal: Absence of Hospital-Acquired Illness or Injury  Outcome: Progressing  Goal: Optimal Comfort and Wellbeing  Outcome: Progressing  Goal: Readiness for Transition of Care  Outcome: Progressing     Problem: Infection  Goal: Absence of Infection Signs and Symptoms  Outcome: Progressing     Problem: Diabetes Comorbidity  Goal: Blood Glucose Level Within Targeted Range  Outcome: Progressing     Problem: Sepsis/Septic Shock  Goal: Optimal Coping  Outcome: Progressing  Goal: Absence of Bleeding  Outcome: Progressing  Goal: Blood Glucose Level Within Targeted Range  Outcome: Progressing  Goal: Absence of Infection Signs and Symptoms  Outcome: Progressing  Goal: Optimal Nutrition Intake  Outcome: Progressing     Problem: Acute Kidney Injury/Impairment  Goal: Fluid and Electrolyte Balance  Outcome: Progressing  Goal: Improved Oral Intake  Outcome: Progressing  Goal: Effective Renal Function  Outcome: Progressing     Problem: Wound  Goal: Optimal Coping  Outcome: Progressing  Goal: Optimal Functional Ability  Outcome: Progressing  Goal: Absence of Infection Signs and Symptoms  Outcome: Progressing  Goal: Improved Oral Intake  Outcome: Progressing  Goal: Optimal Pain Control and Function  Outcome: Progressing  Goal: Skin Health and Integrity  Outcome: Progressing  Goal: Optimal Wound Healing  Outcome: Progressing     Problem: Skin Injury Risk Increased  Goal: Skin Health and Integrity  Outcome: Progressing

## 2025-04-30 NOTE — PLAN OF CARE
Case Management Assessment     PCP: No one currently - pt agreed to referral to a new PCP    Pharmacy:   [id number]  [place] - [address]  [address]  [place] [address]  Phone: [phone] Fax: [phone]    Patient Arrived From: Home  Existing Help at Home: pt's daughterMerrill    Barriers to Discharge: None identified    Discharge Plan:    A. Home with family   B. Home    CM met with pt for dc planning assessment. Pt lives with her daughter. She requires assistance with all ADLs, which her daughter provides. Only DME is a wheelchair. Her daughter may provide transportation home, but she may need a wheelchair van or stretcher. CM will continue to follow.     04/30/25 0966   Discharge Assessment   Assessment Type Discharge Planning Assessment   Confirmed/corrected address, phone number and insurance Yes   Confirmed Demographics Correct on Facesheet   Source of Information patient   Communicated STEPHEN with patient/caregiver Date not available/Unable to determine   People in Home child(jose), adult   Do you expect to return to your current living situation? Yes   Do you have help at home or someone to help you manage your care at home? Yes   Who are your caregiver(s) and their phone number(s)? daughterMerrill [phone]   Current cognitive status: Alert/Oriented   Walking or Climbing Stairs Difficulty yes   Walking or Climbing Stairs ambulation difficulty, requires equipment   Mobility Management wc   Dressing/Bathing Difficulty yes   Dressing/Bathing bathing difficulty, assistance 1 person;dressing difficulty, assistance 1 person   Dressing/Bathing Management daughter assists   Equipment Currently Used at Home wheelchair   Readmission within 30 days? No   Patient currently being followed by outpatient case management? No   Do you currently have service(s) that help you manage your care at home? No   Do you take prescription medications? Yes   Do you have prescription coverage?  Yes   Coverage [employer]   Do you have any problems affording any of your prescribed medications? No   Is the patient taking medications as prescribed? yes   Who is going to help you get home at discharge? daughter or stretcher   How do you get to doctors appointments? family or friend will provide   Are you on dialysis? No   Do you take coumadin? No   Discharge Plan A Home with family   Discharge Plan B Home   DME Needed Upon Discharge  other (see comments)  (TBD)   Discharge Plan discussed with: Patient   Transition of Care Barriers None

## 2025-04-30 NOTE — ASSESSMENT & PLAN NOTE
Patient's FSGs are controlled on current medication regimen.  Last A1c reviewed-   Lab Results   Component Value Date    HGBA1C 7.5 (H) 06/13/2020     Most recent fingerstick glucose reviewed-   Recent Labs   Lab 04/29/25  1325 04/29/25  1411   POCTGLUCOSE 96 63*     Current correctional scale  Low  Maintain anti-hyperglycemic dose as follows-   Antihyperglycemics (From admission, onward)      Start     Stop Route Frequency Ordered    04/29/25 1921  insulin aspart U-100 pen 0-5 Units         -- SubQ Before meals & nightly PRN 04/29/25 1821          Hold Oral hypoglycemics while patient is in the hospital.

## 2025-04-30 NOTE — ASSESSMENT & PLAN NOTE
Anemia is likely due to chronic disease due to Chronic Kidney Disease. Most recent hemoglobin and hematocrit are listed below.  Recent Labs     04/29/25  1353 04/30/25  0430   HGB 9.3* 8.5*   HCT 30.0* 27.7*     Plan  - Monitor serial CBC: Daily  - Transfuse PRBC if patient becomes hemodynamically unstable, symptomatic or H/H drops below 7/21.  - Patient has not received any PRBC transfusions to date  - Patient's anemia is currently stable  - Obtain iron studies and ferritin

## 2025-05-01 PROBLEM — Z74.01 BEDBOUND: Status: ACTIVE | Noted: 2025-05-01

## 2025-05-01 PROBLEM — Z71.89 ADVANCE CARE PLANNING: Status: ACTIVE | Noted: 2025-05-01

## 2025-05-01 LAB
ABSOLUTE EOSINOPHIL (OHS): 0.24 K/UL
ABSOLUTE MONOCYTE (OHS): 1.29 K/UL (ref 0.3–1)
ABSOLUTE NEUTROPHIL COUNT (OHS): 18.09 K/UL (ref 1.8–7.7)
ALBUMIN SERPL BCP-MCNC: 1.7 G/DL (ref 3.5–5.2)
ALP SERPL-CCNC: 96 UNIT/L (ref 40–150)
ALT SERPL W/O P-5'-P-CCNC: <5 UNIT/L (ref 10–44)
ANION GAP (OHS): 14 MMOL/L (ref 8–16)
AST SERPL-CCNC: 12 UNIT/L (ref 11–45)
BASOPHILS # BLD AUTO: 0.03 K/UL
BASOPHILS NFR BLD AUTO: 0.1 %
BILIRUB SERPL-MCNC: 0.2 MG/DL (ref 0.1–1)
BUN SERPL-MCNC: 73 MG/DL (ref 8–23)
CALCIUM SERPL-MCNC: 6.9 MG/DL (ref 8.7–10.5)
CHLORIDE SERPL-SCNC: 106 MMOL/L (ref 95–110)
CO2 SERPL-SCNC: 17 MMOL/L (ref 23–29)
CREAT SERPL-MCNC: 4.8 MG/DL (ref 0.5–1.4)
ERYTHROCYTE [DISTWIDTH] IN BLOOD BY AUTOMATED COUNT: 15.3 % (ref 11.5–14.5)
GFR SERPLBLD CREATININE-BSD FMLA CKD-EPI: 9 ML/MIN/1.73/M2
GLUCOSE SERPL-MCNC: 63 MG/DL (ref 70–110)
HCT VFR BLD AUTO: 23.9 % (ref 37–48.5)
HGB BLD-MCNC: 7.5 GM/DL (ref 12–16)
IMM GRANULOCYTES # BLD AUTO: 0.22 K/UL (ref 0–0.04)
IMM GRANULOCYTES NFR BLD AUTO: 1 % (ref 0–0.5)
LYMPHOCYTES # BLD AUTO: 3.09 K/UL (ref 1–4.8)
MAGNESIUM SERPL-MCNC: 1.3 MG/DL (ref 1.6–2.6)
MCH RBC QN AUTO: 29.9 PG (ref 27–31)
MCHC RBC AUTO-ENTMCNC: 31.4 G/DL (ref 32–36)
MCV RBC AUTO: 95 FL (ref 82–98)
NUCLEATED RBC (/100WBC) (OHS): 0 /100 WBC
PHOSPHATE SERPL-MCNC: 4.7 MG/DL (ref 2.7–4.5)
PLATELET # BLD AUTO: 551 K/UL (ref 150–450)
PMV BLD AUTO: 9.1 FL (ref 9.2–12.9)
POCT GLUCOSE: 120 MG/DL (ref 70–110)
POCT GLUCOSE: 120 MG/DL (ref 70–110)
POCT GLUCOSE: 170 MG/DL (ref 70–110)
POCT GLUCOSE: 71 MG/DL (ref 70–110)
POTASSIUM SERPL-SCNC: 3.3 MMOL/L (ref 3.5–5.1)
PROT SERPL-MCNC: 6.1 GM/DL (ref 6–8.4)
PTH-INTACT SERPL-MCNC: 664.2 PG/ML (ref 9–77)
RBC # BLD AUTO: 2.51 M/UL (ref 4–5.4)
RELATIVE EOSINOPHIL (OHS): 1 %
RELATIVE LYMPHOCYTE (OHS): 13.5 % (ref 18–48)
RELATIVE MONOCYTE (OHS): 5.6 % (ref 4–15)
RELATIVE NEUTROPHIL (OHS): 78.8 % (ref 38–73)
SODIUM SERPL-SCNC: 137 MMOL/L (ref 136–145)
VANCOMYCIN SERPL-MCNC: 20.5 UG/ML (ref ?–80)
WBC # BLD AUTO: 22.96 K/UL (ref 3.9–12.7)

## 2025-05-01 PROCEDURE — 21400001 HC TELEMETRY ROOM: Mod: HCNC

## 2025-05-01 PROCEDURE — 80202 ASSAY OF VANCOMYCIN: CPT | Mod: HCNC | Performed by: HOSPITALIST

## 2025-05-01 PROCEDURE — 25000003 PHARM REV CODE 250: Mod: HCNC | Performed by: NURSE PRACTITIONER

## 2025-05-01 PROCEDURE — 83970 ASSAY OF PARATHORMONE: CPT | Mod: HCNC | Performed by: STUDENT IN AN ORGANIZED HEALTH CARE EDUCATION/TRAINING PROGRAM

## 2025-05-01 PROCEDURE — 63600175 PHARM REV CODE 636 W HCPCS: Mod: HCNC | Performed by: HOSPITALIST

## 2025-05-01 PROCEDURE — 99497 ADVNCD CARE PLAN 30 MIN: CPT | Mod: HCNC,25,, | Performed by: INTERNAL MEDICINE

## 2025-05-01 PROCEDURE — 63600175 PHARM REV CODE 636 W HCPCS: Mod: HCNC | Performed by: NURSE PRACTITIONER

## 2025-05-01 PROCEDURE — 25000003 PHARM REV CODE 250: Mod: HCNC

## 2025-05-01 PROCEDURE — 99223 1ST HOSP IP/OBS HIGH 75: CPT | Mod: HCNC,25,, | Performed by: INTERNAL MEDICINE

## 2025-05-01 PROCEDURE — 94761 N-INVAS EAR/PLS OXIMETRY MLT: CPT | Mod: HCNC

## 2025-05-01 PROCEDURE — 83735 ASSAY OF MAGNESIUM: CPT | Mod: HCNC

## 2025-05-01 PROCEDURE — 63600175 PHARM REV CODE 636 W HCPCS: Mod: HCNC

## 2025-05-01 PROCEDURE — 99291 CRITICAL CARE FIRST HOUR: CPT | Mod: HCNC,,, | Performed by: STUDENT IN AN ORGANIZED HEALTH CARE EDUCATION/TRAINING PROGRAM

## 2025-05-01 PROCEDURE — 80053 COMPREHEN METABOLIC PANEL: CPT | Mod: HCNC

## 2025-05-01 PROCEDURE — 84100 ASSAY OF PHOSPHORUS: CPT | Mod: HCNC

## 2025-05-01 PROCEDURE — 25000003 PHARM REV CODE 250: Mod: HCNC | Performed by: STUDENT IN AN ORGANIZED HEALTH CARE EDUCATION/TRAINING PROGRAM

## 2025-05-01 PROCEDURE — 85025 COMPLETE CBC W/AUTO DIFF WBC: CPT | Mod: HCNC

## 2025-05-01 PROCEDURE — 36415 COLL VENOUS BLD VENIPUNCTURE: CPT | Mod: HCNC

## 2025-05-01 RX ORDER — MAGNESIUM SULFATE 1 G/100ML
1 INJECTION INTRAVENOUS ONCE
Status: COMPLETED | OUTPATIENT
Start: 2025-05-01 | End: 2025-05-01

## 2025-05-01 RX ORDER — POTASSIUM CHLORIDE 20 MEQ/1
40 TABLET, EXTENDED RELEASE ORAL ONCE
Status: COMPLETED | OUTPATIENT
Start: 2025-05-01 | End: 2025-05-01

## 2025-05-01 RX ORDER — MAGNESIUM SULFATE HEPTAHYDRATE 40 MG/ML
2 INJECTION, SOLUTION INTRAVENOUS ONCE
Status: COMPLETED | OUTPATIENT
Start: 2025-05-01 | End: 2025-05-01

## 2025-05-01 RX ADMIN — HEPARIN SODIUM 5000 UNITS: 5000 INJECTION INTRAVENOUS; SUBCUTANEOUS at 05:05

## 2025-05-01 RX ADMIN — LEVOTHYROXINE SODIUM 75 MCG: 75 TABLET ORAL at 05:05

## 2025-05-01 RX ADMIN — SODIUM BICARBONATE 1300 MG: 325 TABLET ORAL at 09:05

## 2025-05-01 RX ADMIN — SEVELAMER CARBONATE 800 MG: 800 TABLET, FILM COATED ORAL at 11:05

## 2025-05-01 RX ADMIN — SEVELAMER CARBONATE 800 MG: 800 TABLET, FILM COATED ORAL at 04:05

## 2025-05-01 RX ADMIN — ATORVASTATIN CALCIUM 40 MG: 40 TABLET, FILM COATED ORAL at 09:05

## 2025-05-01 RX ADMIN — MAGNESIUM SULFATE HEPTAHYDRATE 2 G: 40 INJECTION, SOLUTION INTRAVENOUS at 09:05

## 2025-05-01 RX ADMIN — SODIUM BICARBONATE 1300 MG: 325 TABLET ORAL at 08:05

## 2025-05-01 RX ADMIN — SEVELAMER CARBONATE 800 MG: 800 TABLET, FILM COATED ORAL at 08:05

## 2025-05-01 RX ADMIN — SODIUM BICARBONATE 1300 MG: 325 TABLET ORAL at 02:05

## 2025-05-01 RX ADMIN — CEFEPIME 1 G: 1 INJECTION, POWDER, FOR SOLUTION INTRAMUSCULAR; INTRAVENOUS at 10:05

## 2025-05-01 RX ADMIN — ASPIRIN 81 MG CHEWABLE TABLET 81 MG: 81 TABLET CHEWABLE at 08:05

## 2025-05-01 RX ADMIN — MAGNESIUM SULFATE IN DEXTROSE 1 G: 10 INJECTION, SOLUTION INTRAVENOUS at 08:05

## 2025-05-01 RX ADMIN — HEPARIN SODIUM 5000 UNITS: 5000 INJECTION INTRAVENOUS; SUBCUTANEOUS at 02:05

## 2025-05-01 RX ADMIN — POTASSIUM CHLORIDE 40 MEQ: 1500 TABLET, EXTENDED RELEASE ORAL at 08:05

## 2025-05-01 RX ADMIN — HEPARIN SODIUM 5000 UNITS: 5000 INJECTION INTRAVENOUS; SUBCUTANEOUS at 09:05

## 2025-05-01 NOTE — PLAN OF CARE
Dwight lift sling under pt needs to return home with her. Pt will go home by stretcher. Possible discharge tomorrow.     05/01/25 1346   Medicare Message   Important Message from Medicare regarding Discharge Appeal Rights Given to patient/caregiver;Explained to patient/caregiver;Signed/date by patient/caregiver   Date IMM was signed 05/01/25   Time IMM was signed 1593

## 2025-05-01 NOTE — ASSESSMENT & PLAN NOTE
- Noted for several years; per chart review pt gradually lost mobility due to lower extremity pain, and now requires assistance from daughter for all ADLs

## 2025-05-01 NOTE — ASSESSMENT & PLAN NOTE
"This patient does have evidence of infective focus  My overall impression is sepsis.  Source: Skin and Soft Tissue (location LT inguinal cellulitis)  Antibiotics given-   Antibiotics (72h ago, onward)      Start     Stop Route Frequency Ordered    04/30/25 2000  ceFEPIme injection 1 g         -- IV Every 24 hours (non-standard times) 04/30/25 1823    04/29/25 1909  vancomycin - pharmacy to dose  (vancomycin IVPB (PEDS and ADULTS))        Placed in "And" Linked Group    -- IV pharmacy to manage frequency 04/29/25 1815          Latest lactate reviewed-  Recent Labs   Lab 04/29/25  1528   LACTATE 0.9     Organ dysfunction indicated by Acute kidney injury    Fluid challenge Fluid Not Needed - Patient is not hypotensive and/or lactate is less than 4.0.     Post- resuscitation assessment Yes - I attest a sepsis perfusion exam was performed within 6 hours of sepsis, severe sepsis, or septic shock presentation, following fluid resuscitation.      Will Not start Pressors- Levophed for MAP of 65  Source control achieved by: IV antibiotics  "

## 2025-05-01 NOTE — NURSING
Patient refusing to allow staff to turn her. Reinforced pressure injury prevention education. Patient verbalized understanding and continuing not to turn off her back. Informed charge RN of patient's ongoing position change refusals and her high risk for altered skin integrity.

## 2025-05-01 NOTE — PROGRESS NOTES
Pharmacokinetic Assessment Follow Up: IV Vancomycin    Vancomycin serum concentration assessment(s):    The trough level was drawn correctly and can be used to guide therapy at this time. The measurement is above the desired definitive target range of 10 to 20 mcg/mL.    Vancomycin Regimen Plan:  No Vancomycin dose today.    Re-dose when the random level is less than 20 mcg/mL, next level to be drawn at 0400 on 5/2/25    Drug levels (last 3 results):  Recent Labs   Lab Result Units 04/30/25  0430 05/01/25  0500   Vancomycin Random ug/ml 16.8 20.5       Pharmacy will continue to follow and monitor vancomycin.    Please contact pharmacy at extension [phone] for questions regarding this assessment.    Thank you for the consult,   Yennifer Clifford       Patient brief summary:  Tasneem Lynn is a 79 y.o. female initiated on antimicrobial therapy with IV Vancomycin for treatment of skin & soft tissue infection      Drug Allergies:   Review of patient's allergies indicates:   Allergen Reactions    Corticosteroids (glucocorticoids) Edema       Actual Body Weight:   84.7    Renal Function:   Estimated Creatinine Clearance: 9.6 mL/min (A) (based on SCr of 4.8 mg/dL (H)).,     Dialysis Method (if applicable):  N/A    CBC (last 72 hours):  Recent Labs   Lab Result Units 04/29/25  1353 04/30/25  0430 05/01/25  0500   WBC K/uL 29.11* 28.44* 22.96*   HGB gm/dL 9.3* 8.5* 7.5*   HCT % 30.0* 27.7* 23.9*   Platelet Count K/uL 552* 537* 551*   Lymph % % 6.6* 7.6* 13.5*   Mono % % 4.8 5.1 5.6   Eos % % 0.2 0.2 1.0   Basophil % % 0.2 0.2 0.1       Metabolic Panel (last 72 hours):  Recent Labs   Lab Result Units 04/29/25  1353 04/30/25  0430 05/01/25  0500   Sodium mmol/L 135* 136 137   Potassium mmol/L 4.1 3.7 3.3*   Chloride mmol/L 105 107 106   CO2 mmol/L 11* 12* 17*   Glucose mg/dL 62* 50* 63*   BUN mg/dL 79* 75* 73*   Creatinine mg/dL 5.4* 5.1* 4.8*   Albumin g/dL 2.1* 1.8* 1.7*   Bilirubin Total mg/dL 0.3 0.3 0.2   ALP unit/L 106 98 96    AST unit/L 9* 12 12   ALT unit/L <5* <5* <5*   Magnesium  mg/dL  --  1.3* 1.3*   Phosphorus Level mg/dL  --  5.9* 4.7*       Vancomycin Administrations:  vancomycin given in the last 96 hours                     vancomycin 750 mg in 0.9% NaCl 250 mL IVPB (admixture device) (mg) 750 mg New Bag 04/30/25 0858    vancomycin 1,250 mg in 0.9% NaCl 250 mL IVPB (admixture device) (mg) 1,250 mg New Bag 04/29/25 2047                    Microbiologic Results:  Microbiology Results (last 7 days)       Procedure Component Value Units Date/Time    Blood culture #1 [[id number]]  (Normal) Collected: 04/29/25 1528    Order Status: Completed Specimen: Blood Updated: 05/01/25 0411     Blood Culture No Growth After 36 Hours    Blood culture #2 [[id number]]  (Normal) Collected: 04/29/25 1528    Order Status: Completed Specimen: Blood Updated: 05/01/25 0411     Blood Culture No Growth After 36 Hours

## 2025-05-01 NOTE — PROGRESS NOTES
[facility]  Nephrology  Progress Note    Patient Name: Tasneem Lynn  MRN: [id number]  Admission Date: 4/29/2025  Hospital Length of Stay: 2 days  Attending Provider: Neel Trejo MD   Primary Care Physician: Unable, To Obtain  Principal Problem:Inguinal ulcer    Subjective:     HPI: 79 year old female with a history of T2DM, HTN, HF, hypothyroidism presents with left upper groin abscess for the last week.     She developed an upper leg abscess which prompted evaluation in the ED as management at home failed.    Apropos kidney function: she has never established with a kidney doctor and the last nephrologist evaluation inpatient was in 2020 with a creatinine of 2.3. On presentation today she had a creatinine of 5.4 with an eGFR of 8. Other labs on presentation with sodium of 135, K of 4.1, bicarb of 11, BUN of 79.     Interval History: long discussion with patient. We discussed the possibility of dialysis - while she does not have any acute needs for now, she stated that she does not want to be on dialysis even if it would prolong her life. See ACP in Assessment/Plan below    Review of patient's allergies indicates:   Allergen Reactions    Corticosteroids (glucocorticoids) Edema     Current Facility-Administered Medications   Medication Frequency    acetaminophen tablet 650 mg Q4H PRN    aspirin chewable tablet 81 mg Daily    atorvastatin tablet 40 mg QHS    ceFEPIme injection 1 g Q24H    dextrose 50% injection 12.5 g PRN    dextrose 50% injection 25 g PRN    glucagon (human recombinant) injection 1 mg PRN    glucose chewable tablet 16 g PRN    glucose chewable tablet 24 g Once    heparin (porcine) injection 5,000 Units Q8H    hydrALAZINE injection 10 mg Q4H PRN    levothyroxine tablet 75 mcg Before breakfast    melatonin tablet 6 mg Nightly PRN    ondansetron injection 4 mg Q8H PRN    oxyCODONE immediate release tablet 5 mg Q6H PRN    sevelamer carbonate tablet 800 mg TID WM    sodium bicarbonate tablet  1,300 mg TID    sodium chloride 0.9% flush 10 mL Q12H PRN    vancomycin - pharmacy to dose pharmacy to manage frequency       Objective:     Vital Signs (Most Recent):  Temp: 98.1 °F (36.7 °C) (05/01/25 1110)  Pulse: 83 (05/01/25 1110)  Resp: 19 (05/01/25 1110)  BP: 113/65 (05/01/25 1110)  SpO2: 98 % (05/01/25 1110) Vital Signs (24h Range):  Temp:  [98.1 °F (36.7 °C)-98.6 °F (37 °C)] 98.1 °F (36.7 °C)  Pulse:  [] 83  Resp:  [18-19] 19  SpO2:  [96 %-100 %] 98 %  BP: (113-170)/(58-77) 113/65     Weight: 84.7 kg (186 lb 12.8 oz) (04/29/25 1946)  Body mass index is 34.17 kg/m².  Body surface area is 1.92 meters squared.    I/O last 3 completed shifts:  In: 1080 [P.O.:1080]  Out: 1251 [Urine:1250; Stool:1]     Physical Exam  Constitutional:       General: She is not in acute distress.     Appearance: She is well-developed. She is obese. She is not ill-appearing or toxic-appearing.   HENT:      Head: Normocephalic and atraumatic.      Nose: Nose normal. No congestion.   Eyes:      Pupils: Pupils are equal, round, and reactive to light.   Cardiovascular:      Rate and Rhythm: Normal rate.      Heart sounds: Normal heart sounds.   Pulmonary:      Effort: Pulmonary effort is normal.   Abdominal:      General: Bowel sounds are normal.      Palpations: Abdomen is soft.      Tenderness: There is no abdominal tenderness.   Musculoskeletal:         General: Tenderness (left upper thigh) present. Normal range of motion.      Cervical back: Normal range of motion and neck supple.   Skin:     General: Skin is warm and dry.      Capillary Refill: Capillary refill takes less than 2 seconds.   Neurological:      Mental Status: She is alert and oriented to person, place, and time.          Significant Labs:  All labs within the past 24 hours have been reviewed.     Significant Imaging:  Labs: Reviewed  Assessment/Plan:     Renal/  CKD (chronic kidney disease) stage 5, GFR less than 15 ml/min  Previous sCr in 2020 2.3  Gap in  records and presented with a creatinine of 5.4, eGFR 8    Suspect this is her baseline kidney function as she has never seen a nephrologist nor appears to has had any evaluation by any healthcare provider since 2021.     RP US: 8.2 cm right and 7.5 cm left. Poor imaging though appears echogenic    Plan/Recommendation  Sodium bicarb from 1300 BID to TID.  Replace K, req 60 mEq BID for 2 doses and follow up  -Keep MAP > 65  -Keep hemoglobin > 7  -Strict ins and outs  -Avoid nephrotoxic agents if possible and renally dose medications  -Avoid drastic hemodynamic changes if possible      #Anemia  HGB   Date Value Ref Range Status   05/01/2025 7.5 (L) 12.0 - 16.0 gm/dL Final     Iron Level   Date Value Ref Range Status   04/29/2025 32 30 - 160 ug/dL Final     Transferrin   Date Value Ref Range Status   04/29/2025 62 (L) 200 - 375 mg/dL Final     Iron Binding Capacity Total   Date Value Ref Range Status   04/29/2025 92 (L) 250 - 450 ug/dL Final     Saturated Iron   Date Value Ref Range Status   07/20/2020 8 (L) 20 - 50 % Final     Ferritin   Date Value Ref Range Status   04/29/2025 693.3 (H) 20.0 - 300.0 ng/mL Final   Iron stores adequate  JOYA once BP better controlled    #Secondary hyperparathyroidism  Calcium   Date Value Ref Range Status   05/01/2025 6.9 (LL) 8.7 - 10.5 mg/dL Final     Phosphorus Level   Date Value Ref Range Status   05/01/2025 4.7 (H) 2.7 - 4.5 mg/dL Final     PTH Intact   Date Value Ref Range Status   05/01/2025 664.2 (H) 9.0 - 77.0 pg/mL Final   Continue to monitor PTH in outpatient setting.   Start sevelamer 800 TID with meals      Palliative Care  Advance care planning  Advance Care Planning  Discussed with patient today with regards to goal of care pertaining to her kidneys. Given the gap of care (gets care with [facility]), but with CKD 4 back in 2020 her creatinine at this time is likely her new baseline. She stated that she does not want to be on dialysis and I explained that although she has no  emergent needs she would need it sooner rather than later. To which she stated that she never wanted to be on HD and understands that when she develops the need for it and does not do dialysis then she will die.   We then discussed that just because we were not proceeding with HD/RRT we would still be her providers and maximize medical treatment options moving forward.    Discussed this along with Dr. Mayes of palliative care.            Critical care time spent on the evaluation and treatment of severe organ dysfunction, review of pertinent labs and imaging studies, discussions with consulting providers and discussions with patient/family: 30 minutes.      Thank you for your consult. I will follow-up with patient. Please contact us if you have any additional questions.    Clinton Aguilar MD  Nephrology  [facility] [place] [facility]

## 2025-05-01 NOTE — CONSULTS
"[facility] - Telemetry  Palliative Medicine  Consult Note    Patient Name: Tasneem Lynn  MRN: [id number]  Admission Date: 4/29/2025  Hospital Length of Stay: 2 days  Code Status: Full Code   Attending Provider: Neel Trejo MD  Consulting Provider: Abiola Mayes MD  Primary Care Physician: Unable, To Obtain  Principal Problem:Inguinal ulcer    Patient information was obtained from patient, relative(s), past medical records, ER records, and primary team.      Inpatient consult to Palliative Care  Consult performed by: Abiola Mayes MD  Consult ordered by: Alice Mooney NP  Reason for consult: Advance Care Planning        Assessment/Plan:     Advance Care Planning    - Consult for support and advance care planning in older pt with CKD currently in hospital for cellulitis/wound infection; chart reviewed in depth, and discussed pt with primary team. Pt has voiced to them she is unlikely to desire HD even if this is eventually needed.   - Visited with pt at bedside; she was alert and readily conversational. Introduced role of palliative medicine in pt's care, and learned more about her outside of hospital. She is  and has two daughters, though only her daughter Merrill (resides with pt) is involved in her care. Merrill is not currently working, and is available at all times to assist pt. 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 She spends all of her time in bed at home, and said this has been the case for several years.   - At this time during visit, pt's daughter Merrill called, so I spoke to her on speaker phone. She informed me phone number on file is incorrect, so I updated this in chart ([phone]). Basic medical update provided; she had a number of questions about pt's renal function and is upset that her doctors (sees [facility]) had her on "blood pressure medications that can hurt her kidneys." I explained " that blood pressure control is an important aspect of health care, and that without good blood pressure control pt can have worsening renal function from this as well. Additionally, she would be high risk for strokes or other serious health events. As many of her concerns were renal specific, I let her know that I would have Dr Aguilar reach out to her by phone, to which she was agreeable.    - Following this, spoke to pt about her care preferences if her renal function worsens. Explained that if not wanting to pursue HD - or she is not a candidate for this - alternative path would be comfort-focused/hospice care. While patient indicated to me that she does not wish to undergo HD at any point, she agreed to further discuss with her daughter, who will likely be visiting the hospital sometime today. Encouraged this, as she may become confused and unable to voice her preferences if she goes into renal failure.   - Let her know I will follow up with her throughout hospital stay  - Updated primary team via [employer], and Dr Aguilar in person    Addendum 11:00 am: Along with Dr Aguilar, again visited with pt; she confirmed that even if getting to the point of HD, she does not want to pursue this. If/when she reaches this point, she would prefer comfort-focused/hospice care. Let her know we will do our best to respect these wishes, though again encouraged her to further discuss with her daughter. Given that she seems to want to avoid burdensome interventions, recommended DNR/DNI status moving forward; she also agreed to discuss this with her daughter.     Cardiac/Vascular  CAD (coronary artery disease)  - Noted underlying disease    Hypertension  - Mgmt per primary team     Renal/  CKD (chronic kidney disease) stage 5, GFR less than 15 ml/min  - Nephrology consulted and following; explained to pt that if she gets to point of needing HD and does not want to pursue this (or is not a candidate for this due to her limited mobility),  "alternative path would be comfort-focused/hospice care   - Per Dr Aguilar, if hypothetical HD unit has a cindy lift and pt can sit up in chair by herself, she would be a candidate for HD in future. However, pt has told us that she is unlikely to want to pursue HD.     ID  Sepsis with acute organ dysfunction without septic shock  - Evaluation, mgmt per primary team; on abx. Suspected skin wound/cellulitis as source.     Orthopedic  * Left upper thigh wound/Cellulitis  - Wound care consulted and following; on abx per primary team     Other  Bedbound  - Noted for several years; per chart review pt gradually lost mobility due to lower extremity pain, and now requires assistance from daughter for all ADLs    Thank you for your consult. I will follow-up with patient. Please contact us if you have any additional questions.    RAJ Redd  Palliative Medicine Staff   [phone]    Total visit time: 86 minutes    > 50% of 70 min visit spent in chart review, face to face discussion of symptom assessment, coordination of care with other specialists, and discharge planning.    16 min ACP time spent: goals of care, emotional support, formulating and communicating prognosis, exploring burden/ benefit of various approaches of treatment.      Subjective:     HPI:   (From H&P) "79-year-old F with medical history significant for type 2 DM, HTN, CHF, hypothyroidism, and CKD 4 who presented to the ED with a chief complaint of LT upper  groin pain boil of one week duration     Patient was in her baseline state of health until which initially started as a small pea-sized lesion but progressively grew and ulcerated.  Patient started having pain and this prompted her to come to the ED. she endorsed some subjective chills but denied any fever, nausea, or vomiting.  No diarrhea or loss of appetite.  Patient had no history of bloodstream infection, surgical implants, valvular heart disease.  However, retrospective chart review " "indicated that patient had chronic osteomyelitis of the right foot and ESBL Klebsiella UTI 5 years ago.  Patient has a known CKD 4 but no routine follow up with Nephrology noted; review of home medication did not show any diuretics or other nephrotoxic medications.     On presentation to the ED, patient was hypertensive to 183/74, , oxygen saturation 100% on room air.Laboratory investigations were pertinent for WBC 29.1, HGB 9.3, , sodium 135, BUN 79, creatinine of 5.4, EGFR 8, procalcitonin 25.23, troponin 0.03, lactic acid 0.9.  Patient received NS 1 L in the ED and IV Zosyn.  Admission was requested for further management."    Palliative is consulted for support and advance care planning; see ACP section of plan.        Past Medical History:   Diagnosis Date    Arthritis     Gout    CHF (congestive heart failure)     Coronary artery disease     Diabetes mellitus     HLD (hyperlipidemia)     Hypertension     Obese     Stroke     1986    Thyroid disease        Past Surgical History:   Procedure Laterality Date    ESOPHAGOGASTRODUODENOSCOPY N/A 7/23/2020    Procedure: EGD (ESOPHAGOGASTRODUODENOSCOPY);  Surgeon: Halle Hughes MD;  Location: [facility];  Service: Endoscopy;  Laterality: N/A;    right hand surgery      right knee surgery Right     partial plate       Review of patient's allergies indicates:   Allergen Reactions    Corticosteroids (glucocorticoids) Edema       Medications:  Continuous Infusions:  Scheduled Meds:   aspirin  81 mg Oral Daily    atorvastatin  40 mg Oral QHS    ceFEPime IV (PEDS and ADULTS)  1 g Intravenous Q24H    glucose  24 g Oral Once    heparin (porcine)  5,000 Units Subcutaneous Q8H    levothyroxine  75 mcg Oral Before breakfast    magnesium sulfate 2 g IVPB  2 g Intravenous Once    sevelamer carbonate  800 mg Oral TID WM    sodium bicarbonate  1,300 mg Oral TID     PRN Meds:  Current Facility-Administered Medications:     acetaminophen, 650 mg, Oral, Q4H PRN    " dextrose 50%, 12.5 g, Intravenous, PRN    dextrose 50%, 25 g, Intravenous, PRN    glucagon (human recombinant), 1 mg, Intramuscular, PRN    glucose, 16 g, Oral, PRN    hydrALAZINE, 10 mg, Intravenous, Q4H PRN    melatonin, 6 mg, Oral, Nightly PRN    ondansetron, 4 mg, Intravenous, Q8H PRN    oxyCODONE, 5 mg, Oral, Q6H PRN    sodium chloride 0.9%, 10 mL, Intravenous, Q12H PRN    Pharmacy to dose Vancomycin consult, , , Once **AND** vancomycin - pharmacy to dose, , Intravenous, pharmacy to manage frequency    Family History       Problem Relation (Age of Onset)    Diabetes Maternal Aunt    Heart disease Sister, Maternal Aunt    Hypertension Maternal Aunt          Tobacco Use    Smoking status: Never    Smokeless tobacco: Never   Substance and Sexual Activity    Alcohol use: No    Drug use: No    Sexual activity: Not Currently     Partners: Male       Review of Systems   Respiratory:  Negative for shortness of breath.    Psychiatric/Behavioral:  Negative for confusion.      Objective:     Vital Signs (Most Recent):  Temp: 98.2 °F (36.8 °C) (05/01/25 0759)  Pulse: 80 (05/01/25 1033)  Resp: 18 (05/01/25 0759)  BP: (!) 160/74 (05/01/25 0759)  SpO2: 99 % (05/01/25 0759) Vital Signs (24h Range):  Temp:  [98.2 °F (36.8 °C)-98.6 °F (37 °C)] 98.2 °F (36.8 °C)  Pulse:  [] 80  Resp:  [18-19] 18  SpO2:  [96 %-100 %] 99 %  BP: (127-170)/(58-77) 160/74     Weight: 84.7 kg (186 lb 12.8 oz)  Body mass index is 34.17 kg/m².       Physical Exam  Constitutional:       Appearance: She is obese.      Comments: Chronically ill-appearing, lying in bed, readily conversational        Significant Labs: All pertinent labs within the past 24 hours have been reviewed.  CBC:   Recent Labs   Lab 05/01/25  0500   WBC 22.96*   HGB 7.5*   HCT 23.9*   MCV 95   *     BMP:  Recent Labs   Lab 05/01/25  0500   GLU 63*      K 3.3*      CO2 17*   BUN 73*   CREATININE 4.8*   CALCIUM 6.9*   MG 1.3*     LFT:  Lab Results   Component  Value Date    AST 12 05/01/2025    ALKPHOS 96 05/01/2025    BILITOT 0.2 05/01/2025     Albumin:   Albumin   Date Value Ref Range Status   05/01/2025 1.7 (L) 3.5 - 5.2 g/dL Final   07/24/2020 2.5 (L) 3.5 - 5.2 g/dL Final     Protein:   Protein Total   Date Value Ref Range Status   05/01/2025 6.1 6.0 - 8.4 gm/dL Final     Total Protein   Date Value Ref Range Status   07/24/2020 8.1 6.0 - 8.4 g/dL Final     Lactic acid:   Lab Results   Component Value Date    LACTATE 0.9 04/29/2025    LACTATE 0.9 07/20/2020       Significant Imaging: I have reviewed all pertinent imaging results/findings within the past 24 hours.

## 2025-05-01 NOTE — ASSESSMENT & PLAN NOTE
Anemia is likely due to chronic disease due to Chronic Kidney Disease. Most recent hemoglobin and hematocrit are listed below.  Recent Labs     04/29/25  1353 04/30/25  0430 05/01/25  0500   HGB 9.3* 8.5* 7.5*   HCT 30.0* 27.7* 23.9*     Plan  - Monitor serial CBC: Daily  - Transfuse PRBC if patient becomes hemodynamically unstable, symptomatic or H/H drops below 7/21.  - Patient has not received any PRBC transfusions to date  - Patient's anemia is currently stable  - Obtain iron studies and ferritin- chronic disease and malnutrition

## 2025-05-01 NOTE — PROGRESS NOTES
Pharmacokinetic Assessment Follow Up: IV Vancomycin    Vancomycin serum concentration assessment(s):    The trough level was drawn correctly and can be used to guide therapy at this time. The measurement is within the desired definitive target range of 10 to 20 mcg/mL.    Vancomycin Regimen Plan:  Dose 750 mg x 1 5/1/2025    Re-dose when the random level is less than 20 mcg/mL, next level to be drawn at 0400 on 5/2/25    Drug levels (last 3 results):  Recent Labs   Lab Result Units 04/30/25  0430   Vancomycin Random ug/ml 16.8       Pharmacy will continue to follow and monitor vancomycin.    Please contact pharmacy at extension [phone] for questions regarding this assessment.    Thank you for the consult,   Yennifer Horton       Patient brief summary:  Tasneem yLnn is a 79 y.o. female initiated on antimicrobial therapy with IV Vancomycin for treatment of skin & soft tissue infection    Drug Allergies:   Review of patient's allergies indicates:   Allergen Reactions    Corticosteroids (glucocorticoids) Edema       Actual Body Weight:   84.7    Renal Function:   Estimated Creatinine Clearance: 9 mL/min (A) (based on SCr of 5.1 mg/dL (H)).,     Dialysis Method (if applicable):  N/A    CBC (last 72 hours):  Recent Labs   Lab Result Units 04/29/25  1353 04/30/25  0430 05/01/25  0500   WBC K/uL 29.11* 28.44* 22.96*   HGB gm/dL 9.3* 8.5* 7.5*   HCT % 30.0* 27.7* 23.9*   Platelet Count K/uL 552* 537* 551*   Lymph % % 6.6* 7.6* 13.5*   Mono % % 4.8 5.1 5.6   Eos % % 0.2 0.2 1.0   Basophil % % 0.2 0.2 0.1       Metabolic Panel (last 72 hours):  Recent Labs   Lab Result Units 04/29/25  1353 04/30/25  0430   Sodium mmol/L 135* 136   Potassium mmol/L 4.1 3.7   Chloride mmol/L 105 107   CO2 mmol/L 11* 12*   Glucose mg/dL 62* 50*   BUN mg/dL 79* 75*   Creatinine mg/dL 5.4* 5.1*   Albumin g/dL 2.1* 1.8*   Bilirubin Total mg/dL 0.3 0.3   ALP unit/L 106 98   AST unit/L 9* 12   ALT unit/L <5* <5*   Magnesium  mg/dL  --  1.3*   Phosphorus  Level mg/dL  --  5.9*       Vancomycin Administrations:  vancomycin given in the last 96 hours                     vancomycin 750 mg in 0.9% NaCl 250 mL IVPB (admixture device) (mg) 750 mg New Bag 04/30/25 0858    vancomycin 1,250 mg in 0.9% NaCl 250 mL IVPB (admixture device) (mg) 1,250 mg New Bag 04/29/25 2047                    Microbiologic Results:  Microbiology Results (last 7 days)       Procedure Component Value Units Date/Time    Blood culture #1 [[id number]]  (Normal) Collected: 04/29/25 1528    Order Status: Completed Specimen: Blood Updated: 05/01/25 0411     Blood Culture No Growth After 36 Hours    Blood culture #2 [[id number]]  (Normal) Collected: 04/29/25 1528    Order Status: Completed Specimen: Blood Updated: 05/01/25 0411     Blood Culture No Growth After 36 Hours

## 2025-05-01 NOTE — HPI
"(From H&P) "79-year-old F with medical history significant for type 2 DM, HTN, CHF, hypothyroidism, and CKD 4 who presented to the ED with a chief complaint of LT upper  groin pain boil of one week duration     Patient was in her baseline state of health until which initially started as a small pea-sized lesion but progressively grew and ulcerated.  Patient started having pain and this prompted her to come to the ED. she endorsed some subjective chills but denied any fever, nausea, or vomiting.  No diarrhea or loss of appetite.  Patient had no history of bloodstream infection, surgical implants, valvular heart disease.  However, retrospective chart review indicated that patient had chronic osteomyelitis of the right foot and ESBL Klebsiella UTI 5 years ago.  Patient has a known CKD 4 but no routine follow up with Nephrology noted; review of home medication did not show any diuretics or other nephrotoxic medications.     On presentation to the ED, patient was hypertensive to 183/74, , oxygen saturation 100% on room air.Laboratory investigations were pertinent for WBC 29.1, HGB 9.3, , sodium 135, BUN 79, creatinine of 5.4, EGFR 8, procalcitonin 25.23, troponin 0.03, lactic acid 0.9.  Patient received NS 1 L in the ED and IV Zosyn.  Admission was requested for further management."    Palliative is consulted for support and advance care planning; see ACP section of plan.      "

## 2025-05-01 NOTE — ASSESSMENT & PLAN NOTE
- Nephrology consulted and following; explained to pt that if she gets to point of needing HD and does not want to pursue this (or is not a candidate for this due to her limited mobility), alternative path would be comfort-focused/hospice care

## 2025-05-01 NOTE — ASSESSMENT & PLAN NOTE
Advance Care Planning   Discussed with patient today with regards to goal of care pertaining to her kidneys. Given the gap of care (gets care with [facility]), but with CKD 4 back in 2020 her creatinine at this time is likely her new baseline. She stated that she does not want to be on dialysis and I explained that although she has no emergent needs she would need it sooner rather than later. To which she stated that she never wanted to be on HD and understands that when she develops the need for it and does not do dialysis then she will die.   We then discussed that just because we were not proceeding with HD/RRT we would still be her providers and maximize medical treatment options moving forward.    Discussed this along with Dr. Mayes of palliative care.

## 2025-05-01 NOTE — ASSESSMENT & PLAN NOTE
Patient's FSGs are controlled on current medication regimen.  Last A1c reviewed-   Lab Results   Component Value Date    HGBA1C 7.5 (H) 06/13/2020     Most recent fingerstick glucose reviewed-   Recent Labs   Lab 04/30/25  1556 04/30/25 2018 05/01/25  0800 05/01/25  1109   POCTGLUCOSE 99 170* 71 120*   Hypoglycemia- infection contributing  Obtain HgbA1c

## 2025-05-01 NOTE — PLAN OF CARE
Problem: Adult Inpatient Plan of Care  Goal: Plan of Care Review  Outcome: Progressing  Flowsheets (Taken 5/1/2025 0815)  Plan of Care Reviewed With: patient  Goal: Patient-Specific Goal (Individualized)  Outcome: Progressing     Problem: Diabetes Comorbidity  Goal: Blood Glucose Level Within Targeted Range  Outcome: Progressing  Intervention: Monitor and Manage Glycemia  Flowsheets (Taken 5/1/2025 0815)  Glycemic Management:   blood glucose monitored   carbohydrate replacement provided   oral glucose given     Problem: Adult Inpatient Plan of Care  Goal: Plan of Care Review  Outcome: Progressing  Flowsheets (Taken 5/1/2025 0815)  Plan of Care Reviewed With: patient     Problem: Adult Inpatient Plan of Care  Goal: Plan of Care Review  Outcome: Progressing  Flowsheets (Taken 5/1/2025 0815)  Plan of Care Reviewed With: patient     Problem: Adult Inpatient Plan of Care  Goal: Patient-Specific Goal (Individualized)  Outcome: Progressing     Problem: Adult Inpatient Plan of Care  Goal: Patient-Specific Goal (Individualized)  Outcome: Progressing     Problem: Diabetes Comorbidity  Goal: Blood Glucose Level Within Targeted Range  Outcome: Progressing  Intervention: Monitor and Manage Glycemia  Flowsheets (Taken 5/1/2025 0815)  Glycemic Management:   blood glucose monitored   carbohydrate replacement provided   oral glucose given     Problem: Diabetes Comorbidity  Goal: Blood Glucose Level Within Targeted Range  Outcome: Progressing     Problem: Diabetes Comorbidity  Goal: Blood Glucose Level Within Targeted Range  Intervention: Monitor and Manage Glycemia  Flowsheets (Taken 5/1/2025 0815)  Glycemic Management:   blood glucose monitored   carbohydrate replacement provided   oral glucose given     Problem: Diabetes Comorbidity  Goal: Blood Glucose Level Within Targeted Range  Intervention: Monitor and Manage Glycemia  Flowsheets (Taken 5/1/2025 0815)  Glycemic Management:   blood glucose monitored   carbohydrate  replacement provided   oral glucose given

## 2025-05-01 NOTE — ASSESSMENT & PLAN NOTE
"- Consult for support and advance care planning in older pt with CKD currently in hospital for cellulitis/wound infection; chart reviewed in depth, and discussed pt with primary team. Pt has voiced to them she is unlikely to desire HD even if this is eventually needed.   - Visited with pt at bedside; she was alert and readily conversational. Introduced role of palliative medicine in pt's care, and learned more about her outside of hospital. She is  and has two daughters, though only her daughter Merrill (resides with pt) is involved in her care. Merrill is not currently working, and is available at all times to assist pt. 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 She spends all of her time in bed at home, and said this has been the case for several years.   - At this time during visit, pt's daughter Merrill called, so I spoke to her on speaker phone. She informed me phone number on file is incorrect, so I updated this in chart ([phone]). Basic medical update provided; she had a number of questions about pt's renal function and is upset that her doctors (sees [facility]) had her on "blood pressure medications that can hurt her kidneys." I explained that blood pressure control is an important aspect of health care, and that without good blood pressure control pt can have worsening renal function from this as well. Additionally, she would be high risk for strokes or other serious health events. As many of her concerns were renal specific, I let her know that I would have Dr Aguilar reach out to her by phone, to which she was agreeable.    - Following this, spoke to pt about her care preferences if her renal function worsens. Explained that if not wanting to pursue HD - or she is not a candidate for this - alternative path would be comfort-focused/hospice care. While patient indicated to me that she does not wish to undergo HD at " any point, she agreed to further discuss with her daughter, who will likely be visiting the hospital sometime today. Encouraged this, as she may become confused and unable to voice her preferences if she goes into renal failure.   - Let her know I will follow up with her throughout hospital stay  - Updated primary team via [email or web address], and Dr Aguilar in person

## 2025-05-01 NOTE — SUBJECTIVE & OBJECTIVE
Interval History: sitting up in bed eating breakfast.     Review of Systems   Respiratory:  Negative for shortness of breath.    Psychiatric/Behavioral:  Negative for confusion.      Objective:     Vital Signs (Most Recent):  Temp: 98.1 °F (36.7 °C) (05/01/25 1110)  Pulse: 83 (05/01/25 1110)  Resp: 19 (05/01/25 1110)  BP: 113/65 (05/01/25 1110)  SpO2: 98 % (05/01/25 1110) Vital Signs (24h Range):  Temp:  [98.1 °F (36.7 °C)-98.6 °F (37 °C)] 98.1 °F (36.7 °C)  Pulse:  [] 83  Resp:  [18-19] 19  SpO2:  [96 %-100 %] 98 %  BP: (113-170)/(58-77) 113/65     Weight: 84.7 kg (186 lb 12.8 oz)  Body mass index is 34.17 kg/m².    Intake/Output Summary (Last 24 hours) at 5/1/2025 1319  Last data filed at 5/1/2025 0627  Gross per 24 hour   Intake 720 ml   Output 951 ml   Net -231 ml         Physical Exam  Constitutional:       General: She is not in acute distress.     Appearance: She is well-developed. She is obese. She is not ill-appearing, toxic-appearing or diaphoretic.   HENT:      Head: Normocephalic and atraumatic.      Nose: Nose normal. No congestion.   Eyes:      Pupils: Pupils are equal, round, and reactive to light.   Cardiovascular:      Rate and Rhythm: Normal rate and regular rhythm.      Pulses: Normal pulses.      Heart sounds: Normal heart sounds.   Pulmonary:      Effort: Pulmonary effort is normal. No respiratory distress.      Breath sounds: Normal breath sounds. No stridor.      Comments: Diminished . Difficult to assess.   Abdominal:      General: Bowel sounds are normal. There is no distension.      Palpations: Abdomen is soft. There is no mass.      Tenderness: There is no abdominal tenderness.   Musculoskeletal:         General: Tenderness (left upper thigh) and deformity present. Normal range of motion.      Cervical back: Normal range of motion and neck supple.      Right lower leg: Edema (does not move right leg) present.      Left lower leg: Edema present.   Skin:     General: Skin is warm and  dry.      Capillary Refill: Capillary refill takes less than 2 seconds.      Comments: LT Inguinal desquamated ulcer 8cm x 5cm with exudative base,and sloughing edges   Neurological:      General: No focal deficit present.      Mental Status: She is alert and oriented to person, place, and time.      Comments: Bedbound x 3 years               Significant Labs: All pertinent labs within the past 24 hours have been reviewed.    Significant Imaging: I have reviewed all pertinent imaging results/findings within the past 24 hours.

## 2025-05-01 NOTE — ASSESSMENT & PLAN NOTE
Advance Care Planning     Date: 05/01/2025    Code Status  In light of the patients advanced and life limiting illness,I engaged the the patient in a voluntary conversation about the patient's preferences for care  at the very end of life. The patient wishes to have a natural, peaceful death.  Along those lines, the patient does not wish to have CPR or other invasive treatments performed when her heart and/or breathing stops. Patient would like to discuss with Daughter Merrill. Patient also does not want dialysis if had to come to that point.   Palliative team care consult

## 2025-05-01 NOTE — ASSESSMENT & PLAN NOTE
Previous sCr in 2020 2.3  Gap in records and presented with a creatinine of 5.4, eGFR 8    Suspect this is her baseline kidney function as she has never seen a nephrologist nor appears to has had any evaluation by any healthcare provider since 2021.     RP US: 8.2 cm right and 7.5 cm left. Poor imaging though appears echogenic    Plan/Recommendation  Sodium bicarb from 1300 BID to TID.  Replace K, req 60 mEq BID for 2 doses and follow up  -Keep MAP > 65  -Keep hemoglobin > 7  -Strict ins and outs  -Avoid nephrotoxic agents if possible and renally dose medications  -Avoid drastic hemodynamic changes if possible      #Anemia  HGB   Date Value Ref Range Status   05/01/2025 7.5 (L) 12.0 - 16.0 gm/dL Final     Iron Level   Date Value Ref Range Status   04/29/2025 32 30 - 160 ug/dL Final     Transferrin   Date Value Ref Range Status   04/29/2025 62 (L) 200 - 375 mg/dL Final     Iron Binding Capacity Total   Date Value Ref Range Status   04/29/2025 92 (L) 250 - 450 ug/dL Final     Saturated Iron   Date Value Ref Range Status   07/20/2020 8 (L) 20 - 50 % Final     Ferritin   Date Value Ref Range Status   04/29/2025 693.3 (H) 20.0 - 300.0 ng/mL Final   Iron stores adequate  JOYA once BP better controlled    #Secondary hyperparathyroidism  Calcium   Date Value Ref Range Status   05/01/2025 6.9 (LL) 8.7 - 10.5 mg/dL Final     Phosphorus Level   Date Value Ref Range Status   05/01/2025 4.7 (H) 2.7 - 4.5 mg/dL Final     PTH Intact   Date Value Ref Range Status   05/01/2025 664.2 (H) 9.0 - 77.0 pg/mL Final   Continue to monitor PTH in outpatient setting.   Start sevelamer 800 TID with meals

## 2025-05-01 NOTE — NURSING
[facility] [place]  Nurses Note -- 4 Eyes      5/1/2025       Skin assessed on: Q Shift      [] No Pressure Injuries Present    []Prevention Measures Documented    [x] Yes LDA  for Pressure Injury Previously documented     [] Yes New Pressure Injury Discovered   [] LDA for New Pressure Injury Added      Attending RN:  Nathaly Bradley RN     Second RN:  Yani

## 2025-05-01 NOTE — NURSING
2130- patient refusing to turn at this time.     2350-Patient continuing to refuse to turn. Explained to patient over a course of 6-10 minutes that she is at a high risk for developing a pressure injury due to her hx of being bed bound and a previous but healed sacral ulcer. Patient verbalized understanding, continues to refuse to allow staff to to turn her. Patient allowed staff to perform perineal care, change purwick, and complete incontinence care. Preventive mepliex applied to patient's sacrum.    Patient refused to have bilateral heel mepliex placed. Patient refused to have wound care performed at this time.

## 2025-05-01 NOTE — PLAN OF CARE
Problem: Adult Inpatient Plan of Care  Goal: Plan of Care Review  Outcome: Progressing  Goal: Patient-Specific Goal (Individualized)  Outcome: Progressing  Goal: Absence of Hospital-Acquired Illness or Injury  Outcome: Progressing  Goal: Optimal Comfort and Wellbeing  Outcome: Progressing  Goal: Readiness for Transition of Care  Outcome: Progressing     Problem: Infection  Goal: Absence of Infection Signs and Symptoms  Outcome: Progressing     Problem: Diabetes Comorbidity  Goal: Blood Glucose Level Within Targeted Range  Outcome: Progressing     Problem: Sepsis/Septic Shock  Goal: Optimal Coping  Outcome: Progressing  Goal: Absence of Bleeding  Outcome: Progressing  Goal: Blood Glucose Level Within Targeted Range  Outcome: Progressing  Goal: Absence of Infection Signs and Symptoms  Outcome: Progressing  Goal: Optimal Nutrition Intake  Outcome: Progressing

## 2025-05-01 NOTE — SUBJECTIVE & OBJECTIVE
Past Medical History:   Diagnosis Date    Arthritis     Gout    CHF (congestive heart failure)     Coronary artery disease     Diabetes mellitus     HLD (hyperlipidemia)     Hypertension     Obese     Stroke     1986    Thyroid disease        Past Surgical History:   Procedure Laterality Date    ESOPHAGOGASTRODUODENOSCOPY N/A 7/23/2020    Procedure: EGD (ESOPHAGOGASTRODUODENOSCOPY);  Surgeon: Halle Hughes MD;  Location: [facility];  Service: Endoscopy;  Laterality: N/A;    right hand surgery      right knee surgery Right     partial plate       Review of patient's allergies indicates:   Allergen Reactions    Corticosteroids (glucocorticoids) Edema       Medications:  Continuous Infusions:  Scheduled Meds:   aspirin  81 mg Oral Daily    atorvastatin  40 mg Oral QHS    ceFEPime IV (PEDS and ADULTS)  1 g Intravenous Q24H    glucose  24 g Oral Once    heparin (porcine)  5,000 Units Subcutaneous Q8H    levothyroxine  75 mcg Oral Before breakfast    magnesium sulfate 2 g IVPB  2 g Intravenous Once    sevelamer carbonate  800 mg Oral TID WM    sodium bicarbonate  1,300 mg Oral TID     PRN Meds:  Current Facility-Administered Medications:     acetaminophen, 650 mg, Oral, Q4H PRN    dextrose 50%, 12.5 g, Intravenous, PRN    dextrose 50%, 25 g, Intravenous, PRN    glucagon (human recombinant), 1 mg, Intramuscular, PRN    glucose, 16 g, Oral, PRN    hydrALAZINE, 10 mg, Intravenous, Q4H PRN    melatonin, 6 mg, Oral, Nightly PRN    ondansetron, 4 mg, Intravenous, Q8H PRN    oxyCODONE, 5 mg, Oral, Q6H PRN    sodium chloride 0.9%, 10 mL, Intravenous, Q12H PRN    Pharmacy to dose Vancomycin consult, , , Once **AND** vancomycin - pharmacy to dose, , Intravenous, pharmacy to manage frequency    Family History       Problem Relation (Age of Onset)    Diabetes Maternal Aunt    Heart disease Sister, Maternal Aunt    Hypertension Maternal Aunt          Tobacco Use    Smoking status: Never    Smokeless tobacco: Never   Substance  and Sexual Activity    Alcohol use: No    Drug use: No    Sexual activity: Not Currently     Partners: Male       Review of Systems   Respiratory:  Negative for shortness of breath.    Psychiatric/Behavioral:  Negative for confusion.      Objective:     Vital Signs (Most Recent):  Temp: 98.2 °F (36.8 °C) (05/01/25 0759)  Pulse: 80 (05/01/25 1033)  Resp: 18 (05/01/25 0759)  BP: (!) 160/74 (05/01/25 0759)  SpO2: 99 % (05/01/25 0759) Vital Signs (24h Range):  Temp:  [98.2 °F (36.8 °C)-98.6 °F (37 °C)] 98.2 °F (36.8 °C)  Pulse:  [] 80  Resp:  [18-19] 18  SpO2:  [96 %-100 %] 99 %  BP: (127-170)/(58-77) 160/74     Weight: 84.7 kg (186 lb 12.8 oz)  Body mass index is 34.17 kg/m².       Physical Exam  Constitutional:       Appearance: She is obese.      Comments: Chronically ill-appearing, lying in bed, readily conversational        Significant Labs: All pertinent labs within the past 24 hours have been reviewed.  CBC:   Recent Labs   Lab 05/01/25  0500   WBC 22.96*   HGB 7.5*   HCT 23.9*   MCV 95   *     BMP:  Recent Labs   Lab 05/01/25  0500   GLU 63*      K 3.3*      CO2 17*   BUN 73*   CREATININE 4.8*   CALCIUM 6.9*   MG 1.3*     LFT:  Lab Results   Component Value Date    AST 12 05/01/2025    ALKPHOS 96 05/01/2025    BILITOT 0.2 05/01/2025     Albumin:   Albumin   Date Value Ref Range Status   05/01/2025 1.7 (L) 3.5 - 5.2 g/dL Final   07/24/2020 2.5 (L) 3.5 - 5.2 g/dL Final     Protein:   Protein Total   Date Value Ref Range Status   05/01/2025 6.1 6.0 - 8.4 gm/dL Final     Total Protein   Date Value Ref Range Status   07/24/2020 8.1 6.0 - 8.4 g/dL Final     Lactic acid:   Lab Results   Component Value Date    LACTATE 0.9 04/29/2025    LACTATE 0.9 07/20/2020       Significant Imaging: I have reviewed all pertinent imaging results/findings within the past 24 hours.

## 2025-05-01 NOTE — PROGRESS NOTES
[facility]  Progress Note    Patient Name: Tasneem Lynn  MRN: [id number]  Patient Class: IP- Inpatient   Admission Date: 4/29/2025  Length of Stay: 2 days  Attending Physician: Neel Trejo MD  Primary Care Provider: Unable, To Obtain        Subjective     Principal Problem:Inguinal ulcer        HPI:  79-year-old F with medical history significant for type 2 DM, HTN, CHF, hypothyroidism, and CKD 4 who presented to the ED with a chief complaint of LT upper  groin pain boil of one week duration      Patient was in her baseline state of health until which initially started as a small pea-sized lesion but progressively grew and ulcerated.  Patient started having pain and this prompted her to come to the ED. she endorsed some subjective chills but denied any fever, nausea, or vomiting.  No diarrhea or loss of appetite.  Patient had no history of bloodstream infection, surgical implants, valvular heart disease.  However, retrospective chart review indicated that patient had chronic osteomyelitis of the right foot and ESBL Klebsiella UTI 5 years ago.  Patient has a known CKD 4 but no routine follow up with Nephrology noted; review of home medication did not show any diuretics or other nephrotoxic medications.    On presentation to the ED, patient was hypertensive to 183/74, , oxygen saturation 100% on room air.Laboratory investigations were pertinent for WBC 29.1, HGB 9.3, , sodium 135, BUN 79, creatinine of 5.4, EGFR 8, procalcitonin 25.23, troponin 0.03, lactic acid 0.9.  Patient received NS 1 L in the ED and IV Zosyn.  Admission was requested for further management.      Overview/Hospital Course:  No notes on file    Interval History: sitting up in bed eating breakfast.     Review of Systems   Respiratory:  Negative for shortness of breath.    Psychiatric/Behavioral:  Negative for confusion.      Objective:     Vital Signs (Most Recent):  Temp: 98.1 °F (36.7 °C) (05/01/25  1110)  Pulse: 83 (05/01/25 1110)  Resp: 19 (05/01/25 1110)  BP: 113/65 (05/01/25 1110)  SpO2: 98 % (05/01/25 1110) Vital Signs (24h Range):  Temp:  [98.1 °F (36.7 °C)-98.6 °F (37 °C)] 98.1 °F (36.7 °C)  Pulse:  [] 83  Resp:  [18-19] 19  SpO2:  [96 %-100 %] 98 %  BP: (113-170)/(58-77) 113/65     Weight: 84.7 kg (186 lb 12.8 oz)  Body mass index is 34.17 kg/m².    Intake/Output Summary (Last 24 hours) at 5/1/2025 1319  Last data filed at 5/1/2025 0627  Gross per 24 hour   Intake 720 ml   Output 951 ml   Net -231 ml         Physical Exam  Constitutional:       General: She is not in acute distress.     Appearance: She is well-developed. She is obese. She is not ill-appearing, toxic-appearing or diaphoretic.   HENT:      Head: Normocephalic and atraumatic.      Nose: Nose normal. No congestion.   Eyes:      Pupils: Pupils are equal, round, and reactive to light.   Cardiovascular:      Rate and Rhythm: Normal rate and regular rhythm.      Pulses: Normal pulses.      Heart sounds: Normal heart sounds.   Pulmonary:      Effort: Pulmonary effort is normal. No respiratory distress.      Breath sounds: Normal breath sounds. No stridor.      Comments: Diminished . Difficult to assess.   Abdominal:      General: Bowel sounds are normal. There is no distension.      Palpations: Abdomen is soft. There is no mass.      Tenderness: There is no abdominal tenderness.   Musculoskeletal:         General: Tenderness (left upper thigh) and deformity present. Normal range of motion.      Cervical back: Normal range of motion and neck supple.      Right lower leg: Edema (does not move right leg) present.      Left lower leg: Edema present.   Skin:     General: Skin is warm and dry.      Capillary Refill: Capillary refill takes less than 2 seconds.      Comments: LT Inguinal desquamated ulcer 8cm x 5cm with exudative base,and sloughing edges   Neurological:      General: No focal deficit present.      Mental Status: She is alert and  "oriented to person, place, and time.      Comments: Bedbound x 3 years               Significant Labs: All pertinent labs within the past 24 hours have been reviewed.    Significant Imaging: I have reviewed all pertinent imaging results/findings within the past 24 hours.      Assessment & Plan  Left upper thigh wound/Cellulitis  Hx of LT upper thigh carbuncle progressively enlarging with ulceration  Obtain Blood cultures  Noncontrast CT abdomen and left upper thigh to evaluate for occult abscesses and deep extensions-no abscess   Procal elevated, lactate normal  Given immunosuppression (CKD and type 2 DM); start empiric renally dosed vancomycin and cefepime  Wound does not have expose viable tissue to culture- will discuss with wound care team   Wound care ordered temporarily until seen by Wound care team     Sepsis with acute organ dysfunction without septic shock  This patient does have evidence of infective focus  My overall impression is sepsis.  Source: Skin and Soft Tissue (location LT inguinal cellulitis)  Antibiotics given-   Antibiotics (72h ago, onward)      Start     Stop Route Frequency Ordered    04/30/25 2000  ceFEPIme injection 1 g         -- IV Every 24 hours (non-standard times) 04/30/25 1823    04/29/25 1909  vancomycin - pharmacy to dose  (vancomycin IVPB (PEDS and ADULTS))        Placed in "And" Linked Group    -- IV pharmacy to manage frequency 04/29/25 1815          Latest lactate reviewed-  Recent Labs   Lab 04/29/25  1528   LACTATE 0.9     Organ dysfunction indicated by Acute kidney injury    Fluid challenge Fluid Not Needed - Patient is not hypotensive and/or lactate is less than 4.0.     Post- resuscitation assessment Yes - I attest a sepsis perfusion exam was performed within 6 hours of sepsis, severe sepsis, or septic shock presentation, following fluid resuscitation.      Will Not start Pressors- Levophed for MAP of 65  Source control achieved by: IV antibiotics  CKD (chronic kidney " disease) stage 5, GFR less than 15 ml/min  EMMA is likely due to acute tubular necrosis caused by sepsis . Baseline creatinine is  2.3 to 2.5 . Most recent creatinine and eGFR are listed below.  Recent Labs     04/29/25  1353 04/30/25  0430 05/01/25  0500   CREATININE 5.4* 5.1* 4.8*   EGFRNORACEVR 8* 8* 9*     -Last sCr 2020  - Urine sodium, urine creatinine pending collection-  plus- will discuss with nurse  - Retroperitoneal ultrasound- CKD, no hydronephrosis  - Nephrology consult - at baseline  Hyperlipidemia  Continue statins    Diabetes mellitus  Patient's FSGs are controlled on current medication regimen.  Last A1c reviewed-   Lab Results   Component Value Date    HGBA1C 7.5 (H) 06/13/2020     Most recent fingerstick glucose reviewed-   Recent Labs   Lab 04/30/25  1556 04/30/25  2018 05/01/25  0800 05/01/25  1109   POCTGLUCOSE 99 170* 71 120*   Hypoglycemia- infection contributing  Obtain HgbA1c    Type 2 myocardial infarction  Type 2 secondary to sepsis and EMMA  Noted elevated troponin with underlying history of CAD on presentation  EKG showed no acute ischemic change  Continue to trend troponin    Hypertension  Patient currently borderline hypotensive; home medications held  Restart as clinically appropriate  PRNs ordered  CAD (coronary artery disease)  Patient with known CAD s/p which is controlled Will continue ASA and Statin and monitor for S/Sx of angina/ACS. Continue to monitor on telemetry.   Hypothyroidism  Continue home levothyroxine    Anemia  Anemia is likely due to chronic disease due to Chronic Kidney Disease. Most recent hemoglobin and hematocrit are listed below.  Recent Labs     04/29/25  1353 04/30/25  0430 05/01/25  0500   HGB 9.3* 8.5* 7.5*   HCT 30.0* 27.7* 23.9*     Plan  - Monitor serial CBC: Daily  - Transfuse PRBC if patient becomes hemodynamically unstable, symptomatic or H/H drops below 7/21.  - Patient has not received any PRBC transfusions to date  - Patient's anemia is  currently stable  - Obtain iron studies and ferritin- chronic disease and malnutrition  Bedbound      Advance care planning  Advance Care Planning     Date: 05/01/2025    Code Status  In light of the patients advanced and life limiting illness,I engaged the the patient in a voluntary conversation about the patient's preferences for care  at the very end of life. The patient wishes to have a natural, peaceful death.  Along those lines, the patient does not wish to have CPR or other invasive treatments performed when her heart and/or breathing stops. Patient would like to discuss with Daughter Merrill. Patient also does not want dialysis if had to come to that point.   Palliative team care consult          VTE Risk Mitigation (From admission, onward)           Ordered     heparin (porcine) injection 5,000 Units  Every 8 hours         04/29/25 1815     IP VTE HIGH RISK PATIENT  Once         04/29/25 1815     Place sequential compression device  Until discontinued         04/29/25 1815                    Discharge Planning   STEPHEN: 5/3/2025     Code Status: Full Code   Medical Readiness for Discharge Date:   Discharge Plan A: Home with family          Alice Mooney NP  Department of Hospital Medicine   [facility]

## 2025-05-01 NOTE — ASSESSMENT & PLAN NOTE
EMMA is likely due to acute tubular necrosis caused by sepsis. Baseline creatinine is 2.3 to 2.5. Most recent creatinine and eGFR are listed below.  Recent Labs     04/29/25  1353 04/30/25  0430 05/01/25  0500   CREATININE 5.4* 5.1* 4.8*   EGFRNORACEVR 8* 8* 9*     -Last sCr 2020  - Urine sodium, urine creatinine pending collection-  plus- will discuss with nurse  - Retroperitoneal ultrasound- CKD, no hydronephrosis  - Nephrology consult - at baseline

## 2025-05-01 NOTE — SUBJECTIVE & OBJECTIVE
Interval History: long discussion with patient. We discussed the possibility of dialysis - while she does not have any acute needs for now, she stated that she does not want to be on dialysis even if it would prolong her life. See ACP in Assessment/Plan below    Review of patient's allergies indicates:   Allergen Reactions    Corticosteroids (glucocorticoids) Edema     Current Facility-Administered Medications   Medication Frequency    acetaminophen tablet 650 mg Q4H PRN    aspirin chewable tablet 81 mg Daily    atorvastatin tablet 40 mg QHS    ceFEPIme injection 1 g Q24H    dextrose 50% injection 12.5 g PRN    dextrose 50% injection 25 g PRN    glucagon (human recombinant) injection 1 mg PRN    glucose chewable tablet 16 g PRN    glucose chewable tablet 24 g Once    heparin (porcine) injection 5,000 Units Q8H    hydrALAZINE injection 10 mg Q4H PRN    levothyroxine tablet 75 mcg Before breakfast    melatonin tablet 6 mg Nightly PRN    ondansetron injection 4 mg Q8H PRN    oxyCODONE immediate release tablet 5 mg Q6H PRN    sevelamer carbonate tablet 800 mg TID WM    sodium bicarbonate tablet 1,300 mg TID    sodium chloride 0.9% flush 10 mL Q12H PRN    vancomycin - pharmacy to dose pharmacy to manage frequency       Objective:     Vital Signs (Most Recent):  Temp: 98.1 °F (36.7 °C) (05/01/25 1110)  Pulse: 83 (05/01/25 1110)  Resp: 19 (05/01/25 1110)  BP: 113/65 (05/01/25 1110)  SpO2: 98 % (05/01/25 1110) Vital Signs (24h Range):  Temp:  [98.1 °F (36.7 °C)-98.6 °F (37 °C)] 98.1 °F (36.7 °C)  Pulse:  [] 83  Resp:  [18-19] 19  SpO2:  [96 %-100 %] 98 %  BP: (113-170)/(58-77) 113/65     Weight: 84.7 kg (186 lb 12.8 oz) (04/29/25 1946)  Body mass index is 34.17 kg/m².  Body surface area is 1.92 meters squared.    I/O last 3 completed shifts:  In: 1080 [P.O.:1080]  Out: 1251 [Urine:1250; Stool:1]     Physical Exam  Constitutional:       General: She is not in acute distress.     Appearance: She is well-developed. She  is obese. She is not ill-appearing or toxic-appearing.   HENT:      Head: Normocephalic and atraumatic.      Nose: Nose normal. No congestion.   Eyes:      Pupils: Pupils are equal, round, and reactive to light.   Cardiovascular:      Rate and Rhythm: Normal rate.      Heart sounds: Normal heart sounds.   Pulmonary:      Effort: Pulmonary effort is normal.   Abdominal:      General: Bowel sounds are normal.      Palpations: Abdomen is soft.      Tenderness: There is no abdominal tenderness.   Musculoskeletal:         General: Tenderness (left upper thigh) present. Normal range of motion.      Cervical back: Normal range of motion and neck supple.   Skin:     General: Skin is warm and dry.      Capillary Refill: Capillary refill takes less than 2 seconds.   Neurological:      Mental Status: She is alert and oriented to person, place, and time.          Significant Labs:  All labs within the past 24 hours have been reviewed.     Significant Imaging:  Labs: Reviewed

## 2025-05-02 LAB
ABO + RH BLD: NORMAL
ABSOLUTE EOSINOPHIL (OHS): 0.27 K/UL
ABSOLUTE MONOCYTE (OHS): 1.2 K/UL (ref 0.3–1)
ABSOLUTE NEUTROPHIL COUNT (OHS): 14.62 K/UL (ref 1.8–7.7)
ALBUMIN SERPL BCP-MCNC: 1.6 G/DL (ref 3.5–5.2)
ALP SERPL-CCNC: 82 UNIT/L (ref 40–150)
ALT SERPL W/O P-5'-P-CCNC: <5 UNIT/L (ref 10–44)
ANION GAP (OHS): 12 MMOL/L (ref 8–16)
AST SERPL-CCNC: 11 UNIT/L (ref 11–45)
BACTERIA #/AREA URNS AUTO: ABNORMAL /HPF
BASOPHILS # BLD AUTO: 0.03 K/UL
BASOPHILS NFR BLD AUTO: 0.2 %
BILIRUB SERPL-MCNC: 0.2 MG/DL (ref 0.1–1)
BLD PROD TYP BPU: NORMAL
BLOOD UNIT EXPIRATION DATE: NORMAL
BLOOD UNIT TYPE CODE: 7300
BUN SERPL-MCNC: 73 MG/DL (ref 8–23)
CALCIUM SERPL-MCNC: 7.2 MG/DL (ref 8.7–10.5)
CHLORIDE SERPL-SCNC: 106 MMOL/L (ref 95–110)
CO2 SERPL-SCNC: 19 MMOL/L (ref 23–29)
CREAT SERPL-MCNC: 4.8 MG/DL (ref 0.5–1.4)
CREAT UR-MCNC: 44.9 MG/DL (ref 15–325)
CREAT UR-MCNC: 45.3 MG/DL (ref 15–325)
CROSSMATCH INTERPRETATION: NORMAL
DISPENSE STATUS: NORMAL
ERYTHROCYTE [DISTWIDTH] IN BLOOD BY AUTOMATED COUNT: 15.3 % (ref 11.5–14.5)
GFR SERPLBLD CREATININE-BSD FMLA CKD-EPI: 9 ML/MIN/1.73/M2
GLUCOSE SERPL-MCNC: 76 MG/DL (ref 70–110)
HCT VFR BLD AUTO: 22.8 % (ref 37–48.5)
HGB BLD-MCNC: 7.1 GM/DL (ref 12–16)
IMM GRANULOCYTES # BLD AUTO: 0.16 K/UL (ref 0–0.04)
IMM GRANULOCYTES NFR BLD AUTO: 0.8 % (ref 0–0.5)
INDIRECT COOMBS: NORMAL
LYMPHOCYTES # BLD AUTO: 2.69 K/UL (ref 1–4.8)
MAGNESIUM SERPL-MCNC: 1.9 MG/DL (ref 1.6–2.6)
MCH RBC QN AUTO: 30 PG (ref 27–31)
MCHC RBC AUTO-ENTMCNC: 31.1 G/DL (ref 32–36)
MCV RBC AUTO: 96 FL (ref 82–98)
MICROSCOPIC COMMENT: ABNORMAL
NUCLEATED RBC (/100WBC) (OHS): 0 /100 WBC
PHOSPHATE SERPL-MCNC: 4.4 MG/DL (ref 2.7–4.5)
PLATELET # BLD AUTO: 526 K/UL (ref 150–450)
PMV BLD AUTO: 9 FL (ref 9.2–12.9)
POCT GLUCOSE: 110 MG/DL (ref 70–110)
POCT GLUCOSE: 130 MG/DL (ref 70–110)
POCT GLUCOSE: 154 MG/DL (ref 70–110)
POCT GLUCOSE: 79 MG/DL (ref 70–110)
POTASSIUM SERPL-SCNC: 3.8 MMOL/L (ref 3.5–5.1)
PROT SERPL-MCNC: 5.9 GM/DL (ref 6–8.4)
PROT UR-MCNC: 184 MG/DL
PROT/CREAT UR: 4.1 MG/G{CREAT}
RBC # BLD AUTO: 2.37 M/UL (ref 4–5.4)
RBC #/AREA URNS AUTO: 81 /HPF (ref 0–4)
RELATIVE EOSINOPHIL (OHS): 1.4 %
RELATIVE LYMPHOCYTE (OHS): 14.2 % (ref 18–48)
RELATIVE MONOCYTE (OHS): 6.3 % (ref 4–15)
RELATIVE NEUTROPHIL (OHS): 77.1 % (ref 38–73)
RH BLD: NORMAL
SODIUM SERPL-SCNC: 137 MMOL/L (ref 136–145)
SODIUM UR-SCNC: 62 MMOL/L (ref 20–250)
SPECIMEN OUTDATE: NORMAL
SQUAMOUS #/AREA URNS AUTO: 8 /HPF
UNIT NUMBER: NORMAL
VANCOMYCIN SERPL-MCNC: 18.3 UG/ML (ref ?–80)
WBC # BLD AUTO: 18.97 K/UL (ref 3.9–12.7)
WBC #/AREA URNS AUTO: >100 /HPF (ref 0–5)
YEAST UR QL AUTO: ABNORMAL /HPF

## 2025-05-02 PROCEDURE — 87088 URINE BACTERIA CULTURE: CPT | Mod: HCNC

## 2025-05-02 PROCEDURE — 25000003 PHARM REV CODE 250: Mod: HCNC

## 2025-05-02 PROCEDURE — 99497 ADVNCD CARE PLAN 30 MIN: CPT | Mod: HCNC,25,, | Performed by: INTERNAL MEDICINE

## 2025-05-02 PROCEDURE — 86920 COMPATIBILITY TEST SPIN: CPT | Mod: HCNC | Performed by: NURSE PRACTITIONER

## 2025-05-02 PROCEDURE — 84300 ASSAY OF URINE SODIUM: CPT | Mod: HCNC

## 2025-05-02 PROCEDURE — 85025 COMPLETE CBC W/AUTO DIFF WBC: CPT | Mod: HCNC

## 2025-05-02 PROCEDURE — 84156 ASSAY OF PROTEIN URINE: CPT | Mod: HCNC

## 2025-05-02 PROCEDURE — 25000003 PHARM REV CODE 250: Mod: HCNC | Performed by: STUDENT IN AN ORGANIZED HEALTH CARE EDUCATION/TRAINING PROGRAM

## 2025-05-02 PROCEDURE — 84100 ASSAY OF PHOSPHORUS: CPT | Mod: HCNC

## 2025-05-02 PROCEDURE — 99233 SBSQ HOSP IP/OBS HIGH 50: CPT | Mod: HCNC,25,, | Performed by: INTERNAL MEDICINE

## 2025-05-02 PROCEDURE — 36415 COLL VENOUS BLD VENIPUNCTURE: CPT | Mod: HCNC | Performed by: NURSE PRACTITIONER

## 2025-05-02 PROCEDURE — 99291 CRITICAL CARE FIRST HOUR: CPT | Mod: HCNC,,, | Performed by: STUDENT IN AN ORGANIZED HEALTH CARE EDUCATION/TRAINING PROGRAM

## 2025-05-02 PROCEDURE — 63600175 PHARM REV CODE 636 W HCPCS: Mod: HCNC | Performed by: HOSPITALIST

## 2025-05-02 PROCEDURE — 80053 COMPREHEN METABOLIC PANEL: CPT | Mod: HCNC

## 2025-05-02 PROCEDURE — 21400001 HC TELEMETRY ROOM: Mod: HCNC

## 2025-05-02 PROCEDURE — 86900 BLOOD TYPING SEROLOGIC ABO: CPT | Mod: HCNC | Performed by: NURSE PRACTITIONER

## 2025-05-02 PROCEDURE — 83735 ASSAY OF MAGNESIUM: CPT | Mod: HCNC

## 2025-05-02 PROCEDURE — 81001 URINALYSIS AUTO W/SCOPE: CPT | Mod: HCNC

## 2025-05-02 PROCEDURE — 82570 ASSAY OF URINE CREATININE: CPT | Mod: HCNC

## 2025-05-02 PROCEDURE — 63600175 PHARM REV CODE 636 W HCPCS: Mod: HCNC

## 2025-05-02 PROCEDURE — 36430 TRANSFUSION BLD/BLD COMPNT: CPT | Mod: HCNC

## 2025-05-02 PROCEDURE — P9016 RBC LEUKOCYTES REDUCED: HCPCS | Mod: HCNC | Performed by: NURSE PRACTITIONER

## 2025-05-02 PROCEDURE — 30233N1 TRANSFUSION OF NONAUTOLOGOUS RED BLOOD CELLS INTO PERIPHERAL VEIN, PERCUTANEOUS APPROACH: ICD-10-PCS | Performed by: NURSE PRACTITIONER

## 2025-05-02 PROCEDURE — 80202 ASSAY OF VANCOMYCIN: CPT | Mod: HCNC | Performed by: HOSPITALIST

## 2025-05-02 PROCEDURE — 36415 COLL VENOUS BLD VENIPUNCTURE: CPT | Mod: HCNC

## 2025-05-02 RX ORDER — HYDROCODONE BITARTRATE AND ACETAMINOPHEN 500; 5 MG/1; MG/1
TABLET ORAL
Status: DISCONTINUED | OUTPATIENT
Start: 2025-05-02 | End: 2025-05-05

## 2025-05-02 RX ADMIN — SEVELAMER CARBONATE 800 MG: 800 TABLET, FILM COATED ORAL at 04:05

## 2025-05-02 RX ADMIN — ATORVASTATIN CALCIUM 40 MG: 40 TABLET, FILM COATED ORAL at 08:05

## 2025-05-02 RX ADMIN — CEFEPIME 1 G: 1 INJECTION, POWDER, FOR SOLUTION INTRAMUSCULAR; INTRAVENOUS at 08:05

## 2025-05-02 RX ADMIN — SODIUM BICARBONATE 1300 MG: 325 TABLET ORAL at 08:05

## 2025-05-02 RX ADMIN — LEVOTHYROXINE SODIUM 75 MCG: 75 TABLET ORAL at 05:05

## 2025-05-02 RX ADMIN — HEPARIN SODIUM 5000 UNITS: 5000 INJECTION INTRAVENOUS; SUBCUTANEOUS at 02:05

## 2025-05-02 RX ADMIN — Medication 6 MG: at 08:05

## 2025-05-02 RX ADMIN — HEPARIN SODIUM 5000 UNITS: 5000 INJECTION INTRAVENOUS; SUBCUTANEOUS at 05:05

## 2025-05-02 RX ADMIN — SEVELAMER CARBONATE 800 MG: 800 TABLET, FILM COATED ORAL at 11:05

## 2025-05-02 RX ADMIN — SODIUM BICARBONATE 1300 MG: 325 TABLET ORAL at 02:05

## 2025-05-02 RX ADMIN — SEVELAMER CARBONATE 800 MG: 800 TABLET, FILM COATED ORAL at 10:05

## 2025-05-02 RX ADMIN — SODIUM BICARBONATE 1300 MG: 325 TABLET ORAL at 10:05

## 2025-05-02 RX ADMIN — HEPARIN SODIUM 5000 UNITS: 5000 INJECTION INTRAVENOUS; SUBCUTANEOUS at 08:05

## 2025-05-02 RX ADMIN — ASPIRIN 81 MG CHEWABLE TABLET 81 MG: 81 TABLET CHEWABLE at 10:05

## 2025-05-02 RX ADMIN — ACETAMINOPHEN 650 MG: 325 TABLET ORAL at 04:05

## 2025-05-02 NOTE — ASSESSMENT & PLAN NOTE
Advance Care Planning     Date: 05/01/2025    Code Status  In light of the patients advanced and life limiting illness,I engaged the the patient in a voluntary conversation about the patient's preferences for care  at the very end of life. The patient wishes to have a natural, peaceful death.  Along those lines, the patient does not wish to have CPR or other invasive treatments performed when her heart and/or breathing stops. Patient would like to discuss with Daughter Merrill. Patient also does not want dialysis if had to come to that point.   Palliative team care following

## 2025-05-02 NOTE — SUBJECTIVE & OBJECTIVE
Interval History: no acute events overnight, stable    Review of patient's allergies indicates:   Allergen Reactions    Corticosteroids (glucocorticoids) Edema     Current Facility-Administered Medications   Medication Frequency    0.9%  NaCl infusion (for blood administration) Q24H PRN    acetaminophen tablet 650 mg Q4H PRN    aspirin chewable tablet 81 mg Daily    atorvastatin tablet 40 mg QHS    ceFEPIme injection 1 g Q24H    dextrose 50% injection 12.5 g PRN    dextrose 50% injection 25 g PRN    glucagon (human recombinant) injection 1 mg PRN    glucose chewable tablet 16 g PRN    glucose chewable tablet 24 g Once    heparin (porcine) injection 5,000 Units Q8H    hydrALAZINE injection 10 mg Q4H PRN    levothyroxine tablet 75 mcg Before breakfast    melatonin tablet 6 mg Nightly PRN    ondansetron injection 4 mg Q8H PRN    sevelamer carbonate tablet 800 mg TID WM    sodium bicarbonate tablet 1,300 mg TID    sodium chloride 0.9% flush 10 mL Q12H PRN    vancomycin - pharmacy to dose pharmacy to manage frequency       Objective:     Vital Signs (Most Recent):  Temp: 98.5 °F (36.9 °C) (05/02/25 1103)  Pulse: 83 (05/02/25 1103)  Resp: (!) 48 (05/02/25 1103)  BP: 133/61 (05/02/25 1103)  SpO2: 98 % (05/02/25 1103) Vital Signs (24h Range):  Temp:  [98.3 °F (36.8 °C)-98.6 °F (37 °C)] 98.5 °F (36.9 °C)  Pulse:  [79-88] 83  Resp:  [17-48] 48  SpO2:  [97 %-99 %] 98 %  BP: (115-149)/(55-68) 133/61     Weight: 84.7 kg (186 lb 12.8 oz) (04/29/25 1946)  Body mass index is 34.17 kg/m².  Body surface area is 1.92 meters squared.    I/O last 3 completed shifts:  In: 1320 [P.O.:1320]  Out: 701 [Urine:700; Stool:1]     Physical Exam  Constitutional:       General: She is not in acute distress.     Appearance: She is well-developed. She is obese. She is not ill-appearing or toxic-appearing.   HENT:      Head: Normocephalic and atraumatic.      Nose: Nose normal. No congestion.   Eyes:      Pupils: Pupils are equal, round, and reactive  to light.   Cardiovascular:      Rate and Rhythm: Normal rate.      Heart sounds: Normal heart sounds.   Pulmonary:      Effort: Pulmonary effort is normal.   Abdominal:      General: Bowel sounds are normal.      Palpations: Abdomen is soft.      Tenderness: There is no abdominal tenderness.   Musculoskeletal:         General: Tenderness (left upper thigh) present. Normal range of motion.      Cervical back: Normal range of motion and neck supple.   Skin:     General: Skin is warm and dry.      Capillary Refill: Capillary refill takes less than 2 seconds.   Neurological:      Mental Status: She is alert and oriented to person, place, and time.          Significant Labs:  All labs within the past 24 hours have been reviewed.     Significant Imaging:  Labs: Reviewed

## 2025-05-02 NOTE — ASSESSMENT & PLAN NOTE
EMMA is likely due to acute tubular necrosis caused by sepsis. Baseline creatinine is 2.3 to 2.5. Most recent creatinine and eGFR are listed below.  Recent Labs     04/30/25  0430 05/01/25  0500 05/02/25  0418   CREATININE 5.1* 4.8* 4.8*   EGFRNORACEVR 8* 9* 9*     -Last sCr 2020  - Urine sodium, urine creatinine pending collection-  plus- will discuss with nurse  - Retroperitoneal ultrasound- CKD, no hydronephrosis  - Nephrology consult - at baseline

## 2025-05-02 NOTE — PROGRESS NOTES
[facility] Medicine  Progress Note    Patient Name: Tasneem Lynn  MRN: [id number]  Patient Class: IP- Inpatient   Admission Date: 4/29/2025  Length of Stay: 3 days  Attending Physician: Neel Trejo MD  Primary Care Provider: Unable, To Obtain      Subjective     Principal Problem:Inguinal ulcer        HPI:  79-year-old F with medical history significant for type 2 DM, HTN, CHF, hypothyroidism, and CKD 4 who presented to the ED with a chief complaint of LT upper  groin pain boil of one week duration      Patient was in her baseline state of health until which initially started as a small pea-sized lesion but progressively grew and ulcerated.  Patient started having pain and this prompted her to come to the ED. she endorsed some subjective chills but denied any fever, nausea, or vomiting.  No diarrhea or loss of appetite.  Patient had no history of bloodstream infection, surgical implants, valvular heart disease.  However, retrospective chart review indicated that patient had chronic osteomyelitis of the right foot and ESBL Klebsiella UTI 5 years ago.  Patient has a known CKD 4 but no routine follow up with Nephrology noted; review of home medication did not show any diuretics or other nephrotoxic medications.    On presentation to the ED, patient was hypertensive to 183/74, , oxygen saturation 100% on room air.Laboratory investigations were pertinent for WBC 29.1, HGB 9.3, , sodium 135, BUN 79, creatinine of 5.4, EGFR 8, procalcitonin 25.23, troponin 0.03, lactic acid 0.9.  Patient received NS 1 L in the ED and IV Zosyn.  Admission was requested for further management.      Overview/Hospital Course:  Admission for sepsis secondary to left upper thigh cellulitis and ulcer.  Leukocytosis improving with IV antibiotics.  Afebrile.  Wound care orders placed.  Wound care team also following.  Noted also CKD.  Nephrology consulted.  CKD at baseline.  Ultrasound shows chronic kidney  disease no stones or hydronephrois  Hemoglobin trending down .3> 8.5>7.5>7.1 .  Likely due to CKD and chronic inflammation.  No overt signs of bleeding.  Given history of stroke, we will give 1 unit of blood.  Goals of cares discussed with patient.  Patient wished not for dialysis if it comes to that point.  Was seen by palliative care team.  Updated daughter Merrill via home.  Hopefully home tomorrow.      Interval History:  In good spirit.  Discussed blood transfusion.  Denies any pain or shortness of the breath.  Urine output documented 300+ 1 unmeasured.  Review of Systems   Respiratory:  Negative for shortness of breath.    Psychiatric/Behavioral:  Negative for confusion.      Objective:     Vital Signs (Most Recent):  Temp: 98.3 °F (36.8 °C) (05/02/25 0754)  Pulse: 79 (05/02/25 0754)  Resp: 18 (05/02/25 0754)  BP: (!) 148/61 (05/02/25 0754)  SpO2: 99 % (05/02/25 0754) Vital Signs (24h Range):  Temp:  [98.1 °F (36.7 °C)-98.6 °F (37 °C)] 98.3 °F (36.8 °C)  Pulse:  [79-88] 79  Resp:  [17-19] 18  SpO2:  [97 %-99 %] 99 %  BP: (113-149)/(55-68) 148/61     Weight: 84.7 kg (186 lb 12.8 oz)  Body mass index is 34.17 kg/m².    Intake/Output Summary (Last 24 hours) at 5/2/2025 0925  Last data filed at 5/2/2025 0438  Gross per 24 hour   Intake 480 ml   Output 300 ml   Net 180 ml         Physical Exam  Constitutional:       General: She is not in acute distress.     Appearance: She is well-developed. She is obese. She is not ill-appearing, toxic-appearing or diaphoretic.   HENT:      Head: Normocephalic and atraumatic.      Nose: Nose normal. No congestion.   Eyes:      Pupils: Pupils are equal, round, and reactive to light.   Cardiovascular:      Rate and Rhythm: Normal rate and regular rhythm.      Pulses: Normal pulses.      Heart sounds: Normal heart sounds.   Pulmonary:      Effort: Pulmonary effort is normal. No respiratory distress.      Breath sounds: Normal breath sounds. No stridor.      Comments: Diminished .  Difficult to assess.   Abdominal:      General: Bowel sounds are normal. There is no distension.      Palpations: Abdomen is soft. There is no mass.      Tenderness: There is no abdominal tenderness.   Musculoskeletal:         General: Tenderness (left upper thigh) and deformity present. Normal range of motion.      Cervical back: Normal range of motion and neck supple.      Right lower leg: Edema (does not move right leg) present.      Left lower leg: Edema present.   Skin:     General: Skin is warm and dry.      Capillary Refill: Capillary refill takes less than 2 seconds.      Comments: LT Inguinal desquamated ulcer 8cm x 5cm with exudative base,and sloughing edges   Neurological:      General: No focal deficit present.      Mental Status: She is alert and oriented to person, place, and time.      Comments: Bedbound x 3 years               Significant Labs: All pertinent labs within the past 24 hours have been reviewed.    Significant Imaging: I have reviewed all pertinent imaging results/findings within the past 24 hours.      Assessment & Plan  Left upper thigh wound/Cellulitis  Hx of LT upper thigh carbuncle progressively enlarging with ulceration  Obtain Blood cultures  Noncontrast CT abdomen and left upper thigh to evaluate for occult abscesses and deep extensions-no abscess   Procal elevated, lactate normal  Given immunosuppression (CKD and type 2 DM); start empiric renally dosed vancomycin and cefepime  Wound care ordered . Wound care team following  Continue antibiotics while inpatient  Hopefully home tomorrow with home health wound care    Sepsis with acute organ dysfunction without septic shock  This patient does have evidence of infective focus  My overall impression is sepsis.  Source: Skin and Soft Tissue (location LT inguinal cellulitis)  Antibiotics given-   Antibiotics (72h ago, onward)      Start     Stop Route Frequency Ordered    04/30/25 2000  ceFEPIme injection 1 g         -- IV Every 24  "hours (non-standard times) 04/30/25 1823    04/29/25 1909  vancomycin - pharmacy to dose  (vancomycin IVPB (PEDS and ADULTS))        Placed in "And" Linked Group    -- IV pharmacy to manage frequency 04/29/25 1815          Latest lactate reviewed-  Recent Labs   Lab 04/29/25  1528   LACTATE 0.9   WBC improving    CKD (chronic kidney disease) stage 5, GFR less than 15 ml/min  EMMA is likely due to acute tubular necrosis caused by sepsis. Baseline creatinine is 2.3 to 2.5. Most recent creatinine and eGFR are listed below.  Recent Labs     04/30/25  0430 05/01/25  0500 05/02/25  0418   CREATININE 5.1* 4.8* 4.8*   EGFRNORACEVR 8* 9* 9*     -Last sCr 2020  - Urine sodium, urine creatinine pending collection-  plus- will discuss with nurse  - Retroperitoneal ultrasound- CKD, no hydronephrosis  - Nephrology consult - at baseline  Hyperlipidemia  Continue statins    Diabetes mellitus  Patient's FSGs are controlled on current medication regimen.  Last A1c reviewed-   Lab Results   Component Value Date    HGBA1C 7.5 (H) 06/13/2020     Most recent fingerstick glucose reviewed-   Recent Labs   Lab 05/01/25  1109 05/01/25  1644 05/01/25  1943 05/02/25  0753   POCTGLUCOSE 120* 120* 130* 79   Hypoglycemia- infection contributing  Obtain HgbA1c    Type 2 myocardial infarction  Type 2 secondary to sepsis and EMMA  Noted elevated troponin with underlying history of CAD on presentation  EKG showed no acute ischemic change  Continue to trend troponin    Hypertension  Patient currently borderline hypotensive; home medications held  Restart as clinically appropriate  PRNs ordered  CAD (coronary artery disease)  Patient with known CAD s/p which is controlled Will continue ASA and Statin and monitor for S/Sx of angina/ACS. Continue to monitor on telemetry.   Hypothyroidism  Continue home levothyroxine    Anemia  Anemia is likely due to chronic disease due to Chronic Kidney Disease. Most recent hemoglobin and hematocrit are listed " below.  Recent Labs     04/30/25  0430 05/01/25  0500 05/02/25  0418   HGB 8.5* 7.5* 7.1*   HCT 27.7* 23.9* 22.8*     Plan  - Monitor serial CBC: Daily  - Transfuse PRBC if patient becomes hemodynamically unstable, symptomatic or H/H drops below 7/21.  - Patient has not received any PRBC transfusions to date  - Patient's anemia is currently stable  - Obtain iron studies and ferritin- chronic disease and malnutrition  Bedbound      Advance care planning  Advance Care Planning     Date: 05/01/2025    Code Status  In light of the patients advanced and life limiting illness,I engaged the the patient in a voluntary conversation about the patient's preferences for care  at the very end of life. The patient wishes to have a natural, peaceful death.  Along those lines, the patient does not wish to have CPR or other invasive treatments performed when her heart and/or breathing stops. Patient would like to discuss with Daughter Merrill. Patient also does not want dialysis if had to come to that point.   Palliative team care following      Anemia of chronic disorder  Anemia is likely due to chronic disease due to Chronic Kidney Disease. Most recent hemoglobin and hematocrit are listed below.  Recent Labs     04/30/25  0430 05/01/25  0500 05/02/25  0418   HGB 8.5* 7.5* 7.1*   HCT 27.7* 23.9* 22.8*     Plan  - Monitor serial CBC: Daily  - Transfuse PRBC if patient becomes hemodynamically unstable, symptomatic or H/H drops below 7/21.  - Iron studies iron 32 TSAT 35.   - no overt bleeding  - 1 unit of blood ordered    VTE Risk Mitigation (From admission, onward)           Ordered     heparin (porcine) injection 5,000 Units  Every 8 hours         04/29/25 1815     IP VTE HIGH RISK PATIENT  Once         04/29/25 1815     Place sequential compression device  Until discontinued         04/29/25 1815                    Discharge Planning   STEPHEN: 5/3/2025     Code Status: Full Code   Medical Readiness for Discharge Date:   Discharge  Plan A: Home with family              Alice Mooney NP  Department of Hospital Medicine   [facility] [place] - Telemetry

## 2025-05-02 NOTE — PROGRESS NOTES
[facility]  [facility]rology  Progress Note    Patient Name: Tasneem Lynn  MRN: [id number]  Admission Date: 4/29/2025  Hospital Length of Stay: 3 days  Attending Provider: Neel Trejo MD   Primary Care Physician: Unable, To Obtain  Principal Problem:Inguinal ulcer    Subjective:     HPI: 79 year old female with a history of T2DM, HTN, HF, hypothyroidism presents with left upper groin abscess for the last week.     She developed an upper leg abscess which prompted evaluation in the ED as management at home failed.    Apropos kidney function: she has never established with a kidney doctor and the last nephrologist evaluation inpatient was in 2020 with a creatinine of 2.3. On presentation today she had a creatinine of 5.4 with an eGFR of 8. Other labs on presentation with sodium of 135, K of 4.1, bicarb of 11, BUN of 79.     Interval History: no acute events overnight, stable    Review of patient's allergies indicates:   Allergen Reactions    Corticosteroids (glucocorticoids) Edema     Current Facility-Administered Medications   Medication Frequency    0.9%  NaCl infusion (for blood administration) Q24H PRN    acetaminophen tablet 650 mg Q4H PRN    aspirin chewable tablet 81 mg Daily    atorvastatin tablet 40 mg QHS    ceFEPIme injection 1 g Q24H    dextrose 50% injection 12.5 g PRN    dextrose 50% injection 25 g PRN    glucagon (human recombinant) injection 1 mg PRN    glucose chewable tablet 16 g PRN    glucose chewable tablet 24 g Once    heparin (porcine) injection 5,000 Units Q8H    hydrALAZINE injection 10 mg Q4H PRN    levothyroxine tablet 75 mcg Before breakfast    melatonin tablet 6 mg Nightly PRN    ondansetron injection 4 mg Q8H PRN    sevelamer carbonate tablet 800 mg TID WM    sodium bicarbonate tablet 1,300 mg TID    sodium chloride 0.9% flush 10 mL Q12H PRN    vancomycin - pharmacy to dose pharmacy to manage frequency       Objective:     Vital Signs (Most Recent):  Temp: 98.5 °F (36.9 °C)  (05/02/25 1103)  Pulse: 83 (05/02/25 1103)  Resp: (!) 48 (05/02/25 1103)  BP: 133/61 (05/02/25 1103)  SpO2: 98 % (05/02/25 1103) Vital Signs (24h Range):  Temp:  [98.3 °F (36.8 °C)-98.6 °F (37 °C)] 98.5 °F (36.9 °C)  Pulse:  [79-88] 83  Resp:  [17-48] 48  SpO2:  [97 %-99 %] 98 %  BP: (115-149)/(55-68) 133/61     Weight: 84.7 kg (186 lb 12.8 oz) (04/29/25 1946)  Body mass index is 34.17 kg/m².  Body surface area is 1.92 meters squared.    I/O last 3 completed shifts:  In: 1320 [P.O.:1320]  Out: 701 [Urine:700; Stool:1]     Physical Exam  Constitutional:       General: She is not in acute distress.     Appearance: She is well-developed. She is obese. She is not ill-appearing or toxic-appearing.   HENT:      Head: Normocephalic and atraumatic.      Nose: Nose normal. No congestion.   Eyes:      Pupils: Pupils are equal, round, and reactive to light.   Cardiovascular:      Rate and Rhythm: Normal rate.      Heart sounds: Normal heart sounds.   Pulmonary:      Effort: Pulmonary effort is normal.   Abdominal:      General: Bowel sounds are normal.      Palpations: Abdomen is soft.      Tenderness: There is no abdominal tenderness.   Musculoskeletal:         General: Tenderness (left upper thigh) present. Normal range of motion.      Cervical back: Normal range of motion and neck supple.   Skin:     General: Skin is warm and dry.      Capillary Refill: Capillary refill takes less than 2 seconds.   Neurological:      Mental Status: She is alert and oriented to person, place, and time.          Significant Labs:  All labs within the past 24 hours have been reviewed.     Significant Imaging:  Labs: Reviewed  Assessment/Plan:     Renal/  CKD (chronic kidney disease) stage 5, GFR less than 15 ml/min  Previous sCr in 2020 2.3  Gap in records and presented with a creatinine of 5.4, eGFR 8    Suspect this is her baseline kidney function as she has never seen a nephrologist nor appears to has had any evaluation by any healthcare  provider since 2021.     RP US: 8.2 cm right and 7.5 cm left. Poor imaging though appears echogenic    Plan/Recommendation  Sodium bicarb 1300 TID  -Keep MAP > 65  -Keep hemoglobin > 7  -Strict ins and outs  -Avoid nephrotoxic agents if possible and renally dose medications  -Avoid drastic hemodynamic changes if possible      #Anemia  HGB   Date Value Ref Range Status   05/02/2025 7.1 (L) 12.0 - 16.0 gm/dL Final     Iron Level   Date Value Ref Range Status   04/29/2025 32 30 - 160 ug/dL Final     Transferrin   Date Value Ref Range Status   04/29/2025 62 (L) 200 - 375 mg/dL Final     Iron Binding Capacity Total   Date Value Ref Range Status   04/29/2025 92 (L) 250 - 450 ug/dL Final     Saturated Iron   Date Value Ref Range Status   07/20/2020 8 (L) 20 - 50 % Final     Ferritin   Date Value Ref Range Status   04/29/2025 693.3 (H) 20.0 - 300.0 ng/mL Final   Iron stores adequate  JOYA once BP better controlled    #Secondary hyperparathyroidism  Calcium   Date Value Ref Range Status   05/02/2025 7.2 (L) 8.7 - 10.5 mg/dL Final     Phosphorus Level   Date Value Ref Range Status   05/02/2025 4.4 2.7 - 4.5 mg/dL Final     PTH Intact   Date Value Ref Range Status   05/01/2025 664.2 (H) 9.0 - 77.0 pg/mL Final   Continue to monitor PTH in outpatient setting.   Start sevelamer 800 TID with meals      Palliative Care  Advance care planning  Again discussed with patient with regards to dialysis. Patient repeated that she does not want to live on dialysis and understands that she may eventually reach that place.     With Dr. Mayes present, called patient's daughter. We discussed that her mother has expressed that she does not want to live on dialysis and understands the consequences of that, if--and when her kidneys do fail. Questions answered and emotional support given.          Critical care time spent on the evaluation and treatment of severe organ dysfunction, review of pertinent labs and imaging studies, discussions with  consulting providers and discussions with patient/family: 30 minutes.      Thank you for your consult. I will follow-up with patient. Please contact us if you have any additional questions.    Clinton Aguilar MD  Nephrology  [facility]

## 2025-05-02 NOTE — HOSPITAL COURSE
Ms Tasneem Lynn is a 79 y.o. woman with DM, CKD4, diastolic CHF who was admitted with left thigh cellulitis and wound. Started antibiotics. Noted CKD4/5. Nephrology consulted. Patient does not want dialysis for any reason. Palliative also consulted. Noted mental status changes on 5/2/25 AM; no signs of stroke, concern for cefepime induced neurotoxicity. Moved to ICU for continued altered mental status and EEG 5/4/25 showed status epilepticus. Loaded keppra. Antibiotics adjusted to vanc/ertapenem due to ESBL Ecoli UTI. Repeat EEG 5/5 no longer showing status; loaded vimpat given earlier NCSE. General surgery is planning OR debridement when stable from Neuro standpoint.  Patient remained in status epilepticus; antibiotics switched to Zosyn to minimize worsening encephalopathy and seizures.MRI 5/3 showed no acute intracranial abnormalities.  EEG 5/5 to 5/6 showed diffuse background slowing with periods of Periods of NC SC seen throughout recording worsening later in study.  Patient was intubated on 05/06 and started on propofol for better control. Clobazam ( Onfi) added per Neurology.  Transferred to [facility] neuro ICU.Family agreeable.

## 2025-05-02 NOTE — NURSING
[facility]  Nurses Note -- 4 Eyes      5/1/2025       Skin assessed on: Q Shift      [x] No Pressure Injuries Present    []Prevention Measures Documented    [] Yes LDA  for Pressure Injury Previously documented     [] Yes New Pressure Injury Discovered   [] LDA for New Pressure Injury Added      Attending RN:  Odette Doan LPN     Second RN:  ENZO Andersen

## 2025-05-02 NOTE — SUBJECTIVE & OBJECTIVE
Interval History:  In good spirit.  Discussed blood transfusion.  Denies any pain or shortness of the breath.  Urine output documented 300+ 1 unmeasured.  Review of Systems   Respiratory:  Negative for shortness of breath.    Psychiatric/Behavioral:  Negative for confusion.      Objective:     Vital Signs (Most Recent):  Temp: 98.3 °F (36.8 °C) (05/02/25 0754)  Pulse: 79 (05/02/25 0754)  Resp: 18 (05/02/25 0754)  BP: (!) 148/61 (05/02/25 0754)  SpO2: 99 % (05/02/25 0754) Vital Signs (24h Range):  Temp:  [98.1 °F (36.7 °C)-98.6 °F (37 °C)] 98.3 °F (36.8 °C)  Pulse:  [79-88] 79  Resp:  [17-19] 18  SpO2:  [97 %-99 %] 99 %  BP: (113-149)/(55-68) 148/61     Weight: 84.7 kg (186 lb 12.8 oz)  Body mass index is 34.17 kg/m².    Intake/Output Summary (Last 24 hours) at 5/2/2025 0946  Last data filed at 5/2/2025 0438  Gross per 24 hour   Intake 480 ml   Output 300 ml   Net 180 ml         Physical Exam  Constitutional:       General: She is not in acute distress.     Appearance: She is well-developed. She is obese. She is not ill-appearing, toxic-appearing or diaphoretic.   HENT:      Head: Normocephalic and atraumatic.      Nose: Nose normal. No congestion.   Eyes:      Pupils: Pupils are equal, round, and reactive to light.   Cardiovascular:      Rate and Rhythm: Normal rate and regular rhythm.      Pulses: Normal pulses.      Heart sounds: Normal heart sounds.   Pulmonary:      Effort: Pulmonary effort is normal. No respiratory distress.      Breath sounds: Normal breath sounds. No stridor.      Comments: Diminished . Difficult to assess.   Abdominal:      General: Bowel sounds are normal. There is no distension.      Palpations: Abdomen is soft. There is no mass.      Tenderness: There is no abdominal tenderness.   Musculoskeletal:         General: Tenderness (left upper thigh) and deformity present. Normal range of motion.      Cervical back: Normal range of motion and neck supple.      Right lower leg: Edema (does not  move right leg) present.      Left lower leg: Edema present.   Skin:     General: Skin is warm and dry.      Capillary Refill: Capillary refill takes less than 2 seconds.      Comments: LT Inguinal desquamated ulcer 8cm x 5cm with exudative base,and sloughing edges   Neurological:      General: No focal deficit present.      Mental Status: She is alert and oriented to person, place, and time.      Comments: Bedbound x 3 years               Significant Labs: All pertinent labs within the past 24 hours have been reviewed.    Significant Imaging: I have reviewed all pertinent imaging results/findings within the past 24 hours.

## 2025-05-02 NOTE — ASSESSMENT & PLAN NOTE
- Nephrology consulted and following; explained to pt that if she gets to point of needing HD and does not want to pursue this (or is not a candidate for this due to her limited mobility), alternative path would be comfort-focused/hospice care   - Pt has expressed to both myself and Dr Aguilar that she does not want to undergo HD

## 2025-05-02 NOTE — PROGRESS NOTES
[facility]  Palliative Medicine  Progress Note    Patient Name: Tasneem Lynn  MRN: [id number]  Admission Date: 4/29/2025  Hospital Length of Stay: 3 days  Code Status: Full Code   Attending Provider: Neel Trejo MD  Consulting Provider: Abiola Mayes MD  Primary Care Physician: Unable, To Obtain  Principal Problem:Inguinal ulcer    Assessment/Plan:     Advance Care Planning    5/2/25  - Chart and interval history reviewed; pt's renal function stable overnight   - During visit to bedside, pt was alert, in good spirits, and seemed comfortable. She confirmed that her daughter visited yesterday, and they had a discussion. She informed her daughter that she doesn't want to undergo HD if it reaches that point; let her know we will do our best to honor this preference, and let her know I would speak to her daughter.   - Following this, Dr Aguilar and I called and spoke with her daughter Merrill. Medical update provided, and discussed that while pt does not currently have any acute needs for HD, this may change in the future. Based on our discussion with pt, she does not desire to pursue this, and would prefer to transition to hospice care if it gets to that point.   - Merrill seemed upset to hear this, and said pt will change her mind once her kidneys fail; tried to explain to her the reason we are having this conversation now is that pt may become confused as her kidney function worsens. We also discussed the significant logistical challenges of transporting a completely bed bound pt to HD chair 3 times per week.   - She said she will further discuss above with pt; encouraged this, and let her know we will do our best to honor pt's preferences. As pt has not seen a health care provider since 2021, let her know she will need follow up with both PCP and nephrology   - At this time, plan remains to continue with current level of care (PCP and outpatient nephrology follow-up), though pt has expressed to us that  "she does not want to undergo HD if it ever came to this. Pt's daughter does not seem to be in agreement with this, so have encouraged them to have further discussion.   - Updated primary team after above conversation. While pt would likely benefit from home-based palliative follow-up, she has a high risk of readmission, so  palliative team will see her during any future admissions     5/1/25  - Consult for support and advance care planning in older pt with CKD currently in hospital for cellulitis/wound infection; chart reviewed in depth, and discussed pt with primary team. Pt has voiced to them she is unlikely to desire HD even if this is eventually needed.   - Visited with pt at bedside; she was alert and readily conversational. Introduced role of palliative medicine in pt's care, and learned more about her outside of hospital. She is  and has two daughters, though only her daughter Merrill (resides with pt) is involved in her care. Merrill is not currently working, and is available at all times to assist pt. 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 She spends all of her time in bed at home, and said this has been the case for several years.   - At this time during visit, pt's daughter Merrill called, so I spoke to her on speaker phone. She informed me phone number on file is incorrect, so I updated this in chart ([phone]). Basic medical update provided; she had a number of questions about pt's renal function and is upset [facility] "blood pressure medications that can hurt her kidneys." I explained that blood pressure control is an important aspect of health care, and that without good blood pressure control pt can have worsening renal function from this as well. Additionally, she would be high risk for strokes or other serious health events. As many of her concerns were renal specific, " I let her know that I would have Dr Aguilar reach out to her by phone, to which she was agreeable.    - Following this, spoke to pt about her care preferences if her renal function worsens. Explained that if not wanting to pursue HD - or she is not a candidate for this - alternative path would be comfort-focused/hospice care. While patient indicated to me that she does not wish to undergo HD at any point, she agreed to further discuss with her daughter, who will likely be visiting the hospital sometime today. Encouraged this, as she may become confused and unable to voice her preferences if she goes into renal failure.   - Let her know I will follow up with her throughout hospital stay  - Updated primary team via [employer], and Dr Aguilar in person    Renal/  CKD (chronic kidney disease) stage 5, GFR less than 15 ml/min  - Nephrology consulted and following; explained to pt that if she gets to point of needing HD and does not want to pursue this (or is not a candidate for this due to her limited mobility), alternative path would be comfort-focused/hospice care   - Pt has expressed to both myself and Dr Aguilar that she does not want to undergo HD    ID  Sepsis with acute organ dysfunction without septic shock  - Evaluation, mgmt per primary team; on abx. Suspected skin wound/cellulitis as source.     Orthopedic  * Left upper thigh wound/Cellulitis  - Wound care consulted and following; on abx per primary team     Other  Bedbound  - Noted for several years; per chart review pt gradually lost mobility due to lower extremity pain, and now requires assistance from daughter for all ADLs    I will follow-up with patient. Please contact us if you have any additional questions.    RAJ Redd  Palliative Medicine Staff   [phone]    Total visit time: 55 minutes    > 50% of 35 min visit spent in chart review, face to face discussion of symptom assessment, coordination of care with other specialists, and discharge  planning.    20 min ACP time spent: goals of care, emotional support, formulating and communicating prognosis, exploring burden/ benefit of various approaches of treatment.      Subjective:     Interval History: comfortable this morning     Medications:  Continuous Infusions:  Scheduled Meds:   aspirin  81 mg Oral Daily    atorvastatin  40 mg Oral QHS    ceFEPime IV (PEDS and ADULTS)  1 g Intravenous Q24H    glucose  24 g Oral Once    heparin (porcine)  5,000 Units Subcutaneous Q8H    levothyroxine  75 mcg Oral Before breakfast    sevelamer carbonate  800 mg Oral TID WM    sodium bicarbonate  1,300 mg Oral TID     PRN Meds:  Current Facility-Administered Medications:     0.9%  NaCl infusion (for blood administration), , Intravenous, Q24H PRN    acetaminophen, 650 mg, Oral, Q4H PRN    dextrose 50%, 12.5 g, Intravenous, PRN    dextrose 50%, 25 g, Intravenous, PRN    glucagon (human recombinant), 1 mg, Intramuscular, PRN    glucose, 16 g, Oral, PRN    hydrALAZINE, 10 mg, Intravenous, Q4H PRN    melatonin, 6 mg, Oral, Nightly PRN    ondansetron, 4 mg, Intravenous, Q8H PRN    sodium chloride 0.9%, 10 mL, Intravenous, Q12H PRN    Pharmacy to dose Vancomycin consult, , , Once **AND** vancomycin - pharmacy to dose, , Intravenous, pharmacy to manage frequency    Objective:     Vital Signs (Most Recent):  Temp: 98.5 °F (36.9 °C) (05/02/25 1103)  Pulse: 83 (05/02/25 1103)  Resp: (!) 48 (05/02/25 1103)  BP: 133/61 (05/02/25 1103)  SpO2: 98 % (05/02/25 1103) Vital Signs (24h Range):  Temp:  [98.3 °F (36.8 °C)-98.6 °F (37 °C)] 98.5 °F (36.9 °C)  Pulse:  [79-88] 83  Resp:  [17-48] 48  SpO2:  [97 %-99 %] 98 %  BP: (115-149)/(55-68) 133/61     Weight: 84.7 kg (186 lb 12.8 oz)  Body mass index is 34.17 kg/m².       Physical Exam  Constitutional:       Comments: Sitting up in bed, eating breakfast, in good spirits, breathing comfortably        Significant Labs: All pertinent labs within the past 24 hours have been reviewed.  CBC:    Recent Labs   Lab 05/02/25 0418   WBC 18.97*   HGB 7.1*   HCT 22.8*   MCV 96   *     BMP:  Recent Labs   Lab 05/02/25 0418   GLU 76      K 3.8      CO2 19*   BUN 73*   CREATININE 4.8*   CALCIUM 7.2*   MG 1.9     LFT:  Lab Results   Component Value Date    AST 11 05/02/2025    ALKPHOS 82 05/02/2025    BILITOT 0.2 05/02/2025     Albumin:   Albumin   Date Value Ref Range Status   05/02/2025 1.6 (L) 3.5 - 5.2 g/dL Final   07/24/2020 2.5 (L) 3.5 - 5.2 g/dL Final     Protein:   Protein Total   Date Value Ref Range Status   05/02/2025 5.9 (L) 6.0 - 8.4 gm/dL Final     Total Protein   Date Value Ref Range Status   07/24/2020 8.1 6.0 - 8.4 g/dL Final     Lactic acid:   Lab Results   Component Value Date    LACTATE 0.9 04/29/2025    LACTATE 0.9 07/20/2020       Significant Imaging: I have reviewed all pertinent imaging results/findings within the past 24 hours.

## 2025-05-02 NOTE — ASSESSMENT & PLAN NOTE
Previous sCr in 2020 2.3  Gap in records and presented with a creatinine of 5.4, eGFR 8    Suspect this is her baseline kidney function as she has never seen a nephrologist nor appears to has had any evaluation by any healthcare provider since 2021.     RP US: 8.2 cm right and 7.5 cm left. Poor imaging though appears echogenic    Plan/Recommendation  Sodium bicarb 1300 TID  -Keep MAP > 65  -Keep hemoglobin > 7  -Strict ins and outs  -Avoid nephrotoxic agents if possible and renally dose medications  -Avoid drastic hemodynamic changes if possible      #Anemia  HGB   Date Value Ref Range Status   05/02/2025 7.1 (L) 12.0 - 16.0 gm/dL Final     Iron Level   Date Value Ref Range Status   04/29/2025 32 30 - 160 ug/dL Final     Transferrin   Date Value Ref Range Status   04/29/2025 62 (L) 200 - 375 mg/dL Final     Iron Binding Capacity Total   Date Value Ref Range Status   04/29/2025 92 (L) 250 - 450 ug/dL Final     Saturated Iron   Date Value Ref Range Status   07/20/2020 8 (L) 20 - 50 % Final     Ferritin   Date Value Ref Range Status   04/29/2025 693.3 (H) 20.0 - 300.0 ng/mL Final   Iron stores adequate  JOYA once BP better controlled    #Secondary hyperparathyroidism  Calcium   Date Value Ref Range Status   05/02/2025 7.2 (L) 8.7 - 10.5 mg/dL Final     Phosphorus Level   Date Value Ref Range Status   05/02/2025 4.4 2.7 - 4.5 mg/dL Final     PTH Intact   Date Value Ref Range Status   05/01/2025 664.2 (H) 9.0 - 77.0 pg/mL Final   Continue to monitor PTH in outpatient setting.   Start sevelamer 800 TID with meals

## 2025-05-02 NOTE — ASSESSMENT & PLAN NOTE
5/2/25  - Chart and interval history reviewed; pt's renal function stable overnight   - During visit to bedside, pt was alert, in good spirits, and seemed comfortable. She confirmed that her daughter visited yesterday, and they had a discussion. She informed her daughter that she doesn't want to undergo HD if it reaches that point; let her know we will do our best to honor this preference, and let her know I would speak to her daughter.   - Following this, Dr Aguilar and I called and spoke with her daughter Merrill. Medical update provided, and discussed that while pt does not currently have any acute needs for HD, this may change in the future. Based on our discussion with pt, she does not desire to pursue this, and would prefer to transition to hospice care if it gets to that point.   - Merrill seemed upset to hear this, and said pt will change her mind once her kidneys fail; tried to explain to her the reason we are having this conversation now is that pt may become confused as her kidney function worsens. We also discussed the significant logistical challenges of transporting a completely bed bound pt to HD chair 3 times per week.   - She said she will further discuss above with pt; encouraged this, and let her know we will do our best to honor pt's preferences. As pt has not seen a health care provider since 2021, let her know she will need follow up with both PCP and nephrology   - At this time, plan remains to continue with current level of care (PCP and outpatient nephrology follow-up), though pt has expressed to us that she does not want to undergo HD if it ever came to this. Pt's daughter does not seem to be in agreement with this, so have encouraged them to have further discussion.   - Updated primary team after above conversation. While pt would likely benefit from home-based palliative follow-up, she has a high risk of readmission, so  palliative team will see her during any future admissions  "    5/1/25  - Consult for support and advance care planning in older pt with CKD currently in hospital for cellulitis/wound infection; chart reviewed in depth, and discussed pt with primary team. Pt has voiced to them she is unlikely to desire HD even if this is eventually needed.   - Visited with pt at bedside; she was alert and readily conversational. Introduced role of palliative medicine in pt's care, and learned more about her outside of hospital. She is  and has two daughters, though only her daughter Merrill (resides with pt) is involved in her care. Merrill is not currently working, and is available at all times to assist pt. 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 She spends all of her time in bed at home, and said this has been the case for several years.   - At this time during visit, pt's daughter Merrill called, so I spoke to her on speaker phone. She informed me phone number on file is incorrect, so I updated this in chart ([phone]). Basic medical update provided; she had a number of questions about pt's renal function and is upset that her doctors (sees [facility]) had her on "blood pressure medications that can hurt her kidneys." I explained that blood pressure control is an important aspect of health care, and that without good blood pressure control pt can have worsening renal function from this as well. Additionally, she would be high risk for strokes or other serious health events. As many of her concerns were renal specific, I let her know that I would have Dr Aguilar reach out to her by phone, to which she was agreeable.    - Following this, spoke to pt about her care preferences if her renal function worsens. Explained that if not wanting to pursue HD - or she is not a candidate for this - alternative path would be comfort-focused/hospice care. While patient indicated to me that she does not wish to " undergo HD at any point, she agreed to further discuss with her daughter, who will likely be visiting the hospital sometime today. Encouraged this, as she may become confused and unable to voice her preferences if she goes into renal failure.   - Let her know I will follow up with her throughout hospital stay  - Updated primary team via [employer], and Dr Aguilar in person

## 2025-05-02 NOTE — ASSESSMENT & PLAN NOTE
Patient's FSGs are controlled on current medication regimen.  Last A1c reviewed-   Lab Results   Component Value Date    HGBA1C 7.5 (H) 06/13/2020     Most recent fingerstick glucose reviewed-   Recent Labs   Lab 05/01/25  1109 05/01/25  1644 05/01/25  1943 05/02/25  0753   POCTGLUCOSE 120* 120* 130* 79   Hypoglycemia- infection contributing  Obtain HgbA1c

## 2025-05-02 NOTE — ASSESSMENT & PLAN NOTE
Again discussed with patient with regards to dialysis. Patient repeated that she does not want to live on dialysis and understands that she may eventually reach that place.     With Dr. Mayes present, called patient's daughter. We discussed that her mother has expressed that she does not want to live on dialysis and understands the consequences of that, if--and when her kidneys do fail. Questions answered and emotional support given.

## 2025-05-02 NOTE — ASSESSMENT & PLAN NOTE
Hx of LT upper thigh carbuncle progressively enlarging with ulceration  Obtain Blood cultures  Noncontrast CT abdomen and left upper thigh to evaluate for occult abscesses and deep extensions-no abscess   Procal elevated, lactate normal  Given immunosuppression (CKD and type 2 DM); start empiric renally dosed vancomycin and cefepime  Wound care ordered . Wound care team following  Continue antibiotics while inpatient  Hopefully home tomorrow with home health wound care

## 2025-05-02 NOTE — PROGRESS NOTES
Pharmacokinetic Assessment Follow Up: IV Vancomycin    Vancomycin serum concentration assessment(s):    The random level was drawn correctly and can be used to guide therapy at this time. The measurement is within the desired definitive target range of 10 to 20 mcg/mL.    Vancomycin Regimen Plan:    No dose 5/2   Obtain 5/3 at 0400    Drug levels (last 3 results):  Recent Labs   Lab Result Units 04/30/25  0430 05/01/25  0500 05/02/25  0418   Vancomycin Random ug/ml 16.8 20.5 18.3       Pharmacy will continue to follow and monitor vancomycin.    Please contact pharmacy at extension [phone] for questions regarding this assessment.    Thank you for the consult,   Wiley Mooney       Patient brief summary:  Tasneem Lynn is a 79 y.o. female initiated on antimicrobial therapy with IV Vancomycin for treatment of skin & soft tissue infection    Drug Allergies:   Review of patient's allergies indicates:   Allergen Reactions    Corticosteroids (glucocorticoids) Edema       Actual Body Weight:   84.7 kg    Renal Function:   Estimated Creatinine Clearance: 9.6 mL/min (A) (based on SCr of 4.8 mg/dL (H)).,     Dialysis Method (if applicable):  N/A    CBC (last 72 hours):  Recent Labs   Lab Result Units 04/29/25  1353 04/30/25  0430 05/01/25  0500 05/02/25  0418   WBC K/uL 29.11* 28.44* 22.96* 18.97*   HGB gm/dL 9.3* 8.5* 7.5* 7.1*   HCT % 30.0* 27.7* 23.9* 22.8*   Platelet Count K/uL 552* 537* 551* 526*   Lymph % % 6.6* 7.6* 13.5* 14.2*   Mono % % 4.8 5.1 5.6 6.3   Eos % % 0.2 0.2 1.0 1.4   Basophil % % 0.2 0.2 0.1 0.2       Metabolic Panel (last 72 hours):  Recent Labs   Lab Result Units 04/29/25  1353 04/30/25  0430 05/01/25  0500 05/02/25  0418   Sodium mmol/L 135* 136 137 137   Potassium mmol/L 4.1 3.7 3.3* 3.8   Chloride mmol/L 105 107 106 106   CO2 mmol/L 11* 12* 17* 19*   Glucose mg/dL 62* 50* 63* 76   BUN mg/dL 79* 75* 73* 73*   Creatinine mg/dL 5.4* 5.1* 4.8* 4.8*   Albumin g/dL 2.1* 1.8* 1.7* 1.6*   Bilirubin Total  mg/dL 0.3 0.3 0.2 0.2   ALP unit/L 106 98 96 82   AST unit/L 9* 12 12 11   ALT unit/L <5* <5* <5* <5*   Magnesium  mg/dL  --  1.3* 1.3* 1.9   Phosphorus Level mg/dL  --  5.9* 4.7* 4.4       Vancomycin Administrations:  vancomycin given in the last 96 hours                     vancomycin 750 mg in 0.9% NaCl 250 mL IVPB (admixture device) (mg) 750 mg New Bag 04/30/25 0858    vancomycin 1,250 mg in 0.9% NaCl 250 mL IVPB (admixture device) (mg) 1,250 mg New Bag 04/29/25 2047                    Microbiologic Results:  Microbiology Results (last 7 days)       Procedure Component Value Units Date/Time    Blood culture #1 [[id number]]  (Normal) Collected: 04/29/25 1528    Order Status: Completed Specimen: Blood Updated: 05/01/25 1701     Blood Culture No Growth After 48 Hours    Blood culture #2 [[id number]]  (Normal) Collected: 04/29/25 1528    Order Status: Completed Specimen: Blood Updated: 05/01/25 1701     Blood Culture No Growth After 48 Hours

## 2025-05-02 NOTE — ASSESSMENT & PLAN NOTE
Anemia is likely due to chronic disease due to Chronic Kidney Disease. Most recent hemoglobin and hematocrit are listed below.  Recent Labs     04/30/25  0430 05/01/25  0500 05/02/25  0418   HGB 8.5* 7.5* 7.1*   HCT 27.7* 23.9* 22.8*     Plan  - Monitor serial CBC: Daily  - Transfuse PRBC if patient becomes hemodynamically unstable, symptomatic or H/H drops below 7/21.  - Patient has not received any PRBC transfusions to date  - Patient's anemia is currently stable  - Obtain iron studies and ferritin- chronic disease and malnutrition

## 2025-05-02 NOTE — SUBJECTIVE & OBJECTIVE
Interval History: comfortable this morning     Medications:  Continuous Infusions:  Scheduled Meds:   aspirin  81 mg Oral Daily    atorvastatin  40 mg Oral QHS    ceFEPime IV (PEDS and ADULTS)  1 g Intravenous Q24H    glucose  24 g Oral Once    heparin (porcine)  5,000 Units Subcutaneous Q8H    levothyroxine  75 mcg Oral Before breakfast    sevelamer carbonate  800 mg Oral TID WM    sodium bicarbonate  1,300 mg Oral TID     PRN Meds:  Current Facility-Administered Medications:     0.9%  NaCl infusion (for blood administration), , Intravenous, Q24H PRN    acetaminophen, 650 mg, Oral, Q4H PRN    dextrose 50%, 12.5 g, Intravenous, PRN    dextrose 50%, 25 g, Intravenous, PRN    glucagon (human recombinant), 1 mg, Intramuscular, PRN    glucose, 16 g, Oral, PRN    hydrALAZINE, 10 mg, Intravenous, Q4H PRN    melatonin, 6 mg, Oral, Nightly PRN    ondansetron, 4 mg, Intravenous, Q8H PRN    sodium chloride 0.9%, 10 mL, Intravenous, Q12H PRN    Pharmacy to dose Vancomycin consult, , , Once **AND** vancomycin - pharmacy to dose, , Intravenous, pharmacy to manage frequency    Objective:     Vital Signs (Most Recent):  Temp: 98.5 °F (36.9 °C) (05/02/25 1103)  Pulse: 83 (05/02/25 1103)  Resp: (!) 48 (05/02/25 1103)  BP: 133/61 (05/02/25 1103)  SpO2: 98 % (05/02/25 1103) Vital Signs (24h Range):  Temp:  [98.3 °F (36.8 °C)-98.6 °F (37 °C)] 98.5 °F (36.9 °C)  Pulse:  [79-88] 83  Resp:  [17-48] 48  SpO2:  [97 %-99 %] 98 %  BP: (115-149)/(55-68) 133/61     Weight: 84.7 kg (186 lb 12.8 oz)  Body mass index is 34.17 kg/m².       Physical Exam  Constitutional:       Comments: Sitting up in bed, eating breakfast, in good spirits, breathing comfortably        Significant Labs: All pertinent labs within the past 24 hours have been reviewed.  CBC:   Recent Labs   Lab 05/02/25  9261   WBC 18.97*   HGB 7.1*   HCT 22.8*   MCV 96   *     BMP:  Recent Labs   Lab 05/02/25  0418   GLU 76      K 3.8      CO2 19*   BUN 73*    CREATININE 4.8*   CALCIUM 7.2*   MG 1.9     LFT:  Lab Results   Component Value Date    AST 11 05/02/2025    ALKPHOS 82 05/02/2025    BILITOT 0.2 05/02/2025     Albumin:   Albumin   Date Value Ref Range Status   05/02/2025 1.6 (L) 3.5 - 5.2 g/dL Final   07/24/2020 2.5 (L) 3.5 - 5.2 g/dL Final     Protein:   Protein Total   Date Value Ref Range Status   05/02/2025 5.9 (L) 6.0 - 8.4 gm/dL Final     Total Protein   Date Value Ref Range Status   07/24/2020 8.1 6.0 - 8.4 g/dL Final     Lactic acid:   Lab Results   Component Value Date    LACTATE 0.9 04/29/2025    LACTATE 0.9 07/20/2020       Significant Imaging: I have reviewed all pertinent imaging results/findings within the past 24 hours.

## 2025-05-02 NOTE — PROGRESS NOTES
Pharmacokinetic Assessment Follow Up: IV Vancomycin    Vancomycin serum concentration assessment(s):    The random level was drawn correctly and can be used to guide therapy at this time. The measurement is within the desired definitive target range of 10 to 20 mcg/mL.    Vancomycin Regimen Plan:    No dose 5/2   Obtain 5/3 at 0400    Drug levels (last 3 results):  Recent Labs   Lab Result Units 04/30/25  0430 05/01/25  0500 05/02/25  0418   Vancomycin Random ug/ml 16.8 20.5 18.3       Pharmacy will continue to follow and monitor vancomycin.    Please contact pharmacy at extension [phone] for questions regarding this assessment.    Thank you for the consult,   Wiley Mooney       Patient brief summary:  Tasneem yLnn is a 79 y.o. female initiated on antimicrobial therapy with IV Vancomycin for treatment of skin & soft tissue infection    Drug Allergies:   Review of patient's allergies indicates:   Allergen Reactions    Corticosteroids (glucocorticoids) Edema       Actual Body Weight:   84.7 kg    Renal Function:   Estimated Creatinine Clearance: 9.6 mL/min (A) (based on SCr of 4.8 mg/dL (H)).,     Dialysis Method (if applicable):  N/A    CBC (last 72 hours):  Recent Labs   Lab Result Units 04/29/25  1353 04/30/25  0430 05/01/25  0500 05/02/25  0418   WBC K/uL 29.11* 28.44* 22.96* 18.97*   HGB gm/dL 9.3* 8.5* 7.5* 7.1*   HCT % 30.0* 27.7* 23.9* 22.8*   Platelet Count K/uL 552* 537* 551* 526*   Lymph % % 6.6* 7.6* 13.5* 14.2*   Mono % % 4.8 5.1 5.6 6.3   Eos % % 0.2 0.2 1.0 1.4   Basophil % % 0.2 0.2 0.1 0.2       Metabolic Panel (last 72 hours):  Recent Labs   Lab Result Units 04/29/25  1353 04/30/25  0430 05/01/25  0500 05/02/25  0418   Sodium mmol/L 135* 136 137 137   Potassium mmol/L 4.1 3.7 3.3* 3.8   Chloride mmol/L 105 107 106 106   CO2 mmol/L 11* 12* 17* 19*   Glucose mg/dL 62* 50* 63* 76   BUN mg/dL 79* 75* 73* 73*   Creatinine mg/dL 5.4* 5.1* 4.8* 4.8*   Albumin g/dL 2.1* 1.8* 1.7* 1.6*   Bilirubin Total  mg/dL 0.3 0.3 0.2 0.2   ALP unit/L 106 98 96 82   AST unit/L 9* 12 12 11   ALT unit/L <5* <5* <5* <5*   Magnesium  mg/dL  --  1.3* 1.3* 1.9   Phosphorus Level mg/dL  --  5.9* 4.7* 4.4       Vancomycin Administrations:  vancomycin given in the last 96 hours                     vancomycin 750 mg in 0.9% NaCl 250 mL IVPB (admixture device) (mg) 750 mg New Bag 04/30/25 0858    vancomycin 1,250 mg in 0.9% NaCl 250 mL IVPB (admixture device) (mg) 1,250 mg New Bag 04/29/25 2047                    Microbiologic Results:  Microbiology Results (last 7 days)       Procedure Component Value Units Date/Time    Blood culture #1 [[id number]]  (Normal) Collected: 04/29/25 1528    Order Status: Completed Specimen: Blood Updated: 05/01/25 1701     Blood Culture No Growth After 48 Hours    Blood culture #2 [[id number]]  (Normal) Collected: 04/29/25 1528    Order Status: Completed Specimen: Blood Updated: 05/01/25 1701     Blood Culture No Growth After 48 Hours

## 2025-05-02 NOTE — ASSESSMENT & PLAN NOTE
Anemia is likely due to chronic disease due to Chronic Kidney Disease. Most recent hemoglobin and hematocrit are listed below.  Recent Labs     04/30/25  0430 05/01/25  0500 05/02/25  0418   HGB 8.5* 7.5* 7.1*   HCT 27.7* 23.9* 22.8*     Plan  - Monitor serial CBC: Daily  - Transfuse PRBC if patient becomes hemodynamically unstable, symptomatic or H/H drops below 7/21.  - Iron studies iron 32 TSAT 35.   - no overt bleeding  - 1 unit of blood ordered

## 2025-05-02 NOTE — PLAN OF CARE
Problem: Adult Inpatient Plan of Care  Goal: Plan of Care Review  Outcome: Progressing  Goal: Patient-Specific Goal (Individualized)  Outcome: Progressing     Problem: Infection  Goal: Absence of Infection Signs and Symptoms  Outcome: Progressing     Problem: Sepsis/Septic Shock  Goal: Optimal Coping  Outcome: Progressing

## 2025-05-02 NOTE — ASSESSMENT & PLAN NOTE
"This patient does have evidence of infective focus  My overall impression is sepsis.  Source: Skin and Soft Tissue (location LT inguinal cellulitis)  Antibiotics given-   Antibiotics (72h ago, onward)      Start     Stop Route Frequency Ordered    04/30/25 2000  ceFEPIme injection 1 g         -- IV Every 24 hours (non-standard times) 04/30/25 1823    04/29/25 1909  vancomycin - pharmacy to dose  (vancomycin IVPB (PEDS and ADULTS))        Placed in "And" Linked Group    -- IV pharmacy to manage frequency 04/29/25 1815          Latest lactate reviewed-  Recent Labs   Lab 04/29/25  1528   LACTATE 0.9   WBC improving    "

## 2025-05-03 PROBLEM — G93.40 ACUTE ENCEPHALOPATHY: Status: ACTIVE | Noted: 2025-05-03

## 2025-05-03 LAB
ABSOLUTE EOSINOPHIL (OHS): 0.27 K/UL
ABSOLUTE MONOCYTE (OHS): 1.19 K/UL (ref 0.3–1)
ABSOLUTE NEUTROPHIL COUNT (OHS): 13.2 K/UL (ref 1.8–7.7)
ALBUMIN SERPL BCP-MCNC: 1.8 G/DL (ref 3.5–5.2)
ALLENS TEST: ABNORMAL
ALP SERPL-CCNC: 85 UNIT/L (ref 40–150)
ALT SERPL W/O P-5'-P-CCNC: <5 UNIT/L (ref 10–44)
ANION GAP (OHS): 12 MMOL/L (ref 8–16)
AST SERPL-CCNC: 9 UNIT/L (ref 11–45)
BASOPHILS # BLD AUTO: 0.03 K/UL
BASOPHILS NFR BLD AUTO: 0.2 %
BILIRUB SERPL-MCNC: 0.3 MG/DL (ref 0.1–1)
BUN SERPL-MCNC: 70 MG/DL (ref 8–23)
CALCIUM SERPL-MCNC: 7.4 MG/DL (ref 8.7–10.5)
CHLORIDE SERPL-SCNC: 104 MMOL/L (ref 95–110)
CO2 SERPL-SCNC: 20 MMOL/L (ref 23–29)
CREAT SERPL-MCNC: 4.6 MG/DL (ref 0.5–1.4)
ERYTHROCYTE [DISTWIDTH] IN BLOOD BY AUTOMATED COUNT: 17 % (ref 11.5–14.5)
GFR SERPLBLD CREATININE-BSD FMLA CKD-EPI: 9 ML/MIN/1.73/M2
GLUCOSE SERPL-MCNC: 88 MG/DL (ref 70–110)
HCO3 UR-SCNC: 22.7 MMOL/L (ref 24–28)
HCT VFR BLD AUTO: 25.3 % (ref 37–48.5)
HGB BLD-MCNC: 8.3 GM/DL (ref 12–16)
HOLD SPECIMEN: NORMAL
IMM GRANULOCYTES # BLD AUTO: 0.26 K/UL (ref 0–0.04)
IMM GRANULOCYTES NFR BLD AUTO: 1.4 % (ref 0–0.5)
LYMPHOCYTES # BLD AUTO: 3.06 K/UL (ref 1–4.8)
MAGNESIUM SERPL-MCNC: 1.9 MG/DL (ref 1.6–2.6)
MCH RBC QN AUTO: 30.6 PG (ref 27–31)
MCHC RBC AUTO-ENTMCNC: 32.8 G/DL (ref 32–36)
MCV RBC AUTO: 93 FL (ref 82–98)
NUCLEATED RBC (/100WBC) (OHS): 0 /100 WBC
PCO2 BLDA: 39.8 MMHG (ref 35–45)
PH SMN: 7.37 [PH] (ref 7.35–7.45)
PHOSPHATE SERPL-MCNC: 4.1 MG/DL (ref 2.7–4.5)
PLATELET # BLD AUTO: 474 K/UL (ref 150–450)
PMV BLD AUTO: 8.5 FL (ref 9.2–12.9)
PO2 BLDA: 18 MMHG (ref 40–60)
POC BE: -2 MMOL/L (ref -2–2)
POC SATURATED O2: 25 % (ref 95–100)
POC TCO2: 24 MMOL/L (ref 24–29)
POCT GLUCOSE: 101 MG/DL (ref 70–110)
POCT GLUCOSE: 107 MG/DL (ref 70–110)
POCT GLUCOSE: 121 MG/DL (ref 70–110)
POCT GLUCOSE: 85 MG/DL (ref 70–110)
POCT GLUCOSE: 97 MG/DL (ref 70–110)
POTASSIUM SERPL-SCNC: 3.8 MMOL/L (ref 3.5–5.1)
PROT SERPL-MCNC: 6.3 GM/DL (ref 6–8.4)
RBC # BLD AUTO: 2.71 M/UL (ref 4–5.4)
RELATIVE EOSINOPHIL (OHS): 1.5 %
RELATIVE LYMPHOCYTE (OHS): 17 % (ref 18–48)
RELATIVE MONOCYTE (OHS): 6.6 % (ref 4–15)
RELATIVE NEUTROPHIL (OHS): 73.3 % (ref 38–73)
SAMPLE: ABNORMAL
SITE: ABNORMAL
SODIUM SERPL-SCNC: 136 MMOL/L (ref 136–145)
VANCOMYCIN SERPL-MCNC: 17.6 UG/ML (ref ?–80)
WBC # BLD AUTO: 18.01 K/UL (ref 3.9–12.7)

## 2025-05-03 PROCEDURE — 85025 COMPLETE CBC W/AUTO DIFF WBC: CPT | Mod: HCNC

## 2025-05-03 PROCEDURE — 99232 SBSQ HOSP IP/OBS MODERATE 35: CPT | Mod: HCNC,,, | Performed by: STUDENT IN AN ORGANIZED HEALTH CARE EDUCATION/TRAINING PROGRAM

## 2025-05-03 PROCEDURE — 36415 COLL VENOUS BLD VENIPUNCTURE: CPT | Mod: HCNC

## 2025-05-03 PROCEDURE — 25000003 PHARM REV CODE 250: Mod: HCNC | Performed by: STUDENT IN AN ORGANIZED HEALTH CARE EDUCATION/TRAINING PROGRAM

## 2025-05-03 PROCEDURE — 99900035 HC TECH TIME PER 15 MIN (STAT): Mod: HCNC

## 2025-05-03 PROCEDURE — 82800 BLOOD PH: CPT | Mod: HCNC

## 2025-05-03 PROCEDURE — G0426 INPT/ED TELECONSULT50: HCPCS | Mod: 95,HCNC,, | Performed by: PSYCHIATRY & NEUROLOGY

## 2025-05-03 PROCEDURE — 63600175 PHARM REV CODE 636 W HCPCS: Mod: HCNC | Performed by: NURSE PRACTITIONER

## 2025-05-03 PROCEDURE — 84100 ASSAY OF PHOSPHORUS: CPT | Mod: HCNC

## 2025-05-03 PROCEDURE — 80053 COMPREHEN METABOLIC PANEL: CPT | Mod: HCNC

## 2025-05-03 PROCEDURE — 63600175 PHARM REV CODE 636 W HCPCS: Mod: HCNC

## 2025-05-03 PROCEDURE — 83735 ASSAY OF MAGNESIUM: CPT | Mod: HCNC

## 2025-05-03 PROCEDURE — 82803 BLOOD GASES ANY COMBINATION: CPT | Mod: HCNC

## 2025-05-03 PROCEDURE — 36415 COLL VENOUS BLD VENIPUNCTURE: CPT | Mod: HCNC | Performed by: NURSE PRACTITIONER

## 2025-05-03 PROCEDURE — 25000003 PHARM REV CODE 250: Mod: HCNC

## 2025-05-03 PROCEDURE — 80202 ASSAY OF VANCOMYCIN: CPT | Mod: HCNC | Performed by: HOSPITALIST

## 2025-05-03 PROCEDURE — 21400001 HC TELEMETRY ROOM: Mod: HCNC

## 2025-05-03 RX ORDER — AMLODIPINE BESYLATE 2.5 MG/1
2.5 TABLET ORAL DAILY
Status: DISCONTINUED | OUTPATIENT
Start: 2025-05-04 | End: 2025-05-06 | Stop reason: HOSPADM

## 2025-05-03 RX ORDER — CEFTRIAXONE 1 G/1
1 INJECTION, POWDER, FOR SOLUTION INTRAMUSCULAR; INTRAVENOUS
Status: DISCONTINUED | OUTPATIENT
Start: 2025-05-03 | End: 2025-05-05

## 2025-05-03 RX ORDER — MORPHINE SULFATE 4 MG/ML
1 INJECTION, SOLUTION INTRAMUSCULAR; INTRAVENOUS ONCE
Status: DISCONTINUED | OUTPATIENT
Start: 2025-05-03 | End: 2025-05-05

## 2025-05-03 RX ORDER — ACETAMINOPHEN 650 MG/1
650 SUPPOSITORY RECTAL EVERY 6 HOURS PRN
Status: DISCONTINUED | OUTPATIENT
Start: 2025-05-04 | End: 2025-05-06 | Stop reason: HOSPADM

## 2025-05-03 RX ADMIN — SEVELAMER CARBONATE 800 MG: 800 TABLET, FILM COATED ORAL at 08:05

## 2025-05-03 RX ADMIN — ACETAMINOPHEN 650 MG: 650 SUPPOSITORY RECTAL at 11:05

## 2025-05-03 RX ADMIN — HEPARIN SODIUM 5000 UNITS: 5000 INJECTION INTRAVENOUS; SUBCUTANEOUS at 02:05

## 2025-05-03 RX ADMIN — ASPIRIN 81 MG CHEWABLE TABLET 81 MG: 81 TABLET CHEWABLE at 08:05

## 2025-05-03 RX ADMIN — LEVOTHYROXINE SODIUM 75 MCG: 75 TABLET ORAL at 05:05

## 2025-05-03 RX ADMIN — HEPARIN SODIUM 5000 UNITS: 5000 INJECTION INTRAVENOUS; SUBCUTANEOUS at 05:05

## 2025-05-03 RX ADMIN — SODIUM BICARBONATE 1300 MG: 325 TABLET ORAL at 08:05

## 2025-05-03 RX ADMIN — HEPARIN SODIUM 5000 UNITS: 5000 INJECTION INTRAVENOUS; SUBCUTANEOUS at 09:05

## 2025-05-03 RX ADMIN — CEFTRIAXONE 1 G: 1 INJECTION, POWDER, FOR SOLUTION INTRAMUSCULAR; INTRAVENOUS at 02:05

## 2025-05-03 RX ADMIN — HYDRALAZINE HYDROCHLORIDE 10 MG: 20 INJECTION INTRAMUSCULAR; INTRAVENOUS at 09:05

## 2025-05-03 NOTE — SUBJECTIVE & OBJECTIVE
HPI:  79 y.o. female with confusion.  She was admitted 4/29/25 with sepsis due to thigh wound.      This AM, nurse noted her asleep.  Pharmacist noted she was unsteady holding med cup and then noted to be unresponsive. LKW last night some time but nursing is not sure.    She takes ASA, atorva 40/d and hep sc 5000 q8      Images personally reviewed and interpreted:  Study: Head CT  Study Interpretation: remote L thalamic lacune; WM changes  Glu 85     CMP  Sodium   Date Value Ref Range Status   05/03/2025 136 136 - 145 mmol/L Final   07/24/2020 142 136 - 145 mmol/L Final     Potassium   Date Value Ref Range Status   05/03/2025 3.8 3.5 - 5.1 mmol/L Final   07/24/2020 4.8 3.5 - 5.1 mmol/L Final     Chloride   Date Value Ref Range Status   05/03/2025 104 95 - 110 mmol/L Final   07/24/2020 108 95 - 110 mmol/L Final     CO2   Date Value Ref Range Status   05/03/2025 20 (L) 23 - 29 mmol/L Final   07/24/2020 29 23 - 29 mmol/L Final     Glucose   Date Value Ref Range Status   05/03/2025 88 70 - 110 mg/dL Final   07/24/2020 111 (H) 70 - 110 mg/dL Final     BUN   Date Value Ref Range Status   05/03/2025 70 (H) 8 - 23 mg/dL Final     Creatinine   Date Value Ref Range Status   05/03/2025 4.6 (H) 0.5 - 1.4 mg/dL Final     Calcium   Date Value Ref Range Status   05/03/2025 7.4 (L) 8.7 - 10.5 mg/dL Final   07/24/2020 8.8 8.7 - 10.5 mg/dL Final     Protein Total   Date Value Ref Range Status   05/03/2025 6.3 6.0 - 8.4 gm/dL Final     Total Protein   Date Value Ref Range Status   07/24/2020 8.1 6.0 - 8.4 g/dL Final     Albumin   Date Value Ref Range Status   05/03/2025 1.8 (L) 3.5 - 5.2 g/dL Final   07/24/2020 2.5 (L) 3.5 - 5.2 g/dL Final     Total Bilirubin   Date Value Ref Range Status   07/24/2020 0.3 0.1 - 1.0 mg/dL Final     Comment:     For infants and newborns, interpretation of results should be based  on gestational age, weight and in agreement with clinical  observations.  Premature Infant recommended reference ranges:  Up  to 24 hours.............<8.0 mg/dL  Up to 48 hours............<12.0 mg/dL  3-5 days..................<15.0 mg/dL  6-29 days.................<15.0 mg/dL       Bilirubin Total   Date Value Ref Range Status   05/03/2025 0.3 0.1 - 1.0 mg/dL Final     Comment:     For infants and newborns, interpretation of results should be based   on gestational age, weight and in agreement with clinical   observations.    Premature Infant recommended reference ranges:   0-24 hours:  <8.0 mg/dL   24-48 hours: <12.0 mg/dL   3-5 days:    <15.0 mg/dL   6-29 days:   <15.0 mg/dL     Alkaline Phosphatase   Date Value Ref Range Status   07/24/2020 85 55 - 135 U/L Final     ALP   Date Value Ref Range Status   05/03/2025 85 40 - 150 unit/L Final     AST   Date Value Ref Range Status   05/03/2025 9 (L) 11 - 45 unit/L Final   07/24/2020 12 10 - 40 U/L Final     ALT   Date Value Ref Range Status   05/03/2025 <5 (L) 10 - 44 unit/L Final   07/24/2020 13 10 - 44 U/L Final     Anion Gap   Date Value Ref Range Status   05/03/2025 12 8 - 16 mmol/L Final     eGFR   Date Value Ref Range Status   05/03/2025 9 (L) >60 mL/min/1.73/m2 Final     Comment:     Estimated GFR calculated using the CKD-EPI creatinine (2021) equation.     Lab Results   Component Value Date    WBC 18.01 (H) 05/03/2025    HGB 8.3 (L) 05/03/2025    HCT 25.3 (L) 05/03/2025    MCV 93 05/03/2025     (H) 05/03/2025         Assessment and plan:  Acute encephalopathy:  DDx stroke (possibly small, multifocal), unwitnessed seizure, toxic/metabolic encephalopathy (e.g., uremia).  MRI brain (wake up protocol) with MRA brain/neck will be helpful for e/o stroke and, if so, candidacy for TNK.  Pls call me when performed and I will review. EEG and [facility] consult if MRI negative for acute ischemia. Of note, endcocarditis in DDx given infection.    Lytics recommendation: Thrombolytic therapy not recommended due to rec MRI wake up protocol    Thrombectomy recommendation: awaiting  MRI/MRA  Placement recommendation: remains as inpatient at [facility]

## 2025-05-03 NOTE — SUBJECTIVE & OBJECTIVE
Interval History: Upon entering room this am, patient found to be aphasic and not following simple commands. Pharmacy at bedside states drop meds right hand 5 mins prior to my arrival. Stroke code called.     Review of Systems   Unable to perform ROS: Acuity of condition     Objective:     Vital Signs (Most Recent):  Temp: 98.5 °F (36.9 °C) (05/03/25 1135)  Pulse: 101 (05/03/25 1247)  Resp: 18 (05/03/25 1135)  BP: 139/61 (05/03/25 1135)  SpO2: 100 % (05/03/25 1135) Vital Signs (24h Range):  Temp:  [98 °F (36.7 °C)-98.6 °F (37 °C)] 98.5 °F (36.9 °C)  Pulse:  [] 101  Resp:  [18-20] 18  SpO2:  [95 %-100 %] 100 %  BP: (108-159)/(61-94) 139/61     Weight: 84.7 kg (186 lb 12.8 oz)  Body mass index is 34.17 kg/m².    Intake/Output Summary (Last 24 hours) at 5/3/2025 1353  Last data filed at 5/3/2025 0610  Gross per 24 hour   Intake 349.17 ml   Output 350 ml   Net -0.83 ml         Physical Exam  Constitutional:       General: She is not in acute distress.     Appearance: She is well-developed. She is obese. She is not ill-appearing, toxic-appearing or diaphoretic.   HENT:      Head: Normocephalic and atraumatic.      Nose: Nose normal. No congestion.   Eyes:      Pupils: Pupils are equal, round, and reactive to light.   Cardiovascular:      Rate and Rhythm: Normal rate and regular rhythm.      Pulses: Normal pulses.      Heart sounds: Normal heart sounds.   Pulmonary:      Effort: Pulmonary effort is normal. No respiratory distress.      Breath sounds: Normal breath sounds. No stridor.      Comments: Diminished . Difficult to assess.   Abdominal:      General: Bowel sounds are normal. There is no distension.      Palpations: Abdomen is soft. There is no mass.      Tenderness: There is no abdominal tenderness.   Musculoskeletal:         General: Tenderness (left upper thigh) and deformity present. Normal range of motion.      Cervical back: Normal range of motion and neck supple.      Right lower leg: Edema (does not  move right leg) present.      Left lower leg: Edema present.   Skin:     General: Skin is warm and dry.      Capillary Refill: Capillary refill takes less than 2 seconds.      Comments: LT Inguinal desquamated ulcer 8cm x 5cm with exudative base,and sloughing edges   Neurological:      General: No focal deficit present.      Mental Status: She is alert and oriented to person, place, and time.      Comments: Bedbound x 5 years               Significant Labs: All pertinent labs within the past 24 hours have been reviewed.    Significant Imaging: I have reviewed all pertinent imaging results/findings within the past 24 hours.

## 2025-05-03 NOTE — NURSING
Upon me leaving out the room one of another pt, one of the techs on the floor asked about pt's last blood glucose due to pt not answering questions for NP.  I met NP at bedside,  Pt's blood glucose was rechecked (85). code stroke called  pt was transported to CT after vital signs were taken then to ICU for neuro consult, after consult was completed pt was cleared to come back to the room due to her being more alert then when code stroke was called initially.    .

## 2025-05-03 NOTE — ASSESSMENT & PLAN NOTE
Patient's FSGs are controlled on current medication regimen.  Last A1c reviewed-   Lab Results   Component Value Date    HGBA1C 7.5 (H) 06/13/2020     Most recent fingerstick glucose reviewed-   Recent Labs   Lab 05/02/25  1941 05/03/25  0751 05/03/25  0901 05/03/25  1133   POCTGLUCOSE 154* 97 85 101   Hypoglycemia- infection contributing  Obtain HgbA1c

## 2025-05-03 NOTE — SUBJECTIVE & OBJECTIVE
Interval History: Code stroke this AM pending MRI brain    Review of patient's allergies indicates:   Allergen Reactions    Corticosteroids (glucocorticoids) Edema     Current Facility-Administered Medications   Medication Frequency    0.9%  NaCl infusion (for blood administration) Q24H PRN    acetaminophen tablet 650 mg Q4H PRN    aspirin chewable tablet 81 mg Daily    atorvastatin tablet 40 mg QHS    ceFEPIme injection 1 g Q24H    dextrose 50% injection 12.5 g PRN    dextrose 50% injection 25 g PRN    glucagon (human recombinant) injection 1 mg PRN    glucose chewable tablet 16 g PRN    glucose chewable tablet 24 g Once    heparin (porcine) injection 5,000 Units Q8H    hydrALAZINE injection 10 mg Q4H PRN    levothyroxine tablet 75 mcg Before breakfast    melatonin tablet 6 mg Nightly PRN    ondansetron injection 4 mg Q8H PRN    sevelamer carbonate tablet 800 mg TID WM    sodium bicarbonate tablet 1,300 mg TID    sodium chloride 0.9% flush 10 mL Q12H PRN    vancomycin - pharmacy to dose pharmacy to manage frequency       Objective:     Vital Signs (Most Recent):  Temp: 98.2 °F (36.8 °C) (05/03/25 0752)  Pulse: 90 (05/03/25 0752)  Resp: 19 (05/03/25 0752)  BP: (!) 148/94 (05/03/25 0752)  SpO2: 100 % (05/03/25 0752) Vital Signs (24h Range):  Temp:  [98 °F (36.7 °C)-98.6 °F (37 °C)] 98.2 °F (36.8 °C)  Pulse:  [83-91] 90  Resp:  [18-48] 19  SpO2:  [95 %-100 %] 100 %  BP: (108-159)/(61-94) 148/94     Weight: 84.7 kg (186 lb 12.8 oz) (04/29/25 1946)  Body mass index is 34.17 kg/m².  Body surface area is 1.92 meters squared.    I/O last 3 completed shifts:  In: 349.2 [Blood:349.2]  Out: 950 [Urine:950]     Physical Exam  Constitutional:       Appearance: She is ill-appearing.   Neurological:      Mental Status: She is alert.          Significant Labs:  All labs within the past 24 hours have been reviewed.     Significant Imaging:  Labs: Reviewed

## 2025-05-03 NOTE — ASSESSMENT & PLAN NOTE
Anemia is likely due to chronic disease due to Chronic Kidney Disease. Most recent hemoglobin and hematocrit are listed below.  Recent Labs     05/01/25  0500 05/02/25  0418 05/03/25  0510   HGB 7.5* 7.1* 8.3*   HCT 23.9* 22.8* 25.3*     Plan  - Monitor serial CBC: Daily  - Transfuse PRBC if patient becomes hemodynamically unstable, symptomatic or H/H drops below 7/21.  - Patient has not received any PRBC transfusions to date  - Patient's anemia is currently stable  - Obtain iron studies and ferritin- chronic disease and malnutrition

## 2025-05-03 NOTE — ASSESSMENT & PLAN NOTE
See previous documentation, palliative care    Patient has stated multiple times with different providers that she does not want to be on diaslysis under any circumstance and understands that she will die if she needs dialysis and does not start.

## 2025-05-03 NOTE — ASSESSMENT & PLAN NOTE
"On 5/3 am, noted change in mental status, left facial droop, aphasic and not following simple commands.   -DDx UA evidence of infection, cellulitis and uremia contributing?, concern for stroke given history    -Stroke code called. CT head no acute bleed and indeterminate small lacunar infarct left thalamus. MRI brain, MRA head neck without contrast. Continue ASA, statin.  -Switch Cefepime to Rocephin as cefepime can contribute to encephalopathy.  - Follow up urine culture  - Follow  MRI/MRA.   - Blood cultures negative, 2 D echo   - Continue statin, ASA    Addendum 1500  MRI brain, MRA head neck wo contract unremarkable  Currently, bedside nurse feeding patient without issue, patient states "hi" but not able to state name  Continue plan above- treat UTI, left thigh cellulits/wound  Consult General Neurology, EEG ordered  Discussed again all the above to Daughter Merrill at bedside. Nurses present. Daughter requesting to see a different Nephrologist and to be transferred to [facility]. Await call back from Transfer Center.   "

## 2025-05-03 NOTE — NURSING
[facility] [place]  Nurses Note -- 4 Eyes      5/3/2025       Skin assessed on: Q Shift      [] No Pressure Injuries Present    []Prevention Measures Documented    [] Yes LDA  for Pressure Injury Previously documented     [] Yes New Pressure Injury Discovered   [] LDA for New Pressure Injury Added      Attending RN:  Lashaun Goddard LPN     Second RN:  ENZO Lemus

## 2025-05-03 NOTE — NURSING
Pt transported to MRI via bed by myself, house sup and charge nurse. Accompanied by her daughter, resource nurse monitoring pt during MRI. Will continue to monitor

## 2025-05-03 NOTE — NURSING
Time Stroke Code initiated: 0904  Stroke Team Arrival time: 0904  Team Members:Alice Moore RN. Lashaun AUSTIN,   Stroke Code activation triggers: not responding to simple commands    Glucose Time:              Glucose Result: 85    Arrived at CT: 0914  CT completed: 0920    Vascular Neurologist you spoke with:     NADYA positive or negative: negative  NIH Stroke Scale Total Score:    tPA decision:   tPA bolus (mg and time):  tPA infusion (mg and time):  tPA end time:    IR decision:  IR arrival:  IR end time:     Pt transferred to: 262  Report given to: 0904

## 2025-05-03 NOTE — ASSESSMENT & PLAN NOTE
EMMA is likely due to acute tubular necrosis caused by sepsis. Baseline creatinine is 2.3 to 2.5. Most recent creatinine and eGFR are listed below.  Recent Labs     05/01/25  0500 05/02/25  0418 05/03/25  0510   CREATININE 4.8* 4.8* 4.6*   EGFRNORACEVR 9* 9* 9*     -Last sCr 2020  - Urine sodium, urine creatinine pending collection-  plus- will discuss with nurse  - Retroperitoneal ultrasound- CKD, no hydronephrosis  - Nephrotic Range proteinuria  - follow up urine culture- on rocephin  - Nephrology following  - UTI, cellulitis and uremia? Contributing to mental status- MRI/MRA pending

## 2025-05-03 NOTE — ASSESSMENT & PLAN NOTE
Hx of LT upper thigh carbuncle progressively enlarging with ulceration  Blood culture no growth   Noncontrast CT abdomen and left upper thigh to evaluate for occult abscesses and deep extensions-no abscess   Procal elevated, lactate normal  Given immunosuppression (CKD and type 2 DM); start empiric renally dosed vancomycin and cefepime  Wound care ordered . Wound care team following  Cellulitis and leukocytosis improving. Continue same wound care  Continue vancomycin while inpatient, Stop cefepime given encephalopathy

## 2025-05-03 NOTE — PROVIDER TRANSFER
Outside Transfer Acceptance Note / Regional Referral Center    Referring facility: [facility]  Referring provider: NATALY VITALE  Accepting facility: [facility]  Accepting provider: ELIZABETH VALDEZ  Admitting provider: ELIZABETH VALDEZ  Reason for transfer: PATIENT REQUEST FOR SECOND OPINION FROM NEPHROLOGY AT [place]  Transfer diagnosis: EMMA ON CKD 4  Transfer specialty requested: Nephrology  Transfer specialty notified: Yes  Transfer level: NUMBER 1-5: 3  Bed type requested: TELE  Isolation status: No active isolations   Admission class or status: IP- Inpatient      Narrative     Ms. Lynn is a 78yo lady with a past medical history of gout, CHF, CAD, DM2, HLD, HTN, obesity, CVA, CKD 4 (last Cr 2.3 on 7/24/20), and hypothyroidism.    Ms. Lynn presented to the ED at  on 4/29 due to a left upper groin boil.  On presentation to the ED, patient was hypertensive to 183/74, , oxygen saturation 100% on room air.Laboratory investigations were pertinent for WBC 29.1, HGB 9.3, , sodium 135, BUN 79, creatinine of 5.4, EGFR 8, procalcitonin 25.23, troponin 0.03, lactic acid 0.9. Patient received NS 1 L in the ED and IV Zosyn.    She was found to be in EMMA.  Work up included CT thigh, Renal US and NC CT A/P as noted below.  UA on 5/2 showed RBC 81, WBC > 100, and moderate bacteria.  UCX are growing presumptive E.coli, sensitivities pending.  Blood CXS from 4/29 are pending.  She was admitted on Cefepime and Vanco for UTI and leg cellulitis from abscess.  She is currently being treated with Rocephin 1g iv daily, Vancomycin, and NaBicarb 1300mg TID.  Nephrology as been consulted since 4/30 as well, and does not feel she warrants HD at this time.    Earlier today she developed acute AMS and a stroke code was called.  Dr. Hagen evaluated her and felt this was more acute encephalopathy, but pursued extensive MRI work up (all negative, as noted below).  EEG is being ordered as  "well.    The patient's family wants a second opinion from another Nephrologist.  NP Jesica noted, Discussed again all the above to Daughter Merrill at bedside. Nurses present. Daughter requesting to see a different Nephrologist and to be transferred to [facility]. Await call back from Transfer Center.  Dr. Robinson Gerber with Nephrology at [facility] was notified and willing to assist in the patient's care.  I asked that she have a VBG for pH.    Current VS: /61   Pulse 92   Temp 98.5 °F (36.9 °C) (Oral)   Resp 18   Ht 5' 2" (1.575 m)   Wt 84.7 kg (186 lb 12.8 oz)   SpO2 100%   Breastfeeding No   BMI 34.17 kg/m².    Labs 5/3: WBC 18, Hg 8.3, , K 3.8, HCO3 20, AG 12, BUN 70, Cr 4.6, Alb 1.8, LFTs NML.    CXR 4/29: No acute process seen.    Retroperitoneal US 4/29:  1. Medical renal disease.  2. No hydronephrosis.  3. Dependent material/debris within the urinary bladder. Correlate with urinalysis.  NC CT A/P 4/29: 1. Questionable subtle surrounding fat haziness involving the urinary bladder. Such findings may be seen with acute cystitis.  2. No additional acute intra-abdominal abnormalities identified. 3. Cholelithiasis. 4. Uterine fibroids. 5. Colonic diverticulosis with no evidence of acute diverticulitis. 6. Diffuse body wall edema. 7. Additional findings as detailed above.    CT Thight without IV 4/29:  Extensive subcutaneous soft tissue edema throughout the left hip and thigh. No obvious organized focal fluid collection or drainable abscess seen.    NC CT head 5/3: 1. No acute intracranial hemorrhage, mass effect, or midline shift.  2. Small lacunar type infarct in the left thalamus appears new when compared to remote CT performed 07/20/2020, but is otherwise technically age indeterminate.      MRI Brain without 5/3:  No acute intracranial process with no evidence of acute infarct.    MRA Brain without 5/3:  No major branch advanced stenosis/occlusion is identified at the [id number].  " No definite aneurysm with likely fenestrated duplicated communicated artery. No evidence of AVM.    MRA neck without 5/3: No significant stenosis at the carotid bifurcations bilaterally by NASCET criteria. The vertebral arteries are patent and codominant.  Retropharyngeal course of the carotid arteries.    Objective     Vitals: Temp: 98.5 °F (36.9 °C) (05/03/25 1135)  Pulse: 92 (05/03/25 1458)  Resp: 18 (05/03/25 1135)  BP: 139/61 (05/03/25 1135)  SpO2: 100 % (05/03/25 1135)  Recent Labs: All pertinent labs within the past 24 hours have been reviewed.  Recent Results (from the past 24 hours)   POCT glucose    Collection Time: 05/02/25  7:41 PM   Result Value Ref Range    POCT Glucose 154 (H) 70 - 110 mg/dL   Comprehensive Metabolic Panel (CMP)    Collection Time: 05/03/25  5:10 AM   Result Value Ref Range    Sodium 136 136 - 145 mmol/L    Potassium 3.8 3.5 - 5.1 mmol/L    Chloride 104 95 - 110 mmol/L    CO2 20 (L) 23 - 29 mmol/L    Glucose 88 70 - 110 mg/dL    BUN 70 (H) 8 - 23 mg/dL    Creatinine 4.6 (H) 0.5 - 1.4 mg/dL    Calcium 7.4 (L) 8.7 - 10.5 mg/dL    Protein Total 6.3 6.0 - 8.4 gm/dL    Albumin 1.8 (L) 3.5 - 5.2 g/dL    Bilirubin Total 0.3 0.1 - 1.0 mg/dL    ALP 85 40 - 150 unit/L    AST 9 (L) 11 - 45 unit/L    ALT <5 (L) 10 - 44 unit/L    Anion Gap 12 8 - 16 mmol/L    eGFR 9 (L) >60 mL/min/1.73/m2   Magnesium    Collection Time: 05/03/25  5:10 AM   Result Value Ref Range    Magnesium  1.9 1.6 - 2.6 mg/dL   Phosphorus    Collection Time: 05/03/25  5:10 AM   Result Value Ref Range    Phosphorus Level 4.1 2.7 - 4.5 mg/dL   Vancomycin, Random    Collection Time: 05/03/25  5:10 AM   Result Value Ref Range    Vancomycin Random 17.6 Not established ug/ml   CBC with Differential    Collection Time: 05/03/25  5:10 AM   Result Value Ref Range    WBC 18.01 (H) 3.90 - 12.70 K/uL    RBC 2.71 (L) 4.00 - 5.40 M/uL    HGB 8.3 (L) 12.0 - 16.0 gm/dL    HCT 25.3 (L) 37.0 - 48.5 %    MCV 93 82 - 98 fL    MCH 30.6 27.0 - 31.0  pg    MCHC 32.8 32.0 - 36.0 g/dL    RDW 17.0 (H) 11.5 - 14.5 %    Platelet Count 474 (H) 150 - 450 K/uL    MPV 8.5 (L) 9.2 - 12.9 fL    Nucleated RBC 0 <=0 /100 WBC    Neut % 73.3 (H) 38 - 73 %    Lymph % 17.0 (L) 18 - 48 %    Mono % 6.6 4 - 15 %    Eos % 1.5 <=8 %    Basophil % 0.2 <=1.9 %    Imm Grans % 1.4 (H) 0.0 - 0.5 %    Neut # 13.20 (H) 1.8 - 7.7 K/uL    Lymph # 3.06 1 - 4.8 K/uL    Mono # 1.19 (H) 0.3 - 1 K/uL    Eos # 0.27 <=0.5 K/uL    Baso # 0.03 <=0.2 K/uL    Imm Grans # 0.26 (H) 0.00 - 0.04 K/uL   Gold Top Hold    Collection Time: 05/03/25  5:10 AM   Result Value Ref Range    Extra Tube Hold for add-ons.    POCT glucose    Collection Time: 05/03/25  7:51 AM   Result Value Ref Range    POCT Glucose 97 70 - 110 mg/dL   POCT glucose    Collection Time: 05/03/25  9:01 AM   Result Value Ref Range    POCT Glucose 85 70 - 110 mg/dL   POCT glucose    Collection Time: 05/03/25 11:33 AM   Result Value Ref Range    POCT Glucose 101 70 - 110 mg/dL            IV access:        Peripheral IV - Single Lumen 05/02/25 1700 22 G Yes Anterior;Right Hand (Active)   Site Assessment Clean;Dry;Intact;No redness;No swelling 05/02/25 1940   Line Securement Device Secured with sutureless device 05/02/25 1940   Extremity Assessment Distal to IV No abnormal discoloration;No redness;No swelling;No warmth 05/02/25 1940   Line Status Saline locked 05/02/25 1940   Dressing Status Clean;Dry;Intact 05/02/25 1940   Dressing Intervention Integrity maintained 05/02/25 1940   Site Change Due 05/06/25 05/02/25 1940   Reason Not Rotated Not due 05/02/25 1940     Infusions: NONE  Allergies:   Review of patient's allergies indicates:   Allergen Reactions    Corticosteroids (glucocorticoids) Edema      NPO: No    Anticoagulation:   Anticoagulants       Ordered     Route Frequency Start Stop    04/29/25 1815  heparin (porcine)  (VTE Prophylaxis Orders - High Risk)         SubQ Every 8 hours 04/29/25 2200 --             Instructions      [address]-  Admit to Hospital Medicine  Upon patient arrival to floor, please send SecureChat to [facility] or call extension [phone] (if no answer, do NOT leave a callback number after the beep, rather please send a SecureChat to [facility]), for Hospital Medicine admit team assignment and for additional admit orders for the patient.  Do not page the attending physician associated with the patient on arrival (this physician may not be on duty at the time of arrival).  Rather, always send a SecureChat to [facility] or call [phone] to reach the triage physician for orders and team assignment.

## 2025-05-03 NOTE — TELEMEDICINE CONSULT
[facility]  Vascular Neurology  Comprehensive Stroke Center  TeleVascular Neurology Acute Consultation Note        Consult Information  Consults    Consulting Provider: NATALY VITALE   Current Providers  No providers found    Patient Location:  [facility] TELEMETRY IP Unit    [facility] hospital nurse at bedside with patient assisting consultant.  Patient information was obtained from nursing.       Stroke Documentation  Acute Stroke Times   Last Known Normal Date: 05/02/25 (unknown time)  Stroke Team Arrival Date: 05/03/25  Stroke Team Arrival Time: 0912  CT Interpretation Time: 0921    NIH Scale:  Interval: baseline  1a. Level of Consciousness: 0-->Alert, keenly responsive  1b. LOC Questions: 2-->Answers neither question correctly  1c. LOC Commands: 2-->Performs neither task correctly  2. Best Gaze: 0-->Normal  3. Visual: 0-->No visual loss  4. Facial Palsy: 0-->Normal symmetrical movements  5a. Motor Arm, Left: 2-->Some effort against gravity, limb cannot get to or maintain (if cued) 90 (or 45) degrees, drifts down to bed, but has some effort against gravity  5b. Motor Arm, Right: 2-->Some effort against gravity, limb cannot get to or maintain (if cued) 90 (or 45) degrees, drifts down to bed, but has some effort against gravity  6a. Motor Leg, Left: 4-->No movement  6b. Motor Leg, Right: 4-->No movement  7. Limb Ataxia: 0-->Absent  8. Sensory: 0-->Normal, no sensory loss  9. Best Language: 3-->Mute, global aphasia, no usable speech or auditory comprehension  10. Dysarthria: 2-->Severe dysarthria, patients speech is so slurred as to be unintelligible in the absence of or out of proportion to any dysphasia, or is mute/anarthric  11. Extinction and Inattention (formerly Neglect): 0-->No abnormality  Total (NIH Stroke Scale): 21      Modified [place]:    Holly Coma Scale:     ABCD2 Score:    [id number] Score:    HAS -BLED Score:    ICH Score:    Hunt & Boyd Classification:      Blood pressure (!)  "148/94, pulse 90, temperature 98.2 °F (36.8 °C), temperature source Oral, resp. rate 19, height 5' 2" (1.575 m), weight 84.7 kg (186 lb 12.8 oz), SpO2 100%, not currently breastfeeding.      In my opinion, this was a: Tier 1; VAN Stroke Assessment: Positive     Medical Decision Making  HPI:  79 y.o. female with confusion.  She was admitted 4/29/25 with sepsis due to thigh wound.      This AM, nurse noted her asleep.  Pharmacist noted she was unsteady holding med cup and then noted to be unresponsive. LKW last night some time but nursing is not sure.    She takes ASA, atorva 40/d and hep sc 5000 q8      Images personally reviewed and interpreted:  Study: Head CT  Study Interpretation: remote L thalamic lacune; WM changes  Glu 85     CMP  Sodium   Date Value Ref Range Status   05/03/2025 136 136 - 145 mmol/L Final   07/24/2020 142 136 - 145 mmol/L Final     Potassium   Date Value Ref Range Status   05/03/2025 3.8 3.5 - 5.1 mmol/L Final   07/24/2020 4.8 3.5 - 5.1 mmol/L Final     Chloride   Date Value Ref Range Status   05/03/2025 104 95 - 110 mmol/L Final   07/24/2020 108 95 - 110 mmol/L Final     CO2   Date Value Ref Range Status   05/03/2025 20 (L) 23 - 29 mmol/L Final   07/24/2020 29 23 - 29 mmol/L Final     Glucose   Date Value Ref Range Status   05/03/2025 88 70 - 110 mg/dL Final   07/24/2020 111 (H) 70 - 110 mg/dL Final     BUN   Date Value Ref Range Status   05/03/2025 70 (H) 8 - 23 mg/dL Final     Creatinine   Date Value Ref Range Status   05/03/2025 4.6 (H) 0.5 - 1.4 mg/dL Final     Calcium   Date Value Ref Range Status   05/03/2025 7.4 (L) 8.7 - 10.5 mg/dL Final   07/24/2020 8.8 8.7 - 10.5 mg/dL Final     Protein Total   Date Value Ref Range Status   05/03/2025 6.3 6.0 - 8.4 gm/dL Final     Total Protein   Date Value Ref Range Status   07/24/2020 8.1 6.0 - 8.4 g/dL Final     Albumin   Date Value Ref Range Status   05/03/2025 1.8 (L) 3.5 - 5.2 g/dL Final   07/24/2020 2.5 (L) 3.5 - 5.2 g/dL Final     Total " Bilirubin   Date Value Ref Range Status   07/24/2020 0.3 0.1 - 1.0 mg/dL Final     Comment:     For infants and newborns, interpretation of results should be based  on gestational age, weight and in agreement with clinical  observations.  Premature Infant recommended reference ranges:  Up to 24 hours.............<8.0 mg/dL  Up to 48 hours............<12.0 mg/dL  3-5 days..................<15.0 mg/dL  6-29 days.................<15.0 mg/dL       Bilirubin Total   Date Value Ref Range Status   05/03/2025 0.3 0.1 - 1.0 mg/dL Final     Comment:     For infants and newborns, interpretation of results should be based   on gestational age, weight and in agreement with clinical   observations.    Premature Infant recommended reference ranges:   0-24 hours:  <8.0 mg/dL   24-48 hours: <12.0 mg/dL   3-5 days:    <15.0 mg/dL   6-29 days:   <15.0 mg/dL     Alkaline Phosphatase   Date Value Ref Range Status   07/24/2020 85 55 - 135 U/L Final     ALP   Date Value Ref Range Status   05/03/2025 85 40 - 150 unit/L Final     AST   Date Value Ref Range Status   05/03/2025 9 (L) 11 - 45 unit/L Final   07/24/2020 12 10 - 40 U/L Final     ALT   Date Value Ref Range Status   05/03/2025 <5 (L) 10 - 44 unit/L Final   07/24/2020 13 10 - 44 U/L Final     Anion Gap   Date Value Ref Range Status   05/03/2025 12 8 - 16 mmol/L Final     eGFR   Date Value Ref Range Status   05/03/2025 9 (L) >60 mL/min/1.73/m2 Final     Comment:     Estimated GFR calculated using the CKD-EPI creatinine (2021) equation.     Lab Results   Component Value Date    WBC 18.01 (H) 05/03/2025    HGB 8.3 (L) 05/03/2025    HCT 25.3 (L) 05/03/2025    MCV 93 05/03/2025     (H) 05/03/2025         Assessment and plan:  Acute encephalopathy:  DDx stroke (possibly small, multifocal), unwitnessed seizure, toxic/metabolic encephalopathy (e.g., uremia).  MRI brain (wake up protocol) with MRA brain/neck will be helpful for e/o stroke and, if so, candidacy for TNK.  Pls call me  when performed and I will review. EEG and [facility] consult if MRI negative for acute ischemia. Of note, endcocarditis in DDx given infection.    Lytics recommendation: Thrombolytic therapy not recommended due to rec MRI wake up protocol    Thrombectomy recommendation: awaiting MRI/MRA  Placement recommendation: remains as inpatient at [facility]                ROS  Physical Exam  Past Medical History:   Diagnosis Date    Arthritis     Gout    CHF (congestive heart failure)     Coronary artery disease     Diabetes mellitus     HLD (hyperlipidemia)     Hypertension     Obese     Stroke     1986    Thyroid disease      Past Surgical History:   Procedure Laterality Date    ESOPHAGOGASTRODUODENOSCOPY N/A 7/23/2020    Procedure: EGD (ESOPHAGOGASTRODUODENOSCOPY);  Surgeon: Halle Hughes MD;  Location: [facility];  Service: Endoscopy;  Laterality: N/A;    right hand surgery      right knee surgery Right     partial plate     Family History   Problem Relation Name Age of Onset    Heart disease Sister      Diabetes Maternal Aunt      Heart disease Maternal Aunt      Hypertension Maternal Aunt         Diagnoses  Problem Noted   Acute Encephalopathy 5/3/2025       Osmel Hagen MD      Emergent/Acute neurological consultation requested by spoke provider due to critical concerns for possible cerebrovascular event that could result in permanent loss of neurologic/bodily function, severe disability or death of this patient.  Immediate/timely evaluation by a highly prepared expert is paramount for optimal outcomes  High risk for neurological deterioration if not properly diagnosed  High risk for neurological deterioration if not treated promplty/as soon as possible  Complex diagnostic evaluation may be required (advanced imaging)  High risk treatment options (thrombolytics and/or thrombectomy)    Patient care was coordinated with spoke provider, including but not limted to    Discussing likely diagnosis/etiology of  symptoms  Making recommendations for further diagnostic studies  Making recommendations for intravenous thrombolytics or other advanced therapies  Making recommendations for disposition (admission/transfer for higher level of care)    CC time: [id number] (will also review MRI/MRA)    Neurology consultation requested by spoke provider. Audiovisual encounter with the patient performed using a secure connection.  Results and impressions from the visit are documented on this note and were communicated to the consulting provider/team via direct communication. The note has been shared for addition to the patients electronic medical record.

## 2025-05-03 NOTE — PLAN OF CARE
Problem: Diabetes Comorbidity  Goal: Blood Glucose Level Within Targeted Range  Outcome: Progressing     Problem: Wound  Goal: Optimal Coping  Outcome: Progressing  Goal: Optimal Functional Ability  Outcome: Progressing  Goal: Absence of Infection Signs and Symptoms  Outcome: Progressing  Goal: Improved Oral Intake  Outcome: Progressing  Goal: Optimal Pain Control and Function  Outcome: Progressing  Goal: Skin Health and Integrity  Outcome: Progressing  Goal: Optimal Wound Healing  Outcome: Progressing     Problem: Skin Injury Risk Increased  Goal: Skin Health and Integrity  Outcome: Progressing

## 2025-05-03 NOTE — ASSESSMENT & PLAN NOTE
"This patient does have evidence of infective focus  My overall impression is sepsis.  Source: Skin and Soft Tissue (location LT inguinal cellulitis)  Antibiotics given-   Antibiotics (72h ago, onward)      Start     Stop Route Frequency Ordered    05/03/25 1500  cefTRIAXone injection 1 g         -- IV Every 24 hours (non-standard times) 05/03/25 1346    04/29/25 1909  vancomycin - pharmacy to dose  (vancomycin IVPB (PEDS and ADULTS))        Placed in "And" Linked Group    -- IV pharmacy to manage frequency 04/29/25 1815        WBC improving  Continue vanc, switch cefe to rocephin due to encephalopathy    "

## 2025-05-03 NOTE — PROGRESS NOTES
[facility]  [facility]  Progress Note    Patient Name: Tasneem Lynn  MRN: [id number]  Admission Date: 4/29/2025  Hospital Length of Stay: 4 days  Attending Provider: Neel Trejo MD   Primary Care Physician: Unable, To Obtain  Principal Problem:Inguinal ulcer    Subjective:     HPI: 79 year old female with a history of T2DM, HTN, HF, hypothyroidism presents with left upper groin abscess for the last week.     She developed an upper leg abscess which prompted evaluation in the ED as management at home failed.    Apropos kidney function: she has never established with a kidney doctor and the last nephrologist evaluation inpatient was in 2020 with a creatinine of 2.3. On presentation today she had a creatinine of 5.4 with an eGFR of 8. Other labs on presentation with sodium of 135, K of 4.1, bicarb of 11, BUN of 79.     Interval History: Code stroke this AM pending MRI brain    Review of patient's allergies indicates:   Allergen Reactions    Corticosteroids (glucocorticoids) Edema     Current Facility-Administered Medications   Medication Frequency    0.9%  NaCl infusion (for blood administration) Q24H PRN    acetaminophen tablet 650 mg Q4H PRN    aspirin chewable tablet 81 mg Daily    atorvastatin tablet 40 mg QHS    ceFEPIme injection 1 g Q24H    dextrose 50% injection 12.5 g PRN    dextrose 50% injection 25 g PRN    glucagon (human recombinant) injection 1 mg PRN    glucose chewable tablet 16 g PRN    glucose chewable tablet 24 g Once    heparin (porcine) injection 5,000 Units Q8H    hydrALAZINE injection 10 mg Q4H PRN    levothyroxine tablet 75 mcg Before breakfast    melatonin tablet 6 mg Nightly PRN    ondansetron injection 4 mg Q8H PRN    sevelamer carbonate tablet 800 mg TID WM    sodium bicarbonate tablet 1,300 mg TID    sodium chloride 0.9% flush 10 mL Q12H PRN    vancomycin - pharmacy to dose pharmacy to manage frequency       Objective:     Vital Signs (Most Recent):  Temp: 98.2 °F (36.8 °C)  (05/03/25 0752)  Pulse: 90 (05/03/25 0752)  Resp: 19 (05/03/25 0752)  BP: (!) 148/94 (05/03/25 0752)  SpO2: 100 % (05/03/25 0752) Vital Signs (24h Range):  Temp:  [98 °F (36.7 °C)-98.6 °F (37 °C)] 98.2 °F (36.8 °C)  Pulse:  [83-91] 90  Resp:  [18-48] 19  SpO2:  [95 %-100 %] 100 %  BP: (108-159)/(61-94) 148/94     Weight: 84.7 kg (186 lb 12.8 oz) (04/29/25 1946)  Body mass index is 34.17 kg/m².  Body surface area is 1.92 meters squared.    I/O last 3 completed shifts:  In: 349.2 [Blood:349.2]  Out: 950 [Urine:950]     Physical Exam  Constitutional:       Appearance: She is ill-appearing.   Neurological:      Mental Status: She is alert.          Significant Labs:  All labs within the past 24 hours have been reviewed.     Significant Imaging:  Labs: Reviewed  Assessment/Plan:     Renal/  CKD (chronic kidney disease) stage 5, GFR less than 15 ml/min  Previous sCr in 2020 2.3  Gap in records and presented with a creatinine of 5.4, eGFR 8    Suspect this is her baseline kidney function as she has never seen a nephrologist nor appears to has had any evaluation by any healthcare provider since 2021.     RP US: 8.2 cm right and 7.5 cm left. Poor imaging though appears echogenic    Plan/Recommendation  Sodium bicarb 1300 TID  -Keep MAP > 65  -Keep hemoglobin > 7  -Strict ins and outs  -Avoid nephrotoxic agents if possible and renally dose medications  -Avoid drastic hemodynamic changes if possible      #Anemia  HGB   Date Value Ref Range Status   05/03/2025 8.3 (L) 12.0 - 16.0 gm/dL Final     Iron Level   Date Value Ref Range Status   04/29/2025 32 30 - 160 ug/dL Final     Transferrin   Date Value Ref Range Status   04/29/2025 62 (L) 200 - 375 mg/dL Final     Iron Binding Capacity Total   Date Value Ref Range Status   04/29/2025 92 (L) 250 - 450 ug/dL Final     Saturated Iron   Date Value Ref Range Status   07/20/2020 8 (L) 20 - 50 % Final     Ferritin   Date Value Ref Range Status   04/29/2025 693.3 (H) 20.0 - 300.0 ng/mL  "Final   Iron stores adequate  JOYA once weekly    #Secondary hyperparathyroidism  Calcium   Date Value Ref Range Status   05/03/2025 7.4 (L) 8.7 - 10.5 mg/dL Final     Phosphorus Level   Date Value Ref Range Status   05/03/2025 4.1 2.7 - 4.5 mg/dL Final     PTH Intact   Date Value Ref Range Status   05/01/2025 664.2 (H) 9.0 - 77.0 pg/mL Final   Continue to monitor PTH in outpatient setting.   Start sevelamer 800 TID with meals      Palliative Care  Advance care planning  See previous documentation, palliative care    Patient has stated multiple times with different providers that she does not want to be on diaslysis under any circumstance and understands that she will die if she needs dialysis and does not start.    Altered mental status  Code stroke 5/3 AM, pending MRI read. Was assessed by tele-vascular neurology:    "Acute encephalopathy: DDx stroke (possibly small, multifocal), unwitnessed seizure, toxic/metabolic encephalopathy (e.g., uremia). MRI brain (wake up protocol) with MRA brain/neck will be helpful for e/o stroke and, if so, candidacy for TNK. Pls call me when performed and I will review. EEG and [facility] consult if MRI negative for acute ischemia. Of note, endcocarditis in DDx given infection. "    If uremia, then recommending hospice. However given her BUN has been stable and she was of her usual health before do not believe this to be acute uremic encephalopathy.       Thank you for your consult. I will follow-up with patient. Please contact us if you have any additional questions.    Clinton Aguilar MD  Nephrology  [facility] - Telemetry  "

## 2025-05-03 NOTE — PROGRESS NOTES
Pharmacokinetic Assessment Follow Up: IV Vancomycin    Vancomycin serum concentration assessment(s):    The random level was drawn correctly and can be used to guide therapy at this time. The measurement is within the desired definitive target range of 10 to 20 mcg/mL.    Vancomycin Regimen Plan:    No dose 5/3  Obtain random level 5/4 at 0400    Drug levels (last 3 results):  Recent Labs   Lab Result Units 05/01/25  0500 05/02/25  0418 05/03/25  0510   Vancomycin Random ug/ml 20.5 18.3 17.6       Pharmacy will continue to follow and monitor vancomycin.    Please contact pharmacy at extension [phone] for questions regarding this assessment.    Thank you for the consult,   Wiley Mooney       Patient brief summary:  Tasneem Lynn is a 79 y.o. female initiated on antimicrobial therapy with IV Vancomycin for treatment of skin & soft tissue infection    Drug Allergies:   Review of patient's allergies indicates:   Allergen Reactions    Corticosteroids (glucocorticoids) Edema       Actual Body Weight:   84.7 kg    Renal Function:   Estimated Creatinine Clearance: 10 mL/min (A) (based on SCr of 4.6 mg/dL (H)).,     Dialysis Method (if applicable):  N/A    CBC (last 72 hours):  Recent Labs   Lab Result Units 05/01/25  0500 05/02/25  0418 05/03/25  0510   WBC K/uL 22.96* 18.97* 18.01*   HGB gm/dL 7.5* 7.1* 8.3*   HCT % 23.9* 22.8* 25.3*   Platelet Count K/uL 551* 526* 474*   Lymph % % 13.5* 14.2* 17.0*   Mono % % 5.6 6.3 6.6   Eos % % 1.0 1.4 1.5   Basophil % % 0.1 0.2 0.2       Metabolic Panel (last 72 hours):  Recent Labs   Lab Result Units 05/01/25  0500 05/02/25  0418 05/02/25  1241 05/03/25  0510   Sodium mmol/L 137 137  --  136   Urine Sodium mmol/L  --   --  62  --    Potassium mmol/L 3.3* 3.8  --  3.8   Chloride mmol/L 106 106  --  104   CO2 mmol/L 17* 19*  --  20*   Glucose mg/dL 63* 76  --  88   BUN mg/dL 73* 73*  --  70*   Creatinine mg/dL 4.8* 4.8*  --  4.6*   Urine Creatinine mg/dL  --   --  44.9  45.3  --     Albumin g/dL 1.7* 1.6*  --  1.8*   Bilirubin Total mg/dL 0.2 0.2  --  0.3   ALP unit/L 96 82  --  85   AST unit/L 12 11  --  9*   ALT unit/L <5* <5*  --  <5*   Magnesium  mg/dL 1.3* 1.9  --  1.9   Phosphorus Level mg/dL 4.7* 4.4  --  4.1       Vancomycin Administrations:  vancomycin given in the last 96 hours                     vancomycin 750 mg in 0.9% NaCl 250 mL IVPB (admixture device) (mg) 750 mg New Bag 04/30/25 0858    vancomycin 1,250 mg in 0.9% NaCl 250 mL IVPB (admixture device) (mg) 1,250 mg New Bag 04/29/25 2047                    Microbiologic Results:  Microbiology Results (last 7 days)       Procedure Component Value Units Date/Time    Blood culture #1 [[id number]]  (Normal) Collected: 04/29/25 1528    Order Status: Completed Specimen: Blood Updated: 05/02/25 1702     Blood Culture No Growth After 72 Hours    Blood culture #2 [[id number]]  (Normal) Collected: 04/29/25 1528    Order Status: Completed Specimen: Blood Updated: 05/02/25 1702     Blood Culture No Growth After 72 Hours    Urine culture [[id number]] Collected: 05/02/25 1241    Order Status: Sent Specimen: Urine, Clean Catch Updated: 05/02/25 1331

## 2025-05-03 NOTE — ASSESSMENT & PLAN NOTE
Previous sCr in 2020 2.3  Gap in records and presented with a creatinine of 5.4, eGFR 8    Suspect this is her baseline kidney function as she has never seen a nephrologist nor appears to has had any evaluation by any healthcare provider since 2021.     RP US: 8.2 cm right and 7.5 cm left. Poor imaging though appears echogenic    Plan/Recommendation  Sodium bicarb 1300 TID  -Keep MAP > 65  -Keep hemoglobin > 7  -Strict ins and outs  -Avoid nephrotoxic agents if possible and renally dose medications  -Avoid drastic hemodynamic changes if possible      #Anemia  HGB   Date Value Ref Range Status   05/03/2025 8.3 (L) 12.0 - 16.0 gm/dL Final     Iron Level   Date Value Ref Range Status   04/29/2025 32 30 - 160 ug/dL Final     Transferrin   Date Value Ref Range Status   04/29/2025 62 (L) 200 - 375 mg/dL Final     Iron Binding Capacity Total   Date Value Ref Range Status   04/29/2025 92 (L) 250 - 450 ug/dL Final     Saturated Iron   Date Value Ref Range Status   07/20/2020 8 (L) 20 - 50 % Final     Ferritin   Date Value Ref Range Status   04/29/2025 693.3 (H) 20.0 - 300.0 ng/mL Final   Iron stores adequate  JOYA once weekly    #Secondary hyperparathyroidism  Calcium   Date Value Ref Range Status   05/03/2025 7.4 (L) 8.7 - 10.5 mg/dL Final     Phosphorus Level   Date Value Ref Range Status   05/03/2025 4.1 2.7 - 4.5 mg/dL Final     PTH Intact   Date Value Ref Range Status   05/01/2025 664.2 (H) 9.0 - 77.0 pg/mL Final   Continue to monitor PTH in outpatient setting.   Start sevelamer 800 TID with meals

## 2025-05-03 NOTE — PROGRESS NOTES
[facility] Medicine  Progress Note    Patient Name: Tasneem Lynn  MRN: [id number]  Patient Class: IP- Inpatient   Admission Date: 4/29/2025  Length of Stay: 4 days  Attending Physician: Neel Trejo MD  Primary Care Provider: Unable, To Obtain        Subjective     Principal Problem:Inguinal ulcer        HPI:  79-year-old F with medical history significant for type 2 DM, HTN, CHF, hypothyroidism, and CKD 4 who presented to the ED with a chief complaint of LT upper  groin pain boil of one week duration      Patient was in her baseline state of health until which initially started as a small pea-sized lesion but progressively grew and ulcerated.  Patient started having pain and this prompted her to come to the ED. she endorsed some subjective chills but denied any fever, nausea, or vomiting.  No diarrhea or loss of appetite.  Patient had no history of bloodstream infection, surgical implants, valvular heart disease.  However, retrospective chart review indicated that patient had chronic osteomyelitis of the right foot and ESBL Klebsiella UTI 5 years ago.  Patient has a known CKD 4 but no routine follow up with Nephrology noted; review of home medication did not show any diuretics or other nephrotoxic medications.    On presentation to the ED, patient was hypertensive to 183/74, , oxygen saturation 100% on room air.Laboratory investigations were pertinent for WBC 29.1, HGB 9.3, , sodium 135, BUN 79, creatinine of 5.4, EGFR 8, procalcitonin 25.23, troponin 0.03, lactic acid 0.9.  Patient received NS 1 L in the ED and IV Zosyn.  Admission was requested for further management.      Overview/Hospital Course:  Admission for sepsis secondary to left upper thigh cellulitis and ulcer.  Leukocytosis improving with IV antibiotics.  Afebrile.  Wound care orders placed.  Left leg cellulitis improving.   Noted also CKD.  Nephrology consulted.  CKD at baseline.  Ultrasound shows chronic kidney  "disease no stones or hydronephrois  Hemoglobin trending down .3> 8.5>7.5>7.1 due to CKD/chronic inflammation. No overt signs of bleeding. Repeat hgb 8.3 after 1 unit of PRBC transfusion. Nephrotic range proteinuria. Goals of cares discussed with patient. Patient wished not for dialysis if it comes to that point.  Was seen by palliative care team also.    On 5/3 am, noted change in mental status, left facial droop, aphasic and not following simple commands. UA evidence of infection. Stroke code called. CT head no acute bleed and indeterminate small lacunar infarct left thalamus. MRI brain, MRA head neck without contrast. Continue ASA, statin. Switch Cefepime to Rocephin as cefepime due to acute encephalopathy. Follow up urine culture and MRI/MRA. Continue statin, ASA.     Addendum 1500  MRI brain, MRA head neck wo contract unremarkable  Currently, bedside nurse feeding patient without issue, patient states "hi" but not able to state name  Continue plan above- treat UTI, left thigh cellulits/wound  Consult General Neurology, EEG ordered  Discussed again all the above to Daughter Merrill at bedside. Nurses present. Daughter requesting to see a different Nephrologist and to be transferred to [facility]. Await call back from Transfer Center.     Interval History: Upon entering room this am, patient found to be aphasic and not following simple commands. Pharmacy at bedside states drop meds right hand 5 mins prior to my arrival. Stroke code called.     Review of Systems   Unable to perform ROS: Acuity of condition     Objective:     Vital Signs (Most Recent):  Temp: 98.5 °F (36.9 °C) (05/03/25 1135)  Pulse: 101 (05/03/25 1247)  Resp: 18 (05/03/25 1135)  BP: 139/61 (05/03/25 1135)  SpO2: 100 % (05/03/25 1135) Vital Signs (24h Range):  Temp:  [98 °F (36.7 °C)-98.6 °F (37 °C)] 98.5 °F (36.9 °C)  Pulse:  [] 101  Resp:  [18-20] 18  SpO2:  [95 %-100 %] 100 %  BP: (108-159)/(61-94) 139/61     Weight: 84.7 kg (186 lb 12.8 " oz)  Body mass index is 34.17 kg/m².    Intake/Output Summary (Last 24 hours) at 5/3/2025 1353  Last data filed at 5/3/2025 0610  Gross per 24 hour   Intake 349.17 ml   Output 350 ml   Net -0.83 ml         Physical Exam  Constitutional:       General: She is not in acute distress.     Appearance: She is well-developed. She is obese. She is not ill-appearing, toxic-appearing or diaphoretic.   HENT:      Head: Normocephalic and atraumatic.      Nose: Nose normal. No congestion.   Eyes:      Pupils: Pupils are equal, round, and reactive to light.   Cardiovascular:      Rate and Rhythm: Normal rate and regular rhythm.      Pulses: Normal pulses.      Heart sounds: Normal heart sounds.   Pulmonary:      Effort: Pulmonary effort is normal. No respiratory distress.      Breath sounds: Normal breath sounds. No stridor.      Comments: Diminished . Difficult to assess.   Abdominal:      General: Bowel sounds are normal. There is no distension.      Palpations: Abdomen is soft. There is no mass.      Tenderness: There is no abdominal tenderness.   Musculoskeletal:         General: Tenderness (left upper thigh) and deformity present. Normal range of motion.      Cervical back: Normal range of motion and neck supple.      Right lower leg: Edema (does not move right leg) present.      Left lower leg: Edema present.   Skin:     General: Skin is warm and dry.      Capillary Refill: Capillary refill takes less than 2 seconds.      Comments: LT Inguinal desquamated ulcer 8cm x 5cm with exudative base,and sloughing edges   Neurological:      General: No focal deficit present.      Mental Status: She is alert and oriented to person, place, and time.      Comments: Bedbound x 5 years               Significant Labs: All pertinent labs within the past 24 hours have been reviewed.    Significant Imaging: I have reviewed all pertinent imaging results/findings within the past 24 hours.      Assessment & Plan  Left upper thigh  "wound/Cellulitis  Hx of LT upper thigh carbuncle progressively enlarging with ulceration  Blood culture no growth   Noncontrast CT abdomen and left upper thigh to evaluate for occult abscesses and deep extensions-no abscess   Procal elevated, lactate normal  Given immunosuppression (CKD and type 2 DM); start empiric renally dosed vancomycin and cefepime  Wound care ordered . Wound care team following  Cellulitis and leukocytosis improving. Continue same wound care  Continue vancomycin while inpatient, Stop cefepime given encephalopathy    Acute encephalopathy  On 5/3 am, noted change in mental status, left facial droop, aphasic and not following simple commands.   -DDx UA evidence of infection, cellulitis and uremia contributing?, concern for stroke given history    -Stroke code called. CT head no acute bleed and indeterminate small lacunar infarct left thalamus. MRI brain, MRA head neck without contrast. Continue ASA, statin.  -Switch Cefepime to Rocephin as cefepime can contribute to encephalopathy.  - Follow up urine culture  - Follow  MRI/MRA.   - Blood cultures negative, 2 D echo   - Continue statin, ASA    Addendum 1500  MRI brain, MRA head neck wo contract unremarkable  Currently, bedside nurse feeding patient without issue, patient states "hi" but not able to state name  Continue plan above- treat UTI, left thigh cellulits/wound  Consult General Neurology, EEG ordered  Discussed again all the above to Daughter Merrill at bedside. Nurses present. Daughter requesting to see a different Nephrologist and to be transferred to [facility]. Await call back from Transfer Center.   Sepsis with acute organ dysfunction without septic shock  This patient does have evidence of infective focus  My overall impression is sepsis.  Source: Skin and Soft Tissue (location LT inguinal cellulitis)  Antibiotics given-   Antibiotics (72h ago, onward)      Start     Stop Route Frequency Ordered    05/03/25 1500  cefTRIAXone " "injection 1 g         -- IV Every 24 hours (non-standard times) 05/03/25 1346    04/29/25 1909  vancomycin - pharmacy to dose  (vancomycin IVPB (PEDS and ADULTS))        Placed in "And" Linked Group    -- IV pharmacy to manage frequency 04/29/25 1815        WBC improving  Continue vanc, switch cefe to rocephin due to encephalopathy    CKD (chronic kidney disease) stage 5, GFR less than 15 ml/min  EMMA is likely due to acute tubular necrosis caused by sepsis. Baseline creatinine is 2.3 to 2.5. Most recent creatinine and eGFR are listed below.  Recent Labs     05/01/25  0500 05/02/25  0418 05/03/25  0510   CREATININE 4.8* 4.8* 4.6*   EGFRNORACEVR 9* 9* 9*     -Last sCr 2020  - Urine sodium, urine creatinine pending collection-  plus- will discuss with nurse  - Retroperitoneal ultrasound- CKD, no hydronephrosis  - Nephrotic Range proteinuria  - follow up urine culture- on rocephin  - Nephrology following  - UTI, cellulitis and uremia? Contributing to mental status- MRI/MRA pending  Hyperlipidemia  Continue statins    Diabetes mellitus  Patient's FSGs are controlled on current medication regimen.  Last A1c reviewed-   Lab Results   Component Value Date    HGBA1C 7.5 (H) 06/13/2020     Most recent fingerstick glucose reviewed-   Recent Labs   Lab 05/02/25  1941 05/03/25  0751 05/03/25  0901 05/03/25  1133   POCTGLUCOSE 154* 97 85 101   Hypoglycemia- infection contributing  Obtain HgbA1c    Type 2 myocardial infarction  Type 2 secondary to sepsis and EMMA  Noted elevated troponin with underlying history of CAD on presentation  EKG showed no acute ischemic change  Continue to trend troponin    Hypertension  Patient currently borderline hypotensive; home medications held  Restart as clinically appropriate  PRNs ordered  CAD (coronary artery disease)  Patient with known CAD s/p which is controlled Will continue ASA and Statin and monitor for S/Sx of angina/ACS. Continue to monitor on telemetry.   Patient denied history " of MI  Hypothyroidism  Continue home levothyroxine    Anemia  Anemia is likely due to chronic disease due to Chronic Kidney Disease. Most recent hemoglobin and hematocrit are listed below.  Recent Labs     05/01/25  0500 05/02/25  0418 05/03/25  0510   HGB 7.5* 7.1* 8.3*   HCT 23.9* 22.8* 25.3*     Plan  - Monitor serial CBC: Daily  - Transfuse PRBC if patient becomes hemodynamically unstable, symptomatic or H/H drops below 7/21.  - Patient has not received any PRBC transfusions to date  - Patient's anemia is currently stable  - Obtain iron studies and ferritin- chronic disease and malnutrition  Bedbound      Advance care planning  Advance Care Planning     Date: 05/01/2025    Code Status  In light of the patients advanced and life limiting illness,I engaged the the patient in a voluntary conversation about the patient's preferences for care  at the very end of life. The patient wishes to have a natural, peaceful death.  Along those lines, the patient does not wish to have CPR or other invasive treatments performed when her heart and/or breathing stops. Patient would like to discuss with Daughter Merrill. Patient also does not want dialysis if had to come to that point.   Palliative team care following      Anemia of chronic disorder  Anemia is likely due to chronic disease due to Chronic Kidney Disease. Most recent hemoglobin and hematocrit are listed below.  Recent Labs     05/01/25  0500 05/02/25  0418 05/03/25  0510   HGB 7.5* 7.1* 8.3*   HCT 23.9* 22.8* 25.3*     Plan  - Monitor serial CBC: Daily  - Transfuse PRBC if patient becomes hemodynamically unstable, symptomatic or H/H drops below 7/21.  - Iron studies iron 32 TSAT 35.   - no overt bleeding  - 1 unit of blood ordered- responded appropriately repeat hgb 8.3  - 5/3 - daughter reports had chills and back pain while receiving blood yesterday.   - Add blood transfusion reaction work up     VTE Risk Mitigation (From admission, onward)           Ordered      heparin (porcine) injection 5,000 Units  Every 8 hours         04/29/25 1815     IP VTE HIGH RISK PATIENT  Once         04/29/25 1815     Place sequential compression device  Until discontinued         04/29/25 1815                    Discharge Planning   STEPHEN: 5/3/2025     Code Status: Full Code   Medical Readiness for Discharge Date:   Discharge Plan A: Home with family                        Alice Mooney NP  Department of Hospital Medicine   [facility]

## 2025-05-03 NOTE — ASSESSMENT & PLAN NOTE
Patient with known CAD s/p which is controlled Will continue ASA and Statin and monitor for S/Sx of angina/ACS. Continue to monitor on telemetry.   Patient denied history of MI

## 2025-05-03 NOTE — ASSESSMENT & PLAN NOTE
Anemia is likely due to chronic disease due to Chronic Kidney Disease. Most recent hemoglobin and hematocrit are listed below.  Recent Labs     05/01/25  0500 05/02/25  0418 05/03/25  0510   HGB 7.5* 7.1* 8.3*   HCT 23.9* 22.8* 25.3*     Plan  - Monitor serial CBC: Daily  - Transfuse PRBC if patient becomes hemodynamically unstable, symptomatic or H/H drops below 7/21.  - Iron studies iron 32 TSAT 35.   - no overt bleeding  - 1 unit of blood ordered- responded appropriately repeat hgb 8.3  - 5/3 - daughter reports had chills and back pain while receiving blood yesterday.   - Add blood transfusion reaction work up

## 2025-05-04 PROBLEM — D64.9 ANEMIA: Status: RESOLVED | Noted: 2025-04-29 | Resolved: 2025-05-04

## 2025-05-04 LAB
ABSOLUTE EOSINOPHIL (OHS): 0.03 K/UL
ABSOLUTE MONOCYTE (OHS): 0.85 K/UL (ref 0.3–1)
ABSOLUTE NEUTROPHIL COUNT (OHS): 14.08 K/UL (ref 1.8–7.7)
ALBUMIN SERPL BCP-MCNC: 1.7 G/DL (ref 3.5–5.2)
ALP SERPL-CCNC: 76 UNIT/L (ref 40–150)
ALT SERPL W/O P-5'-P-CCNC: 5 UNIT/L (ref 10–44)
ANION GAP (OHS): 13 MMOL/L (ref 8–16)
AORTIC ROOT ANNULUS: 2.8 CM
AORTIC VALVE CUSP SEPERATION: 1.99 CM
AST SERPL-CCNC: 9 UNIT/L (ref 11–45)
AV INDEX (PROSTH): 0.59
AV MEAN GRADIENT: 5 MMHG
AV PEAK GRADIENT: 10 MMHG
AV VALVE AREA BY VELOCITY RATIO: 2.2 CM²
AV VALVE AREA: 1.9 CM²
AV VELOCITY RATIO: 0.69
BACTERIA BLD CULT: NORMAL
BACTERIA BLD CULT: NORMAL
BASOPHILS # BLD AUTO: 0.03 K/UL
BASOPHILS NFR BLD AUTO: 0.2 %
BILIRUB SERPL-MCNC: 0.2 MG/DL (ref 0.1–1)
BSA FOR ECHO PROCEDURE: 1.92 M2
BUN SERPL-MCNC: 74 MG/DL (ref 8–23)
CALCIUM SERPL-MCNC: 7.9 MG/DL (ref 8.7–10.5)
CHLORIDE SERPL-SCNC: 106 MMOL/L (ref 95–110)
CO2 SERPL-SCNC: 19 MMOL/L (ref 23–29)
CREAT SERPL-MCNC: 4.6 MG/DL (ref 0.5–1.4)
CV ECHO LV RWT: 1.33 CM
DOP CALC AO PEAK VEL: 1.6 M/S
DOP CALC AO VTI: 24.8 CM
DOP CALC LVOT AREA: 3.1 CM2
DOP CALC LVOT DIAMETER: 2 CM
DOP CALC LVOT PEAK VEL: 1.1 M/S
DOP CALC LVOT STROKE VOLUME: 46.2 CM3
DOP CALCLVOT PEAK VEL VTI: 14.7 CM
E WAVE DECELERATION TIME: 90 MSEC
E/A RATIO: 0.57
E/E' RATIO: 13 M/S
ECHO LV POSTERIOR WALL: 1.8 CM (ref 0.6–1.1)
ERYTHROCYTE [DISTWIDTH] IN BLOOD BY AUTOMATED COUNT: 16.3 % (ref 11.5–14.5)
FRACTIONAL SHORTENING: 29.6 % (ref 28–44)
GFR SERPLBLD CREATININE-BSD FMLA CKD-EPI: 9 ML/MIN/1.73/M2
GLUCOSE SERPL-MCNC: 76 MG/DL (ref 70–110)
HCT VFR BLD AUTO: 26 % (ref 37–48.5)
HGB BLD-MCNC: 8.4 GM/DL (ref 12–16)
HOLD SPECIMEN: NORMAL
IMM GRANULOCYTES # BLD AUTO: 0.11 K/UL (ref 0–0.04)
IMM GRANULOCYTES NFR BLD AUTO: 0.7 % (ref 0–0.5)
INTERVENTRICULAR SEPTUM: 1.8 CM (ref 0.6–1.1)
IVC DIAMETER: 1.32 CM
IVRT: 108 MSEC
LA MAJOR: 5.2 CM
LA MINOR: 3.6 CM
LA WIDTH: 3.5 CM
LEFT ATRIUM SIZE: 2.3 CM
LEFT ATRIUM VOLUME INDEX: 16 ML/M2
LEFT ATRIUM VOLUME: 29 CM3
LEFT INTERNAL DIMENSION IN SYSTOLE: 1.9 CM (ref 2.1–4)
LEFT VENTRICLE DIASTOLIC VOLUME INDEX: 15.14 ML/M2
LEFT VENTRICLE DIASTOLIC VOLUME: 28 ML
LEFT VENTRICLE MASS INDEX: 103.9 G/M2
LEFT VENTRICLE SYSTOLIC VOLUME INDEX: 5.9 ML/M2
LEFT VENTRICLE SYSTOLIC VOLUME: 11 ML
LEFT VENTRICULAR INTERNAL DIMENSION IN DIASTOLE: 2.7 CM (ref 3.5–6)
LEFT VENTRICULAR MASS: 192.3 G
LV LATERAL E/E' RATIO: 14.3 M/S
LV SEPTAL E/E' RATIO: 12.3 M/S
LVED V (TEICH): 27.53 ML
LVES V (TEICH): 11.43 ML
LVOT MG: 2.64 MMHG
LVOT MV: 0.77 CM/S
LYMPHOCYTES # BLD AUTO: 1.09 K/UL (ref 1–4.8)
MAGNESIUM SERPL-MCNC: 1.9 MG/DL (ref 1.6–2.6)
MCH RBC QN AUTO: 30.1 PG (ref 27–31)
MCHC RBC AUTO-ENTMCNC: 32.3 G/DL (ref 32–36)
MCV RBC AUTO: 93 FL (ref 82–98)
MV PEAK A VEL: 1.51 M/S
MV PEAK E VEL: 0.86 M/S
MV STENOSIS PRESSURE HALF TIME: 26.24 MS
MV VALVE AREA P 1/2 METHOD: 8.38 CM2
NUCLEATED RBC (/100WBC) (OHS): 0 /100 WBC
OHS CV RV/LV RATIO: 1.33 CM
PHOSPHATE SERPL-MCNC: 4.8 MG/DL (ref 2.7–4.5)
PLATELET # BLD AUTO: 514 K/UL (ref 150–450)
PMV BLD AUTO: 9 FL (ref 9.2–12.9)
POCT GLUCOSE: 102 MG/DL (ref 70–110)
POCT GLUCOSE: 110 MG/DL (ref 70–110)
POCT GLUCOSE: 138 MG/DL (ref 70–110)
POCT GLUCOSE: 78 MG/DL (ref 70–110)
POTASSIUM SERPL-SCNC: 3.9 MMOL/L (ref 3.5–5.1)
PROT SERPL-MCNC: 6.3 GM/DL (ref 6–8.4)
PULM VEIN S/D RATIO: 1.92
PV MV: 1.3 M/S
PV PEAK D VEL: 0.37 M/S
PV PEAK GRADIENT: 18 MMHG
PV PEAK S VEL: 0.71 M/S
PV PEAK VELOCITY: 2.1 M/S
RA MAJOR: 3.52 CM
RA PRESSURE ESTIMATED: 3 MMHG
RA WIDTH: 3.6 CM
RBC # BLD AUTO: 2.79 M/UL (ref 4–5.4)
RELATIVE EOSINOPHIL (OHS): 0.2 %
RELATIVE LYMPHOCYTE (OHS): 6.7 % (ref 18–48)
RELATIVE MONOCYTE (OHS): 5.3 % (ref 4–15)
RELATIVE NEUTROPHIL (OHS): 86.9 % (ref 38–73)
RIGHT VENTRICLE DIASTOLIC BASEL DIMENSION: 3.6 CM
RIGHT VENTRICULAR END-DIASTOLIC DIMENSION: 3.59 CM
RV TISSUE DOPPLER FREE WALL SYSTOLIC VELOCITY 1 (APICAL 4 CHAMBER VIEW): 26.3 CM/S
SINUS: 3.03 CM
SODIUM SERPL-SCNC: 138 MMOL/L (ref 136–145)
STJ: 2.3 CM
TDI LATERAL: 0.06 M/S
TDI SEPTAL: 0.07 M/S
TDI: 0.07 M/S
TRICUSPID ANNULAR PLANE SYSTOLIC EXCURSION: 2 CM
VANCOMYCIN SERPL-MCNC: 16.9 UG/ML (ref ?–80)
WBC # BLD AUTO: 16.19 K/UL (ref 3.9–12.7)
Z-SCORE OF LEFT VENTRICULAR DIMENSION IN END DIASTOLE: -6.44
Z-SCORE OF LEFT VENTRICULAR DIMENSION IN END SYSTOLE: -4.05

## 2025-05-04 PROCEDURE — 25000003 PHARM REV CODE 250: Mod: HCNC | Performed by: HOSPITALIST

## 2025-05-04 PROCEDURE — 63600175 PHARM REV CODE 636 W HCPCS: Mod: HCNC | Performed by: NURSE PRACTITIONER

## 2025-05-04 PROCEDURE — 85025 COMPLETE CBC W/AUTO DIFF WBC: CPT | Mod: HCNC

## 2025-05-04 PROCEDURE — 97161 PT EVAL LOW COMPLEX 20 MIN: CPT | Mod: HCNC

## 2025-05-04 PROCEDURE — 63600175 PHARM REV CODE 636 W HCPCS: Mod: HCNC | Performed by: INTERNAL MEDICINE

## 2025-05-04 PROCEDURE — 84100 ASSAY OF PHOSPHORUS: CPT | Mod: HCNC

## 2025-05-04 PROCEDURE — 84460 ALANINE AMINO (ALT) (SGPT): CPT | Mod: HCNC

## 2025-05-04 PROCEDURE — 25000003 PHARM REV CODE 250: Mod: HCNC

## 2025-05-04 PROCEDURE — G0545 PR VISIT INHERENT TO INPT OR OBS CARE, INFECTIOUS DISEASE: HCPCS | Mod: HCNC,,, | Performed by: INTERNAL MEDICINE

## 2025-05-04 PROCEDURE — 25000003 PHARM REV CODE 250: Mod: HCNC | Performed by: INTERNAL MEDICINE

## 2025-05-04 PROCEDURE — 63600175 PHARM REV CODE 636 W HCPCS: Mod: HCNC | Performed by: HOSPITALIST

## 2025-05-04 PROCEDURE — 80202 ASSAY OF VANCOMYCIN: CPT | Mod: HCNC | Performed by: HOSPITALIST

## 2025-05-04 PROCEDURE — 36415 COLL VENOUS BLD VENIPUNCTURE: CPT | Mod: HCNC

## 2025-05-04 PROCEDURE — 25000003 PHARM REV CODE 250: Mod: HCNC | Performed by: STUDENT IN AN ORGANIZED HEALTH CARE EDUCATION/TRAINING PROGRAM

## 2025-05-04 PROCEDURE — 63600175 PHARM REV CODE 636 W HCPCS: Mod: HCNC

## 2025-05-04 PROCEDURE — 99223 1ST HOSP IP/OBS HIGH 75: CPT | Mod: HCNC,,, | Performed by: INTERNAL MEDICINE

## 2025-05-04 PROCEDURE — 95720 EEG PHY/QHP EA INCR W/VEEG: CPT | Mod: HCNC,,, | Performed by: PSYCHIATRY & NEUROLOGY

## 2025-05-04 PROCEDURE — 20000000 HC ICU ROOM: Mod: HCNC

## 2025-05-04 PROCEDURE — 97166 OT EVAL MOD COMPLEX 45 MIN: CPT | Mod: HCNC

## 2025-05-04 PROCEDURE — 83735 ASSAY OF MAGNESIUM: CPT | Mod: HCNC

## 2025-05-04 PROCEDURE — 25000003 PHARM REV CODE 250: Mod: HCNC | Performed by: NURSE PRACTITIONER

## 2025-05-04 PROCEDURE — 83036 HEMOGLOBIN GLYCOSYLATED A1C: CPT | Mod: HCNC | Performed by: HOSPITALIST

## 2025-05-04 PROCEDURE — G0427 INPT/ED TELECONSULT70: HCPCS | Mod: HCNC,95,, | Performed by: STUDENT IN AN ORGANIZED HEALTH CARE EDUCATION/TRAINING PROGRAM

## 2025-05-04 PROCEDURE — 99232 SBSQ HOSP IP/OBS MODERATE 35: CPT | Mod: HCNC,,, | Performed by: STUDENT IN AN ORGANIZED HEALTH CARE EDUCATION/TRAINING PROGRAM

## 2025-05-04 PROCEDURE — 63600175 PHARM REV CODE 636 W HCPCS: Mod: HCNC | Performed by: STUDENT IN AN ORGANIZED HEALTH CARE EDUCATION/TRAINING PROGRAM

## 2025-05-04 RX ORDER — LEVETIRACETAM 500 MG/5ML
2000 INJECTION, SOLUTION, CONCENTRATE INTRAVENOUS ONCE
Status: COMPLETED | OUTPATIENT
Start: 2025-05-04 | End: 2025-05-04

## 2025-05-04 RX ORDER — METRONIDAZOLE 500 MG/100ML
500 INJECTION, SOLUTION INTRAVENOUS
Status: DISCONTINUED | OUTPATIENT
Start: 2025-05-04 | End: 2025-05-05

## 2025-05-04 RX ORDER — SODIUM CHLORIDE 9 MG/ML
INJECTION, SOLUTION INTRAVENOUS CONTINUOUS
Status: DISCONTINUED | OUTPATIENT
Start: 2025-05-04 | End: 2025-05-05

## 2025-05-04 RX ORDER — MUPIROCIN 20 MG/G
OINTMENT TOPICAL 2 TIMES DAILY
Status: DISCONTINUED | OUTPATIENT
Start: 2025-05-04 | End: 2025-05-06 | Stop reason: HOSPADM

## 2025-05-04 RX ORDER — LORAZEPAM 2 MG/ML
2 INJECTION INTRAMUSCULAR
Status: DISCONTINUED | OUTPATIENT
Start: 2025-05-04 | End: 2025-05-06

## 2025-05-04 RX ORDER — LEVETIRACETAM 500 MG/1
500 TABLET ORAL 2 TIMES DAILY
Status: DISCONTINUED | OUTPATIENT
Start: 2025-05-05 | End: 2025-05-05

## 2025-05-04 RX ADMIN — METRONIDAZOLE 500 MG: 500 INJECTION, SOLUTION INTRAVENOUS at 03:05

## 2025-05-04 RX ADMIN — SODIUM BICARBONATE 1300 MG: 325 TABLET ORAL at 10:05

## 2025-05-04 RX ADMIN — CEFTRIAXONE 1 G: 1 INJECTION, POWDER, FOR SOLUTION INTRAMUSCULAR; INTRAVENOUS at 02:05

## 2025-05-04 RX ADMIN — METRONIDAZOLE 500 MG: 500 INJECTION, SOLUTION INTRAVENOUS at 09:05

## 2025-05-04 RX ADMIN — MUPIROCIN: 20 OINTMENT TOPICAL at 09:05

## 2025-05-04 RX ADMIN — ACETAMINOPHEN 650 MG: 650 SUPPOSITORY RECTAL at 07:05

## 2025-05-04 RX ADMIN — SODIUM CHLORIDE: 9 INJECTION, SOLUTION INTRAVENOUS at 04:05

## 2025-05-04 RX ADMIN — VANCOMYCIN HYDROCHLORIDE 500 MG: 500 INJECTION, POWDER, LYOPHILIZED, FOR SOLUTION INTRAVENOUS at 10:05

## 2025-05-04 RX ADMIN — LEVETIRACETAM 2000 MG: 100 INJECTION INTRAVENOUS at 10:05

## 2025-05-04 RX ADMIN — ASPIRIN 81 MG CHEWABLE TABLET 81 MG: 81 TABLET CHEWABLE at 10:05

## 2025-05-04 RX ADMIN — SODIUM CHLORIDE: 9 INJECTION, SOLUTION INTRAVENOUS at 09:05

## 2025-05-04 RX ADMIN — HEPARIN SODIUM 5000 UNITS: 5000 INJECTION INTRAVENOUS; SUBCUTANEOUS at 02:05

## 2025-05-04 RX ADMIN — HEPARIN SODIUM 5000 UNITS: 5000 INJECTION INTRAVENOUS; SUBCUTANEOUS at 09:05

## 2025-05-04 NOTE — NURSING
"Report received, care resumed. Pt resting with eyes closed, respirations even and unlabored on room air. Q shift skin assessment done, pt has left upper groin wound, also documented on LDA. Bed in lowest position, wheels locked, call light within reach. Pt daughter and other family member at bedside, daughter stated "Have you been told everything? Do you know what's going on?" Pt daughter informed that nurse is updated on pt plan of care. Daughter stated, "I'm waiting on my paper work." Daughter informed that there is no paperwork pending to give to her at this time, but that the charge nurse could come speak with her to escalate any of her concerns. Daughter then stated, "cause I'm going be your company tonight, sorry to tell you." Daughter informed that, that is okay, each pt is entitled to one visitor overnight and that the charge will be notified to update with any concerns or changes to plan of care. Board updated.  "

## 2025-05-04 NOTE — ASSESSMENT & PLAN NOTE
Previous sCr in 2020 2.3  Gap in records and presented with a creatinine of 5.4, eGFR 8    Suspect this is her baseline kidney function as she has never seen a nephrologist nor appears to has had any evaluation by any healthcare provider since 2021.     RP US: 8.2 cm right and 7.5 cm left. Poor imaging though appears echogenic    Plan/Recommendation  Recommend Sodium bicarb 1300 TID, refused by daughter.  -Keep MAP > 65  -Keep hemoglobin > 7  -Strict ins and outs  -Avoid nephrotoxic agents if possible and renally dose medications  -Avoid drastic hemodynamic changes if possible      #Anemia  HGB   Date Value Ref Range Status   05/04/2025 8.4 (L) 12.0 - 16.0 gm/dL Final     Iron Level   Date Value Ref Range Status   04/29/2025 32 30 - 160 ug/dL Final     Transferrin   Date Value Ref Range Status   04/29/2025 62 (L) 200 - 375 mg/dL Final     Iron Binding Capacity Total   Date Value Ref Range Status   04/29/2025 92 (L) 250 - 450 ug/dL Final     Saturated Iron   Date Value Ref Range Status   07/20/2020 8 (L) 20 - 50 % Final     Ferritin   Date Value Ref Range Status   04/29/2025 693.3 (H) 20.0 - 300.0 ng/mL Final   Iron stores adequate  JOYA once weekly    #Secondary hyperparathyroidism  Calcium   Date Value Ref Range Status   05/04/2025 7.9 (L) 8.7 - 10.5 mg/dL Final     Phosphorus Level   Date Value Ref Range Status   05/04/2025 4.8 (H) 2.7 - 4.5 mg/dL Final     PTH Intact   Date Value Ref Range Status   05/01/2025 664.2 (H) 9.0 - 77.0 pg/mL Final   Continue to monitor PTH in outpatient setting.   Start sevelamer 800 TID with meals

## 2025-05-04 NOTE — ASSESSMENT & PLAN NOTE
Patient's FSGs are controlled on current medication regimen.  Last A1c reviewed-   Lab Results   Component Value Date    HGBA1C 7.5 (H) 06/13/2020     Most recent fingerstick glucose reviewed-   Recent Labs   Lab 05/03/25  1133 05/03/25  1625 05/03/25 2017 05/04/25  0802   POCTGLUCOSE 101 121* 107 78   Hypoglycemia- infection contributing  Obtain HgbA1c

## 2025-05-04 NOTE — ASSESSMENT & PLAN NOTE
EMMA is likely due to acute tubular necrosis caused by sepsis. Baseline creatinine is 2.3 to 2.5. Most recent creatinine and eGFR are listed below.  Recent Labs     05/02/25  0418 05/03/25  0510 05/04/25  0647   CREATININE 4.8* 4.6* 4.6*   EGFRNORACEVR 9* 9* 9*     -Last sCr 2020  - Urine sodium, urine creatinine pending collection-  plus- will discuss with nurse  - Retroperitoneal ultrasound- CKD, no hydronephrosis  - Nephrotic Range proteinuria  - follow up urine culture- on rocephin  - Nephrology following

## 2025-05-04 NOTE — ASSESSMENT & PLAN NOTE
Anemia is likely due to chronic disease due to Chronic Kidney Disease. Most recent hemoglobin and hematocrit are listed below.  Recent Labs     05/02/25  0418 05/03/25  0510 05/04/25  0647   HGB 7.1* 8.3* 8.4*   HCT 22.8* 25.3* 26.0*     Plan  - Monitor serial CBC: Daily  - Transfuse PRBC if patient becomes hemodynamically unstable, symptomatic or H/H drops below 7/21.  - Iron studies iron 32 TSAT 35.   - no overt bleeding  - 1 unit of blood ordered- responded appropriately repeat hgb 8.3  - 5/3 - daughter reports had chills and back pain while receiving blood yesterday.   - Add blood transfusion reaction work up

## 2025-05-04 NOTE — EICU
"Virtual ICU Admission    Admit Date: 2025  LOS: 5  Code Status: Full Code   : 1945  Bed: W263/W263 A:     Diagnosis: Inguinal ulcer    Patient  has a past medical history of Arthritis, CHF (congestive heart failure), Coronary artery disease, Diabetes mellitus, HLD (hyperlipidemia), Hypertension, Obese, Stroke, and Thyroid disease.    Last VS: BP (!) 161/93 (BP Location: Left arm, Patient Position: Sitting)   Pulse (!) 114   Temp 99.1 °F (37.3 °C) (Axillary)   Resp 18   Ht 5' 2" (1.575 m)   Wt 84.4 kg (186 lb)   SpO2 98%   Breastfeeding No   BMI 34.02 kg/m²       VICU Review    VICU nurse assessment :  [place] completed, LDA documentation reconciliation completed, and VTE prophylaxis review                  "

## 2025-05-04 NOTE — TELEMEDICINE CONSULT
"[facility]   General Neurology  Consult Note      Consult Information  Inpatient Consult to Neurology Services (General Neurology)  Consult performed by: Wyatt Elmore MD  Consult ordered by: Alice Mooney NP  Reason for consult: Encephalopathy  Assessment/Recommendations: -recommend routine EEG while patient is awaiting transfer.  No antiseizure medication management as of now until EEG results.  If any epileptiform abnormalities/electrographic seizures are discovered, please reach out to on-call neurology for medication management  -Correct lytes as needed / control infection / avoid hypoxia or hypercapnia / avoid hypoglycemia   -Correct any nutritional deficiencies / correct anemia / provide nutritional support as appropriate / ambulate when appropriate - PT/OT recs   -Recommend delirium precautions: Re-orient patient frequently and keep pt's whiteboard up to date with current day and team member's names. Keep shades open/room lit during day..Low light at night (close blinds at night). Low stimulation, minimize interruptions at night. Minimize use of restraints. Encourage family to be at bedside. Avoid opiates, benzodiazepines, antihistamines, anticholinergics, hypnotics as much as possible            Consulting Provider: ALICE MOONEY   Current Providers  No providers found    Patient Location:  [facility] TELEMETRY IP Unit    Spoke hospital nurse at bedside with patient assisting consultant.  Patient information was obtained from patient's daughter and chart review.       Neurology Documentation  Acute Stroke Times   Last Known Normal Date: 05/02/25  Unknown Normal Time: (P) Unknown Time  Stroke Team Arrival Date: 05/03/25  Stroke Team Arrival Time: 0912  CT Interpretation Time: 0921    Blood pressure (!) 107/54, pulse (!) 112, temperature 98.5 °F (36.9 °C), temperature source Axillary, resp. rate 18, height 5' 2" (1.575 m), weight 84.7 kg (186 lb 12.8 oz), SpO2 100%, not currently " breastfeeding.    Medical Decision Making  HPI:  79 y.o. female with medical history significant for type 2 DM, HTN, CHF, hypothyroidism, and CKD 4 who is currently admitted at outside hospital for sepsis secondary to left upper thigh cellulitis.  Patient is currently on IV antibiotics.  Because of confusion, tele stroke was also activated on her yesterday and patient was seen by acute tele stroke service who had recommended further neuroimaging and brain imaging was recommended for the wake-up stroke protocol which was negative for acute stroke and no intravenous thrombolysis was offered.  Tele neurology was reconsulted today for encephalopathy.  Patient is currently awaiting transferred to [facility].  Per my conversation with daughter, patient has been having intermittent trembling in the left leg, and she attributes that to pain from ulcer/cellulitis.  Patient does not answering and/or falling any commands on my exam.     Images personally reviewed and interpreted:  Study: Head CT, MRI Brain, and MRA Brain & Neck  Study Interpretation:   Imaging:   Imaging: Images were reviewed remotely as they were acquired.    CTH:  No acute intracranial abnormality  MRA H/N:  No flow-limiting stenosis  MRI brain without contrast:  No acute infarct           Additional studies reviewed:   Study: Cardiac_Neuro: none  Study Interpretation: N/A    Laboratory studies reviewed:  BMP:   Lab Results   Component Value Date     05/04/2025     07/24/2020    K 3.9 05/04/2025    K 4.8 07/24/2020     05/04/2025     07/24/2020    CO2 19 (L) 05/04/2025    CO2 29 07/24/2020    BUN 74 (H) 05/04/2025    CREATININE 4.6 (H) 05/04/2025    CALCIUM 7.9 (L) 05/04/2025    CALCIUM 8.8 07/24/2020     CBC:   Lab Results   Component Value Date    WBC 16.19 (H) 05/04/2025    RBC 2.79 (L) 05/04/2025    RBC 3.16 (L) 07/24/2020    HGB 8.4 (L) 05/04/2025    HGB 8.9 (L) 07/24/2020    HCT 26.0 (L) 05/04/2025    HCT 30.7 (L) 07/24/2020     HCT 26 (L) 07/20/2020     (H) 05/04/2025     07/24/2020    MCV 93 05/04/2025    MCV 97 07/24/2020    MCH 30.1 05/04/2025    MCHC 32.3 05/04/2025    MCHC 29.0 (L) 07/24/2020     Lipid Panel:   Lab Results   Component Value Date    CHOL 156 06/13/2020    LDLCALC 97.8 06/13/2020    HDL 32 (L) 06/13/2020    TRIG 131 06/13/2020     Coagulation:   Lab Results   Component Value Date    INR 1.0 06/13/2020     Hgb A1C:   Lab Results   Component Value Date    HGBA1C 7.5 (H) 06/13/2020     TSH:   Lab Results   Component Value Date    TSH 1.331 07/20/2020       Documentation personally reviewed:  Notes: Notes: H&P and Consult notes from:  Acute tele stroke     Post charge discharge plan:  Clinic follow up: General Neurology    Visit Type: in person or virtual  Timeframe: 4 weeks        Additional Physical Exam, History, & ROS  ROS  Physical Exam  Neurological:      Comments: Patient groans upon being called her name and does not verbalize follow any commands.  She had shoulder shrug bilateral       Past Medical History:   Diagnosis Date    Arthritis     Gout    CHF (congestive heart failure)     Coronary artery disease     Diabetes mellitus     HLD (hyperlipidemia)     Hypertension     Obese     Stroke     1986    Thyroid disease      Past Surgical History:   Procedure Laterality Date    ESOPHAGOGASTRODUODENOSCOPY N/A 7/23/2020    Procedure: EGD (ESOPHAGOGASTRODUODENOSCOPY);  Surgeon: Halle Hughes MD;  Location: [facility];  Service: Endoscopy;  Laterality: N/A;    right hand surgery      right knee surgery Right     partial plate     Family History   Problem Relation Name Age of Onset    Heart disease Sister      Diabetes Maternal Aunt      Heart disease Maternal Aunt      Hypertension Maternal Aunt         Diagnoses  No problems updated.    Wyatt Elmore MD    Neurology consultation requested by spoke provider. Audiovisual encounter with the patient performed using a secure connection.  Results and  impressions from the visit are documented on this note and were communicated to the consulting provider/team via direct communication. The note has been shared for addition to the patients electronic medical record.

## 2025-05-04 NOTE — PT/OT/SLP EVAL
Occupational Therapy   Evaluation    Name: Tasneem Lynn  MRN: [id number]  Admitting Diagnosis: Inguinal ulcer  Recent Surgery: * No surgery found *      Recommendations:     Discharge Recommendations: Low Intensity Therapy (resume HH)  Discharge Equipment Recommendations:  wheelchair  Barriers to discharge:   (Increased caregiver support.)    Assessment:     Tasneem Lynn is a 79 y.o. female with a medical diagnosis of Inguinal ulcer. Family states set up meals, congruent casual dialoging, and describes min a UB management, OOB x3/week as normative. Patient currently demonstrating marked (-) effects of current illness event marked by tremors, absent social gaze, extensive assist intake, absent communication, inability to follow 1 step direction. Per family upon clearance of infection/s and return to family home, resumption of HH services are being sought. Performance deficits affecting function: weakness, impaired self care skills, impaired functional mobility, visual deficits, impaired cognition, impaired balance, decreased coordination, decreased upper extremity function, abnormal tone, decreased ROM, impaired coordination, impaired fine motor.      Rehab Prognosis: guarded/TBD; patient would benefit from acute skilled OT services to address these deficits and reach maximum level of function.       Plan:     Patient to be seen 3 x/week to address the above listed problems via self-care/home management, therapeutic exercises, therapeutic activities  Plan of Care Expires: 05/31/25  Plan of Care Reviewed with: patient, grandchild(jose)    Subjective     Chief Complaint: Patient unable to state, (-) effects of current illness -> significant impairment of recent norm bed level or seated Patient engagement and interactivity levels.   Patient/Family Comments/goals: Return to family home, resume family support, minimize caregiver strain, maximize external awareness, recover set up meals, re-establish skin/joint integrity  protection measures, resume HH services.     Occupational Profile:  Living Environment: [facility] with Daughter and adult G-children, bed fast s/p 04/2020 fall LB injury at that time, high movement insecurity pursuant. Prior to admit, set up meals, min a UB management, interactive with family (bed level hygiene and dressing). Per chart OOB x3/week.   Equipment Used at Home: walker, rolling  Assistance upon Discharge: Daughter, adult grand children in home.     Pain/Comfort:  Pain Rating 1: 0/10    Patients cultural, spiritual, Mormonism conflicts given the current situation: no    Objective:     Communicated with: RN prior to session.  Patient found supine with peripheral IV, telemetry, bed alarm, PureWick, pressure relief boots upon OT entry to room.    General Precautions: Standard, aspiration  Orthopedic Precautions: N/A  Braces: N/A  Respiratory Status: Room air    Occupational Performance:    Bed Mobility:  Total assist of 2 trained staff all bed mobility and pressure relief. PROM and standard aspiration precaution edu undertaken. OOB NT 2* max low amp tremors, Patient unable to engage, deemed unsafe. RN/MD informed at sessions end. ST consult requested.       Activities of Daily Living:  Feeding:  total assistance    Grooming: total assistance    Bathing: dependence and of 2 persons    Upper Body Dressing: dependence    Lower Body Dressing: dependence and of 2 persons    Toileting: Purewick, dep bowel management          Cognitive Patient in obtund state, visually and verbally unresponsive/, Visual Perceptual:Intact, distant<> downward gaze, VT cues to open eyes frequent, Patient unable to sustain. Patient unable to follow familiar direction, unable to make needs known, absent information integration at time of eval.   Behavior: Blunted, tremors non verbal  Upper Body Physical Exam:  RESPIRATORY: non-labored / normal effort / rate. (-) accessory MM engagement.    UB SKIN: Observable UB skin warm and dry.    Sensation: UT  Posture: Rounded shoulders, sitting kyphosis, down oliver gaze, post pelvic tilt.   BUE PROM impaired, limbs moderately rigid  BUE MMT UT  UB GM/FM coordination markedly impaired no goal directed behaviors noted.   UT standing bedside  (-) UB edema      Treatment & Education:  Discussed role of OT,eval and collaborative POC dev completed w/ Patient's G daughter (present, and daughter( by phone). Both parties state understanding and agreement with all herein.   Interventions:   UE ROM/MMT assessment  Bed mobility training / assessment  Functional mobility assessment  Sitting balance assessment  Educated on standard aspiration precautions, progress to norm sitting <> OOB in bedside chair to promote increased strength, endurance & proper breathing.  Intro UB seated  PROM constructs BUE all joints, all planes seated (ROM B shoulders </= 90 degrees, no c/o pain) 1x10, x3 daily recommended for (I) ex as suggested to counter (-) effects of limited mobility inherent in acute setting.     AMPA 6 Click ADL:  AMPAC Total Score: 6      Patient left HOB elevated with all lines intact, call button in reach, bed alarm on, RN notified, and Grand daughter present, pressure boots reapplied.     GOALS:   Multidisciplinary Problems       Occupational Therapy Goals          Problem: Occupational Therapy    Goal Priority Disciplines Outcome Interventions   Occupational Therapy Goal     OT, PT/OT Progressing    Description: Goals to be met by: 5/31/2025 5/31/2025  Patient will increase functional independence with ADLs by performing:    Feeding with Minimal Assistance.  UE Dressing with Maximum Assistance.  Grooming while seated with Minimal Assistance.  Upper extremity exercise program P/AAROM x10-12 reps per handout, with assistance as needed to bolster skin/joint protection neeeds and family training whimle in current care setting.                            History:     Past Medical History:   Diagnosis Date     Arthritis     Gout    CHF (congestive heart failure)     Coronary artery disease     Diabetes mellitus     HLD (hyperlipidemia)     Hypertension     Obese     Stroke     1986    Thyroid disease          Past Surgical History:   Procedure Laterality Date    ESOPHAGOGASTRODUODENOSCOPY N/A 7/23/2020    Procedure: EGD (ESOPHAGOGASTRODUODENOSCOPY);  Surgeon: Halle Hughes MD;  Location: [facility];  Service: Endoscopy;  Laterality: N/A;    right hand surgery      right knee surgery Right     partial plate       Time Tracking:     OT Date of Treatment: 05/04/25  OT Start Time: 1245  OT Stop Time: 1301  OT Total Time (min): 16 min    Billable Minutes:16    5/4/2025

## 2025-05-04 NOTE — NURSING
"Pt appears alert but not answering any questions. Not responding to commands to open her mouth. Per daughter, pt was "pocketing" food yesterday. Placed an order for speech evaluation.  "

## 2025-05-04 NOTE — PT/OT/SLP EVAL
"Physical Therapy Evaluation    Patient Name:  Tasneem Lynn   MRN:  [id number]    Recommendations:     Discharge Recommendations: Low Intensity Therapy   Discharge Equipment Recommendations: to be determined by next level of care   Barriers to discharge: Medical unstable    Assessment:     Tasneem Lynn is a 79 y.o. female admitted with a medical diagnosis of Inguinal ulcer.  She presents with the following impairments/functional limitations: weakness, impaired endurance, impaired sensation, impaired self care skills, impaired functional mobility, gait instability, impaired balance, impaired cognition, decreased coordination, decreased lower extremity function, decreased safety awareness, impaired coordination, impaired fine motor, impaired skin, edema, impaired cardiopulmonary response to activity, impaired joint extensibility, impaired muscle length. Granddaughter was presented during eval. Granddaughter call her mother. Daughter was given the subject information. Pt was lying in hospital bed. Pt demonstrated AMS. Pt was shaking during eval. Pt did not answered any questions. Pt was unable to perform any bed mobility. Required PROM of B LE. No response to pain or tactile cues. According to granddaughter, her grandmother usually communicates and feed her self. Pt is not medically stable today. Pt is bed bounded, but she was not even following command today. Pt is high fall risk.       Rehab Prognosis: Fair and Poor; patient would benefit from acute skilled PT services to address these deficits and reach maximum level of function.    Recent Surgery: * No surgery found *      Plan:     During this hospitalization, patient to be seen 2 x/week (1-2x per week) to address the identified rehab impairments via gait training, therapeutic activities, therapeutic exercises, neuromuscular re-education and progress toward the following goals:    Plan of Care Expires:  05/25/25    Subjective     Chief Complaint: "granddaughter states " "usually patient communicate normal" " granddaughter states that pt has been non-communicative and shaking since come to hospital"   Patient/Family Comments/goals: agreed to participate in therapy  Pain/Comfort:  Pain Rating 1:  (Pt did not rate pain scale)  Pain Addressed 1: Nurse notified    Patients cultural, spiritual, Yarsani conflicts given the current situation: no    Living Environment:  Pt lives with her daughter and grandchildren in [facility]. Pt reports her daughter helps her with bathing and hygeine  Prior to admission, patients level of function was pt has been mostly bed bound at home, pt's daughter would help change her diaper and feed pt. Limited independence with ADLs and pt has not been able to walk/ perform transfers d/t foot pain Equipment used at home: walker, rolling.  DME owned (not currently used): rolling walker.  Upon discharge, patient will have assistance from daughter.    Objective:     Communicated with nurse prior to session.  Patient found supine with bed alarm, pressure relief boots, telemetry  upon PT entry to room.    General Precautions: Standard, fall  Orthopedic Precautions:N/A   Braces: N/A  Respiratory Status: Room air    Exams:  Cognitive Exam:  Patient is oriented to AMS  Postural Exam:  Patient presented with the following abnormalities:    -       No postural abnormalities identified  Sensation:    -       Intact  Skin Integrity/Edema:      -       Skin integrity: Visible skin intact  RLE ROM: PROM of R LE stiffness  RLE Strength: Deficits: unable to be tested  LLE ROM: PROM of L LE stiffness  LLE Strength: unable to be tested     Functional Mobility:  Bed Mobility:     Rolling Left:  dependence and of 2 persons  Rolling Right: dependence and of 2 persons      AM-PAC 6 CLICK MOBILITY  Total Score:7       Treatment & Education:  Family educated regarding PROM and mobility in bed.     Patient left supine with all lines intact, call button in reach, bed alarm on, nurse notified, " and granddaughter  present.    GOALS:   Multidisciplinary Problems       Physical Therapy Goals          Problem: Physical Therapy    Goal Priority Disciplines Outcome Interventions   Physical Therapy Goal     PT, PT/OT Progressing    Description: Goals to be met by: 25     Patient will increase functional independence with mobility by performin. Supine to sit with Maximum Assistance  2. Sit to supine with Maximum Assistance  3. Rolling to Left and Right with Maximum Assistance.  4. Lower extremity exercise program x30 reps per handout, with independence  5. Teach family proper PROM of B LE and proper repositioning safety to avoid any pressure soreness in patient body.                          DME Justifications:  Due to AMS, DME justification is TBD    History:     Past Medical History:   Diagnosis Date    Arthritis     Gout    CHF (congestive heart failure)     Coronary artery disease     Diabetes mellitus     HLD (hyperlipidemia)     Hypertension     Obese     Stroke     1986    Thyroid disease        Past Surgical History:   Procedure Laterality Date    ESOPHAGOGASTRODUODENOSCOPY N/A 2020    Procedure: EGD (ESOPHAGOGASTRODUODENOSCOPY);  Surgeon: Halle Hughes MD;  Location: [facility];  Service: Endoscopy;  Laterality: N/A;    right hand surgery      right knee surgery Right     partial plate       Time Tracking:     PT Received On: 25  PT Start Time: 1248     PT Stop Time: 1300  PT Total Time (min): 12 min     Billable Minutes: Evaluation 12 min      2025

## 2025-05-04 NOTE — SUBJECTIVE & OBJECTIVE
Interval History: see goals of care    Review of patient's allergies indicates:   Allergen Reactions    Corticosteroids (glucocorticoids) Edema     Current Facility-Administered Medications   Medication Frequency    0.9%  NaCl infusion (for blood administration) Q24H PRN    acetaminophen suppository 650 mg Q6H PRN    acetaminophen tablet 650 mg Q4H PRN    amLODIPine tablet 2.5 mg Daily    aspirin chewable tablet 81 mg Daily    atorvastatin tablet 40 mg QHS    cefTRIAXone injection 1 g Q24H    dextrose 50% injection 12.5 g PRN    dextrose 50% injection 25 g PRN    glucagon (human recombinant) injection 1 mg PRN    glucose chewable tablet 16 g PRN    glucose chewable tablet 24 g Once    heparin (porcine) injection 5,000 Units Q8H    hydrALAZINE injection 10 mg Q4H PRN    levothyroxine tablet 75 mcg Before breakfast    melatonin tablet 6 mg Nightly PRN    morphine injection 1 mg Once    ondansetron injection 4 mg Q8H PRN    sevelamer carbonate tablet 800 mg TID WM    sodium bicarbonate tablet 1,300 mg TID    sodium chloride 0.9% flush 10 mL Q12H PRN    vancomycin (VANCOCIN) 500 mg in 0.9% NaCl 100 mL IVPB (MB+) Once    vancomycin - pharmacy to dose pharmacy to manage frequency       Objective:     Vital Signs (Most Recent):  Temp: 98.5 °F (36.9 °C) (05/04/25 0803)  Pulse: 108 (05/04/25 1100)  Resp: 18 (05/04/25 0803)  BP: (!) 107/54 (05/04/25 0803)  SpO2: 100 % (05/04/25 0803) Vital Signs (24h Range):  Temp:  [97.8 °F (36.6 °C)-98.5 °F (36.9 °C)] 98.5 °F (36.9 °C)  Pulse:  [] 108  Resp:  [18-19] 18  SpO2:  [98 %-100 %] 100 %  BP: ()/(54-83) 107/54     Weight: 84.7 kg (186 lb 12.8 oz) (04/29/25 1946)  Body mass index is 34.17 kg/m².  Body surface area is 1.92 meters squared.    I/O last 3 completed shifts:  In: 0   Out: 500 [Urine:500]     Physical Exam  Constitutional:       General: She is not in acute distress.     Appearance: She is not ill-appearing or toxic-appearing.   Neurological:      Mental  Status: She is alert.      Comments: Listening to [employer] music, protecting her airway.   Not following commands          Significant Labs:  All labs within the past 24 hours have been reviewed.     Significant Imaging:  Labs: Reviewed

## 2025-05-04 NOTE — PLAN OF CARE
Problem: Occupational Therapy  Goal: Occupational Therapy Goal  Description: Goals to be met by: 5/31/2025 5/31/2025  Patient will increase functional independence with ADLs by performing:    Feeding with Minimal Assistance.  UE Dressing with Maximum Assistance.  Grooming while seated with Minimal Assistance.  Upper extremity exercise program P/AAROM x10-12 reps per handout, with assistance as needed to bolster skin/joint protection neeeds and family training whimle in current care setting.     Outcome: Progressing

## 2025-05-04 NOTE — SUBJECTIVE & OBJECTIVE
Past Medical History:   Diagnosis Date    Arthritis     Gout    CHF (congestive heart failure)     Coronary artery disease     Diabetes mellitus     HLD (hyperlipidemia)     Hypertension     Obese     Stroke     1986    Thyroid disease        Past Surgical History:   Procedure Laterality Date    ESOPHAGOGASTRODUODENOSCOPY N/A 7/23/2020    Procedure: EGD (ESOPHAGOGASTRODUODENOSCOPY);  Surgeon: Halle Hughes MD;  Location: [facility];  Service: Endoscopy;  Laterality: N/A;    right hand surgery      right knee surgery Right     partial plate       Review of patient's allergies indicates:   Allergen Reactions    Corticosteroids (glucocorticoids) Edema       Medications:  Medications Prior to Admission   Medication Sig    acetaminophen (TYLENOL) 325 MG tablet Take 2 tablets (650 mg total) by mouth every 6 (six) hours as needed for Pain or Temperature greater than (100.5).    amLODIPine (NORVASC) 2.5 MG tablet Take 1 tablet (2.5 mg total) by mouth once daily.    aspirin 81 MG Chew Take 81 mg by mouth once daily.    blood pressure test kit-large Kit Use to check blood pressure daily and as needed.    carvedilol (COREG) 25 MG tablet Take 25 mg by mouth 2 (two) times daily with meals.    cetirizine (ZYRTEC) 10 MG tablet Take 1 tablet (10 mg total) by mouth every evening.    insulin detemir U-100 (LEVEMIR FLEXTOUCH) 100 unit/mL (3 mL) SubQ InPn pen Inject 10 Units into the skin every evening.    levothyroxine (SYNTHROID) 75 MCG tablet Take 1 tablet (75 mcg total) by mouth before breakfast.    pantoprazole (PROTONIX) 40 MG tablet Take 1 tablet (40 mg total) by mouth once daily.    rosuvastatin (CRESTOR) 20 MG tablet Take 1 tablet (20 mg total) by mouth once daily.    senna-docusate 8.6-50 mg (PERICOLACE) 8.6-50 mg per tablet Take 1 tablet by mouth 2 (two) times daily.     Antibiotics (From admission, onward)      Start     Stop Route Frequency Ordered    05/03/25 1500  cefTRIAXone injection 1 g         -- IV Every 24  "hours (non-standard times) 05/03/25 1346    04/29/25 1909  vancomycin - pharmacy to dose  (vancomycin IVPB (PEDS and ADULTS))        Placed in "And" Linked Group    -- IV pharmacy to manage frequency 04/29/25 1815          Antifungals (From admission, onward)      None          Antivirals (From admission, onward)      None             Immunization History   Administered Date(s) Administered    PPD Test 06/14/2020       Family History       Problem Relation (Age of Onset)    Diabetes Maternal Aunt    Heart disease Sister, Maternal Aunt    Hypertension Maternal Aunt          Social History     Socioeconomic History    Marital status:    Tobacco Use    Smoking status: Never    Smokeless tobacco: Never   Substance and Sexual Activity    Alcohol use: No    Drug use: No    Sexual activity: Not Currently     Partners: Male     Social Drivers of Health     Financial Resource Strain: Low Risk  (5/1/2025)    Overall Financial Resource Strain (CARDIA)     Difficulty of Paying Living Expenses: Not hard at all   Food Insecurity: No Food Insecurity (5/1/2025)    Hunger Vital Sign     Worried About Running Out of Food in the Last Year: Never true     Ran Out of Food in the Last Year: Never true   Transportation Needs: No Transportation Needs (4/29/2025)    PRAPARE - Transportation     Lack of Transportation (Medical): No     Lack of Transportation (Non-Medical): No   Stress: No Stress Concern Present (5/1/2025)    [employer] [place] [employer] - Occupational Stress Questionnaire     Feeling of Stress : Not at all   Housing Stability: High Risk (5/1/2025)    Housing Stability Vital Sign     Unable to Pay for Housing in the Last Year: No     Homeless in the Last Year: Yes     Review of Systems   Unable to perform ROS: Patient nonverbal     Objective:     Vital Signs (Most Recent):  Temp: 99.1 °F (37.3 °C) (05/04/25 1152)  Pulse: 103 (05/04/25 1152)  Resp: 17 (05/04/25 1152)  BP: (!) 187/74 (05/04/25 1152)  SpO2: 98 " % (05/04/25 1152) Vital Signs (24h Range):  Temp:  [97.8 °F (36.6 °C)-99.1 °F (37.3 °C)] 99.1 °F (37.3 °C)  Pulse:  [] 103  Resp:  [17-19] 17  SpO2:  [98 %-100 %] 98 %  BP: ()/(54-83) 187/74     Weight: 84.4 kg (186 lb)  Body mass index is 34.02 kg/m².    Estimated Creatinine Clearance: 10 mL/min (A) (based on SCr of 4.6 mg/dL (H)).     Physical Exam  Vitals and nursing note reviewed.   Constitutional:       Appearance: Normal appearance.   HENT:      Head: Normocephalic.   Musculoskeletal:         General: Swelling and tenderness present.      Comments: Left groin/thigh with unstagable left thigh wound.   Neurological:      Mental Status: She is alert.          Significant Labs: CBC:   Recent Labs   Lab 05/03/25  0510 05/04/25  0647   WBC 18.01* 16.19*   HGB 8.3* 8.4*   HCT 25.3* 26.0*   * 514*     Microbiology Results (last 7 days)       Procedure Component Value Units Date/Time    Blood culture #1 [[id number]]  (Normal) Collected: 04/29/25 1528    Order Status: Completed Specimen: Blood Updated: 05/03/25 1701     Blood Culture No Growth After 96 hours    Blood culture #2 [[id number]]  (Normal) Collected: 04/29/25 1528    Order Status: Completed Specimen: Blood Updated: 05/03/25 1701     Blood Culture No Growth After 96 hours    Urine culture [[id number]]  (Abnormal) Collected: 05/02/25 1241    Order Status: Completed Specimen: Urine, Clean Catch Updated: 05/03/25 0732     Urine Culture >100,000 cfu/ml Presumptive E Coli            Significant Imaging: I have reviewed all pertinent imaging results/findings within the past 24 hours.

## 2025-05-04 NOTE — NURSING
[facility]  Nurses Note -- 4 Eyes      5/3/2025       Skin assessed on: Q Shift      [] No Pressure Injuries Present    []Prevention Measures Documented    [x] Yes LDA  for Pressure Injury Previously documented     [] Yes New Pressure Injury Discovered   [] LDA for New Pressure Injury Added      Attending RN:  Renetta Gilliam RN     Second RN:  GEOVANNA Wilks

## 2025-05-04 NOTE — PLAN OF CARE
Problem: Physical Therapy  Goal: Physical Therapy Goal  Description: Goals to be met by: 25     Patient will increase functional independence with mobility by performin. Supine to sit with Maximum Assistance  2. Sit to supine with Maximum Assistance  3. Rolling to Left and Right with Maximum Assistance.  4. Lower extremity exercise program x30 reps per handout, with independence  5. Teach family proper PROM of B LE and proper repositioning safety to avoid any pressure soreness in patient body.     Outcome: Progressing      [Normal Growth] : growth [Normal Development] : development [None] : No known medical problems [No Elimination Concerns] : elimination [No Feeding Concerns] : feeding [No Skin Concerns] : skin [Normal Sleep Pattern] : sleep [School] : school [Development and Mental Health] : development and mental health [Nutrition and Physical Activity] : nutrition and physical activity [Oral Health] : oral health [Safety] : safety [No Medications] : ~He/She~ is not on any medications [Patient] : patient

## 2025-05-04 NOTE — PROGRESS NOTES
[facility] Medicine  Progress Note    Patient Name: Tasneem Lynn  MRN: [id number]  Patient Class: IP- Inpatient   Admission Date: 4/29/2025  Length of Stay: 5 days  Attending Physician: Neel Trejo MD  Primary Care Provider: Unable, To Obtain        Subjective     Principal Problem:Inguinal ulcer        HPI:  79-year-old F with medical history significant for type 2 DM, HTN, CHF, hypothyroidism, and CKD 4 who presented to the ED with a chief complaint of LT upper  groin pain boil of one week duration      Patient was in her baseline state of health until which initially started as a small pea-sized lesion but progressively grew and ulcerated.  Patient started having pain and this prompted her to come to the ED. she endorsed some subjective chills but denied any fever, nausea, or vomiting.  No diarrhea or loss of appetite.  Patient had no history of bloodstream infection, surgical implants, valvular heart disease.  However, retrospective chart review indicated that patient had chronic osteomyelitis of the right foot and ESBL Klebsiella UTI 5 years ago.  Patient has a known CKD 4 but no routine follow up with Nephrology noted; review of home medication did not show any diuretics or other nephrotoxic medications.    On presentation to the ED, patient was hypertensive to 183/74, , oxygen saturation 100% on room air.Laboratory investigations were pertinent for WBC 29.1, HGB 9.3, , sodium 135, BUN 79, creatinine of 5.4, EGFR 8, procalcitonin 25.23, troponin 0.03, lactic acid 0.9.  Patient received NS 1 L in the ED and IV Zosyn.  Admission was requested for further management.      Overview/Hospital Course:  Admission for sepsis secondary to left upper thigh cellulitis and ulcer.  Leukocytosis improving with IV antibiotics.  Afebrile.  Wound care orders placed.  Left leg cellulitis improving.   Noted also CKD.  Nephrology consulted.  CKD at baseline.  Ultrasound shows chronic kidney  disease no stones or hydronephrois  Hemoglobin trending down .3> 8.5>7.5>7.1 due to CKD/chronic inflammation. No overt signs of bleeding. Repeat hgb 8.3 after 1 unit of PRBC transfusion. Nephrotic range proteinuria. Goals of cares discussed with patient. Patient wished not for dialysis if it comes to that point.  Was seen by palliative care team also.    On 5/2 am, noted change in mental status, left facial droop, aphasic and not following simple commands. UA evidence of infection. Stroke code called. CT head no acute bleed and indeterminate small lacunar infarct left thalamus. MRI brain, MRA head neck without contrast. Continue ASA, statin. Switch Cefepime to Rocephin as cefepime due to acute encephalopathy. Follow up urine culture and MRI/MRA. Continue statin, ASA.   Consulted general surgery and ID.     Interval History: patient seen and examined. Patient nonverbal and tries to follow some commands. She constantly having tremors. Awaiting EEG on Monday. Consulted general surgery secondary to foul odor to left groin wound. Consulted ID for antibiotic recommendations. Consult PT/OT for weakness and consulted speech for swallowing eval due to pocketing food. NPO for now. Ordered IVF for hydration.     Patient awaiting transfer to [facility].   Review of Systems   Unable to perform ROS: Acuity of condition     Objective:     Vital Signs (Most Recent):  Temp: 98.5 °F (36.9 °C) (05/04/25 0803)  Pulse: (!) 112 (05/04/25 0803)  Resp: 18 (05/04/25 0803)  BP: (!) 107/54 (05/04/25 0803)  SpO2: 100 % (05/04/25 0803) Vital Signs (24h Range):  Temp:  [97.8 °F (36.6 °C)-98.5 °F (36.9 °C)] 98.5 °F (36.9 °C)  Pulse:  [] 112  Resp:  [18-19] 18  SpO2:  [98 %-100 %] 100 %  BP: ()/(54-83) 107/54     Weight: 84.7 kg (186 lb 12.8 oz)  Body mass index is 34.17 kg/m².    Intake/Output Summary (Last 24 hours) at 5/4/2025 1049  Last data filed at 5/4/2025 0000  Gross per 24 hour   Intake 0 ml   Output 150 ml   Net -150  ml         Physical Exam  Constitutional:       General: She is not in acute distress.     Appearance: She is well-developed. She is obese. She is not ill-appearing, toxic-appearing or diaphoretic.   HENT:      Head: Normocephalic and atraumatic.      Nose: Nose normal. No congestion.   Eyes:      Pupils: Pupils are equal, round, and reactive to light.   Cardiovascular:      Rate and Rhythm: Normal rate and regular rhythm.      Pulses: Normal pulses.      Heart sounds: Normal heart sounds.   Pulmonary:      Effort: Pulmonary effort is normal. No respiratory distress.      Breath sounds: Normal breath sounds. No stridor.      Comments: Diminished . Difficult to assess.   Abdominal:      General: Bowel sounds are normal. There is no distension.      Palpations: Abdomen is soft. There is no mass.      Tenderness: There is no abdominal tenderness.   Musculoskeletal:         General: Tenderness (left upper thigh) and deformity present. Normal range of motion.      Cervical back: Normal range of motion and neck supple.      Right lower leg: Edema (does not move right leg) present.      Left lower leg: Edema present.   Skin:     General: Skin is warm and dry.      Capillary Refill: Capillary refill takes less than 2 seconds.      Comments: LT Inguinal desquamated ulcer 8cm x 5cm with exudative base,and sloughing edges. Unstageable. Foul odor noted. Drainage noted.   Neurological:      Mental Status: She is alert.      Comments: Bedbound x 5 years  Patient currently nonverbal, tries to follow some commands. When ask to look at me patient didn't follow command but when ask to squeezed my hand. She tried but patient very weak. She has constant tremor also noted    Psychiatric:      Comments: Unable to assess                Significant Labs: All pertinent labs within the past 24 hours have been reviewed.    Significant Imaging: I have reviewed all pertinent imaging results/findings within the past 24 hours.      Assessment &  "Plan  Left upper thigh wound/Cellulitis  Hx of LT upper thigh carbuncle progressively enlarging with ulceration  Blood culture no growth   Noncontrast CT abdomen and left upper thigh to evaluate for occult abscesses and deep extensions-no abscess   Procal elevated, lactate normal  Given immunosuppression (CKD and type 2 DM); start empiric renally dosed vancomycin and cefepime  Wound care ordered . Wound care team following  Cellulitis and leukocytosis improving. Continue same wound care  Continue vancomycin while inpatient, Stop cefepime given encephalopathy  Consulted ID and general surgery     Acute encephalopathy  On 5/2 am, noted change in mental status, left facial droop, aphasic and not following simple commands.   -DDx UA evidence of infection, cellulitis and uremia contributing?, concern for stroke given history    -Stroke code called. CT head no acute bleed and indeterminate small lacunar infarct left thalamus. MRI brain, MRA head neck without contrast. Continue ASA, statin.  -Switch Cefepime to Rocephin as cefepime can contribute to encephalopathy.  - Follow up urine culture  - MRI/MRA. Unremarkable   - Blood cultures negative, 2 D echo pending   - Continue statin, ASA  - NPO for now secondary to pocketing food. Consulted speech. IVF for hydration  -PT/OT for weakness      Addendum 1500 on 5/3:   MRI brain, MRA head neck wo contract unremarkable  Currently, bedside nurse feeding patient without issue, patient states "hi" but not able to state name  Continue plan above- treat UTI, left thigh cellulits/wound  Consult General Neurology, EEG ordered  Discussed again all the above to Daughter Merrill at bedside. Nurses present. Daughter requesting to see a different Nephrologist and to be transferred to [facility]. Await call back from Transfer Center.     Tele neuro: recommend routine EEG while patient is awaiting transfer. No antiseizure medication management as of now until EEG results. If any " "epileptiform abnormalities/electrographic seizures are discovered, please reach out to on-call neurology for medication   Sepsis with acute organ dysfunction without septic shock  This patient does have evidence of infective focus  My overall impression is sepsis.  Source: Skin and Soft Tissue (location LT inguinal cellulitis)  Antibiotics given-   Antibiotics (72h ago, onward)      Start     Stop Route Frequency Ordered    05/04/25 0900  vancomycin (VANCOCIN) 500 mg in 0.9% NaCl 100 mL IVPB (MB+)         -- IV Once 05/04/25 0828    05/03/25 1500  cefTRIAXone injection 1 g         -- IV Every 24 hours (non-standard times) 05/03/25 1346    04/29/25 1909  vancomycin - pharmacy to dose  (vancomycin IVPB (PEDS and ADULTS))        Placed in "And" Linked Group    -- IV pharmacy to manage frequency 04/29/25 1815        WBC improving  Continue vanc, switch cefe to rocephin due to encephalopathy    CKD (chronic kidney disease) stage 5, GFR less than 15 ml/min  EMMA is likely due to acute tubular necrosis caused by sepsis. Baseline creatinine is 2.3 to 2.5. Most recent creatinine and eGFR are listed below.  Recent Labs     05/02/25  0418 05/03/25  0510 05/04/25  0647   CREATININE 4.8* 4.6* 4.6*   EGFRNORACEVR 9* 9* 9*     -Last sCr 2020  - Urine sodium, urine creatinine pending collection-  plus- will discuss with nurse  - Retroperitoneal ultrasound- CKD, no hydronephrosis  - Nephrotic Range proteinuria  - follow up urine culture- on rocephin  - Nephrology following    Hyperlipidemia  Continue statins    Diabetes mellitus  Patient's FSGs are controlled on current medication regimen.  Last A1c reviewed-   Lab Results   Component Value Date    HGBA1C 7.5 (H) 06/13/2020     Most recent fingerstick glucose reviewed-   Recent Labs   Lab 05/03/25  1133 05/03/25  1625 05/03/25  2017 05/04/25  0802   POCTGLUCOSE 101 121* 107 78   Hypoglycemia- infection contributing  Obtain HgbA1c    Type 2 myocardial infarction  Type 2 " secondary to sepsis and EMMA  Noted elevated troponin with underlying history of CAD on presentation  EKG showed no acute ischemic change  Continue to trend troponin    Hypertension  Patient currently borderline hypotensive; home medications held  Restart as clinically appropriate  PRNs ordered  CAD (coronary artery disease)  Patient with known CAD s/p which is controlled Will continue ASA and Statin and monitor for S/Sx of angina/ACS. Continue to monitor on telemetry.   Patient denied history of MI  Hypothyroidism  Continue home levothyroxine    Anemia (Resolved: 5/4/2025)  Anemia is likely due to chronic disease due to Chronic Kidney Disease. Most recent hemoglobin and hematocrit are listed below.  Recent Labs     05/02/25  0418 05/03/25  0510 05/04/25  0647   HGB 7.1* 8.3* 8.4*   HCT 22.8* 25.3* 26.0*     Plan  - Monitor serial CBC: Daily  - Transfuse PRBC if patient becomes hemodynamically unstable, symptomatic or H/H drops below 7/21.  - Patient has not received any PRBC transfusions to date  - Patient's anemia is currently stable  - Obtain iron studies and ferritin- chronic disease and malnutrition  Bedbound  Chronic history of bed bound  PT/OT for weaker than normal      Advance care planning  Advance Care Planning     Date: 05/01/2025    Code Status  In light of the patients advanced and life limiting illness,I engaged the the patient in a voluntary conversation about the patient's preferences for care  at the very end of life. The patient wishes to have a natural, peaceful death.  Along those lines, the patient does not wish to have CPR or other invasive treatments performed when her heart and/or breathing stops. Patient would like to discuss with Daughter Merrill. Patient also does not want dialysis if had to come to that point.   Palliative team care following      Anemia of chronic disorder  Anemia is likely due to chronic disease due to Chronic Kidney Disease. Most recent hemoglobin and hematocrit are  listed below.  Recent Labs     05/02/25  0418 05/03/25  0510 05/04/25  0647   HGB 7.1* 8.3* 8.4*   HCT 22.8* 25.3* 26.0*     Plan  - Monitor serial CBC: Daily  - Transfuse PRBC if patient becomes hemodynamically unstable, symptomatic or H/H drops below 7/21.  - Iron studies iron 32 TSAT 35.   - no overt bleeding  - 1 unit of blood ordered- responded appropriately repeat hgb 8.3  - 5/3 - daughter reports had chills and back pain while receiving blood yesterday.   - Add blood transfusion reaction work up     VTE Risk Mitigation (From admission, onward)           Ordered     heparin (porcine) injection 5,000 Units  Every 8 hours         04/29/25 1815     IP VTE HIGH RISK PATIENT  Once         04/29/25 1815     Place sequential compression device  Until discontinued         04/29/25 1815                    Discharge Planning   STEPHEN: 5/3/2025     Code Status: Full Code   Medical Readiness for Discharge Date:   Discharge Plan A: Home with family                Please place Justification for DME        True Yu NP  Department of Hospital Medicine   [facility] - [place] - Telemetry

## 2025-05-04 NOTE — ASSESSMENT & PLAN NOTE
"Ms. Lynn is a 80 yo woman admitted with a left groin wound which has worsened. Initial imaging reveled "Extensive subcutaneous soft tissue edema throughout the left hip and thigh. No obvious organized focal fluid collection or drainable abscess seen." Patient seems to have worsened. Needs anaerobic coverage (see abx changes).     Recommendations  Add anaerobic coverage (Flagyl)  May benefit from debridement and/or additional imaging  Discussed with daughter via telephone  "

## 2025-05-04 NOTE — PROGRESS NOTES
[facility]  Nephrology  Progress Note    Patient Name: Tasneem Lynn  MRN: [id number]  Admission Date: 4/29/2025  Hospital Length of Stay: 5 days  Attending Provider: Neel Trejo MD   Primary Care Physician: Unable, To Obtain  Principal Problem:Inguinal ulcer    Subjective:     HPI: 79 year old female with a history of T2DM, HTN, HF, hypothyroidism presents with left upper groin abscess for the last week.     She developed an upper leg abscess which prompted evaluation in the ED as management at home failed.    Apropos kidney function: she has never established with a kidney doctor and the last nephrologist evaluation inpatient was in 2020 with a creatinine of 2.3. On presentation today she had a creatinine of 5.4 with an eGFR of 8. Other labs on presentation with sodium of 135, K of 4.1, bicarb of 11, BUN of 79.     Interval History: see goals of care    Review of patient's allergies indicates:   Allergen Reactions    Corticosteroids (glucocorticoids) Edema     Current Facility-Administered Medications   Medication Frequency    0.9%  NaCl infusion (for blood administration) Q24H PRN    acetaminophen suppository 650 mg Q6H PRN    acetaminophen tablet 650 mg Q4H PRN    amLODIPine tablet 2.5 mg Daily    aspirin chewable tablet 81 mg Daily    atorvastatin tablet 40 mg QHS    cefTRIAXone injection 1 g Q24H    dextrose 50% injection 12.5 g PRN    dextrose 50% injection 25 g PRN    glucagon (human recombinant) injection 1 mg PRN    glucose chewable tablet 16 g PRN    glucose chewable tablet 24 g Once    heparin (porcine) injection 5,000 Units Q8H    hydrALAZINE injection 10 mg Q4H PRN    levothyroxine tablet 75 mcg Before breakfast    melatonin tablet 6 mg Nightly PRN    morphine injection 1 mg Once    ondansetron injection 4 mg Q8H PRN    sevelamer carbonate tablet 800 mg TID WM    sodium bicarbonate tablet 1,300 mg TID    sodium chloride 0.9% flush 10 mL Q12H PRN    vancomycin (VANCOCIN) 500 mg in 0.9% NaCl  100 mL IVPB (MB+) Once    vancomycin - pharmacy to dose pharmacy to manage frequency       Objective:     Vital Signs (Most Recent):  Temp: 98.5 °F (36.9 °C) (05/04/25 0803)  Pulse: 108 (05/04/25 1100)  Resp: 18 (05/04/25 0803)  BP: (!) 107/54 (05/04/25 0803)  SpO2: 100 % (05/04/25 0803) Vital Signs (24h Range):  Temp:  [97.8 °F (36.6 °C)-98.5 °F (36.9 °C)] 98.5 °F (36.9 °C)  Pulse:  [] 108  Resp:  [18-19] 18  SpO2:  [98 %-100 %] 100 %  BP: ()/(54-83) 107/54     Weight: 84.7 kg (186 lb 12.8 oz) (04/29/25 1946)  Body mass index is 34.17 kg/m².  Body surface area is 1.92 meters squared.    I/O last 3 completed shifts:  In: 0   Out: 500 [Urine:500]     Physical Exam  Constitutional:       General: She is not in acute distress.     Appearance: She is not ill-appearing or toxic-appearing.   Neurological:      Mental Status: She is alert.      Comments: Listening to Congregational music, protecting her airway.   Not following commands          Significant Labs:  All labs within the past 24 hours have been reviewed.     Significant Imaging:  Labs: Reviewed  Assessment/Plan:     Renal/  CKD (chronic kidney disease) stage 5, GFR less than 15 ml/min  Previous sCr in 2020 2.3  Gap in records and presented with a creatinine of 5.4, eGFR 8    Suspect this is her baseline kidney function as she has never seen a nephrologist nor appears to has had any evaluation by any healthcare provider since 2021.     RP US: 8.2 cm right and 7.5 cm left. Poor imaging though appears echogenic    Plan/Recommendation  Recommend Sodium bicarb 1300 TID, refused by daughter.  -Keep MAP > 65  -Keep hemoglobin > 7  -Strict ins and outs  -Avoid nephrotoxic agents if possible and renally dose medications  -Avoid drastic hemodynamic changes if possible      #Anemia  HGB   Date Value Ref Range Status   05/04/2025 8.4 (L) 12.0 - 16.0 gm/dL Final     Iron Level   Date Value Ref Range Status   04/29/2025 32 30 - 160 ug/dL Final     Transferrin   Date  "Value Ref Range Status   04/29/2025 62 (L) 200 - 375 mg/dL Final     Iron Binding Capacity Total   Date Value Ref Range Status   04/29/2025 92 (L) 250 - 450 ug/dL Final     Saturated Iron   Date Value Ref Range Status   07/20/2020 8 (L) 20 - 50 % Final     Ferritin   Date Value Ref Range Status   04/29/2025 693.3 (H) 20.0 - 300.0 ng/mL Final   Iron stores adequate  JOYA once weekly    #Secondary hyperparathyroidism  Calcium   Date Value Ref Range Status   05/04/2025 7.9 (L) 8.7 - 10.5 mg/dL Final     Phosphorus Level   Date Value Ref Range Status   05/04/2025 4.8 (H) 2.7 - 4.5 mg/dL Final     PTH Intact   Date Value Ref Range Status   05/01/2025 664.2 (H) 9.0 - 77.0 pg/mL Final   Continue to monitor PTH in outpatient setting.   Start sevelamer 800 TID with meals      Palliative Care  Advance care planning  See previous documentation, palliative care    Met with daughter in person today. She states that NAZIA Jenkins ordered blood for "kidney related issues" but I (Nephrology) should have been the one to order it as it was kidney related. I tried to explain that the primary team does not need approval for blood transfusions as they are the primary team and that every hospital works as such. [facility] then stated [facility]. We then discussed her CKD. She was not aware that her mother had CKD despite her creatinine being 2.3 in 2021 and she has not seen a doctor since that time as well. I reiterated to the daughter that the patient has told multiple people, myself and Dr. Mayes of palliative that she does not want to be on dialysis even if she passes away from such and that she does not have any emergent needs for dialysis at this time. The discussion regarding dialysis was prompted in the setting of CKD 5 and unknown course during her hospitalization.     The daughter then stated that she was the power of  and would make the decision when it came to it.     Pending transfer to [facility].  "             Thank you for your consult. I will follow-up with patient. Please contact us if you have any additional questions.    Clinton Aguilar MD  Nephrology  [facility] [place] [facility]

## 2025-05-04 NOTE — NURSING
MD Brewster notified of pt grimacing due to pain to pt left groin wound, 0.25 mg Morphine was ordered, but the pt's daughter refused and said she was only comfortable with the pt getting Tylenol. Tylenol 650 mg suppository administered to pt instead per pt daughter's request.

## 2025-05-04 NOTE — NURSING
[facility] [place]  Rapid Response Nurse Intervention/ Task    Date of Visit: 05/04/2025  Time of Visit: 1650       INTERVENTIONS/ TASK Completed:     Called by Renetta GIRALDO to come and evaluate pt. I went to bedside. Pt with twitching noted. She did look at me when I said her name. MD notified and at bedside at 1700. Orders to transfer to ICU for EEG.

## 2025-05-04 NOTE — ASSESSMENT & PLAN NOTE
Anemia is likely due to chronic disease due to Chronic Kidney Disease. Most recent hemoglobin and hematocrit are listed below.  Recent Labs     05/02/25  0418 05/03/25  0510 05/04/25  0647   HGB 7.1* 8.3* 8.4*   HCT 22.8* 25.3* 26.0*     Plan  - Monitor serial CBC: Daily  - Transfuse PRBC if patient becomes hemodynamically unstable, symptomatic or H/H drops below 7/21.  - Patient has not received any PRBC transfusions to date  - Patient's anemia is currently stable  - Obtain iron studies and ferritin- chronic disease and malnutrition

## 2025-05-04 NOTE — CARE UPDATE
Called to bedside by RN due to worsening tremor / encephalopathy.  Daughters at bedside, pt's RN, and Rapid RN all report her tremor has worsened and that she is no longer responding to them.  Pt can track her eyes to follow source of voice, but is no longer able to purposeful follow any other commands I ask her to carry out.      Neuro teleconsult note reviewed: agree that pt needs an EEG, but think given this reported decline by those who have been following her case closely that it should not wait until tomorrow.  Moving patient to ICU to facilitate STAT EEG.  Vitals are stable for now so will also hold off on giving any antiepileptics or benzos, in order to allow for good data capture.  PRN Ativan in place should her vitals deteriorate.  Family aware and updated.    Mert Wayne MD MPH  [facility] [place]Loc[facility]

## 2025-05-04 NOTE — CONSULTS
"[facility]  Infectious Disease  Consult Note    Patient Name: Tasneem Lynn  MRN: [id number]  Admission Date: 4/29/2025  Hospital Length of Stay: 5 days  Attending Physician: Neel Trejo MD  Primary Care Provider: Unable, To Obtain     Isolation Status: No active isolations    Patient information was obtained from relative(s), past medical records, and ER records.      Inpatient consult to Infectious Diseases  Consult performed by: Adarsh Dash MD  Consult ordered by: True Yu NP        Assessment/Plan:     * Left upper thigh wound/Cellulitis    Ms. Lynn is a 80 yo woman admitted with a left groin wound which has worsened. Initial imaging reveled "Extensive subcutaneous soft tissue edema throughout the left hip and thigh. No obvious organized focal fluid collection or drainable abscess seen." Patient seems to have worsened. Needs anaerobic coverage (see abx changes).     Recommendations  Add anaerobic coverage (Flagyl)  May benefit from debridement and/or additional imaging  Discussed with daughter via telephone    Thank you for your consult. I will follow-up with patient. Please contact us if you have any additional questions.    Adarsh Dash MD  Infectious Disease  [facility]    Subjective:     Principal Problem: Inguinal ulcer    HPI: Ms. Lynn is a 80 yo woman admitted with sepsis secondary to left upper thigh cellulitis and ulcer. Work up included CT thigh, Renal US and NC CT A/P as noted below. UA on 5/2 showed RBC 81, WBC > 100, and moderate bacteria. UCX are growing presumptive E.coli, sensitivities pending. Blood CXS from 4/29 are pending. She was admitted on Cefepime and Vanco for UTI and leg cellulitis from abscess and Wound Care was consulted. On 5/2, a Code Stroke was called. Her cefepime was changed to Rocephin. ID is consulted today for abx assistance. Today, the patient has a worsening groin wound. While she is non-verbal, she does express a " grimace to pain. There is malodor about the wound.    Past Medical History:   Diagnosis Date    Arthritis     Gout    CHF (congestive heart failure)     Coronary artery disease     Diabetes mellitus     HLD (hyperlipidemia)     Hypertension     Obese     Stroke     1986    Thyroid disease        Past Surgical History:   Procedure Laterality Date    ESOPHAGOGASTRODUODENOSCOPY N/A 7/23/2020    Procedure: EGD (ESOPHAGOGASTRODUODENOSCOPY);  Surgeon: Halle Hughes MD;  Location: [facility];  Service: Endoscopy;  Laterality: N/A;    right hand surgery      right knee surgery Right     partial plate       Review of patient's allergies indicates:   Allergen Reactions    Corticosteroids (glucocorticoids) Edema       Medications:  Medications Prior to Admission   Medication Sig    acetaminophen (TYLENOL) 325 MG tablet Take 2 tablets (650 mg total) by mouth every 6 (six) hours as needed for Pain or Temperature greater than (100.5).    amLODIPine (NORVASC) 2.5 MG tablet Take 1 tablet (2.5 mg total) by mouth once daily.    aspirin 81 MG Chew Take 81 mg by mouth once daily.    blood pressure test kit-large Kit Use to check blood pressure daily and as needed.    carvedilol (COREG) 25 MG tablet Take 25 mg by mouth 2 (two) times daily with meals.    cetirizine (ZYRTEC) 10 MG tablet Take 1 tablet (10 mg total) by mouth every evening.    insulin detemir U-100 (LEVEMIR FLEXTOUCH) 100 unit/mL (3 mL) SubQ InPn pen Inject 10 Units into the skin every evening.    levothyroxine (SYNTHROID) 75 MCG tablet Take 1 tablet (75 mcg total) by mouth before breakfast.    pantoprazole (PROTONIX) 40 MG tablet Take 1 tablet (40 mg total) by mouth once daily.    rosuvastatin (CRESTOR) 20 MG tablet Take 1 tablet (20 mg total) by mouth once daily.    senna-docusate 8.6-50 mg (PERICOLACE) 8.6-50 mg per tablet Take 1 tablet by mouth 2 (two) times daily.     Antibiotics (From admission, onward)      Start     Stop Route Frequency Ordered    05/03/25 1500   "cefTRIAXone injection 1 g         -- IV Every 24 hours (non-standard times) 05/03/25 1346    04/29/25 1909  vancomycin - pharmacy to dose  (vancomycin IVPB (PEDS and ADULTS))        Placed in "And" Linked Group    -- IV pharmacy to manage frequency 04/29/25 1815          Antifungals (From admission, onward)      None          Antivirals (From admission, onward)      None             Immunization History   Administered Date(s) Administered    PPD Test 06/14/2020       Family History       Problem Relation (Age of Onset)    Diabetes Maternal Aunt    Heart disease Sister, Maternal Aunt    Hypertension Maternal Aunt          Social History     Socioeconomic History    Marital status:    Tobacco Use    Smoking status: Never    Smokeless tobacco: Never   Substance and Sexual Activity    Alcohol use: No    Drug use: No    Sexual activity: Not Currently     Partners: Male     Social Drivers of Health     Financial Resource Strain: Low Risk  (5/1/2025)    Overall Financial Resource Strain (CARDIA)     Difficulty of Paying Living Expenses: Not hard at all   Food Insecurity: No Food Insecurity (5/1/2025)    Hunger Vital Sign     Worried About Running Out of Food in the Last Year: Never true     Ran Out of Food in the Last Year: Never true   Transportation Needs: No Transportation Needs (4/29/2025)    PRAPARE - Transportation     Lack of Transportation (Medical): No     Lack of Transportation (Non-Medical): No   Stress: No Stress Concern Present (5/1/2025)    [employer] - Occupational Stress Questionnaire     Feeling of Stress : Not at all   Housing Stability: High Risk (5/1/2025)    Housing Stability Vital Sign     Unable to Pay for Housing in the Last Year: No     Homeless in the Last Year: Yes     Review of Systems   Unable to perform ROS: Patient nonverbal     Objective:     Vital Signs (Most Recent):  Temp: 99.1 °F (37.3 °C) (05/04/25 1152)  Pulse: 103 (05/04/25 1152)  Resp: 17 " (05/04/25 1152)  BP: (!) 187/74 (05/04/25 1152)  SpO2: 98 % (05/04/25 1152) Vital Signs (24h Range):  Temp:  [97.8 °F (36.6 °C)-99.1 °F (37.3 °C)] 99.1 °F (37.3 °C)  Pulse:  [] 103  Resp:  [17-19] 17  SpO2:  [98 %-100 %] 98 %  BP: ()/(54-83) 187/74     Weight: 84.4 kg (186 lb)  Body mass index is 34.02 kg/m².    Estimated Creatinine Clearance: 10 mL/min (A) (based on SCr of 4.6 mg/dL (H)).     Physical Exam  Vitals and nursing note reviewed.   Constitutional:       Appearance: Normal appearance.   HENT:      Head: Normocephalic.   Musculoskeletal:         General: Swelling and tenderness present.      Comments: Left groin/thigh with unstagable left thigh wound.   Neurological:      Mental Status: She is alert.          Significant Labs: CBC:   Recent Labs   Lab 05/03/25  0510 05/04/25  0647   WBC 18.01* 16.19*   HGB 8.3* 8.4*   HCT 25.3* 26.0*   * 514*     Microbiology Results (last 7 days)       Procedure Component Value Units Date/Time    Blood culture #1 [[id number]]  (Normal) Collected: 04/29/25 1528    Order Status: Completed Specimen: Blood Updated: 05/03/25 1701     Blood Culture No Growth After 96 hours    Blood culture #2 [[id number]]  (Normal) Collected: 04/29/25 1528    Order Status: Completed Specimen: Blood Updated: 05/03/25 1701     Blood Culture No Growth After 96 hours    Urine culture [[id number]]  (Abnormal) Collected: 05/02/25 1241    Order Status: Completed Specimen: Urine, Clean Catch Updated: 05/03/25 0732     Urine Culture >100,000 cfu/ml Presumptive E Coli            Significant Imaging: I have reviewed all pertinent imaging results/findings within the past 24 hours.

## 2025-05-04 NOTE — ASSESSMENT & PLAN NOTE
"On 5/2 am, noted change in mental status, left facial droop, aphasic and not following simple commands.   -DDx UA evidence of infection, cellulitis and uremia contributing?, concern for stroke given history    -Stroke code called. CT head no acute bleed and indeterminate small lacunar infarct left thalamus. MRI brain, MRA head neck without contrast. Continue ASA, statin.  -Switch Cefepime to Rocephin as cefepime can contribute to encephalopathy.  - Follow up urine culture  - MRI/MRA. Unremarkable   - Blood cultures negative, 2 D echo pending   - Continue statin, ASA  - NPO for now secondary to pocketing food. Consulted speech. IVF for hydration  -PT/OT for weakness      Addendum 1500 on 5/3:   MRI brain, MRA head neck wo contract unremarkable  Currently, bedside nurse feeding patient without issue, patient states "hi" but not able to state name  Continue plan above- treat UTI, left thigh cellulits/wound  Consult General Neurology, EEG ordered  Discussed again all the above to Daughter Merrill at bedside. Nurses present. Daughter requesting to see a different Nephrologist and to be transferred to [facility]. Await call back from Transfer Center.     Tele neuro: recommend routine EEG while patient is awaiting transfer. No antiseizure medication management as of now until EEG results. If any epileptiform abnormalities/electrographic seizures are discovered, please reach out to on-call neurology for medication   "

## 2025-05-04 NOTE — SUBJECTIVE & OBJECTIVE
Interval History: patient seen and examined. Patient nonverbal and tries to follow some commands. She constantly having tremors. Awaiting EEG on Monday. Consulted general surgery secondary to foul odor to left groin wound. Consulted ID for antibiotic recommendations.     Review of Systems   Unable to perform ROS: Acuity of condition     Objective:     Vital Signs (Most Recent):  Temp: 98.5 °F (36.9 °C) (05/04/25 0803)  Pulse: (!) 112 (05/04/25 0803)  Resp: 18 (05/04/25 0803)  BP: (!) 107/54 (05/04/25 0803)  SpO2: 100 % (05/04/25 0803) Vital Signs (24h Range):  Temp:  [97.8 °F (36.6 °C)-98.5 °F (36.9 °C)] 98.5 °F (36.9 °C)  Pulse:  [] 112  Resp:  [18-19] 18  SpO2:  [98 %-100 %] 100 %  BP: ()/(54-83) 107/54     Weight: 84.7 kg (186 lb 12.8 oz)  Body mass index is 34.17 kg/m².    Intake/Output Summary (Last 24 hours) at 5/4/2025 1049  Last data filed at 5/4/2025 0000  Gross per 24 hour   Intake 0 ml   Output 150 ml   Net -150 ml         Physical Exam  Constitutional:       General: She is not in acute distress.     Appearance: She is well-developed. She is obese. She is not ill-appearing, toxic-appearing or diaphoretic.   HENT:      Head: Normocephalic and atraumatic.      Nose: Nose normal. No congestion.   Eyes:      Pupils: Pupils are equal, round, and reactive to light.   Cardiovascular:      Rate and Rhythm: Normal rate and regular rhythm.      Pulses: Normal pulses.      Heart sounds: Normal heart sounds.   Pulmonary:      Effort: Pulmonary effort is normal. No respiratory distress.      Breath sounds: Normal breath sounds. No stridor.      Comments: Diminished . Difficult to assess.   Abdominal:      General: Bowel sounds are normal. There is no distension.      Palpations: Abdomen is soft. There is no mass.      Tenderness: There is no abdominal tenderness.   Musculoskeletal:         General: Tenderness (left upper thigh) and deformity present. Normal range of motion.      Cervical back: Normal  range of motion and neck supple.      Right lower leg: Edema (does not move right leg) present.      Left lower leg: Edema present.   Skin:     General: Skin is warm and dry.      Capillary Refill: Capillary refill takes less than 2 seconds.      Comments: LT Inguinal desquamated ulcer 8cm x 5cm with exudative base,and sloughing edges   Neurological:      Mental Status: She is alert.      Comments: Bedbound x 5 years  Patient currently nonverbal, tries to follow some commands. When ask to look at me patient didn't follow command but when ask to squeezed my hand. She tried but patient very weak. She has constant tremor also noted    Psychiatric:      Comments: Unable to assess                Significant Labs: All pertinent labs within the past 24 hours have been reviewed.    Significant Imaging: I have reviewed all pertinent imaging results/findings within the past 24 hours.

## 2025-05-04 NOTE — PLAN OF CARE
Patient received to ICU from Tele-med and remain in ICU, AA but non verbal at this time, cap EEG started, placed ivs, POC reviewed with daughter at bedside expressed understanding.  Problem: Adult Inpatient Plan of Care  Goal: Plan of Care Review  Outcome: Progressing  Goal: Patient-Specific Goal (Individualized)  Outcome: Progressing  Goal: Absence of Hospital-Acquired Illness or Injury  Outcome: Progressing  Goal: Optimal Comfort and Wellbeing  Outcome: Progressing  Goal: Readiness for Transition of Care  Outcome: Progressing     Problem: Infection  Goal: Absence of Infection Signs and Symptoms  Outcome: Progressing     Problem: Diabetes Comorbidity  Goal: Blood Glucose Level Within Targeted Range  Outcome: Progressing     Problem: Sepsis/Septic Shock  Goal: Optimal Coping  Outcome: Progressing  Goal: Absence of Bleeding  Outcome: Progressing  Goal: Blood Glucose Level Within Targeted Range  Outcome: Progressing  Goal: Absence of Infection Signs and Symptoms  Outcome: Progressing  Goal: Optimal Nutrition Intake  Outcome: Progressing     Problem: Acute Kidney Injury/Impairment  Goal: Fluid and Electrolyte Balance  Outcome: Progressing  Goal: Improved Oral Intake  Outcome: Progressing  Goal: Effective Renal Function  Outcome: Progressing     Problem: Wound  Goal: Optimal Coping  Outcome: Progressing  Goal: Optimal Functional Ability  Outcome: Progressing  Goal: Absence of Infection Signs and Symptoms  Outcome: Progressing  Goal: Improved Oral Intake  Outcome: Progressing  Goal: Optimal Pain Control and Function  Outcome: Progressing  Goal: Skin Health and Integrity  Outcome: Progressing  Goal: Optimal Wound Healing  Outcome: Progressing     Problem: Skin Injury Risk Increased  Goal: Skin Health and Integrity  Outcome: Progressing     Problem: Coping Ineffective  Goal: Effective Coping  Outcome: Progressing

## 2025-05-04 NOTE — ASSESSMENT & PLAN NOTE
"This patient does have evidence of infective focus  My overall impression is sepsis.  Source: Skin and Soft Tissue (location LT inguinal cellulitis)  Antibiotics given-   Antibiotics (72h ago, onward)      Start     Stop Route Frequency Ordered    05/04/25 0900  vancomycin (VANCOCIN) 500 mg in 0.9% NaCl 100 mL IVPB (MB+)         -- IV Once 05/04/25 0828    05/03/25 1500  cefTRIAXone injection 1 g         -- IV Every 24 hours (non-standard times) 05/03/25 1346    04/29/25 1909  vancomycin - pharmacy to dose  (vancomycin IVPB (PEDS and ADULTS))        Placed in "And" Linked Group    -- IV pharmacy to manage frequency 04/29/25 1815        WBC improving  Continue vanc, switch cefe to rocephin due to encephalopathy    "

## 2025-05-04 NOTE — SUBJECTIVE & OBJECTIVE
HPI:  79 y.o. female with medical history significant for type 2 DM, HTN, CHF, hypothyroidism, and CKD 4 who is currently admitted at outside hospital for sepsis secondary to left upper thigh cellulitis.  Patient is currently on IV antibiotics.  Because of confusion, tele stroke was also activated on her yesterday and patient was seen by acute tele stroke service who had recommended further neuroimaging and brain imaging was recommended for the wake-up stroke protocol which was negative for acute stroke and no intravenous thrombolysis was offered.  Tele neurology was reconsulted today for encephalopathy.  Patient is currently awaiting transferred to [facility].  Per my conversation with daughter, patient has been having intermittent trembling in the left leg, and she attributes that to pain from ulcer/cellulitis.  Patient does not answering and/or falling any commands on my exam.     Images personally reviewed and interpreted:  Study: Head CT, MRI Brain, and MRA Brain & Neck  Study Interpretation:   Imaging:   Imaging: Images were reviewed remotely as they were acquired.    CTH:  No acute intracranial abnormality  MRA H/N:  No flow-limiting stenosis  MRI brain without contrast:  No acute infarct           Additional studies reviewed:   Study: Cardiac_Neuro: none  Study Interpretation: N/A    Laboratory studies reviewed:  BMP:   Lab Results   Component Value Date     05/04/2025     07/24/2020    K 3.9 05/04/2025    K 4.8 07/24/2020     05/04/2025     07/24/2020    CO2 19 (L) 05/04/2025    CO2 29 07/24/2020    BUN 74 (H) 05/04/2025    CREATININE 4.6 (H) 05/04/2025    CALCIUM 7.9 (L) 05/04/2025    CALCIUM 8.8 07/24/2020     CBC:   Lab Results   Component Value Date    WBC 16.19 (H) 05/04/2025    RBC 2.79 (L) 05/04/2025    RBC 3.16 (L) 07/24/2020    HGB 8.4 (L) 05/04/2025    HGB 8.9 (L) 07/24/2020    HCT 26.0 (L) 05/04/2025    HCT 30.7 (L) 07/24/2020    HCT 26 (L) 07/20/2020     (H)  05/04/2025     07/24/2020    MCV 93 05/04/2025    MCV 97 07/24/2020    MCH 30.1 05/04/2025    MCHC 32.3 05/04/2025    MCHC 29.0 (L) 07/24/2020     Lipid Panel:   Lab Results   Component Value Date    CHOL 156 06/13/2020    LDLCALC 97.8 06/13/2020    HDL 32 (L) 06/13/2020    TRIG 131 06/13/2020     Coagulation:   Lab Results   Component Value Date    INR 1.0 06/13/2020     Hgb A1C:   Lab Results   Component Value Date    HGBA1C 7.5 (H) 06/13/2020     TSH:   Lab Results   Component Value Date    TSH 1.331 07/20/2020       Documentation personally reviewed:  Notes: Notes: H&P and Consult notes from:  Acute tele stroke     Post charge discharge plan:  Clinic follow up: General Neurology    Visit Type: in person or virtual  Timeframe: 4 weeks

## 2025-05-04 NOTE — ASSESSMENT & PLAN NOTE
"See previous documentation, palliative care    Met with daughter in person today. She states that NP Alice ordered blood for "kidney related issues" but I (Nephrology) should have been the one to order it as it was kidney related. I tried to explain that the primary team does not need approval for blood transfusions as they are the primary team and that every hospital works as such. [facility] then stated [facility]. We then discussed her CKD. She was not aware that her mother had CKD despite her creatinine being 2.3 in 2021 and she has not seen a doctor since that time as well. I reiterated to the daughter that the patient has told multiple people, myself and Dr. Mayes of palliative that she does not want to be on dialysis even if she passes away from such and that she does not have any emergent needs for dialysis at this time. The discussion regarding dialysis was prompted in the setting of CKD 5 and unknown course during her hospitalization.     The daughter then stated that she was the power of  and would make the decision when it came to it.     Pending transfer to [facility].        "

## 2025-05-04 NOTE — HPI
Ms. Lynn is a 78 yo woman admitted with sepsis secondary to left upper thigh cellulitis and ulcer. Work up included CT thigh, Renal US and NC CT A/P as noted below. UA on 5/2 showed RBC 81, WBC > 100, and moderate bacteria. UCX are growing presumptive E.coli, sensitivities pending. Blood CXS from 4/29 are pending. She was admitted on Cefepime and Vanco for UTI and leg cellulitis from abscess and Wound Care was consulted. On 5/2, a Code Stroke was called. Her cefepime was changed to Rocephin. ID is consulted today for abx assistance. Today, the patient has a worsening groin wound. While she is non-verbal, she does express a grimace to pain. There is malodor about the wound.

## 2025-05-04 NOTE — ASSESSMENT & PLAN NOTE
Hx of LT upper thigh carbuncle progressively enlarging with ulceration  Blood culture no growth   Noncontrast CT abdomen and left upper thigh to evaluate for occult abscesses and deep extensions-no abscess   Procal elevated, lactate normal  Given immunosuppression (CKD and type 2 DM); start empiric renally dosed vancomycin and cefepime  Wound care ordered . Wound care team following  Cellulitis and leukocytosis improving. Continue same wound care  Continue vancomycin while inpatient, Stop cefepime given encephalopathy  Consulted ID and general surgery

## 2025-05-04 NOTE — PROGRESS NOTES
Pharmacokinetic Assessment Follow Up: IV Vancomycin    Vancomycin serum concentration assessment(s):    The random level was drawn correctly and can be used to guide therapy at this time. The measurement is within the desired definitive target range of 10 to 20 mcg/mL.    Vancomycin Regimen Plan:    Dose 500 mg x 1 today 5/4    Re-dose when the random level is less than 20 mcg/mL, next level to be drawn at 0400 on 5/5    Drug levels (last 3 results):  Recent Labs   Lab Result Units 05/02/25 0418 05/03/25  0510 05/04/25  0647   Vancomycin Random ug/ml 18.3 17.6 16.9       Pharmacy will continue to follow and monitor vancomycin.    Please contact pharmacy at extension [phone] for questions regarding this assessment.    Thank you for the consult,   Jermaine Brown       Patient brief summary:  Tasneem Lynn is a 79 y.o. female initiated on antimicrobial therapy with IV Vancomycin for treatment of skin & soft tissue infection      Drug Allergies:   Review of patient's allergies indicates:   Allergen Reactions    Corticosteroids (glucocorticoids) Edema       Actual Body Weight:   84 kg    Renal Function:   Estimated Creatinine Clearance: 10 mL/min (A) (based on SCr of 4.6 mg/dL (H)).,     Dialysis Method (if applicable):  N/A    CBC (last 72 hours):  Recent Labs   Lab Result Units 05/02/25 0418 05/03/25  0510 05/04/25  0647   WBC K/uL 18.97* 18.01* 16.19*   HGB gm/dL 7.1* 8.3* 8.4*   HCT % 22.8* 25.3* 26.0*   Platelet Count K/uL 526* 474* 514*   Lymph % % 14.2* 17.0* 6.7*   Mono % % 6.3 6.6 5.3   Eos % % 1.4 1.5 0.2   Basophil % % 0.2 0.2 0.2       Metabolic Panel (last 72 hours):  Recent Labs   Lab Result Units 05/02/25 0418 05/02/25  1241 05/03/25  0510 05/04/25  0647   Sodium mmol/L 137  --  136 138   Urine Sodium mmol/L  --  62  --   --    Potassium mmol/L 3.8  --  3.8 3.9   Chloride mmol/L 106  --  104 106   CO2 mmol/L 19*  --  20* 19*   Glucose mg/dL 76  --  88 76   BUN mg/dL 73*  --  70* 74*   Creatinine mg/dL  4.8*  --  4.6* 4.6*   Urine Creatinine mg/dL  --  44.9  45.3  --   --    Albumin g/dL 1.6*  --  1.8* 1.7*   Bilirubin Total mg/dL 0.2  --  0.3 0.2   ALP unit/L 82  --  85 76   AST unit/L 11  --  9* 9*   ALT unit/L <5*  --  <5* 5*   Magnesium  mg/dL 1.9  --  1.9 1.9   Phosphorus Level mg/dL 4.4  --  4.1 4.8*       Vancomycin Administrations:  vancomycin given in the last 96 hours                     vancomycin 750 mg in 0.9% NaCl 250 mL IVPB (admixture device) (mg) 750 mg New Bag 04/30/25 0858                    Microbiologic Results:  Microbiology Results (last 7 days)       Procedure Component Value Units Date/Time    Blood culture #1 [[id number]]  (Normal) Collected: 04/29/25 1528    Order Status: Completed Specimen: Blood Updated: 05/03/25 1701     Blood Culture No Growth After 96 hours    Blood culture #2 [[id number]]  (Normal) Collected: 04/29/25 1528    Order Status: Completed Specimen: Blood Updated: 05/03/25 1701     Blood Culture No Growth After 96 hours    Urine culture [[id number]]  (Abnormal) Collected: 05/02/25 1241    Order Status: Completed Specimen: Urine, Clean Catch Updated: 05/03/25 0732     Urine Culture >100,000 cfu/ml Presumptive E Coli

## 2025-05-05 ENCOUNTER — ANESTHESIA EVENT (OUTPATIENT)
Dept: SURGERY | Facility: HOSPITAL | Age: 80
DRG: 871 | End: 2025-05-05
Payer: MEDICARE

## 2025-05-05 ENCOUNTER — ANESTHESIA (OUTPATIENT)
Dept: SURGERY | Facility: HOSPITAL | Age: 80
DRG: 871 | End: 2025-05-05
Payer: MEDICARE

## 2025-05-05 PROBLEM — Z16.12 UTI DUE TO EXTENDED-SPECTRUM BETA LACTAMASE (ESBL) PRODUCING ESCHERICHIA COLI: Status: ACTIVE | Noted: 2020-07-21

## 2025-05-05 PROBLEM — G40.901 STATUS EPILEPTICUS: Status: ACTIVE | Noted: 2025-05-05

## 2025-05-05 PROBLEM — B96.29 UTI DUE TO EXTENDED-SPECTRUM BETA LACTAMASE (ESBL) PRODUCING ESCHERICHIA COLI: Status: ACTIVE | Noted: 2020-07-21

## 2025-05-05 PROBLEM — R53.81 DEBILITY: Status: ACTIVE | Noted: 2025-05-01

## 2025-05-05 PROBLEM — R56.9 SEIZURE: Status: ACTIVE | Noted: 2025-05-05

## 2025-05-05 LAB
ABSOLUTE EOSINOPHIL (OHS): 0.04 K/UL
ABSOLUTE MONOCYTE (OHS): 0.98 K/UL (ref 0.3–1)
ABSOLUTE NEUTROPHIL COUNT (OHS): 16.94 K/UL (ref 1.8–7.7)
ALBUMIN SERPL BCP-MCNC: 2.1 G/DL (ref 3.5–5.2)
ALLENS TEST: ABNORMAL
ALP SERPL-CCNC: 93 UNIT/L (ref 40–150)
ALT SERPL W/O P-5'-P-CCNC: 8 UNIT/L (ref 10–44)
ANION GAP (OHS): 18 MMOL/L (ref 8–16)
AST SERPL-CCNC: 12 UNIT/L (ref 11–45)
BACTERIA UR CULT: ABNORMAL
BASOPHILS # BLD AUTO: 0.03 K/UL
BASOPHILS NFR BLD AUTO: 0.1 %
BILIRUB SERPL-MCNC: 0.2 MG/DL (ref 0.1–1)
BUN SERPL-MCNC: 73 MG/DL (ref 8–23)
CALCIUM SERPL-MCNC: 8.6 MG/DL (ref 8.7–10.5)
CHLORIDE SERPL-SCNC: 106 MMOL/L (ref 95–110)
CO2 SERPL-SCNC: 15 MMOL/L (ref 23–29)
CREAT SERPL-MCNC: 4.7 MG/DL (ref 0.5–1.4)
DELSYS: ABNORMAL
EAG (OHS): 91 MG/DL (ref 68–131)
ERYTHROCYTE [DISTWIDTH] IN BLOOD BY AUTOMATED COUNT: 16.3 % (ref 11.5–14.5)
GFR SERPLBLD CREATININE-BSD FMLA CKD-EPI: 9 ML/MIN/1.73/M2
GLUCOSE SERPL-MCNC: 69 MG/DL (ref 70–110)
HBA1C MFR BLD: 4.8 % (ref 4–5.6)
HCO3 UR-SCNC: 18.1 MMOL/L (ref 24–28)
HCT VFR BLD AUTO: 28.8 % (ref 37–48.5)
HGB BLD-MCNC: 9 GM/DL (ref 12–16)
IMM GRANULOCYTES # BLD AUTO: 0.17 K/UL (ref 0–0.04)
IMM GRANULOCYTES NFR BLD AUTO: 0.8 % (ref 0–0.5)
LYMPHOCYTES # BLD AUTO: 2.14 K/UL (ref 1–4.8)
MAGNESIUM SERPL-MCNC: 2 MG/DL (ref 1.6–2.6)
MCH RBC QN AUTO: 30 PG (ref 27–31)
MCHC RBC AUTO-ENTMCNC: 31.3 G/DL (ref 32–36)
MCV RBC AUTO: 96 FL (ref 82–98)
NUCLEATED RBC (/100WBC) (OHS): 0 /100 WBC
PCO2 BLDA: 33 MMHG (ref 35–45)
PH SMN: 7.35 [PH] (ref 7.35–7.45)
PHOSPHATE SERPL-MCNC: 5.3 MG/DL (ref 2.7–4.5)
PLATELET # BLD AUTO: 577 K/UL (ref 150–450)
PMV BLD AUTO: 8.8 FL (ref 9.2–12.9)
PO2 BLDA: 183 MMHG (ref 80–100)
POC BE: -7 MMOL/L (ref -2–2)
POC SATURATED O2: 100 % (ref 95–100)
POC TCO2: 19 MMOL/L (ref 23–27)
POCT GLUCOSE: 63 MG/DL (ref 70–110)
POCT GLUCOSE: 67 MG/DL (ref 70–110)
POCT GLUCOSE: 71 MG/DL (ref 70–110)
POCT GLUCOSE: 84 MG/DL (ref 70–110)
POTASSIUM SERPL-SCNC: 4.4 MMOL/L (ref 3.5–5.1)
PROT SERPL-MCNC: 7.5 GM/DL (ref 6–8.4)
RBC # BLD AUTO: 3 M/UL (ref 4–5.4)
RELATIVE EOSINOPHIL (OHS): 0.2 %
RELATIVE LYMPHOCYTE (OHS): 10.5 % (ref 18–48)
RELATIVE MONOCYTE (OHS): 4.8 % (ref 4–15)
RELATIVE NEUTROPHIL (OHS): 83.6 % (ref 38–73)
SAMPLE: ABNORMAL
SITE: ABNORMAL
SODIUM SERPL-SCNC: 139 MMOL/L (ref 136–145)
VANCOMYCIN SERPL-MCNC: 20.4 UG/ML (ref ?–80)
WBC # BLD AUTO: 20.3 K/UL (ref 3.9–12.7)

## 2025-05-05 PROCEDURE — 20000000 HC ICU ROOM: Mod: HCNC

## 2025-05-05 PROCEDURE — 84100 ASSAY OF PHOSPHORUS: CPT | Mod: HCNC | Performed by: STUDENT IN AN ORGANIZED HEALTH CARE EDUCATION/TRAINING PROGRAM

## 2025-05-05 PROCEDURE — 36415 COLL VENOUS BLD VENIPUNCTURE: CPT | Mod: HCNC | Performed by: STUDENT IN AN ORGANIZED HEALTH CARE EDUCATION/TRAINING PROGRAM

## 2025-05-05 PROCEDURE — 63600175 PHARM REV CODE 636 W HCPCS: Mod: HCNC | Performed by: INTERNAL MEDICINE

## 2025-05-05 PROCEDURE — 25000003 PHARM REV CODE 250: Mod: HCNC

## 2025-05-05 PROCEDURE — 63600175 PHARM REV CODE 636 W HCPCS: Mod: HCNC

## 2025-05-05 PROCEDURE — 25000003 PHARM REV CODE 250: Mod: HCNC | Performed by: INTERNAL MEDICINE

## 2025-05-05 PROCEDURE — 94761 N-INVAS EAR/PLS OXIMETRY MLT: CPT | Mod: HCNC,XB

## 2025-05-05 PROCEDURE — 99900035 HC TECH TIME PER 15 MIN (STAT): Mod: HCNC

## 2025-05-05 PROCEDURE — 63600175 PHARM REV CODE 636 W HCPCS: Mod: HCNC | Performed by: HOSPITALIST

## 2025-05-05 PROCEDURE — G0545 PR VISIT INHERENT TO INPT OR OBS CARE, INFECTIOUS DISEASE: HCPCS | Mod: HCNC,,, | Performed by: INTERNAL MEDICINE

## 2025-05-05 PROCEDURE — 85025 COMPLETE CBC W/AUTO DIFF WBC: CPT | Mod: HCNC | Performed by: STUDENT IN AN ORGANIZED HEALTH CARE EDUCATION/TRAINING PROGRAM

## 2025-05-05 PROCEDURE — 80053 COMPREHEN METABOLIC PANEL: CPT | Mod: HCNC | Performed by: STUDENT IN AN ORGANIZED HEALTH CARE EDUCATION/TRAINING PROGRAM

## 2025-05-05 PROCEDURE — 27000207 HC ISOLATION: Mod: HCNC

## 2025-05-05 PROCEDURE — C9254 INJECTION, LACOSAMIDE: HCPCS | Mod: HCNC | Performed by: HOSPITALIST

## 2025-05-05 PROCEDURE — 25000003 PHARM REV CODE 250: Mod: HCNC | Performed by: HOSPITALIST

## 2025-05-05 PROCEDURE — 82803 BLOOD GASES ANY COMBINATION: CPT | Mod: HCNC

## 2025-05-05 PROCEDURE — 95714 VEEG EA 12-26 HR UNMNTR: CPT | Mod: HCNC

## 2025-05-05 PROCEDURE — 95720 EEG PHY/QHP EA INCR W/VEEG: CPT | Mod: HCNC,,, | Performed by: PSYCHIATRY & NEUROLOGY

## 2025-05-05 PROCEDURE — 80202 ASSAY OF VANCOMYCIN: CPT | Mod: HCNC | Performed by: HOSPITALIST

## 2025-05-05 PROCEDURE — 99499 UNLISTED E&M SERVICE: CPT | Mod: HCNC,,, | Performed by: SURGERY

## 2025-05-05 PROCEDURE — 99291 CRITICAL CARE FIRST HOUR: CPT | Mod: HCNC,,, | Performed by: INTERNAL MEDICINE

## 2025-05-05 PROCEDURE — 99223 1ST HOSP IP/OBS HIGH 75: CPT | Mod: HCNC,,, | Performed by: SURGERY

## 2025-05-05 PROCEDURE — 95700 EEG CONT REC W/VID EEG TECH: CPT | Mod: HCNC

## 2025-05-05 PROCEDURE — 36600 WITHDRAWAL OF ARTERIAL BLOOD: CPT | Mod: HCNC

## 2025-05-05 PROCEDURE — 83735 ASSAY OF MAGNESIUM: CPT | Mod: HCNC | Performed by: STUDENT IN AN ORGANIZED HEALTH CARE EDUCATION/TRAINING PROGRAM

## 2025-05-05 PROCEDURE — 99233 SBSQ HOSP IP/OBS HIGH 50: CPT | Mod: HCNC,,, | Performed by: INTERNAL MEDICINE

## 2025-05-05 RX ORDER — LACOSAMIDE 10 MG/ML
400 INJECTION, SOLUTION INTRAVENOUS ONCE
Status: COMPLETED | OUTPATIENT
Start: 2025-05-05 | End: 2025-05-05

## 2025-05-05 RX ORDER — LORAZEPAM 2 MG/ML
2 INJECTION INTRAMUSCULAR ONCE
Status: COMPLETED | OUTPATIENT
Start: 2025-05-05 | End: 2025-05-05

## 2025-05-05 RX ORDER — LACOSAMIDE 10 MG/ML
200 INJECTION, SOLUTION INTRAVENOUS EVERY 12 HOURS
Status: DISCONTINUED | OUTPATIENT
Start: 2025-05-05 | End: 2025-05-06 | Stop reason: HOSPADM

## 2025-05-05 RX ORDER — INSULIN ASPART 100 [IU]/ML
0-5 INJECTION, SOLUTION INTRAVENOUS; SUBCUTANEOUS EVERY 6 HOURS PRN
Status: DISCONTINUED | OUTPATIENT
Start: 2025-05-05 | End: 2025-05-06 | Stop reason: HOSPADM

## 2025-05-05 RX ORDER — LEVETIRACETAM 500 MG/5ML
500 INJECTION, SOLUTION, CONCENTRATE INTRAVENOUS EVERY 12 HOURS
Status: DISCONTINUED | OUTPATIENT
Start: 2025-05-05 | End: 2025-05-06 | Stop reason: HOSPADM

## 2025-05-05 RX ADMIN — LEVETIRACETAM 500 MG: 100 INJECTION INTRAVENOUS at 09:05

## 2025-05-05 RX ADMIN — ACETAMINOPHEN 650 MG: 650 SUPPOSITORY RECTAL at 07:05

## 2025-05-05 RX ADMIN — MUPIROCIN: 20 OINTMENT TOPICAL at 08:05

## 2025-05-05 RX ADMIN — HEPARIN SODIUM 5000 UNITS: 5000 INJECTION INTRAVENOUS; SUBCUTANEOUS at 09:05

## 2025-05-05 RX ADMIN — HEPARIN SODIUM 5000 UNITS: 5000 INJECTION INTRAVENOUS; SUBCUTANEOUS at 05:05

## 2025-05-05 RX ADMIN — LACOSAMIDE 200 MG: 10 INJECTION INTRAVENOUS at 09:05

## 2025-05-05 RX ADMIN — HEPARIN SODIUM 5000 UNITS: 5000 INJECTION INTRAVENOUS; SUBCUTANEOUS at 01:05

## 2025-05-05 RX ADMIN — SODIUM CHLORIDE 0.5 G: 9 INJECTION, SOLUTION INTRAVENOUS at 08:05

## 2025-05-05 RX ADMIN — DEXTROSE MONOHYDRATE 12.5 G: 25 INJECTION, SOLUTION INTRAVENOUS at 08:05

## 2025-05-05 RX ADMIN — LACOSAMIDE 400 MG: 10 INJECTION INTRAVENOUS at 01:05

## 2025-05-05 RX ADMIN — HYDRALAZINE HYDROCHLORIDE 10 MG: 20 INJECTION INTRAMUSCULAR; INTRAVENOUS at 02:05

## 2025-05-05 RX ADMIN — LEVETIRACETAM 500 MG: 100 INJECTION INTRAVENOUS at 11:05

## 2025-05-05 RX ADMIN — LORAZEPAM 2 MG: 2 INJECTION INTRAMUSCULAR; INTRAVENOUS at 11:05

## 2025-05-05 RX ADMIN — METRONIDAZOLE 500 MG: 500 INJECTION, SOLUTION INTRAVENOUS at 05:05

## 2025-05-05 NOTE — PROGRESS NOTES
Pharmacokinetic Assessment Follow Up: IV Vancomycin    Vancomycin serum concentration assessment(s):    The random level was drawn correctly and can be used to guide therapy at this time. The measurement is within the desired definitive target range of 10 to 20 mcg/mL.    Vancomycin Regimen Plan:    Re-dose when the random level is less than 20 mcg/mL, next level to be drawn at 0400 on 5/6    Drug levels (last 3 results):  Recent Labs   Lab Result Units 05/03/25  0510 05/04/25  0647 05/05/25  0329   Vancomycin Random ug/ml 17.6 16.9 20.4       Pharmacy will continue to follow and monitor vancomycin.    Please contact pharmacy at extension [phone] for questions regarding this assessment.    Thank you for the consult,   Wiley Mooney       Patient brief summary:  Tasneem Lynn is a 79 y.o. female initiated on antimicrobial therapy with IV Vancomycin for treatment of skin & soft tissue infection    Drug Allergies:   Review of patient's allergies indicates:   Allergen Reactions    Corticosteroids (glucocorticoids) Edema       Actual Body Weight:   84.4 kg    Renal Function:   Estimated Creatinine Clearance: 9.8 mL/min (A) (based on SCr of 4.7 mg/dL (H)).,     Dialysis Method (if applicable):  N/A    CBC (last 72 hours):  Recent Labs   Lab Result Units 05/03/25  0510 05/04/25  0647 05/05/25  0329   WBC K/uL 18.01* 16.19* 20.30*   HGB gm/dL 8.3* 8.4* 9.0*   HCT % 25.3* 26.0* 28.8*   Platelet Count K/uL 474* 514* 577*   Lymph % % 17.0* 6.7* 10.5*   Mono % % 6.6 5.3 4.8   Eos % % 1.5 0.2 0.2   Basophil % % 0.2 0.2 0.1       Metabolic Panel (last 72 hours):  Recent Labs   Lab Result Units 05/02/25  1241 05/03/25  0510 05/04/25  0647 05/05/25  0329   Sodium mmol/L  --  136 138 139   Urine Sodium mmol/L 62  --   --   --    Potassium mmol/L  --  3.8 3.9 4.4   Chloride mmol/L  --  104 106 106   CO2 mmol/L  --  20* 19* 15*   Glucose mg/dL  --  88 76 69*   BUN mg/dL  --  70* 74* 73*   Creatinine mg/dL  --  4.6* 4.6* 4.7*    Urine Creatinine mg/dL 44.9  45.3  --   --   --    Albumin g/dL  --  1.8* 1.7* 2.1*   Bilirubin Total mg/dL  --  0.3 0.2 0.2   ALP unit/L  --  85 76 93   AST unit/L  --  9* 9* 12   ALT unit/L  --  <5* 5* 8*   Magnesium  mg/dL  --  1.9 1.9 2.0   Phosphorus Level mg/dL  --  4.1 4.8* 5.3*       Vancomycin Administrations:  vancomycin given in the last 96 hours                     vancomycin (VANCOCIN) 500 mg in 0.9% NaCl 100 mL IVPB (MB+) ()  Restarted 05/04/25 1042     500 mg New Bag  1039                    Microbiologic Results:  Microbiology Results (last 7 days)       Procedure Component Value Units Date/Time    Blood culture #1 [[id number]]  (Normal) Collected: 04/29/25 1528    Order Status: Completed Specimen: Blood Updated: 05/04/25 1701     Blood Culture No Growth After 5 Days    Blood culture #2 [[id number]]  (Normal) Collected: 04/29/25 1528    Order Status: Completed Specimen: Blood Updated: 05/04/25 1701     Blood Culture No Growth After 5 Days    Urine culture [[id number]]  (Abnormal) Collected: 05/02/25 1241    Order Status: Completed Specimen: Urine, Clean Catch Updated: 05/03/25 0732     Urine Culture >100,000 cfu/ml Presumptive E Coli

## 2025-05-05 NOTE — PT/OT/SLP PROGRESS
Speech Language Pathology      Tasneem Lynn  MRN: [id number]    Orders received for clinical swallow secondary to reports of pocketing. Pt is npo awaiting surgery (debridement) will re-attempt when cleared for po trials. Lauren Bueno, AG-SLP

## 2025-05-05 NOTE — PROGRESS NOTES
[facility] - Intensive Care  Nephrology  Progress Note    Patient Name: Tasneem Lynn  MRN: [id number]  Admission Date: 4/29/2025  Hospital Length of Stay: 6 days  Attending Provider: Samantha Fuentes MD   Primary Care Physician: Unable, To Obtain  Principal Problem:Inguinal ulcer    Subjective:     HPI: Following for EMMA on CKD stage 4.     Interval History: worsening tremors/encephalopathy yesterday evening. Transferred to ICU for EEG. Results concerning for NCSE. Last dose of cefepime on 5/2. Appears comfortable at time of my exam this morning. No family at bedside.   Last dose of sevelamer: 5/3  Last dose of NaBicarb: 5/4      Review of patient's allergies indicates:   Allergen Reactions    Corticosteroids (glucocorticoids) Edema     Current Facility-Administered Medications   Medication Frequency    acetaminophen suppository 650 mg Q6H PRN    acetaminophen tablet 650 mg Q4H PRN    amLODIPine tablet 2.5 mg Daily    aspirin chewable tablet 81 mg Daily    atorvastatin tablet 40 mg QHS    dextrose 50% injection 12.5 g PRN    dextrose 50% injection 25 g PRN    ertapenem (INVANZ) 0.5 g in 0.9% NaCl 100 mL IVPB Q24H    glucagon (human recombinant) injection 1 mg PRN    glucose chewable tablet 16 g PRN    heparin (porcine) injection 5,000 Units Q8H    hydrALAZINE injection 10 mg Q4H PRN    insulin aspart U-100 pen 0-5 Units Q6H PRN    lacosamide injection 200 mg Q12H    levETIRAcetam injection 500 mg Q12H    levothyroxine tablet 75 mcg Before breakfast    LORazepam injection 2 mg Q15 Min PRN    melatonin tablet 6 mg Nightly PRN    mupirocin 2 % ointment BID    ondansetron injection 4 mg Q8H PRN    sevelamer carbonate tablet 800 mg TID WM    sodium bicarbonate tablet 1,300 mg TID    sodium chloride 0.9% flush 10 mL Q12H PRN    vancomycin - pharmacy to dose pharmacy to manage frequency       Objective:     Vital Signs (Most Recent):  Temp: 98.3 °F (36.8 °C) (05/05/25 1110)  Pulse: 87 (05/05/25 1200)  Resp: (!) 22 (05/05/25  1200)  BP: 136/60 (05/05/25 1200)  SpO2: 100 % (05/05/25 1200) Vital Signs (24h Range):  Temp:  [98.2 °F (36.8 °C)-99.1 °F (37.3 °C)] 98.3 °F (36.8 °C)  Pulse:  [] 87  Resp:  [8-23] 22  SpO2:  [98 %-100 %] 100 %  BP: (113-161)/() 136/60     Weight: 84.4 kg (186 lb) (05/04/25 1152)  Body mass index is 34.02 kg/m².  Body surface area is 1.92 meters squared.    I/O last 3 completed shifts:  In: 559.4 [I.V.:210.9; IV Piggyback:348.5]  Out: 450 [Urine:450]     Physical Exam  Vitals and nursing note reviewed.   Constitutional:       General: She is not in acute distress.     Appearance: Normal appearance. She is well-developed.   HENT:      Head: Normocephalic and atraumatic.      Nose: Nose normal.      Mouth/Throat:      Mouth: Mucous membranes are moist.   Eyes:      General: Lids are normal.         Right eye: No discharge.         Left eye: No discharge.   Cardiovascular:      Rate and Rhythm: Normal rate and regular rhythm.   Pulmonary:      Effort: Pulmonary effort is normal.      Breath sounds: Normal breath sounds.   Abdominal:      General: There is no distension.      Palpations: Abdomen is soft.   Musculoskeletal:         General: Swelling present. No tenderness or signs of injury.      Right lower leg: Edema present.      Left lower leg: Edema present.   Skin:     General: Skin is warm and dry.      Findings: No erythema or rash.   Neurological:      Mental Status: She is lethargic.          Significant Labs:  CBC:   Recent Labs   Lab 05/05/25  0329   WBC 20.30*   RBC 3.00*   HGB 9.0*   HCT 28.8*   *   MCV 96   MCH 30.0   MCHC 31.3*     CMP:   Recent Labs   Lab 05/05/25  0329   GLU 69*   CALCIUM 8.6*   ALBUMIN 2.1*   PROT 7.5      K 4.4   CO2 15*      BUN 73*   CREATININE 4.7*   ALKPHOS 93   ALT 8*   AST 12   BILITOT 0.2     All labs within the past 24 hours have been reviewed.     Significant Imaging:  Labs: Reviewed  EEG: Reviewed    Assessment/Plan:     EMMA on CKD stage 4 vs  CKD stage 5  - baseline Cr unknown; Cr 2.3 in 2020 (GFR 22%); Cr 2.0 in 2013  - Cr 5.4 on admission; likely progression of diabetic nephropathy/CKD  - Cr 4.6 --> 4.7 today; stable  - patient has previously reported she would never want dialysis. Patient is NOT a candidate for long-term/outpatient dialysis due to her bedbound status (bedbound x 5 years). Will consider short-term HD (that is, 1-2 sessions only) if NCSE felt to be related to cefepime as RRT will help with cefepime clearance. Will f/u neurology recommendations.  - otherwise, no indication for RRT at this time  - daily labs. Monitor UOP    Metabolic acidosis  - worsening  - not receiving NaBicarb due to AMS; will discontinue  - consider LR or bicarb gtt over NS when IVF are needed    Secondary HPTH  - CCa normal; phos acceptable  - not receiving sevelamer due to AMS; will discontinue  - daily labs      Critical care time spent on the evaluation and treatment of severe organ dysfunction, review of pertinent labs and imaging studies, discussions with Dr. Fuentes and nursing staff, and discussions with patient/family: 45 minutes.      Thank you for your consult. I will follow-up with patient. Please contact us if you have any additional questions.    Rebecca gAuilar MD  Nephrology  [facility] - [place] - Intensive Care

## 2025-05-05 NOTE — EICU
Intervention Initiated From:  COR / SAVITAU    Mic intervened regarding:  Rounding (Video assessment)    VICU Night Rounds Checklist  24H Vital Sign Range:  Temp:  [97.8 °F (36.6 °C)-99.1 °F (37.3 °C)]   Pulse:  []   Resp:  [14-19]   BP: (107-187)/(54-93)   SpO2:  [98 %-100 %]     Video rounds and LDA reconciliation

## 2025-05-05 NOTE — ASSESSMENT & PLAN NOTE
Anemia is likely due to chronic disease due to Chronic Kidney Disease. Most recent hemoglobin and hematocrit are listed below.  Recent Labs     05/03/25  0510 05/04/25  0647 05/05/25  0329   HGB 8.3* 8.4* 9.0*   HCT 25.3* 26.0* 28.8*     Plan  - Monitor serial CBC: Daily  - Transfuse PRBC if patient becomes hemodynamically unstable, symptomatic or H/H drops below 7/21.  - Iron studies iron 32 TSAT 35- not iron deficient  - no overt bleeding  - 1 unit of blood transfused on 5/2/25- responded appropriately

## 2025-05-05 NOTE — ASSESSMENT & PLAN NOTE
- Hx of LEFT upper thigh carbuncle progressively enlarging with ulceration  - Blood culture no growth   - Noncontrast CT abdomen and left upper thigh to evaluate for occult abscesses and deep extensions-no abscess   - initially on vanc/cefepime  - Cellulitis and leukocytosis improved. Continued same wound care  - however, then there were concerns for cefepime encephalopathy-- stopped cefepime  - General surgery consulted- plan OR debridement on 5/5/25  - ID consulted  - changed antibiotics to vanc/ertapenem given ESBL Ecoli in urine

## 2025-05-05 NOTE — ASSESSMENT & PLAN NOTE
Patient's FSGs are controlled on current medication regimen.  Last A1c reviewed-   Lab Results   Component Value Date    HGBA1C 7.5 (H) 06/13/2020     Most recent fingerstick glucose reviewed-   Recent Labs   Lab 05/04/25  1149 05/04/25  1636 05/04/25  2116 05/05/25  0724   POCTGLUCOSE 110 102 138* 84   Hypoglycemia- infection contributing  Obtain HgbA1c

## 2025-05-05 NOTE — ASSESSMENT & PLAN NOTE
Advance Care Planning     Date: 05/01/2025    Code Status  In light of the patients advanced and life limiting illness, prior physicians engaged the the patient in a voluntary conversation about the patient's preferences for care  at the very end of life. The patient wishes to have a natural, peaceful death.  Along those lines, the patient does not wish to have CPR or other invasive treatments performed when her heart and/or breathing stops. Patient would like to discuss with Daughter Merrill. Patient also does not want dialysis if had to come to that point.     - she is currently full code status  - Palliative team care following

## 2025-05-05 NOTE — ASSESSMENT & PLAN NOTE
"This patient does have evidence of infective focus. My overall impression is sepsis.  Source: Skin and Soft Tissue (location LEFT inguinal cellulitis)  Antibiotics given-   Antibiotics (72h ago, onward)      Start     Stop Route Frequency Ordered    05/05/25 0915  ertapenem (INVANZ) 0.5 g in 0.9% NaCl 100 mL IVPB         -- IV Every 24 hours (non-standard times) 05/05/25 0806    05/04/25 2100  mupirocin 2 % ointment         05/09/25 2059 Nasl 2 times daily 05/04/25 1729    04/29/25 1909  vancomycin - pharmacy to dose  (vancomycin IVPB (PEDS and ADULTS))        Placed in "And" Linked Group    -- IV pharmacy to manage frequency 04/29/25 1815        - see above    "

## 2025-05-05 NOTE — PLAN OF CARE
Surgery note    See consult note by Dr Ruiz.  Patient is having neuro issues and unable to communicate but is awake. I explained the issue to her, but unsure if he is comprehending.  I consented her daughter by phone. Plan for debridement under sedation in the OR, and will obtain cultures and likely pack the wound. Discussed w Dr Fuentes.

## 2025-05-05 NOTE — NURSING
[facility]  Nurses Note -- 4 Eyes      5/5/2025       Skin assessed on: Q Shift      [] No Pressure Injuries Present    []Prevention Measures Documented    [x] Yes LDA  for Pressure Injury Previously documented     [] Yes New Pressure Injury Discovered   [] LDA for New Pressure Injury Added      Attending RN:  Magda Mercado RN     Second RN:  ENZO Dickerson

## 2025-05-05 NOTE — ASSESSMENT & PLAN NOTE
"This patient does have evidence of infective focus  My overall impression is sepsis.  Source: Skin and Soft Tissue (location LT inguinal cellulitis)  Antibiotics given-   Antibiotics (72h ago, onward)      Start     Stop Route Frequency Ordered    05/05/25 0915  ertapenem (INVANZ) 0.5 g in 0.9% NaCl 100 mL IVPB         -- IV Every 24 hours (non-standard times) 05/05/25 0806    05/04/25 2100  mupirocin 2 % ointment         05/09/25 2059 Nasl 2 times daily 05/04/25 1729    04/29/25 1909  vancomycin - pharmacy to dose  (vancomycin IVPB (PEDS and ADULTS))        Placed in "And" Linked Group    -- IV pharmacy to manage frequency 04/29/25 1815        WBC improving  Continue vanc, switch cefe to rocephin due to encephalopathy    "

## 2025-05-05 NOTE — PLAN OF CARE
"Pt remains in ICU with EEG cap on. Per MD Alexandra via secure chat: "looks like non-convulsive status, would load keppra." Keppra ordered & given. Pt awake & alert, but nonverbal at this time. VSS. Purewick in place, output noted. CHG bath given, gown & incontinence pad changed, perineal care performed. Free of falls, injury, & skin breakdown. Plans for debridement to L groin abscess today. Daughter @ bedside verbalized understanding, questions & concerns addressed.     Problem: Adult Inpatient Plan of Care  Goal: Plan of Care Review  Outcome: Progressing  Goal: Patient-Specific Goal (Individualized)  Outcome: Progressing  Goal: Absence of Hospital-Acquired Illness or Injury  Outcome: Progressing  Goal: Optimal Comfort and Wellbeing  Outcome: Progressing  Goal: Readiness for Transition of Care  Outcome: Progressing     Problem: Infection  Goal: Absence of Infection Signs and Symptoms  Outcome: Progressing     Problem: Diabetes Comorbidity  Goal: Blood Glucose Level Within Targeted Range  Outcome: Progressing     Problem: Sepsis/Septic Shock  Goal: Optimal Coping  Outcome: Progressing  Goal: Absence of Bleeding  Outcome: Progressing  Goal: Blood Glucose Level Within Targeted Range  Outcome: Progressing  Goal: Absence of Infection Signs and Symptoms  Outcome: Progressing  Goal: Optimal Nutrition Intake  Outcome: Progressing     Problem: Acute Kidney Injury/Impairment  Goal: Fluid and Electrolyte Balance  Outcome: Progressing  Goal: Improved Oral Intake  Outcome: Progressing  Goal: Effective Renal Function  Outcome: Progressing     Problem: Wound  Goal: Optimal Coping  Outcome: Progressing  Goal: Optimal Functional Ability  Outcome: Progressing  Goal: Absence of Infection Signs and Symptoms  Outcome: Progressing  Goal: Improved Oral Intake  Outcome: Progressing  Goal: Optimal Pain Control and Function  Outcome: Progressing  Goal: Skin Health and Integrity  Outcome: Progressing  Goal: Optimal Wound Healing  Outcome: " Progressing     Problem: Skin Injury Risk Increased  Goal: Skin Health and Integrity  Outcome: Progressing     Problem: Coping Ineffective  Goal: Effective Coping  Outcome: Progressing

## 2025-05-05 NOTE — CONSULTS
Surgery consult    Patient ID: Tasneem Lynn is a 79 y.o. female.    Chief Complaint: Abscess (Pt arrived via ems, pt chief complaint is an Abscess. Pt has a left leg abscess that family is concerned is infected. )      HPI:  Consulted for left groin wound.  Patient is non-verbal at this time.        Review of Systems   Unable to perform ROS: Patient nonverbal       Current Medications[1]    Review of patient's allergies indicates:   Allergen Reactions    Corticosteroids (glucocorticoids) Edema       Past Medical History:   Diagnosis Date    Arthritis     Gout    CHF (congestive heart failure)     Coronary artery disease     Diabetes mellitus     HLD (hyperlipidemia)     Hypertension     Obese     Stroke     1986    Thyroid disease        Past Surgical History:   Procedure Laterality Date    ESOPHAGOGASTRODUODENOSCOPY N/A 7/23/2020    Procedure: EGD (ESOPHAGOGASTRODUODENOSCOPY);  Surgeon: Halle Hughes MD;  Location: [facility];  Service: Endoscopy;  Laterality: N/A;    right hand surgery      right knee surgery Right     partial plate       Family History   Problem Relation Name Age of Onset    Heart disease Sister      Diabetes Maternal Aunt      Heart disease Maternal Aunt      Hypertension Maternal Aunt         Social History[2]    Vitals:    05/05/25 0800   BP: (!) 146/62   Pulse: 97   Resp: 15   Temp:        Physical Exam  Skin:     Comments: Large area of skin necrosis with underlying purulence         Assessment & Plan:   Left groin wound, plan excisional debridement in OR             [1]   Current Facility-Administered Medications   Medication Dose Route Frequency Provider Last Rate Last Admin    0.9% NaCl infusion   Intravenous Continuous Clinton Aguilar MD   Stopped at 05/05/25 0521    acetaminophen suppository 650 mg  650 mg Rectal Q6H PRN Corbin Brewster MD   650 mg at 05/04/25 1931    acetaminophen tablet 650 mg  650 mg Oral Q4H PRN Laron Klein MD   650 mg at 05/02/25 1646    amLODIPine tablet 2.5 mg   2.5 mg Oral Daily Alice Mooney, NP        aspirin chewable tablet 81 mg  81 mg Oral Daily Laron Klein MD   81 mg at 05/04/25 1036    atorvastatin tablet 40 mg  40 mg Oral QHS Laron Klein MD   40 mg at 05/02/25 2047    dextrose 50% injection 12.5 g  12.5 g Intravenous PRN Laron Klein MD        dextrose 50% injection 25 g  25 g Intravenous PRN Laron Klein MD        ertapenem (INVANZ) 0.5 g in 0.9% NaCl 100 mL IVPB  0.5 g Intravenous Q24H Samantha Fuentes MD        glucagon (human recombinant) injection 1 mg  1 mg Intramuscular PRN Laron Klein MD        glucose chewable tablet 16 g  16 g Oral PRN Laron Klein MD   16 g at 04/30/25 0756    glucose chewable tablet 24 g  24 g Oral Once Alice Mooney NP        heparin (porcine) injection 5,000 Units  5,000 Units Subcutaneous Q8H Laron Klein MD   5,000 Units at 05/05/25 0511    hydrALAZINE injection 10 mg  10 mg Intravenous Q4H PRN Laron Klein MD   10 mg at 05/03/25 2122    levETIRAcetam tablet 500 mg  500 mg Oral BID Tolu Proctor MD        levothyroxine tablet 75 mcg  75 mcg Oral Before breakfast Laron Klein MD   75 mcg at 05/03/25 0504    LORazepam injection 2 mg  2 mg Intravenous Q15 Min PRN Mert Wayne MD        melatonin tablet 6 mg  6 mg Oral Nightly PRN Laron Klein MD   6 mg at 05/02/25 2046    mupirocin 2 % ointment   Nasal BID Samantha Fuentes MD   Given at 05/04/25 2108    ondansetron injection 4 mg  4 mg Intravenous Q8H PRN Laron Klein MD        sevelamer carbonate tablet 800 mg  800 mg Oral TID  Clinton Aguilar MD   800 mg at 05/03/25 0840    sodium bicarbonate tablet 1,300 mg  1,300 mg Oral TID Clinton Aguilar MD   1,300 mg at 05/04/25 1036    sodium chloride 0.9% flush 10 mL  10 mL Intravenous Q12H PRN Laron Klein MD        vancomycin - pharmacy to dose   Intravenous pharmacy to manage frequency Laron Klein MD       [2]   Social History  Socioeconomic History     Marital status:    Tobacco Use    Smoking status: Never    Smokeless tobacco: Never   Substance and Sexual Activity    Alcohol use: No    Drug use: No    Sexual activity: Not Currently     Partners: Male     Social Drivers of Health     Financial Resource Strain: Low Risk  (5/1/2025)    Overall Financial Resource Strain (CARDIA)     Difficulty of Paying Living Expenses: Not hard at all   Food Insecurity: No Food Insecurity (5/1/2025)    Hunger Vital Sign     Worried About Running Out of Food in the Last Year: Never true     Ran Out of Food in the Last Year: Never true   Transportation Needs: No Transportation Needs (4/29/2025)    PRAPARE - Transportation     Lack of Transportation (Medical): No     Lack of Transportation (Non-Medical): No   Stress: No Stress Concern Present (5/1/2025)    [employer] - Occupational Stress Questionnaire     Feeling of Stress : Not at all   Housing Stability: High Risk (5/1/2025)    Housing Stability Vital Sign     Unable to Pay for Housing in the Last Year: No     Homeless in the Last Year: Yes

## 2025-05-05 NOTE — NURSING
[facility]  Nurses Note -- 4 Eyes      5/5/2025       Skin assessed on: Q Shift      [] No Pressure Injuries Present    []Prevention Measures Documented    [x] Yes LDA  for Pressure Injury Previously documented     [] Yes New Pressure Injury Discovered   [] LDA for New Pressure Injury Added      Attending RN:  Jayla Mercado, RN     Second RN:  Magda

## 2025-05-05 NOTE — PROGRESS NOTES
"[facility]  Infectious Disease  Progress Note    Patient Name: Tasneem Lynn  MRN: [id number]  Admission Date: 4/29/2025  Length of Stay: 6 days  Attending Physician: Samantha Fuentes MD  Primary Care Provider: Unable, To Obtain    Isolation Status: Contact  Assessment/Plan:      Orthopedic  * Left upper thigh wound/Cellulitis  Ms. Lynn is a 78 yo woman admitted with a left groin wound which has worsened. Initial imaging reveled "Extensive subcutaneous soft tissue edema throughout the left hip and thigh. No obvious organized focal fluid collection or drainable abscess seen." Patient seems to have worsened.     ESBL E.coli identified in her urine, so she's been changed to ertapenem.    Recommendations  Continue ertapenem and vancomycin  Drainage procedure planned  Will reassess big picture after drainage and culture results    Adarsh Dash MD  Infectious Disease  [facility]    Subjective:     Principal Problem:Inguinal ulcer    HPI: Ms. Lynn is a 78 yo woman admitted with sepsis secondary to left upper thigh cellulitis and ulcer. Work up included CT thigh, Renal US and NC CT A/P as noted below. UA on 5/2 showed RBC 81, WBC > 100, and moderate bacteria. UCX are growing presumptive E.coli, sensitivities pending. Blood CXS from 4/29 are pending. She was admitted on Cefepime and Vanco for UTI and leg cellulitis from abscess and Wound Care was consulted. On 5/2, a Code Stroke was called. Her cefepime was changed to Rocephin. ID is consulted today for abx assistance. Today, the patient has a worsening groin wound. While she is non-verbal, she does express a grimace to pain. There is malodor about the wound.  Interval History: Now in ICU for seizures. Left hand twitching    Review of Systems   Unable to perform ROS: Mental status change     Objective:     Vital Signs (Most Recent):  Temp: 98.4 °F (36.9 °C) (05/05/25 0715)  Pulse: 98 (05/05/25 0900)  Resp: (!) 8 (05/05/25 0900)  BP: " (!) 149/61 (05/05/25 0900)  SpO2: 98 % (05/05/25 0900) Vital Signs (24h Range):  Temp:  [98.2 °F (36.8 °C)-99.1 °F (37.3 °C)] 98.4 °F (36.9 °C)  Pulse:  [] 98  Resp:  [8-23] 8  SpO2:  [98 %-100 %] 98 %  BP: (113-187)/() 149/61     Weight: 84.4 kg (186 lb)  Body mass index is 34.02 kg/m².    Estimated Creatinine Clearance: 9.8 mL/min (A) (based on SCr of 4.7 mg/dL (H)).     Physical Exam  Vitals and nursing note reviewed.   HENT:      Head: Normocephalic.   Musculoskeletal:         General: Tenderness and signs of injury present.      Comments: Left thigh eschar with malodor          Significant Labs: CBC:   Recent Labs   Lab 05/04/25  0647 05/05/25  0329   WBC 16.19* 20.30*   HGB 8.4* 9.0*   HCT 26.0* 28.8*   * 577*     Microbiology Results (last 7 days)       Procedure Component Value Units Date/Time    Urine culture [[id number]]  (Abnormal)  (Susceptibility) Collected: 05/02/25 1241    Order Status: Completed Specimen: Urine, Clean Catch Updated: 05/05/25 0725     Urine Culture >100,000 cfu/ml Escherichia coli ESBL    Blood culture #1 [[id number]]  (Normal) Collected: 04/29/25 1528    Order Status: Completed Specimen: Blood Updated: 05/04/25 1701     Blood Culture No Growth After 5 Days    Blood culture #2 [[id number]]  (Normal) Collected: 04/29/25 1528    Order Status: Completed Specimen: Blood Updated: 05/04/25 1701     Blood Culture No Growth After 5 Days            Significant Imaging: I have reviewed all pertinent imaging results/findings within the past 24 hours.

## 2025-05-05 NOTE — ASSESSMENT & PLAN NOTE
EMMA is likely due to acute tubular necrosis caused by sepsis. Baseline creatinine is 2.3 to 2.5, but this was several years ago. This may be EMMA on CKD4 vs progression to CKD5. Most recent creatinine and eGFR are listed below.  Recent Labs     05/03/25  0510 05/04/25  0647 05/05/25  0329   CREATININE 4.6* 4.6* 4.7*   EGFRNORACEVR 9* 9* 9*   - Last sCr 2020  - Retroperitoneal ultrasound- CKD, no hydronephrosis  - Nephrotic Range proteinuria  - Nephrology following  - she had told Nephrology and Palliative that she would never want dialysis prior to her mental status worsening  - Cr is stable on 5/5/25

## 2025-05-05 NOTE — PROCEDURES
EEG Extended Monitoring greater than one hour    Date/Time: 5/5/2025 11:36 AM    Performed by: Delmar Horton MD  Authorized by: Tolu Proctor MD      ICU EEG/VIDEO MONITORING REPORT    DATE OF SERVICE: 5/4/25-5/5/25  EEG NUMBER:OW -1  REQUESTED BY: Esthela  LOCATION OF SERVICE: [facility] [place]   Electroencephalographic (EEG) recording is with electrodes placed according to the International 10-20 placement system.  Thirty two (32) channels of digital signal are simultaneously recorded from the scalp and may include EKG, EMG, and/or eye monitors.   Recording band pass was 0.1 to 512 hz.  Digital video recording of the patient is simultaneously recorded with the EEG.  The nursing staff report clinical symptoms and may press an event button when the patient has symptoms of clinical interest to the treating physicians.  EEG and video recording is stored and archived in digital format.  The entire recording is visually reviewed and the times identified by computer analysis as being spikes or seizures are reviewed again.  Activation procedures which include photic stimulation, hyperventilation and instructing patients to perform simple task are done in selected patients.   Compresses spectral analysis (CSA) is also performed on the activity recorded from each individual channel.  This is displayed as a power display of frequencies from 0 to 30 Hz over time.   The CSA analysis is done and displayed continuously.  This is reviewed for asymmetries in power between homologous areas of the scalp and for presence of changes in power which canbe seen when seizures occur.  Sections of suspected abnormalities on the CSA is then compared with the original EEG recording.     [employer] software was also utilized in the review of this study.  This software suite analyzes the EEG recording in multiple domains.  Coherence and rhythmicity is computed to identify EEG sections which may contain organized seizures.  Each  channel undergoes analysis to detect presence of spike and sharp waves which have special and morphological characteristic of epileptic activity.  The routine EEG recording is converted from spacial into frequency domain.  This is then displayed comparing homologous areas to identify areas of significant asymmetry.  Algorithm to identify non-cortically generated artifact is used to separate eye movement, EMG and other artifact from the EEG.      Recording Times  Start on 5/4/25 at 17:44  Stop on 5/5/25 at 07:00  Start on 5/5/25 at 07:00  Stop on 5/5/25 at  07:17  A total of 13 hours of EEG was recorded.    EEG FINDINGS   Markedly abnormal study due to high-amplitude generalized continuous spike and wave activity consistent with nonconvulsive status epilepticus.  Intermittent periods of high-amplitude diffuse disorganized delta and theta range background slowing with multifocal spike and wave discharges seen occasionally throughout recording ultimately waxing and waning back to generalized continuous nonconvulsive status.    States changes are not seen    Sleep architecture is not seen    Provocative maneuvers including hyperventilation and photic stimulation were not performed.     EKG recording shows a regular rhythm.    There is no push button or clinical event.     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    5/5/2025

## 2025-05-05 NOTE — ASSESSMENT & PLAN NOTE
- On 5/2 am, noted change in mental status, left facial droop, aphasic and not following simple commands.   -DDx UA evidence of infection, cellulitis and uremia contributing, concern for stroke given history    -Stroke code called. CT head no acute bleed and indeterminate small lacunar infarct left thalamus. MRI brain, MRA head neck without contrast no stroke. Continue ASA, statin.  -Switched Cefepime to Rocephin as cefepime can contribute to encephalopathy.  -Urine culture with ESBL Ecoli- further changed CTX to ertapenem on 5/5/25  - she is still encephalopathic, not oriented, not speaking  - EEG ordered- pending read

## 2025-05-05 NOTE — PLAN OF CARE
Changes in medical condition or discharge plan:  PT/OT recommend HH    Does patient need new DME? No ([facility] recommended but patient has one)    Follow up appts needed: Patient will need followup appointments with PCP and addtional appointments as ordered by the discharging provider.      Medically stable: no    Estimated Discharge Date: 5/9/25    Patient lives with her daughter who is her primary caregiver.  She may need help with transport home.       05/05/25 1307   Discharge Reassessment   Assessment Type Discharge Planning Reassessment   Did the patient's condition or plan change since previous assessment? Yes   Discharge Plan discussed with:   (in [place] this am)   Communicated STEPHEN with patient/caregiver Yes   Discharge Plan A Home Health   Discharge Plan B Home Health   DME Needed Upon Discharge  none  ([facility] recommended.  Patient has one at home.)   Transition of Care Barriers None

## 2025-05-05 NOTE — NURSING
[facility] [place]  Nurses Note -- 4 Eyes      5/4/2025       Skin assessed on: Q Shift      [] No Pressure Injuries Present    []Prevention Measures Documented    [x] Yes LDA  for Pressure Injury Previously documented     [] Yes New Pressure Injury Discovered   [] LDA for New Pressure Injury Added      Attending RN:  Magda Mercado RN     Second RN:  ENZO Hernandez

## 2025-05-05 NOTE — PLAN OF CARE
Surgery note    Initial plan was for surgery for today.  However she was/is in possible status epilepticus, and was given ativan and now is pending an EEG.  Discussed with primary team, will be best to wait and see the status of her neurological situation prior to going to the OR for debridement. May still go for surgery later today, versus possible rescheduling until tomorrow after stabilizing her neuro status.    ---    After discussion with family, and primary medical team, we are planning to delay the surgery until tomorrow, that patient is having an EEG for new seizures and is actively being re-dosed with new seizure medications. Due to need for anesthesia and lack of fevers and no evidence of necrotizing infection, we has decided that surgery presents higher risk than benefit at this time.  Likely surgery tomorrow for debridement.    Npo at midnight. Consent already signed and in the chart.

## 2025-05-05 NOTE — ASSESSMENT & PLAN NOTE
Type 2 secondary to sepsis and EMMA  Noted elevated troponin with underlying history of CAD on presentation  EKG showed no acute ischemic change

## 2025-05-05 NOTE — PLAN OF CARE
Problem: Adult Inpatient Plan of Care  Goal: Plan of Care Review  Outcome: Progressing  Goal: Patient-Specific Goal (Individualized)  Outcome: Progressing  Goal: Absence of Hospital-Acquired Illness or Injury  Outcome: Progressing  Goal: Optimal Comfort and Wellbeing  Outcome: Progressing  Goal: Readiness for Transition of Care  Outcome: Progressing     Problem: Infection  Goal: Absence of Infection Signs and Symptoms  Outcome: Progressing     Problem: Diabetes Comorbidity  Goal: Blood Glucose Level Within Targeted Range  Outcome: Progressing     Problem: Sepsis/Septic Shock  Goal: Optimal Coping  Outcome: Progressing  Goal: Absence of Bleeding  Outcome: Progressing  Goal: Blood Glucose Level Within Targeted Range  Outcome: Progressing  Goal: Absence of Infection Signs and Symptoms  Outcome: Progressing  Goal: Optimal Nutrition Intake  Outcome: Progressing     Problem: Acute Kidney Injury/Impairment  Goal: Fluid and Electrolyte Balance  Outcome: Progressing  Goal: Improved Oral Intake  Outcome: Progressing  Goal: Effective Renal Function  Outcome: Progressing     Problem: Wound  Goal: Optimal Coping  Outcome: Progressing  Goal: Optimal Functional Ability  Outcome: Progressing  Goal: Absence of Infection Signs and Symptoms  Outcome: Progressing  Goal: Improved Oral Intake  Outcome: Progressing  Goal: Optimal Pain Control and Function  Outcome: Progressing  Goal: Skin Health and Integrity  Outcome: Progressing  Goal: Optimal Wound Healing  Outcome: Progressing     Problem: Skin Injury Risk Increased  Goal: Skin Health and Integrity  Outcome: Progressing     Problem: Coping Ineffective  Goal: Effective Coping  Outcome: Progressing

## 2025-05-05 NOTE — PROGRESS NOTES
[facility] Medicine  Progress Note    Patient Name: Tasneem Lynn  MRN: [id number]  Patient Class: IP- Inpatient   Admission Date: 4/29/2025  Length of Stay: 6 days  Attending Physician: Samantha Fuentes MD  Primary Care Provider: Unable, To Obtain        Subjective     Principal Problem:Inguinal ulcer        HPI:  79-year-old F with medical history significant for type 2 DM, HTN, CHF, hypothyroidism, and CKD 4 who presented to the ED with a chief complaint of LT upper  groin pain boil of one week duration      Patient was in her baseline state of health until which initially started as a small pea-sized lesion but progressively grew and ulcerated.  Patient started having pain and this prompted her to come to the ED. she endorsed some subjective chills but denied any fever, nausea, or vomiting.  No diarrhea or loss of appetite.  Patient had no history of bloodstream infection, surgical implants, valvular heart disease.  However, retrospective chart review indicated that patient had chronic osteomyelitis of the right foot and ESBL Klebsiella UTI 5 years ago.  Patient has a known CKD 4 but no routine follow up with Nephrology noted; review of home medication did not show any diuretics or other nephrotoxic medications.    On presentation to the ED, patient was hypertensive to 183/74, , oxygen saturation 100% on room air.Laboratory investigations were pertinent for WBC 29.1, HGB 9.3, , sodium 135, BUN 79, creatinine of 5.4, EGFR 8, procalcitonin 25.23, troponin 0.03, lactic acid 0.9.  Patient received NS 1 L in the ED and IV Zosyn.  Admission was requested for further management.      Overview/Hospital Course:  Ms Tasneem Lynn is a 79 y.o. woman with DM, CKD4, diastolic CHF who was admitted with left thigh cellulitis and wound. Started antibiotics. Noted CKD4/5. Nephrology consulted. Patient does not want dialysis for any reason. Palliative also consulted. Noted mental status changes  on 5/2/25 AM; no signs of stroke, concern for cefepime induced neurotoxicity. Moved to ICU for continued altered mental status and EEG 5/4/25 showed status epilepticus. Loaded keppra. Antibiotics adjusted to vanc/ertapenem due to ESBL Ecoli UTI. General surgery is planning OR debridement on 5/5/25.     Interval History: She does not speak. She groans to pain stimulus.     Review of Systems   Unable to perform ROS: Mental status change     Objective:     Vital Signs (Most Recent):  Temp: 98.4 °F (36.9 °C) (05/05/25 0715)  Pulse: 98 (05/05/25 0900)  Resp: (!) 8 (05/05/25 0900)  BP: (!) 149/61 (05/05/25 0900)  SpO2: 98 % (05/05/25 0900) Vital Signs (24h Range):  Temp:  [98.2 °F (36.8 °C)-99.1 °F (37.3 °C)] 98.4 °F (36.9 °C)  Pulse:  [] 98  Resp:  [8-23] 8  SpO2:  [98 %-100 %] 98 %  BP: (113-187)/() 149/61     Weight: 84.4 kg (186 lb)  Body mass index is 34.02 kg/m².    Intake/Output Summary (Last 24 hours) at 5/5/2025 1032  Last data filed at 5/5/2025 0600  Gross per 24 hour   Intake 559.39 ml   Output 300 ml   Net 259.39 ml         Physical Exam  Vitals and nursing note reviewed.   Constitutional:       Appearance: She is obese. She is ill-appearing. She is not diaphoretic.   HENT:      Head: Normocephalic and atraumatic.      Nose: Nose normal.   Cardiovascular:      Rate and Rhythm: Normal rate and regular rhythm.   Pulmonary:      Effort: Pulmonary effort is normal.      Breath sounds: Normal breath sounds.      Comments: RA  Abdominal:      General: Bowel sounds are normal.      Palpations: Abdomen is soft.      Tenderness: There is no abdominal tenderness.   Musculoskeletal:      Right lower leg: Edema present.      Left lower leg: Edema present.   Skin:     Comments: Left thigh wound with purulence   Neurological:      Mental Status: She is disoriented.               Significant Labs: All pertinent labs within the past 24 hours have been reviewed.    Significant Imaging: I have reviewed all pertinent  imaging results/findings within the past 24 hours.      Assessment & Plan  Left upper thigh wound/Cellulitis  Hx of LT upper thigh carbuncle progressively enlarging with ulceration  Blood culture no growth   Noncontrast CT abdomen and left upper thigh to evaluate for occult abscesses and deep extensions-no abscess   Procal elevated, lactate normal  Given immunosuppression (CKD and type 2 DM); start empiric renally dosed vancomycin and cefepime  Wound care ordered . Wound care team following  Cellulitis and leukocytosis improving. Continue same wound care  Continue vancomycin while inpatient, Stop cefepime given encephalopathy  Consulted ID and general surgery     Acute encephalopathy  - On 5/2 am, noted change in mental status, left facial droop, aphasic and not following simple commands.   -DDx UA evidence of infection, cellulitis and uremia contributing, concern for stroke given history    -Stroke code called. CT head no acute bleed and indeterminate small lacunar infarct left thalamus. MRI brain, MRA head neck without contrast no stroke. Continue ASA, statin.  -Switched Cefepime to Rocephin as cefepime can contribute to encephalopathy.  -Urine culture with ESBL Ecoli- further changed CTX to ertapenem on 5/5/25  - she is still encephalopathic, not oriented, not speaking  - EEG 5/4 with NSCE- loaded with keppra, repeat EEG in process  Sepsis with acute organ dysfunction without septic shock  This patient does have evidence of infective focus  My overall impression is sepsis.  Source: Skin and Soft Tissue (location LT inguinal cellulitis)  Antibiotics given-   Antibiotics (72h ago, onward)      Start     Stop Route Frequency Ordered    05/05/25 0915  ertapenem (INVANZ) 0.5 g in 0.9% NaCl 100 mL IVPB         -- IV Every 24 hours (non-standard times) 05/05/25 0806    05/04/25 2100  mupirocin 2 % ointment         05/09/25 2059 Nasl 2 times daily 05/04/25 1729    04/29/25 1909  vancomycin - pharmacy to dose  (vancomycin  "IVPB (PEDS and ADULTS))        Placed in "And" Linked Group    -- IV pharmacy to manage frequency 04/29/25 1815        WBC improving  Continue vanc, switch cefe to rocephin due to encephalopathy    CKD (chronic kidney disease) stage 5, GFR less than 15 ml/min  EMMA is likely due to acute tubular necrosis caused by sepsis. Baseline creatinine is 2.3 to 2.5. Most recent creatinine and eGFR are listed below.  Recent Labs     05/03/25  0510 05/04/25  0647 05/05/25  0329   CREATININE 4.6* 4.6* 4.7*   EGFRNORACEVR 9* 9* 9*     -Last sCr 2020  - Urine sodium, urine creatinine pending collection-  plus- will discuss with nurse  - Retroperitoneal ultrasound- CKD, no hydronephrosis  - Nephrotic Range proteinuria  - follow up urine culture- on rocephin  - Nephrology following    Hyperlipidemia  Continue statins    Diabetes mellitus  Patient's FSGs are controlled on current medication regimen.  Last A1c reviewed-   Lab Results   Component Value Date    HGBA1C 7.5 (H) 06/13/2020     Most recent fingerstick glucose reviewed-   Recent Labs   Lab 05/04/25  1149 05/04/25  1636 05/04/25  2116 05/05/25  0724   POCTGLUCOSE 110 102 138* 84   Hypoglycemia- infection contributing  Obtain HgbA1c    Type 2 myocardial infarction  Type 2 secondary to sepsis and EMMA  Noted elevated troponin with underlying history of CAD on presentation  EKG showed no acute ischemic change  Continue to trend troponin    Hypertension  Patient currently borderline hypotensive; home medications held  Restart as clinically appropriate  PRNs ordered  CAD (coronary artery disease)  Patient with known CAD s/p which is controlled Will continue ASA and Statin and monitor for S/Sx of angina/ACS. Continue to monitor on telemetry.   Patient denied history of MI  Hypothyroidism  Continue home levothyroxine    Debility  Chronic history of bed bound  PT/OT for weaker than normal      Advance care planning  Advance Care Planning     Date: 05/01/2025    Code Status  In " light of the patients advanced and life limiting illness,I engaged the the patient in a voluntary conversation about the patient's preferences for care  at the very end of life. The patient wishes to have a natural, peaceful death.  Along those lines, the patient does not wish to have CPR or other invasive treatments performed when her heart and/or breathing stops. Patient would like to discuss with Daughter Merrill. Patient also does not want dialysis if had to come to that point.   Palliative team care following      Anemia of chronic disorder  Anemia is likely due to chronic disease due to Chronic Kidney Disease. Most recent hemoglobin and hematocrit are listed below.  Recent Labs     05/03/25  0510 05/04/25  0647 05/05/25  0329   HGB 8.3* 8.4* 9.0*   HCT 25.3* 26.0* 28.8*     Plan  - Monitor serial CBC: Daily  - Transfuse PRBC if patient becomes hemodynamically unstable, symptomatic or H/H drops below 7/21.  - Iron studies iron 32 TSAT 35.   - no overt bleeding  - 1 unit of blood ordered- responded appropriately repeat hgb 8.3  - 5/3 - daughter reports had chills and back pain while receiving blood yesterday.   - Add blood transfusion reaction work up     UTI due to extended-spectrum beta lactamase (ESBL) producing Escherichia coli      Status epilepticus  - noted on EEG 5/4/25  - loaded keppra on 5/4/25  - now on keppra 500mg IV BID  - still not altered- 2mg IV now  - repeat EEG ordered     VTE Risk Mitigation (From admission, onward)           Ordered     heparin (porcine) injection 5,000 Units  Every 8 hours         04/29/25 1815     IP VTE HIGH RISK PATIENT  Once         04/29/25 1815     Place sequential compression device  Until discontinued         04/29/25 1815                    Discharge Planning   STEPHEN: 5/9/2025     Code Status: Full Code   Medical Readiness for Discharge Date:   Discharge Plan A: Home with family        Critical care time spent on the evaluation and treatment of severe organ  dysfunction, review of pertinent labs and imaging studies, discussions with consulting providers and discussions with patient/family: 45 minutes.      2:57 PM Updated daughter Merrill Lynn. Discussed seizure activity and plans for management. Discussed hopeful surgical debridement tomorrow. All questions answered.       Samantha Fuentes MD  Department of Hospital Medicine   [facility] - Intensive Care

## 2025-05-05 NOTE — ANESTHESIA PREPROCEDURE EVALUATION
05/05/2025  Tasneem Lynn is a 79 y.o., female.      Pre-op Assessment    I have reviewed the Patient Summary Reports.     I have reviewed the Nursing Notes.       Review of Systems  Anesthesia Hx:  No problems with previous Anesthesia             Denies Family Hx of Anesthesia complications.    Denies Personal Hx of Anesthesia complications.                    Social:  Non-Smoker       Hematology/Oncology:       -- Anemia:               Hematology Comments: sepsis                    Cardiovascular:     Hypertension  Past MI CAD       CHF        Echo:  Left Ventricle: The left ventricle is normal in size. Mildly increased wall thickness. There is mild concentric hypertrophy. There is normal systolic function with a visually estimated ejection fraction of 65 - 70%. Grade I diastolic dysfunction.  ·  Right Ventricle: The right ventricle is normal in size. Systolic function is normal.  ·  Aortic Valve: The aortic valve is a trileaflet valve. There is mild aortic valve sclerosis. Mildly calcified left cusp.  ·  Mitral Valve: Moderately calcified posterior subvalvular apparatus.  ·  Pulmonary Artery: Pulmonary artery pressure could not be estimated.  ·  IVC/SVC: Normal venous pressure at 3 mmHg.                             Pulmonary:  Pulmonary Normal                       Renal/:  Chronic Renal Disease, ARF, CKD                Hepatic/GI:  Hepatic/GI Normal                    Neurological:   CVA        Acute AMS on 5/2, prompting stroke activation; head/brain imaging w/o acute findings    Currently non-verbal, loaded with Keppra                            Endocrine:  Diabetes Hypothyroidism          Dermatological:  Left groin abscess       Physical Exam  General: Opens eyes to voice, but non-verbal and does not follow commands  Airway:  Unable to perform due to lack of cooperation  Chest/Lungs:  Normal  Respiratory Rate    Heart:  Rate: Normal      Wt Readings from Last 3 Encounters:   05/04/25 84.4 kg (186 lb)   05/11/21 129.3 kg (285 lb)   08/03/20 129.3 kg (285 lb)     Temp Readings from Last 3 Encounters:   05/05/25 37 °C (98.6 °F) (Axillary)   05/11/21 36.3 °C (97.4 °F) (Oral)   08/03/20 36.8 °C (98.2 °F) (Oral)     BP Readings from Last 3 Encounters:   05/05/25 (!) 146/62   05/11/21 (!) 163/77   08/03/20 (!) 182/72     Pulse Readings from Last 3 Encounters:   05/05/25 97   05/11/21 76   08/03/20 73     Lab Results   Component Value Date    WBC 20.30 (H) 05/05/2025    HGB 9.0 (L) 05/05/2025    HCT 28.8 (L) 05/05/2025    MCV 96 05/05/2025     (H) 05/05/2025       CMP  Sodium   Date Value Ref Range Status   05/05/2025 139 136 - 145 mmol/L Final   07/24/2020 142 136 - 145 mmol/L Final     Potassium   Date Value Ref Range Status   05/05/2025 4.4 3.5 - 5.1 mmol/L Final   07/24/2020 4.8 3.5 - 5.1 mmol/L Final     Chloride   Date Value Ref Range Status   05/05/2025 106 95 - 110 mmol/L Final   07/24/2020 108 95 - 110 mmol/L Final     CO2   Date Value Ref Range Status   05/05/2025 15 (L) 23 - 29 mmol/L Final   07/24/2020 29 23 - 29 mmol/L Final     Glucose   Date Value Ref Range Status   05/05/2025 69 (L) 70 - 110 mg/dL Final   07/24/2020 111 (H) 70 - 110 mg/dL Final     BUN   Date Value Ref Range Status   05/05/2025 73 (H) 8 - 23 mg/dL Final     Creatinine   Date Value Ref Range Status   05/05/2025 4.7 (H) 0.5 - 1.4 mg/dL Final     Calcium   Date Value Ref Range Status   05/05/2025 8.6 (L) 8.7 - 10.5 mg/dL Final   07/24/2020 8.8 8.7 - 10.5 mg/dL Final     Protein Total   Date Value Ref Range Status   05/05/2025 7.5 6.0 - 8.4 gm/dL Final     Total Protein   Date Value Ref Range Status   07/24/2020 8.1 6.0 - 8.4 g/dL Final     Albumin   Date Value Ref Range Status   05/05/2025 2.1 (L) 3.5 - 5.2 g/dL Final   07/24/2020 2.5 (L) 3.5 - 5.2 g/dL Final     Total Bilirubin   Date Value Ref Range Status   07/24/2020 0.3  0.1 - 1.0 mg/dL Final     Comment:     For infants and newborns, interpretation of results should be based  on gestational age, weight and in agreement with clinical  observations.  Premature Infant recommended reference ranges:  Up to 24 hours.............<8.0 mg/dL  Up to 48 hours............<12.0 mg/dL  3-5 days..................<15.0 mg/dL  6-29 days.................<15.0 mg/dL       Bilirubin Total   Date Value Ref Range Status   05/05/2025 0.2 0.1 - 1.0 mg/dL Final     Comment:     For infants and newborns, interpretation of results should be based   on gestational age, weight and in agreement with clinical   observations.    Premature Infant recommended reference ranges:   0-24 hours:  <8.0 mg/dL   24-48 hours: <12.0 mg/dL   3-5 days:    <15.0 mg/dL   6-29 days:   <15.0 mg/dL     Alkaline Phosphatase   Date Value Ref Range Status   07/24/2020 85 55 - 135 U/L Final     ALP   Date Value Ref Range Status   05/05/2025 93 40 - 150 unit/L Final     AST   Date Value Ref Range Status   05/05/2025 12 11 - 45 unit/L Final   07/24/2020 12 10 - 40 U/L Final     ALT   Date Value Ref Range Status   05/05/2025 8 (L) 10 - 44 unit/L Final   07/24/2020 13 10 - 44 U/L Final     Anion Gap   Date Value Ref Range Status   05/05/2025 18 (H) 8 - 16 mmol/L Final     eGFR   Date Value Ref Range Status   05/05/2025 9 (L) >60 mL/min/1.73/m2 Final     Comment:     Estimated GFR calculated using the CKD-EPI creatinine (2021) equation.         Anesthesia Plan  Type of Anesthesia, risks & benefits discussed:    Anesthesia Type: Gen Natural Airway, MAC, Gen ETT, Gen Supraglottic Airway  Intra-op Monitoring Plan: Standard ASA Monitors  Post Op Pain Control Plan: multimodal analgesia and IV/PO Opioids PRN  Induction:  IV  Informed Consent: Informed consent signed with the Patient and all parties understand the risks and agree with anesthesia plan.  All questions answered.   ASA Score: 4  Day of Surgery Review of History & Physical: H&P Update  referred to the surgeon/provider.  Anesthesia Plan Notes: Informed consent obtained from patient's daughter, Nat Lynn, via telephone and witnessed by 2 RNs.    Ready For Surgery From Anesthesia Perspective.     .

## 2025-05-05 NOTE — ASSESSMENT & PLAN NOTE
Chronic history of bed bound  PT/OT for weaker than normal when she can participate      Implemented All Fall Risk Interventions:  [place] to call system. Call bell, personal items and telephone within reach. Instruct patient to call for assistance. Room bathroom lighting operational. Non-slip footwear when patient is off stretcher. Physically safe environment: no spills, clutter or unnecessary equipment. Stretcher in lowest position, wheels locked, appropriate side rails in place. Provide visual cue, wrist band, yellow gown, etc. Monitor gait and stability. Monitor for mental status changes and reorient to person, place, and time. Review medications for side effects contributing to fall risk. Reinforce activity limits and safety measures with patient and family.

## 2025-05-05 NOTE — ASSESSMENT & PLAN NOTE
EMMA is likely due to acute tubular necrosis caused by sepsis. Baseline creatinine is 2.3 to 2.5. Most recent creatinine and eGFR are listed below.  Recent Labs     05/03/25  0510 05/04/25  0647 05/05/25  0329   CREATININE 4.6* 4.6* 4.7*   EGFRNORACEVR 9* 9* 9*     -Last sCr 2020  - Urine sodium, urine creatinine pending collection-  plus- will discuss with nurse  - Retroperitoneal ultrasound- CKD, no hydronephrosis  - Nephrotic Range proteinuria  - follow up urine culture- on rocephin  - Nephrology following

## 2025-05-05 NOTE — PROGRESS NOTES
"[facility] [place] - Intensive Care  Neurology Consult Note    Patient Name: Tasneem Lynn  Age: 79 y.o.  MRN: [id number]  Admission Date: 4/29/2025  Reason for consult:  Altered mental status    CC:  "Altered mental status"    From initial neurology consult 05/04/2025  HPI:  79 y.o. female with medical history significant for type 2 DM, HTN, CHF, hypothyroidism, and CKD 4 who is currently admitted at outside hospital for sepsis secondary to left upper thigh cellulitis.  Patient is currently on IV antibiotics.  Because of confusion, tele stroke was also activated on her yesterday and patient was seen by acute tele stroke service who had recommended further neuroimaging and brain imaging was recommended for the wake-up stroke protocol which was negative for acute stroke and no intravenous thrombolysis was offered.  Tele neurology was reconsulted today for encephalopathy.  Patient is currently awaiting transferred to [facility].  Per my conversation with daughter, patient has been having intermittent trembling in the left leg, and she attributes that to pain from ulcer/cellulitis.  Patient does not answering and/or falling any commands on my exam.    5/5: EEG with nonconvulsive status epilepticus overnight.  She was loaded with Keppra 1.5 g overnight, continued on 500 mg b.i.d. this morning.  Given extra dose of Ativan 2 mg this morning, hooked up to continuous EEG.  Status resolved on EEG this afternoon.      Histories:  Allergies:  Corticosteroids (glucocorticoids)    Current Medications:  Current Medications[1]    Medical:   Past Medical History:   Diagnosis Date    Arthritis     Gout    CHF (congestive heart failure)     Coronary artery disease     Diabetes mellitus     HLD (hyperlipidemia)     Hypertension     Obese     Stroke     1986    Thyroid disease         Surgeries:   Past Surgical History:   Procedure Laterality Date    ESOPHAGOGASTRODUODENOSCOPY N/A 7/23/2020    Procedure: EGD (ESOPHAGOGASTRODUODENOSCOPY);  Surgeon: " "Halle Hughes MD;  Location[facility];  Service: Endoscopy;  Laterality: N/A;    right hand surgery      right knee surgery Right     partial plate        Family:   Family History   Problem Relation Name Age of Onset    Heart disease Sister      Diabetes Maternal Aunt      Heart disease Maternal Aunt      Hypertension Maternal Aunt         Tobacco Use    Smoking status: Never    Smokeless tobacco: Never   Substance and Sexual Activity    Alcohol use: No    Drug use: No    Sexual activity: Not Currently     Partners: Male         Physical Exam:     Physical Examination  /60   Pulse 87   Temp 98.3 °F (36.8 °C) (Axillary)   Resp (!) 22   Ht 5' 2" (1.575 m)   Wt 84.4 kg (186 lb)   SpO2 100%   Breastfeeding No   BMI 34.02 kg/m²   Body mass index is 34.02 kg/m².    Neurological Exam  Neurocritical care exam:   Mental Status:  Drowsy, awakens to painful stimulus.  Nonverbal, only moaning, not following commands  Brainstem reflexes/cranial nerve exam:   CN II,V,VII, IX, X grossly intact. Eyes shut tight, no gross facial asym   Pupils 5 mm, Equal Round Reactive to Light and Accomodation. No RAPD.   Corneal reflex, gag reflex intact. Dolls eye reflex (Oculocephalic reflex) present.  Motor:  Withdraws to painful stimulus in all 4 extremities  Reflexes: down going plantars b/l   Sensory: unable to assess  Co-ordination: unable to assess  Gait: unable to assess    Significant Labs:   Recent Lab Results  (Last 5 results in the past 24 hours)        05/05/25  1127   05/05/25  0934   05/05/25  0724   05/05/25  0329   05/04/25  2116        Albumin       2.1         ALP       93         Allens Test   Pass             ALT       8         Anion Gap       18         AST       12         Baso #       0.03         Basophil %       0.1         BILIRUBIN TOTAL       0.2  Comment: For infants and newborns, interpretation of results should be based   on gestational age, weight and in agreement with clinical "   observations.    Premature Infant recommended reference ranges:   0-24 hours:  <8.0 mg/dL   24-48 hours: <12.0 mg/dL   3-5 days:    <15.0 mg/dL   6-29 days:   <15.0 mg/dL         Site   RR             BUN       73         Calcium       8.6         Chloride       106         CO2       15         Creatinine       4.7         DelSys   Nasal Can             eGFR       9  Comment: Estimated GFR calculated using the CKD-EPI creatinine (2021) equation.         Eos #       0.04         Eos %       0.2         Glucose       69         Gran # (ANC)       16.94         Hematocrit       28.8         Hemoglobin       9.0         Immature Grans (Abs)       0.17  Comment: Mild elevation in immature granulocytes is non specific and can be seen in a variety of conditions including stress response, acute inflammation, trauma and pregnancy. Correlation with other laboratory and clinical findings is essential.         Immature Granulocytes       0.8         Lymph #       2.14         Lymph %       10.5         Magnesium        2.0         MCH       30.0         MCHC       31.3         MCV       96         Mono #       0.98         Mono %       4.8         MPV       8.8         Neut %       83.6         nRBC       0         Phosphorus Level       5.3         Platelet Count       577         POC BE   -7             POC HCO3   18.1             POC PCO2   33.0             POC PH   7.348             POC PO2   183             POC SATURATED O2   100             POC TCO2   19             POCT Glucose 71     84     138       Potassium       4.4         PROTEIN TOTAL       7.5         RBC       3.00         RDW       16.3         Sample   ARTERIAL             Sodium       139         Vancomycin, Random       20.4         WBC       20.30                                Significant Imaging:   Images were personally reviewed and interpreted:   MRI brain wo con 5/3: NAICA    EEG 5/4-5/5: Abnormal study due to nonconvulsive status epilepticus with  transient periods of improvement following ASM administration     Assessment and Plan:   79 y.o. female with medical history significant for type 2 DM, HTN, CHF, hypothyroidism, and CKD 4 who is currently admitted at outside hospital for sepsis secondary to left upper thigh cellulitis.  Mental status change noted on 5/2, stroke alert called.  Thought to be cefepime related neurotoxicity, switched to Rocephin.  Mental status continued to be poor, EEG on 5/4 revealing NCSE.  Imaging review.  Likely provoked from cefepime and ertapenem induced neurotoxicity leading to lowered seizure threshold    Plan:   - on continuous EEG  - continue Keppra 500 mg b.i.d.  - load with Vimpat 400 mg once and started on 200 mg b.i.d.  - kindly reach out to EEG reader with any clinical event  - Ativan 2 mg IV p.r.n. for clinical seizures lasting for more than 2 minutes  - seizure and fall precautions while inpatient  - noted ID and general surgery on board for cellulitis.  Kindly avoid antibiotics known to lower seizure threshold including cefepime, meropenem/ertapenem.  Noted patient is already on ertapenem currently.  If patient returns back to NCSE, highly recommend switching ertapenem abx  - we will continue to follow up    Thank you for your consult. I will sign off. Please contact us if you have any additional questions.      35 minutes of Critical care time was spent personally by me on the following activities: development of treatment plan with patient or surrogate and bedside caregivers, discussions with consultants, evaluation of patient's response to treatment, examination of patient, ordering and performing treatments and interventions, ordering and review of laboratory studies, ordering and review of radiographic studies, pulse oximetry, antibiotic titration if applicable, vasopressor titration if applicable, re-evaluation of patient's condition. This critical care time did not overlap with that of any other provider or  involve time for any procedures. There is high probability for acute neurological change leading to clinical and possibly life-threatening deterioration requiring highest level of provider preparedness for urgent intervention.      Daniela Mauricio MD  Neurology  [facility]         [1]   Current Facility-Administered Medications   Medication Dose Route Frequency Provider Last Rate Last Admin    acetaminophen suppository 650 mg  650 mg Rectal Q6H PRN Corbin Brewster MD   650 mg at 05/04/25 1931    acetaminophen tablet 650 mg  650 mg Oral Q4H PRN Laron Klein MD   650 mg at 05/02/25 1646    amLODIPine tablet 2.5 mg  2.5 mg Oral Daily Alice Mooney, NP        aspirin chewable tablet 81 mg  81 mg Oral Daily Laron Klein MD   81 mg at 05/04/25 1036    atorvastatin tablet 40 mg  40 mg Oral QHS Laron Klein MD   40 mg at 05/02/25 2047    dextrose 50% injection 12.5 g  12.5 g Intravenous PRN Laron Klein MD        dextrose 50% injection 25 g  25 g Intravenous PRN Laron Klein MD        ertapenem (INVANZ) 0.5 g in 0.9% NaCl 100 mL IVPB  0.5 g Intravenous Q24H Samantha Fuentes MD   Stopped at 05/05/25 0906    glucagon (human recombinant) injection 1 mg  1 mg Intramuscular PRN Laron Klein MD        glucose chewable tablet 16 g  16 g Oral PRN Laron Klein MD   16 g at 04/30/25 0756    heparin (porcine) injection 5,000 Units  5,000 Units Subcutaneous Q8H Laron Klein MD   5,000 Units at 05/05/25 1310    hydrALAZINE injection 10 mg  10 mg Intravenous Q4H PRN Laron Klein MD   10 mg at 05/03/25 2122    insulin aspart U-100 pen 0-5 Units  0-5 Units Subcutaneous Q6H PRN Samantha Fuentes MD        lacosamide injection 200 mg  200 mg Intravenous Q12H Samantha Fuentes MD        levETIRAcetam injection 500 mg  500 mg Intravenous Q12H Samantha Fuentes MD   500 mg at 05/05/25 1111    levothyroxine tablet 75 mcg  75 mcg Oral Before breakfast Laron Klein MD   75 mcg  at 05/03/25 0504    LORazepam injection 2 mg  2 mg Intravenous Q15 Min PRN Mert Wayne MD        melatonin tablet 6 mg  6 mg Oral Nightly PRN Laron Klein MD   6 mg at 05/02/25 2046    mupirocin 2 % ointment   Nasal BID Samantha Fuentes MD   Given at 05/05/25 0825    ondansetron injection 4 mg  4 mg Intravenous Q8H PRN Laron Klein MD        sevelamer carbonate tablet 800 mg  800 mg Oral TID  Clinton Aguilar MD   800 mg at 05/03/25 0840    sodium bicarbonate tablet 1,300 mg  1,300 mg Oral TID Clinton Aguilar MD   1,300 mg at 05/04/25 1036    sodium chloride 0.9% flush 10 mL  10 mL Intravenous Q12H PRN Laron Klein MD        vancomycin - pharmacy to dose   Intravenous pharmacy to manage frequency Laron Klein MD

## 2025-05-05 NOTE — ASSESSMENT & PLAN NOTE
"Ms. Lynn is a 78 yo woman admitted with a left groin wound which has worsened. Initial imaging reveled "Extensive subcutaneous soft tissue edema throughout the left hip and thigh. No obvious organized focal fluid collection or drainable abscess seen." Patient seems to have worsened.     ESBL E.coli identified in her urine, so she's been changed to ertapenem.    Recommendations  Continue ertapenem  Drainage procedure planned  Will reassess big picture after drainage and culture results    "

## 2025-05-05 NOTE — ASSESSMENT & PLAN NOTE
Patient's FSGs are controlled on current medication regimen.  Last A1c reviewed-   Lab Results   Component Value Date    HGBA1C 7.5 (H) 06/13/2020   - repeat A1c in process    Most recent fingerstick glucose reviewed-   Recent Labs   Lab 05/04/25  1149 05/04/25  1636 05/04/25  2116 05/05/25  0724   POCTGLUCOSE 110 102 138* 84   - glucose well controlled without need for insulin  - monitor Q6 while NPO

## 2025-05-05 NOTE — PT/OT/SLP PROGRESS
Occupational Therapy      Patient Name:  Tasneem Lynn   MRN:  [id number]    Patient not seen today secondary to Nurse/ PATITO hold, Other (Comment) (Patient with altered mental status, was awaiting debridement). Will follow-up tomorrow.    5/5/2025

## 2025-05-05 NOTE — ASSESSMENT & PLAN NOTE
Anemia is likely due to chronic disease due to Chronic Kidney Disease. Most recent hemoglobin and hematocrit are listed below.  Recent Labs     05/03/25  0510 05/04/25  0647 05/05/25  0329   HGB 8.3* 8.4* 9.0*   HCT 25.3* 26.0* 28.8*     Plan  - Monitor serial CBC: Daily  - Transfuse PRBC if patient becomes hemodynamically unstable, symptomatic or H/H drops below 7/21.  - Iron studies iron 32 TSAT 35.   - no overt bleeding  - 1 unit of blood ordered- responded appropriately repeat hgb 8.3  - 5/3 - daughter reports had chills and back pain while receiving blood yesterday.   - Add blood transfusion reaction work up

## 2025-05-05 NOTE — ASSESSMENT & PLAN NOTE
Patient with known CAD s/p which is controlled Will continue ASA and Statin and monitor for S/Sx of angina/ACS. Continue to monitor on telemetry.   - Patient denied history of MI  - no record of [facility]

## 2025-05-05 NOTE — SUBJECTIVE & OBJECTIVE
Interval History: Now in ICU for seizures. Left hand twitching    Review of Systems   Unable to perform ROS: Mental status change     Objective:     Vital Signs (Most Recent):  Temp: 98.4 °F (36.9 °C) (05/05/25 0715)  Pulse: 98 (05/05/25 0900)  Resp: (!) 8 (05/05/25 0900)  BP: (!) 149/61 (05/05/25 0900)  SpO2: 98 % (05/05/25 0900) Vital Signs (24h Range):  Temp:  [98.2 °F (36.8 °C)-99.1 °F (37.3 °C)] 98.4 °F (36.9 °C)  Pulse:  [] 98  Resp:  [8-23] 8  SpO2:  [98 %-100 %] 98 %  BP: (113-187)/() 149/61     Weight: 84.4 kg (186 lb)  Body mass index is 34.02 kg/m².    Estimated Creatinine Clearance: 9.8 mL/min (A) (based on SCr of 4.7 mg/dL (H)).     Physical Exam  Vitals and nursing note reviewed.   HENT:      Head: Normocephalic.   Musculoskeletal:         General: Tenderness and signs of injury present.      Comments: Left thigh eschar with malodor          Significant Labs: CBC:   Recent Labs   Lab 05/04/25  0647 05/05/25  0329   WBC 16.19* 20.30*   HGB 8.4* 9.0*   HCT 26.0* 28.8*   * 577*     Microbiology Results (last 7 days)       Procedure Component Value Units Date/Time    Urine culture [[id number]]  (Abnormal)  (Susceptibility) Collected: 05/02/25 1241    Order Status: Completed Specimen: Urine, Clean Catch Updated: 05/05/25 0725     Urine Culture >100,000 cfu/ml Escherichia coli ESBL    Blood culture #1 [[id number]]  (Normal) Collected: 04/29/25 1528    Order Status: Completed Specimen: Blood Updated: 05/04/25 1701     Blood Culture No Growth After 5 Days    Blood culture #2 [[id number]]  (Normal) Collected: 04/29/25 1528    Order Status: Completed Specimen: Blood Updated: 05/04/25 1701     Blood Culture No Growth After 5 Days            Significant Imaging: I have reviewed all pertinent imaging results/findings within the past 24 hours.

## 2025-05-05 NOTE — SUBJECTIVE & OBJECTIVE
Interval History: worsening tremors/encephalopathy yesterday evening. Transferred to ICU for EEG. Results concerning for NCSE. Last dose of cefepime on 5/2. Appears comfortable at time of my exam this morning. No family at bedside.   Last dose of sevelamer: 5/3  Last dose of NaBicarb: 5/4      Review of patient's allergies indicates:   Allergen Reactions    Corticosteroids (glucocorticoids) Edema     Current Facility-Administered Medications   Medication Frequency    acetaminophen suppository 650 mg Q6H PRN    acetaminophen tablet 650 mg Q4H PRN    amLODIPine tablet 2.5 mg Daily    aspirin chewable tablet 81 mg Daily    atorvastatin tablet 40 mg QHS    dextrose 50% injection 12.5 g PRN    dextrose 50% injection 25 g PRN    ertapenem (INVANZ) 0.5 g in 0.9% NaCl 100 mL IVPB Q24H    glucagon (human recombinant) injection 1 mg PRN    glucose chewable tablet 16 g PRN    heparin (porcine) injection 5,000 Units Q8H    hydrALAZINE injection 10 mg Q4H PRN    insulin aspart U-100 pen 0-5 Units Q6H PRN    lacosamide injection 200 mg Q12H    levETIRAcetam injection 500 mg Q12H    levothyroxine tablet 75 mcg Before breakfast    LORazepam injection 2 mg Q15 Min PRN    melatonin tablet 6 mg Nightly PRN    mupirocin 2 % ointment BID    ondansetron injection 4 mg Q8H PRN    sevelamer carbonate tablet 800 mg TID WM    sodium bicarbonate tablet 1,300 mg TID    sodium chloride 0.9% flush 10 mL Q12H PRN    vancomycin - pharmacy to dose pharmacy to manage frequency       Objective:     Vital Signs (Most Recent):  Temp: 98.3 °F (36.8 °C) (05/05/25 1110)  Pulse: 87 (05/05/25 1200)  Resp: (!) 22 (05/05/25 1200)  BP: 136/60 (05/05/25 1200)  SpO2: 100 % (05/05/25 1200) Vital Signs (24h Range):  Temp:  [98.2 °F (36.8 °C)-99.1 °F (37.3 °C)] 98.3 °F (36.8 °C)  Pulse:  [] 87  Resp:  [8-23] 22  SpO2:  [98 %-100 %] 100 %  BP: (113-161)/() 136/60     Weight: 84.4 kg (186 lb) (05/04/25 1152)  Body mass index is 34.02 kg/m².  Body surface  area is 1.92 meters squared.    I/O last 3 completed shifts:  In: 559.4 [I.V.:210.9; IV Piggyback:348.5]  Out: 450 [Urine:450]     Physical Exam  Vitals and nursing note reviewed.   Constitutional:       General: She is not in acute distress.     Appearance: Normal appearance. She is well-developed.   HENT:      Head: Normocephalic and atraumatic.      Nose: Nose normal.      Mouth/Throat:      Mouth: Mucous membranes are moist.   Eyes:      General: Lids are normal.         Right eye: No discharge.         Left eye: No discharge.   Cardiovascular:      Rate and Rhythm: Normal rate and regular rhythm.   Pulmonary:      Effort: Pulmonary effort is normal.      Breath sounds: Normal breath sounds.   Abdominal:      General: There is no distension.      Palpations: Abdomen is soft.   Musculoskeletal:         General: Swelling present. No tenderness or signs of injury.      Right lower leg: Edema present.      Left lower leg: Edema present.   Skin:     General: Skin is warm and dry.      Findings: No erythema or rash.   Neurological:      Mental Status: Mental status is at baseline. She is lethargic.          Significant Labs:  CBC:   Recent Labs   Lab 05/05/25  0329   WBC 20.30*   RBC 3.00*   HGB 9.0*   HCT 28.8*   *   MCV 96   MCH 30.0   MCHC 31.3*     CMP:   Recent Labs   Lab 05/05/25  0329   GLU 69*   CALCIUM 8.6*   ALBUMIN 2.1*   PROT 7.5      K 4.4   CO2 15*      BUN 73*   CREATININE 4.7*   ALKPHOS 93   ALT 8*   AST 12   BILITOT 0.2     All labs within the past 24 hours have been reviewed.     Significant Imaging:  Labs: Reviewed  EEG: Reviewed

## 2025-05-05 NOTE — SUBJECTIVE & OBJECTIVE
Interval History: She does not speak. She groans to pain stimulus.     Review of Systems   Unable to perform ROS: Mental status change     Objective:     Vital Signs (Most Recent):  Temp: 98.4 °F (36.9 °C) (05/05/25 0715)  Pulse: 98 (05/05/25 0900)  Resp: (!) 8 (05/05/25 0900)  BP: (!) 149/61 (05/05/25 0900)  SpO2: 98 % (05/05/25 0900) Vital Signs (24h Range):  Temp:  [98.2 °F (36.8 °C)-99.1 °F (37.3 °C)] 98.4 °F (36.9 °C)  Pulse:  [] 98  Resp:  [8-23] 8  SpO2:  [98 %-100 %] 98 %  BP: (113-187)/() 149/61     Weight: 84.4 kg (186 lb)  Body mass index is 34.02 kg/m².    Intake/Output Summary (Last 24 hours) at 5/5/2025 1032  Last data filed at 5/5/2025 0600  Gross per 24 hour   Intake 559.39 ml   Output 300 ml   Net 259.39 ml         Physical Exam  Vitals and nursing note reviewed.   Constitutional:       Appearance: She is obese. She is ill-appearing. She is not diaphoretic.   HENT:      Head: Normocephalic and atraumatic.      Nose: Nose normal.   Cardiovascular:      Rate and Rhythm: Normal rate and regular rhythm.   Pulmonary:      Effort: Pulmonary effort is normal.      Breath sounds: Normal breath sounds.      Comments: RA  Abdominal:      General: Bowel sounds are normal.      Palpations: Abdomen is soft.      Tenderness: There is no abdominal tenderness.   Musculoskeletal:      Right lower leg: Edema present.      Left lower leg: Edema present.   Skin:     Comments: Left thigh wound with purulence   Neurological:      Mental Status: She is disoriented.               Significant Labs: All pertinent labs within the past 24 hours have been reviewed.    Significant Imaging: I have reviewed all pertinent imaging results/findings within the past 24 hours.

## 2025-05-05 NOTE — ASSESSMENT & PLAN NOTE
- noted on EEG 5/4/25  - loaded keppra on 5/4/25  - now on keppra 500mg IV BID  - still not altered- 2mg IV now  - repeat EEG ordered

## 2025-05-05 NOTE — PROGRESS NOTES
[facility] Medicine  Progress Note    Patient Name: Tasneem Lynn  MRN: [id number]  Patient Class: IP- Inpatient   Admission Date: 4/29/2025  Length of Stay: 6 days  Attending Physician: Samantha Fuentes MD  Primary Care Provider: Unable, To Obtain        Subjective     Principal Problem:Inguinal ulcer        HPI:  79-year-old F with medical history significant for type 2 DM, HTN, CHF, hypothyroidism, and CKD 4 who presented to the ED with a chief complaint of LT upper  groin pain boil of one week duration      Patient was in her baseline state of health until which initially started as a small pea-sized lesion but progressively grew and ulcerated.  Patient started having pain and this prompted her to come to the ED. she endorsed some subjective chills but denied any fever, nausea, or vomiting.  No diarrhea or loss of appetite.  Patient had no history of bloodstream infection, surgical implants, valvular heart disease.  However, retrospective chart review indicated that patient had chronic osteomyelitis of the right foot and ESBL Klebsiella UTI 5 years ago.  Patient has a known CKD 4 but no routine follow up with Nephrology noted; review of home medication did not show any diuretics or other nephrotoxic medications.    On presentation to the ED, patient was hypertensive to 183/74, , oxygen saturation 100% on room air.Laboratory investigations were pertinent for WBC 29.1, HGB 9.3, , sodium 135, BUN 79, creatinine of 5.4, EGFR 8, procalcitonin 25.23, troponin 0.03, lactic acid 0.9.  Patient received NS 1 L in the ED and IV Zosyn.  Admission was requested for further management.      Overview/Hospital Course:  Ms Tasneem Lynn is a 79 y.o. woman with DM, CKD4, diastolic CHF who was admitted with left thigh cellulitis and wound. Started antibiotics. Noted CKD4/5. Nephrology consulted. Patient does not want dialysis for any reason. Palliative also consulted. Noted mental status changes  on 5/2/25 AM; no signs of stroke, concern for cefepime induced neurotoxicity. Moved to ICU for continued altered mental status and EEG ordered. Antibiotics adjusted to vanc/ertapenem due to ESBL Ecoli UTI. General surgery is planning OR debridement on 5/5/25.     Interval History: She does not speak. She groans to pain stimulus.     Review of Systems   Unable to perform ROS: Mental status change     Objective:     Vital Signs (Most Recent):  Temp: 98.4 °F (36.9 °C) (05/05/25 0715)  Pulse: 98 (05/05/25 0900)  Resp: (!) 8 (05/05/25 0900)  BP: (!) 149/61 (05/05/25 0900)  SpO2: 98 % (05/05/25 0900) Vital Signs (24h Range):  Temp:  [98.2 °F (36.8 °C)-99.1 °F (37.3 °C)] 98.4 °F (36.9 °C)  Pulse:  [] 98  Resp:  [8-23] 8  SpO2:  [98 %-100 %] 98 %  BP: (113-187)/() 149/61     Weight: 84.4 kg (186 lb)  Body mass index is 34.02 kg/m².    Intake/Output Summary (Last 24 hours) at 5/5/2025 1032  Last data filed at 5/5/2025 0600  Gross per 24 hour   Intake 559.39 ml   Output 300 ml   Net 259.39 ml         Physical Exam  Vitals and nursing note reviewed.   Constitutional:       Appearance: She is obese. She is ill-appearing. She is not diaphoretic.   HENT:      Head: Normocephalic and atraumatic.      Nose: Nose normal.   Cardiovascular:      Rate and Rhythm: Normal rate and regular rhythm.   Pulmonary:      Effort: Pulmonary effort is normal.      Breath sounds: Normal breath sounds.      Comments: RA  Abdominal:      General: Bowel sounds are normal.      Palpations: Abdomen is soft.      Tenderness: There is no abdominal tenderness.   Musculoskeletal:      Right lower leg: Edema present.      Left lower leg: Edema present.   Skin:     Comments: Left thigh wound with purulence   Neurological:      Mental Status: She is disoriented.               Significant Labs: All pertinent labs within the past 24 hours have been reviewed.    Significant Imaging: I have reviewed all pertinent imaging results/findings within the  "past 24 hours.      Assessment & Plan  Left upper thigh wound/Cellulitis  - Hx of LEFT upper thigh carbuncle progressively enlarging with ulceration  - Blood culture no growth   - Noncontrast CT abdomen and left upper thigh to evaluate for occult abscesses and deep extensions-no abscess   - initially on vanc/cefepime  - Cellulitis and leukocytosis improved. Continued same wound care  - however, then there were concerns for cefepime encephalopathy-- stopped cefepime  - General surgery consulted- plan OR debridement on 5/5/25  - ID consulted  - changed antibiotics to vanc/ertapenem given ESBL Ecoli in urine  Acute encephalopathy  - On 5/2 am, noted change in mental status, left facial droop, aphasic and not following simple commands.   -DDx UA evidence of infection, cellulitis and uremia contributing, concern for stroke given history    -Stroke code called. CT head no acute bleed and indeterminate small lacunar infarct left thalamus. MRI brain, MRA head neck without contrast no stroke. Continue ASA, statin.  -Switched Cefepime to Rocephin as cefepime can contribute to encephalopathy.  -Urine culture with ESBL Ecoli- further changed CTX to ertapenem on 5/5/25  - she is still encephalopathic, not oriented, not speaking  - EEG ordered- pending read   Sepsis with acute organ dysfunction without septic shock  This patient does have evidence of infective focus. My overall impression is sepsis.  Source: Skin and Soft Tissue (location LEFT inguinal cellulitis)  Antibiotics given-   Antibiotics (72h ago, onward)      Start     Stop Route Frequency Ordered    05/05/25 0915  ertapenem (INVANZ) 0.5 g in 0.9% NaCl 100 mL IVPB         -- IV Every 24 hours (non-standard times) 05/05/25 0806    05/04/25 2100  mupirocin 2 % ointment         05/09/25 2059 Nasl 2 times daily 05/04/25 1729    04/29/25 1909  vancomycin - pharmacy to dose  (vancomycin IVPB (PEDS and ADULTS))        Placed in "And" Linked Group    -- IV pharmacy to manage " frequency 04/29/25 1815        - see above    CKD (chronic kidney disease) stage 5, GFR less than 15 ml/min  EMMA is likely due to acute tubular necrosis caused by sepsis. Baseline creatinine is 2.3 to 2.5, but this was several years ago. This may be EMMA on CKD4 vs progression to CKD5. Most recent creatinine and eGFR are listed below.  Recent Labs     05/03/25  0510 05/04/25  0647 05/05/25  0329   CREATININE 4.6* 4.6* 4.7*   EGFRNORACEVR 9* 9* 9*   - Last sCr 2020  - Retroperitoneal ultrasound- CKD, no hydronephrosis  - Nephrotic Range proteinuria  - Nephrology following  - she had told Nephrology and Palliative that she would never want dialysis prior to her mental status worsening  - Cr is stable on 5/5/25     Hyperlipidemia  - Continue statin if she is able to swallow     Diabetes mellitus  Patient's FSGs are controlled on current medication regimen.  Last A1c reviewed-   Lab Results   Component Value Date    HGBA1C 7.5 (H) 06/13/2020   - repeat A1c in process    Most recent fingerstick glucose reviewed-   Recent Labs   Lab 05/04/25  1149 05/04/25  1636 05/04/25  2116 05/05/25  0724   POCTGLUCOSE 110 102 138* 84   - glucose well controlled without need for insulin  - monitor Q6 while NPO    Type 2 myocardial infarction  Type 2 secondary to sepsis and EMMA  Noted elevated troponin with underlying history of CAD on presentation  EKG showed no acute ischemic change    Hypertension  - BP has been variable  - unable to swallow home BP Rx due to AMS  - PRN available   CAD (coronary artery disease)  Patient with known CAD s/p which is controlled Will continue ASA and Statin and monitor for S/Sx of angina/ACS. Continue to monitor on telemetry.   - Patient denied history of MI  - no record of [facility]  Hypothyroidism  Lab Results   Component Value Date    TSH 1.331 07/20/2020     Continue home levothyroxine  - if still too altered to receive tomorrow, consider switch to IV levothyroxine     Debility  Chronic history of bed  bound  PT/OT for weaker than normal when she can participate     Advance care planning  Advance Care Planning     Date: 05/01/2025    Code Status  In light of the patients advanced and life limiting illness, prior physicians engaged the the patient in a voluntary conversation about the patient's preferences for care  at the very end of life. The patient wishes to have a natural, peaceful death.  Along those lines, the patient does not wish to have CPR or other invasive treatments performed when her heart and/or breathing stops. Patient would like to discuss with Daughter Merrill. Patient also does not want dialysis if had to come to that point.     - she is currently full code status  - Palliative team care following      Anemia of chronic disorder  Anemia is likely due to chronic disease due to Chronic Kidney Disease. Most recent hemoglobin and hematocrit are listed below.  Recent Labs     05/03/25  0510 05/04/25  0647 05/05/25  0329   HGB 8.3* 8.4* 9.0*   HCT 25.3* 26.0* 28.8*     Plan  - Monitor serial CBC: Daily  - Transfuse PRBC if patient becomes hemodynamically unstable, symptomatic or H/H drops below 7/21.  - Iron studies iron 32 TSAT 35- not iron deficient  - no overt bleeding  - 1 unit of blood transfused on 5/2/25- responded appropriately    UTI due to extended-spectrum beta lactamase (ESBL) producing Escherichia coli  - urine culture 5/2/25 with ESBL Ecoli  - changed to ertapenem on 5/5/25    Status epilepticus      VTE Risk Mitigation (From admission, onward)           Ordered     heparin (porcine) injection 5,000 Units  Every 8 hours         04/29/25 1815     IP VTE HIGH RISK PATIENT  Once         04/29/25 1815     Place sequential compression device  Until discontinued         04/29/25 1815                    Discharge Planning   STEPHEN: 5/9/2025     Code Status: Full Code   Medical Readiness for Discharge Date:   Discharge Plan A: Home with family        10:57 AM Discussed with Neurology Dr Verde. Ms  Tasneem Lynn did have seizures/status epilepticus last night but it stopped at midnight. Note she did receive 2g keppra last night. Plan repeat EEG now.     Critical care time spent on the evaluation and treatment of severe organ dysfunction, review of pertinent labs and imaging studies, discussions with consulting providers and discussions with patient/family: 45 minutes.    Please place Justification for DME        Samantha Fuentes MD  Department of Hospital Medicine   [facility] [place] [facility]

## 2025-05-05 NOTE — EICU
Virtual ICU Quality Rounds    Admit Date: 4/29/2025  Hospital Day: 6    ICU Day: 17h    24H Vital Sign Range:  Temp:  [98.2 °F (36.8 °C)-99.1 °F (37.3 °C)]   Pulse:  []   Resp:  [8-23]   BP: (113-187)/()   SpO2:  [98 %-100 %]     VICU Surveillance Screening                    LDA reconciliation : Yes    Nursing orders placed : IP IBIS Peripheral IV Access

## 2025-05-05 NOTE — ASSESSMENT & PLAN NOTE
- On 5/2 am, noted change in mental status, left facial droop, aphasic and not following simple commands.   -DDx UA evidence of infection, cellulitis and uremia contributing, concern for stroke given history    -Stroke code called. CT head no acute bleed and indeterminate small lacunar infarct left thalamus. MRI brain, MRA head neck without contrast no stroke. Continue ASA, statin.  -Switched Cefepime to Rocephin as cefepime can contribute to encephalopathy.  -Urine culture with ESBL Ecoli- further changed CTX to ertapenem on 5/5/25  - she is still encephalopathic, not oriented, not speaking  - EEG 5/4 with NSCE- loaded with keppra, repeat EEG in process

## 2025-05-06 VITALS
HEIGHT: 62 IN | WEIGHT: 186 LBS | RESPIRATION RATE: 15 BRPM | TEMPERATURE: 97 F | SYSTOLIC BLOOD PRESSURE: 98 MMHG | HEART RATE: 67 BPM | DIASTOLIC BLOOD PRESSURE: 45 MMHG | BODY MASS INDEX: 34.23 KG/M2 | OXYGEN SATURATION: 100 %

## 2025-05-06 PROBLEM — R65.21 SEPTIC SHOCK: Status: ACTIVE | Noted: 2025-04-29

## 2025-05-06 PROBLEM — L03.116 CELLULITIS OF LEFT LOWER EXTREMITY: Status: ACTIVE | Noted: 2025-05-06

## 2025-05-06 PROBLEM — I95.9 HYPOTENSION: Status: ACTIVE | Noted: 2025-05-06

## 2025-05-06 PROBLEM — R56.9 SEIZURE: Status: ACTIVE | Noted: 2025-05-06

## 2025-05-06 PROBLEM — Z99.11 ON MECHANICALLY ASSISTED VENTILATION: Status: ACTIVE | Noted: 2025-05-06

## 2025-05-06 PROBLEM — E16.2 HYPOGLYCEMIA: Status: ACTIVE | Noted: 2025-05-06

## 2025-05-06 LAB
ABSOLUTE EOSINOPHIL (OHS): 0 K/UL
ABSOLUTE MONOCYTE (OHS): 0.94 K/UL (ref 0.3–1)
ABSOLUTE NEUTROPHIL COUNT (OHS): 23.6 K/UL (ref 1.8–7.7)
ALBUMIN SERPL BCP-MCNC: 1.9 G/DL (ref 3.5–5.2)
ALLENS TEST: ABNORMAL
ALP SERPL-CCNC: 82 UNIT/L (ref 40–150)
ALT SERPL W/O P-5'-P-CCNC: 6 UNIT/L (ref 10–44)
ANION GAP (OHS): 16 MMOL/L (ref 8–16)
AST SERPL-CCNC: 11 UNIT/L (ref 11–45)
BASOPHILS # BLD AUTO: 0.03 K/UL
BASOPHILS NFR BLD AUTO: 0.1 %
BILIRUB SERPL-MCNC: 0.2 MG/DL (ref 0.1–1)
BUN SERPL-MCNC: 77 MG/DL (ref 8–23)
CALCIUM SERPL-MCNC: 8.3 MG/DL (ref 8.7–10.5)
CHLORIDE SERPL-SCNC: 107 MMOL/L (ref 95–110)
CO2 SERPL-SCNC: 19 MMOL/L (ref 23–29)
CREAT SERPL-MCNC: 4.7 MG/DL (ref 0.5–1.4)
DELSYS: ABNORMAL
ERYTHROCYTE [DISTWIDTH] IN BLOOD BY AUTOMATED COUNT: 16.1 % (ref 11.5–14.5)
ERYTHROCYTE [SEDIMENTATION RATE] IN BLOOD BY WESTERGREN METHOD: 20 MM/H
FIO2: 30
GFR SERPLBLD CREATININE-BSD FMLA CKD-EPI: 9 ML/MIN/1.73/M2
GLUCOSE SERPL-MCNC: 69 MG/DL (ref 70–110)
HCO3 UR-SCNC: 17 MMOL/L (ref 24–28)
HCT VFR BLD AUTO: 30.8 % (ref 37–48.5)
HGB BLD-MCNC: 9.7 GM/DL (ref 12–16)
IMM GRANULOCYTES # BLD AUTO: 0.33 K/UL (ref 0–0.04)
IMM GRANULOCYTES NFR BLD AUTO: 1.3 % (ref 0–0.5)
INDIRECT COOMBS: NORMAL
LYMPHOCYTES # BLD AUTO: 0.83 K/UL (ref 1–4.8)
MAGNESIUM SERPL-MCNC: 2 MG/DL (ref 1.6–2.6)
MCH RBC QN AUTO: 30.1 PG (ref 27–31)
MCHC RBC AUTO-ENTMCNC: 31.5 G/DL (ref 32–36)
MCV RBC AUTO: 96 FL (ref 82–98)
MODE: ABNORMAL
NUCLEATED RBC (/100WBC) (OHS): 0 /100 WBC
PCO2 BLDA: 21.9 MMHG (ref 35–45)
PEEP: 5
PH SMN: 7.5 [PH] (ref 7.35–7.45)
PHOSPHATE SERPL-MCNC: 5.2 MG/DL (ref 2.7–4.5)
PLATELET # BLD AUTO: 569 K/UL (ref 150–450)
PMV BLD AUTO: 8.6 FL (ref 9.2–12.9)
PO2 BLDA: 174 MMHG (ref 80–100)
POC BE: -5 MMOL/L (ref -2–2)
POC SATURATED O2: 100 % (ref 95–100)
POC TCO2: 18 MMOL/L (ref 23–27)
POCT GLUCOSE: 101 MG/DL (ref 70–110)
POCT GLUCOSE: 103 MG/DL (ref 70–110)
POCT GLUCOSE: 106 MG/DL (ref 70–110)
POCT GLUCOSE: 117 MG/DL (ref 70–110)
POCT GLUCOSE: 65 MG/DL (ref 70–110)
POCT GLUCOSE: 82 MG/DL (ref 70–110)
POTASSIUM SERPL-SCNC: 4 MMOL/L (ref 3.5–5.1)
PROT SERPL-MCNC: 6.9 GM/DL (ref 6–8.4)
RBC # BLD AUTO: 3.22 M/UL (ref 4–5.4)
RELATIVE EOSINOPHIL (OHS): 0 %
RELATIVE LYMPHOCYTE (OHS): 3.2 % (ref 18–48)
RELATIVE MONOCYTE (OHS): 3.7 % (ref 4–15)
RELATIVE NEUTROPHIL (OHS): 91.7 % (ref 38–73)
RH BLD: NORMAL
SAMPLE: ABNORMAL
SITE: ABNORMAL
SODIUM SERPL-SCNC: 142 MMOL/L (ref 136–145)
SPECIMEN OUTDATE: NORMAL
VANCOMYCIN SERPL-MCNC: 19.3 UG/ML (ref ?–80)
VT: 400
WBC # BLD AUTO: 25.73 K/UL (ref 3.9–12.7)

## 2025-05-06 PROCEDURE — 80202 ASSAY OF VANCOMYCIN: CPT | Mod: HCNC | Performed by: HOSPITALIST

## 2025-05-06 PROCEDURE — 63600175 PHARM REV CODE 636 W HCPCS: Mod: HCNC | Performed by: NURSE PRACTITIONER

## 2025-05-06 PROCEDURE — 84100 ASSAY OF PHOSPHORUS: CPT | Mod: HCNC | Performed by: HOSPITALIST

## 2025-05-06 PROCEDURE — 99291 CRITICAL CARE FIRST HOUR: CPT | Mod: HCNC,,, | Performed by: INTERNAL MEDICINE

## 2025-05-06 PROCEDURE — 5A1935Z RESPIRATORY VENTILATION, LESS THAN 24 CONSECUTIVE HOURS: ICD-10-PCS | Performed by: INTERNAL MEDICINE

## 2025-05-06 PROCEDURE — 27000221 HC OXYGEN, UP TO 24 HOURS: Mod: HCNC

## 2025-05-06 PROCEDURE — 25000003 PHARM REV CODE 250: Mod: HCNC

## 2025-05-06 PROCEDURE — 25000003 PHARM REV CODE 250: Mod: HCNC | Performed by: NURSE PRACTITIONER

## 2025-05-06 PROCEDURE — 99233 SBSQ HOSP IP/OBS HIGH 50: CPT | Mod: HCNC,25,, | Performed by: INTERNAL MEDICINE

## 2025-05-06 PROCEDURE — 63600175 PHARM REV CODE 636 W HCPCS: Mod: HCNC

## 2025-05-06 PROCEDURE — 25000003 PHARM REV CODE 250: Mod: HCNC | Performed by: INTERNAL MEDICINE

## 2025-05-06 PROCEDURE — 25000003 PHARM REV CODE 250: Mod: HCNC | Performed by: HOSPITALIST

## 2025-05-06 PROCEDURE — 31500 INSERT EMERGENCY AIRWAY: CPT | Mod: HCNC

## 2025-05-06 PROCEDURE — 99291 CRITICAL CARE FIRST HOUR: CPT | Mod: 25,HCNC,, | Performed by: NURSE PRACTITIONER

## 2025-05-06 PROCEDURE — 99900035 HC TECH TIME PER 15 MIN (STAT): Mod: HCNC

## 2025-05-06 PROCEDURE — 85025 COMPLETE CBC W/AUTO DIFF WBC: CPT | Mod: HCNC | Performed by: HOSPITALIST

## 2025-05-06 PROCEDURE — 86900 BLOOD TYPING SEROLOGIC ABO: CPT | Mod: HCNC | Performed by: INTERNAL MEDICINE

## 2025-05-06 PROCEDURE — 80053 COMPREHEN METABOLIC PANEL: CPT | Mod: HCNC | Performed by: HOSPITALIST

## 2025-05-06 PROCEDURE — 31500 INSERT EMERGENCY AIRWAY: CPT | Mod: HCNC,,, | Performed by: NURSE PRACTITIONER

## 2025-05-06 PROCEDURE — 0BH17EZ INSERTION OF ENDOTRACHEAL AIRWAY INTO TRACHEA, VIA NATURAL OR ARTIFICIAL OPENING: ICD-10-PCS | Performed by: INTERNAL MEDICINE

## 2025-05-06 PROCEDURE — 36600 WITHDRAWAL OF ARTERIAL BLOOD: CPT | Mod: HCNC

## 2025-05-06 PROCEDURE — 36415 COLL VENOUS BLD VENIPUNCTURE: CPT | Mod: HCNC | Performed by: HOSPITALIST

## 2025-05-06 PROCEDURE — 82803 BLOOD GASES ANY COMBINATION: CPT | Mod: HCNC

## 2025-05-06 PROCEDURE — 63600175 PHARM REV CODE 636 W HCPCS: Mod: HCNC | Performed by: INTERNAL MEDICINE

## 2025-05-06 PROCEDURE — 94002 VENT MGMT INPAT INIT DAY: CPT | Mod: HCNC

## 2025-05-06 PROCEDURE — 99499 UNLISTED E&M SERVICE: CPT | Mod: ,,, | Performed by: INTERNAL MEDICINE

## 2025-05-06 PROCEDURE — C9254 INJECTION, LACOSAMIDE: HCPCS | Mod: HCNC | Performed by: HOSPITALIST

## 2025-05-06 PROCEDURE — 99233 SBSQ HOSP IP/OBS HIGH 50: CPT | Mod: HCNC,,, | Performed by: INTERNAL MEDICINE

## 2025-05-06 PROCEDURE — G0545 PR VISIT INHERENT TO INPT OR OBS CARE, INFECTIOUS DISEASE: HCPCS | Mod: HCNC,,, | Performed by: INTERNAL MEDICINE

## 2025-05-06 PROCEDURE — 99497 ADVNCD CARE PLAN 30 MIN: CPT | Mod: HCNC,25,, | Performed by: INTERNAL MEDICINE

## 2025-05-06 PROCEDURE — 99900026 HC AIRWAY MAINTENANCE (STAT): Mod: HCNC

## 2025-05-06 PROCEDURE — 63600175 PHARM REV CODE 636 W HCPCS: Mod: HCNC | Performed by: HOSPITALIST

## 2025-05-06 PROCEDURE — 83735 ASSAY OF MAGNESIUM: CPT | Mod: HCNC | Performed by: HOSPITALIST

## 2025-05-06 PROCEDURE — 95711 VEEG 2-12 HR UNMONITORED: CPT | Mod: HCNC

## 2025-05-06 PROCEDURE — 99292 CRITICAL CARE ADDL 30 MIN: CPT | Mod: 25,HCNC,, | Performed by: NURSE PRACTITIONER

## 2025-05-06 RX ORDER — PROPOFOL 10 MG/ML
0-50 INJECTION, EMULSION INTRAVENOUS CONTINUOUS
Status: DISCONTINUED | OUTPATIENT
Start: 2025-05-06 | End: 2025-05-06

## 2025-05-06 RX ORDER — NOREPINEPHRINE BITARTRATE/D5W 4MG/250ML
0-.2 PLASTIC BAG, INJECTION (ML) INTRAVENOUS CONTINUOUS
Status: DISCONTINUED | OUTPATIENT
Start: 2025-05-06 | End: 2025-05-06

## 2025-05-06 RX ORDER — PHENYLEPHRINE HCL IN 0.9% NACL 1 MG/10 ML
SYRINGE (ML) INTRAVENOUS
Status: COMPLETED
Start: 2025-05-06 | End: 2025-05-06

## 2025-05-06 RX ORDER — CLOBAZAM 10 MG/1
20 TABLET ORAL DAILY
Status: DISCONTINUED | OUTPATIENT
Start: 2025-05-06 | End: 2025-05-06 | Stop reason: HOSPADM

## 2025-05-06 RX ORDER — FAMOTIDINE 10 MG/ML
20 INJECTION, SOLUTION INTRAVENOUS DAILY
Status: DISCONTINUED | OUTPATIENT
Start: 2025-05-06 | End: 2025-05-06 | Stop reason: HOSPADM

## 2025-05-06 RX ORDER — SUCCINYLCHOLINE CHLORIDE 20 MG/ML
1 INJECTION INTRAMUSCULAR; INTRAVENOUS ONCE
Status: COMPLETED | OUTPATIENT
Start: 2025-05-06 | End: 2025-05-06

## 2025-05-06 RX ORDER — LORAZEPAM 2 MG/ML
2 INJECTION INTRAMUSCULAR
Status: DISCONTINUED | OUTPATIENT
Start: 2025-05-06 | End: 2025-05-06 | Stop reason: HOSPADM

## 2025-05-06 RX ORDER — PROPOFOL 10 MG/ML
INJECTION, EMULSION INTRAVENOUS
Status: COMPLETED
Start: 2025-05-06 | End: 2025-05-06

## 2025-05-06 RX ORDER — NOREPINEPHRINE BITARTRATE/D5W 4MG/250ML
0-.2 PLASTIC BAG, INJECTION (ML) INTRAVENOUS CONTINUOUS
Status: DISCONTINUED | OUTPATIENT
Start: 2025-05-06 | End: 2025-05-06 | Stop reason: HOSPADM

## 2025-05-06 RX ORDER — PHENYLEPHRINE HCL IN 0.9% NACL 1 MG/10 ML
200 SYRINGE (ML) INTRAVENOUS ONCE
Status: COMPLETED | OUTPATIENT
Start: 2025-05-06 | End: 2025-05-06

## 2025-05-06 RX ORDER — CHLORHEXIDINE GLUCONATE ORAL RINSE 1.2 MG/ML
15 SOLUTION DENTAL 2 TIMES DAILY
Status: DISCONTINUED | OUTPATIENT
Start: 2025-05-06 | End: 2025-05-06 | Stop reason: HOSPADM

## 2025-05-06 RX ORDER — ETOMIDATE 2 MG/ML
0.3 INJECTION INTRAVENOUS ONCE
Status: COMPLETED | OUTPATIENT
Start: 2025-05-06 | End: 2025-05-06

## 2025-05-06 RX ADMIN — SODIUM CHLORIDE, POTASSIUM CHLORIDE, SODIUM LACTATE AND CALCIUM CHLORIDE 1000 ML: 600; 310; 30; 20 INJECTION, SOLUTION INTRAVENOUS at 11:05

## 2025-05-06 RX ADMIN — NOREPINEPHRINE BITARTRATE 0.02 MCG/KG/MIN: 4 INJECTION, SOLUTION INTRAVENOUS at 12:05

## 2025-05-06 RX ADMIN — LEVETIRACETAM 500 MG: 100 INJECTION INTRAVENOUS at 08:05

## 2025-05-06 RX ADMIN — CHLORHEXIDINE GLUCONATE 0.12% ORAL RINSE 15 ML: 1.2 LIQUID ORAL at 12:05

## 2025-05-06 RX ADMIN — SUCCINYLCHOLINE CHLORIDE 84 MG: 20 INJECTION, SOLUTION INTRAMUSCULAR; INTRAVENOUS; PARENTERAL at 10:05

## 2025-05-06 RX ADMIN — HEPARIN SODIUM 5000 UNITS: 5000 INJECTION INTRAVENOUS; SUBCUTANEOUS at 01:05

## 2025-05-06 RX ADMIN — PROPOFOL 30 MCG/KG/MIN: 10 INJECTION, EMULSION INTRAVENOUS at 04:05

## 2025-05-06 RX ADMIN — LORAZEPAM 2 MG: 2 INJECTION INTRAMUSCULAR; INTRAVENOUS at 09:05

## 2025-05-06 RX ADMIN — PIPERACILLIN AND TAZOBACTAM 4.5 G: 4; .5 INJECTION, POWDER, LYOPHILIZED, FOR SOLUTION INTRAVENOUS; PARENTERAL at 11:05

## 2025-05-06 RX ADMIN — PROPOFOL 30 MCG/KG/MIN: 10 INJECTION, EMULSION INTRAVENOUS at 11:05

## 2025-05-06 RX ADMIN — MUPIROCIN: 20 OINTMENT TOPICAL at 08:05

## 2025-05-06 RX ADMIN — DEXTROSE MONOHYDRATE 12.5 G: 25 INJECTION, SOLUTION INTRAVENOUS at 02:05

## 2025-05-06 RX ADMIN — LACOSAMIDE 200 MG: 10 INJECTION INTRAVENOUS at 08:05

## 2025-05-06 RX ADMIN — ETOMIDATE 25.4 MG: 2 INJECTION INTRAVENOUS at 10:05

## 2025-05-06 RX ADMIN — HEPARIN SODIUM 5000 UNITS: 5000 INJECTION INTRAVENOUS; SUBCUTANEOUS at 06:05

## 2025-05-06 RX ADMIN — Medication 200 MCG: at 11:05

## 2025-05-06 RX ADMIN — PROPOFOL 20 MCG/KG/MIN: 10 INJECTION, EMULSION INTRAVENOUS at 12:05

## 2025-05-06 RX ADMIN — FAMOTIDINE 20 MG: 10 INJECTION, SOLUTION INTRAVENOUS at 12:05

## 2025-05-06 RX ADMIN — SODIUM CHLORIDE 0.5 G: 9 INJECTION, SOLUTION INTRAVENOUS at 08:05

## 2025-05-06 RX ADMIN — CLOBAZAM 20 MG: 10 TABLET ORAL at 12:05

## 2025-05-06 NOTE — PROCEDURES
"Tasneem Lynn is a 79 y.o. female patient.    Temp: 97.8 °F (36.6 °C) (25 0701)  Pulse: 98 (25 1051)  Resp: (!) 24 (25 1051)  BP: (!) 128/49 (25 1051)  SpO2: 100 % (25 1051)  Weight: 84.4 kg (186 lb) (25 1152)  Height: 5' 2" (157.5 cm) (25 1152)       Intubation    Date/Time: 2025 11:07 AM  Location procedure was performed: [facility] ICU    Performed by: Fabiana Ayala NP  Authorized by: Fabiana Ayala NP  Assisting provider: Rayray Medina MD  Pre-operative diagnosis: status epilepticus  Post-operative diagnosis: status epilepticus  Consent Done: Yes  Consent: Verbal consent obtained  Risks and benefits: risks, benefits and alternatives were discussed  Consent given by: daughter.  Patient identity confirmed: , MRN and name  Time out: Immediately prior to procedure a "time out" was called to verify the correct patient, procedure, equipment, support staff and site/side marked as required.  Indications: airway protection  Intubation method: video-assisted  Preoxygenation: bag valve mask  Sedatives: etomidate  Paralytic: succinylcholine  Laryngoscope size: Glide 4  Tube size: 7.5 mm  Tube type: cuffed  Number of attempts: 1  Cords visualized: yes  Post-procedure assessment: chest rise and CO2 detector  Breath sounds: clear and equal and absent over the epigastrium  Cuff inflated: yes  ETT to lip: 22 cm  Tube secured with: ETT nino  Chest x-ray interpreted by me.  Chest x-ray findings: endotracheal tube in appropriate position  Patient tolerance: Patient tolerated the procedure well with no immediate complications  Complications: No      Fabiana Ayala NP  Critical Care Medicine   2025    "

## 2025-05-06 NOTE — PLAN OF CARE
Problem: Adult Inpatient Plan of Care  Goal: Plan of Care Review  Outcome: Progressing  Goal: Patient-Specific Goal (Individualized)  Outcome: Progressing  Goal: Absence of Hospital-Acquired Illness or Injury  Outcome: Progressing  Goal: Optimal Comfort and Wellbeing  Outcome: Progressing  Goal: Readiness for Transition of Care  Outcome: Progressing     Problem: Infection  Goal: Absence of Infection Signs and Symptoms  Outcome: Progressing     Problem: Diabetes Comorbidity  Goal: Blood Glucose Level Within Targeted Range  Outcome: Progressing     Problem: Sepsis/Septic Shock  Goal: Optimal Coping  Outcome: Progressing  Goal: Absence of Bleeding  Outcome: Progressing  Goal: Blood Glucose Level Within Targeted Range  Outcome: Progressing  Goal: Absence of Infection Signs and Symptoms  Outcome: Progressing  Goal: Optimal Nutrition Intake  Outcome: Progressing     Problem: Acute Kidney Injury/Impairment  Goal: Fluid and Electrolyte Balance  Outcome: Progressing  Goal: Improved Oral Intake  Outcome: Progressing  Goal: Effective Renal Function  Outcome: Progressing

## 2025-05-06 NOTE — ASSESSMENT & PLAN NOTE
UTI noted. ID following for antibiotic regimen; this has been adjusted to avoid medications that lower the seizure threshold.

## 2025-05-06 NOTE — ASSESSMENT & PLAN NOTE
- While this was reportedly improving with abx at start of hospital stay, this has gradually worsened over last few days. Complex situation due to concern that abx may be related to NCSE.   - Plan for surgical intervention dependent on control of NCSE  - ID and surgery consulted and following for wound infectinon

## 2025-05-06 NOTE — SUBJECTIVE & OBJECTIVE
Interval History: EEG 5/5 to 5/6 showed diffuse background slowing with periods of Periods of NC SC seen throughout recording worsening later in study.  Patient was intubated on 05/06 and started on propofol for better control. Clobazam ( Onfi) added per Neurology.  Pending transfer to facility with inpatient neuro ICU .Family agreeable.    Review of Systems  Objective:     Vital Signs (Most Recent):  Temp: 97.5 °F (36.4 °C) (05/06/25 1120)  Pulse: 79 (05/06/25 1400)  Resp: 15 (05/06/25 1400)  BP: (!) 174/66 (05/06/25 1400)  SpO2: 100 % (05/06/25 1400) Vital Signs (24h Range):  Temp:  [97.2 °F (36.2 °C)-97.8 °F (36.6 °C)] 97.5 °F (36.4 °C)  Pulse:  [] 79  Resp:  [9-24] 15  SpO2:  [97 %-100 %] 100 %  BP: ()/() 174/66     Weight: 84.4 kg (186 lb)  Body mass index is 34.02 kg/m².    Intake/Output Summary (Last 24 hours) at 5/6/2025 1438  Last data filed at 5/6/2025 0600  Gross per 24 hour   Intake 182.99 ml   Output 275 ml   Net -92.01 ml         Physical Exam  Constitutional:       General: She is not in acute distress.     Appearance: She is ill-appearing. She is not toxic-appearing or diaphoretic.   Cardiovascular:      Rate and Rhythm: Normal rate and regular rhythm.      Heart sounds: No murmur heard.     No friction rub. No gallop.   Pulmonary:      Effort: Pulmonary effort is normal. No respiratory distress.      Breath sounds: Normal breath sounds. No stridor.   Abdominal:      General: Bowel sounds are normal.      Palpations: Abdomen is soft.   Skin:     Comments: LT thigh ulcer with foul smelling discharge   Neurological:      Comments: Tremors with a horizontal nystagmus noted earlier on; currently Sedated and intubated               Significant Labs: CBC:   Recent Labs   Lab 05/05/25  0329 05/06/25  0600   WBC 20.30* 25.73*   HGB 9.0* 9.7*   HCT 28.8* 30.8*   * 569*     CMP:   Recent Labs   Lab 05/05/25  0329 05/06/25  0600    142   K 4.4 4.0    107   CO2 15* 19*   GLU  69* 69*   BUN 73* 77*   CREATININE 4.7* 4.7*   CALCIUM 8.6* 8.3*   PROT 7.5 6.9   ALBUMIN 2.1* 1.9*   BILITOT 0.2 0.2   ALKPHOS 93 82   AST 12 11   ALT 8* 6*   ANIONGAP 18* 16       Significant Imaging:   X-Ray Chest AP Portable   Final Result      As above         Electronically signed by: Anton Al MD   Date:    05/06/2025   Time:    12:17      MRI Brain Without Contrast   Final Result      No acute intracranial process with no evidence of acute infarct.         Electronically signed by: Wilton Pittman   Date:    05/03/2025   Time:    14:01      MRA Brain without contrast   Final Result   FINDINGS/   No major branch advanced stenosis/occlusion is identified at the [id number].      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  No evidence of AVM.         Electronically signed by: Wilton Pittman   Date:    05/03/2025   Time:    14:07      MRA Neck without contrast   Final Result   FINDINGS/   Limited by motion.      No significant stenosis at the carotid bifurcations bilaterally by NASCET criteria.  The vertebral arteries are patent and codominant.      Retropharyngeal course of the carotid arteries.         Electronically signed by: Wilton Pittman   Date:    05/03/2025   Time:    13:53      CT Head Without Contrast   Final Result      1. No acute intracranial hemorrhage, mass effect, or midline shift.   2. Small lacunar type infarct in the left thalamus appears new when compared to remote CT performed 07/20/2020, but is otherwise technically age indeterminate.  Recommend clinical correlation.  MRI may be considered for more sensitive and specific assessment, as warranted clinically.         Electronically signed by: Franck Coombs   Date:    05/03/2025   Time:    09:41      CT THIGH WITHOUT CONTRAST LEFT   Final Result      Extensive subcutaneous soft tissue edema throughout the left hip and thigh.  No obvious organized focal fluid collection or drainable abscess seen.          Electronically signed by: Tish Marquez MD   Date:    04/29/2025   Time:    20:41      US Retroperitoneal Complete   Final Result      1. Medical renal disease.   2. No hydronephrosis.   3. Dependent material/debris within the urinary bladder.  Correlate with urinalysis.         Electronically signed by: Tish Marquez MD   Date:    04/29/2025   Time:    19:50      CT Abdomen Pelvis  Without Contrast   Final Result      1. Questionable subtle surrounding fat haziness involving the urinary bladder.  Such findings may be seen with acute cystitis.  Correlate with clinical symptoms and urinalysis if indicated.   2. No additional acute intra-abdominal abnormalities identified.   3. Cholelithiasis.   4. Uterine fibroids.   5. Colonic diverticulosis with no evidence of acute diverticulitis.   6. Diffuse body wall edema.   7. Additional findings as detailed above.         Electronically signed by: Tish Marquez MD   Date:    04/29/2025   Time:    18:22      X-Ray Chest AP Portable   Final Result      No acute process seen.         Electronically signed by: Aba Guzman MD   Date:    04/29/2025   Time:    15:54

## 2025-05-06 NOTE — NURSING
[facility]  Nurses Note -- 4 Eyes      5/6/2025       Skin assessed on: Q Shift      [] No Pressure Injuries Present    []Prevention Measures Documented    [x] Yes LDA  for Pressure Injury Previously documented     [] Yes New Pressure Injury Discovered   [] LDA for New Pressure Injury Added      Attending RN:  Jayla Mercado, RN     Second RN:  Magda        Please let patient know we have not received paperwork

## 2025-05-06 NOTE — PROGRESS NOTES
[facility] - Intensive Care  Nephrology  Progress Note    Patient Name: Tasneem Lynn  MRN: [id number]  Admission Date: 4/29/2025  Hospital Length of Stay: 7 days  Attending Provider: Laron Klein MD   Primary Care Physician: Unable, To Obtain  Principal Problem:Inguinal ulcer    Subjective:     HPI: Following for EMMA on CKD stage 4.     Interval History: UOP 275cc yesterday. No events overnight aside from ongoing seizure activity. Intubated and started on propofol this morning. Also receiving IVF for low BP. Transfer to neuro ICU pending. No family at bedside at time of my exam.       Review of patient's allergies indicates:   Allergen Reactions    Corticosteroids (glucocorticoids) Edema     Current Facility-Administered Medications   Medication Frequency    acetaminophen suppository 650 mg Q6H PRN    acetaminophen tablet 650 mg Q4H PRN    amLODIPine tablet 2.5 mg Daily    aspirin chewable tablet 81 mg Daily    atorvastatin tablet 40 mg QHS    chlorhexidine 0.12 % solution 15 mL BID    cloBAZam Tab 20 mg Daily    dextrose 50% injection 12.5 g PRN    dextrose 50% injection 25 g PRN    famotidine (PF) injection 20 mg Daily    glucagon (human recombinant) injection 1 mg PRN    glucose chewable tablet 16 g PRN    heparin (porcine) injection 5,000 Units Q8H    hydrALAZINE injection 10 mg Q4H PRN    insulin aspart U-100 pen 0-5 Units Q6H PRN    lacosamide injection 200 mg Q12H    levETIRAcetam injection 500 mg Q12H    levothyroxine tablet 75 mcg Before breakfast    LORazepam injection 2 mg Q15 Min PRN    melatonin tablet 6 mg Nightly PRN    mupirocin 2 % ointment BID    NORepinephrine 4 mg in dextrose 5% 250 mL infusion (premix) Continuous    ondansetron injection 4 mg Q8H PRN    piperacillin-tazobactam (ZOSYN) 4.5 g in D5W 100 mL IVPB (MB+) Q12H    propofol (DIPRIVAN) 10 mg/mL infusion (NON-TITRATING) Continuous    sodium chloride 0.9% flush 10 mL Q12H PRN    vancomycin - pharmacy to dose pharmacy to manage frequency        Objective:     Vital Signs (Most Recent):  Temp: 97.5 °F (36.4 °C) (05/06/25 1120)  Pulse: 69 (05/06/25 1243)  Resp: 20 (05/06/25 1243)  BP: (!) 84/38 (05/06/25 1243)  SpO2: 100 % (05/06/25 1243) Vital Signs (24h Range):  Temp:  [97.2 °F (36.2 °C)-97.8 °F (36.6 °C)] 97.5 °F (36.4 °C)  Pulse:  [] 69  Resp:  [9-24] 20  SpO2:  [97 %-100 %] 100 %  BP: ()/() 84/38     Weight: 84.4 kg (186 lb) (05/04/25 1152)  Body mass index is 34.02 kg/m².  Body surface area is 1.92 meters squared.    I/O last 3 completed shifts:  In: 742.4 [I.V.:261.8; IV Piggyback:480.6]  Out: 575 [Urine:575]     Physical Exam  Vitals and nursing note reviewed.   Constitutional:       General: She is not in acute distress.     Appearance: Normal appearance. She is well-developed.      Interventions: She is sedated and intubated.   HENT:      Head: Normocephalic and atraumatic.      Nose: Nose normal.      Mouth/Throat:      Mouth: Mucous membranes are moist.   Eyes:      General: Lids are normal.         Right eye: No discharge.         Left eye: No discharge.   Cardiovascular:      Rate and Rhythm: Normal rate and regular rhythm.   Pulmonary:      Effort: Pulmonary effort is normal. She is intubated.      Breath sounds: Normal breath sounds.   Abdominal:      General: There is no distension.      Palpations: Abdomen is soft.   Musculoskeletal:         General: Swelling present. No tenderness or signs of injury.      Right lower leg: Edema present.      Left lower leg: Edema present.      Comments: BLE with 1-2+ pitting edema   Skin:     General: Skin is warm and dry.      Findings: No erythema or rash.          Significant Labs:  CBC:   Recent Labs   Lab 05/06/25  0600   WBC 25.73*   RBC 3.22*   HGB 9.7*   HCT 30.8*   *   MCV 96   MCH 30.1   MCHC 31.5*     CMP:   Recent Labs   Lab 05/06/25  0600   GLU 69*   CALCIUM 8.3*   ALBUMIN 1.9*   PROT 6.9      K 4.0   CO2 19*      BUN 77*   CREATININE 4.7*   ALKPHOS  82   ALT 6*   AST 11   BILITOT 0.2     All labs within the past 24 hours have been reviewed.     Significant Imaging:  Labs: Reviewed  TTE: CVP ~3    Assessment/Plan:     EMMA on CKD stage 4 vs CKD stage 5  - baseline Cr unknown; Cr 2.3 in 2020 (GFR 22%); Cr 2.0 in 2013  - Cr 5.4 on admission; likely progression of diabetic nephropathy/CKD  - Cr 4.6 --> 4.7 --> 4.7 today; stable  - oliguric and hypervolemic on exam; though without O2 requirement this AM  - lasix 80mg IV PRN for any worsening hypoxia  - no need for RRT at this time. Patient previously reported she would never want dialysis. Of note, patient is not a candidate for outpatient dialysis due to her longstanding bedbound (>5 years) status.   - daily labs. Monitor UOP    Metabolic acidosis  - bicarb 15 --> 19 today; improving  - will trend    Secondary HPTH  - CCa normal; phos acceptable  - no need for phos binders at this time  - renal diet once diet started    Critical care time spent on the evaluation and treatment of severe organ dysfunction, review of pertinent labs and imaging studies, discussions with Dr. Klein and Jamie, NP and nursing staff, and discussions with patient/family: 35 minutes.    Thank you for your consult. I will follow-up with patient. Please contact us if you have any additional questions.    Rebecca Aguilar MD  Nephrology  [facility] - [place] - Intensive Care

## 2025-05-06 NOTE — SUBJECTIVE & OBJECTIVE
Interval History: will likely be transferred to [facility]; spoke to daughter this morning     Medications:  Continuous Infusions:  Scheduled Meds:   amLODIPine  2.5 mg Oral Daily    aspirin  81 mg Oral Daily    atorvastatin  40 mg Oral QHS    heparin (porcine)  5,000 Units Subcutaneous Q8H    lacosamide (Vimpat) IV orderable  200 mg Intravenous Q12H    levETIRAcetam (Keppra) IV (PEDS and ADULTS)  500 mg Intravenous Q12H    levothyroxine  75 mcg Oral Before breakfast    mupirocin   Nasal BID    PHENYLephrine HCl in 0.9% NaCl        piperacillin-tazobactam (Zosyn) IV (PEDS and ADULTS) (extended infusion is not appropriate)  4.5 g Intravenous Q12H    propofoL         PRN Meds:  Current Facility-Administered Medications:     acetaminophen, 650 mg, Rectal, Q6H PRN    acetaminophen, 650 mg, Oral, Q4H PRN    dextrose 50%, 12.5 g, Intravenous, PRN    dextrose 50%, 25 g, Intravenous, PRN    glucagon (human recombinant), 1 mg, Intramuscular, PRN    glucose, 16 g, Oral, PRN    hydrALAZINE, 10 mg, Intravenous, Q4H PRN    insulin aspart U-100, 0-5 Units, Subcutaneous, Q6H PRN    lorazepam, 2 mg, Intravenous, Q15 Min PRN    melatonin, 6 mg, Oral, Nightly PRN    ondansetron, 4 mg, Intravenous, Q8H PRN    PHENYLephrine HCl in 0.9% NaCl, , ,     propofoL, , ,     sodium chloride 0.9%, 10 mL, Intravenous, Q12H PRN    Pharmacy to dose Vancomycin consult, , , Once **AND** vancomycin - pharmacy to dose, , Intravenous, pharmacy to manage frequency    Objective:     Vital Signs (Most Recent):  Temp: 97.8 °F (36.6 °C) (05/06/25 0701)  Pulse: 95 (05/06/25 0900)  Resp: 15 (05/06/25 0900)  BP: (!) 119/46 (05/06/25 0900)  SpO2: 99 % (05/06/25 0900) Vital Signs (24h Range):  Temp:  [97.2 °F (36.2 °C)-98.3 °F (36.8 °C)] 97.8 °F (36.6 °C)  Pulse:  [] 95  Resp:  [9-22] 15  SpO2:  [97 %-100 %] 99 %  BP: ()/() 119/46     Weight: 84.4 kg (186 lb)  Body mass index is 34.02 kg/m².       Physical Exam  Constitutional:       Comments:  Ill-appearing, lying in bed, minimally responsive, EEG in process        Significant Labs: All pertinent labs within the past 24 hours have been reviewed.  CBC:   Recent Labs   Lab 05/06/25  0600   WBC 25.73*   HGB 9.7*   HCT 30.8*   MCV 96   *     BMP:  Recent Labs   Lab 05/06/25  0600   GLU 69*      K 4.0      CO2 19*   BUN 77*   CREATININE 4.7*   CALCIUM 8.3*   MG 2.0     LFT:  Lab Results   Component Value Date    AST 11 05/06/2025    ALKPHOS 82 05/06/2025    BILITOT 0.2 05/06/2025     Albumin:   Albumin   Date Value Ref Range Status   05/06/2025 1.9 (L) 3.5 - 5.2 g/dL Final   07/24/2020 2.5 (L) 3.5 - 5.2 g/dL Final     Protein:   Protein Total   Date Value Ref Range Status   05/06/2025 6.9 6.0 - 8.4 gm/dL Final     Total Protein   Date Value Ref Range Status   07/24/2020 8.1 6.0 - 8.4 g/dL Final     Lactic acid:   Lab Results   Component Value Date    LACTATE 0.9 04/29/2025    LACTATE 0.9 07/20/2020       Significant Imaging: I have reviewed all pertinent imaging results/findings within the past 24 hours.

## 2025-05-06 NOTE — ASSESSMENT & PLAN NOTE
- On 5/2 am, noted change in mental status, left facial droop, aphasic and not following simple commands.   -DDx UA evidence of infection, cellulitis and uremia contributing, concern for stroke given history    -Stroke code called. CT head no acute bleed and indeterminate small lacunar infarct left thalamus. MRI brain, MRA head neck without contrast no stroke. Continue ASA, statin.  -Switched Cefepime to Rocephin as cefepime can contribute to encephalopathy.  -Urine culture with ESBL Ecoli- further changed CTX to ertapenem on 5/5/25; change to Zosyn on 05/06  - she is still encephalopathic, not oriented, not speaking;  - EEG 5/4 with NSCE- loaded with keppra, repeat EEG in process; subsequently intubated on 05/06

## 2025-05-06 NOTE — CONSULTS
[facility] - Intensive Care  Pulmonology  Consult Note    Patient Name: Tasneem Lynn  MRN: [id number]  Admission Date: 4/29/2025  Hospital Length of Stay: 7 days  Code Status: Full Code  Attending Physician: Laron Klein MD  Primary Care Provider: Unable, To Obtain   Principal Problem: Inguinal ulcer    Inpatient consult to Pulmonology  Consult performed by: Fabiana Ayala NP  Consult ordered by: Laron Klein MD        Subjective:     HPI:  Ms. Tasneem Lynn is a 79 y.o. woman with DM, CKD4, and diastolic CHF who was admitted to the hospital on 4/29 with left thigh cellulitis/wound and UTI. She was started antibiotics and general surgery was consulted for evaluation of wound. Nephrology was also consulted for EMMA and it is noted that the patient does not want dialysis for any reason. Palliative medicine also following. Noted mental status changes on 5/2/25 AM; no signs of stroke, concern for cefepime induced neurotoxicity vs seizure activity. She was moved to ICU for continued altered mental status and EEG 5/4/25 showed status epilepticus. She was loaded with keppra and neurology was consulted. Antibiotics adjusted to vanc/ertapenem due to ESBL Ecoli UTI. General surgery is planning OR debridement once stable from a seizure standpoint. Additional AEDs were started. Overnight and early morning on 5/6, she was again noted to be in NCSE. Pulmonary was consulted and the patient was intubated for airway protection and started on propofol.     Past Medical History:   Diagnosis Date    Arthritis     Gout    CHF (congestive heart failure)     Coronary artery disease     Diabetes mellitus     HLD (hyperlipidemia)     Hypertension     Obese     Stroke     1986    Thyroid disease        Past Surgical History:   Procedure Laterality Date    ESOPHAGOGASTRODUODENOSCOPY N/A 7/23/2020    Procedure: EGD (ESOPHAGOGASTRODUODENOSCOPY);  Surgeon: Halle Hughes MD;  Location: [facility];  Service: Endoscopy;  Laterality: N/A;     right hand surgery      right knee surgery Right     partial plate       Review of patient's allergies indicates:   Allergen Reactions    Corticosteroids (glucocorticoids) Edema       Family History       Problem Relation (Age of Onset)    Diabetes Maternal Aunt    Heart disease Sister, Maternal Aunt    Hypertension Maternal Aunt          Tobacco Use    Smoking status: Never    Smokeless tobacco: Never   Substance and Sexual Activity    Alcohol use: No    Drug use: No    Sexual activity: Not Currently     Partners: Male         Review of Systems   Unable to perform ROS: Intubated     Objective:     Vital Signs (Most Recent):  Temp: 97.5 °F (36.4 °C) (05/06/25 1120)  Pulse: 79 (05/06/25 1400)  Resp: 15 (05/06/25 1400)  BP: (!) 174/66 (05/06/25 1400)  SpO2: 100 % (05/06/25 1400) Vital Signs (24h Range):  Temp:  [97.2 °F (36.2 °C)-97.8 °F (36.6 °C)] 97.5 °F (36.4 °C)  Pulse:  [] 79  Resp:  [9-24] 15  SpO2:  [97 %-100 %] 100 %  BP: ()/() 174/66     Weight: 84.4 kg (186 lb)  Body mass index is 34.02 kg/m².      Intake/Output Summary (Last 24 hours) at 5/6/2025 1424  Last data filed at 5/6/2025 0600  Gross per 24 hour   Intake 182.99 ml   Output 275 ml   Net -92.01 ml        Physical Exam  Vitals and nursing note reviewed.   Constitutional:       Appearance: Normal appearance. She is well-developed. She is ill-appearing.      Interventions: She is sedated and intubated.   HENT:      Head: Normocephalic and atraumatic.      Nose: Nose normal.      Mouth/Throat:      Mouth: Mucous membranes are moist.   Eyes:      General: Lids are normal.         Right eye: No discharge.         Left eye: No discharge.   Cardiovascular:      Rate and Rhythm: Normal rate and regular rhythm.      Pulses: Normal pulses.   Pulmonary:      Effort: Pulmonary effort is normal. She is intubated.      Breath sounds: Normal breath sounds.   Abdominal:      General: There is no distension.      Palpations: Abdomen is soft.    Genitourinary:     Comments: Davis in place  Musculoskeletal:         General: Swelling present. No tenderness or signs of injury.      Right lower le+ Edema present.      Left lower le+ Edema present.   Skin:     General: Skin is warm and dry.      Findings: No erythema or rash.      Comments: Wound to left thigh, foul smelling    Neurological:      Comments: Obtunded. Rhythmic jerking to bilateral shoulders and head noted prior to intubation. No response to noxious stimulus .         Vents:  Vent Mode: PRVC (25 105)  Ventilator Initiated: Yes (25 105)  Set Rate: 20 BPM (25 1243)  Vt Set: 400 mL (25)  PEEP/CPAP: 5 cmH20 (25)  Oxygen Concentration (%): 30 (25 1400)  Peak Airway Pressure: 19.4 cmH20 (25)  Total Ve: 7.7 L/m (25)  F/VT Ratio<105 (RSBI): (!) 51.55 (25 1243)    Lines/Drains/Airways       Drain  Duration                  NG/OG Tube 25 1055 Center mouth <1 day         Urethral Catheter 25 1057 <1 day              Airway  Duration                  Airway - Non-Surgical 25 1051 <1 day              Peripheral Intravenous Line  Duration                  Peripheral IV - Single Lumen 25 1809 22 G Anterior;Left Forearm 1 day         Peripheral IV - Single Lumen 25 1220 20 G Anterior;Distal;Right Forearm <1 day                    Significant Labs:    CBC/Anemia Profile:  Recent Labs   Lab 25  03225  0600   WBC 20.30* 25.73*   HGB 9.0* 9.7*   HCT 28.8* 30.8*   * 569*   MCV 96 96   RDW 16.3* 16.1*        Chemistries:  Recent Labs   Lab 25  03225  0600    142   K 4.4 4.0    107   CO2 15* 19*   BUN 73* 77*   CREATININE 4.7* 4.7*   CALCIUM 8.6* 8.3*   ALBUMIN 2.1* 1.9*   PROT 7.5 6.9   BILITOT 0.2 0.2   ALKPHOS 93 82   ALT 8* 6*   AST 12 11   MG 2.0 2.0   PHOS 5.3* 5.2*       All pertinent labs within the past 24 hours have been reviewed.    Significant  Imaging:   I have reviewed all pertinent imaging results/findings within the past 24 hours.    ABG  Recent Labs   Lab 05/06/25  1243   PH 7.499*   PO2 174*   PCO2 21.9*   HCO3 17.0*   BE -5*     Assessment/Plan:     Neuro  Non-convulsive status epilepticus  NCSE noted on EEG overnight 5/4 and 5/5 despite multiple AEDs. Antibiotic regimen adjusted accordingly. Intubated for airway protection and propofol administration.      - continue AEDs per neuro recs  - continue non-titrating propofol and decrease with epileptologist recs  - recommend transfer to a [facility] for higher level of care    Pulmonary  On mechanically assisted ventilation  Intubated for airway protection and propofol administration in the setting of recurrent status epilepticus despite multiple AEDs. ABG reviewed and ventilator settings adjusted.     - maintain LPV, wean for SpO2 >90%  - wean non-titrating propofol with epilepsy guidance  - once stable from a seizure standpoint, daily SAT/SBT     Renal/  CKD (chronic kidney disease) stage 5, GFR less than 15 ml/min  EMMA on CKD. Likely with progression of diabetic nephropathy/CKD. Of note, when coherent, patient verbalized that she would never want dialysis.     - Avoid ACEI/ARB/NSAIDS/Nephrotoxins.   - Renally dose all meds  - Nephrology consulted, appreciate assistance       UTI due to extended-spectrum beta lactamase (ESBL) producing Escherichia coli  UTI noted. ID following for antibiotic regimen; this has been adjusted to avoid medications that lower the seizure threshold.     Orthopedic  * Left upper thigh wound/Cellulitis  Infected appearing wound to left thigh.     - general surgery following with plans for eventual I&D  - continue antibiotics per ID    Other  Debility  Reportedly bed bound for >5 years.      Plan discussed with Dr. Medina, Dr. Klein, Dr. Mayes, Dr. Mauricio, Dr. Caruso, and Dr. Aguilar.  Pending transfer to [facility].   Daughter updated by Dr. Medina and  other members of the care team throughout the day.    Critical Care Time: 70 minutes  Critical care was time spent personally by me on the following activities: development of treatment plan with patient or surrogate and bedside caregivers, discussions with consultants, evaluation of patient's response to treatment, examination of patient, ordering and performing treatments and interventions, ordering and review of laboratory studies, ordering and review of radiographic studies, pulse oximetry, re-evaluation of patient's condition. This critical care time did not overlap with that of any other provider or involve time for any procedures.      Thank you for your consult. I will follow-up with patient. Please contact us if you have any additional questions.     Fabiana Ayala NP  Pulmonology  [facility] [place] - Intensive Care

## 2025-05-06 NOTE — ASSESSMENT & PLAN NOTE
Infected appearing wound to left thigh.     - general surgery following with plans for eventual I&D  - continue antibiotics per ID

## 2025-05-06 NOTE — ASSESSMENT & PLAN NOTE
5/6/25  - Have been chart reviewing and discussed pt during MDT rounds yesterday and today; pt with persistently poor mental status believed triggered by NCSE which may have been brought on by abx (Cefepime, and now carbapenem) for pt's would infection, which has worsened over last few days. While I&D surgery was planned initially for yesterday, this has been further postponed by pt's ongoing seizures. She is still having seizure activity; based on discussion with [facility], primary team, neurology, and ID, plan is to intubate pt and start her on Propofol, and to change abx to Zosyn.   - As pt with concern for ongoing seizure activity as of this morning, called and spoke with pt's daughter Merrill; requested that she come in to hospital.   - Later in the morning, Dr Klein (primary staff), Dr Medina (PCCM), and myself met with Merrill in waiting room, as she preferred to speak outside of pt's room.   - Thorough medical update provided, and addressed her questions and concerns to the best of our abilities. She is upset about the care pt has received in hospital, so we arranged for Patient Relations to meet with her today.   - Based on conversation, plan is to intubate pt and start Propofol in hopes of controlling pt's NCSE, with likely transfer to Neuro ICU if this is able to arranged. Timing of wound debridement TBD, as we will need to have control of seizures first.    - Of note, Merrill said she is familiar with ventilator, as immediate family member has a trach (and is also on HD).   - Dr Klein is working on transfer to [facility]; will reach out to palliative team in case they are able to provide support to family. Updated ID and neurology after family meeting.     5/2/25  - Chart and interval history reviewed; pt's renal function stable overnight   - During visit to bedside, pt was alert, in good spirits, and seemed comfortable. She confirmed that her daughter visited yesterday, and they had a discussion. She  informed her daughter that she doesn't want to undergo HD if it reaches that point; let her know we will do our best to honor this preference, and let her know I would speak to her daughter.   - Following this, Dr Aguilar and I called and spoke with her daughter Merrill. Medical update provided, and discussed that while pt does not currently have any acute needs for HD, this may change in the future. Based on our discussion with pt, she does not desire to pursue this, and would prefer to transition to hospice care if it gets to that point.   - Merrill seemed upset to hear this, and said pt will change her mind once her kidneys fail; tried to explain to her the reason we are having this conversation now is that pt may become confused as her kidney function worsens. We also discussed the significant logistical challenges of transporting a completely bed bound pt to HD chair 3 times per week.   - She said she will further discuss above with pt; encouraged this, and let her know we will do our best to honor pt's preferences. As pt has not seen a health care provider since 2021, let her know she will need follow up with both PCP and nephrology   - At this time, plan remains to continue with current level of care (PCP and outpatient nephrology follow-up), though pt has expressed to us that she does not want to undergo HD if it ever came to this. Pt's daughter does not seem to be in agreement with this, so have encouraged them to have further discussion.   - Updated primary team after above conversation. While pt would likely benefit from home-based palliative follow-up, she has a high risk of readmission, so  palliative team will see her during any future admissions     5/1/25  - Consult for support and advance care planning in older pt with CKD currently in hospital for cellulitis/wound infection; chart reviewed in depth, and discussed pt with primary team. Pt has voiced to them she is unlikely to desire HD even if this is  "eventually needed.   - Visited with pt at bedside; she was alert and readily conversational. Introduced role of palliative medicine in pt's care, and learned more about her outside of hospital. She is  and has two daughters, though only her daughter Merrill (resides with pt) is involved in her care. Merrill is not currently working, and is available at all times to assist pt. 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 She spends all of her time in bed at home, and said this has been the case for several years.   - At this time during visit, pt's daughter Merrill called, so I spoke to her on speaker phone. She informed me phone number on file is incorrect, so I updated this in chart ([phone]). Basic medical update provided; she had a number of questions about pt's renal function and is upset that her doctors (rosalvas Collins) had her on "blood pressure medications that can hurt her kidneys." I explained that blood pressure control is an important aspect of health care, and that without good blood pressure control pt can have worsening renal function from this as well. Additionally, she would be high risk for strokes or other serious health events. As many of her concerns were renal specific, I let her know that I would have Dr Aguilar reach out to her by phone, to which she was agreeable.    - Following this, spoke to pt about her care preferences if her renal function worsens. Explained that if not wanting to pursue HD - or she is not a candidate for this - alternative path would be comfort-focused/hospice care. While patient indicated to me that she does not wish to undergo HD at any point, she agreed to further discuss with her daughter, who will likely be visiting the hospital sometime today. Encouraged this, as she may become confused and unable to voice her preferences if she goes into renal failure.   - Let her know I will " follow up with her throughout hospital stay  - Updated primary team via [employer], and Dr Aguilar in person

## 2025-05-06 NOTE — ASSESSMENT & PLAN NOTE
- Believed caused by NCSE and infection; mgmt per neurology/primary staff/PCCM. Pt to be intubated and started on propfol this morning, with transfer to [facility] if this is able to be arranged.

## 2025-05-06 NOTE — PROGRESS NOTES
Pharmacist Renal Dose Adjustment Note    Tasneem Lynn is a 79 y.o. female being treated with the medication famotidinw    Patient Data:    Vital Signs (Most Recent):  Temp: 97.5 °F (36.4 °C) (05/06/25 1120)  Pulse: 78 (05/06/25 1120)  Resp: 20 (05/06/25 1120)  BP: (!) 120/45 (05/06/25 1120)  SpO2: 100 % (05/06/25 1120) Vital Signs (72h Range):  Temp:  [97.2 °F (36.2 °C)-99.1 °F (37.3 °C)]   Pulse:  []   Resp:  [8-24]   BP: ()/()   SpO2:  [97 %-100 %]      Recent Labs   Lab 05/04/25  0647 05/05/25  0329 05/06/25  0600   CREATININE 4.6* 4.7* 4.7*     Serum creatinine: 4.7 mg/dL (H) 05/06/25 0600  Estimated creatinine clearance: 9.8 mL/min (A)    Medication:famitidine dose: 20mg frequency bid will be changed to medication:famotidine dose:20mg  frequency:daily    Pharmacist's Name: Annika Mcneil  Pharmacist's Extension: [phone]

## 2025-05-06 NOTE — PLAN OF CARE
Case Management Final Discharge Note    Discharge Disposition: Transferred to [facility] for a high level of care    New DME ordered / company name: none    Relevant SDOH / Transition of Care Barriers:  none    Person available to provide assistance at home when needed and their contact information: daughter    Scheduled followup appointment: none    Referrals placed: none    Transportation: Ambulance        Patient and family educated on discharge services and updated on DC plan. Bedside RN notified, patient clear to discharge from Case Management Perspective.      05/06/25 5861   Final Note   Assessment Type Final Discharge Note   Anticipated Discharge Disposition [facility]   Post-Acute Status   Coverage ayor: HU[employer] MANAGED MEDICARE - [employer] MEDICARE O   Discharge Delays None known at this time

## 2025-05-06 NOTE — PROGRESS NOTES
Surgery Progress Note    Tasneem Lynn is a 79 y.o. year old female in hospital due to sepsis secondary to left thigh wound and UTI. Patient noted to be in status epilepticus, planned I&D postponed for same.         PE:  Vitals:    05/06/25 1315 05/06/25 1330 05/06/25 1345 05/06/25 1400   BP: (!) 140/60 (!) 169/65 (!) 164/68 (!) 174/66   Pulse: 76 79 78 79   Resp: 15 15 15 15   Temp:       TempSrc:       SpO2: 100% 100% 100% 100%   Weight:       Height:         Lethargic, not interactive or following commands  Slight increased WOB  Left thigh wound with overlying eschar and foul smell    Significant Diagnostic Studies: Labs: CMP   Recent Labs   Lab 05/05/25 0329 05/06/25  0600    142   K 4.4 4.0    107   CO2 15* 19*   GLU 69* 69*   BUN 73* 77*   CREATININE 4.7* 4.7*   CALCIUM 8.6* 8.3*   PROT 7.5 6.9   ALBUMIN 2.1* 1.9*   BILITOT 0.2 0.2   ALKPHOS 93 82   AST 12 11   ALT 8* 6*   ANIONGAP 18* 16    and CBC   Recent Labs   Lab 05/05/25 0329 05/06/25  0600   WBC 20.30* 25.73*   HGB 9.0* 9.7*   HCT 28.8* 30.8*   * 569*     Radiology: CT scan:     X-Ray Chest AP Portable   Final Result      As above         Electronically signed by: Anton Al MD   Date:    05/06/2025   Time:    12:17      MRI Brain Without Contrast   Final Result      No acute intracranial process with no evidence of acute infarct.         Electronically signed by: Wilton Pittman   Date:    05/03/2025   Time:    14:01      MRA Brain without contrast   Final Result   FINDINGS/   No major branch advanced stenosis/occlusion is identified at the jquupx-ge-Begcma.      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  No evidence of AVM.         Electronically signed by: Wilton Pittman   Date:    05/03/2025   Time:    14:07      MRA Neck without contrast   Final Result   FINDINGS/   Limited by motion.      No significant stenosis at the carotid bifurcations bilaterally by NASCET criteria.  The vertebral  arteries are patent and codominant.      Retropharyngeal course of the carotid arteries.         Electronically signed by: Wilton Pittman   Date:    05/03/2025   Time:    13:53      CT Head Without Contrast   Final Result      1. No acute intracranial hemorrhage, mass effect, or midline shift.   2. Small lacunar type infarct in the left thalamus appears new when compared to remote CT performed 07/20/2020, but is otherwise technically age indeterminate.  Recommend clinical correlation.  MRI may be considered for more sensitive and specific assessment, as warranted clinically.         Electronically signed by: Franck Coombs   Date:    05/03/2025   Time:    09:41      CT THIGH WITHOUT CONTRAST LEFT   Final Result      Extensive subcutaneous soft tissue edema throughout the left hip and thigh.  No obvious organized focal fluid collection or drainable abscess seen.         Electronically signed by: Tish Marquez MD   Date:    04/29/2025   Time:    20:41      US Retroperitoneal Complete   Final Result      1. Medical renal disease.   2. No hydronephrosis.   3. Dependent material/debris within the urinary bladder.  Correlate with urinalysis.         Electronically signed by: Tish Marquez MD   Date:    04/29/2025   Time:    19:50      CT Abdomen Pelvis  Without Contrast   Final Result      1. Questionable subtle surrounding fat haziness involving the urinary bladder.  Such findings may be seen with acute cystitis.  Correlate with clinical symptoms and urinalysis if indicated.   2. No additional acute intra-abdominal abnormalities identified.   3. Cholelithiasis.   4. Uterine fibroids.   5. Colonic diverticulosis with no evidence of acute diverticulitis.   6. Diffuse body wall edema.   7. Additional findings as detailed above.         Electronically signed by: Tish Marquez MD   Date:    04/29/2025   Time:    18:22      X-Ray Chest AP Portable   Final Result      No acute process seen.         Electronically signed  by: Aba Guzman MD   Date:    04/29/2025   Time:    15:54            A/P:  Tasneem Lynn is a 79 y.o. year old female  with infected left thigh ulcer    -planning for I&D today, however patient continues to be in status   - General Surgery will continue to follow    Adeline Alonzo  General Surgery - [facility]  5/6/2025

## 2025-05-06 NOTE — PLAN OF CARE
Changes in medical condition or discharge plan: Patient transferring to [facility] for high level of care    Does patient need new DME?  None    Follow up appts needed: Will be ordered by the discharging provider.    Medically stable:  Patient transferring to another facility for a high level of care    Estimated Discharge Date: 5/6/2025 05/06/25 1620   Discharge Reassessment   Assessment Type Discharge Planning Reassessment   Did the patient's condition or plan change since previous assessment? Yes  (Tx to [facility] Neuro ICU)   Discharge Plan discussed with: Adult children  (Physician(s) and nurse discussed with daughter at the bedside.)   Communicated STEPHEN with patient/caregiver Yes   Discharge Plan A Other  (TBD)   Discharge Plan B Home with family   DME Needed Upon Discharge  none   Transition of Care Barriers None   Why the patient remains in the hospital Requires continued medical care   Post-Acute Status   Discharge Delays None known at this time

## 2025-05-06 NOTE — PLAN OF CARE
"Rounding:  Patient remains in ICU with treatment ongoing.  Patient not intubated; neuro, general surgery, and ID consulted and following.  Patient was to go to surgery today depending on status.  Patient remains "in status."      Palliative medicine following patient and will continue to followup with daughter to discuss goals of care.    0900h      "

## 2025-05-06 NOTE — ASSESSMENT & PLAN NOTE
Patient's FSGs are controlled on current medication regimen.  Last A1c reviewed-   Lab Results   Component Value Date    HGBA1C 4.8 05/04/2025     Most recent fingerstick glucose reviewed-   Recent Labs   Lab 05/06/25  0224 05/06/25  0247 05/06/25  0723 05/06/25  1357   POCTGLUCOSE 65* 117* 106 82   Hypoglycemia- infection contributing  Obtain HgbA1c

## 2025-05-06 NOTE — CONSULTS
[facility] - Intensive Care  Wound Care  WOC PATITO    Patient Name:  Tasneem Lynn   MRN:  [id number]  Date: 5/6/2025  Diagnosis: Inguinal ulcer    History:     Past Medical History:   Diagnosis Date    Arthritis     Gout    CHF (congestive heart failure)     Coronary artery disease     Diabetes mellitus     HLD (hyperlipidemia)     Hypertension     Obese     Stroke     1986    Thyroid disease        Social History[1]    Precautions:     Allergies as of 04/29/2025 - Reviewed 04/29/2025   Allergen Reaction Noted    Corticosteroids (glucocorticoids) Edema 07/05/2012       WO Assessment Details/Treatment     Active Problem List with Overview Notes    Diagnosis Date Noted    Cellulitis of left lower extremity 05/06/2025    Seizure 05/06/2025    Non-convulsive status epilepticus 05/05/2025    Acute encephalopathy 05/03/2025    Debility 05/01/2025    Advance care planning 05/01/2025    Left upper thigh wound/Cellulitis 04/29/2025    Sepsis with acute organ dysfunction without septic shock 04/29/2025    CKD (chronic kidney disease) stage 5, GFR less than 15 ml/min 04/29/2025    Hypertension 04/29/2025    Diabetes mellitus 04/29/2025    CAD (coronary artery disease) 04/29/2025    Type 2 myocardial infarction 04/29/2025    Anemia of chronic disorder 07/24/2020    UTI due to extended-spectrum beta lactamase (ESBL) producing Escherichia coli 07/21/2020    Chronic osteomyelitis of right foot with draining sinus 06/15/2020    Atherosclerosis of native arteries of right leg with ulceration of heel and midfoot 06/15/2020    Wound of right leg 06/13/2020    Diabetic ulcer of right heel associated with diabetes mellitus due to underlying condition, limited to breakdown of skin 06/12/2020    Aortic atherosclerosis 05/03/2019    Chronic allergic rhinitis 12/19/2018    Vitamin D deficiency 12/19/2018    Osteoarthritis involving multiple joints on both sides of body 06/25/2018    CKD (chronic kidney disease) stage 4, GFR 15-29 ml/min  05/31/2018    Hypothyroidism 11/17/2016    History of CVA (cerebrovascular accident) 11/17/2016    Chronic gout 11/17/2016    Hyperlipidemia 11/17/2016    Type 2 diabetes mellitus, uncontrolled, with renal complications 05/07/2013    Essential hypertension 05/07/2013      Nursing consult for altered skin integrity underneath bilateral breast  WOC consult for cellulitis left groin 4/30/25 5/5 Surgery consult for left groin wound. Plan excisional debridement in OR  Photodocumentation (from Media)        5/4 Nursing documented sacral pressure injury  Patient discharged before WOC nurse assessment.     05/06/2025         [1]   Social History  Socioeconomic History    Marital status:    Tobacco Use    Smoking status: Never    Smokeless tobacco: Never   Substance and Sexual Activity    Alcohol use: No    Drug use: No    Sexual activity: Not Currently     Partners: Male     Social Drivers of Health     Financial Resource Strain: Low Risk  (5/1/2025)    Overall Financial Resource Strain (CARDIA)     Difficulty of Paying Living Expenses: Not hard at all   Food Insecurity: No Food Insecurity (5/1/2025)    Hunger Vital Sign     Worried About Running Out of Food in the Last Year: Never true     Ran Out of Food in the Last Year: Never true   Transportation Needs: No Transportation Needs (4/29/2025)    PRAPARE - Transportation     Lack of Transportation (Medical): No     Lack of Transportation (Non-Medical): No   Stress: No Stress Concern Present (5/1/2025)    [employer] - Occupational Stress Questionnaire     Feeling of Stress : Not at all   Housing Stability: High Risk (5/1/2025)    Housing Stability Vital Sign     Unable to Pay for Housing in the Last Year: No     Homeless in the Last Year: Yes

## 2025-05-06 NOTE — ASSESSMENT & PLAN NOTE
EMMA on CKD. Likely with progression of diabetic nephropathy/CKD. Of note, when coherent, patient verbalized that she would never want dialysis.     - Avoid ACEI/ARB/NSAIDS/Nephrotoxins.   - Renally dose all meds  - Nephrology consulted, appreciate assistance

## 2025-05-06 NOTE — ASSESSMENT & PLAN NOTE
5/6/25  - Have been chart reviewing and discussed pt during MDT rounds yesterday and today; pt with persistently poor mental status believed triggered by NCSE which may have been brought on by abx (Cefepime, and now carbapenem) for pt's would infection, which has worsened over last few days. While I&D surgery was planned initially for yesterday, this has been further postponed by pt's ongoing seizures. She is still having seizure activity; based on discussion with [facility], primary team, neurology, and ID, plan is to intubate pt and start her on Propofol, and to change abx to Zosyn.   - As pt with concern for ongoing seizure activity as of this morning, called and spoke with pt's daughter Merrill; requested that she come in to hospital.   - Later in the morning, Dr Klein (primary staff), Dr Medina (PCCM), and myself met with Merrill in waiting room, as she preferred to speak outside of pt's room.   - Thorough medical update provided, and addressed her questions and concerns to the best of our abilities. She is upset about the care pt has received in hospital, so we arranged for Patient Relations to meet with her today.   - Based on conversation, plan is to intubate pt and start Propofol in hopes of controlling pt's NCSE, with likely transfer to Neuro ICU if this is able to arranged. Timing of wound debridement TBD, as we will need to have control of seizures first.    - Of note, Merrill said she is familiar with ventilator, as immediate family member has a trach (and is also on HD).   - Dr Klein is working on transfer to [facility]; will reach out to palliative team in case they are able to provide support to family. Updated ID and neurology after family meeting.     5/2/25  - Chart and interval history reviewed; pt's renal function stable overnight   - During visit to bedside, pt was alert, in good spirits, and seemed comfortable. She confirmed that her daughter visited yesterday, and they had a discussion. She  informed her daughter that she doesn't want to undergo HD if it reaches that point; let her know we will do our best to honor this preference, and let her know I would speak to her daughter.   - Following this, Dr Aguilar and I called and spoke with her daughter Merrill. Medical update provided, and discussed that while pt does not currently have any acute needs for HD, this may change in the future. Based on our discussion with pt, she does not desire to pursue this, and would prefer to transition to hospice care if it gets to that point.   - Merrill seemed upset to hear this, and said pt will change her mind once her kidneys fail; tried to explain to her the reason we are having this conversation now is that pt may become confused as her kidney function worsens. We also discussed the significant logistical challenges of transporting a completely bed bound pt to HD chair 3 times per week.   - She said she will further discuss above with pt; encouraged this, and let her know we will do our best to honor pt's preferences. As pt has not seen a health care provider since 2021, let her know she will need follow up with both PCP and nephrology   - At this time, plan remains to continue with current level of care (PCP and outpatient nephrology follow-up), though pt has expressed to us that she does not want to undergo HD if it ever came to this. Pt's daughter does not seem to be in agreement with this, so have encouraged them to have further discussion.   - Updated primary team after above conversation. While pt would likely benefit from home-based palliative follow-up, she has a high risk of readmission, so  palliative team will see her during any future admissions     5/1/25  - Consult for support and advance care planning in older pt with CKD currently in hospital for cellulitis/wound infection; chart reviewed in depth, and discussed pt with primary team. Pt has voiced to them she is unlikely to desire HD even if this is  "eventually needed.   - Visited with pt at bedside; she was alert and readily conversational. Introduced role of palliative medicine in pt's care, and learned more about her outside of hospital. She is  and has two daughters, though only her daughter Merrill (resides with pt) is involved in her care. Merrill is not currently working, and is available at all times to assist pt. 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 She spends all of her time in bed at home, and said this has been the case for several years.   - At this time during visit, pt's daughter Merrill called, so I spoke to her on speaker phone. She informed me phone number on file is incorrect, so I updated this in chart ([phone]). Basic medical update provided; she had a number of questions about pt's renal function and is upset that her doctors (rosalvas Collins) had her on "blood pressure medications that can hurt her kidneys." I explained that blood pressure control is an important aspect of health care, and that without good blood pressure control pt can have worsening renal function from this as well. Additionally, she would be high risk for strokes or other serious health events. As many of her concerns were renal specific, I let her know that I would have Dr Aguilar reach out to her by phone, to which she was agreeable.    - Following this, spoke to pt about her care preferences if her renal function worsens. Explained that if not wanting to pursue HD - or she is not a candidate for this - alternative path would be comfort-focused/hospice care. While patient indicated to me that she does not wish to undergo HD at any point, she agreed to further discuss with her daughter, who will likely be visiting the hospital sometime today. Encouraged this, as she may become confused and unable to voice her preferences if she goes into renal failure.   - Let her know I will " follow up with her throughout hospital stay  - Updated primary team via [employer], and Dr Aguilar in person

## 2025-05-06 NOTE — SUBJECTIVE & OBJECTIVE
Interval History: Events noted. Poorly responsive. On continuous EEG.    Review of Systems   Unable to perform ROS: Mental status change     Objective:     Vital Signs (Most Recent):  Temp: 97.8 °F (36.6 °C) (05/06/25 0701)  Pulse: 96 (05/06/25 0701)  Resp: 14 (05/06/25 0701)  BP: (!) 119/46 (05/06/25 0701)  SpO2: 99 % (05/06/25 0701) Vital Signs (24h Range):  Temp:  [97.2 °F (36.2 °C)-98.3 °F (36.8 °C)] 97.8 °F (36.6 °C)  Pulse:  [] 96  Resp:  [8-22] 14  SpO2:  [98 %-100 %] 99 %  BP: ()/() 119/46     Weight: 84.4 kg (186 lb)  Body mass index is 34.02 kg/m².    Estimated Creatinine Clearance: 9.8 mL/min (A) (based on SCr of 4.7 mg/dL (H)).     Physical Exam  Vitals and nursing note reviewed.   Constitutional:       Appearance: Normal appearance.   Eyes:      Pupils: Pupils are equal, round, and reactive to light.   Skin:     Findings: Lesion present.      Comments: Eschar left upper thigh   Neurological:      Mental Status: She is alert.          Significant Labs: BMP:   Recent Labs   Lab 05/06/25  0600   GLU 69*      K 4.0      CO2 19*   BUN 77*   CREATININE 4.7*   CALCIUM 8.3*   MG 2.0     CBC:   Recent Labs   Lab 05/05/25  0329 05/06/25  0600   WBC 20.30* 25.73*   HGB 9.0* 9.7*   HCT 28.8* 30.8*   * 569*     Microbiology Results (last 7 days)       Procedure Component Value Units Date/Time    Urine culture [[id number]]  (Abnormal)  (Susceptibility) Collected: 05/02/25 1241    Order Status: Completed Specimen: Urine, Clean Catch Updated: 05/05/25 0725     Urine Culture >100,000 cfu/ml Escherichia coli ESBL    Blood culture #1 [[id number]]  (Normal) Collected: 04/29/25 1528    Order Status: Completed Specimen: Blood Updated: 05/04/25 1701     Blood Culture No Growth After 5 Days    Blood culture #2 [[id number]]  (Normal) Collected: 04/29/25 1528    Order Status: Completed Specimen: Blood Updated: 05/04/25 1701     Blood Culture No Growth After 5 Days            Significant  Imaging: I have reviewed all pertinent imaging results/findings within the past 24 hours.

## 2025-05-06 NOTE — ASSESSMENT & PLAN NOTE
NCSE noted on EEG overnight 5/4 and 5/5 despite multiple AEDs. Antibiotic regimen adjusted accordingly. Intubated for airway protection and propofol administration.      - continue AEDs per neuro recs  - continue non-titrating propofol and decrease with epileptologist recs  - recommend transfer to a [facility] for higher level of care   DISPLAY PLAN FREE TEXT

## 2025-05-06 NOTE — ASSESSMENT & PLAN NOTE
ESBL-E.coli identified on urine culture. Unclear if symptomatic or not. Treating with ertapenem for now.    Recommendations  Complete 5 days of ertapenem for UTI, if possible

## 2025-05-06 NOTE — ASSESSMENT & PLAN NOTE
- Nephrology consulted and following; explained to pt that if she gets to point of needing HD and does not want to pursue this (or is not a candidate for this due to her limited mobility from being bedbound for about 5 years), alternative path would be comfort-focused/hospice care   - Pt has expressed to both myself and Dr Aguilar that she does not want to undergo HD

## 2025-05-06 NOTE — EICU
ALEXISU Night Rounds Checklist  24H Vital Sign Range:  Temp:  [97.5 °F (36.4 °C)-98.6 °F (37 °C)]   Pulse:  []   Resp:  [8-23]   BP: (113-197)/()   SpO2:  [98 %-100 %]     Video rounds and LDA reconciliation

## 2025-05-06 NOTE — ASSESSMENT & PLAN NOTE
EMMA is likely due to acute tubular necrosis caused by sepsis. Baseline creatinine is 2.3 to 2.5. Most recent creatinine and eGFR are listed below.  Recent Labs     05/04/25  0647 05/05/25  0329 05/06/25  0600   CREATININE 4.6* 4.7* 4.7*   EGFRNORACEVR 9* 9* 9*     -Last sCr 2020  - Urine sodium, urine creatinine pending collection-  plus- will discuss with nurse  - Retroperitoneal ultrasound- CKD, no hydronephrosis  - Nephrotic Range proteinuria  - Nephrology following

## 2025-05-06 NOTE — PROGRESS NOTES
[facility] Medicine  Progress Note    Patient Name: Tasneem Lynn  MRN: [id number]  Patient Class: IP- Inpatient   Admission Date: 4/29/2025  Length of Stay: 7 days  Attending Physician: Laron Klein MD  Primary Care Provider: Unable, To Obtain        Subjective     Principal Problem:Inguinal ulcer        HPI:  79-year-old F with medical history significant for type 2 DM, HTN, CHF, hypothyroidism, and CKD 4 who presented to the ED with a chief complaint of LT upper  groin pain boil of one week duration      Patient was in her baseline state of health until which initially started as a small pea-sized lesion but progressively grew and ulcerated.  Patient started having pain and this prompted her to come to the ED. she endorsed some subjective chills but denied any fever, nausea, or vomiting.  No diarrhea or loss of appetite.  Patient had no history of bloodstream infection, surgical implants, valvular heart disease.  However, retrospective chart review indicated that patient had chronic osteomyelitis of the right foot and ESBL Klebsiella UTI 5 years ago.  Patient has a known CKD 4 but no routine follow up with Nephrology noted; review of home medication did not show any diuretics or other nephrotoxic medications.    On presentation to the ED, patient was hypertensive to 183/74, , oxygen saturation 100% on room air.Laboratory investigations were pertinent for WBC 29.1, HGB 9.3, , sodium 135, BUN 79, creatinine of 5.4, EGFR 8, procalcitonin 25.23, troponin 0.03, lactic acid 0.9.  Patient received NS 1 L in the ED and IV Zosyn.  Admission was requested for further management.      Overview/Hospital Course:  Ms Tasneem Lynn is a 79 y.o. woman with DM, CKD4, diastolic CHF who was admitted with left thigh cellulitis and wound. Started antibiotics. Noted CKD4/5. Nephrology consulted. Patient does not want dialysis for any reason. Palliative also consulted. Noted mental status changes on  5/2/25 AM; no signs of stroke, concern for cefepime induced neurotoxicity. Moved to ICU for continued altered mental status and EEG 5/4/25 showed status epilepticus. Loaded keppra. Antibiotics adjusted to vanc/ertapenem due to ESBL Ecoli UTI. Repeat EEG 5/5 no longer showing status; loaded vimpat given earlier NCSE. General surgery is planning OR debridement when stable from Neuro standpoint.  Patient remained in status epilepticus; antibiotics switched to Zosyn to minimize worsening encephalopathy and seizures.MRI 5/3 showed no acute intracranial abnormalities.  EEG 5/5 to 5/6 showed diffuse background slowing with periods of Periods of NC SC seen throughout recording worsening later in study.  Patient was intubated on 05/06 and started on propofol for better control. Clobazam ( Onfi) added per Neurology.  Transferred to [facility] neuro ICU.Family agreeable.    Interval History: EEG 5/5 to 5/6 showed diffuse background slowing with periods of Periods of NC SC seen throughout recording worsening later in study.  Patient was intubated on 05/06 and started on propofol for better control. Clobazam ( Onfi) added per Neurology.  Pending transfer to facility with inpatient neuro ICU .Family agreeable.    Review of Systems  Objective:     Vital Signs (Most Recent):  Temp: 97.5 °F (36.4 °C) (05/06/25 1120)  Pulse: 79 (05/06/25 1400)  Resp: 15 (05/06/25 1400)  BP: (!) 174/66 (05/06/25 1400)  SpO2: 100 % (05/06/25 1400) Vital Signs (24h Range):  Temp:  [97.2 °F (36.2 °C)-97.8 °F (36.6 °C)] 97.5 °F (36.4 °C)  Pulse:  [] 79  Resp:  [9-24] 15  SpO2:  [97 %-100 %] 100 %  BP: ()/() 174/66     Weight: 84.4 kg (186 lb)  Body mass index is 34.02 kg/m².    Intake/Output Summary (Last 24 hours) at 5/6/2025 1438  Last data filed at 5/6/2025 0600  Gross per 24 hour   Intake 182.99 ml   Output 275 ml   Net -92.01 ml         Physical Exam  Constitutional:       General: She is not in acute distress.     Appearance:  She is ill-appearing. She is not toxic-appearing or diaphoretic.   Cardiovascular:      Rate and Rhythm: Normal rate and regular rhythm.      Heart sounds: No murmur heard.     No friction rub. No gallop.   Pulmonary:      Effort: Pulmonary effort is normal. No respiratory distress.      Breath sounds: Normal breath sounds. No stridor.   Abdominal:      General: Bowel sounds are normal.      Palpations: Abdomen is soft.   Skin:     Comments: LT thigh ulcer with foul smelling discharge   Neurological:      Comments: Tremors with a horizontal nystagmus noted earlier on; currently Sedated and intubated               Significant Labs: CBC:   Recent Labs   Lab 05/05/25 0329 05/06/25  0600   WBC 20.30* 25.73*   HGB 9.0* 9.7*   HCT 28.8* 30.8*   * 569*     CMP:   Recent Labs   Lab 05/05/25 0329 05/06/25  0600    142   K 4.4 4.0    107   CO2 15* 19*   GLU 69* 69*   BUN 73* 77*   CREATININE 4.7* 4.7*   CALCIUM 8.6* 8.3*   PROT 7.5 6.9   ALBUMIN 2.1* 1.9*   BILITOT 0.2 0.2   ALKPHOS 93 82   AST 12 11   ALT 8* 6*   ANIONGAP 18* 16       Significant Imaging:   X-Ray Chest AP Portable   Final Result      As above         Electronically signed by: Anton Al MD   Date:    05/06/2025   Time:    12:17      MRI Brain Without Contrast   Final Result      No acute intracranial process with no evidence of acute infarct.         Electronically signed by: Wilton Pittman   Date:    05/03/2025   Time:    14:01      MRA Brain without contrast   Final Result   FINDINGS/   No major branch advanced stenosis/occlusion is identified at the [id number]-Oukfuy.      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  No evidence of AVM.         Electronically signed by: Wilton Pittman   Date:    05/03/2025   Time:    14:07      MRA Neck without contrast   Final Result   FINDINGS/   Limited by motion.      No significant stenosis at the carotid bifurcations bilaterally by NASCET criteria.  The  vertebral arteries are patent and codominant.      Retropharyngeal course of the carotid arteries.         Electronically signed by: Wilton Pittman   Date:    05/03/2025   Time:    13:53      CT Head Without Contrast   Final Result      1. No acute intracranial hemorrhage, mass effect, or midline shift.   2. Small lacunar type infarct in the left thalamus appears new when compared to remote CT performed 07/20/2020, but is otherwise technically age indeterminate.  Recommend clinical correlation.  MRI may be considered for more sensitive and specific assessment, as warranted clinically.         Electronically signed by: Franck Coombs   Date:    05/03/2025   Time:    09:41      CT THIGH WITHOUT CONTRAST LEFT   Final Result      Extensive subcutaneous soft tissue edema throughout the left hip and thigh.  No obvious organized focal fluid collection or drainable abscess seen.         Electronically signed by: Tish Marquez MD   Date:    04/29/2025   Time:    20:41      US Retroperitoneal Complete   Final Result      1. Medical renal disease.   2. No hydronephrosis.   3. Dependent material/debris within the urinary bladder.  Correlate with urinalysis.         Electronically signed by: Tish Marquez MD   Date:    04/29/2025   Time:    19:50      CT Abdomen Pelvis  Without Contrast   Final Result      1. Questionable subtle surrounding fat haziness involving the urinary bladder.  Such findings may be seen with acute cystitis.  Correlate with clinical symptoms and urinalysis if indicated.   2. No additional acute intra-abdominal abnormalities identified.   3. Cholelithiasis.   4. Uterine fibroids.   5. Colonic diverticulosis with no evidence of acute diverticulitis.   6. Diffuse body wall edema.   7. Additional findings as detailed above.         Electronically signed by: Tish Marquez MD   Date:    04/29/2025   Time:    18:22      X-Ray Chest AP Portable   Final Result      No acute process seen.         Electronically  signed by: Aba Guzman MD   Date:    04/29/2025   Time:    15:54              Assessment & Plan  Left upper thigh wound/Cellulitis  Hx of LT upper thigh carbuncle progressively enlarging with ulceration  Blood culture no growth   Noncontrast CT abdomen and left upper thigh to evaluate for occult abscesses and deep extensions-no abscess   Procal elevated, lactate normal  Wound care ordered . Wound care team following  Cellulitis and leukocytosis improving. Continue same wound care  Continue vancomycin while inpatient, Stop cefepime given encephalopathy; started Zosyn per ID recommendation; input appreciated  Status seizures limiting surgical intervention; general surgery input appreciated    Acute encephalopathy  - On 5/2 am, noted change in mental status, left facial droop, aphasic and not following simple commands.   -DDx UA evidence of infection, cellulitis and uremia contributing, concern for stroke given history    -Stroke code called. CT head no acute bleed and indeterminate small lacunar infarct left thalamus. MRI brain, MRA head neck without contrast no stroke. Continue ASA, statin.  -Switched Cefepime to Rocephin as cefepime can contribute to encephalopathy.  -Urine culture with ESBL Ecoli- further changed CTX to ertapenem on 5/5/25; change to Zosyn on 05/06  - she is still encephalopathic, not oriented, not speaking;  - EEG 5/4 with NSCE- loaded with keppra, repeat EEG in process; subsequently intubated on 05/06  Sepsis with acute organ dysfunction without septic shock  This patient does have evidence of infective focus  My overall impression is sepsis.  Source: Skin and Soft Tissue (location LT inguinal cellulitis)  Antibiotics given-   Antibiotics (72h ago, onward)      Start     Stop Route Frequency Ordered    05/06/25 1100  piperacillin-tazobactam (ZOSYN) 4.5 g in D5W 100 mL IVPB (MB+)         -- IV Every 12 hours (non-standard times) 05/06/25 0959    05/04/25 2100  mupirocin 2 % ointment          "05/09/25 2059 Nasl 2 times daily 05/04/25 1729    04/29/25 1909  vancomycin - pharmacy to dose  (vancomycin IVPB (PEDS and ADULTS))        Placed in "And" Linked Group    -- IV pharmacy to manage frequency 04/29/25 1815        WBC improving  Continue vanc, switch cefe to rocephin due to encephalopathy    CKD (chronic kidney disease) stage 5, GFR less than 15 ml/min  EMMA is likely due to acute tubular necrosis caused by sepsis. Baseline creatinine is 2.3 to 2.5. Most recent creatinine and eGFR are listed below.  Recent Labs     05/04/25  0647 05/05/25  0329 05/06/25  0600   CREATININE 4.6* 4.7* 4.7*   EGFRNORACEVR 9* 9* 9*     -Last sCr 2020  - Urine sodium, urine creatinine pending collection-  plus- will discuss with nurse  - Retroperitoneal ultrasound- CKD, no hydronephrosis  - Nephrotic Range proteinuria  - Nephrology following    Hyperlipidemia  Continue statins    Diabetes mellitus  Patient's FSGs are controlled on current medication regimen.  Last A1c reviewed-   Lab Results   Component Value Date    HGBA1C 4.8 05/04/2025     Most recent fingerstick glucose reviewed-   Recent Labs   Lab 05/06/25  0224 05/06/25  0247 05/06/25  0723 05/06/25  1357   POCTGLUCOSE 65* 117* 106 82   Hypoglycemia- infection contributing  Obtain HgbA1c    Type 2 myocardial infarction  Type 2 secondary to sepsis and EMMA  Noted elevated troponin with underlying history of CAD on presentation  EKG showed no acute ischemic change  Continue to trend troponin    Hypertension  Patient currently borderline hypotensive; home medications held  Restart as clinically appropriate  PRNs ordered  CAD (coronary artery disease)  Patient with known CAD s/p which is controlled Will continue ASA and Statin and monitor for S/Sx of angina/ACS. Continue to monitor on telemetry.   Patient denied history of MI  Hypothyroidism  Continue home levothyroxine    Debility  Chronic history of bed bound  PT/OT for weaker than normal      Advance care " planning  Advance Care Planning     Date: 05/01/2025    Code Status  In light of the patients advanced and life limiting illness,I engaged the the patient in a voluntary conversation about the patient's preferences for care  at the very end of life. The patient wishes to have a natural, peaceful death.  Along those lines, the patient does not wish to have CPR or other invasive treatments performed when her heart and/or breathing stops. Patient would like to discuss with Daughter Merrill. Patient also does not want dialysis if had to come to that point.   Palliative team care following      Anemia of chronic disorder  Anemia is likely due to chronic disease due to Chronic Kidney Disease. Most recent hemoglobin and hematocrit are listed below.  Recent Labs     05/04/25  0647 05/05/25  0329 05/06/25  0600   HGB 8.4* 9.0* 9.7*   HCT 26.0* 28.8* 30.8*     Plan  - Monitor serial CBC: Daily  - Transfuse PRBC if patient becomes hemodynamically unstable, symptomatic or H/H drops below 7/21.  - Iron studies iron 32 TSAT 35.   - no overt bleeding  - 1 unit of blood ordered- responded appropriately repeat hgb 8.3  - 5/3 - daughter reports had chills and back pain while receiving blood yesterday.   - Add blood transfusion reaction work up     UTI due to extended-spectrum beta lactamase (ESBL) producing Escherichia coli      Non-convulsive status epilepticus  - noted on EEG 5/4/25  - loaded keppra on 5/4/25  - now on keppra 500mg IV BID and Vimpat  - Patient intubated on 05/06  -Onfi 20 mg daily added  Transferred to [facility] for neuro ICU      Cellulitis of left lower extremity      Seizure      On mechanically assisted ventilation      VTE Risk Mitigation (From admission, onward)           Ordered     heparin (porcine) injection 5,000 Units  Every 8 hours         04/29/25 1815     IP VTE HIGH RISK PATIENT  Once         04/29/25 1815     Place sequential compression device  Until discontinued         04/29/25 1815                     Discharge Planning   STEPHEN: 5/9/2025     Code Status: Full Code   Medical Readiness for Discharge Date:   Discharge Plan A: Other (TBD)   Discharge Delays: None known at this time        Critical care time spent on the evaluation and treatment of severe organ dysfunction, review of pertinent labs and imaging studies, discussions with consulting providers and discussions with patient/family: more than 35 minutes.            Laron Klein MD  Department of Hospital Medicine   [facility] - Intensive Care

## 2025-05-06 NOTE — PROGRESS NOTES
"[facility]  Infectious Disease  Progress Note    Patient Name: Tasneem Lynn  MRN: [id number]  Admission Date: 4/29/2025  Length of Stay: 7 days  Attending Physician: Laron Klein MD  Primary Care Provider: Unable, To Obtain    Isolation Status: Contact    Assessment/Plan:        UTI due to extended-spectrum beta lactamase (ESBL) producing Escherichia coli  ESBL-E.coli identified on urine culture. Unclear if symptomatic or not. Treating with ertapenem for now.    Recommendations  Complete 5 days of ertapenem for UTI, if possible      Left upper thigh wound/Cellulitis  Ms. Lynn is a 78 yo woman admitted with a left groin wound which has worsened. Initial imaging reveled "Extensive subcutaneous soft tissue edema throughout the left hip and thigh. No obvious organized focal fluid collection or drainable abscess seen."     In ICU for seizure work-up. Ertapenem started for ESBL in urine.      Recommendations  Continue ertapenem  Drainage procedure planned  Will reassess big picture after drainage and culture results      Adarsh Dash MD  Infectious Disease  [facility]    Subjective:     Principal Problem:Inguinal ulcer    HPI: Ms. Lynn is a 78 yo woman admitted with sepsis secondary to left upper thigh cellulitis and ulcer. Work up included CT thigh, Renal US and NC CT A/P as noted below. UA on 5/2 showed RBC 81, WBC > 100, and moderate bacteria. UCX are growing presumptive E.coli, sensitivities pending. Blood CXS from 4/29 are pending. She was admitted on Cefepime and Vanco for UTI and leg cellulitis from abscess and Wound Care was consulted. On 5/2, a Code Stroke was called. Her cefepime was changed to Rocephin. ID is consulted today for abx assistance. Today, the patient has a worsening groin wound. While she is non-verbal, she does express a grimace to pain. There is malodor about the wound.  Interval History: Events noted. Poorly responsive. On continuous EEG.    Review of " Systems   Unable to perform ROS: Mental status change     Objective:     Vital Signs (Most Recent):  Temp: 97.8 °F (36.6 °C) (05/06/25 0701)  Pulse: 96 (05/06/25 0701)  Resp: 14 (05/06/25 0701)  BP: (!) 119/46 (05/06/25 0701)  SpO2: 99 % (05/06/25 0701) Vital Signs (24h Range):  Temp:  [97.2 °F (36.2 °C)-98.3 °F (36.8 °C)] 97.8 °F (36.6 °C)  Pulse:  [] 96  Resp:  [8-22] 14  SpO2:  [98 %-100 %] 99 %  BP: ()/() 119/46     Weight: 84.4 kg (186 lb)  Body mass index is 34.02 kg/m².    Estimated Creatinine Clearance: 9.8 mL/min (A) (based on SCr of 4.7 mg/dL (H)).     Physical Exam  Vitals and nursing note reviewed.   Constitutional:       Appearance: Normal appearance.   Eyes:      Pupils: Pupils are equal, round, and reactive to light.   Skin:     Findings: Lesion present.      Comments: Eschar left upper thigh   Neurological:      Mental Status: She is alert.          Significant Labs: BMP:   Recent Labs   Lab 05/06/25  0600   GLU 69*      K 4.0      CO2 19*   BUN 77*   CREATININE 4.7*   CALCIUM 8.3*   MG 2.0     CBC:   Recent Labs   Lab 05/05/25  0329 05/06/25  0600   WBC 20.30* 25.73*   HGB 9.0* 9.7*   HCT 28.8* 30.8*   * 569*     Microbiology Results (last 7 days)       Procedure Component Value Units Date/Time    Urine culture [[id number]]  (Abnormal)  (Susceptibility) Collected: 05/02/25 1241    Order Status: Completed Specimen: Urine, Clean Catch Updated: 05/05/25 0725     Urine Culture >100,000 cfu/ml Escherichia coli ESBL    Blood culture #1 [[id number]]  (Normal) Collected: 04/29/25 1528    Order Status: Completed Specimen: Blood Updated: 05/04/25 1701     Blood Culture No Growth After 5 Days    Blood culture #2 [[id number]]  (Normal) Collected: 04/29/25 1528    Order Status: Completed Specimen: Blood Updated: 05/04/25 1701     Blood Culture No Growth After 5 Days            Significant Imaging: I have reviewed all pertinent imaging results/findings within the past 24  hours.

## 2025-05-06 NOTE — ASSESSMENT & PLAN NOTE
Anemia is likely due to chronic disease due to Chronic Kidney Disease. Most recent hemoglobin and hematocrit are listed below.  Recent Labs     05/04/25  0647 05/05/25  0329 05/06/25  0600   HGB 8.4* 9.0* 9.7*   HCT 26.0* 28.8* 30.8*     Plan  - Monitor serial CBC: Daily  - Transfuse PRBC if patient becomes hemodynamically unstable, symptomatic or H/H drops below 7/21.  - Iron studies iron 32 TSAT 35.   - no overt bleeding  - 1 unit of blood ordered- responded appropriately repeat hgb 8.3  - 5/3 - daughter reports had chills and back pain while receiving blood yesterday.   - Add blood transfusion reaction work up

## 2025-05-06 NOTE — PROGRESS NOTES
"[facility] - Intensive Care  Neurology Consult Note    Patient Name: Tasneem Lynn  Age: 79 y.o.  MRN: [id number]  Admission Date: 4/29/2025  Reason for consult:  Altered mental status    CC:  "Altered mental status"    From initial neurology consult 05/04/2025  HPI:  79 y.o. female with medical history significant for type 2 DM, HTN, CHF, hypothyroidism, and CKD 4 who is currently admitted at outside hospital for sepsis secondary to left upper thigh cellulitis.  Patient is currently on IV antibiotics.  Because of confusion, tele stroke was also activated on her yesterday and patient was seen by acute tele stroke service who had recommended further neuroimaging and brain imaging was recommended for the wake-up stroke protocol which was negative for acute stroke and no intravenous thrombolysis was offered.  Tele neurology was reconsulted today for encephalopathy.  Patient is currently awaiting transferred to [facility].  Per my conversation with daughter, patient has been having intermittent trembling in the left leg, and she attributes that to pain from ulcer/cellulitis.  Patient does not answering and/or falling any commands on my exam.    5/5: EEG with nonconvulsive status epilepticus overnight.  She was loaded with Keppra 1.5 g overnight, continued on 500 mg b.i.d. this morning.  Given extra dose of Ativan 2 mg this morning, hooked up to continuous EEG.  Status resolved on EEG this afternoon.    5/6:  Noted ongoing concern for NCSE on overnight EEG especially past midnight.  Patient was added on Vimpat yesterday.  Intubated and sedated on propofol 30 today.  Added Onfi.  Antibiotics switched to Zosyn.  Palliative care on board.  Noted discussions between palliative care and daughter today.      Histories:  Allergies:  Corticosteroids (glucocorticoids)    Current Medications:  Current Medications[1]    Medical:   Past Medical History:   Diagnosis Date    Arthritis     Gout    CHF (congestive heart failure)     Coronary " "artery disease     Diabetes mellitus     HLD (hyperlipidemia)     Hypertension     Obese     Stroke     1986    Thyroid disease         Surgeries:   Past Surgical History:   Procedure Laterality Date    ESOPHAGOGASTRODUODENOSCOPY N/A 7/23/2020    Procedure: EGD (ESOPHAGOGASTRODUODENOSCOPY);  Surgeon: Halle Hughes MD;  Location: [facility];  Service: Endoscopy;  Laterality: N/A;    right hand surgery      right knee surgery Right     partial plate        Family:   Family History   Problem Relation Name Age of Onset    Heart disease Sister      Diabetes Maternal Aunt      Heart disease Maternal Aunt      Hypertension Maternal Aunt         Tobacco Use    Smoking status: Never    Smokeless tobacco: Never   Substance and Sexual Activity    Alcohol use: No    Drug use: No    Sexual activity: Not Currently     Partners: Male         Physical Exam:     Physical Examination  BP (!) 120/45   Pulse 78   Temp 97.5 °F (36.4 °C) (Axillary)   Resp 20   Ht 5' 2" (1.575 m)   Wt 84.4 kg (186 lb)   SpO2 100%   Breastfeeding No   BMI 34.02 kg/m²   Body mass index is 34.02 kg/m².    Neurological Exam  Neurocritical care exam:   Mental Status:  Intubated, sedated on propofol 30  Brainstem reflexes/cranial nerve exam:   CN II,V,VII, IX, X grossly intact. Eyes shut tight, no gross facial asym   Pupils 5 mm, Equal Round non Reactive to Light   Motor:  No movement to painful stimulus  Reflexes: down going plantars b/l   Sensory: unable to assess  Co-ordination: unable to assess  Gait: unable to assess    Significant Labs:   Recent Lab Results  (Last 5 results in the past 24 hours)        05/06/25  0723   05/06/25  0600   05/06/25  0247   05/06/25  0224   05/05/25  2117        Albumin   1.9             ALP   82             ALT   6             Anion Gap   16             AST   11             Baso #   0.03             Basophil %   0.1             BILIRUBIN TOTAL   0.2  Comment: For infants and newborns, interpretation of results " should be based   on gestational age, weight and in agreement with clinical   observations.    Premature Infant recommended reference ranges:   0-24 hours:  <8.0 mg/dL   24-48 hours: <12.0 mg/dL   3-5 days:    <15.0 mg/dL   6-29 days:   <15.0 mg/dL             BUN   77             Calcium   8.3             Chloride   107             CO2   19             Creatinine   4.7             eGFR   9  Comment: Estimated GFR calculated using the CKD-EPI creatinine (2021) equation.             Eos #   0.00             Eos %   0.0             Glucose   69             Gran # (ANC)   23.60             Group & Rh   B POS             Hematocrit   30.8             Hemoglobin   9.7             Immature Grans (Abs)   0.33  Comment: Mild elevation in immature granulocytes is non specific and can be seen in a variety of conditions including stress response, acute inflammation, trauma and pregnancy. Correlation with other laboratory and clinical findings is essential.             Immature Granulocytes   1.3             INDIRECT SANGITA   NEG             Lymph #   0.83             Lymph %   3.2             Magnesium    2.0             MCH   30.1             MCHC   31.5             MCV   96             Mono #   0.94             Mono %   3.7             MPV   8.6             Neut %   91.7             nRBC   0             Phosphorus Level   5.2             Platelet Count   569             POCT Glucose 106     117   65   101       Potassium   4.0             PROTEIN TOTAL   6.9             RBC   3.22             RDW   16.1             Sodium   142             Specimen Outdate   05/09/2025 23:59             Vancomycin, Random   19.3             WBC   25.73                                    Significant Imaging:   Images were personally reviewed and interpreted:   MRI brain wo con 5/3: NAICA    EEG 5/5-5/6: Abnormal study due to severe  diffuse background slowing consistent with diffuse cerebral dysfunction and encephalopathy which may be on the  basis of toxic, metabolic, or primary neuronal disorder. Triphasic waves may be seen in a variety of encephalopathies, including those due to renal, hepatic, anoxic, metabolic, or other toxic sources. Multifocal interictal epileptiform discharges consistent with diffuse cortical irritability and seizure potential noted throughout recording.  Periods of NC SC seen throughout recording worsening later in study.     Assessment and Plan:   79 y.o. female with medical history significant for type 2 DM, HTN, CHF, hypothyroidism, and CKD 4 who is currently admitted at outside hospital for sepsis secondary to left upper thigh cellulitis.  Mental status change noted on 5/2, stroke alert called.  Thought to be cefepime related neurotoxicity, switched to Rocephin.   Likely provoked from cefepime and ertapenem induced neurotoxicity leading to lowered seizure threshold.  EEG continued to show nonconvulsive status epilepticus overnight, intubated this am.     Plan:   - on continuous EEG, now intubated and sedated at 30  - continue Keppra 500 mg b.i.d.  - continue maintenance Vimpat 200 mg b.i.d.  - added Onfi 20 mg daily today  - seizure and fall precautions while inpatient  - noted ID and general surgery on board for cellulitis.  Kindly avoid antibiotics known to lower seizure threshold including cefepime, meropenem/ertapenem.  Noted antibiotics switched to Zosyn today.  - we will continue to follow up    Thank you for your consult. I will follow-up with patient. Please contact us if you have any additional questions.      35 minutes of Critical care time was spent personally by me on the following activities: development of treatment plan with patient or surrogate and bedside caregivers, discussions with consultants, evaluation of patient's response to treatment, examination of patient, ordering and performing treatments and interventions, ordering and review of laboratory studies, ordering and review of radiographic studies,  pulse oximetry, antibiotic titration if applicable, vasopressor titration if applicable, re-evaluation of patient's condition. This critical care time did not overlap with that of any other provider or involve time for any procedures. There is high probability for acute neurological change leading to clinical and possibly life-threatening deterioration requiring highest level of provider preparedness for urgent intervention.      Daniela Mauricio MD  Neurology  [facility]         [1]   Current Facility-Administered Medications   Medication Dose Route Frequency Provider Last Rate Last Admin    acetaminophen suppository 650 mg  650 mg Rectal Q6H PRN Corbin Brewster MD   650 mg at 05/05/25 1952    acetaminophen tablet 650 mg  650 mg Oral Q4H PRN Laron Klein MD   650 mg at 05/02/25 1646    amLODIPine tablet 2.5 mg  2.5 mg Oral Daily Alice Mooney, NP        aspirin chewable tablet 81 mg  81 mg Oral Daily Laron Klein MD   81 mg at 05/04/25 1036    atorvastatin tablet 40 mg  40 mg Oral QHS Laron Klein MD   40 mg at 05/02/25 2047    chlorhexidine 0.12 % solution 15 mL  15 mL Mouth/Throat BID Jamie, Fabiana H., NP        cloBAZam Tab 20 mg  20 mg Oral Daily Daniela Mauricio MD        dextrose 50% injection 12.5 g  12.5 g Intravenous PRN Laron Klein MD   12.5 g at 05/06/25 0226    dextrose 50% injection 25 g  25 g Intravenous PRN Laron Klein MD        famotidine (PF) injection 20 mg  20 mg Intravenous Daily Jamie, Fabiana H., NP        glucagon (human recombinant) injection 1 mg  1 mg Intramuscular PRN Laron Klein MD        glucose chewable tablet 16 g  16 g Oral PRN Laron Klein MD   16 g at 04/30/25 0756    heparin (porcine) injection 5,000 Units  5,000 Units Subcutaneous Q8H Laron Klein MD   5,000 Units at 05/06/25 0609    hydrALAZINE injection 10 mg  10 mg Intravenous Q4H PRN Laron Klein MD   10 mg at 05/05/25 1409    insulin aspart U-100 pen 0-5 Units  0-5  Units Subcutaneous Q6H PRN Samantha Fuentes MD        lacosamide injection 200 mg  200 mg Intravenous Q12H Samantha Fuentes MD   200 mg at 05/06/25 0802    levETIRAcetam injection 500 mg  500 mg Intravenous Q12H Samantha Fuentes MD   500 mg at 05/06/25 0802    levothyroxine tablet 75 mcg  75 mcg Oral Before breakfast Laron Klein MD   75 mcg at 05/03/25 0504    LORazepam injection 2 mg  2 mg Intravenous Q15 Min PRN Rayray Medina MD   2 mg at 05/06/25 0920    melatonin tablet 6 mg  6 mg Oral Nightly PRN Laron Klein MD   6 mg at 05/02/25 2046    mupirocin 2 % ointment   Nasal BID Samantha Fuentes MD   Given at 05/06/25 0801    NORepinephrine bitartrate-D5W 4 mg/250 mL (16 mcg/mL) PERIPHERAL access infusion  0-0.2 mcg/kg/min Intravenous Continuous Fabiana Ayala, NAZIA        ondansetron injection 4 mg  4 mg Intravenous Q8H PRN Laron Klein MD        piperacillin-tazobactam (ZOSYN) 4.5 g in D5W 100 mL IVPB (MB+)  4.5 g Intravenous Q12H Adarsh Dash MD 25 mL/hr at 05/06/25 1114 4.5 g at 05/06/25 1114    propofol (DIPRIVAN) 10 mg/mL infusion (NON-TITRATING)  30 mcg/kg/min Intravenous Continuous Fabiana Ayala, NP        sodium chloride 0.9% flush 10 mL  10 mL Intravenous Q12H PRN Laron Klein MD        vancomycin - pharmacy to dose   Intravenous pharmacy to manage frequency Laron Klein MD

## 2025-05-06 NOTE — SUBJECTIVE & OBJECTIVE
Interval History: UOP 275cc yesterday. No events overnight aside from ongoing seizure activity. Intubated and started on propofol this morning. Also receiving IVF for low BP. Transfer to neuro ICU pending. No family at bedside at time of my exam.       Review of patient's allergies indicates:   Allergen Reactions    Corticosteroids (glucocorticoids) Edema     Current Facility-Administered Medications   Medication Frequency    acetaminophen suppository 650 mg Q6H PRN    acetaminophen tablet 650 mg Q4H PRN    amLODIPine tablet 2.5 mg Daily    aspirin chewable tablet 81 mg Daily    atorvastatin tablet 40 mg QHS    chlorhexidine 0.12 % solution 15 mL BID    cloBAZam Tab 20 mg Daily    dextrose 50% injection 12.5 g PRN    dextrose 50% injection 25 g PRN    famotidine (PF) injection 20 mg Daily    glucagon (human recombinant) injection 1 mg PRN    glucose chewable tablet 16 g PRN    heparin (porcine) injection 5,000 Units Q8H    hydrALAZINE injection 10 mg Q4H PRN    insulin aspart U-100 pen 0-5 Units Q6H PRN    lacosamide injection 200 mg Q12H    levETIRAcetam injection 500 mg Q12H    levothyroxine tablet 75 mcg Before breakfast    LORazepam injection 2 mg Q15 Min PRN    melatonin tablet 6 mg Nightly PRN    mupirocin 2 % ointment BID    NORepinephrine 4 mg in dextrose 5% 250 mL infusion (premix) Continuous    ondansetron injection 4 mg Q8H PRN    piperacillin-tazobactam (ZOSYN) 4.5 g in D5W 100 mL IVPB (MB+) Q12H    propofol (DIPRIVAN) 10 mg/mL infusion (NON-TITRATING) Continuous    sodium chloride 0.9% flush 10 mL Q12H PRN    vancomycin - pharmacy to dose pharmacy to manage frequency       Objective:     Vital Signs (Most Recent):  Temp: 97.5 °F (36.4 °C) (05/06/25 1120)  Pulse: 69 (05/06/25 1243)  Resp: 20 (05/06/25 1243)  BP: (!) 84/38 (05/06/25 1243)  SpO2: 100 % (05/06/25 1243) Vital Signs (24h Range):  Temp:  [97.2 °F (36.2 °C)-97.8 °F (36.6 °C)] 97.5 °F (36.4 °C)  Pulse:  [] 69  Resp:  [9-24] 20  SpO2:  [97  %-100 %] 100 %  BP: ()/() 84/38     Weight: 84.4 kg (186 lb) (05/04/25 1152)  Body mass index is 34.02 kg/m².  Body surface area is 1.92 meters squared.    I/O last 3 completed shifts:  In: 742.4 [I.V.:261.8; IV Piggyback:480.6]  Out: 575 [Urine:575]     Physical Exam  Vitals and nursing note reviewed.   Constitutional:       General: She is not in acute distress.     Appearance: Normal appearance. She is well-developed.      Interventions: She is sedated and intubated.   HENT:      Head: Normocephalic and atraumatic.      Nose: Nose normal.      Mouth/Throat:      Mouth: Mucous membranes are moist.   Eyes:      General: Lids are normal.         Right eye: No discharge.         Left eye: No discharge.   Cardiovascular:      Rate and Rhythm: Normal rate and regular rhythm.   Pulmonary:      Effort: Pulmonary effort is normal. She is intubated.      Breath sounds: Normal breath sounds.   Abdominal:      General: There is no distension.      Palpations: Abdomen is soft.   Musculoskeletal:         General: Swelling present. No tenderness or signs of injury.      Right lower leg: Edema present.      Left lower leg: Edema present.      Comments: BLE with 1-2+ pitting edema   Skin:     General: Skin is warm and dry.      Findings: No erythema or rash.          Significant Labs:  CBC:   Recent Labs   Lab 05/06/25  0600   WBC 25.73*   RBC 3.22*   HGB 9.7*   HCT 30.8*   *   MCV 96   MCH 30.1   MCHC 31.5*     CMP:   Recent Labs   Lab 05/06/25  0600   GLU 69*   CALCIUM 8.3*   ALBUMIN 1.9*   PROT 6.9      K 4.0   CO2 19*      BUN 77*   CREATININE 4.7*   ALKPHOS 82   ALT 6*   AST 11   BILITOT 0.2     All labs within the past 24 hours have been reviewed.     Significant Imaging:  Labs: Reviewed

## 2025-05-06 NOTE — ASSESSMENT & PLAN NOTE
- Neurology consulted and following; plan for [place] [facility]. PCCM consulted for intubation for airway protection and to initiate Propfol; she is already on Keppra and Vimpat.

## 2025-05-06 NOTE — ASSESSMENT & PLAN NOTE
"This patient does have evidence of infective focus  My overall impression is sepsis.  Source: Skin and Soft Tissue (location LT inguinal cellulitis)  Antibiotics given-   Antibiotics (72h ago, onward)      Start     Stop Route Frequency Ordered    05/06/25 1100  piperacillin-tazobactam (ZOSYN) 4.5 g in D5W 100 mL IVPB (MB+)         -- IV Every 12 hours (non-standard times) 05/06/25 0959    05/04/25 2100  mupirocin 2 % ointment         05/09/25 2059 Nasl 2 times daily 05/04/25 1729    04/29/25 1909  vancomycin - pharmacy to dose  (vancomycin IVPB (PEDS and ADULTS))        Placed in "And" Linked Group    -- IV pharmacy to manage frequency 04/29/25 1815        WBC improving  Continue vanc, switch cefe to rocephin due to encephalopathy    "

## 2025-05-06 NOTE — HPI
Ms. Tasneem Lynn is a 79 y.o. woman with DM, CKD4, and diastolic CHF who was admitted to the hospital on 4/29 with left thigh cellulitis/wound and UTI. She was started antibiotics and general surgery was consulted for evaluation of wound. Nephrology was also consulted for EMMA and it is noted that the patient does not want dialysis for any reason. Palliative medicine also following. Noted mental status changes on 5/2/25 AM; no signs of stroke, concern for cefepime induced neurotoxicity vs seizure activity. She was moved to ICU for continued altered mental status and EEG 5/4/25 showed status epilepticus. She was loaded with keppra and neurology was consulted. Antibiotics adjusted to vanc/ertapenem due to ESBL Ecoli UTI. General surgery is planning OR debridement once stable from a seizure standpoint. Additional AEDs were started. Overnight and early morning on 5/6, she was again noted to be in NCSE. Pulmonary was consulted and the patient was intubated for airway protection and started on propofol.

## 2025-05-06 NOTE — ASSESSMENT & PLAN NOTE
"Ms. Lynn is a 80 yo woman admitted with a left groin wound which has worsened. Initial imaging reveled "Extensive subcutaneous soft tissue edema throughout the left hip and thigh. No obvious organized focal fluid collection or drainable abscess seen."     In ICU for seizure work-up. Ertapenem started for ESBL in urine.      Recommendations  Continue ertapenem  Drainage procedure planned  Will reassess big picture after drainage and culture results    "

## 2025-05-06 NOTE — ASSESSMENT & PLAN NOTE
Hx of LT upper thigh carbuncle progressively enlarging with ulceration  Blood culture no growth   Noncontrast CT abdomen and left upper thigh to evaluate for occult abscesses and deep extensions-no abscess   Procal elevated, lactate normal  Wound care ordered . Wound care team following  Cellulitis and leukocytosis improving. Continue same wound care  Continue vancomycin while inpatient, Stop cefepime given encephalopathy; started Zosyn per ID recommendation; input appreciated  Status seizures limiting surgical intervention; general surgery input appreciated

## 2025-05-06 NOTE — EICU
Intervention Initiated From:  Bedside    Mic intervened regarding:  Time-Out    Nurse Notified:  Yes    Doctor Notified:  Yes    Comments: Called to the room for Intubation    [x] Verified patient name   [x] Verified patient   [x] Read from armband    Intubation

## 2025-05-06 NOTE — PROGRESS NOTES
Pharmacokinetic Assessment Follow Up: IV Vancomycin    Vancomycin serum concentration assessment(s):    The random level was drawn correctly and can be used to guide therapy at this time. The measurement is within the desired definitive target range of 10 to 20 mcg/mL.    Vancomycin Regimen Plan:    No dose today.  Re-dose when the random level is less than 20 mcg/mL, next level to be drawn at 0300 on 5/7/2025    Drug levels (last 3 results):  Recent Labs   Lab Result Units 05/04/25  0647 05/05/25 0329 05/06/25  0600   Vancomycin Random ug/ml 16.9 20.4 19.3       Pharmacy will continue to follow and monitor vancomycin.    Please contact pharmacy at extension [phone] for questions regarding this assessment.    Thank you for the consult,   Can Samuel Jr       Patient brief summary:  Tasneem Lynn is a 79 y.o. female initiated on antimicrobial therapy with IV Vancomycin for treatment of skin & soft tissue infection    Drug Allergies:   Review of patient's allergies indicates:   Allergen Reactions    Corticosteroids (glucocorticoids) Edema       Actual Body Weight:   84.4 kg    Renal Function:   Estimated Creatinine Clearance: 9.8 mL/min (A) (based on SCr of 4.7 mg/dL (H)).,     Dialysis Method (if applicable):  N/A    CBC (last 72 hours):  Recent Labs   Lab Result Units 05/04/25  0647 05/05/25 0329 05/06/25  0600   WBC K/uL 16.19* 20.30* 25.73*   HGB gm/dL 8.4* 9.0* 9.7*   Hemoglobin A1c % 4.8  --   --    HCT % 26.0* 28.8* 30.8*   Platelet Count K/uL 514* 577* 569*   Lymph % % 6.7* 10.5* 3.2*   Mono % % 5.3 4.8 3.7*   Eos % % 0.2 0.2 0.0   Basophil % % 0.2 0.1 0.1       Metabolic Panel (last 72 hours):  Recent Labs   Lab Result Units 05/04/25  0647 05/05/25  0329 05/06/25  0600   Sodium mmol/L 138 139 142   Potassium mmol/L 3.9 4.4 4.0   Chloride mmol/L 106 106 107   CO2 mmol/L 19* 15* 19*   Glucose mg/dL 76 69* 69*   BUN mg/dL 74* 73* 77*   Creatinine mg/dL 4.6* 4.7* 4.7*   Albumin g/dL 1.7* 2.1* 1.9*   Bilirubin  Total mg/dL 0.2 0.2 0.2   ALP unit/L 76 93 82   AST unit/L 9* 12 11   ALT unit/L 5* 8* 6*   Magnesium  mg/dL 1.9 2.0 2.0   Phosphorus Level mg/dL 4.8* 5.3* 5.2*       Vancomycin Administrations:  vancomycin given in the last 96 hours                     vancomycin (VANCOCIN) 500 mg in 0.9% NaCl 100 mL IVPB (MB+) ()  Restarted 05/04/25 1042     500 mg New Bag  1039                    Microbiologic Results:  Microbiology Results (last 7 days)       Procedure Component Value Units Date/Time    Urine culture [[id number]]  (Abnormal)  (Susceptibility) Collected: 05/02/25 1241    Order Status: Completed Specimen: Urine, Clean Catch Updated: 05/05/25 0725     Urine Culture >100,000 cfu/ml Escherichia coli ESBL    Blood culture #1 [[id number]]  (Normal) Collected: 04/29/25 1528    Order Status: Completed Specimen: Blood Updated: 05/04/25 1701     Blood Culture No Growth After 5 Days    Blood culture #2 [[id number]]  (Normal) Collected: 04/29/25 1528    Order Status: Completed Specimen: Blood Updated: 05/04/25 1701     Blood Culture No Growth After 5 Days

## 2025-05-06 NOTE — PROGRESS NOTES
[facility] - Intensive Care  Palliative Medicine  Progress Note    Patient Name: Tasneem Lynn  MRN: [id number]  Admission Date: 4/29/2025  Hospital Length of Stay: 7 days  Code Status: Full Code   Attending Provider: Laron Klein MD  Consulting Provider: Abiola Mayes MD  Primary Care Physician: Unable, To Obtain  Principal Problem:Inguinal ulcer    Assessment/Plan:     Advance Care Planning    5/6/25  - Have been chart reviewing and discussed pt during MDT rounds yesterday and today; pt with persistently poor mental status believed triggered by NCSE which may have been brought on by abx (Cefepime, and now carbapenem) for pt's would infection, which has worsened over last few days. While I&D surgery was planned initially for yesterday, this has been further postponed by pt's ongoing seizures. She is still having seizure activity; based on discussion with PCCM, primary team, neurology, and ID, plan is to intubate pt and start her on Propofol, and to change abx to Zosyn.   - As pt with concern for ongoing seizure activity as of this morning, called and spoke with pt's daughter Merrill; requested that she come in to hospital.   - Later in the morning, Dr Klein (primary staff), Dr Medina (PCCM), and myself met with Merrill in waiting room, as she preferred to speak outside of pt's room.   - Thorough medical update provided, and addressed her questions and concerns to the best of our abilities. She is upset about the care pt has received in hospital, so we arranged for Patient Relations to meet with her today.   - Based on conversation, plan is to intubate pt and start Propofol in hopes of controlling pt's NCSE, with likely transfer to Neuro ICU if this is able to arranged. Timing of wound debridement TBD, as we will need to have control of seizures first.    - Of note, Merrill said she is familiar with ventilator, as immediate family member has a trach (and is also on HD).   - Dr Klein is working on transfer  to main campus; will reach out to palliative team in case they are able to provide support to family. Updated ID and neurology after family meeting.     5/2/25  - Chart and interval history reviewed; pt's renal function stable overnight   - During visit to bedside, pt was alert, in good spirits, and seemed comfortable. She confirmed that her daughter visited yesterday, and they had a discussion. She informed her daughter that she doesn't want to undergo HD if it reaches that point; let her know we will do our best to honor this preference, and let her know I would speak to her daughter.   - Following this, Dr Aguilar and I called and spoke with her daughter Merrill. Medical update provided, and discussed that while pt does not currently have any acute needs for HD, this may change in the future. Based on our discussion with pt, she does not desire to pursue this, and would prefer to transition to hospice care if it gets to that point.   - Merrill seemed upset to hear this, and said pt will change her mind once her kidneys fail; tried to explain to her the reason we are having this conversation now is that pt may become confused as her kidney function worsens. We also discussed the significant logistical challenges of transporting a completely bed bound pt to HD chair 3 times per week.   - She said she will further discuss above with pt; encouraged this, and let her know we will do our best to honor pt's preferences. As pt has not seen a health care provider since 2021, let her know she will need follow up with both PCP and nephrology   - At this time, plan remains to continue with current level of care (PCP and outpatient nephrology follow-up), though pt has expressed to us that she does not want to undergo HD if it ever came to this. Pt's daughter does not seem to be in agreement with this, so have encouraged them to have further discussion.   - Updated primary team after above conversation. While pt would likely  "benefit from home-based palliative follow-up, she has a high risk of readmission, so  palliative team will see her during any future admissions     5/1/25  - Consult for support and advance care planning in older pt with CKD currently in hospital for cellulitis/wound infection; chart reviewed in depth, and discussed pt with primary team. Pt has voiced to them she is unlikely to desire HD even if this is eventually needed.   - Visited with pt at bedside; she was alert and readily conversational. Introduced role of palliative medicine in pt's care, and learned more about her outside of hospital. She is  and has two daughters, though only her daughter Merrill (resides with pt) is involved in her care. Merrill is not currently working, and is available at all times to assist pt. 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 She spends all of her time in bed at home, and said this has been the case for several years.   - At this time during visit, pt's daughter Merrill called, so I spoke to her on speaker phone. She informed me phone number on file is incorrect, so I updated this in chart ([phone]). Basic medical update provided; she had a number of questions about pt's renal function and is upset that her doctors (jennifer Guadalupe) had her on "blood pressure medications that can hurt her kidneys." I explained that blood pressure control is an important aspect of health care, and that without good blood pressure control pt can have worsening renal function from this as well. Additionally, she would be high risk for strokes or other serious health events. As many of her concerns were renal specific, I let her know that I would have Dr Aguilar reach out to her by phone, to which she was agreeable.    - Following this, spoke to pt about her care preferences if her renal function worsens. Explained that if not wanting to pursue HD - or she is " not a candidate for this - alternative path would be comfort-focused/hospice care. While patient indicated to me that she does not wish to undergo HD at any point, she agreed to further discuss with her daughter, who will likely be visiting the hospital sometime today. Encouraged this, as she may become confused and unable to voice her preferences if she goes into renal failure.   - Let her know I will follow up with her throughout hospital stay  - Updated primary team via SecureChat, and Dr Aguilar in person    Neuro  Non-convulsive status epilepticus  - Neurology consulted and following; plan for [place] [facility] [place]. PCCM consulted for intubation for airway protection and to initiate Propfol; she is already on Keppra and Vimpat.     Acute encephalopathy  - Believed caused by NCSE and sepsis; mgmt per neurology/primary staff/PCCM. Pt to be intubated and started on propfol this morning, with transfer to [facility] if this is able to be arranged.      Renal/  CKD (chronic kidney disease) stage 5, GFR less than 15 ml/min  - Nephrology consulted and following; explained to pt that if she gets to point of needing HD and does not want to pursue this (or is not a candidate for this due to her limited mobility from being bedbound for about 5 years), alternative path would be comfort-focused/hospice care   - Pt has expressed to both myself and Dr Aguilar that she does not want to undergo HD    UTI due to extended-spectrum beta lactamase (ESBL) producing Escherichia coli  - Mgmt per ID, primary     ID  Sepsis with acute organ dysfunction without septic shock  - See cellulitis   - Mgmt per ID and primary team     Orthopedic  * Left upper thigh wound/Cellulitis  - While this was reportedly improving with abx at start of hospital stay, this has gradually worsened over last few days. Complex situation due to concern that abx may be related to NCSE.   - Plan for surgical intervention dependent on control of NCSE  - ID and surgery consulted and  following for wound infectinon     Other  Debility  - Noted for several years; per chart review pt gradually lost mobility due to lower extremity pain, and now requires assistance from daughter for all ADLs    I will follow-up with patient. Please contact us if you have any additional questions.    RAJ Redd  Palliative Medicine Staff   [phone]    Total visit time: 70 minutes    > 50% of 35 min visit spent in chart review, face to face discussion of symptom assessment, coordination of care with other specialists, and discharge planning.    35 min ACP time spent: goals of care, emotional support, formulating and communicating prognosis, exploring burden/ benefit of various approaches of treatment.      Subjective:     Interval History[place] [facility] [place] spoke to daughter this morning     Medications:  Continuous Infusions:  Scheduled Meds:   amLODIPine  2.5 mg Oral Daily    aspirin  81 mg Oral Daily    atorvastatin  40 mg Oral QHS    heparin (porcine)  5,000 Units Subcutaneous Q8H    lacosamide (Vimpat) IV orderable  200 mg Intravenous Q12H    levETIRAcetam (Keppra) IV (PEDS and ADULTS)  500 mg Intravenous Q12H    levothyroxine  75 mcg Oral Before breakfast    mupirocin   Nasal BID    PHENYLephrine HCl in 0.9% NaCl        piperacillin-tazobactam (Zosyn) IV (PEDS and ADULTS) (extended infusion is not appropriate)  4.5 g Intravenous Q12H    propofoL         PRN Meds:  Current Facility-Administered Medications:     acetaminophen, 650 mg, Rectal, Q6H PRN    acetaminophen, 650 mg, Oral, Q4H PRN    dextrose 50%, 12.5 g, Intravenous, PRN    dextrose 50%, 25 g, Intravenous, PRN    glucagon (human recombinant), 1 mg, Intramuscular, PRN    glucose, 16 g, Oral, PRN    hydrALAZINE, 10 mg, Intravenous, Q4H PRN    insulin aspart U-100, 0-5 Units, Subcutaneous, Q6H PRN    lorazepam, 2 mg, Intravenous, Q15 Min PRN    melatonin, 6 mg, Oral, Nightly PRN    ondansetron, 4 mg, Intravenous, Q8H PRN     PHENYLephrine HCl in 0.9% NaCl, , ,     propofoL, , ,     sodium chloride 0.9%, 10 mL, Intravenous, Q12H PRN    Pharmacy to dose Vancomycin consult, , , Once **AND** vancomycin - pharmacy to dose, , Intravenous, pharmacy to manage frequency    Objective:     Vital Signs (Most Recent):  Temp: 97.8 °F (36.6 °C) (05/06/25 0701)  Pulse: 95 (05/06/25 0900)  Resp: 15 (05/06/25 0900)  BP: (!) 119/46 (05/06/25 0900)  SpO2: 99 % (05/06/25 0900) Vital Signs (24h Range):  Temp:  [97.2 °F (36.2 °C)-98.3 °F (36.8 °C)] 97.8 °F (36.6 °C)  Pulse:  [] 95  Resp:  [9-22] 15  SpO2:  [97 %-100 %] 99 %  BP: ()/() 119/46     Weight: 84.4 kg (186 lb)  Body mass index is 34.02 kg/m².       Physical Exam  Constitutional:       Comments: Ill-appearing, lying in bed, minimally responsive, EEG in process        Significant Labs: All pertinent labs within the past 24 hours have been reviewed.  CBC:   Recent Labs   Lab 05/06/25 0600   WBC 25.73*   HGB 9.7*   HCT 30.8*   MCV 96   *     BMP:  Recent Labs   Lab 05/06/25 0600   GLU 69*      K 4.0      CO2 19*   BUN 77*   CREATININE 4.7*   CALCIUM 8.3*   MG 2.0     LFT:  Lab Results   Component Value Date    AST 11 05/06/2025    ALKPHOS 82 05/06/2025    BILITOT 0.2 05/06/2025     Albumin:   Albumin   Date Value Ref Range Status   05/06/2025 1.9 (L) 3.5 - 5.2 g/dL Final   07/24/2020 2.5 (L) 3.5 - 5.2 g/dL Final     Protein:   Protein Total   Date Value Ref Range Status   05/06/2025 6.9 6.0 - 8.4 gm/dL Final     Total Protein   Date Value Ref Range Status   07/24/2020 8.1 6.0 - 8.4 g/dL Final     Lactic acid:   Lab Results   Component Value Date    LACTATE 0.9 04/29/2025    LACTATE 0.9 07/20/2020       Significant Imaging: I have reviewed all pertinent imaging results/findings within the past 24 hours.

## 2025-05-06 NOTE — PROCEDURES
Procedures  ICU EEG/VIDEO MONITORING REPORT    DATE OF SERVICE: 5/5/25-5/6/25  EEG NUMBER:OW -3  REQUESTED BY: Esthela  LOCATION OF SERVICE: [facility] [place]   Electroencephalographic (EEG) recording is with electrodes placed according to the International 10-20 placement system.  Thirty two (32) channels of digital signal are simultaneously recorded from the scalp and may include EKG, EMG, and/or eye monitors.   Recording band pass was 0.1 to 512 hz.  Digital video recording of the patient is simultaneously recorded with the EEG.  The nursing staff report clinical symptoms and may press an event button when the patient has symptoms of clinical interest to the treating physicians.  EEG and video recording is stored and archived in digital format.  The entire recording is visually reviewed and the times identified by computer analysis as being spikes or seizures are reviewed again.  Activation procedures which include photic stimulation, hyperventilation and instructing patients to perform simple task are done in selected patients.   Compresses spectral analysis (CSA) is also performed on the activity recorded from each individual channel.  This is displayed as a power display of frequencies from 0 to 30 Hz over time.   The CSA analysis is done and displayed continuously.  This is reviewed for asymmetries in power between homologous areas of the scalp and for presence of changes in power which canbe seen when seizures occur.  Sections of suspected abnormalities on the CSA is then compared with the original EEG recording.     [employer] software was also utilized in the review of this study.  This software suite analyzes the EEG recording in multiple domains.  Coherence and rhythmicity is computed to identify EEG sections which may contain organized seizures.  Each channel undergoes analysis to detect presence of spike and sharp waves which have special and morphological characteristic of epileptic  activity.  The routine EEG recording is converted from spacial into frequency domain.  This is then displayed comparing homologous areas to identify areas of significant asymmetry.  Algorithm to identify non-cortically generated artifact is used to separate eye movement, EMG and other artifact from the EEG.      Recording Times  Start on 5/5/25 at 12:24  Stop on 5/6/25 at 07:00    A total of 18 hours of EEG was recorded.    EEG FINDINGS   Markedly abnormal study due to high-amplitude generalized continuous spike and wave activity consistent with nonconvulsive status epilepticus.  Intermittent periods of high-amplitude diffuse disorganized delta and theta range background slowing with multifocal spike and wave discharges seen occasionally at times with generalized high amplitude spike and wave and polyspike and wave. Periods of slowing sharply contoured anterior predominant periodic theta/delta slowing with some anterior to posterior lag with a triphasic morphology. Intermittent periods of generalized continuous spike and wave and polyspike and wave activity increase throughout recording.        A second state is characterized by further delta and theta range background slowing.     Sleep architecture is not seen    Provocative maneuvers including hyperventilation and photic stimulation were not performed.     EKG recording shows a regular rhythm.    There are occasional      IMPRESSION:  Abnormal study due to severe  diffuse background slowing consistent with diffuse cerebral dysfunction and encephalopathy which may be on the basis of toxic, metabolic, or primary neuronal disorder. Triphasic waves may be seen in a variety of encephalopathies, including those due to renal, hepatic, anoxic, metabolic, or other toxic sources. Multifocal interictal epileptiform discharges consistent with diffuse cortical irritability and seizure potential noted throughout recording.  Periods of NC SC seen throughout recording worsening  later in study.    5/6/2025

## 2025-05-06 NOTE — ASSESSMENT & PLAN NOTE
- Believed caused by NCSE; mgmt per neurology/primary staff/PCCM. Pt to be intubated and started on propfol this morning, with transfer to [facility] if this is able to be arranged.

## 2025-05-06 NOTE — CLINICAL REVIEW
Sepsis Screen (most recent)       Sepsis Screen (IP) - 05/06/25 2866       Is the patient's history or complaint suggestive of a possible infection? Yes  -    Are there at least two of the following signs and symptoms present? Yes  -    Sepsis signs/symptoms - Tachycardia Tachycardia     >90  -    Sepsis signs/symptoms - WBC WBC < 4,000 or WBC > 12,000  -    Sepsis signs/symptoms - Altered Mental Status Altered Mental Status  -    Are any of the following organ dysfunction criteria present and not considered to be due to a chronic condition? Yes   EMMA on CKD -    Organ Dysfunction Criteria Creatinine > 2.0  -    Initiate Sepsis Protocol No  -    Reason sepsis not considered Pt. receiving appropriate management   ID following, on abx -              User Key  (r) = Recorded By, (t) = Taken By, (c) = Cosigned By      Initials Name    Shannan Chiu RN

## 2025-05-06 NOTE — SUBJECTIVE & OBJECTIVE
Past Medical History:   Diagnosis Date    Arthritis     Gout    CHF (congestive heart failure)     Coronary artery disease     Diabetes mellitus     HLD (hyperlipidemia)     Hypertension     Obese     Stroke     1986    Thyroid disease        Past Surgical History:   Procedure Laterality Date    ESOPHAGOGASTRODUODENOSCOPY N/A 7/23/2020    Procedure: EGD (ESOPHAGOGASTRODUODENOSCOPY);  Surgeon: Halle Hughes MD;  [facility];  Service: Endoscopy;  Laterality: N/A;    right hand surgery      right knee surgery Right     partial plate       Review of patient's allergies indicates:   Allergen Reactions    Corticosteroids (glucocorticoids) Edema       Family History       Problem Relation (Age of Onset)    Diabetes Maternal Aunt    Heart disease Sister, Maternal Aunt    Hypertension Maternal Aunt          Tobacco Use    Smoking status: Never    Smokeless tobacco: Never   Substance and Sexual Activity    Alcohol use: No    Drug use: No    Sexual activity: Not Currently     Partners: Male         Review of Systems   Unable to perform ROS: Intubated     Objective:     Vital Signs (Most Recent):  Temp: 97.5 °F (36.4 °C) (05/06/25 1120)  Pulse: 79 (05/06/25 1400)  Resp: 15 (05/06/25 1400)  BP: (!) 174/66 (05/06/25 1400)  SpO2: 100 % (05/06/25 1400) Vital Signs (24h Range):  Temp:  [97.2 °F (36.2 °C)-97.8 °F (36.6 °C)] 97.5 °F (36.4 °C)  Pulse:  [] 79  Resp:  [9-24] 15  SpO2:  [97 %-100 %] 100 %  BP: ()/() 174/66     Weight: 84.4 kg (186 lb)  Body mass index is 34.02 kg/m².      Intake/Output Summary (Last 24 hours) at 5/6/2025 1424  Last data filed at 5/6/2025 0600  Gross per 24 hour   Intake 182.99 ml   Output 275 ml   Net -92.01 ml        Physical Exam  Vitals and nursing note reviewed.   Constitutional:       Appearance: Normal appearance. She is well-developed. She is ill-appearing.      Interventions: She is sedated and intubated.   HENT:      Head: Normocephalic and atraumatic.      Nose:  Nose normal.      Mouth/Throat:      Mouth: Mucous membranes are moist.   Eyes:      General: Lids are normal.         Right eye: No discharge.         Left eye: No discharge.   Cardiovascular:      Rate and Rhythm: Normal rate and regular rhythm.      Pulses: Normal pulses.   Pulmonary:      Effort: Pulmonary effort is normal. She is intubated.      Breath sounds: Normal breath sounds.   Abdominal:      General: There is no distension.      Palpations: Abdomen is soft.   Genitourinary:     Comments: Davis in place  Musculoskeletal:         General: Swelling present. No tenderness or signs of injury.      Right lower le+ Edema present.      Left lower le+ Edema present.   Skin:     General: Skin is warm and dry.      Findings: No erythema or rash.      Comments: Wound to left thigh, foul smelling    Neurological:      Comments: Obtunded. Rhythmic jerking to bilateral shoulders and head noted prior to intubation. No response to noxious stimulus .         Vents:  Vent Mode: PRVC (25 1051)  Ventilator Initiated: Yes (25 105)  Set Rate: 20 BPM (25 1243)  Vt Set: 400 mL (25 1243)  PEEP/CPAP: 5 cmH20 (25 1243)  Oxygen Concentration (%): 30 (25 1400)  Peak Airway Pressure: 19.4 cmH20 (25 1243)  Total Ve: 7.7 L/m (25 1243)  F/VT Ratio<105 (RSBI): (!) 51.55 (25 1243)    Lines/Drains/Airways       Drain  Duration                  NG/OG Tube 25 1055 Center mouth <1 day         Urethral Catheter 25 1057 <1 day              Airway  Duration                  Airway - Non-Surgical 25 1051 <1 day              Peripheral Intravenous Line  Duration                  Peripheral IV - Single Lumen 25 1809 22 G Anterior;Left Forearm 1 day         Peripheral IV - Single Lumen 25 1220 20 G Anterior;Distal;Right Forearm <1 day                    Significant Labs:    CBC/Anemia Profile:  Recent Labs   Lab 25  0329 25  0600   WBC 20.30*  25.73*   HGB 9.0* 9.7*   HCT 28.8* 30.8*   * 569*   MCV 96 96   RDW 16.3* 16.1*        Chemistries:  Recent Labs   Lab 05/05/25  0329 05/06/25  0600    142   K 4.4 4.0    107   CO2 15* 19*   BUN 73* 77*   CREATININE 4.7* 4.7*   CALCIUM 8.6* 8.3*   ALBUMIN 2.1* 1.9*   PROT 7.5 6.9   BILITOT 0.2 0.2   ALKPHOS 93 82   ALT 8* 6*   AST 12 11   MG 2.0 2.0   PHOS 5.3* 5.2*       All pertinent labs within the past 24 hours have been reviewed.    Significant Imaging:   I have reviewed all pertinent imaging results/findings within the past 24 hours.

## 2025-05-06 NOTE — ASSESSMENT & PLAN NOTE
- noted on EEG 5/4/25  - loaded keppra on 5/4/25  - now on keppra 500mg IV BID and Vimpat  - Patient intubated on 05/06  -Onfi 20 mg daily added  Transferred to [facility] for neuro ICU

## 2025-05-06 NOTE — EICU
Virtual ICU Quality Rounds    Admit Date: 4/29/2025  Hospital Day: 7    ICU Day: 1d 16h    24H Vital Sign Range:  Temp:  [97.2 °F (36.2 °C)-98.3 °F (36.8 °C)]   Pulse:  []   Resp:  [9-22]   BP: ()/()   SpO2:  [97 %-100 %]     VICU Surveillance Screening                    LDA reconciliation : Yes

## 2025-05-06 NOTE — ASSESSMENT & PLAN NOTE
Intubated for airway protection and propofol administration in the setting of recurrent status epilepticus despite multiple AEDs. ABG reviewed and ventilator settings adjusted.     - maintain LPV, wean for SpO2 >90%  - wean non-titrating propofol with epilepsy guidance  - once stable from a seizure standpoint, daily SAT/SBT

## 2025-05-07 PROBLEM — N18.4 ACUTE RENAL FAILURE SUPERIMPOSED ON STAGE 4 CHRONIC KIDNEY DISEASE: Status: ACTIVE | Noted: 2018-05-31

## 2025-05-07 LAB — POCT GLUCOSE: 70 MG/DL (ref 70–110)

## 2025-05-07 NOTE — ASSESSMENT & PLAN NOTE
EMMA is likely due to acute tubular necrosis caused by sepsis. Baseline creatinine is 2.3 to 2.5. Most recent creatinine and eGFR are listed below.  Recent Labs     05/06/25  2226 05/07/25  0419 05/07/25  1242   CREATININE 4.59* 4.48* 4.46*   EGFRNORACEVR 9* 9* 10*     -Last sCr 2020  - Urine sodium, urine creatinine pending collection-  plus- will discuss with nurse  - Retroperitoneal ultrasound- CKD, no hydronephrosis  - Nephrotic Range proteinuria  - Nephrology following

## 2025-05-07 NOTE — DISCHARGE SUMMARY
[facility] Medicine  Discharge Summary      Patient Name: Tasneem Lynn  MRN: [id number]  [facility]: [id number]  Patient Class: IP- Inpatient  Admission Date: 4/29/2025  Hospital Length of Stay: 7 days  Discharge Date and Time: 5/6/2025  2:30 PM  Attending Physician: No att. providers found   Discharging Provider: Laron Klein MD  Primary Care Provider: Unable, To Obtain    Primary Care Team: Networked reference to record PCT     HPI:   79-year-old F with medical history significant for type 2 DM, HTN, CHF, hypothyroidism, and CKD 4 who presented to the ED with a chief complaint of LT upper  groin pain boil of one week duration      Patient was in her baseline state of health until which initially started as a small pea-sized lesion but progressively grew and ulcerated.  Patient started having pain and this prompted her to come to the ED. she endorsed some subjective chills but denied any fever, nausea, or vomiting.  No diarrhea or loss of appetite.  Patient had no history of bloodstream infection, surgical implants, valvular heart disease.  However, retrospective chart review indicated that patient had chronic osteomyelitis of the right foot and ESBL Klebsiella UTI 5 years ago.  Patient has a known CKD 4 but no routine follow up with Nephrology noted; review of home medication did not show any diuretics or other nephrotoxic medications.    On presentation to the ED, patient was hypertensive to 183/74, , oxygen saturation 100% on room air.Laboratory investigations were pertinent for WBC 29.1, HGB 9.3, , sodium 135, BUN 79, creatinine of 5.4, EGFR 8, procalcitonin 25.23, troponin 0.03, lactic acid 0.9.  Patient received NS 1 L in the ED and IV Zosyn.  Admission was requested for further management.      Procedure(s) (LRB):  DEBRIDEMENT, WOUND (N/A)      Hospital Course:   Ms Tasneem Lynn is a 79 y.o. woman with DM, CKD4, diastolic CHF who was admitted with left thigh cellulitis and  wound. Started antibiotics. Noted CKD4/5. Nephrology consulted. Patient does not want dialysis for any reason. Palliative also consulted. Noted mental status changes on 5/2/25 AM; no signs of stroke, concern for cefepime induced neurotoxicity. Moved to ICU for continued altered mental status and EEG 5/4/25 showed status epilepticus. Loaded keppra. Antibiotics adjusted to vanc/ertapenem due to ESBL Ecoli UTI. Repeat EEG 5/5 no longer showing status; loaded vimpat given earlier NCSE. General surgery is planning OR debridement when stable from Neuro standpoint.  Patient remained in status epilepticus; antibiotics switched to Zosyn to minimize worsening encephalopathy and seizures.MRI 5/3 showed no acute intracranial abnormalities.  EEG 5/5 to 5/6 showed diffuse background slowing with periods of Periods of NC SC seen throughout recording worsening later in study.  Patient was intubated on 05/06 and started on propofol for better control. Clobazam ( Onfi) added per Neurology.  Transferred to [facility] neuro ICU.Family agreeable.     Goals of Care Treatment Preferences:  Code Status: Full Code       [place]: Yes           SDOH Screening:  The patient was screened for food insecurity, housing instability, transportation needs, utility difficulties, and interpersonal safety. The social determinant(s) of health identified as a concern this admission are:  Housing instability    Will discuss with case management and/or community health workers.    Social Drivers of Health with Concerns     Housing Stability: Patient Unable To Answer (5/7/2025)   Recent Concern: Housing Stability - High Risk (5/1/2025)        Consults:   Consults (From admission, onward)          Status Ordering Provider     Inpatient consult to Pulmonology  Once        Provider:  Rayray Medina MD    Completed ALTAGRACIA BRONSON     Inpatient consult to General Surgery  Once        Provider:  Anton Ruiz MD    Completed ELDA ESTRADA      Inpatient consult to Infectious Diseases  Once        Provider:  Adarsh Dash MD    Completed ELDA ESTRADA     Inpatient Consult to Neurology Services (General Neurology)  Once        Provider:  Daniela Mauricio MD    Completed VITALE, NATALY NIDA     Inpatient consult to Palliative Care  Once        Provider:  Staci Obando NP    Completed VITALE, NATALY NIDA     Inpatient consult to Nephrology  Once        Provider:  Clinton Aguilar MD    Completed ALTAGRACIA BRONSON            Assessment & Plan  Left upper thigh wound/Cellulitis  Hx of LT upper thigh carbuncle progressively enlarging with ulceration  Blood culture no growth   Noncontrast CT abdomen and left upper thigh to evaluate for occult abscesses and deep extensions-no abscess   Procal elevated, lactate normal  Wound care ordered . Wound care team following  Cellulitis and leukocytosis improving. Continue same wound care  Continue vancomycin while inpatient, Stop cefepime given encephalopathy; started Zosyn per ID recommendation; input appreciated  Status seizures limiting surgical intervention; general surgery input appreciated    Acute encephalopathy  - On 5/2 am, noted change in mental status, left facial droop, aphasic and not following simple commands.   -DDx UA evidence of infection, cellulitis and uremia contributing, concern for stroke given history    -Stroke code called. CT head no acute bleed and indeterminate small lacunar infarct left thalamus. MRI brain, MRA head neck without contrast no stroke. Continue ASA, statin.  -Switched Cefepime to Rocephin as cefepime can contribute to encephalopathy.  -Urine culture with ESBL Ecoli- further changed CTX to ertapenem on 5/5/25; change to Zosyn on 05/06  - she is still encephalopathic, not oriented, not speaking;  - EEG 5/4 with NSCE- loaded with keppra, repeat EEG in process; subsequently intubated on 05/06  Septic shock  This patient does have evidence of infective focus  My overall  impression is sepsis.  Source: Skin and Soft Tissue (location LT inguinal cellulitis)  Antibiotics given-   Antibiotics (72h ago, onward)      None        WBC improving  Continue vanc, switch cefe to rocephin due to encephalopathy    CKD (chronic kidney disease) stage 5, GFR less than 15 ml/min  EMMA is likely due to acute tubular necrosis caused by sepsis. Baseline creatinine is 2.3 to 2.5. Most recent creatinine and eGFR are listed below.  Recent Labs     05/06/25  2226 05/07/25  0419 05/07/25  1242   CREATININE 4.59* 4.48* 4.46*   EGFRNORACEVR 9* 9* 10*     -Last sCr 2020  - Urine sodium, urine creatinine pending collection-  plus- will discuss with nurse  - Retroperitoneal ultrasound- CKD, no hydronephrosis  - Nephrotic Range proteinuria  - Nephrology following    Hyperlipidemia  Continue statins    Diabetes mellitus, type 2  Patient's FSGs are controlled on current medication regimen.  Last A1c reviewed-   Lab Results   Component Value Date    HGBA1C 4.8 05/04/2025     Most recent fingerstick glucose reviewed-   Recent Labs   Lab 05/07/25  0055 05/07/25  0245 05/07/25  0957 05/07/25  1432   POCTGLUCOSE 101 75 65* 93   Hypoglycemia- infection contributing  Obtain HgbA1c    Type 2 myocardial infarction  Type 2 secondary to sepsis and EMMA  Noted elevated troponin with underlying history of CAD on presentation  EKG showed no acute ischemic change  Continue to trend troponin    Hypertension  Patient currently borderline hypotensive; home medications held  Restart as clinically appropriate  PRNs ordered  CAD (coronary artery disease)  Patient with known CAD s/p which is controlled Will continue ASA and Statin and monitor for S/Sx of angina/ACS. Continue to monitor on telemetry.   Patient denied history of MI  Hypothyroidism  Continue home levothyroxine    Debility  Chronic history of bed bound  PT/OT for weaker than normal      Advance care planning  Advance Care Planning     Date: 05/01/2025    Code Status  In  light of the patients advanced and life limiting illness,I engaged the the patient in a voluntary conversation about the patient's preferences for care  at the very end of life. The patient wishes to have a natural, peaceful death.  Along those lines, the patient does not wish to have CPR or other invasive treatments performed when her heart and/or breathing stops. Patient would like to discuss with Daughter Merrill. Patient also does not want dialysis if had to come to that point.   Palliative team care following      Anemia of chronic disorder  Anemia is likely due to chronic disease due to Chronic Kidney Disease. Most recent hemoglobin and hematocrit are listed below.  Recent Labs     05/06/25  2226 05/07/25  0418 05/07/25  1242   HGB 9.6* 8.3* 8.4*   HCT 29.8* 24.5* 25.0*     Plan  - Monitor serial CBC: Daily  - Transfuse PRBC if patient becomes hemodynamically unstable, symptomatic or H/H drops below 7/21.  - Iron studies iron 32 TSAT 35.   - no overt bleeding  - 1 unit of blood ordered- responded appropriately repeat hgb 8.3  - 5/3 - daughter reports had chills and back pain while receiving blood yesterday.   - Add blood transfusion reaction work up     UTI due to extended-spectrum beta lactamase (ESBL) producing Escherichia coli      Non-convulsive status epilepticus  - noted on EEG 5/4/25  - loaded keppra on 5/4/25  - now on keppra 500mg IV BID and Vimpat  - Patient intubated on 05/06  -Onfi 20 mg daily added  Transferred to [facility] for neuro ICU      Cellulitis of left lower extremity      Seizure      Final Active Diagnoses:    Diagnosis Date Noted POA    PRINCIPAL PROBLEM:  Left upper thigh wound/Cellulitis [L98.499] 04/29/2025 Yes    Cellulitis of left lower extremity [L03.116] 05/06/2025 Unknown    Seizure [R56.9] 05/06/2025 Unknown    Non-convulsive status epilepticus [G40.901] 05/05/2025 No    Acute encephalopathy [G93.40] 05/03/2025 No    Debility [R53.81] 05/01/2025 Yes    Advance care  planning [Z71.89] 05/01/2025 Not Applicable    Septic shock [A41.9, R65.21] 04/29/2025 Yes    CKD (chronic kidney disease) stage 5, GFR less than 15 ml/min [N18.5] 04/29/2025 Yes    Hypertension [I10] 04/29/2025 Yes    Diabetes mellitus, type 2 [E11.9] 04/29/2025 Yes    CAD (coronary artery disease) [I25.10] 04/29/2025 Yes    Type 2 myocardial infarction [I21.A1] 04/29/2025 Yes    Anemia of chronic disorder [D63.8] 07/24/2020 Yes     Chronic    UTI due to extended-spectrum beta lactamase (ESBL) producing Escherichia coli [N39.0, B96.29, Z16.12] 07/21/2020 Yes    Hypothyroidism [E03.9] 11/17/2016 Yes     Chronic    Hyperlipidemia [E78.5] 11/17/2016 Yes     Chronic      Problems Resolved During this Admission:    Diagnosis Date Noted Date Resolved POA    Anemia [D64.9] 04/29/2025 05/04/2025 Yes       Discharged Condition: critical    Disposition: Another Health Care Inst*    Follow Up:    Patient Instructions:   No discharge procedures on file.    Significant Diagnostic Studies: N/A    Pending Diagnostic Studies:       None           Medications:  Transfer Medications (for Discharge Readmit only): Current Medications[1]    Indwelling Lines/Drains at time of discharge:   Lines/Drains/Airways       Drain  Duration                  NG/OG Tube 05/06/25 1055 Center mouth 1 day         Urethral Catheter 05/06/25 1057 1 day              Airway  Duration                  Airway - Non-Surgical 05/06/25 1051 1 day                    Time spent on the discharge of patient: more than 35 minutes    Critical care time spent on the evaluation and treatment of severe organ dysfunction, review of pertinent labs and imaging studies, discussions with consulting providers and discussions with patient/family: more than 35 minutes.     Laron Klein MD  Department of Hospital Medicine  [facility] [place] - Intensive Care       [1]   No current facility-administered medications for this encounter.     No current outpatient medications on file.      Facility-Administered Medications Ordered in Other Encounters   Medication Dose Route Frequency Provider Last Rate Last Admin    0.9% NaCl infusion   Intravenous Once Dagoberto Hargrove MD        albuterol sulfate nebulizer solution 2.5 mg  2.5 mg Nebulization Q4H PRN Jacoby Velasquez MD        aspirin chewable tablet 81 mg  81 mg Per OG tube Daily Sanaz Antoine NP        atorvastatin tablet 80 mg  80 mg Per OG tube Daily Sanaz Antoine NP   80 mg at 05/07/25 0927    chlorhexidine 0.12 % solution 15 mL  15 mL Mouth/Throat BID Marlon Abdullahi MD   15 mL at 05/07/25 0927    cloBAZam suspension 20 mg  20 mg Per OG tube Daily Sanaz Antoine NP   20 mg at 05/07/25 0929    dextrose 50% injection 12.5 g  12.5 g Intravenous PRN Sanaz Antoine NP   12.5 g at 05/07/25 0959    dextrose 50% injection 12.5 g  12.5 g Intravenous PRN Jacoby Velasquez MD        dextrose 50% injection 25 g  25 g Intravenous PRN Kemal Torrez MD        dextrose oral liquid/gel 15 g  15 g Oral PRN Kemal Torrez MD        dextrose oral liquid/gel 30 g  30 g Oral PRN Kemal Torrez MD        diphenhydrAMINE injection 25 mg  25 mg Intravenous Once PRN Jacoby Velasquez MD        [START ON 5/8/2025] famotidine (PF) injection 20 mg  20 mg Intravenous Daily Marlon Abdullahi MD        glucagon (human recombinant) injection 1 mg  1 mg Intramuscular PRN Sanaz Antoine NP        glucagon (human recombinant) injection 1 mg  1 mg Intramuscular PRN Jacoby Velasquez MD        heparin (porcine) injection 5,000 Units  5,000 Units Subcutaneous Q8H Sanaz Antoine NP   5,000 Units at 05/07/25 0535    hydrALAZINE injection 20 mg  20 mg Intravenous Once PRN Jacoby Velasquez MD        HYDROmorphone injection 0.5 mg  0.5 mg Intravenous Q5 Min PRN Jacboy Velasquez MD        insulin aspart U-100 injection 0-10 Units  0-10 Units Subcutaneous QID (AC + HS) PRN Kemal Torrez MD        ketorolac  injection 15 mg  15 mg Intravenous Once PRN Jacoby Velasquez MD        labetalol 20 mg/4 mL (5 mg/mL) IV syring  20 mg Intravenous Once PRN Jacoby Velasquez MD        lacosamide injection 200 mg  200 mg Intravenous Q12H Sanaz Antoine, NP   200 mg at 05/07/25 0927    levETIRAcetam injection 250 mg  250 mg Intravenous Q12H Sanaz Antoine NP   250 mg at 05/07/25 0927    levothyroxine tablet 75 mcg  75 mcg Per OG tube Before breakfast Sanaz Antoine NP   75 mcg at 05/07/25 0535    linezolid 600 mg/300 mL IVPB 600 mg  600 mg Intravenous Q12H Marlon Abdullahi MD   Stopped at 05/07/25 1030    meropenem (MERREM) 500 mg in 0.9% NaCl 100 mL IVPB (MB+)  500 mg Intravenous Once Marlon Abdullahi MD   Held at 05/07/25 1145    Followed by    [START ON 5/8/2025] meropenem (MERREM) 500 mg in 0.9% NaCl 100 mL IVPB (MB+)  500 mg Intravenous Daily Marlon Abdullahi MD        mupirocin 2 % ointment   Nasal BID Marlon Abdullahi MD   1 g at 05/07/25 0927    NORepinephrine bitartrate-NaCl 8 mg/250 mL (32 mcg/mL) infusion  0-3 mcg/kg/min (Order-Specific) Intravenous Continuous Marlon Abdullahi MD 5.8 mL/hr at 05/07/25 1620 0.04 mcg/kg/min at 05/07/25 1620    ondansetron injection 4 mg  4 mg Intravenous Daily PRN Jacoby Velasquez MD        polyethylene glycol packet 17 g  17 g Per OG tube BID Sanaz Antoine NP   17 g at 05/07/25 0927    propofol (DIPRIVAN) 10 mg/mL infusion (NON-TITRATING)  20 mcg/kg/min (Order-Specific) Intravenous Continuous Marlon Abdullahi MD 9.348 mL/hr at 05/07/25 1053 20 mcg/kg/min at 05/07/25 1053    racepinephrine 2.25 % nebulizer solution 0.5 mL  0.5 mL Nebulization Once PRN Jacoby Velasquez MD        senna-docusate 8.6-50 mg per tablet 2 tablet  2 tablet Per OG tube BID Sanaz Antoine, NP   2 tablet at 05/07/25 0927    sodium chloride 0.9% bolus 250 mL 250 mL  250 mL Intravenous PRN Dagoberto Hargrove MD        sodium chloride 0.9% flush 10 mL  10 mL  Intravenous PRN Jacoby Velasquez MD        sodium hypochlorite 0.125% external solution   Topical (Top) BID Sanaz Antoine, NP

## 2025-05-07 NOTE — ASSESSMENT & PLAN NOTE
Anemia is likely due to chronic disease due to Chronic Kidney Disease. Most recent hemoglobin and hematocrit are listed below.  Recent Labs     05/06/25  2226 05/07/25  0418 05/07/25  1242   HGB 9.6* 8.3* 8.4*   HCT 29.8* 24.5* 25.0*     Plan  - Monitor serial CBC: Daily  - Transfuse PRBC if patient becomes hemodynamically unstable, symptomatic or H/H drops below 7/21.  - Iron studies iron 32 TSAT 35.   - no overt bleeding  - 1 unit of blood ordered- responded appropriately repeat hgb 8.3  - 5/3 - daughter reports had chills and back pain while receiving blood yesterday.   - Add blood transfusion reaction work up

## 2025-05-07 NOTE — ASSESSMENT & PLAN NOTE
This patient does have evidence of infective focus  My overall impression is sepsis.  Source: Skin and Soft Tissue (location LT inguinal cellulitis)  Antibiotics given-   Antibiotics (72h ago, onward)      None        WBC improving  Continue vanc, switch cefe to rocephin due to encephalopathy

## 2025-05-07 NOTE — ASSESSMENT & PLAN NOTE
Patient's FSGs are controlled on current medication regimen.  Last A1c reviewed-   Lab Results   Component Value Date    HGBA1C 4.8 05/04/2025     Most recent fingerstick glucose reviewed-   Recent Labs   Lab 05/07/25  0055 05/07/25  0245 05/07/25  0957 05/07/25  1432   POCTGLUCOSE 101 75 65* 93   Hypoglycemia- infection contributing  Obtain HgbA1c

## 2025-05-08 PROBLEM — Z51.5 ENCOUNTER FOR PALLIATIVE CARE: Status: ACTIVE | Noted: 2025-05-08

## 2025-05-12 PROBLEM — Z75.8 DISCHARGE PLANNING ISSUES: Status: ACTIVE | Noted: 2025-05-12

## 2025-05-13 LAB
PATH INTP: NORMAL
PATHOLOGIST INTERPRETATION TRANSF REACTION: NORMAL

## 2025-05-18 PROBLEM — R13.10 DYSPHAGIA: Status: ACTIVE | Noted: 2025-05-18

## 2025-05-18 PROBLEM — Z93.1 S/P PERCUTANEOUS ENDOSCOPIC GASTROSTOMY (PEG) TUBE PLACEMENT: Status: ACTIVE | Noted: 2025-05-18

## 2025-05-18 PROBLEM — E46 MALNUTRITION: Status: ACTIVE | Noted: 2025-05-18

## 2025-05-18 PROBLEM — D69.6 THROMBOCYTOPENIA: Status: ACTIVE | Noted: 2025-05-18

## 2025-05-21 PROBLEM — N18.6 ESRD (END STAGE RENAL DISEASE): Status: ACTIVE | Noted: 2025-05-21

## 2025-05-25 PROBLEM — Z16.12 UTI DUE TO EXTENDED-SPECTRUM BETA LACTAMASE (ESBL) PRODUCING ESCHERICHIA COLI: Status: RESOLVED | Noted: 2020-07-21 | Resolved: 2025-05-25

## 2025-05-25 PROBLEM — A41.9 SEPTIC SHOCK: Status: RESOLVED | Noted: 2025-04-29 | Resolved: 2025-05-25

## 2025-05-25 PROBLEM — N39.0 UTI DUE TO EXTENDED-SPECTRUM BETA LACTAMASE (ESBL) PRODUCING ESCHERICHIA COLI: Status: RESOLVED | Noted: 2020-07-21 | Resolved: 2025-05-25

## 2025-05-25 PROBLEM — R65.21 SEPTIC SHOCK: Status: RESOLVED | Noted: 2025-04-29 | Resolved: 2025-05-25

## 2025-05-25 PROBLEM — B96.29 UTI DUE TO EXTENDED-SPECTRUM BETA LACTAMASE (ESBL) PRODUCING ESCHERICHIA COLI: Status: RESOLVED | Noted: 2020-07-21 | Resolved: 2025-05-25

## 2025-05-26 PROBLEM — J96.01 ACUTE RESPIRATORY FAILURE WITH HYPOXIA: Status: RESOLVED | Noted: 2020-07-20 | Resolved: 2025-05-26

## 2025-05-27 PROBLEM — D69.6 THROMBOCYTOPENIA: Status: RESOLVED | Noted: 2025-05-18 | Resolved: 2025-05-27

## 2025-05-31 PROBLEM — E83.39 HYPOPHOSPHATEMIA: Status: ACTIVE | Noted: 2025-05-31

## 2025-05-31 PROBLEM — E87.1 HYPONATREMIA: Status: ACTIVE | Noted: 2025-05-31

## 2025-06-12 PROBLEM — N17.9 AKI (ACUTE KIDNEY INJURY): Status: ACTIVE | Noted: 2025-06-12

## 2025-06-14 PROBLEM — N17.9 AKI (ACUTE KIDNEY INJURY): Status: ACTIVE | Noted: 2025-06-14

## 2025-06-15 PROBLEM — N17.9 AKI (ACUTE KIDNEY INJURY): Status: RESOLVED | Noted: 2025-06-14 | Resolved: 2025-06-15

## 2025-06-18 ENCOUNTER — HOSPITAL ENCOUNTER (INPATIENT)
Facility: HOSPITAL | Age: 80
LOS: 69 days | Discharge: NURSING, REHAB OR LONG-TERM CARE | DRG: 264 | End: 2025-08-26
Attending: EMERGENCY MEDICINE | Admitting: HOSPITALIST
Payer: MEDICARE

## 2025-06-18 DIAGNOSIS — D72.829 LEUKOCYTOSIS, UNSPECIFIED TYPE: ICD-10-CM

## 2025-06-18 DIAGNOSIS — Z91.89 AT RISK FOR PROLONGED QT INTERVAL SYNDROME: ICD-10-CM

## 2025-06-18 DIAGNOSIS — L98.429 SKIN ULCER OF SACRUM, UNSPECIFIED ULCER STAGE: ICD-10-CM

## 2025-06-18 DIAGNOSIS — G93.40 ACUTE ENCEPHALOPATHY: ICD-10-CM

## 2025-06-18 DIAGNOSIS — R47.81 SLURRED SPEECH: ICD-10-CM

## 2025-06-18 DIAGNOSIS — Z99.2 DEPENDENCE ON RENAL DIALYSIS: ICD-10-CM

## 2025-06-18 DIAGNOSIS — Z51.81 MEDICATION MONITORING ENCOUNTER: ICD-10-CM

## 2025-06-18 DIAGNOSIS — R79.89 ELEVATED BRAIN NATRIURETIC PEPTIDE (BNP) LEVEL: ICD-10-CM

## 2025-06-18 DIAGNOSIS — A41.9 SEPSIS: ICD-10-CM

## 2025-06-18 DIAGNOSIS — R00.1 BRADYCARDIA: ICD-10-CM

## 2025-06-18 DIAGNOSIS — R07.9 CHEST PAIN: ICD-10-CM

## 2025-06-18 DIAGNOSIS — L08.9 DECUBITUS ULCER, UNSTAGEABLE WITH INFECTION: ICD-10-CM

## 2025-06-18 DIAGNOSIS — R78.81 STAPHYLOCOCCUS AUREUS BACTEREMIA: ICD-10-CM

## 2025-06-18 DIAGNOSIS — Z13.6 SCREENING FOR CARDIOVASCULAR CONDITION: ICD-10-CM

## 2025-06-18 DIAGNOSIS — L89.95 DECUBITUS ULCER, UNSTAGEABLE WITH INFECTION: ICD-10-CM

## 2025-06-18 DIAGNOSIS — N18.4 CKD (CHRONIC KIDNEY DISEASE) STAGE 4, GFR 15-29 ML/MIN: ICD-10-CM

## 2025-06-18 DIAGNOSIS — N17.9 AKI (ACUTE KIDNEY INJURY): ICD-10-CM

## 2025-06-18 DIAGNOSIS — L97.129: ICD-10-CM

## 2025-06-18 DIAGNOSIS — K92.1 MELENA: ICD-10-CM

## 2025-06-18 DIAGNOSIS — R65.10 SIRS (SYSTEMIC INFLAMMATORY RESPONSE SYNDROME): ICD-10-CM

## 2025-06-18 DIAGNOSIS — E87.5 HYPERKALEMIA: ICD-10-CM

## 2025-06-18 DIAGNOSIS — B95.61 STAPHYLOCOCCUS AUREUS BACTEREMIA: ICD-10-CM

## 2025-06-18 DIAGNOSIS — R41.89 DECREASED RESPONSIVENESS: Primary | ICD-10-CM

## 2025-06-18 DIAGNOSIS — G45.9 TRANSIENT ISCHEMIC ATTACK: ICD-10-CM

## 2025-06-18 DIAGNOSIS — I38 ENDOCARDITIS: ICD-10-CM

## 2025-06-18 DIAGNOSIS — D63.8 ANEMIA OF CHRONIC DISORDER: Chronic | ICD-10-CM

## 2025-06-18 DIAGNOSIS — R41.82 AMS (ALTERED MENTAL STATUS): ICD-10-CM

## 2025-06-18 DIAGNOSIS — S31.000D WOUND OF SACRAL REGION, SUBSEQUENT ENCOUNTER: ICD-10-CM

## 2025-06-18 DIAGNOSIS — Z86.73 HISTORY OF CVA (CEREBROVASCULAR ACCIDENT): Chronic | ICD-10-CM

## 2025-06-18 DIAGNOSIS — N18.6 ESRD (END STAGE RENAL DISEASE): Chronic | ICD-10-CM

## 2025-06-18 DIAGNOSIS — I44.1 2ND DEGREE ATRIOVENTRICULAR BLOCK: ICD-10-CM

## 2025-06-18 LAB
ABSOLUTE EOSINOPHIL (OHS): 0.17 K/UL
ABSOLUTE MONOCYTE (OHS): 1.67 K/UL (ref 0.3–1)
ABSOLUTE NEUTROPHIL COUNT (OHS): 15.64 K/UL (ref 1.8–7.7)
ALBUMIN SERPL BCP-MCNC: 1.5 G/DL (ref 3.5–5.2)
ALLENS TEST: NORMAL
ALP SERPL-CCNC: 358 UNIT/L (ref 40–150)
ALT SERPL W/O P-5'-P-CCNC: 30 UNIT/L (ref 10–44)
AMPHET UR QL SCN: NEGATIVE
ANION GAP (OHS): 10 MMOL/L (ref 8–16)
ANION GAP (OHS): 8 MMOL/L (ref 8–16)
AST SERPL-CCNC: 49 UNIT/L (ref 11–45)
BACTERIA #/AREA URNS AUTO: ABNORMAL /HPF
BACTERIA #/AREA URNS AUTO: ABNORMAL /HPF
BARBITURATE SCN PRESENT UR: NEGATIVE
BASOPHILS # BLD AUTO: 0.05 K/UL
BASOPHILS NFR BLD AUTO: 0.2 %
BENZODIAZ UR QL SCN: ABNORMAL
BILIRUB SERPL-MCNC: 0.2 MG/DL (ref 0.1–1)
BILIRUB UR QL STRIP.AUTO: NEGATIVE
BILIRUB UR QL STRIP.AUTO: NEGATIVE
BIPAP: 0
BNP SERPL-MCNC: 973 PG/ML (ref 0–99)
BUN SERPL-MCNC: 66 MG/DL (ref 8–23)
BUN SERPL-MCNC: 69 MG/DL (ref 8–23)
CALCIUM SERPL-MCNC: 8 MG/DL (ref 8.7–10.5)
CALCIUM SERPL-MCNC: 8.4 MG/DL (ref 8.7–10.5)
CANNABINOIDS UR QL SCN: NEGATIVE
CHLORIDE SERPL-SCNC: 100 MMOL/L (ref 95–110)
CHLORIDE SERPL-SCNC: 101 MMOL/L (ref 95–110)
CLARITY UR: ABNORMAL
CLARITY UR: ABNORMAL
CO2 SERPL-SCNC: 23 MMOL/L (ref 23–29)
CO2 SERPL-SCNC: 23 MMOL/L (ref 23–29)
COCAINE UR QL SCN: NEGATIVE
COLOR UR AUTO: YELLOW
COLOR UR AUTO: YELLOW
CORRECTED TEMPERATURE (PCO2): 45.2 MMHG
CORRECTED TEMPERATURE (PH): 7.37
CORRECTED TEMPERATURE (PO2): 44.6 MMHG
CREAT SERPL-MCNC: 1.8 MG/DL (ref 0.5–1.4)
CREAT SERPL-MCNC: 1.9 MG/DL (ref 0.5–1.4)
CREAT UR-MCNC: 27 MG/DL (ref 15–325)
ERYTHROCYTE [DISTWIDTH] IN BLOOD BY AUTOMATED COUNT: 17.7 % (ref 11.5–14.5)
FIO2: 21 %
GFR SERPLBLD CREATININE-BSD FMLA CKD-EPI: 27 ML/MIN/1.73/M2
GFR SERPLBLD CREATININE-BSD FMLA CKD-EPI: 28 ML/MIN/1.73/M2
GLUCOSE SERPL-MCNC: 159 MG/DL (ref 70–110)
GLUCOSE SERPL-MCNC: 170 MG/DL (ref 70–110)
GLUCOSE SERPL-MCNC: 182 MG/DL (ref 70–110)
GLUCOSE SERPL-MCNC: 225 MG/DL (ref 70–110)
GLUCOSE UR QL STRIP: ABNORMAL
GLUCOSE UR QL STRIP: ABNORMAL
HCT VFR BLD AUTO: 26.7 % (ref 37–48.5)
HGB BLD-MCNC: 8.5 GM/DL (ref 12–16)
HGB UR QL STRIP: ABNORMAL
HGB UR QL STRIP: ABNORMAL
HOLD SPECIMEN: NORMAL
HOLD SPECIMEN: NORMAL
HYALINE CASTS UR QL AUTO: 0 /LPF (ref 0–1)
HYALINE CASTS UR QL AUTO: 0 /LPF (ref 0–1)
IMM GRANULOCYTES # BLD AUTO: 0.28 K/UL (ref 0–0.04)
IMM GRANULOCYTES NFR BLD AUTO: 1.4 % (ref 0–0.5)
KETONES UR QL STRIP: NEGATIVE
KETONES UR QL STRIP: NEGATIVE
LACTATE SERPL-SCNC: 0.9 MMOL/L (ref 0.5–2.2)
LDH SERPL L TO P-CCNC: 0.61 MMOL/L (ref 0.5–2.2)
LEUKOCYTE ESTERASE UR QL STRIP: ABNORMAL
LEUKOCYTE ESTERASE UR QL STRIP: ABNORMAL
LIPASE SERPL-CCNC: 25 U/L (ref 4–60)
LYMPHOCYTES # BLD AUTO: 2.53 K/UL (ref 1–4.8)
MAGNESIUM SERPL-MCNC: 2.3 MG/DL (ref 1.6–2.6)
MCH RBC QN AUTO: 31.3 PG (ref 27–31)
MCHC RBC AUTO-ENTMCNC: 31.8 G/DL (ref 32–36)
MCV RBC AUTO: 98 FL (ref 82–98)
METHADONE UR QL SCN: NEGATIVE
MICROSCOPIC COMMENT: ABNORMAL
MICROSCOPIC COMMENT: ABNORMAL
NITRITE UR QL STRIP: NEGATIVE
NITRITE UR QL STRIP: NEGATIVE
NUCLEATED RBC (/100WBC) (OHS): 0 /100 WBC
OHS QRS DURATION: 86 MS
OHS QRS DURATION: 88 MS
OHS QTC CALCULATION: 430 MS
OHS QTC CALCULATION: 432 MS
OPIATES UR QL SCN: ABNORMAL
PCO2 BLDA: 45.2 MMHG (ref 35–45)
PCP UR QL: NEGATIVE
PH SMN: 7.37 [PH] (ref 7.35–7.45)
PH UR STRIP: 7 [PH]
PH UR STRIP: 7 [PH]
PHOSPHATE SERPL-MCNC: 3.6 MG/DL (ref 2.7–4.5)
PLATELET # BLD AUTO: 501 K/UL (ref 150–450)
PMV BLD AUTO: 10.3 FL (ref 9.2–12.9)
PO2 BLDA: 44.6 MMHG (ref 40–60)
POC BASE DEFICIT: 0.5 MMOL/L
POC HCO3: 24.7 MMOL/L (ref 24–28)
POC PERFORMED BY: ABNORMAL
POC TEMPERATURE: 37 C
POCT GLUCOSE: 271 MG/DL (ref 70–110)
POTASSIUM SERPL-SCNC: 4.8 MMOL/L (ref 3.5–5.1)
POTASSIUM SERPL-SCNC: 5.6 MMOL/L (ref 3.5–5.1)
PROCALCITONIN SERPL-MCNC: 0.89 NG/ML
PROT SERPL-MCNC: 7.5 GM/DL (ref 6–8.4)
PROT UR QL STRIP: ABNORMAL
PROT UR QL STRIP: ABNORMAL
RBC # BLD AUTO: 2.72 M/UL (ref 4–5.4)
RBC #/AREA URNS AUTO: 22 /HPF (ref 0–4)
RBC #/AREA URNS AUTO: 5 /HPF (ref 0–4)
RELATIVE EOSINOPHIL (OHS): 0.8 %
RELATIVE LYMPHOCYTE (OHS): 12.4 % (ref 18–48)
RELATIVE MONOCYTE (OHS): 8.2 % (ref 4–15)
RELATIVE NEUTROPHIL (OHS): 77 % (ref 38–73)
SAMPLE: NORMAL
SITE: NORMAL
SODIUM SERPL-SCNC: 132 MMOL/L (ref 136–145)
SODIUM SERPL-SCNC: 133 MMOL/L (ref 136–145)
SP GR UR STRIP: 1.01
SP GR UR STRIP: 1.01
SPECIMEN SOURCE: ABNORMAL
SQUAMOUS #/AREA URNS AUTO: 1 /HPF
SQUAMOUS #/AREA URNS AUTO: 15 /HPF
TROPONIN I SERPL HS-MCNC: 11 NG/L
TSH SERPL-ACNC: 3.63 UIU/ML (ref 0.4–4)
UROBILINOGEN UR STRIP-ACNC: NEGATIVE EU/DL
UROBILINOGEN UR STRIP-ACNC: NEGATIVE EU/DL
WBC # BLD AUTO: 20.34 K/UL (ref 3.9–12.7)
WBC #/AREA URNS AUTO: 58 /HPF (ref 0–5)
WBC #/AREA URNS AUTO: >100 /HPF (ref 0–5)
WBC CLUMPS UR QL AUTO: ABNORMAL
WBC CLUMPS UR QL AUTO: ABNORMAL
YEAST UR QL AUTO: ABNORMAL /HPF
YEAST UR QL AUTO: ABNORMAL /HPF

## 2025-06-18 PROCEDURE — 5A1D70Z PERFORMANCE OF URINARY FILTRATION, INTERMITTENT, LESS THAN 6 HOURS PER DAY: ICD-10-PCS | Performed by: STUDENT IN AN ORGANIZED HEALTH CARE EDUCATION/TRAINING PROGRAM

## 2025-06-18 PROCEDURE — 99900035 HC TECH TIME PER 15 MIN (STAT): Mod: HCNC

## 2025-06-18 PROCEDURE — 84145 PROCALCITONIN (PCT): CPT | Mod: HCNC | Performed by: EMERGENCY MEDICINE

## 2025-06-18 PROCEDURE — 83605 ASSAY OF LACTIC ACID: CPT | Mod: HCNC

## 2025-06-18 PROCEDURE — 99291 CRITICAL CARE FIRST HOUR: CPT | Mod: HCNC

## 2025-06-18 PROCEDURE — 94640 AIRWAY INHALATION TREATMENT: CPT | Mod: HCNC

## 2025-06-18 PROCEDURE — 27000221 HC OXYGEN, UP TO 24 HOURS: Mod: HCNC

## 2025-06-18 PROCEDURE — 87077 CULTURE AEROBIC IDENTIFY: CPT | Mod: HCNC | Performed by: EMERGENCY MEDICINE

## 2025-06-18 PROCEDURE — 63600175 PHARM REV CODE 636 W HCPCS: Mod: HCNC | Performed by: EMERGENCY MEDICINE

## 2025-06-18 PROCEDURE — 12000002 HC ACUTE/MED SURGE SEMI-PRIVATE ROOM: Mod: HCNC

## 2025-06-18 PROCEDURE — 99223 1ST HOSP IP/OBS HIGH 75: CPT | Mod: HCNC,,, | Performed by: NURSE PRACTITIONER

## 2025-06-18 PROCEDURE — 83880 ASSAY OF NATRIURETIC PEPTIDE: CPT | Mod: HCNC | Performed by: EMERGENCY MEDICINE

## 2025-06-18 PROCEDURE — 85025 COMPLETE CBC W/AUTO DIFF WBC: CPT | Mod: HCNC | Performed by: EMERGENCY MEDICINE

## 2025-06-18 PROCEDURE — 63600175 PHARM REV CODE 636 W HCPCS: Mod: HCNC | Performed by: HOSPITALIST

## 2025-06-18 PROCEDURE — 93005 ELECTROCARDIOGRAM TRACING: CPT | Mod: HCNC

## 2025-06-18 PROCEDURE — 83605 ASSAY OF LACTIC ACID: CPT | Mod: HCNC | Performed by: EMERGENCY MEDICINE

## 2025-06-18 PROCEDURE — 25000003 PHARM REV CODE 250: Mod: HCNC | Performed by: EMERGENCY MEDICINE

## 2025-06-18 PROCEDURE — 84443 ASSAY THYROID STIM HORMONE: CPT | Mod: HCNC | Performed by: EMERGENCY MEDICINE

## 2025-06-18 PROCEDURE — 87086 URINE CULTURE/COLONY COUNT: CPT | Mod: HCNC | Performed by: EMERGENCY MEDICINE

## 2025-06-18 PROCEDURE — 84100 ASSAY OF PHOSPHORUS: CPT | Mod: HCNC | Performed by: EMERGENCY MEDICINE

## 2025-06-18 PROCEDURE — 82800 BLOOD PH: CPT | Mod: HCNC

## 2025-06-18 PROCEDURE — 80053 COMPREHEN METABOLIC PANEL: CPT | Mod: HCNC | Performed by: EMERGENCY MEDICINE

## 2025-06-18 PROCEDURE — 83735 ASSAY OF MAGNESIUM: CPT | Mod: HCNC | Performed by: EMERGENCY MEDICINE

## 2025-06-18 PROCEDURE — 87154 CUL TYP ID BLD PTHGN 6+ TRGT: CPT | Mod: HCNC | Performed by: EMERGENCY MEDICINE

## 2025-06-18 PROCEDURE — 81001 URINALYSIS AUTO W/SCOPE: CPT | Mod: HCNC,XB | Performed by: EMERGENCY MEDICINE

## 2025-06-18 PROCEDURE — 84484 ASSAY OF TROPONIN QUANT: CPT | Mod: HCNC | Performed by: EMERGENCY MEDICINE

## 2025-06-18 PROCEDURE — 96374 THER/PROPH/DIAG INJ IV PUSH: CPT | Mod: HCNC

## 2025-06-18 PROCEDURE — 83690 ASSAY OF LIPASE: CPT | Mod: HCNC | Performed by: EMERGENCY MEDICINE

## 2025-06-18 PROCEDURE — 80307 DRUG TEST PRSMV CHEM ANLYZR: CPT | Mod: HCNC | Performed by: EMERGENCY MEDICINE

## 2025-06-18 PROCEDURE — 94761 N-INVAS EAR/PLS OXIMETRY MLT: CPT | Mod: HCNC,XB

## 2025-06-18 PROCEDURE — 93010 ELECTROCARDIOGRAM REPORT: CPT | Mod: HCNC,,, | Performed by: INTERNAL MEDICINE

## 2025-06-18 PROCEDURE — 82962 GLUCOSE BLOOD TEST: CPT | Mod: HCNC

## 2025-06-18 PROCEDURE — 87186 SC STD MICRODIL/AGAR DIL: CPT | Mod: HCNC | Performed by: EMERGENCY MEDICINE

## 2025-06-18 PROCEDURE — 82803 BLOOD GASES ANY COMBINATION: CPT | Mod: HCNC

## 2025-06-18 PROCEDURE — 25000003 PHARM REV CODE 250: Mod: HCNC | Performed by: HOSPITALIST

## 2025-06-18 PROCEDURE — 25000242 PHARM REV CODE 250 ALT 637 W/ HCPCS: Mod: HCNC | Performed by: HOSPITALIST

## 2025-06-18 RX ORDER — MUPIROCIN 20 MG/G
OINTMENT TOPICAL 2 TIMES DAILY
Status: CANCELLED | OUTPATIENT
Start: 2025-06-18 | End: 2025-06-23

## 2025-06-18 RX ORDER — CLOBAZAM 2.5 MG/ML
5 SUSPENSION ORAL DAILY
Status: DISCONTINUED | OUTPATIENT
Start: 2025-06-18 | End: 2025-06-18

## 2025-06-18 RX ORDER — IBUPROFEN 200 MG
24 TABLET ORAL
Status: DISCONTINUED | OUTPATIENT
Start: 2025-06-18 | End: 2025-06-19

## 2025-06-18 RX ORDER — SODIUM CHLORIDE 9 MG/ML
INJECTION, SOLUTION INTRAVENOUS ONCE
Status: CANCELLED | OUTPATIENT
Start: 2025-06-18 | End: 2025-06-18

## 2025-06-18 RX ORDER — VANCOMYCIN 2 GRAM/500 ML IN 0.9 % SODIUM CHLORIDE INTRAVENOUS
25 ONCE
Status: COMPLETED | OUTPATIENT
Start: 2025-06-18 | End: 2025-06-18

## 2025-06-18 RX ORDER — NALOXONE HCL 0.4 MG/ML
0.02 VIAL (ML) INJECTION
Status: DISCONTINUED | OUTPATIENT
Start: 2025-06-18 | End: 2025-08-26 | Stop reason: HOSPADM

## 2025-06-18 RX ORDER — CLOBAZAM 2.5 MG/ML
5 SUSPENSION ORAL DAILY
Status: DISCONTINUED | OUTPATIENT
Start: 2025-06-19 | End: 2025-06-22

## 2025-06-18 RX ORDER — GLUCAGON 1 MG
1 KIT INJECTION
Status: DISCONTINUED | OUTPATIENT
Start: 2025-06-18 | End: 2025-06-19

## 2025-06-18 RX ORDER — ALBUTEROL SULFATE 2.5 MG/.5ML
10 SOLUTION RESPIRATORY (INHALATION) ONCE
Status: COMPLETED | OUTPATIENT
Start: 2025-06-18 | End: 2025-06-18

## 2025-06-18 RX ORDER — GLUCAGON 1 MG
1 KIT INJECTION
Status: DISCONTINUED | OUTPATIENT
Start: 2025-06-18 | End: 2025-06-21

## 2025-06-18 RX ORDER — ATORVASTATIN CALCIUM 40 MG/1
80 TABLET, FILM COATED ORAL NIGHTLY
Status: DISCONTINUED | OUTPATIENT
Start: 2025-06-18 | End: 2025-07-07

## 2025-06-18 RX ORDER — ALBUTEROL SULFATE 2.5 MG/.5ML
10 SOLUTION RESPIRATORY (INHALATION) ONCE
Status: DISCONTINUED | OUTPATIENT
Start: 2025-06-18 | End: 2025-06-18

## 2025-06-18 RX ORDER — INSULIN ASPART 100 [IU]/ML
0-5 INJECTION, SOLUTION INTRAVENOUS; SUBCUTANEOUS EVERY 6 HOURS PRN
Status: DISCONTINUED | OUTPATIENT
Start: 2025-06-18 | End: 2025-06-19

## 2025-06-18 RX ORDER — LACOSAMIDE 50 MG/1
100 TABLET ORAL EVERY 12 HOURS
Status: DISCONTINUED | OUTPATIENT
Start: 2025-06-18 | End: 2025-06-18

## 2025-06-18 RX ORDER — SODIUM CHLORIDE 0.9 % (FLUSH) 0.9 %
10 SYRINGE (ML) INJECTION EVERY 12 HOURS PRN
Status: DISCONTINUED | OUTPATIENT
Start: 2025-06-18 | End: 2025-07-03

## 2025-06-18 RX ORDER — ASPIRIN 81 MG/1
81 TABLET ORAL DAILY
Status: DISCONTINUED | OUTPATIENT
Start: 2025-06-19 | End: 2025-06-19

## 2025-06-18 RX ORDER — IBUPROFEN 200 MG
16 TABLET ORAL
Status: DISCONTINUED | OUTPATIENT
Start: 2025-06-18 | End: 2025-06-19

## 2025-06-18 RX ORDER — LACOSAMIDE 50 MG/1
100 TABLET ORAL EVERY 12 HOURS
Status: DISCONTINUED | OUTPATIENT
Start: 2025-06-18 | End: 2025-07-07

## 2025-06-18 RX ORDER — SODIUM CHLORIDE 9 MG/ML
INJECTION, SOLUTION INTRAVENOUS
Status: CANCELLED | OUTPATIENT
Start: 2025-06-18

## 2025-06-18 RX ORDER — LEVOTHYROXINE SODIUM 75 UG/1
75 TABLET ORAL
Status: DISCONTINUED | OUTPATIENT
Start: 2025-06-19 | End: 2025-07-07

## 2025-06-18 RX ADMIN — SODIUM ZIRCONIUM CYCLOSILICATE 10 G: 10 POWDER, FOR SUSPENSION ORAL at 04:06

## 2025-06-18 RX ADMIN — DEXTROSE MONOHYDRATE 50 G: 25 INJECTION, SOLUTION INTRAVENOUS at 08:06

## 2025-06-18 RX ADMIN — ALBUTEROL SULFATE 10 MG: 2.5 SOLUTION RESPIRATORY (INHALATION) at 05:06

## 2025-06-18 RX ADMIN — ATORVASTATIN CALCIUM 80 MG: 40 TABLET, FILM COATED ORAL at 09:06

## 2025-06-18 RX ADMIN — VANCOMYCIN HYDROCHLORIDE 2000 MG: 10 INJECTION, POWDER, LYOPHILIZED, FOR SOLUTION INTRAVENOUS at 05:06

## 2025-06-18 RX ADMIN — PIPERACILLIN SODIUM AND TAZOBACTAM SODIUM 4.5 G: 4; .5 INJECTION, POWDER, FOR SOLUTION INTRAVENOUS at 04:06

## 2025-06-18 RX ADMIN — INSULIN HUMAN 5 UNITS: 100 INJECTION, SOLUTION PARENTERAL at 09:06

## 2025-06-18 RX ADMIN — LACOSAMIDE 100 MG: 50 TABLET, FILM COATED ORAL at 09:06

## 2025-06-19 PROBLEM — N18.4 CKD (CHRONIC KIDNEY DISEASE), STAGE IV: Status: ACTIVE | Noted: 2025-05-21

## 2025-06-19 LAB
ABSOLUTE EOSINOPHIL (OHS): 0.19 K/UL
ABSOLUTE MONOCYTE (OHS): 1.53 K/UL (ref 0.3–1)
ABSOLUTE NEUTROPHIL COUNT (OHS): 18.05 K/UL (ref 1.8–7.7)
ACB COMPLEX DNA BLD POS QL NAA+NON-PROBE: NOT DETECTED
ALBUMIN SERPL BCP-MCNC: 1.3 G/DL (ref 3.5–5.2)
ALP SERPL-CCNC: 301 UNIT/L (ref 40–150)
ALT SERPL W/O P-5'-P-CCNC: 22 UNIT/L (ref 10–44)
AMMONIA PLAS-SCNC: 43 UMOL/L (ref 10–50)
ANION GAP (OHS): 10 MMOL/L (ref 8–16)
ANION GAP (OHS): 10 MMOL/L (ref 8–16)
ANION GAP (OHS): 14 MMOL/L (ref 8–16)
ANION GAP (OHS): 9 MMOL/L (ref 8–16)
AORTIC SIZE INDEX (SOV): 1.5 CM/M2
AORTIC SIZE INDEX: 1.6 CM/M2
ASCENDING AORTA: 2.9 CM
AST SERPL-CCNC: 37 UNIT/L (ref 11–45)
AV AREA BY CONTINUOUS VTI: 1.7 CM2
AV INDEX (PROSTH): 0.53
AV LVOT MEAN GRADIENT: 2 MMHG
AV LVOT PEAK GRADIENT: 2 MMHG
AV MEAN GRADIENT: 4 MMHG
AV PEAK GRADIENT: 8 MMHG
AV VALVE AREA BY VELOCITY RATIO: 1.8 CM²
AV VALVE AREA: 1.7 CM2
AV VELOCITY RATIO: 0.57
B FRAGILIS DNA BLD POS QL NAA+PROBE: NOT DETECTED
BASOPHILS # BLD AUTO: 0.04 K/UL
BASOPHILS NFR BLD AUTO: 0.2 %
BILIRUB SERPL-MCNC: 0.3 MG/DL (ref 0.1–1)
BIPAP: 0
BSA FOR ECHO PROCEDURE: 1.84 M2
BUN SERPL-MCNC: 50 MG/DL (ref 8–23)
BUN SERPL-MCNC: 56 MG/DL (ref 8–23)
BUN SERPL-MCNC: 57 MG/DL (ref 8–23)
BUN SERPL-MCNC: 58 MG/DL (ref 8–23)
BUN SERPL-MCNC: 58 MG/DL (ref 8–23)
BUN SERPL-MCNC: 69 MG/DL (ref 8–23)
C ALBICANS DNA BLD POS QL NAA+PROBE: NOT DETECTED
C AURIS DNA BLD POS QL NAA+NON-PROBE: NOT DETECTED
C GATTII+NEOFOR DNA CSF QL NAA+NON-PROBE: NOT DETECTED
C GLABRATA DNA BLD POS QL NAA+PROBE: NOT DETECTED
C KRUSEI DNA BLD POS QL NAA+PROBE: NOT DETECTED
C PARAP DNA BLD POS QL NAA+PROBE: NOT DETECTED
C TROPICLS DNA BLD POS QL NAA+PROBE: NOT DETECTED
CALCIUM SERPL-MCNC: 7.7 MG/DL (ref 8.7–10.5)
CALCIUM SERPL-MCNC: 7.7 MG/DL (ref 8.7–10.5)
CALCIUM SERPL-MCNC: 8.1 MG/DL (ref 8.7–10.5)
CALCIUM SERPL-MCNC: 8.1 MG/DL (ref 8.7–10.5)
CALCIUM SERPL-MCNC: 8.2 MG/DL (ref 8.7–10.5)
CALCIUM SERPL-MCNC: 8.2 MG/DL (ref 8.7–10.5)
CHLORIDE SERPL-SCNC: 100 MMOL/L (ref 95–110)
CHLORIDE SERPL-SCNC: 101 MMOL/L (ref 95–110)
CHLORIDE SERPL-SCNC: 102 MMOL/L (ref 95–110)
CHLORIDE SERPL-SCNC: 97 MMOL/L (ref 95–110)
CO2 SERPL-SCNC: 21 MMOL/L (ref 23–29)
CO2 SERPL-SCNC: 22 MMOL/L (ref 23–29)
CO2 SERPL-SCNC: 23 MMOL/L (ref 23–29)
CO2 SERPL-SCNC: 24 MMOL/L (ref 23–29)
COLISTIN RES MCR-1 ISLT/SPM QL: ABNORMAL
CORRECTED TEMPERATURE (PCO2): 44 MMHG
CORRECTED TEMPERATURE (PH): 7.39
CORRECTED TEMPERATURE (PO2): 32.8 MMHG
CREAT SERPL-MCNC: 1.5 MG/DL (ref 0.5–1.4)
CREAT SERPL-MCNC: 1.6 MG/DL (ref 0.5–1.4)
CREAT SERPL-MCNC: 1.7 MG/DL (ref 0.5–1.4)
CREAT SERPL-MCNC: 1.7 MG/DL (ref 0.5–1.4)
CREAT SERPL-MCNC: 1.8 MG/DL (ref 0.5–1.4)
CREAT SERPL-MCNC: 1.9 MG/DL (ref 0.5–1.4)
CV ECHO LV RWT: 0.47 CM
DOP CALC AO PEAK VEL: 1.4 M/S
DOP CALC AO VTI: 37.6 CM
DOP CALC LVOT AREA: 3.1 CM2
DOP CALC LVOT DIAMETER: 2 CM
DOP CALC LVOT PEAK VEL: 0.8 M/S
DOP CALC LVOT STROKE VOLUME: 62.2 CM3
DOP CALCLVOT PEAK VEL VTI: 19.8 CM
E CLOAC COMP DNA BLD POS QL NAA+PROBE: NOT DETECTED
E COLI DNA BLD POS QL NAA+PROBE: NOT DETECTED
E FAECALIS+OTHR E SP RRNA BLD POS FISH: NOT DETECTED
E FAECIUM HSP60 BLD POS QL PROBE: NOT DETECTED
E WAVE DECELERATION TIME: 248 MS
E/A RATIO: 0.75
E/E' RATIO: 20 M/S
ECHO EF ESTIMATED: 66 %
ECHO LV POSTERIOR WALL: 1 CM (ref 0.6–1.1)
ENTEROBACTERALES DNA BLD POS NAA+N-PRB: NOT DETECTED
ERYTHROCYTE [DISTWIDTH] IN BLOOD BY AUTOMATED COUNT: 16.6 % (ref 11.5–14.5)
ESBL CFT TO CFT-CLAV IC RTO BD POS IMP: ABNORMAL
FIO2: 21 %
FOLATE SERPL-MCNC: 13.7 NG/ML (ref 4–24)
FRACTIONAL SHORTENING: 34.9 % (ref 28–44)
GFR SERPLBLD CREATININE-BSD FMLA CKD-EPI: 27 ML/MIN/1.73/M2
GFR SERPLBLD CREATININE-BSD FMLA CKD-EPI: 28 ML/MIN/1.73/M2
GFR SERPLBLD CREATININE-BSD FMLA CKD-EPI: 30 ML/MIN/1.73/M2
GFR SERPLBLD CREATININE-BSD FMLA CKD-EPI: 30 ML/MIN/1.73/M2
GFR SERPLBLD CREATININE-BSD FMLA CKD-EPI: 33 ML/MIN/1.73/M2
GFR SERPLBLD CREATININE-BSD FMLA CKD-EPI: 35 ML/MIN/1.73/M2
GLUCOSE SERPL-MCNC: 132 MG/DL (ref 70–110)
GLUCOSE SERPL-MCNC: 133 MG/DL (ref 70–110)
GLUCOSE SERPL-MCNC: 135 MG/DL (ref 70–110)
GLUCOSE SERPL-MCNC: 135 MG/DL (ref 70–110)
GLUCOSE SERPL-MCNC: 177 MG/DL (ref 70–110)
GLUCOSE SERPL-MCNC: 276 MG/DL (ref 70–110)
GP B STREP DNA CSF QL NAA+NON-PROBE: NOT DETECTED
HAEM INFLU DNA CSF QL NAA+NON-PROBE: NOT DETECTED
HCT VFR BLD AUTO: 24.8 % (ref 37–48.5)
HGB BLD-MCNC: 7.6 GM/DL (ref 12–16)
IMM GRANULOCYTES # BLD AUTO: 0.23 K/UL (ref 0–0.04)
IMM GRANULOCYTES NFR BLD AUTO: 1.1 % (ref 0–0.5)
IMP CARBAPENEMASE ISLT QL IA.RAPID: ABNORMAL
INTERVENTRICULAR SEPTUM: 1.1 CM (ref 0.6–1.1)
K OXYTOCA OMPA BLD POS QL PROBE: NOT DETECTED
KLEBSIELLA SP DNA BLD POS QL NAA+NON-PRB: NOT DETECTED
KLEBSIELLA SP DNA BLD POS QL NAA+NON-PRB: NOT DETECTED
KPC CARBAPENEMASE ISLT QL IA.RAPID: ABNORMAL
L MONOCYTOG DNA CSF QL NAA+NON-PROBE: NOT DETECTED
LA MAJOR: 4.3 CM
LA MINOR: 4.4 CM
LA WIDTH: 3.7 CM
LEFT ATRIUM SIZE: 3.5 CM
LEFT ATRIUM VOLUME INDEX MOD: 28 ML/M2
LEFT ATRIUM VOLUME INDEX: 27 ML/M2
LEFT ATRIUM VOLUME MOD: 50 ML
LEFT ATRIUM VOLUME: 48 CM3
LEFT INTERNAL DIMENSION IN SYSTOLE: 2.8 CM (ref 2.1–4)
LEFT VENTRICLE DIASTOLIC VOLUME INDEX: 47.19 ML/M2
LEFT VENTRICLE DIASTOLIC VOLUME: 84 ML
LEFT VENTRICLE MASS INDEX: 85.7 G/M2
LEFT VENTRICLE SYSTOLIC VOLUME INDEX: 16.3 ML/M2
LEFT VENTRICLE SYSTOLIC VOLUME: 29 ML
LEFT VENTRICULAR INTERNAL DIMENSION IN DIASTOLE: 4.3 CM (ref 3.5–6)
LEFT VENTRICULAR MASS: 152.6 G
LV LATERAL E/E' RATIO: 20.2
LV SEPTAL E/E' RATIO: 20.2
LYMPHOCYTES # BLD AUTO: 1.4 K/UL (ref 1–4.8)
MAGNESIUM SERPL-MCNC: 2 MG/DL (ref 1.6–2.6)
MCH RBC QN AUTO: 31.1 PG (ref 27–31)
MCHC RBC AUTO-ENTMCNC: 30.6 G/DL (ref 32–36)
MCV RBC AUTO: 102 FL (ref 82–98)
MECA+MECC NOSE QL NAA+PROBE: DETECTED
MECA+MECC+MREJ ISLT/SPM QL: NOT DETECTED
MRSA PCR SCRN (OHS): DETECTED
MV MEAN GRADIENT: 4 MMHG
MV PEAK A VEL: 1.35 M/S
MV PEAK E VEL: 1.01 M/S
MV PEAK GRADIENT: 8 MMHG
N MEN DNA CSF QL NAA+NON-PROBE: NOT DETECTED
NDM CARBAPENEMASE ISLT QL IA.RAPID: ABNORMAL
NUCLEATED RBC (/100WBC) (OHS): 0 /100 WBC
OHS CV RV/LV RATIO: 0.7 CM
OXA-48-LIKE CRBPNASE ISLT QL IA.RAPID: ABNORMAL
P AERUGINOSA DNA BLD POS QL NAA+PROBE: NOT DETECTED
PCO2 BLDA: 44 MMHG (ref 35–45)
PH SMN: 7.39 [PH] (ref 7.35–7.45)
PHOSPHATE SERPL-MCNC: 2.6 MG/DL (ref 2.7–4.5)
PISA TR MAX VEL: 3.1 M/S
PLATELET # BLD AUTO: 384 K/UL (ref 150–450)
PMV BLD AUTO: 10 FL (ref 9.2–12.9)
PO2 BLDA: 32.8 MMHG (ref 40–60)
POC BASE DEFICIT: 1.5 MMOL/L
POC HCO3: 25.3 MMOL/L (ref 24–28)
POC PERFORMED BY: ABNORMAL
POC TEMPERATURE: 37 C
POCT GLUCOSE: 143 MG/DL (ref 70–110)
POCT GLUCOSE: 145 MG/DL (ref 70–110)
POCT GLUCOSE: 149 MG/DL (ref 70–110)
POCT GLUCOSE: 153 MG/DL (ref 70–110)
POCT GLUCOSE: 190 MG/DL (ref 70–110)
POTASSIUM SERPL-SCNC: 4.2 MMOL/L (ref 3.5–5.1)
POTASSIUM SERPL-SCNC: 4.2 MMOL/L (ref 3.5–5.1)
POTASSIUM SERPL-SCNC: 4.3 MMOL/L (ref 3.5–5.1)
POTASSIUM SERPL-SCNC: 4.4 MMOL/L (ref 3.5–5.1)
POTASSIUM SERPL-SCNC: 4.6 MMOL/L (ref 3.5–5.1)
POTASSIUM SERPL-SCNC: 4.9 MMOL/L (ref 3.5–5.1)
PROT SERPL-MCNC: 6.5 GM/DL (ref 6–8.4)
PROTEUS SP DNA BLD POS QL NAA+PROBE: NOT DETECTED
RA MAJOR: 3.58 CM
RA PRESSURE ESTIMATED: 3 MMHG
RA WIDTH: 3.03 CM
RBC # BLD AUTO: 2.44 M/UL (ref 4–5.4)
RELATIVE EOSINOPHIL (OHS): 0.9 %
RELATIVE LYMPHOCYTE (OHS): 6.5 % (ref 18–48)
RELATIVE MONOCYTE (OHS): 7.1 % (ref 4–15)
RELATIVE NEUTROPHIL (OHS): 84.2 % (ref 38–73)
RIGHT VENTRICLE DIASTOLIC BASEL DIMENSION: 3 CM
RV TB RVSP: 6 MMHG
S AUREUS DNA BLD POS QL NAA+PROBE: DETECTED
S ENT+BONG DNA STL QL NAA+NON-PROBE: NOT DETECTED
S EPIDERMIDIS HSP60 BLD POS QL PROBE: DETECTED
S LUGDUNENSIS SODA BLD POS QL PROBE: NOT DETECTED
S MALTOPH DNA BLD POS QL NAA+PROBE: NOT DETECTED
S MARCESCENS DNA BLD POS QL NAA+PROBE: NOT DETECTED
S PNEUM DNA CSF QL NAA+NON-PROBE: NOT DETECTED
S PYOGENES HSP60 BLD POS QL PROBE: NOT DETECTED
SINUS: 2.61 CM
SODIUM SERPL-SCNC: 132 MMOL/L (ref 136–145)
SODIUM SERPL-SCNC: 132 MMOL/L (ref 136–145)
SODIUM SERPL-SCNC: 133 MMOL/L (ref 136–145)
SODIUM SERPL-SCNC: 134 MMOL/L (ref 136–145)
SPECIMEN SOURCE: ABNORMAL
STAPH SP TUF BLD POS QL PROBE: ABNORMAL
STJ: 2.5 CM
STREPTOCOCCUS SP TUF BLD POS QL PROBE: NOT DETECTED
TDI LATERAL: 0.05 M/S
TDI SEPTAL: 0.05 M/S
TDI: 0.05 M/S
TRICUSPID ANNULAR PLANE SYSTOLIC EXCURSION: 1.7 CM
TV PEAK GRADIENT: 41 MMHG
TV REST PULMONARY ARTERY PRESSURE: 41 MMHG
VAN(A+B+C1+C2) GENES ISLT/SPM: ABNORMAL
VANCOMYCIN SERPL-MCNC: 20.3 UG/ML (ref ?–80)
VIM CARBAPENEMASE ISLT QL IA.RAPID: ABNORMAL
VIT B12 SERPL-MCNC: 697 PG/ML (ref 210–950)
WBC # BLD AUTO: 21.44 K/UL (ref 3.9–12.7)
Z-SCORE OF LEFT VENTRICULAR DIMENSION IN END DIASTOLE: -1.35
Z-SCORE OF LEFT VENTRICULAR DIMENSION IN END SYSTOLE: -0.66

## 2025-06-19 PROCEDURE — 25000003 PHARM REV CODE 250: Mod: HCNC | Performed by: HOSPITALIST

## 2025-06-19 PROCEDURE — 25000003 PHARM REV CODE 250: Mod: HCNC | Performed by: EMERGENCY MEDICINE

## 2025-06-19 PROCEDURE — 80053 COMPREHEN METABOLIC PANEL: CPT | Mod: HCNC | Performed by: HOSPITALIST

## 2025-06-19 PROCEDURE — 82140 ASSAY OF AMMONIA: CPT | Mod: HCNC | Performed by: STUDENT IN AN ORGANIZED HEALTH CARE EDUCATION/TRAINING PROGRAM

## 2025-06-19 PROCEDURE — 99223 1ST HOSP IP/OBS HIGH 75: CPT | Mod: HCNC,,,

## 2025-06-19 PROCEDURE — 83735 ASSAY OF MAGNESIUM: CPT | Mod: HCNC | Performed by: HOSPITALIST

## 2025-06-19 PROCEDURE — 25000003 PHARM REV CODE 250: Mod: HCNC | Performed by: INTERNAL MEDICINE

## 2025-06-19 PROCEDURE — 0JPTXXZ REMOVAL OF TUNNELED VASCULAR ACCESS DEVICE FROM TRUNK SUBCUTANEOUS TISSUE AND FASCIA, EXTERNAL APPROACH: ICD-10-PCS | Performed by: SURGERY

## 2025-06-19 PROCEDURE — 85025 COMPLETE CBC W/AUTO DIFF WBC: CPT | Mod: HCNC | Performed by: HOSPITALIST

## 2025-06-19 PROCEDURE — 80339 ANTIEPILEPTICS NOS 1-3: CPT | Mod: HCNC | Performed by: STUDENT IN AN ORGANIZED HEALTH CARE EDUCATION/TRAINING PROGRAM

## 2025-06-19 PROCEDURE — 82803 BLOOD GASES ANY COMBINATION: CPT | Mod: HCNC

## 2025-06-19 PROCEDURE — 99900035 HC TECH TIME PER 15 MIN (STAT): Mod: HCNC

## 2025-06-19 PROCEDURE — G0545 PR VISIT INHERENT TO INPT OR OBS CARE, INFECTIOUS DISEASE: HCPCS | Mod: HCNC,,,

## 2025-06-19 PROCEDURE — 63600175 PHARM REV CODE 636 W HCPCS: Mod: HCNC

## 2025-06-19 PROCEDURE — 25000003 PHARM REV CODE 250: Mod: HCNC

## 2025-06-19 PROCEDURE — 99223 1ST HOSP IP/OBS HIGH 75: CPT | Mod: HCNC,,, | Performed by: INTERNAL MEDICINE

## 2025-06-19 PROCEDURE — 63600175 PHARM REV CODE 636 W HCPCS: Mod: HCNC | Performed by: HOSPITALIST

## 2025-06-19 PROCEDURE — 94761 N-INVAS EAR/PLS OXIMETRY MLT: CPT | Mod: HCNC,XB

## 2025-06-19 PROCEDURE — 99292 CRITICAL CARE ADDL 30 MIN: CPT | Mod: HCNC,GC,, | Performed by: INTERNAL MEDICINE

## 2025-06-19 PROCEDURE — 20000000 HC ICU ROOM: Mod: HCNC

## 2025-06-19 PROCEDURE — 84100 ASSAY OF PHOSPHORUS: CPT | Mod: HCNC | Performed by: HOSPITALIST

## 2025-06-19 PROCEDURE — 84520 ASSAY OF UREA NITROGEN: CPT | Mod: HCNC | Performed by: HOSPITALIST

## 2025-06-19 PROCEDURE — 82746 ASSAY OF FOLIC ACID SERUM: CPT | Mod: HCNC | Performed by: HOSPITALIST

## 2025-06-19 PROCEDURE — 82607 VITAMIN B-12: CPT | Mod: HCNC | Performed by: HOSPITALIST

## 2025-06-19 PROCEDURE — 02PY33Z REMOVAL OF INFUSION DEVICE FROM GREAT VESSEL, PERCUTANEOUS APPROACH: ICD-10-PCS | Performed by: SURGERY

## 2025-06-19 PROCEDURE — 80235 DRUG ASSAY LACOSAMIDE: CPT | Mod: HCNC | Performed by: STUDENT IN AN ORGANIZED HEALTH CARE EDUCATION/TRAINING PROGRAM

## 2025-06-19 PROCEDURE — 87641 MR-STAPH DNA AMP PROBE: CPT | Mod: HCNC

## 2025-06-19 PROCEDURE — 80100014 HC HEMODIALYSIS 1:1: Mod: HCNC

## 2025-06-19 PROCEDURE — 95714 VEEG EA 12-26 HR UNMNTR: CPT | Mod: HCNC

## 2025-06-19 PROCEDURE — 27000207 HC ISOLATION: Mod: HCNC

## 2025-06-19 PROCEDURE — 95700 EEG CONT REC W/VID EEG TECH: CPT | Mod: HCNC

## 2025-06-19 PROCEDURE — 80202 ASSAY OF VANCOMYCIN: CPT | Mod: HCNC | Performed by: EMERGENCY MEDICINE

## 2025-06-19 PROCEDURE — 95720 EEG PHY/QHP EA INCR W/VEEG: CPT | Mod: HCNC,,, | Performed by: PSYCHIATRY & NEUROLOGY

## 2025-06-19 PROCEDURE — 92610 EVALUATE SWALLOWING FUNCTION: CPT | Mod: HCNC

## 2025-06-19 PROCEDURE — 99291 CRITICAL CARE FIRST HOUR: CPT | Mod: HCNC,GC,, | Performed by: INTERNAL MEDICINE

## 2025-06-19 RX ORDER — NAPROXEN SODIUM 220 MG/1
81 TABLET, FILM COATED ORAL DAILY
Status: DISCONTINUED | OUTPATIENT
Start: 2025-06-19 | End: 2025-07-07

## 2025-06-19 RX ORDER — MUPIROCIN 20 MG/G
OINTMENT TOPICAL 2 TIMES DAILY
Status: COMPLETED | OUTPATIENT
Start: 2025-06-19 | End: 2025-06-23

## 2025-06-19 RX ORDER — INSULIN ASPART 100 [IU]/ML
0-10 INJECTION, SOLUTION INTRAVENOUS; SUBCUTANEOUS EVERY 6 HOURS PRN
Status: DISCONTINUED | OUTPATIENT
Start: 2025-06-19 | End: 2025-07-03

## 2025-06-19 RX ORDER — MIDODRINE HYDROCHLORIDE 2.5 MG/1
2.5 TABLET ORAL ONCE
Status: COMPLETED | OUTPATIENT
Start: 2025-06-19 | End: 2025-06-19

## 2025-06-19 RX ORDER — GLUCAGON 1 MG
1 KIT INJECTION
Status: DISCONTINUED | OUTPATIENT
Start: 2025-06-19 | End: 2025-07-03

## 2025-06-19 RX ORDER — MEROPENEM 1 G/1
1 INJECTION, POWDER, FOR SOLUTION INTRAVENOUS
Status: DISCONTINUED | OUTPATIENT
Start: 2025-06-19 | End: 2025-06-19

## 2025-06-19 RX ORDER — HEPARIN SODIUM 5000 [USP'U]/ML
5000 INJECTION, SOLUTION INTRAVENOUS; SUBCUTANEOUS EVERY 8 HOURS
Status: DISCONTINUED | OUTPATIENT
Start: 2025-06-19 | End: 2025-06-23

## 2025-06-19 RX ADMIN — LACOSAMIDE 100 MG: 50 TABLET, FILM COATED ORAL at 08:06

## 2025-06-19 RX ADMIN — LEVOTHYROXINE SODIUM 75 MCG: 75 TABLET ORAL at 06:06

## 2025-06-19 RX ADMIN — CLOBAZAM 5 MG: 2.5 SUSPENSION ORAL at 08:06

## 2025-06-19 RX ADMIN — MUPIROCIN: 20 OINTMENT TOPICAL at 08:06

## 2025-06-19 RX ADMIN — ATORVASTATIN CALCIUM 80 MG: 40 TABLET, FILM COATED ORAL at 08:06

## 2025-06-19 RX ADMIN — ASPIRIN 81 MG CHEWABLE TABLET 81 MG: 81 TABLET CHEWABLE at 09:06

## 2025-06-19 RX ADMIN — PIPERACILLIN SODIUM AND TAZOBACTAM SODIUM 4.5 G: 4; .5 INJECTION, POWDER, FOR SOLUTION INTRAVENOUS at 09:06

## 2025-06-19 RX ADMIN — INSULIN ASPART 2 UNITS: 100 INJECTION, SOLUTION INTRAVENOUS; SUBCUTANEOUS at 09:06

## 2025-06-19 RX ADMIN — MIDODRINE HYDROCHLORIDE 2.5 MG: 2.5 TABLET ORAL at 06:06

## 2025-06-19 RX ADMIN — PIPERACILLIN SODIUM AND TAZOBACTAM SODIUM 4.5 G: 4; .5 INJECTION, POWDER, FOR SOLUTION INTRAVENOUS at 12:06

## 2025-06-19 RX ADMIN — LACOSAMIDE 100 MG: 50 TABLET, FILM COATED ORAL at 09:06

## 2025-06-19 RX ADMIN — MUPIROCIN: 20 OINTMENT TOPICAL at 09:06

## 2025-06-19 RX ADMIN — MEROPENEM 2 G: 2 INJECTION, POWDER, FOR SOLUTION INTRAVENOUS at 03:06

## 2025-06-19 RX ADMIN — HEPARIN SODIUM 5000 UNITS: 5000 INJECTION INTRAVENOUS; SUBCUTANEOUS at 09:06

## 2025-06-19 RX ADMIN — HEPARIN SODIUM 5000 UNITS: 5000 INJECTION INTRAVENOUS; SUBCUTANEOUS at 03:06

## 2025-06-20 PROBLEM — S31.000A SACRAL WOUND: Status: ACTIVE | Noted: 2025-06-20

## 2025-06-20 PROBLEM — R41.82 AMS (ALTERED MENTAL STATUS): Status: ACTIVE | Noted: 2025-06-20

## 2025-06-20 PROBLEM — B95.61 STAPHYLOCOCCUS AUREUS BACTEREMIA: Status: ACTIVE | Noted: 2025-06-20

## 2025-06-20 PROBLEM — R78.81 STAPHYLOCOCCUS AUREUS BACTEREMIA: Status: ACTIVE | Noted: 2025-06-20

## 2025-06-20 PROBLEM — N17.0 ACUTE RENAL FAILURE WITH ACUTE TUBULAR NECROSIS SUPERIMPOSED ON STAGE 4 CHRONIC KIDNEY DISEASE: Status: ACTIVE | Noted: 2025-05-21

## 2025-06-20 LAB
ABSOLUTE EOSINOPHIL (OHS): 0.32 K/UL
ABSOLUTE MONOCYTE (OHS): 1.68 K/UL (ref 0.3–1)
ABSOLUTE NEUTROPHIL COUNT (OHS): 12.48 K/UL (ref 1.8–7.7)
ALBUMIN SERPL BCP-MCNC: 1.3 G/DL (ref 3.5–5.2)
ALP SERPL-CCNC: 247 UNIT/L (ref 40–150)
ALT SERPL W/O P-5'-P-CCNC: 21 UNIT/L (ref 10–44)
ANION GAP (OHS): 10 MMOL/L (ref 8–16)
ANION GAP (OHS): 8 MMOL/L (ref 8–16)
ANION GAP (OHS): 9 MMOL/L (ref 8–16)
AST SERPL-CCNC: 24 UNIT/L (ref 11–45)
BACTERIA UR CULT: NORMAL
BASOPHILS # BLD AUTO: 0.04 K/UL
BASOPHILS NFR BLD AUTO: 0.2 %
BILIRUB SERPL-MCNC: 0.2 MG/DL (ref 0.1–1)
BUN SERPL-MCNC: 58 MG/DL (ref 8–23)
BUN SERPL-MCNC: 58 MG/DL (ref 8–23)
BUN SERPL-MCNC: 59 MG/DL (ref 8–23)
BUN SERPL-MCNC: 60 MG/DL (ref 8–23)
BUN SERPL-MCNC: 60 MG/DL (ref 8–23)
BUN SERPL-MCNC: 61 MG/DL (ref 8–23)
CALCIUM SERPL-MCNC: 7.9 MG/DL (ref 8.7–10.5)
CALCIUM SERPL-MCNC: 8 MG/DL (ref 8.7–10.5)
CALCIUM SERPL-MCNC: 8.2 MG/DL (ref 8.7–10.5)
CALCIUM SERPL-MCNC: 8.5 MG/DL (ref 8.7–10.5)
CHLORIDE SERPL-SCNC: 101 MMOL/L (ref 95–110)
CHLORIDE SERPL-SCNC: 102 MMOL/L (ref 95–110)
CHLORIDE SERPL-SCNC: 103 MMOL/L (ref 95–110)
CO2 SERPL-SCNC: 22 MMOL/L (ref 23–29)
CO2 SERPL-SCNC: 23 MMOL/L (ref 23–29)
CO2 SERPL-SCNC: 23 MMOL/L (ref 23–29)
CREAT SERPL-MCNC: 1.8 MG/DL (ref 0.5–1.4)
CREAT SERPL-MCNC: 1.8 MG/DL (ref 0.5–1.4)
CREAT SERPL-MCNC: 1.9 MG/DL (ref 0.5–1.4)
ERYTHROCYTE [DISTWIDTH] IN BLOOD BY AUTOMATED COUNT: 16.6 % (ref 11.5–14.5)
GFR SERPLBLD CREATININE-BSD FMLA CKD-EPI: 27 ML/MIN/1.73/M2
GFR SERPLBLD CREATININE-BSD FMLA CKD-EPI: 28 ML/MIN/1.73/M2
GFR SERPLBLD CREATININE-BSD FMLA CKD-EPI: 28 ML/MIN/1.73/M2
GLUCOSE SERPL-MCNC: 101 MG/DL (ref 70–110)
GLUCOSE SERPL-MCNC: 108 MG/DL (ref 70–110)
GLUCOSE SERPL-MCNC: 122 MG/DL (ref 70–110)
GLUCOSE SERPL-MCNC: 141 MG/DL (ref 70–110)
GLUCOSE SERPL-MCNC: 95 MG/DL (ref 70–110)
GLUCOSE SERPL-MCNC: 96 MG/DL (ref 70–110)
HCT VFR BLD AUTO: 24.3 % (ref 37–48.5)
HGB BLD-MCNC: 7.7 GM/DL (ref 12–16)
IMM GRANULOCYTES # BLD AUTO: 0.14 K/UL (ref 0–0.04)
IMM GRANULOCYTES NFR BLD AUTO: 0.8 % (ref 0–0.5)
LYMPHOCYTES # BLD AUTO: 1.93 K/UL (ref 1–4.8)
MAGNESIUM SERPL-MCNC: 2.1 MG/DL (ref 1.6–2.6)
MCH RBC QN AUTO: 31.3 PG (ref 27–31)
MCHC RBC AUTO-ENTMCNC: 31.7 G/DL (ref 32–36)
MCV RBC AUTO: 99 FL (ref 82–98)
NUCLEATED RBC (/100WBC) (OHS): 0 /100 WBC
PHOSPHATE SERPL-MCNC: 3.7 MG/DL (ref 2.7–4.5)
PLATELET # BLD AUTO: 425 K/UL (ref 150–450)
PMV BLD AUTO: 9.6 FL (ref 9.2–12.9)
POCT GLUCOSE: 113 MG/DL (ref 70–110)
POCT GLUCOSE: 159 MG/DL (ref 70–110)
POTASSIUM SERPL-SCNC: 4.4 MMOL/L (ref 3.5–5.1)
POTASSIUM SERPL-SCNC: 4.5 MMOL/L (ref 3.5–5.1)
POTASSIUM SERPL-SCNC: 4.5 MMOL/L (ref 3.5–5.1)
POTASSIUM SERPL-SCNC: 4.6 MMOL/L (ref 3.5–5.1)
POTASSIUM SERPL-SCNC: 4.8 MMOL/L (ref 3.5–5.1)
POTASSIUM SERPL-SCNC: 5.2 MMOL/L (ref 3.5–5.1)
PROT SERPL-MCNC: 6.3 GM/DL (ref 6–8.4)
RBC # BLD AUTO: 2.46 M/UL (ref 4–5.4)
RELATIVE EOSINOPHIL (OHS): 1.9 %
RELATIVE LYMPHOCYTE (OHS): 11.6 % (ref 18–48)
RELATIVE MONOCYTE (OHS): 10.1 % (ref 4–15)
RELATIVE NEUTROPHIL (OHS): 75.4 % (ref 38–73)
SODIUM SERPL-SCNC: 133 MMOL/L (ref 136–145)
SODIUM SERPL-SCNC: 133 MMOL/L (ref 136–145)
SODIUM SERPL-SCNC: 134 MMOL/L (ref 136–145)
SODIUM SERPL-SCNC: 135 MMOL/L (ref 136–145)
VANCOMYCIN SERPL-MCNC: 19.1 UG/ML (ref ?–80)
WBC # BLD AUTO: 16.59 K/UL (ref 3.9–12.7)

## 2025-06-20 PROCEDURE — 80053 COMPREHEN METABOLIC PANEL: CPT | Mod: HCNC | Performed by: HOSPITALIST

## 2025-06-20 PROCEDURE — 99233 SBSQ HOSP IP/OBS HIGH 50: CPT | Mod: HCNC,,, | Performed by: INTERNAL MEDICINE

## 2025-06-20 PROCEDURE — 83735 ASSAY OF MAGNESIUM: CPT | Mod: HCNC | Performed by: HOSPITALIST

## 2025-06-20 PROCEDURE — 80202 ASSAY OF VANCOMYCIN: CPT | Mod: HCNC | Performed by: HOSPITALIST

## 2025-06-20 PROCEDURE — G0545 PR VISIT INHERENT TO INPT OR OBS CARE, INFECTIOUS DISEASE: HCPCS | Mod: HCNC,,, | Performed by: INTERNAL MEDICINE

## 2025-06-20 PROCEDURE — 20000000 HC ICU ROOM: Mod: HCNC

## 2025-06-20 PROCEDURE — 97535 SELF CARE MNGMENT TRAINING: CPT | Mod: HCNC

## 2025-06-20 PROCEDURE — 63600175 PHARM REV CODE 636 W HCPCS: Mod: HCNC | Performed by: HOSPITALIST

## 2025-06-20 PROCEDURE — 99232 SBSQ HOSP IP/OBS MODERATE 35: CPT | Mod: HCNC,,, | Performed by: INTERNAL MEDICINE

## 2025-06-20 PROCEDURE — 85025 COMPLETE CBC W/AUTO DIFF WBC: CPT | Mod: HCNC | Performed by: HOSPITALIST

## 2025-06-20 PROCEDURE — 25000003 PHARM REV CODE 250: Mod: HCNC | Performed by: EMERGENCY MEDICINE

## 2025-06-20 PROCEDURE — 63600175 PHARM REV CODE 636 W HCPCS: Mod: HCNC

## 2025-06-20 PROCEDURE — 92526 ORAL FUNCTION THERAPY: CPT | Mod: HCNC

## 2025-06-20 PROCEDURE — 82310 ASSAY OF CALCIUM: CPT | Mod: HCNC | Performed by: HOSPITALIST

## 2025-06-20 PROCEDURE — 25000003 PHARM REV CODE 250: Mod: HCNC | Performed by: HOSPITALIST

## 2025-06-20 PROCEDURE — 25000003 PHARM REV CODE 250: Mod: HCNC

## 2025-06-20 PROCEDURE — 84100 ASSAY OF PHOSPHORUS: CPT | Mod: HCNC | Performed by: HOSPITALIST

## 2025-06-20 PROCEDURE — 99291 CRITICAL CARE FIRST HOUR: CPT | Mod: HCNC,GC,, | Performed by: INTERNAL MEDICINE

## 2025-06-20 PROCEDURE — 87040 BLOOD CULTURE FOR BACTERIA: CPT | Mod: HCNC

## 2025-06-20 PROCEDURE — 27000207 HC ISOLATION: Mod: HCNC

## 2025-06-20 RX ORDER — POLYETHYLENE GLYCOL 3350 17 G/17G
17 POWDER, FOR SOLUTION ORAL 2 TIMES DAILY
Status: DISCONTINUED | OUTPATIENT
Start: 2025-06-20 | End: 2025-06-21

## 2025-06-20 RX ORDER — ONDANSETRON HYDROCHLORIDE 4 MG/5ML
4 SOLUTION ORAL EVERY 6 HOURS PRN
Status: DISCONTINUED | OUTPATIENT
Start: 2025-06-20 | End: 2025-08-09

## 2025-06-20 RX ORDER — POLYETHYLENE GLYCOL 3350 17 G/17G
17 POWDER, FOR SOLUTION ORAL 2 TIMES DAILY
Status: DISCONTINUED | OUTPATIENT
Start: 2025-06-20 | End: 2025-06-20

## 2025-06-20 RX ORDER — AMOXICILLIN 250 MG
1 CAPSULE ORAL DAILY PRN
Status: DISCONTINUED | OUTPATIENT
Start: 2025-06-20 | End: 2025-06-21

## 2025-06-20 RX ORDER — AMOXICILLIN 250 MG
1 CAPSULE ORAL DAILY PRN
Status: DISCONTINUED | OUTPATIENT
Start: 2025-06-20 | End: 2025-06-20

## 2025-06-20 RX ADMIN — INSULIN ASPART 1 UNITS: 100 INJECTION, SOLUTION INTRAVENOUS; SUBCUTANEOUS at 09:06

## 2025-06-20 RX ADMIN — MEROPENEM 2 G: 2 INJECTION, POWDER, FOR SOLUTION INTRAVENOUS at 04:06

## 2025-06-20 RX ADMIN — HEPARIN SODIUM 5000 UNITS: 5000 INJECTION INTRAVENOUS; SUBCUTANEOUS at 05:06

## 2025-06-20 RX ADMIN — MUPIROCIN: 20 OINTMENT TOPICAL at 08:06

## 2025-06-20 RX ADMIN — LEVOTHYROXINE SODIUM 75 MCG: 75 TABLET ORAL at 05:06

## 2025-06-20 RX ADMIN — ATORVASTATIN CALCIUM 80 MG: 40 TABLET, FILM COATED ORAL at 09:06

## 2025-06-20 RX ADMIN — HEPARIN SODIUM 5000 UNITS: 5000 INJECTION INTRAVENOUS; SUBCUTANEOUS at 02:06

## 2025-06-20 RX ADMIN — CLOBAZAM 5 MG: 2.5 SUSPENSION ORAL at 08:06

## 2025-06-20 RX ADMIN — LACOSAMIDE 100 MG: 50 TABLET, FILM COATED ORAL at 08:06

## 2025-06-20 RX ADMIN — MUPIROCIN: 20 OINTMENT TOPICAL at 09:06

## 2025-06-20 RX ADMIN — ASPIRIN 81 MG CHEWABLE TABLET 81 MG: 81 TABLET CHEWABLE at 08:06

## 2025-06-20 RX ADMIN — POLYETHYLENE GLYCOL 3350 17 G: 17 POWDER, FOR SOLUTION ORAL at 09:06

## 2025-06-20 RX ADMIN — HEPARIN SODIUM 5000 UNITS: 5000 INJECTION INTRAVENOUS; SUBCUTANEOUS at 09:06

## 2025-06-20 RX ADMIN — LACOSAMIDE 100 MG: 50 TABLET, FILM COATED ORAL at 09:06

## 2025-06-20 RX ADMIN — POLYETHYLENE GLYCOL 3350 17 G: 17 POWDER, FOR SOLUTION ORAL at 11:06

## 2025-06-20 RX ADMIN — ONDANSETRON HYDROCHLORIDE 4 MG: 4 SOLUTION ORAL at 11:06

## 2025-06-21 PROBLEM — N17.9 AKI (ACUTE KIDNEY INJURY): Status: ACTIVE | Noted: 2025-05-21

## 2025-06-21 PROBLEM — R65.10 SIRS (SYSTEMIC INFLAMMATORY RESPONSE SYNDROME): Status: ACTIVE | Noted: 2025-06-21

## 2025-06-21 LAB
ABSOLUTE EOSINOPHIL (OHS): 0.29 K/UL
ABSOLUTE MONOCYTE (OHS): 1.51 K/UL (ref 0.3–1)
ABSOLUTE NEUTROPHIL COUNT (OHS): 9.92 K/UL (ref 1.8–7.7)
ALBUMIN SERPL BCP-MCNC: 1.4 G/DL (ref 3.5–5.2)
ALLENS TEST: ABNORMAL
ALP SERPL-CCNC: 292 UNIT/L (ref 40–150)
ALT SERPL W/O P-5'-P-CCNC: 23 UNIT/L (ref 10–44)
ANION GAP (OHS): 10 MMOL/L (ref 8–16)
ANION GAP (OHS): 10 MMOL/L (ref 8–16)
ANION GAP (OHS): 11 MMOL/L (ref 8–16)
ANION GAP (OHS): 11 MMOL/L (ref 8–16)
ANION GAP (OHS): 12 MMOL/L (ref 8–16)
AST SERPL-CCNC: 30 UNIT/L (ref 11–45)
BACTERIA BLD CULT: ABNORMAL
BACTERIA BLD CULT: ABNORMAL
BASOPHILS # BLD AUTO: 0.05 K/UL
BASOPHILS NFR BLD AUTO: 0.3 %
BILIRUB SERPL-MCNC: 0.2 MG/DL (ref 0.1–1)
BUN SERPL-MCNC: 61 MG/DL (ref 8–23)
BUN SERPL-MCNC: 62 MG/DL (ref 8–23)
BUN SERPL-MCNC: 62 MG/DL (ref 8–23)
BUN SERPL-MCNC: 63 MG/DL (ref 8–23)
BUN SERPL-MCNC: 68 MG/DL (ref 8–23)
CALCIUM SERPL-MCNC: 7.6 MG/DL (ref 8.7–10.5)
CALCIUM SERPL-MCNC: 7.6 MG/DL (ref 8.7–10.5)
CALCIUM SERPL-MCNC: 7.7 MG/DL (ref 8.7–10.5)
CHLORIDE SERPL-SCNC: 103 MMOL/L (ref 95–110)
CHLORIDE SERPL-SCNC: 104 MMOL/L (ref 95–110)
CHLORIDE SERPL-SCNC: 104 MMOL/L (ref 95–110)
CO2 SERPL-SCNC: 20 MMOL/L (ref 23–29)
CO2 SERPL-SCNC: 20 MMOL/L (ref 23–29)
CO2 SERPL-SCNC: 22 MMOL/L (ref 23–29)
CO2 SERPL-SCNC: 22 MMOL/L (ref 23–29)
CO2 SERPL-SCNC: 23 MMOL/L (ref 23–29)
CREAT SERPL-MCNC: 2 MG/DL (ref 0.5–1.4)
CREAT SERPL-MCNC: 2.1 MG/DL (ref 0.5–1.4)
DELSYS: ABNORMAL
DELSYS: ABNORMAL
ERYTHROCYTE [DISTWIDTH] IN BLOOD BY AUTOMATED COUNT: 16.8 % (ref 11.5–14.5)
FIO2: 23
GFR SERPLBLD CREATININE-BSD FMLA CKD-EPI: 24 ML/MIN/1.73/M2
GFR SERPLBLD CREATININE-BSD FMLA CKD-EPI: 25 ML/MIN/1.73/M2
GLUCOSE SERPL-MCNC: 120 MG/DL (ref 70–110)
GLUCOSE SERPL-MCNC: 125 MG/DL (ref 70–110)
GLUCOSE SERPL-MCNC: 138 MG/DL (ref 70–110)
GLUCOSE SERPL-MCNC: 145 MG/DL (ref 70–110)
GLUCOSE SERPL-MCNC: 161 MG/DL (ref 70–110)
GRAM STN SPEC: ABNORMAL
HCO3 UR-SCNC: 24.9 MMOL/L (ref 24–28)
HCO3 UR-SCNC: 27.1 MMOL/L (ref 24–28)
HCO3 UR-SCNC: 27.9 MMOL/L (ref 24–28)
HCT VFR BLD AUTO: 24.2 % (ref 37–48.5)
HCT VFR BLD CALC: 23 %PCV (ref 36–54)
HGB BLD-MCNC: 7.4 GM/DL (ref 12–16)
IMM GRANULOCYTES # BLD AUTO: 0.2 K/UL (ref 0–0.04)
IMM GRANULOCYTES NFR BLD AUTO: 1.4 % (ref 0–0.5)
LYMPHOCYTES # BLD AUTO: 2.34 K/UL (ref 1–4.8)
MAGNESIUM SERPL-MCNC: 2.2 MG/DL (ref 1.6–2.6)
MCH RBC QN AUTO: 30.6 PG (ref 27–31)
MCHC RBC AUTO-ENTMCNC: 30.6 G/DL (ref 32–36)
MCV RBC AUTO: 100 FL (ref 82–98)
MODE: ABNORMAL
NUCLEATED RBC (/100WBC) (OHS): 0 /100 WBC
PCO2 BLDA: 43.1 MMHG (ref 35–45)
PCO2 BLDA: 47.4 MMHG (ref 35–45)
PCO2 BLDA: 53.6 MMHG (ref 35–45)
PEEP: 10
PH SMN: 7.33 [PH] (ref 7.35–7.45)
PH SMN: 7.37 [PH] (ref 7.35–7.45)
PH SMN: 7.37 [PH] (ref 7.35–7.45)
PHOSPHATE SERPL-MCNC: 3.9 MG/DL (ref 2.7–4.5)
PLATELET # BLD AUTO: 435 K/UL (ref 150–450)
PMV BLD AUTO: 9.7 FL (ref 9.2–12.9)
PO2 BLDA: 37 MMHG (ref 40–60)
PO2 BLDA: 48 MMHG (ref 40–60)
PO2 BLDA: 48 MMHG (ref 40–60)
POC BE: 0 MMOL/L (ref -2–2)
POC BE: 2 MMOL/L (ref -2–2)
POC BE: 2 MMOL/L (ref -2–2)
POC IONIZED CALCIUM: 1.05 MMOL/L (ref 1.06–1.42)
POC SATURATED O2: 68 % (ref 95–100)
POC SATURATED O2: 79 % (ref 95–100)
POC SATURATED O2: 81 % (ref 95–100)
POC TCO2: 26 MMOL/L (ref 24–29)
POC TCO2: 28 MMOL/L (ref 24–29)
POC TCO2: 30 MMOL/L (ref 24–29)
POCT GLUCOSE: 112 MG/DL (ref 70–110)
POCT GLUCOSE: 144 MG/DL (ref 70–110)
POCT GLUCOSE: 149 MG/DL (ref 70–110)
POCT GLUCOSE: 161 MG/DL (ref 70–110)
POCT GLUCOSE: 172 MG/DL (ref 70–110)
POTASSIUM BLD-SCNC: 4.9 MMOL/L (ref 3.5–5.1)
POTASSIUM SERPL-SCNC: 4.9 MMOL/L (ref 3.5–5.1)
POTASSIUM SERPL-SCNC: 5 MMOL/L (ref 3.5–5.1)
POTASSIUM SERPL-SCNC: 5 MMOL/L (ref 3.5–5.1)
POTASSIUM SERPL-SCNC: 5.1 MMOL/L (ref 3.5–5.1)
POTASSIUM SERPL-SCNC: 5.3 MMOL/L (ref 3.5–5.1)
POTASSIUM SERPL-SCNC: 5.5 MMOL/L (ref 3.5–5.1)
POTASSIUM SERPL-SCNC: 5.5 MMOL/L (ref 3.5–5.1)
PROT SERPL-MCNC: 6.6 GM/DL (ref 6–8.4)
RBC # BLD AUTO: 2.42 M/UL (ref 4–5.4)
RELATIVE EOSINOPHIL (OHS): 2 %
RELATIVE LYMPHOCYTE (OHS): 16.4 % (ref 18–48)
RELATIVE MONOCYTE (OHS): 10.6 % (ref 4–15)
RELATIVE NEUTROPHIL (OHS): 69.3 % (ref 38–73)
SAMPLE: ABNORMAL
SITE: ABNORMAL
SODIUM BLD-SCNC: 138 MMOL/L (ref 136–145)
SODIUM SERPL-SCNC: 135 MMOL/L (ref 136–145)
SODIUM SERPL-SCNC: 135 MMOL/L (ref 136–145)
SODIUM SERPL-SCNC: 136 MMOL/L (ref 136–145)
SP02: 96
SPONT RATE: 24
VANCOMYCIN SERPL-MCNC: 18.2 UG/ML (ref ?–80)
WBC # BLD AUTO: 14.31 K/UL (ref 3.9–12.7)

## 2025-06-21 PROCEDURE — 94761 N-INVAS EAR/PLS OXIMETRY MLT: CPT | Mod: HCNC,XB

## 2025-06-21 PROCEDURE — 27000190 HC CPAP FULL FACE MASK W/VALVE: Mod: HCNC

## 2025-06-21 PROCEDURE — 85025 COMPLETE CBC W/AUTO DIFF WBC: CPT | Mod: HCNC | Performed by: HOSPITALIST

## 2025-06-21 PROCEDURE — 25000003 PHARM REV CODE 250: Mod: HCNC | Performed by: HOSPITALIST

## 2025-06-21 PROCEDURE — 94660 CPAP INITIATION&MGMT: CPT | Mod: HCNC

## 2025-06-21 PROCEDURE — 83735 ASSAY OF MAGNESIUM: CPT | Mod: HCNC | Performed by: HOSPITALIST

## 2025-06-21 PROCEDURE — 82803 BLOOD GASES ANY COMBINATION: CPT | Mod: HCNC

## 2025-06-21 PROCEDURE — 99291 CRITICAL CARE FIRST HOUR: CPT | Mod: HCNC,,, | Performed by: INTERNAL MEDICINE

## 2025-06-21 PROCEDURE — 82310 ASSAY OF CALCIUM: CPT | Mod: HCNC | Performed by: HOSPITALIST

## 2025-06-21 PROCEDURE — 99900035 HC TECH TIME PER 15 MIN (STAT): Mod: HCNC

## 2025-06-21 PROCEDURE — 27100171 HC OXYGEN HIGH FLOW UP TO 24 HOURS: Mod: HCNC

## 2025-06-21 PROCEDURE — 25000003 PHARM REV CODE 250: Mod: HCNC | Performed by: EMERGENCY MEDICINE

## 2025-06-21 PROCEDURE — 63600175 PHARM REV CODE 636 W HCPCS: Mod: HCNC | Performed by: HOSPITALIST

## 2025-06-21 PROCEDURE — 63600175 PHARM REV CODE 636 W HCPCS: Mod: HCNC

## 2025-06-21 PROCEDURE — 25000003 PHARM REV CODE 250: Mod: HCNC

## 2025-06-21 PROCEDURE — 99233 SBSQ HOSP IP/OBS HIGH 50: CPT | Mod: HCNC,,, | Performed by: INTERNAL MEDICINE

## 2025-06-21 PROCEDURE — 99232 SBSQ HOSP IP/OBS MODERATE 35: CPT | Mod: HCNC,,, | Performed by: INTERNAL MEDICINE

## 2025-06-21 PROCEDURE — G0545 PR VISIT INHERENT TO INPT OR OBS CARE, INFECTIOUS DISEASE: HCPCS | Mod: HCNC,,, | Performed by: INTERNAL MEDICINE

## 2025-06-21 PROCEDURE — 80202 ASSAY OF VANCOMYCIN: CPT | Mod: HCNC | Performed by: INTERNAL MEDICINE

## 2025-06-21 PROCEDURE — 84100 ASSAY OF PHOSPHORUS: CPT | Mod: HCNC | Performed by: HOSPITALIST

## 2025-06-21 PROCEDURE — 94640 AIRWAY INHALATION TREATMENT: CPT | Mod: HCNC

## 2025-06-21 PROCEDURE — 27000207 HC ISOLATION: Mod: HCNC

## 2025-06-21 PROCEDURE — 84132 ASSAY OF SERUM POTASSIUM: CPT | Mod: HCNC | Performed by: INTERNAL MEDICINE

## 2025-06-21 PROCEDURE — 95718 EEG PHYS/QHP 2-12 HR W/VEEG: CPT | Mod: HCNC,,, | Performed by: STUDENT IN AN ORGANIZED HEALTH CARE EDUCATION/TRAINING PROGRAM

## 2025-06-21 PROCEDURE — 84132 ASSAY OF SERUM POTASSIUM: CPT | Mod: HCNC

## 2025-06-21 PROCEDURE — 25000242 PHARM REV CODE 250 ALT 637 W/ HCPCS: Mod: HCNC

## 2025-06-21 PROCEDURE — 20000000 HC ICU ROOM: Mod: HCNC

## 2025-06-21 RX ORDER — ALBUTEROL SULFATE 2.5 MG/.5ML
10 SOLUTION RESPIRATORY (INHALATION) ONCE
Status: COMPLETED | OUTPATIENT
Start: 2025-06-21 | End: 2025-06-21

## 2025-06-21 RX ORDER — FUROSEMIDE 10 MG/ML
40 INJECTION INTRAMUSCULAR; INTRAVENOUS ONCE
Status: COMPLETED | OUTPATIENT
Start: 2025-06-21 | End: 2025-06-21

## 2025-06-21 RX ORDER — POLYETHYLENE GLYCOL 3350 17 G/17G
17 POWDER, FOR SOLUTION ORAL DAILY
Status: DISCONTINUED | OUTPATIENT
Start: 2025-06-21 | End: 2025-06-24

## 2025-06-21 RX ORDER — AMOXICILLIN 250 MG
1 CAPSULE ORAL 2 TIMES DAILY
Status: DISCONTINUED | OUTPATIENT
Start: 2025-06-21 | End: 2025-06-21

## 2025-06-21 RX ORDER — AMOXICILLIN 250 MG
1 CAPSULE ORAL DAILY
Status: DISCONTINUED | OUTPATIENT
Start: 2025-06-21 | End: 2025-06-24

## 2025-06-21 RX ADMIN — ALBUTEROL SULFATE 10 MG: 2.5 SOLUTION RESPIRATORY (INHALATION) at 07:06

## 2025-06-21 RX ADMIN — LACOSAMIDE 100 MG: 50 TABLET, FILM COATED ORAL at 08:06

## 2025-06-21 RX ADMIN — HEPARIN SODIUM 5000 UNITS: 5000 INJECTION INTRAVENOUS; SUBCUTANEOUS at 01:06

## 2025-06-21 RX ADMIN — HEPARIN SODIUM 5000 UNITS: 5000 INJECTION INTRAVENOUS; SUBCUTANEOUS at 06:06

## 2025-06-21 RX ADMIN — FUROSEMIDE 40 MG: 10 INJECTION, SOLUTION INTRAVENOUS at 09:06

## 2025-06-21 RX ADMIN — CLOBAZAM 5 MG: 2.5 SUSPENSION ORAL at 08:06

## 2025-06-21 RX ADMIN — SODIUM ZIRCONIUM CYCLOSILICATE 5 G: 5 POWDER, FOR SUSPENSION ORAL at 01:06

## 2025-06-21 RX ADMIN — LEVOTHYROXINE SODIUM 75 MCG: 75 TABLET ORAL at 06:06

## 2025-06-21 RX ADMIN — MUPIROCIN: 20 OINTMENT TOPICAL at 08:06

## 2025-06-21 RX ADMIN — INSULIN ASPART 2 UNITS: 100 INJECTION, SOLUTION INTRAVENOUS; SUBCUTANEOUS at 12:06

## 2025-06-21 RX ADMIN — ASPIRIN 81 MG CHEWABLE TABLET 81 MG: 81 TABLET CHEWABLE at 08:06

## 2025-06-21 RX ADMIN — HEPARIN SODIUM 5000 UNITS: 5000 INJECTION INTRAVENOUS; SUBCUTANEOUS at 09:06

## 2025-06-21 RX ADMIN — SENNOSIDES AND DOCUSATE SODIUM 1 TABLET: 50; 8.6 TABLET ORAL at 08:06

## 2025-06-21 RX ADMIN — INSULIN ASPART 2 UNITS: 100 INJECTION, SOLUTION INTRAVENOUS; SUBCUTANEOUS at 05:06

## 2025-06-21 RX ADMIN — INSULIN ASPART 2 UNITS: 100 INJECTION, SOLUTION INTRAVENOUS; SUBCUTANEOUS at 08:06

## 2025-06-21 RX ADMIN — SODIUM ZIRCONIUM CYCLOSILICATE 5 G: 5 POWDER, FOR SUSPENSION ORAL at 08:06

## 2025-06-21 RX ADMIN — ALBUTEROL SULFATE 10 MG: 2.5 SOLUTION RESPIRATORY (INHALATION) at 04:06

## 2025-06-21 RX ADMIN — ATORVASTATIN CALCIUM 80 MG: 40 TABLET, FILM COATED ORAL at 08:06

## 2025-06-22 PROBLEM — G93.41 METABOLIC ENCEPHALOPATHY: Status: ACTIVE | Noted: 2025-06-22

## 2025-06-22 LAB
ABSOLUTE EOSINOPHIL (OHS): 0.18 K/UL
ABSOLUTE MONOCYTE (OHS): 2.11 K/UL (ref 0.3–1)
ABSOLUTE NEUTROPHIL COUNT (OHS): 16.11 K/UL (ref 1.8–7.7)
ALBUMIN SERPL BCP-MCNC: 1.4 G/DL (ref 3.5–5.2)
ALLENS TEST: ABNORMAL
ALP SERPL-CCNC: 288 UNIT/L (ref 40–150)
ALT SERPL W/O P-5'-P-CCNC: 22 UNIT/L (ref 10–44)
ANION GAP (OHS): 11 MMOL/L (ref 8–16)
ANION GAP (OHS): 12 MMOL/L (ref 8–16)
AST SERPL-CCNC: 28 UNIT/L (ref 11–45)
BACTERIA UR CULT: ABNORMAL
BASOPHILS # BLD AUTO: 0.1 K/UL
BASOPHILS NFR BLD AUTO: 0.5 %
BILIRUB SERPL-MCNC: 0.2 MG/DL (ref 0.1–1)
BUN SERPL-MCNC: 67 MG/DL (ref 8–23)
BUN SERPL-MCNC: 71 MG/DL (ref 8–23)
CALCIUM SERPL-MCNC: 7.4 MG/DL (ref 8.7–10.5)
CALCIUM SERPL-MCNC: 7.7 MG/DL (ref 8.7–10.5)
CHLORIDE SERPL-SCNC: 102 MMOL/L (ref 95–110)
CHLORIDE SERPL-SCNC: 104 MMOL/L (ref 95–110)
CO2 SERPL-SCNC: 21 MMOL/L (ref 23–29)
CO2 SERPL-SCNC: 23 MMOL/L (ref 23–29)
CREAT SERPL-MCNC: 2.2 MG/DL (ref 0.5–1.4)
CREAT SERPL-MCNC: 2.2 MG/DL (ref 0.5–1.4)
DELSYS: ABNORMAL
ERYTHROCYTE [DISTWIDTH] IN BLOOD BY AUTOMATED COUNT: 16.9 % (ref 11.5–14.5)
GFR SERPLBLD CREATININE-BSD FMLA CKD-EPI: 22 ML/MIN/1.73/M2
GFR SERPLBLD CREATININE-BSD FMLA CKD-EPI: 22 ML/MIN/1.73/M2
GLUCOSE SERPL-MCNC: 175 MG/DL (ref 70–110)
GLUCOSE SERPL-MCNC: 181 MG/DL (ref 70–110)
HCO3 UR-SCNC: 28.7 MMOL/L (ref 24–28)
HCT VFR BLD AUTO: 23.8 % (ref 37–48.5)
HCT VFR BLD CALC: 24 %PCV (ref 36–54)
HGB BLD-MCNC: 7.2 GM/DL (ref 12–16)
IMM GRANULOCYTES # BLD AUTO: 0.44 K/UL (ref 0–0.04)
IMM GRANULOCYTES NFR BLD AUTO: 2 % (ref 0–0.5)
LACOSAMIDE SERPL-MCNC: 9.4 MCG/ML (ref 1–10)
LYMPHOCYTES # BLD AUTO: 2.94 K/UL (ref 1–4.8)
MAGNESIUM SERPL-MCNC: 2.3 MG/DL (ref 1.6–2.6)
MCH RBC QN AUTO: 30.5 PG (ref 27–31)
MCHC RBC AUTO-ENTMCNC: 30.3 G/DL (ref 32–36)
MCV RBC AUTO: 101 FL (ref 82–98)
MODE: ABNORMAL
NUCLEATED RBC (/100WBC) (OHS): 0 /100 WBC
PCO2 BLDA: 52 MMHG (ref 35–45)
PH SMN: 7.35 [PH] (ref 7.35–7.45)
PHOSPHATE SERPL-MCNC: 4.1 MG/DL (ref 2.7–4.5)
PLATELET # BLD AUTO: 470 K/UL (ref 150–450)
PMV BLD AUTO: 9.4 FL (ref 9.2–12.9)
PO2 BLDA: 95 MMHG (ref 40–60)
POC BE: 3 MMOL/L (ref -2–2)
POC IONIZED CALCIUM: 1.04 MMOL/L (ref 1.06–1.42)
POC SATURATED O2: 97 % (ref 95–100)
POC TCO2: 30 MMOL/L (ref 24–29)
POCT GLUCOSE: 148 MG/DL (ref 70–110)
POCT GLUCOSE: 162 MG/DL (ref 70–110)
POCT GLUCOSE: 179 MG/DL (ref 70–110)
POCT GLUCOSE: 197 MG/DL (ref 70–110)
POCT GLUCOSE: 201 MG/DL (ref 70–110)
POCT GLUCOSE: 202 MG/DL (ref 70–110)
POTASSIUM BLD-SCNC: 5.5 MMOL/L (ref 3.5–5.1)
POTASSIUM SERPL-SCNC: 5.1 MMOL/L (ref 3.5–5.1)
POTASSIUM SERPL-SCNC: 5.1 MMOL/L (ref 3.5–5.1)
POTASSIUM SERPL-SCNC: 5.6 MMOL/L (ref 3.5–5.1)
PROT SERPL-MCNC: 6.8 GM/DL (ref 6–8.4)
RBC # BLD AUTO: 2.36 M/UL (ref 4–5.4)
RELATIVE EOSINOPHIL (OHS): 0.8 %
RELATIVE LYMPHOCYTE (OHS): 13.4 % (ref 18–48)
RELATIVE MONOCYTE (OHS): 9.6 % (ref 4–15)
RELATIVE NEUTROPHIL (OHS): 73.7 % (ref 38–73)
SAMPLE: ABNORMAL
SITE: ABNORMAL
SODIUM BLD-SCNC: 137 MMOL/L (ref 136–145)
SODIUM SERPL-SCNC: 136 MMOL/L (ref 136–145)
SODIUM SERPL-SCNC: 137 MMOL/L (ref 136–145)
VANCOMYCIN SERPL-MCNC: 17.2 UG/ML (ref ?–80)
WBC # BLD AUTO: 21.88 K/UL (ref 3.9–12.7)

## 2025-06-22 PROCEDURE — 63600175 PHARM REV CODE 636 W HCPCS: Mod: HCNC

## 2025-06-22 PROCEDURE — 85014 HEMATOCRIT: CPT | Mod: HCNC

## 2025-06-22 PROCEDURE — 25000003 PHARM REV CODE 250: Mod: HCNC | Performed by: INTERNAL MEDICINE

## 2025-06-22 PROCEDURE — 82330 ASSAY OF CALCIUM: CPT | Mod: HCNC

## 2025-06-22 PROCEDURE — 94640 AIRWAY INHALATION TREATMENT: CPT | Mod: HCNC

## 2025-06-22 PROCEDURE — 27100171 HC OXYGEN HIGH FLOW UP TO 24 HOURS: Mod: HCNC

## 2025-06-22 PROCEDURE — 80202 ASSAY OF VANCOMYCIN: CPT | Mod: HCNC | Performed by: INTERNAL MEDICINE

## 2025-06-22 PROCEDURE — 25000242 PHARM REV CODE 250 ALT 637 W/ HCPCS: Mod: HCNC

## 2025-06-22 PROCEDURE — 84295 ASSAY OF SERUM SODIUM: CPT | Mod: HCNC

## 2025-06-22 PROCEDURE — 84132 ASSAY OF SERUM POTASSIUM: CPT | Mod: HCNC

## 2025-06-22 PROCEDURE — 83735 ASSAY OF MAGNESIUM: CPT | Mod: HCNC | Performed by: HOSPITALIST

## 2025-06-22 PROCEDURE — 85025 COMPLETE CBC W/AUTO DIFF WBC: CPT | Mod: HCNC | Performed by: HOSPITALIST

## 2025-06-22 PROCEDURE — 82803 BLOOD GASES ANY COMBINATION: CPT | Mod: HCNC

## 2025-06-22 PROCEDURE — 84100 ASSAY OF PHOSPHORUS: CPT | Mod: HCNC | Performed by: HOSPITALIST

## 2025-06-22 PROCEDURE — 63600175 PHARM REV CODE 636 W HCPCS: Mod: HCNC | Performed by: INTERNAL MEDICINE

## 2025-06-22 PROCEDURE — 94660 CPAP INITIATION&MGMT: CPT | Mod: HCNC

## 2025-06-22 PROCEDURE — 94761 N-INVAS EAR/PLS OXIMETRY MLT: CPT | Mod: HCNC,XB

## 2025-06-22 PROCEDURE — 25000003 PHARM REV CODE 250: Mod: HCNC | Performed by: HOSPITALIST

## 2025-06-22 PROCEDURE — 80053 COMPREHEN METABOLIC PANEL: CPT | Mod: HCNC | Performed by: HOSPITALIST

## 2025-06-22 PROCEDURE — 99233 SBSQ HOSP IP/OBS HIGH 50: CPT | Mod: HCNC,,, | Performed by: INTERNAL MEDICINE

## 2025-06-22 PROCEDURE — 25000003 PHARM REV CODE 250: Mod: HCNC

## 2025-06-22 PROCEDURE — 20000000 HC ICU ROOM: Mod: HCNC

## 2025-06-22 PROCEDURE — 99900035 HC TECH TIME PER 15 MIN (STAT): Mod: HCNC

## 2025-06-22 PROCEDURE — 99291 CRITICAL CARE FIRST HOUR: CPT | Mod: HCNC,,, | Performed by: INTERNAL MEDICINE

## 2025-06-22 PROCEDURE — 27000207 HC ISOLATION: Mod: HCNC

## 2025-06-22 PROCEDURE — 99232 SBSQ HOSP IP/OBS MODERATE 35: CPT | Mod: HCNC,,, | Performed by: INTERNAL MEDICINE

## 2025-06-22 PROCEDURE — G0545 PR VISIT INHERENT TO INPT OR OBS CARE, INFECTIOUS DISEASE: HCPCS | Mod: HCNC,,, | Performed by: INTERNAL MEDICINE

## 2025-06-22 RX ORDER — MEROPENEM 1 G/1
1 INJECTION, POWDER, FOR SOLUTION INTRAVENOUS
Status: DISCONTINUED | OUTPATIENT
Start: 2025-06-22 | End: 2025-06-23

## 2025-06-22 RX ORDER — ALBUTEROL SULFATE 2.5 MG/.5ML
10 SOLUTION RESPIRATORY (INHALATION) ONCE
Status: COMPLETED | OUTPATIENT
Start: 2025-06-22 | End: 2025-06-22

## 2025-06-22 RX ORDER — FUROSEMIDE 10 MG/ML
120 INJECTION INTRAMUSCULAR; INTRAVENOUS ONCE
Status: COMPLETED | OUTPATIENT
Start: 2025-06-22 | End: 2025-06-22

## 2025-06-22 RX ADMIN — LEVOTHYROXINE SODIUM 75 MCG: 75 TABLET ORAL at 06:06

## 2025-06-22 RX ADMIN — MUPIROCIN: 20 OINTMENT TOPICAL at 09:06

## 2025-06-22 RX ADMIN — ASPIRIN 81 MG CHEWABLE TABLET 81 MG: 81 TABLET CHEWABLE at 09:06

## 2025-06-22 RX ADMIN — FUROSEMIDE 120 MG: 10 INJECTION, SOLUTION INTRAVENOUS at 12:06

## 2025-06-22 RX ADMIN — MEROPENEM 1 G: 1 INJECTION, POWDER, FOR SOLUTION INTRAVENOUS at 12:06

## 2025-06-22 RX ADMIN — CLOBAZAM 5 MG: 2.5 SUSPENSION ORAL at 09:06

## 2025-06-22 RX ADMIN — HEPARIN SODIUM 5000 UNITS: 5000 INJECTION INTRAVENOUS; SUBCUTANEOUS at 10:06

## 2025-06-22 RX ADMIN — INSULIN ASPART 2 UNITS: 100 INJECTION, SOLUTION INTRAVENOUS; SUBCUTANEOUS at 12:06

## 2025-06-22 RX ADMIN — SODIUM ZIRCONIUM CYCLOSILICATE 5 G: 5 POWDER, FOR SUSPENSION ORAL at 09:06

## 2025-06-22 RX ADMIN — LACOSAMIDE 100 MG: 50 TABLET, FILM COATED ORAL at 09:06

## 2025-06-22 RX ADMIN — HEPARIN SODIUM 5000 UNITS: 5000 INJECTION INTRAVENOUS; SUBCUTANEOUS at 02:06

## 2025-06-22 RX ADMIN — ATORVASTATIN CALCIUM 80 MG: 40 TABLET, FILM COATED ORAL at 10:06

## 2025-06-22 RX ADMIN — INSULIN ASPART 4 UNITS: 100 INJECTION, SOLUTION INTRAVENOUS; SUBCUTANEOUS at 04:06

## 2025-06-22 RX ADMIN — ACYCLOVIR SODIUM 500 MG: 50 INJECTION, SOLUTION INTRAVENOUS at 12:06

## 2025-06-22 RX ADMIN — HEPARIN SODIUM 5000 UNITS: 5000 INJECTION INTRAVENOUS; SUBCUTANEOUS at 06:06

## 2025-06-22 RX ADMIN — ALBUTEROL SULFATE 10 MG: 2.5 SOLUTION RESPIRATORY (INHALATION) at 01:06

## 2025-06-22 RX ADMIN — SENNOSIDES AND DOCUSATE SODIUM 1 TABLET: 50; 8.6 TABLET ORAL at 09:06

## 2025-06-22 RX ADMIN — MUPIROCIN: 20 OINTMENT TOPICAL at 10:06

## 2025-06-23 LAB
ABSOLUTE EOSINOPHIL (OHS): 0.47 K/UL
ABSOLUTE EOSINOPHIL (OHS): 0.56 K/UL
ABSOLUTE MONOCYTE (OHS): 1.43 K/UL (ref 0.3–1)
ABSOLUTE MONOCYTE (OHS): 1.86 K/UL (ref 0.3–1)
ABSOLUTE NEUTROPHIL COUNT (OHS): 16.36 K/UL (ref 1.8–7.7)
ABSOLUTE NEUTROPHIL COUNT (OHS): 16.43 K/UL (ref 1.8–7.7)
ALBUMIN SERPL BCP-MCNC: 1.4 G/DL (ref 3.5–5.2)
ALBUMIN SERPL BCP-MCNC: 1.4 G/DL (ref 3.5–5.2)
ALP SERPL-CCNC: 303 UNIT/L (ref 40–150)
ALT SERPL W/O P-5'-P-CCNC: 21 UNIT/L (ref 10–44)
ANION GAP (OHS): 13 MMOL/L (ref 8–16)
ANION GAP (OHS): 13 MMOL/L (ref 8–16)
AST SERPL-CCNC: 25 UNIT/L (ref 11–45)
BASOPHILS # BLD AUTO: 0.07 K/UL
BASOPHILS # BLD AUTO: 0.08 K/UL
BASOPHILS NFR BLD AUTO: 0.3 %
BASOPHILS NFR BLD AUTO: 0.3 %
BILIRUB SERPL-MCNC: 0.2 MG/DL (ref 0.1–1)
BUN SERPL-MCNC: 78 MG/DL (ref 8–23)
BUN SERPL-MCNC: 82 MG/DL (ref 8–23)
CALCIUM SERPL-MCNC: 7.4 MG/DL (ref 8.7–10.5)
CALCIUM SERPL-MCNC: 7.7 MG/DL (ref 8.7–10.5)
CHLORIDE SERPL-SCNC: 103 MMOL/L (ref 95–110)
CHLORIDE SERPL-SCNC: 104 MMOL/L (ref 95–110)
CO2 SERPL-SCNC: 21 MMOL/L (ref 23–29)
CO2 SERPL-SCNC: 23 MMOL/L (ref 23–29)
CREAT SERPL-MCNC: 2.4 MG/DL (ref 0.5–1.4)
CREAT SERPL-MCNC: 2.5 MG/DL (ref 0.5–1.4)
ERYTHROCYTE [DISTWIDTH] IN BLOOD BY AUTOMATED COUNT: 16.7 % (ref 11.5–14.5)
ERYTHROCYTE [DISTWIDTH] IN BLOOD BY AUTOMATED COUNT: 16.8 % (ref 11.5–14.5)
GFR SERPLBLD CREATININE-BSD FMLA CKD-EPI: 19 ML/MIN/1.73/M2
GFR SERPLBLD CREATININE-BSD FMLA CKD-EPI: 20 ML/MIN/1.73/M2
GLUCOSE SERPL-MCNC: 151 MG/DL (ref 70–110)
GLUCOSE SERPL-MCNC: 163 MG/DL (ref 70–110)
HCT VFR BLD AUTO: 23.7 % (ref 37–48.5)
HCT VFR BLD AUTO: 25.8 % (ref 37–48.5)
HGB BLD-MCNC: 7.4 GM/DL (ref 12–16)
HGB BLD-MCNC: 7.8 GM/DL (ref 12–16)
IMM GRANULOCYTES # BLD AUTO: 0.34 K/UL (ref 0–0.04)
IMM GRANULOCYTES # BLD AUTO: 0.38 K/UL (ref 0–0.04)
IMM GRANULOCYTES NFR BLD AUTO: 1.4 % (ref 0–0.5)
IMM GRANULOCYTES NFR BLD AUTO: 1.8 % (ref 0–0.5)
LYMPHOCYTES # BLD AUTO: 2.37 K/UL (ref 1–4.8)
LYMPHOCYTES # BLD AUTO: 5.57 K/UL (ref 1–4.8)
MAGNESIUM SERPL-MCNC: 2.3 MG/DL (ref 1.6–2.6)
MCH RBC QN AUTO: 30.1 PG (ref 27–31)
MCH RBC QN AUTO: 30.8 PG (ref 27–31)
MCHC RBC AUTO-ENTMCNC: 30.2 G/DL (ref 32–36)
MCHC RBC AUTO-ENTMCNC: 31.2 G/DL (ref 32–36)
MCV RBC AUTO: 100 FL (ref 82–98)
MCV RBC AUTO: 99 FL (ref 82–98)
NUCLEATED RBC (/100WBC) (OHS): 0 /100 WBC
NUCLEATED RBC (/100WBC) (OHS): 0 /100 WBC
OHS QRS DURATION: 96 MS
OHS QTC CALCULATION: 459 MS
PHOSPHATE SERPL-MCNC: 4.5 MG/DL (ref 2.7–4.5)
PHOSPHATE SERPL-MCNC: 4.6 MG/DL (ref 2.7–4.5)
PLATELET # BLD AUTO: 470 K/UL (ref 150–450)
PLATELET # BLD AUTO: 539 K/UL (ref 150–450)
PMV BLD AUTO: 10 FL (ref 9.2–12.9)
PMV BLD AUTO: 9.5 FL (ref 9.2–12.9)
POCT GLUCOSE: 177 MG/DL (ref 70–110)
POCT GLUCOSE: 185 MG/DL (ref 70–110)
POCT GLUCOSE: 186 MG/DL (ref 70–110)
POCT GLUCOSE: 193 MG/DL (ref 70–110)
POTASSIUM SERPL-SCNC: 5.5 MMOL/L (ref 3.5–5.1)
POTASSIUM SERPL-SCNC: 5.8 MMOL/L (ref 3.5–5.1)
PROT SERPL-MCNC: 7.1 GM/DL (ref 6–8.4)
RBC # BLD AUTO: 2.4 M/UL (ref 4–5.4)
RBC # BLD AUTO: 2.59 M/UL (ref 4–5.4)
RELATIVE EOSINOPHIL (OHS): 2.2 %
RELATIVE EOSINOPHIL (OHS): 2.3 %
RELATIVE LYMPHOCYTE (OHS): 11.2 % (ref 18–48)
RELATIVE LYMPHOCYTE (OHS): 22.5 % (ref 18–48)
RELATIVE MONOCYTE (OHS): 6.8 % (ref 4–15)
RELATIVE MONOCYTE (OHS): 7.5 % (ref 4–15)
RELATIVE NEUTROPHIL (OHS): 66 % (ref 38–73)
RELATIVE NEUTROPHIL (OHS): 77.7 % (ref 38–73)
SODIUM SERPL-SCNC: 138 MMOL/L (ref 136–145)
SODIUM SERPL-SCNC: 139 MMOL/L (ref 136–145)
VANCOMYCIN SERPL-MCNC: 16 UG/ML (ref ?–80)
WBC # BLD AUTO: 21.15 K/UL (ref 3.9–12.7)
WBC # BLD AUTO: 24.77 K/UL (ref 3.9–12.7)

## 2025-06-23 PROCEDURE — 94799 UNLISTED PULMONARY SVC/PX: CPT | Mod: HCNC

## 2025-06-23 PROCEDURE — 83735 ASSAY OF MAGNESIUM: CPT | Mod: HCNC | Performed by: HOSPITALIST

## 2025-06-23 PROCEDURE — 25000003 PHARM REV CODE 250: Mod: HCNC | Performed by: INTERNAL MEDICINE

## 2025-06-23 PROCEDURE — 84100 ASSAY OF PHOSPHORUS: CPT | Mod: HCNC | Performed by: HOSPITALIST

## 2025-06-23 PROCEDURE — 27000207 HC ISOLATION: Mod: HCNC

## 2025-06-23 PROCEDURE — 20000000 HC ICU ROOM: Mod: HCNC

## 2025-06-23 PROCEDURE — 85025 COMPLETE CBC W/AUTO DIFF WBC: CPT | Mod: HCNC

## 2025-06-23 PROCEDURE — 63600175 PHARM REV CODE 636 W HCPCS: Mod: HCNC

## 2025-06-23 PROCEDURE — 25000003 PHARM REV CODE 250: Mod: HCNC

## 2025-06-23 PROCEDURE — 80202 ASSAY OF VANCOMYCIN: CPT | Mod: HCNC | Performed by: INTERNAL MEDICINE

## 2025-06-23 PROCEDURE — 99291 CRITICAL CARE FIRST HOUR: CPT | Mod: HCNC,,, | Performed by: INTERNAL MEDICINE

## 2025-06-23 PROCEDURE — 99233 SBSQ HOSP IP/OBS HIGH 50: CPT | Mod: HCNC,GC,, | Performed by: PSYCHIATRY & NEUROLOGY

## 2025-06-23 PROCEDURE — 63600175 PHARM REV CODE 636 W HCPCS: Mod: HCNC | Performed by: INTERNAL MEDICINE

## 2025-06-23 PROCEDURE — 94660 CPAP INITIATION&MGMT: CPT | Mod: HCNC

## 2025-06-23 PROCEDURE — 27100171 HC OXYGEN HIGH FLOW UP TO 24 HOURS: Mod: HCNC

## 2025-06-23 PROCEDURE — 99900035 HC TECH TIME PER 15 MIN (STAT): Mod: HCNC

## 2025-06-23 PROCEDURE — 94761 N-INVAS EAR/PLS OXIMETRY MLT: CPT | Mod: HCNC

## 2025-06-23 PROCEDURE — 25000242 PHARM REV CODE 250 ALT 637 W/ HCPCS: Mod: HCNC

## 2025-06-23 PROCEDURE — 99232 SBSQ HOSP IP/OBS MODERATE 35: CPT | Mod: HCNC,,, | Performed by: INTERNAL MEDICINE

## 2025-06-23 PROCEDURE — 84100 ASSAY OF PHOSPHORUS: CPT | Mod: HCNC

## 2025-06-23 PROCEDURE — 97535 SELF CARE MNGMENT TRAINING: CPT | Mod: HCNC

## 2025-06-23 PROCEDURE — 25000003 PHARM REV CODE 250: Mod: HCNC | Performed by: HOSPITALIST

## 2025-06-23 PROCEDURE — 85025 COMPLETE CBC W/AUTO DIFF WBC: CPT | Mod: HCNC | Performed by: HOSPITALIST

## 2025-06-23 PROCEDURE — 80053 COMPREHEN METABOLIC PANEL: CPT | Mod: HCNC | Performed by: HOSPITALIST

## 2025-06-23 PROCEDURE — 99291 CRITICAL CARE FIRST HOUR: CPT | Mod: HCNC,GC,, | Performed by: INTERNAL MEDICINE

## 2025-06-23 PROCEDURE — 94640 AIRWAY INHALATION TREATMENT: CPT | Mod: HCNC

## 2025-06-23 PROCEDURE — 92526 ORAL FUNCTION THERAPY: CPT | Mod: HCNC

## 2025-06-23 RX ORDER — CEFAZOLIN 2 G/1
2 INJECTION, POWDER, FOR SOLUTION INTRAMUSCULAR; INTRAVENOUS
Status: DISCONTINUED | OUTPATIENT
Start: 2025-06-23 | End: 2025-06-23

## 2025-06-23 RX ORDER — HEPARIN SODIUM 5000 [USP'U]/ML
5000 INJECTION, SOLUTION INTRAVENOUS; SUBCUTANEOUS EVERY 8 HOURS
Status: DISCONTINUED | OUTPATIENT
Start: 2025-06-23 | End: 2025-07-07

## 2025-06-23 RX ORDER — FUROSEMIDE 10 MG/ML
40 INJECTION INTRAMUSCULAR; INTRAVENOUS ONCE
Status: COMPLETED | OUTPATIENT
Start: 2025-06-23 | End: 2025-06-23

## 2025-06-23 RX ORDER — CEFAZOLIN 2 G/1
2 INJECTION, POWDER, FOR SOLUTION INTRAMUSCULAR; INTRAVENOUS
Status: DISCONTINUED | OUTPATIENT
Start: 2025-06-23 | End: 2025-07-05

## 2025-06-23 RX ORDER — CLOBAZAM 2.5 MG/ML
5 SUSPENSION ORAL DAILY
Status: DISCONTINUED | OUTPATIENT
Start: 2025-06-23 | End: 2025-06-24

## 2025-06-23 RX ORDER — FUROSEMIDE 10 MG/ML
80 INJECTION INTRAMUSCULAR; INTRAVENOUS ONCE
Status: COMPLETED | OUTPATIENT
Start: 2025-06-23 | End: 2025-06-23

## 2025-06-23 RX ORDER — ALBUTEROL SULFATE 2.5 MG/.5ML
10 SOLUTION RESPIRATORY (INHALATION) ONCE
Status: COMPLETED | OUTPATIENT
Start: 2025-06-23 | End: 2025-06-23

## 2025-06-23 RX ADMIN — HEPARIN SODIUM 5000 UNITS: 5000 INJECTION INTRAVENOUS; SUBCUTANEOUS at 09:06

## 2025-06-23 RX ADMIN — FUROSEMIDE 80 MG: 10 INJECTION, SOLUTION INTRAVENOUS at 06:06

## 2025-06-23 RX ADMIN — INSULIN ASPART 2 UNITS: 100 INJECTION, SOLUTION INTRAVENOUS; SUBCUTANEOUS at 01:06

## 2025-06-23 RX ADMIN — SODIUM ZIRCONIUM CYCLOSILICATE 10 G: 5 POWDER, FOR SUSPENSION ORAL at 08:06

## 2025-06-23 RX ADMIN — SODIUM ZIRCONIUM CYCLOSILICATE 10 G: 5 POWDER, FOR SUSPENSION ORAL at 09:06

## 2025-06-23 RX ADMIN — LEVOTHYROXINE SODIUM 75 MCG: 75 TABLET ORAL at 06:06

## 2025-06-23 RX ADMIN — INSULIN ASPART 1 UNITS: 100 INJECTION, SOLUTION INTRAVENOUS; SUBCUTANEOUS at 11:06

## 2025-06-23 RX ADMIN — FUROSEMIDE 40 MG: 10 INJECTION, SOLUTION INTRAVENOUS at 07:06

## 2025-06-23 RX ADMIN — CEFAZOLIN 2 G: 2 INJECTION, POWDER, FOR SOLUTION INTRAMUSCULAR; INTRAVENOUS at 04:06

## 2025-06-23 RX ADMIN — ATORVASTATIN CALCIUM 80 MG: 40 TABLET, FILM COATED ORAL at 09:06

## 2025-06-23 RX ADMIN — INSULIN ASPART 2 UNITS: 100 INJECTION, SOLUTION INTRAVENOUS; SUBCUTANEOUS at 02:06

## 2025-06-23 RX ADMIN — LACOSAMIDE 100 MG: 50 TABLET, FILM COATED ORAL at 08:06

## 2025-06-23 RX ADMIN — INSULIN ASPART 2 UNITS: 100 INJECTION, SOLUTION INTRAVENOUS; SUBCUTANEOUS at 07:06

## 2025-06-23 RX ADMIN — MEROPENEM 1 G: 1 INJECTION, POWDER, FOR SOLUTION INTRAVENOUS at 12:06

## 2025-06-23 RX ADMIN — MEROPENEM 1 G: 1 INJECTION, POWDER, FOR SOLUTION INTRAVENOUS at 01:06

## 2025-06-23 RX ADMIN — HEPARIN SODIUM 5000 UNITS: 5000 INJECTION INTRAVENOUS; SUBCUTANEOUS at 06:06

## 2025-06-23 RX ADMIN — ACYCLOVIR SODIUM 500 MG: 50 INJECTION, SOLUTION INTRAVENOUS at 08:06

## 2025-06-23 RX ADMIN — LACOSAMIDE 100 MG: 50 TABLET, FILM COATED ORAL at 09:06

## 2025-06-23 RX ADMIN — MUPIROCIN: 20 OINTMENT TOPICAL at 09:06

## 2025-06-23 RX ADMIN — ALBUTEROL SULFATE 10 MG: 2.5 SOLUTION RESPIRATORY (INHALATION) at 06:06

## 2025-06-23 RX ADMIN — HEPARIN SODIUM 5000 UNITS: 5000 INJECTION INTRAVENOUS; SUBCUTANEOUS at 01:06

## 2025-06-23 RX ADMIN — ASPIRIN 81 MG CHEWABLE TABLET 81 MG: 81 TABLET CHEWABLE at 08:06

## 2025-06-23 RX ADMIN — CLOBAZAM 5 MG: 2.5 SUSPENSION ORAL at 04:06

## 2025-06-23 RX ADMIN — MUPIROCIN: 20 OINTMENT TOPICAL at 08:06

## 2025-06-24 LAB
ABSOLUTE EOSINOPHIL (OHS): 0.5 K/UL
ABSOLUTE EOSINOPHIL (OHS): 0.57 K/UL
ABSOLUTE MONOCYTE (OHS): 1.18 K/UL (ref 0.3–1)
ABSOLUTE MONOCYTE (OHS): 1.61 K/UL (ref 0.3–1)
ABSOLUTE NEUTROPHIL COUNT (OHS): 13.32 K/UL (ref 1.8–7.7)
ABSOLUTE NEUTROPHIL COUNT (OHS): 15.68 K/UL (ref 1.8–7.7)
ALBUMIN SERPL BCP-MCNC: 1.3 G/DL (ref 3.5–5.2)
ALLENS TEST: ABNORMAL
ALP SERPL-CCNC: 257 UNIT/L (ref 40–150)
ALT SERPL W/O P-5'-P-CCNC: 19 UNIT/L (ref 10–44)
ANION GAP (OHS): 12 MMOL/L (ref 8–16)
AST SERPL-CCNC: 31 UNIT/L (ref 11–45)
BASOPHILS # BLD AUTO: 0.05 K/UL
BASOPHILS # BLD AUTO: 0.07 K/UL
BASOPHILS NFR BLD AUTO: 0.3 %
BASOPHILS NFR BLD AUTO: 0.3 %
BILIRUB SERPL-MCNC: 0.1 MG/DL (ref 0.1–1)
BUN SERPL-MCNC: 84 MG/DL (ref 8–23)
CALCIUM SERPL-MCNC: 7.4 MG/DL (ref 8.7–10.5)
CHLORIDE SERPL-SCNC: 103 MMOL/L (ref 95–110)
CLOBAZAM SERPL-MCNC: 94.8 NG/ML (ref 30–300)
CO2 SERPL-SCNC: 23 MMOL/L (ref 23–29)
CREAT SERPL-MCNC: 2.6 MG/DL (ref 0.5–1.4)
DELSYS: ABNORMAL
ERYTHROCYTE [DISTWIDTH] IN BLOOD BY AUTOMATED COUNT: 16.9 % (ref 11.5–14.5)
ERYTHROCYTE [DISTWIDTH] IN BLOOD BY AUTOMATED COUNT: 16.9 % (ref 11.5–14.5)
ERYTHROCYTE [SEDIMENTATION RATE] IN BLOOD BY WESTERGREN METHOD: 21 MM/H
FIO2: 21
GFR SERPLBLD CREATININE-BSD FMLA CKD-EPI: 18 ML/MIN/1.73/M2
GLUCOSE SERPL-MCNC: 170 MG/DL (ref 70–110)
HCO3 UR-SCNC: 30.2 MMOL/L (ref 24–28)
HCT VFR BLD AUTO: 22.7 % (ref 37–48.5)
HCT VFR BLD AUTO: 25 % (ref 37–48.5)
HGB BLD-MCNC: 7 GM/DL (ref 12–16)
HGB BLD-MCNC: 7.9 GM/DL (ref 12–16)
IMM GRANULOCYTES # BLD AUTO: 0.29 K/UL (ref 0–0.04)
IMM GRANULOCYTES # BLD AUTO: 0.36 K/UL (ref 0–0.04)
IMM GRANULOCYTES NFR BLD AUTO: 1.6 % (ref 0–0.5)
IMM GRANULOCYTES NFR BLD AUTO: 1.7 % (ref 0–0.5)
LYMPHOCYTES # BLD AUTO: 2.03 K/UL (ref 1–4.8)
LYMPHOCYTES # BLD AUTO: 3.64 K/UL (ref 1–4.8)
MAGNESIUM SERPL-MCNC: 2.3 MG/DL (ref 1.6–2.6)
MCH RBC QN AUTO: 31 PG (ref 27–31)
MCH RBC QN AUTO: 31.2 PG (ref 27–31)
MCHC RBC AUTO-ENTMCNC: 30.8 G/DL (ref 32–36)
MCHC RBC AUTO-ENTMCNC: 31.6 G/DL (ref 32–36)
MCV RBC AUTO: 100 FL (ref 82–98)
MCV RBC AUTO: 99 FL (ref 82–98)
MODE: ABNORMAL
NORCLOBAZAM SERPL-MCNC: 285 NG/ML (ref 300–3000)
NUCLEATED RBC (/100WBC) (OHS): 0 /100 WBC
NUCLEATED RBC (/100WBC) (OHS): 0 /100 WBC
PCO2 BLDA: 48 MMHG (ref 35–45)
PH SMN: 7.41 [PH] (ref 7.35–7.45)
PHOSPHATE SERPL-MCNC: 4.7 MG/DL (ref 2.7–4.5)
PLATELET # BLD AUTO: 431 K/UL (ref 150–450)
PLATELET # BLD AUTO: 517 K/UL (ref 150–450)
PMV BLD AUTO: 9.8 FL (ref 9.2–12.9)
PMV BLD AUTO: 9.9 FL (ref 9.2–12.9)
PO2 BLDA: 31 MMHG (ref 40–60)
POC BE: 6 MMOL/L (ref -2–2)
POC SATURATED O2: 60 % (ref 95–100)
POC TCO2: 32 MMOL/L (ref 24–29)
POCT GLUCOSE: 172 MG/DL (ref 70–110)
POCT GLUCOSE: 181 MG/DL (ref 70–110)
POCT GLUCOSE: 200 MG/DL (ref 70–110)
POTASSIUM SERPL-SCNC: 5.4 MMOL/L (ref 3.5–5.1)
PROT SERPL-MCNC: 6.5 GM/DL (ref 6–8.4)
RBC # BLD AUTO: 2.26 M/UL (ref 4–5.4)
RBC # BLD AUTO: 2.53 M/UL (ref 4–5.4)
RELATIVE EOSINOPHIL (OHS): 2.6 %
RELATIVE EOSINOPHIL (OHS): 2.9 %
RELATIVE LYMPHOCYTE (OHS): 11.7 % (ref 18–48)
RELATIVE LYMPHOCYTE (OHS): 16.6 % (ref 18–48)
RELATIVE MONOCYTE (OHS): 6.8 % (ref 4–15)
RELATIVE MONOCYTE (OHS): 7.3 % (ref 4–15)
RELATIVE NEUTROPHIL (OHS): 71.6 % (ref 38–73)
RELATIVE NEUTROPHIL (OHS): 76.6 % (ref 38–73)
SAMPLE: ABNORMAL
SITE: ABNORMAL
SODIUM SERPL-SCNC: 138 MMOL/L (ref 136–145)
SP02: 95
VANCOMYCIN SERPL-MCNC: 16 UG/ML (ref ?–80)
WBC # BLD AUTO: 17.37 K/UL (ref 3.9–12.7)
WBC # BLD AUTO: 21.93 K/UL (ref 3.9–12.7)

## 2025-06-24 PROCEDURE — 63600175 PHARM REV CODE 636 W HCPCS: Mod: HCNC

## 2025-06-24 PROCEDURE — 94660 CPAP INITIATION&MGMT: CPT | Mod: HCNC

## 2025-06-24 PROCEDURE — 99291 CRITICAL CARE FIRST HOUR: CPT | Mod: HCNC,GC,, | Performed by: INTERNAL MEDICINE

## 2025-06-24 PROCEDURE — 82800 BLOOD PH: CPT | Mod: HCNC

## 2025-06-24 PROCEDURE — 99232 SBSQ HOSP IP/OBS MODERATE 35: CPT | Mod: HCNC,,, | Performed by: INTERNAL MEDICINE

## 2025-06-24 PROCEDURE — 85025 COMPLETE CBC W/AUTO DIFF WBC: CPT | Mod: HCNC | Performed by: HOSPITALIST

## 2025-06-24 PROCEDURE — 95816 EEG AWAKE AND DROWSY: CPT | Mod: 26,HCNC,, | Performed by: PSYCHIATRY & NEUROLOGY

## 2025-06-24 PROCEDURE — 94799 UNLISTED PULMONARY SVC/PX: CPT | Mod: HCNC

## 2025-06-24 PROCEDURE — 25000003 PHARM REV CODE 250: Mod: HCNC | Performed by: INTERNAL MEDICINE

## 2025-06-24 PROCEDURE — 94761 N-INVAS EAR/PLS OXIMETRY MLT: CPT | Mod: HCNC

## 2025-06-24 PROCEDURE — 27100171 HC OXYGEN HIGH FLOW UP TO 24 HOURS: Mod: HCNC

## 2025-06-24 PROCEDURE — 95812 EEG 41-60 MINUTES: CPT | Mod: HCNC

## 2025-06-24 PROCEDURE — 25000003 PHARM REV CODE 250: Mod: HCNC

## 2025-06-24 PROCEDURE — 80202 ASSAY OF VANCOMYCIN: CPT | Mod: HCNC | Performed by: INTERNAL MEDICINE

## 2025-06-24 PROCEDURE — G0545 PR VISIT INHERENT TO INPT OR OBS CARE, INFECTIOUS DISEASE: HCPCS | Mod: HCNC,,, | Performed by: INTERNAL MEDICINE

## 2025-06-24 PROCEDURE — 99900035 HC TECH TIME PER 15 MIN (STAT): Mod: HCNC

## 2025-06-24 PROCEDURE — 80053 COMPREHEN METABOLIC PANEL: CPT | Mod: HCNC | Performed by: HOSPITALIST

## 2025-06-24 PROCEDURE — 25000003 PHARM REV CODE 250: Mod: HCNC | Performed by: HOSPITALIST

## 2025-06-24 PROCEDURE — 92526 ORAL FUNCTION THERAPY: CPT | Mod: HCNC

## 2025-06-24 PROCEDURE — 84100 ASSAY OF PHOSPHORUS: CPT | Mod: HCNC | Performed by: HOSPITALIST

## 2025-06-24 PROCEDURE — 82803 BLOOD GASES ANY COMBINATION: CPT | Mod: HCNC

## 2025-06-24 PROCEDURE — 83735 ASSAY OF MAGNESIUM: CPT | Mod: HCNC | Performed by: HOSPITALIST

## 2025-06-24 PROCEDURE — 27000207 HC ISOLATION: Mod: HCNC

## 2025-06-24 PROCEDURE — 20000000 HC ICU ROOM: Mod: HCNC

## 2025-06-24 PROCEDURE — 85025 COMPLETE CBC W/AUTO DIFF WBC: CPT | Mod: HCNC

## 2025-06-24 RX ORDER — POLYETHYLENE GLYCOL 3350 17 G/17G
17 POWDER, FOR SOLUTION ORAL DAILY PRN
Status: DISCONTINUED | OUTPATIENT
Start: 2025-06-24 | End: 2025-07-14

## 2025-06-24 RX ORDER — CLOBAZAM 2.5 MG/ML
5 SUSPENSION ORAL DAILY
Status: DISCONTINUED | OUTPATIENT
Start: 2025-06-24 | End: 2025-06-24

## 2025-06-24 RX ORDER — AMOXICILLIN 250 MG
1 CAPSULE ORAL DAILY PRN
Status: DISCONTINUED | OUTPATIENT
Start: 2025-06-24 | End: 2025-07-14

## 2025-06-24 RX ORDER — FUROSEMIDE 10 MG/ML
80 INJECTION INTRAMUSCULAR; INTRAVENOUS ONCE
Status: COMPLETED | OUTPATIENT
Start: 2025-06-24 | End: 2025-06-24

## 2025-06-24 RX ADMIN — HEPARIN SODIUM 5000 UNITS: 5000 INJECTION INTRAVENOUS; SUBCUTANEOUS at 01:06

## 2025-06-24 RX ADMIN — INSULIN ASPART 2 UNITS: 100 INJECTION, SOLUTION INTRAVENOUS; SUBCUTANEOUS at 11:06

## 2025-06-24 RX ADMIN — INSULIN ASPART 2 UNITS: 100 INJECTION, SOLUTION INTRAVENOUS; SUBCUTANEOUS at 01:06

## 2025-06-24 RX ADMIN — LACOSAMIDE 100 MG: 50 TABLET, FILM COATED ORAL at 09:06

## 2025-06-24 RX ADMIN — CEFAZOLIN 2 G: 2 INJECTION, POWDER, FOR SOLUTION INTRAMUSCULAR; INTRAVENOUS at 04:06

## 2025-06-24 RX ADMIN — HEPARIN SODIUM 5000 UNITS: 5000 INJECTION INTRAVENOUS; SUBCUTANEOUS at 09:06

## 2025-06-24 RX ADMIN — SODIUM ZIRCONIUM CYCLOSILICATE 10 G: 5 POWDER, FOR SUSPENSION ORAL at 09:06

## 2025-06-24 RX ADMIN — CLOBAZAM 5 MG: 2.5 SUSPENSION ORAL at 09:06

## 2025-06-24 RX ADMIN — ATORVASTATIN CALCIUM 80 MG: 40 TABLET, FILM COATED ORAL at 09:06

## 2025-06-24 RX ADMIN — FUROSEMIDE 80 MG: 10 INJECTION, SOLUTION INTRAVENOUS at 09:06

## 2025-06-24 RX ADMIN — HEPARIN SODIUM 5000 UNITS: 5000 INJECTION INTRAVENOUS; SUBCUTANEOUS at 05:06

## 2025-06-24 RX ADMIN — INSULIN ASPART 2 UNITS: 100 INJECTION, SOLUTION INTRAVENOUS; SUBCUTANEOUS at 05:06

## 2025-06-24 RX ADMIN — LEVOTHYROXINE SODIUM 75 MCG: 75 TABLET ORAL at 05:06

## 2025-06-24 RX ADMIN — ASPIRIN 81 MG CHEWABLE TABLET 81 MG: 81 TABLET CHEWABLE at 09:06

## 2025-06-24 RX ADMIN — INSULIN ASPART 2 UNITS: 100 INJECTION, SOLUTION INTRAVENOUS; SUBCUTANEOUS at 07:06

## 2025-06-25 LAB
ABSOLUTE EOSINOPHIL (OHS): 0.51 K/UL
ABSOLUTE MONOCYTE (OHS): 1.26 K/UL (ref 0.3–1)
ABSOLUTE NEUTROPHIL COUNT (OHS): 13.03 K/UL (ref 1.8–7.7)
ALBUMIN SERPL BCP-MCNC: 1.4 G/DL (ref 3.5–5.2)
ALLENS TEST: ABNORMAL
ALP SERPL-CCNC: 269 UNIT/L (ref 40–150)
ALT SERPL W/O P-5'-P-CCNC: 9 UNIT/L (ref 10–44)
ANION GAP (OHS): 13 MMOL/L (ref 8–16)
AST SERPL-CCNC: 47 UNIT/L (ref 11–45)
BACTERIA BLD CULT: NORMAL
BACTERIA BLD CULT: NORMAL
BASOPHILS # BLD AUTO: 0.08 K/UL
BASOPHILS NFR BLD AUTO: 0.4 %
BILIRUB SERPL-MCNC: 0.1 MG/DL (ref 0.1–1)
BUN SERPL-MCNC: 89 MG/DL (ref 8–23)
CALCIUM SERPL-MCNC: 7.5 MG/DL (ref 8.7–10.5)
CHLORIDE SERPL-SCNC: 104 MMOL/L (ref 95–110)
CO2 SERPL-SCNC: 23 MMOL/L (ref 23–29)
CREAT SERPL-MCNC: 2.8 MG/DL (ref 0.5–1.4)
DELSYS: ABNORMAL
ERYTHROCYTE [DISTWIDTH] IN BLOOD BY AUTOMATED COUNT: 16.7 % (ref 11.5–14.5)
GFR SERPLBLD CREATININE-BSD FMLA CKD-EPI: 17 ML/MIN/1.73/M2
GLUCOSE SERPL-MCNC: 173 MG/DL (ref 70–110)
HCO3 UR-SCNC: 30.4 MMOL/L (ref 24–28)
HCT VFR BLD AUTO: 24.3 % (ref 37–48.5)
HGB BLD-MCNC: 7.5 GM/DL (ref 12–16)
HIV 1+2 AB+HIV1 P24 AG SERPL QL IA: NORMAL
IMM GRANULOCYTES # BLD AUTO: 0.3 K/UL (ref 0–0.04)
IMM GRANULOCYTES NFR BLD AUTO: 1.7 % (ref 0–0.5)
LYMPHOCYTES # BLD AUTO: 2.71 K/UL (ref 1–4.8)
MAGNESIUM SERPL-MCNC: 2.5 MG/DL (ref 1.6–2.6)
MCH RBC QN AUTO: 31 PG (ref 27–31)
MCHC RBC AUTO-ENTMCNC: 30.9 G/DL (ref 32–36)
MCV RBC AUTO: 100 FL (ref 82–98)
MODE: ABNORMAL
NUCLEATED RBC (/100WBC) (OHS): 0 /100 WBC
PCO2 BLDA: 52.6 MMHG (ref 35–45)
PH SMN: 7.37 [PH] (ref 7.35–7.45)
PHOSPHATE SERPL-MCNC: 5.1 MG/DL (ref 2.7–4.5)
PLATELET # BLD AUTO: 507 K/UL (ref 150–450)
PMV BLD AUTO: 9.8 FL (ref 9.2–12.9)
PO2 BLDA: 25 MMHG (ref 40–60)
POC BE: 5 MMOL/L (ref -2–2)
POC SATURATED O2: 43 % (ref 95–100)
POC TCO2: 32 MMOL/L (ref 24–29)
POCT GLUCOSE: 198 MG/DL (ref 70–110)
POCT GLUCOSE: 203 MG/DL (ref 70–110)
POCT GLUCOSE: 210 MG/DL (ref 70–110)
POTASSIUM SERPL-SCNC: 5.2 MMOL/L (ref 3.5–5.1)
PROT SERPL-MCNC: 7 GM/DL (ref 6–8.4)
RBC # BLD AUTO: 2.42 M/UL (ref 4–5.4)
RELATIVE EOSINOPHIL (OHS): 2.9 %
RELATIVE LYMPHOCYTE (OHS): 15.1 % (ref 18–48)
RELATIVE MONOCYTE (OHS): 7 % (ref 4–15)
RELATIVE NEUTROPHIL (OHS): 72.9 % (ref 38–73)
SAMPLE: ABNORMAL
SITE: ABNORMAL
SODIUM SERPL-SCNC: 140 MMOL/L (ref 136–145)
T PALLIDUM IGG+IGM SER QL: NORMAL
WBC # BLD AUTO: 17.89 K/UL (ref 3.9–12.7)

## 2025-06-25 PROCEDURE — 80053 COMPREHEN METABOLIC PANEL: CPT | Mod: HCNC | Performed by: HOSPITALIST

## 2025-06-25 PROCEDURE — 87389 HIV-1 AG W/HIV-1&-2 AB AG IA: CPT | Mod: HCNC

## 2025-06-25 PROCEDURE — 27000207 HC ISOLATION: Mod: HCNC

## 2025-06-25 PROCEDURE — 92526 ORAL FUNCTION THERAPY: CPT | Mod: HCNC

## 2025-06-25 PROCEDURE — 84100 ASSAY OF PHOSPHORUS: CPT | Mod: HCNC | Performed by: HOSPITALIST

## 2025-06-25 PROCEDURE — 94799 UNLISTED PULMONARY SVC/PX: CPT | Mod: HCNC

## 2025-06-25 PROCEDURE — 82803 BLOOD GASES ANY COMBINATION: CPT | Mod: HCNC

## 2025-06-25 PROCEDURE — 63600175 PHARM REV CODE 636 W HCPCS: Mod: HCNC

## 2025-06-25 PROCEDURE — 99233 SBSQ HOSP IP/OBS HIGH 50: CPT | Mod: HCNC,,,

## 2025-06-25 PROCEDURE — 83735 ASSAY OF MAGNESIUM: CPT | Mod: HCNC | Performed by: HOSPITALIST

## 2025-06-25 PROCEDURE — 94761 N-INVAS EAR/PLS OXIMETRY MLT: CPT | Mod: HCNC

## 2025-06-25 PROCEDURE — 85025 COMPLETE CBC W/AUTO DIFF WBC: CPT | Mod: HCNC | Performed by: HOSPITALIST

## 2025-06-25 PROCEDURE — 25000003 PHARM REV CODE 250: Mod: HCNC | Performed by: HOSPITALIST

## 2025-06-25 PROCEDURE — 94660 CPAP INITIATION&MGMT: CPT | Mod: HCNC

## 2025-06-25 PROCEDURE — 27100171 HC OXYGEN HIGH FLOW UP TO 24 HOURS: Mod: HCNC

## 2025-06-25 PROCEDURE — 99291 CRITICAL CARE FIRST HOUR: CPT | Mod: HCNC,GC,, | Performed by: INTERNAL MEDICINE

## 2025-06-25 PROCEDURE — 99900035 HC TECH TIME PER 15 MIN (STAT): Mod: HCNC

## 2025-06-25 PROCEDURE — 86593 SYPHILIS TEST NON-TREP QUANT: CPT | Mod: HCNC

## 2025-06-25 PROCEDURE — 20000000 HC ICU ROOM: Mod: HCNC

## 2025-06-25 PROCEDURE — 25000003 PHARM REV CODE 250: Mod: HCNC

## 2025-06-25 RX ORDER — FUROSEMIDE 10 MG/ML
80 INJECTION INTRAMUSCULAR; INTRAVENOUS ONCE
Status: DISCONTINUED | OUTPATIENT
Start: 2025-06-25 | End: 2025-06-25

## 2025-06-25 RX ADMIN — HEPARIN SODIUM 5000 UNITS: 5000 INJECTION INTRAVENOUS; SUBCUTANEOUS at 02:06

## 2025-06-25 RX ADMIN — SODIUM ZIRCONIUM CYCLOSILICATE 10 G: 5 POWDER, FOR SUSPENSION ORAL at 08:06

## 2025-06-25 RX ADMIN — CEFAZOLIN 2 G: 2 INJECTION, POWDER, FOR SOLUTION INTRAMUSCULAR; INTRAVENOUS at 05:06

## 2025-06-25 RX ADMIN — INSULIN ASPART 2 UNITS: 100 INJECTION, SOLUTION INTRAVENOUS; SUBCUTANEOUS at 12:06

## 2025-06-25 RX ADMIN — ASPIRIN 81 MG CHEWABLE TABLET 81 MG: 81 TABLET CHEWABLE at 08:06

## 2025-06-25 RX ADMIN — INSULIN ASPART 4 UNITS: 100 INJECTION, SOLUTION INTRAVENOUS; SUBCUTANEOUS at 05:06

## 2025-06-25 RX ADMIN — LEVOTHYROXINE SODIUM 75 MCG: 75 TABLET ORAL at 05:06

## 2025-06-25 RX ADMIN — LACOSAMIDE 100 MG: 50 TABLET, FILM COATED ORAL at 09:06

## 2025-06-25 RX ADMIN — HEPARIN SODIUM 5000 UNITS: 5000 INJECTION INTRAVENOUS; SUBCUTANEOUS at 05:06

## 2025-06-25 RX ADMIN — LACOSAMIDE 100 MG: 50 TABLET, FILM COATED ORAL at 08:06

## 2025-06-25 RX ADMIN — HEPARIN SODIUM 5000 UNITS: 5000 INJECTION INTRAVENOUS; SUBCUTANEOUS at 09:06

## 2025-06-25 RX ADMIN — ATORVASTATIN CALCIUM 80 MG: 40 TABLET, FILM COATED ORAL at 09:06

## 2025-06-26 LAB
ABSOLUTE EOSINOPHIL (OHS): 0.45 K/UL
ABSOLUTE MONOCYTE (OHS): 1.64 K/UL (ref 0.3–1)
ABSOLUTE NEUTROPHIL COUNT (OHS): 12.44 K/UL (ref 1.8–7.7)
ALBUMIN SERPL BCP-MCNC: 1.3 G/DL (ref 3.5–5.2)
ALP SERPL-CCNC: 292 UNIT/L (ref 40–150)
ALT SERPL W/O P-5'-P-CCNC: 8 UNIT/L (ref 10–44)
ANION GAP (OHS): 13 MMOL/L (ref 8–16)
AST SERPL-CCNC: 61 UNIT/L (ref 11–45)
BASOPHILS # BLD AUTO: 0.06 K/UL
BASOPHILS NFR BLD AUTO: 0.3 %
BILIRUB SERPL-MCNC: 0.1 MG/DL (ref 0.1–1)
BUN SERPL-MCNC: 104 MG/DL (ref 8–23)
CALCIUM SERPL-MCNC: 7.7 MG/DL (ref 8.7–10.5)
CHLORIDE SERPL-SCNC: 105 MMOL/L (ref 95–110)
CO2 SERPL-SCNC: 25 MMOL/L (ref 23–29)
CREAT SERPL-MCNC: 2.9 MG/DL (ref 0.5–1.4)
ERYTHROCYTE [DISTWIDTH] IN BLOOD BY AUTOMATED COUNT: 16.8 % (ref 11.5–14.5)
GFR SERPLBLD CREATININE-BSD FMLA CKD-EPI: 16 ML/MIN/1.73/M2
GLUCOSE SERPL-MCNC: 166 MG/DL (ref 70–110)
HCT VFR BLD AUTO: 24.3 % (ref 37–48.5)
HGB BLD-MCNC: 7.5 GM/DL (ref 12–16)
IMM GRANULOCYTES # BLD AUTO: 0.29 K/UL (ref 0–0.04)
IMM GRANULOCYTES NFR BLD AUTO: 1.6 % (ref 0–0.5)
LYMPHOCYTES # BLD AUTO: 3.65 K/UL (ref 1–4.8)
MAGNESIUM SERPL-MCNC: 2.5 MG/DL (ref 1.6–2.6)
MCH RBC QN AUTO: 31.3 PG (ref 27–31)
MCHC RBC AUTO-ENTMCNC: 30.9 G/DL (ref 32–36)
MCV RBC AUTO: 101 FL (ref 82–98)
NUCLEATED RBC (/100WBC) (OHS): 0 /100 WBC
PHOSPHATE SERPL-MCNC: 5.1 MG/DL (ref 2.7–4.5)
PLATELET # BLD AUTO: 476 K/UL (ref 150–450)
PMV BLD AUTO: 9.8 FL (ref 9.2–12.9)
POCT GLUCOSE: 191 MG/DL (ref 70–110)
POCT GLUCOSE: 215 MG/DL (ref 70–110)
POCT GLUCOSE: 219 MG/DL (ref 70–110)
POCT GLUCOSE: 238 MG/DL (ref 70–110)
POTASSIUM SERPL-SCNC: 5.1 MMOL/L (ref 3.5–5.1)
PROT SERPL-MCNC: 6.8 GM/DL (ref 6–8.4)
RBC # BLD AUTO: 2.4 M/UL (ref 4–5.4)
RELATIVE EOSINOPHIL (OHS): 2.4 %
RELATIVE LYMPHOCYTE (OHS): 19.7 % (ref 18–48)
RELATIVE MONOCYTE (OHS): 8.9 % (ref 4–15)
RELATIVE NEUTROPHIL (OHS): 67.1 % (ref 38–73)
SODIUM SERPL-SCNC: 143 MMOL/L (ref 136–145)
WBC # BLD AUTO: 18.53 K/UL (ref 3.9–12.7)

## 2025-06-26 PROCEDURE — 92526 ORAL FUNCTION THERAPY: CPT | Mod: HCNC

## 2025-06-26 PROCEDURE — 94799 UNLISTED PULMONARY SVC/PX: CPT | Mod: HCNC

## 2025-06-26 PROCEDURE — 27100171 HC OXYGEN HIGH FLOW UP TO 24 HOURS: Mod: HCNC

## 2025-06-26 PROCEDURE — 63600175 PHARM REV CODE 636 W HCPCS: Mod: HCNC

## 2025-06-26 PROCEDURE — 94761 N-INVAS EAR/PLS OXIMETRY MLT: CPT | Mod: HCNC

## 2025-06-26 PROCEDURE — 85025 COMPLETE CBC W/AUTO DIFF WBC: CPT | Mod: HCNC

## 2025-06-26 PROCEDURE — 27000207 HC ISOLATION: Mod: HCNC

## 2025-06-26 PROCEDURE — 99232 SBSQ HOSP IP/OBS MODERATE 35: CPT | Mod: HCNC,,, | Performed by: INTERNAL MEDICINE

## 2025-06-26 PROCEDURE — 94660 CPAP INITIATION&MGMT: CPT | Mod: HCNC

## 2025-06-26 PROCEDURE — 99900035 HC TECH TIME PER 15 MIN (STAT): Mod: HCNC

## 2025-06-26 PROCEDURE — 25000003 PHARM REV CODE 250: Mod: HCNC | Performed by: HOSPITALIST

## 2025-06-26 PROCEDURE — 51702 INSERT TEMP BLADDER CATH: CPT | Mod: HCNC

## 2025-06-26 PROCEDURE — 83735 ASSAY OF MAGNESIUM: CPT | Mod: HCNC

## 2025-06-26 PROCEDURE — 99291 CRITICAL CARE FIRST HOUR: CPT | Mod: HCNC,GC,, | Performed by: INTERNAL MEDICINE

## 2025-06-26 PROCEDURE — 20000000 HC ICU ROOM: Mod: HCNC

## 2025-06-26 PROCEDURE — 80053 COMPREHEN METABOLIC PANEL: CPT | Mod: HCNC

## 2025-06-26 PROCEDURE — 84100 ASSAY OF PHOSPHORUS: CPT | Mod: HCNC

## 2025-06-26 RX ADMIN — SODIUM CHLORIDE, POTASSIUM CHLORIDE, SODIUM LACTATE AND CALCIUM CHLORIDE 500 ML: 600; 310; 30; 20 INJECTION, SOLUTION INTRAVENOUS at 01:06

## 2025-06-26 RX ADMIN — CEFAZOLIN 2 G: 2 INJECTION, POWDER, FOR SOLUTION INTRAMUSCULAR; INTRAVENOUS at 04:06

## 2025-06-26 RX ADMIN — ATORVASTATIN CALCIUM 80 MG: 40 TABLET, FILM COATED ORAL at 09:06

## 2025-06-26 RX ADMIN — HEPARIN SODIUM 5000 UNITS: 5000 INJECTION INTRAVENOUS; SUBCUTANEOUS at 09:06

## 2025-06-26 RX ADMIN — INSULIN ASPART 4 UNITS: 100 INJECTION, SOLUTION INTRAVENOUS; SUBCUTANEOUS at 06:06

## 2025-06-26 RX ADMIN — CEFAZOLIN 2 G: 2 INJECTION, POWDER, FOR SOLUTION INTRAMUSCULAR; INTRAVENOUS at 06:06

## 2025-06-26 RX ADMIN — ASPIRIN 81 MG CHEWABLE TABLET 81 MG: 81 TABLET CHEWABLE at 08:06

## 2025-06-26 RX ADMIN — INSULIN ASPART 4 UNITS: 100 INJECTION, SOLUTION INTRAVENOUS; SUBCUTANEOUS at 12:06

## 2025-06-26 RX ADMIN — LEVOTHYROXINE SODIUM 75 MCG: 75 TABLET ORAL at 06:06

## 2025-06-26 RX ADMIN — HEPARIN SODIUM 5000 UNITS: 5000 INJECTION INTRAVENOUS; SUBCUTANEOUS at 06:06

## 2025-06-26 RX ADMIN — HEPARIN SODIUM 5000 UNITS: 5000 INJECTION INTRAVENOUS; SUBCUTANEOUS at 02:06

## 2025-06-26 RX ADMIN — LACOSAMIDE 100 MG: 50 TABLET, FILM COATED ORAL at 08:06

## 2025-06-26 RX ADMIN — LACOSAMIDE 100 MG: 50 TABLET, FILM COATED ORAL at 09:06

## 2025-06-27 ENCOUNTER — ANESTHESIA EVENT (OUTPATIENT)
Dept: INTENSIVE CARE | Facility: HOSPITAL | Age: 80
End: 2025-06-27
Payer: MEDICARE

## 2025-06-27 ENCOUNTER — ANESTHESIA (OUTPATIENT)
Dept: INTENSIVE CARE | Facility: HOSPITAL | Age: 80
End: 2025-06-27
Payer: MEDICARE

## 2025-06-27 LAB
ABSOLUTE EOSINOPHIL (OHS): 0.46 K/UL
ABSOLUTE MONOCYTE (OHS): 1.97 K/UL (ref 0.3–1)
ABSOLUTE NEUTROPHIL COUNT (OHS): 15.01 K/UL (ref 1.8–7.7)
ALBUMIN SERPL BCP-MCNC: 1.4 G/DL (ref 3.5–5.2)
ALP SERPL-CCNC: 336 UNIT/L (ref 40–150)
ALT SERPL W/O P-5'-P-CCNC: 6 UNIT/L (ref 10–44)
ANION GAP (OHS): 13 MMOL/L (ref 8–16)
AST SERPL-CCNC: 73 UNIT/L (ref 11–45)
BASOPHILS # BLD AUTO: 0.06 K/UL
BASOPHILS NFR BLD AUTO: 0.3 %
BILIRUB SERPL-MCNC: 0.1 MG/DL (ref 0.1–1)
BUN SERPL-MCNC: 112 MG/DL (ref 8–23)
CALCIUM SERPL-MCNC: 8 MG/DL (ref 8.7–10.5)
CHLORIDE SERPL-SCNC: 106 MMOL/L (ref 95–110)
CO2 SERPL-SCNC: 25 MMOL/L (ref 23–29)
CREAT SERPL-MCNC: 2.9 MG/DL (ref 0.5–1.4)
ERYTHROCYTE [DISTWIDTH] IN BLOOD BY AUTOMATED COUNT: 16.9 % (ref 11.5–14.5)
GFR SERPLBLD CREATININE-BSD FMLA CKD-EPI: 16 ML/MIN/1.73/M2
GLUCOSE SERPL-MCNC: 163 MG/DL (ref 70–110)
HBV CORE AB SERPL QL IA: NORMAL
HBV SURFACE AB SER-ACNC: <3 MIU/ML
HBV SURFACE AB SERPL IA-ACNC: NORMAL M[IU]/ML
HBV SURFACE AG SERPL QL IA: NORMAL
HCT VFR BLD AUTO: 24.9 % (ref 37–48.5)
HGB BLD-MCNC: 7.6 GM/DL (ref 12–16)
IMM GRANULOCYTES # BLD AUTO: 0.21 K/UL (ref 0–0.04)
IMM GRANULOCYTES NFR BLD AUTO: 1 % (ref 0–0.5)
LYMPHOCYTES # BLD AUTO: 3.54 K/UL (ref 1–4.8)
MAGNESIUM SERPL-MCNC: 2.6 MG/DL (ref 1.6–2.6)
MCH RBC QN AUTO: 30.8 PG (ref 27–31)
MCHC RBC AUTO-ENTMCNC: 30.5 G/DL (ref 32–36)
MCV RBC AUTO: 101 FL (ref 82–98)
NUCLEATED RBC (/100WBC) (OHS): 0 /100 WBC
PHOSPHATE SERPL-MCNC: 5.4 MG/DL (ref 2.7–4.5)
PLATELET # BLD AUTO: 480 K/UL (ref 150–450)
PMV BLD AUTO: 10 FL (ref 9.2–12.9)
POCT GLUCOSE: 135 MG/DL (ref 70–110)
POCT GLUCOSE: 151 MG/DL (ref 70–110)
POCT GLUCOSE: 173 MG/DL (ref 70–110)
POTASSIUM SERPL-SCNC: 5.1 MMOL/L (ref 3.5–5.1)
PROT SERPL-MCNC: 6.9 GM/DL (ref 6–8.4)
RBC # BLD AUTO: 2.47 M/UL (ref 4–5.4)
RELATIVE EOSINOPHIL (OHS): 2.2 %
RELATIVE LYMPHOCYTE (OHS): 16.7 % (ref 18–48)
RELATIVE MONOCYTE (OHS): 9.3 % (ref 4–15)
RELATIVE NEUTROPHIL (OHS): 70.5 % (ref 38–73)
SODIUM SERPL-SCNC: 144 MMOL/L (ref 136–145)
WBC # BLD AUTO: 21.25 K/UL (ref 3.9–12.7)

## 2025-06-27 PROCEDURE — 99291 CRITICAL CARE FIRST HOUR: CPT | Mod: HCNC,GC,, | Performed by: INTERNAL MEDICINE

## 2025-06-27 PROCEDURE — 63600175 PHARM REV CODE 636 W HCPCS: Mod: HCNC

## 2025-06-27 PROCEDURE — 94660 CPAP INITIATION&MGMT: CPT | Mod: HCNC

## 2025-06-27 PROCEDURE — 82040 ASSAY OF SERUM ALBUMIN: CPT | Mod: HCNC

## 2025-06-27 PROCEDURE — 27100171 HC OXYGEN HIGH FLOW UP TO 24 HOURS: Mod: HCNC

## 2025-06-27 PROCEDURE — 25000003 PHARM REV CODE 250: Mod: HCNC | Performed by: HOSPITALIST

## 2025-06-27 PROCEDURE — 94761 N-INVAS EAR/PLS OXIMETRY MLT: CPT | Mod: HCNC

## 2025-06-27 PROCEDURE — 99900035 HC TECH TIME PER 15 MIN (STAT): Mod: HCNC

## 2025-06-27 PROCEDURE — 87340 HEPATITIS B SURFACE AG IA: CPT | Mod: HCNC

## 2025-06-27 PROCEDURE — 27000207 HC ISOLATION: Mod: HCNC

## 2025-06-27 PROCEDURE — 84100 ASSAY OF PHOSPHORUS: CPT | Mod: HCNC

## 2025-06-27 PROCEDURE — 99232 SBSQ HOSP IP/OBS MODERATE 35: CPT | Mod: HCNC,GC,, | Performed by: INTERNAL MEDICINE

## 2025-06-27 PROCEDURE — 92526 ORAL FUNCTION THERAPY: CPT | Mod: HCNC

## 2025-06-27 PROCEDURE — 62270 DX LMBR SPI PNXR: CPT | Mod: HCNC

## 2025-06-27 PROCEDURE — 86706 HEP B SURFACE ANTIBODY: CPT | Mod: HCNC

## 2025-06-27 PROCEDURE — 85025 COMPLETE CBC W/AUTO DIFF WBC: CPT | Mod: HCNC

## 2025-06-27 PROCEDURE — 83735 ASSAY OF MAGNESIUM: CPT | Mod: HCNC

## 2025-06-27 PROCEDURE — 86704 HEP B CORE ANTIBODY TOTAL: CPT | Mod: HCNC

## 2025-06-27 PROCEDURE — 99223 1ST HOSP IP/OBS HIGH 75: CPT | Mod: HCNC,,, | Performed by: PHYSICIAN ASSISTANT

## 2025-06-27 PROCEDURE — 20000000 HC ICU ROOM: Mod: HCNC

## 2025-06-27 RX ADMIN — HEPARIN SODIUM 5000 UNITS: 5000 INJECTION INTRAVENOUS; SUBCUTANEOUS at 09:06

## 2025-06-27 RX ADMIN — INSULIN ASPART 2 UNITS: 100 INJECTION, SOLUTION INTRAVENOUS; SUBCUTANEOUS at 12:06

## 2025-06-27 RX ADMIN — CEFAZOLIN 2 G: 2 INJECTION, POWDER, FOR SOLUTION INTRAMUSCULAR; INTRAVENOUS at 05:06

## 2025-06-27 RX ADMIN — LACOSAMIDE 100 MG: 50 TABLET, FILM COATED ORAL at 08:06

## 2025-06-27 RX ADMIN — HEPARIN SODIUM 5000 UNITS: 5000 INJECTION INTRAVENOUS; SUBCUTANEOUS at 05:06

## 2025-06-27 RX ADMIN — LEVOTHYROXINE SODIUM 75 MCG: 75 TABLET ORAL at 05:06

## 2025-06-27 RX ADMIN — INSULIN ASPART 1 UNITS: 100 INJECTION, SOLUTION INTRAVENOUS; SUBCUTANEOUS at 12:06

## 2025-06-27 RX ADMIN — ASPIRIN 81 MG CHEWABLE TABLET 81 MG: 81 TABLET CHEWABLE at 08:06

## 2025-06-27 RX ADMIN — LACOSAMIDE 100 MG: 50 TABLET, FILM COATED ORAL at 09:06

## 2025-06-27 RX ADMIN — CEFAZOLIN 2 G: 2 INJECTION, POWDER, FOR SOLUTION INTRAMUSCULAR; INTRAVENOUS at 04:06

## 2025-06-27 RX ADMIN — ATORVASTATIN CALCIUM 80 MG: 40 TABLET, FILM COATED ORAL at 09:06

## 2025-06-27 NOTE — ANESTHESIA PROCEDURE NOTES
LP    Diagnosis: Encephalopathy  Patient location during procedure: ICU  Start time: 6/27/2025 4:20 PM  Timeout: 6/27/2025 4:15 PM  End time: 6/27/2025 4:30 PM    Staffing  Authorizing Provider: Paola Rodriguez MD  Performing Provider: Paola Rodriguez MD    Staffing  Performed by: Paola Rodriguez MD  Authorized by: Paola Rodriguez MD    Preanesthetic Checklist  Completed: patient identified, IV checked, site marked, risks and benefits discussed, surgical consent, monitors and equipment checked, pre-op evaluation and timeout performed  Spinal Block  Patient position: sitting  Prep: ChloraPrep  Patient monitoring: heart rate, cardiac monitor, continuous pulse ox and frequent blood pressure checks  Approach: midline  Location: L4-5  Injection technique: lumbar puncture  Needle  Needle gauge: 20G.  Needle localization: anatomical landmarks  Additional Notes  After 1 attempt, procedure aborted 2/2 failure to access intrathecal space and difficulty maintaining patient position. Technically challenging given patient habitus, inability to follow commands, and tight interspinous spaces with inadequate positioning.

## 2025-06-28 LAB
ABO + RH BLD: NORMAL
ABSOLUTE EOSINOPHIL (OHS): 0.21 K/UL
ABSOLUTE MONOCYTE (OHS): 1.37 K/UL (ref 0.3–1)
ABSOLUTE NEUTROPHIL COUNT (OHS): 15.08 K/UL (ref 1.8–7.7)
ALBUMIN SERPL BCP-MCNC: 1.2 G/DL (ref 3.5–5.2)
ALBUMIN SERPL BCP-MCNC: 1.2 G/DL (ref 3.5–5.2)
ALBUMIN SERPL BCP-MCNC: 1.4 G/DL (ref 3.5–5.2)
ALP SERPL-CCNC: 238 UNIT/L (ref 40–150)
ALT SERPL W/O P-5'-P-CCNC: <5 UNIT/L (ref 10–44)
ANION GAP (OHS): 11 MMOL/L (ref 8–16)
ANION GAP (OHS): 15 MMOL/L (ref 8–16)
ANION GAP (OHS): 15 MMOL/L (ref 8–16)
AST SERPL-CCNC: 46 UNIT/L (ref 11–45)
BASOPHILS # BLD AUTO: 0.06 K/UL
BASOPHILS NFR BLD AUTO: 0.3 %
BILIRUB SERPL-MCNC: 0.1 MG/DL (ref 0.1–1)
BLD PROD TYP BPU: NORMAL
BLOOD UNIT EXPIRATION DATE: NORMAL
BLOOD UNIT TYPE CODE: 1700
BUN SERPL-MCNC: 115 MG/DL (ref 8–23)
BUN SERPL-MCNC: 84 MG/DL (ref 8–23)
BUN SERPL-MCNC: 99 MG/DL (ref 8–23)
CALCIUM SERPL-MCNC: 7.6 MG/DL (ref 8.7–10.5)
CALCIUM SERPL-MCNC: 8 MG/DL (ref 8.7–10.5)
CALCIUM SERPL-MCNC: 8.3 MG/DL (ref 8.7–10.5)
CHLORIDE SERPL-SCNC: 107 MMOL/L (ref 95–110)
CHLORIDE SERPL-SCNC: 108 MMOL/L (ref 95–110)
CHLORIDE SERPL-SCNC: 109 MMOL/L (ref 95–110)
CO2 SERPL-SCNC: 22 MMOL/L (ref 23–29)
CO2 SERPL-SCNC: 25 MMOL/L (ref 23–29)
CO2 SERPL-SCNC: 25 MMOL/L (ref 23–29)
CREAT SERPL-MCNC: 2.4 MG/DL (ref 0.5–1.4)
CREAT SERPL-MCNC: 2.7 MG/DL (ref 0.5–1.4)
CREAT SERPL-MCNC: 3.1 MG/DL (ref 0.5–1.4)
CROSSMATCH INTERPRETATION: NORMAL
DISPENSE STATUS: NORMAL
ERYTHROCYTE [DISTWIDTH] IN BLOOD BY AUTOMATED COUNT: 16.9 % (ref 11.5–14.5)
GFR SERPLBLD CREATININE-BSD FMLA CKD-EPI: 15 ML/MIN/1.73/M2
GFR SERPLBLD CREATININE-BSD FMLA CKD-EPI: 17 ML/MIN/1.73/M2
GFR SERPLBLD CREATININE-BSD FMLA CKD-EPI: 20 ML/MIN/1.73/M2
GLUCOSE SERPL-MCNC: 110 MG/DL (ref 70–110)
GLUCOSE SERPL-MCNC: 81 MG/DL (ref 70–110)
GLUCOSE SERPL-MCNC: 96 MG/DL (ref 70–110)
HCT VFR BLD AUTO: 22.4 % (ref 37–48.5)
HGB BLD-MCNC: 6.9 GM/DL (ref 12–16)
HGB BLD-MCNC: 9.4 GM/DL (ref 12–16)
IMM GRANULOCYTES # BLD AUTO: 0.17 K/UL (ref 0–0.04)
IMM GRANULOCYTES NFR BLD AUTO: 0.9 % (ref 0–0.5)
INDIRECT COOMBS: NORMAL
LYMPHOCYTES # BLD AUTO: 2.82 K/UL (ref 1–4.8)
MAGNESIUM SERPL-MCNC: 2.4 MG/DL (ref 1.6–2.6)
MAGNESIUM SERPL-MCNC: 2.4 MG/DL (ref 1.6–2.6)
MAGNESIUM SERPL-MCNC: 2.6 MG/DL (ref 1.6–2.6)
MCH RBC QN AUTO: 30.9 PG (ref 27–31)
MCHC RBC AUTO-ENTMCNC: 30.8 G/DL (ref 32–36)
MCV RBC AUTO: 100 FL (ref 82–98)
NUCLEATED RBC (/100WBC) (OHS): 0 /100 WBC
PHOSPHATE SERPL-MCNC: 4.7 MG/DL (ref 2.7–4.5)
PHOSPHATE SERPL-MCNC: 5.4 MG/DL (ref 2.7–4.5)
PHOSPHATE SERPL-MCNC: 5.9 MG/DL (ref 2.7–4.5)
PLATELET # BLD AUTO: 444 K/UL (ref 150–450)
PMV BLD AUTO: 10 FL (ref 9.2–12.9)
POCT GLUCOSE: 107 MG/DL (ref 70–110)
POCT GLUCOSE: 127 MG/DL (ref 70–110)
POCT GLUCOSE: 128 MG/DL (ref 70–110)
POCT GLUCOSE: 97 MG/DL (ref 70–110)
POTASSIUM SERPL-SCNC: 4.9 MMOL/L (ref 3.5–5.1)
POTASSIUM SERPL-SCNC: 5.1 MMOL/L (ref 3.5–5.1)
POTASSIUM SERPL-SCNC: 5.1 MMOL/L (ref 3.5–5.1)
PROT SERPL-MCNC: 6.7 GM/DL (ref 6–8.4)
RBC # BLD AUTO: 2.23 M/UL (ref 4–5.4)
RELATIVE EOSINOPHIL (OHS): 1.1 %
RELATIVE LYMPHOCYTE (OHS): 14.3 % (ref 18–48)
RELATIVE MONOCYTE (OHS): 7 % (ref 4–15)
RELATIVE NEUTROPHIL (OHS): 76.4 % (ref 38–73)
RH BLD: NORMAL
SODIUM SERPL-SCNC: 144 MMOL/L (ref 136–145)
SODIUM SERPL-SCNC: 146 MMOL/L (ref 136–145)
SODIUM SERPL-SCNC: 147 MMOL/L (ref 136–145)
SPECIMEN OUTDATE: NORMAL
UNIT NUMBER: NORMAL
WBC # BLD AUTO: 19.71 K/UL (ref 3.9–12.7)

## 2025-06-28 PROCEDURE — 25000003 PHARM REV CODE 250: Mod: HCNC | Performed by: INTERNAL MEDICINE

## 2025-06-28 PROCEDURE — 99291 CRITICAL CARE FIRST HOUR: CPT | Mod: HCNC,GC,, | Performed by: INTERNAL MEDICINE

## 2025-06-28 PROCEDURE — 63600175 PHARM REV CODE 636 W HCPCS: Mod: HCNC | Performed by: STUDENT IN AN ORGANIZED HEALTH CARE EDUCATION/TRAINING PROGRAM

## 2025-06-28 PROCEDURE — 94761 N-INVAS EAR/PLS OXIMETRY MLT: CPT | Mod: HCNC

## 2025-06-28 PROCEDURE — 63600175 PHARM REV CODE 636 W HCPCS: Mod: HCNC

## 2025-06-28 PROCEDURE — 20000000 HC ICU ROOM: Mod: HCNC

## 2025-06-28 PROCEDURE — 99900035 HC TECH TIME PER 15 MIN (STAT): Mod: HCNC

## 2025-06-28 PROCEDURE — 84100 ASSAY OF PHOSPHORUS: CPT | Mod: HCNC

## 2025-06-28 PROCEDURE — 02H633Z INSERTION OF INFUSION DEVICE INTO RIGHT ATRIUM, PERCUTANEOUS APPROACH: ICD-10-PCS | Performed by: STUDENT IN AN ORGANIZED HEALTH CARE EDUCATION/TRAINING PROGRAM

## 2025-06-28 PROCEDURE — 84100 ASSAY OF PHOSPHORUS: CPT | Mod: HCNC | Performed by: INTERNAL MEDICINE

## 2025-06-28 PROCEDURE — 85025 COMPLETE CBC W/AUTO DIFF WBC: CPT | Mod: HCNC

## 2025-06-28 PROCEDURE — 83735 ASSAY OF MAGNESIUM: CPT | Mod: HCNC

## 2025-06-28 PROCEDURE — P9016 RBC LEUKOCYTES REDUCED: HCPCS | Mod: HCNC

## 2025-06-28 PROCEDURE — 25000003 PHARM REV CODE 250: Mod: HCNC | Performed by: HOSPITALIST

## 2025-06-28 PROCEDURE — 90945 DIALYSIS ONE EVALUATION: CPT | Mod: HCNC

## 2025-06-28 PROCEDURE — 0JH63XZ INSERTION OF TUNNELED VASCULAR ACCESS DEVICE INTO CHEST SUBCUTANEOUS TISSUE AND FASCIA, PERCUTANEOUS APPROACH: ICD-10-PCS | Performed by: STUDENT IN AN ORGANIZED HEALTH CARE EDUCATION/TRAINING PROGRAM

## 2025-06-28 PROCEDURE — 83735 ASSAY OF MAGNESIUM: CPT | Mod: HCNC | Performed by: INTERNAL MEDICINE

## 2025-06-28 PROCEDURE — 27000207 HC ISOLATION: Mod: HCNC

## 2025-06-28 PROCEDURE — 94799 UNLISTED PULMONARY SVC/PX: CPT | Mod: HCNC

## 2025-06-28 PROCEDURE — 99233 SBSQ HOSP IP/OBS HIGH 50: CPT | Mod: HCNC,,, | Performed by: INTERNAL MEDICINE

## 2025-06-28 PROCEDURE — 80053 COMPREHEN METABOLIC PANEL: CPT | Mod: HCNC

## 2025-06-28 PROCEDURE — 86920 COMPATIBILITY TEST SPIN: CPT | Mod: HCNC

## 2025-06-28 PROCEDURE — 94660 CPAP INITIATION&MGMT: CPT | Mod: HCNC

## 2025-06-28 PROCEDURE — 27100171 HC OXYGEN HIGH FLOW UP TO 24 HOURS: Mod: HCNC

## 2025-06-28 PROCEDURE — 85018 HEMOGLOBIN: CPT | Mod: HCNC

## 2025-06-28 PROCEDURE — 86901 BLOOD TYPING SEROLOGIC RH(D): CPT | Mod: HCNC

## 2025-06-28 RX ORDER — MAGNESIUM SULFATE HEPTAHYDRATE 40 MG/ML
2 INJECTION, SOLUTION INTRAVENOUS
Status: DISCONTINUED | OUTPATIENT
Start: 2025-06-28 | End: 2025-06-29

## 2025-06-28 RX ORDER — CEFAZOLIN SODIUM 1 G/3ML
INJECTION, POWDER, FOR SOLUTION INTRAMUSCULAR; INTRAVENOUS
Status: COMPLETED | OUTPATIENT
Start: 2025-06-28 | End: 2025-06-28

## 2025-06-28 RX ORDER — FUROSEMIDE 10 MG/ML
120 INJECTION INTRAMUSCULAR; INTRAVENOUS ONCE
Status: COMPLETED | OUTPATIENT
Start: 2025-06-28 | End: 2025-06-28

## 2025-06-28 RX ORDER — DIPHENHYDRAMINE HYDROCHLORIDE 50 MG/ML
INJECTION, SOLUTION INTRAMUSCULAR; INTRAVENOUS
Status: COMPLETED | OUTPATIENT
Start: 2025-06-28 | End: 2025-06-28

## 2025-06-28 RX ORDER — HEPARIN SODIUM 1000 [USP'U]/ML
4000 INJECTION, SOLUTION INTRAVENOUS; SUBCUTANEOUS ONCE
Status: COMPLETED | OUTPATIENT
Start: 2025-06-28 | End: 2025-06-28

## 2025-06-28 RX ORDER — FENTANYL CITRATE 50 UG/ML
INJECTION, SOLUTION INTRAMUSCULAR; INTRAVENOUS
Status: COMPLETED | OUTPATIENT
Start: 2025-06-28 | End: 2025-06-28

## 2025-06-28 RX ORDER — HYDROCODONE BITARTRATE AND ACETAMINOPHEN 500; 5 MG/1; MG/1
TABLET ORAL
Status: DISCONTINUED | OUTPATIENT
Start: 2025-06-28 | End: 2025-07-08

## 2025-06-28 RX ORDER — LIDOCAINE HYDROCHLORIDE 10 MG/ML
INJECTION, SOLUTION EPIDURAL; INFILTRATION; INTRACAUDAL; PERINEURAL
Status: COMPLETED | OUTPATIENT
Start: 2025-06-28 | End: 2025-06-28

## 2025-06-28 RX ORDER — HYDROCODONE BITARTRATE AND ACETAMINOPHEN 500; 5 MG/1; MG/1
TABLET ORAL CONTINUOUS
Status: DISCONTINUED | OUTPATIENT
Start: 2025-06-28 | End: 2025-06-29

## 2025-06-28 RX ADMIN — FUROSEMIDE 120 MG: 10 INJECTION, SOLUTION INTRAVENOUS at 11:06

## 2025-06-28 RX ADMIN — CEFAZOLIN 2 G: 2 INJECTION, POWDER, FOR SOLUTION INTRAMUSCULAR; INTRAVENOUS at 04:06

## 2025-06-28 RX ADMIN — FENTANYL CITRATE 25 MCG: 50 INJECTION, SOLUTION INTRAMUSCULAR; INTRAVENOUS at 09:06

## 2025-06-28 RX ADMIN — LACOSAMIDE 100 MG: 50 TABLET, FILM COATED ORAL at 08:06

## 2025-06-28 RX ADMIN — LACOSAMIDE 100 MG: 50 TABLET, FILM COATED ORAL at 09:06

## 2025-06-28 RX ADMIN — HEPARIN SODIUM 5000 UNITS: 5000 INJECTION INTRAVENOUS; SUBCUTANEOUS at 09:06

## 2025-06-28 RX ADMIN — DIPHENHYDRAMINE HYDROCHLORIDE 12.5 MG: 50 INJECTION, SOLUTION INTRAMUSCULAR; INTRAVENOUS at 09:06

## 2025-06-28 RX ADMIN — HEPARIN SODIUM 5000 UNITS: 5000 INJECTION INTRAVENOUS; SUBCUTANEOUS at 02:06

## 2025-06-28 RX ADMIN — CEFAZOLIN 2 G: 2 INJECTION, POWDER, FOR SOLUTION INTRAMUSCULAR; INTRAVENOUS at 05:06

## 2025-06-28 RX ADMIN — CEFAZOLIN 1 G: 330 INJECTION, POWDER, FOR SOLUTION INTRAMUSCULAR; INTRAVENOUS at 09:06

## 2025-06-28 RX ADMIN — FUROSEMIDE 10 MG/HR: 10 INJECTION INTRAMUSCULAR; INTRAVENOUS at 11:06

## 2025-06-28 RX ADMIN — ASPIRIN 81 MG CHEWABLE TABLET 81 MG: 81 TABLET CHEWABLE at 08:06

## 2025-06-28 RX ADMIN — LIDOCAINE HYDROCHLORIDE 5 ML: 10 SOLUTION INTRAVENOUS at 09:06

## 2025-06-28 RX ADMIN — ATORVASTATIN CALCIUM 80 MG: 40 TABLET, FILM COATED ORAL at 09:06

## 2025-06-28 RX ADMIN — SODIUM CHLORIDE: 9 INJECTION, SOLUTION INTRAVENOUS at 01:06

## 2025-06-29 LAB
ABSOLUTE EOSINOPHIL (OHS): 0.36 K/UL
ABSOLUTE MONOCYTE (OHS): 1.58 K/UL (ref 0.3–1)
ABSOLUTE NEUTROPHIL COUNT (OHS): 14.34 K/UL (ref 1.8–7.7)
ALBUMIN SERPL BCP-MCNC: 1.3 G/DL (ref 3.5–5.2)
ALP SERPL-CCNC: 210 UNIT/L (ref 40–150)
ALT SERPL W/O P-5'-P-CCNC: <5 UNIT/L (ref 10–44)
ANION GAP (OHS): 10 MMOL/L (ref 8–16)
ANION GAP (OHS): 13 MMOL/L (ref 8–16)
ANION GAP (OHS): 13 MMOL/L (ref 8–16)
AST SERPL-CCNC: 33 UNIT/L (ref 11–45)
BASOPHILS # BLD AUTO: 0.07 K/UL
BASOPHILS NFR BLD AUTO: 0.4 %
BILIRUB SERPL-MCNC: 0.1 MG/DL (ref 0.1–1)
BUN SERPL-MCNC: 48 MG/DL (ref 8–23)
BUN SERPL-MCNC: 87 MG/DL (ref 8–23)
BUN SERPL-MCNC: 87 MG/DL (ref 8–23)
CALCIUM SERPL-MCNC: 7.9 MG/DL (ref 8.7–10.5)
CALCIUM SERPL-MCNC: 8.3 MG/DL (ref 8.7–10.5)
CALCIUM SERPL-MCNC: 8.3 MG/DL (ref 8.7–10.5)
CHLORIDE SERPL-SCNC: 110 MMOL/L (ref 95–110)
CHLORIDE SERPL-SCNC: 111 MMOL/L (ref 95–110)
CHLORIDE SERPL-SCNC: 111 MMOL/L (ref 95–110)
CO2 SERPL-SCNC: 23 MMOL/L (ref 23–29)
CREAT SERPL-MCNC: 1.5 MG/DL (ref 0.5–1.4)
CREAT SERPL-MCNC: 2.4 MG/DL (ref 0.5–1.4)
CREAT SERPL-MCNC: 2.4 MG/DL (ref 0.5–1.4)
ERYTHROCYTE [DISTWIDTH] IN BLOOD BY AUTOMATED COUNT: 20.3 % (ref 11.5–14.5)
GFR SERPLBLD CREATININE-BSD FMLA CKD-EPI: 20 ML/MIN/1.73/M2
GFR SERPLBLD CREATININE-BSD FMLA CKD-EPI: 20 ML/MIN/1.73/M2
GFR SERPLBLD CREATININE-BSD FMLA CKD-EPI: 35 ML/MIN/1.73/M2
GLUCOSE SERPL-MCNC: 77 MG/DL (ref 70–110)
GLUCOSE SERPL-MCNC: 78 MG/DL (ref 70–110)
GLUCOSE SERPL-MCNC: 78 MG/DL (ref 70–110)
HCT VFR BLD AUTO: 26.9 % (ref 37–48.5)
HGB BLD-MCNC: 8.3 GM/DL (ref 12–16)
IMM GRANULOCYTES # BLD AUTO: 0.12 K/UL (ref 0–0.04)
IMM GRANULOCYTES NFR BLD AUTO: 0.6 % (ref 0–0.5)
LYMPHOCYTES # BLD AUTO: 2.77 K/UL (ref 1–4.8)
MAGNESIUM SERPL-MCNC: 2.1 MG/DL (ref 1.6–2.6)
MAGNESIUM SERPL-MCNC: 2.4 MG/DL (ref 1.6–2.6)
MCH RBC QN AUTO: 29.7 PG (ref 27–31)
MCHC RBC AUTO-ENTMCNC: 30.9 G/DL (ref 32–36)
MCV RBC AUTO: 96 FL (ref 82–98)
NUCLEATED RBC (/100WBC) (OHS): 0 /100 WBC
PHOSPHATE SERPL-MCNC: 3.2 MG/DL (ref 2.7–4.5)
PHOSPHATE SERPL-MCNC: 5 MG/DL (ref 2.7–4.5)
PLATELET # BLD AUTO: 404 K/UL (ref 150–450)
PMV BLD AUTO: 9.7 FL (ref 9.2–12.9)
POCT GLUCOSE: 78 MG/DL (ref 70–110)
POCT GLUCOSE: 84 MG/DL (ref 70–110)
POTASSIUM SERPL-SCNC: 4.7 MMOL/L (ref 3.5–5.1)
POTASSIUM SERPL-SCNC: 5.4 MMOL/L (ref 3.5–5.1)
POTASSIUM SERPL-SCNC: 5.4 MMOL/L (ref 3.5–5.1)
PROT SERPL-MCNC: 7.3 GM/DL (ref 6–8.4)
RBC # BLD AUTO: 2.79 M/UL (ref 4–5.4)
RELATIVE EOSINOPHIL (OHS): 1.9 %
RELATIVE LYMPHOCYTE (OHS): 14.4 % (ref 18–48)
RELATIVE MONOCYTE (OHS): 8.2 % (ref 4–15)
RELATIVE NEUTROPHIL (OHS): 74.5 % (ref 38–73)
SODIUM SERPL-SCNC: 143 MMOL/L (ref 136–145)
SODIUM SERPL-SCNC: 147 MMOL/L (ref 136–145)
SODIUM SERPL-SCNC: 147 MMOL/L (ref 136–145)
WBC # BLD AUTO: 19.24 K/UL (ref 3.9–12.7)

## 2025-06-29 PROCEDURE — 27100171 HC OXYGEN HIGH FLOW UP TO 24 HOURS: Mod: HCNC

## 2025-06-29 PROCEDURE — 25000003 PHARM REV CODE 250: Mod: HCNC | Performed by: INTERNAL MEDICINE

## 2025-06-29 PROCEDURE — 20000000 HC ICU ROOM: Mod: HCNC

## 2025-06-29 PROCEDURE — 63600175 PHARM REV CODE 636 W HCPCS: Mod: HCNC

## 2025-06-29 PROCEDURE — 80100008 HC CRRT DAILY MAINTENANCE: Mod: HCNC

## 2025-06-29 PROCEDURE — 99900035 HC TECH TIME PER 15 MIN (STAT): Mod: HCNC

## 2025-06-29 PROCEDURE — 80053 COMPREHEN METABOLIC PANEL: CPT | Mod: HCNC

## 2025-06-29 PROCEDURE — 83735 ASSAY OF MAGNESIUM: CPT | Mod: HCNC | Performed by: INTERNAL MEDICINE

## 2025-06-29 PROCEDURE — 27000207 HC ISOLATION: Mod: HCNC

## 2025-06-29 PROCEDURE — 94799 UNLISTED PULMONARY SVC/PX: CPT | Mod: HCNC

## 2025-06-29 PROCEDURE — 80069 RENAL FUNCTION PANEL: CPT | Mod: HCNC | Performed by: INTERNAL MEDICINE

## 2025-06-29 PROCEDURE — 99233 SBSQ HOSP IP/OBS HIGH 50: CPT | Mod: HCNC,,, | Performed by: INTERNAL MEDICINE

## 2025-06-29 PROCEDURE — 90945 DIALYSIS ONE EVALUATION: CPT | Mod: HCNC

## 2025-06-29 PROCEDURE — 94761 N-INVAS EAR/PLS OXIMETRY MLT: CPT | Mod: HCNC

## 2025-06-29 PROCEDURE — 99291 CRITICAL CARE FIRST HOUR: CPT | Mod: HCNC,GC,, | Performed by: INTERNAL MEDICINE

## 2025-06-29 PROCEDURE — 25000003 PHARM REV CODE 250: Mod: HCNC | Performed by: HOSPITALIST

## 2025-06-29 PROCEDURE — 85025 COMPLETE CBC W/AUTO DIFF WBC: CPT | Mod: HCNC

## 2025-06-29 PROCEDURE — 94660 CPAP INITIATION&MGMT: CPT | Mod: HCNC

## 2025-06-29 RX ORDER — MAGNESIUM SULFATE HEPTAHYDRATE 40 MG/ML
2 INJECTION, SOLUTION INTRAVENOUS
Status: ACTIVE | OUTPATIENT
Start: 2025-06-29 | End: 2025-06-30

## 2025-06-29 RX ORDER — HYDROCODONE BITARTRATE AND ACETAMINOPHEN 500; 5 MG/1; MG/1
TABLET ORAL CONTINUOUS
Status: ACTIVE | OUTPATIENT
Start: 2025-06-29 | End: 2025-06-30

## 2025-06-29 RX ADMIN — HEPARIN SODIUM 5000 UNITS: 5000 INJECTION INTRAVENOUS; SUBCUTANEOUS at 10:06

## 2025-06-29 RX ADMIN — HEPARIN SODIUM 5000 UNITS: 5000 INJECTION INTRAVENOUS; SUBCUTANEOUS at 05:06

## 2025-06-29 RX ADMIN — ASPIRIN 81 MG CHEWABLE TABLET 81 MG: 81 TABLET CHEWABLE at 08:06

## 2025-06-29 RX ADMIN — LACOSAMIDE 100 MG: 50 TABLET, FILM COATED ORAL at 09:06

## 2025-06-29 RX ADMIN — ATORVASTATIN CALCIUM 80 MG: 40 TABLET, FILM COATED ORAL at 09:06

## 2025-06-29 RX ADMIN — CEFAZOLIN 2 G: 2 INJECTION, POWDER, FOR SOLUTION INTRAMUSCULAR; INTRAVENOUS at 05:06

## 2025-06-29 RX ADMIN — SODIUM CHLORIDE: 9 INJECTION, SOLUTION INTRAVENOUS at 09:06

## 2025-06-29 RX ADMIN — LACOSAMIDE 100 MG: 50 TABLET, FILM COATED ORAL at 08:06

## 2025-06-29 RX ADMIN — HEPARIN SODIUM 5000 UNITS: 5000 INJECTION INTRAVENOUS; SUBCUTANEOUS at 01:06

## 2025-06-29 RX ADMIN — LEVOTHYROXINE SODIUM 75 MCG: 75 TABLET ORAL at 05:06

## 2025-06-30 PROBLEM — Z99.2 DEPENDENCE ON RENAL DIALYSIS: Status: ACTIVE | Noted: 2025-06-30

## 2025-06-30 LAB
ABSOLUTE EOSINOPHIL (OHS): 0.44 K/UL
ABSOLUTE MONOCYTE (OHS): 1.53 K/UL (ref 0.3–1)
ABSOLUTE NEUTROPHIL COUNT (OHS): 15.04 K/UL (ref 1.8–7.7)
ALBUMIN SERPL BCP-MCNC: 1.4 G/DL (ref 3.5–5.2)
ALP SERPL-CCNC: 214 UNIT/L (ref 40–150)
ALT SERPL W/O P-5'-P-CCNC: <5 UNIT/L (ref 10–44)
ANION GAP (OHS): 12 MMOL/L (ref 8–16)
ANION GAP (OHS): 12 MMOL/L (ref 8–16)
ANION GAP (OHS): 8 MMOL/L (ref 8–16)
AST SERPL-CCNC: 30 UNIT/L (ref 11–45)
BASOPHILS # BLD AUTO: 0.05 K/UL
BASOPHILS NFR BLD AUTO: 0.3 %
BILIRUB SERPL-MCNC: 0.1 MG/DL (ref 0.1–1)
BUN SERPL-MCNC: 44 MG/DL (ref 8–23)
BUN SERPL-MCNC: 59 MG/DL (ref 8–23)
BUN SERPL-MCNC: 59 MG/DL (ref 8–23)
CALCIUM SERPL-MCNC: 7.8 MG/DL (ref 8.7–10.5)
CALCIUM SERPL-MCNC: 8 MG/DL (ref 8.7–10.5)
CALCIUM SERPL-MCNC: 8 MG/DL (ref 8.7–10.5)
CHLORIDE SERPL-SCNC: 109 MMOL/L (ref 95–110)
CHLORIDE SERPL-SCNC: 111 MMOL/L (ref 95–110)
CHLORIDE SERPL-SCNC: 111 MMOL/L (ref 95–110)
CO2 SERPL-SCNC: 23 MMOL/L (ref 23–29)
CO2 SERPL-SCNC: 23 MMOL/L (ref 23–29)
CO2 SERPL-SCNC: 24 MMOL/L (ref 23–29)
CREAT SERPL-MCNC: 1.5 MG/DL (ref 0.5–1.4)
CREAT SERPL-MCNC: 1.9 MG/DL (ref 0.5–1.4)
CREAT SERPL-MCNC: 1.9 MG/DL (ref 0.5–1.4)
ERYTHROCYTE [DISTWIDTH] IN BLOOD BY AUTOMATED COUNT: 19.4 % (ref 11.5–14.5)
GFR SERPLBLD CREATININE-BSD FMLA CKD-EPI: 26 ML/MIN/1.73/M2
GFR SERPLBLD CREATININE-BSD FMLA CKD-EPI: 26 ML/MIN/1.73/M2
GFR SERPLBLD CREATININE-BSD FMLA CKD-EPI: 35 ML/MIN/1.73/M2
GLUCOSE SERPL-MCNC: 127 MG/DL (ref 70–110)
GLUCOSE SERPL-MCNC: 83 MG/DL (ref 70–110)
GLUCOSE SERPL-MCNC: 83 MG/DL (ref 70–110)
HCT VFR BLD AUTO: 27.4 % (ref 37–48.5)
HGB BLD-MCNC: 8.3 GM/DL (ref 12–16)
IMM GRANULOCYTES # BLD AUTO: 0.11 K/UL (ref 0–0.04)
IMM GRANULOCYTES NFR BLD AUTO: 0.6 % (ref 0–0.5)
LYMPHOCYTES # BLD AUTO: 2.61 K/UL (ref 1–4.8)
MAGNESIUM SERPL-MCNC: 2.1 MG/DL (ref 1.6–2.6)
MAGNESIUM SERPL-MCNC: 2.1 MG/DL (ref 1.6–2.6)
MCH RBC QN AUTO: 29.5 PG (ref 27–31)
MCHC RBC AUTO-ENTMCNC: 30.3 G/DL (ref 32–36)
MCV RBC AUTO: 98 FL (ref 82–98)
NUCLEATED RBC (/100WBC) (OHS): 0 /100 WBC
PHOSPHATE SERPL-MCNC: 2.9 MG/DL (ref 2.7–4.5)
PHOSPHATE SERPL-MCNC: 3.9 MG/DL (ref 2.7–4.5)
PLATELET # BLD AUTO: 393 K/UL (ref 150–450)
PMV BLD AUTO: 9.9 FL (ref 9.2–12.9)
POCT GLUCOSE: 147 MG/DL (ref 70–110)
POCT GLUCOSE: 150 MG/DL (ref 70–110)
POCT GLUCOSE: 163 MG/DL (ref 70–110)
POTASSIUM SERPL-SCNC: 4.2 MMOL/L (ref 3.5–5.1)
POTASSIUM SERPL-SCNC: 4.9 MMOL/L (ref 3.5–5.1)
POTASSIUM SERPL-SCNC: 4.9 MMOL/L (ref 3.5–5.1)
PROT SERPL-MCNC: 6.9 GM/DL (ref 6–8.4)
RBC # BLD AUTO: 2.81 M/UL (ref 4–5.4)
RELATIVE EOSINOPHIL (OHS): 2.2 %
RELATIVE LYMPHOCYTE (OHS): 13.2 % (ref 18–48)
RELATIVE MONOCYTE (OHS): 7.7 % (ref 4–15)
RELATIVE NEUTROPHIL (OHS): 76 % (ref 38–73)
SODIUM SERPL-SCNC: 141 MMOL/L (ref 136–145)
SODIUM SERPL-SCNC: 146 MMOL/L (ref 136–145)
SODIUM SERPL-SCNC: 146 MMOL/L (ref 136–145)
WBC # BLD AUTO: 19.78 K/UL (ref 3.9–12.7)

## 2025-06-30 PROCEDURE — 80053 COMPREHEN METABOLIC PANEL: CPT | Mod: HCNC

## 2025-06-30 PROCEDURE — 25000003 PHARM REV CODE 250: Mod: HCNC | Performed by: HOSPITALIST

## 2025-06-30 PROCEDURE — 83735 ASSAY OF MAGNESIUM: CPT | Mod: HCNC | Performed by: INTERNAL MEDICINE

## 2025-06-30 PROCEDURE — 27100171 HC OXYGEN HIGH FLOW UP TO 24 HOURS: Mod: HCNC

## 2025-06-30 PROCEDURE — 63600175 PHARM REV CODE 636 W HCPCS: Mod: HCNC

## 2025-06-30 PROCEDURE — 99900035 HC TECH TIME PER 15 MIN (STAT): Mod: HCNC

## 2025-06-30 PROCEDURE — 99291 CRITICAL CARE FIRST HOUR: CPT | Mod: HCNC,GC,, | Performed by: INTERNAL MEDICINE

## 2025-06-30 PROCEDURE — 94660 CPAP INITIATION&MGMT: CPT | Mod: HCNC

## 2025-06-30 PROCEDURE — 99900031 HC PATIENT EDUCATION (STAT): Mod: HCNC

## 2025-06-30 PROCEDURE — 85025 COMPLETE CBC W/AUTO DIFF WBC: CPT | Mod: HCNC

## 2025-06-30 PROCEDURE — 80100014 HC HEMODIALYSIS 1:1: Mod: HCNC

## 2025-06-30 PROCEDURE — 20000000 HC ICU ROOM: Mod: HCNC

## 2025-06-30 PROCEDURE — 80069 RENAL FUNCTION PANEL: CPT | Mod: HCNC | Performed by: INTERNAL MEDICINE

## 2025-06-30 PROCEDURE — 27000207 HC ISOLATION: Mod: HCNC

## 2025-06-30 PROCEDURE — 99233 SBSQ HOSP IP/OBS HIGH 50: CPT | Mod: HCNC,,,

## 2025-06-30 PROCEDURE — 94761 N-INVAS EAR/PLS OXIMETRY MLT: CPT | Mod: HCNC

## 2025-06-30 PROCEDURE — 63600175 PHARM REV CODE 636 W HCPCS: Mod: HCNC | Performed by: STUDENT IN AN ORGANIZED HEALTH CARE EDUCATION/TRAINING PROGRAM

## 2025-06-30 RX ORDER — SODIUM CHLORIDE 9 MG/ML
INJECTION, SOLUTION INTRAVENOUS
Status: CANCELLED | OUTPATIENT
Start: 2025-06-30

## 2025-06-30 RX ORDER — SODIUM CHLORIDE 9 MG/ML
INJECTION, SOLUTION INTRAVENOUS ONCE
Status: CANCELLED | OUTPATIENT
Start: 2025-06-30 | End: 2025-06-30

## 2025-06-30 RX ORDER — FUROSEMIDE 10 MG/ML
120 INJECTION INTRAMUSCULAR; INTRAVENOUS ONCE
Status: COMPLETED | OUTPATIENT
Start: 2025-06-30 | End: 2025-06-30

## 2025-06-30 RX ORDER — HEPARIN SODIUM 1000 [USP'U]/ML
4000 INJECTION, SOLUTION INTRAVENOUS; SUBCUTANEOUS
Status: DISCONTINUED | OUTPATIENT
Start: 2025-06-30 | End: 2025-08-26 | Stop reason: HOSPADM

## 2025-06-30 RX ORDER — HEPARIN SODIUM 1000 [USP'U]/ML
2000 INJECTION, SOLUTION INTRAVENOUS; SUBCUTANEOUS ONCE
Status: DISCONTINUED | OUTPATIENT
Start: 2025-06-30 | End: 2025-06-30

## 2025-06-30 RX ADMIN — LACOSAMIDE 100 MG: 50 TABLET, FILM COATED ORAL at 09:06

## 2025-06-30 RX ADMIN — CEFAZOLIN 2 G: 2 INJECTION, POWDER, FOR SOLUTION INTRAMUSCULAR; INTRAVENOUS at 05:06

## 2025-06-30 RX ADMIN — INSULIN ASPART 2 UNITS: 100 INJECTION, SOLUTION INTRAVENOUS; SUBCUTANEOUS at 06:06

## 2025-06-30 RX ADMIN — HEPARIN SODIUM 5000 UNITS: 5000 INJECTION INTRAVENOUS; SUBCUTANEOUS at 01:06

## 2025-06-30 RX ADMIN — LEVOTHYROXINE SODIUM 75 MCG: 75 TABLET ORAL at 05:06

## 2025-06-30 RX ADMIN — CEFAZOLIN 2 G: 2 INJECTION, POWDER, FOR SOLUTION INTRAMUSCULAR; INTRAVENOUS at 04:06

## 2025-06-30 RX ADMIN — FUROSEMIDE 120 MG: 10 INJECTION, SOLUTION INTRAVENOUS at 04:06

## 2025-06-30 RX ADMIN — ATORVASTATIN CALCIUM 80 MG: 40 TABLET, FILM COATED ORAL at 09:06

## 2025-06-30 RX ADMIN — ASPIRIN 81 MG CHEWABLE TABLET 81 MG: 81 TABLET CHEWABLE at 09:06

## 2025-06-30 RX ADMIN — HEPARIN SODIUM 4000 UNITS: 1000 INJECTION INTRAVENOUS; SUBCUTANEOUS at 03:06

## 2025-06-30 RX ADMIN — HEPARIN SODIUM 5000 UNITS: 5000 INJECTION INTRAVENOUS; SUBCUTANEOUS at 09:06

## 2025-06-30 RX ADMIN — HEPARIN SODIUM 5000 UNITS: 5000 INJECTION INTRAVENOUS; SUBCUTANEOUS at 05:06

## 2025-07-01 PROBLEM — Z93.1 S/P PERCUTANEOUS ENDOSCOPIC GASTROSTOMY (PEG) TUBE PLACEMENT: Status: RESOLVED | Noted: 2025-05-18 | Resolved: 2025-07-01

## 2025-07-01 PROBLEM — E87.5 HYPERKALEMIA: Status: RESOLVED | Noted: 2020-06-18 | Resolved: 2025-07-01

## 2025-07-01 PROBLEM — R56.9 SEIZURE: Status: RESOLVED | Noted: 2025-05-06 | Resolved: 2025-07-01

## 2025-07-01 PROBLEM — A41.9 SEPSIS: Status: RESOLVED | Noted: 2018-05-30 | Resolved: 2025-07-01

## 2025-07-01 PROBLEM — L03.116 CELLULITIS OF LEFT LOWER EXTREMITY: Status: RESOLVED | Noted: 2025-05-06 | Resolved: 2025-07-01

## 2025-07-01 PROBLEM — L98.499: Status: RESOLVED | Noted: 2025-04-29 | Resolved: 2025-07-01

## 2025-07-01 LAB
ABSOLUTE EOSINOPHIL (OHS): 0.61 K/UL
ABSOLUTE MONOCYTE (OHS): 2.09 K/UL (ref 0.3–1)
ABSOLUTE NEUTROPHIL COUNT (OHS): 16.24 K/UL (ref 1.8–7.7)
ALBUMIN SERPL BCP-MCNC: 1.3 G/DL (ref 3.5–5.2)
ALP SERPL-CCNC: 288 UNIT/L (ref 40–150)
ALT SERPL W/O P-5'-P-CCNC: <5 UNIT/L (ref 10–44)
ANION GAP (OHS): 6 MMOL/L (ref 8–16)
ANION GAP (OHS): 9 MMOL/L (ref 8–16)
AST SERPL-CCNC: 31 UNIT/L (ref 11–45)
BASOPHILS # BLD AUTO: 0.07 K/UL
BASOPHILS NFR BLD AUTO: 0.3 %
BILIRUB SERPL-MCNC: 0.1 MG/DL (ref 0.1–1)
BUN SERPL-MCNC: 51 MG/DL (ref 8–23)
BUN SERPL-MCNC: 54 MG/DL (ref 8–23)
BUN SERPL-MCNC: 54 MG/DL (ref 8–23)
BUN SERPL-MCNC: 59 MG/DL (ref 8–23)
CALCIUM SERPL-MCNC: 7.6 MG/DL (ref 8.7–10.5)
CALCIUM SERPL-MCNC: 7.8 MG/DL (ref 8.7–10.5)
CHLORIDE SERPL-SCNC: 109 MMOL/L (ref 95–110)
CHLORIDE SERPL-SCNC: 109 MMOL/L (ref 95–110)
CHLORIDE SERPL-SCNC: 110 MMOL/L (ref 95–110)
CHLORIDE SERPL-SCNC: 110 MMOL/L (ref 95–110)
CO2 SERPL-SCNC: 23 MMOL/L (ref 23–29)
CO2 SERPL-SCNC: 24 MMOL/L (ref 23–29)
CO2 SERPL-SCNC: 24 MMOL/L (ref 23–29)
CO2 SERPL-SCNC: 25 MMOL/L (ref 23–29)
CREAT SERPL-MCNC: 1.9 MG/DL (ref 0.5–1.4)
CREAT SERPL-MCNC: 2 MG/DL (ref 0.5–1.4)
CREAT SERPL-MCNC: 2 MG/DL (ref 0.5–1.4)
CREAT SERPL-MCNC: 2.1 MG/DL (ref 0.5–1.4)
ERYTHROCYTE [DISTWIDTH] IN BLOOD BY AUTOMATED COUNT: 18.8 % (ref 11.5–14.5)
GFR SERPLBLD CREATININE-BSD FMLA CKD-EPI: 23 ML/MIN/1.73/M2
GFR SERPLBLD CREATININE-BSD FMLA CKD-EPI: 25 ML/MIN/1.73/M2
GFR SERPLBLD CREATININE-BSD FMLA CKD-EPI: 25 ML/MIN/1.73/M2
GFR SERPLBLD CREATININE-BSD FMLA CKD-EPI: 26 ML/MIN/1.73/M2
GLUCOSE SERPL-MCNC: 162 MG/DL (ref 70–110)
GLUCOSE SERPL-MCNC: 164 MG/DL (ref 70–110)
GLUCOSE SERPL-MCNC: 164 MG/DL (ref 70–110)
GLUCOSE SERPL-MCNC: 171 MG/DL (ref 70–110)
HCT VFR BLD AUTO: 25.8 % (ref 37–48.5)
HGB BLD-MCNC: 7.8 GM/DL (ref 12–16)
IMM GRANULOCYTES # BLD AUTO: 0.16 K/UL (ref 0–0.04)
IMM GRANULOCYTES NFR BLD AUTO: 0.7 % (ref 0–0.5)
LYMPHOCYTES # BLD AUTO: 3.13 K/UL (ref 1–4.8)
MAGNESIUM SERPL-MCNC: 2.1 MG/DL (ref 1.6–2.6)
MAGNESIUM SERPL-MCNC: 2.2 MG/DL (ref 1.6–2.6)
MAGNESIUM SERPL-MCNC: 2.2 MG/DL (ref 1.6–2.6)
MCH RBC QN AUTO: 29.7 PG (ref 27–31)
MCHC RBC AUTO-ENTMCNC: 30.2 G/DL (ref 32–36)
MCV RBC AUTO: 98 FL (ref 82–98)
NUCLEATED RBC (/100WBC) (OHS): 0 /100 WBC
PHOSPHATE SERPL-MCNC: 3.2 MG/DL (ref 2.7–4.5)
PHOSPHATE SERPL-MCNC: 3.3 MG/DL (ref 2.7–4.5)
PHOSPHATE SERPL-MCNC: 3.4 MG/DL (ref 2.7–4.5)
PLATELET # BLD AUTO: 366 K/UL (ref 150–450)
PMV BLD AUTO: 9.9 FL (ref 9.2–12.9)
POCT GLUCOSE: 151 MG/DL (ref 70–110)
POCT GLUCOSE: 163 MG/DL (ref 70–110)
POCT GLUCOSE: 186 MG/DL (ref 70–110)
POCT GLUCOSE: 194 MG/DL (ref 70–110)
POCT GLUCOSE: 203 MG/DL (ref 70–110)
POCT GLUCOSE: 211 MG/DL (ref 70–110)
POTASSIUM SERPL-SCNC: 4.6 MMOL/L (ref 3.5–5.1)
POTASSIUM SERPL-SCNC: 4.6 MMOL/L (ref 3.5–5.1)
POTASSIUM SERPL-SCNC: 4.8 MMOL/L (ref 3.5–5.1)
POTASSIUM SERPL-SCNC: 5 MMOL/L (ref 3.5–5.1)
PROT SERPL-MCNC: 6.8 GM/DL (ref 6–8.4)
RBC # BLD AUTO: 2.63 M/UL (ref 4–5.4)
RELATIVE EOSINOPHIL (OHS): 2.7 %
RELATIVE LYMPHOCYTE (OHS): 14 % (ref 18–48)
RELATIVE MONOCYTE (OHS): 9.4 % (ref 4–15)
RELATIVE NEUTROPHIL (OHS): 72.9 % (ref 38–73)
SODIUM SERPL-SCNC: 141 MMOL/L (ref 136–145)
SODIUM SERPL-SCNC: 142 MMOL/L (ref 136–145)
WBC # BLD AUTO: 22.3 K/UL (ref 3.9–12.7)

## 2025-07-01 PROCEDURE — 63600175 PHARM REV CODE 636 W HCPCS: Mod: HCNC

## 2025-07-01 PROCEDURE — 99233 SBSQ HOSP IP/OBS HIGH 50: CPT | Mod: HCNC,GC,, | Performed by: INTERNAL MEDICINE

## 2025-07-01 PROCEDURE — 83735 ASSAY OF MAGNESIUM: CPT | Mod: HCNC | Performed by: INTERNAL MEDICINE

## 2025-07-01 PROCEDURE — 20000000 HC ICU ROOM: Mod: HCNC

## 2025-07-01 PROCEDURE — 27000207 HC ISOLATION: Mod: HCNC

## 2025-07-01 PROCEDURE — 99233 SBSQ HOSP IP/OBS HIGH 50: CPT | Mod: HCNC,,,

## 2025-07-01 PROCEDURE — 80069 RENAL FUNCTION PANEL: CPT | Mod: HCNC | Performed by: INTERNAL MEDICINE

## 2025-07-01 PROCEDURE — 27100171 HC OXYGEN HIGH FLOW UP TO 24 HOURS: Mod: HCNC

## 2025-07-01 PROCEDURE — 25000003 PHARM REV CODE 250: Mod: HCNC | Performed by: HOSPITALIST

## 2025-07-01 PROCEDURE — 94761 N-INVAS EAR/PLS OXIMETRY MLT: CPT | Mod: HCNC

## 2025-07-01 PROCEDURE — 94660 CPAP INITIATION&MGMT: CPT | Mod: HCNC

## 2025-07-01 PROCEDURE — 99900035 HC TECH TIME PER 15 MIN (STAT): Mod: HCNC

## 2025-07-01 PROCEDURE — 85025 COMPLETE CBC W/AUTO DIFF WBC: CPT | Mod: HCNC

## 2025-07-01 PROCEDURE — 82947 ASSAY GLUCOSE BLOOD QUANT: CPT | Mod: HCNC | Performed by: INTERNAL MEDICINE

## 2025-07-01 RX ORDER — MIDODRINE HYDROCHLORIDE 5 MG/1
5 TABLET ORAL DAILY PRN
Status: DISCONTINUED | OUTPATIENT
Start: 2025-07-01 | End: 2025-07-02

## 2025-07-01 RX ADMIN — ASPIRIN 81 MG CHEWABLE TABLET 81 MG: 81 TABLET CHEWABLE at 09:07

## 2025-07-01 RX ADMIN — LACOSAMIDE 100 MG: 50 TABLET, FILM COATED ORAL at 09:07

## 2025-07-01 RX ADMIN — HEPARIN SODIUM 5000 UNITS: 5000 INJECTION INTRAVENOUS; SUBCUTANEOUS at 02:07

## 2025-07-01 RX ADMIN — INSULIN ASPART 4 UNITS: 100 INJECTION, SOLUTION INTRAVENOUS; SUBCUTANEOUS at 06:07

## 2025-07-01 RX ADMIN — CEFAZOLIN 2 G: 2 INJECTION, POWDER, FOR SOLUTION INTRAMUSCULAR; INTRAVENOUS at 05:07

## 2025-07-01 RX ADMIN — LEVOTHYROXINE SODIUM 75 MCG: 75 TABLET ORAL at 05:07

## 2025-07-01 RX ADMIN — HEPARIN SODIUM 5000 UNITS: 5000 INJECTION INTRAVENOUS; SUBCUTANEOUS at 09:07

## 2025-07-01 RX ADMIN — HEPARIN SODIUM 5000 UNITS: 5000 INJECTION INTRAVENOUS; SUBCUTANEOUS at 05:07

## 2025-07-01 RX ADMIN — CEFAZOLIN 2 G: 2 INJECTION, POWDER, FOR SOLUTION INTRAMUSCULAR; INTRAVENOUS at 04:07

## 2025-07-01 RX ADMIN — INSULIN ASPART 2 UNITS: 100 INJECTION, SOLUTION INTRAVENOUS; SUBCUTANEOUS at 05:07

## 2025-07-01 RX ADMIN — ATORVASTATIN CALCIUM 80 MG: 40 TABLET, FILM COATED ORAL at 09:07

## 2025-07-01 RX ADMIN — INSULIN ASPART 1 UNITS: 100 INJECTION, SOLUTION INTRAVENOUS; SUBCUTANEOUS at 11:07

## 2025-07-02 LAB
ABSOLUTE EOSINOPHIL (OHS): 0.84 K/UL
ABSOLUTE MONOCYTE (OHS): 1.76 K/UL (ref 0.3–1)
ABSOLUTE NEUTROPHIL COUNT (OHS): 17.01 K/UL (ref 1.8–7.7)
ALBUMIN SERPL BCP-MCNC: 1.4 G/DL (ref 3.5–5.2)
ALBUMIN SERPL BCP-MCNC: 1.4 G/DL (ref 3.5–5.2)
ALP SERPL-CCNC: 310 UNIT/L (ref 40–150)
ALT SERPL W/O P-5'-P-CCNC: <5 UNIT/L (ref 10–44)
ANION GAP (OHS): 9 MMOL/L (ref 8–16)
ANION GAP (OHS): 9 MMOL/L (ref 8–16)
AST SERPL-CCNC: 23 UNIT/L (ref 11–45)
BASOPHILS # BLD AUTO: 0.09 K/UL
BASOPHILS NFR BLD AUTO: 0.4 %
BILIRUB SERPL-MCNC: 0.1 MG/DL (ref 0.1–1)
BUN SERPL-MCNC: 65 MG/DL (ref 8–23)
BUN SERPL-MCNC: 65 MG/DL (ref 8–23)
CALCIUM SERPL-MCNC: 7.8 MG/DL (ref 8.7–10.5)
CALCIUM SERPL-MCNC: 7.8 MG/DL (ref 8.7–10.5)
CHLORIDE SERPL-SCNC: 111 MMOL/L (ref 95–110)
CHLORIDE SERPL-SCNC: 111 MMOL/L (ref 95–110)
CO2 SERPL-SCNC: 22 MMOL/L (ref 23–29)
CO2 SERPL-SCNC: 22 MMOL/L (ref 23–29)
CREAT SERPL-MCNC: 2.2 MG/DL (ref 0.5–1.4)
CREAT SERPL-MCNC: 2.2 MG/DL (ref 0.5–1.4)
ERYTHROCYTE [DISTWIDTH] IN BLOOD BY AUTOMATED COUNT: 18.4 % (ref 11.5–14.5)
GFR SERPLBLD CREATININE-BSD FMLA CKD-EPI: 22 ML/MIN/1.73/M2
GFR SERPLBLD CREATININE-BSD FMLA CKD-EPI: 22 ML/MIN/1.73/M2
GLUCOSE SERPL-MCNC: 161 MG/DL (ref 70–110)
GLUCOSE SERPL-MCNC: 161 MG/DL (ref 70–110)
HCT VFR BLD AUTO: 28.7 % (ref 37–48.5)
HGB BLD-MCNC: 8.4 GM/DL (ref 12–16)
IMM GRANULOCYTES # BLD AUTO: 0.19 K/UL (ref 0–0.04)
IMM GRANULOCYTES NFR BLD AUTO: 0.8 % (ref 0–0.5)
LYMPHOCYTES # BLD AUTO: 3.15 K/UL (ref 1–4.8)
MAGNESIUM SERPL-MCNC: 2.2 MG/DL (ref 1.6–2.6)
MCH RBC QN AUTO: 29.5 PG (ref 27–31)
MCHC RBC AUTO-ENTMCNC: 29.3 G/DL (ref 32–36)
MCV RBC AUTO: 101 FL (ref 82–98)
NUCLEATED RBC (/100WBC) (OHS): 0 /100 WBC
PHOSPHATE SERPL-MCNC: 3.5 MG/DL (ref 2.7–4.5)
PLATELET # BLD AUTO: 375 K/UL (ref 150–450)
PMV BLD AUTO: 9.7 FL (ref 9.2–12.9)
POCT GLUCOSE: 169 MG/DL (ref 70–110)
POTASSIUM SERPL-SCNC: 5.1 MMOL/L (ref 3.5–5.1)
POTASSIUM SERPL-SCNC: 5.1 MMOL/L (ref 3.5–5.1)
PROT SERPL-MCNC: 7.1 GM/DL (ref 6–8.4)
RBC # BLD AUTO: 2.85 M/UL (ref 4–5.4)
RELATIVE EOSINOPHIL (OHS): 3.6 %
RELATIVE LYMPHOCYTE (OHS): 13.7 % (ref 18–48)
RELATIVE MONOCYTE (OHS): 7.6 % (ref 4–15)
RELATIVE NEUTROPHIL (OHS): 73.9 % (ref 38–73)
SODIUM SERPL-SCNC: 142 MMOL/L (ref 136–145)
SODIUM SERPL-SCNC: 142 MMOL/L (ref 136–145)
WBC # BLD AUTO: 23.04 K/UL (ref 3.9–12.7)

## 2025-07-02 PROCEDURE — 80053 COMPREHEN METABOLIC PANEL: CPT | Mod: HCNC

## 2025-07-02 PROCEDURE — 99900035 HC TECH TIME PER 15 MIN (STAT): Mod: HCNC

## 2025-07-02 PROCEDURE — 63600175 PHARM REV CODE 636 W HCPCS: Mod: HCNC

## 2025-07-02 PROCEDURE — 20000000 HC ICU ROOM: Mod: HCNC

## 2025-07-02 PROCEDURE — 97535 SELF CARE MNGMENT TRAINING: CPT | Mod: HCNC

## 2025-07-02 PROCEDURE — 94660 CPAP INITIATION&MGMT: CPT | Mod: HCNC

## 2025-07-02 PROCEDURE — 83735 ASSAY OF MAGNESIUM: CPT | Mod: HCNC | Performed by: INTERNAL MEDICINE

## 2025-07-02 PROCEDURE — 85025 COMPLETE CBC W/AUTO DIFF WBC: CPT | Mod: HCNC

## 2025-07-02 PROCEDURE — 80069 RENAL FUNCTION PANEL: CPT | Mod: HCNC | Performed by: INTERNAL MEDICINE

## 2025-07-02 PROCEDURE — 94761 N-INVAS EAR/PLS OXIMETRY MLT: CPT | Mod: HCNC

## 2025-07-02 PROCEDURE — 99233 SBSQ HOSP IP/OBS HIGH 50: CPT | Mod: HCNC,GC,, | Performed by: INTERNAL MEDICINE

## 2025-07-02 PROCEDURE — 92526 ORAL FUNCTION THERAPY: CPT | Mod: HCNC

## 2025-07-02 PROCEDURE — 25000003 PHARM REV CODE 250: Mod: HCNC | Performed by: HOSPITALIST

## 2025-07-02 PROCEDURE — 27000207 HC ISOLATION: Mod: HCNC

## 2025-07-02 RX ORDER — SODIUM CHLORIDE 9 MG/ML
INJECTION, SOLUTION INTRAVENOUS ONCE
Status: CANCELLED | OUTPATIENT
Start: 2025-07-02 | End: 2025-07-02

## 2025-07-02 RX ORDER — MIDODRINE HYDROCHLORIDE 5 MG/1
10 TABLET ORAL DAILY PRN
Status: DISCONTINUED | OUTPATIENT
Start: 2025-07-02 | End: 2025-07-23

## 2025-07-02 RX ORDER — SODIUM CHLORIDE 9 MG/ML
INJECTION, SOLUTION INTRAVENOUS
Status: CANCELLED | OUTPATIENT
Start: 2025-07-02

## 2025-07-02 RX ADMIN — HEPARIN SODIUM 5000 UNITS: 5000 INJECTION INTRAVENOUS; SUBCUTANEOUS at 05:07

## 2025-07-02 RX ADMIN — LACOSAMIDE 100 MG: 50 TABLET, FILM COATED ORAL at 08:07

## 2025-07-02 RX ADMIN — HEPARIN SODIUM 5000 UNITS: 5000 INJECTION INTRAVENOUS; SUBCUTANEOUS at 01:07

## 2025-07-02 RX ADMIN — LACOSAMIDE 100 MG: 50 TABLET, FILM COATED ORAL at 09:07

## 2025-07-02 RX ADMIN — ASPIRIN 81 MG CHEWABLE TABLET 81 MG: 81 TABLET CHEWABLE at 08:07

## 2025-07-02 RX ADMIN — CEFAZOLIN 2 G: 2 INJECTION, POWDER, FOR SOLUTION INTRAMUSCULAR; INTRAVENOUS at 05:07

## 2025-07-02 RX ADMIN — ATORVASTATIN CALCIUM 80 MG: 40 TABLET, FILM COATED ORAL at 09:07

## 2025-07-02 RX ADMIN — LEVOTHYROXINE SODIUM 75 MCG: 75 TABLET ORAL at 05:07

## 2025-07-02 RX ADMIN — HEPARIN SODIUM 5000 UNITS: 5000 INJECTION INTRAVENOUS; SUBCUTANEOUS at 09:07

## 2025-07-02 RX ADMIN — CEFAZOLIN 2 G: 2 INJECTION, POWDER, FOR SOLUTION INTRAMUSCULAR; INTRAVENOUS at 04:07

## 2025-07-02 RX ADMIN — INSULIN ASPART 2 UNITS: 100 INJECTION, SOLUTION INTRAVENOUS; SUBCUTANEOUS at 06:07

## 2025-07-03 ENCOUNTER — RESULTS FOLLOW-UP (OUTPATIENT)
Dept: HEPATOLOGY | Facility: HOSPITAL | Age: 80
End: 2025-07-03

## 2025-07-03 LAB
ABSOLUTE EOSINOPHIL (OHS): 0.71 K/UL
ABSOLUTE MONOCYTE (OHS): 1.55 K/UL (ref 0.3–1)
ABSOLUTE NEUTROPHIL COUNT (OHS): 14.89 K/UL (ref 1.8–7.7)
ALBUMIN SERPL BCP-MCNC: 1.3 G/DL (ref 3.5–5.2)
ALBUMIN SERPL BCP-MCNC: 1.3 G/DL (ref 3.5–5.2)
ALP SERPL-CCNC: 305 UNIT/L (ref 40–150)
ALT SERPL W/O P-5'-P-CCNC: <5 UNIT/L (ref 10–44)
ANION GAP (OHS): 7 MMOL/L (ref 8–16)
ANION GAP (OHS): 7 MMOL/L (ref 8–16)
AST SERPL-CCNC: 34 UNIT/L (ref 11–45)
BASOPHILS # BLD AUTO: 0.09 K/UL
BASOPHILS NFR BLD AUTO: 0.4 %
BILIRUB SERPL-MCNC: 0.1 MG/DL (ref 0.1–1)
BUN SERPL-MCNC: 35 MG/DL (ref 8–23)
BUN SERPL-MCNC: 35 MG/DL (ref 8–23)
CALCIUM SERPL-MCNC: 7.7 MG/DL (ref 8.7–10.5)
CALCIUM SERPL-MCNC: 7.7 MG/DL (ref 8.7–10.5)
CHLORIDE SERPL-SCNC: 106 MMOL/L (ref 95–110)
CHLORIDE SERPL-SCNC: 106 MMOL/L (ref 95–110)
CO2 SERPL-SCNC: 24 MMOL/L (ref 23–29)
CO2 SERPL-SCNC: 24 MMOL/L (ref 23–29)
CREAT SERPL-MCNC: 1.3 MG/DL (ref 0.5–1.4)
CREAT SERPL-MCNC: 1.3 MG/DL (ref 0.5–1.4)
ERYTHROCYTE [DISTWIDTH] IN BLOOD BY AUTOMATED COUNT: 17.8 % (ref 11.5–14.5)
GFR SERPLBLD CREATININE-BSD FMLA CKD-EPI: 42 ML/MIN/1.73/M2
GFR SERPLBLD CREATININE-BSD FMLA CKD-EPI: 42 ML/MIN/1.73/M2
GLUCOSE SERPL-MCNC: 139 MG/DL (ref 70–110)
GLUCOSE SERPL-MCNC: 139 MG/DL (ref 70–110)
HCT VFR BLD AUTO: 25.7 % (ref 37–48.5)
HGB BLD-MCNC: 7.7 GM/DL (ref 12–16)
IMM GRANULOCYTES # BLD AUTO: 0.2 K/UL (ref 0–0.04)
IMM GRANULOCYTES NFR BLD AUTO: 0.9 % (ref 0–0.5)
LYMPHOCYTES # BLD AUTO: 4.08 K/UL (ref 1–4.8)
MAGNESIUM SERPL-MCNC: 1.9 MG/DL (ref 1.6–2.6)
MCH RBC QN AUTO: 29.7 PG (ref 27–31)
MCHC RBC AUTO-ENTMCNC: 30 G/DL (ref 32–36)
MCV RBC AUTO: 99 FL (ref 82–98)
NUCLEATED RBC (/100WBC) (OHS): 0 /100 WBC
PHOSPHATE SERPL-MCNC: 2 MG/DL (ref 2.7–4.5)
PLATELET # BLD AUTO: 398 K/UL (ref 150–450)
PMV BLD AUTO: 10.4 FL (ref 9.2–12.9)
POCT GLUCOSE: 171 MG/DL (ref 70–110)
POTASSIUM SERPL-SCNC: 4 MMOL/L (ref 3.5–5.1)
POTASSIUM SERPL-SCNC: 4 MMOL/L (ref 3.5–5.1)
PROT SERPL-MCNC: 7 GM/DL (ref 6–8.4)
RBC # BLD AUTO: 2.59 M/UL (ref 4–5.4)
RELATIVE EOSINOPHIL (OHS): 3.3 %
RELATIVE LYMPHOCYTE (OHS): 19 % (ref 18–48)
RELATIVE MONOCYTE (OHS): 7.2 % (ref 4–15)
RELATIVE NEUTROPHIL (OHS): 69.2 % (ref 38–73)
SODIUM SERPL-SCNC: 137 MMOL/L (ref 136–145)
SODIUM SERPL-SCNC: 137 MMOL/L (ref 136–145)
WBC # BLD AUTO: 21.52 K/UL (ref 3.9–12.7)

## 2025-07-03 PROCEDURE — 94660 CPAP INITIATION&MGMT: CPT | Mod: HCNC

## 2025-07-03 PROCEDURE — 27000207 HC ISOLATION: Mod: HCNC

## 2025-07-03 PROCEDURE — 85025 COMPLETE CBC W/AUTO DIFF WBC: CPT | Mod: HCNC

## 2025-07-03 PROCEDURE — 20600001 HC STEP DOWN PRIVATE ROOM: Mod: HCNC

## 2025-07-03 PROCEDURE — 63600175 PHARM REV CODE 636 W HCPCS: Mod: HCNC

## 2025-07-03 PROCEDURE — 25000003 PHARM REV CODE 250: Mod: HCNC | Performed by: HOSPITALIST

## 2025-07-03 PROCEDURE — 25000003 PHARM REV CODE 250: Mod: HCNC | Performed by: INTERNAL MEDICINE

## 2025-07-03 PROCEDURE — 80053 COMPREHEN METABOLIC PANEL: CPT | Mod: HCNC

## 2025-07-03 PROCEDURE — 99233 SBSQ HOSP IP/OBS HIGH 50: CPT | Mod: HCNC,GC,, | Performed by: INTERNAL MEDICINE

## 2025-07-03 PROCEDURE — 25000003 PHARM REV CODE 250: Mod: HCNC

## 2025-07-03 PROCEDURE — 80069 RENAL FUNCTION PANEL: CPT | Mod: HCNC | Performed by: INTERNAL MEDICINE

## 2025-07-03 PROCEDURE — 99900035 HC TECH TIME PER 15 MIN (STAT): Mod: HCNC

## 2025-07-03 PROCEDURE — 63600175 PHARM REV CODE 636 W HCPCS: Mod: HCNC | Performed by: STUDENT IN AN ORGANIZED HEALTH CARE EDUCATION/TRAINING PROGRAM

## 2025-07-03 PROCEDURE — 94761 N-INVAS EAR/PLS OXIMETRY MLT: CPT | Mod: HCNC

## 2025-07-03 PROCEDURE — 92526 ORAL FUNCTION THERAPY: CPT | Mod: HCNC

## 2025-07-03 PROCEDURE — 83735 ASSAY OF MAGNESIUM: CPT | Mod: HCNC | Performed by: INTERNAL MEDICINE

## 2025-07-03 PROCEDURE — 80100014 HC HEMODIALYSIS 1:1: Mod: HCNC

## 2025-07-03 PROCEDURE — 36415 COLL VENOUS BLD VENIPUNCTURE: CPT | Mod: HCNC | Performed by: INTERNAL MEDICINE

## 2025-07-03 RX ORDER — IBUPROFEN 200 MG
24 TABLET ORAL
Status: DISCONTINUED | OUTPATIENT
Start: 2025-07-03 | End: 2025-07-03

## 2025-07-03 RX ORDER — IBUPROFEN 200 MG
24 TABLET ORAL
Status: DISCONTINUED | OUTPATIENT
Start: 2025-07-03 | End: 2025-07-07

## 2025-07-03 RX ORDER — IBUPROFEN 200 MG
16 TABLET ORAL
Status: DISCONTINUED | OUTPATIENT
Start: 2025-07-03 | End: 2025-07-03

## 2025-07-03 RX ORDER — INSULIN ASPART 100 [IU]/ML
0-10 INJECTION, SOLUTION INTRAVENOUS; SUBCUTANEOUS
Status: DISCONTINUED | OUTPATIENT
Start: 2025-07-03 | End: 2025-07-07

## 2025-07-03 RX ORDER — LANOLIN ALCOHOL/MO/W.PET/CERES
800 CREAM (GRAM) TOPICAL
Status: DISCONTINUED | OUTPATIENT
Start: 2025-07-03 | End: 2025-07-03

## 2025-07-03 RX ORDER — IBUPROFEN 200 MG
16 TABLET ORAL
Status: DISCONTINUED | OUTPATIENT
Start: 2025-07-03 | End: 2025-07-07

## 2025-07-03 RX ORDER — GLUCAGON 1 MG
1 KIT INJECTION
Status: DISCONTINUED | OUTPATIENT
Start: 2025-07-03 | End: 2025-07-07

## 2025-07-03 RX ADMIN — HEPARIN SODIUM 5000 UNITS: 5000 INJECTION INTRAVENOUS; SUBCUTANEOUS at 06:07

## 2025-07-03 RX ADMIN — MIDODRINE HYDROCHLORIDE 10 MG: 5 TABLET ORAL at 01:07

## 2025-07-03 RX ADMIN — ASPIRIN 81 MG CHEWABLE TABLET 81 MG: 81 TABLET CHEWABLE at 08:07

## 2025-07-03 RX ADMIN — HEPARIN SODIUM 5000 UNITS: 5000 INJECTION INTRAVENOUS; SUBCUTANEOUS at 10:07

## 2025-07-03 RX ADMIN — ATORVASTATIN CALCIUM 80 MG: 40 TABLET, FILM COATED ORAL at 08:07

## 2025-07-03 RX ADMIN — CEFAZOLIN 2 G: 2 INJECTION, POWDER, FOR SOLUTION INTRAMUSCULAR; INTRAVENOUS at 04:07

## 2025-07-03 RX ADMIN — LACOSAMIDE 100 MG: 50 TABLET, FILM COATED ORAL at 08:07

## 2025-07-03 RX ADMIN — LEVOTHYROXINE SODIUM 75 MCG: 75 TABLET ORAL at 06:07

## 2025-07-03 RX ADMIN — SODIUM PHOSPHATE, MONOBASIC, MONOHYDRATE AND SODIUM PHOSPHATE, DIBASIC, ANHYDROUS 20.1 MMOL: 142; 276 INJECTION, SOLUTION INTRAVENOUS at 10:07

## 2025-07-03 RX ADMIN — HEPARIN SODIUM 4000 UNITS: 1000 INJECTION INTRAVENOUS; SUBCUTANEOUS at 05:07

## 2025-07-03 RX ADMIN — CEFAZOLIN 2 G: 2 INJECTION, POWDER, FOR SOLUTION INTRAMUSCULAR; INTRAVENOUS at 05:07

## 2025-07-03 RX ADMIN — INSULIN ASPART 2 UNITS: 100 INJECTION, SOLUTION INTRAVENOUS; SUBCUTANEOUS at 06:07

## 2025-07-03 RX ADMIN — HEPARIN SODIUM 5000 UNITS: 5000 INJECTION INTRAVENOUS; SUBCUTANEOUS at 01:07

## 2025-07-04 LAB
ABSOLUTE EOSINOPHIL (OHS): 0.89 K/UL
ABSOLUTE MONOCYTE (OHS): 1.96 K/UL (ref 0.3–1)
ABSOLUTE NEUTROPHIL COUNT (OHS): 14.61 K/UL (ref 1.8–7.7)
ALBUMIN SERPL BCP-MCNC: 1.3 G/DL (ref 3.5–5.2)
ALP SERPL-CCNC: 303 UNIT/L (ref 40–150)
ALT SERPL W/O P-5'-P-CCNC: <5 UNIT/L (ref 10–44)
ANION GAP (OHS): 10 MMOL/L (ref 8–16)
ANION GAP (OHS): 8 MMOL/L (ref 8–16)
AST SERPL-CCNC: 24 UNIT/L (ref 11–45)
BASOPHILS # BLD AUTO: 0.11 K/UL
BASOPHILS NFR BLD AUTO: 0.5 %
BILIRUB SERPL-MCNC: 0.1 MG/DL (ref 0.1–1)
BUN SERPL-MCNC: 43 MG/DL (ref 8–23)
BUN SERPL-MCNC: 47 MG/DL (ref 8–23)
BUN SERPL-MCNC: 52 MG/DL (ref 8–23)
BUN SERPL-MCNC: 56 MG/DL (ref 8–23)
CALCIUM SERPL-MCNC: 7.7 MG/DL (ref 8.7–10.5)
CALCIUM SERPL-MCNC: 7.7 MG/DL (ref 8.7–10.5)
CALCIUM SERPL-MCNC: 7.8 MG/DL (ref 8.7–10.5)
CALCIUM SERPL-MCNC: 7.9 MG/DL (ref 8.7–10.5)
CHLORIDE SERPL-SCNC: 105 MMOL/L (ref 95–110)
CHLORIDE SERPL-SCNC: 106 MMOL/L (ref 95–110)
CHLORIDE SERPL-SCNC: 106 MMOL/L (ref 95–110)
CHLORIDE SERPL-SCNC: 108 MMOL/L (ref 95–110)
CO2 SERPL-SCNC: 22 MMOL/L (ref 23–29)
CO2 SERPL-SCNC: 22 MMOL/L (ref 23–29)
CO2 SERPL-SCNC: 23 MMOL/L (ref 23–29)
CO2 SERPL-SCNC: 23 MMOL/L (ref 23–29)
CREAT SERPL-MCNC: 1.7 MG/DL (ref 0.5–1.4)
CREAT SERPL-MCNC: 1.8 MG/DL (ref 0.5–1.4)
CREAT SERPL-MCNC: 1.9 MG/DL (ref 0.5–1.4)
CREAT SERPL-MCNC: 2.1 MG/DL (ref 0.5–1.4)
ERYTHROCYTE [DISTWIDTH] IN BLOOD BY AUTOMATED COUNT: 18.1 % (ref 11.5–14.5)
GFR SERPLBLD CREATININE-BSD FMLA CKD-EPI: 23 ML/MIN/1.73/M2
GFR SERPLBLD CREATININE-BSD FMLA CKD-EPI: 26 ML/MIN/1.73/M2
GFR SERPLBLD CREATININE-BSD FMLA CKD-EPI: 28 ML/MIN/1.73/M2
GFR SERPLBLD CREATININE-BSD FMLA CKD-EPI: 30 ML/MIN/1.73/M2
GLUCOSE SERPL-MCNC: 162 MG/DL (ref 70–110)
GLUCOSE SERPL-MCNC: 180 MG/DL (ref 70–110)
GLUCOSE SERPL-MCNC: 181 MG/DL (ref 70–110)
GLUCOSE SERPL-MCNC: 186 MG/DL (ref 70–110)
HCT VFR BLD AUTO: 27 % (ref 37–48.5)
HGB BLD-MCNC: 8.3 GM/DL (ref 12–16)
IMM GRANULOCYTES # BLD AUTO: 0.22 K/UL (ref 0–0.04)
IMM GRANULOCYTES NFR BLD AUTO: 1 % (ref 0–0.5)
LYMPHOCYTES # BLD AUTO: 3.69 K/UL (ref 1–4.8)
MAGNESIUM SERPL-MCNC: 2 MG/DL (ref 1.6–2.6)
MAGNESIUM SERPL-MCNC: 2.1 MG/DL (ref 1.6–2.6)
MCH RBC QN AUTO: 30 PG (ref 27–31)
MCHC RBC AUTO-ENTMCNC: 30.7 G/DL (ref 32–36)
MCV RBC AUTO: 98 FL (ref 82–98)
NUCLEATED RBC (/100WBC) (OHS): 0 /100 WBC
PHOSPHATE SERPL-MCNC: 2.8 MG/DL (ref 2.7–4.5)
PHOSPHATE SERPL-MCNC: 3.2 MG/DL (ref 2.7–4.5)
PHOSPHATE SERPL-MCNC: 3.3 MG/DL (ref 2.7–4.5)
PHOSPHATE SERPL-MCNC: 3.3 MG/DL (ref 2.7–4.5)
PLATELET # BLD AUTO: 464 K/UL (ref 150–450)
PMV BLD AUTO: 10.4 FL (ref 9.2–12.9)
POCT GLUCOSE: 206 MG/DL (ref 70–110)
POCT GLUCOSE: 208 MG/DL (ref 70–110)
POCT GLUCOSE: 216 MG/DL (ref 70–110)
POCT GLUCOSE: 231 MG/DL (ref 70–110)
POCT GLUCOSE: 88 MG/DL (ref 70–110)
POCT GLUCOSE: 89 MG/DL (ref 70–110)
POTASSIUM SERPL-SCNC: 4.6 MMOL/L (ref 3.5–5.1)
POTASSIUM SERPL-SCNC: 4.7 MMOL/L (ref 3.5–5.1)
POTASSIUM SERPL-SCNC: 4.9 MMOL/L (ref 3.5–5.1)
POTASSIUM SERPL-SCNC: 5.1 MMOL/L (ref 3.5–5.1)
PROT SERPL-MCNC: 7 GM/DL (ref 6–8.4)
RBC # BLD AUTO: 2.77 M/UL (ref 4–5.4)
RELATIVE EOSINOPHIL (OHS): 4.1 %
RELATIVE LYMPHOCYTE (OHS): 17.2 % (ref 18–48)
RELATIVE MONOCYTE (OHS): 9.1 % (ref 4–15)
RELATIVE NEUTROPHIL (OHS): 68.1 % (ref 38–73)
SODIUM SERPL-SCNC: 136 MMOL/L (ref 136–145)
SODIUM SERPL-SCNC: 137 MMOL/L (ref 136–145)
SODIUM SERPL-SCNC: 138 MMOL/L (ref 136–145)
SODIUM SERPL-SCNC: 138 MMOL/L (ref 136–145)
WBC # BLD AUTO: 21.48 K/UL (ref 3.9–12.7)

## 2025-07-04 PROCEDURE — 36415 COLL VENOUS BLD VENIPUNCTURE: CPT | Mod: HCNC

## 2025-07-04 PROCEDURE — 27000207 HC ISOLATION: Mod: HCNC

## 2025-07-04 PROCEDURE — 63600175 PHARM REV CODE 636 W HCPCS: Mod: HCNC

## 2025-07-04 PROCEDURE — 25000003 PHARM REV CODE 250: Mod: HCNC | Performed by: HOSPITALIST

## 2025-07-04 PROCEDURE — 20600001 HC STEP DOWN PRIVATE ROOM: Mod: HCNC

## 2025-07-04 PROCEDURE — 83735 ASSAY OF MAGNESIUM: CPT | Mod: HCNC | Performed by: INTERNAL MEDICINE

## 2025-07-04 PROCEDURE — 84100 ASSAY OF PHOSPHORUS: CPT | Mod: HCNC | Performed by: STUDENT IN AN ORGANIZED HEALTH CARE EDUCATION/TRAINING PROGRAM

## 2025-07-04 PROCEDURE — 36415 COLL VENOUS BLD VENIPUNCTURE: CPT | Mod: HCNC | Performed by: INTERNAL MEDICINE

## 2025-07-04 PROCEDURE — 84100 ASSAY OF PHOSPHORUS: CPT | Mod: HCNC | Performed by: INTERNAL MEDICINE

## 2025-07-04 RX ADMIN — INSULIN ASPART 4 UNITS: 100 INJECTION, SOLUTION INTRAVENOUS; SUBCUTANEOUS at 12:07

## 2025-07-04 RX ADMIN — ASPIRIN 81 MG CHEWABLE TABLET 81 MG: 81 TABLET CHEWABLE at 09:07

## 2025-07-04 RX ADMIN — INSULIN ASPART 4 UNITS: 100 INJECTION, SOLUTION INTRAVENOUS; SUBCUTANEOUS at 04:07

## 2025-07-04 RX ADMIN — LACOSAMIDE 100 MG: 50 TABLET, FILM COATED ORAL at 08:07

## 2025-07-04 RX ADMIN — CEFAZOLIN 2 G: 2 INJECTION, POWDER, FOR SOLUTION INTRAMUSCULAR; INTRAVENOUS at 05:07

## 2025-07-04 RX ADMIN — HEPARIN SODIUM 5000 UNITS: 5000 INJECTION INTRAVENOUS; SUBCUTANEOUS at 09:07

## 2025-07-04 RX ADMIN — CEFAZOLIN 2 G: 2 INJECTION, POWDER, FOR SOLUTION INTRAMUSCULAR; INTRAVENOUS at 04:07

## 2025-07-04 RX ADMIN — INSULIN ASPART 2 UNITS: 100 INJECTION, SOLUTION INTRAVENOUS; SUBCUTANEOUS at 08:07

## 2025-07-04 RX ADMIN — INSULIN ASPART 4 UNITS: 100 INJECTION, SOLUTION INTRAVENOUS; SUBCUTANEOUS at 07:07

## 2025-07-04 RX ADMIN — ATORVASTATIN CALCIUM 80 MG: 40 TABLET, FILM COATED ORAL at 08:07

## 2025-07-04 RX ADMIN — HEPARIN SODIUM 5000 UNITS: 5000 INJECTION INTRAVENOUS; SUBCUTANEOUS at 02:07

## 2025-07-04 RX ADMIN — LEVOTHYROXINE SODIUM 75 MCG: 75 TABLET ORAL at 04:07

## 2025-07-04 RX ADMIN — LACOSAMIDE 100 MG: 50 TABLET, FILM COATED ORAL at 09:07

## 2025-07-04 RX ADMIN — HEPARIN SODIUM 5000 UNITS: 5000 INJECTION INTRAVENOUS; SUBCUTANEOUS at 04:07

## 2025-07-05 LAB
ABSOLUTE EOSINOPHIL (OHS): 0.82 K/UL
ABSOLUTE MONOCYTE (OHS): 1.81 K/UL (ref 0.3–1)
ABSOLUTE NEUTROPHIL COUNT (OHS): 14.38 K/UL (ref 1.8–7.7)
ALBUMIN SERPL BCP-MCNC: 1.3 G/DL (ref 3.5–5.2)
ALP SERPL-CCNC: 287 UNIT/L (ref 40–150)
ALT SERPL W/O P-5'-P-CCNC: <5 UNIT/L (ref 10–44)
ANION GAP (OHS): 10 MMOL/L (ref 8–16)
ANION GAP (OHS): 9 MMOL/L (ref 8–16)
AST SERPL-CCNC: 21 UNIT/L (ref 11–45)
BASOPHILS # BLD AUTO: 0.09 K/UL
BASOPHILS NFR BLD AUTO: 0.4 %
BILIRUB SERPL-MCNC: 0.1 MG/DL (ref 0.1–1)
BUN SERPL-MCNC: 28 MG/DL (ref 8–23)
BUN SERPL-MCNC: 34 MG/DL (ref 8–23)
BUN SERPL-MCNC: 54 MG/DL (ref 8–23)
BUN SERPL-MCNC: 54 MG/DL (ref 8–23)
CALCIUM SERPL-MCNC: 7.8 MG/DL (ref 8.7–10.5)
CALCIUM SERPL-MCNC: 8 MG/DL (ref 8.7–10.5)
CHLORIDE SERPL-SCNC: 104 MMOL/L (ref 95–110)
CHLORIDE SERPL-SCNC: 105 MMOL/L (ref 95–110)
CHLORIDE SERPL-SCNC: 105 MMOL/L (ref 95–110)
CHLORIDE SERPL-SCNC: 106 MMOL/L (ref 95–110)
CO2 SERPL-SCNC: 20 MMOL/L (ref 23–29)
CO2 SERPL-SCNC: 20 MMOL/L (ref 23–29)
CO2 SERPL-SCNC: 24 MMOL/L (ref 23–29)
CO2 SERPL-SCNC: 24 MMOL/L (ref 23–29)
CREAT SERPL-MCNC: 1.3 MG/DL (ref 0.5–1.4)
CREAT SERPL-MCNC: 1.5 MG/DL (ref 0.5–1.4)
CREAT SERPL-MCNC: 2.1 MG/DL (ref 0.5–1.4)
CREAT SERPL-MCNC: 2.1 MG/DL (ref 0.5–1.4)
ERYTHROCYTE [DISTWIDTH] IN BLOOD BY AUTOMATED COUNT: 18.1 % (ref 11.5–14.5)
GFR SERPLBLD CREATININE-BSD FMLA CKD-EPI: 23 ML/MIN/1.73/M2
GFR SERPLBLD CREATININE-BSD FMLA CKD-EPI: 23 ML/MIN/1.73/M2
GFR SERPLBLD CREATININE-BSD FMLA CKD-EPI: 35 ML/MIN/1.73/M2
GFR SERPLBLD CREATININE-BSD FMLA CKD-EPI: 42 ML/MIN/1.73/M2
GLUCOSE SERPL-MCNC: 123 MG/DL (ref 70–110)
GLUCOSE SERPL-MCNC: 152 MG/DL (ref 70–110)
GLUCOSE SERPL-MCNC: 152 MG/DL (ref 70–110)
GLUCOSE SERPL-MCNC: 154 MG/DL (ref 70–110)
HCT VFR BLD AUTO: 24.3 % (ref 37–48.5)
HGB BLD-MCNC: 7.6 GM/DL (ref 12–16)
IMM GRANULOCYTES # BLD AUTO: 0.23 K/UL (ref 0–0.04)
IMM GRANULOCYTES NFR BLD AUTO: 1.1 % (ref 0–0.5)
LYMPHOCYTES # BLD AUTO: 3.56 K/UL (ref 1–4.8)
MAGNESIUM SERPL-MCNC: 1.8 MG/DL (ref 1.6–2.6)
MAGNESIUM SERPL-MCNC: 1.9 MG/DL (ref 1.6–2.6)
MAGNESIUM SERPL-MCNC: 2.1 MG/DL (ref 1.6–2.6)
MCH RBC QN AUTO: 30.8 PG (ref 27–31)
MCHC RBC AUTO-ENTMCNC: 31.3 G/DL (ref 32–36)
MCV RBC AUTO: 98 FL (ref 82–98)
NUCLEATED RBC (/100WBC) (OHS): 0 /100 WBC
PHOSPHATE SERPL-MCNC: 2.1 MG/DL (ref 2.7–4.5)
PHOSPHATE SERPL-MCNC: 2.4 MG/DL (ref 2.7–4.5)
PHOSPHATE SERPL-MCNC: 3.3 MG/DL (ref 2.7–4.5)
PLATELET # BLD AUTO: 497 K/UL (ref 150–450)
PMV BLD AUTO: 10.8 FL (ref 9.2–12.9)
POCT GLUCOSE: 178 MG/DL (ref 70–110)
POCT GLUCOSE: 198 MG/DL (ref 70–110)
POCT GLUCOSE: 224 MG/DL (ref 70–110)
POTASSIUM SERPL-SCNC: 4 MMOL/L (ref 3.5–5.1)
POTASSIUM SERPL-SCNC: 4.4 MMOL/L (ref 3.5–5.1)
POTASSIUM SERPL-SCNC: 5.5 MMOL/L (ref 3.5–5.1)
POTASSIUM SERPL-SCNC: 5.5 MMOL/L (ref 3.5–5.1)
PROT SERPL-MCNC: 7 GM/DL (ref 6–8.4)
RBC # BLD AUTO: 2.47 M/UL (ref 4–5.4)
RELATIVE EOSINOPHIL (OHS): 3.9 %
RELATIVE LYMPHOCYTE (OHS): 17 % (ref 18–48)
RELATIVE MONOCYTE (OHS): 8.7 % (ref 4–15)
RELATIVE NEUTROPHIL (OHS): 68.9 % (ref 38–73)
SODIUM SERPL-SCNC: 134 MMOL/L (ref 136–145)
SODIUM SERPL-SCNC: 134 MMOL/L (ref 136–145)
SODIUM SERPL-SCNC: 138 MMOL/L (ref 136–145)
SODIUM SERPL-SCNC: 139 MMOL/L (ref 136–145)
WBC # BLD AUTO: 20.89 K/UL (ref 3.9–12.7)

## 2025-07-05 PROCEDURE — 80069 RENAL FUNCTION PANEL: CPT | Mod: HCNC | Performed by: INTERNAL MEDICINE

## 2025-07-05 PROCEDURE — 80053 COMPREHEN METABOLIC PANEL: CPT | Mod: HCNC

## 2025-07-05 PROCEDURE — 25000003 PHARM REV CODE 250: Mod: HCNC

## 2025-07-05 PROCEDURE — 63600175 PHARM REV CODE 636 W HCPCS: Mod: HCNC | Performed by: STUDENT IN AN ORGANIZED HEALTH CARE EDUCATION/TRAINING PROGRAM

## 2025-07-05 PROCEDURE — 20600001 HC STEP DOWN PRIVATE ROOM: Mod: HCNC

## 2025-07-05 PROCEDURE — 25000003 PHARM REV CODE 250: Mod: HCNC | Performed by: HOSPITALIST

## 2025-07-05 PROCEDURE — 63600175 PHARM REV CODE 636 W HCPCS: Mod: HCNC

## 2025-07-05 PROCEDURE — 83735 ASSAY OF MAGNESIUM: CPT | Mod: HCNC | Performed by: INTERNAL MEDICINE

## 2025-07-05 PROCEDURE — 27000207 HC ISOLATION: Mod: HCNC

## 2025-07-05 PROCEDURE — 85025 COMPLETE CBC W/AUTO DIFF WBC: CPT | Mod: HCNC

## 2025-07-05 PROCEDURE — 94761 N-INVAS EAR/PLS OXIMETRY MLT: CPT | Mod: HCNC

## 2025-07-05 PROCEDURE — 25000003 PHARM REV CODE 250: Mod: HCNC | Performed by: STUDENT IN AN ORGANIZED HEALTH CARE EDUCATION/TRAINING PROGRAM

## 2025-07-05 PROCEDURE — 80100014 HC HEMODIALYSIS 1:1: Mod: HCNC

## 2025-07-05 PROCEDURE — 36415 COLL VENOUS BLD VENIPUNCTURE: CPT | Mod: HCNC | Performed by: INTERNAL MEDICINE

## 2025-07-05 PROCEDURE — 36415 COLL VENOUS BLD VENIPUNCTURE: CPT | Mod: HCNC

## 2025-07-05 PROCEDURE — 25000003 PHARM REV CODE 250: Mod: HCNC | Performed by: INTERNAL MEDICINE

## 2025-07-05 RX ORDER — ACETAMINOPHEN 325 MG/1
650 TABLET ORAL EVERY 6 HOURS PRN
Status: DISCONTINUED | OUTPATIENT
Start: 2025-07-05 | End: 2025-07-05

## 2025-07-05 RX ORDER — ACETAMINOPHEN 325 MG/1
650 TABLET ORAL EVERY 6 HOURS PRN
Status: DISCONTINUED | OUTPATIENT
Start: 2025-07-05 | End: 2025-08-26 | Stop reason: HOSPADM

## 2025-07-05 RX ORDER — CEFAZOLIN 2 G/1
2 INJECTION, POWDER, FOR SOLUTION INTRAMUSCULAR; INTRAVENOUS EVERY 24 HOURS
Status: DISCONTINUED | OUTPATIENT
Start: 2025-07-06 | End: 2025-07-05

## 2025-07-05 RX ORDER — SODIUM CHLORIDE 9 MG/ML
INJECTION, SOLUTION INTRAVENOUS
Status: CANCELLED | OUTPATIENT
Start: 2025-07-05

## 2025-07-05 RX ORDER — CEFAZOLIN SODIUM 1 G/3ML
1 INJECTION, POWDER, FOR SOLUTION INTRAMUSCULAR; INTRAVENOUS EVERY 24 HOURS
Status: DISCONTINUED | OUTPATIENT
Start: 2025-07-06 | End: 2025-07-07

## 2025-07-05 RX ORDER — SODIUM CHLORIDE 9 MG/ML
INJECTION, SOLUTION INTRAVENOUS ONCE
Status: CANCELLED | OUTPATIENT
Start: 2025-07-05 | End: 2025-07-05

## 2025-07-05 RX ADMIN — HEPARIN SODIUM 4000 UNITS: 1000 INJECTION INTRAVENOUS; SUBCUTANEOUS at 04:07

## 2025-07-05 RX ADMIN — DIPHENHYDRAMINE HYDROCHLORIDE, ZINC ACETATE: 2; .1 CREAM TOPICAL at 12:07

## 2025-07-05 RX ADMIN — INSULIN ASPART 2 UNITS: 100 INJECTION, SOLUTION INTRAVENOUS; SUBCUTANEOUS at 08:07

## 2025-07-05 RX ADMIN — INSULIN ASPART 4 UNITS: 100 INJECTION, SOLUTION INTRAVENOUS; SUBCUTANEOUS at 12:07

## 2025-07-05 RX ADMIN — ACETAMINOPHEN 650 MG: 325 TABLET ORAL at 02:07

## 2025-07-05 RX ADMIN — ACETAMINOPHEN 650 MG: 325 TABLET ORAL at 09:07

## 2025-07-05 RX ADMIN — INSULIN ASPART 1 UNITS: 100 INJECTION, SOLUTION INTRAVENOUS; SUBCUTANEOUS at 10:07

## 2025-07-05 RX ADMIN — DIPHENHYDRAMINE HYDROCHLORIDE, ZINC ACETATE: 2; .1 CREAM TOPICAL at 09:07

## 2025-07-05 RX ADMIN — POLYETHYLENE GLYCOL 3350 17 G: 17 POWDER, FOR SOLUTION ORAL at 09:07

## 2025-07-05 RX ADMIN — SENNOSIDES AND DOCUSATE SODIUM 1 TABLET: 50; 8.6 TABLET ORAL at 09:07

## 2025-07-05 RX ADMIN — LEVOTHYROXINE SODIUM 75 MCG: 75 TABLET ORAL at 05:07

## 2025-07-05 RX ADMIN — LACOSAMIDE 100 MG: 50 TABLET, FILM COATED ORAL at 09:07

## 2025-07-05 RX ADMIN — ASPIRIN 81 MG CHEWABLE TABLET 81 MG: 81 TABLET CHEWABLE at 08:07

## 2025-07-05 RX ADMIN — HEPARIN SODIUM 5000 UNITS: 5000 INJECTION INTRAVENOUS; SUBCUTANEOUS at 09:07

## 2025-07-05 RX ADMIN — CEFAZOLIN 2 G: 2 INJECTION, POWDER, FOR SOLUTION INTRAMUSCULAR; INTRAVENOUS at 05:07

## 2025-07-05 RX ADMIN — MIDODRINE HYDROCHLORIDE 10 MG: 5 TABLET ORAL at 01:07

## 2025-07-05 RX ADMIN — ATORVASTATIN CALCIUM 80 MG: 40 TABLET, FILM COATED ORAL at 09:07

## 2025-07-05 RX ADMIN — HEPARIN SODIUM 5000 UNITS: 5000 INJECTION INTRAVENOUS; SUBCUTANEOUS at 05:07

## 2025-07-05 RX ADMIN — LACOSAMIDE 100 MG: 50 TABLET, FILM COATED ORAL at 08:07

## 2025-07-06 LAB
ABSOLUTE EOSINOPHIL (OHS): 0.7 K/UL
ABSOLUTE MONOCYTE (OHS): 1.86 K/UL (ref 0.3–1)
ABSOLUTE NEUTROPHIL COUNT (OHS): 11.66 K/UL (ref 1.8–7.7)
ALBUMIN SERPL BCP-MCNC: 1.1 G/DL (ref 3.5–5.2)
ALBUMIN SERPL BCP-MCNC: 1.3 G/DL (ref 3.5–5.2)
ALP SERPL-CCNC: 275 UNIT/L (ref 40–150)
ALT SERPL W/O P-5'-P-CCNC: <5 UNIT/L (ref 10–44)
ANION GAP (OHS): 7 MMOL/L (ref 8–16)
ANION GAP (OHS): 7 MMOL/L (ref 8–16)
ANION GAP (OHS): 8 MMOL/L (ref 8–16)
ANION GAP (OHS): 8 MMOL/L (ref 8–16)
AST SERPL-CCNC: 26 UNIT/L (ref 11–45)
BASOPHILS # BLD AUTO: 0.1 K/UL
BASOPHILS NFR BLD AUTO: 0.6 %
BILIRUB SERPL-MCNC: 0.1 MG/DL (ref 0.1–1)
BUN SERPL-MCNC: 40 MG/DL (ref 8–23)
BUN SERPL-MCNC: 40 MG/DL (ref 8–23)
BUN SERPL-MCNC: 45 MG/DL (ref 8–23)
BUN SERPL-MCNC: 52 MG/DL (ref 8–23)
CALCIUM SERPL-MCNC: 7.3 MG/DL (ref 8.7–10.5)
CALCIUM SERPL-MCNC: 7.6 MG/DL (ref 8.7–10.5)
CALCIUM SERPL-MCNC: 7.6 MG/DL (ref 8.7–10.5)
CALCIUM SERPL-MCNC: 7.8 MG/DL (ref 8.7–10.5)
CHLORIDE SERPL-SCNC: 103 MMOL/L (ref 95–110)
CHLORIDE SERPL-SCNC: 104 MMOL/L (ref 95–110)
CHLORIDE SERPL-SCNC: 104 MMOL/L (ref 95–110)
CHLORIDE SERPL-SCNC: 105 MMOL/L (ref 95–110)
CO2 SERPL-SCNC: 23 MMOL/L (ref 23–29)
CO2 SERPL-SCNC: 24 MMOL/L (ref 23–29)
CO2 SERPL-SCNC: 24 MMOL/L (ref 23–29)
CO2 SERPL-SCNC: 27 MMOL/L (ref 23–29)
CREAT SERPL-MCNC: 1.6 MG/DL (ref 0.5–1.4)
CREAT SERPL-MCNC: 1.6 MG/DL (ref 0.5–1.4)
CREAT SERPL-MCNC: 1.8 MG/DL (ref 0.5–1.4)
CREAT SERPL-MCNC: 1.9 MG/DL (ref 0.5–1.4)
ERYTHROCYTE [DISTWIDTH] IN BLOOD BY AUTOMATED COUNT: 18.1 % (ref 11.5–14.5)
GFR SERPLBLD CREATININE-BSD FMLA CKD-EPI: 26 ML/MIN/1.73/M2
GFR SERPLBLD CREATININE-BSD FMLA CKD-EPI: 28 ML/MIN/1.73/M2
GFR SERPLBLD CREATININE-BSD FMLA CKD-EPI: 32 ML/MIN/1.73/M2
GFR SERPLBLD CREATININE-BSD FMLA CKD-EPI: 32 ML/MIN/1.73/M2
GLUCOSE SERPL-MCNC: 165 MG/DL (ref 70–110)
GLUCOSE SERPL-MCNC: 165 MG/DL (ref 70–110)
GLUCOSE SERPL-MCNC: 168 MG/DL (ref 70–110)
GLUCOSE SERPL-MCNC: 172 MG/DL (ref 70–110)
HCT VFR BLD AUTO: 24.6 % (ref 37–48.5)
HGB BLD-MCNC: 7.7 GM/DL (ref 12–16)
IMM GRANULOCYTES # BLD AUTO: 0.15 K/UL (ref 0–0.04)
IMM GRANULOCYTES NFR BLD AUTO: 0.9 % (ref 0–0.5)
LYMPHOCYTES # BLD AUTO: 2.94 K/UL (ref 1–4.8)
MAGNESIUM SERPL-MCNC: 1.9 MG/DL (ref 1.6–2.6)
MAGNESIUM SERPL-MCNC: 1.9 MG/DL (ref 1.6–2.6)
MAGNESIUM SERPL-MCNC: 2 MG/DL (ref 1.6–2.6)
MCH RBC QN AUTO: 30.6 PG (ref 27–31)
MCHC RBC AUTO-ENTMCNC: 31.3 G/DL (ref 32–36)
MCV RBC AUTO: 98 FL (ref 82–98)
NUCLEATED RBC (/100WBC) (OHS): 0 /100 WBC
PHOSPHATE SERPL-MCNC: 2.7 MG/DL (ref 2.7–4.5)
PHOSPHATE SERPL-MCNC: 2.8 MG/DL (ref 2.7–4.5)
PHOSPHATE SERPL-MCNC: 3 MG/DL (ref 2.7–4.5)
PLATELET # BLD AUTO: 482 K/UL (ref 150–450)
PMV BLD AUTO: 10.3 FL (ref 9.2–12.9)
POCT GLUCOSE: 213 MG/DL (ref 70–110)
POCT GLUCOSE: 218 MG/DL (ref 70–110)
POCT GLUCOSE: 240 MG/DL (ref 70–110)
POTASSIUM SERPL-SCNC: 4.4 MMOL/L (ref 3.5–5.1)
POTASSIUM SERPL-SCNC: 4.4 MMOL/L (ref 3.5–5.1)
POTASSIUM SERPL-SCNC: 4.6 MMOL/L (ref 3.5–5.1)
POTASSIUM SERPL-SCNC: 5.1 MMOL/L (ref 3.5–5.1)
PROT SERPL-MCNC: 6.6 GM/DL (ref 6–8.4)
RBC # BLD AUTO: 2.52 M/UL (ref 4–5.4)
RELATIVE EOSINOPHIL (OHS): 4 %
RELATIVE LYMPHOCYTE (OHS): 16.9 % (ref 18–48)
RELATIVE MONOCYTE (OHS): 10.7 % (ref 4–15)
RELATIVE NEUTROPHIL (OHS): 66.9 % (ref 38–73)
SODIUM SERPL-SCNC: 135 MMOL/L (ref 136–145)
SODIUM SERPL-SCNC: 136 MMOL/L (ref 136–145)
SODIUM SERPL-SCNC: 136 MMOL/L (ref 136–145)
SODIUM SERPL-SCNC: 137 MMOL/L (ref 136–145)
WBC # BLD AUTO: 17.41 K/UL (ref 3.9–12.7)

## 2025-07-06 PROCEDURE — 20600001 HC STEP DOWN PRIVATE ROOM: Mod: HCNC

## 2025-07-06 PROCEDURE — 80053 COMPREHEN METABOLIC PANEL: CPT | Mod: HCNC

## 2025-07-06 PROCEDURE — 94761 N-INVAS EAR/PLS OXIMETRY MLT: CPT | Mod: HCNC

## 2025-07-06 PROCEDURE — 25000003 PHARM REV CODE 250: Mod: HCNC | Performed by: HOSPITALIST

## 2025-07-06 PROCEDURE — 27000207 HC ISOLATION: Mod: HCNC

## 2025-07-06 PROCEDURE — 83735 ASSAY OF MAGNESIUM: CPT | Mod: HCNC | Performed by: INTERNAL MEDICINE

## 2025-07-06 PROCEDURE — 63600175 PHARM REV CODE 636 W HCPCS: Mod: HCNC | Performed by: STUDENT IN AN ORGANIZED HEALTH CARE EDUCATION/TRAINING PROGRAM

## 2025-07-06 PROCEDURE — 80069 RENAL FUNCTION PANEL: CPT | Mod: HCNC | Performed by: INTERNAL MEDICINE

## 2025-07-06 PROCEDURE — 63600175 PHARM REV CODE 636 W HCPCS: Mod: HCNC

## 2025-07-06 PROCEDURE — 36415 COLL VENOUS BLD VENIPUNCTURE: CPT | Mod: HCNC

## 2025-07-06 PROCEDURE — 36415 COLL VENOUS BLD VENIPUNCTURE: CPT | Mod: HCNC | Performed by: INTERNAL MEDICINE

## 2025-07-06 PROCEDURE — 85025 COMPLETE CBC W/AUTO DIFF WBC: CPT | Mod: HCNC

## 2025-07-06 RX ADMIN — LACOSAMIDE 100 MG: 50 TABLET, FILM COATED ORAL at 09:07

## 2025-07-06 RX ADMIN — HEPARIN SODIUM 5000 UNITS: 5000 INJECTION INTRAVENOUS; SUBCUTANEOUS at 03:07

## 2025-07-06 RX ADMIN — ATORVASTATIN CALCIUM 80 MG: 40 TABLET, FILM COATED ORAL at 09:07

## 2025-07-06 RX ADMIN — HEPARIN SODIUM 5000 UNITS: 5000 INJECTION INTRAVENOUS; SUBCUTANEOUS at 09:07

## 2025-07-06 RX ADMIN — HEPARIN SODIUM 5000 UNITS: 5000 INJECTION INTRAVENOUS; SUBCUTANEOUS at 06:07

## 2025-07-06 RX ADMIN — INSULIN ASPART 2 UNITS: 100 INJECTION, SOLUTION INTRAVENOUS; SUBCUTANEOUS at 09:07

## 2025-07-06 RX ADMIN — LACOSAMIDE 100 MG: 50 TABLET, FILM COATED ORAL at 08:07

## 2025-07-06 RX ADMIN — CEFAZOLIN 1 G: 330 INJECTION, POWDER, FOR SOLUTION INTRAMUSCULAR; INTRAVENOUS at 03:07

## 2025-07-06 RX ADMIN — ASPIRIN 81 MG CHEWABLE TABLET 81 MG: 81 TABLET CHEWABLE at 08:07

## 2025-07-06 RX ADMIN — INSULIN ASPART 4 UNITS: 100 INJECTION, SOLUTION INTRAVENOUS; SUBCUTANEOUS at 04:07

## 2025-07-06 RX ADMIN — LEVOTHYROXINE SODIUM 75 MCG: 75 TABLET ORAL at 06:07

## 2025-07-06 RX ADMIN — INSULIN ASPART 4 UNITS: 100 INJECTION, SOLUTION INTRAVENOUS; SUBCUTANEOUS at 11:07

## 2025-07-07 ENCOUNTER — ANESTHESIA (OUTPATIENT)
Dept: ENDOSCOPY | Facility: HOSPITAL | Age: 80
End: 2025-07-07
Payer: MEDICARE

## 2025-07-07 ENCOUNTER — ANESTHESIA EVENT (OUTPATIENT)
Dept: ENDOSCOPY | Facility: HOSPITAL | Age: 80
End: 2025-07-07
Payer: MEDICARE

## 2025-07-07 PROBLEM — K92.2 GI BLEED: Status: ACTIVE | Noted: 2025-07-07

## 2025-07-07 LAB
ABO + RH BLD: NORMAL
ABSOLUTE EOSINOPHIL (OHS): 0.12 K/UL
ABSOLUTE EOSINOPHIL (OHS): 0.14 K/UL
ABSOLUTE EOSINOPHIL (OHS): 0.18 K/UL
ABSOLUTE EOSINOPHIL (OHS): 0.56 K/UL
ABSOLUTE MONOCYTE (OHS): 2.16 K/UL (ref 0.3–1)
ABSOLUTE MONOCYTE (OHS): 2.78 K/UL (ref 0.3–1)
ABSOLUTE MONOCYTE (OHS): 3.71 K/UL (ref 0.3–1)
ABSOLUTE MONOCYTE (OHS): 4.47 K/UL (ref 0.3–1)
ABSOLUTE NEUTROPHIL COUNT (OHS): 10.14 K/UL (ref 1.8–7.7)
ABSOLUTE NEUTROPHIL COUNT (OHS): 14.09 K/UL (ref 1.8–7.7)
ABSOLUTE NEUTROPHIL COUNT (OHS): 16.03 K/UL (ref 1.8–7.7)
ABSOLUTE NEUTROPHIL COUNT (OHS): 16.56 K/UL (ref 1.8–7.7)
ALBUMIN SERPL BCP-MCNC: 0.9 G/DL (ref 3.5–5.2)
ALBUMIN SERPL BCP-MCNC: 1 G/DL (ref 3.5–5.2)
ALBUMIN SERPL BCP-MCNC: 1.1 G/DL (ref 3.5–5.2)
ALLENS TEST: ABNORMAL
ALP SERPL-CCNC: 217 UNIT/L (ref 40–150)
ALT SERPL W/O P-5'-P-CCNC: <5 UNIT/L (ref 10–44)
AMMONIA PLAS-SCNC: 50 UMOL/L (ref 10–50)
ANION GAP (OHS): 10 MMOL/L (ref 8–16)
ANION GAP (OHS): 10 MMOL/L (ref 8–16)
ANION GAP (OHS): 15 MMOL/L (ref 8–16)
ANION GAP (OHS): 8 MMOL/L (ref 8–16)
ANION GAP (OHS): 9 MMOL/L (ref 8–16)
ANISOCYTOSIS BLD QL SMEAR: NORMAL
ANISOCYTOSIS BLD QL SMEAR: SLIGHT
ASCENDING AORTA: 2.8 CM
ASCENDING AORTA: 2.8 CM
AST SERPL-CCNC: 35 UNIT/L (ref 11–45)
AV LVOT PEAK GRADIENT: 10 MMHG
AV MEAN GRADIENT: 10 MMHG
AV PEAK GRADIENT: 16 MMHG
BACTERIA #/AREA URNS AUTO: ABNORMAL /HPF
BASOPHILS # BLD AUTO: 0.05 K/UL
BASOPHILS # BLD AUTO: 0.08 K/UL
BASOPHILS # BLD AUTO: 0.1 K/UL
BASOPHILS # BLD AUTO: 0.13 K/UL
BASOPHILS NFR BLD AUTO: 0.3 %
BASOPHILS NFR BLD AUTO: 0.4 %
BASOPHILS NFR BLD AUTO: 0.4 %
BASOPHILS NFR BLD AUTO: 0.5 %
BILIRUB SERPL-MCNC: 0.1 MG/DL (ref 0.1–1)
BILIRUB UR QL STRIP.AUTO: NEGATIVE
BLD PROD TYP BPU: NORMAL
BLOOD UNIT EXPIRATION DATE: NORMAL
BLOOD UNIT TYPE CODE: 7300
BSA FOR ECHO PROCEDURE: 1.84 M2
BUN SERPL-MCNC: 55 MG/DL (ref 8–23)
BUN SERPL-MCNC: 57 MG/DL (ref 8–23)
BUN SERPL-MCNC: 57 MG/DL (ref 8–23)
BUN SERPL-MCNC: 58 MG/DL (ref 8–23)
BUN SERPL-MCNC: 58 MG/DL (ref 8–23)
BUN SERPL-MCNC: 60 MG/DL (ref 8–23)
BUN SERPL-MCNC: 61 MG/DL (ref 8–23)
BUN SERPL-MCNC: 62 MG/DL (ref 8–23)
BUN SERPL-MCNC: 62 MG/DL (ref 8–23)
BURR CELLS BLD QL SMEAR: NORMAL
BURR CELLS BLD QL SMEAR: NORMAL
CALCIUM SERPL-MCNC: 7 MG/DL (ref 8.7–10.5)
CALCIUM SERPL-MCNC: 7.2 MG/DL (ref 8.7–10.5)
CALCIUM SERPL-MCNC: 7.3 MG/DL (ref 8.7–10.5)
CALCIUM SERPL-MCNC: 7.4 MG/DL (ref 8.7–10.5)
CALCIUM SERPL-MCNC: 7.5 MG/DL (ref 8.7–10.5)
CHLORIDE SERPL-SCNC: 103 MMOL/L (ref 95–110)
CHLORIDE SERPL-SCNC: 104 MMOL/L (ref 95–110)
CHLORIDE SERPL-SCNC: 105 MMOL/L (ref 95–110)
CHLORIDE SERPL-SCNC: 106 MMOL/L (ref 95–110)
CHLORIDE SERPL-SCNC: 107 MMOL/L (ref 95–110)
CHLORIDE SERPL-SCNC: 107 MMOL/L (ref 95–110)
CHLORIDE SERPL-SCNC: 99 MMOL/L (ref 95–110)
CLARITY UR: ABNORMAL
CO2 SERPL-SCNC: 20 MMOL/L (ref 23–29)
CO2 SERPL-SCNC: 20 MMOL/L (ref 23–29)
CO2 SERPL-SCNC: 21 MMOL/L (ref 23–29)
CO2 SERPL-SCNC: 22 MMOL/L (ref 23–29)
CO2 SERPL-SCNC: 22 MMOL/L (ref 23–29)
CO2 SERPL-SCNC: 23 MMOL/L (ref 23–29)
COLOR UR AUTO: ABNORMAL
CREAT SERPL-MCNC: 1.9 MG/DL (ref 0.5–1.4)
CREAT SERPL-MCNC: 2 MG/DL (ref 0.5–1.4)
CROSSMATCH INTERPRETATION: NORMAL
CV ECHO LV RWT: 0.71 CM
DELSYS: ABNORMAL
DISPENSE STATUS: NORMAL
DOP CALC AO PEAK VEL: 2 M/S
DOP CALC LVOT AREA: 2.8 CM2
DOP CALC LVOT DIAMETER: 1.9 CM
DOP CALC LVOT STROKE VOLUME: 66 CM3
DOP CALCLVOT PEAK VEL VTI: 23.3 CM
E WAVE DECELERATION TIME: 152 MS
ECHO LV POSTERIOR WALL: 1.2 CM (ref 0.6–1.1)
EJECTION FRACTION: 68 %
ERYTHROCYTE [DISTWIDTH] IN BLOOD BY AUTOMATED COUNT: 16.4 % (ref 11.5–14.5)
ERYTHROCYTE [DISTWIDTH] IN BLOOD BY AUTOMATED COUNT: 16.7 % (ref 11.5–14.5)
ERYTHROCYTE [DISTWIDTH] IN BLOOD BY AUTOMATED COUNT: 16.9 % (ref 11.5–14.5)
ERYTHROCYTE [DISTWIDTH] IN BLOOD BY AUTOMATED COUNT: 17.1 % (ref 11.5–14.5)
ERYTHROCYTE [DISTWIDTH] IN BLOOD BY AUTOMATED COUNT: 18.6 % (ref 11.5–14.5)
ERYTHROCYTE [SEDIMENTATION RATE] IN BLOOD BY WESTERGREN METHOD: 18 MM/H
FIO2: 21
FLOW: 3
FLOW: 3
FRACTIONAL SHORTENING: 32.4 % (ref 28–44)
GFR SERPLBLD CREATININE-BSD FMLA CKD-EPI: 25 ML/MIN/1.73/M2
GFR SERPLBLD CREATININE-BSD FMLA CKD-EPI: 26 ML/MIN/1.73/M2
GLUCOSE SERPL-MCNC: 188 MG/DL (ref 70–110)
GLUCOSE SERPL-MCNC: 188 MG/DL (ref 70–110)
GLUCOSE SERPL-MCNC: 222 MG/DL (ref 70–110)
GLUCOSE SERPL-MCNC: 299 MG/DL (ref 70–110)
GLUCOSE SERPL-MCNC: 305 MG/DL (ref 70–110)
GLUCOSE SERPL-MCNC: 326 MG/DL (ref 70–110)
GLUCOSE SERPL-MCNC: 329 MG/DL (ref 70–110)
GLUCOSE SERPL-MCNC: 344 MG/DL (ref 70–110)
GLUCOSE SERPL-MCNC: 396 MG/DL (ref 70–110)
GLUCOSE UR QL STRIP: NEGATIVE
HCO3 UR-SCNC: 23.9 MMOL/L (ref 24–28)
HCO3 UR-SCNC: 25.2 MMOL/L (ref 24–28)
HCT VFR BLD AUTO: 15.6 % (ref 37–48.5)
HCT VFR BLD AUTO: 20.2 % (ref 37–48.5)
HCT VFR BLD AUTO: 21.9 % (ref 37–48.5)
HCT VFR BLD AUTO: 23.5 % (ref 37–48.5)
HCT VFR BLD AUTO: 25.2 % (ref 37–48.5)
HCT VFR BLD CALC: <15 %PCV (ref 36–54)
HGB BLD-MCNC: 4.8 GM/DL (ref 12–16)
HGB BLD-MCNC: 6.8 GM/DL (ref 12–16)
HGB BLD-MCNC: 7.4 GM/DL (ref 12–16)
HGB BLD-MCNC: 7.4 GM/DL (ref 12–16)
HGB BLD-MCNC: 8.5 GM/DL (ref 12–16)
HGB UR QL STRIP: ABNORMAL
HYALINE CASTS UR QL AUTO: 0 /LPF (ref 0–1)
HYPOCHROMIA BLD QL SMEAR: NORMAL
IMM GRANULOCYTES # BLD AUTO: 0.33 K/UL (ref 0–0.04)
IMM GRANULOCYTES # BLD AUTO: 0.38 K/UL (ref 0–0.04)
IMM GRANULOCYTES # BLD AUTO: 0.65 K/UL (ref 0–0.04)
IMM GRANULOCYTES # BLD AUTO: 1.03 K/UL (ref 0–0.04)
IMM GRANULOCYTES NFR BLD AUTO: 1.7 % (ref 0–0.5)
IMM GRANULOCYTES NFR BLD AUTO: 1.9 % (ref 0–0.5)
IMM GRANULOCYTES NFR BLD AUTO: 2.7 % (ref 0–0.5)
IMM GRANULOCYTES NFR BLD AUTO: 4.1 % (ref 0–0.5)
INDIRECT COOMBS: NORMAL
INTERVENTRICULAR SEPTUM: 1.6 CM (ref 0.6–1.1)
KETONES UR QL STRIP: NEGATIVE
LACTATE SERPL-SCNC: 1.6 MMOL/L (ref 0.5–2.2)
LACTATE SERPL-SCNC: 2.1 MMOL/L (ref 0.5–2.2)
LARGE/GIANT PLATELETS (OHS): PRESENT
LDH SERPL L TO P-CCNC: 2.97 MMOL/L (ref 0.5–2.2)
LEFT INTERNAL DIMENSION IN SYSTOLE: 2.3 CM (ref 2.1–4)
LEFT VENTRICLE MASS INDEX: 86.1 G/M2
LEFT VENTRICULAR INTERNAL DIMENSION IN DIASTOLE: 3.4 CM (ref 3.5–6)
LEFT VENTRICULAR MASS: 166.2 G
LEUKOCYTE ESTERASE UR QL STRIP: ABNORMAL
LYMPHOCYTES # BLD AUTO: 2.68 K/UL (ref 1–4.8)
LYMPHOCYTES # BLD AUTO: 2.91 K/UL (ref 1–4.8)
LYMPHOCYTES # BLD AUTO: 3.64 K/UL (ref 1–4.8)
LYMPHOCYTES # BLD AUTO: 5.25 K/UL (ref 1–4.8)
MAGNESIUM SERPL-MCNC: 1.7 MG/DL (ref 1.6–2.6)
MAGNESIUM SERPL-MCNC: 1.7 MG/DL (ref 1.6–2.6)
MAGNESIUM SERPL-MCNC: 1.9 MG/DL (ref 1.6–2.6)
MAGNESIUM SERPL-MCNC: 1.9 MG/DL (ref 1.6–2.6)
MCH RBC QN AUTO: 30.6 PG (ref 27–31)
MCH RBC QN AUTO: 31 PG (ref 27–31)
MCH RBC QN AUTO: 31.1 PG (ref 27–31)
MCHC RBC AUTO-ENTMCNC: 30.8 G/DL (ref 32–36)
MCHC RBC AUTO-ENTMCNC: 31.5 G/DL (ref 32–36)
MCHC RBC AUTO-ENTMCNC: 33.7 G/DL (ref 32–36)
MCHC RBC AUTO-ENTMCNC: 33.7 G/DL (ref 32–36)
MCHC RBC AUTO-ENTMCNC: 33.8 G/DL (ref 32–36)
MCV RBC AUTO: 101 FL (ref 82–98)
MCV RBC AUTO: 92 FL (ref 82–98)
MCV RBC AUTO: 97 FL (ref 82–98)
MICROSCOPIC COMMENT: ABNORMAL
MODE: ABNORMAL
NITRITE UR QL STRIP: NEGATIVE
NUCLEATED RBC (/100WBC) (OHS): 1 /100 WBC
OVALOCYTES BLD QL SMEAR: NORMAL
PCO2 BLDA: 40.8 MMHG (ref 35–45)
PCO2 BLDA: 49.8 MMHG (ref 35–45)
PEEP: 5
PH SMN: 7.31 [PH] (ref 7.35–7.45)
PH SMN: 7.38 [PH] (ref 7.35–7.45)
PH UR STRIP: 7 [PH]
PHOSPHATE SERPL-MCNC: 2.9 MG/DL (ref 2.7–4.5)
PHOSPHATE SERPL-MCNC: 3.3 MG/DL (ref 2.7–4.5)
PHOSPHATE SERPL-MCNC: 3.5 MG/DL (ref 2.7–4.5)
PHOSPHATE SERPL-MCNC: 3.5 MG/DL (ref 2.7–4.5)
PLATELET # BLD AUTO: 303 K/UL (ref 150–450)
PLATELET # BLD AUTO: 348 K/UL (ref 150–450)
PLATELET # BLD AUTO: 364 K/UL (ref 150–450)
PLATELET # BLD AUTO: 369 K/UL (ref 150–450)
PLATELET # BLD AUTO: 372 K/UL (ref 150–450)
PMV BLD AUTO: 10.4 FL (ref 9.2–12.9)
PMV BLD AUTO: 10.5 FL (ref 9.2–12.9)
PMV BLD AUTO: 10.6 FL (ref 9.2–12.9)
PMV BLD AUTO: 10.8 FL (ref 9.2–12.9)
PMV BLD AUTO: 11 FL (ref 9.2–12.9)
PO2 BLDA: 27 MMHG (ref 40–60)
PO2 BLDA: 35 MMHG (ref 40–60)
POC BE: -1 MMOL/L (ref -2–2)
POC BE: -1 MMOL/L (ref -2–2)
POC IONIZED CALCIUM: 1.13 MMOL/L (ref 1.06–1.42)
POC SATURATED O2: 44 % (ref 95–100)
POC SATURATED O2: 66 % (ref 95–100)
POC TCO2: 25 MMOL/L (ref 24–29)
POC TCO2: 27 MMOL/L (ref 24–29)
POCT GLUCOSE: 210 MG/DL (ref 70–110)
POCT GLUCOSE: 277 MG/DL (ref 70–110)
POCT GLUCOSE: 284 MG/DL (ref 70–110)
POCT GLUCOSE: 292 MG/DL (ref 70–110)
POCT GLUCOSE: 300 MG/DL (ref 70–110)
POCT GLUCOSE: 331 MG/DL (ref 70–110)
POIKILOCYTOSIS BLD QL SMEAR: NORMAL
POIKILOCYTOSIS BLD QL SMEAR: SLIGHT
POLYCHROMASIA BLD QL SMEAR: NORMAL
POLYCHROMASIA BLD QL SMEAR: NORMAL
POTASSIUM BLD-SCNC: 5.7 MMOL/L (ref 3.5–5.1)
POTASSIUM SERPL-SCNC: 4.8 MMOL/L (ref 3.5–5.1)
POTASSIUM SERPL-SCNC: 4.8 MMOL/L (ref 3.5–5.1)
POTASSIUM SERPL-SCNC: 4.9 MMOL/L (ref 3.5–5.1)
POTASSIUM SERPL-SCNC: 5 MMOL/L (ref 3.5–5.1)
POTASSIUM SERPL-SCNC: 5.8 MMOL/L (ref 3.5–5.1)
POTASSIUM SERPL-SCNC: 6 MMOL/L (ref 3.5–5.1)
POTASSIUM SERPL-SCNC: 6 MMOL/L (ref 3.5–5.1)
PROT SERPL-MCNC: 5.2 GM/DL (ref 6–8.4)
PROT UR QL STRIP: ABNORMAL
RA PRESSURE ESTIMATED: 3 MMHG
RBC # BLD AUTO: 1.55 M/UL (ref 4–5.4)
RBC # BLD AUTO: 2.19 M/UL (ref 4–5.4)
RBC # BLD AUTO: 2.38 M/UL (ref 4–5.4)
RBC # BLD AUTO: 2.42 M/UL (ref 4–5.4)
RBC # BLD AUTO: 2.73 M/UL (ref 4–5.4)
RBC #/AREA URNS AUTO: 69 /HPF (ref 0–4)
RELATIVE EOSINOPHIL (OHS): 0.5 %
RELATIVE EOSINOPHIL (OHS): 0.6 %
RELATIVE EOSINOPHIL (OHS): 0.9 %
RELATIVE EOSINOPHIL (OHS): 2.9 %
RELATIVE LYMPHOCYTE (OHS): 11.2 % (ref 18–48)
RELATIVE LYMPHOCYTE (OHS): 14.3 % (ref 18–48)
RELATIVE LYMPHOCYTE (OHS): 14.7 % (ref 18–48)
RELATIVE LYMPHOCYTE (OHS): 27.5 % (ref 18–48)
RELATIVE MONOCYTE (OHS): 10.9 % (ref 4–15)
RELATIVE MONOCYTE (OHS): 14.5 % (ref 4–15)
RELATIVE MONOCYTE (OHS): 15.6 % (ref 4–15)
RELATIVE MONOCYTE (OHS): 17.6 % (ref 4–15)
RELATIVE NEUTROPHIL (OHS): 53.1 % (ref 38–73)
RELATIVE NEUTROPHIL (OHS): 63 % (ref 38–73)
RELATIVE NEUTROPHIL (OHS): 69.5 % (ref 38–73)
RELATIVE NEUTROPHIL (OHS): 71.2 % (ref 38–73)
RH BLD: NORMAL
RIGHT VENTRICULAR END-DIASTOLIC DIMENSION: 2.67 CM
SAMPLE: ABNORMAL
SCHISTOCYTES BLD QL SMEAR: NORMAL
SITE: ABNORMAL
SODIUM BLD-SCNC: 139 MMOL/L (ref 136–145)
SODIUM SERPL-SCNC: 134 MMOL/L (ref 136–145)
SODIUM SERPL-SCNC: 135 MMOL/L (ref 136–145)
SODIUM SERPL-SCNC: 135 MMOL/L (ref 136–145)
SODIUM SERPL-SCNC: 137 MMOL/L (ref 136–145)
SODIUM SERPL-SCNC: 137 MMOL/L (ref 136–145)
SODIUM SERPL-SCNC: 138 MMOL/L (ref 136–145)
SP GR UR STRIP: 1.02
SPECIMEN OUTDATE: NORMAL
SPHEROCYTES BLD QL SMEAR: NORMAL
SQUAMOUS #/AREA URNS AUTO: 26 /HPF
UNIT NUMBER: NORMAL
UROBILINOGEN UR STRIP-ACNC: NEGATIVE EU/DL
VT: 360
WBC # BLD AUTO: 19.11 K/UL (ref 3.9–12.7)
WBC # BLD AUTO: 19.8 K/UL (ref 3.9–12.7)
WBC # BLD AUTO: 23.6 K/UL (ref 3.9–12.7)
WBC # BLD AUTO: 23.84 K/UL (ref 3.9–12.7)
WBC # BLD AUTO: 25.42 K/UL (ref 3.9–12.7)
WBC #/AREA URNS AUTO: >100 /HPF (ref 0–5)
WBC CLUMPS UR QL AUTO: ABNORMAL
YEAST UR QL AUTO: ABNORMAL /HPF
Z-SCORE OF LEFT VENTRICULAR DIMENSION IN END DIASTOLE: -4.74
Z-SCORE OF LEFT VENTRICULAR DIMENSION IN END SYSTOLE: -3

## 2025-07-07 PROCEDURE — 94002 VENT MGMT INPAT INIT DAY: CPT | Mod: HCNC

## 2025-07-07 PROCEDURE — 25000003 PHARM REV CODE 250: Mod: HCNC

## 2025-07-07 PROCEDURE — 0BH17EZ INSERTION OF ENDOTRACHEAL AIRWAY INTO TRACHEA, VIA NATURAL OR ARTIFICIAL OPENING: ICD-10-PCS | Performed by: HOSPITALIST

## 2025-07-07 PROCEDURE — 27100171 HC OXYGEN HIGH FLOW UP TO 24 HOURS: Mod: HCNC

## 2025-07-07 PROCEDURE — 83735 ASSAY OF MAGNESIUM: CPT | Mod: HCNC | Performed by: INTERNAL MEDICINE

## 2025-07-07 PROCEDURE — 63600175 PHARM REV CODE 636 W HCPCS: Mod: HCNC

## 2025-07-07 PROCEDURE — 84295 ASSAY OF SERUM SODIUM: CPT | Mod: HCNC

## 2025-07-07 PROCEDURE — C9254 INJECTION, LACOSAMIDE: HCPCS | Mod: HCNC | Performed by: EMERGENCY MEDICINE

## 2025-07-07 PROCEDURE — 86920 COMPATIBILITY TEST SPIN: CPT | Mod: HCNC

## 2025-07-07 PROCEDURE — 99291 CRITICAL CARE FIRST HOUR: CPT | Mod: HCNC,25,GC,ICN | Performed by: INTERNAL MEDICINE

## 2025-07-07 PROCEDURE — 25000003 PHARM REV CODE 250: Mod: HCNC | Performed by: STUDENT IN AN ORGANIZED HEALTH CARE EDUCATION/TRAINING PROGRAM

## 2025-07-07 PROCEDURE — 80053 COMPREHEN METABOLIC PANEL: CPT | Mod: HCNC

## 2025-07-07 PROCEDURE — 85025 COMPLETE CBC W/AUTO DIFF WBC: CPT | Mod: HCNC

## 2025-07-07 PROCEDURE — 5A1955Z RESPIRATORY VENTILATION, GREATER THAN 96 CONSECUTIVE HOURS: ICD-10-PCS | Performed by: HOSPITALIST

## 2025-07-07 PROCEDURE — 90945 DIALYSIS ONE EVALUATION: CPT | Mod: HCNC

## 2025-07-07 PROCEDURE — 37000009 HC ANESTHESIA EA ADD 15 MINS: Mod: HCNC | Performed by: STUDENT IN AN ORGANIZED HEALTH CARE EDUCATION/TRAINING PROGRAM

## 2025-07-07 PROCEDURE — 99900035 HC TECH TIME PER 15 MIN (STAT): Mod: HCNC

## 2025-07-07 PROCEDURE — 99900026 HC AIRWAY MAINTENANCE (STAT): Mod: HCNC

## 2025-07-07 PROCEDURE — 85027 COMPLETE CBC AUTOMATED: CPT | Mod: HCNC

## 2025-07-07 PROCEDURE — 87040 BLOOD CULTURE FOR BACTERIA: CPT | Mod: HCNC

## 2025-07-07 PROCEDURE — 99223 1ST HOSP IP/OBS HIGH 75: CPT | Mod: HCNC,25,GC, | Performed by: STUDENT IN AN ORGANIZED HEALTH CARE EDUCATION/TRAINING PROGRAM

## 2025-07-07 PROCEDURE — 86901 BLOOD TYPING SEROLOGIC RH(D): CPT | Mod: HCNC | Performed by: STUDENT IN AN ORGANIZED HEALTH CARE EDUCATION/TRAINING PROGRAM

## 2025-07-07 PROCEDURE — 27000513 HC SENSOR FLOTRAC: Mod: HCNC

## 2025-07-07 PROCEDURE — 80069 RENAL FUNCTION PANEL: CPT | Mod: HCNC | Performed by: INTERNAL MEDICINE

## 2025-07-07 PROCEDURE — 0W3P8ZZ CONTROL BLEEDING IN GASTROINTESTINAL TRACT, VIA NATURAL OR ARTIFICIAL OPENING ENDOSCOPIC: ICD-10-PCS | Performed by: STUDENT IN AN ORGANIZED HEALTH CARE EDUCATION/TRAINING PROGRAM

## 2025-07-07 PROCEDURE — 99292 CRITICAL CARE ADDL 30 MIN: CPT | Mod: HCNC,25,GC,ICN | Performed by: INTERNAL MEDICINE

## 2025-07-07 PROCEDURE — 63600175 PHARM REV CODE 636 W HCPCS: Mod: HCNC | Performed by: NURSE ANESTHETIST, CERTIFIED REGISTERED

## 2025-07-07 PROCEDURE — 85014 HEMATOCRIT: CPT | Mod: HCNC

## 2025-07-07 PROCEDURE — 20000000 HC ICU ROOM: Mod: HCNC

## 2025-07-07 PROCEDURE — 99499 UNLISTED E&M SERVICE: CPT | Mod: HCNC,GC,, | Performed by: EMERGENCY MEDICINE

## 2025-07-07 PROCEDURE — 85025 COMPLETE CBC W/AUTO DIFF WBC: CPT | Mod: HCNC | Performed by: EMERGENCY MEDICINE

## 2025-07-07 PROCEDURE — 80175 DRUG SCREEN QUAN LAMOTRIGINE: CPT | Mod: HCNC

## 2025-07-07 PROCEDURE — 27200997: Mod: HCNC | Performed by: STUDENT IN AN ORGANIZED HEALTH CARE EDUCATION/TRAINING PROGRAM

## 2025-07-07 PROCEDURE — 36620 INSERTION CATHETER ARTERY: CPT | Mod: HCNC

## 2025-07-07 PROCEDURE — 87077 CULTURE AEROBIC IDENTIFY: CPT | Mod: HCNC

## 2025-07-07 PROCEDURE — 30233N1 TRANSFUSION OF NONAUTOLOGOUS RED BLOOD CELLS INTO PERIPHERAL VEIN, PERCUTANEOUS APPROACH: ICD-10-PCS | Performed by: HOSPITALIST

## 2025-07-07 PROCEDURE — 36415 COLL VENOUS BLD VENIPUNCTURE: CPT | Mod: HCNC

## 2025-07-07 PROCEDURE — 63600175 PHARM REV CODE 636 W HCPCS: Mod: HCNC | Performed by: INTERNAL MEDICINE

## 2025-07-07 PROCEDURE — 82803 BLOOD GASES ANY COMBINATION: CPT | Mod: HCNC

## 2025-07-07 PROCEDURE — 99232 SBSQ HOSP IP/OBS MODERATE 35: CPT | Mod: HCNC,GC,, | Performed by: PSYCHIATRY & NEUROLOGY

## 2025-07-07 PROCEDURE — 63600175 PHARM REV CODE 636 W HCPCS: Mod: HCNC | Performed by: STUDENT IN AN ORGANIZED HEALTH CARE EDUCATION/TRAINING PROGRAM

## 2025-07-07 PROCEDURE — 94799 UNLISTED PULMONARY SVC/PX: CPT | Mod: HCNC

## 2025-07-07 PROCEDURE — 83605 ASSAY OF LACTIC ACID: CPT | Mod: HCNC

## 2025-07-07 PROCEDURE — 25500020 PHARM REV CODE 255: Mod: HCNC | Performed by: EMERGENCY MEDICINE

## 2025-07-07 PROCEDURE — 43255 EGD CONTROL BLEEDING ANY: CPT | Mod: HCNC,,, | Performed by: STUDENT IN AN ORGANIZED HEALTH CARE EDUCATION/TRAINING PROGRAM

## 2025-07-07 PROCEDURE — P9016 RBC LEUKOCYTES REDUCED: HCPCS | Mod: HCNC

## 2025-07-07 PROCEDURE — 87205 SMEAR GRAM STAIN: CPT | Mod: HCNC

## 2025-07-07 PROCEDURE — 37000008 HC ANESTHESIA 1ST 15 MINUTES: Mod: HCNC | Performed by: STUDENT IN AN ORGANIZED HEALTH CARE EDUCATION/TRAINING PROGRAM

## 2025-07-07 PROCEDURE — 27000207 HC ISOLATION: Mod: HCNC

## 2025-07-07 PROCEDURE — C1751 CATH, INF, PER/CENT/MIDLINE: HCPCS | Mod: HCNC

## 2025-07-07 PROCEDURE — 82140 ASSAY OF AMMONIA: CPT | Mod: HCNC | Performed by: STUDENT IN AN ORGANIZED HEALTH CARE EDUCATION/TRAINING PROGRAM

## 2025-07-07 PROCEDURE — 82310 ASSAY OF CALCIUM: CPT | Mod: HCNC

## 2025-07-07 PROCEDURE — 02HV33Z INSERTION OF INFUSION DEVICE INTO SUPERIOR VENA CAVA, PERCUTANEOUS APPROACH: ICD-10-PCS

## 2025-07-07 PROCEDURE — 25000242 PHARM REV CODE 250 ALT 637 W/ HCPCS: Mod: HCNC

## 2025-07-07 PROCEDURE — 94761 N-INVAS EAR/PLS OXIMETRY MLT: CPT | Mod: HCNC,XB

## 2025-07-07 PROCEDURE — 82800 BLOOD PH: CPT | Mod: HCNC

## 2025-07-07 PROCEDURE — 63600175 PHARM REV CODE 636 W HCPCS: Mod: HCNC | Performed by: EMERGENCY MEDICINE

## 2025-07-07 PROCEDURE — 25000003 PHARM REV CODE 250: Mod: HCNC | Performed by: HOSPITALIST

## 2025-07-07 PROCEDURE — 84132 ASSAY OF SERUM POTASSIUM: CPT | Mod: HCNC

## 2025-07-07 PROCEDURE — 31500 INSERT EMERGENCY AIRWAY: CPT | Mod: HCNC,,,

## 2025-07-07 PROCEDURE — 81001 URINALYSIS AUTO W/SCOPE: CPT | Mod: HCNC

## 2025-07-07 PROCEDURE — 43255 EGD CONTROL BLEEDING ANY: CPT | Mod: HCNC | Performed by: STUDENT IN AN ORGANIZED HEALTH CARE EDUCATION/TRAINING PROGRAM

## 2025-07-07 PROCEDURE — 94640 AIRWAY INHALATION TREATMENT: CPT | Mod: HCNC

## 2025-07-07 PROCEDURE — 82040 ASSAY OF SERUM ALBUMIN: CPT | Mod: HCNC | Performed by: INTERNAL MEDICINE

## 2025-07-07 PROCEDURE — 82330 ASSAY OF CALCIUM: CPT | Mod: HCNC

## 2025-07-07 RX ORDER — ALBUTEROL SULFATE 2.5 MG/.5ML
10 SOLUTION RESPIRATORY (INHALATION) ONCE
Status: COMPLETED | OUTPATIENT
Start: 2025-07-07 | End: 2025-07-07

## 2025-07-07 RX ORDER — FENTANYL CITRATE 50 UG/ML
100 INJECTION, SOLUTION INTRAMUSCULAR; INTRAVENOUS ONCE
Status: COMPLETED | OUTPATIENT
Start: 2025-07-07 | End: 2025-07-07

## 2025-07-07 RX ORDER — HYDROCODONE BITARTRATE AND ACETAMINOPHEN 500; 5 MG/1; MG/1
TABLET ORAL CONTINUOUS
Status: ACTIVE | OUTPATIENT
Start: 2025-07-07 | End: 2025-07-08

## 2025-07-07 RX ORDER — HYDROCODONE BITARTRATE AND ACETAMINOPHEN 500; 5 MG/1; MG/1
TABLET ORAL
Status: DISCONTINUED | OUTPATIENT
Start: 2025-07-07 | End: 2025-07-08

## 2025-07-07 RX ORDER — LACOSAMIDE 10 MG/ML
100 INJECTION INTRAVENOUS EVERY 12 HOURS
Status: DISCONTINUED | OUTPATIENT
Start: 2025-07-07 | End: 2025-07-09

## 2025-07-07 RX ORDER — ONDANSETRON HYDROCHLORIDE 2 MG/ML
4 INJECTION, SOLUTION INTRAVENOUS ONCE AS NEEDED
Status: COMPLETED | OUTPATIENT
Start: 2025-07-07 | End: 2025-07-07

## 2025-07-07 RX ORDER — MAGNESIUM SULFATE HEPTAHYDRATE 40 MG/ML
2 INJECTION, SOLUTION INTRAVENOUS
Status: DISPENSED | OUTPATIENT
Start: 2025-07-07 | End: 2025-07-08

## 2025-07-07 RX ORDER — PANTOPRAZOLE SODIUM 40 MG/10ML
40 INJECTION, POWDER, LYOPHILIZED, FOR SOLUTION INTRAVENOUS 2 TIMES DAILY
Status: DISCONTINUED | OUTPATIENT
Start: 2025-07-07 | End: 2025-07-10

## 2025-07-07 RX ORDER — PROPOFOL 10 MG/ML
VIAL (ML) INTRAVENOUS
Status: DISCONTINUED | OUTPATIENT
Start: 2025-07-07 | End: 2025-07-07

## 2025-07-07 RX ORDER — CHLORHEXIDINE GLUCONATE ORAL RINSE 1.2 MG/ML
15 SOLUTION DENTAL 2 TIMES DAILY
Status: DISCONTINUED | OUTPATIENT
Start: 2025-07-07 | End: 2025-07-08

## 2025-07-07 RX ORDER — PHENYLEPHRINE HYDROCHLORIDE 10 MG/ML
100 INJECTION INTRAVENOUS
Status: DISCONTINUED | OUTPATIENT
Start: 2025-07-07 | End: 2025-07-14

## 2025-07-07 RX ORDER — MIDODRINE HYDROCHLORIDE 5 MG/1
15 TABLET ORAL EVERY 8 HOURS
Status: DISCONTINUED | OUTPATIENT
Start: 2025-07-07 | End: 2025-07-13

## 2025-07-07 RX ORDER — PHENYLEPHRINE HYDROCHLORIDE 10 MG/ML
100 INJECTION INTRAVENOUS ONCE AS NEEDED
Status: DISCONTINUED | OUTPATIENT
Start: 2025-07-07 | End: 2025-07-14

## 2025-07-07 RX ORDER — VASOPRESSIN 20 [USP'U]/ML
INJECTION, SOLUTION INTRAMUSCULAR; SUBCUTANEOUS
Status: COMPLETED
Start: 2025-07-07 | End: 2025-07-07

## 2025-07-07 RX ORDER — PHENYLEPHRINE HYDROCHLORIDE 10 MG/ML
INJECTION INTRAVENOUS
Status: DISCONTINUED | OUTPATIENT
Start: 2025-07-07 | End: 2025-07-07

## 2025-07-07 RX ORDER — FENTANYL CITRATE 50 UG/ML
INJECTION, SOLUTION INTRAMUSCULAR; INTRAVENOUS
Status: COMPLETED
Start: 2025-07-07 | End: 2025-07-07

## 2025-07-07 RX ORDER — NOREPINEPHRINE BIT/0.9 % NACL 32MG/250ML
0-3 PLASTIC BAG, INJECTION (ML) INTRAVENOUS CONTINUOUS
Status: DISCONTINUED | OUTPATIENT
Start: 2025-07-07 | End: 2025-07-08

## 2025-07-07 RX ORDER — DEXMEDETOMIDINE HYDROCHLORIDE 4 UG/ML
0-1.4 INJECTION, SOLUTION INTRAVENOUS CONTINUOUS
Status: DISCONTINUED | OUTPATIENT
Start: 2025-07-07 | End: 2025-07-08

## 2025-07-07 RX ORDER — PROPOFOL 10 MG/ML
0-50 INJECTION, EMULSION INTRAVENOUS CONTINUOUS
Status: DISCONTINUED | OUTPATIENT
Start: 2025-07-07 | End: 2025-07-07

## 2025-07-07 RX ORDER — NOREPINEPHRINE BITARTRATE/D5W 4MG/250ML
0-.2 PLASTIC BAG, INJECTION (ML) INTRAVENOUS CONTINUOUS
Status: DISCONTINUED | OUTPATIENT
Start: 2025-07-07 | End: 2025-07-07

## 2025-07-07 RX ORDER — PANTOPRAZOLE SODIUM 40 MG/10ML
80 INJECTION, POWDER, LYOPHILIZED, FOR SOLUTION INTRAVENOUS ONCE
Status: COMPLETED | OUTPATIENT
Start: 2025-07-07 | End: 2025-07-07

## 2025-07-07 RX ORDER — MIDODRINE HYDROCHLORIDE 5 MG/1
10 TABLET ORAL DAILY
Status: DISCONTINUED | OUTPATIENT
Start: 2025-07-07 | End: 2025-07-07

## 2025-07-07 RX ORDER — ROCURONIUM BROMIDE 10 MG/ML
0.6 INJECTION, SOLUTION INTRAVENOUS ONCE
Status: COMPLETED | OUTPATIENT
Start: 2025-07-07 | End: 2025-07-07

## 2025-07-07 RX ORDER — CALCIUM GLUCONATE 20 MG/ML
1 INJECTION, SOLUTION INTRAVENOUS ONCE
Status: COMPLETED | OUTPATIENT
Start: 2025-07-07 | End: 2025-07-07

## 2025-07-07 RX ORDER — ETOMIDATE 2 MG/ML
0.3 INJECTION INTRAVENOUS ONCE
Status: COMPLETED | OUTPATIENT
Start: 2025-07-07 | End: 2025-07-07

## 2025-07-07 RX ORDER — CALCIUM GLUCONATE 20 MG/ML
1 INJECTION, SOLUTION INTRAVENOUS EVERY 10 MIN PRN
Status: DISCONTINUED | OUTPATIENT
Start: 2025-07-07 | End: 2025-07-23

## 2025-07-07 RX ADMIN — ETOMIDATE 28 MG: 2 INJECTION INTRAVENOUS at 06:07

## 2025-07-07 RX ADMIN — PANTOPRAZOLE SODIUM 80 MG: 40 INJECTION, POWDER, FOR SOLUTION INTRAVENOUS at 05:07

## 2025-07-07 RX ADMIN — PROPOFOL 20 MG: 10 INJECTION, EMULSION INTRAVENOUS at 04:07

## 2025-07-07 RX ADMIN — ALBUTEROL SULFATE 10 MG: 2.5 SOLUTION RESPIRATORY (INHALATION) at 07:07

## 2025-07-07 RX ADMIN — PROPOFOL 20 MG: 10 INJECTION, EMULSION INTRAVENOUS at 05:07

## 2025-07-07 RX ADMIN — PIPERACILLIN SODIUM AND TAZOBACTAM SODIUM 4.5 G: 4; .5 INJECTION, POWDER, LYOPHILIZED, FOR SOLUTION INTRAVENOUS at 06:07

## 2025-07-07 RX ADMIN — LEVOTHYROXINE SODIUM 75 MCG: 75 TABLET ORAL at 09:07

## 2025-07-07 RX ADMIN — PHENYLEPHRINE HYDROCHLORIDE 200 MCG: 10 INJECTION INTRAVENOUS at 04:07

## 2025-07-07 RX ADMIN — SODIUM CHLORIDE: 9 INJECTION, SOLUTION INTRAVENOUS at 09:07

## 2025-07-07 RX ADMIN — NOREPINEPHRINE BITARTRATE 0.24 MCG/KG/MIN: 4 INJECTION, SOLUTION INTRAVENOUS at 05:07

## 2025-07-07 RX ADMIN — VASOPRESSIN 0.04 UNITS/MIN: 20 INJECTION INTRAVENOUS at 10:07

## 2025-07-07 RX ADMIN — PIPERACILLIN SODIUM AND TAZOBACTAM SODIUM 4.5 G: 4; .5 INJECTION, POWDER, LYOPHILIZED, FOR SOLUTION INTRAVENOUS at 04:07

## 2025-07-07 RX ADMIN — FENTANYL CITRATE 100 MCG: 50 INJECTION, SOLUTION INTRAMUSCULAR; INTRAVENOUS at 06:07

## 2025-07-07 RX ADMIN — LACOSAMIDE 100 MG: 50 TABLET, FILM COATED ORAL at 09:07

## 2025-07-07 RX ADMIN — PROPOFOL 10 MCG/KG/MIN: 10 INJECTION, EMULSION INTRAVENOUS at 06:07

## 2025-07-07 RX ADMIN — MIDODRINE HYDROCHLORIDE 15 MG: 5 TABLET ORAL at 10:07

## 2025-07-07 RX ADMIN — PROPOFOL 30 MCG/KG/MIN: 10 INJECTION, EMULSION INTRAVENOUS at 10:07

## 2025-07-07 RX ADMIN — NOREPINEPHRINE BITARTRATE 0.14 MCG/KG/MIN: 1 INJECTION, SOLUTION, CONCENTRATE INTRAVENOUS at 08:07

## 2025-07-07 RX ADMIN — INSULIN HUMAN 9.3 UNITS: 100 INJECTION, SOLUTION PARENTERAL at 06:07

## 2025-07-07 RX ADMIN — ROCURONIUM BROMIDE 95 MG: 10 INJECTION, SOLUTION INTRAVENOUS at 06:07

## 2025-07-07 RX ADMIN — VANCOMYCIN HYDROCHLORIDE 1500 MG: 1.5 INJECTION, POWDER, LYOPHILIZED, FOR SOLUTION INTRAVENOUS at 09:07

## 2025-07-07 RX ADMIN — CALCIUM GLUCONATE 1 G: 20 INJECTION, SOLUTION INTRAVENOUS at 06:07

## 2025-07-07 RX ADMIN — PROPOFOL 50 MG: 10 INJECTION, EMULSION INTRAVENOUS at 04:07

## 2025-07-07 RX ADMIN — FENTANYL CITRATE 100 MCG: 50 INJECTION INTRAMUSCULAR; INTRAVENOUS at 06:07

## 2025-07-07 RX ADMIN — CHLORHEXIDINE GLUCONATE 0.12% ORAL RINSE 15 ML: 1.2 LIQUID ORAL at 09:07

## 2025-07-07 RX ADMIN — SODIUM CHLORIDE, SODIUM LACTATE, POTASSIUM CHLORIDE, AND CALCIUM CHLORIDE 1000 ML: .6; .31; .03; .02 INJECTION, SOLUTION INTRAVENOUS at 04:07

## 2025-07-07 RX ADMIN — DEXMEDETOMIDINE HYDROCHLORIDE 0.2 MCG/KG/HR: 4 INJECTION INTRAVENOUS at 08:07

## 2025-07-07 RX ADMIN — PANTOPRAZOLE SODIUM 40 MG: 40 INJECTION, POWDER, FOR SOLUTION INTRAVENOUS at 09:07

## 2025-07-07 RX ADMIN — IOHEXOL 100 ML: 350 INJECTION, SOLUTION INTRAVENOUS at 08:07

## 2025-07-07 RX ADMIN — DEXTROSE MONOHYDRATE 50 G: 25 INJECTION, SOLUTION INTRAVENOUS at 05:07

## 2025-07-07 RX ADMIN — NOREPINEPHRINE BITARTRATE 0.18 MCG/KG/MIN: 4 INJECTION, SOLUTION INTRAVENOUS at 01:07

## 2025-07-07 RX ADMIN — NOREPINEPHRINE BITARTRATE 0.14 MCG/KG/MIN: 4 INJECTION, SOLUTION INTRAVENOUS at 10:07

## 2025-07-07 RX ADMIN — PANTOPRAZOLE SODIUM 40 MG: 40 INJECTION, POWDER, FOR SOLUTION INTRAVENOUS at 12:07

## 2025-07-07 RX ADMIN — PHENYLEPHRINE HYDROCHLORIDE 100 MCG: 10 INJECTION INTRAVENOUS at 04:07

## 2025-07-07 RX ADMIN — LACOSAMIDE 100 MG: 10 INJECTION INTRAVENOUS at 09:07

## 2025-07-07 RX ADMIN — ALBUTEROL SULFATE 10 MG: 2.5 SOLUTION RESPIRATORY (INHALATION) at 05:07

## 2025-07-07 RX ADMIN — NOREPINEPHRINE BITARTRATE 0.08 MCG/KG/MIN: 4 INJECTION, SOLUTION INTRAVENOUS at 06:07

## 2025-07-07 RX ADMIN — SODIUM CHLORIDE, SODIUM LACTATE, POTASSIUM CHLORIDE, AND CALCIUM CHLORIDE: .6; .31; .03; .02 INJECTION, SOLUTION INTRAVENOUS at 04:07

## 2025-07-07 RX ADMIN — ONDANSETRON 4 MG: 2 INJECTION INTRAMUSCULAR; INTRAVENOUS at 01:07

## 2025-07-07 NOTE — TRANSFER OF CARE
"Anesthesia Transfer of Care Note    Patient: Tasneem Lynn    Procedure(s) Performed: Procedure(s) (LRB):  EGD (ESOPHAGOGASTRODUODENOSCOPY) (N/A)    Patient location: ICU    Anesthesia Type: general    Transport from OR: Upon arrival to PACU/ICU, patient attached to ventilator and auscultated to confirm bilateral breath sounds and adequate TV    Post pain: adequate analgesia    Post assessment: no apparent anesthetic complications    Post vital signs: stable    Level of consciousness: sedated    Nausea/Vomiting: no nausea/vomiting    Complications: none    Transfer of care protocol was followed    Last vitals: Visit Vitals  BP (!) 117/56 (BP Location: Right arm, Patient Position: Lying)   Pulse 89   Temp 37.1 °C (98.8 °F) (Axillary)   Resp 18   Ht 5' 2" (1.575 m)   Wt 93 kg (205 lb 0.4 oz)   SpO2 (!) 90%   BMI 37.50 kg/m²     "

## 2025-07-07 NOTE — ANESTHESIA PREPROCEDURE EVALUATION
[facility]  Anesthesia Pre-Operative Evaluation         Patient Name: Tasneem Lynn  YOB: 1945  MRN: [id number]    SUBJECTIVE:     Pre-operative evaluation for Procedure(s) (LRB):  EGD (ESOPHAGOGASTRODUODENOSCOPY) (N/A)     07/07/2025    Tasneem Lynn is a 80 y.o. female w/ a significant PMHx of HTN, HLD, hypothyroidism, CAD, IDDM, CKD IV, CVA, encephalopathy, PEG tube, CHF, CAD, heel ulcer, recent extensive hospitalization for nonconvulsive status epilepticus (at home on clobazam and lacosamide) , left groin abscess, bacteremia, ESBL U[place] [facility] [place], found to be septic. Patient is currently intubated and sedated, requiring vasopressor support.    Patient now presents for the above procedure(s) due to concern for GIB.    TTE:      Left Ventricle: The left ventricle is normal in size. Mildly increased wall thickness. Moderate septal thickening. There is concentric remodeling. Normal wall motion. There is normal systolic function with a visually estimated ejection fraction of 65 - 70%. Ejection fraction is approximately 68%. There is normal diastolic function.    Right Ventricle: The right ventricle is normal in sizeWall thickness is normal. Systolic function is normal.    Aortic Valve: There is moderate aortic valve sclerosis.    IVC/SVC: Normal venous pressure at 3 mmHg.    Pericardium: Left pleural effusion.         LDA:        Hemodialysis Catheter 06/28/25 1001 right internal jugular (Active)   Line Necessity Review CRRT/HD 07/07/25 0710   Verification by X-ray Yes 07/07/25 0710   Site Assessment No drainage;No redness;No swelling;No warmth 07/07/25 1115   Line Securement Device Secured with sutures 07/07/25 0710   Dressing Type CHG impregnated dressing/sponge;Central line dressing 07/07/25 1115   Dressing Status Clean;Dry;Intact 07/07/25 1115   Dressing Intervention Integrity maintained 07/07/25 1115   Date on Dressing 07/04/25 07/07/25 1115   Dressing Due to be  Changed 07/11/25 07/07/25 1115   Venous Patency/Care infusing 07/07/25 1115   Arterial Patency/Care infusing 07/07/25 1115   Waveform Not being transduced 07/07/25 1115   Number of days: 9       Peripheral IV 07/07/25 0600 20 G Left Antecubital (Active)   Site Assessment Clean;Dry;Intact;No redness;No swelling 07/07/25 1115   Extremity Assessment Distal to IV No swelling;No abnormal discoloration;No redness;No warmth 07/07/25 1115   Dressing Status Clean;Dry;Intact 07/07/25 1115   Dressing Intervention Integrity maintained 07/07/25 1115   Dressing Change Due 07/11/25 07/07/25 1115   Number of days: 0       Peripheral IV 07/07/25 0600 20 G Anterior;Left Upper Arm (Active)   Site Assessment Clean;Dry;Intact;No swelling;No redness;No drainage;No warmth 07/07/25 1115   Extremity Assessment Distal to IV No redness;No swelling;No abnormal discoloration;No warmth 07/07/25 1115   Dressing Status Clean;Dry;Intact 07/07/25 1115   Dressing Intervention Integrity maintained 07/07/25 1115   Dressing Change Due 07/11/25 07/07/25 1115   Number of days: 0            Gastrostomy/Enterostomy 05/16/25 1030 Percutaneous endoscopic gastrostomy (PEG) LUQ feeding (Active)   Securement secured to abdomen w/ adhesive device 07/07/25 1115   Interventions Prior to Feeding patency checked 07/07/25 1115   Output Characteristics marie 06/13/25 0738   Feeding Type continuous;by pump 07/1945   Clamp Status/Tolerance clamped 07/07/25 1115   Feeding Action feeding held 07/07/25 1115   Dressing no dressing 07/07/25 1115   Insertion Site no redness;no warmth;no tenderness;no swelling;dry 07/07/25 1115   Insertion Site Drainage  scant 06/29/25 1701   Site Care sterile precut T-slit dressing applied 06/29/25 1701   Flush/Irrigation flushed w/;water 07/07/25 1115   Catheter Position (cm marking) 2.5 cm 05/26/25 1410   Dressing Change Due 05/27/25 05/26/25 1857   Current Rate (mL/hr) 45 mL/hr 07/1945   Goal Rate (mL/hr) 45 mL/hr 07/1945  "  Intake (mL) 60 mL 07/05/25 2018   Water Bolus (mL) 30 mL 07/06/25 0800   Tube Output(mL)(Include Discarded Residual) 50 mL 05/18/25 0600   Intake (mL) - Breast Milk Tube Feeding 45 07/03/25 2100   Formula Name Rj 1.5 07/1945   Tube Feeding Intake (mL) 405 07/05/25 1727   Residual Amount (ml) 0 ml 06/26/25 0701   Number of days: 52            Rectal Tube 06/28/25 1000 rectal tube w/ balloon (indicate number of mLs) (Active)   Balloon Inflation Volume (mL) 45 07/05/25 0800   Reposition drainage bags for BMS & Davis on opposite sides of bed 07/07/25 1115   Outcome gas expelled, stool evacuated 07/07/25 1115   Stool Color Red Streak;Black 07/07/25 1115   Insertion Site Appearance no redness, warmth, tenderness, skin breakdown, drainage 07/07/25 1115   Flush/Irrigation flushed w/;water 07/07/25 1115   Intake (mL) 50 mL 07/04/25 2010   Rectal Tube Output 150 mL 07/04/25 1853   Number of days: 9            Urethral Catheter 06/26/25 1411 Silicone 16 Fr. (Active)   $ Davis Insertion Bedside Insertion Performed 06/26/25 1500   Site Assessment Clean;Intact 07/07/25 1115   Collection Container Urimeter 07/07/25 1115   Securement Method secured to top of thigh w/ adhesive device 07/07/25 1115   Catheter Care Performed yes 07/07/25 1115   Reason for Continuing Urinary Catheterization Non-healing sacral/perineal wound 07/07/25 1115   CAUTI Prevention Bundle Securement Device in place with 1" slack;Intact seal between catheter & drainage tubing;Drainage bag/urimeter off the floor;No dependent loops or kinks;Sheeting clip in use;Drainage bag/urimeter not overfilled (<2/3 full);Drainage bag/urimeter below bladder 07/07/25 1115   Output (mL) 125 mL 07/07/25 1400   Number of days: 11       Prev airway: None documented.    Drips:   NORepinephrine bitartrate-D5W  0-0.2 mcg/kg/min Intravenous Continuous 62.8 mL/hr at 07/07/25 1400 0.18 mcg/kg/min at 07/07/25 1400    propofoL  0-50 mcg/kg/min Intravenous Continuous 11.2 " mL/hr at 07/07/25 1400 20 mcg/kg/min at 07/07/25 1400       Problem List[1]    Review of patient's allergies indicates:   Allergen Reactions    Corticosteroids (glucocorticoids) Edema       Current Inpatient Medications:   chlorhexidine  15 mL Mouth/Throat BID    lacosamide  100 mg Intravenous Q12H    midodrine  10 mg Per G Tube Daily    pantoprazole  40 mg Intravenous BID    piperacillin-tazobactam (Zosyn) IV (PEDS and ADULTS) (extended infusion is not appropriate)  4.5 g Intravenous Q8H       Medications Ordered Prior to Encounter[2]    Past Surgical History:   Procedure Laterality Date    ESOPHAGOGASTRODUODENOSCOPY N/A 7/23/2020    Procedure: EGD (ESOPHAGOGASTRODUODENOSCOPY);  Surgeon: Halle Hughes MD;  Location: [facility] ENDO;  Service: Endoscopy;  Laterality: N/A;    ESOPHAGOGASTRODUODENOSCOPY W/ PEG N/A 5/16/2025    Procedure: EGD, WITH PEG TUBE INSERTION;  Surgeon: [employer] DO;  Location: [facility] ENDO;  Service: Endoscopy;  Laterality: N/A;    INCISION AND DRAINAGE, LOWER EXTREMITY Left 5/7/2025    Procedure: INCISION AND DRAINAGE, LOWER EXTREMITY- left thigh;  Surgeon: Kerrie Cm MD;  Location: [facility] OR;  Service: General;  Laterality: Left;    INSERTION, CATHETER, TUNNELED Right 5/7/2025    Procedure: Insertion,catheter,tunneled- trialysis cath;  Surgeon: Kerrie Cm MD;  Location: [facility] OR;  Service: General;  Laterality: Right;    INSERTION, CATHETER, TUNNELED Right 5/15/2025    Procedure: Insertion,catheter,tunneled;  Surgeon: Kerrie Cm MD;  Location: [facility] OR;  Service: General;  Laterality: Right;    right hand surgery      right knee surgery Right     partial plate       Social History[3]    OBJECTIVE:     Vital Signs Range (Last 24H):  Temp:  [36 °C (96.8 °F)-37 °C (98.6 °F)]   Pulse:  []   Resp:  [8-41]   BP: ()/(28-92)   SpO2:  [89 %-100 %]       Significant Labs:  Lab Results   Component Value Date    WBC 23.84 (H) 07/07/2025    HGB 8.5 (L)  07/07/2025    HCT 25.2 (L) 07/07/2025     07/07/2025    CHOL 64 (L) 05/07/2025    TRIG 269 (H) 05/07/2025    HDL 17 (L) 05/07/2025    ALT <5 (L) 07/07/2025    AST 35 07/07/2025     (L) 07/07/2025    K 4.8 07/07/2025     07/07/2025    CREATININE 2.0 (H) 07/07/2025    BUN 60 (H) 07/07/2025    CO2 21 (L) 07/07/2025    TSH 3.631 06/18/2025    INR 1.1 05/15/2025    HGBA1C 4.8 05/04/2025       Diagnostic Studies: No relevant studies.    EKG:   Results for orders placed or performed during the hospital encounter of 06/18/25   EKG 12-lead    Collection Time: 06/18/25  3:29 PM   Result Value Ref Range    QRS Duration 86 ms    [facility] QTC Calculation 430 ms    Narrative    Test Reason : Z13.6,    Vent. Rate :  89 BPM     Atrial Rate :  89 BPM     P-R Int : 232 ms          QRS Dur :  86 ms      QT Int : 354 ms       P-R-T Axes :  72  75 219 degrees    QTcB Int : 430 ms    Sinus rhythm with 1st degree A-V block  Low septal forces  Nonspecific ST and/or T wave abnormalities  Abnormal ECG  When compared with ECG of 18-Jun-2025 13:27,  No significant change was found  Confirmed by León Damon ([id number]) on 6/18/2025 3:43:17 PM    Referred By: AAAREFERRAL SELF           Confirmed By: León Damon       2D ECHO:  TTE:  Results for orders placed or performed during the hospital encounter of 06/18/25   Echo   Result Value Ref Range    IVS 1.6 (A) 0.6 - 1.1 cm    LVIDd 3.4 (A) 3.5 - 6.0 cm    LVIDs 2.3 2.1 - 4.0 cm    LVOT diameter 1.9 cm    PW 1.2 (A) 0.6 - 1.1 cm    AV LVOT peak gradient 10 mmHg    LVOT peak VTI 23.3 cm    RVDD 2.67 cm    AV mean gradient 10 mmHg    Ao peak kristin 2.0 m/s    E wave deceleration time 152 ms    Ascending aorta 2.8 cm    Ascending aorta 2.8 cm    BSA 1.84 m2    LVOT stroke volume 66.0 cm3    LVOT area 2.8 cm2    FS 32.4 28 - 44 %    Left Ventricle Relative Wall Thickness 0.71 cm    LV mass 166.2 g    LV Mass Index 86.1 g/m2    AV peak gradient 16 mmHg    ZLVIDS -3.00     ZLVIDD -4.74     EF  68 %    Est. RA pres 3 mmHg    Narrative      Left Ventricle: The left ventricle is normal in size. Mildly increased   wall thickness. Moderate septal thickening. There is concentric   remodeling. Normal wall motion. There is normal systolic function with a   visually estimated ejection fraction of 65 - 70%. Ejection fraction is   approximately 68%. There is normal diastolic function.    Right Ventricle: The right ventricle is normal in sizeWall thickness is   normal. Systolic function is normal.    Aortic Valve: There is moderate aortic valve sclerosis.    IVC/SVC: Normal venous pressure at 3 mmHg.    Pericardium: Left pleural effusion.         GEOVANNY:  No results found for this or any previous visit.    ASSESSMENT/PLAN:          Pre-op Assessment    I have reviewed the Patient Summary Reports.    I have reviewed the NPO Status.   I have reviewed the Medications.     Review of Systems  Anesthesia Hx:  No problems with previous Anesthesia                Social:  Non-Smoker       Hematology/Oncology:       -- Anemia:                                  Cardiovascular:     Hypertension  Past MI CAD       CHF                                   Renal/:  Chronic Renal Disease, ESRD                Hepatic/GI:   PUD,                  Musculoskeletal:  Arthritis               Neurological:   CVA    Seizures                                Endocrine:  Diabetes, type 2 Hypothyroidism        Obesity / BMI > 30      Physical Exam    Airway:  Mallampati: unable to assess   Pre-Existing Airway: Oral Endotracheal tube        Anesthesia Plan  Type of Anesthesia, risks & benefits discussed:    Anesthesia Type: Gen ETT  Intra-op Monitoring Plan: Standard ASA Monitors  Post Op Pain Control Plan: multimodal analgesia and IV/PO Opioids PRN  Induction:  IV  Airway Plan: Direct, Post-Induction  ASA Score: 4  Day of Surgery Review of History & Physical: H&P Update referred to the surgeon/provider.    Ready For Surgery From Anesthesia  Perspective.     .           [1]   Patient Active Problem List  Diagnosis    Type 2 diabetes mellitus, uncontrolled, with renal complications    Hypotension    Hypothyroidism    History of CVA (cerebrovascular accident)    Chronic gout    Hyperlipidemia    Acute renal failure superimposed on stage 4 chronic kidney disease    CKD (chronic kidney disease) stage 4, GFR 15-29 ml/min    Osteoarthritis involving multiple joints on both sides of body    Chronic allergic rhinitis    Vitamin D deficiency    Aortic atherosclerosis    Diabetic ulcer of right heel associated with diabetes mellitus due to underlying condition, limited to breakdown of skin    Wound of right leg    Chronic osteomyelitis of right foot with draining sinus    Atherosclerosis of native arteries of right leg with ulceration of heel and midfoot    Hyperkalemia    Anemia of chronic disorder    Hypertension    Diabetes mellitus, type 2    CAD (coronary artery disease)    Type 2 myocardial infarction    Advance care planning    Acute encephalopathy    Non-convulsive status epilepticus    Encounter for palliative care    Discharge planning issues    Malnutrition    Dysphagia    EMMA (acute kidney injury)    Hyponatremia    Hypophosphatemia    AMS (altered mental status)    Sacral wound    Staphylococcus aureus bacteremia    SIRS (systemic inflammatory response syndrome)    Metabolic encephalopathy    Dependence on renal dialysis    GI bleed   [2]   No current facility-administered medications on file prior to encounter.     Current Outpatient Medications on File Prior to Encounter   Medication Sig Dispense Refill    acetaminophen (TYLENOL) 325 MG tablet Take 2 tablets (650 mg total) by mouth every 6 (six) hours as needed for Pain or Temperature greater than (100.5). 13 tablet 0    aspirin 81 MG Chew 1 tablet (81 mg total) by Per G Tube route once daily.      atorvastatin (LIPITOR) 80 MG tablet 1 tablet (80 mg total) by Per G Tube route once daily.       cloBAZam (ONFI) 2.5 mg/mL Susp 2 mLs (5 mg total) by Per G Tube route once daily.      diphenhydrAMINE (BENADRYL) 25 mg capsule Take 1 capsule (25 mg total) by mouth every 6 (six) hours as needed for Itching.      HYDROcodone-acetaminophen (NORCO) 5-325 mg per tablet Take 1 tablet by mouth every 6 (six) hours as needed for Pain. 10 tablet 0    insulin aspart U-100 (NOVOLOG) 100 unit/mL injection Inject 0-10 Units into the skin every 6 (six) hours as needed for High Blood Sugar. **MODERATE CORRECTION DOSE**  Blood Glucose  mg/dL                  Pre-meal                2200  151-200                2 units                    1 unit  201-250                4 units                    2 units  251-300                6 units                    3 units  301-350                8 units                    4 units  >350                     10 units                  5 units  Administer subcutaneously if needed at times designated by monitoring  schedule.  DO NOT HOLD correction dose insulin for patients who are  NPO.      insulin glargine U-100, Lantus, 100 unit/mL injection Inject 12 Units into the skin every evening.      lacosamide (VIMPAT) 100 mg Tab 1 tablet (100 mg total) by Per G Tube route every 12 (twelve) hours.      levothyroxine (SYNTHROID) 75 MCG tablet 1 tablet (75 mcg total) by Per G Tube route before breakfast.      miconazole NITRATE 2 % (MICOTIN) 2 % top powder Apply topically 2 (two) times daily. Apply to skin folds      midodrine (PROAMATINE) 2.5 MG Tab 1 tablet (2.5 mg total) by Per G Tube route every 8 (eight) hours.      sodium hypochlorite 0.125% (DAKIN'S SOLUTION) external solution Apply topically 2 (two) times daily. Apply to left thigh/groin wound for cleanse and to soak gauze     [3]   Social History  Socioeconomic History    Marital status:    Tobacco Use    Smoking status: Never    Smokeless tobacco: Never   Substance and Sexual Activity    Alcohol use: No    Drug use: No    Sexual activity:  Not Currently     Partners: Male     Social Drivers of Health     Financial Resource Strain: Patient Unable To Answer (5/7/2025)    Overall Financial Resource Strain (CARDIA)     Difficulty of Paying Living Expenses: Patient unable to answer   Food Insecurity: Patient Unable To Answer (5/7/2025)    Hunger Vital Sign     Worried About Running Out of Food in the Last Year: Patient unable to answer     Ran Out of Food in the Last Year: Patient unable to answer   Transportation Needs: Patient Unable To Answer (5/7/2025)    PRAPARE - Transportation     Lack of Transportation (Medical): Patient unable to answer     Lack of Transportation (Non-Medical): Patient unable to answer   Physical Activity: Patient Unable To Answer (5/7/2025)    Exercise Vital Sign     Days of Exercise per Week: Patient unable to answer     Minutes of Exercise per Session: Patient unable to answer   Stress: Patient Unable To Answer (5/7/2025)    [employer] [place] [employer] - Occupational Stress Questionnaire     Feeling of Stress : Patient unable to answer   Housing Stability: Patient Unable To Answer (5/7/2025)    Housing Stability Vital Sign     Unable to Pay for Housing in the Last Year: Patient unable to answer     Number of Times Moved in the Last Year: 0     Homeless in the Last Year: Patient unable to answer   Recent Concern: Housing Stability - High Risk (5/1/2025)    Housing Stability Vital Sign     Unable to Pay for Housing in the Last Year: No     Homeless in the Last Year: Yes

## 2025-07-08 LAB
ABSOLUTE EOSINOPHIL (OHS): 0.23 K/UL
ABSOLUTE MONOCYTE (OHS): 1.86 K/UL (ref 0.3–1)
ABSOLUTE NEUTROPHIL COUNT (OHS): 15.6 K/UL (ref 1.8–7.7)
ALBUMIN SERPL BCP-MCNC: 1 G/DL (ref 3.5–5.2)
ALBUMIN SERPL BCP-MCNC: 1 G/DL (ref 3.5–5.2)
ALBUMIN SERPL BCP-MCNC: 1.1 G/DL (ref 3.5–5.2)
ALP SERPL-CCNC: 131 UNIT/L (ref 40–150)
ALT SERPL W/O P-5'-P-CCNC: <5 UNIT/L (ref 10–44)
ANION GAP (OHS): 7 MMOL/L (ref 8–16)
ANION GAP (OHS): 8 MMOL/L (ref 8–16)
ANION GAP (OHS): 9 MMOL/L (ref 8–16)
AST SERPL-CCNC: 22 UNIT/L (ref 11–45)
BACTERIA UR CULT: ABNORMAL
BASOPHILS # BLD AUTO: 0.07 K/UL
BASOPHILS NFR BLD AUTO: 0.3 %
BILIRUB SERPL-MCNC: 0.1 MG/DL (ref 0.1–1)
BUN SERPL-MCNC: 53 MG/DL (ref 8–23)
BUN SERPL-MCNC: 56 MG/DL (ref 8–23)
BUN SERPL-MCNC: 57 MG/DL (ref 8–23)
BUN SERPL-MCNC: 59 MG/DL (ref 8–23)
BUN SERPL-MCNC: 62 MG/DL (ref 8–23)
CALCIUM SERPL-MCNC: 7.1 MG/DL (ref 8.7–10.5)
CALCIUM SERPL-MCNC: 7.2 MG/DL (ref 8.7–10.5)
CALCIUM SERPL-MCNC: 7.3 MG/DL (ref 8.7–10.5)
CALCIUM SERPL-MCNC: 7.3 MG/DL (ref 8.7–10.5)
CALCIUM SERPL-MCNC: 7.4 MG/DL (ref 8.7–10.5)
CALCIUM SERPL-MCNC: 7.6 MG/DL (ref 8.7–10.5)
CHLORIDE SERPL-SCNC: 103 MMOL/L (ref 95–110)
CHLORIDE SERPL-SCNC: 104 MMOL/L (ref 95–110)
CHLORIDE SERPL-SCNC: 105 MMOL/L (ref 95–110)
CHLORIDE SERPL-SCNC: 106 MMOL/L (ref 95–110)
CO2 SERPL-SCNC: 21 MMOL/L (ref 23–29)
CO2 SERPL-SCNC: 22 MMOL/L (ref 23–29)
CO2 SERPL-SCNC: 22 MMOL/L (ref 23–29)
CO2 SERPL-SCNC: 23 MMOL/L (ref 23–29)
CO2 SERPL-SCNC: 24 MMOL/L (ref 23–29)
CREAT SERPL-MCNC: 1.7 MG/DL (ref 0.5–1.4)
CREAT SERPL-MCNC: 1.9 MG/DL (ref 0.5–1.4)
CREAT SERPL-MCNC: 1.9 MG/DL (ref 0.5–1.4)
CREAT SERPL-MCNC: 2 MG/DL (ref 0.5–1.4)
CREAT SERPL-MCNC: 2 MG/DL (ref 0.5–1.4)
CREAT SERPL-MCNC: 2.1 MG/DL (ref 0.5–1.4)
ERYTHROCYTE [DISTWIDTH] IN BLOOD BY AUTOMATED COUNT: 15.8 % (ref 11.5–14.5)
FLUAV AG UPPER RESP QL IA.RAPID: NEGATIVE
FLUBV AG UPPER RESP QL IA.RAPID: NEGATIVE
GFR SERPLBLD CREATININE-BSD FMLA CKD-EPI: 23 ML/MIN/1.73/M2
GFR SERPLBLD CREATININE-BSD FMLA CKD-EPI: 25 ML/MIN/1.73/M2
GFR SERPLBLD CREATININE-BSD FMLA CKD-EPI: 25 ML/MIN/1.73/M2
GFR SERPLBLD CREATININE-BSD FMLA CKD-EPI: 26 ML/MIN/1.73/M2
GFR SERPLBLD CREATININE-BSD FMLA CKD-EPI: 26 ML/MIN/1.73/M2
GFR SERPLBLD CREATININE-BSD FMLA CKD-EPI: 30 ML/MIN/1.73/M2
GLUCOSE SERPL-MCNC: 191 MG/DL (ref 70–110)
GLUCOSE SERPL-MCNC: 221 MG/DL (ref 70–110)
GLUCOSE SERPL-MCNC: 221 MG/DL (ref 70–110)
GLUCOSE SERPL-MCNC: 227 MG/DL (ref 70–110)
GLUCOSE SERPL-MCNC: 244 MG/DL (ref 70–110)
GLUCOSE SERPL-MCNC: 273 MG/DL (ref 70–110)
GLUCOSE SERPL-MCNC: 278 MG/DL (ref 70–110)
GLUCOSE SERPL-MCNC: 278 MG/DL (ref 70–110)
HCT VFR BLD AUTO: 23.8 % (ref 37–48.5)
HGB BLD-MCNC: 8.1 GM/DL (ref 12–16)
HOLD SPECIMEN: NORMAL
IMM GRANULOCYTES # BLD AUTO: 0.43 K/UL (ref 0–0.04)
IMM GRANULOCYTES NFR BLD AUTO: 2.1 % (ref 0–0.5)
LYMPHOCYTES # BLD AUTO: 2.27 K/UL (ref 1–4.8)
MAGNESIUM SERPL-MCNC: 2.1 MG/DL (ref 1.6–2.6)
MAGNESIUM SERPL-MCNC: 2.2 MG/DL (ref 1.6–2.6)
MCH RBC QN AUTO: 30.5 PG (ref 27–31)
MCHC RBC AUTO-ENTMCNC: 34 G/DL (ref 32–36)
MCV RBC AUTO: 90 FL (ref 82–98)
NUCLEATED RBC (/100WBC) (OHS): 2 /100 WBC
PHOSPHATE SERPL-MCNC: 3.3 MG/DL (ref 2.7–4.5)
PHOSPHATE SERPL-MCNC: 3.5 MG/DL (ref 2.7–4.5)
PLATELET # BLD AUTO: 280 K/UL (ref 150–450)
PMV BLD AUTO: 10.9 FL (ref 9.2–12.9)
POCT GLUCOSE: 211 MG/DL (ref 70–110)
POCT GLUCOSE: 218 MG/DL (ref 70–110)
POCT GLUCOSE: 312 MG/DL (ref 70–110)
POCT GLUCOSE: 339 MG/DL (ref 70–110)
POCT GLUCOSE: 340 MG/DL (ref 70–110)
POTASSIUM SERPL-SCNC: 5 MMOL/L (ref 3.5–5.1)
POTASSIUM SERPL-SCNC: 5.1 MMOL/L (ref 3.5–5.1)
POTASSIUM SERPL-SCNC: 5.1 MMOL/L (ref 3.5–5.1)
POTASSIUM SERPL-SCNC: 5.2 MMOL/L (ref 3.5–5.1)
PROT SERPL-MCNC: 4.7 GM/DL (ref 6–8.4)
RBC # BLD AUTO: 2.66 M/UL (ref 4–5.4)
RELATIVE EOSINOPHIL (OHS): 1.1 %
RELATIVE LYMPHOCYTE (OHS): 11.1 % (ref 18–48)
RELATIVE MONOCYTE (OHS): 9.1 % (ref 4–15)
RELATIVE NEUTROPHIL (OHS): 76.3 % (ref 38–73)
RSV A 5' UTR RNA NPH QL NAA+PROBE: NEGATIVE
SARS-COV-2 RNA RESP QL NAA+PROBE: NEGATIVE
SODIUM SERPL-SCNC: 134 MMOL/L (ref 136–145)
SODIUM SERPL-SCNC: 135 MMOL/L (ref 136–145)
SODIUM SERPL-SCNC: 136 MMOL/L (ref 136–145)
SODIUM SERPL-SCNC: 137 MMOL/L (ref 136–145)
TRIGL SERPL-MCNC: 136 MG/DL (ref 30–150)
VANCOMYCIN SERPL-MCNC: 20.2 UG/ML (ref ?–80)
WBC # BLD AUTO: 20.46 K/UL (ref 3.9–12.7)

## 2025-07-08 PROCEDURE — 63600175 PHARM REV CODE 636 W HCPCS: Mod: HCNC | Performed by: STUDENT IN AN ORGANIZED HEALTH CARE EDUCATION/TRAINING PROGRAM

## 2025-07-08 PROCEDURE — 87040 BLOOD CULTURE FOR BACTERIA: CPT | Mod: HCNC | Performed by: EMERGENCY MEDICINE

## 2025-07-08 PROCEDURE — 83735 ASSAY OF MAGNESIUM: CPT | Mod: HCNC | Performed by: INTERNAL MEDICINE

## 2025-07-08 PROCEDURE — 95714 VEEG EA 12-26 HR UNMNTR: CPT | Mod: HCNC

## 2025-07-08 PROCEDURE — 99900026 HC AIRWAY MAINTENANCE (STAT): Mod: HCNC

## 2025-07-08 PROCEDURE — 25000003 PHARM REV CODE 250: Mod: HCNC | Performed by: EMERGENCY MEDICINE

## 2025-07-08 PROCEDURE — 27000207 HC ISOLATION: Mod: HCNC

## 2025-07-08 PROCEDURE — 99233 SBSQ HOSP IP/OBS HIGH 50: CPT | Mod: HCNC,,,

## 2025-07-08 PROCEDURE — C9254 INJECTION, LACOSAMIDE: HCPCS | Mod: HCNC | Performed by: EMERGENCY MEDICINE

## 2025-07-08 PROCEDURE — 95700 EEG CONT REC W/VID EEG TECH: CPT | Mod: HCNC

## 2025-07-08 PROCEDURE — 80053 COMPREHEN METABOLIC PANEL: CPT | Mod: HCNC

## 2025-07-08 PROCEDURE — 20000000 HC ICU ROOM: Mod: HCNC

## 2025-07-08 PROCEDURE — 82040 ASSAY OF SERUM ALBUMIN: CPT | Mod: HCNC

## 2025-07-08 PROCEDURE — 63600175 PHARM REV CODE 636 W HCPCS: Mod: HCNC

## 2025-07-08 PROCEDURE — 99900035 HC TECH TIME PER 15 MIN (STAT): Mod: HCNC

## 2025-07-08 PROCEDURE — 94761 N-INVAS EAR/PLS OXIMETRY MLT: CPT | Mod: HCNC

## 2025-07-08 PROCEDURE — 27200966 HC CLOSED SUCTION SYSTEM: Mod: HCNC

## 2025-07-08 PROCEDURE — 25000003 PHARM REV CODE 250: Mod: HCNC

## 2025-07-08 PROCEDURE — 82310 ASSAY OF CALCIUM: CPT | Mod: HCNC

## 2025-07-08 PROCEDURE — 94003 VENT MGMT INPAT SUBQ DAY: CPT | Mod: HCNC

## 2025-07-08 PROCEDURE — 27100171 HC OXYGEN HIGH FLOW UP TO 24 HOURS: Mod: HCNC

## 2025-07-08 PROCEDURE — 95720 EEG PHY/QHP EA INCR W/VEEG: CPT | Mod: HCNC,,, | Performed by: PSYCHIATRY & NEUROLOGY

## 2025-07-08 PROCEDURE — 84478 ASSAY OF TRIGLYCERIDES: CPT | Mod: HCNC | Performed by: EMERGENCY MEDICINE

## 2025-07-08 PROCEDURE — 87637 SARSCOV2&INF A&B&RSV AMP PRB: CPT | Mod: HCNC

## 2025-07-08 PROCEDURE — 80202 ASSAY OF VANCOMYCIN: CPT | Mod: HCNC | Performed by: STUDENT IN AN ORGANIZED HEALTH CARE EDUCATION/TRAINING PROGRAM

## 2025-07-08 PROCEDURE — 85025 COMPLETE CBC W/AUTO DIFF WBC: CPT | Mod: HCNC

## 2025-07-08 PROCEDURE — 99232 SBSQ HOSP IP/OBS MODERATE 35: CPT | Mod: HCNC,GC,, | Performed by: PSYCHIATRY & NEUROLOGY

## 2025-07-08 PROCEDURE — 63600175 PHARM REV CODE 636 W HCPCS: Mod: HCNC | Performed by: EMERGENCY MEDICINE

## 2025-07-08 PROCEDURE — 82040 ASSAY OF SERUM ALBUMIN: CPT | Mod: HCNC | Performed by: INTERNAL MEDICINE

## 2025-07-08 RX ORDER — GLUCAGON 1 MG
1 KIT INJECTION
Status: DISCONTINUED | OUTPATIENT
Start: 2025-07-08 | End: 2025-07-08

## 2025-07-08 RX ORDER — DEXMEDETOMIDINE HYDROCHLORIDE 4 UG/ML
0-1.4 INJECTION, SOLUTION INTRAVENOUS CONTINUOUS
Status: DISCONTINUED | OUTPATIENT
Start: 2025-07-08 | End: 2025-07-09

## 2025-07-08 RX ORDER — INSULIN GLARGINE 100 [IU]/ML
5 INJECTION, SOLUTION SUBCUTANEOUS DAILY
Status: DISCONTINUED | OUTPATIENT
Start: 2025-07-08 | End: 2025-07-14

## 2025-07-08 RX ORDER — INSULIN ASPART 100 [IU]/ML
0-10 INJECTION, SOLUTION INTRAVENOUS; SUBCUTANEOUS EVERY 6 HOURS PRN
Status: DISCONTINUED | OUTPATIENT
Start: 2025-07-08 | End: 2025-07-08

## 2025-07-08 RX ORDER — INSULIN ASPART 100 [IU]/ML
0-10 INJECTION, SOLUTION INTRAVENOUS; SUBCUTANEOUS EVERY 4 HOURS PRN
Status: DISCONTINUED | OUTPATIENT
Start: 2025-07-08 | End: 2025-08-08

## 2025-07-08 RX ORDER — NOREPINEPHRINE BIT/0.9 % NACL 32MG/250ML
0-3 PLASTIC BAG, INJECTION (ML) INTRAVENOUS CONTINUOUS
Status: DISCONTINUED | OUTPATIENT
Start: 2025-07-08 | End: 2025-07-11

## 2025-07-08 RX ORDER — GLUCAGON 1 MG
1 KIT INJECTION
Status: DISCONTINUED | OUTPATIENT
Start: 2025-07-08 | End: 2025-08-08

## 2025-07-08 RX ORDER — DEXTROSE MONOHYDRATE 100 MG/ML
INJECTION, SOLUTION INTRAVENOUS CONTINUOUS PRN
Status: DISCONTINUED | OUTPATIENT
Start: 2025-07-08 | End: 2025-08-08

## 2025-07-08 RX ADMIN — CHLORHEXIDINE GLUCONATE 0.12% ORAL RINSE 15 ML: 1.2 LIQUID ORAL at 08:07

## 2025-07-08 RX ADMIN — MIDODRINE HYDROCHLORIDE 15 MG: 5 TABLET ORAL at 02:07

## 2025-07-08 RX ADMIN — PANTOPRAZOLE SODIUM 40 MG: 40 INJECTION, POWDER, FOR SOLUTION INTRAVENOUS at 08:07

## 2025-07-08 RX ADMIN — MAGNESIUM SULFATE HEPTAHYDRATE 2 G: 40 INJECTION, SOLUTION INTRAVENOUS at 12:07

## 2025-07-08 RX ADMIN — PIPERACILLIN SODIUM AND TAZOBACTAM SODIUM 4.5 G: 4; .5 INJECTION, POWDER, LYOPHILIZED, FOR SOLUTION INTRAVENOUS at 09:07

## 2025-07-08 RX ADMIN — LACOSAMIDE 100 MG: 10 INJECTION INTRAVENOUS at 08:07

## 2025-07-08 RX ADMIN — PIPERACILLIN SODIUM AND TAZOBACTAM SODIUM 4.5 G: 4; .5 INJECTION, POWDER, LYOPHILIZED, FOR SOLUTION INTRAVENOUS at 01:07

## 2025-07-08 RX ADMIN — INSULIN ASPART 4 UNITS: 100 INJECTION, SOLUTION INTRAVENOUS; SUBCUTANEOUS at 01:07

## 2025-07-08 RX ADMIN — NOREPINEPHRINE BITARTRATE 0.08 MCG/KG/MIN: 1 INJECTION, SOLUTION, CONCENTRATE INTRAVENOUS at 03:07

## 2025-07-08 RX ADMIN — MIDODRINE HYDROCHLORIDE 15 MG: 5 TABLET ORAL at 06:07

## 2025-07-08 RX ADMIN — INSULIN ASPART 2 UNITS: 100 INJECTION, SOLUTION INTRAVENOUS; SUBCUTANEOUS at 09:07

## 2025-07-08 RX ADMIN — DEXMEDETOMIDINE HYDROCHLORIDE 0.4 MCG/KG/HR: 4 INJECTION INTRAVENOUS at 04:07

## 2025-07-08 RX ADMIN — LACOSAMIDE 100 MG: 10 INJECTION INTRAVENOUS at 09:07

## 2025-07-08 RX ADMIN — INSULIN GLARGINE 5 UNITS: 100 INJECTION, SOLUTION SUBCUTANEOUS at 08:07

## 2025-07-08 RX ADMIN — INSULIN ASPART 8 UNITS: 100 INJECTION, SOLUTION INTRAVENOUS; SUBCUTANEOUS at 06:07

## 2025-07-08 RX ADMIN — PANTOPRAZOLE SODIUM 40 MG: 40 INJECTION, POWDER, FOR SOLUTION INTRAVENOUS at 09:07

## 2025-07-08 RX ADMIN — MIDODRINE HYDROCHLORIDE 15 MG: 5 TABLET ORAL at 09:07

## 2025-07-08 RX ADMIN — INSULIN ASPART 4 UNITS: 100 INJECTION, SOLUTION INTRAVENOUS; SUBCUTANEOUS at 04:07

## 2025-07-08 NOTE — ANESTHESIA POSTPROCEDURE EVALUATION
Anesthesia Post Evaluation    Patient: Tasneem Lynn    Procedure(s) Performed: Procedure(s) (LRB):  EGD (ESOPHAGOGASTRODUODENOSCOPY) (N/A)    Final Anesthesia Type: MAC      Patient location during evaluation: ICU  Patient participation: No - Unable to Participate, Intubation  Level of consciousness: sedated  Post-procedure vital signs: reviewed and stable  Pain management: adequate    PONV status at discharge: No PONV  Anesthetic complications: no      Cardiovascular status: blood pressure returned to baseline  Respiratory status: ETT                Vitals Value Taken Time   BP 78/46 07/07/25 19:53   Temp 36.6 °C (97.88 °F) 07/08/25 10:43   Pulse 75 07/08/25 10:43   Resp 18 07/07/25 23:55   SpO2 100 % 07/08/25 10:43   Vitals shown include unfiled device data.      No case tracking events are documented in the log.      Pain/Clay Score: Pain Rating Prior to Med Admin: -- (sedative) (7/7/2025  6:06 PM)

## 2025-07-09 LAB
ABSOLUTE EOSINOPHIL (OHS): 0.89 K/UL
ABSOLUTE EOSINOPHIL (OHS): 0.94 K/UL
ABSOLUTE MONOCYTE (OHS): 2.03 K/UL (ref 0.3–1)
ABSOLUTE MONOCYTE (OHS): 2.28 K/UL (ref 0.3–1)
ABSOLUTE NEUTROPHIL COUNT (OHS): 17.84 K/UL (ref 1.8–7.7)
ABSOLUTE NEUTROPHIL COUNT (OHS): 20.32 K/UL (ref 1.8–7.7)
ALBUMIN SERPL BCP-MCNC: 1 G/DL (ref 3.5–5.2)
ALBUMIN SERPL BCP-MCNC: 1 G/DL (ref 3.5–5.2)
ALBUMIN SERPL BCP-MCNC: 1.2 G/DL (ref 3.5–5.2)
ALP SERPL-CCNC: 124 UNIT/L (ref 40–150)
ALP SERPL-CCNC: 162 UNIT/L (ref 40–150)
ALT SERPL W/O P-5'-P-CCNC: <5 UNIT/L (ref 10–44)
ALT SERPL W/O P-5'-P-CCNC: <5 UNIT/L (ref 10–44)
ANION GAP (OHS): 10 MMOL/L (ref 8–16)
ANION GAP (OHS): 10 MMOL/L (ref 8–16)
ANION GAP (OHS): 11 MMOL/L (ref 8–16)
ANION GAP (OHS): 7 MMOL/L (ref 8–16)
ANION GAP (OHS): 8 MMOL/L (ref 8–16)
ANION GAP (OHS): 8 MMOL/L (ref 8–16)
ANION GAP (OHS): 9 MMOL/L (ref 8–16)
AST SERPL-CCNC: 25 UNIT/L (ref 11–45)
AST SERPL-CCNC: 30 UNIT/L (ref 11–45)
BASOPHILS # BLD AUTO: 0.06 K/UL
BASOPHILS # BLD AUTO: 0.07 K/UL
BASOPHILS NFR BLD AUTO: 0.2 %
BASOPHILS NFR BLD AUTO: 0.3 %
BILIRUB SERPL-MCNC: 0.1 MG/DL (ref 0.1–1)
BILIRUB SERPL-MCNC: 0.2 MG/DL (ref 0.1–1)
BUN SERPL-MCNC: 60 MG/DL (ref 8–23)
BUN SERPL-MCNC: 60 MG/DL (ref 8–23)
BUN SERPL-MCNC: 61 MG/DL (ref 8–23)
BUN SERPL-MCNC: 63 MG/DL (ref 8–23)
BUN SERPL-MCNC: 64 MG/DL (ref 8–23)
CALCIUM SERPL-MCNC: 7.4 MG/DL (ref 8.7–10.5)
CALCIUM SERPL-MCNC: 7.5 MG/DL (ref 8.7–10.5)
CALCIUM SERPL-MCNC: 7.5 MG/DL (ref 8.7–10.5)
CALCIUM SERPL-MCNC: 7.6 MG/DL (ref 8.7–10.5)
CALCIUM SERPL-MCNC: 7.6 MG/DL (ref 8.7–10.5)
CALCIUM SERPL-MCNC: 7.8 MG/DL (ref 8.7–10.5)
CALCIUM SERPL-MCNC: 7.9 MG/DL (ref 8.7–10.5)
CHLORIDE SERPL-SCNC: 104 MMOL/L (ref 95–110)
CHLORIDE SERPL-SCNC: 104 MMOL/L (ref 95–110)
CHLORIDE SERPL-SCNC: 105 MMOL/L (ref 95–110)
CO2 SERPL-SCNC: 20 MMOL/L (ref 23–29)
CO2 SERPL-SCNC: 22 MMOL/L (ref 23–29)
CO2 SERPL-SCNC: 23 MMOL/L (ref 23–29)
CO2 SERPL-SCNC: 24 MMOL/L (ref 23–29)
CREAT SERPL-MCNC: 2.2 MG/DL (ref 0.5–1.4)
CREAT SERPL-MCNC: 2.2 MG/DL (ref 0.5–1.4)
CREAT SERPL-MCNC: 2.3 MG/DL (ref 0.5–1.4)
CREAT SERPL-MCNC: 2.4 MG/DL (ref 0.5–1.4)
ERYTHROCYTE [DISTWIDTH] IN BLOOD BY AUTOMATED COUNT: 16.9 % (ref 11.5–14.5)
ERYTHROCYTE [DISTWIDTH] IN BLOOD BY AUTOMATED COUNT: 17.1 % (ref 11.5–14.5)
GFR SERPLBLD CREATININE-BSD FMLA CKD-EPI: 20 ML/MIN/1.73/M2
GFR SERPLBLD CREATININE-BSD FMLA CKD-EPI: 21 ML/MIN/1.73/M2
GFR SERPLBLD CREATININE-BSD FMLA CKD-EPI: 22 ML/MIN/1.73/M2
GFR SERPLBLD CREATININE-BSD FMLA CKD-EPI: 22 ML/MIN/1.73/M2
GLUCOSE SERPL-MCNC: 100 MG/DL (ref 70–110)
GLUCOSE SERPL-MCNC: 132 MG/DL (ref 70–110)
GLUCOSE SERPL-MCNC: 144 MG/DL (ref 70–110)
GLUCOSE SERPL-MCNC: 144 MG/DL (ref 70–110)
GLUCOSE SERPL-MCNC: 164 MG/DL (ref 70–110)
GLUCOSE SERPL-MCNC: 75 MG/DL (ref 70–110)
GLUCOSE SERPL-MCNC: 83 MG/DL (ref 70–110)
HCT VFR BLD AUTO: 21.3 % (ref 37–48.5)
HCT VFR BLD AUTO: 21.9 % (ref 37–48.5)
HGB BLD-MCNC: 7.2 GM/DL (ref 12–16)
HGB BLD-MCNC: 7.3 GM/DL (ref 12–16)
IMM GRANULOCYTES # BLD AUTO: 0.26 K/UL (ref 0–0.04)
IMM GRANULOCYTES # BLD AUTO: 0.26 K/UL (ref 0–0.04)
IMM GRANULOCYTES NFR BLD AUTO: 1 % (ref 0–0.5)
IMM GRANULOCYTES NFR BLD AUTO: 1.1 % (ref 0–0.5)
LYMPHOCYTES # BLD AUTO: 2.51 K/UL (ref 1–4.8)
LYMPHOCYTES # BLD AUTO: 2.52 K/UL (ref 1–4.8)
MAGNESIUM SERPL-MCNC: 2 MG/DL (ref 1.6–2.6)
MCH RBC QN AUTO: 30.9 PG (ref 27–31)
MCH RBC QN AUTO: 31 PG (ref 27–31)
MCHC RBC AUTO-ENTMCNC: 33.3 G/DL (ref 32–36)
MCHC RBC AUTO-ENTMCNC: 33.8 G/DL (ref 32–36)
MCV RBC AUTO: 92 FL (ref 82–98)
MCV RBC AUTO: 93 FL (ref 82–98)
NUCLEATED RBC (/100WBC) (OHS): 2 /100 WBC
NUCLEATED RBC (/100WBC) (OHS): 2 /100 WBC
OHS QRS DURATION: 82 MS
OHS QTC CALCULATION: 458 MS
PHOSPHATE SERPL-MCNC: 3.6 MG/DL (ref 2.7–4.5)
PLATELET # BLD AUTO: 309 K/UL (ref 150–450)
PLATELET # BLD AUTO: 344 K/UL (ref 150–450)
PMV BLD AUTO: 10.2 FL (ref 9.2–12.9)
PMV BLD AUTO: 10.2 FL (ref 9.2–12.9)
POCT GLUCOSE: 154 MG/DL (ref 70–110)
POCT GLUCOSE: 160 MG/DL (ref 70–110)
POCT GLUCOSE: 184 MG/DL (ref 70–110)
POCT GLUCOSE: 185 MG/DL (ref 70–110)
POCT GLUCOSE: 71 MG/DL (ref 70–110)
POCT GLUCOSE: 95 MG/DL (ref 70–110)
POCT GLUCOSE: 98 MG/DL (ref 70–110)
POTASSIUM SERPL-SCNC: 4.8 MMOL/L (ref 3.5–5.1)
POTASSIUM SERPL-SCNC: 4.9 MMOL/L (ref 3.5–5.1)
POTASSIUM SERPL-SCNC: 5 MMOL/L (ref 3.5–5.1)
POTASSIUM SERPL-SCNC: 5.1 MMOL/L (ref 3.5–5.1)
PROT SERPL-MCNC: 5.2 GM/DL (ref 6–8.4)
PROT SERPL-MCNC: 5.8 GM/DL (ref 6–8.4)
RBC # BLD AUTO: 2.32 M/UL (ref 4–5.4)
RBC # BLD AUTO: 2.36 M/UL (ref 4–5.4)
RELATIVE EOSINOPHIL (OHS): 3.6 %
RELATIVE EOSINOPHIL (OHS): 3.7 %
RELATIVE LYMPHOCYTE (OHS): 10.6 % (ref 18–48)
RELATIVE LYMPHOCYTE (OHS): 9.6 % (ref 18–48)
RELATIVE MONOCYTE (OHS): 7.8 % (ref 4–15)
RELATIVE MONOCYTE (OHS): 9.6 % (ref 4–15)
RELATIVE NEUTROPHIL (OHS): 74.7 % (ref 38–73)
RELATIVE NEUTROPHIL (OHS): 77.8 % (ref 38–73)
SODIUM SERPL-SCNC: 135 MMOL/L (ref 136–145)
SODIUM SERPL-SCNC: 135 MMOL/L (ref 136–145)
SODIUM SERPL-SCNC: 136 MMOL/L (ref 136–145)
SODIUM SERPL-SCNC: 138 MMOL/L (ref 136–145)
SODIUM SERPL-SCNC: 138 MMOL/L (ref 136–145)
VANCOMYCIN SERPL-MCNC: 18.5 UG/ML (ref ?–80)
WBC # BLD AUTO: 23.86 K/UL (ref 3.9–12.7)
WBC # BLD AUTO: 26.12 K/UL (ref 3.9–12.7)

## 2025-07-09 PROCEDURE — 94761 N-INVAS EAR/PLS OXIMETRY MLT: CPT | Mod: HCNC

## 2025-07-09 PROCEDURE — 83735 ASSAY OF MAGNESIUM: CPT | Mod: HCNC | Performed by: STUDENT IN AN ORGANIZED HEALTH CARE EDUCATION/TRAINING PROGRAM

## 2025-07-09 PROCEDURE — 80053 COMPREHEN METABOLIC PANEL: CPT | Mod: HCNC

## 2025-07-09 PROCEDURE — 94003 VENT MGMT INPAT SUBQ DAY: CPT | Mod: HCNC

## 2025-07-09 PROCEDURE — 63600175 PHARM REV CODE 636 W HCPCS: Mod: HCNC | Performed by: EMERGENCY MEDICINE

## 2025-07-09 PROCEDURE — 93005 ELECTROCARDIOGRAM TRACING: CPT | Mod: HCNC | Performed by: INTERNAL MEDICINE

## 2025-07-09 PROCEDURE — 99233 SBSQ HOSP IP/OBS HIGH 50: CPT | Mod: FS,HCNC,, | Performed by: PSYCHIATRY & NEUROLOGY

## 2025-07-09 PROCEDURE — 25000003 PHARM REV CODE 250: Mod: HCNC

## 2025-07-09 PROCEDURE — 95714 VEEG EA 12-26 HR UNMNTR: CPT | Mod: HCNC

## 2025-07-09 PROCEDURE — 99900026 HC AIRWAY MAINTENANCE (STAT): Mod: HCNC

## 2025-07-09 PROCEDURE — 99222 1ST HOSP IP/OBS MODERATE 55: CPT | Mod: HCNC,,, | Performed by: NURSE PRACTITIONER

## 2025-07-09 PROCEDURE — 27100171 HC OXYGEN HIGH FLOW UP TO 24 HOURS: Mod: HCNC

## 2025-07-09 PROCEDURE — 25000003 PHARM REV CODE 250: Mod: HCNC | Performed by: EMERGENCY MEDICINE

## 2025-07-09 PROCEDURE — 85025 COMPLETE CBC W/AUTO DIFF WBC: CPT | Mod: HCNC

## 2025-07-09 PROCEDURE — 93010 ELECTROCARDIOGRAM REPORT: CPT | Mod: HCNC,,, | Performed by: INTERNAL MEDICINE

## 2025-07-09 PROCEDURE — C9254 INJECTION, LACOSAMIDE: HCPCS | Mod: HCNC

## 2025-07-09 PROCEDURE — 85025 COMPLETE CBC W/AUTO DIFF WBC: CPT | Mod: HCNC | Performed by: EMERGENCY MEDICINE

## 2025-07-09 PROCEDURE — 99499 UNLISTED E&M SERVICE: CPT | Mod: HCNC,,, | Performed by: PHYSICIAN ASSISTANT

## 2025-07-09 PROCEDURE — 99900035 HC TECH TIME PER 15 MIN (STAT): Mod: HCNC

## 2025-07-09 PROCEDURE — 80202 ASSAY OF VANCOMYCIN: CPT | Mod: HCNC | Performed by: EMERGENCY MEDICINE

## 2025-07-09 PROCEDURE — C9254 INJECTION, LACOSAMIDE: HCPCS | Mod: HCNC | Performed by: EMERGENCY MEDICINE

## 2025-07-09 PROCEDURE — 80069 RENAL FUNCTION PANEL: CPT | Mod: HCNC | Performed by: STUDENT IN AN ORGANIZED HEALTH CARE EDUCATION/TRAINING PROGRAM

## 2025-07-09 PROCEDURE — 82947 ASSAY GLUCOSE BLOOD QUANT: CPT | Mod: HCNC

## 2025-07-09 PROCEDURE — 80069 RENAL FUNCTION PANEL: CPT | Mod: HCNC

## 2025-07-09 PROCEDURE — 99291 CRITICAL CARE FIRST HOUR: CPT | Mod: HCNC,GC,, | Performed by: EMERGENCY MEDICINE

## 2025-07-09 PROCEDURE — 27000207 HC ISOLATION: Mod: HCNC

## 2025-07-09 PROCEDURE — 27200966 HC CLOSED SUCTION SYSTEM: Mod: HCNC

## 2025-07-09 PROCEDURE — 99223 1ST HOSP IP/OBS HIGH 75: CPT | Mod: HCNC,,, | Performed by: STUDENT IN AN ORGANIZED HEALTH CARE EDUCATION/TRAINING PROGRAM

## 2025-07-09 PROCEDURE — 95720 EEG PHY/QHP EA INCR W/VEEG: CPT | Mod: HCNC,,, | Performed by: PSYCHIATRY & NEUROLOGY

## 2025-07-09 PROCEDURE — 63600175 PHARM REV CODE 636 W HCPCS: Mod: HCNC

## 2025-07-09 PROCEDURE — 20000000 HC ICU ROOM: Mod: HCNC

## 2025-07-09 RX ORDER — PROPOFOL 10 MG/ML
0-50 INJECTION, EMULSION INTRAVENOUS CONTINUOUS
Status: DISCONTINUED | OUTPATIENT
Start: 2025-07-09 | End: 2025-07-09

## 2025-07-09 RX ORDER — LACOSAMIDE 10 MG/ML
200 INJECTION INTRAVENOUS EVERY 12 HOURS
Status: DISCONTINUED | OUTPATIENT
Start: 2025-07-09 | End: 2025-07-23

## 2025-07-09 RX ORDER — IBUPROFEN 200 MG
16 TABLET ORAL
Status: DISCONTINUED | OUTPATIENT
Start: 2025-07-09 | End: 2025-07-10

## 2025-07-09 RX ORDER — LACOSAMIDE 10 MG/ML
200 INJECTION INTRAVENOUS ONCE
Status: COMPLETED | OUTPATIENT
Start: 2025-07-09 | End: 2025-07-09

## 2025-07-09 RX ADMIN — LACOSAMIDE 200 MG: 10 INJECTION INTRAVENOUS at 09:07

## 2025-07-09 RX ADMIN — INSULIN ASPART 2 UNITS: 100 INJECTION, SOLUTION INTRAVENOUS; SUBCUTANEOUS at 11:07

## 2025-07-09 RX ADMIN — INSULIN ASPART 2 UNITS: 100 INJECTION, SOLUTION INTRAVENOUS; SUBCUTANEOUS at 08:07

## 2025-07-09 RX ADMIN — MIDODRINE HYDROCHLORIDE 15 MG: 5 TABLET ORAL at 01:07

## 2025-07-09 RX ADMIN — PANTOPRAZOLE SODIUM 40 MG: 40 INJECTION, POWDER, FOR SOLUTION INTRAVENOUS at 08:07

## 2025-07-09 RX ADMIN — INSULIN GLARGINE 5 UNITS: 100 INJECTION, SOLUTION SUBCUTANEOUS at 08:07

## 2025-07-09 RX ADMIN — MIDODRINE HYDROCHLORIDE 15 MG: 5 TABLET ORAL at 05:07

## 2025-07-09 RX ADMIN — INSULIN ASPART 2 UNITS: 100 INJECTION, SOLUTION INTRAVENOUS; SUBCUTANEOUS at 05:07

## 2025-07-09 RX ADMIN — DEXTROSE MONOHYDRATE 12.5 G: 25 INJECTION, SOLUTION INTRAVENOUS at 08:07

## 2025-07-09 RX ADMIN — PIPERACILLIN SODIUM AND TAZOBACTAM SODIUM 4.5 G: 4; .5 INJECTION, POWDER, LYOPHILIZED, FOR SOLUTION INTRAVENOUS at 11:07

## 2025-07-09 RX ADMIN — MIDODRINE HYDROCHLORIDE 15 MG: 5 TABLET ORAL at 09:07

## 2025-07-09 RX ADMIN — LACOSAMIDE 100 MG: 10 INJECTION INTRAVENOUS at 08:07

## 2025-07-09 RX ADMIN — INSULIN ASPART 1 UNITS: 100 INJECTION, SOLUTION INTRAVENOUS; SUBCUTANEOUS at 12:07

## 2025-07-09 RX ADMIN — PANTOPRAZOLE SODIUM 40 MG: 40 INJECTION, POWDER, FOR SOLUTION INTRAVENOUS at 09:07

## 2025-07-09 RX ADMIN — PIPERACILLIN SODIUM AND TAZOBACTAM SODIUM 4.5 G: 4; .5 INJECTION, POWDER, LYOPHILIZED, FOR SOLUTION INTRAVENOUS at 09:07

## 2025-07-09 RX ADMIN — LACOSAMIDE 200 MG: 10 INJECTION INTRAVENOUS at 11:07

## 2025-07-10 PROBLEM — L02.416 ABSCESS OF LEFT THIGH: Status: ACTIVE | Noted: 2025-07-10

## 2025-07-10 PROBLEM — L24.A2 IRRITANT CONTACT DERMATITIS DUE TO FECAL, URINARY OR DUAL INCONTINENCE: Status: ACTIVE | Noted: 2025-07-10

## 2025-07-10 PROBLEM — L89.150 PRESSURE INJURY OF SACRAL REGION, UNSTAGEABLE: Status: ACTIVE | Noted: 2025-07-10

## 2025-07-10 PROBLEM — L89.220: Status: ACTIVE | Noted: 2025-07-10

## 2025-07-10 LAB
ABO + RH BLD: NORMAL
ABSOLUTE EOSINOPHIL (OHS): 0.43 K/UL
ABSOLUTE EOSINOPHIL (OHS): 1 K/UL
ABSOLUTE EOSINOPHIL (OHS): 1 K/UL
ABSOLUTE MONOCYTE (OHS): 0.8 K/UL (ref 0.3–1)
ABSOLUTE MONOCYTE (OHS): 2.05 K/UL (ref 0.3–1)
ABSOLUTE MONOCYTE (OHS): 2.67 K/UL (ref 0.3–1)
ABSOLUTE NEUTROPHIL COUNT (OHS): 15.14 K/UL (ref 1.8–7.7)
ABSOLUTE NEUTROPHIL COUNT (OHS): 19.67 K/UL (ref 1.8–7.7)
ABSOLUTE NEUTROPHIL COUNT (OHS): 21.39 K/UL (ref 1.8–7.7)
ALBUMIN SERPL BCP-MCNC: 1.1 G/DL (ref 3.5–5.2)
ALLENS TEST: ABNORMAL
ALP SERPL-CCNC: 164 UNIT/L (ref 40–150)
ALT SERPL W/O P-5'-P-CCNC: <5 UNIT/L (ref 10–44)
ANION GAP (OHS): 10 MMOL/L (ref 8–16)
ANION GAP (OHS): 10 MMOL/L (ref 8–16)
ANION GAP (OHS): 11 MMOL/L (ref 8–16)
ANION GAP (OHS): 9 MMOL/L (ref 8–16)
AST SERPL-CCNC: 25 UNIT/L (ref 11–45)
BASOPHILS # BLD AUTO: 0.04 K/UL
BASOPHILS # BLD AUTO: 0.05 K/UL
BASOPHILS # BLD AUTO: 0.07 K/UL
BASOPHILS NFR BLD AUTO: 0.2 %
BILIRUB SERPL-MCNC: 0.2 MG/DL (ref 0.1–1)
BLD PROD TYP BPU: NORMAL
BLOOD UNIT EXPIRATION DATE: NORMAL
BLOOD UNIT TYPE CODE: 7300
BUN SERPL-MCNC: 26 MG/DL (ref 8–23)
BUN SERPL-MCNC: 61 MG/DL (ref 8–23)
BUN SERPL-MCNC: 62 MG/DL (ref 8–23)
BUN SERPL-MCNC: 63 MG/DL (ref 8–23)
BUN SERPL-MCNC: 64 MG/DL (ref 8–23)
BUN SERPL-MCNC: 64 MG/DL (ref 8–23)
CALCIUM SERPL-MCNC: 7.3 MG/DL (ref 8.7–10.5)
CALCIUM SERPL-MCNC: 7.4 MG/DL (ref 8.7–10.5)
CALCIUM SERPL-MCNC: 7.4 MG/DL (ref 8.7–10.5)
CALCIUM SERPL-MCNC: 7.6 MG/DL (ref 8.7–10.5)
CALCIUM SERPL-MCNC: 7.7 MG/DL (ref 8.7–10.5)
CALCIUM SERPL-MCNC: 7.7 MG/DL (ref 8.7–10.5)
CHLORIDE SERPL-SCNC: 102 MMOL/L (ref 95–110)
CHLORIDE SERPL-SCNC: 103 MMOL/L (ref 95–110)
CHLORIDE SERPL-SCNC: 104 MMOL/L (ref 95–110)
CHLORIDE SERPL-SCNC: 105 MMOL/L (ref 95–110)
CO2 SERPL-SCNC: 20 MMOL/L (ref 23–29)
CO2 SERPL-SCNC: 20 MMOL/L (ref 23–29)
CO2 SERPL-SCNC: 21 MMOL/L (ref 23–29)
CO2 SERPL-SCNC: 22 MMOL/L (ref 23–29)
CREAT SERPL-MCNC: 1.1 MG/DL (ref 0.5–1.4)
CREAT SERPL-MCNC: 2.5 MG/DL (ref 0.5–1.4)
CREAT SERPL-MCNC: 2.6 MG/DL (ref 0.5–1.4)
CREAT SERPL-MCNC: 2.6 MG/DL (ref 0.5–1.4)
CROSSMATCH INTERPRETATION: NORMAL
DELSYS: ABNORMAL
DISPENSE STATUS: NORMAL
ERYTHROCYTE [DISTWIDTH] IN BLOOD BY AUTOMATED COUNT: 17.3 % (ref 11.5–14.5)
ERYTHROCYTE [DISTWIDTH] IN BLOOD BY AUTOMATED COUNT: 17.3 % (ref 11.5–14.5)
ERYTHROCYTE [DISTWIDTH] IN BLOOD BY AUTOMATED COUNT: 17.7 % (ref 11.5–14.5)
ERYTHROCYTE [SEDIMENTATION RATE] IN BLOOD BY WESTERGREN METHOD: 18 MM/H
FIO2: 35
GFR SERPLBLD CREATININE-BSD FMLA CKD-EPI: 18 ML/MIN/1.73/M2
GFR SERPLBLD CREATININE-BSD FMLA CKD-EPI: 18 ML/MIN/1.73/M2
GFR SERPLBLD CREATININE-BSD FMLA CKD-EPI: 19 ML/MIN/1.73/M2
GFR SERPLBLD CREATININE-BSD FMLA CKD-EPI: 51 ML/MIN/1.73/M2
GLUCOSE SERPL-MCNC: 69 MG/DL (ref 70–110)
GLUCOSE SERPL-MCNC: 69 MG/DL (ref 70–110)
GLUCOSE SERPL-MCNC: 70 MG/DL (ref 70–110)
GLUCOSE SERPL-MCNC: 71 MG/DL (ref 70–110)
GLUCOSE SERPL-MCNC: 71 MG/DL (ref 70–110)
GLUCOSE SERPL-MCNC: 72 MG/DL (ref 70–110)
HCO3 UR-SCNC: 23.4 MMOL/L (ref 24–28)
HCT VFR BLD AUTO: 21.5 % (ref 37–48.5)
HCT VFR BLD AUTO: 22.2 % (ref 37–48.5)
HCT VFR BLD AUTO: 26.5 % (ref 37–48.5)
HGB BLD-MCNC: 6.9 GM/DL (ref 12–16)
HGB BLD-MCNC: 7.2 GM/DL (ref 12–16)
HGB BLD-MCNC: 8.6 GM/DL (ref 12–16)
IMM GRANULOCYTES # BLD AUTO: 0.19 K/UL (ref 0–0.04)
IMM GRANULOCYTES # BLD AUTO: 0.3 K/UL (ref 0–0.04)
IMM GRANULOCYTES # BLD AUTO: 0.37 K/UL (ref 0–0.04)
IMM GRANULOCYTES NFR BLD AUTO: 1.1 % (ref 0–0.5)
IMM GRANULOCYTES NFR BLD AUTO: 1.2 % (ref 0–0.5)
IMM GRANULOCYTES NFR BLD AUTO: 1.3 % (ref 0–0.5)
INR PPP: 1.6 (ref 0.8–1.2)
LYMPHOCYTES # BLD AUTO: 1.41 K/UL (ref 1–4.8)
LYMPHOCYTES # BLD AUTO: 2.33 K/UL (ref 1–4.8)
LYMPHOCYTES # BLD AUTO: 2.91 K/UL (ref 1–4.8)
MAGNESIUM SERPL-MCNC: 2 MG/DL (ref 1.6–2.6)
MAGNESIUM SERPL-MCNC: 2.1 MG/DL (ref 1.6–2.6)
MAGNESIUM SERPL-MCNC: 2.2 MG/DL (ref 1.6–2.6)
MCH RBC QN AUTO: 30.4 PG (ref 27–31)
MCH RBC QN AUTO: 30.5 PG (ref 27–31)
MCH RBC QN AUTO: 30.6 PG (ref 27–31)
MCHC RBC AUTO-ENTMCNC: 32.1 G/DL (ref 32–36)
MCHC RBC AUTO-ENTMCNC: 32.4 G/DL (ref 32–36)
MCHC RBC AUTO-ENTMCNC: 32.5 G/DL (ref 32–36)
MCV RBC AUTO: 94 FL (ref 82–98)
MCV RBC AUTO: 95 FL (ref 82–98)
MCV RBC AUTO: 95 FL (ref 82–98)
MIN VOL: 7
MODE: ABNORMAL
NUCLEATED RBC (/100WBC) (OHS): 1 /100 WBC
NUCLEATED RBC (/100WBC) (OHS): 2 /100 WBC
NUCLEATED RBC (/100WBC) (OHS): 2 /100 WBC
PCO2 BLDA: 41.4 MMHG (ref 35–45)
PEEP: 5
PH SMN: 7.36 [PH] (ref 7.35–7.45)
PHOSPHATE SERPL-MCNC: 4.2 MG/DL (ref 2.7–4.5)
PHOSPHATE SERPL-MCNC: 4.5 MG/DL (ref 2.7–4.5)
PHOSPHATE SERPL-MCNC: 4.6 MG/DL (ref 2.7–4.5)
PIP: 24
PLATELET # BLD AUTO: 262 K/UL (ref 150–450)
PLATELET # BLD AUTO: 310 K/UL (ref 150–450)
PLATELET # BLD AUTO: 337 K/UL (ref 150–450)
PMV BLD AUTO: 10.1 FL (ref 9.2–12.9)
PMV BLD AUTO: 10.2 FL (ref 9.2–12.9)
PMV BLD AUTO: 9.8 FL (ref 9.2–12.9)
PO2 BLDA: 30 MMHG (ref 40–60)
POC BE: -2 MMOL/L (ref -2–2)
POC SATURATED O2: 55 % (ref 95–100)
POC TCO2: 25 MMOL/L (ref 24–29)
POCT GLUCOSE: 57 MG/DL (ref 70–110)
POCT GLUCOSE: 75 MG/DL (ref 70–110)
POCT GLUCOSE: 76 MG/DL (ref 70–110)
POCT GLUCOSE: 79 MG/DL (ref 70–110)
POCT GLUCOSE: 79 MG/DL (ref 70–110)
POCT GLUCOSE: 90 MG/DL (ref 70–110)
POCT GLUCOSE: 94 MG/DL (ref 70–110)
POTASSIUM SERPL-SCNC: 3.3 MMOL/L (ref 3.5–5.1)
POTASSIUM SERPL-SCNC: 4.7 MMOL/L (ref 3.5–5.1)
POTASSIUM SERPL-SCNC: 4.7 MMOL/L (ref 3.5–5.1)
POTASSIUM SERPL-SCNC: 5 MMOL/L (ref 3.5–5.1)
POTASSIUM SERPL-SCNC: 5.1 MMOL/L (ref 3.5–5.1)
POTASSIUM SERPL-SCNC: 5.1 MMOL/L (ref 3.5–5.1)
PROT SERPL-MCNC: 5.5 GM/DL (ref 6–8.4)
PROTHROMBIN TIME: 16.8 SECONDS (ref 9–12.5)
RBC # BLD AUTO: 2.26 M/UL (ref 4–5.4)
RBC # BLD AUTO: 2.35 M/UL (ref 4–5.4)
RBC # BLD AUTO: 2.83 M/UL (ref 4–5.4)
RELATIVE EOSINOPHIL (OHS): 2.4 %
RELATIVE EOSINOPHIL (OHS): 3.5 %
RELATIVE EOSINOPHIL (OHS): 3.9 %
RELATIVE LYMPHOCYTE (OHS): 10.2 % (ref 18–48)
RELATIVE LYMPHOCYTE (OHS): 7.8 % (ref 18–48)
RELATIVE LYMPHOCYTE (OHS): 9.2 % (ref 18–48)
RELATIVE MONOCYTE (OHS): 4.4 % (ref 4–15)
RELATIVE MONOCYTE (OHS): 8.1 % (ref 4–15)
RELATIVE MONOCYTE (OHS): 9.4 % (ref 4–15)
RELATIVE NEUTROPHIL (OHS): 75.4 % (ref 38–73)
RELATIVE NEUTROPHIL (OHS): 77.4 % (ref 38–73)
RELATIVE NEUTROPHIL (OHS): 84.1 % (ref 38–73)
SAMPLE: ABNORMAL
SITE: ABNORMAL
SODIUM SERPL-SCNC: 134 MMOL/L (ref 136–145)
SODIUM SERPL-SCNC: 135 MMOL/L (ref 136–145)
SODIUM SERPL-SCNC: 136 MMOL/L (ref 136–145)
SP02: 96
UNIT NUMBER: NORMAL
VANCOMYCIN SERPL-MCNC: 17.6 UG/ML (ref ?–80)
VT: 360
W LAMOTRIGINE: <0.2 UG/ML
WBC # BLD AUTO: 18.01 K/UL (ref 3.9–12.7)
WBC # BLD AUTO: 25.4 K/UL (ref 3.9–12.7)
WBC # BLD AUTO: 28.41 K/UL (ref 3.9–12.7)

## 2025-07-10 PROCEDURE — 87641 MR-STAPH DNA AMP PROBE: CPT | Mod: HCNC

## 2025-07-10 PROCEDURE — 94003 VENT MGMT INPAT SUBQ DAY: CPT | Mod: HCNC

## 2025-07-10 PROCEDURE — 25000003 PHARM REV CODE 250: Mod: HCNC | Performed by: EMERGENCY MEDICINE

## 2025-07-10 PROCEDURE — 99900035 HC TECH TIME PER 15 MIN (STAT): Mod: HCNC

## 2025-07-10 PROCEDURE — P9016 RBC LEUKOCYTES REDUCED: HCPCS | Mod: HCNC

## 2025-07-10 PROCEDURE — 27000207 HC ISOLATION: Mod: HCNC

## 2025-07-10 PROCEDURE — 25000003 PHARM REV CODE 250: Mod: HCNC

## 2025-07-10 PROCEDURE — 27100171 HC OXYGEN HIGH FLOW UP TO 24 HOURS: Mod: HCNC

## 2025-07-10 PROCEDURE — C9254 INJECTION, LACOSAMIDE: HCPCS | Mod: HCNC

## 2025-07-10 PROCEDURE — 99291 CRITICAL CARE FIRST HOUR: CPT | Mod: HCNC,GC,, | Performed by: EMERGENCY MEDICINE

## 2025-07-10 PROCEDURE — 99233 SBSQ HOSP IP/OBS HIGH 50: CPT | Mod: HCNC,,, | Performed by: STUDENT IN AN ORGANIZED HEALTH CARE EDUCATION/TRAINING PROGRAM

## 2025-07-10 PROCEDURE — 94761 N-INVAS EAR/PLS OXIMETRY MLT: CPT | Mod: HCNC

## 2025-07-10 PROCEDURE — 99499 UNLISTED E&M SERVICE: CPT | Mod: HCNC,,, | Performed by: PHYSICIAN ASSISTANT

## 2025-07-10 PROCEDURE — 99233 SBSQ HOSP IP/OBS HIGH 50: CPT | Mod: HCNC,FS,, | Performed by: PSYCHIATRY & NEUROLOGY

## 2025-07-10 PROCEDURE — 27200966 HC CLOSED SUCTION SYSTEM: Mod: HCNC

## 2025-07-10 PROCEDURE — 63600175 PHARM REV CODE 636 W HCPCS: Mod: HCNC

## 2025-07-10 PROCEDURE — 20000000 HC ICU ROOM: Mod: HCNC

## 2025-07-10 PROCEDURE — 83735 ASSAY OF MAGNESIUM: CPT | Mod: HCNC | Performed by: STUDENT IN AN ORGANIZED HEALTH CARE EDUCATION/TRAINING PROGRAM

## 2025-07-10 PROCEDURE — 95720 EEG PHY/QHP EA INCR W/VEEG: CPT | Mod: HCNC,,, | Performed by: PSYCHIATRY & NEUROLOGY

## 2025-07-10 PROCEDURE — 85025 COMPLETE CBC W/AUTO DIFF WBC: CPT | Mod: HCNC

## 2025-07-10 PROCEDURE — 85610 PROTHROMBIN TIME: CPT | Mod: HCNC

## 2025-07-10 PROCEDURE — 63600175 PHARM REV CODE 636 W HCPCS: Mod: HCNC | Performed by: STUDENT IN AN ORGANIZED HEALTH CARE EDUCATION/TRAINING PROGRAM

## 2025-07-10 PROCEDURE — 80069 RENAL FUNCTION PANEL: CPT | Mod: HCNC | Performed by: STUDENT IN AN ORGANIZED HEALTH CARE EDUCATION/TRAINING PROGRAM

## 2025-07-10 PROCEDURE — 95714 VEEG EA 12-26 HR UNMNTR: CPT | Mod: HCNC

## 2025-07-10 PROCEDURE — 82310 ASSAY OF CALCIUM: CPT | Mod: HCNC

## 2025-07-10 PROCEDURE — 99900026 HC AIRWAY MAINTENANCE (STAT): Mod: HCNC

## 2025-07-10 PROCEDURE — 85025 COMPLETE CBC W/AUTO DIFF WBC: CPT | Mod: HCNC | Performed by: EMERGENCY MEDICINE

## 2025-07-10 PROCEDURE — 25000003 PHARM REV CODE 250: Mod: HCNC | Performed by: STUDENT IN AN ORGANIZED HEALTH CARE EDUCATION/TRAINING PROGRAM

## 2025-07-10 PROCEDURE — 80202 ASSAY OF VANCOMYCIN: CPT | Mod: HCNC | Performed by: EMERGENCY MEDICINE

## 2025-07-10 PROCEDURE — 86920 COMPATIBILITY TEST SPIN: CPT | Mod: HCNC

## 2025-07-10 PROCEDURE — 82040 ASSAY OF SERUM ALBUMIN: CPT | Mod: HCNC | Performed by: STUDENT IN AN ORGANIZED HEALTH CARE EDUCATION/TRAINING PROGRAM

## 2025-07-10 PROCEDURE — 82803 BLOOD GASES ANY COMBINATION: CPT | Mod: HCNC

## 2025-07-10 PROCEDURE — 80053 COMPREHEN METABOLIC PANEL: CPT | Mod: HCNC

## 2025-07-10 PROCEDURE — 80100014 HC HEMODIALYSIS 1:1: Mod: HCNC

## 2025-07-10 PROCEDURE — 63600175 PHARM REV CODE 636 W HCPCS: Mod: HCNC | Performed by: EMERGENCY MEDICINE

## 2025-07-10 RX ORDER — LEVETIRACETAM 500 MG/5ML
500 INJECTION, SOLUTION, CONCENTRATE INTRAVENOUS EVERY 12 HOURS
Status: DISCONTINUED | OUTPATIENT
Start: 2025-07-10 | End: 2025-07-22

## 2025-07-10 RX ORDER — HYDROCODONE BITARTRATE AND ACETAMINOPHEN 500; 5 MG/1; MG/1
TABLET ORAL
Status: DISCONTINUED | OUTPATIENT
Start: 2025-07-10 | End: 2025-07-21

## 2025-07-10 RX ORDER — PANTOPRAZOLE SODIUM 40 MG/10ML
40 INJECTION, POWDER, LYOPHILIZED, FOR SOLUTION INTRAVENOUS 2 TIMES DAILY
Status: DISCONTINUED | OUTPATIENT
Start: 2025-07-10 | End: 2025-07-23

## 2025-07-10 RX ORDER — PANTOPRAZOLE SODIUM 40 MG/1
40 TABLET, DELAYED RELEASE ORAL
Status: DISCONTINUED | OUTPATIENT
Start: 2025-07-10 | End: 2025-07-10

## 2025-07-10 RX ORDER — SODIUM CHLORIDE 9 MG/ML
INJECTION, SOLUTION INTRAVENOUS
Status: DISCONTINUED | OUTPATIENT
Start: 2025-07-10 | End: 2025-07-21

## 2025-07-10 RX ORDER — SODIUM CHLORIDE 9 MG/ML
INJECTION, SOLUTION INTRAVENOUS ONCE
Status: CANCELLED | OUTPATIENT
Start: 2025-07-10 | End: 2025-07-10

## 2025-07-10 RX ORDER — LEVETIRACETAM 500 MG/5ML
2000 INJECTION, SOLUTION, CONCENTRATE INTRAVENOUS ONCE
Status: COMPLETED | OUTPATIENT
Start: 2025-07-10 | End: 2025-07-10

## 2025-07-10 RX ORDER — IBUPROFEN 200 MG
16 TABLET ORAL
Status: DISCONTINUED | OUTPATIENT
Start: 2025-07-10 | End: 2025-08-26 | Stop reason: HOSPADM

## 2025-07-10 RX ORDER — CEFAZOLIN SODIUM 1 G/3ML
1 INJECTION, POWDER, FOR SOLUTION INTRAMUSCULAR; INTRAVENOUS EVERY 24 HOURS
Status: DISCONTINUED | OUTPATIENT
Start: 2025-07-10 | End: 2025-07-11

## 2025-07-10 RX ADMIN — LACOSAMIDE 200 MG: 10 INJECTION INTRAVENOUS at 08:07

## 2025-07-10 RX ADMIN — MIDODRINE HYDROCHLORIDE 15 MG: 5 TABLET ORAL at 01:07

## 2025-07-10 RX ADMIN — DEXTROSE MONOHYDRATE 12.5 G: 25 INJECTION, SOLUTION INTRAVENOUS at 08:07

## 2025-07-10 RX ADMIN — NOREPINEPHRINE BITARTRATE 0.02 MCG/KG/MIN: 1 INJECTION, SOLUTION, CONCENTRATE INTRAVENOUS at 12:07

## 2025-07-10 RX ADMIN — LEVETIRACETAM 2000 MG: 500 INJECTION, SOLUTION, CONCENTRATE INTRAVENOUS at 11:07

## 2025-07-10 RX ADMIN — MIDODRINE HYDROCHLORIDE 15 MG: 5 TABLET ORAL at 06:07

## 2025-07-10 RX ADMIN — CEFAZOLIN 1 G: 330 INJECTION, POWDER, FOR SOLUTION INTRAMUSCULAR; INTRAVENOUS at 11:07

## 2025-07-10 RX ADMIN — MIDODRINE HYDROCHLORIDE 15 MG: 5 TABLET ORAL at 09:07

## 2025-07-10 RX ADMIN — PANTOPRAZOLE SODIUM 40 MG: 40 INJECTION, POWDER, FOR SOLUTION INTRAVENOUS at 09:07

## 2025-07-10 RX ADMIN — SODIUM CHLORIDE: 9 INJECTION, SOLUTION INTRAVENOUS at 02:07

## 2025-07-10 RX ADMIN — PANTOPRAZOLE SODIUM 40 MG: 40 INJECTION, POWDER, FOR SOLUTION INTRAVENOUS at 08:07

## 2025-07-10 RX ADMIN — PANTOPRAZOLE SODIUM 40 MG: 40 INJECTION, POWDER, FOR SOLUTION INTRAVENOUS at 01:07

## 2025-07-10 RX ADMIN — PIPERACILLIN SODIUM AND TAZOBACTAM SODIUM 4.5 G: 4; .5 INJECTION, POWDER, LYOPHILIZED, FOR SOLUTION INTRAVENOUS at 09:07

## 2025-07-10 RX ADMIN — LACOSAMIDE 200 MG: 10 INJECTION INTRAVENOUS at 09:07

## 2025-07-10 RX ADMIN — LEVETIRACETAM 500 MG: 100 INJECTION INTRAVENOUS at 09:07

## 2025-07-10 RX ADMIN — HEPARIN SODIUM 4000 UNITS: 1000 INJECTION INTRAVENOUS; SUBCUTANEOUS at 02:07

## 2025-07-10 RX ADMIN — INSULIN GLARGINE 5 UNITS: 100 INJECTION, SOLUTION SUBCUTANEOUS at 08:07

## 2025-07-11 PROBLEM — J15.69 GRAM-NEGATIVE PNEUMONIA: Status: ACTIVE | Noted: 2025-07-11

## 2025-07-11 LAB
ABSOLUTE EOSINOPHIL (OHS): 0.66 K/UL
ABSOLUTE MONOCYTE (OHS): 1.15 K/UL (ref 0.3–1)
ABSOLUTE NEUTROPHIL COUNT (OHS): 14.73 K/UL (ref 1.8–7.7)
ALBUMIN SERPL BCP-MCNC: 1 G/DL (ref 3.5–5.2)
ALBUMIN SERPL BCP-MCNC: 1.1 G/DL (ref 3.5–5.2)
ALP SERPL-CCNC: 162 UNIT/L (ref 40–150)
ALT SERPL W/O P-5'-P-CCNC: <5 UNIT/L (ref 10–44)
ANION GAP (OHS): 11 MMOL/L (ref 8–16)
ANION GAP (OHS): 11 MMOL/L (ref 8–16)
ANION GAP (OHS): 9 MMOL/L (ref 8–16)
AST SERPL-CCNC: 26 UNIT/L (ref 11–45)
BASOPHILS # BLD AUTO: 0.07 K/UL
BASOPHILS NFR BLD AUTO: 0.4 %
BILIRUB SERPL-MCNC: 0.1 MG/DL (ref 0.1–1)
BUN SERPL-MCNC: 38 MG/DL (ref 8–23)
BUN SERPL-MCNC: 38 MG/DL (ref 8–23)
BUN SERPL-MCNC: 40 MG/DL (ref 8–23)
CALCIUM SERPL-MCNC: 7 MG/DL (ref 8.7–10.5)
CALCIUM SERPL-MCNC: 7.1 MG/DL (ref 8.7–10.5)
CALCIUM SERPL-MCNC: 7.3 MG/DL (ref 8.7–10.5)
CHLORIDE SERPL-SCNC: 105 MMOL/L (ref 95–110)
CHLORIDE SERPL-SCNC: 106 MMOL/L (ref 95–110)
CHLORIDE SERPL-SCNC: 107 MMOL/L (ref 95–110)
CO2 SERPL-SCNC: 19 MMOL/L (ref 23–29)
CO2 SERPL-SCNC: 21 MMOL/L (ref 23–29)
CO2 SERPL-SCNC: 22 MMOL/L (ref 23–29)
CREAT SERPL-MCNC: 1.8 MG/DL (ref 0.5–1.4)
CREAT SERPL-MCNC: 1.9 MG/DL (ref 0.5–1.4)
CREAT SERPL-MCNC: 2 MG/DL (ref 0.5–1.4)
ERYTHROCYTE [DISTWIDTH] IN BLOOD BY AUTOMATED COUNT: 17.8 % (ref 11.5–14.5)
GFR SERPLBLD CREATININE-BSD FMLA CKD-EPI: 25 ML/MIN/1.73/M2
GFR SERPLBLD CREATININE-BSD FMLA CKD-EPI: 26 ML/MIN/1.73/M2
GFR SERPLBLD CREATININE-BSD FMLA CKD-EPI: 28 ML/MIN/1.73/M2
GLUCOSE SERPL-MCNC: 60 MG/DL (ref 70–110)
GLUCOSE SERPL-MCNC: 73 MG/DL (ref 70–110)
GLUCOSE SERPL-MCNC: 73 MG/DL (ref 70–110)
HCT VFR BLD AUTO: 23.6 % (ref 37–48.5)
HGB BLD-MCNC: 7.8 GM/DL (ref 12–16)
IMM GRANULOCYTES # BLD AUTO: 0.19 K/UL (ref 0–0.04)
IMM GRANULOCYTES NFR BLD AUTO: 1 % (ref 0–0.5)
LYMPHOCYTES # BLD AUTO: 2.33 K/UL (ref 1–4.8)
MAGNESIUM SERPL-MCNC: 2 MG/DL (ref 1.6–2.6)
MAGNESIUM SERPL-MCNC: 2 MG/DL (ref 1.6–2.6)
MCH RBC QN AUTO: 30.4 PG (ref 27–31)
MCHC RBC AUTO-ENTMCNC: 33.1 G/DL (ref 32–36)
MCV RBC AUTO: 92 FL (ref 82–98)
MRSA PCR SCRN (OHS): DETECTED
NUCLEATED RBC (/100WBC) (OHS): 1 /100 WBC
PHOSPHATE SERPL-MCNC: 3.5 MG/DL (ref 2.7–4.5)
PHOSPHATE SERPL-MCNC: 3.6 MG/DL (ref 2.7–4.5)
PLATELET # BLD AUTO: 246 K/UL (ref 150–450)
PMV BLD AUTO: 10 FL (ref 9.2–12.9)
POCT GLUCOSE: 100 MG/DL (ref 70–110)
POCT GLUCOSE: 49 MG/DL (ref 70–110)
POCT GLUCOSE: 63 MG/DL (ref 70–110)
POCT GLUCOSE: 70 MG/DL (ref 70–110)
POCT GLUCOSE: 71 MG/DL (ref 70–110)
POCT GLUCOSE: 73 MG/DL (ref 70–110)
POCT GLUCOSE: 97 MG/DL (ref 70–110)
POCT GLUCOSE: 99 MG/DL (ref 70–110)
POTASSIUM SERPL-SCNC: 3.9 MMOL/L (ref 3.5–5.1)
POTASSIUM SERPL-SCNC: 3.9 MMOL/L (ref 3.5–5.1)
POTASSIUM SERPL-SCNC: 4.1 MMOL/L (ref 3.5–5.1)
PROT SERPL-MCNC: 5.6 GM/DL (ref 6–8.4)
RBC # BLD AUTO: 2.57 M/UL (ref 4–5.4)
RBCS: NORMAL
RELATIVE EOSINOPHIL (OHS): 3.5 %
RELATIVE LYMPHOCYTE (OHS): 12.2 % (ref 18–48)
RELATIVE MONOCYTE (OHS): 6 % (ref 4–15)
RELATIVE NEUTROPHIL (OHS): 76.9 % (ref 38–73)
SODIUM SERPL-SCNC: 135 MMOL/L (ref 136–145)
SODIUM SERPL-SCNC: 138 MMOL/L (ref 136–145)
SODIUM SERPL-SCNC: 138 MMOL/L (ref 136–145)
WBC # BLD AUTO: 19.13 K/UL (ref 3.9–12.7)

## 2025-07-11 PROCEDURE — 94761 N-INVAS EAR/PLS OXIMETRY MLT: CPT | Mod: HCNC

## 2025-07-11 PROCEDURE — 27200966 HC CLOSED SUCTION SYSTEM: Mod: HCNC

## 2025-07-11 PROCEDURE — 63600175 PHARM REV CODE 636 W HCPCS: Mod: HCNC | Performed by: EMERGENCY MEDICINE

## 2025-07-11 PROCEDURE — 63600175 PHARM REV CODE 636 W HCPCS: Mod: HCNC

## 2025-07-11 PROCEDURE — 99900026 HC AIRWAY MAINTENANCE (STAT): Mod: HCNC

## 2025-07-11 PROCEDURE — C9254 INJECTION, LACOSAMIDE: HCPCS | Mod: HCNC

## 2025-07-11 PROCEDURE — 84100 ASSAY OF PHOSPHORUS: CPT | Mod: HCNC | Performed by: STUDENT IN AN ORGANIZED HEALTH CARE EDUCATION/TRAINING PROGRAM

## 2025-07-11 PROCEDURE — 20000000 HC ICU ROOM: Mod: HCNC

## 2025-07-11 PROCEDURE — 99291 CRITICAL CARE FIRST HOUR: CPT | Mod: HCNC,GC,, | Performed by: EMERGENCY MEDICINE

## 2025-07-11 PROCEDURE — 99499 UNLISTED E&M SERVICE: CPT | Mod: HCNC,,, | Performed by: PHYSICIAN ASSISTANT

## 2025-07-11 PROCEDURE — 27100171 HC OXYGEN HIGH FLOW UP TO 24 HOURS: Mod: HCNC

## 2025-07-11 PROCEDURE — 84100 ASSAY OF PHOSPHORUS: CPT | Mod: HCNC | Performed by: EMERGENCY MEDICINE

## 2025-07-11 PROCEDURE — 94003 VENT MGMT INPAT SUBQ DAY: CPT | Mod: HCNC

## 2025-07-11 PROCEDURE — 83735 ASSAY OF MAGNESIUM: CPT | Mod: HCNC | Performed by: STUDENT IN AN ORGANIZED HEALTH CARE EDUCATION/TRAINING PROGRAM

## 2025-07-11 PROCEDURE — 85025 COMPLETE CBC W/AUTO DIFF WBC: CPT | Mod: HCNC

## 2025-07-11 PROCEDURE — 25000003 PHARM REV CODE 250: Mod: HCNC

## 2025-07-11 PROCEDURE — 25000003 PHARM REV CODE 250: Mod: HCNC | Performed by: EMERGENCY MEDICINE

## 2025-07-11 PROCEDURE — 80053 COMPREHEN METABOLIC PANEL: CPT | Mod: HCNC

## 2025-07-11 PROCEDURE — 27000207 HC ISOLATION: Mod: HCNC

## 2025-07-11 PROCEDURE — 99233 SBSQ HOSP IP/OBS HIGH 50: CPT | Mod: FS,HCNC,, | Performed by: PSYCHIATRY & NEUROLOGY

## 2025-07-11 PROCEDURE — 95718 EEG PHYS/QHP 2-12 HR W/VEEG: CPT | Mod: HCNC,,, | Performed by: PSYCHIATRY & NEUROLOGY

## 2025-07-11 PROCEDURE — 99900035 HC TECH TIME PER 15 MIN (STAT): Mod: HCNC

## 2025-07-11 RX ORDER — HEPARIN SODIUM 5000 [USP'U]/ML
5000 INJECTION, SOLUTION INTRAVENOUS; SUBCUTANEOUS EVERY 12 HOURS
Status: DISCONTINUED | OUTPATIENT
Start: 2025-07-11 | End: 2025-07-24

## 2025-07-11 RX ADMIN — HEPARIN SODIUM 5000 UNITS: 5000 INJECTION INTRAVENOUS; SUBCUTANEOUS at 11:07

## 2025-07-11 RX ADMIN — HEPARIN SODIUM 5000 UNITS: 5000 INJECTION INTRAVENOUS; SUBCUTANEOUS at 09:07

## 2025-07-11 RX ADMIN — PIPERACILLIN SODIUM AND TAZOBACTAM SODIUM 4.5 G: 4; .5 INJECTION, POWDER, FOR SOLUTION INTRAVENOUS at 11:07

## 2025-07-11 RX ADMIN — DEXTROSE MONOHYDRATE 12.5 G: 25 INJECTION, SOLUTION INTRAVENOUS at 04:07

## 2025-07-11 RX ADMIN — PANTOPRAZOLE SODIUM 40 MG: 40 INJECTION, POWDER, FOR SOLUTION INTRAVENOUS at 09:07

## 2025-07-11 RX ADMIN — MIDODRINE HYDROCHLORIDE 15 MG: 5 TABLET ORAL at 06:07

## 2025-07-11 RX ADMIN — LACOSAMIDE 200 MG: 10 INJECTION INTRAVENOUS at 08:07

## 2025-07-11 RX ADMIN — MIDODRINE HYDROCHLORIDE 15 MG: 5 TABLET ORAL at 09:07

## 2025-07-11 RX ADMIN — PANTOPRAZOLE SODIUM 40 MG: 40 INJECTION, POWDER, FOR SOLUTION INTRAVENOUS at 08:07

## 2025-07-11 RX ADMIN — CEFAZOLIN 1 G: 330 INJECTION, POWDER, FOR SOLUTION INTRAMUSCULAR; INTRAVENOUS at 08:07

## 2025-07-11 RX ADMIN — MIDODRINE HYDROCHLORIDE 15 MG: 5 TABLET ORAL at 01:07

## 2025-07-11 RX ADMIN — LEVETIRACETAM 500 MG: 100 INJECTION INTRAVENOUS at 09:07

## 2025-07-11 RX ADMIN — DEXTROSE MONOHYDRATE 12.5 G: 25 INJECTION, SOLUTION INTRAVENOUS at 01:07

## 2025-07-11 RX ADMIN — LEVETIRACETAM 500 MG: 100 INJECTION INTRAVENOUS at 08:07

## 2025-07-11 RX ADMIN — LACOSAMIDE 200 MG: 10 INJECTION INTRAVENOUS at 09:07

## 2025-07-12 LAB
ABSOLUTE EOSINOPHIL (OHS): 0.54 K/UL
ABSOLUTE MONOCYTE (OHS): 1.08 K/UL (ref 0.3–1)
ABSOLUTE NEUTROPHIL COUNT (OHS): 12.73 K/UL (ref 1.8–7.7)
ALBUMIN SERPL BCP-MCNC: 1.1 G/DL (ref 3.5–5.2)
ALBUMIN SERPL BCP-MCNC: 1.1 G/DL (ref 3.5–5.2)
ALP SERPL-CCNC: 184 UNIT/L (ref 40–150)
ALT SERPL W/O P-5'-P-CCNC: <5 UNIT/L (ref 10–44)
ANION GAP (OHS): 10 MMOL/L (ref 8–16)
ANION GAP (OHS): 10 MMOL/L (ref 8–16)
AST SERPL-CCNC: 13 UNIT/L (ref 11–45)
BACTERIA BLD CULT: NORMAL
BASOPHILS # BLD AUTO: 0.04 K/UL
BASOPHILS NFR BLD AUTO: 0.2 %
BILIRUB SERPL-MCNC: 0.1 MG/DL (ref 0.1–1)
BUN SERPL-MCNC: 43 MG/DL (ref 8–23)
BUN SERPL-MCNC: 43 MG/DL (ref 8–23)
CALCIUM SERPL-MCNC: 7.1 MG/DL (ref 8.7–10.5)
CALCIUM SERPL-MCNC: 7.1 MG/DL (ref 8.7–10.5)
CHLORIDE SERPL-SCNC: 106 MMOL/L (ref 95–110)
CHLORIDE SERPL-SCNC: 106 MMOL/L (ref 95–110)
CO2 SERPL-SCNC: 20 MMOL/L (ref 23–29)
CO2 SERPL-SCNC: 20 MMOL/L (ref 23–29)
CREAT SERPL-MCNC: 2.3 MG/DL (ref 0.5–1.4)
CREAT SERPL-MCNC: 2.3 MG/DL (ref 0.5–1.4)
ERYTHROCYTE [DISTWIDTH] IN BLOOD BY AUTOMATED COUNT: 17.8 % (ref 11.5–14.5)
GFR SERPLBLD CREATININE-BSD FMLA CKD-EPI: 21 ML/MIN/1.73/M2
GFR SERPLBLD CREATININE-BSD FMLA CKD-EPI: 21 ML/MIN/1.73/M2
GLUCOSE SERPL-MCNC: 155 MG/DL (ref 70–110)
GLUCOSE SERPL-MCNC: 155 MG/DL (ref 70–110)
HCT VFR BLD AUTO: 22.8 % (ref 37–48.5)
HGB BLD-MCNC: 7.4 GM/DL (ref 12–16)
IMM GRANULOCYTES # BLD AUTO: 0.2 K/UL (ref 0–0.04)
IMM GRANULOCYTES NFR BLD AUTO: 1.2 % (ref 0–0.5)
LYMPHOCYTES # BLD AUTO: 1.74 K/UL (ref 1–4.8)
MAGNESIUM SERPL-MCNC: 2.1 MG/DL (ref 1.6–2.6)
MCH RBC QN AUTO: 30.5 PG (ref 27–31)
MCHC RBC AUTO-ENTMCNC: 32.5 G/DL (ref 32–36)
MCV RBC AUTO: 94 FL (ref 82–98)
NUCLEATED RBC (/100WBC) (OHS): 2 /100 WBC
PHOSPHATE SERPL-MCNC: 3.9 MG/DL (ref 2.7–4.5)
PLATELET # BLD AUTO: 229 K/UL (ref 150–450)
PMV BLD AUTO: 10.1 FL (ref 9.2–12.9)
POCT GLUCOSE: 133 MG/DL (ref 70–110)
POCT GLUCOSE: 138 MG/DL (ref 70–110)
POCT GLUCOSE: 142 MG/DL (ref 70–110)
POCT GLUCOSE: 178 MG/DL (ref 70–110)
POCT GLUCOSE: 186 MG/DL (ref 70–110)
POTASSIUM SERPL-SCNC: 4 MMOL/L (ref 3.5–5.1)
POTASSIUM SERPL-SCNC: 4 MMOL/L (ref 3.5–5.1)
PROT SERPL-MCNC: 5.3 GM/DL (ref 6–8.4)
RBC # BLD AUTO: 2.43 M/UL (ref 4–5.4)
RELATIVE EOSINOPHIL (OHS): 3.3 %
RELATIVE LYMPHOCYTE (OHS): 10.7 % (ref 18–48)
RELATIVE MONOCYTE (OHS): 6.6 % (ref 4–15)
RELATIVE NEUTROPHIL (OHS): 78 % (ref 38–73)
SODIUM SERPL-SCNC: 136 MMOL/L (ref 136–145)
SODIUM SERPL-SCNC: 136 MMOL/L (ref 136–145)
WBC # BLD AUTO: 16.33 K/UL (ref 3.9–12.7)

## 2025-07-12 PROCEDURE — 25000003 PHARM REV CODE 250: Mod: HCNC

## 2025-07-12 PROCEDURE — 63600175 PHARM REV CODE 636 W HCPCS: Mod: HCNC

## 2025-07-12 PROCEDURE — 99900026 HC AIRWAY MAINTENANCE (STAT): Mod: HCNC

## 2025-07-12 PROCEDURE — 63600175 PHARM REV CODE 636 W HCPCS: Mod: HCNC | Performed by: EMERGENCY MEDICINE

## 2025-07-12 PROCEDURE — 83735 ASSAY OF MAGNESIUM: CPT | Mod: HCNC | Performed by: STUDENT IN AN ORGANIZED HEALTH CARE EDUCATION/TRAINING PROGRAM

## 2025-07-12 PROCEDURE — C9254 INJECTION, LACOSAMIDE: HCPCS | Mod: HCNC

## 2025-07-12 PROCEDURE — 99900035 HC TECH TIME PER 15 MIN (STAT): Mod: HCNC

## 2025-07-12 PROCEDURE — 99291 CRITICAL CARE FIRST HOUR: CPT | Mod: HCNC,,, | Performed by: INTERNAL MEDICINE

## 2025-07-12 PROCEDURE — 85025 COMPLETE CBC W/AUTO DIFF WBC: CPT | Mod: HCNC

## 2025-07-12 PROCEDURE — 20000000 HC ICU ROOM: Mod: HCNC

## 2025-07-12 PROCEDURE — 94761 N-INVAS EAR/PLS OXIMETRY MLT: CPT | Mod: HCNC

## 2025-07-12 PROCEDURE — 25000003 PHARM REV CODE 250: Mod: HCNC | Performed by: EMERGENCY MEDICINE

## 2025-07-12 PROCEDURE — 80053 COMPREHEN METABOLIC PANEL: CPT | Mod: HCNC

## 2025-07-12 PROCEDURE — 63600175 PHARM REV CODE 636 W HCPCS: Mod: HCNC | Performed by: STUDENT IN AN ORGANIZED HEALTH CARE EDUCATION/TRAINING PROGRAM

## 2025-07-12 PROCEDURE — 27000207 HC ISOLATION: Mod: HCNC

## 2025-07-12 PROCEDURE — 27200966 HC CLOSED SUCTION SYSTEM: Mod: HCNC

## 2025-07-12 PROCEDURE — 80100014 HC HEMODIALYSIS 1:1: Mod: HCNC

## 2025-07-12 PROCEDURE — 27100171 HC OXYGEN HIGH FLOW UP TO 24 HOURS: Mod: HCNC

## 2025-07-12 PROCEDURE — 94003 VENT MGMT INPAT SUBQ DAY: CPT | Mod: HCNC

## 2025-07-12 PROCEDURE — 80069 RENAL FUNCTION PANEL: CPT | Mod: HCNC | Performed by: STUDENT IN AN ORGANIZED HEALTH CARE EDUCATION/TRAINING PROGRAM

## 2025-07-12 RX ORDER — SODIUM CHLORIDE 9 MG/ML
INJECTION, SOLUTION INTRAVENOUS ONCE
Status: CANCELLED | OUTPATIENT
Start: 2025-07-12 | End: 2025-07-12

## 2025-07-12 RX ORDER — SODIUM CHLORIDE 9 MG/ML
INJECTION, SOLUTION INTRAVENOUS
Status: CANCELLED | OUTPATIENT
Start: 2025-07-12

## 2025-07-12 RX ORDER — DEXAMETHASONE SODIUM PHOSPHATE 4 MG/ML
4 INJECTION, SOLUTION INTRA-ARTICULAR; INTRALESIONAL; INTRAMUSCULAR; INTRAVENOUS; SOFT TISSUE EVERY 8 HOURS
Status: DISCONTINUED | OUTPATIENT
Start: 2025-07-12 | End: 2025-07-13

## 2025-07-12 RX ADMIN — HEPARIN SODIUM 4000 UNITS: 1000 INJECTION INTRAVENOUS; SUBCUTANEOUS at 05:07

## 2025-07-12 RX ADMIN — DEXAMETHASONE SODIUM PHOSPHATE 4 MG: 4 INJECTION INTRA-ARTICULAR; INTRALESIONAL; INTRAMUSCULAR; INTRAVENOUS; SOFT TISSUE at 09:07

## 2025-07-12 RX ADMIN — HEPARIN SODIUM 5000 UNITS: 5000 INJECTION INTRAVENOUS; SUBCUTANEOUS at 08:07

## 2025-07-12 RX ADMIN — HEPARIN SODIUM 5000 UNITS: 5000 INJECTION INTRAVENOUS; SUBCUTANEOUS at 09:07

## 2025-07-12 RX ADMIN — INSULIN ASPART 2 UNITS: 100 INJECTION, SOLUTION INTRAVENOUS; SUBCUTANEOUS at 04:07

## 2025-07-12 RX ADMIN — INSULIN GLARGINE 5 UNITS: 100 INJECTION, SOLUTION SUBCUTANEOUS at 08:07

## 2025-07-12 RX ADMIN — INSULIN ASPART 2 UNITS: 100 INJECTION, SOLUTION INTRAVENOUS; SUBCUTANEOUS at 08:07

## 2025-07-12 RX ADMIN — PANTOPRAZOLE SODIUM 40 MG: 40 INJECTION, POWDER, FOR SOLUTION INTRAVENOUS at 08:07

## 2025-07-12 RX ADMIN — MIDODRINE HYDROCHLORIDE 15 MG: 5 TABLET ORAL at 09:07

## 2025-07-12 RX ADMIN — PANTOPRAZOLE SODIUM 40 MG: 40 INJECTION, POWDER, FOR SOLUTION INTRAVENOUS at 09:07

## 2025-07-12 RX ADMIN — MIDODRINE HYDROCHLORIDE 15 MG: 5 TABLET ORAL at 06:07

## 2025-07-12 RX ADMIN — LEVETIRACETAM 500 MG: 100 INJECTION INTRAVENOUS at 08:07

## 2025-07-12 RX ADMIN — MIDODRINE HYDROCHLORIDE 15 MG: 5 TABLET ORAL at 01:07

## 2025-07-12 RX ADMIN — LACOSAMIDE 200 MG: 10 INJECTION INTRAVENOUS at 08:07

## 2025-07-12 RX ADMIN — INSULIN ASPART 1 UNITS: 100 INJECTION, SOLUTION INTRAVENOUS; SUBCUTANEOUS at 11:07

## 2025-07-12 RX ADMIN — PIPERACILLIN SODIUM AND TAZOBACTAM SODIUM 4.5 G: 4; .5 INJECTION, POWDER, FOR SOLUTION INTRAVENOUS at 11:07

## 2025-07-12 RX ADMIN — LACOSAMIDE 200 MG: 10 INJECTION INTRAVENOUS at 09:07

## 2025-07-12 RX ADMIN — PIPERACILLIN SODIUM AND TAZOBACTAM SODIUM 4.5 G: 4; .5 INJECTION, POWDER, FOR SOLUTION INTRAVENOUS at 12:07

## 2025-07-12 RX ADMIN — LEVETIRACETAM 500 MG: 100 INJECTION INTRAVENOUS at 09:07

## 2025-07-12 RX ADMIN — PIPERACILLIN SODIUM AND TAZOBACTAM SODIUM 4.5 G: 4; .5 INJECTION, POWDER, FOR SOLUTION INTRAVENOUS at 01:07

## 2025-07-13 PROBLEM — T07.XXXA WOUNDS, MULTIPLE: Status: ACTIVE | Noted: 2025-07-13

## 2025-07-13 PROBLEM — Z99.2: Status: ACTIVE | Noted: 2025-07-13

## 2025-07-13 PROBLEM — N18.6 ESRD (END STAGE RENAL DISEASE): Status: ACTIVE | Noted: 2025-07-13

## 2025-07-13 PROBLEM — R60.1 ANASARCA: Status: ACTIVE | Noted: 2025-07-13

## 2025-07-13 PROBLEM — N18.6: Status: ACTIVE | Noted: 2025-07-13

## 2025-07-13 PROBLEM — Z99.11 ON MECHANICALLY ASSISTED VENTILATION: Status: ACTIVE | Noted: 2025-07-13

## 2025-07-13 PROBLEM — R53.81 DEBILITY: Status: ACTIVE | Noted: 2025-07-13

## 2025-07-13 LAB
ABSOLUTE EOSINOPHIL (OHS): 0.01 K/UL
ABSOLUTE MONOCYTE (OHS): 0.14 K/UL (ref 0.3–1)
ABSOLUTE NEUTROPHIL COUNT (OHS): 15.14 K/UL (ref 1.8–7.7)
ALBUMIN SERPL BCP-MCNC: 1.1 G/DL (ref 3.5–5.2)
ALP SERPL-CCNC: 220 UNIT/L (ref 40–150)
ALT SERPL W/O P-5'-P-CCNC: <5 UNIT/L (ref 10–44)
ANION GAP (OHS): 10 MMOL/L (ref 8–16)
AST SERPL-CCNC: 13 UNIT/L (ref 11–45)
BACTERIA BLD CULT: NORMAL
BASOPHILS # BLD AUTO: 0.02 K/UL
BASOPHILS NFR BLD AUTO: 0.1 %
BILIRUB SERPL-MCNC: 0.1 MG/DL (ref 0.1–1)
BUN SERPL-MCNC: 40 MG/DL (ref 8–23)
CALCIUM SERPL-MCNC: 7 MG/DL (ref 8.7–10.5)
CHLORIDE SERPL-SCNC: 105 MMOL/L (ref 95–110)
CO2 SERPL-SCNC: 21 MMOL/L (ref 23–29)
CREAT SERPL-MCNC: 2.1 MG/DL (ref 0.5–1.4)
ERYTHROCYTE [DISTWIDTH] IN BLOOD BY AUTOMATED COUNT: 17.7 % (ref 11.5–14.5)
GFR SERPLBLD CREATININE-BSD FMLA CKD-EPI: 23 ML/MIN/1.73/M2
GLUCOSE SERPL-MCNC: 176 MG/DL (ref 70–110)
HCT VFR BLD AUTO: 24.9 % (ref 37–48.5)
HGB BLD-MCNC: 8 GM/DL (ref 12–16)
IMM GRANULOCYTES # BLD AUTO: 0.22 K/UL (ref 0–0.04)
IMM GRANULOCYTES NFR BLD AUTO: 1.3 % (ref 0–0.5)
LYMPHOCYTES # BLD AUTO: 0.99 K/UL (ref 1–4.8)
MAGNESIUM SERPL-MCNC: 2 MG/DL (ref 1.6–2.6)
MCH RBC QN AUTO: 30.5 PG (ref 27–31)
MCHC RBC AUTO-ENTMCNC: 32.1 G/DL (ref 32–36)
MCV RBC AUTO: 95 FL (ref 82–98)
NUCLEATED RBC (/100WBC) (OHS): 1 /100 WBC
PHOSPHATE SERPL-MCNC: 3.4 MG/DL (ref 2.7–4.5)
PLATELET # BLD AUTO: 208 K/UL (ref 150–450)
PMV BLD AUTO: 10 FL (ref 9.2–12.9)
POCT GLUCOSE: 169 MG/DL (ref 70–110)
POCT GLUCOSE: 213 MG/DL (ref 70–110)
POTASSIUM SERPL-SCNC: 4.4 MMOL/L (ref 3.5–5.1)
PROT SERPL-MCNC: 5.8 GM/DL (ref 6–8.4)
RBC # BLD AUTO: 2.62 M/UL (ref 4–5.4)
RELATIVE EOSINOPHIL (OHS): 0.1 %
RELATIVE LYMPHOCYTE (OHS): 6 % (ref 18–48)
RELATIVE MONOCYTE (OHS): 0.8 % (ref 4–15)
RELATIVE NEUTROPHIL (OHS): 91.7 % (ref 38–73)
SODIUM SERPL-SCNC: 136 MMOL/L (ref 136–145)
WBC # BLD AUTO: 16.52 K/UL (ref 3.9–12.7)

## 2025-07-13 PROCEDURE — 84100 ASSAY OF PHOSPHORUS: CPT | Mod: HCNC | Performed by: INTERNAL MEDICINE

## 2025-07-13 PROCEDURE — 63600175 PHARM REV CODE 636 W HCPCS: Mod: HCNC

## 2025-07-13 PROCEDURE — 94761 N-INVAS EAR/PLS OXIMETRY MLT: CPT | Mod: HCNC

## 2025-07-13 PROCEDURE — 25000003 PHARM REV CODE 250: Mod: HCNC | Performed by: EMERGENCY MEDICINE

## 2025-07-13 PROCEDURE — 99900031 HC PATIENT EDUCATION (STAT): Mod: HCNC

## 2025-07-13 PROCEDURE — 99291 CRITICAL CARE FIRST HOUR: CPT | Mod: HCNC,,, | Performed by: INTERNAL MEDICINE

## 2025-07-13 PROCEDURE — 20000000 HC ICU ROOM: Mod: HCNC

## 2025-07-13 PROCEDURE — 63600175 PHARM REV CODE 636 W HCPCS: Mod: HCNC | Performed by: EMERGENCY MEDICINE

## 2025-07-13 PROCEDURE — 27100171 HC OXYGEN HIGH FLOW UP TO 24 HOURS: Mod: HCNC

## 2025-07-13 PROCEDURE — 83735 ASSAY OF MAGNESIUM: CPT | Mod: HCNC | Performed by: INTERNAL MEDICINE

## 2025-07-13 PROCEDURE — 82040 ASSAY OF SERUM ALBUMIN: CPT | Mod: HCNC

## 2025-07-13 PROCEDURE — 99900026 HC AIRWAY MAINTENANCE (STAT): Mod: HCNC

## 2025-07-13 PROCEDURE — 85025 COMPLETE CBC W/AUTO DIFF WBC: CPT | Mod: HCNC

## 2025-07-13 PROCEDURE — 94003 VENT MGMT INPAT SUBQ DAY: CPT | Mod: HCNC

## 2025-07-13 PROCEDURE — 25000003 PHARM REV CODE 250: Mod: HCNC

## 2025-07-13 PROCEDURE — 27000207 HC ISOLATION: Mod: HCNC

## 2025-07-13 PROCEDURE — 99900035 HC TECH TIME PER 15 MIN (STAT): Mod: HCNC

## 2025-07-13 PROCEDURE — C9254 INJECTION, LACOSAMIDE: HCPCS | Mod: HCNC

## 2025-07-13 RX ORDER — DEXAMETHASONE SODIUM PHOSPHATE 4 MG/ML
4 INJECTION, SOLUTION INTRA-ARTICULAR; INTRALESIONAL; INTRAMUSCULAR; INTRAVENOUS; SOFT TISSUE EVERY 8 HOURS
Status: COMPLETED | OUTPATIENT
Start: 2025-07-13 | End: 2025-07-14

## 2025-07-13 RX ORDER — MIDODRINE HYDROCHLORIDE 5 MG/1
10 TABLET ORAL EVERY 8 HOURS
Status: DISCONTINUED | OUTPATIENT
Start: 2025-07-13 | End: 2025-07-14

## 2025-07-13 RX ADMIN — LEVETIRACETAM 500 MG: 100 INJECTION INTRAVENOUS at 09:07

## 2025-07-13 RX ADMIN — DEXAMETHASONE SODIUM PHOSPHATE 4 MG: 4 INJECTION INTRA-ARTICULAR; INTRALESIONAL; INTRAMUSCULAR; INTRAVENOUS; SOFT TISSUE at 09:07

## 2025-07-13 RX ADMIN — PANTOPRAZOLE SODIUM 40 MG: 40 INJECTION, POWDER, FOR SOLUTION INTRAVENOUS at 09:07

## 2025-07-13 RX ADMIN — INSULIN ASPART 6 UNITS: 100 INJECTION, SOLUTION INTRAVENOUS; SUBCUTANEOUS at 04:07

## 2025-07-13 RX ADMIN — DEXAMETHASONE SODIUM PHOSPHATE 4 MG: 4 INJECTION INTRA-ARTICULAR; INTRALESIONAL; INTRAMUSCULAR; INTRAVENOUS; SOFT TISSUE at 06:07

## 2025-07-13 RX ADMIN — LACOSAMIDE 200 MG: 10 INJECTION INTRAVENOUS at 09:07

## 2025-07-13 RX ADMIN — INSULIN ASPART 4 UNITS: 100 INJECTION, SOLUTION INTRAVENOUS; SUBCUTANEOUS at 04:07

## 2025-07-13 RX ADMIN — INSULIN GLARGINE 5 UNITS: 100 INJECTION, SOLUTION SUBCUTANEOUS at 09:07

## 2025-07-13 RX ADMIN — DEXAMETHASONE SODIUM PHOSPHATE 4 MG: 4 INJECTION INTRA-ARTICULAR; INTRALESIONAL; INTRAMUSCULAR; INTRAVENOUS; SOFT TISSUE at 11:07

## 2025-07-13 RX ADMIN — HEPARIN SODIUM 5000 UNITS: 5000 INJECTION INTRAVENOUS; SUBCUTANEOUS at 09:07

## 2025-07-13 RX ADMIN — MIDODRINE HYDROCHLORIDE 15 MG: 5 TABLET ORAL at 06:07

## 2025-07-13 RX ADMIN — INSULIN ASPART 4 UNITS: 100 INJECTION, SOLUTION INTRAVENOUS; SUBCUTANEOUS at 09:07

## 2025-07-13 RX ADMIN — MIDODRINE HYDROCHLORIDE 10 MG: 5 TABLET ORAL at 02:07

## 2025-07-13 RX ADMIN — INSULIN ASPART 6 UNITS: 100 INJECTION, SOLUTION INTRAVENOUS; SUBCUTANEOUS at 12:07

## 2025-07-13 RX ADMIN — INSULIN ASPART 2 UNITS: 100 INJECTION, SOLUTION INTRAVENOUS; SUBCUTANEOUS at 07:07

## 2025-07-13 RX ADMIN — PIPERACILLIN SODIUM AND TAZOBACTAM SODIUM 4.5 G: 4; .5 INJECTION, POWDER, FOR SOLUTION INTRAVENOUS at 11:07

## 2025-07-13 RX ADMIN — INSULIN ASPART 2 UNITS: 100 INJECTION, SOLUTION INTRAVENOUS; SUBCUTANEOUS at 11:07

## 2025-07-14 PROBLEM — K92.1 MELENA: Status: ACTIVE | Noted: 2025-07-14

## 2025-07-14 LAB
ABSOLUTE EOSINOPHIL (OHS): 0 K/UL
ABSOLUTE MONOCYTE (OHS): 0.64 K/UL (ref 0.3–1)
ABSOLUTE NEUTROPHIL COUNT (OHS): 14.98 K/UL (ref 1.8–7.7)
ALBUMIN SERPL BCP-MCNC: 1.2 G/DL (ref 3.5–5.2)
ALP SERPL-CCNC: 206 UNIT/L (ref 40–150)
ALT SERPL W/O P-5'-P-CCNC: <5 UNIT/L (ref 10–44)
ANION GAP (OHS): 10 MMOL/L (ref 8–16)
AST SERPL-CCNC: 9 UNIT/L (ref 11–45)
BACTERIA SPT CULT: ABNORMAL
BASOPHILS # BLD AUTO: 0.04 K/UL
BASOPHILS NFR BLD AUTO: 0.2 %
BILIRUB SERPL-MCNC: 0.1 MG/DL (ref 0.1–1)
BUN SERPL-MCNC: 51 MG/DL (ref 8–23)
CALCIUM SERPL-MCNC: 7.5 MG/DL (ref 8.7–10.5)
CHLORIDE SERPL-SCNC: 102 MMOL/L (ref 95–110)
CO2 SERPL-SCNC: 22 MMOL/L (ref 23–29)
CREAT SERPL-MCNC: 2.5 MG/DL (ref 0.5–1.4)
ERYTHROCYTE [DISTWIDTH] IN BLOOD BY AUTOMATED COUNT: 17.7 % (ref 11.5–14.5)
GFR SERPLBLD CREATININE-BSD FMLA CKD-EPI: 19 ML/MIN/1.73/M2
GLUCOSE SERPL-MCNC: 232 MG/DL (ref 70–110)
GRAM STN SPEC: ABNORMAL
HCT VFR BLD AUTO: 26 % (ref 37–48.5)
HGB BLD-MCNC: 8.1 GM/DL (ref 12–16)
IMM GRANULOCYTES # BLD AUTO: 0.31 K/UL (ref 0–0.04)
IMM GRANULOCYTES NFR BLD AUTO: 1.7 % (ref 0–0.5)
LYMPHOCYTES # BLD AUTO: 1.89 K/UL (ref 1–4.8)
MAGNESIUM SERPL-MCNC: 2.2 MG/DL (ref 1.6–2.6)
MCH RBC QN AUTO: 29.9 PG (ref 27–31)
MCHC RBC AUTO-ENTMCNC: 31.2 G/DL (ref 32–36)
MCV RBC AUTO: 96 FL (ref 82–98)
NUCLEATED RBC (/100WBC) (OHS): 1 /100 WBC
PHOSPHATE SERPL-MCNC: 3.8 MG/DL (ref 2.7–4.5)
PLATELET # BLD AUTO: 243 K/UL (ref 150–450)
PMV BLD AUTO: 9.6 FL (ref 9.2–12.9)
POCT GLUCOSE: 275 MG/DL (ref 70–110)
POCT GLUCOSE: 276 MG/DL (ref 70–110)
POCT GLUCOSE: 290 MG/DL (ref 70–110)
POTASSIUM SERPL-SCNC: 4.9 MMOL/L (ref 3.5–5.1)
PROT SERPL-MCNC: 6.2 GM/DL (ref 6–8.4)
RBC # BLD AUTO: 2.71 M/UL (ref 4–5.4)
RELATIVE EOSINOPHIL (OHS): 0 %
RELATIVE LYMPHOCYTE (OHS): 10.6 % (ref 18–48)
RELATIVE MONOCYTE (OHS): 3.6 % (ref 4–15)
RELATIVE NEUTROPHIL (OHS): 83.9 % (ref 38–73)
SODIUM SERPL-SCNC: 134 MMOL/L (ref 136–145)
WBC # BLD AUTO: 17.86 K/UL (ref 3.9–12.7)

## 2025-07-14 PROCEDURE — 80053 COMPREHEN METABOLIC PANEL: CPT | Mod: HCNC

## 2025-07-14 PROCEDURE — 27200966 HC CLOSED SUCTION SYSTEM: Mod: HCNC

## 2025-07-14 PROCEDURE — 20000000 HC ICU ROOM: Mod: HCNC

## 2025-07-14 PROCEDURE — 85025 COMPLETE CBC W/AUTO DIFF WBC: CPT | Mod: HCNC

## 2025-07-14 PROCEDURE — 63600175 PHARM REV CODE 636 W HCPCS: Mod: HCNC | Performed by: INTERNAL MEDICINE

## 2025-07-14 PROCEDURE — 63600175 PHARM REV CODE 636 W HCPCS: Mod: HCNC | Performed by: STUDENT IN AN ORGANIZED HEALTH CARE EDUCATION/TRAINING PROGRAM

## 2025-07-14 PROCEDURE — 94761 N-INVAS EAR/PLS OXIMETRY MLT: CPT | Mod: HCNC

## 2025-07-14 PROCEDURE — 99900026 HC AIRWAY MAINTENANCE (STAT): Mod: HCNC

## 2025-07-14 PROCEDURE — 84100 ASSAY OF PHOSPHORUS: CPT | Mod: HCNC | Performed by: INTERNAL MEDICINE

## 2025-07-14 PROCEDURE — 63600175 PHARM REV CODE 636 W HCPCS: Mod: HCNC

## 2025-07-14 PROCEDURE — 63600175 PHARM REV CODE 636 W HCPCS: Mod: HCNC | Performed by: EMERGENCY MEDICINE

## 2025-07-14 PROCEDURE — 94003 VENT MGMT INPAT SUBQ DAY: CPT | Mod: HCNC

## 2025-07-14 PROCEDURE — 27000207 HC ISOLATION: Mod: HCNC

## 2025-07-14 PROCEDURE — 83735 ASSAY OF MAGNESIUM: CPT | Mod: HCNC | Performed by: INTERNAL MEDICINE

## 2025-07-14 PROCEDURE — 27100171 HC OXYGEN HIGH FLOW UP TO 24 HOURS: Mod: HCNC

## 2025-07-14 PROCEDURE — 25000003 PHARM REV CODE 250: Mod: HCNC | Performed by: EMERGENCY MEDICINE

## 2025-07-14 PROCEDURE — 99291 CRITICAL CARE FIRST HOUR: CPT | Mod: HCNC,GC,, | Performed by: INTERNAL MEDICINE

## 2025-07-14 PROCEDURE — C9254 INJECTION, LACOSAMIDE: HCPCS | Mod: HCNC

## 2025-07-14 PROCEDURE — 80100014 HC HEMODIALYSIS 1:1: Mod: HCNC

## 2025-07-14 PROCEDURE — 99233 SBSQ HOSP IP/OBS HIGH 50: CPT | Mod: HCNC,,, | Performed by: STUDENT IN AN ORGANIZED HEALTH CARE EDUCATION/TRAINING PROGRAM

## 2025-07-14 PROCEDURE — 99900035 HC TECH TIME PER 15 MIN (STAT): Mod: HCNC

## 2025-07-14 RX ORDER — CEFAZOLIN SODIUM 1 G/3ML
1000 INJECTION, POWDER, FOR SOLUTION INTRAMUSCULAR; INTRAVENOUS
Status: COMPLETED | OUTPATIENT
Start: 2025-07-14 | End: 2025-07-15

## 2025-07-14 RX ORDER — INSULIN GLARGINE 100 [IU]/ML
5 INJECTION, SOLUTION SUBCUTANEOUS ONCE
Status: COMPLETED | OUTPATIENT
Start: 2025-07-14 | End: 2025-07-14

## 2025-07-14 RX ORDER — SODIUM CHLORIDE 9 MG/ML
INJECTION, SOLUTION INTRAVENOUS
Status: CANCELLED | OUTPATIENT
Start: 2025-07-14

## 2025-07-14 RX ORDER — INSULIN GLARGINE 100 [IU]/ML
8 INJECTION, SOLUTION SUBCUTANEOUS DAILY
Status: DISCONTINUED | OUTPATIENT
Start: 2025-07-15 | End: 2025-07-14

## 2025-07-14 RX ORDER — SODIUM CHLORIDE 9 MG/ML
INJECTION, SOLUTION INTRAVENOUS ONCE
Status: CANCELLED | OUTPATIENT
Start: 2025-07-14 | End: 2025-07-14

## 2025-07-14 RX ORDER — INSULIN GLARGINE 100 [IU]/ML
10 INJECTION, SOLUTION SUBCUTANEOUS DAILY
Status: DISCONTINUED | OUTPATIENT
Start: 2025-07-15 | End: 2025-07-15

## 2025-07-14 RX ADMIN — HEPARIN SODIUM 5000 UNITS: 5000 INJECTION INTRAVENOUS; SUBCUTANEOUS at 10:07

## 2025-07-14 RX ADMIN — INSULIN ASPART 6 UNITS: 100 INJECTION, SOLUTION INTRAVENOUS; SUBCUTANEOUS at 04:07

## 2025-07-14 RX ADMIN — INSULIN GLARGINE 5 UNITS: 100 INJECTION, SOLUTION SUBCUTANEOUS at 11:07

## 2025-07-14 RX ADMIN — HEPARIN SODIUM 5000 UNITS: 5000 INJECTION INTRAVENOUS; SUBCUTANEOUS at 08:07

## 2025-07-14 RX ADMIN — LACOSAMIDE 200 MG: 10 INJECTION INTRAVENOUS at 08:07

## 2025-07-14 RX ADMIN — PANTOPRAZOLE SODIUM 40 MG: 40 INJECTION, POWDER, FOR SOLUTION INTRAVENOUS at 09:07

## 2025-07-14 RX ADMIN — DEXAMETHASONE SODIUM PHOSPHATE 4 MG: 4 INJECTION INTRA-ARTICULAR; INTRALESIONAL; INTRAMUSCULAR; INTRAVENOUS; SOFT TISSUE at 05:07

## 2025-07-14 RX ADMIN — LEVETIRACETAM 500 MG: 100 INJECTION INTRAVENOUS at 08:07

## 2025-07-14 RX ADMIN — INSULIN GLARGINE 5 UNITS: 100 INJECTION, SOLUTION SUBCUTANEOUS at 09:07

## 2025-07-14 RX ADMIN — INSULIN ASPART 4 UNITS: 100 INJECTION, SOLUTION INTRAVENOUS; SUBCUTANEOUS at 04:07

## 2025-07-14 RX ADMIN — DEXAMETHASONE SODIUM PHOSPHATE 4 MG: 4 INJECTION INTRA-ARTICULAR; INTRALESIONAL; INTRAMUSCULAR; INTRAVENOUS; SOFT TISSUE at 02:07

## 2025-07-14 RX ADMIN — INSULIN ASPART 6 UNITS: 100 INJECTION, SOLUTION INTRAVENOUS; SUBCUTANEOUS at 09:07

## 2025-07-14 RX ADMIN — LEVETIRACETAM 500 MG: 100 INJECTION INTRAVENOUS at 09:07

## 2025-07-14 RX ADMIN — HEPARIN SODIUM 4000 UNITS: 1000 INJECTION INTRAVENOUS; SUBCUTANEOUS at 11:07

## 2025-07-14 RX ADMIN — PIPERACILLIN SODIUM AND TAZOBACTAM SODIUM 4.5 G: 4; .5 INJECTION, POWDER, FOR SOLUTION INTRAVENOUS at 11:07

## 2025-07-14 RX ADMIN — INSULIN ASPART 6 UNITS: 100 INJECTION, SOLUTION INTRAVENOUS; SUBCUTANEOUS at 11:07

## 2025-07-14 RX ADMIN — CEFAZOLIN 1000 MG: 330 INJECTION, POWDER, FOR SOLUTION INTRAMUSCULAR; INTRAVENOUS at 08:07

## 2025-07-14 RX ADMIN — LACOSAMIDE 200 MG: 10 INJECTION INTRAVENOUS at 09:07

## 2025-07-14 RX ADMIN — PANTOPRAZOLE SODIUM 40 MG: 40 INJECTION, POWDER, FOR SOLUTION INTRAVENOUS at 08:07

## 2025-07-15 LAB
ABSOLUTE EOSINOPHIL (OHS): 0.01 K/UL
ABSOLUTE MONOCYTE (OHS): 1.31 K/UL (ref 0.3–1)
ABSOLUTE NEUTROPHIL COUNT (OHS): 17.97 K/UL (ref 1.8–7.7)
ALBUMIN SERPL BCP-MCNC: 1.2 G/DL (ref 3.5–5.2)
ALLENS TEST: ABNORMAL
ALP SERPL-CCNC: 224 UNIT/L (ref 40–150)
ALT SERPL W/O P-5'-P-CCNC: <5 UNIT/L (ref 10–44)
ANION GAP (OHS): 11 MMOL/L (ref 8–16)
AST SERPL-CCNC: 14 UNIT/L (ref 11–45)
BASOPHILS # BLD AUTO: 0.03 K/UL
BASOPHILS NFR BLD AUTO: 0.1 %
BILIRUB SERPL-MCNC: 0.1 MG/DL (ref 0.1–1)
BUN SERPL-MCNC: 33 MG/DL (ref 8–23)
CALCIUM SERPL-MCNC: 7.3 MG/DL (ref 8.7–10.5)
CHLORIDE SERPL-SCNC: 101 MMOL/L (ref 95–110)
CO2 SERPL-SCNC: 23 MMOL/L (ref 23–29)
CREAT SERPL-MCNC: 1.7 MG/DL (ref 0.5–1.4)
DELSYS: ABNORMAL
ERYTHROCYTE [DISTWIDTH] IN BLOOD BY AUTOMATED COUNT: 17.1 % (ref 11.5–14.5)
FIO2: 21
GFR SERPLBLD CREATININE-BSD FMLA CKD-EPI: 30 ML/MIN/1.73/M2
GLUCOSE SERPL-MCNC: 170 MG/DL (ref 70–110)
HCO3 UR-SCNC: 26.4 MMOL/L (ref 24–28)
HCT VFR BLD AUTO: 26.1 % (ref 37–48.5)
HGB BLD-MCNC: 8.5 GM/DL (ref 12–16)
IMM GRANULOCYTES # BLD AUTO: 0.56 K/UL (ref 0–0.04)
IMM GRANULOCYTES NFR BLD AUTO: 2.6 % (ref 0–0.5)
LYMPHOCYTES # BLD AUTO: 1.76 K/UL (ref 1–4.8)
MAGNESIUM SERPL-MCNC: 1.9 MG/DL (ref 1.6–2.6)
MCH RBC QN AUTO: 30.2 PG (ref 27–31)
MCHC RBC AUTO-ENTMCNC: 32.6 G/DL (ref 32–36)
MCV RBC AUTO: 93 FL (ref 82–98)
MIN VOL: 6.94
MODE: ABNORMAL
NUCLEATED RBC (/100WBC) (OHS): 1 /100 WBC
PCO2 BLDA: 45.8 MMHG (ref 35–45)
PEEP: 5
PH SMN: 7.37 [PH] (ref 7.35–7.45)
PHOSPHATE SERPL-MCNC: 2.7 MG/DL (ref 2.7–4.5)
PLATELET # BLD AUTO: 257 K/UL (ref 150–450)
PMV BLD AUTO: 10.1 FL (ref 9.2–12.9)
PO2 BLDA: 39 MMHG (ref 40–60)
POC BE: 1 MMOL/L (ref -2–2)
POC SATURATED O2: 71 % (ref 95–100)
POC TCO2: 28 MMOL/L (ref 24–29)
POCT GLUCOSE: 126 MG/DL (ref 70–110)
POCT GLUCOSE: 127 MG/DL (ref 70–110)
POCT GLUCOSE: 148 MG/DL (ref 70–110)
POCT GLUCOSE: 151 MG/DL (ref 70–110)
POCT GLUCOSE: 172 MG/DL (ref 70–110)
POCT GLUCOSE: 203 MG/DL (ref 70–110)
POCT GLUCOSE: 211 MG/DL (ref 70–110)
POCT GLUCOSE: 242 MG/DL (ref 70–110)
POCT GLUCOSE: 249 MG/DL (ref 70–110)
POCT GLUCOSE: 249 MG/DL (ref 70–110)
POCT GLUCOSE: 253 MG/DL (ref 70–110)
POCT GLUCOSE: 278 MG/DL (ref 70–110)
POTASSIUM SERPL-SCNC: 3.7 MMOL/L (ref 3.5–5.1)
PROT SERPL-MCNC: 6.2 GM/DL (ref 6–8.4)
PS: 10
RBC # BLD AUTO: 2.81 M/UL (ref 4–5.4)
RELATIVE EOSINOPHIL (OHS): 0 %
RELATIVE LYMPHOCYTE (OHS): 8.1 % (ref 18–48)
RELATIVE MONOCYTE (OHS): 6.1 % (ref 4–15)
RELATIVE NEUTROPHIL (OHS): 83.1 % (ref 38–73)
SAMPLE: ABNORMAL
SITE: ABNORMAL
SODIUM SERPL-SCNC: 135 MMOL/L (ref 136–145)
SP02: 100
SPONT RATE: 19
VOL: 347
WBC # BLD AUTO: 21.64 K/UL (ref 3.9–12.7)

## 2025-07-15 PROCEDURE — 27200966 HC CLOSED SUCTION SYSTEM: Mod: HCNC

## 2025-07-15 PROCEDURE — 85025 COMPLETE CBC W/AUTO DIFF WBC: CPT | Mod: HCNC

## 2025-07-15 PROCEDURE — C9254 INJECTION, LACOSAMIDE: HCPCS | Mod: HCNC

## 2025-07-15 PROCEDURE — 63600175 PHARM REV CODE 636 W HCPCS: Mod: HCNC

## 2025-07-15 PROCEDURE — 27100171 HC OXYGEN HIGH FLOW UP TO 24 HOURS: Mod: HCNC

## 2025-07-15 PROCEDURE — 99291 CRITICAL CARE FIRST HOUR: CPT | Mod: HCNC,GC,, | Performed by: INTERNAL MEDICINE

## 2025-07-15 PROCEDURE — 27000190 HC CPAP FULL FACE MASK W/VALVE: Mod: HCNC

## 2025-07-15 PROCEDURE — 99900026 HC AIRWAY MAINTENANCE (STAT): Mod: HCNC

## 2025-07-15 PROCEDURE — 84100 ASSAY OF PHOSPHORUS: CPT | Mod: HCNC | Performed by: INTERNAL MEDICINE

## 2025-07-15 PROCEDURE — 63600175 PHARM REV CODE 636 W HCPCS: Mod: HCNC | Performed by: EMERGENCY MEDICINE

## 2025-07-15 PROCEDURE — 94660 CPAP INITIATION&MGMT: CPT | Mod: HCNC,XB

## 2025-07-15 PROCEDURE — 27000207 HC ISOLATION: Mod: HCNC

## 2025-07-15 PROCEDURE — 80053 COMPREHEN METABOLIC PANEL: CPT | Mod: HCNC

## 2025-07-15 PROCEDURE — 25000003 PHARM REV CODE 250: Mod: HCNC

## 2025-07-15 PROCEDURE — 83735 ASSAY OF MAGNESIUM: CPT | Mod: HCNC | Performed by: INTERNAL MEDICINE

## 2025-07-15 PROCEDURE — 94003 VENT MGMT INPAT SUBQ DAY: CPT | Mod: HCNC

## 2025-07-15 PROCEDURE — 20000000 HC ICU ROOM: Mod: HCNC

## 2025-07-15 PROCEDURE — 99900035 HC TECH TIME PER 15 MIN (STAT): Mod: HCNC

## 2025-07-15 PROCEDURE — 94761 N-INVAS EAR/PLS OXIMETRY MLT: CPT | Mod: HCNC

## 2025-07-15 PROCEDURE — 82803 BLOOD GASES ANY COMBINATION: CPT | Mod: HCNC

## 2025-07-15 RX ORDER — INSULIN GLARGINE 100 [IU]/ML
5 INJECTION, SOLUTION SUBCUTANEOUS DAILY
Status: DISCONTINUED | OUTPATIENT
Start: 2025-07-16 | End: 2025-07-18

## 2025-07-15 RX ADMIN — LACOSAMIDE 200 MG: 10 INJECTION INTRAVENOUS at 08:07

## 2025-07-15 RX ADMIN — LEVETIRACETAM 500 MG: 100 INJECTION INTRAVENOUS at 08:07

## 2025-07-15 RX ADMIN — INSULIN GLARGINE 10 UNITS: 100 INJECTION, SOLUTION SUBCUTANEOUS at 09:07

## 2025-07-15 RX ADMIN — CEFAZOLIN 1000 MG: 330 INJECTION, POWDER, FOR SOLUTION INTRAMUSCULAR; INTRAVENOUS at 08:07

## 2025-07-15 RX ADMIN — PANTOPRAZOLE SODIUM 40 MG: 40 INJECTION, POWDER, FOR SOLUTION INTRAVENOUS at 08:07

## 2025-07-15 RX ADMIN — HEPARIN SODIUM 5000 UNITS: 5000 INJECTION INTRAVENOUS; SUBCUTANEOUS at 08:07

## 2025-07-15 RX ADMIN — POTASSIUM BICARBONATE 25 MEQ: 978 TABLET, EFFERVESCENT ORAL at 05:07

## 2025-07-15 RX ADMIN — INSULIN ASPART 4 UNITS: 100 INJECTION, SOLUTION INTRAVENOUS; SUBCUTANEOUS at 03:07

## 2025-07-16 LAB
ABSOLUTE EOSINOPHIL (OHS): 0.67 K/UL
ABSOLUTE MONOCYTE (OHS): 1.49 K/UL (ref 0.3–1)
ABSOLUTE NEUTROPHIL COUNT (OHS): 15.95 K/UL (ref 1.8–7.7)
ALBUMIN SERPL BCP-MCNC: 1.3 G/DL (ref 3.5–5.2)
ALBUMIN SERPL BCP-MCNC: 1.3 G/DL (ref 3.5–5.2)
ALBUMIN SERPL BCP-MCNC: 1.4 G/DL (ref 3.5–5.2)
ALP SERPL-CCNC: 221 UNIT/L (ref 40–150)
ALT SERPL W/O P-5'-P-CCNC: <5 UNIT/L (ref 10–44)
ANION GAP (OHS): 11 MMOL/L (ref 8–16)
ANION GAP (OHS): 5 MMOL/L (ref 8–16)
ANION GAP (OHS): 7 MMOL/L (ref 8–16)
AST SERPL-CCNC: 15 UNIT/L (ref 11–45)
BASOPHILS # BLD AUTO: 0.03 K/UL
BASOPHILS NFR BLD AUTO: 0.1 %
BILIRUB SERPL-MCNC: 0.1 MG/DL (ref 0.1–1)
BUN SERPL-MCNC: 14 MG/DL (ref 8–23)
BUN SERPL-MCNC: 24 MG/DL (ref 8–23)
BUN SERPL-MCNC: 42 MG/DL (ref 8–23)
CALCIUM SERPL-MCNC: 7 MG/DL (ref 8.7–10.5)
CALCIUM SERPL-MCNC: 7.2 MG/DL (ref 8.7–10.5)
CALCIUM SERPL-MCNC: 7.2 MG/DL (ref 8.7–10.5)
CHLORIDE SERPL-SCNC: 102 MMOL/L (ref 95–110)
CHLORIDE SERPL-SCNC: 105 MMOL/L (ref 95–110)
CHLORIDE SERPL-SCNC: 107 MMOL/L (ref 95–110)
CO2 SERPL-SCNC: 23 MMOL/L (ref 23–29)
CO2 SERPL-SCNC: 23 MMOL/L (ref 23–29)
CO2 SERPL-SCNC: 24 MMOL/L (ref 23–29)
CREAT SERPL-MCNC: 0.8 MG/DL (ref 0.5–1.4)
CREAT SERPL-MCNC: 1.2 MG/DL (ref 0.5–1.4)
CREAT SERPL-MCNC: 2 MG/DL (ref 0.5–1.4)
ERYTHROCYTE [DISTWIDTH] IN BLOOD BY AUTOMATED COUNT: 17.3 % (ref 11.5–14.5)
GFR SERPLBLD CREATININE-BSD FMLA CKD-EPI: 25 ML/MIN/1.73/M2
GFR SERPLBLD CREATININE-BSD FMLA CKD-EPI: 46 ML/MIN/1.73/M2
GFR SERPLBLD CREATININE-BSD FMLA CKD-EPI: >60 ML/MIN/1.73/M2
GLUCOSE SERPL-MCNC: 115 MG/DL (ref 70–110)
GLUCOSE SERPL-MCNC: 119 MG/DL (ref 70–110)
GLUCOSE SERPL-MCNC: 87 MG/DL (ref 70–110)
HCT VFR BLD AUTO: 29.9 % (ref 37–48.5)
HGB BLD-MCNC: 9.3 GM/DL (ref 12–16)
IMM GRANULOCYTES # BLD AUTO: 0.33 K/UL (ref 0–0.04)
IMM GRANULOCYTES NFR BLD AUTO: 1.6 % (ref 0–0.5)
LYMPHOCYTES # BLD AUTO: 1.73 K/UL (ref 1–4.8)
MAGNESIUM SERPL-MCNC: 1.8 MG/DL (ref 1.6–2.6)
MAGNESIUM SERPL-MCNC: 1.8 MG/DL (ref 1.6–2.6)
MCH RBC QN AUTO: 29.6 PG (ref 27–31)
MCHC RBC AUTO-ENTMCNC: 31.1 G/DL (ref 32–36)
MCV RBC AUTO: 95 FL (ref 82–98)
NUCLEATED RBC (/100WBC) (OHS): 0 /100 WBC
PHOSPHATE SERPL-MCNC: 1.5 MG/DL (ref 2.7–4.5)
PHOSPHATE SERPL-MCNC: 2.2 MG/DL (ref 2.7–4.5)
PLATELET # BLD AUTO: 276 K/UL (ref 150–450)
PMV BLD AUTO: 9.6 FL (ref 9.2–12.9)
POCT GLUCOSE: 102 MG/DL (ref 70–110)
POCT GLUCOSE: 122 MG/DL (ref 70–110)
POCT GLUCOSE: 130 MG/DL (ref 70–110)
POCT GLUCOSE: 136 MG/DL (ref 70–110)
POCT GLUCOSE: 141 MG/DL (ref 70–110)
POCT GLUCOSE: 161 MG/DL (ref 70–110)
POTASSIUM SERPL-SCNC: 4.3 MMOL/L (ref 3.5–5.1)
POTASSIUM SERPL-SCNC: 4.4 MMOL/L (ref 3.5–5.1)
POTASSIUM SERPL-SCNC: 4.5 MMOL/L (ref 3.5–5.1)
PROT SERPL-MCNC: 6.3 GM/DL (ref 6–8.4)
RBC # BLD AUTO: 3.14 M/UL (ref 4–5.4)
RELATIVE EOSINOPHIL (OHS): 3.3 %
RELATIVE LYMPHOCYTE (OHS): 8.6 % (ref 18–48)
RELATIVE MONOCYTE (OHS): 7.4 % (ref 4–15)
RELATIVE NEUTROPHIL (OHS): 79 % (ref 38–73)
SODIUM SERPL-SCNC: 135 MMOL/L (ref 136–145)
SODIUM SERPL-SCNC: 136 MMOL/L (ref 136–145)
SODIUM SERPL-SCNC: 136 MMOL/L (ref 136–145)
WBC # BLD AUTO: 20.2 K/UL (ref 3.9–12.7)

## 2025-07-16 PROCEDURE — 99900031 HC PATIENT EDUCATION (STAT): Mod: HCNC

## 2025-07-16 PROCEDURE — 80053 COMPREHEN METABOLIC PANEL: CPT | Mod: HCNC

## 2025-07-16 PROCEDURE — 90945 DIALYSIS ONE EVALUATION: CPT | Mod: HCNC

## 2025-07-16 PROCEDURE — 94761 N-INVAS EAR/PLS OXIMETRY MLT: CPT | Mod: HCNC

## 2025-07-16 PROCEDURE — 27000207 HC ISOLATION: Mod: HCNC

## 2025-07-16 PROCEDURE — 85025 COMPLETE CBC W/AUTO DIFF WBC: CPT | Mod: HCNC

## 2025-07-16 PROCEDURE — 83735 ASSAY OF MAGNESIUM: CPT | Mod: HCNC | Performed by: STUDENT IN AN ORGANIZED HEALTH CARE EDUCATION/TRAINING PROGRAM

## 2025-07-16 PROCEDURE — 36620 INSERTION CATHETER ARTERY: CPT | Mod: HCNC

## 2025-07-16 PROCEDURE — 63600175 PHARM REV CODE 636 W HCPCS: Mod: HCNC | Performed by: STUDENT IN AN ORGANIZED HEALTH CARE EDUCATION/TRAINING PROGRAM

## 2025-07-16 PROCEDURE — 25000003 PHARM REV CODE 250: Mod: HCNC | Performed by: STUDENT IN AN ORGANIZED HEALTH CARE EDUCATION/TRAINING PROGRAM

## 2025-07-16 PROCEDURE — 25000003 PHARM REV CODE 250: Mod: HCNC

## 2025-07-16 PROCEDURE — 94660 CPAP INITIATION&MGMT: CPT | Mod: HCNC

## 2025-07-16 PROCEDURE — 80069 RENAL FUNCTION PANEL: CPT | Mod: HCNC | Performed by: STUDENT IN AN ORGANIZED HEALTH CARE EDUCATION/TRAINING PROGRAM

## 2025-07-16 PROCEDURE — 63600175 PHARM REV CODE 636 W HCPCS: Mod: HCNC

## 2025-07-16 PROCEDURE — 63600175 PHARM REV CODE 636 W HCPCS: Mod: HCNC | Performed by: EMERGENCY MEDICINE

## 2025-07-16 PROCEDURE — 99900035 HC TECH TIME PER 15 MIN (STAT): Mod: HCNC

## 2025-07-16 PROCEDURE — 99291 CRITICAL CARE FIRST HOUR: CPT | Mod: HCNC,GC,, | Performed by: INTERNAL MEDICINE

## 2025-07-16 PROCEDURE — 20000000 HC ICU ROOM: Mod: HCNC

## 2025-07-16 PROCEDURE — C9254 INJECTION, LACOSAMIDE: HCPCS | Mod: HCNC

## 2025-07-16 PROCEDURE — 27100171 HC OXYGEN HIGH FLOW UP TO 24 HOURS: Mod: HCNC

## 2025-07-16 PROCEDURE — 90945 DIALYSIS ONE EVALUATION: CPT | Mod: HCNC,GC,, | Performed by: INTERNAL MEDICINE

## 2025-07-16 RX ORDER — NOREPINEPHRINE BITARTRATE/D5W 4MG/250ML
PLASTIC BAG, INJECTION (ML) INTRAVENOUS
Status: COMPLETED
Start: 2025-07-16 | End: 2025-07-16

## 2025-07-16 RX ORDER — NOREPINEPHRINE BITARTRATE/D5W 4MG/250ML
0-3 PLASTIC BAG, INJECTION (ML) INTRAVENOUS CONTINUOUS
Status: DISCONTINUED | OUTPATIENT
Start: 2025-07-16 | End: 2025-07-16

## 2025-07-16 RX ORDER — HYDROCODONE BITARTRATE AND ACETAMINOPHEN 500; 5 MG/1; MG/1
TABLET ORAL CONTINUOUS
Status: ACTIVE | OUTPATIENT
Start: 2025-07-16 | End: 2025-07-17

## 2025-07-16 RX ORDER — NOREPINEPHRINE BITARTRATE/D5W 4MG/250ML
0-.2 PLASTIC BAG, INJECTION (ML) INTRAVENOUS CONTINUOUS
Status: ACTIVE | OUTPATIENT
Start: 2025-07-16 | End: 2025-07-17

## 2025-07-16 RX ORDER — MAGNESIUM SULFATE HEPTAHYDRATE 40 MG/ML
2 INJECTION, SOLUTION INTRAVENOUS
Status: ACTIVE | OUTPATIENT
Start: 2025-07-16 | End: 2025-07-17

## 2025-07-16 RX ADMIN — SODIUM CHLORIDE: 9 INJECTION, SOLUTION INTRAVENOUS at 03:07

## 2025-07-16 RX ADMIN — LACOSAMIDE 200 MG: 10 INJECTION INTRAVENOUS at 08:07

## 2025-07-16 RX ADMIN — PANTOPRAZOLE SODIUM 40 MG: 40 INJECTION, POWDER, FOR SOLUTION INTRAVENOUS at 09:07

## 2025-07-16 RX ADMIN — PANTOPRAZOLE SODIUM 40 MG: 40 INJECTION, POWDER, FOR SOLUTION INTRAVENOUS at 08:07

## 2025-07-16 RX ADMIN — LACOSAMIDE 200 MG: 10 INJECTION INTRAVENOUS at 09:07

## 2025-07-16 RX ADMIN — LEVETIRACETAM 500 MG: 100 INJECTION INTRAVENOUS at 08:07

## 2025-07-16 RX ADMIN — HEPARIN SODIUM 5000 UNITS: 5000 INJECTION INTRAVENOUS; SUBCUTANEOUS at 09:07

## 2025-07-16 RX ADMIN — NOREPINEPHRINE BITARTRATE 0.06 MCG/KG/MIN: 4 INJECTION, SOLUTION INTRAVENOUS at 10:07

## 2025-07-16 RX ADMIN — INSULIN GLARGINE 5 UNITS: 100 INJECTION, SOLUTION SUBCUTANEOUS at 09:07

## 2025-07-16 RX ADMIN — NOREPINEPHRINE BITARTRATE 0.06 MCG/KG/MIN: 4 INJECTION, SOLUTION INTRAVENOUS at 09:07

## 2025-07-16 RX ADMIN — HEPARIN SODIUM 5000 UNITS: 5000 INJECTION INTRAVENOUS; SUBCUTANEOUS at 08:07

## 2025-07-16 RX ADMIN — INSULIN ASPART 2 UNITS: 100 INJECTION, SOLUTION INTRAVENOUS; SUBCUTANEOUS at 12:07

## 2025-07-16 RX ADMIN — Medication 0.06 MCG/KG/MIN: at 10:07

## 2025-07-16 RX ADMIN — HEPARIN SODIUM 4000 UNITS: 1000 INJECTION INTRAVENOUS; SUBCUTANEOUS at 11:07

## 2025-07-16 RX ADMIN — LEVETIRACETAM 500 MG: 100 INJECTION INTRAVENOUS at 09:07

## 2025-07-17 LAB
ABSOLUTE EOSINOPHIL (OHS): 0.4 K/UL
ABSOLUTE MONOCYTE (OHS): 0.94 K/UL (ref 0.3–1)
ABSOLUTE NEUTROPHIL COUNT (OHS): 12.88 K/UL (ref 1.8–7.7)
ALBUMIN SERPL BCP-MCNC: 1.3 G/DL (ref 3.5–5.2)
ALP SERPL-CCNC: 248 UNIT/L (ref 40–150)
ALT SERPL W/O P-5'-P-CCNC: <5 UNIT/L (ref 10–44)
ANION GAP (OHS): 10 MMOL/L (ref 8–16)
ANION GAP (OHS): 10 MMOL/L (ref 8–16)
ANION GAP (OHS): 6 MMOL/L (ref 8–16)
AST SERPL-CCNC: 23 UNIT/L (ref 11–45)
BASOPHILS # BLD AUTO: 0.03 K/UL
BASOPHILS NFR BLD AUTO: 0.2 %
BILIRUB SERPL-MCNC: 0.1 MG/DL (ref 0.1–1)
BUN SERPL-MCNC: 19 MG/DL (ref 8–23)
BUN SERPL-MCNC: 19 MG/DL (ref 8–23)
BUN SERPL-MCNC: 27 MG/DL (ref 8–23)
CALCIUM SERPL-MCNC: 6.8 MG/DL (ref 8.7–10.5)
CALCIUM SERPL-MCNC: 7.1 MG/DL (ref 8.7–10.5)
CALCIUM SERPL-MCNC: 7.1 MG/DL (ref 8.7–10.5)
CHLORIDE SERPL-SCNC: 104 MMOL/L (ref 95–110)
CHLORIDE SERPL-SCNC: 105 MMOL/L (ref 95–110)
CHLORIDE SERPL-SCNC: 105 MMOL/L (ref 95–110)
CO2 SERPL-SCNC: 21 MMOL/L (ref 23–29)
CO2 SERPL-SCNC: 21 MMOL/L (ref 23–29)
CO2 SERPL-SCNC: 25 MMOL/L (ref 23–29)
CREAT SERPL-MCNC: 1.1 MG/DL (ref 0.5–1.4)
CREAT SERPL-MCNC: 1.1 MG/DL (ref 0.5–1.4)
CREAT SERPL-MCNC: 1.4 MG/DL (ref 0.5–1.4)
ERYTHROCYTE [DISTWIDTH] IN BLOOD BY AUTOMATED COUNT: 17.4 % (ref 11.5–14.5)
GFR SERPLBLD CREATININE-BSD FMLA CKD-EPI: 38 ML/MIN/1.73/M2
GFR SERPLBLD CREATININE-BSD FMLA CKD-EPI: 51 ML/MIN/1.73/M2
GFR SERPLBLD CREATININE-BSD FMLA CKD-EPI: 51 ML/MIN/1.73/M2
GLUCOSE SERPL-MCNC: 158 MG/DL (ref 70–110)
GLUCOSE SERPL-MCNC: 166 MG/DL (ref 70–110)
GLUCOSE SERPL-MCNC: 166 MG/DL (ref 70–110)
HCT VFR BLD AUTO: 28.1 % (ref 37–48.5)
HGB BLD-MCNC: 9.1 GM/DL (ref 12–16)
IMM GRANULOCYTES # BLD AUTO: 0.29 K/UL (ref 0–0.04)
IMM GRANULOCYTES NFR BLD AUTO: 1.8 % (ref 0–0.5)
LYMPHOCYTES # BLD AUTO: 1.38 K/UL (ref 1–4.8)
MAGNESIUM SERPL-MCNC: 1.8 MG/DL (ref 1.6–2.6)
MAGNESIUM SERPL-MCNC: 1.8 MG/DL (ref 1.6–2.6)
MCH RBC QN AUTO: 30.5 PG (ref 27–31)
MCHC RBC AUTO-ENTMCNC: 32.4 G/DL (ref 32–36)
MCV RBC AUTO: 94 FL (ref 82–98)
NUCLEATED RBC (/100WBC) (OHS): 0 /100 WBC
PHOSPHATE SERPL-MCNC: 3.5 MG/DL (ref 2.7–4.5)
PHOSPHATE SERPL-MCNC: 4.7 MG/DL (ref 2.7–4.5)
PLATELET # BLD AUTO: 254 K/UL (ref 150–450)
PMV BLD AUTO: 10.1 FL (ref 9.2–12.9)
POCT GLUCOSE: 145 MG/DL (ref 70–110)
POCT GLUCOSE: 176 MG/DL (ref 70–110)
POCT GLUCOSE: 181 MG/DL (ref 70–110)
POCT GLUCOSE: 185 MG/DL (ref 70–110)
POCT GLUCOSE: 221 MG/DL (ref 70–110)
POTASSIUM SERPL-SCNC: 4.6 MMOL/L (ref 3.5–5.1)
POTASSIUM SERPL-SCNC: 4.6 MMOL/L (ref 3.5–5.1)
POTASSIUM SERPL-SCNC: 4.7 MMOL/L (ref 3.5–5.1)
PROT SERPL-MCNC: 6.1 GM/DL (ref 6–8.4)
RBC # BLD AUTO: 2.98 M/UL (ref 4–5.4)
RELATIVE EOSINOPHIL (OHS): 2.5 %
RELATIVE LYMPHOCYTE (OHS): 8.7 % (ref 18–48)
RELATIVE MONOCYTE (OHS): 5.9 % (ref 4–15)
RELATIVE NEUTROPHIL (OHS): 80.9 % (ref 38–73)
SODIUM SERPL-SCNC: 135 MMOL/L (ref 136–145)
SODIUM SERPL-SCNC: 136 MMOL/L (ref 136–145)
SODIUM SERPL-SCNC: 136 MMOL/L (ref 136–145)
WBC # BLD AUTO: 15.92 K/UL (ref 3.9–12.7)

## 2025-07-17 PROCEDURE — 25000003 PHARM REV CODE 250: Mod: HCNC | Performed by: STUDENT IN AN ORGANIZED HEALTH CARE EDUCATION/TRAINING PROGRAM

## 2025-07-17 PROCEDURE — 63600175 PHARM REV CODE 636 W HCPCS: Mod: HCNC | Performed by: EMERGENCY MEDICINE

## 2025-07-17 PROCEDURE — 27100171 HC OXYGEN HIGH FLOW UP TO 24 HOURS: Mod: HCNC

## 2025-07-17 PROCEDURE — 97535 SELF CARE MNGMENT TRAINING: CPT | Mod: HCNC

## 2025-07-17 PROCEDURE — 94660 CPAP INITIATION&MGMT: CPT | Mod: HCNC

## 2025-07-17 PROCEDURE — 92610 EVALUATE SWALLOWING FUNCTION: CPT | Mod: HCNC

## 2025-07-17 PROCEDURE — 80069 RENAL FUNCTION PANEL: CPT | Mod: HCNC | Performed by: STUDENT IN AN ORGANIZED HEALTH CARE EDUCATION/TRAINING PROGRAM

## 2025-07-17 PROCEDURE — 99232 SBSQ HOSP IP/OBS MODERATE 35: CPT | Mod: HCNC,GC,, | Performed by: INTERNAL MEDICINE

## 2025-07-17 PROCEDURE — 63600175 PHARM REV CODE 636 W HCPCS: Mod: HCNC

## 2025-07-17 PROCEDURE — 20000000 HC ICU ROOM: Mod: HCNC

## 2025-07-17 PROCEDURE — 83735 ASSAY OF MAGNESIUM: CPT | Mod: HCNC | Performed by: STUDENT IN AN ORGANIZED HEALTH CARE EDUCATION/TRAINING PROGRAM

## 2025-07-17 PROCEDURE — 85025 COMPLETE CBC W/AUTO DIFF WBC: CPT | Mod: HCNC

## 2025-07-17 PROCEDURE — 99291 CRITICAL CARE FIRST HOUR: CPT | Mod: HCNC,GC,, | Performed by: INTERNAL MEDICINE

## 2025-07-17 PROCEDURE — 27000207 HC ISOLATION: Mod: HCNC

## 2025-07-17 PROCEDURE — 82040 ASSAY OF SERUM ALBUMIN: CPT | Mod: HCNC

## 2025-07-17 PROCEDURE — 94761 N-INVAS EAR/PLS OXIMETRY MLT: CPT | Mod: HCNC

## 2025-07-17 PROCEDURE — C9254 INJECTION, LACOSAMIDE: HCPCS | Mod: HCNC

## 2025-07-17 PROCEDURE — 99900035 HC TECH TIME PER 15 MIN (STAT): Mod: HCNC

## 2025-07-17 RX ADMIN — LEVETIRACETAM 500 MG: 100 INJECTION INTRAVENOUS at 09:07

## 2025-07-17 RX ADMIN — INSULIN ASPART 2 UNITS: 100 INJECTION, SOLUTION INTRAVENOUS; SUBCUTANEOUS at 01:07

## 2025-07-17 RX ADMIN — HEPARIN SODIUM 5000 UNITS: 5000 INJECTION INTRAVENOUS; SUBCUTANEOUS at 09:07

## 2025-07-17 RX ADMIN — INSULIN ASPART 4 UNITS: 100 INJECTION, SOLUTION INTRAVENOUS; SUBCUTANEOUS at 09:07

## 2025-07-17 RX ADMIN — LACOSAMIDE 200 MG: 10 INJECTION INTRAVENOUS at 09:07

## 2025-07-17 RX ADMIN — PANTOPRAZOLE SODIUM 40 MG: 40 INJECTION, POWDER, FOR SOLUTION INTRAVENOUS at 09:07

## 2025-07-17 RX ADMIN — SODIUM PHOSPHATE, MONOBASIC, MONOHYDRATE AND SODIUM PHOSPHATE, DIBASIC, ANHYDROUS 39.9 MMOL: 142; 276 INJECTION, SOLUTION INTRAVENOUS at 12:07

## 2025-07-17 RX ADMIN — INSULIN GLARGINE 5 UNITS: 100 INJECTION, SOLUTION SUBCUTANEOUS at 09:07

## 2025-07-17 RX ADMIN — INSULIN ASPART 2 UNITS: 100 INJECTION, SOLUTION INTRAVENOUS; SUBCUTANEOUS at 03:07

## 2025-07-17 RX ADMIN — INSULIN ASPART 1 UNITS: 100 INJECTION, SOLUTION INTRAVENOUS; SUBCUTANEOUS at 09:07

## 2025-07-18 LAB
ABSOLUTE EOSINOPHIL (OHS): 0.46 K/UL
ABSOLUTE MONOCYTE (OHS): 1.14 K/UL (ref 0.3–1)
ABSOLUTE NEUTROPHIL COUNT (OHS): 11.46 K/UL (ref 1.8–7.7)
ALBUMIN SERPL BCP-MCNC: 1.3 G/DL (ref 3.5–5.2)
ALP SERPL-CCNC: 211 UNIT/L (ref 40–150)
ALT SERPL W/O P-5'-P-CCNC: <5 UNIT/L (ref 10–44)
ANION GAP (OHS): 7 MMOL/L (ref 8–16)
ANION GAP (OHS): 8 MMOL/L (ref 8–16)
ANION GAP (OHS): 9 MMOL/L (ref 8–16)
ANION GAP (OHS): 9 MMOL/L (ref 8–16)
AST SERPL-CCNC: 19 UNIT/L (ref 11–45)
BASOPHILS # BLD AUTO: 0.02 K/UL
BASOPHILS NFR BLD AUTO: 0.1 %
BILIRUB SERPL-MCNC: 0.1 MG/DL (ref 0.1–1)
BUN SERPL-MCNC: 29 MG/DL (ref 8–23)
BUN SERPL-MCNC: 29 MG/DL (ref 8–23)
BUN SERPL-MCNC: 31 MG/DL (ref 8–23)
BUN SERPL-MCNC: 31 MG/DL (ref 8–23)
CALCIUM SERPL-MCNC: 6.7 MG/DL (ref 8.7–10.5)
CALCIUM SERPL-MCNC: 6.8 MG/DL (ref 8.7–10.5)
CALCIUM SERPL-MCNC: 6.8 MG/DL (ref 8.7–10.5)
CALCIUM SERPL-MCNC: 6.9 MG/DL (ref 8.7–10.5)
CHLORIDE SERPL-SCNC: 103 MMOL/L (ref 95–110)
CHLORIDE SERPL-SCNC: 103 MMOL/L (ref 95–110)
CHLORIDE SERPL-SCNC: 104 MMOL/L (ref 95–110)
CHLORIDE SERPL-SCNC: 104 MMOL/L (ref 95–110)
CO2 SERPL-SCNC: 21 MMOL/L (ref 23–29)
CO2 SERPL-SCNC: 21 MMOL/L (ref 23–29)
CO2 SERPL-SCNC: 23 MMOL/L (ref 23–29)
CO2 SERPL-SCNC: 23 MMOL/L (ref 23–29)
CREAT SERPL-MCNC: 1.5 MG/DL (ref 0.5–1.4)
CREAT SERPL-MCNC: 1.5 MG/DL (ref 0.5–1.4)
CREAT SERPL-MCNC: 1.6 MG/DL (ref 0.5–1.4)
CREAT SERPL-MCNC: 1.6 MG/DL (ref 0.5–1.4)
ERYTHROCYTE [DISTWIDTH] IN BLOOD BY AUTOMATED COUNT: 18.2 % (ref 11.5–14.5)
GFR SERPLBLD CREATININE-BSD FMLA CKD-EPI: 32 ML/MIN/1.73/M2
GFR SERPLBLD CREATININE-BSD FMLA CKD-EPI: 32 ML/MIN/1.73/M2
GFR SERPLBLD CREATININE-BSD FMLA CKD-EPI: 35 ML/MIN/1.73/M2
GFR SERPLBLD CREATININE-BSD FMLA CKD-EPI: 35 ML/MIN/1.73/M2
GLUCOSE SERPL-MCNC: 110 MG/DL (ref 70–110)
GLUCOSE SERPL-MCNC: 128 MG/DL (ref 70–110)
GLUCOSE SERPL-MCNC: 175 MG/DL (ref 70–110)
GLUCOSE SERPL-MCNC: 175 MG/DL (ref 70–110)
HCT VFR BLD AUTO: 25.4 % (ref 37–48.5)
HGB BLD-MCNC: 8.1 GM/DL (ref 12–16)
IMM GRANULOCYTES # BLD AUTO: 0.17 K/UL (ref 0–0.04)
IMM GRANULOCYTES NFR BLD AUTO: 1.1 % (ref 0–0.5)
LYMPHOCYTES # BLD AUTO: 1.61 K/UL (ref 1–4.8)
MAGNESIUM SERPL-MCNC: 1.8 MG/DL (ref 1.6–2.6)
MCH RBC QN AUTO: 30.8 PG (ref 27–31)
MCHC RBC AUTO-ENTMCNC: 31.9 G/DL (ref 32–36)
MCV RBC AUTO: 97 FL (ref 82–98)
NUCLEATED RBC (/100WBC) (OHS): 0 /100 WBC
PHOSPHATE SERPL-MCNC: 4.1 MG/DL (ref 2.7–4.5)
PHOSPHATE SERPL-MCNC: 4.4 MG/DL (ref 2.7–4.5)
PHOSPHATE SERPL-MCNC: 4.6 MG/DL (ref 2.7–4.5)
PLATELET # BLD AUTO: 282 K/UL (ref 150–450)
PMV BLD AUTO: 10 FL (ref 9.2–12.9)
POCT GLUCOSE: 132 MG/DL (ref 70–110)
POCT GLUCOSE: 151 MG/DL (ref 70–110)
POCT GLUCOSE: 195 MG/DL (ref 70–110)
POCT GLUCOSE: 196 MG/DL (ref 70–110)
POTASSIUM SERPL-SCNC: 4.7 MMOL/L (ref 3.5–5.1)
POTASSIUM SERPL-SCNC: 4.9 MMOL/L (ref 3.5–5.1)
PROT SERPL-MCNC: 5.6 GM/DL (ref 6–8.4)
RBC # BLD AUTO: 2.63 M/UL (ref 4–5.4)
RELATIVE EOSINOPHIL (OHS): 3.1 %
RELATIVE LYMPHOCYTE (OHS): 10.8 % (ref 18–48)
RELATIVE MONOCYTE (OHS): 7.7 % (ref 4–15)
RELATIVE NEUTROPHIL (OHS): 77.2 % (ref 38–73)
SODIUM SERPL-SCNC: 133 MMOL/L (ref 136–145)
SODIUM SERPL-SCNC: 134 MMOL/L (ref 136–145)
WBC # BLD AUTO: 14.86 K/UL (ref 3.9–12.7)

## 2025-07-18 PROCEDURE — 20000000 HC ICU ROOM: Mod: HCNC

## 2025-07-18 PROCEDURE — 63600175 PHARM REV CODE 636 W HCPCS: Mod: HCNC | Performed by: EMERGENCY MEDICINE

## 2025-07-18 PROCEDURE — 94660 CPAP INITIATION&MGMT: CPT | Mod: HCNC

## 2025-07-18 PROCEDURE — 80053 COMPREHEN METABOLIC PANEL: CPT | Mod: HCNC

## 2025-07-18 PROCEDURE — 25000003 PHARM REV CODE 250: Mod: HCNC | Performed by: STUDENT IN AN ORGANIZED HEALTH CARE EDUCATION/TRAINING PROGRAM

## 2025-07-18 PROCEDURE — 27000207 HC ISOLATION: Mod: HCNC

## 2025-07-18 PROCEDURE — 25000003 PHARM REV CODE 250: Mod: HCNC

## 2025-07-18 PROCEDURE — 99233 SBSQ HOSP IP/OBS HIGH 50: CPT | Mod: HCNC,GC,, | Performed by: INTERNAL MEDICINE

## 2025-07-18 PROCEDURE — 92526 ORAL FUNCTION THERAPY: CPT | Mod: HCNC

## 2025-07-18 PROCEDURE — 83735 ASSAY OF MAGNESIUM: CPT | Mod: HCNC | Performed by: STUDENT IN AN ORGANIZED HEALTH CARE EDUCATION/TRAINING PROGRAM

## 2025-07-18 PROCEDURE — C9254 INJECTION, LACOSAMIDE: HCPCS | Mod: HCNC

## 2025-07-18 PROCEDURE — 99291 CRITICAL CARE FIRST HOUR: CPT | Mod: HCNC,GC,, | Performed by: INTERNAL MEDICINE

## 2025-07-18 PROCEDURE — 63600175 PHARM REV CODE 636 W HCPCS: Mod: HCNC

## 2025-07-18 PROCEDURE — 94761 N-INVAS EAR/PLS OXIMETRY MLT: CPT | Mod: HCNC

## 2025-07-18 PROCEDURE — 90945 DIALYSIS ONE EVALUATION: CPT | Mod: HCNC

## 2025-07-18 PROCEDURE — 85025 COMPLETE CBC W/AUTO DIFF WBC: CPT | Mod: HCNC

## 2025-07-18 PROCEDURE — 80069 RENAL FUNCTION PANEL: CPT | Mod: HCNC | Performed by: STUDENT IN AN ORGANIZED HEALTH CARE EDUCATION/TRAINING PROGRAM

## 2025-07-18 PROCEDURE — 99900035 HC TECH TIME PER 15 MIN (STAT): Mod: HCNC

## 2025-07-18 RX ORDER — INSULIN GLARGINE 100 [IU]/ML
8 INJECTION, SOLUTION SUBCUTANEOUS DAILY
Status: DISCONTINUED | OUTPATIENT
Start: 2025-07-19 | End: 2025-07-24

## 2025-07-18 RX ORDER — MAGNESIUM SULFATE HEPTAHYDRATE 40 MG/ML
2 INJECTION, SOLUTION INTRAVENOUS
Status: DISCONTINUED | OUTPATIENT
Start: 2025-07-18 | End: 2025-07-19

## 2025-07-18 RX ORDER — CALCIUM GLUCONATE 20 MG/ML
1 INJECTION, SOLUTION INTRAVENOUS ONCE
Status: COMPLETED | OUTPATIENT
Start: 2025-07-18 | End: 2025-07-18

## 2025-07-18 RX ORDER — HYDROCODONE BITARTRATE AND ACETAMINOPHEN 500; 5 MG/1; MG/1
TABLET ORAL CONTINUOUS
Status: DISCONTINUED | OUTPATIENT
Start: 2025-07-18 | End: 2025-07-19

## 2025-07-18 RX ADMIN — PANTOPRAZOLE SODIUM 40 MG: 40 INJECTION, POWDER, FOR SOLUTION INTRAVENOUS at 09:07

## 2025-07-18 RX ADMIN — SODIUM CHLORIDE: 9 INJECTION, SOLUTION INTRAVENOUS at 09:07

## 2025-07-18 RX ADMIN — INSULIN GLARGINE 5 UNITS: 100 INJECTION, SOLUTION SUBCUTANEOUS at 09:07

## 2025-07-18 RX ADMIN — CALCIUM GLUCONATE 1 G: 20 INJECTION, SOLUTION INTRAVENOUS at 04:07

## 2025-07-18 RX ADMIN — INSULIN ASPART 2 UNITS: 100 INJECTION, SOLUTION INTRAVENOUS; SUBCUTANEOUS at 09:07

## 2025-07-18 RX ADMIN — HEPARIN SODIUM 5000 UNITS: 5000 INJECTION INTRAVENOUS; SUBCUTANEOUS at 08:07

## 2025-07-18 RX ADMIN — HEPARIN SODIUM 5000 UNITS: 5000 INJECTION INTRAVENOUS; SUBCUTANEOUS at 09:07

## 2025-07-18 RX ADMIN — INSULIN ASPART 2 UNITS: 100 INJECTION, SOLUTION INTRAVENOUS; SUBCUTANEOUS at 12:07

## 2025-07-18 RX ADMIN — INSULIN ASPART 2 UNITS: 100 INJECTION, SOLUTION INTRAVENOUS; SUBCUTANEOUS at 04:07

## 2025-07-18 RX ADMIN — LACOSAMIDE 200 MG: 10 INJECTION INTRAVENOUS at 09:07

## 2025-07-18 RX ADMIN — LEVETIRACETAM 500 MG: 100 INJECTION INTRAVENOUS at 09:07

## 2025-07-18 RX ADMIN — INSULIN ASPART 4 UNITS: 100 INJECTION, SOLUTION INTRAVENOUS; SUBCUTANEOUS at 03:07

## 2025-07-18 RX ADMIN — LACOSAMIDE 200 MG: 10 INJECTION INTRAVENOUS at 08:07

## 2025-07-18 RX ADMIN — LEVETIRACETAM 500 MG: 100 INJECTION INTRAVENOUS at 08:07

## 2025-07-18 RX ADMIN — PANTOPRAZOLE SODIUM 40 MG: 40 INJECTION, POWDER, FOR SOLUTION INTRAVENOUS at 08:07

## 2025-07-19 LAB
ABSOLUTE EOSINOPHIL (OHS): 0.45 K/UL
ABSOLUTE MONOCYTE (OHS): 1.31 K/UL (ref 0.3–1)
ABSOLUTE NEUTROPHIL COUNT (OHS): 16.34 K/UL (ref 1.8–7.7)
ALBUMIN SERPL BCP-MCNC: 1.3 G/DL (ref 3.5–5.2)
ALBUMIN SERPL BCP-MCNC: 1.3 G/DL (ref 3.5–5.2)
ALBUMIN SERPL BCP-MCNC: 1.4 G/DL (ref 3.5–5.2)
ALBUMIN SERPL BCP-MCNC: 2 G/DL (ref 3.5–5.2)
ALP SERPL-CCNC: 192 UNIT/L (ref 40–150)
ALT SERPL W/O P-5'-P-CCNC: <5 UNIT/L (ref 10–44)
ANION GAP (OHS): 12 MMOL/L (ref 8–16)
ANION GAP (OHS): 6 MMOL/L (ref 8–16)
ANION GAP (OHS): 9 MMOL/L (ref 8–16)
ANION GAP (OHS): 9 MMOL/L (ref 8–16)
AST SERPL-CCNC: 23 UNIT/L (ref 11–45)
BASOPHILS # BLD AUTO: 0.04 K/UL
BASOPHILS NFR BLD AUTO: 0.2 %
BILIRUB SERPL-MCNC: 0.1 MG/DL (ref 0.1–1)
BUN SERPL-MCNC: 19 MG/DL (ref 8–23)
BUN SERPL-MCNC: 27 MG/DL (ref 8–23)
BUN SERPL-MCNC: 27 MG/DL (ref 8–23)
BUN SERPL-MCNC: 29 MG/DL (ref 8–23)
CA-I BLD-SCNC: 0.94 MMOL/L (ref 1.06–1.42)
CALCIUM SERPL-MCNC: 6.6 MG/DL (ref 8.7–10.5)
CALCIUM SERPL-MCNC: 6.6 MG/DL (ref 8.7–10.5)
CALCIUM SERPL-MCNC: 6.7 MG/DL (ref 8.7–10.5)
CALCIUM SERPL-MCNC: 7 MG/DL (ref 8.7–10.5)
CHLORIDE SERPL-SCNC: 106 MMOL/L (ref 95–110)
CHLORIDE SERPL-SCNC: 106 MMOL/L (ref 95–110)
CHLORIDE SERPL-SCNC: 108 MMOL/L (ref 95–110)
CHLORIDE SERPL-SCNC: 108 MMOL/L (ref 95–110)
CO2 SERPL-SCNC: 20 MMOL/L (ref 23–29)
CO2 SERPL-SCNC: 21 MMOL/L (ref 23–29)
CREAT SERPL-MCNC: 1 MG/DL (ref 0.5–1.4)
CREAT SERPL-MCNC: 1.4 MG/DL (ref 0.5–1.4)
CREAT SERPL-MCNC: 1.4 MG/DL (ref 0.5–1.4)
CREAT SERPL-MCNC: 1.5 MG/DL (ref 0.5–1.4)
ERYTHROCYTE [DISTWIDTH] IN BLOOD BY AUTOMATED COUNT: 18.2 % (ref 11.5–14.5)
GFR SERPLBLD CREATININE-BSD FMLA CKD-EPI: 35 ML/MIN/1.73/M2
GFR SERPLBLD CREATININE-BSD FMLA CKD-EPI: 38 ML/MIN/1.73/M2
GFR SERPLBLD CREATININE-BSD FMLA CKD-EPI: 38 ML/MIN/1.73/M2
GFR SERPLBLD CREATININE-BSD FMLA CKD-EPI: 57 ML/MIN/1.73/M2
GLUCOSE SERPL-MCNC: 100 MG/DL (ref 70–110)
GLUCOSE SERPL-MCNC: 138 MG/DL (ref 70–110)
GLUCOSE SERPL-MCNC: 138 MG/DL (ref 70–110)
GLUCOSE SERPL-MCNC: 139 MG/DL (ref 70–110)
HCT VFR BLD AUTO: 24.3 % (ref 37–48.5)
HGB BLD-MCNC: 7.5 GM/DL (ref 12–16)
HGB BLD-MCNC: 8.3 GM/DL (ref 12–16)
IMM GRANULOCYTES # BLD AUTO: 0.24 K/UL (ref 0–0.04)
IMM GRANULOCYTES NFR BLD AUTO: 1.2 % (ref 0–0.5)
LYMPHOCYTES # BLD AUTO: 1.71 K/UL (ref 1–4.8)
MAGNESIUM SERPL-MCNC: 1.7 MG/DL (ref 1.6–2.6)
MAGNESIUM SERPL-MCNC: 1.8 MG/DL (ref 1.6–2.6)
MAGNESIUM SERPL-MCNC: 1.8 MG/DL (ref 1.6–2.6)
MCH RBC QN AUTO: 30.5 PG (ref 27–31)
MCHC RBC AUTO-ENTMCNC: 30.9 G/DL (ref 32–36)
MCV RBC AUTO: 99 FL (ref 82–98)
NUCLEATED RBC (/100WBC) (OHS): 0 /100 WBC
PHOSPHATE SERPL-MCNC: 2.7 MG/DL (ref 2.7–4.5)
PHOSPHATE SERPL-MCNC: 3.6 MG/DL (ref 2.7–4.5)
PHOSPHATE SERPL-MCNC: 3.8 MG/DL (ref 2.7–4.5)
PLATELET # BLD AUTO: 318 K/UL (ref 150–450)
PMV BLD AUTO: 10 FL (ref 9.2–12.9)
POTASSIUM SERPL-SCNC: 4.8 MMOL/L (ref 3.5–5.1)
POTASSIUM SERPL-SCNC: 5.1 MMOL/L (ref 3.5–5.1)
PROT SERPL-MCNC: 5.5 GM/DL (ref 6–8.4)
RBC # BLD AUTO: 2.46 M/UL (ref 4–5.4)
RELATIVE EOSINOPHIL (OHS): 2.2 %
RELATIVE LYMPHOCYTE (OHS): 8.5 % (ref 18–48)
RELATIVE MONOCYTE (OHS): 6.5 % (ref 4–15)
RELATIVE NEUTROPHIL (OHS): 81.4 % (ref 38–73)
SODIUM SERPL-SCNC: 134 MMOL/L (ref 136–145)
SODIUM SERPL-SCNC: 135 MMOL/L (ref 136–145)
SODIUM SERPL-SCNC: 135 MMOL/L (ref 136–145)
SODIUM SERPL-SCNC: 141 MMOL/L (ref 136–145)
WBC # BLD AUTO: 20.09 K/UL (ref 3.9–12.7)

## 2025-07-19 PROCEDURE — 82330 ASSAY OF CALCIUM: CPT | Mod: HCNC

## 2025-07-19 PROCEDURE — 83735 ASSAY OF MAGNESIUM: CPT | Mod: HCNC | Performed by: STUDENT IN AN ORGANIZED HEALTH CARE EDUCATION/TRAINING PROGRAM

## 2025-07-19 PROCEDURE — 63600175 PHARM REV CODE 636 W HCPCS: Mod: HCNC | Performed by: EMERGENCY MEDICINE

## 2025-07-19 PROCEDURE — 20000000 HC ICU ROOM: Mod: HCNC

## 2025-07-19 PROCEDURE — 82040 ASSAY OF SERUM ALBUMIN: CPT | Mod: HCNC

## 2025-07-19 PROCEDURE — 63600175 PHARM REV CODE 636 W HCPCS: Mod: HCNC

## 2025-07-19 PROCEDURE — 25000003 PHARM REV CODE 250: Mod: HCNC | Performed by: INTERNAL MEDICINE

## 2025-07-19 PROCEDURE — 99900035 HC TECH TIME PER 15 MIN (STAT): Mod: HCNC

## 2025-07-19 PROCEDURE — 85018 HEMOGLOBIN: CPT | Mod: HCNC

## 2025-07-19 PROCEDURE — C9254 INJECTION, LACOSAMIDE: HCPCS | Mod: HCNC

## 2025-07-19 PROCEDURE — 63600175 PHARM REV CODE 636 W HCPCS: Mod: HCNC | Performed by: STUDENT IN AN ORGANIZED HEALTH CARE EDUCATION/TRAINING PROGRAM

## 2025-07-19 PROCEDURE — 99291 CRITICAL CARE FIRST HOUR: CPT | Mod: HCNC,GC,, | Performed by: INTERNAL MEDICINE

## 2025-07-19 PROCEDURE — 80069 RENAL FUNCTION PANEL: CPT | Mod: HCNC | Performed by: STUDENT IN AN ORGANIZED HEALTH CARE EDUCATION/TRAINING PROGRAM

## 2025-07-19 PROCEDURE — 94761 N-INVAS EAR/PLS OXIMETRY MLT: CPT | Mod: HCNC

## 2025-07-19 PROCEDURE — 85025 COMPLETE CBC W/AUTO DIFF WBC: CPT | Mod: HCNC

## 2025-07-19 PROCEDURE — 99233 SBSQ HOSP IP/OBS HIGH 50: CPT | Mod: HCNC,,, | Performed by: INTERNAL MEDICINE

## 2025-07-19 PROCEDURE — P9047 ALBUMIN (HUMAN), 25%, 50ML: HCPCS | Mod: HCNC

## 2025-07-19 PROCEDURE — 94660 CPAP INITIATION&MGMT: CPT | Mod: HCNC

## 2025-07-19 PROCEDURE — 27000207 HC ISOLATION: Mod: HCNC

## 2025-07-19 RX ORDER — MAGNESIUM SULFATE HEPTAHYDRATE 40 MG/ML
2 INJECTION, SOLUTION INTRAVENOUS
Status: DISCONTINUED | OUTPATIENT
Start: 2025-07-19 | End: 2025-07-20

## 2025-07-19 RX ORDER — ALBUMIN HUMAN 250 G/1000ML
25 SOLUTION INTRAVENOUS EVERY 12 HOURS
Status: DISCONTINUED | OUTPATIENT
Start: 2025-07-19 | End: 2025-07-23

## 2025-07-19 RX ORDER — HYDROCODONE BITARTRATE AND ACETAMINOPHEN 500; 5 MG/1; MG/1
TABLET ORAL CONTINUOUS
Status: DISCONTINUED | OUTPATIENT
Start: 2025-07-19 | End: 2025-07-20

## 2025-07-19 RX ORDER — ALBUMIN HUMAN 250 G/1000ML
25 SOLUTION INTRAVENOUS EVERY 12 HOURS
Status: DISCONTINUED | OUTPATIENT
Start: 2025-07-19 | End: 2025-07-19

## 2025-07-19 RX ADMIN — ALBUMIN (HUMAN) 25 G: 12.5 SOLUTION INTRAVENOUS at 03:07

## 2025-07-19 RX ADMIN — HEPARIN SODIUM 4000 UNITS: 1000 INJECTION INTRAVENOUS; SUBCUTANEOUS at 09:07

## 2025-07-19 RX ADMIN — PANTOPRAZOLE SODIUM 40 MG: 40 INJECTION, POWDER, FOR SOLUTION INTRAVENOUS at 09:07

## 2025-07-19 RX ADMIN — SODIUM CHLORIDE: 9 INJECTION, SOLUTION INTRAVENOUS at 02:07

## 2025-07-19 RX ADMIN — HEPARIN SODIUM 5000 UNITS: 5000 INJECTION INTRAVENOUS; SUBCUTANEOUS at 09:07

## 2025-07-19 RX ADMIN — LEVETIRACETAM 500 MG: 100 INJECTION INTRAVENOUS at 09:07

## 2025-07-19 RX ADMIN — LACOSAMIDE 200 MG: 10 INJECTION INTRAVENOUS at 09:07

## 2025-07-19 RX ADMIN — INSULIN GLARGINE 8 UNITS: 100 INJECTION, SOLUTION SUBCUTANEOUS at 09:07

## 2025-07-20 PROBLEM — Z71.89 ACP (ADVANCE CARE PLANNING): Status: ACTIVE | Noted: 2025-07-20

## 2025-07-20 LAB
ABSOLUTE EOSINOPHIL (OHS): 0.47 K/UL
ABSOLUTE MONOCYTE (OHS): 1.1 K/UL (ref 0.3–1)
ABSOLUTE NEUTROPHIL COUNT (OHS): 11.34 K/UL (ref 1.8–7.7)
ALBUMIN SERPL BCP-MCNC: 1.9 G/DL (ref 3.5–5.2)
ALBUMIN SERPL BCP-MCNC: 1.9 G/DL (ref 3.5–5.2)
ALBUMIN SERPL BCP-MCNC: 2.2 G/DL (ref 3.5–5.2)
ALBUMIN SERPL BCP-MCNC: 2.2 G/DL (ref 3.5–5.2)
ALP SERPL-CCNC: 179 UNIT/L (ref 40–150)
ALT SERPL W/O P-5'-P-CCNC: <5 UNIT/L (ref 10–44)
ANION GAP (OHS): 5 MMOL/L (ref 8–16)
ANION GAP (OHS): 5 MMOL/L (ref 8–16)
ANION GAP (OHS): 6 MMOL/L (ref 8–16)
ANION GAP (OHS): 6 MMOL/L (ref 8–16)
AST SERPL-CCNC: 15 UNIT/L (ref 11–45)
BASOPHILS # BLD AUTO: 0.04 K/UL
BASOPHILS NFR BLD AUTO: 0.3 %
BILIRUB SERPL-MCNC: 0.1 MG/DL (ref 0.1–1)
BUN SERPL-MCNC: 11 MG/DL (ref 8–23)
BUN SERPL-MCNC: 21 MG/DL (ref 8–23)
BUN SERPL-MCNC: 21 MG/DL (ref 8–23)
BUN SERPL-MCNC: 4 MG/DL (ref 8–23)
CALCIUM SERPL-MCNC: 7 MG/DL (ref 8.7–10.5)
CALCIUM SERPL-MCNC: 7.1 MG/DL (ref 8.7–10.5)
CALCIUM SERPL-MCNC: 7.1 MG/DL (ref 8.7–10.5)
CALCIUM SERPL-MCNC: 7.5 MG/DL (ref 8.7–10.5)
CHLORIDE SERPL-SCNC: 110 MMOL/L (ref 95–110)
CHLORIDE SERPL-SCNC: 110 MMOL/L (ref 95–110)
CHLORIDE SERPL-SCNC: 115 MMOL/L (ref 95–110)
CHLORIDE SERPL-SCNC: 118 MMOL/L (ref 95–110)
CO2 SERPL-SCNC: 21 MMOL/L (ref 23–29)
CO2 SERPL-SCNC: 23 MMOL/L (ref 23–29)
CREAT SERPL-MCNC: 0.5 MG/DL (ref 0.5–1.4)
CREAT SERPL-MCNC: 0.7 MG/DL (ref 0.5–1.4)
CREAT SERPL-MCNC: 1.2 MG/DL (ref 0.5–1.4)
CREAT SERPL-MCNC: 1.2 MG/DL (ref 0.5–1.4)
ERYTHROCYTE [DISTWIDTH] IN BLOOD BY AUTOMATED COUNT: 18.6 % (ref 11.5–14.5)
GFR SERPLBLD CREATININE-BSD FMLA CKD-EPI: 46 ML/MIN/1.73/M2
GFR SERPLBLD CREATININE-BSD FMLA CKD-EPI: 46 ML/MIN/1.73/M2
GFR SERPLBLD CREATININE-BSD FMLA CKD-EPI: >60 ML/MIN/1.73/M2
GFR SERPLBLD CREATININE-BSD FMLA CKD-EPI: >60 ML/MIN/1.73/M2
GLUCOSE SERPL-MCNC: 100 MG/DL (ref 70–110)
GLUCOSE SERPL-MCNC: 100 MG/DL (ref 70–110)
GLUCOSE SERPL-MCNC: 107 MG/DL (ref 70–110)
GLUCOSE SERPL-MCNC: 97 MG/DL (ref 70–110)
HCT VFR BLD AUTO: 25.3 % (ref 37–48.5)
HGB BLD-MCNC: 7.8 GM/DL (ref 12–16)
IMM GRANULOCYTES # BLD AUTO: 0.12 K/UL (ref 0–0.04)
IMM GRANULOCYTES NFR BLD AUTO: 0.8 % (ref 0–0.5)
LYMPHOCYTES # BLD AUTO: 1.57 K/UL (ref 1–4.8)
MAGNESIUM SERPL-MCNC: 1.8 MG/DL (ref 1.6–2.6)
MAGNESIUM SERPL-MCNC: 1.8 MG/DL (ref 1.6–2.6)
MAGNESIUM SERPL-MCNC: 1.9 MG/DL (ref 1.6–2.6)
MCH RBC QN AUTO: 30.4 PG (ref 27–31)
MCHC RBC AUTO-ENTMCNC: 30.8 G/DL (ref 32–36)
MCV RBC AUTO: 98 FL (ref 82–98)
NUCLEATED RBC (/100WBC) (OHS): 0 /100 WBC
PHOSPHATE SERPL-MCNC: 1.7 MG/DL (ref 2.7–4.5)
PHOSPHATE SERPL-MCNC: 2 MG/DL (ref 2.7–4.5)
PHOSPHATE SERPL-MCNC: 2.9 MG/DL (ref 2.7–4.5)
PLATELET # BLD AUTO: 332 K/UL (ref 150–450)
PMV BLD AUTO: 9.6 FL (ref 9.2–12.9)
POCT GLUCOSE: 101 MG/DL (ref 70–110)
POCT GLUCOSE: 111 MG/DL (ref 70–110)
POCT GLUCOSE: 118 MG/DL (ref 70–110)
POCT GLUCOSE: 122 MG/DL (ref 70–110)
POCT GLUCOSE: 140 MG/DL (ref 70–110)
POCT GLUCOSE: 148 MG/DL (ref 70–110)
POCT GLUCOSE: 205 MG/DL (ref 70–110)
POCT GLUCOSE: 97 MG/DL (ref 70–110)
POTASSIUM SERPL-SCNC: 4.8 MMOL/L (ref 3.5–5.1)
POTASSIUM SERPL-SCNC: 4.9 MMOL/L (ref 3.5–5.1)
PROT SERPL-MCNC: 6 GM/DL (ref 6–8.4)
RBC # BLD AUTO: 2.57 M/UL (ref 4–5.4)
RELATIVE EOSINOPHIL (OHS): 3.2 %
RELATIVE LYMPHOCYTE (OHS): 10.7 % (ref 18–48)
RELATIVE MONOCYTE (OHS): 7.5 % (ref 4–15)
RELATIVE NEUTROPHIL (OHS): 77.5 % (ref 38–73)
SODIUM SERPL-SCNC: 137 MMOL/L (ref 136–145)
SODIUM SERPL-SCNC: 137 MMOL/L (ref 136–145)
SODIUM SERPL-SCNC: 143 MMOL/L (ref 136–145)
SODIUM SERPL-SCNC: 144 MMOL/L (ref 136–145)
WBC # BLD AUTO: 14.64 K/UL (ref 3.9–12.7)

## 2025-07-20 PROCEDURE — 99291 CRITICAL CARE FIRST HOUR: CPT | Mod: HCNC,GC,, | Performed by: INTERNAL MEDICINE

## 2025-07-20 PROCEDURE — 63600175 PHARM REV CODE 636 W HCPCS: Mod: HCNC

## 2025-07-20 PROCEDURE — 90945 DIALYSIS ONE EVALUATION: CPT | Mod: HCNC

## 2025-07-20 PROCEDURE — 27000207 HC ISOLATION: Mod: HCNC

## 2025-07-20 PROCEDURE — P9047 ALBUMIN (HUMAN), 25%, 50ML: HCPCS | Mod: HCNC

## 2025-07-20 PROCEDURE — 83735 ASSAY OF MAGNESIUM: CPT | Mod: HCNC | Performed by: STUDENT IN AN ORGANIZED HEALTH CARE EDUCATION/TRAINING PROGRAM

## 2025-07-20 PROCEDURE — 63600175 PHARM REV CODE 636 W HCPCS: Mod: HCNC | Performed by: INTERNAL MEDICINE

## 2025-07-20 PROCEDURE — 63600175 PHARM REV CODE 636 W HCPCS: Mod: HCNC | Performed by: EMERGENCY MEDICINE

## 2025-07-20 PROCEDURE — 80069 RENAL FUNCTION PANEL: CPT | Mod: HCNC | Performed by: STUDENT IN AN ORGANIZED HEALTH CARE EDUCATION/TRAINING PROGRAM

## 2025-07-20 PROCEDURE — 99233 SBSQ HOSP IP/OBS HIGH 50: CPT | Mod: HCNC,,, | Performed by: INTERNAL MEDICINE

## 2025-07-20 PROCEDURE — 80053 COMPREHEN METABOLIC PANEL: CPT | Mod: HCNC

## 2025-07-20 PROCEDURE — 20000000 HC ICU ROOM: Mod: HCNC

## 2025-07-20 PROCEDURE — 94660 CPAP INITIATION&MGMT: CPT | Mod: HCNC

## 2025-07-20 PROCEDURE — 99900035 HC TECH TIME PER 15 MIN (STAT): Mod: HCNC

## 2025-07-20 PROCEDURE — 85025 COMPLETE CBC W/AUTO DIFF WBC: CPT | Mod: HCNC

## 2025-07-20 PROCEDURE — 25000003 PHARM REV CODE 250: Mod: HCNC | Performed by: INTERNAL MEDICINE

## 2025-07-20 PROCEDURE — 94761 N-INVAS EAR/PLS OXIMETRY MLT: CPT | Mod: HCNC

## 2025-07-20 PROCEDURE — C9254 INJECTION, LACOSAMIDE: HCPCS | Mod: HCNC

## 2025-07-20 RX ORDER — HYDROCODONE BITARTRATE AND ACETAMINOPHEN 500; 5 MG/1; MG/1
TABLET ORAL CONTINUOUS
Status: ACTIVE | OUTPATIENT
Start: 2025-07-20 | End: 2025-07-21

## 2025-07-20 RX ORDER — MAGNESIUM SULFATE HEPTAHYDRATE 40 MG/ML
2 INJECTION, SOLUTION INTRAVENOUS
Status: DISPENSED | OUTPATIENT
Start: 2025-07-20 | End: 2025-07-21

## 2025-07-20 RX ADMIN — SODIUM CHLORIDE: 9 INJECTION, SOLUTION INTRAVENOUS at 08:07

## 2025-07-20 RX ADMIN — SODIUM PHOSPHATE, MONOBASIC, MONOHYDRATE AND SODIUM PHOSPHATE, DIBASIC, ANHYDROUS 39.9 MMOL: 142; 276 INJECTION, SOLUTION INTRAVENOUS at 05:07

## 2025-07-20 RX ADMIN — HEPARIN SODIUM 5000 UNITS: 5000 INJECTION INTRAVENOUS; SUBCUTANEOUS at 08:07

## 2025-07-20 RX ADMIN — INSULIN GLARGINE 8 UNITS: 100 INJECTION, SOLUTION SUBCUTANEOUS at 08:07

## 2025-07-20 RX ADMIN — LEVETIRACETAM 500 MG: 100 INJECTION INTRAVENOUS at 09:07

## 2025-07-20 RX ADMIN — HEPARIN SODIUM 5000 UNITS: 5000 INJECTION INTRAVENOUS; SUBCUTANEOUS at 09:07

## 2025-07-20 RX ADMIN — MAGNESIUM SULFATE HEPTAHYDRATE 2 G: 40 INJECTION, SOLUTION INTRAVENOUS at 04:07

## 2025-07-20 RX ADMIN — ALBUMIN (HUMAN) 25 G: 12.5 SOLUTION INTRAVENOUS at 09:07

## 2025-07-20 RX ADMIN — PANTOPRAZOLE SODIUM 40 MG: 40 INJECTION, POWDER, FOR SOLUTION INTRAVENOUS at 09:07

## 2025-07-20 RX ADMIN — LACOSAMIDE 200 MG: 10 INJECTION INTRAVENOUS at 09:07

## 2025-07-20 RX ADMIN — PANTOPRAZOLE SODIUM 40 MG: 40 INJECTION, POWDER, FOR SOLUTION INTRAVENOUS at 08:07

## 2025-07-20 RX ADMIN — LEVETIRACETAM 500 MG: 100 INJECTION INTRAVENOUS at 08:07

## 2025-07-21 PROBLEM — Z99.11 ON MECHANICALLY ASSISTED VENTILATION: Status: RESOLVED | Noted: 2025-07-13 | Resolved: 2025-07-21

## 2025-07-21 LAB
ABO + RH BLD: NORMAL
ABSOLUTE EOSINOPHIL (OHS): 0.27 K/UL
ABSOLUTE EOSINOPHIL (OHS): 0.45 K/UL
ABSOLUTE MONOCYTE (OHS): 1.11 K/UL (ref 0.3–1)
ABSOLUTE MONOCYTE (OHS): 1.12 K/UL (ref 0.3–1)
ABSOLUTE NEUTROPHIL COUNT (OHS): 10.98 K/UL (ref 1.8–7.7)
ABSOLUTE NEUTROPHIL COUNT (OHS): 12.44 K/UL (ref 1.8–7.7)
ALBUMIN SERPL BCP-MCNC: 2.7 G/DL (ref 3.5–5.2)
ALBUMIN SERPL BCP-MCNC: 2.7 G/DL (ref 3.5–5.2)
ALBUMIN SERPL BCP-MCNC: 2.9 G/DL (ref 3.5–5.2)
ALBUMIN SERPL BCP-MCNC: 3 G/DL (ref 3.5–5.2)
ALP SERPL-CCNC: 159 UNIT/L (ref 40–150)
ALT SERPL W/O P-5'-P-CCNC: <5 UNIT/L (ref 10–44)
ANION GAP (OHS): 3 MMOL/L (ref 8–16)
ANION GAP (OHS): 4 MMOL/L (ref 8–16)
ANION GAP (OHS): 7 MMOL/L (ref 8–16)
ANION GAP (OHS): 7 MMOL/L (ref 8–16)
ANISOCYTOSIS BLD QL SMEAR: SLIGHT
AST SERPL-CCNC: 14 UNIT/L (ref 11–45)
BASO STIPL BLD QL SMEAR: NORMAL
BASOPHILS # BLD AUTO: 0.05 K/UL
BASOPHILS # BLD AUTO: 0.05 K/UL
BASOPHILS NFR BLD AUTO: 0.3 %
BASOPHILS NFR BLD AUTO: 0.4 %
BILIRUB SERPL-MCNC: 0.2 MG/DL (ref 0.1–1)
BLD PROD TYP BPU: NORMAL
BLOOD UNIT EXPIRATION DATE: NORMAL
BLOOD UNIT TYPE CODE: 7300
BUN SERPL-MCNC: 3 MG/DL (ref 8–23)
BUN SERPL-MCNC: 4 MG/DL (ref 8–23)
BUN SERPL-MCNC: 4 MG/DL (ref 8–23)
BUN SERPL-MCNC: 5 MG/DL (ref 8–23)
CALCIUM SERPL-MCNC: 7.6 MG/DL (ref 8.7–10.5)
CALCIUM SERPL-MCNC: 7.7 MG/DL (ref 8.7–10.5)
CALCIUM SERPL-MCNC: 7.8 MG/DL (ref 8.7–10.5)
CALCIUM SERPL-MCNC: 7.8 MG/DL (ref 8.7–10.5)
CHLORIDE SERPL-SCNC: 116 MMOL/L (ref 95–110)
CHLORIDE SERPL-SCNC: 117 MMOL/L (ref 95–110)
CHLORIDE SERPL-SCNC: 117 MMOL/L (ref 95–110)
CHLORIDE SERPL-SCNC: 118 MMOL/L (ref 95–110)
CO2 SERPL-SCNC: 19 MMOL/L (ref 23–29)
CO2 SERPL-SCNC: 20 MMOL/L (ref 23–29)
CO2 SERPL-SCNC: 20 MMOL/L (ref 23–29)
CO2 SERPL-SCNC: 22 MMOL/L (ref 23–29)
CREAT SERPL-MCNC: 0.4 MG/DL (ref 0.5–1.4)
CREAT SERPL-MCNC: 0.5 MG/DL (ref 0.5–1.4)
CROSSMATCH INTERPRETATION: NORMAL
DISPENSE STATUS: NORMAL
ERYTHROCYTE [DISTWIDTH] IN BLOOD BY AUTOMATED COUNT: 18.8 % (ref 11.5–14.5)
ERYTHROCYTE [DISTWIDTH] IN BLOOD BY AUTOMATED COUNT: 22.4 % (ref 11.5–14.5)
FOLATE SERPL-MCNC: 5.5 NG/ML (ref 4–24)
GFR SERPLBLD CREATININE-BSD FMLA CKD-EPI: >60 ML/MIN/1.73/M2
GLUCOSE SERPL-MCNC: 117 MG/DL (ref 70–110)
GLUCOSE SERPL-MCNC: 117 MG/DL (ref 70–110)
GLUCOSE SERPL-MCNC: 123 MG/DL (ref 70–110)
GLUCOSE SERPL-MCNC: 135 MG/DL (ref 70–110)
HAPTOGLOB SERPL-MCNC: 190 MG/DL (ref 30–250)
HCT VFR BLD AUTO: 22.7 % (ref 37–48.5)
HCT VFR BLD AUTO: 26.3 % (ref 37–48.5)
HGB BLD-MCNC: 6.7 GM/DL (ref 12–16)
HGB BLD-MCNC: 8.1 GM/DL (ref 12–16)
IMM GRANULOCYTES # BLD AUTO: 0.08 K/UL (ref 0–0.04)
IMM GRANULOCYTES # BLD AUTO: 0.08 K/UL (ref 0–0.04)
IMM GRANULOCYTES NFR BLD AUTO: 0.5 % (ref 0–0.5)
IMM GRANULOCYTES NFR BLD AUTO: 0.6 % (ref 0–0.5)
INDIRECT COOMBS: NORMAL
INR PPP: 1.1 (ref 0.8–1.2)
LARGE/GIANT PLATELETS (OHS): PRESENT
LYMPHOCYTES # BLD AUTO: 1.08 K/UL (ref 1–4.8)
LYMPHOCYTES # BLD AUTO: 1.11 K/UL (ref 1–4.8)
MAGNESIUM SERPL-MCNC: 1.7 MG/DL (ref 1.6–2.6)
MAGNESIUM SERPL-MCNC: 1.9 MG/DL (ref 1.6–2.6)
MAGNESIUM SERPL-MCNC: 1.9 MG/DL (ref 1.6–2.6)
MCH RBC QN AUTO: 30.2 PG (ref 27–31)
MCH RBC QN AUTO: 30.5 PG (ref 27–31)
MCHC RBC AUTO-ENTMCNC: 29.5 G/DL (ref 32–36)
MCHC RBC AUTO-ENTMCNC: 30.8 G/DL (ref 32–36)
MCV RBC AUTO: 103 FL (ref 82–98)
MCV RBC AUTO: 98 FL (ref 82–98)
NUCLEATED RBC (/100WBC) (OHS): 0 /100 WBC
NUCLEATED RBC (/100WBC) (OHS): 0 /100 WBC
OVALOCYTES BLD QL SMEAR: NORMAL
PHOSPHATE SERPL-MCNC: 1.3 MG/DL (ref 2.7–4.5)
PHOSPHATE SERPL-MCNC: 2.4 MG/DL (ref 2.7–4.5)
PHOSPHATE SERPL-MCNC: 2.5 MG/DL (ref 2.7–4.5)
PLATELET # BLD AUTO: 276 K/UL (ref 150–450)
PLATELET # BLD AUTO: 304 K/UL (ref 150–450)
PMV BLD AUTO: 10 FL (ref 9.2–12.9)
PMV BLD AUTO: 9.7 FL (ref 9.2–12.9)
POCT GLUCOSE: 155 MG/DL (ref 70–110)
POCT GLUCOSE: 162 MG/DL (ref 70–110)
POCT GLUCOSE: 180 MG/DL (ref 70–110)
POIKILOCYTOSIS BLD QL SMEAR: SLIGHT
POLYCHROMASIA BLD QL SMEAR: NORMAL
POTASSIUM SERPL-SCNC: 4.9 MMOL/L (ref 3.5–5.1)
POTASSIUM SERPL-SCNC: 4.9 MMOL/L (ref 3.5–5.1)
POTASSIUM SERPL-SCNC: 5 MMOL/L (ref 3.5–5.1)
POTASSIUM SERPL-SCNC: 5 MMOL/L (ref 3.5–5.1)
PROT SERPL-MCNC: 6.3 GM/DL (ref 6–8.4)
PROTHROMBIN TIME: 11.9 SECONDS (ref 9–12.5)
RBC # BLD AUTO: 2.2 M/UL (ref 4–5.4)
RBC # BLD AUTO: 2.68 M/UL (ref 4–5.4)
RELATIVE EOSINOPHIL (OHS): 1.8 %
RELATIVE EOSINOPHIL (OHS): 3.3 %
RELATIVE LYMPHOCYTE (OHS): 7.2 % (ref 18–48)
RELATIVE LYMPHOCYTE (OHS): 8.1 % (ref 18–48)
RELATIVE MONOCYTE (OHS): 7.4 % (ref 4–15)
RELATIVE MONOCYTE (OHS): 8.1 % (ref 4–15)
RELATIVE NEUTROPHIL (OHS): 79.5 % (ref 38–73)
RELATIVE NEUTROPHIL (OHS): 82.8 % (ref 38–73)
RH BLD: NORMAL
SODIUM SERPL-SCNC: 140 MMOL/L (ref 136–145)
SODIUM SERPL-SCNC: 142 MMOL/L (ref 136–145)
SODIUM SERPL-SCNC: 144 MMOL/L (ref 136–145)
SODIUM SERPL-SCNC: 144 MMOL/L (ref 136–145)
SPECIMEN OUTDATE: NORMAL
SPHEROCYTES BLD QL SMEAR: NORMAL
UNIT NUMBER: NORMAL
VIT B12 SERPL-MCNC: 899 PG/ML (ref 210–950)
WBC # BLD AUTO: 13.78 K/UL (ref 3.9–12.7)
WBC # BLD AUTO: 15.04 K/UL (ref 3.9–12.7)

## 2025-07-21 PROCEDURE — 63600175 PHARM REV CODE 636 W HCPCS: Mod: HCNC

## 2025-07-21 PROCEDURE — 90945 DIALYSIS ONE EVALUATION: CPT | Mod: HCNC

## 2025-07-21 PROCEDURE — P9016 RBC LEUKOCYTES REDUCED: HCPCS | Mod: HCNC

## 2025-07-21 PROCEDURE — 83010 ASSAY OF HAPTOGLOBIN QUANT: CPT | Mod: HCNC

## 2025-07-21 PROCEDURE — 94761 N-INVAS EAR/PLS OXIMETRY MLT: CPT | Mod: HCNC

## 2025-07-21 PROCEDURE — P9047 ALBUMIN (HUMAN), 25%, 50ML: HCPCS | Mod: HCNC

## 2025-07-21 PROCEDURE — 83735 ASSAY OF MAGNESIUM: CPT | Mod: HCNC | Performed by: STUDENT IN AN ORGANIZED HEALTH CARE EDUCATION/TRAINING PROGRAM

## 2025-07-21 PROCEDURE — 94660 CPAP INITIATION&MGMT: CPT | Mod: HCNC

## 2025-07-21 PROCEDURE — 80100008 HC CRRT DAILY MAINTENANCE: Mod: HCNC

## 2025-07-21 PROCEDURE — 80053 COMPREHEN METABOLIC PANEL: CPT | Mod: HCNC

## 2025-07-21 PROCEDURE — 20000000 HC ICU ROOM: Mod: HCNC

## 2025-07-21 PROCEDURE — 86920 COMPATIBILITY TEST SPIN: CPT | Mod: HCNC

## 2025-07-21 PROCEDURE — 36430 TRANSFUSION BLD/BLD COMPNT: CPT | Mod: HCNC

## 2025-07-21 PROCEDURE — 27100171 HC OXYGEN HIGH FLOW UP TO 24 HOURS: Mod: HCNC

## 2025-07-21 PROCEDURE — 99900035 HC TECH TIME PER 15 MIN (STAT): Mod: HCNC

## 2025-07-21 PROCEDURE — 85025 COMPLETE CBC W/AUTO DIFF WBC: CPT | Mod: HCNC

## 2025-07-21 PROCEDURE — 25000003 PHARM REV CODE 250: Mod: HCNC

## 2025-07-21 PROCEDURE — 82607 VITAMIN B-12: CPT | Mod: HCNC | Performed by: STUDENT IN AN ORGANIZED HEALTH CARE EDUCATION/TRAINING PROGRAM

## 2025-07-21 PROCEDURE — 25000003 PHARM REV CODE 250: Mod: HCNC | Performed by: INTERNAL MEDICINE

## 2025-07-21 PROCEDURE — 86901 BLOOD TYPING SEROLOGIC RH(D): CPT | Mod: HCNC | Performed by: INTERNAL MEDICINE

## 2025-07-21 PROCEDURE — 85610 PROTHROMBIN TIME: CPT | Mod: HCNC | Performed by: STUDENT IN AN ORGANIZED HEALTH CARE EDUCATION/TRAINING PROGRAM

## 2025-07-21 PROCEDURE — 99291 CRITICAL CARE FIRST HOUR: CPT | Mod: HCNC,GC,, | Performed by: STUDENT IN AN ORGANIZED HEALTH CARE EDUCATION/TRAINING PROGRAM

## 2025-07-21 PROCEDURE — C9254 INJECTION, LACOSAMIDE: HCPCS | Mod: HCNC

## 2025-07-21 PROCEDURE — 82374 ASSAY BLOOD CARBON DIOXIDE: CPT | Mod: HCNC | Performed by: STUDENT IN AN ORGANIZED HEALTH CARE EDUCATION/TRAINING PROGRAM

## 2025-07-21 PROCEDURE — 82746 ASSAY OF FOLIC ACID SERUM: CPT | Mod: HCNC | Performed by: STUDENT IN AN ORGANIZED HEALTH CARE EDUCATION/TRAINING PROGRAM

## 2025-07-21 PROCEDURE — 90945 DIALYSIS ONE EVALUATION: CPT | Mod: HCNC,GC,, | Performed by: INTERNAL MEDICINE

## 2025-07-21 PROCEDURE — 63600175 PHARM REV CODE 636 W HCPCS: Mod: HCNC | Performed by: EMERGENCY MEDICINE

## 2025-07-21 PROCEDURE — 92526 ORAL FUNCTION THERAPY: CPT | Mod: HCNC

## 2025-07-21 PROCEDURE — 85025 COMPLETE CBC W/AUTO DIFF WBC: CPT | Mod: HCNC | Performed by: STUDENT IN AN ORGANIZED HEALTH CARE EDUCATION/TRAINING PROGRAM

## 2025-07-21 PROCEDURE — 27000207 HC ISOLATION: Mod: HCNC

## 2025-07-21 PROCEDURE — 25000003 PHARM REV CODE 250: Mod: HCNC | Performed by: STUDENT IN AN ORGANIZED HEALTH CARE EDUCATION/TRAINING PROGRAM

## 2025-07-21 RX ORDER — HYDROCODONE BITARTRATE AND ACETAMINOPHEN 500; 5 MG/1; MG/1
TABLET ORAL CONTINUOUS
Status: DISCONTINUED | OUTPATIENT
Start: 2025-07-21 | End: 2025-07-22

## 2025-07-21 RX ORDER — MAGNESIUM SULFATE HEPTAHYDRATE 40 MG/ML
2 INJECTION, SOLUTION INTRAVENOUS
Status: DISCONTINUED | OUTPATIENT
Start: 2025-07-21 | End: 2025-07-21

## 2025-07-21 RX ORDER — MAGNESIUM SULFATE HEPTAHYDRATE 40 MG/ML
2 INJECTION, SOLUTION INTRAVENOUS
Status: DISCONTINUED | OUTPATIENT
Start: 2025-07-21 | End: 2025-07-22

## 2025-07-21 RX ORDER — FOLIC ACID 1 MG/1
2 TABLET ORAL DAILY
Status: DISCONTINUED | OUTPATIENT
Start: 2025-07-21 | End: 2025-08-26 | Stop reason: HOSPADM

## 2025-07-21 RX ORDER — FOLIC ACID 1 MG/1
2 TABLET ORAL DAILY
Status: DISCONTINUED | OUTPATIENT
Start: 2025-07-21 | End: 2025-07-21

## 2025-07-21 RX ORDER — HYDROCODONE BITARTRATE AND ACETAMINOPHEN 500; 5 MG/1; MG/1
TABLET ORAL CONTINUOUS
Status: DISCONTINUED | OUTPATIENT
Start: 2025-07-21 | End: 2025-07-21

## 2025-07-21 RX ADMIN — LEVETIRACETAM 500 MG: 100 INJECTION INTRAVENOUS at 08:07

## 2025-07-21 RX ADMIN — SODIUM CHLORIDE: 9 INJECTION, SOLUTION INTRAVENOUS at 04:07

## 2025-07-21 RX ADMIN — INSULIN ASPART 2 UNITS: 100 INJECTION, SOLUTION INTRAVENOUS; SUBCUTANEOUS at 04:07

## 2025-07-21 RX ADMIN — ALBUMIN (HUMAN) 12.5 G: 12.5 SOLUTION INTRAVENOUS at 09:07

## 2025-07-21 RX ADMIN — HEPARIN SODIUM 5000 UNITS: 5000 INJECTION INTRAVENOUS; SUBCUTANEOUS at 08:07

## 2025-07-21 RX ADMIN — SODIUM PHOSPHATE, MONOBASIC, MONOHYDRATE AND SODIUM PHOSPHATE, DIBASIC, ANHYDROUS 39.9 MMOL: 142; 276 INJECTION, SOLUTION INTRAVENOUS at 05:07

## 2025-07-21 RX ADMIN — FOLIC ACID 2 MG: 1 TABLET ORAL at 04:07

## 2025-07-21 RX ADMIN — SODIUM CHLORIDE: 9 INJECTION, SOLUTION INTRAVENOUS at 11:07

## 2025-07-21 RX ADMIN — LACOSAMIDE 200 MG: 10 INJECTION INTRAVENOUS at 09:07

## 2025-07-21 RX ADMIN — LACOSAMIDE 200 MG: 10 INJECTION INTRAVENOUS at 08:07

## 2025-07-21 RX ADMIN — ALBUMIN (HUMAN) 25 G: 12.5 SOLUTION INTRAVENOUS at 09:07

## 2025-07-21 RX ADMIN — PANTOPRAZOLE SODIUM 40 MG: 40 INJECTION, POWDER, FOR SOLUTION INTRAVENOUS at 09:07

## 2025-07-21 RX ADMIN — HEPARIN SODIUM 5000 UNITS: 5000 INJECTION INTRAVENOUS; SUBCUTANEOUS at 09:07

## 2025-07-21 RX ADMIN — LEVETIRACETAM 500 MG: 100 INJECTION INTRAVENOUS at 09:07

## 2025-07-21 RX ADMIN — SODIUM CHLORIDE: 9 INJECTION, SOLUTION INTRAVENOUS at 12:07

## 2025-07-21 RX ADMIN — INSULIN GLARGINE 8 UNITS: 100 INJECTION, SOLUTION SUBCUTANEOUS at 09:07

## 2025-07-21 RX ADMIN — INSULIN ASPART 2 UNITS: 100 INJECTION, SOLUTION INTRAVENOUS; SUBCUTANEOUS at 09:07

## 2025-07-21 RX ADMIN — PANTOPRAZOLE SODIUM 40 MG: 40 INJECTION, POWDER, FOR SOLUTION INTRAVENOUS at 08:07

## 2025-07-22 LAB
ABSOLUTE EOSINOPHIL (OHS): 0.18 K/UL
ABSOLUTE MONOCYTE (OHS): 1.27 K/UL (ref 0.3–1)
ABSOLUTE NEUTROPHIL COUNT (OHS): 13.33 K/UL (ref 1.8–7.7)
ALBUMIN SERPL BCP-MCNC: 3.2 G/DL (ref 3.5–5.2)
ALBUMIN SERPL BCP-MCNC: 3.5 G/DL (ref 3.5–5.2)
ALLENS TEST: ABNORMAL
ALP SERPL-CCNC: 156 UNIT/L (ref 40–150)
ALT SERPL W/O P-5'-P-CCNC: <5 UNIT/L (ref 10–44)
ANION GAP (OHS): 4 MMOL/L (ref 8–16)
ANION GAP (OHS): 7 MMOL/L (ref 8–16)
AST SERPL-CCNC: 16 UNIT/L (ref 11–45)
BASOPHILS # BLD AUTO: 0.04 K/UL
BASOPHILS NFR BLD AUTO: 0.2 %
BILIRUB SERPL-MCNC: 0.2 MG/DL (ref 0.1–1)
BUN SERPL-MCNC: 11 MG/DL (ref 8–23)
BUN SERPL-MCNC: 6 MG/DL (ref 8–23)
BUN SERPL-MCNC: 6 MG/DL (ref 8–23)
BUN SERPL-MCNC: 8 MG/DL (ref 8–23)
CALCIUM SERPL-MCNC: 7.7 MG/DL (ref 8.7–10.5)
CALCIUM SERPL-MCNC: 7.8 MG/DL (ref 8.7–10.5)
CHLORIDE SERPL-SCNC: 116 MMOL/L (ref 95–110)
CHLORIDE SERPL-SCNC: 117 MMOL/L (ref 95–110)
CHLORIDE SERPL-SCNC: 118 MMOL/L (ref 95–110)
CHLORIDE SERPL-SCNC: 118 MMOL/L (ref 95–110)
CO2 SERPL-SCNC: 19 MMOL/L (ref 23–29)
CREAT SERPL-MCNC: 0.5 MG/DL (ref 0.5–1.4)
CREAT SERPL-MCNC: 0.5 MG/DL (ref 0.5–1.4)
CREAT SERPL-MCNC: 0.6 MG/DL (ref 0.5–1.4)
CREAT SERPL-MCNC: 0.6 MG/DL (ref 0.5–1.4)
DELSYS: ABNORMAL
EP: 6
EP: 6
ERYTHROCYTE [DISTWIDTH] IN BLOOD BY AUTOMATED COUNT: 23.1 % (ref 11.5–14.5)
ERYTHROCYTE [SEDIMENTATION RATE] IN BLOOD BY WESTERGREN METHOD: 16 MM/H
ERYTHROCYTE [SEDIMENTATION RATE] IN BLOOD BY WESTERGREN METHOD: 16 MM/H
FIO2: 24
FIO2: 24
GFR SERPLBLD CREATININE-BSD FMLA CKD-EPI: >60 ML/MIN/1.73/M2
GLUCOSE SERPL-MCNC: 132 MG/DL (ref 70–110)
GLUCOSE SERPL-MCNC: 163 MG/DL (ref 70–110)
GLUCOSE SERPL-MCNC: 163 MG/DL (ref 70–110)
GLUCOSE SERPL-MCNC: 197 MG/DL (ref 70–110)
HCO3 UR-SCNC: 21.4 MMOL/L (ref 24–28)
HCO3 UR-SCNC: 21.9 MMOL/L (ref 24–28)
HCO3 UR-SCNC: 22.4 MMOL/L (ref 24–28)
HCT VFR BLD AUTO: 26.6 % (ref 37–48.5)
HGB BLD-MCNC: 8.1 GM/DL (ref 12–16)
IMM GRANULOCYTES # BLD AUTO: 0.08 K/UL (ref 0–0.04)
IMM GRANULOCYTES NFR BLD AUTO: 0.5 % (ref 0–0.5)
IP: 16
IP: 16
LACTATE SERPL-SCNC: 0.6 MMOL/L (ref 0.5–2.2)
LYMPHOCYTES # BLD AUTO: 1.36 K/UL (ref 1–4.8)
MAGNESIUM SERPL-MCNC: 1.7 MG/DL (ref 1.6–2.6)
MAGNESIUM SERPL-MCNC: 1.8 MG/DL (ref 1.6–2.6)
MAGNESIUM SERPL-MCNC: 1.8 MG/DL (ref 1.6–2.6)
MCH RBC QN AUTO: 30.5 PG (ref 27–31)
MCHC RBC AUTO-ENTMCNC: 30.5 G/DL (ref 32–36)
MCV RBC AUTO: 100 FL (ref 82–98)
MIN VOL: 4
MIN VOL: 7.1
MODE: ABNORMAL
MODE: ABNORMAL
NUCLEATED RBC (/100WBC) (OHS): 0 /100 WBC
PCO2 BLDA: 63.5 MMHG (ref 35–45)
PCO2 BLDA: 69 MMHG (ref 35–45)
PCO2 BLDA: 79.8 MMHG (ref 35–45)
PH SMN: 7.06 [PH] (ref 7.35–7.45)
PH SMN: 7.11 [PH] (ref 7.35–7.45)
PH SMN: 7.14 [PH] (ref 7.35–7.45)
PHOSPHATE SERPL-MCNC: 2.6 MG/DL (ref 2.7–4.5)
PHOSPHATE SERPL-MCNC: 3.9 MG/DL (ref 2.7–4.5)
PHOSPHATE SERPL-MCNC: 4.5 MG/DL (ref 2.7–4.5)
PLATELET # BLD AUTO: 243 K/UL (ref 150–450)
PMV BLD AUTO: 9.9 FL (ref 9.2–12.9)
PO2 BLDA: 41 MMHG (ref 40–60)
PO2 BLDA: 42 MMHG (ref 40–60)
PO2 BLDA: 45 MMHG (ref 40–60)
POC BE: -8 MMOL/L (ref -2–2)
POC SATURATED O2: 58 % (ref 95–100)
POC SATURATED O2: 59 % (ref 95–100)
POC SATURATED O2: 62 % (ref 95–100)
POC TCO2: 23 MMOL/L (ref 24–29)
POC TCO2: 24 MMOL/L (ref 24–29)
POC TCO2: 25 MMOL/L (ref 24–29)
POCT GLUCOSE: 145 MG/DL (ref 70–110)
POCT GLUCOSE: 201 MG/DL (ref 70–110)
POCT GLUCOSE: 208 MG/DL (ref 70–110)
POCT GLUCOSE: 235 MG/DL (ref 70–110)
POTASSIUM SERPL-SCNC: 4.9 MMOL/L (ref 3.5–5.1)
POTASSIUM SERPL-SCNC: 5 MMOL/L (ref 3.5–5.1)
POTASSIUM SERPL-SCNC: 5 MMOL/L (ref 3.5–5.1)
POTASSIUM SERPL-SCNC: 5.1 MMOL/L (ref 3.5–5.1)
PROT SERPL-MCNC: 6.6 GM/DL (ref 6–8.4)
RBC # BLD AUTO: 2.66 M/UL (ref 4–5.4)
RELATIVE EOSINOPHIL (OHS): 1.1 %
RELATIVE LYMPHOCYTE (OHS): 8.4 % (ref 18–48)
RELATIVE MONOCYTE (OHS): 7.8 % (ref 4–15)
RELATIVE NEUTROPHIL (OHS): 82 % (ref 38–73)
SAMPLE: ABNORMAL
SITE: ABNORMAL
SODIUM SERPL-SCNC: 139 MMOL/L (ref 136–145)
SODIUM SERPL-SCNC: 141 MMOL/L (ref 136–145)
SODIUM SERPL-SCNC: 141 MMOL/L (ref 136–145)
SODIUM SERPL-SCNC: 143 MMOL/L (ref 136–145)
SP02: 100
SPONT RATE: 16
SPONT RATE: 18
WBC # BLD AUTO: 16.26 K/UL (ref 3.9–12.7)

## 2025-07-22 PROCEDURE — 83605 ASSAY OF LACTIC ACID: CPT | Mod: HCNC

## 2025-07-22 PROCEDURE — 63600175 PHARM REV CODE 636 W HCPCS: Mod: HCNC

## 2025-07-22 PROCEDURE — 90945 DIALYSIS ONE EVALUATION: CPT | Mod: HCNC

## 2025-07-22 PROCEDURE — 82040 ASSAY OF SERUM ALBUMIN: CPT | Mod: HCNC

## 2025-07-22 PROCEDURE — 20000000 HC ICU ROOM: Mod: HCNC

## 2025-07-22 PROCEDURE — 94761 N-INVAS EAR/PLS OXIMETRY MLT: CPT | Mod: HCNC

## 2025-07-22 PROCEDURE — 36620 INSERTION CATHETER ARTERY: CPT | Mod: HCNC

## 2025-07-22 PROCEDURE — 90945 DIALYSIS ONE EVALUATION: CPT | Mod: HCNC,,, | Performed by: INTERNAL MEDICINE

## 2025-07-22 PROCEDURE — 25000003 PHARM REV CODE 250: Mod: HCNC | Performed by: STUDENT IN AN ORGANIZED HEALTH CARE EDUCATION/TRAINING PROGRAM

## 2025-07-22 PROCEDURE — 27100171 HC OXYGEN HIGH FLOW UP TO 24 HOURS: Mod: HCNC

## 2025-07-22 PROCEDURE — 94660 CPAP INITIATION&MGMT: CPT | Mod: HCNC

## 2025-07-22 PROCEDURE — 85025 COMPLETE CBC W/AUTO DIFF WBC: CPT | Mod: HCNC

## 2025-07-22 PROCEDURE — 82310 ASSAY OF CALCIUM: CPT | Mod: HCNC | Performed by: STUDENT IN AN ORGANIZED HEALTH CARE EDUCATION/TRAINING PROGRAM

## 2025-07-22 PROCEDURE — 27000207 HC ISOLATION: Mod: HCNC

## 2025-07-22 PROCEDURE — 80100008 HC CRRT DAILY MAINTENANCE: Mod: HCNC

## 2025-07-22 PROCEDURE — P9047 ALBUMIN (HUMAN), 25%, 50ML: HCPCS | Mod: HCNC

## 2025-07-22 PROCEDURE — 87040 BLOOD CULTURE FOR BACTERIA: CPT | Mod: HCNC

## 2025-07-22 PROCEDURE — 82803 BLOOD GASES ANY COMBINATION: CPT | Mod: HCNC

## 2025-07-22 PROCEDURE — 63600175 PHARM REV CODE 636 W HCPCS: Mod: HCNC | Performed by: EMERGENCY MEDICINE

## 2025-07-22 PROCEDURE — 99900035 HC TECH TIME PER 15 MIN (STAT): Mod: HCNC

## 2025-07-22 PROCEDURE — 25000003 PHARM REV CODE 250: Mod: HCNC

## 2025-07-22 PROCEDURE — C9254 INJECTION, LACOSAMIDE: HCPCS | Mod: HCNC

## 2025-07-22 PROCEDURE — 99291 CRITICAL CARE FIRST HOUR: CPT | Mod: HCNC,GC,, | Performed by: STUDENT IN AN ORGANIZED HEALTH CARE EDUCATION/TRAINING PROGRAM

## 2025-07-22 PROCEDURE — 83735 ASSAY OF MAGNESIUM: CPT | Mod: HCNC | Performed by: STUDENT IN AN ORGANIZED HEALTH CARE EDUCATION/TRAINING PROGRAM

## 2025-07-22 PROCEDURE — 63600175 PHARM REV CODE 636 W HCPCS: Mod: HCNC | Performed by: STUDENT IN AN ORGANIZED HEALTH CARE EDUCATION/TRAINING PROGRAM

## 2025-07-22 RX ORDER — HYDROCODONE BITARTRATE AND ACETAMINOPHEN 500; 5 MG/1; MG/1
TABLET ORAL CONTINUOUS
Status: DISCONTINUED | OUTPATIENT
Start: 2025-07-22 | End: 2025-07-23

## 2025-07-22 RX ORDER — MIDODRINE HYDROCHLORIDE 5 MG/1
10 TABLET ORAL 3 TIMES DAILY
Status: DISCONTINUED | OUTPATIENT
Start: 2025-07-22 | End: 2025-07-22

## 2025-07-22 RX ORDER — MAGNESIUM SULFATE HEPTAHYDRATE 40 MG/ML
2 INJECTION, SOLUTION INTRAVENOUS
Status: DISCONTINUED | OUTPATIENT
Start: 2025-07-22 | End: 2025-07-23

## 2025-07-22 RX ORDER — VANCOMYCIN 2 GRAM/500 ML IN 0.9 % SODIUM CHLORIDE INTRAVENOUS
2000 ONCE
Status: COMPLETED | OUTPATIENT
Start: 2025-07-22 | End: 2025-07-22

## 2025-07-22 RX ORDER — MIDODRINE HYDROCHLORIDE 5 MG/1
10 TABLET ORAL 3 TIMES DAILY PRN
Status: DISCONTINUED | OUTPATIENT
Start: 2025-07-22 | End: 2025-07-22

## 2025-07-22 RX ORDER — NOREPINEPHRINE BITARTRATE/D5W 4MG/250ML
0-.2 PLASTIC BAG, INJECTION (ML) INTRAVENOUS CONTINUOUS
Status: DISCONTINUED | OUTPATIENT
Start: 2025-07-22 | End: 2025-07-23

## 2025-07-22 RX ORDER — MIDODRINE HYDROCHLORIDE 5 MG/1
10 TABLET ORAL 3 TIMES DAILY PRN
Status: DISCONTINUED | OUTPATIENT
Start: 2025-07-22 | End: 2025-07-23

## 2025-07-22 RX ORDER — LEVETIRACETAM 100 MG/ML
500 SOLUTION ORAL 2 TIMES DAILY
Status: DISCONTINUED | OUTPATIENT
Start: 2025-07-22 | End: 2025-08-26 | Stop reason: HOSPADM

## 2025-07-22 RX ORDER — MIDODRINE HYDROCHLORIDE 2.5 MG/1
2.5 TABLET ORAL 3 TIMES DAILY
Status: CANCELLED | OUTPATIENT
Start: 2025-07-22

## 2025-07-22 RX ADMIN — LEVETIRACETAM 500 MG: 500 SOLUTION ORAL at 08:07

## 2025-07-22 RX ADMIN — PANTOPRAZOLE SODIUM 40 MG: 40 INJECTION, POWDER, FOR SOLUTION INTRAVENOUS at 09:07

## 2025-07-22 RX ADMIN — FOLIC ACID 2 MG: 1 TABLET ORAL at 09:07

## 2025-07-22 RX ADMIN — HEPARIN SODIUM 5000 UNITS: 5000 INJECTION INTRAVENOUS; SUBCUTANEOUS at 08:07

## 2025-07-22 RX ADMIN — INSULIN ASPART 4 UNITS: 100 INJECTION, SOLUTION INTRAVENOUS; SUBCUTANEOUS at 08:07

## 2025-07-22 RX ADMIN — LEVETIRACETAM 500 MG: 100 INJECTION INTRAVENOUS at 09:07

## 2025-07-22 RX ADMIN — PANTOPRAZOLE SODIUM 40 MG: 40 INJECTION, POWDER, FOR SOLUTION INTRAVENOUS at 08:07

## 2025-07-22 RX ADMIN — ALBUMIN (HUMAN) 25 G: 12.5 SOLUTION INTRAVENOUS at 10:07

## 2025-07-22 RX ADMIN — INSULIN GLARGINE 8 UNITS: 100 INJECTION, SOLUTION SUBCUTANEOUS at 09:07

## 2025-07-22 RX ADMIN — LACOSAMIDE 200 MG: 10 INJECTION INTRAVENOUS at 08:07

## 2025-07-22 RX ADMIN — INSULIN ASPART 4 UNITS: 100 INJECTION, SOLUTION INTRAVENOUS; SUBCUTANEOUS at 12:07

## 2025-07-22 RX ADMIN — SODIUM CHLORIDE: 9 INJECTION, SOLUTION INTRAVENOUS at 06:07

## 2025-07-22 RX ADMIN — VANCOMYCIN HYDROCHLORIDE 2000 MG: 500 INJECTION, POWDER, LYOPHILIZED, FOR SOLUTION INTRAVENOUS at 01:07

## 2025-07-22 RX ADMIN — SODIUM CHLORIDE: 9 INJECTION, SOLUTION INTRAVENOUS at 04:07

## 2025-07-22 RX ADMIN — INSULIN ASPART 4 UNITS: 100 INJECTION, SOLUTION INTRAVENOUS; SUBCUTANEOUS at 03:07

## 2025-07-22 RX ADMIN — LACOSAMIDE 200 MG: 10 INJECTION INTRAVENOUS at 09:07

## 2025-07-22 RX ADMIN — SODIUM CHLORIDE: 9 INJECTION, SOLUTION INTRAVENOUS at 09:07

## 2025-07-22 RX ADMIN — PIPERACILLIN SODIUM AND TAZOBACTAM SODIUM 4.5 G: 4; .5 INJECTION, POWDER, LYOPHILIZED, FOR SOLUTION INTRAVENOUS at 01:07

## 2025-07-22 RX ADMIN — SODIUM PHOSPHATE, MONOBASIC, MONOHYDRATE AND SODIUM PHOSPHATE, DIBASIC, ANHYDROUS 20.1 MMOL: 142; 276 INJECTION, SOLUTION INTRAVENOUS at 08:07

## 2025-07-22 RX ADMIN — HEPARIN SODIUM 5000 UNITS: 5000 INJECTION INTRAVENOUS; SUBCUTANEOUS at 10:07

## 2025-07-23 LAB
ABSOLUTE EOSINOPHIL (OHS): 0.22 K/UL
ABSOLUTE MONOCYTE (OHS): 1.45 K/UL (ref 0.3–1)
ABSOLUTE NEUTROPHIL COUNT (OHS): 13.94 K/UL (ref 1.8–7.7)
ALBUMIN SERPL BCP-MCNC: 2.7 G/DL (ref 3.5–5.2)
ALBUMIN SERPL BCP-MCNC: 2.9 G/DL (ref 3.5–5.2)
ALBUMIN SERPL BCP-MCNC: 3.1 G/DL (ref 3.5–5.2)
ALBUMIN SERPL BCP-MCNC: 3.1 G/DL (ref 3.5–5.2)
ALLENS TEST: ABNORMAL
ALLENS TEST: ABNORMAL
ALP SERPL-CCNC: 149 UNIT/L (ref 40–150)
ALT SERPL W/O P-5'-P-CCNC: <5 UNIT/L (ref 10–44)
ANION GAP (OHS): 5 MMOL/L (ref 8–16)
ANION GAP (OHS): 5 MMOL/L (ref 8–16)
ANION GAP (OHS): 7 MMOL/L (ref 8–16)
ANION GAP (OHS): 7 MMOL/L (ref 8–16)
AST SERPL-CCNC: 15 UNIT/L (ref 11–45)
BASOPHILS # BLD AUTO: 0.04 K/UL
BASOPHILS NFR BLD AUTO: 0.2 %
BILIRUB SERPL-MCNC: 0.2 MG/DL (ref 0.1–1)
BUN SERPL-MCNC: 13 MG/DL (ref 8–23)
BUN SERPL-MCNC: 13 MG/DL (ref 8–23)
BUN SERPL-MCNC: 16 MG/DL (ref 8–23)
BUN SERPL-MCNC: 18 MG/DL (ref 8–23)
CALCIUM SERPL-MCNC: 7.3 MG/DL (ref 8.7–10.5)
CALCIUM SERPL-MCNC: 7.5 MG/DL (ref 8.7–10.5)
CALCIUM SERPL-MCNC: 7.8 MG/DL (ref 8.7–10.5)
CALCIUM SERPL-MCNC: 7.8 MG/DL (ref 8.7–10.5)
CHLORIDE SERPL-SCNC: 118 MMOL/L (ref 95–110)
CHLORIDE SERPL-SCNC: 119 MMOL/L (ref 95–110)
CO2 SERPL-SCNC: 17 MMOL/L (ref 23–29)
CO2 SERPL-SCNC: 17 MMOL/L (ref 23–29)
CO2 SERPL-SCNC: 18 MMOL/L (ref 23–29)
CO2 SERPL-SCNC: 18 MMOL/L (ref 23–29)
CREAT SERPL-MCNC: 0.6 MG/DL (ref 0.5–1.4)
CREAT SERPL-MCNC: 0.6 MG/DL (ref 0.5–1.4)
CREAT SERPL-MCNC: 0.7 MG/DL (ref 0.5–1.4)
CREAT SERPL-MCNC: 0.8 MG/DL (ref 0.5–1.4)
DELSYS: ABNORMAL
DELSYS: ABNORMAL
EP: 5
ERYTHROCYTE [DISTWIDTH] IN BLOOD BY AUTOMATED COUNT: 22.3 % (ref 11.5–14.5)
ERYTHROCYTE [SEDIMENTATION RATE] IN BLOOD BY WESTERGREN METHOD: 16 MM/H
FIO2: 24
GFR SERPLBLD CREATININE-BSD FMLA CKD-EPI: >60 ML/MIN/1.73/M2
GLUCOSE SERPL-MCNC: 138 MG/DL (ref 70–110)
GLUCOSE SERPL-MCNC: 138 MG/DL (ref 70–110)
GLUCOSE SERPL-MCNC: 155 MG/DL (ref 70–110)
GLUCOSE SERPL-MCNC: 175 MG/DL (ref 70–110)
HCO3 UR-SCNC: 19.1 MMOL/L (ref 24–28)
HCO3 UR-SCNC: 19.4 MMOL/L (ref 24–28)
HCT VFR BLD AUTO: 23.4 % (ref 37–48.5)
HGB BLD-MCNC: 7.1 GM/DL (ref 12–16)
IMM GRANULOCYTES # BLD AUTO: 0.12 K/UL (ref 0–0.04)
IMM GRANULOCYTES NFR BLD AUTO: 0.7 % (ref 0–0.5)
IP: 16
LYMPHOCYTES # BLD AUTO: 0.97 K/UL (ref 1–4.8)
MAGNESIUM SERPL-MCNC: 1.7 MG/DL (ref 1.6–2.6)
MAGNESIUM SERPL-MCNC: 1.8 MG/DL (ref 1.6–2.6)
MAGNESIUM SERPL-MCNC: 1.8 MG/DL (ref 1.6–2.6)
MCH RBC QN AUTO: 30.3 PG (ref 27–31)
MCHC RBC AUTO-ENTMCNC: 30.3 G/DL (ref 32–36)
MCV RBC AUTO: 100 FL (ref 82–98)
MODE: ABNORMAL
NUCLEATED RBC (/100WBC) (OHS): 0 /100 WBC
PCO2 BLDA: 48.2 MMHG (ref 35–45)
PCO2 BLDA: 52.9 MMHG (ref 35–45)
PH SMN: 7.17 [PH] (ref 7.35–7.45)
PH SMN: 7.21 [PH] (ref 7.35–7.45)
PHOSPHATE SERPL-MCNC: 3.5 MG/DL (ref 2.7–4.5)
PHOSPHATE SERPL-MCNC: 3.6 MG/DL (ref 2.7–4.5)
PHOSPHATE SERPL-MCNC: 3.9 MG/DL (ref 2.7–4.5)
PLATELET # BLD AUTO: 201 K/UL (ref 150–450)
PMV BLD AUTO: 10.4 FL (ref 9.2–12.9)
PO2 BLDA: 79 MMHG (ref 80–100)
PO2 BLDA: 90 MMHG (ref 80–100)
POC BE: -9 MMOL/L (ref -2–2)
POC BE: -9 MMOL/L (ref -2–2)
POC SATURATED O2: 92 % (ref 95–100)
POC SATURATED O2: 95 % (ref 95–100)
POC TCO2: 21 MMOL/L (ref 23–27)
POC TCO2: 21 MMOL/L (ref 23–27)
POCT GLUCOSE: 181 MG/DL (ref 70–110)
POCT GLUCOSE: 196 MG/DL (ref 70–110)
POCT GLUCOSE: 197 MG/DL (ref 70–110)
POCT GLUCOSE: 257 MG/DL (ref 70–110)
POTASSIUM SERPL-SCNC: 4.7 MMOL/L (ref 3.5–5.1)
POTASSIUM SERPL-SCNC: 4.9 MMOL/L (ref 3.5–5.1)
POTASSIUM SERPL-SCNC: 5 MMOL/L (ref 3.5–5.1)
POTASSIUM SERPL-SCNC: 5 MMOL/L (ref 3.5–5.1)
PROT SERPL-MCNC: 6.3 GM/DL (ref 6–8.4)
RBC # BLD AUTO: 2.34 M/UL (ref 4–5.4)
RELATIVE EOSINOPHIL (OHS): 1.3 %
RELATIVE LYMPHOCYTE (OHS): 5.8 % (ref 18–48)
RELATIVE MONOCYTE (OHS): 8.7 % (ref 4–15)
RELATIVE NEUTROPHIL (OHS): 83.3 % (ref 38–73)
SAMPLE: ABNORMAL
SAMPLE: ABNORMAL
SITE: ABNORMAL
SITE: ABNORMAL
SODIUM SERPL-SCNC: 141 MMOL/L (ref 136–145)
SODIUM SERPL-SCNC: 142 MMOL/L (ref 136–145)
SODIUM SERPL-SCNC: 143 MMOL/L (ref 136–145)
SODIUM SERPL-SCNC: 143 MMOL/L (ref 136–145)
SPONT RATE: 20
WBC # BLD AUTO: 16.74 K/UL (ref 3.9–12.7)

## 2025-07-23 PROCEDURE — 63600175 PHARM REV CODE 636 W HCPCS: Mod: HCNC | Performed by: EMERGENCY MEDICINE

## 2025-07-23 PROCEDURE — 63600175 PHARM REV CODE 636 W HCPCS: Mod: HCNC | Performed by: STUDENT IN AN ORGANIZED HEALTH CARE EDUCATION/TRAINING PROGRAM

## 2025-07-23 PROCEDURE — 90945 DIALYSIS ONE EVALUATION: CPT | Mod: HCNC

## 2025-07-23 PROCEDURE — 25000003 PHARM REV CODE 250: Mod: HCNC

## 2025-07-23 PROCEDURE — 27000207 HC ISOLATION: Mod: HCNC

## 2025-07-23 PROCEDURE — 63600175 PHARM REV CODE 636 W HCPCS: Mod: HCNC

## 2025-07-23 PROCEDURE — 90945 DIALYSIS ONE EVALUATION: CPT | Mod: HCNC,GC,, | Performed by: INTERNAL MEDICINE

## 2025-07-23 PROCEDURE — 25000003 PHARM REV CODE 250: Mod: HCNC | Performed by: STUDENT IN AN ORGANIZED HEALTH CARE EDUCATION/TRAINING PROGRAM

## 2025-07-23 PROCEDURE — 80069 RENAL FUNCTION PANEL: CPT | Mod: HCNC | Performed by: STUDENT IN AN ORGANIZED HEALTH CARE EDUCATION/TRAINING PROGRAM

## 2025-07-23 PROCEDURE — 94761 N-INVAS EAR/PLS OXIMETRY MLT: CPT | Mod: HCNC,XB

## 2025-07-23 PROCEDURE — 63600175 PHARM REV CODE 636 W HCPCS: Mod: HCNC | Performed by: NURSE PRACTITIONER

## 2025-07-23 PROCEDURE — 80100008 HC CRRT DAILY MAINTENANCE: Mod: HCNC

## 2025-07-23 PROCEDURE — 37799 UNLISTED PX VASCULAR SURGERY: CPT | Mod: HCNC

## 2025-07-23 PROCEDURE — 27100171 HC OXYGEN HIGH FLOW UP TO 24 HOURS: Mod: HCNC

## 2025-07-23 PROCEDURE — 83735 ASSAY OF MAGNESIUM: CPT | Mod: HCNC | Performed by: STUDENT IN AN ORGANIZED HEALTH CARE EDUCATION/TRAINING PROGRAM

## 2025-07-23 PROCEDURE — 85025 COMPLETE CBC W/AUTO DIFF WBC: CPT | Mod: HCNC

## 2025-07-23 PROCEDURE — C9254 INJECTION, LACOSAMIDE: HCPCS | Mod: HCNC

## 2025-07-23 PROCEDURE — 99291 CRITICAL CARE FIRST HOUR: CPT | Mod: HCNC,GC,, | Performed by: STUDENT IN AN ORGANIZED HEALTH CARE EDUCATION/TRAINING PROGRAM

## 2025-07-23 PROCEDURE — 82803 BLOOD GASES ANY COMBINATION: CPT | Mod: HCNC

## 2025-07-23 PROCEDURE — 20000000 HC ICU ROOM: Mod: HCNC

## 2025-07-23 PROCEDURE — 80053 COMPREHEN METABOLIC PANEL: CPT | Mod: HCNC

## 2025-07-23 PROCEDURE — 94660 CPAP INITIATION&MGMT: CPT | Mod: HCNC

## 2025-07-23 PROCEDURE — 99900035 HC TECH TIME PER 15 MIN (STAT): Mod: HCNC

## 2025-07-23 RX ORDER — MAGNESIUM SULFATE HEPTAHYDRATE 40 MG/ML
2 INJECTION, SOLUTION INTRAVENOUS
Status: DISCONTINUED | OUTPATIENT
Start: 2025-07-23 | End: 2025-07-24

## 2025-07-23 RX ORDER — LANOLIN ALCOHOL/MO/W.PET/CERES
800 CREAM (GRAM) TOPICAL
Status: DISCONTINUED | OUTPATIENT
Start: 2025-07-23 | End: 2025-07-23

## 2025-07-23 RX ORDER — LAMOTRIGINE 100 MG/1
200 TABLET ORAL DAILY
Status: DISCONTINUED | OUTPATIENT
Start: 2025-07-23 | End: 2025-07-30

## 2025-07-23 RX ORDER — SODIUM,POTASSIUM PHOSPHATES 280-250MG
2 POWDER IN PACKET (EA) ORAL
Status: DISCONTINUED | OUTPATIENT
Start: 2025-07-23 | End: 2025-07-23

## 2025-07-23 RX ORDER — PANTOPRAZOLE SODIUM 40 MG/1
40 FOR SUSPENSION ORAL 2 TIMES DAILY
Status: DISCONTINUED | OUTPATIENT
Start: 2025-07-23 | End: 2025-07-23

## 2025-07-23 RX ORDER — SODIUM CHLORIDE 9 MG/ML
INJECTION, SOLUTION INTRAVENOUS ONCE
Status: CANCELLED | OUTPATIENT
Start: 2025-07-23 | End: 2025-07-23

## 2025-07-23 RX ORDER — MAGNESIUM SULFATE 1 G/100ML
1 INJECTION INTRAVENOUS ONCE
Status: COMPLETED | OUTPATIENT
Start: 2025-07-23 | End: 2025-07-23

## 2025-07-23 RX ORDER — HYDROCODONE BITARTRATE AND ACETAMINOPHEN 500; 5 MG/1; MG/1
TABLET ORAL CONTINUOUS
Status: ACTIVE | OUTPATIENT
Start: 2025-07-23 | End: 2025-07-24

## 2025-07-23 RX ORDER — HYDROCODONE BITARTRATE AND ACETAMINOPHEN 500; 5 MG/1; MG/1
TABLET ORAL CONTINUOUS
Status: DISCONTINUED | OUTPATIENT
Start: 2025-07-23 | End: 2025-07-23

## 2025-07-23 RX ORDER — SODIUM CHLORIDE 9 MG/ML
INJECTION, SOLUTION INTRAVENOUS
Status: CANCELLED | OUTPATIENT
Start: 2025-07-23

## 2025-07-23 RX ORDER — MAGNESIUM SULFATE HEPTAHYDRATE 40 MG/ML
2 INJECTION, SOLUTION INTRAVENOUS
Status: DISCONTINUED | OUTPATIENT
Start: 2025-07-23 | End: 2025-07-23

## 2025-07-23 RX ORDER — NOREPINEPHRINE BITARTRATE/D5W 4MG/250ML
0-.2 PLASTIC BAG, INJECTION (ML) INTRAVENOUS CONTINUOUS
Status: DISCONTINUED | OUTPATIENT
Start: 2025-07-23 | End: 2025-07-24

## 2025-07-23 RX ORDER — MIDAZOLAM HYDROCHLORIDE 5 MG/ML
2 INJECTION INTRAMUSCULAR; INTRAVENOUS ONCE
Status: COMPLETED | OUTPATIENT
Start: 2025-07-23 | End: 2025-07-23

## 2025-07-23 RX ORDER — PANTOPRAZOLE SODIUM 40 MG/10ML
40 INJECTION, POWDER, LYOPHILIZED, FOR SOLUTION INTRAVENOUS DAILY
Status: DISCONTINUED | OUTPATIENT
Start: 2025-07-23 | End: 2025-08-08

## 2025-07-23 RX ADMIN — PANTOPRAZOLE SODIUM 40 MG: 40 INJECTION, POWDER, FOR SOLUTION INTRAVENOUS at 12:07

## 2025-07-23 RX ADMIN — LAMOTRIGINE 200 MG: 100 TABLET ORAL at 12:07

## 2025-07-23 RX ADMIN — HEPARIN SODIUM 5000 UNITS: 5000 INJECTION INTRAVENOUS; SUBCUTANEOUS at 09:07

## 2025-07-23 RX ADMIN — MAGNESIUM SULFATE HEPTAHYDRATE 1 G: 500 INJECTION, SOLUTION INTRAMUSCULAR; INTRAVENOUS at 06:07

## 2025-07-23 RX ADMIN — PIPERACILLIN SODIUM AND TAZOBACTAM SODIUM 4.5 G: 4; .5 INJECTION, POWDER, LYOPHILIZED, FOR SOLUTION INTRAVENOUS at 12:07

## 2025-07-23 RX ADMIN — PIPERACILLIN SODIUM AND TAZOBACTAM SODIUM 4.5 G: 4; .5 INJECTION, POWDER, LYOPHILIZED, FOR SOLUTION INTRAVENOUS at 01:07

## 2025-07-23 RX ADMIN — SODIUM CHLORIDE: 9 INJECTION, SOLUTION INTRAVENOUS at 01:07

## 2025-07-23 RX ADMIN — INSULIN ASPART 6 UNITS: 100 INJECTION, SOLUTION INTRAVENOUS; SUBCUTANEOUS at 09:07

## 2025-07-23 RX ADMIN — INSULIN GLARGINE 8 UNITS: 100 INJECTION, SOLUTION SUBCUTANEOUS at 09:07

## 2025-07-23 RX ADMIN — MAGNESIUM SULFATE HEPTAHYDRATE 2 G: 40 INJECTION, SOLUTION INTRAVENOUS at 11:07

## 2025-07-23 RX ADMIN — SODIUM CHLORIDE: 9 INJECTION, SOLUTION INTRAVENOUS at 11:07

## 2025-07-23 RX ADMIN — MIDAZOLAM HYDROCHLORIDE 2 MG: 5 INJECTION, SOLUTION INTRAMUSCULAR; INTRAVENOUS at 08:07

## 2025-07-23 RX ADMIN — LACOSAMIDE 200 MG: 10 INJECTION INTRAVENOUS at 09:07

## 2025-07-23 RX ADMIN — Medication 800 MG: at 03:07

## 2025-07-23 RX ADMIN — HEPARIN SODIUM 5000 UNITS: 5000 INJECTION INTRAVENOUS; SUBCUTANEOUS at 10:07

## 2025-07-23 RX ADMIN — LEVETIRACETAM 500 MG: 500 SOLUTION ORAL at 09:07

## 2025-07-23 RX ADMIN — PANTOPRAZOLE SODIUM 40 MG: 40 INJECTION, POWDER, FOR SOLUTION INTRAVENOUS at 09:07

## 2025-07-23 RX ADMIN — FOLIC ACID 2 MG: 1 TABLET ORAL at 09:07

## 2025-07-23 RX ADMIN — NOREPINEPHRINE BITARTRATE 0.02 MCG/KG/MIN: 4 INJECTION, SOLUTION INTRAVENOUS at 10:07

## 2025-07-23 RX ADMIN — SODIUM CHLORIDE: 9 INJECTION, SOLUTION INTRAVENOUS at 10:07

## 2025-07-23 RX ADMIN — LEVETIRACETAM 500 MG: 500 SOLUTION ORAL at 10:07

## 2025-07-24 LAB
ABSOLUTE EOSINOPHIL (OHS): 0.23 K/UL
ABSOLUTE MONOCYTE (OHS): 1.25 K/UL (ref 0.3–1)
ABSOLUTE NEUTROPHIL COUNT (OHS): 13.73 K/UL (ref 1.8–7.7)
ALBUMIN SERPL BCP-MCNC: 2.4 G/DL (ref 3.5–5.2)
ALBUMIN SERPL BCP-MCNC: 2.7 G/DL (ref 3.5–5.2)
ALP SERPL-CCNC: 182 UNIT/L (ref 40–150)
ALT SERPL W/O P-5'-P-CCNC: <5 UNIT/L (ref 10–44)
ANION GAP (OHS): 4 MMOL/L (ref 8–16)
ANION GAP (OHS): 6 MMOL/L (ref 8–16)
ANION GAP (OHS): 6 MMOL/L (ref 8–16)
ANION GAP (OHS): 7 MMOL/L (ref 8–16)
AST SERPL-CCNC: 14 UNIT/L (ref 11–45)
BASOPHILS # BLD AUTO: 0.04 K/UL
BASOPHILS NFR BLD AUTO: 0.2 %
BILIRUB SERPL-MCNC: 0.2 MG/DL (ref 0.1–1)
BUN SERPL-MCNC: 19 MG/DL (ref 8–23)
BUN SERPL-MCNC: 19 MG/DL (ref 8–23)
BUN SERPL-MCNC: 22 MG/DL (ref 8–23)
BUN SERPL-MCNC: 24 MG/DL (ref 8–23)
CALCIUM SERPL-MCNC: 7 MG/DL (ref 8.7–10.5)
CALCIUM SERPL-MCNC: 7.2 MG/DL (ref 8.7–10.5)
CALCIUM SERPL-MCNC: 7.4 MG/DL (ref 8.7–10.5)
CALCIUM SERPL-MCNC: 7.4 MG/DL (ref 8.7–10.5)
CHLORIDE SERPL-SCNC: 119 MMOL/L (ref 95–110)
CO2 SERPL-SCNC: 16 MMOL/L (ref 23–29)
CO2 SERPL-SCNC: 17 MMOL/L (ref 23–29)
CREAT SERPL-MCNC: 0.8 MG/DL (ref 0.5–1.4)
CREAT SERPL-MCNC: 0.8 MG/DL (ref 0.5–1.4)
CREAT SERPL-MCNC: 0.9 MG/DL (ref 0.5–1.4)
CREAT SERPL-MCNC: 0.9 MG/DL (ref 0.5–1.4)
ERYTHROCYTE [DISTWIDTH] IN BLOOD BY AUTOMATED COUNT: 22 % (ref 11.5–14.5)
GFR SERPLBLD CREATININE-BSD FMLA CKD-EPI: >60 ML/MIN/1.73/M2
GLUCOSE SERPL-MCNC: 138 MG/DL (ref 70–110)
GLUCOSE SERPL-MCNC: 157 MG/DL (ref 70–110)
GLUCOSE SERPL-MCNC: 178 MG/DL (ref 70–110)
GLUCOSE SERPL-MCNC: 178 MG/DL (ref 70–110)
HCT VFR BLD AUTO: 23.2 % (ref 37–48.5)
HGB BLD-MCNC: 7.1 GM/DL (ref 12–16)
IMM GRANULOCYTES # BLD AUTO: 0.13 K/UL (ref 0–0.04)
IMM GRANULOCYTES NFR BLD AUTO: 0.8 % (ref 0–0.5)
LYMPHOCYTES # BLD AUTO: 1.22 K/UL (ref 1–4.8)
MAGNESIUM SERPL-MCNC: 2 MG/DL (ref 1.6–2.6)
MAGNESIUM SERPL-MCNC: 2.1 MG/DL (ref 1.6–2.6)
MAGNESIUM SERPL-MCNC: 2.2 MG/DL (ref 1.6–2.6)
MCH RBC QN AUTO: 30.3 PG (ref 27–31)
MCHC RBC AUTO-ENTMCNC: 30.6 G/DL (ref 32–36)
MCV RBC AUTO: 99 FL (ref 82–98)
NUCLEATED RBC (/100WBC) (OHS): 0 /100 WBC
PHOSPHATE SERPL-MCNC: 2.8 MG/DL (ref 2.7–4.5)
PHOSPHATE SERPL-MCNC: 3.1 MG/DL (ref 2.7–4.5)
PHOSPHATE SERPL-MCNC: 4 MG/DL (ref 2.7–4.5)
PLATELET # BLD AUTO: 216 K/UL (ref 150–450)
PMV BLD AUTO: 10.3 FL (ref 9.2–12.9)
POCT GLUCOSE: 163 MG/DL (ref 70–110)
POCT GLUCOSE: 175 MG/DL (ref 70–110)
POCT GLUCOSE: 189 MG/DL (ref 70–110)
POCT GLUCOSE: 205 MG/DL (ref 70–110)
POCT GLUCOSE: 228 MG/DL (ref 70–110)
POCT GLUCOSE: 277 MG/DL (ref 70–110)
POTASSIUM SERPL-SCNC: 4.7 MMOL/L (ref 3.5–5.1)
POTASSIUM SERPL-SCNC: 4.8 MMOL/L (ref 3.5–5.1)
POTASSIUM SERPL-SCNC: 4.8 MMOL/L (ref 3.5–5.1)
POTASSIUM SERPL-SCNC: 4.9 MMOL/L (ref 3.5–5.1)
PROT SERPL-MCNC: 6.3 GM/DL (ref 6–8.4)
RBC # BLD AUTO: 2.34 M/UL (ref 4–5.4)
RELATIVE EOSINOPHIL (OHS): 1.4 %
RELATIVE LYMPHOCYTE (OHS): 7.3 % (ref 18–48)
RELATIVE MONOCYTE (OHS): 7.5 % (ref 4–15)
RELATIVE NEUTROPHIL (OHS): 82.8 % (ref 38–73)
SODIUM SERPL-SCNC: 140 MMOL/L (ref 136–145)
SODIUM SERPL-SCNC: 142 MMOL/L (ref 136–145)
VANCOMYCIN SERPL-MCNC: 16.4 UG/ML (ref ?–80)
WBC # BLD AUTO: 16.6 K/UL (ref 3.9–12.7)

## 2025-07-24 PROCEDURE — 82040 ASSAY OF SERUM ALBUMIN: CPT | Mod: HCNC | Performed by: STUDENT IN AN ORGANIZED HEALTH CARE EDUCATION/TRAINING PROGRAM

## 2025-07-24 PROCEDURE — 63600175 PHARM REV CODE 636 W HCPCS: Mod: HCNC | Performed by: EMERGENCY MEDICINE

## 2025-07-24 PROCEDURE — 25000003 PHARM REV CODE 250: Mod: HCNC

## 2025-07-24 PROCEDURE — 25000003 PHARM REV CODE 250: Mod: HCNC | Performed by: STUDENT IN AN ORGANIZED HEALTH CARE EDUCATION/TRAINING PROGRAM

## 2025-07-24 PROCEDURE — 80053 COMPREHEN METABOLIC PANEL: CPT | Mod: HCNC

## 2025-07-24 PROCEDURE — 83735 ASSAY OF MAGNESIUM: CPT | Mod: HCNC | Performed by: STUDENT IN AN ORGANIZED HEALTH CARE EDUCATION/TRAINING PROGRAM

## 2025-07-24 PROCEDURE — 85025 COMPLETE CBC W/AUTO DIFF WBC: CPT | Mod: HCNC

## 2025-07-24 PROCEDURE — 90945 DIALYSIS ONE EVALUATION: CPT | Mod: HCNC

## 2025-07-24 PROCEDURE — 80100008 HC CRRT DAILY MAINTENANCE: Mod: HCNC

## 2025-07-24 PROCEDURE — 36620 INSERTION CATHETER ARTERY: CPT | Mod: HCNC

## 2025-07-24 PROCEDURE — 63600175 PHARM REV CODE 636 W HCPCS: Mod: HCNC | Performed by: STUDENT IN AN ORGANIZED HEALTH CARE EDUCATION/TRAINING PROGRAM

## 2025-07-24 PROCEDURE — 94660 CPAP INITIATION&MGMT: CPT | Mod: HCNC

## 2025-07-24 PROCEDURE — 63600175 PHARM REV CODE 636 W HCPCS: Mod: HCNC

## 2025-07-24 PROCEDURE — 20000000 HC ICU ROOM: Mod: HCNC

## 2025-07-24 PROCEDURE — 80202 ASSAY OF VANCOMYCIN: CPT | Mod: HCNC | Performed by: STUDENT IN AN ORGANIZED HEALTH CARE EDUCATION/TRAINING PROGRAM

## 2025-07-24 PROCEDURE — 99291 CRITICAL CARE FIRST HOUR: CPT | Mod: HCNC,GC,, | Performed by: STUDENT IN AN ORGANIZED HEALTH CARE EDUCATION/TRAINING PROGRAM

## 2025-07-24 PROCEDURE — 27000207 HC ISOLATION: Mod: HCNC

## 2025-07-24 PROCEDURE — 94761 N-INVAS EAR/PLS OXIMETRY MLT: CPT | Mod: HCNC

## 2025-07-24 PROCEDURE — 99900035 HC TECH TIME PER 15 MIN (STAT): Mod: HCNC

## 2025-07-24 PROCEDURE — 90945 DIALYSIS ONE EVALUATION: CPT | Mod: HCNC,GC,, | Performed by: INTERNAL MEDICINE

## 2025-07-24 RX ORDER — HYDROCODONE BITARTRATE AND ACETAMINOPHEN 500; 5 MG/1; MG/1
TABLET ORAL CONTINUOUS
Status: DISCONTINUED | OUTPATIENT
Start: 2025-07-24 | End: 2025-07-25

## 2025-07-24 RX ORDER — MICONAZOLE NITRATE 2 G/100G
POWDER TOPICAL 2 TIMES DAILY
Status: DISCONTINUED | OUTPATIENT
Start: 2025-07-24 | End: 2025-08-26 | Stop reason: HOSPADM

## 2025-07-24 RX ORDER — NOREPINEPHRINE BITARTRATE/D5W 4MG/250ML
0-3 PLASTIC BAG, INJECTION (ML) INTRAVENOUS CONTINUOUS
Status: DISCONTINUED | OUTPATIENT
Start: 2025-07-24 | End: 2025-07-24

## 2025-07-24 RX ORDER — MAGNESIUM SULFATE HEPTAHYDRATE 40 MG/ML
2 INJECTION, SOLUTION INTRAVENOUS
Status: DISCONTINUED | OUTPATIENT
Start: 2025-07-24 | End: 2025-07-25

## 2025-07-24 RX ORDER — AMLODIPINE BESYLATE 10 MG/1
10 TABLET ORAL DAILY
Status: DISCONTINUED | OUTPATIENT
Start: 2025-07-24 | End: 2025-07-26

## 2025-07-24 RX ORDER — HEPARIN SODIUM 5000 [USP'U]/ML
5000 INJECTION, SOLUTION INTRAVENOUS; SUBCUTANEOUS EVERY 8 HOURS
Status: DISCONTINUED | OUTPATIENT
Start: 2025-07-24 | End: 2025-08-26 | Stop reason: HOSPADM

## 2025-07-24 RX ORDER — INSULIN GLARGINE 100 [IU]/ML
10 INJECTION, SOLUTION SUBCUTANEOUS DAILY
Status: DISCONTINUED | OUTPATIENT
Start: 2025-07-24 | End: 2025-07-29

## 2025-07-24 RX ADMIN — LEVETIRACETAM 500 MG: 500 SOLUTION ORAL at 09:07

## 2025-07-24 RX ADMIN — INSULIN ASPART 4 UNITS: 100 INJECTION, SOLUTION INTRAVENOUS; SUBCUTANEOUS at 09:07

## 2025-07-24 RX ADMIN — FOLIC ACID 2 MG: 1 TABLET ORAL at 09:07

## 2025-07-24 RX ADMIN — NOREPINEPHRINE BITARTRATE 0.02 MCG/KG/MIN: 4 INJECTION, SOLUTION INTRAVENOUS at 09:07

## 2025-07-24 RX ADMIN — INSULIN ASPART 2 UNITS: 100 INJECTION, SOLUTION INTRAVENOUS; SUBCUTANEOUS at 12:07

## 2025-07-24 RX ADMIN — HEPARIN SODIUM 5000 UNITS: 5000 INJECTION INTRAVENOUS; SUBCUTANEOUS at 01:07

## 2025-07-24 RX ADMIN — PIPERACILLIN SODIUM AND TAZOBACTAM SODIUM 4.5 G: 4; .5 INJECTION, POWDER, LYOPHILIZED, FOR SOLUTION INTRAVENOUS at 01:07

## 2025-07-24 RX ADMIN — HEPARIN SODIUM 5000 UNITS: 5000 INJECTION INTRAVENOUS; SUBCUTANEOUS at 09:07

## 2025-07-24 RX ADMIN — SODIUM CHLORIDE: 9 INJECTION, SOLUTION INTRAVENOUS at 11:07

## 2025-07-24 RX ADMIN — PANTOPRAZOLE SODIUM 40 MG: 40 INJECTION, POWDER, FOR SOLUTION INTRAVENOUS at 09:07

## 2025-07-24 RX ADMIN — SODIUM PHOSPHATE, MONOBASIC, MONOHYDRATE AND SODIUM PHOSPHATE, DIBASIC, ANHYDROUS 20.1 MMOL: 142; 276 INJECTION, SOLUTION INTRAVENOUS at 06:07

## 2025-07-24 RX ADMIN — LEVETIRACETAM 500 MG: 500 SOLUTION ORAL at 12:07

## 2025-07-24 RX ADMIN — LAMOTRIGINE 200 MG: 100 TABLET ORAL at 09:07

## 2025-07-24 RX ADMIN — MICONAZOLE NITRATE 2 % TOPICAL POWDER: at 08:07

## 2025-07-24 RX ADMIN — SODIUM CHLORIDE: 9 INJECTION, SOLUTION INTRAVENOUS at 06:07

## 2025-07-24 RX ADMIN — ACETAMINOPHEN 650 MG: 325 TABLET ORAL at 01:07

## 2025-07-24 RX ADMIN — SODIUM CHLORIDE: 9 INJECTION, SOLUTION INTRAVENOUS at 01:07

## 2025-07-24 RX ADMIN — INSULIN ASPART 2 UNITS: 100 INJECTION, SOLUTION INTRAVENOUS; SUBCUTANEOUS at 05:07

## 2025-07-24 RX ADMIN — SODIUM CHLORIDE: 9 INJECTION, SOLUTION INTRAVENOUS at 09:07

## 2025-07-24 RX ADMIN — AMLODIPINE BESYLATE 10 MG: 10 TABLET ORAL at 10:07

## 2025-07-24 RX ADMIN — MICONAZOLE NITRATE 2 % TOPICAL POWDER: at 03:07

## 2025-07-24 RX ADMIN — INSULIN GLARGINE 10 UNITS: 100 INJECTION, SOLUTION SUBCUTANEOUS at 09:07

## 2025-07-24 RX ADMIN — INSULIN ASPART 1 UNITS: 100 INJECTION, SOLUTION INTRAVENOUS; SUBCUTANEOUS at 08:07

## 2025-07-25 PROBLEM — N17.9 AKI (ACUTE KIDNEY INJURY): Status: RESOLVED | Noted: 2025-05-21 | Resolved: 2025-07-25

## 2025-07-25 PROBLEM — G93.41 METABOLIC ENCEPHALOPATHY: Status: RESOLVED | Noted: 2025-06-22 | Resolved: 2025-07-25

## 2025-07-25 PROBLEM — R78.81 STAPHYLOCOCCUS AUREUS BACTEREMIA: Status: RESOLVED | Noted: 2025-06-20 | Resolved: 2025-07-25

## 2025-07-25 PROBLEM — B95.61 STAPHYLOCOCCUS AUREUS BACTEREMIA: Status: RESOLVED | Noted: 2025-06-20 | Resolved: 2025-07-25

## 2025-07-25 LAB
ABO + RH BLD: NORMAL
ABSOLUTE EOSINOPHIL (OHS): 0.2 K/UL
ABSOLUTE EOSINOPHIL (OHS): 0.21 K/UL
ABSOLUTE MONOCYTE (OHS): 1.01 K/UL (ref 0.3–1)
ABSOLUTE MONOCYTE (OHS): 1.11 K/UL (ref 0.3–1)
ABSOLUTE NEUTROPHIL COUNT (OHS): 11.7 K/UL (ref 1.8–7.7)
ABSOLUTE NEUTROPHIL COUNT (OHS): 12.38 K/UL (ref 1.8–7.7)
ALBUMIN SERPL BCP-MCNC: 2.5 G/DL (ref 3.5–5.2)
ALBUMIN SERPL BCP-MCNC: 2.6 G/DL (ref 3.5–5.2)
ALP SERPL-CCNC: 157 UNIT/L (ref 40–150)
ALT SERPL W/O P-5'-P-CCNC: <8 UNIT/L (ref 0–55)
ANION GAP (OHS): 10 MMOL/L (ref 8–16)
ANION GAP (OHS): 6 MMOL/L (ref 8–16)
ANION GAP (OHS): 8 MMOL/L (ref 8–16)
ANION GAP (OHS): 8 MMOL/L (ref 8–16)
AST SERPL-CCNC: 17 UNIT/L (ref 0–50)
BASOPHILS # BLD AUTO: 0.06 K/UL
BASOPHILS # BLD AUTO: 0.07 K/UL
BASOPHILS NFR BLD AUTO: 0.4 %
BASOPHILS NFR BLD AUTO: 0.5 %
BILIRUB SERPL-MCNC: 0.2 MG/DL (ref 0.1–1)
BLD PROD TYP BPU: NORMAL
BLOOD UNIT EXPIRATION DATE: NORMAL
BLOOD UNIT TYPE CODE: 7300
BUN SERPL-MCNC: 17 MG/DL (ref 8–23)
BUN SERPL-MCNC: 26 MG/DL (ref 8–23)
BUN SERPL-MCNC: 27 MG/DL (ref 8–23)
BUN SERPL-MCNC: 30 MG/DL (ref 8–23)
CALCIUM SERPL-MCNC: 7.1 MG/DL (ref 8.7–10.5)
CALCIUM SERPL-MCNC: 7.1 MG/DL (ref 8.7–10.5)
CALCIUM SERPL-MCNC: 7.4 MG/DL (ref 8.7–10.5)
CALCIUM SERPL-MCNC: 7.4 MG/DL (ref 8.7–10.5)
CHLORIDE SERPL-SCNC: 114 MMOL/L (ref 95–110)
CHLORIDE SERPL-SCNC: 117 MMOL/L (ref 95–110)
CHLORIDE SERPL-SCNC: 118 MMOL/L (ref 95–110)
CHLORIDE SERPL-SCNC: 119 MMOL/L (ref 95–110)
CO2 SERPL-SCNC: 13 MMOL/L (ref 23–29)
CO2 SERPL-SCNC: 13 MMOL/L (ref 23–29)
CO2 SERPL-SCNC: 14 MMOL/L (ref 23–29)
CO2 SERPL-SCNC: 18 MMOL/L (ref 23–29)
CREAT SERPL-MCNC: 0.7 MG/DL (ref 0.5–1.4)
CREAT SERPL-MCNC: 1 MG/DL (ref 0.5–1.4)
CREAT SERPL-MCNC: 1 MG/DL (ref 0.5–1.4)
CREAT SERPL-MCNC: 1.1 MG/DL (ref 0.5–1.4)
CROSSMATCH INTERPRETATION: NORMAL
DISPENSE STATUS: NORMAL
ERYTHROCYTE [DISTWIDTH] IN BLOOD BY AUTOMATED COUNT: 18.8 % (ref 11.5–14.5)
ERYTHROCYTE [DISTWIDTH] IN BLOOD BY AUTOMATED COUNT: 21.3 % (ref 11.5–14.5)
GFR SERPLBLD CREATININE-BSD FMLA CKD-EPI: 51 ML/MIN/1.73/M2
GFR SERPLBLD CREATININE-BSD FMLA CKD-EPI: 57 ML/MIN/1.73/M2
GFR SERPLBLD CREATININE-BSD FMLA CKD-EPI: 57 ML/MIN/1.73/M2
GFR SERPLBLD CREATININE-BSD FMLA CKD-EPI: >60 ML/MIN/1.73/M2
GLUCOSE SERPL-MCNC: 104 MG/DL (ref 70–110)
GLUCOSE SERPL-MCNC: 113 MG/DL (ref 70–110)
GLUCOSE SERPL-MCNC: 178 MG/DL (ref 70–110)
GLUCOSE SERPL-MCNC: 179 MG/DL (ref 70–110)
HCT VFR BLD AUTO: 20.9 % (ref 37–48.5)
HCT VFR BLD AUTO: 27.5 % (ref 37–48.5)
HGB BLD-MCNC: 6.5 GM/DL (ref 12–16)
HGB BLD-MCNC: 8.8 GM/DL (ref 12–16)
IMM GRANULOCYTES # BLD AUTO: 0.14 K/UL (ref 0–0.04)
IMM GRANULOCYTES # BLD AUTO: 0.14 K/UL (ref 0–0.04)
IMM GRANULOCYTES NFR BLD AUTO: 0.9 % (ref 0–0.5)
IMM GRANULOCYTES NFR BLD AUTO: 0.9 % (ref 0–0.5)
INDIRECT COOMBS: NORMAL
LEFT CBA DIAS: 11 CM/S
LEFT CBA SYS: 70 CM/S
LEFT CCA DIST DIAS: 11 CM/S
LEFT CCA DIST SYS: 41 CM/S
LEFT CCA MID DIAS: 11 CM/S
LEFT CCA MID SYS: 53 CM/S
LEFT CCA PROX DIAS: 6 CM/S
LEFT CCA PROX SYS: 57 CM/S
LEFT ECA DIAS: 9 CM/S
LEFT ECA SYS: 61 CM/S
LEFT ICA DIST DIAS: 8 CM/S
LEFT ICA DIST SYS: 81 CM/S
LEFT ICA MID DIAS: 11 CM/S
LEFT ICA MID SYS: 78 CM/S
LEFT ICA PROX DIAS: 13 CM/S
LEFT ICA PROX SYS: 66 CM/S
LEFT VERTEBRAL DIAS: 4 CM/S
LEFT VERTEBRAL SYS: 44 CM/S
LYMPHOCYTES # BLD AUTO: 1.57 K/UL (ref 1–4.8)
LYMPHOCYTES # BLD AUTO: 1.69 K/UL (ref 1–4.8)
MAGNESIUM SERPL-MCNC: 1.8 MG/DL (ref 1.6–2.6)
MAGNESIUM SERPL-MCNC: 2 MG/DL (ref 1.6–2.6)
MAGNESIUM SERPL-MCNC: 2 MG/DL (ref 1.6–2.6)
MCH RBC QN AUTO: 30.5 PG (ref 27–31)
MCH RBC QN AUTO: 31 PG (ref 27–31)
MCHC RBC AUTO-ENTMCNC: 31.1 G/DL (ref 32–36)
MCHC RBC AUTO-ENTMCNC: 32 G/DL (ref 32–36)
MCV RBC AUTO: 97 FL (ref 82–98)
MCV RBC AUTO: 98 FL (ref 82–98)
NUCLEATED RBC (/100WBC) (OHS): 3 /100 WBC
NUCLEATED RBC (/100WBC) (OHS): 3 /100 WBC
OHS CV CAROTID RIGHT ICA EDV HIGHEST: 10
OHS CV CAROTID ULTRASOUND LEFT ICA/CCA RATIO: 1.98
OHS CV CAROTID ULTRASOUND RIGHT ICA/CCA RATIO: 1.54
OHS CV CPX PATIENT HEIGHT IN: 62
OHS CV PV CAROTID LEFT HIGHEST CCA: 57
OHS CV PV CAROTID LEFT HIGHEST ICA: 81
OHS CV PV CAROTID RIGHT HIGHEST CCA: 100
OHS CV PV CAROTID RIGHT HIGHEST ICA: 88
OHS CV US CAROTID LEFT HIGHEST EDV: 13
PHOSPHATE SERPL-MCNC: 2 MG/DL (ref 2.7–4.5)
PHOSPHATE SERPL-MCNC: 3.6 MG/DL (ref 2.7–4.5)
PHOSPHATE SERPL-MCNC: 3.7 MG/DL (ref 2.7–4.5)
PLATELET # BLD AUTO: 178 K/UL (ref 150–450)
PLATELET # BLD AUTO: 201 K/UL (ref 150–450)
PMV BLD AUTO: 10.3 FL (ref 9.2–12.9)
PMV BLD AUTO: 10.7 FL (ref 9.2–12.9)
POCT GLUCOSE: 113 MG/DL (ref 70–110)
POCT GLUCOSE: 119 MG/DL (ref 70–110)
POCT GLUCOSE: 170 MG/DL (ref 70–110)
POCT GLUCOSE: 174 MG/DL (ref 70–110)
POCT GLUCOSE: 177 MG/DL (ref 70–110)
POCT GLUCOSE: 183 MG/DL (ref 70–110)
POCT GLUCOSE: 183 MG/DL (ref 70–110)
POTASSIUM SERPL-SCNC: 4.7 MMOL/L (ref 3.5–5.1)
POTASSIUM SERPL-SCNC: 4.8 MMOL/L (ref 3.5–5.1)
PROT SERPL-MCNC: 6.3 GM/DL (ref 6–8.4)
RBC # BLD AUTO: 2.13 M/UL (ref 4–5.4)
RBC # BLD AUTO: 2.84 M/UL (ref 4–5.4)
RELATIVE EOSINOPHIL (OHS): 1.3 %
RELATIVE EOSINOPHIL (OHS): 1.4 %
RELATIVE LYMPHOCYTE (OHS): 10.2 % (ref 18–48)
RELATIVE LYMPHOCYTE (OHS): 11.3 % (ref 18–48)
RELATIVE MONOCYTE (OHS): 6.6 % (ref 4–15)
RELATIVE MONOCYTE (OHS): 7.4 % (ref 4–15)
RELATIVE NEUTROPHIL (OHS): 78.7 % (ref 38–73)
RELATIVE NEUTROPHIL (OHS): 80.4 % (ref 38–73)
RH BLD: NORMAL
RIGHT CBA DIAS: 0 CM/S
RIGHT CBA SYS: 41 CM/S
RIGHT CCA DIST DIAS: 0 CM/S
RIGHT CCA DIST SYS: 57 CM/S
RIGHT CCA MID DIAS: 0 CM/S
RIGHT CCA MID SYS: 91 CM/S
RIGHT CCA PROX DIAS: 0 CM/S
RIGHT CCA PROX SYS: 100 CM/S
RIGHT ECA DIAS: 0 CM/S
RIGHT ECA SYS: 127 CM/S
RIGHT ICA DIST DIAS: 0 CM/S
RIGHT ICA DIST SYS: 79 CM/S
RIGHT ICA MID DIAS: 0 CM/S
RIGHT ICA MID SYS: 88 CM/S
RIGHT ICA PROX DIAS: 10 CM/S
RIGHT ICA PROX SYS: 81 CM/S
RIGHT VERTEBRAL DIAS: 8 CM/S
RIGHT VERTEBRAL SYS: 43 CM/S
SODIUM SERPL-SCNC: 138 MMOL/L (ref 136–145)
SODIUM SERPL-SCNC: 140 MMOL/L (ref 136–145)
SPECIMEN OUTDATE: NORMAL
T3FREE SERPL-MCNC: <1.5 PG/ML (ref 2.3–4.2)
T4 FREE SERPL-MCNC: 0.71 NG/DL (ref 0.71–1.51)
T4 SERPL-MCNC: 3.4 UG/DL (ref 4.5–11.5)
TSH SERPL-ACNC: 9.64 UIU/ML (ref 0.4–4)
UNIT NUMBER: NORMAL
WBC # BLD AUTO: 14.9 K/UL (ref 3.9–12.7)
WBC # BLD AUTO: 15.38 K/UL (ref 3.9–12.7)

## 2025-07-25 PROCEDURE — 99900035 HC TECH TIME PER 15 MIN (STAT): Mod: HCNC

## 2025-07-25 PROCEDURE — 20000000 HC ICU ROOM: Mod: HCNC

## 2025-07-25 PROCEDURE — 84439 ASSAY OF FREE THYROXINE: CPT | Mod: HCNC

## 2025-07-25 PROCEDURE — 85025 COMPLETE CBC W/AUTO DIFF WBC: CPT | Mod: HCNC

## 2025-07-25 PROCEDURE — 25000003 PHARM REV CODE 250: Mod: HCNC

## 2025-07-25 PROCEDURE — 63600175 PHARM REV CODE 636 W HCPCS: Mod: HCNC | Performed by: STUDENT IN AN ORGANIZED HEALTH CARE EDUCATION/TRAINING PROGRAM

## 2025-07-25 PROCEDURE — P9016 RBC LEUKOCYTES REDUCED: HCPCS | Mod: HCNC

## 2025-07-25 PROCEDURE — 84443 ASSAY THYROID STIM HORMONE: CPT | Mod: HCNC

## 2025-07-25 PROCEDURE — 86901 BLOOD TYPING SEROLOGIC RH(D): CPT | Mod: HCNC | Performed by: NURSE PRACTITIONER

## 2025-07-25 PROCEDURE — 86920 COMPATIBILITY TEST SPIN: CPT | Mod: HCNC

## 2025-07-25 PROCEDURE — 90945 DIALYSIS ONE EVALUATION: CPT | Mod: HCNC

## 2025-07-25 PROCEDURE — 94660 CPAP INITIATION&MGMT: CPT | Mod: HCNC

## 2025-07-25 PROCEDURE — 84481 FREE ASSAY (FT-3): CPT | Mod: HCNC

## 2025-07-25 PROCEDURE — 80069 RENAL FUNCTION PANEL: CPT | Mod: HCNC | Performed by: STUDENT IN AN ORGANIZED HEALTH CARE EDUCATION/TRAINING PROGRAM

## 2025-07-25 PROCEDURE — 63600175 PHARM REV CODE 636 W HCPCS: Mod: HCNC

## 2025-07-25 PROCEDURE — 80100008 HC CRRT DAILY MAINTENANCE: Mod: HCNC

## 2025-07-25 PROCEDURE — 25000003 PHARM REV CODE 250: Mod: HCNC | Performed by: STUDENT IN AN ORGANIZED HEALTH CARE EDUCATION/TRAINING PROGRAM

## 2025-07-25 PROCEDURE — 27000207 HC ISOLATION: Mod: HCNC

## 2025-07-25 PROCEDURE — 83735 ASSAY OF MAGNESIUM: CPT | Mod: HCNC | Performed by: STUDENT IN AN ORGANIZED HEALTH CARE EDUCATION/TRAINING PROGRAM

## 2025-07-25 PROCEDURE — 84436 ASSAY OF TOTAL THYROXINE: CPT | Mod: HCNC

## 2025-07-25 PROCEDURE — 36430 TRANSFUSION BLD/BLD COMPNT: CPT | Mod: HCNC

## 2025-07-25 PROCEDURE — 90945 DIALYSIS ONE EVALUATION: CPT | Mod: HCNC,GC,, | Performed by: INTERNAL MEDICINE

## 2025-07-25 PROCEDURE — 94761 N-INVAS EAR/PLS OXIMETRY MLT: CPT | Mod: HCNC

## 2025-07-25 PROCEDURE — 99291 CRITICAL CARE FIRST HOUR: CPT | Mod: HCNC,GC,, | Performed by: STUDENT IN AN ORGANIZED HEALTH CARE EDUCATION/TRAINING PROGRAM

## 2025-07-25 RX ORDER — HYDROCODONE BITARTRATE AND ACETAMINOPHEN 500; 5 MG/1; MG/1
TABLET ORAL CONTINUOUS
Status: ACTIVE | OUTPATIENT
Start: 2025-07-25 | End: 2025-07-26

## 2025-07-25 RX ORDER — VALSARTAN 40 MG/1
40 TABLET ORAL 2 TIMES DAILY
Status: DISCONTINUED | OUTPATIENT
Start: 2025-07-25 | End: 2025-07-26

## 2025-07-25 RX ORDER — LEVOTHYROXINE SODIUM 75 UG/1
75 TABLET ORAL DAILY
Status: DISCONTINUED | OUTPATIENT
Start: 2025-07-25 | End: 2025-07-25

## 2025-07-25 RX ORDER — LEVOTHYROXINE SODIUM 75 UG/1
75 TABLET ORAL DAILY
Status: DISCONTINUED | OUTPATIENT
Start: 2025-07-25 | End: 2025-08-26 | Stop reason: HOSPADM

## 2025-07-25 RX ORDER — LEVOTHYROXINE SODIUM 75 UG/1
75 TABLET ORAL DAILY
Status: DISCONTINUED | OUTPATIENT
Start: 2025-07-26 | End: 2025-07-25

## 2025-07-25 RX ORDER — NOREPINEPHRINE BITARTRATE/D5W 4MG/250ML
PLASTIC BAG, INJECTION (ML) INTRAVENOUS
Status: DISPENSED
Start: 2025-07-25 | End: 2025-07-26

## 2025-07-25 RX ORDER — VALSARTAN 40 MG/1
40 TABLET ORAL 2 TIMES DAILY
Status: DISCONTINUED | OUTPATIENT
Start: 2025-07-25 | End: 2025-07-25

## 2025-07-25 RX ORDER — MAGNESIUM SULFATE HEPTAHYDRATE 40 MG/ML
2 INJECTION, SOLUTION INTRAVENOUS
Status: DISPENSED | OUTPATIENT
Start: 2025-07-25 | End: 2025-07-26

## 2025-07-25 RX ORDER — HYDROCODONE BITARTRATE AND ACETAMINOPHEN 500; 5 MG/1; MG/1
TABLET ORAL
Status: DISCONTINUED | OUTPATIENT
Start: 2025-07-25 | End: 2025-07-26

## 2025-07-25 RX ADMIN — INSULIN ASPART 2 UNITS: 100 INJECTION, SOLUTION INTRAVENOUS; SUBCUTANEOUS at 09:07

## 2025-07-25 RX ADMIN — MAGNESIUM SULFATE HEPTAHYDRATE 2 G: 40 INJECTION, SOLUTION INTRAVENOUS at 11:07

## 2025-07-25 RX ADMIN — HEPARIN SODIUM 5000 UNITS: 5000 INJECTION INTRAVENOUS; SUBCUTANEOUS at 09:07

## 2025-07-25 RX ADMIN — MICONAZOLE NITRATE 2 % TOPICAL POWDER: at 09:07

## 2025-07-25 RX ADMIN — SODIUM PHOSPHATE, MONOBASIC, MONOHYDRATE AND SODIUM PHOSPHATE, DIBASIC, ANHYDROUS 39.9 MMOL: 142; 276 INJECTION, SOLUTION INTRAVENOUS at 11:07

## 2025-07-25 RX ADMIN — INSULIN ASPART 1 UNITS: 100 INJECTION, SOLUTION INTRAVENOUS; SUBCUTANEOUS at 12:07

## 2025-07-25 RX ADMIN — SODIUM CHLORIDE: 9 INJECTION, SOLUTION INTRAVENOUS at 03:07

## 2025-07-25 RX ADMIN — SODIUM CHLORIDE: 9 INJECTION, SOLUTION INTRAVENOUS at 04:07

## 2025-07-25 RX ADMIN — INSULIN GLARGINE 10 UNITS: 100 INJECTION, SOLUTION SUBCUTANEOUS at 09:07

## 2025-07-25 RX ADMIN — PIPERACILLIN SODIUM AND TAZOBACTAM SODIUM 4.5 G: 4; .5 INJECTION, POWDER, LYOPHILIZED, FOR SOLUTION INTRAVENOUS at 04:07

## 2025-07-25 RX ADMIN — SODIUM CHLORIDE: 9 INJECTION, SOLUTION INTRAVENOUS at 09:07

## 2025-07-25 RX ADMIN — HEPARIN SODIUM 5000 UNITS: 5000 INJECTION INTRAVENOUS; SUBCUTANEOUS at 03:07

## 2025-07-25 RX ADMIN — PIPERACILLIN SODIUM AND TAZOBACTAM SODIUM 4.5 G: 4; .5 INJECTION, POWDER, LYOPHILIZED, FOR SOLUTION INTRAVENOUS at 12:07

## 2025-07-25 RX ADMIN — LEVETIRACETAM 500 MG: 500 SOLUTION ORAL at 08:07

## 2025-07-25 RX ADMIN — HEPARIN SODIUM 5000 UNITS: 5000 INJECTION INTRAVENOUS; SUBCUTANEOUS at 05:07

## 2025-07-25 RX ADMIN — LEVETIRACETAM 500 MG: 500 SOLUTION ORAL at 09:07

## 2025-07-25 RX ADMIN — LEVOTHYROXINE SODIUM 75 MCG: 75 TABLET ORAL at 08:07

## 2025-07-25 RX ADMIN — FOLIC ACID 2 MG: 1 TABLET ORAL at 08:07

## 2025-07-25 RX ADMIN — LAMOTRIGINE 200 MG: 100 TABLET ORAL at 08:07

## 2025-07-25 RX ADMIN — VALSARTAN 40 MG: 40 TABLET, FILM COATED ORAL at 08:07

## 2025-07-25 RX ADMIN — AMLODIPINE BESYLATE 10 MG: 10 TABLET ORAL at 08:07

## 2025-07-25 RX ADMIN — PANTOPRAZOLE SODIUM 40 MG: 40 INJECTION, POWDER, FOR SOLUTION INTRAVENOUS at 08:07

## 2025-07-25 RX ADMIN — INSULIN ASPART 2 UNITS: 100 INJECTION, SOLUTION INTRAVENOUS; SUBCUTANEOUS at 03:07

## 2025-07-26 PROBLEM — R00.1 BRADYCARDIA: Status: ACTIVE | Noted: 2025-07-26

## 2025-07-26 PROBLEM — I44.1 ATRIOVENTRICULAR BLOCK, MOBITZ TYPE 1, WENCKEBACH: Status: ACTIVE | Noted: 2025-07-26

## 2025-07-26 LAB
ABSOLUTE EOSINOPHIL (OHS): 0.15 K/UL
ABSOLUTE MONOCYTE (OHS): 0.99 K/UL (ref 0.3–1)
ABSOLUTE NEUTROPHIL COUNT (OHS): 11.82 K/UL (ref 1.8–7.7)
ALBUMIN SERPL BCP-MCNC: 2.4 G/DL (ref 3.5–5.2)
ALBUMIN SERPL BCP-MCNC: 2.4 G/DL (ref 3.5–5.2)
ALBUMIN SERPL BCP-MCNC: 2.5 G/DL (ref 3.5–5.2)
ALP SERPL-CCNC: 149 UNIT/L (ref 40–150)
ALT SERPL W/O P-5'-P-CCNC: <8 UNIT/L (ref 0–55)
ANION GAP (OHS): 11 MMOL/L (ref 8–16)
ANION GAP (OHS): 11 MMOL/L (ref 8–16)
ANION GAP (OHS): 9 MMOL/L (ref 8–16)
AST SERPL-CCNC: 21 UNIT/L (ref 0–50)
BASOPHILS # BLD AUTO: 0.06 K/UL
BASOPHILS NFR BLD AUTO: 0.4 %
BILIRUB SERPL-MCNC: 0.2 MG/DL (ref 0.1–1)
BUN SERPL-MCNC: 20 MG/DL (ref 8–23)
BUN SERPL-MCNC: 20 MG/DL (ref 8–23)
BUN SERPL-MCNC: 23 MG/DL (ref 8–23)
CALCIUM SERPL-MCNC: 7.1 MG/DL (ref 8.7–10.5)
CALCIUM SERPL-MCNC: 7.1 MG/DL (ref 8.7–10.5)
CALCIUM SERPL-MCNC: 7.3 MG/DL (ref 8.7–10.5)
CHLORIDE SERPL-SCNC: 112 MMOL/L (ref 95–110)
CHLORIDE SERPL-SCNC: 112 MMOL/L (ref 95–110)
CHLORIDE SERPL-SCNC: 114 MMOL/L (ref 95–110)
CO2 SERPL-SCNC: 15 MMOL/L (ref 23–29)
CO2 SERPL-SCNC: 16 MMOL/L (ref 23–29)
CO2 SERPL-SCNC: 16 MMOL/L (ref 23–29)
CREAT SERPL-MCNC: 0.8 MG/DL (ref 0.5–1.4)
CREAT SERPL-MCNC: 0.8 MG/DL (ref 0.5–1.4)
CREAT SERPL-MCNC: 0.9 MG/DL (ref 0.5–1.4)
ERYTHROCYTE [DISTWIDTH] IN BLOOD BY AUTOMATED COUNT: 19.4 % (ref 11.5–14.5)
GFR SERPLBLD CREATININE-BSD FMLA CKD-EPI: >60 ML/MIN/1.73/M2
GLUCOSE SERPL-MCNC: 135 MG/DL (ref 70–110)
GLUCOSE SERPL-MCNC: 169 MG/DL (ref 70–110)
GLUCOSE SERPL-MCNC: 169 MG/DL (ref 70–110)
HCT VFR BLD AUTO: 26.4 % (ref 37–48.5)
HGB BLD-MCNC: 8.7 GM/DL (ref 12–16)
IMM GRANULOCYTES # BLD AUTO: 0.15 K/UL (ref 0–0.04)
IMM GRANULOCYTES NFR BLD AUTO: 1 % (ref 0–0.5)
LYMPHOCYTES # BLD AUTO: 1.54 K/UL (ref 1–4.8)
MAGNESIUM SERPL-MCNC: 2.1 MG/DL (ref 1.6–2.6)
MAGNESIUM SERPL-MCNC: 2.2 MG/DL (ref 1.6–2.6)
MCH RBC QN AUTO: 31.1 PG (ref 27–31)
MCHC RBC AUTO-ENTMCNC: 33 G/DL (ref 32–36)
MCV RBC AUTO: 94 FL (ref 82–98)
NUCLEATED RBC (/100WBC) (OHS): 5 /100 WBC
PHOSPHATE SERPL-MCNC: 4.1 MG/DL (ref 2.7–4.5)
PHOSPHATE SERPL-MCNC: 4.8 MG/DL (ref 2.7–4.5)
PLATELET # BLD AUTO: 168 K/UL (ref 150–450)
PMV BLD AUTO: 10.6 FL (ref 9.2–12.9)
POCT GLUCOSE: 128 MG/DL (ref 70–110)
POCT GLUCOSE: 180 MG/DL (ref 70–110)
POCT GLUCOSE: 191 MG/DL (ref 70–110)
POCT GLUCOSE: 205 MG/DL (ref 70–110)
POTASSIUM SERPL-SCNC: 4.7 MMOL/L (ref 3.5–5.1)
POTASSIUM SERPL-SCNC: 4.7 MMOL/L (ref 3.5–5.1)
POTASSIUM SERPL-SCNC: 4.8 MMOL/L (ref 3.5–5.1)
PROT SERPL-MCNC: 6.5 GM/DL (ref 6–8.4)
RBC # BLD AUTO: 2.8 M/UL (ref 4–5.4)
RELATIVE EOSINOPHIL (OHS): 1 %
RELATIVE LYMPHOCYTE (OHS): 10.5 % (ref 18–48)
RELATIVE MONOCYTE (OHS): 6.7 % (ref 4–15)
RELATIVE NEUTROPHIL (OHS): 80.4 % (ref 38–73)
SODIUM SERPL-SCNC: 138 MMOL/L (ref 136–145)
SODIUM SERPL-SCNC: 139 MMOL/L (ref 136–145)
SODIUM SERPL-SCNC: 139 MMOL/L (ref 136–145)
WBC # BLD AUTO: 14.71 K/UL (ref 3.9–12.7)

## 2025-07-26 PROCEDURE — 93005 ELECTROCARDIOGRAM TRACING: CPT | Mod: HCNC | Performed by: INTERNAL MEDICINE

## 2025-07-26 PROCEDURE — 80053 COMPREHEN METABOLIC PANEL: CPT | Mod: HCNC

## 2025-07-26 PROCEDURE — 25000003 PHARM REV CODE 250: Mod: HCNC

## 2025-07-26 PROCEDURE — 83735 ASSAY OF MAGNESIUM: CPT | Mod: HCNC | Performed by: STUDENT IN AN ORGANIZED HEALTH CARE EDUCATION/TRAINING PROGRAM

## 2025-07-26 PROCEDURE — 20000000 HC ICU ROOM: Mod: HCNC

## 2025-07-26 PROCEDURE — 99222 1ST HOSP IP/OBS MODERATE 55: CPT | Mod: HCNC,GC,, | Performed by: INTERNAL MEDICINE

## 2025-07-26 PROCEDURE — 99291 CRITICAL CARE FIRST HOUR: CPT | Mod: HCNC,GC,, | Performed by: STUDENT IN AN ORGANIZED HEALTH CARE EDUCATION/TRAINING PROGRAM

## 2025-07-26 PROCEDURE — 25000003 PHARM REV CODE 250: Mod: HCNC | Performed by: STUDENT IN AN ORGANIZED HEALTH CARE EDUCATION/TRAINING PROGRAM

## 2025-07-26 PROCEDURE — 63600175 PHARM REV CODE 636 W HCPCS: Mod: HCNC | Performed by: STUDENT IN AN ORGANIZED HEALTH CARE EDUCATION/TRAINING PROGRAM

## 2025-07-26 PROCEDURE — 80100008 HC CRRT DAILY MAINTENANCE: Mod: HCNC

## 2025-07-26 PROCEDURE — 94761 N-INVAS EAR/PLS OXIMETRY MLT: CPT | Mod: HCNC

## 2025-07-26 PROCEDURE — 27000207 HC ISOLATION: Mod: HCNC

## 2025-07-26 PROCEDURE — 90945 DIALYSIS ONE EVALUATION: CPT | Mod: HCNC,GC,, | Performed by: INTERNAL MEDICINE

## 2025-07-26 PROCEDURE — 90945 DIALYSIS ONE EVALUATION: CPT | Mod: HCNC

## 2025-07-26 PROCEDURE — 93010 ELECTROCARDIOGRAM REPORT: CPT | Mod: HCNC,,, | Performed by: INTERNAL MEDICINE

## 2025-07-26 PROCEDURE — 99900035 HC TECH TIME PER 15 MIN (STAT): Mod: HCNC

## 2025-07-26 PROCEDURE — 85025 COMPLETE CBC W/AUTO DIFF WBC: CPT | Mod: HCNC

## 2025-07-26 PROCEDURE — 63600175 PHARM REV CODE 636 W HCPCS: Mod: HCNC

## 2025-07-26 PROCEDURE — 80069 RENAL FUNCTION PANEL: CPT | Mod: HCNC | Performed by: STUDENT IN AN ORGANIZED HEALTH CARE EDUCATION/TRAINING PROGRAM

## 2025-07-26 RX ORDER — TALC
6 POWDER (GRAM) TOPICAL NIGHTLY PRN
Status: DISCONTINUED | OUTPATIENT
Start: 2025-07-26 | End: 2025-07-28

## 2025-07-26 RX ADMIN — LAMOTRIGINE 200 MG: 100 TABLET ORAL at 09:07

## 2025-07-26 RX ADMIN — HEPARIN SODIUM 5000 UNITS: 5000 INJECTION INTRAVENOUS; SUBCUTANEOUS at 02:07

## 2025-07-26 RX ADMIN — LEVETIRACETAM 500 MG: 500 SOLUTION ORAL at 09:07

## 2025-07-26 RX ADMIN — INSULIN GLARGINE 10 UNITS: 100 INJECTION, SOLUTION SUBCUTANEOUS at 09:07

## 2025-07-26 RX ADMIN — Medication 6 MG: at 09:07

## 2025-07-26 RX ADMIN — INSULIN ASPART 4 UNITS: 100 INJECTION, SOLUTION INTRAVENOUS; SUBCUTANEOUS at 12:07

## 2025-07-26 RX ADMIN — PIPERACILLIN SODIUM AND TAZOBACTAM SODIUM 4.5 G: 4; .5 INJECTION, POWDER, LYOPHILIZED, FOR SOLUTION INTRAVENOUS at 01:07

## 2025-07-26 RX ADMIN — FOLIC ACID 2 MG: 1 TABLET ORAL at 09:07

## 2025-07-26 RX ADMIN — LEVOTHYROXINE SODIUM 75 MCG: 75 TABLET ORAL at 05:07

## 2025-07-26 RX ADMIN — HEPARIN SODIUM 5000 UNITS: 5000 INJECTION INTRAVENOUS; SUBCUTANEOUS at 05:07

## 2025-07-26 RX ADMIN — MICONAZOLE NITRATE 2 % TOPICAL POWDER: at 09:07

## 2025-07-26 RX ADMIN — PIPERACILLIN SODIUM AND TAZOBACTAM SODIUM 4.5 G: 4; .5 INJECTION, POWDER, LYOPHILIZED, FOR SOLUTION INTRAVENOUS at 02:07

## 2025-07-26 RX ADMIN — HEPARIN SODIUM 5000 UNITS: 5000 INJECTION INTRAVENOUS; SUBCUTANEOUS at 09:07

## 2025-07-26 RX ADMIN — PANTOPRAZOLE SODIUM 40 MG: 40 INJECTION, POWDER, FOR SOLUTION INTRAVENOUS at 09:07

## 2025-07-26 RX ADMIN — INSULIN ASPART 2 UNITS: 100 INJECTION, SOLUTION INTRAVENOUS; SUBCUTANEOUS at 09:07

## 2025-07-26 RX ADMIN — SODIUM CHLORIDE: 9 INJECTION, SOLUTION INTRAVENOUS at 12:07

## 2025-07-26 RX ADMIN — SODIUM CHLORIDE: 9 INJECTION, SOLUTION INTRAVENOUS at 05:07

## 2025-07-26 RX ADMIN — INSULIN ASPART 2 UNITS: 100 INJECTION, SOLUTION INTRAVENOUS; SUBCUTANEOUS at 04:07

## 2025-07-27 LAB
ABSOLUTE EOSINOPHIL (OHS): 0.14 K/UL
ABSOLUTE MONOCYTE (OHS): 1.13 K/UL (ref 0.3–1)
ABSOLUTE NEUTROPHIL COUNT (OHS): 11.28 K/UL (ref 1.8–7.7)
ALBUMIN SERPL BCP-MCNC: 2.2 G/DL (ref 3.5–5.2)
ALBUMIN SERPL BCP-MCNC: 2.2 G/DL (ref 3.5–5.2)
ALBUMIN SERPL BCP-MCNC: 2.3 G/DL (ref 3.5–5.2)
ALBUMIN SERPL BCP-MCNC: 2.3 G/DL (ref 3.5–5.2)
ALP SERPL-CCNC: 160 UNIT/L (ref 40–150)
ALT SERPL W/O P-5'-P-CCNC: <8 UNIT/L (ref 0–55)
ANION GAP (OHS): 10 MMOL/L (ref 8–16)
ANION GAP (OHS): 12 MMOL/L (ref 8–16)
ANION GAP (OHS): 9 MMOL/L (ref 8–16)
ANION GAP (OHS): 9 MMOL/L (ref 8–16)
AST SERPL-CCNC: 30 UNIT/L (ref 0–50)
BACTERIA BLD CULT: NORMAL
BACTERIA BLD CULT: NORMAL
BASOPHILS # BLD AUTO: 0.04 K/UL
BASOPHILS NFR BLD AUTO: 0.3 %
BILIRUB SERPL-MCNC: 0.2 MG/DL (ref 0.1–1)
BUN SERPL-MCNC: 15 MG/DL (ref 8–23)
BUN SERPL-MCNC: 32 MG/DL (ref 8–23)
BUN SERPL-MCNC: 32 MG/DL (ref 8–23)
BUN SERPL-MCNC: 36 MG/DL (ref 8–23)
CALCIUM SERPL-MCNC: 7.4 MG/DL (ref 8.7–10.5)
CALCIUM SERPL-MCNC: 7.4 MG/DL (ref 8.7–10.5)
CALCIUM SERPL-MCNC: 8 MG/DL (ref 8.7–10.5)
CALCIUM SERPL-MCNC: 8 MG/DL (ref 8.7–10.5)
CHLORIDE SERPL-SCNC: 108 MMOL/L (ref 95–110)
CHLORIDE SERPL-SCNC: 112 MMOL/L (ref 95–110)
CHLORIDE SERPL-SCNC: 113 MMOL/L (ref 95–110)
CHLORIDE SERPL-SCNC: 113 MMOL/L (ref 95–110)
CO2 SERPL-SCNC: 14 MMOL/L (ref 23–29)
CO2 SERPL-SCNC: 15 MMOL/L (ref 23–29)
CO2 SERPL-SCNC: 15 MMOL/L (ref 23–29)
CO2 SERPL-SCNC: 22 MMOL/L (ref 23–29)
CREAT SERPL-MCNC: 0.7 MG/DL (ref 0.5–1.4)
CREAT SERPL-MCNC: 1.2 MG/DL (ref 0.5–1.4)
CREAT SERPL-MCNC: 1.2 MG/DL (ref 0.5–1.4)
CREAT SERPL-MCNC: 1.4 MG/DL (ref 0.5–1.4)
ERYTHROCYTE [DISTWIDTH] IN BLOOD BY AUTOMATED COUNT: 19 % (ref 11.5–14.5)
GFR SERPLBLD CREATININE-BSD FMLA CKD-EPI: 38 ML/MIN/1.73/M2
GFR SERPLBLD CREATININE-BSD FMLA CKD-EPI: 46 ML/MIN/1.73/M2
GFR SERPLBLD CREATININE-BSD FMLA CKD-EPI: 46 ML/MIN/1.73/M2
GFR SERPLBLD CREATININE-BSD FMLA CKD-EPI: >60 ML/MIN/1.73/M2
GLUCOSE SERPL-MCNC: 104 MG/DL (ref 70–110)
GLUCOSE SERPL-MCNC: 116 MG/DL (ref 70–110)
GLUCOSE SERPL-MCNC: 116 MG/DL (ref 70–110)
GLUCOSE SERPL-MCNC: 92 MG/DL (ref 70–110)
HCT VFR BLD AUTO: 27.1 % (ref 37–48.5)
HGB BLD-MCNC: 8.9 GM/DL (ref 12–16)
IMM GRANULOCYTES # BLD AUTO: 0.14 K/UL (ref 0–0.04)
IMM GRANULOCYTES NFR BLD AUTO: 0.9 % (ref 0–0.5)
LYMPHOCYTES # BLD AUTO: 2.29 K/UL (ref 1–4.8)
MAGNESIUM SERPL-MCNC: 2 MG/DL (ref 1.6–2.6)
MAGNESIUM SERPL-MCNC: 2.2 MG/DL (ref 1.6–2.6)
MCH RBC QN AUTO: 30.9 PG (ref 27–31)
MCHC RBC AUTO-ENTMCNC: 32.8 G/DL (ref 32–36)
MCV RBC AUTO: 94 FL (ref 82–98)
NUCLEATED RBC (/100WBC) (OHS): 2 /100 WBC
OHS QRS DURATION: 94 MS
OHS QTC CALCULATION: 466 MS
PHOSPHATE SERPL-MCNC: 2.1 MG/DL (ref 2.7–4.5)
PHOSPHATE SERPL-MCNC: 4.5 MG/DL (ref 2.7–4.5)
PHOSPHATE SERPL-MCNC: 4.5 MG/DL (ref 2.7–4.5)
PLATELET # BLD AUTO: 194 K/UL (ref 150–450)
PMV BLD AUTO: 10.7 FL (ref 9.2–12.9)
POCT GLUCOSE: 109 MG/DL (ref 70–110)
POCT GLUCOSE: 110 MG/DL (ref 70–110)
POCT GLUCOSE: 116 MG/DL (ref 70–110)
POCT GLUCOSE: 119 MG/DL (ref 70–110)
POCT GLUCOSE: 136 MG/DL (ref 70–110)
POCT GLUCOSE: 148 MG/DL (ref 70–110)
POCT GLUCOSE: 88 MG/DL (ref 70–110)
POCT GLUCOSE: 94 MG/DL (ref 70–110)
POTASSIUM SERPL-SCNC: 4.9 MMOL/L (ref 3.5–5.1)
POTASSIUM SERPL-SCNC: 5.4 MMOL/L (ref 3.5–5.1)
PROT SERPL-MCNC: 6.6 GM/DL (ref 6–8.4)
RBC # BLD AUTO: 2.88 M/UL (ref 4–5.4)
RELATIVE EOSINOPHIL (OHS): 0.9 %
RELATIVE LYMPHOCYTE (OHS): 15.2 % (ref 18–48)
RELATIVE MONOCYTE (OHS): 7.5 % (ref 4–15)
RELATIVE NEUTROPHIL (OHS): 75.2 % (ref 38–73)
SODIUM SERPL-SCNC: 137 MMOL/L (ref 136–145)
SODIUM SERPL-SCNC: 137 MMOL/L (ref 136–145)
SODIUM SERPL-SCNC: 138 MMOL/L (ref 136–145)
SODIUM SERPL-SCNC: 140 MMOL/L (ref 136–145)
WBC # BLD AUTO: 15.02 K/UL (ref 3.9–12.7)

## 2025-07-27 PROCEDURE — 80100008 HC CRRT DAILY MAINTENANCE: Mod: HCNC

## 2025-07-27 PROCEDURE — 25000003 PHARM REV CODE 250: Mod: HCNC

## 2025-07-27 PROCEDURE — 27000207 HC ISOLATION: Mod: HCNC

## 2025-07-27 PROCEDURE — 82040 ASSAY OF SERUM ALBUMIN: CPT | Mod: HCNC | Performed by: STUDENT IN AN ORGANIZED HEALTH CARE EDUCATION/TRAINING PROGRAM

## 2025-07-27 PROCEDURE — 63600175 PHARM REV CODE 636 W HCPCS: Mod: HCNC | Performed by: STUDENT IN AN ORGANIZED HEALTH CARE EDUCATION/TRAINING PROGRAM

## 2025-07-27 PROCEDURE — 80053 COMPREHEN METABOLIC PANEL: CPT | Mod: HCNC

## 2025-07-27 PROCEDURE — 94640 AIRWAY INHALATION TREATMENT: CPT | Mod: HCNC

## 2025-07-27 PROCEDURE — 94761 N-INVAS EAR/PLS OXIMETRY MLT: CPT | Mod: HCNC

## 2025-07-27 PROCEDURE — 83735 ASSAY OF MAGNESIUM: CPT | Mod: HCNC | Performed by: STUDENT IN AN ORGANIZED HEALTH CARE EDUCATION/TRAINING PROGRAM

## 2025-07-27 PROCEDURE — 99291 CRITICAL CARE FIRST HOUR: CPT | Mod: HCNC,GC,, | Performed by: STUDENT IN AN ORGANIZED HEALTH CARE EDUCATION/TRAINING PROGRAM

## 2025-07-27 PROCEDURE — 85025 COMPLETE CBC W/AUTO DIFF WBC: CPT | Mod: HCNC

## 2025-07-27 PROCEDURE — 25000003 PHARM REV CODE 250: Mod: HCNC | Performed by: INTERNAL MEDICINE

## 2025-07-27 PROCEDURE — 63600175 PHARM REV CODE 636 W HCPCS: Mod: HCNC

## 2025-07-27 PROCEDURE — 20000000 HC ICU ROOM: Mod: HCNC

## 2025-07-27 PROCEDURE — 99900035 HC TECH TIME PER 15 MIN (STAT): Mod: HCNC

## 2025-07-27 PROCEDURE — 25000242 PHARM REV CODE 250 ALT 637 W/ HCPCS: Mod: HCNC

## 2025-07-27 PROCEDURE — 25000003 PHARM REV CODE 250: Mod: HCNC | Performed by: STUDENT IN AN ORGANIZED HEALTH CARE EDUCATION/TRAINING PROGRAM

## 2025-07-27 PROCEDURE — 90945 DIALYSIS ONE EVALUATION: CPT | Mod: HCNC

## 2025-07-27 PROCEDURE — 99233 SBSQ HOSP IP/OBS HIGH 50: CPT | Mod: HCNC,GC,, | Performed by: INTERNAL MEDICINE

## 2025-07-27 PROCEDURE — 80069 RENAL FUNCTION PANEL: CPT | Mod: HCNC | Performed by: STUDENT IN AN ORGANIZED HEALTH CARE EDUCATION/TRAINING PROGRAM

## 2025-07-27 RX ORDER — SODIUM CHLORIDE FOR INHALATION 3 %
4 VIAL, NEBULIZER (ML) INHALATION EVERY 6 HOURS
Status: DISCONTINUED | OUTPATIENT
Start: 2025-07-27 | End: 2025-07-31

## 2025-07-27 RX ORDER — MIDODRINE HYDROCHLORIDE 5 MG/1
15 TABLET ORAL DAILY PRN
Status: DISCONTINUED | OUTPATIENT
Start: 2025-07-27 | End: 2025-07-28

## 2025-07-27 RX ORDER — HYDROCODONE BITARTRATE AND ACETAMINOPHEN 500; 5 MG/1; MG/1
TABLET ORAL CONTINUOUS
Status: ACTIVE | OUTPATIENT
Start: 2025-07-27 | End: 2025-07-28

## 2025-07-27 RX ORDER — NOREPINEPHRINE BITARTRATE/D5W 4MG/250ML
PLASTIC BAG, INJECTION (ML) INTRAVENOUS
Status: DISPENSED
Start: 2025-07-27 | End: 2025-07-28

## 2025-07-27 RX ORDER — MAGNESIUM SULFATE HEPTAHYDRATE 40 MG/ML
2 INJECTION, SOLUTION INTRAVENOUS
Status: ACTIVE | OUTPATIENT
Start: 2025-07-27 | End: 2025-07-28

## 2025-07-27 RX ORDER — IPRATROPIUM BROMIDE AND ALBUTEROL SULFATE 2.5; .5 MG/3ML; MG/3ML
3 SOLUTION RESPIRATORY (INHALATION)
Status: DISCONTINUED | OUTPATIENT
Start: 2025-07-27 | End: 2025-07-30

## 2025-07-27 RX ADMIN — SODIUM CHLORIDE SOLN NEBU 3% 4 ML: 3 NEBU SOLN at 01:07

## 2025-07-27 RX ADMIN — MICONAZOLE NITRATE 2 % TOPICAL POWDER: at 09:07

## 2025-07-27 RX ADMIN — SODIUM CHLORIDE SOLN NEBU 3% 4 ML: 3 NEBU SOLN at 07:07

## 2025-07-27 RX ADMIN — LEVETIRACETAM 500 MG: 500 SOLUTION ORAL at 09:07

## 2025-07-27 RX ADMIN — LEVETIRACETAM 500 MG: 500 SOLUTION ORAL at 08:07

## 2025-07-27 RX ADMIN — IPRATROPIUM BROMIDE AND ALBUTEROL SULFATE 3 ML: 2.5; .5 SOLUTION RESPIRATORY (INHALATION) at 07:07

## 2025-07-27 RX ADMIN — LEVOTHYROXINE SODIUM 75 MCG: 75 TABLET ORAL at 05:07

## 2025-07-27 RX ADMIN — HEPARIN SODIUM 5000 UNITS: 5000 INJECTION INTRAVENOUS; SUBCUTANEOUS at 09:07

## 2025-07-27 RX ADMIN — PIPERACILLIN SODIUM AND TAZOBACTAM SODIUM 4.5 G: 4; .5 INJECTION, POWDER, LYOPHILIZED, FOR SOLUTION INTRAVENOUS at 12:07

## 2025-07-27 RX ADMIN — MIDODRINE HYDROCHLORIDE 15 MG: 5 TABLET ORAL at 05:07

## 2025-07-27 RX ADMIN — SODIUM CHLORIDE: 9 INJECTION, SOLUTION INTRAVENOUS at 05:07

## 2025-07-27 RX ADMIN — IPRATROPIUM BROMIDE AND ALBUTEROL SULFATE 3 ML: 2.5; .5 SOLUTION RESPIRATORY (INHALATION) at 01:07

## 2025-07-27 RX ADMIN — FOLIC ACID 2 MG: 1 TABLET ORAL at 08:07

## 2025-07-27 RX ADMIN — HEPARIN SODIUM 5000 UNITS: 5000 INJECTION INTRAVENOUS; SUBCUTANEOUS at 05:07

## 2025-07-27 RX ADMIN — SODIUM CHLORIDE: 9 INJECTION, SOLUTION INTRAVENOUS at 10:07

## 2025-07-27 RX ADMIN — HEPARIN SODIUM 5000 UNITS: 5000 INJECTION INTRAVENOUS; SUBCUTANEOUS at 02:07

## 2025-07-27 RX ADMIN — PANTOPRAZOLE SODIUM 40 MG: 40 INJECTION, POWDER, FOR SOLUTION INTRAVENOUS at 08:07

## 2025-07-27 RX ADMIN — MICONAZOLE NITRATE 2 % TOPICAL POWDER: at 08:07

## 2025-07-27 RX ADMIN — LAMOTRIGINE 200 MG: 100 TABLET ORAL at 08:07

## 2025-07-27 RX ADMIN — SODIUM CHLORIDE SOLN NEBU 3% 4 ML: 3 NEBU SOLN at 11:07

## 2025-07-27 RX ADMIN — INSULIN GLARGINE 10 UNITS: 100 INJECTION, SOLUTION SUBCUTANEOUS at 08:07

## 2025-07-28 PROBLEM — R41.82 AMS (ALTERED MENTAL STATUS): Status: RESOLVED | Noted: 2025-06-20 | Resolved: 2025-07-28

## 2025-07-28 LAB
ABSOLUTE EOSINOPHIL (OHS): 0.29 K/UL
ABSOLUTE MONOCYTE (OHS): 1.16 K/UL (ref 0.3–1)
ABSOLUTE NEUTROPHIL COUNT (OHS): 8.42 K/UL (ref 1.8–7.7)
ALBUMIN SERPL BCP-MCNC: 2.2 G/DL (ref 3.5–5.2)
ALP SERPL-CCNC: 210 UNIT/L (ref 40–150)
ALT SERPL W/O P-5'-P-CCNC: 12 UNIT/L (ref 0–55)
ANION GAP (OHS): 6 MMOL/L (ref 8–16)
ANION GAP (OHS): 7 MMOL/L (ref 8–16)
ANION GAP (OHS): 8 MMOL/L (ref 8–16)
ANION GAP (OHS): 8 MMOL/L (ref 8–16)
AST SERPL-CCNC: 43 UNIT/L (ref 0–50)
BASOPHILS # BLD AUTO: 0.05 K/UL
BASOPHILS NFR BLD AUTO: 0.4 %
BILIRUB SERPL-MCNC: 0.2 MG/DL (ref 0.1–1)
BUN SERPL-MCNC: 18 MG/DL (ref 8–23)
BUN SERPL-MCNC: 18 MG/DL (ref 8–23)
BUN SERPL-MCNC: 20 MG/DL (ref 8–23)
BUN SERPL-MCNC: 24 MG/DL (ref 8–23)
CALCIUM SERPL-MCNC: 7.9 MG/DL (ref 8.7–10.5)
CALCIUM SERPL-MCNC: 7.9 MG/DL (ref 8.7–10.5)
CALCIUM SERPL-MCNC: 8.1 MG/DL (ref 8.7–10.5)
CALCIUM SERPL-MCNC: 8.2 MG/DL (ref 8.7–10.5)
CHLORIDE SERPL-SCNC: 106 MMOL/L (ref 95–110)
CHLORIDE SERPL-SCNC: 106 MMOL/L (ref 95–110)
CHLORIDE SERPL-SCNC: 107 MMOL/L (ref 95–110)
CHLORIDE SERPL-SCNC: 108 MMOL/L (ref 95–110)
CO2 SERPL-SCNC: 19 MMOL/L (ref 23–29)
CO2 SERPL-SCNC: 19 MMOL/L (ref 23–29)
CO2 SERPL-SCNC: 20 MMOL/L (ref 23–29)
CO2 SERPL-SCNC: 20 MMOL/L (ref 23–29)
CREAT SERPL-MCNC: 0.8 MG/DL (ref 0.5–1.4)
CREAT SERPL-MCNC: 0.8 MG/DL (ref 0.5–1.4)
CREAT SERPL-MCNC: 0.9 MG/DL (ref 0.5–1.4)
CREAT SERPL-MCNC: 1.1 MG/DL (ref 0.5–1.4)
ERYTHROCYTE [DISTWIDTH] IN BLOOD BY AUTOMATED COUNT: 19.3 % (ref 11.5–14.5)
GFR SERPLBLD CREATININE-BSD FMLA CKD-EPI: 51 ML/MIN/1.73/M2
GFR SERPLBLD CREATININE-BSD FMLA CKD-EPI: >60 ML/MIN/1.73/M2
GLUCOSE SERPL-MCNC: 126 MG/DL (ref 70–110)
GLUCOSE SERPL-MCNC: 157 MG/DL (ref 70–110)
GLUCOSE SERPL-MCNC: 95 MG/DL (ref 70–110)
GLUCOSE SERPL-MCNC: 95 MG/DL (ref 70–110)
HCT VFR BLD AUTO: 26.8 % (ref 37–48.5)
HGB BLD-MCNC: 8.7 GM/DL (ref 12–16)
IMM GRANULOCYTES # BLD AUTO: 0.09 K/UL (ref 0–0.04)
IMM GRANULOCYTES NFR BLD AUTO: 0.8 % (ref 0–0.5)
LYMPHOCYTES # BLD AUTO: 1.99 K/UL (ref 1–4.8)
MAGNESIUM SERPL-MCNC: 2 MG/DL (ref 1.6–2.6)
MAGNESIUM SERPL-MCNC: 2.1 MG/DL (ref 1.6–2.6)
MAGNESIUM SERPL-MCNC: 2.1 MG/DL (ref 1.6–2.6)
MCH RBC QN AUTO: 30.9 PG (ref 27–31)
MCHC RBC AUTO-ENTMCNC: 32.5 G/DL (ref 32–36)
MCV RBC AUTO: 95 FL (ref 82–98)
NUCLEATED RBC (/100WBC) (OHS): 1 /100 WBC
PHOSPHATE SERPL-MCNC: 2.7 MG/DL (ref 2.7–4.5)
PHOSPHATE SERPL-MCNC: 3.1 MG/DL (ref 2.7–4.5)
PHOSPHATE SERPL-MCNC: 3.6 MG/DL (ref 2.7–4.5)
PLATELET # BLD AUTO: 164 K/UL (ref 150–450)
PMV BLD AUTO: 10.7 FL (ref 9.2–12.9)
POCT GLUCOSE: 136 MG/DL (ref 70–110)
POCT GLUCOSE: 147 MG/DL (ref 70–110)
POCT GLUCOSE: 98 MG/DL (ref 70–110)
POCT GLUCOSE: 99 MG/DL (ref 70–110)
POTASSIUM SERPL-SCNC: 4.7 MMOL/L (ref 3.5–5.1)
POTASSIUM SERPL-SCNC: 4.7 MMOL/L (ref 3.5–5.1)
POTASSIUM SERPL-SCNC: 5 MMOL/L (ref 3.5–5.1)
POTASSIUM SERPL-SCNC: 5.3 MMOL/L (ref 3.5–5.1)
PROT SERPL-MCNC: 6.5 GM/DL (ref 6–8.4)
RBC # BLD AUTO: 2.82 M/UL (ref 4–5.4)
RELATIVE EOSINOPHIL (OHS): 2.4 %
RELATIVE LYMPHOCYTE (OHS): 16.6 % (ref 18–48)
RELATIVE MONOCYTE (OHS): 9.7 % (ref 4–15)
RELATIVE NEUTROPHIL (OHS): 70.1 % (ref 38–73)
SODIUM SERPL-SCNC: 133 MMOL/L (ref 136–145)
SODIUM SERPL-SCNC: 133 MMOL/L (ref 136–145)
SODIUM SERPL-SCNC: 134 MMOL/L (ref 136–145)
SODIUM SERPL-SCNC: 134 MMOL/L (ref 136–145)
WBC # BLD AUTO: 12 K/UL (ref 3.9–12.7)

## 2025-07-28 PROCEDURE — 82040 ASSAY OF SERUM ALBUMIN: CPT | Mod: HCNC | Performed by: STUDENT IN AN ORGANIZED HEALTH CARE EDUCATION/TRAINING PROGRAM

## 2025-07-28 PROCEDURE — 20000000 HC ICU ROOM: Mod: HCNC

## 2025-07-28 PROCEDURE — 83735 ASSAY OF MAGNESIUM: CPT | Mod: HCNC | Performed by: STUDENT IN AN ORGANIZED HEALTH CARE EDUCATION/TRAINING PROGRAM

## 2025-07-28 PROCEDURE — 85025 COMPLETE CBC W/AUTO DIFF WBC: CPT | Mod: HCNC

## 2025-07-28 PROCEDURE — 25000003 PHARM REV CODE 250: Mod: HCNC | Performed by: INTERNAL MEDICINE

## 2025-07-28 PROCEDURE — 94667 MNPJ CHEST WALL 1ST: CPT | Mod: HCNC

## 2025-07-28 PROCEDURE — 27000207 HC ISOLATION: Mod: HCNC

## 2025-07-28 PROCEDURE — 94640 AIRWAY INHALATION TREATMENT: CPT | Mod: HCNC

## 2025-07-28 PROCEDURE — 25000003 PHARM REV CODE 250: Mod: HCNC

## 2025-07-28 PROCEDURE — 99291 CRITICAL CARE FIRST HOUR: CPT | Mod: HCNC,GC,, | Performed by: INTERNAL MEDICINE

## 2025-07-28 PROCEDURE — 63600175 PHARM REV CODE 636 W HCPCS: Mod: HCNC | Performed by: STUDENT IN AN ORGANIZED HEALTH CARE EDUCATION/TRAINING PROGRAM

## 2025-07-28 PROCEDURE — 63600175 PHARM REV CODE 636 W HCPCS: Mod: HCNC

## 2025-07-28 PROCEDURE — 99900035 HC TECH TIME PER 15 MIN (STAT): Mod: HCNC

## 2025-07-28 PROCEDURE — 94761 N-INVAS EAR/PLS OXIMETRY MLT: CPT | Mod: HCNC

## 2025-07-28 PROCEDURE — 25000242 PHARM REV CODE 250 ALT 637 W/ HCPCS: Mod: HCNC

## 2025-07-28 PROCEDURE — 99232 SBSQ HOSP IP/OBS MODERATE 35: CPT | Mod: HCNC,GC,, | Performed by: INTERNAL MEDICINE

## 2025-07-28 PROCEDURE — 92526 ORAL FUNCTION THERAPY: CPT | Mod: HCNC

## 2025-07-28 PROCEDURE — 80053 COMPREHEN METABOLIC PANEL: CPT | Mod: HCNC

## 2025-07-28 RX ORDER — MUPIROCIN 20 MG/G
OINTMENT TOPICAL 2 TIMES DAILY
Status: CANCELLED | OUTPATIENT
Start: 2025-07-28 | End: 2025-08-02

## 2025-07-28 RX ORDER — MIDODRINE HYDROCHLORIDE 5 MG/1
15 TABLET ORAL DAILY PRN
Status: DISCONTINUED | OUTPATIENT
Start: 2025-07-28 | End: 2025-08-26 | Stop reason: HOSPADM

## 2025-07-28 RX ORDER — TALC
6 POWDER (GRAM) TOPICAL NIGHTLY PRN
Status: DISCONTINUED | OUTPATIENT
Start: 2025-07-28 | End: 2025-08-26 | Stop reason: HOSPADM

## 2025-07-28 RX ORDER — SODIUM CHLORIDE 9 MG/ML
INJECTION, SOLUTION INTRAVENOUS
Status: CANCELLED | OUTPATIENT
Start: 2025-07-28

## 2025-07-28 RX ORDER — SODIUM CHLORIDE 9 MG/ML
INJECTION, SOLUTION INTRAVENOUS ONCE
Status: CANCELLED | OUTPATIENT
Start: 2025-07-28 | End: 2025-07-28

## 2025-07-28 RX ADMIN — PANTOPRAZOLE SODIUM 40 MG: 40 INJECTION, POWDER, FOR SOLUTION INTRAVENOUS at 09:07

## 2025-07-28 RX ADMIN — SODIUM CHLORIDE SOLN NEBU 3% 4 ML: 3 NEBU SOLN at 01:07

## 2025-07-28 RX ADMIN — INSULIN GLARGINE 10 UNITS: 100 INJECTION, SOLUTION SUBCUTANEOUS at 09:07

## 2025-07-28 RX ADMIN — SODIUM CHLORIDE SOLN NEBU 3% 4 ML: 3 NEBU SOLN at 07:07

## 2025-07-28 RX ADMIN — SODIUM CHLORIDE: 9 INJECTION, SOLUTION INTRAVENOUS at 03:07

## 2025-07-28 RX ADMIN — MICONAZOLE NITRATE 2 % TOPICAL POWDER: at 09:07

## 2025-07-28 RX ADMIN — HEPARIN SODIUM 5000 UNITS: 5000 INJECTION INTRAVENOUS; SUBCUTANEOUS at 02:07

## 2025-07-28 RX ADMIN — IPRATROPIUM BROMIDE AND ALBUTEROL SULFATE 3 ML: 2.5; .5 SOLUTION RESPIRATORY (INHALATION) at 07:07

## 2025-07-28 RX ADMIN — FOLIC ACID 2 MG: 1 TABLET ORAL at 09:07

## 2025-07-28 RX ADMIN — SODIUM CHLORIDE SOLN NEBU 3% 4 ML: 3 NEBU SOLN at 08:07

## 2025-07-28 RX ADMIN — HEPARIN SODIUM 5000 UNITS: 5000 INJECTION INTRAVENOUS; SUBCUTANEOUS at 06:07

## 2025-07-28 RX ADMIN — LAMOTRIGINE 200 MG: 100 TABLET ORAL at 09:07

## 2025-07-28 RX ADMIN — HEPARIN SODIUM 5000 UNITS: 5000 INJECTION INTRAVENOUS; SUBCUTANEOUS at 09:07

## 2025-07-28 RX ADMIN — IPRATROPIUM BROMIDE AND ALBUTEROL SULFATE 3 ML: 2.5; .5 SOLUTION RESPIRATORY (INHALATION) at 08:07

## 2025-07-28 RX ADMIN — LEVETIRACETAM 500 MG: 500 SOLUTION ORAL at 09:07

## 2025-07-28 RX ADMIN — HEPARIN SODIUM 4000 UNITS: 1000 INJECTION INTRAVENOUS; SUBCUTANEOUS at 05:07

## 2025-07-28 RX ADMIN — IPRATROPIUM BROMIDE AND ALBUTEROL SULFATE 3 ML: 2.5; .5 SOLUTION RESPIRATORY (INHALATION) at 01:07

## 2025-07-28 RX ADMIN — LEVOTHYROXINE SODIUM 75 MCG: 75 TABLET ORAL at 06:07

## 2025-07-29 PROBLEM — L24.A2 IRRITANT CONTACT DERMATITIS DUE TO FECAL, URINARY OR DUAL INCONTINENCE: Status: RESOLVED | Noted: 2025-07-10 | Resolved: 2025-07-29

## 2025-07-29 PROBLEM — E87.5 HYPERKALEMIA: Status: RESOLVED | Noted: 2020-06-18 | Resolved: 2025-07-29

## 2025-07-29 PROBLEM — L02.416 ABSCESS OF LEFT THIGH: Status: RESOLVED | Noted: 2025-07-10 | Resolved: 2025-07-29

## 2025-07-29 PROBLEM — G93.40 ACUTE ENCEPHALOPATHY: Status: RESOLVED | Noted: 2025-05-03 | Resolved: 2025-07-29

## 2025-07-29 PROBLEM — E87.8 ELECTROLYTE ABNORMALITY: Status: ACTIVE | Noted: 2025-07-29

## 2025-07-29 PROBLEM — J15.69 GRAM-NEGATIVE PNEUMONIA: Status: RESOLVED | Noted: 2025-07-11 | Resolved: 2025-07-29

## 2025-07-29 PROBLEM — N17.9 AKI (ACUTE KIDNEY INJURY): Status: RESOLVED | Noted: 2025-05-21 | Resolved: 2025-07-29

## 2025-07-29 LAB
ABSOLUTE EOSINOPHIL (OHS): 0.22 K/UL
ABSOLUTE MONOCYTE (OHS): 0.73 K/UL (ref 0.3–1)
ABSOLUTE NEUTROPHIL COUNT (OHS): 7.13 K/UL (ref 1.8–7.7)
ALBUMIN SERPL BCP-MCNC: 2.1 G/DL (ref 3.5–5.2)
ALBUMIN SERPL BCP-MCNC: 2.2 G/DL (ref 3.5–5.2)
ALP SERPL-CCNC: 228 UNIT/L (ref 40–150)
ALT SERPL W/O P-5'-P-CCNC: 17 UNIT/L (ref 0–55)
ANION GAP (OHS): 11 MMOL/L (ref 8–16)
ANION GAP (OHS): 11 MMOL/L (ref 8–16)
ANION GAP (OHS): 7 MMOL/L (ref 8–16)
ANION GAP (OHS): 8 MMOL/L (ref 8–16)
AST SERPL-CCNC: 46 UNIT/L (ref 0–50)
BASOPHILS # BLD AUTO: 0.04 K/UL
BASOPHILS NFR BLD AUTO: 0.4 %
BILIRUB SERPL-MCNC: 0.2 MG/DL (ref 0.1–1)
BUN SERPL-MCNC: 18 MG/DL (ref 8–23)
BUN SERPL-MCNC: 19 MG/DL (ref 8–23)
BUN SERPL-MCNC: 8 MG/DL (ref 8–23)
BUN SERPL-MCNC: 8 MG/DL (ref 8–23)
CALCIUM SERPL-MCNC: 8.2 MG/DL (ref 8.7–10.5)
CALCIUM SERPL-MCNC: 8.3 MG/DL (ref 8.7–10.5)
CHLORIDE SERPL-SCNC: 101 MMOL/L (ref 95–110)
CHLORIDE SERPL-SCNC: 101 MMOL/L (ref 95–110)
CHLORIDE SERPL-SCNC: 103 MMOL/L (ref 95–110)
CHLORIDE SERPL-SCNC: 104 MMOL/L (ref 95–110)
CO2 SERPL-SCNC: 24 MMOL/L (ref 23–29)
CREAT SERPL-MCNC: 0.5 MG/DL (ref 0.5–1.4)
CREAT SERPL-MCNC: 0.5 MG/DL (ref 0.5–1.4)
CREAT SERPL-MCNC: 0.9 MG/DL (ref 0.5–1.4)
CREAT SERPL-MCNC: 1 MG/DL (ref 0.5–1.4)
ERYTHROCYTE [DISTWIDTH] IN BLOOD BY AUTOMATED COUNT: 19 % (ref 11.5–14.5)
FERRITIN SERPL-MCNC: 2734 NG/ML (ref 20–300)
GFR SERPLBLD CREATININE-BSD FMLA CKD-EPI: 57 ML/MIN/1.73/M2
GFR SERPLBLD CREATININE-BSD FMLA CKD-EPI: >60 ML/MIN/1.73/M2
GLUCOSE SERPL-MCNC: 106 MG/DL (ref 70–110)
GLUCOSE SERPL-MCNC: 110 MG/DL (ref 70–110)
GLUCOSE SERPL-MCNC: 110 MG/DL (ref 70–110)
GLUCOSE SERPL-MCNC: 76 MG/DL (ref 70–110)
HCT VFR BLD AUTO: 27.1 % (ref 37–48.5)
HGB BLD-MCNC: 9 GM/DL (ref 12–16)
IMM GRANULOCYTES # BLD AUTO: 0.08 K/UL (ref 0–0.04)
IMM GRANULOCYTES NFR BLD AUTO: 0.8 % (ref 0–0.5)
INDIRECT COOMBS: NORMAL
IRON SATN MFR SERPL: 25 % (ref 20–50)
IRON SERPL-MCNC: 41 UG/DL (ref 30–160)
LYMPHOCYTES # BLD AUTO: 1.74 K/UL (ref 1–4.8)
MAGNESIUM SERPL-MCNC: 1.8 MG/DL (ref 1.6–2.6)
MAGNESIUM SERPL-MCNC: 2 MG/DL (ref 1.6–2.6)
MAGNESIUM SERPL-MCNC: 2.1 MG/DL (ref 1.6–2.6)
MCH RBC QN AUTO: 31.3 PG (ref 27–31)
MCHC RBC AUTO-ENTMCNC: 33.2 G/DL (ref 32–36)
MCV RBC AUTO: 94 FL (ref 82–98)
NUCLEATED RBC (/100WBC) (OHS): 1 /100 WBC
PHOSPHATE SERPL-MCNC: 1.4 MG/DL (ref 2.7–4.5)
PHOSPHATE SERPL-MCNC: 2.7 MG/DL (ref 2.7–4.5)
PHOSPHATE SERPL-MCNC: 3.1 MG/DL (ref 2.7–4.5)
PLATELET # BLD AUTO: 156 K/UL (ref 150–450)
PMV BLD AUTO: 11 FL (ref 9.2–12.9)
POCT GLUCOSE: 113 MG/DL (ref 70–110)
POCT GLUCOSE: 115 MG/DL (ref 70–110)
POTASSIUM SERPL-SCNC: 3.4 MMOL/L (ref 3.5–5.1)
POTASSIUM SERPL-SCNC: 3.4 MMOL/L (ref 3.5–5.1)
POTASSIUM SERPL-SCNC: 4.8 MMOL/L (ref 3.5–5.1)
POTASSIUM SERPL-SCNC: 4.9 MMOL/L (ref 3.5–5.1)
PROT SERPL-MCNC: 6.7 GM/DL (ref 6–8.4)
RBC # BLD AUTO: 2.88 M/UL (ref 4–5.4)
RELATIVE EOSINOPHIL (OHS): 2.2 %
RELATIVE LYMPHOCYTE (OHS): 17.5 % (ref 18–48)
RELATIVE MONOCYTE (OHS): 7.3 % (ref 4–15)
RELATIVE NEUTROPHIL (OHS): 71.8 % (ref 38–73)
RH BLD: NORMAL
SODIUM SERPL-SCNC: 135 MMOL/L (ref 136–145)
SODIUM SERPL-SCNC: 135 MMOL/L (ref 136–145)
SODIUM SERPL-SCNC: 136 MMOL/L (ref 136–145)
SODIUM SERPL-SCNC: 136 MMOL/L (ref 136–145)
SPECIMEN OUTDATE: NORMAL
TIBC SERPL-MCNC: 167 UG/DL (ref 250–450)
TRANSFERRIN SERPL-MCNC: 113 MG/DL (ref 200–375)
WBC # BLD AUTO: 9.94 K/UL (ref 3.9–12.7)

## 2025-07-29 PROCEDURE — 80069 RENAL FUNCTION PANEL: CPT | Mod: HCNC | Performed by: STUDENT IN AN ORGANIZED HEALTH CARE EDUCATION/TRAINING PROGRAM

## 2025-07-29 PROCEDURE — 27000207 HC ISOLATION: Mod: HCNC

## 2025-07-29 PROCEDURE — 25000003 PHARM REV CODE 250: Mod: HCNC

## 2025-07-29 PROCEDURE — 25000003 PHARM REV CODE 250: Mod: HCNC | Performed by: INTERNAL MEDICINE

## 2025-07-29 PROCEDURE — 83735 ASSAY OF MAGNESIUM: CPT | Mod: HCNC | Performed by: STUDENT IN AN ORGANIZED HEALTH CARE EDUCATION/TRAINING PROGRAM

## 2025-07-29 PROCEDURE — 80100014 HC HEMODIALYSIS 1:1: Mod: HCNC

## 2025-07-29 PROCEDURE — 86901 BLOOD TYPING SEROLOGIC RH(D): CPT | Mod: HCNC | Performed by: INTERNAL MEDICINE

## 2025-07-29 PROCEDURE — 80053 COMPREHEN METABOLIC PANEL: CPT | Mod: HCNC

## 2025-07-29 PROCEDURE — 99233 SBSQ HOSP IP/OBS HIGH 50: CPT | Mod: HCNC,GC,, | Performed by: INTERNAL MEDICINE

## 2025-07-29 PROCEDURE — 94640 AIRWAY INHALATION TREATMENT: CPT | Mod: HCNC

## 2025-07-29 PROCEDURE — 63600175 PHARM REV CODE 636 W HCPCS: Mod: HCNC | Performed by: STUDENT IN AN ORGANIZED HEALTH CARE EDUCATION/TRAINING PROGRAM

## 2025-07-29 PROCEDURE — 94761 N-INVAS EAR/PLS OXIMETRY MLT: CPT | Mod: HCNC

## 2025-07-29 PROCEDURE — 25000242 PHARM REV CODE 250 ALT 637 W/ HCPCS: Mod: HCNC

## 2025-07-29 PROCEDURE — 85025 COMPLETE CBC W/AUTO DIFF WBC: CPT | Mod: HCNC

## 2025-07-29 PROCEDURE — 63600175 PHARM REV CODE 636 W HCPCS: Mod: HCNC

## 2025-07-29 PROCEDURE — 20000000 HC ICU ROOM: Mod: HCNC

## 2025-07-29 PROCEDURE — 82728 ASSAY OF FERRITIN: CPT | Mod: HCNC | Performed by: INTERNAL MEDICINE

## 2025-07-29 PROCEDURE — 84466 ASSAY OF TRANSFERRIN: CPT | Mod: HCNC | Performed by: INTERNAL MEDICINE

## 2025-07-29 RX ORDER — INSULIN GLARGINE 100 [IU]/ML
8 INJECTION, SOLUTION SUBCUTANEOUS DAILY
Status: DISCONTINUED | OUTPATIENT
Start: 2025-07-30 | End: 2025-08-17

## 2025-07-29 RX ADMIN — LEVETIRACETAM 500 MG: 500 SOLUTION ORAL at 08:07

## 2025-07-29 RX ADMIN — IPRATROPIUM BROMIDE AND ALBUTEROL SULFATE 3 ML: 2.5; .5 SOLUTION RESPIRATORY (INHALATION) at 08:07

## 2025-07-29 RX ADMIN — SODIUM CHLORIDE SOLN NEBU 3% 4 ML: 3 NEBU SOLN at 08:07

## 2025-07-29 RX ADMIN — HEPARIN SODIUM 5000 UNITS: 5000 INJECTION INTRAVENOUS; SUBCUTANEOUS at 10:07

## 2025-07-29 RX ADMIN — HEPARIN SODIUM 5000 UNITS: 5000 INJECTION INTRAVENOUS; SUBCUTANEOUS at 05:07

## 2025-07-29 RX ADMIN — SODIUM CHLORIDE SOLN NEBU 3% 4 ML: 3 NEBU SOLN at 12:07

## 2025-07-29 RX ADMIN — MELATONIN TAB 3 MG 6 MG: 3 TAB at 01:07

## 2025-07-29 RX ADMIN — IPRATROPIUM BROMIDE AND ALBUTEROL SULFATE 3 ML: 2.5; .5 SOLUTION RESPIRATORY (INHALATION) at 12:07

## 2025-07-29 RX ADMIN — POTASSIUM BICARBONATE 40 MEQ: 391 TABLET, EFFERVESCENT ORAL at 08:07

## 2025-07-29 RX ADMIN — PANTOPRAZOLE SODIUM 40 MG: 40 INJECTION, POWDER, FOR SOLUTION INTRAVENOUS at 08:07

## 2025-07-29 RX ADMIN — INSULIN ASPART 2 UNITS: 100 INJECTION, SOLUTION INTRAVENOUS; SUBCUTANEOUS at 11:07

## 2025-07-29 RX ADMIN — MIDODRINE HYDROCHLORIDE 15 MG: 5 TABLET ORAL at 01:07

## 2025-07-29 RX ADMIN — MICONAZOLE NITRATE 2 % TOPICAL POWDER: at 10:07

## 2025-07-29 RX ADMIN — FOLIC ACID 2 MG: 1 TABLET ORAL at 08:07

## 2025-07-29 RX ADMIN — INSULIN GLARGINE 10 UNITS: 100 INJECTION, SOLUTION SUBCUTANEOUS at 08:07

## 2025-07-29 RX ADMIN — LEVOTHYROXINE SODIUM 75 MCG: 75 TABLET ORAL at 05:07

## 2025-07-29 RX ADMIN — LAMOTRIGINE 200 MG: 100 TABLET ORAL at 08:07

## 2025-07-29 RX ADMIN — MICONAZOLE NITRATE 2 % TOPICAL POWDER: at 08:07

## 2025-07-29 RX ADMIN — LEVETIRACETAM 500 MG: 500 SOLUTION ORAL at 10:07

## 2025-07-29 RX ADMIN — HEPARIN SODIUM 5000 UNITS: 5000 INJECTION INTRAVENOUS; SUBCUTANEOUS at 03:07

## 2025-07-30 PROBLEM — R65.10 SIRS (SYSTEMIC INFLAMMATORY RESPONSE SYNDROME): Status: RESOLVED | Noted: 2025-06-21 | Resolved: 2025-07-30

## 2025-07-30 PROBLEM — Z99.2 DEPENDENCE ON RENAL DIALYSIS: Status: RESOLVED | Noted: 2025-06-30 | Resolved: 2025-07-30

## 2025-07-30 LAB
ABSOLUTE EOSINOPHIL (OHS): 0.39 K/UL
ABSOLUTE MONOCYTE (OHS): 0.97 K/UL (ref 0.3–1)
ABSOLUTE NEUTROPHIL COUNT (OHS): 5.24 K/UL (ref 1.8–7.7)
ALBUMIN SERPL BCP-MCNC: 2 G/DL (ref 3.5–5.2)
ALBUMIN SERPL BCP-MCNC: 2.1 G/DL (ref 3.5–5.2)
ALP SERPL-CCNC: 238 UNIT/L (ref 40–150)
ALT SERPL W/O P-5'-P-CCNC: 15 UNIT/L (ref 0–55)
ANION GAP (OHS): 5 MMOL/L (ref 8–16)
ANION GAP (OHS): 8 MMOL/L (ref 8–16)
ANION GAP (OHS): 9 MMOL/L (ref 8–16)
ANION GAP (OHS): 9 MMOL/L (ref 8–16)
AST SERPL-CCNC: 40 UNIT/L (ref 0–50)
BASOPHILS # BLD AUTO: 0.04 K/UL
BASOPHILS NFR BLD AUTO: 0.4 %
BILIRUB SERPL-MCNC: 0.2 MG/DL (ref 0.1–1)
BUN SERPL-MCNC: 20 MG/DL (ref 8–23)
BUN SERPL-MCNC: 20 MG/DL (ref 8–23)
BUN SERPL-MCNC: 24 MG/DL (ref 8–23)
BUN SERPL-MCNC: 29 MG/DL (ref 8–23)
CALCIUM SERPL-MCNC: 8.3 MG/DL (ref 8.7–10.5)
CALCIUM SERPL-MCNC: 8.5 MG/DL (ref 8.7–10.5)
CHLORIDE SERPL-SCNC: 102 MMOL/L (ref 95–110)
CHLORIDE SERPL-SCNC: 102 MMOL/L (ref 95–110)
CHLORIDE SERPL-SCNC: 105 MMOL/L (ref 95–110)
CHLORIDE SERPL-SCNC: 105 MMOL/L (ref 95–110)
CO2 SERPL-SCNC: 23 MMOL/L (ref 23–29)
CO2 SERPL-SCNC: 24 MMOL/L (ref 23–29)
CREAT SERPL-MCNC: 1.1 MG/DL (ref 0.5–1.4)
CREAT SERPL-MCNC: 1.1 MG/DL (ref 0.5–1.4)
CREAT SERPL-MCNC: 1.3 MG/DL (ref 0.5–1.4)
CREAT SERPL-MCNC: 1.5 MG/DL (ref 0.5–1.4)
ERYTHROCYTE [DISTWIDTH] IN BLOOD BY AUTOMATED COUNT: 19 % (ref 11.5–14.5)
GFR SERPLBLD CREATININE-BSD FMLA CKD-EPI: 35 ML/MIN/1.73/M2
GFR SERPLBLD CREATININE-BSD FMLA CKD-EPI: 42 ML/MIN/1.73/M2
GFR SERPLBLD CREATININE-BSD FMLA CKD-EPI: 51 ML/MIN/1.73/M2
GFR SERPLBLD CREATININE-BSD FMLA CKD-EPI: 51 ML/MIN/1.73/M2
GLUCOSE SERPL-MCNC: 102 MG/DL (ref 70–110)
GLUCOSE SERPL-MCNC: 102 MG/DL (ref 70–110)
GLUCOSE SERPL-MCNC: 119 MG/DL (ref 70–110)
GLUCOSE SERPL-MCNC: 138 MG/DL (ref 70–110)
HCT VFR BLD AUTO: 26.6 % (ref 37–48.5)
HGB BLD-MCNC: 8.9 GM/DL (ref 12–16)
IMM GRANULOCYTES # BLD AUTO: 0.06 K/UL (ref 0–0.04)
IMM GRANULOCYTES NFR BLD AUTO: 0.7 % (ref 0–0.5)
LYMPHOCYTES # BLD AUTO: 2.26 K/UL (ref 1–4.8)
MAGNESIUM SERPL-MCNC: 2.1 MG/DL (ref 1.6–2.6)
MAGNESIUM SERPL-MCNC: 2.1 MG/DL (ref 1.6–2.6)
MAGNESIUM SERPL-MCNC: 2.2 MG/DL (ref 1.6–2.6)
MCH RBC QN AUTO: 31.7 PG (ref 27–31)
MCHC RBC AUTO-ENTMCNC: 33.5 G/DL (ref 32–36)
MCV RBC AUTO: 95 FL (ref 82–98)
NUCLEATED RBC (/100WBC) (OHS): 0 /100 WBC
PHOSPHATE SERPL-MCNC: 3.2 MG/DL (ref 2.7–4.5)
PHOSPHATE SERPL-MCNC: 3.3 MG/DL (ref 2.7–4.5)
PHOSPHATE SERPL-MCNC: 3.5 MG/DL (ref 2.7–4.5)
PLATELET # BLD AUTO: 164 K/UL (ref 150–450)
PMV BLD AUTO: 10.6 FL (ref 9.2–12.9)
POCT GLUCOSE: 106 MG/DL (ref 70–110)
POCT GLUCOSE: 173 MG/DL (ref 70–110)
POCT GLUCOSE: 82 MG/DL (ref 70–110)
POTASSIUM SERPL-SCNC: 4.8 MMOL/L (ref 3.5–5.1)
POTASSIUM SERPL-SCNC: 4.8 MMOL/L (ref 3.5–5.1)
POTASSIUM SERPL-SCNC: 5 MMOL/L (ref 3.5–5.1)
POTASSIUM SERPL-SCNC: 5.2 MMOL/L (ref 3.5–5.1)
PROT SERPL-MCNC: 6.7 GM/DL (ref 6–8.4)
PTH-INTACT SERPL-MCNC: 449 PG/ML (ref 9–77)
RBC # BLD AUTO: 2.81 M/UL (ref 4–5.4)
RELATIVE EOSINOPHIL (OHS): 4.4 %
RELATIVE LYMPHOCYTE (OHS): 25.2 % (ref 18–48)
RELATIVE MONOCYTE (OHS): 10.8 % (ref 4–15)
RELATIVE NEUTROPHIL (OHS): 58.5 % (ref 38–73)
SODIUM SERPL-SCNC: 134 MMOL/L (ref 136–145)
SODIUM SERPL-SCNC: 136 MMOL/L (ref 136–145)
WBC # BLD AUTO: 8.96 K/UL (ref 3.9–12.7)

## 2025-07-30 PROCEDURE — 99233 SBSQ HOSP IP/OBS HIGH 50: CPT | Mod: HCNC,GC,, | Performed by: INTERNAL MEDICINE

## 2025-07-30 PROCEDURE — 63600175 PHARM REV CODE 636 W HCPCS: Mod: HCNC | Performed by: INTERNAL MEDICINE

## 2025-07-30 PROCEDURE — 82040 ASSAY OF SERUM ALBUMIN: CPT | Mod: HCNC | Performed by: STUDENT IN AN ORGANIZED HEALTH CARE EDUCATION/TRAINING PROGRAM

## 2025-07-30 PROCEDURE — 20000000 HC ICU ROOM: Mod: HCNC

## 2025-07-30 PROCEDURE — 25000003 PHARM REV CODE 250: Mod: HCNC

## 2025-07-30 PROCEDURE — 25000242 PHARM REV CODE 250 ALT 637 W/ HCPCS: Mod: HCNC

## 2025-07-30 PROCEDURE — 94761 N-INVAS EAR/PLS OXIMETRY MLT: CPT | Mod: HCNC

## 2025-07-30 PROCEDURE — 94640 AIRWAY INHALATION TREATMENT: CPT | Mod: HCNC

## 2025-07-30 PROCEDURE — 99233 SBSQ HOSP IP/OBS HIGH 50: CPT | Mod: HCNC,,, | Performed by: INTERNAL MEDICINE

## 2025-07-30 PROCEDURE — 99900035 HC TECH TIME PER 15 MIN (STAT): Mod: HCNC

## 2025-07-30 PROCEDURE — 63600175 PHARM REV CODE 636 W HCPCS: Mod: HCNC

## 2025-07-30 PROCEDURE — 27000207 HC ISOLATION: Mod: HCNC

## 2025-07-30 PROCEDURE — C9254 INJECTION, LACOSAMIDE: HCPCS | Mod: HCNC | Performed by: INTERNAL MEDICINE

## 2025-07-30 PROCEDURE — 85025 COMPLETE CBC W/AUTO DIFF WBC: CPT | Mod: HCNC

## 2025-07-30 PROCEDURE — 83970 ASSAY OF PARATHORMONE: CPT | Mod: HCNC | Performed by: INTERNAL MEDICINE

## 2025-07-30 PROCEDURE — 80053 COMPREHEN METABOLIC PANEL: CPT | Mod: HCNC

## 2025-07-30 PROCEDURE — 83735 ASSAY OF MAGNESIUM: CPT | Mod: HCNC | Performed by: STUDENT IN AN ORGANIZED HEALTH CARE EDUCATION/TRAINING PROGRAM

## 2025-07-30 PROCEDURE — 63600175 PHARM REV CODE 636 W HCPCS: Mod: HCNC | Performed by: STUDENT IN AN ORGANIZED HEALTH CARE EDUCATION/TRAINING PROGRAM

## 2025-07-30 PROCEDURE — 25000003 PHARM REV CODE 250: Mod: HCNC | Performed by: INTERNAL MEDICINE

## 2025-07-30 RX ORDER — LACOSAMIDE 10 MG/ML
200 INJECTION INTRAVENOUS EVERY 12 HOURS
Status: DISCONTINUED | OUTPATIENT
Start: 2025-07-30 | End: 2025-08-01

## 2025-07-30 RX ORDER — IPRATROPIUM BROMIDE AND ALBUTEROL SULFATE 2.5; .5 MG/3ML; MG/3ML
3 SOLUTION RESPIRATORY (INHALATION) EVERY 6 HOURS PRN
Status: DISCONTINUED | OUTPATIENT
Start: 2025-07-31 | End: 2025-08-26 | Stop reason: HOSPADM

## 2025-07-30 RX ADMIN — MICONAZOLE NITRATE 2 % TOPICAL POWDER: at 10:07

## 2025-07-30 RX ADMIN — HEPARIN SODIUM 5000 UNITS: 5000 INJECTION INTRAVENOUS; SUBCUTANEOUS at 02:07

## 2025-07-30 RX ADMIN — MICONAZOLE NITRATE 2 % TOPICAL POWDER: at 08:07

## 2025-07-30 RX ADMIN — IPRATROPIUM BROMIDE AND ALBUTEROL SULFATE 3 ML: 2.5; .5 SOLUTION RESPIRATORY (INHALATION) at 02:07

## 2025-07-30 RX ADMIN — HEPARIN SODIUM 5000 UNITS: 5000 INJECTION INTRAVENOUS; SUBCUTANEOUS at 06:07

## 2025-07-30 RX ADMIN — MELATONIN TAB 3 MG 6 MG: 3 TAB at 09:07

## 2025-07-30 RX ADMIN — PANTOPRAZOLE SODIUM 40 MG: 40 INJECTION, POWDER, FOR SOLUTION INTRAVENOUS at 08:07

## 2025-07-30 RX ADMIN — PSYLLIUM HUSK 1 PACKET: 3.4 POWDER ORAL at 09:07

## 2025-07-30 RX ADMIN — COLLAGENASE SANTYL: 250 OINTMENT TOPICAL at 04:07

## 2025-07-30 RX ADMIN — FOLIC ACID 2 MG: 1 TABLET ORAL at 08:07

## 2025-07-30 RX ADMIN — LEVETIRACETAM 500 MG: 500 SOLUTION ORAL at 09:07

## 2025-07-30 RX ADMIN — LACOSAMIDE 200 MG: 10 INJECTION INTRAVENOUS at 02:07

## 2025-07-30 RX ADMIN — LACOSAMIDE 200 MG: 10 INJECTION INTRAVENOUS at 09:07

## 2025-07-30 RX ADMIN — LEVETIRACETAM 500 MG: 500 SOLUTION ORAL at 08:07

## 2025-07-30 RX ADMIN — SODIUM CHLORIDE SOLN NEBU 3% 4 ML: 3 NEBU SOLN at 07:07

## 2025-07-30 RX ADMIN — IPRATROPIUM BROMIDE AND ALBUTEROL SULFATE 3 ML: 2.5; .5 SOLUTION RESPIRATORY (INHALATION) at 07:07

## 2025-07-30 RX ADMIN — LEVOTHYROXINE SODIUM 75 MCG: 75 TABLET ORAL at 06:07

## 2025-07-30 RX ADMIN — SODIUM CHLORIDE SOLN NEBU 3% 4 ML: 3 NEBU SOLN at 02:07

## 2025-07-30 RX ADMIN — INSULIN GLARGINE 8 UNITS: 100 INJECTION, SOLUTION SUBCUTANEOUS at 08:07

## 2025-07-30 RX ADMIN — HEPARIN SODIUM 5000 UNITS: 5000 INJECTION INTRAVENOUS; SUBCUTANEOUS at 09:07

## 2025-07-31 PROBLEM — K92.2 GI BLEED: Status: RESOLVED | Noted: 2025-07-07 | Resolved: 2025-07-31

## 2025-07-31 LAB
ABSOLUTE EOSINOPHIL (OHS): 0.36 K/UL
ABSOLUTE MONOCYTE (OHS): 1.13 K/UL (ref 0.3–1)
ABSOLUTE NEUTROPHIL COUNT (OHS): 4.85 K/UL (ref 1.8–7.7)
ALBUMIN SERPL BCP-MCNC: 2.1 G/DL (ref 3.5–5.2)
ALP SERPL-CCNC: 234 UNIT/L (ref 40–150)
ALT SERPL W/O P-5'-P-CCNC: 16 UNIT/L (ref 0–55)
ANION GAP (OHS): 7 MMOL/L (ref 8–16)
ANION GAP (OHS): 8 MMOL/L (ref 8–16)
ANION GAP (OHS): 8 MMOL/L (ref 8–16)
AST SERPL-CCNC: 36 UNIT/L (ref 0–50)
BASOPHILS # BLD AUTO: 0.04 K/UL
BASOPHILS NFR BLD AUTO: 0.5 %
BILIRUB SERPL-MCNC: 0.2 MG/DL (ref 0.1–1)
BUN SERPL-MCNC: 31 MG/DL (ref 8–23)
BUN SERPL-MCNC: 31 MG/DL (ref 8–23)
BUN SERPL-MCNC: 33 MG/DL (ref 8–23)
CALCIUM SERPL-MCNC: 8.3 MG/DL (ref 8.7–10.5)
CALCIUM SERPL-MCNC: 8.3 MG/DL (ref 8.7–10.5)
CALCIUM SERPL-MCNC: 8.5 MG/DL (ref 8.7–10.5)
CHLORIDE SERPL-SCNC: 103 MMOL/L (ref 95–110)
CO2 SERPL-SCNC: 23 MMOL/L (ref 23–29)
CO2 SERPL-SCNC: 23 MMOL/L (ref 23–29)
CO2 SERPL-SCNC: 25 MMOL/L (ref 23–29)
CREAT SERPL-MCNC: 1.6 MG/DL (ref 0.5–1.4)
CREAT SERPL-MCNC: 1.6 MG/DL (ref 0.5–1.4)
CREAT SERPL-MCNC: 1.8 MG/DL (ref 0.5–1.4)
ERYTHROCYTE [DISTWIDTH] IN BLOOD BY AUTOMATED COUNT: 18.4 % (ref 11.5–14.5)
GFR SERPLBLD CREATININE-BSD FMLA CKD-EPI: 28 ML/MIN/1.73/M2
GFR SERPLBLD CREATININE-BSD FMLA CKD-EPI: 32 ML/MIN/1.73/M2
GFR SERPLBLD CREATININE-BSD FMLA CKD-EPI: 32 ML/MIN/1.73/M2
GLUCOSE SERPL-MCNC: 103 MG/DL (ref 70–110)
GLUCOSE SERPL-MCNC: 140 MG/DL (ref 70–110)
GLUCOSE SERPL-MCNC: 140 MG/DL (ref 70–110)
HCT VFR BLD AUTO: 26.8 % (ref 37–48.5)
HGB BLD-MCNC: 8.6 GM/DL (ref 12–16)
IMM GRANULOCYTES # BLD AUTO: 0.07 K/UL (ref 0–0.04)
IMM GRANULOCYTES NFR BLD AUTO: 0.8 % (ref 0–0.5)
LYMPHOCYTES # BLD AUTO: 2.09 K/UL (ref 1–4.8)
MAGNESIUM SERPL-MCNC: 2.1 MG/DL (ref 1.6–2.6)
MCH RBC QN AUTO: 30.9 PG (ref 27–31)
MCHC RBC AUTO-ENTMCNC: 32.1 G/DL (ref 32–36)
MCV RBC AUTO: 96 FL (ref 82–98)
NUCLEATED RBC (/100WBC) (OHS): 0 /100 WBC
PHOSPHATE SERPL-MCNC: 3.6 MG/DL (ref 2.7–4.5)
PHOSPHATE SERPL-MCNC: 3.9 MG/DL (ref 2.7–4.5)
PLATELET # BLD AUTO: 172 K/UL (ref 150–450)
PMV BLD AUTO: 10.6 FL (ref 9.2–12.9)
POCT GLUCOSE: 120 MG/DL (ref 70–110)
POCT GLUCOSE: 130 MG/DL (ref 70–110)
POCT GLUCOSE: 137 MG/DL (ref 70–110)
POCT GLUCOSE: 141 MG/DL (ref 70–110)
POCT GLUCOSE: 170 MG/DL (ref 70–110)
POCT GLUCOSE: 179 MG/DL (ref 70–110)
POCT GLUCOSE: 94 MG/DL (ref 70–110)
POTASSIUM SERPL-SCNC: 5.5 MMOL/L (ref 3.5–5.1)
POTASSIUM SERPL-SCNC: 5.5 MMOL/L (ref 3.5–5.1)
POTASSIUM SERPL-SCNC: 5.6 MMOL/L (ref 3.5–5.1)
PROT SERPL-MCNC: 6.7 GM/DL (ref 6–8.4)
RBC # BLD AUTO: 2.78 M/UL (ref 4–5.4)
RELATIVE EOSINOPHIL (OHS): 4.2 %
RELATIVE LYMPHOCYTE (OHS): 24.5 % (ref 18–48)
RELATIVE MONOCYTE (OHS): 13.2 % (ref 4–15)
RELATIVE NEUTROPHIL (OHS): 56.8 % (ref 38–73)
SODIUM SERPL-SCNC: 134 MMOL/L (ref 136–145)
SODIUM SERPL-SCNC: 134 MMOL/L (ref 136–145)
SODIUM SERPL-SCNC: 135 MMOL/L (ref 136–145)
WBC # BLD AUTO: 8.54 K/UL (ref 3.9–12.7)

## 2025-07-31 PROCEDURE — 93010 ELECTROCARDIOGRAM REPORT: CPT | Mod: HCNC,,, | Performed by: INTERNAL MEDICINE

## 2025-07-31 PROCEDURE — 99233 SBSQ HOSP IP/OBS HIGH 50: CPT | Mod: HCNC,GC,, | Performed by: INTERNAL MEDICINE

## 2025-07-31 PROCEDURE — 25000242 PHARM REV CODE 250 ALT 637 W/ HCPCS: Mod: HCNC

## 2025-07-31 PROCEDURE — 82040 ASSAY OF SERUM ALBUMIN: CPT | Mod: HCNC

## 2025-07-31 PROCEDURE — 93005 ELECTROCARDIOGRAM TRACING: CPT | Mod: HCNC | Performed by: INTERNAL MEDICINE

## 2025-07-31 PROCEDURE — C9254 INJECTION, LACOSAMIDE: HCPCS | Mod: HCNC | Performed by: INTERNAL MEDICINE

## 2025-07-31 PROCEDURE — 25000003 PHARM REV CODE 250: Mod: HCNC

## 2025-07-31 PROCEDURE — 63600175 PHARM REV CODE 636 W HCPCS: Mod: HCNC

## 2025-07-31 PROCEDURE — 94761 N-INVAS EAR/PLS OXIMETRY MLT: CPT | Mod: HCNC

## 2025-07-31 PROCEDURE — 20000000 HC ICU ROOM: Mod: HCNC

## 2025-07-31 PROCEDURE — 25000003 PHARM REV CODE 250: Mod: HCNC | Performed by: INTERNAL MEDICINE

## 2025-07-31 PROCEDURE — 99233 SBSQ HOSP IP/OBS HIGH 50: CPT | Mod: HCNC,,, | Performed by: INTERNAL MEDICINE

## 2025-07-31 PROCEDURE — 85025 COMPLETE CBC W/AUTO DIFF WBC: CPT | Mod: HCNC

## 2025-07-31 PROCEDURE — 27000207 HC ISOLATION: Mod: HCNC

## 2025-07-31 PROCEDURE — 63600175 PHARM REV CODE 636 W HCPCS: Mod: HCNC | Performed by: INTERNAL MEDICINE

## 2025-07-31 PROCEDURE — 80069 RENAL FUNCTION PANEL: CPT | Mod: HCNC | Performed by: STUDENT IN AN ORGANIZED HEALTH CARE EDUCATION/TRAINING PROGRAM

## 2025-07-31 PROCEDURE — 83605 ASSAY OF LACTIC ACID: CPT | Mod: HCNC

## 2025-07-31 PROCEDURE — 99900035 HC TECH TIME PER 15 MIN (STAT): Mod: HCNC

## 2025-07-31 PROCEDURE — 83735 ASSAY OF MAGNESIUM: CPT | Mod: HCNC | Performed by: STUDENT IN AN ORGANIZED HEALTH CARE EDUCATION/TRAINING PROGRAM

## 2025-07-31 PROCEDURE — 63600175 PHARM REV CODE 636 W HCPCS: Mod: HCNC | Performed by: STUDENT IN AN ORGANIZED HEALTH CARE EDUCATION/TRAINING PROGRAM

## 2025-07-31 RX ORDER — ATROPINE SULFATE 0.1 MG/ML
INJECTION INTRAVENOUS
Status: DISPENSED
Start: 2025-07-31 | End: 2025-08-01

## 2025-07-31 RX ADMIN — PSYLLIUM HUSK 1 PACKET: 3.4 POWDER ORAL at 09:07

## 2025-07-31 RX ADMIN — MICONAZOLE NITRATE 2 % TOPICAL POWDER: at 10:07

## 2025-07-31 RX ADMIN — LEVETIRACETAM 500 MG: 500 SOLUTION ORAL at 10:07

## 2025-07-31 RX ADMIN — HEPARIN SODIUM 5000 UNITS: 5000 INJECTION INTRAVENOUS; SUBCUTANEOUS at 06:07

## 2025-07-31 RX ADMIN — LACOSAMIDE 200 MG: 10 INJECTION INTRAVENOUS at 09:07

## 2025-07-31 RX ADMIN — LEVOTHYROXINE SODIUM 75 MCG: 75 TABLET ORAL at 06:07

## 2025-07-31 RX ADMIN — MICONAZOLE NITRATE 2 % TOPICAL POWDER: at 08:07

## 2025-07-31 RX ADMIN — MELATONIN TAB 3 MG 6 MG: 3 TAB at 10:07

## 2025-07-31 RX ADMIN — COLLAGENASE SANTYL: 250 OINTMENT TOPICAL at 09:07

## 2025-07-31 RX ADMIN — LACOSAMIDE 200 MG: 10 INJECTION INTRAVENOUS at 10:07

## 2025-07-31 RX ADMIN — FOLIC ACID 2 MG: 1 TABLET ORAL at 09:07

## 2025-07-31 RX ADMIN — PANTOPRAZOLE SODIUM 40 MG: 40 INJECTION, POWDER, FOR SOLUTION INTRAVENOUS at 09:07

## 2025-07-31 RX ADMIN — HEPARIN SODIUM 5000 UNITS: 5000 INJECTION INTRAVENOUS; SUBCUTANEOUS at 01:07

## 2025-07-31 RX ADMIN — HEPARIN SODIUM 5000 UNITS: 5000 INJECTION INTRAVENOUS; SUBCUTANEOUS at 10:07

## 2025-07-31 RX ADMIN — LEVETIRACETAM 500 MG: 500 SOLUTION ORAL at 09:07

## 2025-07-31 RX ADMIN — INSULIN GLARGINE 8 UNITS: 100 INJECTION, SOLUTION SUBCUTANEOUS at 09:07

## 2025-07-31 RX ADMIN — INSULIN ASPART 2 UNITS: 100 INJECTION, SOLUTION INTRAVENOUS; SUBCUTANEOUS at 09:07

## 2025-08-01 LAB
ABSOLUTE EOSINOPHIL (OHS): 0.27 K/UL
ABSOLUTE MONOCYTE (OHS): 1.18 K/UL (ref 0.3–1)
ABSOLUTE NEUTROPHIL COUNT (OHS): 6.28 K/UL (ref 1.8–7.7)
ALBUMIN SERPL BCP-MCNC: 2 G/DL (ref 3.5–5.2)
ALBUMIN SERPL BCP-MCNC: 2 G/DL (ref 3.5–5.2)
ALBUMIN SERPL BCP-MCNC: 2.1 G/DL (ref 3.5–5.2)
ALBUMIN SERPL BCP-MCNC: 2.2 G/DL (ref 3.5–5.2)
ALLENS TEST: ABNORMAL
ALP SERPL-CCNC: 227 UNIT/L (ref 40–150)
ALT SERPL W/O P-5'-P-CCNC: 14 UNIT/L (ref 0–55)
ANION GAP (OHS): 10 MMOL/L (ref 8–16)
ANION GAP (OHS): 10 MMOL/L (ref 8–16)
ANION GAP (OHS): 7 MMOL/L (ref 8–16)
ANION GAP (OHS): 7 MMOL/L (ref 8–16)
ANION GAP (OHS): 8 MMOL/L (ref 8–16)
ANION GAP (OHS): 9 MMOL/L (ref 8–16)
AST SERPL-CCNC: 35 UNIT/L (ref 0–50)
BASOPHILS # BLD AUTO: 0.04 K/UL
BASOPHILS NFR BLD AUTO: 0.4 %
BILIRUB SERPL-MCNC: 0.1 MG/DL (ref 0.1–1)
BUN SERPL-MCNC: 38 MG/DL (ref 8–23)
BUN SERPL-MCNC: 40 MG/DL (ref 8–23)
BUN SERPL-MCNC: 41 MG/DL (ref 8–23)
BUN SERPL-MCNC: 44 MG/DL (ref 8–23)
BUN SERPL-MCNC: 45 MG/DL (ref 8–23)
BUN SERPL-MCNC: 45 MG/DL (ref 8–23)
CALCIUM SERPL-MCNC: 8.3 MG/DL (ref 8.7–10.5)
CALCIUM SERPL-MCNC: 8.4 MG/DL (ref 8.7–10.5)
CALCIUM SERPL-MCNC: 8.5 MG/DL (ref 8.7–10.5)
CALCIUM SERPL-MCNC: 8.6 MG/DL (ref 8.7–10.5)
CHLORIDE SERPL-SCNC: 100 MMOL/L (ref 95–110)
CHLORIDE SERPL-SCNC: 101 MMOL/L (ref 95–110)
CHLORIDE SERPL-SCNC: 101 MMOL/L (ref 95–110)
CHLORIDE SERPL-SCNC: 102 MMOL/L (ref 95–110)
CHLORIDE SERPL-SCNC: 103 MMOL/L (ref 95–110)
CHLORIDE SERPL-SCNC: 103 MMOL/L (ref 95–110)
CO2 SERPL-SCNC: 21 MMOL/L (ref 23–29)
CO2 SERPL-SCNC: 21 MMOL/L (ref 23–29)
CO2 SERPL-SCNC: 22 MMOL/L (ref 23–29)
CO2 SERPL-SCNC: 23 MMOL/L (ref 23–29)
CREAT SERPL-MCNC: 1.9 MG/DL (ref 0.5–1.4)
CREAT SERPL-MCNC: 2 MG/DL (ref 0.5–1.4)
CREAT SERPL-MCNC: 2.1 MG/DL (ref 0.5–1.4)
CREAT SERPL-MCNC: 2.1 MG/DL (ref 0.5–1.4)
CREAT SERPL-MCNC: 2.2 MG/DL (ref 0.5–1.4)
CREAT SERPL-MCNC: 2.2 MG/DL (ref 0.5–1.4)
DELSYS: ABNORMAL
ERYTHROCYTE [DISTWIDTH] IN BLOOD BY AUTOMATED COUNT: 18.2 % (ref 11.5–14.5)
GFR SERPLBLD CREATININE-BSD FMLA CKD-EPI: 22 ML/MIN/1.73/M2
GFR SERPLBLD CREATININE-BSD FMLA CKD-EPI: 22 ML/MIN/1.73/M2
GFR SERPLBLD CREATININE-BSD FMLA CKD-EPI: 23 ML/MIN/1.73/M2
GFR SERPLBLD CREATININE-BSD FMLA CKD-EPI: 23 ML/MIN/1.73/M2
GFR SERPLBLD CREATININE-BSD FMLA CKD-EPI: 25 ML/MIN/1.73/M2
GFR SERPLBLD CREATININE-BSD FMLA CKD-EPI: 26 ML/MIN/1.73/M2
GLUCOSE SERPL-MCNC: 126 MG/DL (ref 70–110)
GLUCOSE SERPL-MCNC: 134 MG/DL (ref 70–110)
GLUCOSE SERPL-MCNC: 135 MG/DL (ref 70–110)
GLUCOSE SERPL-MCNC: 149 MG/DL (ref 70–110)
GLUCOSE SERPL-MCNC: 163 MG/DL (ref 70–110)
GLUCOSE SERPL-MCNC: 163 MG/DL (ref 70–110)
GLUCOSE SERPL-MCNC: 193 MG/DL (ref 70–110)
GLUCOSE SERPL-MCNC: 193 MG/DL (ref 70–110)
HCO3 UR-SCNC: 25 MMOL/L (ref 24–28)
HCT VFR BLD AUTO: 24.9 % (ref 37–48.5)
HGB BLD-MCNC: 8 GM/DL (ref 12–16)
IMM GRANULOCYTES # BLD AUTO: 0.09 K/UL (ref 0–0.04)
IMM GRANULOCYTES NFR BLD AUTO: 0.9 % (ref 0–0.5)
LACTATE SERPL-SCNC: 0.7 MMOL/L (ref 0.5–2.2)
LACTATE SERPL-SCNC: 0.9 MMOL/L (ref 0.5–2.2)
LYMPHOCYTES # BLD AUTO: 1.78 K/UL (ref 1–4.8)
MAGNESIUM SERPL-MCNC: 2.3 MG/DL (ref 1.6–2.6)
MAGNESIUM SERPL-MCNC: 2.3 MG/DL (ref 1.6–2.6)
MAGNESIUM SERPL-MCNC: 2.4 MG/DL (ref 1.6–2.6)
MCH RBC QN AUTO: 31.1 PG (ref 27–31)
MCHC RBC AUTO-ENTMCNC: 32.1 G/DL (ref 32–36)
MCV RBC AUTO: 97 FL (ref 82–98)
MODE: ABNORMAL
NT-PROBNP SERPL-MCNC: ABNORMAL PG/ML
NUCLEATED RBC (/100WBC) (OHS): 0 /100 WBC
OHS QRS DURATION: 82 MS
OHS QRS DURATION: 98 MS
OHS QTC CALCULATION: 368 MS
OHS QTC CALCULATION: 387 MS
PCO2 BLDA: 43.5 MMHG (ref 35–45)
PH SMN: 7.37 [PH] (ref 7.35–7.45)
PHOSPHATE SERPL-MCNC: 4.2 MG/DL (ref 2.7–4.5)
PHOSPHATE SERPL-MCNC: 4.3 MG/DL (ref 2.7–4.5)
PHOSPHATE SERPL-MCNC: 4.6 MG/DL (ref 2.7–4.5)
PLATELET # BLD AUTO: 173 K/UL (ref 150–450)
PMV BLD AUTO: 11 FL (ref 9.2–12.9)
PO2 BLDA: 70 MMHG (ref 80–100)
POC BE: 0 MMOL/L (ref -2–2)
POC SATURATED O2: 93 % (ref 95–100)
POC TCO2: 26 MMOL/L (ref 23–27)
POCT GLUCOSE: 135 MG/DL (ref 70–110)
POCT GLUCOSE: 145 MG/DL (ref 70–110)
POCT GLUCOSE: 153 MG/DL (ref 70–110)
POCT GLUCOSE: 157 MG/DL (ref 70–110)
POCT GLUCOSE: 167 MG/DL (ref 70–110)
POCT GLUCOSE: 167 MG/DL (ref 70–110)
POCT GLUCOSE: 171 MG/DL (ref 70–110)
POCT GLUCOSE: 172 MG/DL (ref 70–110)
POCT GLUCOSE: 177 MG/DL (ref 70–110)
POCT GLUCOSE: 217 MG/DL (ref 70–110)
POCT GLUCOSE: 262 MG/DL (ref 70–110)
POCT GLUCOSE: 273 MG/DL (ref 70–110)
POTASSIUM SERPL-SCNC: 5.5 MMOL/L (ref 3.5–5.1)
POTASSIUM SERPL-SCNC: 5.5 MMOL/L (ref 3.5–5.1)
POTASSIUM SERPL-SCNC: 5.7 MMOL/L (ref 3.5–5.1)
POTASSIUM SERPL-SCNC: 5.8 MMOL/L (ref 3.5–5.1)
POTASSIUM SERPL-SCNC: 6 MMOL/L (ref 3.5–5.1)
POTASSIUM SERPL-SCNC: 6.1 MMOL/L (ref 3.5–5.1)
PROT SERPL-MCNC: 6.5 GM/DL (ref 6–8.4)
RBC # BLD AUTO: 2.57 M/UL (ref 4–5.4)
RELATIVE EOSINOPHIL (OHS): 2.8 %
RELATIVE LYMPHOCYTE (OHS): 18.5 % (ref 18–48)
RELATIVE MONOCYTE (OHS): 12.2 % (ref 4–15)
RELATIVE NEUTROPHIL (OHS): 65.2 % (ref 38–73)
SAMPLE: ABNORMAL
SITE: ABNORMAL
SODIUM SERPL-SCNC: 131 MMOL/L (ref 136–145)
SODIUM SERPL-SCNC: 132 MMOL/L (ref 136–145)
SODIUM SERPL-SCNC: 133 MMOL/L (ref 136–145)
TROPONIN I SERPL HS-MCNC: 33 NG/L
TROPONIN I SERPL HS-MCNC: 40 NG/L
WBC # BLD AUTO: 9.64 K/UL (ref 3.9–12.7)

## 2025-08-01 PROCEDURE — C9254 INJECTION, LACOSAMIDE: HCPCS | Mod: HCNC | Performed by: INTERNAL MEDICINE

## 2025-08-01 PROCEDURE — 99233 SBSQ HOSP IP/OBS HIGH 50: CPT | Mod: HCNC,,, | Performed by: INTERNAL MEDICINE

## 2025-08-01 PROCEDURE — 25000242 PHARM REV CODE 250 ALT 637 W/ HCPCS: Mod: HCNC

## 2025-08-01 PROCEDURE — 63600175 PHARM REV CODE 636 W HCPCS: Mod: HCNC | Performed by: INTERNAL MEDICINE

## 2025-08-01 PROCEDURE — 99900035 HC TECH TIME PER 15 MIN (STAT): Mod: HCNC

## 2025-08-01 PROCEDURE — 93010 ELECTROCARDIOGRAM REPORT: CPT | Mod: HCNC,,, | Performed by: STUDENT IN AN ORGANIZED HEALTH CARE EDUCATION/TRAINING PROGRAM

## 2025-08-01 PROCEDURE — 20000000 HC ICU ROOM: Mod: HCNC

## 2025-08-01 PROCEDURE — 93010 ELECTROCARDIOGRAM REPORT: CPT | Mod: HCNC,76,, | Performed by: INTERNAL MEDICINE

## 2025-08-01 PROCEDURE — 93005 ELECTROCARDIOGRAM TRACING: CPT | Mod: HCNC | Performed by: INTERNAL MEDICINE

## 2025-08-01 PROCEDURE — 25000003 PHARM REV CODE 250: Mod: HCNC

## 2025-08-01 PROCEDURE — 85025 COMPLETE CBC W/AUTO DIFF WBC: CPT | Mod: HCNC

## 2025-08-01 PROCEDURE — 84484 ASSAY OF TROPONIN QUANT: CPT | Mod: HCNC

## 2025-08-01 PROCEDURE — 63600175 PHARM REV CODE 636 W HCPCS: Mod: HCNC

## 2025-08-01 PROCEDURE — 82803 BLOOD GASES ANY COMBINATION: CPT | Mod: HCNC

## 2025-08-01 PROCEDURE — 80053 COMPREHEN METABOLIC PANEL: CPT | Mod: HCNC

## 2025-08-01 PROCEDURE — 83880 ASSAY OF NATRIURETIC PEPTIDE: CPT | Mod: HCNC

## 2025-08-01 PROCEDURE — 80069 RENAL FUNCTION PANEL: CPT | Mod: HCNC

## 2025-08-01 PROCEDURE — 37799 UNLISTED PX VASCULAR SURGERY: CPT | Mod: HCNC

## 2025-08-01 PROCEDURE — 83605 ASSAY OF LACTIC ACID: CPT | Mod: HCNC

## 2025-08-01 PROCEDURE — 93005 ELECTROCARDIOGRAM TRACING: CPT | Mod: HCNC | Performed by: STUDENT IN AN ORGANIZED HEALTH CARE EDUCATION/TRAINING PROGRAM

## 2025-08-01 PROCEDURE — 83735 ASSAY OF MAGNESIUM: CPT | Mod: HCNC | Performed by: STUDENT IN AN ORGANIZED HEALTH CARE EDUCATION/TRAINING PROGRAM

## 2025-08-01 PROCEDURE — 80069 RENAL FUNCTION PANEL: CPT | Mod: HCNC | Performed by: STUDENT IN AN ORGANIZED HEALTH CARE EDUCATION/TRAINING PROGRAM

## 2025-08-01 PROCEDURE — 27000207 HC ISOLATION: Mod: HCNC

## 2025-08-01 PROCEDURE — 94761 N-INVAS EAR/PLS OXIMETRY MLT: CPT | Mod: HCNC,XB

## 2025-08-01 PROCEDURE — 93010 ELECTROCARDIOGRAM REPORT: CPT | Mod: HCNC,,, | Performed by: INTERNAL MEDICINE

## 2025-08-01 PROCEDURE — 63600175 PHARM REV CODE 636 W HCPCS: Mod: HCNC | Performed by: STUDENT IN AN ORGANIZED HEALTH CARE EDUCATION/TRAINING PROGRAM

## 2025-08-01 PROCEDURE — 87040 BLOOD CULTURE FOR BACTERIA: CPT | Mod: HCNC

## 2025-08-01 PROCEDURE — 99291 CRITICAL CARE FIRST HOUR: CPT | Mod: HCNC,GC,, | Performed by: INTERNAL MEDICINE

## 2025-08-01 PROCEDURE — 94640 AIRWAY INHALATION TREATMENT: CPT | Mod: HCNC

## 2025-08-01 RX ORDER — SODIUM CHLORIDE 9 MG/ML
INJECTION, SOLUTION INTRAVENOUS ONCE
Status: CANCELLED | OUTPATIENT
Start: 2025-08-01 | End: 2025-08-01

## 2025-08-01 RX ORDER — NOREPINEPHRINE BITARTRATE/D5W 4MG/250ML
0-3 PLASTIC BAG, INJECTION (ML) INTRAVENOUS CONTINUOUS
Status: DISCONTINUED | OUTPATIENT
Start: 2025-08-01 | End: 2025-08-01

## 2025-08-01 RX ORDER — ATROPINE SULFATE 0.1 MG/ML
1 INJECTION INTRAVENOUS ONCE
Status: DISCONTINUED | OUTPATIENT
Start: 2025-08-01 | End: 2025-08-04

## 2025-08-01 RX ORDER — SODIUM CHLORIDE 9 MG/ML
INJECTION, SOLUTION INTRAVENOUS
Status: CANCELLED | OUTPATIENT
Start: 2025-08-01

## 2025-08-01 RX ORDER — ALBUTEROL SULFATE 2.5 MG/.5ML
10 SOLUTION RESPIRATORY (INHALATION) ONCE
Status: COMPLETED | OUTPATIENT
Start: 2025-08-01 | End: 2025-08-01

## 2025-08-01 RX ORDER — MUPIROCIN 20 MG/G
OINTMENT TOPICAL 2 TIMES DAILY
Status: CANCELLED | OUTPATIENT
Start: 2025-08-01 | End: 2025-08-06

## 2025-08-01 RX ADMIN — COLLAGENASE SANTYL: 250 OINTMENT TOPICAL at 08:08

## 2025-08-01 RX ADMIN — MICONAZOLE NITRATE 2 % TOPICAL POWDER: at 09:08

## 2025-08-01 RX ADMIN — INSULIN GLARGINE 8 UNITS: 100 INJECTION, SOLUTION SUBCUTANEOUS at 08:08

## 2025-08-01 RX ADMIN — ALBUTEROL SULFATE 10 MG: 2.5 SOLUTION RESPIRATORY (INHALATION) at 10:08

## 2025-08-01 RX ADMIN — LEVETIRACETAM 500 MG: 500 SOLUTION ORAL at 09:08

## 2025-08-01 RX ADMIN — SODIUM ZIRCONIUM CYCLOSILICATE 10 G: 5 POWDER, FOR SUSPENSION ORAL at 02:08

## 2025-08-01 RX ADMIN — PANTOPRAZOLE SODIUM 40 MG: 40 INJECTION, POWDER, FOR SOLUTION INTRAVENOUS at 08:08

## 2025-08-01 RX ADMIN — PSYLLIUM HUSK 1 PACKET: 3.4 POWDER ORAL at 08:08

## 2025-08-01 RX ADMIN — LEVOTHYROXINE SODIUM 75 MCG: 75 TABLET ORAL at 05:08

## 2025-08-01 RX ADMIN — MICONAZOLE NITRATE 2 % TOPICAL POWDER: at 08:08

## 2025-08-01 RX ADMIN — INSULIN ASPART 1 UNITS: 100 INJECTION, SOLUTION INTRAVENOUS; SUBCUTANEOUS at 07:08

## 2025-08-01 RX ADMIN — NOREPINEPHRINE BITARTRATE 0.02 MCG/KG/MIN: 4 INJECTION, SOLUTION INTRAVENOUS at 09:08

## 2025-08-01 RX ADMIN — HEPARIN SODIUM 5000 UNITS: 5000 INJECTION INTRAVENOUS; SUBCUTANEOUS at 01:08

## 2025-08-01 RX ADMIN — FOLIC ACID 2 MG: 1 TABLET ORAL at 08:08

## 2025-08-01 RX ADMIN — HEPARIN SODIUM 5000 UNITS: 5000 INJECTION INTRAVENOUS; SUBCUTANEOUS at 05:08

## 2025-08-01 RX ADMIN — LACOSAMIDE 200 MG: 10 INJECTION INTRAVENOUS at 08:08

## 2025-08-01 RX ADMIN — SODIUM ZIRCONIUM CYCLOSILICATE 10 G: 5 POWDER, FOR SUSPENSION ORAL at 10:08

## 2025-08-01 RX ADMIN — INSULIN ASPART 2 UNITS: 100 INJECTION, SOLUTION INTRAVENOUS; SUBCUTANEOUS at 12:08

## 2025-08-01 RX ADMIN — INSULIN ASPART 3 UNITS: 100 INJECTION, SOLUTION INTRAVENOUS; SUBCUTANEOUS at 11:08

## 2025-08-01 RX ADMIN — DEXTROSE MONOHYDRATE 50 G: 25 INJECTION, SOLUTION INTRAVENOUS at 02:08

## 2025-08-01 RX ADMIN — INSULIN HUMAN 5 UNITS: 100 INJECTION, SOLUTION PARENTERAL at 02:08

## 2025-08-01 RX ADMIN — EPINEPHRINE 0.01 MCG/KG/MIN: 1 INJECTION INTRAMUSCULAR; INTRAVENOUS; SUBCUTANEOUS at 05:08

## 2025-08-01 RX ADMIN — LEVETIRACETAM 500 MG: 500 SOLUTION ORAL at 08:08

## 2025-08-01 RX ADMIN — HEPARIN SODIUM 5000 UNITS: 5000 INJECTION INTRAVENOUS; SUBCUTANEOUS at 09:08

## 2025-08-01 RX ADMIN — SODIUM ZIRCONIUM CYCLOSILICATE 10 G: 5 POWDER, FOR SUSPENSION ORAL at 12:08

## 2025-08-02 LAB
ABSOLUTE EOSINOPHIL (OHS): 0.09 K/UL
ABSOLUTE EOSINOPHIL (OHS): 0.13 K/UL
ABSOLUTE MONOCYTE (OHS): 1.09 K/UL (ref 0.3–1)
ABSOLUTE MONOCYTE (OHS): 1.19 K/UL (ref 0.3–1)
ABSOLUTE NEUTROPHIL COUNT (OHS): 8.41 K/UL (ref 1.8–7.7)
ABSOLUTE NEUTROPHIL COUNT (OHS): 9.44 K/UL (ref 1.8–7.7)
ALBUMIN SERPL BCP-MCNC: 2 G/DL (ref 3.5–5.2)
ANION GAP (OHS): 10 MMOL/L (ref 8–16)
ANION GAP (OHS): 11 MMOL/L (ref 8–16)
ANION GAP (OHS): 8 MMOL/L (ref 8–16)
ANION GAP (OHS): 9 MMOL/L (ref 8–16)
BASOPHILS # BLD AUTO: 0.05 K/UL
BASOPHILS # BLD AUTO: 0.05 K/UL
BASOPHILS NFR BLD AUTO: 0.4 %
BASOPHILS NFR BLD AUTO: 0.4 %
BUN SERPL-MCNC: 17 MG/DL (ref 8–23)
BUN SERPL-MCNC: 47 MG/DL (ref 8–23)
BUN SERPL-MCNC: 47 MG/DL (ref 8–23)
BUN SERPL-MCNC: 51 MG/DL (ref 8–23)
CALCIUM SERPL-MCNC: 8.3 MG/DL (ref 8.7–10.5)
CALCIUM SERPL-MCNC: 8.3 MG/DL (ref 8.7–10.5)
CALCIUM SERPL-MCNC: 8.5 MG/DL (ref 8.7–10.5)
CALCIUM SERPL-MCNC: 8.6 MG/DL (ref 8.7–10.5)
CHLORIDE SERPL-SCNC: 100 MMOL/L (ref 95–110)
CHLORIDE SERPL-SCNC: 100 MMOL/L (ref 95–110)
CHLORIDE SERPL-SCNC: 101 MMOL/L (ref 95–110)
CHLORIDE SERPL-SCNC: 105 MMOL/L (ref 95–110)
CO2 SERPL-SCNC: 19 MMOL/L (ref 23–29)
CO2 SERPL-SCNC: 21 MMOL/L (ref 23–29)
CO2 SERPL-SCNC: 22 MMOL/L (ref 23–29)
CO2 SERPL-SCNC: 22 MMOL/L (ref 23–29)
CREAT SERPL-MCNC: 1 MG/DL (ref 0.5–1.4)
CREAT SERPL-MCNC: 2.3 MG/DL (ref 0.5–1.4)
ERYTHROCYTE [DISTWIDTH] IN BLOOD BY AUTOMATED COUNT: 17.9 % (ref 11.5–14.5)
ERYTHROCYTE [DISTWIDTH] IN BLOOD BY AUTOMATED COUNT: 18 % (ref 11.5–14.5)
GFR SERPLBLD CREATININE-BSD FMLA CKD-EPI: 21 ML/MIN/1.73/M2
GFR SERPLBLD CREATININE-BSD FMLA CKD-EPI: 57 ML/MIN/1.73/M2
GLUCOSE SERPL-MCNC: 167 MG/DL (ref 70–110)
GLUCOSE SERPL-MCNC: 189 MG/DL (ref 70–110)
GLUCOSE SERPL-MCNC: 240 MG/DL (ref 70–110)
GLUCOSE SERPL-MCNC: 255 MG/DL (ref 70–110)
HCT VFR BLD AUTO: 20.3 % (ref 37–48.5)
HCT VFR BLD AUTO: 25.3 % (ref 37–48.5)
HGB BLD-MCNC: 6.4 GM/DL (ref 12–16)
HGB BLD-MCNC: 7.8 GM/DL (ref 12–16)
IMM GRANULOCYTES # BLD AUTO: 0.08 K/UL (ref 0–0.04)
IMM GRANULOCYTES # BLD AUTO: 0.09 K/UL (ref 0–0.04)
IMM GRANULOCYTES NFR BLD AUTO: 0.7 % (ref 0–0.5)
IMM GRANULOCYTES NFR BLD AUTO: 0.7 % (ref 0–0.5)
INDIRECT COOMBS: NORMAL
LYMPHOCYTES # BLD AUTO: 1.68 K/UL (ref 1–4.8)
LYMPHOCYTES # BLD AUTO: 1.75 K/UL (ref 1–4.8)
MAGNESIUM SERPL-MCNC: 2.3 MG/DL (ref 1.6–2.6)
MAGNESIUM SERPL-MCNC: 2.5 MG/DL (ref 1.6–2.6)
MCH RBC QN AUTO: 30.6 PG (ref 27–31)
MCH RBC QN AUTO: 30.9 PG (ref 27–31)
MCHC RBC AUTO-ENTMCNC: 30.8 G/DL (ref 32–36)
MCHC RBC AUTO-ENTMCNC: 31.5 G/DL (ref 32–36)
MCV RBC AUTO: 98 FL (ref 82–98)
MCV RBC AUTO: 99 FL (ref 82–98)
NUCLEATED RBC (/100WBC) (OHS): 0 /100 WBC
NUCLEATED RBC (/100WBC) (OHS): 0 /100 WBC
OHS QRS DURATION: 90 MS
OHS QRS DURATION: 96 MS
OHS QTC CALCULATION: 386 MS
OHS QTC CALCULATION: 447 MS
PHOSPHATE SERPL-MCNC: 4.7 MG/DL (ref 2.7–4.5)
PLATELET # BLD AUTO: 173 K/UL (ref 150–450)
PLATELET # BLD AUTO: 210 K/UL (ref 150–450)
PMV BLD AUTO: 10.1 FL (ref 9.2–12.9)
PMV BLD AUTO: 10.2 FL (ref 9.2–12.9)
POCT GLUCOSE: 172 MG/DL (ref 70–110)
POCT GLUCOSE: 174 MG/DL (ref 70–110)
POCT GLUCOSE: 186 MG/DL (ref 70–110)
POCT GLUCOSE: 193 MG/DL (ref 70–110)
POCT GLUCOSE: 229 MG/DL (ref 70–110)
POTASSIUM SERPL-SCNC: 3.6 MMOL/L (ref 3.5–5.1)
POTASSIUM SERPL-SCNC: 3.9 MMOL/L (ref 3.5–5.1)
POTASSIUM SERPL-SCNC: 5.2 MMOL/L (ref 3.5–5.1)
POTASSIUM SERPL-SCNC: 5.5 MMOL/L (ref 3.5–5.1)
POTASSIUM SERPL-SCNC: 5.6 MMOL/L (ref 3.5–5.1)
RBC # BLD AUTO: 2.07 M/UL (ref 4–5.4)
RBC # BLD AUTO: 2.55 M/UL (ref 4–5.4)
RELATIVE EOSINOPHIL (OHS): 0.8 %
RELATIVE EOSINOPHIL (OHS): 1 %
RELATIVE LYMPHOCYTE (OHS): 13.4 % (ref 18–48)
RELATIVE LYMPHOCYTE (OHS): 15.3 % (ref 18–48)
RELATIVE MONOCYTE (OHS): 9.5 % (ref 4–15)
RELATIVE MONOCYTE (OHS): 9.5 % (ref 4–15)
RELATIVE NEUTROPHIL (OHS): 73.3 % (ref 38–73)
RELATIVE NEUTROPHIL (OHS): 75 % (ref 38–73)
RH BLD: NORMAL
SODIUM SERPL-SCNC: 130 MMOL/L (ref 136–145)
SODIUM SERPL-SCNC: 130 MMOL/L (ref 136–145)
SODIUM SERPL-SCNC: 132 MMOL/L (ref 136–145)
SODIUM SERPL-SCNC: 136 MMOL/L (ref 136–145)
SPECIMEN OUTDATE: NORMAL
WBC # BLD AUTO: 11.47 K/UL (ref 3.9–12.7)
WBC # BLD AUTO: 12.58 K/UL (ref 3.9–12.7)

## 2025-08-02 PROCEDURE — 94761 N-INVAS EAR/PLS OXIMETRY MLT: CPT | Mod: HCNC

## 2025-08-02 PROCEDURE — 99233 SBSQ HOSP IP/OBS HIGH 50: CPT | Mod: HCNC,,, | Performed by: INTERNAL MEDICINE

## 2025-08-02 PROCEDURE — 63600175 PHARM REV CODE 636 W HCPCS: Mod: HCNC

## 2025-08-02 PROCEDURE — 63600175 PHARM REV CODE 636 W HCPCS: Mod: HCNC | Performed by: STUDENT IN AN ORGANIZED HEALTH CARE EDUCATION/TRAINING PROGRAM

## 2025-08-02 PROCEDURE — 27000207 HC ISOLATION: Mod: HCNC

## 2025-08-02 PROCEDURE — 25000003 PHARM REV CODE 250: Mod: HCNC

## 2025-08-02 PROCEDURE — 85025 COMPLETE CBC W/AUTO DIFF WBC: CPT | Mod: HCNC | Performed by: INTERNAL MEDICINE

## 2025-08-02 PROCEDURE — 86900 BLOOD TYPING SEROLOGIC ABO: CPT | Mod: HCNC | Performed by: INTERNAL MEDICINE

## 2025-08-02 PROCEDURE — 85025 COMPLETE CBC W/AUTO DIFF WBC: CPT | Mod: HCNC

## 2025-08-02 PROCEDURE — 83735 ASSAY OF MAGNESIUM: CPT | Mod: HCNC | Performed by: STUDENT IN AN ORGANIZED HEALTH CARE EDUCATION/TRAINING PROGRAM

## 2025-08-02 PROCEDURE — 82040 ASSAY OF SERUM ALBUMIN: CPT | Mod: HCNC | Performed by: INTERNAL MEDICINE

## 2025-08-02 PROCEDURE — 82374 ASSAY BLOOD CARBON DIOXIDE: CPT | Mod: HCNC

## 2025-08-02 PROCEDURE — 25000003 PHARM REV CODE 250: Mod: HCNC | Performed by: INTERNAL MEDICINE

## 2025-08-02 PROCEDURE — 20000000 HC ICU ROOM: Mod: HCNC

## 2025-08-02 PROCEDURE — 80100014 HC HEMODIALYSIS 1:1: Mod: HCNC

## 2025-08-02 PROCEDURE — 86920 COMPATIBILITY TEST SPIN: CPT | Mod: HCNC

## 2025-08-02 PROCEDURE — 99233 SBSQ HOSP IP/OBS HIGH 50: CPT | Mod: HCNC,GC,, | Performed by: INTERNAL MEDICINE

## 2025-08-02 PROCEDURE — 83735 ASSAY OF MAGNESIUM: CPT | Mod: HCNC | Performed by: INTERNAL MEDICINE

## 2025-08-02 PROCEDURE — 84132 ASSAY OF SERUM POTASSIUM: CPT | Mod: HCNC

## 2025-08-02 PROCEDURE — 25000242 PHARM REV CODE 250 ALT 637 W/ HCPCS: Mod: HCNC | Performed by: INTERNAL MEDICINE

## 2025-08-02 RX ORDER — HYDROCODONE BITARTRATE AND ACETAMINOPHEN 500; 5 MG/1; MG/1
TABLET ORAL
Status: DISCONTINUED | OUTPATIENT
Start: 2025-08-02 | End: 2025-08-20

## 2025-08-02 RX ADMIN — HEPARIN SODIUM 5000 UNITS: 5000 INJECTION INTRAVENOUS; SUBCUTANEOUS at 09:08

## 2025-08-02 RX ADMIN — HEPARIN SODIUM 5000 UNITS: 5000 INJECTION INTRAVENOUS; SUBCUTANEOUS at 03:08

## 2025-08-02 RX ADMIN — PSYLLIUM HUSK 1 PACKET: 3.4 POWDER ORAL at 09:08

## 2025-08-02 RX ADMIN — INSULIN ASPART 2 UNITS: 100 INJECTION, SOLUTION INTRAVENOUS; SUBCUTANEOUS at 03:08

## 2025-08-02 RX ADMIN — LEVETIRACETAM 500 MG: 500 SOLUTION ORAL at 09:08

## 2025-08-02 RX ADMIN — INSULIN ASPART 2 UNITS: 100 INJECTION, SOLUTION INTRAVENOUS; SUBCUTANEOUS at 08:08

## 2025-08-02 RX ADMIN — MICONAZOLE NITRATE 2 % TOPICAL POWDER: at 08:08

## 2025-08-02 RX ADMIN — PANTOPRAZOLE SODIUM 40 MG: 40 INJECTION, POWDER, FOR SOLUTION INTRAVENOUS at 09:08

## 2025-08-02 RX ADMIN — COLLAGENASE SANTYL: 250 OINTMENT TOPICAL at 09:08

## 2025-08-02 RX ADMIN — INSULIN ASPART 2 UNITS: 100 INJECTION, SOLUTION INTRAVENOUS; SUBCUTANEOUS at 12:08

## 2025-08-02 RX ADMIN — INSULIN ASPART 1 UNITS: 100 INJECTION, SOLUTION INTRAVENOUS; SUBCUTANEOUS at 08:08

## 2025-08-02 RX ADMIN — INSULIN ASPART 4 UNITS: 100 INJECTION, SOLUTION INTRAVENOUS; SUBCUTANEOUS at 04:08

## 2025-08-02 RX ADMIN — MIDODRINE HYDROCHLORIDE 15 MG: 5 TABLET ORAL at 09:08

## 2025-08-02 RX ADMIN — FOLIC ACID 2 MG: 1 TABLET ORAL at 09:08

## 2025-08-02 RX ADMIN — HEPARIN SODIUM 5000 UNITS: 5000 INJECTION INTRAVENOUS; SUBCUTANEOUS at 05:08

## 2025-08-02 RX ADMIN — LEVOTHYROXINE SODIUM 75 MCG: 75 TABLET ORAL at 05:08

## 2025-08-02 RX ADMIN — HEPARIN SODIUM 4000 UNITS: 1000 INJECTION INTRAVENOUS; SUBCUTANEOUS at 08:08

## 2025-08-02 RX ADMIN — INSULIN GLARGINE 8 UNITS: 100 INJECTION, SOLUTION SUBCUTANEOUS at 09:08

## 2025-08-02 RX ADMIN — MICONAZOLE NITRATE 2 % TOPICAL POWDER: at 09:08

## 2025-08-02 RX ADMIN — LEVETIRACETAM 500 MG: 500 SOLUTION ORAL at 08:08

## 2025-08-03 LAB
ABSOLUTE EOSINOPHIL (OHS): 0.61 K/UL
ABSOLUTE MONOCYTE (OHS): 1.14 K/UL (ref 0.3–1)
ABSOLUTE NEUTROPHIL COUNT (OHS): 9.22 K/UL (ref 1.8–7.7)
ALBUMIN SERPL BCP-MCNC: 2.1 G/DL (ref 3.5–5.2)
ALP SERPL-CCNC: 251 UNIT/L (ref 40–150)
ALT SERPL W/O P-5'-P-CCNC: <8 UNIT/L (ref 0–55)
ANION GAP (OHS): 7 MMOL/L (ref 8–16)
AST SERPL-CCNC: 17 UNIT/L (ref 0–50)
BASOPHILS # BLD AUTO: 0.09 K/UL
BASOPHILS NFR BLD AUTO: 0.7 %
BILIRUB SERPL-MCNC: 0.1 MG/DL (ref 0.1–1)
BUN SERPL-MCNC: 28 MG/DL (ref 8–23)
CALCIUM SERPL-MCNC: 8.4 MG/DL (ref 8.7–10.5)
CHLORIDE SERPL-SCNC: 104 MMOL/L (ref 95–110)
CO2 SERPL-SCNC: 22 MMOL/L (ref 23–29)
CREAT SERPL-MCNC: 1.5 MG/DL (ref 0.5–1.4)
ERYTHROCYTE [DISTWIDTH] IN BLOOD BY AUTOMATED COUNT: 17.9 % (ref 11.5–14.5)
GFR SERPLBLD CREATININE-BSD FMLA CKD-EPI: 35 ML/MIN/1.73/M2
GLUCOSE SERPL-MCNC: 185 MG/DL (ref 70–110)
HCT VFR BLD AUTO: 27.8 % (ref 37–48.5)
HGB BLD-MCNC: 9 GM/DL (ref 12–16)
IMM GRANULOCYTES # BLD AUTO: 0.13 K/UL (ref 0–0.04)
IMM GRANULOCYTES NFR BLD AUTO: 1 % (ref 0–0.5)
LYMPHOCYTES # BLD AUTO: 1.79 K/UL (ref 1–4.8)
MCH RBC QN AUTO: 31 PG (ref 27–31)
MCHC RBC AUTO-ENTMCNC: 32.4 G/DL (ref 32–36)
MCV RBC AUTO: 96 FL (ref 82–98)
NUCLEATED RBC (/100WBC) (OHS): 0 /100 WBC
PLATELET # BLD AUTO: 310 K/UL (ref 150–450)
PLATELET BLD QL SMEAR: NORMAL
PMV BLD AUTO: 10.1 FL (ref 9.2–12.9)
POCT GLUCOSE: 159 MG/DL (ref 70–110)
POCT GLUCOSE: 171 MG/DL (ref 70–110)
POCT GLUCOSE: 180 MG/DL (ref 70–110)
POCT GLUCOSE: 185 MG/DL (ref 70–110)
POCT GLUCOSE: 190 MG/DL (ref 70–110)
POCT GLUCOSE: 193 MG/DL (ref 70–110)
POTASSIUM SERPL-SCNC: 3.8 MMOL/L (ref 3.5–5.1)
POTASSIUM SERPL-SCNC: 3.8 MMOL/L (ref 3.5–5.1)
PROT SERPL-MCNC: 6.9 GM/DL (ref 6–8.4)
RBC # BLD AUTO: 2.9 M/UL (ref 4–5.4)
RELATIVE EOSINOPHIL (OHS): 4.7 %
RELATIVE LYMPHOCYTE (OHS): 13.8 % (ref 18–48)
RELATIVE MONOCYTE (OHS): 8.8 % (ref 4–15)
RELATIVE NEUTROPHIL (OHS): 71 % (ref 38–73)
SODIUM SERPL-SCNC: 133 MMOL/L (ref 136–145)
WBC # BLD AUTO: 12.98 K/UL (ref 3.9–12.7)

## 2025-08-03 PROCEDURE — 63600175 PHARM REV CODE 636 W HCPCS: Mod: HCNC | Performed by: EMERGENCY MEDICINE

## 2025-08-03 PROCEDURE — 84132 ASSAY OF SERUM POTASSIUM: CPT | Mod: HCNC

## 2025-08-03 PROCEDURE — 25000003 PHARM REV CODE 250: Mod: HCNC

## 2025-08-03 PROCEDURE — 63600175 PHARM REV CODE 636 W HCPCS: Mod: HCNC | Performed by: STUDENT IN AN ORGANIZED HEALTH CARE EDUCATION/TRAINING PROGRAM

## 2025-08-03 PROCEDURE — 25000242 PHARM REV CODE 250 ALT 637 W/ HCPCS: Mod: HCNC | Performed by: INTERNAL MEDICINE

## 2025-08-03 PROCEDURE — 20000000 HC ICU ROOM: Mod: HCNC

## 2025-08-03 PROCEDURE — 85025 COMPLETE CBC W/AUTO DIFF WBC: CPT | Mod: HCNC

## 2025-08-03 PROCEDURE — 99291 CRITICAL CARE FIRST HOUR: CPT | Mod: HCNC,GC,, | Performed by: INTERNAL MEDICINE

## 2025-08-03 PROCEDURE — 27000207 HC ISOLATION: Mod: HCNC

## 2025-08-03 PROCEDURE — 63600175 PHARM REV CODE 636 W HCPCS: Mod: HCNC

## 2025-08-03 PROCEDURE — 99233 SBSQ HOSP IP/OBS HIGH 50: CPT | Mod: HCNC,,, | Performed by: INTERNAL MEDICINE

## 2025-08-03 RX ADMIN — HEPARIN SODIUM 5000 UNITS: 5000 INJECTION INTRAVENOUS; SUBCUTANEOUS at 05:08

## 2025-08-03 RX ADMIN — FOLIC ACID 2 MG: 1 TABLET ORAL at 08:08

## 2025-08-03 RX ADMIN — COLLAGENASE SANTYL: 250 OINTMENT TOPICAL at 08:08

## 2025-08-03 RX ADMIN — LEVETIRACETAM 500 MG: 500 SOLUTION ORAL at 08:08

## 2025-08-03 RX ADMIN — INSULIN ASPART 1 UNITS: 100 INJECTION, SOLUTION INTRAVENOUS; SUBCUTANEOUS at 12:08

## 2025-08-03 RX ADMIN — INSULIN ASPART 2 UNITS: 100 INJECTION, SOLUTION INTRAVENOUS; SUBCUTANEOUS at 07:08

## 2025-08-03 RX ADMIN — PSYLLIUM HUSK 1 PACKET: 3.4 POWDER ORAL at 08:08

## 2025-08-03 RX ADMIN — INSULIN ASPART 2 UNITS: 100 INJECTION, SOLUTION INTRAVENOUS; SUBCUTANEOUS at 11:08

## 2025-08-03 RX ADMIN — INSULIN ASPART 1 UNITS: 100 INJECTION, SOLUTION INTRAVENOUS; SUBCUTANEOUS at 11:08

## 2025-08-03 RX ADMIN — HEPARIN SODIUM 5000 UNITS: 5000 INJECTION INTRAVENOUS; SUBCUTANEOUS at 09:08

## 2025-08-03 RX ADMIN — HEPARIN SODIUM 5000 UNITS: 5000 INJECTION INTRAVENOUS; SUBCUTANEOUS at 01:08

## 2025-08-03 RX ADMIN — INSULIN ASPART 2 UNITS: 100 INJECTION, SOLUTION INTRAVENOUS; SUBCUTANEOUS at 03:08

## 2025-08-03 RX ADMIN — INSULIN GLARGINE 8 UNITS: 100 INJECTION, SOLUTION SUBCUTANEOUS at 08:08

## 2025-08-03 RX ADMIN — MICONAZOLE NITRATE 2 % TOPICAL POWDER: at 08:08

## 2025-08-03 RX ADMIN — MICONAZOLE NITRATE 2 % TOPICAL POWDER: at 09:08

## 2025-08-03 RX ADMIN — INSULIN ASPART 1 UNITS: 100 INJECTION, SOLUTION INTRAVENOUS; SUBCUTANEOUS at 08:08

## 2025-08-03 RX ADMIN — PANTOPRAZOLE SODIUM 40 MG: 40 INJECTION, POWDER, FOR SOLUTION INTRAVENOUS at 08:08

## 2025-08-03 RX ADMIN — LEVOTHYROXINE SODIUM 75 MCG: 75 TABLET ORAL at 05:08

## 2025-08-04 LAB
ABSOLUTE EOSINOPHIL (OHS): 0.62 K/UL
ABSOLUTE MONOCYTE (OHS): 0.86 K/UL (ref 0.3–1)
ABSOLUTE NEUTROPHIL COUNT (OHS): 7.79 K/UL (ref 1.8–7.7)
ALBUMIN SERPL BCP-MCNC: 1.9 G/DL (ref 3.5–5.2)
ALP SERPL-CCNC: 212 UNIT/L (ref 40–150)
ALT SERPL W/O P-5'-P-CCNC: <8 UNIT/L (ref 0–55)
ANION GAP (OHS): 10 MMOL/L (ref 8–16)
AORTIC SIZE INDEX (SOV): 1.5 CM/M2
AORTIC SIZE INDEX: 1.7 CM/M2
ASCENDING AORTA: 3.1 CM
AST SERPL-CCNC: 15 UNIT/L (ref 0–50)
AV AREA BY CONTINUOUS VTI: 2.2 CM2
AV INDEX (PROSTH): 0.71
AV LVOT MEAN GRADIENT: 2 MMHG
AV LVOT PEAK GRADIENT: 4 MMHG
AV MEAN GRADIENT: 5 MMHG
AV PEAK GRADIENT: 8 MMHG
AV VALVE AREA BY VELOCITY RATIO: 2.5 CM²
AV VALVE AREA: 2.2 CM2
AV VELOCITY RATIO: 0.79
BASOPHILS # BLD AUTO: 0.05 K/UL
BASOPHILS NFR BLD AUTO: 0.4 %
BILIRUB SERPL-MCNC: 0.1 MG/DL (ref 0.1–1)
BSA FOR ECHO PROCEDURE: 2.02 M2
BUN SERPL-MCNC: 39 MG/DL (ref 8–23)
CALCIUM SERPL-MCNC: 8.1 MG/DL (ref 8.7–10.5)
CHLORIDE SERPL-SCNC: 101 MMOL/L (ref 95–110)
CO2 SERPL-SCNC: 22 MMOL/L (ref 23–29)
CREAT SERPL-MCNC: 1.7 MG/DL (ref 0.5–1.4)
CV ECHO LV RWT: 0.6 CM
DOP CALC AO PEAK VEL: 1.4 M/S
DOP CALC AO VTI: 31.3 CM
DOP CALC LVOT AREA: 3.1 CM2
DOP CALC LVOT DIAMETER: 2 CM
DOP CALC LVOT PEAK VEL: 1.1 M/S
DOP CALCLVOT PEAK VEL VTI: 22.1 CM
E WAVE DECELERATION TIME: 239 MS
E/A RATIO: 0.78
E/E' RATIO: 23 M/S
ECHO EF ESTIMATED: 78 %
ECHO LV POSTERIOR WALL: 1.2 CM (ref 0.6–1.1)
ERYTHROCYTE [DISTWIDTH] IN BLOOD BY AUTOMATED COUNT: 17.8 % (ref 11.5–14.5)
FRACTIONAL SHORTENING: 47.5 % (ref 28–44)
GFR SERPLBLD CREATININE-BSD FMLA CKD-EPI: 30 ML/MIN/1.73/M2
GLUCOSE SERPL-MCNC: 192 MG/DL (ref 70–110)
HCT VFR BLD AUTO: 25.7 % (ref 37–48.5)
HGB BLD-MCNC: 8 GM/DL (ref 12–16)
HR MV ECHO: 68 BPM
IMM GRANULOCYTES # BLD AUTO: 0.08 K/UL (ref 0–0.04)
IMM GRANULOCYTES NFR BLD AUTO: 0.7 % (ref 0–0.5)
INTERVENTRICULAR SEPTUM: 1.4 CM (ref 0.6–1.1)
IVC DIAMETER: 1.04 CM
LA MAJOR: 5.6 CM
LA MINOR: 4.4 CM
LA WIDTH: 3.6 CM
LEFT ATRIUM SIZE: 3.6 CM
LEFT ATRIUM VOLUME INDEX MOD: 35 ML/M2
LEFT ATRIUM VOLUME INDEX: 30 ML/M2
LEFT ATRIUM VOLUME MOD: 65 ML
LEFT ATRIUM VOLUME: 54 CM3
LEFT INTERNAL DIMENSION IN SYSTOLE: 2.1 CM (ref 2.1–4)
LEFT VENTRICLE DIASTOLIC VOLUME INDEX: 34.24 ML/M2
LEFT VENTRICLE DIASTOLIC VOLUME: 63 ML
LEFT VENTRICLE MASS INDEX: 101.4 G/M2
LEFT VENTRICLE SYSTOLIC VOLUME INDEX: 7.6 ML/M2
LEFT VENTRICLE SYSTOLIC VOLUME: 14 ML
LEFT VENTRICULAR INTERNAL DIMENSION IN DIASTOLE: 4 CM (ref 3.5–6)
LEFT VENTRICULAR MASS: 186.5 G
LV LATERAL E/E' RATIO: 18 M/S
LV SEPTAL E/E' RATIO: 30 M/S
LYMPHOCYTES # BLD AUTO: 1.73 K/UL (ref 1–4.8)
Lab: 1.8 CM/M
Lab: 2 CM/M
MCH RBC QN AUTO: 30.4 PG (ref 27–31)
MCHC RBC AUTO-ENTMCNC: 31.1 G/DL (ref 32–36)
MCV RBC AUTO: 98 FL (ref 82–98)
MV MEAN GRADIENT: 2 MMHG
MV PEAK A VEL: 1.16 M/S
MV PEAK E VEL: 0.9 M/S
MV PEAK GRADIENT: 5 MMHG
NUCLEATED RBC (/100WBC) (OHS): 0 /100 WBC
OHS CV CPX PATIENT HEIGHT IN: 62
OHS CV RV/LV RATIO: 0.73 CM
PLATELET # BLD AUTO: 324 K/UL (ref 150–450)
PMV BLD AUTO: 10 FL (ref 9.2–12.9)
POCT GLUCOSE: 166 MG/DL (ref 70–110)
POCT GLUCOSE: 175 MG/DL (ref 70–110)
POCT GLUCOSE: 188 MG/DL (ref 70–110)
POCT GLUCOSE: 199 MG/DL (ref 70–110)
POCT GLUCOSE: 207 MG/DL (ref 70–110)
POTASSIUM SERPL-SCNC: 3.5 MMOL/L (ref 3.5–5.1)
PROT SERPL-MCNC: 6.3 GM/DL (ref 6–8.4)
RA PRESSURE ESTIMATED: 3 MMHG
RBC # BLD AUTO: 2.63 M/UL (ref 4–5.4)
RELATIVE EOSINOPHIL (OHS): 5.6 %
RELATIVE LYMPHOCYTE (OHS): 15.5 % (ref 18–48)
RELATIVE MONOCYTE (OHS): 7.7 % (ref 4–15)
RELATIVE NEUTROPHIL (OHS): 70.1 % (ref 38–73)
RIGHT VENTRICLE DIASTOLIC BASEL DIMENSION: 2.9 CM
RV TISSUE DOPPLER FREE WALL SYSTOLIC VELOCITY 1 (APICAL 4 CHAMBER VIEW): 10.7 CM/S
SINUS: 2.8 CM
SODIUM SERPL-SCNC: 133 MMOL/L (ref 136–145)
STJ: 2.1 CM
TDI LATERAL: 0.05 M/S
TDI SEPTAL: 0.03 M/S
TDI: 0.04 M/S
TRICUSPID ANNULAR PLANE SYSTOLIC EXCURSION: 2.5 CM
WBC # BLD AUTO: 11.13 K/UL (ref 3.9–12.7)
Z-SCORE OF LEFT VENTRICULAR DIMENSION IN END DIASTOLE: -2.44
Z-SCORE OF LEFT VENTRICULAR DIMENSION IN END SYSTOLE: -3.21

## 2025-08-04 PROCEDURE — 25000003 PHARM REV CODE 250: Mod: HCNC

## 2025-08-04 PROCEDURE — 85025 COMPLETE CBC W/AUTO DIFF WBC: CPT | Mod: HCNC

## 2025-08-04 PROCEDURE — 63600175 PHARM REV CODE 636 W HCPCS: Mod: HCNC

## 2025-08-04 PROCEDURE — 99233 SBSQ HOSP IP/OBS HIGH 50: CPT | Mod: HCNC,,, | Performed by: INTERNAL MEDICINE

## 2025-08-04 PROCEDURE — 25000003 PHARM REV CODE 250: Mod: HCNC | Performed by: INTERNAL MEDICINE

## 2025-08-04 PROCEDURE — 25000003 PHARM REV CODE 250: Mod: HCNC | Performed by: STUDENT IN AN ORGANIZED HEALTH CARE EDUCATION/TRAINING PROGRAM

## 2025-08-04 PROCEDURE — 94761 N-INVAS EAR/PLS OXIMETRY MLT: CPT | Mod: HCNC

## 2025-08-04 PROCEDURE — 63600175 PHARM REV CODE 636 W HCPCS: Mod: HCNC | Performed by: STUDENT IN AN ORGANIZED HEALTH CARE EDUCATION/TRAINING PROGRAM

## 2025-08-04 PROCEDURE — 92526 ORAL FUNCTION THERAPY: CPT | Mod: HCNC

## 2025-08-04 PROCEDURE — 94760 N-INVAS EAR/PLS OXIMETRY 1: CPT

## 2025-08-04 PROCEDURE — 80053 COMPREHEN METABOLIC PANEL: CPT | Mod: HCNC

## 2025-08-04 PROCEDURE — 27000207 HC ISOLATION: Mod: HCNC

## 2025-08-04 PROCEDURE — 99233 SBSQ HOSP IP/OBS HIGH 50: CPT | Mod: HCNC,GC,, | Performed by: INTERNAL MEDICINE

## 2025-08-04 PROCEDURE — 20000000 HC ICU ROOM: Mod: HCNC

## 2025-08-04 PROCEDURE — 25000242 PHARM REV CODE 250 ALT 637 W/ HCPCS: Mod: HCNC | Performed by: INTERNAL MEDICINE

## 2025-08-04 RX ORDER — HEPARIN SODIUM 1000 [USP'U]/ML
1000 INJECTION, SOLUTION INTRAVENOUS; SUBCUTANEOUS
Status: CANCELLED | OUTPATIENT
Start: 2025-08-05 | End: 2025-08-06

## 2025-08-04 RX ORDER — SODIUM CHLORIDE 9 MG/ML
INJECTION, SOLUTION INTRAVENOUS
Status: CANCELLED | OUTPATIENT
Start: 2025-08-05

## 2025-08-04 RX ORDER — MUPIROCIN 20 MG/G
OINTMENT TOPICAL 2 TIMES DAILY
Status: CANCELLED | OUTPATIENT
Start: 2025-08-05 | End: 2025-08-10

## 2025-08-04 RX ORDER — SODIUM CHLORIDE 9 MG/ML
INJECTION, SOLUTION INTRAVENOUS ONCE
Status: CANCELLED | OUTPATIENT
Start: 2025-08-05 | End: 2025-08-05

## 2025-08-04 RX ADMIN — PSYLLIUM HUSK 1 PACKET: 3.4 POWDER ORAL at 08:08

## 2025-08-04 RX ADMIN — INSULIN ASPART 1 UNITS: 100 INJECTION, SOLUTION INTRAVENOUS; SUBCUTANEOUS at 08:08

## 2025-08-04 RX ADMIN — MELATONIN TAB 3 MG 6 MG: 3 TAB at 09:08

## 2025-08-04 RX ADMIN — MICONAZOLE NITRATE 2 % TOPICAL POWDER: at 08:08

## 2025-08-04 RX ADMIN — HEPARIN SODIUM 5000 UNITS: 5000 INJECTION INTRAVENOUS; SUBCUTANEOUS at 09:08

## 2025-08-04 RX ADMIN — INSULIN ASPART 2 UNITS: 100 INJECTION, SOLUTION INTRAVENOUS; SUBCUTANEOUS at 11:08

## 2025-08-04 RX ADMIN — INSULIN GLARGINE 8 UNITS: 100 INJECTION, SOLUTION SUBCUTANEOUS at 08:08

## 2025-08-04 RX ADMIN — FOLIC ACID 2 MG: 1 TABLET ORAL at 08:08

## 2025-08-04 RX ADMIN — ACETAMINOPHEN 650 MG: 325 TABLET ORAL at 09:08

## 2025-08-04 RX ADMIN — HEPARIN SODIUM 5000 UNITS: 5000 INJECTION INTRAVENOUS; SUBCUTANEOUS at 02:08

## 2025-08-04 RX ADMIN — LEVETIRACETAM 500 MG: 500 SOLUTION ORAL at 08:08

## 2025-08-04 RX ADMIN — INSULIN ASPART 4 UNITS: 100 INJECTION, SOLUTION INTRAVENOUS; SUBCUTANEOUS at 07:08

## 2025-08-04 RX ADMIN — PANTOPRAZOLE SODIUM 40 MG: 40 INJECTION, POWDER, FOR SOLUTION INTRAVENOUS at 08:08

## 2025-08-04 RX ADMIN — HEPARIN SODIUM 5000 UNITS: 5000 INJECTION INTRAVENOUS; SUBCUTANEOUS at 06:08

## 2025-08-04 RX ADMIN — COLLAGENASE SANTYL: 250 OINTMENT TOPICAL at 08:08

## 2025-08-04 RX ADMIN — INSULIN ASPART 2 UNITS: 100 INJECTION, SOLUTION INTRAVENOUS; SUBCUTANEOUS at 04:08

## 2025-08-04 RX ADMIN — LEVOTHYROXINE SODIUM 75 MCG: 75 TABLET ORAL at 06:08

## 2025-08-05 LAB
ABSOLUTE EOSINOPHIL (OHS): 0.68 K/UL
ABSOLUTE MONOCYTE (OHS): 0.94 K/UL (ref 0.3–1)
ABSOLUTE NEUTROPHIL COUNT (OHS): 9.02 K/UL (ref 1.8–7.7)
ALBUMIN SERPL BCP-MCNC: 1.9 G/DL (ref 3.5–5.2)
ALP SERPL-CCNC: 245 UNIT/L (ref 40–150)
ALT SERPL W/O P-5'-P-CCNC: 8 UNIT/L (ref 0–55)
ANION GAP (OHS): 9 MMOL/L (ref 8–16)
AST SERPL-CCNC: 18 UNIT/L (ref 0–50)
BASOPHILS # BLD AUTO: 0.05 K/UL
BASOPHILS NFR BLD AUTO: 0.4 %
BILIRUB SERPL-MCNC: 0.1 MG/DL (ref 0.1–1)
BUN SERPL-MCNC: 56 MG/DL (ref 8–23)
CALCIUM SERPL-MCNC: 8.1 MG/DL (ref 8.7–10.5)
CHLORIDE SERPL-SCNC: 96 MMOL/L (ref 95–110)
CO2 SERPL-SCNC: 22 MMOL/L (ref 23–29)
CREAT SERPL-MCNC: 1.9 MG/DL (ref 0.5–1.4)
ERYTHROCYTE [DISTWIDTH] IN BLOOD BY AUTOMATED COUNT: 17.8 % (ref 11.5–14.5)
GFR SERPLBLD CREATININE-BSD FMLA CKD-EPI: 26 ML/MIN/1.73/M2
GLUCOSE SERPL-MCNC: 192 MG/DL (ref 70–110)
HCT VFR BLD AUTO: 26.2 % (ref 37–48.5)
HGB BLD-MCNC: 8.3 GM/DL (ref 12–16)
IMM GRANULOCYTES # BLD AUTO: 0.09 K/UL (ref 0–0.04)
IMM GRANULOCYTES NFR BLD AUTO: 0.7 % (ref 0–0.5)
LYMPHOCYTES # BLD AUTO: 2.29 K/UL (ref 1–4.8)
MCH RBC QN AUTO: 30.3 PG (ref 27–31)
MCHC RBC AUTO-ENTMCNC: 31.7 G/DL (ref 32–36)
MCV RBC AUTO: 96 FL (ref 82–98)
NUCLEATED RBC (/100WBC) (OHS): 0 /100 WBC
PLATELET # BLD AUTO: 396 K/UL (ref 150–450)
PMV BLD AUTO: 10.1 FL (ref 9.2–12.9)
POCT GLUCOSE: 102 MG/DL (ref 70–110)
POCT GLUCOSE: 179 MG/DL (ref 70–110)
POCT GLUCOSE: 201 MG/DL (ref 70–110)
POCT GLUCOSE: 216 MG/DL (ref 70–110)
POCT GLUCOSE: 95 MG/DL (ref 70–110)
POTASSIUM SERPL-SCNC: 3.6 MMOL/L (ref 3.5–5.1)
PROT SERPL-MCNC: 6.4 GM/DL (ref 6–8.4)
RBC # BLD AUTO: 2.74 M/UL (ref 4–5.4)
RELATIVE EOSINOPHIL (OHS): 5.2 %
RELATIVE LYMPHOCYTE (OHS): 17.5 % (ref 18–48)
RELATIVE MONOCYTE (OHS): 7.2 % (ref 4–15)
RELATIVE NEUTROPHIL (OHS): 69 % (ref 38–73)
SODIUM SERPL-SCNC: 127 MMOL/L (ref 136–145)
WBC # BLD AUTO: 13.07 K/UL (ref 3.9–12.7)

## 2025-08-05 PROCEDURE — 94761 N-INVAS EAR/PLS OXIMETRY MLT: CPT | Mod: HCNC

## 2025-08-05 PROCEDURE — 99233 SBSQ HOSP IP/OBS HIGH 50: CPT | Mod: HCNC,GC,, | Performed by: INTERNAL MEDICINE

## 2025-08-05 PROCEDURE — 20600001 HC STEP DOWN PRIVATE ROOM: Mod: HCNC

## 2025-08-05 PROCEDURE — 25000242 PHARM REV CODE 250 ALT 637 W/ HCPCS: Mod: HCNC | Performed by: INTERNAL MEDICINE

## 2025-08-05 PROCEDURE — 25000003 PHARM REV CODE 250: Mod: HCNC

## 2025-08-05 PROCEDURE — 63600175 PHARM REV CODE 636 W HCPCS: Mod: HCNC | Performed by: STUDENT IN AN ORGANIZED HEALTH CARE EDUCATION/TRAINING PROGRAM

## 2025-08-05 PROCEDURE — 25000003 PHARM REV CODE 250: Mod: HCNC | Performed by: INTERNAL MEDICINE

## 2025-08-05 PROCEDURE — 92526 ORAL FUNCTION THERAPY: CPT | Mod: HCNC

## 2025-08-05 PROCEDURE — 85025 COMPLETE CBC W/AUTO DIFF WBC: CPT | Mod: HCNC

## 2025-08-05 PROCEDURE — 99233 SBSQ HOSP IP/OBS HIGH 50: CPT | Mod: HCNC,,, | Performed by: INTERNAL MEDICINE

## 2025-08-05 PROCEDURE — 27000207 HC ISOLATION: Mod: HCNC

## 2025-08-05 PROCEDURE — 80100014 HC HEMODIALYSIS 1:1: Mod: HCNC

## 2025-08-05 PROCEDURE — 80053 COMPREHEN METABOLIC PANEL: CPT | Mod: HCNC

## 2025-08-05 PROCEDURE — 63600175 PHARM REV CODE 636 W HCPCS: Mod: HCNC

## 2025-08-05 PROCEDURE — 94760 N-INVAS EAR/PLS OXIMETRY 1: CPT

## 2025-08-05 RX ADMIN — INSULIN ASPART 2 UNITS: 100 INJECTION, SOLUTION INTRAVENOUS; SUBCUTANEOUS at 12:08

## 2025-08-05 RX ADMIN — FOLIC ACID 2 MG: 1 TABLET ORAL at 09:08

## 2025-08-05 RX ADMIN — COLLAGENASE SANTYL: 250 OINTMENT TOPICAL at 09:08

## 2025-08-05 RX ADMIN — INSULIN GLARGINE 8 UNITS: 100 INJECTION, SOLUTION SUBCUTANEOUS at 09:08

## 2025-08-05 RX ADMIN — LEVETIRACETAM 500 MG: 500 SOLUTION ORAL at 09:08

## 2025-08-05 RX ADMIN — LEVOTHYROXINE SODIUM 75 MCG: 75 TABLET ORAL at 05:08

## 2025-08-05 RX ADMIN — HEPARIN SODIUM 5000 UNITS: 5000 INJECTION INTRAVENOUS; SUBCUTANEOUS at 05:08

## 2025-08-05 RX ADMIN — HEPARIN SODIUM 5000 UNITS: 5000 INJECTION INTRAVENOUS; SUBCUTANEOUS at 01:08

## 2025-08-05 RX ADMIN — HEPARIN SODIUM 5000 UNITS: 5000 INJECTION INTRAVENOUS; SUBCUTANEOUS at 09:08

## 2025-08-05 RX ADMIN — PANTOPRAZOLE SODIUM 40 MG: 40 INJECTION, POWDER, FOR SOLUTION INTRAVENOUS at 09:08

## 2025-08-05 RX ADMIN — MICONAZOLE NITRATE 2 % TOPICAL POWDER: at 09:08

## 2025-08-05 RX ADMIN — INSULIN ASPART 1 UNITS: 100 INJECTION, SOLUTION INTRAVENOUS; SUBCUTANEOUS at 12:08

## 2025-08-05 RX ADMIN — MICONAZOLE NITRATE 2 % TOPICAL POWDER: at 08:08

## 2025-08-05 RX ADMIN — LEVETIRACETAM 500 MG: 500 SOLUTION ORAL at 08:08

## 2025-08-05 RX ADMIN — PSYLLIUM HUSK 1 PACKET: 3.4 POWDER ORAL at 09:08

## 2025-08-05 RX ADMIN — INSULIN ASPART 4 UNITS: 100 INJECTION, SOLUTION INTRAVENOUS; SUBCUTANEOUS at 04:08

## 2025-08-05 RX ADMIN — HEPARIN SODIUM 4000 UNITS: 1000 INJECTION INTRAVENOUS; SUBCUTANEOUS at 05:08

## 2025-08-05 RX ADMIN — MIDODRINE HYDROCHLORIDE 15 MG: 5 TABLET ORAL at 01:08

## 2025-08-05 RX ADMIN — INSULIN ASPART 4 UNITS: 100 INJECTION, SOLUTION INTRAVENOUS; SUBCUTANEOUS at 09:08

## 2025-08-06 PROBLEM — I25.10 CAD (CORONARY ARTERY DISEASE): Chronic | Status: ACTIVE | Noted: 2025-04-29

## 2025-08-06 PROBLEM — I70.0 AORTIC ATHEROSCLEROSIS: Chronic | Status: ACTIVE | Noted: 2019-05-03

## 2025-08-06 PROBLEM — M15.9 OSTEOARTHRITIS INVOLVING MULTIPLE JOINTS ON BOTH SIDES OF BODY: Chronic | Status: ACTIVE | Noted: 2018-06-25

## 2025-08-06 PROBLEM — N18.4 CKD (CHRONIC KIDNEY DISEASE) STAGE 4, GFR 15-29 ML/MIN: Chronic | Status: ACTIVE | Noted: 2018-05-31

## 2025-08-06 PROBLEM — E11.9 DIABETES MELLITUS, TYPE 2: Chronic | Status: ACTIVE | Noted: 2025-04-29

## 2025-08-06 PROBLEM — Z75.8 DISCHARGE PLANNING ISSUES: Status: RESOLVED | Noted: 2025-05-12 | Resolved: 2025-08-06

## 2025-08-06 PROBLEM — J96.12 CHRONIC HYPERCAPNIC RESPIRATORY FAILURE: Status: ACTIVE | Noted: 2020-07-20

## 2025-08-06 LAB
ABO + RH BLD: NORMAL
ABSOLUTE EOSINOPHIL (OHS): 0.57 K/UL
ABSOLUTE MONOCYTE (OHS): 1.1 K/UL (ref 0.3–1)
ABSOLUTE NEUTROPHIL COUNT (OHS): 10.03 K/UL (ref 1.8–7.7)
ALBUMIN SERPL BCP-MCNC: 2 G/DL (ref 3.5–5.2)
ALP SERPL-CCNC: 275 UNIT/L (ref 40–150)
ALT SERPL W/O P-5'-P-CCNC: <8 UNIT/L (ref 0–55)
ANION GAP (OHS): 10 MMOL/L (ref 8–16)
AST SERPL-CCNC: 28 UNIT/L (ref 0–50)
BACTERIA BLD CULT: NORMAL
BACTERIA BLD CULT: NORMAL
BASOPHILS # BLD AUTO: 0.08 K/UL
BASOPHILS NFR BLD AUTO: 0.5 %
BILIRUB SERPL-MCNC: 0.1 MG/DL (ref 0.1–1)
BLD PROD TYP BPU: NORMAL
BLOOD UNIT EXPIRATION DATE: NORMAL
BLOOD UNIT TYPE CODE: 7300
BUN SERPL-MCNC: 36 MG/DL (ref 8–23)
CALCIUM SERPL-MCNC: 8.5 MG/DL (ref 8.7–10.5)
CHLORIDE SERPL-SCNC: 98 MMOL/L (ref 95–110)
CO2 SERPL-SCNC: 19 MMOL/L (ref 23–29)
CREAT SERPL-MCNC: 1.3 MG/DL (ref 0.5–1.4)
CROSSMATCH INTERPRETATION: NORMAL
DISPENSE STATUS: NORMAL
ERYTHROCYTE [DISTWIDTH] IN BLOOD BY AUTOMATED COUNT: 18.1 % (ref 11.5–14.5)
GFR SERPLBLD CREATININE-BSD FMLA CKD-EPI: 42 ML/MIN/1.73/M2
GLUCOSE SERPL-MCNC: 182 MG/DL (ref 70–110)
HCT VFR BLD AUTO: 29.9 % (ref 37–48.5)
HGB BLD-MCNC: 9.6 GM/DL (ref 12–16)
IMM GRANULOCYTES # BLD AUTO: 0.15 K/UL (ref 0–0.04)
IMM GRANULOCYTES NFR BLD AUTO: 1 % (ref 0–0.5)
LYMPHOCYTES # BLD AUTO: 2.78 K/UL (ref 1–4.8)
MCH RBC QN AUTO: 30.8 PG (ref 27–31)
MCHC RBC AUTO-ENTMCNC: 32.1 G/DL (ref 32–36)
MCV RBC AUTO: 96 FL (ref 82–98)
NUCLEATED RBC (/100WBC) (OHS): 0 /100 WBC
PLATELET # BLD AUTO: 353 K/UL (ref 150–450)
PMV BLD AUTO: 10.2 FL (ref 9.2–12.9)
POCT GLUCOSE: 138 MG/DL (ref 70–110)
POCT GLUCOSE: 208 MG/DL (ref 70–110)
POCT GLUCOSE: 224 MG/DL (ref 70–110)
POCT GLUCOSE: 231 MG/DL (ref 70–110)
POTASSIUM SERPL-SCNC: 3.7 MMOL/L (ref 3.5–5.1)
PROT SERPL-MCNC: 7.1 GM/DL (ref 6–8.4)
RBC # BLD AUTO: 3.12 M/UL (ref 4–5.4)
RELATIVE EOSINOPHIL (OHS): 3.9 %
RELATIVE LYMPHOCYTE (OHS): 18.9 % (ref 18–48)
RELATIVE MONOCYTE (OHS): 7.5 % (ref 4–15)
RELATIVE NEUTROPHIL (OHS): 68.2 % (ref 38–73)
SODIUM SERPL-SCNC: 127 MMOL/L (ref 136–145)
UNIT NUMBER: NORMAL
WBC # BLD AUTO: 14.71 K/UL (ref 3.9–12.7)

## 2025-08-06 PROCEDURE — 25000003 PHARM REV CODE 250: Mod: HCNC

## 2025-08-06 PROCEDURE — 85025 COMPLETE CBC W/AUTO DIFF WBC: CPT | Mod: HCNC

## 2025-08-06 PROCEDURE — 63600175 PHARM REV CODE 636 W HCPCS: Mod: HCNC

## 2025-08-06 PROCEDURE — 25000242 PHARM REV CODE 250 ALT 637 W/ HCPCS: Mod: HCNC | Performed by: INTERNAL MEDICINE

## 2025-08-06 PROCEDURE — 99232 SBSQ HOSP IP/OBS MODERATE 35: CPT | Mod: HCNC,,, | Performed by: NURSE PRACTITIONER

## 2025-08-06 PROCEDURE — 20600001 HC STEP DOWN PRIVATE ROOM: Mod: HCNC

## 2025-08-06 PROCEDURE — 25000003 PHARM REV CODE 250: Mod: HCNC | Performed by: INTERNAL MEDICINE

## 2025-08-06 PROCEDURE — 27000207 HC ISOLATION: Mod: HCNC

## 2025-08-06 PROCEDURE — 36415 COLL VENOUS BLD VENIPUNCTURE: CPT | Mod: HCNC

## 2025-08-06 PROCEDURE — 63600175 PHARM REV CODE 636 W HCPCS: Mod: HCNC | Performed by: STUDENT IN AN ORGANIZED HEALTH CARE EDUCATION/TRAINING PROGRAM

## 2025-08-06 PROCEDURE — 82040 ASSAY OF SERUM ALBUMIN: CPT | Mod: HCNC

## 2025-08-06 RX ORDER — SODIUM CHLORIDE 9 MG/ML
INJECTION, SOLUTION INTRAVENOUS ONCE
Status: CANCELLED | OUTPATIENT
Start: 2025-08-07

## 2025-08-06 RX ADMIN — LEVOTHYROXINE SODIUM 75 MCG: 75 TABLET ORAL at 05:08

## 2025-08-06 RX ADMIN — MICONAZOLE NITRATE 2 % TOPICAL POWDER: at 09:08

## 2025-08-06 RX ADMIN — LEVETIRACETAM 500 MG: 500 SOLUTION ORAL at 09:08

## 2025-08-06 RX ADMIN — HEPARIN SODIUM 5000 UNITS: 5000 INJECTION INTRAVENOUS; SUBCUTANEOUS at 01:08

## 2025-08-06 RX ADMIN — LEVETIRACETAM 500 MG: 500 SOLUTION ORAL at 08:08

## 2025-08-06 RX ADMIN — PSYLLIUM HUSK 1 PACKET: 3.4 POWDER ORAL at 08:08

## 2025-08-06 RX ADMIN — MELATONIN TAB 3 MG 6 MG: 3 TAB at 10:08

## 2025-08-06 RX ADMIN — INSULIN GLARGINE 8 UNITS: 100 INJECTION, SOLUTION SUBCUTANEOUS at 08:08

## 2025-08-06 RX ADMIN — HEPARIN SODIUM 5000 UNITS: 5000 INJECTION INTRAVENOUS; SUBCUTANEOUS at 05:08

## 2025-08-06 RX ADMIN — INSULIN ASPART 4 UNITS: 100 INJECTION, SOLUTION INTRAVENOUS; SUBCUTANEOUS at 01:08

## 2025-08-06 RX ADMIN — HEPARIN SODIUM 5000 UNITS: 5000 INJECTION INTRAVENOUS; SUBCUTANEOUS at 09:08

## 2025-08-06 RX ADMIN — PANTOPRAZOLE SODIUM 40 MG: 40 INJECTION, POWDER, FOR SOLUTION INTRAVENOUS at 08:08

## 2025-08-06 RX ADMIN — INSULIN ASPART 4 UNITS: 100 INJECTION, SOLUTION INTRAVENOUS; SUBCUTANEOUS at 08:08

## 2025-08-06 RX ADMIN — FOLIC ACID 2 MG: 1 TABLET ORAL at 08:08

## 2025-08-06 RX ADMIN — COLLAGENASE SANTYL: 250 OINTMENT TOPICAL at 09:08

## 2025-08-07 ENCOUNTER — OFFICE VISIT (OUTPATIENT)
Dept: DIALYSIS | Facility: HOSPITAL | Age: 80
End: 2025-08-07
Attending: EMERGENCY MEDICINE
Payer: MEDICARE

## 2025-08-07 PROBLEM — G40.309 GENERALIZED NON-CONVULSIVE EPILEPSY: Chronic | Status: ACTIVE | Noted: 2025-05-05

## 2025-08-07 PROBLEM — R13.10 DYSPHAGIA: Chronic | Status: ACTIVE | Noted: 2025-05-18

## 2025-08-07 PROBLEM — J96.12 CHRONIC HYPERCAPNIC RESPIRATORY FAILURE: Chronic | Status: ACTIVE | Noted: 2020-07-20

## 2025-08-07 PROBLEM — N18.6 ESRD (END STAGE RENAL DISEASE): Chronic | Status: ACTIVE | Noted: 2025-07-13

## 2025-08-07 LAB
ABSOLUTE EOSINOPHIL (OHS): 0.39 K/UL
ABSOLUTE MONOCYTE (OHS): 1.59 K/UL (ref 0.3–1)
ABSOLUTE NEUTROPHIL COUNT (OHS): 11.58 K/UL (ref 1.8–7.7)
ALBUMIN SERPL BCP-MCNC: 1.8 G/DL (ref 3.5–5.2)
ALP SERPL-CCNC: 216 UNIT/L (ref 40–150)
ALT SERPL W/O P-5'-P-CCNC: <8 UNIT/L (ref 0–55)
ANION GAP (OHS): 10 MMOL/L (ref 8–16)
AST SERPL-CCNC: 20 UNIT/L (ref 0–50)
BASOPHILS # BLD AUTO: 0.07 K/UL
BASOPHILS NFR BLD AUTO: 0.4 %
BILIRUB SERPL-MCNC: 0.2 MG/DL (ref 0.1–1)
BUN SERPL-MCNC: 47 MG/DL (ref 8–23)
CALCIUM SERPL-MCNC: 8 MG/DL (ref 8.7–10.5)
CHLORIDE SERPL-SCNC: 98 MMOL/L (ref 95–110)
CO2 SERPL-SCNC: 21 MMOL/L (ref 23–29)
CREAT SERPL-MCNC: 1.5 MG/DL (ref 0.5–1.4)
ERYTHROCYTE [DISTWIDTH] IN BLOOD BY AUTOMATED COUNT: 17.9 % (ref 11.5–14.5)
GFR SERPLBLD CREATININE-BSD FMLA CKD-EPI: 35 ML/MIN/1.73/M2
GLUCOSE SERPL-MCNC: 82 MG/DL (ref 70–110)
HCT VFR BLD AUTO: 23.8 % (ref 37–48.5)
HGB BLD-MCNC: 7.7 GM/DL (ref 12–16)
IMM GRANULOCYTES # BLD AUTO: 0.2 K/UL (ref 0–0.04)
IMM GRANULOCYTES NFR BLD AUTO: 1.2 % (ref 0–0.5)
LYMPHOCYTES # BLD AUTO: 2.72 K/UL (ref 1–4.8)
MCH RBC QN AUTO: 30.4 PG (ref 27–31)
MCHC RBC AUTO-ENTMCNC: 32.4 G/DL (ref 32–36)
MCV RBC AUTO: 94 FL (ref 82–98)
NUCLEATED RBC (/100WBC) (OHS): 0 /100 WBC
PLATELET # BLD AUTO: 337 K/UL (ref 150–450)
PMV BLD AUTO: 9.8 FL (ref 9.2–12.9)
POCT GLUCOSE: 124 MG/DL (ref 70–110)
POCT GLUCOSE: 198 MG/DL (ref 70–110)
POTASSIUM SERPL-SCNC: 3.4 MMOL/L (ref 3.5–5.1)
PROT SERPL-MCNC: 6.2 GM/DL (ref 6–8.4)
RBC # BLD AUTO: 2.53 M/UL (ref 4–5.4)
RELATIVE EOSINOPHIL (OHS): 2.4 %
RELATIVE LYMPHOCYTE (OHS): 16.4 % (ref 18–48)
RELATIVE MONOCYTE (OHS): 9.6 % (ref 4–15)
RELATIVE NEUTROPHIL (OHS): 70 % (ref 38–73)
SODIUM SERPL-SCNC: 129 MMOL/L (ref 136–145)
WBC # BLD AUTO: 16.55 K/UL (ref 3.9–12.7)

## 2025-08-07 PROCEDURE — 63600175 PHARM REV CODE 636 W HCPCS: Mod: HCNC | Performed by: STUDENT IN AN ORGANIZED HEALTH CARE EDUCATION/TRAINING PROGRAM

## 2025-08-07 PROCEDURE — 80053 COMPREHEN METABOLIC PANEL: CPT | Mod: HCNC

## 2025-08-07 PROCEDURE — 92526 ORAL FUNCTION THERAPY: CPT | Mod: HCNC

## 2025-08-07 PROCEDURE — 25000003 PHARM REV CODE 250: Mod: HCNC

## 2025-08-07 PROCEDURE — 94760 N-INVAS EAR/PLS OXIMETRY 1: CPT

## 2025-08-07 PROCEDURE — 63600175 PHARM REV CODE 636 W HCPCS: Mod: HCNC

## 2025-08-07 PROCEDURE — 94761 N-INVAS EAR/PLS OXIMETRY MLT: CPT | Mod: HCNC

## 2025-08-07 PROCEDURE — 99900035 HC TECH TIME PER 15 MIN (STAT): Mod: HCNC

## 2025-08-07 PROCEDURE — 20600001 HC STEP DOWN PRIVATE ROOM: Mod: HCNC

## 2025-08-07 PROCEDURE — 27000207 HC ISOLATION: Mod: HCNC

## 2025-08-07 PROCEDURE — 63600175 PHARM REV CODE 636 W HCPCS: Mod: TB,HCNC | Performed by: NURSE PRACTITIONER

## 2025-08-07 PROCEDURE — 25000242 PHARM REV CODE 250 ALT 637 W/ HCPCS: Mod: HCNC | Performed by: INTERNAL MEDICINE

## 2025-08-07 PROCEDURE — 36415 COLL VENOUS BLD VENIPUNCTURE: CPT | Mod: HCNC

## 2025-08-07 PROCEDURE — 25000003 PHARM REV CODE 250: Mod: HCNC | Performed by: INTERNAL MEDICINE

## 2025-08-07 PROCEDURE — 90935 HEMODIALYSIS ONE EVALUATION: CPT | Mod: HCNC,,, | Performed by: NURSE PRACTITIONER

## 2025-08-07 PROCEDURE — 1157F ADVNC CARE PLAN IN RCRD: CPT | Mod: CPTII,HCNC,, | Performed by: NURSE PRACTITIONER

## 2025-08-07 PROCEDURE — 80100014 HC HEMODIALYSIS 1:1: Mod: HCNC

## 2025-08-07 PROCEDURE — 85025 COMPLETE CBC W/AUTO DIFF WBC: CPT | Mod: HCNC

## 2025-08-07 RX ADMIN — HEPARIN SODIUM 5000 UNITS: 5000 INJECTION INTRAVENOUS; SUBCUTANEOUS at 06:08

## 2025-08-07 RX ADMIN — LEVOTHYROXINE SODIUM 75 MCG: 75 TABLET ORAL at 06:08

## 2025-08-07 RX ADMIN — LEVETIRACETAM 500 MG: 500 SOLUTION ORAL at 02:08

## 2025-08-07 RX ADMIN — COLLAGENASE SANTYL: 250 OINTMENT TOPICAL at 02:08

## 2025-08-07 RX ADMIN — EPOETIN ALFA-EPBX 4300 UNITS: 10000 INJECTION, SOLUTION INTRAVENOUS; SUBCUTANEOUS at 11:08

## 2025-08-07 RX ADMIN — MICONAZOLE NITRATE 2 % TOPICAL POWDER: at 02:08

## 2025-08-07 RX ADMIN — HEPARIN SODIUM 4000 UNITS: 1000 INJECTION INTRAVENOUS; SUBCUTANEOUS at 01:08

## 2025-08-07 RX ADMIN — PSYLLIUM HUSK 1 PACKET: 3.4 POWDER ORAL at 02:08

## 2025-08-07 RX ADMIN — MIDODRINE HYDROCHLORIDE 15 MG: 5 TABLET ORAL at 11:08

## 2025-08-07 RX ADMIN — HEPARIN SODIUM 5000 UNITS: 5000 INJECTION INTRAVENOUS; SUBCUTANEOUS at 09:08

## 2025-08-07 RX ADMIN — PANTOPRAZOLE SODIUM 40 MG: 40 INJECTION, POWDER, FOR SOLUTION INTRAVENOUS at 02:08

## 2025-08-07 RX ADMIN — LEVETIRACETAM 500 MG: 500 SOLUTION ORAL at 09:08

## 2025-08-07 RX ADMIN — MICONAZOLE NITRATE 2 % TOPICAL POWDER: at 09:08

## 2025-08-07 RX ADMIN — FOLIC ACID 2 MG: 1 TABLET ORAL at 02:08

## 2025-08-08 PROBLEM — N18.4 ACUTE RENAL FAILURE SUPERIMPOSED ON STAGE 4 CHRONIC KIDNEY DISEASE: Status: RESOLVED | Noted: 2018-05-31 | Resolved: 2025-08-08

## 2025-08-08 PROBLEM — K92.1 MELENA: Status: RESOLVED | Noted: 2025-07-14 | Resolved: 2025-08-08

## 2025-08-08 PROBLEM — Z99.2: Status: RESOLVED | Noted: 2025-07-13 | Resolved: 2025-08-08

## 2025-08-08 PROBLEM — Z51.5 ENCOUNTER FOR PALLIATIVE CARE: Status: RESOLVED | Noted: 2025-05-08 | Resolved: 2025-08-08

## 2025-08-08 PROBLEM — M86.471 CHRONIC OSTEOMYELITIS OF RIGHT FOOT WITH DRAINING SINUS: Status: RESOLVED | Noted: 2020-06-15 | Resolved: 2025-08-08

## 2025-08-08 PROBLEM — E87.8 ELECTROLYTE ABNORMALITY: Status: RESOLVED | Noted: 2025-07-29 | Resolved: 2025-08-08

## 2025-08-08 PROBLEM — N18.6: Status: RESOLVED | Noted: 2025-07-13 | Resolved: 2025-08-08

## 2025-08-08 PROBLEM — N17.9 ACUTE RENAL FAILURE SUPERIMPOSED ON STAGE 4 CHRONIC KIDNEY DISEASE: Status: RESOLVED | Noted: 2018-05-31 | Resolved: 2025-08-08

## 2025-08-08 PROBLEM — A41.9 SEPSIS: Status: RESOLVED | Noted: 2018-05-30 | Resolved: 2025-08-08

## 2025-08-08 PROBLEM — R50.9 FEVER: Status: ACTIVE | Noted: 2025-08-07

## 2025-08-08 PROBLEM — Z71.89 ACP (ADVANCE CARE PLANNING): Status: RESOLVED | Noted: 2025-07-20 | Resolved: 2025-08-08

## 2025-08-08 PROBLEM — I21.A1 TYPE 2 MYOCARDIAL INFARCTION: Status: RESOLVED | Noted: 2025-04-29 | Resolved: 2025-08-08

## 2025-08-08 PROBLEM — Z71.89 ADVANCE CARE PLANNING: Status: RESOLVED | Noted: 2025-05-01 | Resolved: 2025-08-08

## 2025-08-08 LAB
ABSOLUTE EOSINOPHIL (OHS): 0.14 K/UL
ABSOLUTE MONOCYTE (OHS): 1.95 K/UL (ref 0.3–1)
ABSOLUTE NEUTROPHIL COUNT (OHS): 10.75 K/UL (ref 1.8–7.7)
ALBUMIN SERPL BCP-MCNC: 1.7 G/DL (ref 3.5–5.2)
ALP SERPL-CCNC: 255 UNIT/L (ref 40–150)
ALT SERPL W/O P-5'-P-CCNC: <8 UNIT/L (ref 0–55)
ANION GAP (OHS): 9 MMOL/L (ref 8–16)
AST SERPL-CCNC: 26 UNIT/L (ref 0–50)
BASOPHILS # BLD AUTO: 0.07 K/UL
BASOPHILS NFR BLD AUTO: 0.4 %
BILIRUB SERPL-MCNC: 0.1 MG/DL (ref 0.1–1)
BUN SERPL-MCNC: 32 MG/DL (ref 8–23)
CALCIUM SERPL-MCNC: 7.8 MG/DL (ref 8.7–10.5)
CHLORIDE SERPL-SCNC: 99 MMOL/L (ref 95–110)
CO2 SERPL-SCNC: 24 MMOL/L (ref 23–29)
CREAT SERPL-MCNC: 1.3 MG/DL (ref 0.5–1.4)
ERYTHROCYTE [DISTWIDTH] IN BLOOD BY AUTOMATED COUNT: 18.4 % (ref 11.5–14.5)
GFR SERPLBLD CREATININE-BSD FMLA CKD-EPI: 42 ML/MIN/1.73/M2
GLUCOSE SERPL-MCNC: 201 MG/DL (ref 70–110)
HCT VFR BLD AUTO: 23.9 % (ref 37–48.5)
HGB BLD-MCNC: 7.7 GM/DL (ref 12–16)
IMM GRANULOCYTES # BLD AUTO: 0.19 K/UL (ref 0–0.04)
IMM GRANULOCYTES NFR BLD AUTO: 1.1 % (ref 0–0.5)
LACTATE SERPL-SCNC: 1 MMOL/L (ref 0.5–2.2)
LYMPHOCYTES # BLD AUTO: 3.46 K/UL (ref 1–4.8)
MCH RBC QN AUTO: 30.8 PG (ref 27–31)
MCHC RBC AUTO-ENTMCNC: 32.2 G/DL (ref 32–36)
MCV RBC AUTO: 96 FL (ref 82–98)
NUCLEATED RBC (/100WBC) (OHS): 0 /100 WBC
PLATELET # BLD AUTO: 364 K/UL (ref 150–450)
PMV BLD AUTO: 9.8 FL (ref 9.2–12.9)
POCT GLUCOSE: 177 MG/DL (ref 70–110)
POCT GLUCOSE: 206 MG/DL (ref 70–110)
POTASSIUM SERPL-SCNC: 3.7 MMOL/L (ref 3.5–5.1)
PROT SERPL-MCNC: 6.3 GM/DL (ref 6–8.4)
RBC # BLD AUTO: 2.5 M/UL (ref 4–5.4)
RELATIVE EOSINOPHIL (OHS): 0.8 %
RELATIVE LYMPHOCYTE (OHS): 20.9 % (ref 18–48)
RELATIVE MONOCYTE (OHS): 11.8 % (ref 4–15)
RELATIVE NEUTROPHIL (OHS): 65 % (ref 38–73)
SODIUM SERPL-SCNC: 132 MMOL/L (ref 136–145)
WBC # BLD AUTO: 16.56 K/UL (ref 3.9–12.7)

## 2025-08-08 PROCEDURE — 25000003 PHARM REV CODE 250: Mod: HCNC

## 2025-08-08 PROCEDURE — 36415 COLL VENOUS BLD VENIPUNCTURE: CPT | Mod: HCNC

## 2025-08-08 PROCEDURE — 27000207 HC ISOLATION: Mod: HCNC

## 2025-08-08 PROCEDURE — 92526 ORAL FUNCTION THERAPY: CPT | Mod: HCNC

## 2025-08-08 PROCEDURE — 63600175 PHARM REV CODE 636 W HCPCS: Mod: HCNC | Performed by: STUDENT IN AN ORGANIZED HEALTH CARE EDUCATION/TRAINING PROGRAM

## 2025-08-08 PROCEDURE — 87040 BLOOD CULTURE FOR BACTERIA: CPT | Mod: HCNC | Performed by: HOSPITALIST

## 2025-08-08 PROCEDURE — 25000003 PHARM REV CODE 250: Mod: HCNC | Performed by: HOSPITALIST

## 2025-08-08 PROCEDURE — 94760 N-INVAS EAR/PLS OXIMETRY 1: CPT

## 2025-08-08 PROCEDURE — 80053 COMPREHEN METABOLIC PANEL: CPT | Mod: HCNC

## 2025-08-08 PROCEDURE — 94761 N-INVAS EAR/PLS OXIMETRY MLT: CPT | Mod: HCNC

## 2025-08-08 PROCEDURE — 83605 ASSAY OF LACTIC ACID: CPT | Mod: HCNC

## 2025-08-08 PROCEDURE — 20600001 HC STEP DOWN PRIVATE ROOM: Mod: HCNC

## 2025-08-08 PROCEDURE — 99900035 HC TECH TIME PER 15 MIN (STAT): Mod: HCNC

## 2025-08-08 PROCEDURE — 25000242 PHARM REV CODE 250 ALT 637 W/ HCPCS: Mod: HCNC | Performed by: INTERNAL MEDICINE

## 2025-08-08 PROCEDURE — 99232 SBSQ HOSP IP/OBS MODERATE 35: CPT | Mod: HCNC,,, | Performed by: NURSE PRACTITIONER

## 2025-08-08 PROCEDURE — 85025 COMPLETE CBC W/AUTO DIFF WBC: CPT | Mod: HCNC

## 2025-08-08 RX ORDER — PANTOPRAZOLE SODIUM 40 MG/1
40 FOR SUSPENSION ORAL DAILY
Status: DISCONTINUED | OUTPATIENT
Start: 2025-08-08 | End: 2025-08-26 | Stop reason: HOSPADM

## 2025-08-08 RX ORDER — SODIUM CHLORIDE 9 MG/ML
INJECTION, SOLUTION INTRAVENOUS ONCE
Status: CANCELLED | OUTPATIENT
Start: 2025-08-09

## 2025-08-08 RX ADMIN — PSYLLIUM HUSK 1 PACKET: 3.4 POWDER ORAL at 09:08

## 2025-08-08 RX ADMIN — LEVETIRACETAM 500 MG: 500 SOLUTION ORAL at 08:08

## 2025-08-08 RX ADMIN — HEPARIN SODIUM 5000 UNITS: 5000 INJECTION INTRAVENOUS; SUBCUTANEOUS at 05:08

## 2025-08-08 RX ADMIN — LEVOTHYROXINE SODIUM 75 MCG: 75 TABLET ORAL at 05:08

## 2025-08-08 RX ADMIN — PANTOPRAZOLE SODIUM 40 MG: 40 GRANULE, DELAYED RELEASE ORAL at 09:08

## 2025-08-08 RX ADMIN — HEPARIN SODIUM 5000 UNITS: 5000 INJECTION INTRAVENOUS; SUBCUTANEOUS at 02:08

## 2025-08-08 RX ADMIN — FOLIC ACID 2 MG: 1 TABLET ORAL at 09:08

## 2025-08-08 RX ADMIN — LEVETIRACETAM 500 MG: 500 SOLUTION ORAL at 09:08

## 2025-08-08 RX ADMIN — INSULIN GLARGINE 8 UNITS: 100 INJECTION, SOLUTION SUBCUTANEOUS at 10:08

## 2025-08-08 RX ADMIN — MICONAZOLE NITRATE 2 % TOPICAL POWDER: at 09:08

## 2025-08-08 RX ADMIN — MICONAZOLE NITRATE 2 % TOPICAL POWDER: at 08:08

## 2025-08-08 RX ADMIN — COLLAGENASE SANTYL: 250 OINTMENT TOPICAL at 09:08

## 2025-08-08 RX ADMIN — HEPARIN SODIUM 5000 UNITS: 5000 INJECTION INTRAVENOUS; SUBCUTANEOUS at 09:08

## 2025-08-09 LAB
ABSOLUTE EOSINOPHIL (OHS): 0.34 K/UL
ABSOLUTE MONOCYTE (OHS): 1.74 K/UL (ref 0.3–1)
ABSOLUTE NEUTROPHIL COUNT (OHS): 12.51 K/UL (ref 1.8–7.7)
ALBUMIN SERPL BCP-MCNC: 1.8 G/DL (ref 3.5–5.2)
ALP SERPL-CCNC: 281 UNIT/L (ref 40–150)
ALT SERPL W/O P-5'-P-CCNC: <8 UNIT/L (ref 0–55)
ANION GAP (OHS): 10 MMOL/L (ref 8–16)
AST SERPL-CCNC: 24 UNIT/L (ref 0–50)
BASOPHILS # BLD AUTO: 0.07 K/UL
BASOPHILS NFR BLD AUTO: 0.4 %
BILIRUB SERPL-MCNC: 0.2 MG/DL (ref 0.1–1)
BUN SERPL-MCNC: 43 MG/DL (ref 8–23)
CALCIUM SERPL-MCNC: 8.2 MG/DL (ref 8.7–10.5)
CHLORIDE SERPL-SCNC: 97 MMOL/L (ref 95–110)
CO2 SERPL-SCNC: 23 MMOL/L (ref 23–29)
CREAT SERPL-MCNC: 1.6 MG/DL (ref 0.5–1.4)
ERYTHROCYTE [DISTWIDTH] IN BLOOD BY AUTOMATED COUNT: 18.6 % (ref 11.5–14.5)
GFR SERPLBLD CREATININE-BSD FMLA CKD-EPI: 32 ML/MIN/1.73/M2
GLUCOSE SERPL-MCNC: 229 MG/DL (ref 70–110)
HCT VFR BLD AUTO: 26.6 % (ref 37–48.5)
HGB BLD-MCNC: 8.3 GM/DL (ref 12–16)
IMM GRANULOCYTES # BLD AUTO: 0.14 K/UL (ref 0–0.04)
IMM GRANULOCYTES NFR BLD AUTO: 0.8 % (ref 0–0.5)
LYMPHOCYTES # BLD AUTO: 2.26 K/UL (ref 1–4.8)
MAGNESIUM SERPL-MCNC: 1.9 MG/DL (ref 1.6–2.6)
MCH RBC QN AUTO: 30.9 PG (ref 27–31)
MCHC RBC AUTO-ENTMCNC: 31.2 G/DL (ref 32–36)
MCV RBC AUTO: 99 FL (ref 82–98)
NUCLEATED RBC (/100WBC) (OHS): 0 /100 WBC
PLATELET # BLD AUTO: 383 K/UL (ref 150–450)
PMV BLD AUTO: 9.4 FL (ref 9.2–12.9)
POCT GLUCOSE: 185 MG/DL (ref 70–110)
POTASSIUM SERPL-SCNC: 3.6 MMOL/L (ref 3.5–5.1)
PROT SERPL-MCNC: 6.8 GM/DL (ref 6–8.4)
RBC # BLD AUTO: 2.69 M/UL (ref 4–5.4)
RELATIVE EOSINOPHIL (OHS): 2 %
RELATIVE LYMPHOCYTE (OHS): 13.2 % (ref 18–48)
RELATIVE MONOCYTE (OHS): 10.2 % (ref 4–15)
RELATIVE NEUTROPHIL (OHS): 73.4 % (ref 38–73)
SODIUM SERPL-SCNC: 130 MMOL/L (ref 136–145)
WBC # BLD AUTO: 17.06 K/UL (ref 3.9–12.7)

## 2025-08-09 PROCEDURE — 25000003 PHARM REV CODE 250: Mod: HCNC

## 2025-08-09 PROCEDURE — 25000003 PHARM REV CODE 250: Mod: HCNC | Performed by: HOSPITALIST

## 2025-08-09 PROCEDURE — 20600001 HC STEP DOWN PRIVATE ROOM: Mod: HCNC

## 2025-08-09 PROCEDURE — 83735 ASSAY OF MAGNESIUM: CPT | Mod: HCNC | Performed by: HOSPITALIST

## 2025-08-09 PROCEDURE — 63600175 PHARM REV CODE 636 W HCPCS: Mod: HCNC | Performed by: STUDENT IN AN ORGANIZED HEALTH CARE EDUCATION/TRAINING PROGRAM

## 2025-08-09 PROCEDURE — 80100016 HC MAINTENANCE HEMODIALYSIS: Mod: HCNC

## 2025-08-09 PROCEDURE — 90935 HEMODIALYSIS ONE EVALUATION: CPT | Mod: HCNC,,, | Performed by: NURSE PRACTITIONER

## 2025-08-09 PROCEDURE — 63600175 PHARM REV CODE 636 W HCPCS: Mod: HCNC

## 2025-08-09 PROCEDURE — 25000242 PHARM REV CODE 250 ALT 637 W/ HCPCS: Mod: HCNC | Performed by: INTERNAL MEDICINE

## 2025-08-09 PROCEDURE — 80100014 HC HEMODIALYSIS 1:1: Mod: HCNC

## 2025-08-09 PROCEDURE — 27000207 HC ISOLATION: Mod: HCNC

## 2025-08-09 PROCEDURE — 93010 ELECTROCARDIOGRAM REPORT: CPT | Mod: HCNC,,, | Performed by: INTERNAL MEDICINE

## 2025-08-09 PROCEDURE — 93005 ELECTROCARDIOGRAM TRACING: CPT | Mod: HCNC

## 2025-08-09 PROCEDURE — 63600175 PHARM REV CODE 636 W HCPCS: Mod: HCNC | Performed by: HOSPITALIST

## 2025-08-09 PROCEDURE — 82040 ASSAY OF SERUM ALBUMIN: CPT | Mod: HCNC

## 2025-08-09 PROCEDURE — 36415 COLL VENOUS BLD VENIPUNCTURE: CPT | Mod: HCNC

## 2025-08-09 PROCEDURE — 85025 COMPLETE CBC W/AUTO DIFF WBC: CPT | Mod: HCNC

## 2025-08-09 RX ORDER — PROCHLORPERAZINE EDISYLATE 5 MG/ML
2.5 INJECTION INTRAMUSCULAR; INTRAVENOUS EVERY 6 HOURS PRN
Status: DISCONTINUED | OUTPATIENT
Start: 2025-08-09 | End: 2025-08-26

## 2025-08-09 RX ORDER — ONDANSETRON HYDROCHLORIDE 2 MG/ML
4 INJECTION, SOLUTION INTRAVENOUS EVERY 6 HOURS PRN
Status: DISCONTINUED | OUTPATIENT
Start: 2025-08-09 | End: 2025-08-26

## 2025-08-09 RX ADMIN — ONDANSETRON 4 MG: 2 INJECTION INTRAMUSCULAR; INTRAVENOUS at 01:08

## 2025-08-09 RX ADMIN — MICONAZOLE NITRATE 2 % TOPICAL POWDER: at 08:08

## 2025-08-09 RX ADMIN — LEVOTHYROXINE SODIUM 75 MCG: 75 TABLET ORAL at 06:08

## 2025-08-09 RX ADMIN — INSULIN GLARGINE 8 UNITS: 100 INJECTION, SOLUTION SUBCUTANEOUS at 08:08

## 2025-08-09 RX ADMIN — COLLAGENASE SANTYL: 250 OINTMENT TOPICAL at 08:08

## 2025-08-09 RX ADMIN — PANTOPRAZOLE SODIUM 40 MG: 40 GRANULE, DELAYED RELEASE ORAL at 08:08

## 2025-08-09 RX ADMIN — HEPARIN SODIUM 5000 UNITS: 5000 INJECTION INTRAVENOUS; SUBCUTANEOUS at 06:08

## 2025-08-09 RX ADMIN — LEVETIRACETAM 500 MG: 500 SOLUTION ORAL at 09:08

## 2025-08-09 RX ADMIN — HEPARIN SODIUM 5000 UNITS: 5000 INJECTION INTRAVENOUS; SUBCUTANEOUS at 01:08

## 2025-08-09 RX ADMIN — MICONAZOLE NITRATE 2 % TOPICAL POWDER: at 09:08

## 2025-08-09 RX ADMIN — PSYLLIUM HUSK 1 PACKET: 3.4 POWDER ORAL at 08:08

## 2025-08-09 RX ADMIN — HEPARIN SODIUM 5000 UNITS: 5000 INJECTION INTRAVENOUS; SUBCUTANEOUS at 09:08

## 2025-08-09 RX ADMIN — POTASSIUM BICARBONATE 25 MEQ: 977.5 TABLET, EFFERVESCENT ORAL at 09:08

## 2025-08-09 RX ADMIN — FOLIC ACID 2 MG: 1 TABLET ORAL at 08:08

## 2025-08-10 PROBLEM — R50.9 FEVER: Status: RESOLVED | Noted: 2025-08-07 | Resolved: 2025-08-10

## 2025-08-10 LAB
ABSOLUTE EOSINOPHIL (OHS): 0.54 K/UL
ABSOLUTE MONOCYTE (OHS): 1.76 K/UL (ref 0.3–1)
ABSOLUTE NEUTROPHIL COUNT (OHS): 12.34 K/UL (ref 1.8–7.7)
ALBUMIN SERPL BCP-MCNC: 1.7 G/DL (ref 3.5–5.2)
ALP SERPL-CCNC: 279 UNIT/L (ref 40–150)
ALT SERPL W/O P-5'-P-CCNC: <8 UNIT/L (ref 0–55)
ANION GAP (OHS): 9 MMOL/L (ref 8–16)
AST SERPL-CCNC: 22 UNIT/L (ref 0–50)
BASOPHILS # BLD AUTO: 0.05 K/UL
BASOPHILS NFR BLD AUTO: 0.3 %
BILIRUB SERPL-MCNC: 0.2 MG/DL (ref 0.1–1)
BUN SERPL-MCNC: 26 MG/DL (ref 8–23)
CALCIUM SERPL-MCNC: 8.2 MG/DL (ref 8.7–10.5)
CHLORIDE SERPL-SCNC: 101 MMOL/L (ref 95–110)
CO2 SERPL-SCNC: 23 MMOL/L (ref 23–29)
CREAT SERPL-MCNC: 1.2 MG/DL (ref 0.5–1.4)
EAG (OHS): 105 MG/DL (ref 68–131)
ERYTHROCYTE [DISTWIDTH] IN BLOOD BY AUTOMATED COUNT: 18.3 % (ref 11.5–14.5)
GFR SERPLBLD CREATININE-BSD FMLA CKD-EPI: 46 ML/MIN/1.73/M2
GLUCOSE SERPL-MCNC: 204 MG/DL (ref 70–110)
HBA1C MFR BLD: 5.3 % (ref 4–5.6)
HCT VFR BLD AUTO: 25.9 % (ref 37–48.5)
HGB BLD-MCNC: 8.3 GM/DL (ref 12–16)
IMM GRANULOCYTES # BLD AUTO: 0.14 K/UL (ref 0–0.04)
IMM GRANULOCYTES NFR BLD AUTO: 0.8 % (ref 0–0.5)
LYMPHOCYTES # BLD AUTO: 2.29 K/UL (ref 1–4.8)
MCH RBC QN AUTO: 31.2 PG (ref 27–31)
MCHC RBC AUTO-ENTMCNC: 32 G/DL (ref 32–36)
MCV RBC AUTO: 97 FL (ref 82–98)
NUCLEATED RBC (/100WBC) (OHS): 0 /100 WBC
OHS QRS DURATION: 82 MS
OHS QRS DURATION: 88 MS
OHS QTC CALCULATION: 435 MS
OHS QTC CALCULATION: 438 MS
PLATELET # BLD AUTO: 375 K/UL (ref 150–450)
PMV BLD AUTO: 9.5 FL (ref 9.2–12.9)
POCT GLUCOSE: 227 MG/DL (ref 70–110)
POCT GLUCOSE: 242 MG/DL (ref 70–110)
POCT GLUCOSE: 276 MG/DL (ref 70–110)
POTASSIUM SERPL-SCNC: 4.3 MMOL/L (ref 3.5–5.1)
PROT SERPL-MCNC: 6.8 GM/DL (ref 6–8.4)
RBC # BLD AUTO: 2.66 M/UL (ref 4–5.4)
RELATIVE EOSINOPHIL (OHS): 3.2 %
RELATIVE LYMPHOCYTE (OHS): 13.4 % (ref 18–48)
RELATIVE MONOCYTE (OHS): 10.3 % (ref 4–15)
RELATIVE NEUTROPHIL (OHS): 72 % (ref 38–73)
SODIUM SERPL-SCNC: 133 MMOL/L (ref 136–145)
WBC # BLD AUTO: 17.12 K/UL (ref 3.9–12.7)

## 2025-08-10 PROCEDURE — 25000003 PHARM REV CODE 250: Mod: HCNC | Performed by: HOSPITALIST

## 2025-08-10 PROCEDURE — 27000207 HC ISOLATION: Mod: HCNC

## 2025-08-10 PROCEDURE — 63600175 PHARM REV CODE 636 W HCPCS: Mod: HCNC | Performed by: STUDENT IN AN ORGANIZED HEALTH CARE EDUCATION/TRAINING PROGRAM

## 2025-08-10 PROCEDURE — 93005 ELECTROCARDIOGRAM TRACING: CPT | Mod: HCNC

## 2025-08-10 PROCEDURE — 93010 ELECTROCARDIOGRAM REPORT: CPT | Mod: HCNC,,, | Performed by: INTERNAL MEDICINE

## 2025-08-10 PROCEDURE — 80053 COMPREHEN METABOLIC PANEL: CPT | Mod: HCNC

## 2025-08-10 PROCEDURE — 25000003 PHARM REV CODE 250: Mod: HCNC

## 2025-08-10 PROCEDURE — 85025 COMPLETE CBC W/AUTO DIFF WBC: CPT | Mod: HCNC

## 2025-08-10 PROCEDURE — 83036 HEMOGLOBIN GLYCOSYLATED A1C: CPT | Mod: HCNC | Performed by: FAMILY MEDICINE

## 2025-08-10 PROCEDURE — 36415 COLL VENOUS BLD VENIPUNCTURE: CPT | Mod: HCNC

## 2025-08-10 PROCEDURE — 94761 N-INVAS EAR/PLS OXIMETRY MLT: CPT | Mod: HCNC

## 2025-08-10 PROCEDURE — 36415 COLL VENOUS BLD VENIPUNCTURE: CPT | Mod: HCNC | Performed by: FAMILY MEDICINE

## 2025-08-10 PROCEDURE — 20600001 HC STEP DOWN PRIVATE ROOM: Mod: HCNC

## 2025-08-10 PROCEDURE — 99900035 HC TECH TIME PER 15 MIN (STAT): Mod: HCNC

## 2025-08-10 PROCEDURE — 94760 N-INVAS EAR/PLS OXIMETRY 1: CPT

## 2025-08-10 PROCEDURE — 25000242 PHARM REV CODE 250 ALT 637 W/ HCPCS: Mod: HCNC | Performed by: INTERNAL MEDICINE

## 2025-08-10 RX ORDER — GLUCAGON 1 MG
1 KIT INJECTION
Status: DISCONTINUED | OUTPATIENT
Start: 2025-08-10 | End: 2025-08-26 | Stop reason: HOSPADM

## 2025-08-10 RX ORDER — INSULIN ASPART 100 [IU]/ML
0-5 INJECTION, SOLUTION INTRAVENOUS; SUBCUTANEOUS EVERY 6 HOURS PRN
Status: DISCONTINUED | OUTPATIENT
Start: 2025-08-10 | End: 2025-08-26 | Stop reason: HOSPADM

## 2025-08-10 RX ADMIN — MICONAZOLE NITRATE 2 % TOPICAL POWDER: at 08:08

## 2025-08-10 RX ADMIN — INSULIN GLARGINE 8 UNITS: 100 INJECTION, SOLUTION SUBCUTANEOUS at 08:08

## 2025-08-10 RX ADMIN — PANTOPRAZOLE SODIUM 40 MG: 40 GRANULE, DELAYED RELEASE ORAL at 08:08

## 2025-08-10 RX ADMIN — LEVOTHYROXINE SODIUM 75 MCG: 75 TABLET ORAL at 05:08

## 2025-08-10 RX ADMIN — COLLAGENASE SANTYL: 250 OINTMENT TOPICAL at 09:08

## 2025-08-10 RX ADMIN — HEPARIN SODIUM 5000 UNITS: 5000 INJECTION INTRAVENOUS; SUBCUTANEOUS at 08:08

## 2025-08-10 RX ADMIN — HEPARIN SODIUM 5000 UNITS: 5000 INJECTION INTRAVENOUS; SUBCUTANEOUS at 05:08

## 2025-08-10 RX ADMIN — MICONAZOLE NITRATE 2 % TOPICAL POWDER: at 09:08

## 2025-08-10 RX ADMIN — HEPARIN SODIUM 5000 UNITS: 5000 INJECTION INTRAVENOUS; SUBCUTANEOUS at 02:08

## 2025-08-10 RX ADMIN — FOLIC ACID 2 MG: 1 TABLET ORAL at 08:08

## 2025-08-10 RX ADMIN — LEVETIRACETAM 500 MG: 500 SOLUTION ORAL at 08:08

## 2025-08-10 RX ADMIN — PSYLLIUM HUSK 1 PACKET: 3.4 POWDER ORAL at 08:08

## 2025-08-11 LAB
ABSOLUTE EOSINOPHIL (OHS): 0.53 K/UL
ABSOLUTE MONOCYTE (OHS): 1.72 K/UL (ref 0.3–1)
ABSOLUTE NEUTROPHIL COUNT (OHS): 12.15 K/UL (ref 1.8–7.7)
ALBUMIN SERPL BCP-MCNC: 1.6 G/DL (ref 3.5–5.2)
ALP SERPL-CCNC: 291 UNIT/L (ref 40–150)
ALT SERPL W/O P-5'-P-CCNC: <8 UNIT/L (ref 0–55)
ANION GAP (OHS): 7 MMOL/L (ref 8–16)
AST SERPL-CCNC: 22 UNIT/L (ref 0–50)
BASOPHILS # BLD AUTO: 0.05 K/UL
BASOPHILS NFR BLD AUTO: 0.3 %
BILIRUB SERPL-MCNC: 0.2 MG/DL (ref 0.1–1)
BUN SERPL-MCNC: 38 MG/DL (ref 8–23)
CALCIUM SERPL-MCNC: 8.1 MG/DL (ref 8.7–10.5)
CHLORIDE SERPL-SCNC: 101 MMOL/L (ref 95–110)
CO2 SERPL-SCNC: 24 MMOL/L (ref 23–29)
CREAT SERPL-MCNC: 1.4 MG/DL (ref 0.5–1.4)
ERYTHROCYTE [DISTWIDTH] IN BLOOD BY AUTOMATED COUNT: 18 % (ref 11.5–14.5)
GFR SERPLBLD CREATININE-BSD FMLA CKD-EPI: 38 ML/MIN/1.73/M2
GLUCOSE SERPL-MCNC: 232 MG/DL (ref 70–110)
HBV CORE AB SERPL QL IA: NORMAL
HBV SURFACE AB SER-ACNC: <3 MIU/ML
HBV SURFACE AB SERPL IA-ACNC: NORMAL M[IU]/ML
HBV SURFACE AG SERPL QL IA: NORMAL
HCT VFR BLD AUTO: 26.3 % (ref 37–48.5)
HGB BLD-MCNC: 8.5 GM/DL (ref 12–16)
IMM GRANULOCYTES # BLD AUTO: 0.19 K/UL (ref 0–0.04)
IMM GRANULOCYTES NFR BLD AUTO: 1.1 % (ref 0–0.5)
LYMPHOCYTES # BLD AUTO: 2.49 K/UL (ref 1–4.8)
MCH RBC QN AUTO: 31 PG (ref 27–31)
MCHC RBC AUTO-ENTMCNC: 32.3 G/DL (ref 32–36)
MCV RBC AUTO: 96 FL (ref 82–98)
NUCLEATED RBC (/100WBC) (OHS): 0 /100 WBC
OHS QRS DURATION: 74 MS
OHS QTC CALCULATION: 435 MS
PLATELET # BLD AUTO: 385 K/UL (ref 150–450)
PMV BLD AUTO: 9.7 FL (ref 9.2–12.9)
POCT GLUCOSE: 177 MG/DL (ref 70–110)
POCT GLUCOSE: 224 MG/DL (ref 70–110)
POCT GLUCOSE: 236 MG/DL (ref 70–110)
POCT GLUCOSE: 247 MG/DL (ref 70–110)
POCT GLUCOSE: 254 MG/DL (ref 70–110)
POTASSIUM SERPL-SCNC: 4.3 MMOL/L (ref 3.5–5.1)
PROT SERPL-MCNC: 6.9 GM/DL (ref 6–8.4)
RBC # BLD AUTO: 2.74 M/UL (ref 4–5.4)
RELATIVE EOSINOPHIL (OHS): 3.1 %
RELATIVE LYMPHOCYTE (OHS): 14.5 % (ref 18–48)
RELATIVE MONOCYTE (OHS): 10 % (ref 4–15)
RELATIVE NEUTROPHIL (OHS): 71 % (ref 38–73)
SODIUM SERPL-SCNC: 132 MMOL/L (ref 136–145)
WBC # BLD AUTO: 17.13 K/UL (ref 3.9–12.7)

## 2025-08-11 PROCEDURE — 63600175 PHARM REV CODE 636 W HCPCS: Mod: HCNC | Performed by: STUDENT IN AN ORGANIZED HEALTH CARE EDUCATION/TRAINING PROGRAM

## 2025-08-11 PROCEDURE — 25000242 PHARM REV CODE 250 ALT 637 W/ HCPCS: Mod: HCNC | Performed by: INTERNAL MEDICINE

## 2025-08-11 PROCEDURE — 85025 COMPLETE CBC W/AUTO DIFF WBC: CPT | Mod: HCNC

## 2025-08-11 PROCEDURE — 93010 ELECTROCARDIOGRAM REPORT: CPT | Mod: HCNC,,, | Performed by: INTERNAL MEDICINE

## 2025-08-11 PROCEDURE — 92526 ORAL FUNCTION THERAPY: CPT | Mod: HCNC

## 2025-08-11 PROCEDURE — 25000003 PHARM REV CODE 250: Mod: HCNC | Performed by: STUDENT IN AN ORGANIZED HEALTH CARE EDUCATION/TRAINING PROGRAM

## 2025-08-11 PROCEDURE — 86704 HEP B CORE ANTIBODY TOTAL: CPT | Mod: HCNC | Performed by: HOSPITALIST

## 2025-08-11 PROCEDURE — 20600001 HC STEP DOWN PRIVATE ROOM: Mod: HCNC

## 2025-08-11 PROCEDURE — 82565 ASSAY OF CREATININE: CPT | Mod: HCNC

## 2025-08-11 PROCEDURE — 25000003 PHARM REV CODE 250: Mod: HCNC

## 2025-08-11 PROCEDURE — 86706 HEP B SURFACE ANTIBODY: CPT | Mod: HCNC | Performed by: HOSPITALIST

## 2025-08-11 PROCEDURE — 63600175 PHARM REV CODE 636 W HCPCS: Mod: HCNC | Performed by: FAMILY MEDICINE

## 2025-08-11 PROCEDURE — 97530 THERAPEUTIC ACTIVITIES: CPT | Mod: HCNC

## 2025-08-11 PROCEDURE — 36415 COLL VENOUS BLD VENIPUNCTURE: CPT | Mod: HCNC | Performed by: HOSPITALIST

## 2025-08-11 PROCEDURE — 87340 HEPATITIS B SURFACE AG IA: CPT | Mod: HCNC | Performed by: HOSPITALIST

## 2025-08-11 PROCEDURE — 93005 ELECTROCARDIOGRAM TRACING: CPT | Mod: HCNC

## 2025-08-11 PROCEDURE — 36415 COLL VENOUS BLD VENIPUNCTURE: CPT | Mod: HCNC

## 2025-08-11 PROCEDURE — 97165 OT EVAL LOW COMPLEX 30 MIN: CPT | Mod: HCNC

## 2025-08-11 PROCEDURE — 27000207 HC ISOLATION: Mod: HCNC

## 2025-08-11 PROCEDURE — 25000003 PHARM REV CODE 250: Mod: HCNC | Performed by: HOSPITALIST

## 2025-08-11 RX ADMIN — HEPARIN SODIUM 5000 UNITS: 5000 INJECTION INTRAVENOUS; SUBCUTANEOUS at 05:08

## 2025-08-11 RX ADMIN — ACETAMINOPHEN 650 MG: 325 TABLET ORAL at 09:08

## 2025-08-11 RX ADMIN — LEVOTHYROXINE SODIUM 75 MCG: 75 TABLET ORAL at 05:08

## 2025-08-11 RX ADMIN — INSULIN GLARGINE 8 UNITS: 100 INJECTION, SOLUTION SUBCUTANEOUS at 08:08

## 2025-08-11 RX ADMIN — PSYLLIUM HUSK 1 PACKET: 3.4 POWDER ORAL at 09:08

## 2025-08-11 RX ADMIN — INSULIN ASPART 3 UNITS: 100 INJECTION, SOLUTION INTRAVENOUS; SUBCUTANEOUS at 05:08

## 2025-08-11 RX ADMIN — ACETAMINOPHEN 650 MG: 325 TABLET ORAL at 05:08

## 2025-08-11 RX ADMIN — MICONAZOLE NITRATE 2 % TOPICAL POWDER: at 09:08

## 2025-08-11 RX ADMIN — FOLIC ACID 2 MG: 1 TABLET ORAL at 09:08

## 2025-08-11 RX ADMIN — HEPARIN SODIUM 5000 UNITS: 5000 INJECTION INTRAVENOUS; SUBCUTANEOUS at 09:08

## 2025-08-11 RX ADMIN — HEPARIN SODIUM 5000 UNITS: 5000 INJECTION INTRAVENOUS; SUBCUTANEOUS at 02:08

## 2025-08-11 RX ADMIN — PANTOPRAZOLE SODIUM 40 MG: 40 GRANULE, DELAYED RELEASE ORAL at 09:08

## 2025-08-11 RX ADMIN — LEVETIRACETAM 500 MG: 500 SOLUTION ORAL at 09:08

## 2025-08-11 RX ADMIN — INSULIN ASPART 3 UNITS: 100 INJECTION, SOLUTION INTRAVENOUS; SUBCUTANEOUS at 08:08

## 2025-08-11 RX ADMIN — COLLAGENASE SANTYL: 250 OINTMENT TOPICAL at 09:08

## 2025-08-11 RX ADMIN — INSULIN ASPART 1 UNITS: 100 INJECTION, SOLUTION INTRAVENOUS; SUBCUTANEOUS at 01:08

## 2025-08-12 PROBLEM — D72.829 LEUKOCYTOSIS: Status: ACTIVE | Noted: 2025-08-05

## 2025-08-12 LAB
ABSOLUTE EOSINOPHIL (OHS): 0.45 K/UL
ABSOLUTE MONOCYTE (OHS): 2.18 K/UL (ref 0.3–1)
ABSOLUTE NEUTROPHIL COUNT (OHS): 18.1 K/UL (ref 1.8–7.7)
ALBUMIN SERPL BCP-MCNC: 1.7 G/DL (ref 3.5–5.2)
ALP SERPL-CCNC: 318 UNIT/L (ref 40–150)
ALT SERPL W/O P-5'-P-CCNC: 9 UNIT/L (ref 0–55)
ANION GAP (OHS): 8 MMOL/L (ref 8–16)
AST SERPL-CCNC: 23 UNIT/L (ref 0–50)
BASOPHILS # BLD AUTO: 0.09 K/UL
BASOPHILS NFR BLD AUTO: 0.4 %
BILIRUB SERPL-MCNC: 0.2 MG/DL (ref 0.1–1)
BUN SERPL-MCNC: 47 MG/DL (ref 8–23)
CALCIUM SERPL-MCNC: 8.3 MG/DL (ref 8.7–10.5)
CHLORIDE SERPL-SCNC: 99 MMOL/L (ref 95–110)
CO2 SERPL-SCNC: 23 MMOL/L (ref 23–29)
CREAT SERPL-MCNC: 1.7 MG/DL (ref 0.5–1.4)
ERYTHROCYTE [DISTWIDTH] IN BLOOD BY AUTOMATED COUNT: 17.6 % (ref 11.5–14.5)
GFR SERPLBLD CREATININE-BSD FMLA CKD-EPI: 30 ML/MIN/1.73/M2
GLUCOSE SERPL-MCNC: 177 MG/DL (ref 70–110)
HCT VFR BLD AUTO: 24.7 % (ref 37–48.5)
HGB BLD-MCNC: 8.1 GM/DL (ref 12–16)
IMM GRANULOCYTES # BLD AUTO: 0.36 K/UL (ref 0–0.04)
IMM GRANULOCYTES NFR BLD AUTO: 1.5 % (ref 0–0.5)
LYMPHOCYTES # BLD AUTO: 3 K/UL (ref 1–4.8)
MCH RBC QN AUTO: 30.9 PG (ref 27–31)
MCHC RBC AUTO-ENTMCNC: 32.8 G/DL (ref 32–36)
MCV RBC AUTO: 94 FL (ref 82–98)
NUCLEATED RBC (/100WBC) (OHS): 0 /100 WBC
PHOSPHATE SERPL-MCNC: 1.1 MG/DL (ref 2.7–4.5)
PLATELET # BLD AUTO: 421 K/UL (ref 150–450)
PMV BLD AUTO: 9.8 FL (ref 9.2–12.9)
POCT GLUCOSE: 159 MG/DL (ref 70–110)
POCT GLUCOSE: 235 MG/DL (ref 70–110)
POCT GLUCOSE: 236 MG/DL (ref 70–110)
POCT GLUCOSE: 241 MG/DL (ref 70–110)
POTASSIUM SERPL-SCNC: 4.5 MMOL/L (ref 3.5–5.1)
PROT SERPL-MCNC: 7.2 GM/DL (ref 6–8.4)
RBC # BLD AUTO: 2.62 M/UL (ref 4–5.4)
RELATIVE EOSINOPHIL (OHS): 1.9 %
RELATIVE LYMPHOCYTE (OHS): 12.4 % (ref 18–48)
RELATIVE MONOCYTE (OHS): 9 % (ref 4–15)
RELATIVE NEUTROPHIL (OHS): 74.8 % (ref 38–73)
SODIUM SERPL-SCNC: 130 MMOL/L (ref 136–145)
WBC # BLD AUTO: 24.18 K/UL (ref 3.9–12.7)

## 2025-08-12 PROCEDURE — 80100014 HC HEMODIALYSIS 1:1: Mod: HCNC

## 2025-08-12 PROCEDURE — 20600001 HC STEP DOWN PRIVATE ROOM: Mod: HCNC

## 2025-08-12 PROCEDURE — 36415 COLL VENOUS BLD VENIPUNCTURE: CPT | Mod: HCNC

## 2025-08-12 PROCEDURE — 80053 COMPREHEN METABOLIC PANEL: CPT | Mod: HCNC

## 2025-08-12 PROCEDURE — 90935 HEMODIALYSIS ONE EVALUATION: CPT | Mod: HCNC,,,

## 2025-08-12 PROCEDURE — 25000003 PHARM REV CODE 250: Mod: HCNC

## 2025-08-12 PROCEDURE — 63600175 PHARM REV CODE 636 W HCPCS: Mod: HCNC | Performed by: STUDENT IN AN ORGANIZED HEALTH CARE EDUCATION/TRAINING PROGRAM

## 2025-08-12 PROCEDURE — 84100 ASSAY OF PHOSPHORUS: CPT | Mod: HCNC

## 2025-08-12 PROCEDURE — 27000207 HC ISOLATION: Mod: HCNC

## 2025-08-12 PROCEDURE — 25000003 PHARM REV CODE 250: Mod: HCNC | Performed by: INTERNAL MEDICINE

## 2025-08-12 PROCEDURE — 94761 N-INVAS EAR/PLS OXIMETRY MLT: CPT | Mod: HCNC

## 2025-08-12 PROCEDURE — 99900035 HC TECH TIME PER 15 MIN (STAT): Mod: HCNC

## 2025-08-12 PROCEDURE — 25000003 PHARM REV CODE 250: Mod: HCNC | Performed by: STUDENT IN AN ORGANIZED HEALTH CARE EDUCATION/TRAINING PROGRAM

## 2025-08-12 PROCEDURE — 25000003 PHARM REV CODE 250: Mod: HCNC | Performed by: HOSPITALIST

## 2025-08-12 PROCEDURE — 97530 THERAPEUTIC ACTIVITIES: CPT | Mod: HCNC

## 2025-08-12 PROCEDURE — 63600175 PHARM REV CODE 636 W HCPCS: Mod: TB,HCNC | Performed by: NURSE PRACTITIONER

## 2025-08-12 PROCEDURE — 85025 COMPLETE CBC W/AUTO DIFF WBC: CPT | Mod: HCNC

## 2025-08-12 PROCEDURE — 25000242 PHARM REV CODE 250 ALT 637 W/ HCPCS: Mod: HCNC | Performed by: INTERNAL MEDICINE

## 2025-08-12 PROCEDURE — 97161 PT EVAL LOW COMPLEX 20 MIN: CPT | Mod: HCNC

## 2025-08-12 RX ORDER — SODIUM CHLORIDE 9 MG/ML
INJECTION, SOLUTION INTRAVENOUS ONCE
Status: COMPLETED | OUTPATIENT
Start: 2025-08-12 | End: 2025-08-12

## 2025-08-12 RX ADMIN — SODIUM CHLORIDE: 9 INJECTION, SOLUTION INTRAVENOUS at 01:08

## 2025-08-12 RX ADMIN — INSULIN ASPART 2 UNITS: 100 INJECTION, SOLUTION INTRAVENOUS; SUBCUTANEOUS at 05:08

## 2025-08-12 RX ADMIN — LEVETIRACETAM 500 MG: 500 SOLUTION ORAL at 09:08

## 2025-08-12 RX ADMIN — INSULIN ASPART 1 UNITS: 100 INJECTION, SOLUTION INTRAVENOUS; SUBCUTANEOUS at 09:08

## 2025-08-12 RX ADMIN — MICONAZOLE NITRATE 2 % TOPICAL POWDER: at 09:08

## 2025-08-12 RX ADMIN — ACETAMINOPHEN 650 MG: 325 TABLET ORAL at 11:08

## 2025-08-12 RX ADMIN — HEPARIN SODIUM 5000 UNITS: 5000 INJECTION INTRAVENOUS; SUBCUTANEOUS at 05:08

## 2025-08-12 RX ADMIN — FOLIC ACID 2 MG: 1 TABLET ORAL at 09:08

## 2025-08-12 RX ADMIN — HEPARIN SODIUM 5000 UNITS: 5000 INJECTION INTRAVENOUS; SUBCUTANEOUS at 09:08

## 2025-08-12 RX ADMIN — EPOETIN ALFA-EPBX 4300 UNITS: 10000 INJECTION, SOLUTION INTRAVENOUS; SUBCUTANEOUS at 05:08

## 2025-08-12 RX ADMIN — ACETAMINOPHEN 650 MG: 325 TABLET ORAL at 01:08

## 2025-08-12 RX ADMIN — LEVOTHYROXINE SODIUM 75 MCG: 75 TABLET ORAL at 05:08

## 2025-08-12 RX ADMIN — PANTOPRAZOLE SODIUM 40 MG: 40 GRANULE, DELAYED RELEASE ORAL at 09:08

## 2025-08-12 RX ADMIN — INSULIN ASPART 2 UNITS: 100 INJECTION, SOLUTION INTRAVENOUS; SUBCUTANEOUS at 12:08

## 2025-08-12 RX ADMIN — MELATONIN TAB 3 MG 6 MG: 3 TAB at 11:08

## 2025-08-12 RX ADMIN — COLLAGENASE SANTYL: 250 OINTMENT TOPICAL at 09:08

## 2025-08-12 RX ADMIN — POTASSIUM PHOSPHATE, MONOBASIC AND POTASSIUM PHOSPHATE, DIBASIC 30 MMOL: 224; 236 INJECTION, SOLUTION, CONCENTRATE INTRAVENOUS at 05:08

## 2025-08-12 RX ADMIN — HEPARIN SODIUM 4000 UNITS: 1000 INJECTION INTRAVENOUS; SUBCUTANEOUS at 04:08

## 2025-08-12 RX ADMIN — PSYLLIUM HUSK 1 PACKET: 3.4 POWDER ORAL at 09:08

## 2025-08-12 RX ADMIN — INSULIN GLARGINE 8 UNITS: 100 INJECTION, SOLUTION SUBCUTANEOUS at 09:08

## 2025-08-12 RX ADMIN — MIDODRINE HYDROCHLORIDE 15 MG: 5 TABLET ORAL at 02:08

## 2025-08-13 PROBLEM — R06.02 SHORTNESS OF BREATH: Status: ACTIVE | Noted: 2025-08-13

## 2025-08-13 LAB
ABSOLUTE EOSINOPHIL (OHS): 0.54 K/UL
ABSOLUTE MONOCYTE (OHS): 2.78 K/UL (ref 0.3–1)
ABSOLUTE NEUTROPHIL COUNT (OHS): 21.13 K/UL (ref 1.8–7.7)
ALBUMIN SERPL BCP-MCNC: 1.6 G/DL (ref 3.5–5.2)
ALP SERPL-CCNC: 345 UNIT/L (ref 40–150)
ALT SERPL W/O P-5'-P-CCNC: 11 UNIT/L (ref 0–55)
ANION GAP (OHS): 8 MMOL/L (ref 8–16)
AST SERPL-CCNC: 25 UNIT/L (ref 0–50)
BACTERIA BLD CULT: NORMAL
BACTERIA BLD CULT: NORMAL
BASOPHILS # BLD AUTO: 0.19 K/UL
BASOPHILS NFR BLD AUTO: 0.7 %
BILIRUB SERPL-MCNC: 0.2 MG/DL (ref 0.1–1)
BUN SERPL-MCNC: 29 MG/DL (ref 8–23)
CALCIUM SERPL-MCNC: 8.2 MG/DL (ref 8.7–10.5)
CHLORIDE SERPL-SCNC: 101 MMOL/L (ref 95–110)
CO2 SERPL-SCNC: 23 MMOL/L (ref 23–29)
CREAT SERPL-MCNC: 1.3 MG/DL (ref 0.5–1.4)
ERYTHROCYTE [DISTWIDTH] IN BLOOD BY AUTOMATED COUNT: 18.3 % (ref 11.5–14.5)
GFR SERPLBLD CREATININE-BSD FMLA CKD-EPI: 42 ML/MIN/1.73/M2
GLUCOSE SERPL-MCNC: 198 MG/DL (ref 70–110)
HCT VFR BLD AUTO: 25.3 % (ref 37–48.5)
HGB BLD-MCNC: 8.1 GM/DL (ref 12–16)
IMM GRANULOCYTES # BLD AUTO: 0.92 K/UL (ref 0–0.04)
IMM GRANULOCYTES NFR BLD AUTO: 3.2 % (ref 0–0.5)
LYMPHOCYTES # BLD AUTO: 3.41 K/UL (ref 1–4.8)
MCH RBC QN AUTO: 30.6 PG (ref 27–31)
MCHC RBC AUTO-ENTMCNC: 32 G/DL (ref 32–36)
MCV RBC AUTO: 96 FL (ref 82–98)
NT-PROBNP SERPL-MCNC: ABNORMAL PG/ML
NUCLEATED RBC (/100WBC) (OHS): 0 /100 WBC
PHOSPHATE SERPL-MCNC: 2.1 MG/DL (ref 2.7–4.5)
PLATELET # BLD AUTO: 397 K/UL (ref 150–450)
PMV BLD AUTO: 9.8 FL (ref 9.2–12.9)
POCT GLUCOSE: 204 MG/DL (ref 70–110)
POCT GLUCOSE: 258 MG/DL (ref 70–110)
POCT GLUCOSE: 276 MG/DL (ref 70–110)
POTASSIUM SERPL-SCNC: 4 MMOL/L (ref 3.5–5.1)
PROT SERPL-MCNC: 7.2 GM/DL (ref 6–8.4)
RBC # BLD AUTO: 2.65 M/UL (ref 4–5.4)
RELATIVE EOSINOPHIL (OHS): 1.9 %
RELATIVE LYMPHOCYTE (OHS): 11.8 % (ref 18–48)
RELATIVE MONOCYTE (OHS): 9.6 % (ref 4–15)
RELATIVE NEUTROPHIL (OHS): 72.8 % (ref 38–73)
SODIUM SERPL-SCNC: 132 MMOL/L (ref 136–145)
WBC # BLD AUTO: 28.97 K/UL (ref 3.9–12.7)

## 2025-08-13 PROCEDURE — 25000003 PHARM REV CODE 250: Mod: HCNC | Performed by: HOSPITALIST

## 2025-08-13 PROCEDURE — 36415 COLL VENOUS BLD VENIPUNCTURE: CPT | Mod: HCNC | Performed by: HOSPITALIST

## 2025-08-13 PROCEDURE — 36415 COLL VENOUS BLD VENIPUNCTURE: CPT | Mod: HCNC

## 2025-08-13 PROCEDURE — 83880 ASSAY OF NATRIURETIC PEPTIDE: CPT | Mod: HCNC | Performed by: HOSPITALIST

## 2025-08-13 PROCEDURE — 97530 THERAPEUTIC ACTIVITIES: CPT | Mod: HCNC

## 2025-08-13 PROCEDURE — 25000003 PHARM REV CODE 250: Mod: HCNC

## 2025-08-13 PROCEDURE — 20600001 HC STEP DOWN PRIVATE ROOM: Mod: HCNC

## 2025-08-13 PROCEDURE — 63600175 PHARM REV CODE 636 W HCPCS: Mod: HCNC | Performed by: STUDENT IN AN ORGANIZED HEALTH CARE EDUCATION/TRAINING PROGRAM

## 2025-08-13 PROCEDURE — 94760 N-INVAS EAR/PLS OXIMETRY 1: CPT

## 2025-08-13 PROCEDURE — 85025 COMPLETE CBC W/AUTO DIFF WBC: CPT | Mod: HCNC

## 2025-08-13 PROCEDURE — 80053 COMPREHEN METABOLIC PANEL: CPT | Mod: HCNC

## 2025-08-13 PROCEDURE — 25000242 PHARM REV CODE 250 ALT 637 W/ HCPCS: Mod: HCNC | Performed by: INTERNAL MEDICINE

## 2025-08-13 PROCEDURE — 94761 N-INVAS EAR/PLS OXIMETRY MLT: CPT | Mod: HCNC

## 2025-08-13 PROCEDURE — 84100 ASSAY OF PHOSPHORUS: CPT | Mod: HCNC

## 2025-08-13 PROCEDURE — 63600175 PHARM REV CODE 636 W HCPCS: Mod: HCNC | Performed by: HOSPITALIST

## 2025-08-13 PROCEDURE — 27000207 HC ISOLATION: Mod: HCNC

## 2025-08-13 PROCEDURE — 97535 SELF CARE MNGMENT TRAINING: CPT | Mod: HCNC

## 2025-08-13 RX ORDER — SODIUM CHLORIDE 9 MG/ML
INJECTION, SOLUTION INTRAVENOUS ONCE
Status: CANCELLED | OUTPATIENT
Start: 2025-08-14

## 2025-08-13 RX ORDER — SODIUM,POTASSIUM PHOSPHATES 280-250MG
2 POWDER IN PACKET (EA) ORAL
Status: DISPENSED | OUTPATIENT
Start: 2025-08-13 | End: 2025-08-14

## 2025-08-13 RX ORDER — FUROSEMIDE 10 MG/ML
60 INJECTION INTRAMUSCULAR; INTRAVENOUS DAILY
Status: DISCONTINUED | OUTPATIENT
Start: 2025-08-13 | End: 2025-08-13

## 2025-08-13 RX ADMIN — INSULIN ASPART 3 UNITS: 100 INJECTION, SOLUTION INTRAVENOUS; SUBCUTANEOUS at 12:08

## 2025-08-13 RX ADMIN — PANTOPRAZOLE SODIUM 40 MG: 40 GRANULE, DELAYED RELEASE ORAL at 08:08

## 2025-08-13 RX ADMIN — COLLAGENASE SANTYL: 250 OINTMENT TOPICAL at 10:08

## 2025-08-13 RX ADMIN — POTASSIUM & SODIUM PHOSPHATES POWDER PACK 280-160-250 MG 2 PACKET: 280-160-250 PACK at 08:08

## 2025-08-13 RX ADMIN — HEPARIN SODIUM 5000 UNITS: 5000 INJECTION INTRAVENOUS; SUBCUTANEOUS at 08:08

## 2025-08-13 RX ADMIN — POTASSIUM & SODIUM PHOSPHATES POWDER PACK 280-160-250 MG 2 PACKET: 280-160-250 PACK at 05:08

## 2025-08-13 RX ADMIN — INSULIN GLARGINE 8 UNITS: 100 INJECTION, SOLUTION SUBCUTANEOUS at 08:08

## 2025-08-13 RX ADMIN — HEPARIN SODIUM 5000 UNITS: 5000 INJECTION INTRAVENOUS; SUBCUTANEOUS at 05:08

## 2025-08-13 RX ADMIN — MICONAZOLE NITRATE 2 % TOPICAL POWDER: at 08:08

## 2025-08-13 RX ADMIN — INSULIN ASPART 1 UNITS: 100 INJECTION, SOLUTION INTRAVENOUS; SUBCUTANEOUS at 12:08

## 2025-08-13 RX ADMIN — HEPARIN SODIUM 5000 UNITS: 5000 INJECTION INTRAVENOUS; SUBCUTANEOUS at 03:08

## 2025-08-13 RX ADMIN — LEVETIRACETAM 500 MG: 500 SOLUTION ORAL at 08:08

## 2025-08-13 RX ADMIN — POTASSIUM & SODIUM PHOSPHATES POWDER PACK 280-160-250 MG 2 PACKET: 280-160-250 PACK at 01:08

## 2025-08-13 RX ADMIN — LEVOTHYROXINE SODIUM 75 MCG: 75 TABLET ORAL at 05:08

## 2025-08-13 RX ADMIN — PSYLLIUM HUSK 1 PACKET: 3.4 POWDER ORAL at 08:08

## 2025-08-13 RX ADMIN — INSULIN ASPART 3 UNITS: 100 INJECTION, SOLUTION INTRAVENOUS; SUBCUTANEOUS at 06:08

## 2025-08-13 RX ADMIN — FOLIC ACID 2 MG: 1 TABLET ORAL at 08:08

## 2025-08-14 LAB
ABSOLUTE EOSINOPHIL (OHS): 0.39 K/UL
ABSOLUTE MONOCYTE (OHS): 3.19 K/UL (ref 0.3–1)
ABSOLUTE NEUTROPHIL COUNT (OHS): 19.17 K/UL (ref 1.8–7.7)
ALBUMIN SERPL BCP-MCNC: 1.5 G/DL (ref 3.5–5.2)
ALP SERPL-CCNC: 337 UNIT/L (ref 40–150)
ALT SERPL W/O P-5'-P-CCNC: <8 UNIT/L (ref 0–55)
ANION GAP (OHS): 14 MMOL/L (ref 8–16)
AST SERPL-CCNC: 24 UNIT/L (ref 0–50)
BASOPHILS # BLD AUTO: 0.11 K/UL
BASOPHILS NFR BLD AUTO: 0.4 %
BILIRUB SERPL-MCNC: 0.2 MG/DL (ref 0.1–1)
BUN SERPL-MCNC: 41 MG/DL (ref 8–23)
CALCIUM SERPL-MCNC: 8 MG/DL (ref 8.7–10.5)
CHLORIDE SERPL-SCNC: 97 MMOL/L (ref 95–110)
CO2 SERPL-SCNC: 22 MMOL/L (ref 23–29)
CREAT SERPL-MCNC: 1.5 MG/DL (ref 0.5–1.4)
ERYTHROCYTE [DISTWIDTH] IN BLOOD BY AUTOMATED COUNT: 17.4 % (ref 11.5–14.5)
GFR SERPLBLD CREATININE-BSD FMLA CKD-EPI: 35 ML/MIN/1.73/M2
GLUCOSE SERPL-MCNC: 164 MG/DL (ref 70–110)
HCT VFR BLD AUTO: 24.4 % (ref 37–48.5)
HGB BLD-MCNC: 7.7 GM/DL (ref 12–16)
IMM GRANULOCYTES # BLD AUTO: 0.96 K/UL (ref 0–0.04)
IMM GRANULOCYTES NFR BLD AUTO: 3.3 % (ref 0–0.5)
LYMPHOCYTES # BLD AUTO: 4.94 K/UL (ref 1–4.8)
MCH RBC QN AUTO: 30.1 PG (ref 27–31)
MCHC RBC AUTO-ENTMCNC: 31.6 G/DL (ref 32–36)
MCV RBC AUTO: 95 FL (ref 82–98)
NUCLEATED RBC (/100WBC) (OHS): 1 /100 WBC
PHOSPHATE SERPL-MCNC: 2.3 MG/DL (ref 2.7–4.5)
PLATELET # BLD AUTO: 414 K/UL (ref 150–450)
PMV BLD AUTO: 9.7 FL (ref 9.2–12.9)
POCT GLUCOSE: 130 MG/DL (ref 70–110)
POTASSIUM SERPL-SCNC: 4.7 MMOL/L (ref 3.5–5.1)
PROT SERPL-MCNC: 6.9 GM/DL (ref 6–8.4)
RBC # BLD AUTO: 2.56 M/UL (ref 4–5.4)
RELATIVE EOSINOPHIL (OHS): 1.4 %
RELATIVE LYMPHOCYTE (OHS): 17.2 % (ref 18–48)
RELATIVE MONOCYTE (OHS): 11.1 % (ref 4–15)
RELATIVE NEUTROPHIL (OHS): 66.6 % (ref 38–73)
SODIUM SERPL-SCNC: 133 MMOL/L (ref 136–145)
WBC # BLD AUTO: 28.76 K/UL (ref 3.9–12.7)

## 2025-08-14 PROCEDURE — 80053 COMPREHEN METABOLIC PANEL: CPT | Mod: HCNC

## 2025-08-14 PROCEDURE — P9047 ALBUMIN (HUMAN), 25%, 50ML: HCPCS | Mod: HCNC

## 2025-08-14 PROCEDURE — 63600175 PHARM REV CODE 636 W HCPCS: Mod: HCNC

## 2025-08-14 PROCEDURE — 27000207 HC ISOLATION: Mod: HCNC

## 2025-08-14 PROCEDURE — 25000003 PHARM REV CODE 250: Mod: HCNC | Performed by: INTERNAL MEDICINE

## 2025-08-14 PROCEDURE — 25000003 PHARM REV CODE 250: Mod: HCNC

## 2025-08-14 PROCEDURE — 25000003 PHARM REV CODE 250: Mod: HCNC | Performed by: HOSPITALIST

## 2025-08-14 PROCEDURE — 90935 HEMODIALYSIS ONE EVALUATION: CPT | Mod: HCNC,,,

## 2025-08-14 PROCEDURE — 20600001 HC STEP DOWN PRIVATE ROOM: Mod: HCNC

## 2025-08-14 PROCEDURE — 36415 COLL VENOUS BLD VENIPUNCTURE: CPT | Mod: HCNC

## 2025-08-14 PROCEDURE — 63600175 PHARM REV CODE 636 W HCPCS: Mod: HCNC | Performed by: STUDENT IN AN ORGANIZED HEALTH CARE EDUCATION/TRAINING PROGRAM

## 2025-08-14 PROCEDURE — 25000003 PHARM REV CODE 250: Mod: HCNC | Performed by: STUDENT IN AN ORGANIZED HEALTH CARE EDUCATION/TRAINING PROGRAM

## 2025-08-14 PROCEDURE — 84100 ASSAY OF PHOSPHORUS: CPT | Mod: HCNC

## 2025-08-14 PROCEDURE — 63600175 PHARM REV CODE 636 W HCPCS: Mod: TB,HCNC | Performed by: NURSE PRACTITIONER

## 2025-08-14 PROCEDURE — 85025 COMPLETE CBC W/AUTO DIFF WBC: CPT | Mod: HCNC

## 2025-08-14 PROCEDURE — 80100014 HC HEMODIALYSIS 1:1: Mod: HCNC

## 2025-08-14 RX ORDER — SODIUM,POTASSIUM PHOSPHATES 280-250MG
1 POWDER IN PACKET (EA) ORAL EVERY 6 HOURS
Status: COMPLETED | OUTPATIENT
Start: 2025-08-14 | End: 2025-08-14

## 2025-08-14 RX ORDER — ALBUMIN HUMAN 250 G/1000ML
25 SOLUTION INTRAVENOUS ONCE
Status: COMPLETED | OUTPATIENT
Start: 2025-08-14 | End: 2025-08-14

## 2025-08-14 RX ORDER — SODIUM,POTASSIUM PHOSPHATES 280-250MG
1 POWDER IN PACKET (EA) ORAL EVERY 6 HOURS
Status: DISCONTINUED | OUTPATIENT
Start: 2025-08-14 | End: 2025-08-14

## 2025-08-14 RX ADMIN — POTASSIUM & SODIUM PHOSPHATES POWDER PACK 280-160-250 MG 1 PACKET: 280-160-250 PACK at 05:08

## 2025-08-14 RX ADMIN — LEVETIRACETAM 500 MG: 500 SOLUTION ORAL at 08:08

## 2025-08-14 RX ADMIN — MIDODRINE HYDROCHLORIDE 15 MG: 5 TABLET ORAL at 09:08

## 2025-08-14 RX ADMIN — COLLAGENASE SANTYL: 250 OINTMENT TOPICAL at 04:08

## 2025-08-14 RX ADMIN — HEPARIN SODIUM 5000 UNITS: 5000 INJECTION INTRAVENOUS; SUBCUTANEOUS at 06:08

## 2025-08-14 RX ADMIN — HEPARIN SODIUM 5000 UNITS: 5000 INJECTION INTRAVENOUS; SUBCUTANEOUS at 08:08

## 2025-08-14 RX ADMIN — MICONAZOLE NITRATE 2 % TOPICAL POWDER: at 04:08

## 2025-08-14 RX ADMIN — ALBUMIN (HUMAN) 25 G: 12.5 SOLUTION INTRAVENOUS at 10:08

## 2025-08-14 RX ADMIN — HEPARIN SODIUM 4000 UNITS: 1000 INJECTION INTRAVENOUS; SUBCUTANEOUS at 01:08

## 2025-08-14 RX ADMIN — ACETAMINOPHEN 650 MG: 325 TABLET ORAL at 09:08

## 2025-08-14 RX ADMIN — INSULIN GLARGINE 8 UNITS: 100 INJECTION, SOLUTION SUBCUTANEOUS at 08:08

## 2025-08-14 RX ADMIN — EPOETIN ALFA-EPBX 4300 UNITS: 10000 INJECTION, SOLUTION INTRAVENOUS; SUBCUTANEOUS at 09:08

## 2025-08-14 RX ADMIN — MICONAZOLE NITRATE 2 % TOPICAL POWDER: at 08:08

## 2025-08-15 LAB
ABSOLUTE EOSINOPHIL (OHS): 0.3 K/UL
ABSOLUTE MONOCYTE (OHS): 2.77 K/UL (ref 0.3–1)
ABSOLUTE NEUTROPHIL COUNT (OHS): 19.69 K/UL (ref 1.8–7.7)
ALBUMIN SERPL BCP-MCNC: 2 G/DL (ref 3.5–5.2)
ALP SERPL-CCNC: 340 UNIT/L (ref 40–150)
ALT SERPL W/O P-5'-P-CCNC: <8 UNIT/L (ref 0–55)
ANION GAP (OHS): 10 MMOL/L (ref 8–16)
AST SERPL-CCNC: 23 UNIT/L (ref 0–50)
BASOPHILS # BLD AUTO: 0.1 K/UL
BASOPHILS NFR BLD AUTO: 0.4 %
BILIRUB SERPL-MCNC: 0.2 MG/DL (ref 0.1–1)
BUN SERPL-MCNC: 24 MG/DL (ref 8–23)
CALCIUM SERPL-MCNC: 8.3 MG/DL (ref 8.7–10.5)
CHLORIDE SERPL-SCNC: 103 MMOL/L (ref 95–110)
CO2 SERPL-SCNC: 21 MMOL/L (ref 23–29)
CREAT SERPL-MCNC: 1.1 MG/DL (ref 0.5–1.4)
ERYTHROCYTE [DISTWIDTH] IN BLOOD BY AUTOMATED COUNT: 17.7 % (ref 11.5–14.5)
GFR SERPLBLD CREATININE-BSD FMLA CKD-EPI: 51 ML/MIN/1.73/M2
GLUCOSE SERPL-MCNC: 196 MG/DL (ref 70–110)
HCT VFR BLD AUTO: 24.5 % (ref 37–48.5)
HGB BLD-MCNC: 7.8 GM/DL (ref 12–16)
IMM GRANULOCYTES # BLD AUTO: 0.57 K/UL (ref 0–0.04)
IMM GRANULOCYTES NFR BLD AUTO: 2.1 % (ref 0–0.5)
LYMPHOCYTES # BLD AUTO: 4.16 K/UL (ref 1–4.8)
MCH RBC QN AUTO: 30.1 PG (ref 27–31)
MCHC RBC AUTO-ENTMCNC: 31.8 G/DL (ref 32–36)
MCV RBC AUTO: 95 FL (ref 82–98)
NUCLEATED RBC (/100WBC) (OHS): 2 /100 WBC
PHOSPHATE SERPL-MCNC: 2.4 MG/DL (ref 2.7–4.5)
PLATELET # BLD AUTO: 418 K/UL (ref 150–450)
PMV BLD AUTO: 9.7 FL (ref 9.2–12.9)
POCT GLUCOSE: 140 MG/DL (ref 70–110)
POCT GLUCOSE: 240 MG/DL (ref 70–110)
POTASSIUM SERPL-SCNC: 3.8 MMOL/L (ref 3.5–5.1)
PROCALCITONIN SERPL-MCNC: 1.5 NG/ML
PROT SERPL-MCNC: 7.3 GM/DL (ref 6–8.4)
RBC # BLD AUTO: 2.59 M/UL (ref 4–5.4)
RELATIVE EOSINOPHIL (OHS): 1.1 %
RELATIVE LYMPHOCYTE (OHS): 15.1 % (ref 18–48)
RELATIVE MONOCYTE (OHS): 10 % (ref 4–15)
RELATIVE NEUTROPHIL (OHS): 71.3 % (ref 38–73)
SODIUM SERPL-SCNC: 134 MMOL/L (ref 136–145)
WBC # BLD AUTO: 27.59 K/UL (ref 3.9–12.7)

## 2025-08-15 PROCEDURE — 25000003 PHARM REV CODE 250: Mod: HCNC | Performed by: INTERNAL MEDICINE

## 2025-08-15 PROCEDURE — 84145 PROCALCITONIN (PCT): CPT | Mod: HCNC | Performed by: HOSPITALIST

## 2025-08-15 PROCEDURE — 85025 COMPLETE CBC W/AUTO DIFF WBC: CPT | Mod: HCNC

## 2025-08-15 PROCEDURE — 87040 BLOOD CULTURE FOR BACTERIA: CPT | Mod: HCNC | Performed by: HOSPITALIST

## 2025-08-15 PROCEDURE — 84100 ASSAY OF PHOSPHORUS: CPT | Mod: HCNC

## 2025-08-15 PROCEDURE — 25000003 PHARM REV CODE 250: Mod: HCNC

## 2025-08-15 PROCEDURE — 87077 CULTURE AEROBIC IDENTIFY: CPT | Mod: HCNC | Performed by: HOSPITALIST

## 2025-08-15 PROCEDURE — 87075 CULTR BACTERIA EXCEPT BLOOD: CPT | Mod: HCNC | Performed by: HOSPITALIST

## 2025-08-15 PROCEDURE — 27000207 HC ISOLATION: Mod: HCNC

## 2025-08-15 PROCEDURE — 20600001 HC STEP DOWN PRIVATE ROOM: Mod: HCNC

## 2025-08-15 PROCEDURE — 25000003 PHARM REV CODE 250: Mod: HCNC | Performed by: STUDENT IN AN ORGANIZED HEALTH CARE EDUCATION/TRAINING PROGRAM

## 2025-08-15 PROCEDURE — 25000242 PHARM REV CODE 250 ALT 637 W/ HCPCS: Mod: HCNC | Performed by: INTERNAL MEDICINE

## 2025-08-15 PROCEDURE — 63600175 PHARM REV CODE 636 W HCPCS: Mod: HCNC | Performed by: STUDENT IN AN ORGANIZED HEALTH CARE EDUCATION/TRAINING PROGRAM

## 2025-08-15 PROCEDURE — 36415 COLL VENOUS BLD VENIPUNCTURE: CPT | Mod: HCNC | Performed by: HOSPITALIST

## 2025-08-15 PROCEDURE — 25000003 PHARM REV CODE 250: Mod: HCNC | Performed by: HOSPITALIST

## 2025-08-15 PROCEDURE — 99232 SBSQ HOSP IP/OBS MODERATE 35: CPT | Mod: HCNC,,,

## 2025-08-15 PROCEDURE — 80053 COMPREHEN METABOLIC PANEL: CPT | Mod: HCNC

## 2025-08-15 PROCEDURE — 36415 COLL VENOUS BLD VENIPUNCTURE: CPT | Mod: HCNC

## 2025-08-15 RX ORDER — SODIUM CHLORIDE 9 MG/ML
INJECTION, SOLUTION INTRAVENOUS ONCE
Status: COMPLETED | OUTPATIENT
Start: 2025-08-15 | End: 2025-08-15

## 2025-08-15 RX ADMIN — PANTOPRAZOLE SODIUM 40 MG: 40 GRANULE, DELAYED RELEASE ORAL at 08:08

## 2025-08-15 RX ADMIN — ACETAMINOPHEN 650 MG: 325 TABLET ORAL at 08:08

## 2025-08-15 RX ADMIN — MIDODRINE HYDROCHLORIDE 15 MG: 5 TABLET ORAL at 05:08

## 2025-08-15 RX ADMIN — HEPARIN SODIUM 5000 UNITS: 5000 INJECTION INTRAVENOUS; SUBCUTANEOUS at 06:08

## 2025-08-15 RX ADMIN — LEVETIRACETAM 500 MG: 500 SOLUTION ORAL at 08:08

## 2025-08-15 RX ADMIN — LEVOTHYROXINE SODIUM 75 MCG: 75 TABLET ORAL at 06:08

## 2025-08-15 RX ADMIN — LEVETIRACETAM 500 MG: 500 SOLUTION ORAL at 09:08

## 2025-08-15 RX ADMIN — INSULIN ASPART 3 UNITS: 100 INJECTION, SOLUTION INTRAVENOUS; SUBCUTANEOUS at 12:08

## 2025-08-15 RX ADMIN — INSULIN GLARGINE 8 UNITS: 100 INJECTION, SOLUTION SUBCUTANEOUS at 08:08

## 2025-08-15 RX ADMIN — HEPARIN SODIUM 4000 UNITS: 1000 INJECTION INTRAVENOUS; SUBCUTANEOUS at 06:08

## 2025-08-15 RX ADMIN — HEPARIN SODIUM 5000 UNITS: 5000 INJECTION INTRAVENOUS; SUBCUTANEOUS at 09:08

## 2025-08-15 RX ADMIN — PSYLLIUM HUSK 1 PACKET: 3.4 POWDER ORAL at 08:08

## 2025-08-15 RX ADMIN — MICONAZOLE NITRATE 2 % TOPICAL POWDER: at 08:08

## 2025-08-15 RX ADMIN — SODIUM CHLORIDE: 9 INJECTION, SOLUTION INTRAVENOUS at 04:08

## 2025-08-15 RX ADMIN — MICONAZOLE NITRATE 2 % TOPICAL POWDER: at 09:08

## 2025-08-15 RX ADMIN — ACETAMINOPHEN 650 MG: 325 TABLET ORAL at 03:08

## 2025-08-15 RX ADMIN — FOLIC ACID 2 MG: 1 TABLET ORAL at 08:08

## 2025-08-15 RX ADMIN — COLLAGENASE SANTYL: 250 OINTMENT TOPICAL at 08:08

## 2025-08-16 ENCOUNTER — OFFICE VISIT (OUTPATIENT)
Dept: DIALYSIS | Facility: HOSPITAL | Age: 80
End: 2025-08-16
Attending: EMERGENCY MEDICINE
Payer: MEDICARE

## 2025-08-16 LAB
ABSOLUTE EOSINOPHIL (OHS): 0.27 K/UL
ABSOLUTE MONOCYTE (OHS): 2.86 K/UL (ref 0.3–1)
ABSOLUTE NEUTROPHIL COUNT (OHS): 24.67 K/UL (ref 1.8–7.7)
ALBUMIN SERPL BCP-MCNC: 1.7 G/DL (ref 3.5–5.2)
ALP SERPL-CCNC: 313 UNIT/L (ref 40–150)
ALT SERPL W/O P-5'-P-CCNC: <8 UNIT/L (ref 0–55)
ANION GAP (OHS): 9 MMOL/L (ref 8–16)
AST SERPL-CCNC: 23 UNIT/L (ref 0–50)
BASOPHILS # BLD AUTO: 0.08 K/UL
BASOPHILS NFR BLD AUTO: 0.3 %
BILIRUB SERPL-MCNC: 0.2 MG/DL (ref 0.1–1)
BUN SERPL-MCNC: 28 MG/DL (ref 8–23)
BUN SERPL-MCNC: 28 MG/DL (ref 8–23)
BUN SERPL-MCNC: 9 MG/DL (ref 8–23)
CALCIUM SERPL-MCNC: 7.9 MG/DL (ref 8.7–10.5)
CHLORIDE SERPL-SCNC: 103 MMOL/L (ref 95–110)
CO2 SERPL-SCNC: 23 MMOL/L (ref 23–29)
CREAT SERPL-MCNC: 1.2 MG/DL (ref 0.5–1.4)
ERYTHROCYTE [DISTWIDTH] IN BLOOD BY AUTOMATED COUNT: 17.8 % (ref 11.5–14.5)
GFR SERPLBLD CREATININE-BSD FMLA CKD-EPI: 46 ML/MIN/1.73/M2
GLUCOSE SERPL-MCNC: 262 MG/DL (ref 70–110)
HCT VFR BLD AUTO: 23.1 % (ref 37–48.5)
HGB BLD-MCNC: 7.4 GM/DL (ref 12–16)
IMM GRANULOCYTES # BLD AUTO: 0.52 K/UL (ref 0–0.04)
IMM GRANULOCYTES NFR BLD AUTO: 1.6 % (ref 0–0.5)
LYMPHOCYTES # BLD AUTO: 3.5 K/UL (ref 1–4.8)
MCH RBC QN AUTO: 30.5 PG (ref 27–31)
MCHC RBC AUTO-ENTMCNC: 32 G/DL (ref 32–36)
MCV RBC AUTO: 95 FL (ref 82–98)
NUCLEATED RBC (/100WBC) (OHS): 1 /100 WBC
PHOSPHATE SERPL-MCNC: 2 MG/DL (ref 2.7–4.5)
PLATELET # BLD AUTO: 402 K/UL (ref 150–450)
PMV BLD AUTO: 9.9 FL (ref 9.2–12.9)
POCT GLUCOSE: 168 MG/DL (ref 70–110)
POCT GLUCOSE: 229 MG/DL (ref 70–110)
POCT GLUCOSE: 237 MG/DL (ref 70–110)
POCT GLUCOSE: 251 MG/DL (ref 70–110)
POTASSIUM SERPL-SCNC: 3.9 MMOL/L (ref 3.5–5.1)
PROT SERPL-MCNC: 6.8 GM/DL (ref 6–8.4)
RBC # BLD AUTO: 2.43 M/UL (ref 4–5.4)
RELATIVE EOSINOPHIL (OHS): 0.8 %
RELATIVE LYMPHOCYTE (OHS): 11 % (ref 18–48)
RELATIVE MONOCYTE (OHS): 9 % (ref 4–15)
RELATIVE NEUTROPHIL (OHS): 77.3 % (ref 38–73)
SODIUM SERPL-SCNC: 135 MMOL/L (ref 136–145)
WBC # BLD AUTO: 31.9 K/UL (ref 3.9–12.7)

## 2025-08-16 PROCEDURE — 63600175 PHARM REV CODE 636 W HCPCS: Mod: TB,HCNC | Performed by: NURSE PRACTITIONER

## 2025-08-16 PROCEDURE — 80053 COMPREHEN METABOLIC PANEL: CPT | Mod: HCNC

## 2025-08-16 PROCEDURE — 36415 COLL VENOUS BLD VENIPUNCTURE: CPT | Mod: HCNC

## 2025-08-16 PROCEDURE — 90935 HEMODIALYSIS ONE EVALUATION: CPT | Mod: HCNC,,,

## 2025-08-16 PROCEDURE — 63600175 PHARM REV CODE 636 W HCPCS: Mod: HCNC | Performed by: STUDENT IN AN ORGANIZED HEALTH CARE EDUCATION/TRAINING PROGRAM

## 2025-08-16 PROCEDURE — 80100014 HC HEMODIALYSIS 1:1: Mod: HCNC

## 2025-08-16 PROCEDURE — 84520 ASSAY OF UREA NITROGEN: CPT | Mod: HCNC | Performed by: HOSPITALIST

## 2025-08-16 PROCEDURE — 85025 COMPLETE CBC W/AUTO DIFF WBC: CPT | Mod: HCNC

## 2025-08-16 PROCEDURE — 63600175 PHARM REV CODE 636 W HCPCS: Mod: HCNC | Performed by: HOSPITALIST

## 2025-08-16 PROCEDURE — 25000003 PHARM REV CODE 250: Mod: HCNC | Performed by: HOSPITALIST

## 2025-08-16 PROCEDURE — 84100 ASSAY OF PHOSPHORUS: CPT | Mod: HCNC

## 2025-08-16 PROCEDURE — 25000003 PHARM REV CODE 250: Mod: HCNC

## 2025-08-16 PROCEDURE — 27000207 HC ISOLATION: Mod: HCNC

## 2025-08-16 PROCEDURE — 25500020 PHARM REV CODE 255: Mod: HCNC | Performed by: HOSPITALIST

## 2025-08-16 PROCEDURE — 25000003 PHARM REV CODE 250: Mod: HCNC | Performed by: STUDENT IN AN ORGANIZED HEALTH CARE EDUCATION/TRAINING PROGRAM

## 2025-08-16 PROCEDURE — 84520 ASSAY OF UREA NITROGEN: CPT | Mod: HCNC

## 2025-08-16 PROCEDURE — 20600001 HC STEP DOWN PRIVATE ROOM: Mod: HCNC

## 2025-08-16 PROCEDURE — 25000242 PHARM REV CODE 250 ALT 637 W/ HCPCS: Mod: HCNC | Performed by: INTERNAL MEDICINE

## 2025-08-16 RX ORDER — SODIUM,POTASSIUM PHOSPHATES 280-250MG
1 POWDER IN PACKET (EA) ORAL EVERY 6 HOURS
Status: DISCONTINUED | OUTPATIENT
Start: 2025-08-16 | End: 2025-08-16

## 2025-08-16 RX ORDER — SODIUM,POTASSIUM PHOSPHATES 280-250MG
1 POWDER IN PACKET (EA) ORAL EVERY 6 HOURS
Status: COMPLETED | OUTPATIENT
Start: 2025-08-16 | End: 2025-08-16

## 2025-08-16 RX ORDER — SODIUM CHLORIDE 9 MG/ML
INJECTION, SOLUTION INTRAVENOUS ONCE
Status: CANCELLED | OUTPATIENT
Start: 2025-08-16 | End: 2025-08-16

## 2025-08-16 RX ADMIN — ACETAMINOPHEN 650 MG: 325 TABLET ORAL at 12:08

## 2025-08-16 RX ADMIN — INSULIN GLARGINE 8 UNITS: 100 INJECTION, SOLUTION SUBCUTANEOUS at 06:08

## 2025-08-16 RX ADMIN — LEVOTHYROXINE SODIUM 75 MCG: 75 TABLET ORAL at 06:08

## 2025-08-16 RX ADMIN — HEPARIN SODIUM 5000 UNITS: 5000 INJECTION INTRAVENOUS; SUBCUTANEOUS at 06:08

## 2025-08-16 RX ADMIN — PSYLLIUM HUSK 1 PACKET: 3.4 POWDER ORAL at 02:08

## 2025-08-16 RX ADMIN — INSULIN ASPART 1 UNITS: 100 INJECTION, SOLUTION INTRAVENOUS; SUBCUTANEOUS at 10:08

## 2025-08-16 RX ADMIN — HEPARIN SODIUM 5000 UNITS: 5000 INJECTION INTRAVENOUS; SUBCUTANEOUS at 09:08

## 2025-08-16 RX ADMIN — PANTOPRAZOLE SODIUM 40 MG: 40 GRANULE, DELAYED RELEASE ORAL at 02:08

## 2025-08-16 RX ADMIN — FOLIC ACID 2 MG: 1 TABLET ORAL at 02:08

## 2025-08-16 RX ADMIN — POTASSIUM & SODIUM PHOSPHATES POWDER PACK 280-160-250 MG 1 PACKET: 280-160-250 PACK at 12:08

## 2025-08-16 RX ADMIN — IOHEXOL 75 ML: 350 INJECTION, SOLUTION INTRAVENOUS at 03:08

## 2025-08-16 RX ADMIN — EPOETIN ALFA-EPBX 4300 UNITS: 10000 INJECTION, SOLUTION INTRAVENOUS; SUBCUTANEOUS at 12:08

## 2025-08-16 RX ADMIN — LEVETIRACETAM 500 MG: 500 SOLUTION ORAL at 02:08

## 2025-08-16 RX ADMIN — MICONAZOLE NITRATE 2 % TOPICAL POWDER: at 08:08

## 2025-08-16 RX ADMIN — LEVETIRACETAM 500 MG: 500 SOLUTION ORAL at 08:08

## 2025-08-16 RX ADMIN — PIPERACILLIN SODIUM AND TAZOBACTAM SODIUM 4.5 G: 4; .5 INJECTION, POWDER, LYOPHILIZED, FOR SOLUTION INTRAVENOUS at 04:08

## 2025-08-16 RX ADMIN — HEPARIN SODIUM 4000 UNITS: 1000 INJECTION INTRAVENOUS; SUBCUTANEOUS at 12:08

## 2025-08-16 RX ADMIN — POTASSIUM & SODIUM PHOSPHATES POWDER PACK 280-160-250 MG 1 PACKET: 280-160-250 PACK at 06:08

## 2025-08-17 PROBLEM — L89.94 DECUBITUS ULCER, STAGE 4 WITH INFECTION: Status: ACTIVE | Noted: 2025-08-17

## 2025-08-17 PROBLEM — L08.9 DECUBITUS ULCER, STAGE 4 WITH INFECTION: Status: ACTIVE | Noted: 2025-08-17

## 2025-08-17 PROBLEM — L89.95 PRESSURE INJURY, UNSTAGEABLE: Status: ACTIVE | Noted: 2025-08-17

## 2025-08-17 LAB
ABSOLUTE EOSINOPHIL (OHS): 0.34 K/UL
ABSOLUTE MONOCYTE (OHS): 2.71 K/UL (ref 0.3–1)
ABSOLUTE NEUTROPHIL COUNT (OHS): 24.61 K/UL (ref 1.8–7.7)
ALBUMIN SERPL BCP-MCNC: 1.5 G/DL (ref 3.5–5.2)
ALP SERPL-CCNC: 280 UNIT/L (ref 40–150)
ALT SERPL W/O P-5'-P-CCNC: <8 UNIT/L (ref 0–55)
ANION GAP (OHS): 7 MMOL/L (ref 8–16)
AST SERPL-CCNC: 23 UNIT/L (ref 0–50)
BASOPHILS # BLD AUTO: 0.12 K/UL
BASOPHILS NFR BLD AUTO: 0.4 %
BILIRUB SERPL-MCNC: 0.2 MG/DL (ref 0.1–1)
BUN SERPL-MCNC: 22 MG/DL (ref 8–23)
CALCIUM SERPL-MCNC: 7.7 MG/DL (ref 8.7–10.5)
CHLORIDE SERPL-SCNC: 105 MMOL/L (ref 95–110)
CO2 SERPL-SCNC: 25 MMOL/L (ref 23–29)
CREAT SERPL-MCNC: 1.1 MG/DL (ref 0.5–1.4)
CRP SERPL-MCNC: 268 MG/L
ERYTHROCYTE [DISTWIDTH] IN BLOOD BY AUTOMATED COUNT: 17.9 % (ref 11.5–14.5)
ERYTHROCYTE [SEDIMENTATION RATE] IN BLOOD BY PHOTOMETRIC METHOD: 98 MM/HR
GFR SERPLBLD CREATININE-BSD FMLA CKD-EPI: 51 ML/MIN/1.73/M2
GLUCOSE SERPL-MCNC: 263 MG/DL (ref 70–110)
HCT VFR BLD AUTO: 22.5 % (ref 37–48.5)
HGB BLD-MCNC: 7.1 GM/DL (ref 12–16)
IMM GRANULOCYTES # BLD AUTO: 0.45 K/UL (ref 0–0.04)
IMM GRANULOCYTES NFR BLD AUTO: 1.4 % (ref 0–0.5)
LYMPHOCYTES # BLD AUTO: 3.46 K/UL (ref 1–4.8)
MCH RBC QN AUTO: 30.3 PG (ref 27–31)
MCHC RBC AUTO-ENTMCNC: 31.6 G/DL (ref 32–36)
MCV RBC AUTO: 96 FL (ref 82–98)
NUCLEATED RBC (/100WBC) (OHS): 1 /100 WBC
OHS QRS DURATION: 72 MS
OHS QTC CALCULATION: 424 MS
PHOSPHATE SERPL-MCNC: 1.6 MG/DL (ref 2.7–4.5)
PLATELET # BLD AUTO: 366 K/UL (ref 150–450)
PMV BLD AUTO: 9.7 FL (ref 9.2–12.9)
POCT GLUCOSE: 262 MG/DL (ref 70–110)
POCT GLUCOSE: 273 MG/DL (ref 70–110)
POCT GLUCOSE: 328 MG/DL (ref 70–110)
POCT GLUCOSE: 342 MG/DL (ref 70–110)
POCT GLUCOSE: 379 MG/DL (ref 70–110)
POTASSIUM SERPL-SCNC: 4.2 MMOL/L (ref 3.5–5.1)
PROT SERPL-MCNC: 6.8 GM/DL (ref 6–8.4)
RBC # BLD AUTO: 2.34 M/UL (ref 4–5.4)
RELATIVE EOSINOPHIL (OHS): 1.1 %
RELATIVE LYMPHOCYTE (OHS): 10.9 % (ref 18–48)
RELATIVE MONOCYTE (OHS): 8.6 % (ref 4–15)
RELATIVE NEUTROPHIL (OHS): 77.6 % (ref 38–73)
SODIUM SERPL-SCNC: 137 MMOL/L (ref 136–145)
WBC # BLD AUTO: 31.69 K/UL (ref 3.9–12.7)

## 2025-08-17 PROCEDURE — 25000003 PHARM REV CODE 250: Mod: HCNC

## 2025-08-17 PROCEDURE — 85652 RBC SED RATE AUTOMATED: CPT | Mod: HCNC | Performed by: HOSPITALIST

## 2025-08-17 PROCEDURE — 25000242 PHARM REV CODE 250 ALT 637 W/ HCPCS: Mod: HCNC | Performed by: INTERNAL MEDICINE

## 2025-08-17 PROCEDURE — 63600175 PHARM REV CODE 636 W HCPCS: Mod: HCNC | Performed by: HOSPITALIST

## 2025-08-17 PROCEDURE — 63600175 PHARM REV CODE 636 W HCPCS: Mod: HCNC | Performed by: STUDENT IN AN ORGANIZED HEALTH CARE EDUCATION/TRAINING PROGRAM

## 2025-08-17 PROCEDURE — 84100 ASSAY OF PHOSPHORUS: CPT | Mod: HCNC

## 2025-08-17 PROCEDURE — 25000003 PHARM REV CODE 250: Mod: HCNC | Performed by: HOSPITALIST

## 2025-08-17 PROCEDURE — 80053 COMPREHEN METABOLIC PANEL: CPT | Mod: HCNC

## 2025-08-17 PROCEDURE — 27000207 HC ISOLATION: Mod: HCNC

## 2025-08-17 PROCEDURE — 20600001 HC STEP DOWN PRIVATE ROOM: Mod: HCNC

## 2025-08-17 PROCEDURE — 25500020 PHARM REV CODE 255: Mod: HCNC | Performed by: HOSPITALIST

## 2025-08-17 PROCEDURE — 93010 ELECTROCARDIOGRAM REPORT: CPT | Mod: HCNC,,, | Performed by: INTERNAL MEDICINE

## 2025-08-17 PROCEDURE — 86140 C-REACTIVE PROTEIN: CPT | Mod: HCNC | Performed by: HOSPITALIST

## 2025-08-17 PROCEDURE — 36415 COLL VENOUS BLD VENIPUNCTURE: CPT | Mod: HCNC

## 2025-08-17 PROCEDURE — 85025 COMPLETE CBC W/AUTO DIFF WBC: CPT | Mod: HCNC

## 2025-08-17 PROCEDURE — 93005 ELECTROCARDIOGRAM TRACING: CPT | Mod: HCNC

## 2025-08-17 RX ORDER — INSULIN GLARGINE 100 [IU]/ML
12 INJECTION, SOLUTION SUBCUTANEOUS DAILY
Status: DISCONTINUED | OUTPATIENT
Start: 2025-08-18 | End: 2025-08-26 | Stop reason: HOSPADM

## 2025-08-17 RX ADMIN — LEVETIRACETAM 500 MG: 500 SOLUTION ORAL at 09:08

## 2025-08-17 RX ADMIN — MICONAZOLE NITRATE 2 % TOPICAL POWDER: at 09:08

## 2025-08-17 RX ADMIN — COLLAGENASE SANTYL: 250 OINTMENT TOPICAL at 09:08

## 2025-08-17 RX ADMIN — HEPARIN SODIUM 5000 UNITS: 5000 INJECTION INTRAVENOUS; SUBCUTANEOUS at 09:08

## 2025-08-17 RX ADMIN — IOHEXOL 100 ML: 350 INJECTION, SOLUTION INTRAVENOUS at 11:08

## 2025-08-17 RX ADMIN — PANTOPRAZOLE SODIUM 40 MG: 40 GRANULE, DELAYED RELEASE ORAL at 09:08

## 2025-08-17 RX ADMIN — INSULIN ASPART 4 UNITS: 100 INJECTION, SOLUTION INTRAVENOUS; SUBCUTANEOUS at 12:08

## 2025-08-17 RX ADMIN — INSULIN ASPART 2 UNITS: 100 INJECTION, SOLUTION INTRAVENOUS; SUBCUTANEOUS at 10:08

## 2025-08-17 RX ADMIN — FOLIC ACID 2 MG: 1 TABLET ORAL at 09:08

## 2025-08-17 RX ADMIN — LEVOTHYROXINE SODIUM 75 MCG: 75 TABLET ORAL at 06:08

## 2025-08-17 RX ADMIN — PIPERACILLIN SODIUM AND TAZOBACTAM SODIUM 4.5 G: 4; .5 INJECTION, POWDER, LYOPHILIZED, FOR SOLUTION INTRAVENOUS at 12:08

## 2025-08-17 RX ADMIN — INSULIN GLARGINE 8 UNITS: 100 INJECTION, SOLUTION SUBCUTANEOUS at 09:08

## 2025-08-17 RX ADMIN — INSULIN ASPART 5 UNITS: 100 INJECTION, SOLUTION INTRAVENOUS; SUBCUTANEOUS at 05:08

## 2025-08-17 RX ADMIN — HEPARIN SODIUM 5000 UNITS: 5000 INJECTION INTRAVENOUS; SUBCUTANEOUS at 02:08

## 2025-08-17 RX ADMIN — HEPARIN SODIUM 5000 UNITS: 5000 INJECTION INTRAVENOUS; SUBCUTANEOUS at 06:08

## 2025-08-17 RX ADMIN — PIPERACILLIN SODIUM AND TAZOBACTAM SODIUM 4.5 G: 4; .5 INJECTION, POWDER, LYOPHILIZED, FOR SOLUTION INTRAVENOUS at 11:08

## 2025-08-17 RX ADMIN — VANCOMYCIN HYDROCHLORIDE 1500 MG: 1.5 INJECTION, POWDER, LYOPHILIZED, FOR SOLUTION INTRAVENOUS at 11:08

## 2025-08-17 RX ADMIN — PSYLLIUM HUSK 1 PACKET: 3.4 POWDER ORAL at 09:08

## 2025-08-18 ENCOUNTER — ANESTHESIA (OUTPATIENT)
Dept: SURGERY | Facility: HOSPITAL | Age: 80
End: 2025-08-18
Payer: MEDICARE

## 2025-08-18 ENCOUNTER — ANESTHESIA EVENT (OUTPATIENT)
Dept: SURGERY | Facility: HOSPITAL | Age: 80
End: 2025-08-18
Payer: MEDICARE

## 2025-08-18 PROBLEM — L97.129: Status: ACTIVE | Noted: 2025-08-18

## 2025-08-18 LAB
ABSOLUTE EOSINOPHIL (OHS): 0.4 K/UL
ABSOLUTE MONOCYTE (OHS): 2.32 K/UL (ref 0.3–1)
ABSOLUTE NEUTROPHIL COUNT (OHS): 26.32 K/UL (ref 1.8–7.7)
ALBUMIN SERPL BCP-MCNC: 1.6 G/DL (ref 3.5–5.2)
ALP SERPL-CCNC: 242 UNIT/L (ref 40–150)
ALT SERPL W/O P-5'-P-CCNC: <8 UNIT/L (ref 0–55)
ANION GAP (OHS): 7 MMOL/L (ref 8–16)
AST SERPL-CCNC: 16 UNIT/L (ref 0–50)
BACTERIA SPEC AEROBE CULT: ABNORMAL
BACTERIA SPEC AEROBE CULT: ABNORMAL
BASOPHILS # BLD AUTO: 0.12 K/UL
BASOPHILS NFR BLD AUTO: 0.4 %
BILIRUB SERPL-MCNC: 0.3 MG/DL (ref 0.1–1)
BUN SERPL-MCNC: 33 MG/DL (ref 8–23)
CALCIUM SERPL-MCNC: 8.3 MG/DL (ref 8.7–10.5)
CHLORIDE SERPL-SCNC: 104 MMOL/L (ref 95–110)
CO2 SERPL-SCNC: 22 MMOL/L (ref 23–29)
CREAT SERPL-MCNC: 1.5 MG/DL (ref 0.5–1.4)
ERYTHROCYTE [DISTWIDTH] IN BLOOD BY AUTOMATED COUNT: 17.6 % (ref 11.5–14.5)
GFR SERPLBLD CREATININE-BSD FMLA CKD-EPI: 35 ML/MIN/1.73/M2
GLUCOSE SERPL-MCNC: 238 MG/DL (ref 70–110)
HCT VFR BLD AUTO: 23.2 % (ref 37–48.5)
HGB BLD-MCNC: 7.4 GM/DL (ref 12–16)
IMM GRANULOCYTES # BLD AUTO: 0.48 K/UL (ref 0–0.04)
IMM GRANULOCYTES NFR BLD AUTO: 1.5 % (ref 0–0.5)
LYMPHOCYTES # BLD AUTO: 3.16 K/UL (ref 1–4.8)
MCH RBC QN AUTO: 30.2 PG (ref 27–31)
MCHC RBC AUTO-ENTMCNC: 31.9 G/DL (ref 32–36)
MCV RBC AUTO: 95 FL (ref 82–98)
NUCLEATED RBC (/100WBC) (OHS): 0 /100 WBC
OHS QRS DURATION: 90 MS
OHS QTC CALCULATION: 439 MS
PHOSPHATE SERPL-MCNC: 2.2 MG/DL (ref 2.7–4.5)
PLATELET # BLD AUTO: 391 K/UL (ref 150–450)
PMV BLD AUTO: 9.4 FL (ref 9.2–12.9)
POCT GLUCOSE: 168 MG/DL (ref 70–110)
POCT GLUCOSE: 204 MG/DL (ref 70–110)
POCT GLUCOSE: 236 MG/DL (ref 70–110)
POCT GLUCOSE: 269 MG/DL (ref 70–110)
POCT GLUCOSE: 324 MG/DL (ref 70–110)
POTASSIUM SERPL-SCNC: 4.1 MMOL/L (ref 3.5–5.1)
PROT SERPL-MCNC: 6.9 GM/DL (ref 6–8.4)
RBC # BLD AUTO: 2.45 M/UL (ref 4–5.4)
RELATIVE EOSINOPHIL (OHS): 1.2 %
RELATIVE LYMPHOCYTE (OHS): 9.6 % (ref 18–48)
RELATIVE MONOCYTE (OHS): 7.1 % (ref 4–15)
RELATIVE NEUTROPHIL (OHS): 80.2 % (ref 38–73)
SODIUM SERPL-SCNC: 133 MMOL/L (ref 136–145)
VANCOMYCIN SERPL-MCNC: 16.4 UG/ML
WBC # BLD AUTO: 32.8 K/UL (ref 3.9–12.7)

## 2025-08-18 PROCEDURE — 71000015 HC POSTOP RECOV 1ST HR: Mod: HCNC | Performed by: SURGERY

## 2025-08-18 PROCEDURE — 25000003 PHARM REV CODE 250: Mod: HCNC | Performed by: HOSPITALIST

## 2025-08-18 PROCEDURE — 36415 COLL VENOUS BLD VENIPUNCTURE: CPT | Mod: HCNC

## 2025-08-18 PROCEDURE — 85025 COMPLETE CBC W/AUTO DIFF WBC: CPT | Mod: HCNC

## 2025-08-18 PROCEDURE — 71000033 HC RECOVERY, INTIAL HOUR: Mod: HCNC | Performed by: SURGERY

## 2025-08-18 PROCEDURE — 36000707: Mod: HCNC | Performed by: SURGERY

## 2025-08-18 PROCEDURE — 0KBP0ZZ EXCISION OF LEFT HIP MUSCLE, OPEN APPROACH: ICD-10-PCS | Performed by: SURGERY

## 2025-08-18 PROCEDURE — 0JBM0ZZ EXCISION OF LEFT UPPER LEG SUBCUTANEOUS TISSUE AND FASCIA, OPEN APPROACH: ICD-10-PCS | Performed by: SURGERY

## 2025-08-18 PROCEDURE — 80202 ASSAY OF VANCOMYCIN: CPT | Mod: HCNC | Performed by: HOSPITALIST

## 2025-08-18 PROCEDURE — 25000003 PHARM REV CODE 250: Mod: HCNC

## 2025-08-18 PROCEDURE — 11046 DBRDMT MUSC&/FSCA EA ADDL: CPT | Mod: HCNC,,, | Performed by: SURGERY

## 2025-08-18 PROCEDURE — 25000003 PHARM REV CODE 250: Mod: HCNC | Performed by: STUDENT IN AN ORGANIZED HEALTH CARE EDUCATION/TRAINING PROGRAM

## 2025-08-18 PROCEDURE — 37000009 HC ANESTHESIA EA ADD 15 MINS: Mod: HCNC | Performed by: SURGERY

## 2025-08-18 PROCEDURE — 94761 N-INVAS EAR/PLS OXIMETRY MLT: CPT | Mod: HCNC

## 2025-08-18 PROCEDURE — 99232 SBSQ HOSP IP/OBS MODERATE 35: CPT | Mod: 25,HCNC,, | Performed by: STUDENT IN AN ORGANIZED HEALTH CARE EDUCATION/TRAINING PROGRAM

## 2025-08-18 PROCEDURE — 11043 DBRDMT MUSC&/FSCA 1ST 20/<: CPT | Mod: HCNC,,, | Performed by: SURGERY

## 2025-08-18 PROCEDURE — 63600175 PHARM REV CODE 636 W HCPCS: Mod: HCNC | Performed by: STUDENT IN AN ORGANIZED HEALTH CARE EDUCATION/TRAINING PROGRAM

## 2025-08-18 PROCEDURE — 25000003 PHARM REV CODE 250: Mod: HCNC | Performed by: INTERNAL MEDICINE

## 2025-08-18 PROCEDURE — 84100 ASSAY OF PHOSPHORUS: CPT | Mod: HCNC

## 2025-08-18 PROCEDURE — 82962 GLUCOSE BLOOD TEST: CPT | Mod: HCNC | Performed by: SURGERY

## 2025-08-18 PROCEDURE — 63600175 PHARM REV CODE 636 W HCPCS: Mod: HCNC | Performed by: HOSPITALIST

## 2025-08-18 PROCEDURE — 93010 ELECTROCARDIOGRAM REPORT: CPT | Mod: HCNC,,, | Performed by: STUDENT IN AN ORGANIZED HEALTH CARE EDUCATION/TRAINING PROGRAM

## 2025-08-18 PROCEDURE — 37000008 HC ANESTHESIA 1ST 15 MINUTES: Mod: HCNC | Performed by: SURGERY

## 2025-08-18 PROCEDURE — 82040 ASSAY OF SERUM ALBUMIN: CPT | Mod: HCNC

## 2025-08-18 PROCEDURE — 36000706: Mod: HCNC | Performed by: SURGERY

## 2025-08-18 PROCEDURE — 93005 ELECTROCARDIOGRAM TRACING: CPT | Mod: HCNC

## 2025-08-18 PROCEDURE — 25000003 PHARM REV CODE 250: Mod: HCNC | Performed by: NURSE ANESTHETIST, CERTIFIED REGISTERED

## 2025-08-18 PROCEDURE — 25000242 PHARM REV CODE 250 ALT 637 W/ HCPCS: Mod: HCNC | Performed by: INTERNAL MEDICINE

## 2025-08-18 PROCEDURE — 63600175 PHARM REV CODE 636 W HCPCS: Mod: HCNC | Performed by: NURSE ANESTHETIST, CERTIFIED REGISTERED

## 2025-08-18 PROCEDURE — 27000207 HC ISOLATION: Mod: HCNC

## 2025-08-18 PROCEDURE — 20600001 HC STEP DOWN PRIVATE ROOM: Mod: HCNC

## 2025-08-18 RX ORDER — HYDROMORPHONE HYDROCHLORIDE 1 MG/ML
0.2 INJECTION, SOLUTION INTRAMUSCULAR; INTRAVENOUS; SUBCUTANEOUS EVERY 5 MIN PRN
Refills: 0 | Status: DISCONTINUED | OUTPATIENT
Start: 2025-08-18 | End: 2025-08-18 | Stop reason: HOSPADM

## 2025-08-18 RX ORDER — GLUCAGON 1 MG
1 KIT INJECTION
Status: DISCONTINUED | OUTPATIENT
Start: 2025-08-18 | End: 2025-08-18 | Stop reason: HOSPADM

## 2025-08-18 RX ORDER — FENTANYL CITRATE 50 UG/ML
INJECTION, SOLUTION INTRAMUSCULAR; INTRAVENOUS
Status: DISCONTINUED | OUTPATIENT
Start: 2025-08-18 | End: 2025-08-18

## 2025-08-18 RX ORDER — OXYCODONE HYDROCHLORIDE 5 MG/1
5 TABLET ORAL
Refills: 0 | Status: DISCONTINUED | OUTPATIENT
Start: 2025-08-18 | End: 2025-08-18 | Stop reason: HOSPADM

## 2025-08-18 RX ORDER — ONDANSETRON HYDROCHLORIDE 2 MG/ML
4 INJECTION, SOLUTION INTRAVENOUS DAILY PRN
Status: DISCONTINUED | OUTPATIENT
Start: 2025-08-18 | End: 2025-08-18 | Stop reason: HOSPADM

## 2025-08-18 RX ORDER — LIDOCAINE HYDROCHLORIDE 20 MG/ML
INJECTION INTRAVENOUS
Status: DISCONTINUED | OUTPATIENT
Start: 2025-08-18 | End: 2025-08-18

## 2025-08-18 RX ORDER — ROCURONIUM BROMIDE 10 MG/ML
INJECTION, SOLUTION INTRAVENOUS
Status: DISCONTINUED | OUTPATIENT
Start: 2025-08-18 | End: 2025-08-18

## 2025-08-18 RX ORDER — SODIUM CHLORIDE 0.9 % (FLUSH) 0.9 %
10 SYRINGE (ML) INJECTION
Status: DISCONTINUED | OUTPATIENT
Start: 2025-08-18 | End: 2025-08-18 | Stop reason: HOSPADM

## 2025-08-18 RX ORDER — DEXAMETHASONE SODIUM PHOSPHATE 4 MG/ML
INJECTION, SOLUTION INTRA-ARTICULAR; INTRALESIONAL; INTRAMUSCULAR; INTRAVENOUS; SOFT TISSUE
Status: DISCONTINUED | OUTPATIENT
Start: 2025-08-18 | End: 2025-08-18

## 2025-08-18 RX ORDER — ONDANSETRON HYDROCHLORIDE 2 MG/ML
INJECTION, SOLUTION INTRAVENOUS
Status: DISCONTINUED | OUTPATIENT
Start: 2025-08-18 | End: 2025-08-18

## 2025-08-18 RX ORDER — PHENYLEPHRINE HYDROCHLORIDE 10 MG/ML
INJECTION INTRAVENOUS
Status: DISCONTINUED | OUTPATIENT
Start: 2025-08-18 | End: 2025-08-18

## 2025-08-18 RX ORDER — PROPOFOL 10 MG/ML
VIAL (ML) INTRAVENOUS
Status: DISCONTINUED | OUTPATIENT
Start: 2025-08-18 | End: 2025-08-18

## 2025-08-18 RX ADMIN — LEVETIRACETAM 500 MG: 500 SOLUTION ORAL at 09:08

## 2025-08-18 RX ADMIN — ONDANSETRON 4 MG: 2 INJECTION INTRAMUSCULAR; INTRAVENOUS at 07:08

## 2025-08-18 RX ADMIN — MICONAZOLE NITRATE 2 % TOPICAL POWDER: at 11:08

## 2025-08-18 RX ADMIN — LIDOCAINE HYDROCHLORIDE 100 MG: 20 INJECTION INTRAVENOUS at 07:08

## 2025-08-18 RX ADMIN — SODIUM CHLORIDE: 0.9 INJECTION, SOLUTION INTRAVENOUS at 07:08

## 2025-08-18 RX ADMIN — PHENYLEPHRINE HYDROCHLORIDE 100 MCG: 10 INJECTION INTRAVENOUS at 07:08

## 2025-08-18 RX ADMIN — INSULIN GLARGINE 12 UNITS: 100 INJECTION, SOLUTION SUBCUTANEOUS at 08:08

## 2025-08-18 RX ADMIN — HEPARIN SODIUM 5000 UNITS: 5000 INJECTION INTRAVENOUS; SUBCUTANEOUS at 06:08

## 2025-08-18 RX ADMIN — FOLIC ACID 2 MG: 1 TABLET ORAL at 11:08

## 2025-08-18 RX ADMIN — MELATONIN TAB 3 MG 6 MG: 3 TAB at 11:08

## 2025-08-18 RX ADMIN — PHENYLEPHRINE HYDROCHLORIDE 200 MCG: 10 INJECTION INTRAVENOUS at 07:08

## 2025-08-18 RX ADMIN — PANTOPRAZOLE SODIUM 40 MG: 40 GRANULE, DELAYED RELEASE ORAL at 11:08

## 2025-08-18 RX ADMIN — LEVETIRACETAM 500 MG: 500 SOLUTION ORAL at 11:08

## 2025-08-18 RX ADMIN — PHENYLEPHRINE HYDROCHLORIDE 200 MCG: 10 INJECTION INTRAVENOUS at 08:08

## 2025-08-18 RX ADMIN — LEVOTHYROXINE SODIUM 75 MCG: 75 TABLET ORAL at 06:08

## 2025-08-18 RX ADMIN — SUGAMMADEX 200 MG: 100 INJECTION, SOLUTION INTRAVENOUS at 08:08

## 2025-08-18 RX ADMIN — ACETAMINOPHEN 650 MG: 325 TABLET ORAL at 11:08

## 2025-08-18 RX ADMIN — HEPARIN SODIUM 5000 UNITS: 5000 INJECTION INTRAVENOUS; SUBCUTANEOUS at 10:08

## 2025-08-18 RX ADMIN — ROCURONIUM BROMIDE 50 MG: 10 INJECTION, SOLUTION INTRAVENOUS at 07:08

## 2025-08-18 RX ADMIN — PROPOFOL 100 MG: 10 INJECTION, EMULSION INTRAVENOUS at 07:08

## 2025-08-18 RX ADMIN — DEXAMETHASONE SODIUM PHOSPHATE 4 MG: 4 INJECTION, SOLUTION INTRAMUSCULAR; INTRAVENOUS at 07:08

## 2025-08-18 RX ADMIN — INSULIN ASPART 2 UNITS: 100 INJECTION, SOLUTION INTRAVENOUS; SUBCUTANEOUS at 01:08

## 2025-08-18 RX ADMIN — INSULIN ASPART 4 UNITS: 100 INJECTION, SOLUTION INTRAVENOUS; SUBCUTANEOUS at 08:08

## 2025-08-18 RX ADMIN — COLLAGENASE SANTYL: 250 OINTMENT TOPICAL at 11:08

## 2025-08-18 RX ADMIN — ERTAPENEM 500 MG: 1 INJECTION INTRAMUSCULAR; INTRAVENOUS at 01:08

## 2025-08-18 RX ADMIN — HEPARIN SODIUM 5000 UNITS: 5000 INJECTION INTRAVENOUS; SUBCUTANEOUS at 03:08

## 2025-08-18 RX ADMIN — VANCOMYCIN HYDROCHLORIDE 500 MG: 500 INJECTION, POWDER, LYOPHILIZED, FOR SOLUTION INTRAVENOUS at 03:08

## 2025-08-18 RX ADMIN — PSYLLIUM HUSK 1 PACKET: 3.4 POWDER ORAL at 11:08

## 2025-08-18 RX ADMIN — PIPERACILLIN SODIUM AND TAZOBACTAM SODIUM 4.5 G: 4; .5 INJECTION, POWDER, LYOPHILIZED, FOR SOLUTION INTRAVENOUS at 12:08

## 2025-08-18 RX ADMIN — MICONAZOLE NITRATE 2 % TOPICAL POWDER: at 09:08

## 2025-08-18 RX ADMIN — FENTANYL CITRATE 75 MCG: 50 INJECTION, SOLUTION INTRAMUSCULAR; INTRAVENOUS at 07:08

## 2025-08-19 LAB
ABSOLUTE EOSINOPHIL (OHS): 0.04 K/UL
ABSOLUTE MONOCYTE (OHS): 0.3 K/UL (ref 0.3–1)
ABSOLUTE NEUTROPHIL COUNT (OHS): 35.07 K/UL (ref 1.8–7.7)
ALBUMIN SERPL BCP-MCNC: 1.6 G/DL (ref 3.5–5.2)
ALP SERPL-CCNC: 231 UNIT/L (ref 40–150)
ALT SERPL W/O P-5'-P-CCNC: <8 UNIT/L (ref 0–55)
ANION GAP (OHS): 8 MMOL/L (ref 8–16)
AST SERPL-CCNC: 16 UNIT/L (ref 0–50)
BASOPHILS # BLD AUTO: 0.09 K/UL
BASOPHILS NFR BLD AUTO: 0.2 %
BILIRUB SERPL-MCNC: 0.2 MG/DL (ref 0.1–1)
BUN SERPL-MCNC: 37 MG/DL (ref 8–23)
CALCIUM SERPL-MCNC: 8.4 MG/DL (ref 8.7–10.5)
CHLORIDE SERPL-SCNC: 102 MMOL/L (ref 95–110)
CO2 SERPL-SCNC: 20 MMOL/L (ref 23–29)
CREAT SERPL-MCNC: 1.7 MG/DL (ref 0.5–1.4)
ERYTHROCYTE [DISTWIDTH] IN BLOOD BY AUTOMATED COUNT: 17.7 % (ref 11.5–14.5)
GFR SERPLBLD CREATININE-BSD FMLA CKD-EPI: 30 ML/MIN/1.73/M2
GLUCOSE SERPL-MCNC: 202 MG/DL (ref 70–110)
HCT VFR BLD AUTO: 24.5 % (ref 37–48.5)
HGB BLD-MCNC: 7.6 GM/DL (ref 12–16)
IMM GRANULOCYTES # BLD AUTO: 0.59 K/UL (ref 0–0.04)
IMM GRANULOCYTES NFR BLD AUTO: 1.6 % (ref 0–0.5)
LYMPHOCYTES # BLD AUTO: 1.9 K/UL (ref 1–4.8)
MCH RBC QN AUTO: 29.9 PG (ref 27–31)
MCHC RBC AUTO-ENTMCNC: 31 G/DL (ref 32–36)
MCV RBC AUTO: 97 FL (ref 82–98)
NUCLEATED RBC (/100WBC) (OHS): 0 /100 WBC
PHOSPHATE SERPL-MCNC: 3.6 MG/DL (ref 2.7–4.5)
PLATELET # BLD AUTO: 460 K/UL (ref 150–450)
PMV BLD AUTO: 9.8 FL (ref 9.2–12.9)
POCT GLUCOSE: 171 MG/DL (ref 70–110)
POCT GLUCOSE: 183 MG/DL (ref 70–110)
POCT GLUCOSE: 183 MG/DL (ref 70–110)
POCT GLUCOSE: 212 MG/DL (ref 70–110)
POCT GLUCOSE: 286 MG/DL (ref 70–110)
POTASSIUM SERPL-SCNC: 4.8 MMOL/L (ref 3.5–5.1)
PROT SERPL-MCNC: 7 GM/DL (ref 6–8.4)
RBC # BLD AUTO: 2.54 M/UL (ref 4–5.4)
RELATIVE EOSINOPHIL (OHS): 0.1 %
RELATIVE LYMPHOCYTE (OHS): 5 % (ref 18–48)
RELATIVE MONOCYTE (OHS): 0.8 % (ref 4–15)
RELATIVE NEUTROPHIL (OHS): 92.3 % (ref 38–73)
SODIUM SERPL-SCNC: 130 MMOL/L (ref 136–145)
VANCOMYCIN SERPL-MCNC: 15.3 UG/ML
WBC # BLD AUTO: 37.99 K/UL (ref 3.9–12.7)

## 2025-08-19 PROCEDURE — 82565 ASSAY OF CREATININE: CPT | Mod: HCNC

## 2025-08-19 PROCEDURE — 80100014 HC HEMODIALYSIS 1:1: Mod: HCNC

## 2025-08-19 PROCEDURE — 63600175 PHARM REV CODE 636 W HCPCS: Mod: JZ,TB,HCNC | Performed by: NURSE PRACTITIONER

## 2025-08-19 PROCEDURE — 90935 HEMODIALYSIS ONE EVALUATION: CPT | Mod: HCNC,,, | Performed by: NURSE PRACTITIONER

## 2025-08-19 PROCEDURE — 63600175 PHARM REV CODE 636 W HCPCS: Mod: HCNC | Performed by: STUDENT IN AN ORGANIZED HEALTH CARE EDUCATION/TRAINING PROGRAM

## 2025-08-19 PROCEDURE — 25000003 PHARM REV CODE 250: Mod: HCNC | Performed by: STUDENT IN AN ORGANIZED HEALTH CARE EDUCATION/TRAINING PROGRAM

## 2025-08-19 PROCEDURE — 25000003 PHARM REV CODE 250: Mod: HCNC | Performed by: INTERNAL MEDICINE

## 2025-08-19 PROCEDURE — 25000003 PHARM REV CODE 250: Mod: HCNC | Performed by: HOSPITALIST

## 2025-08-19 PROCEDURE — 25000003 PHARM REV CODE 250: Mod: HCNC

## 2025-08-19 PROCEDURE — 99233 SBSQ HOSP IP/OBS HIGH 50: CPT | Mod: HCNC,,, | Performed by: INTERNAL MEDICINE

## 2025-08-19 PROCEDURE — 85025 COMPLETE CBC W/AUTO DIFF WBC: CPT | Mod: HCNC

## 2025-08-19 PROCEDURE — 80202 ASSAY OF VANCOMYCIN: CPT | Mod: HCNC | Performed by: HOSPITALIST

## 2025-08-19 PROCEDURE — 36415 COLL VENOUS BLD VENIPUNCTURE: CPT | Mod: HCNC

## 2025-08-19 PROCEDURE — 97110 THERAPEUTIC EXERCISES: CPT | Mod: HCNC

## 2025-08-19 PROCEDURE — 63600175 PHARM REV CODE 636 W HCPCS: Mod: HCNC | Performed by: HOSPITALIST

## 2025-08-19 PROCEDURE — 84100 ASSAY OF PHOSPHORUS: CPT | Mod: HCNC

## 2025-08-19 PROCEDURE — 27000207 HC ISOLATION: Mod: HCNC

## 2025-08-19 PROCEDURE — 20600001 HC STEP DOWN PRIVATE ROOM: Mod: HCNC

## 2025-08-19 PROCEDURE — 25000003 PHARM REV CODE 250: Mod: HCNC | Performed by: NURSE PRACTITIONER

## 2025-08-19 RX ORDER — MEROPENEM 500 MG/1
500 INJECTION, POWDER, FOR SOLUTION INTRAVENOUS
Status: DISCONTINUED | OUTPATIENT
Start: 2025-08-20 | End: 2025-08-20

## 2025-08-19 RX ORDER — SODIUM CHLORIDE 9 MG/ML
INJECTION, SOLUTION INTRAVENOUS ONCE
Status: COMPLETED | OUTPATIENT
Start: 2025-08-19 | End: 2025-08-19

## 2025-08-19 RX ADMIN — INSULIN ASPART 1 UNITS: 100 INJECTION, SOLUTION INTRAVENOUS; SUBCUTANEOUS at 09:08

## 2025-08-19 RX ADMIN — INSULIN ASPART 2 UNITS: 100 INJECTION, SOLUTION INTRAVENOUS; SUBCUTANEOUS at 05:08

## 2025-08-19 RX ADMIN — HEPARIN SODIUM 5000 UNITS: 5000 INJECTION INTRAVENOUS; SUBCUTANEOUS at 09:08

## 2025-08-19 RX ADMIN — MIDODRINE HYDROCHLORIDE 15 MG: 5 TABLET ORAL at 08:08

## 2025-08-19 RX ADMIN — HEPARIN SODIUM 5000 UNITS: 5000 INJECTION INTRAVENOUS; SUBCUTANEOUS at 06:08

## 2025-08-19 RX ADMIN — EPOETIN ALFA-EPBX 4300 UNITS: 10000 INJECTION, SOLUTION INTRAVENOUS; SUBCUTANEOUS at 09:08

## 2025-08-19 RX ADMIN — HEPARIN SODIUM 4000 UNITS: 1000 INJECTION INTRAVENOUS; SUBCUTANEOUS at 10:08

## 2025-08-19 RX ADMIN — SODIUM CHLORIDE: 9 INJECTION, SOLUTION INTRAVENOUS at 07:08

## 2025-08-19 RX ADMIN — VANCOMYCIN HYDROCHLORIDE 1000 MG: 1 INJECTION, POWDER, LYOPHILIZED, FOR SOLUTION INTRAVENOUS at 11:08

## 2025-08-19 RX ADMIN — LEVOTHYROXINE SODIUM 75 MCG: 75 TABLET ORAL at 06:08

## 2025-08-19 RX ADMIN — ERTAPENEM 500 MG: 1 INJECTION INTRAMUSCULAR; INTRAVENOUS at 02:08

## 2025-08-19 RX ADMIN — ACETAMINOPHEN 650 MG: 325 TABLET ORAL at 08:08

## 2025-08-19 RX ADMIN — HEPARIN SODIUM 5000 UNITS: 5000 INJECTION INTRAVENOUS; SUBCUTANEOUS at 02:08

## 2025-08-19 RX ADMIN — ACETAMINOPHEN 650 MG: 325 TABLET ORAL at 04:08

## 2025-08-19 RX ADMIN — MICONAZOLE NITRATE 2 % TOPICAL POWDER: at 08:08

## 2025-08-19 RX ADMIN — LEVETIRACETAM 500 MG: 500 SOLUTION ORAL at 08:08

## 2025-08-20 LAB
ABSOLUTE EOSINOPHIL (OHS): 0.01 K/UL
ABSOLUTE MONOCYTE (OHS): 1.56 K/UL (ref 0.3–1)
ABSOLUTE NEUTROPHIL COUNT (OHS): 25.7 K/UL (ref 1.8–7.7)
ALBUMIN SERPL BCP-MCNC: 1.6 G/DL (ref 3.5–5.2)
ALP SERPL-CCNC: 245 UNIT/L (ref 40–150)
ALT SERPL W/O P-5'-P-CCNC: <8 UNIT/L (ref 0–55)
ANION GAP (OHS): 10 MMOL/L (ref 8–16)
ANISOCYTOSIS BLD QL SMEAR: SLIGHT
AST SERPL-CCNC: 15 UNIT/L (ref 0–50)
BACTERIA BLD CULT: NORMAL
BACTERIA BLD CULT: NORMAL
BACTERIA SPEC ANAEROBE CULT: NORMAL
BASOPHILS # BLD AUTO: 0.06 K/UL
BASOPHILS NFR BLD AUTO: 0.2 %
BILIRUB SERPL-MCNC: 0.2 MG/DL (ref 0.1–1)
BUN SERPL-MCNC: 26 MG/DL (ref 8–23)
CALCIUM SERPL-MCNC: 8.1 MG/DL (ref 8.7–10.5)
CHLORIDE SERPL-SCNC: 100 MMOL/L (ref 95–110)
CO2 SERPL-SCNC: 22 MMOL/L (ref 23–29)
CREAT SERPL-MCNC: 1.4 MG/DL (ref 0.5–1.4)
ERYTHROCYTE [DISTWIDTH] IN BLOOD BY AUTOMATED COUNT: 17.3 % (ref 11.5–14.5)
GFR SERPLBLD CREATININE-BSD FMLA CKD-EPI: 38 ML/MIN/1.73/M2
GLUCOSE SERPL-MCNC: 270 MG/DL (ref 70–110)
HCT VFR BLD AUTO: 22.5 % (ref 37–48.5)
HGB BLD-MCNC: 7.2 GM/DL (ref 12–16)
HYPOCHROMIA BLD QL SMEAR: ABNORMAL
IMM GRANULOCYTES # BLD AUTO: 0.37 K/UL (ref 0–0.04)
IMM GRANULOCYTES NFR BLD AUTO: 1.2 % (ref 0–0.5)
LYMPHOCYTES # BLD AUTO: 2.31 K/UL (ref 1–4.8)
MCH RBC QN AUTO: 29.9 PG (ref 27–31)
MCHC RBC AUTO-ENTMCNC: 32 G/DL (ref 32–36)
MCV RBC AUTO: 93 FL (ref 82–98)
NUCLEATED RBC (/100WBC) (OHS): 1 /100 WBC
PHOSPHATE SERPL-MCNC: 2.6 MG/DL (ref 2.7–4.5)
PLATELET # BLD AUTO: 481 K/UL (ref 150–450)
PLATELET BLD QL SMEAR: ABNORMAL
PMV BLD AUTO: 9.7 FL (ref 9.2–12.9)
POCT GLUCOSE: 348 MG/DL (ref 70–110)
POIKILOCYTOSIS BLD QL SMEAR: SLIGHT
POLYCHROMASIA BLD QL SMEAR: ABNORMAL
POTASSIUM SERPL-SCNC: 3.6 MMOL/L (ref 3.5–5.1)
PROT SERPL-MCNC: 6.9 GM/DL (ref 6–8.4)
RBC # BLD AUTO: 2.41 M/UL (ref 4–5.4)
RELATIVE EOSINOPHIL (OHS): 0 %
RELATIVE LYMPHOCYTE (OHS): 7.7 % (ref 18–48)
RELATIVE MONOCYTE (OHS): 5.2 % (ref 4–15)
RELATIVE NEUTROPHIL (OHS): 85.7 % (ref 38–73)
SODIUM SERPL-SCNC: 132 MMOL/L (ref 136–145)
TARGETS BLD QL SMEAR: ABNORMAL
VANCOMYCIN SERPL-MCNC: 24 UG/ML
WBC # BLD AUTO: 30.01 K/UL (ref 3.9–12.7)

## 2025-08-20 PROCEDURE — 25500020 PHARM REV CODE 255: Mod: HCNC | Performed by: HOSPITALIST

## 2025-08-20 PROCEDURE — 25000003 PHARM REV CODE 250: Mod: HCNC

## 2025-08-20 PROCEDURE — 25000003 PHARM REV CODE 250: Mod: HCNC | Performed by: HOSPITALIST

## 2025-08-20 PROCEDURE — 27000207 HC ISOLATION: Mod: HCNC

## 2025-08-20 PROCEDURE — 25000242 PHARM REV CODE 250 ALT 637 W/ HCPCS: Mod: HCNC | Performed by: INTERNAL MEDICINE

## 2025-08-20 PROCEDURE — 20600001 HC STEP DOWN PRIVATE ROOM: Mod: HCNC

## 2025-08-20 PROCEDURE — 82040 ASSAY OF SERUM ALBUMIN: CPT | Mod: HCNC

## 2025-08-20 PROCEDURE — 25000003 PHARM REV CODE 250: Mod: HCNC | Performed by: INTERNAL MEDICINE

## 2025-08-20 PROCEDURE — 36415 COLL VENOUS BLD VENIPUNCTURE: CPT | Mod: HCNC

## 2025-08-20 PROCEDURE — 63600175 PHARM REV CODE 636 W HCPCS: Mod: HCNC | Performed by: STUDENT IN AN ORGANIZED HEALTH CARE EDUCATION/TRAINING PROGRAM

## 2025-08-20 PROCEDURE — 85025 COMPLETE CBC W/AUTO DIFF WBC: CPT | Mod: HCNC

## 2025-08-20 PROCEDURE — 25000003 PHARM REV CODE 250: Mod: HCNC | Performed by: STUDENT IN AN ORGANIZED HEALTH CARE EDUCATION/TRAINING PROGRAM

## 2025-08-20 PROCEDURE — 63600175 PHARM REV CODE 636 W HCPCS: Mod: HCNC | Performed by: PHYSICIAN ASSISTANT

## 2025-08-20 PROCEDURE — 80202 ASSAY OF VANCOMYCIN: CPT | Mod: HCNC | Performed by: HOSPITALIST

## 2025-08-20 PROCEDURE — 84100 ASSAY OF PHOSPHORUS: CPT | Mod: HCNC

## 2025-08-20 RX ORDER — MEROPENEM 1 G/1
1 INJECTION, POWDER, FOR SOLUTION INTRAVENOUS
Status: DISCONTINUED | OUTPATIENT
Start: 2025-08-20 | End: 2025-08-22

## 2025-08-20 RX ORDER — SODIUM CHLORIDE 9 MG/ML
INJECTION, SOLUTION INTRAVENOUS ONCE
Status: CANCELLED | OUTPATIENT
Start: 2025-08-21

## 2025-08-20 RX ADMIN — HEPARIN SODIUM 5000 UNITS: 5000 INJECTION INTRAVENOUS; SUBCUTANEOUS at 10:08

## 2025-08-20 RX ADMIN — INSULIN GLARGINE 12 UNITS: 100 INJECTION, SOLUTION SUBCUTANEOUS at 08:08

## 2025-08-20 RX ADMIN — MICONAZOLE NITRATE 2 % TOPICAL POWDER: at 09:08

## 2025-08-20 RX ADMIN — ACETAMINOPHEN 650 MG: 325 TABLET ORAL at 07:08

## 2025-08-20 RX ADMIN — MIDODRINE HYDROCHLORIDE 15 MG: 5 TABLET ORAL at 07:08

## 2025-08-20 RX ADMIN — LEVOTHYROXINE SODIUM 75 MCG: 75 TABLET ORAL at 06:08

## 2025-08-20 RX ADMIN — HEPARIN SODIUM 5000 UNITS: 5000 INJECTION INTRAVENOUS; SUBCUTANEOUS at 02:08

## 2025-08-20 RX ADMIN — LEVETIRACETAM 500 MG: 500 SOLUTION ORAL at 09:08

## 2025-08-20 RX ADMIN — LEVETIRACETAM 500 MG: 500 SOLUTION ORAL at 08:08

## 2025-08-20 RX ADMIN — PANTOPRAZOLE SODIUM 40 MG: 40 GRANULE, DELAYED RELEASE ORAL at 08:08

## 2025-08-20 RX ADMIN — IOHEXOL 100 ML: 350 INJECTION, SOLUTION INTRAVENOUS at 09:08

## 2025-08-20 RX ADMIN — MICONAZOLE NITRATE 2 % TOPICAL POWDER: at 08:08

## 2025-08-20 RX ADMIN — INSULIN ASPART 4 UNITS: 100 INJECTION, SOLUTION INTRAVENOUS; SUBCUTANEOUS at 10:08

## 2025-08-20 RX ADMIN — FOLIC ACID 2 MG: 1 TABLET ORAL at 08:08

## 2025-08-20 RX ADMIN — MEROPENEM 1 G: 1 INJECTION INTRAVENOUS at 08:08

## 2025-08-20 RX ADMIN — ACETAMINOPHEN 650 MG: 325 TABLET ORAL at 08:08

## 2025-08-20 RX ADMIN — COLLAGENASE SANTYL: 250 OINTMENT TOPICAL at 08:08

## 2025-08-20 RX ADMIN — PSYLLIUM HUSK 1 PACKET: 3.4 POWDER ORAL at 08:08

## 2025-08-20 RX ADMIN — MELATONIN TAB 3 MG 6 MG: 3 TAB at 07:08

## 2025-08-20 RX ADMIN — HEPARIN SODIUM 5000 UNITS: 5000 INJECTION INTRAVENOUS; SUBCUTANEOUS at 06:08

## 2025-08-21 LAB
ABO + RH BLD: NORMAL
ABSOLUTE EOSINOPHIL (OHS): 0.24 K/UL
ABSOLUTE MONOCYTE (OHS): 1.65 K/UL (ref 0.3–1)
ABSOLUTE NEUTROPHIL COUNT (OHS): 17.19 K/UL (ref 1.8–7.7)
ALBUMIN SERPL BCP-MCNC: 1.5 G/DL (ref 3.5–5.2)
ALP SERPL-CCNC: 246 UNIT/L (ref 40–150)
ALT SERPL W/O P-5'-P-CCNC: <8 UNIT/L (ref 0–55)
ANION GAP (OHS): 9 MMOL/L (ref 8–16)
AST SERPL-CCNC: 18 UNIT/L (ref 0–50)
BASOPHILS # BLD AUTO: 0.04 K/UL
BASOPHILS NFR BLD AUTO: 0.2 %
BILIRUB SERPL-MCNC: 0.1 MG/DL (ref 0.1–1)
BLD PROD TYP BPU: NORMAL
BLOOD UNIT EXPIRATION DATE: NORMAL
BLOOD UNIT TYPE CODE: 7300
BUN SERPL-MCNC: 38 MG/DL (ref 8–23)
CALCIUM SERPL-MCNC: 7.8 MG/DL (ref 8.7–10.5)
CHLORIDE SERPL-SCNC: 97 MMOL/L (ref 95–110)
CO2 SERPL-SCNC: 24 MMOL/L (ref 23–29)
CREAT SERPL-MCNC: 1.7 MG/DL (ref 0.5–1.4)
CROSSMATCH INTERPRETATION: NORMAL
DISPENSE STATUS: NORMAL
ERYTHROCYTE [DISTWIDTH] IN BLOOD BY AUTOMATED COUNT: 17.5 % (ref 11.5–14.5)
GFR SERPLBLD CREATININE-BSD FMLA CKD-EPI: 30 ML/MIN/1.73/M2
GLUCOSE SERPL-MCNC: 248 MG/DL (ref 70–110)
HCT VFR BLD AUTO: 21.2 % (ref 37–48.5)
HGB BLD-MCNC: 6.6 GM/DL (ref 12–16)
IMM GRANULOCYTES # BLD AUTO: 0.24 K/UL (ref 0–0.04)
IMM GRANULOCYTES NFR BLD AUTO: 1.1 % (ref 0–0.5)
INDIRECT COOMBS: NORMAL
LYMPHOCYTES # BLD AUTO: 2.91 K/UL (ref 1–4.8)
MCH RBC QN AUTO: 29.5 PG (ref 27–31)
MCHC RBC AUTO-ENTMCNC: 31.1 G/DL (ref 32–36)
MCV RBC AUTO: 95 FL (ref 82–98)
NUCLEATED RBC (/100WBC) (OHS): 1 /100 WBC
PHOSPHATE SERPL-MCNC: 2.8 MG/DL (ref 2.7–4.5)
PLATELET # BLD AUTO: 562 K/UL (ref 150–450)
PMV BLD AUTO: 9.9 FL (ref 9.2–12.9)
POCT GLUCOSE: 142 MG/DL (ref 70–110)
POCT GLUCOSE: 292 MG/DL (ref 70–110)
POCT GLUCOSE: 313 MG/DL (ref 70–110)
POTASSIUM SERPL-SCNC: 3.5 MMOL/L (ref 3.5–5.1)
PROT SERPL-MCNC: 6.5 GM/DL (ref 6–8.4)
RBC # BLD AUTO: 2.24 M/UL (ref 4–5.4)
RELATIVE EOSINOPHIL (OHS): 1.1 %
RELATIVE LYMPHOCYTE (OHS): 13.1 % (ref 18–48)
RELATIVE MONOCYTE (OHS): 7.4 % (ref 4–15)
RELATIVE NEUTROPHIL (OHS): 77.1 % (ref 38–73)
RH BLD: NORMAL
SODIUM SERPL-SCNC: 130 MMOL/L (ref 136–145)
SPECIMEN OUTDATE: NORMAL
UNIT NUMBER: NORMAL
VANCOMYCIN SERPL-MCNC: 21.6 UG/ML
WBC # BLD AUTO: 22.27 K/UL (ref 3.9–12.7)

## 2025-08-21 PROCEDURE — 36415 COLL VENOUS BLD VENIPUNCTURE: CPT | Mod: HCNC

## 2025-08-21 PROCEDURE — 27000207 HC ISOLATION: Mod: HCNC

## 2025-08-21 PROCEDURE — 63600175 PHARM REV CODE 636 W HCPCS: Mod: HCNC | Performed by: STUDENT IN AN ORGANIZED HEALTH CARE EDUCATION/TRAINING PROGRAM

## 2025-08-21 PROCEDURE — 99233 SBSQ HOSP IP/OBS HIGH 50: CPT | Mod: HCNC,,, | Performed by: PHYSICIAN ASSISTANT

## 2025-08-21 PROCEDURE — 20600001 HC STEP DOWN PRIVATE ROOM: Mod: HCNC

## 2025-08-21 PROCEDURE — 25000003 PHARM REV CODE 250: Mod: HCNC

## 2025-08-21 PROCEDURE — 86850 RBC ANTIBODY SCREEN: CPT | Mod: HCNC | Performed by: HOSPITALIST

## 2025-08-21 PROCEDURE — 97530 THERAPEUTIC ACTIVITIES: CPT | Mod: HCNC

## 2025-08-21 PROCEDURE — 25000003 PHARM REV CODE 250: Mod: HCNC | Performed by: STUDENT IN AN ORGANIZED HEALTH CARE EDUCATION/TRAINING PROGRAM

## 2025-08-21 PROCEDURE — 25000003 PHARM REV CODE 250: Mod: HCNC | Performed by: INTERNAL MEDICINE

## 2025-08-21 PROCEDURE — 63600175 PHARM REV CODE 636 W HCPCS: Mod: HCNC | Performed by: PHYSICIAN ASSISTANT

## 2025-08-21 PROCEDURE — 80100014 HC HEMODIALYSIS 1:1: Mod: HCNC

## 2025-08-21 PROCEDURE — 63600175 PHARM REV CODE 636 W HCPCS: Mod: TB,HCNC | Performed by: NURSE PRACTITIONER

## 2025-08-21 PROCEDURE — 99232 SBSQ HOSP IP/OBS MODERATE 35: CPT | Mod: HCNC,,, | Performed by: NURSE PRACTITIONER

## 2025-08-21 PROCEDURE — 97110 THERAPEUTIC EXERCISES: CPT | Mod: HCNC

## 2025-08-21 PROCEDURE — 86920 COMPATIBILITY TEST SPIN: CPT | Mod: HCNC | Performed by: HOSPITALIST

## 2025-08-21 PROCEDURE — 36415 COLL VENOUS BLD VENIPUNCTURE: CPT | Mod: HCNC | Performed by: HOSPITALIST

## 2025-08-21 PROCEDURE — P9016 RBC LEUKOCYTES REDUCED: HCPCS | Mod: HCNC | Performed by: HOSPITALIST

## 2025-08-21 PROCEDURE — 25000003 PHARM REV CODE 250: Mod: HCNC | Performed by: HOSPITALIST

## 2025-08-21 RX ADMIN — FOLIC ACID 2 MG: 1 TABLET ORAL at 09:08

## 2025-08-21 RX ADMIN — HEPARIN SODIUM 5000 UNITS: 5000 INJECTION INTRAVENOUS; SUBCUTANEOUS at 09:08

## 2025-08-21 RX ADMIN — MIDODRINE HYDROCHLORIDE 15 MG: 5 TABLET ORAL at 03:08

## 2025-08-21 RX ADMIN — HEPARIN SODIUM 5000 UNITS: 5000 INJECTION INTRAVENOUS; SUBCUTANEOUS at 02:08

## 2025-08-21 RX ADMIN — HEPARIN SODIUM 5000 UNITS: 5000 INJECTION INTRAVENOUS; SUBCUTANEOUS at 06:08

## 2025-08-21 RX ADMIN — INSULIN ASPART 4 UNITS: 100 INJECTION, SOLUTION INTRAVENOUS; SUBCUTANEOUS at 02:08

## 2025-08-21 RX ADMIN — ACETAMINOPHEN 650 MG: 325 TABLET ORAL at 06:08

## 2025-08-21 RX ADMIN — ACETAMINOPHEN 650 MG: 325 TABLET ORAL at 12:08

## 2025-08-21 RX ADMIN — ACETAMINOPHEN 650 MG: 325 TABLET ORAL at 05:08

## 2025-08-21 RX ADMIN — EPOETIN ALFA-EPBX 4300 UNITS: 10000 INJECTION, SOLUTION INTRAVENOUS; SUBCUTANEOUS at 05:08

## 2025-08-21 RX ADMIN — LEVETIRACETAM 500 MG: 500 SOLUTION ORAL at 09:08

## 2025-08-21 RX ADMIN — MICONAZOLE NITRATE 2 % TOPICAL POWDER: at 09:08

## 2025-08-21 RX ADMIN — LEVOTHYROXINE SODIUM 75 MCG: 75 TABLET ORAL at 06:08

## 2025-08-21 RX ADMIN — INSULIN ASPART 2 UNITS: 100 INJECTION, SOLUTION INTRAVENOUS; SUBCUTANEOUS at 09:08

## 2025-08-21 RX ADMIN — INSULIN GLARGINE 12 UNITS: 100 INJECTION, SOLUTION SUBCUTANEOUS at 09:08

## 2025-08-21 RX ADMIN — PANTOPRAZOLE SODIUM 40 MG: 40 GRANULE, DELAYED RELEASE ORAL at 09:08

## 2025-08-21 RX ADMIN — ACETAMINOPHEN 650 MG: 325 TABLET ORAL at 11:08

## 2025-08-21 RX ADMIN — HEPARIN SODIUM 4000 UNITS: 1000 INJECTION INTRAVENOUS; SUBCUTANEOUS at 06:08

## 2025-08-21 RX ADMIN — MEROPENEM 1 G: 1 INJECTION INTRAVENOUS at 09:08

## 2025-08-21 RX ADMIN — MIDODRINE HYDROCHLORIDE 15 MG: 5 TABLET ORAL at 04:08

## 2025-08-21 RX ADMIN — COLLAGENASE SANTYL: 250 OINTMENT TOPICAL at 09:08

## 2025-08-22 LAB
ABSOLUTE EOSINOPHIL (OHS): 0.2 K/UL
ABSOLUTE MONOCYTE (OHS): 1.63 K/UL (ref 0.3–1)
ABSOLUTE NEUTROPHIL COUNT (OHS): 17.86 K/UL (ref 1.8–7.7)
ALBUMIN SERPL BCP-MCNC: 1.7 G/DL (ref 3.5–5.2)
ALP SERPL-CCNC: 252 UNIT/L (ref 40–150)
ALT SERPL W/O P-5'-P-CCNC: <8 UNIT/L (ref 0–55)
ANION GAP (OHS): 9 MMOL/L (ref 8–16)
AST SERPL-CCNC: 26 UNIT/L (ref 0–50)
BASOPHILS # BLD AUTO: 0.09 K/UL
BASOPHILS NFR BLD AUTO: 0.4 %
BILIRUB SERPL-MCNC: 0.2 MG/DL (ref 0.1–1)
BUN SERPL-MCNC: 27 MG/DL (ref 8–23)
CALCIUM SERPL-MCNC: 8.1 MG/DL (ref 8.7–10.5)
CHLORIDE SERPL-SCNC: 101 MMOL/L (ref 95–110)
CO2 SERPL-SCNC: 22 MMOL/L (ref 23–29)
CREAT SERPL-MCNC: 1.2 MG/DL (ref 0.5–1.4)
ERYTHROCYTE [DISTWIDTH] IN BLOOD BY AUTOMATED COUNT: 17.2 % (ref 11.5–14.5)
GFR SERPLBLD CREATININE-BSD FMLA CKD-EPI: 46 ML/MIN/1.73/M2
GLUCOSE SERPL-MCNC: 225 MG/DL (ref 70–110)
HCT VFR BLD AUTO: 32.4 % (ref 37–48.5)
HGB BLD-MCNC: 10.6 GM/DL (ref 12–16)
IMM GRANULOCYTES # BLD AUTO: 0.36 K/UL (ref 0–0.04)
IMM GRANULOCYTES NFR BLD AUTO: 1.5 % (ref 0–0.5)
LYMPHOCYTES # BLD AUTO: 3.1 K/UL (ref 1–4.8)
MCH RBC QN AUTO: 29.8 PG (ref 27–31)
MCHC RBC AUTO-ENTMCNC: 32.7 G/DL (ref 32–36)
MCV RBC AUTO: 91 FL (ref 82–98)
NUCLEATED RBC (/100WBC) (OHS): 1 /100 WBC
PHOSPHATE SERPL-MCNC: 2.1 MG/DL (ref 2.7–4.5)
PLATELET # BLD AUTO: 573 K/UL (ref 150–450)
PMV BLD AUTO: 9.8 FL (ref 9.2–12.9)
POCT GLUCOSE: 279 MG/DL (ref 70–110)
POCT GLUCOSE: 283 MG/DL (ref 70–110)
POTASSIUM SERPL-SCNC: 4.5 MMOL/L (ref 3.5–5.1)
PROT SERPL-MCNC: 7.2 GM/DL (ref 6–8.4)
RBC # BLD AUTO: 3.56 M/UL (ref 4–5.4)
RELATIVE EOSINOPHIL (OHS): 0.9 %
RELATIVE LYMPHOCYTE (OHS): 13.3 % (ref 18–48)
RELATIVE MONOCYTE (OHS): 7 % (ref 4–15)
RELATIVE NEUTROPHIL (OHS): 76.9 % (ref 38–73)
SODIUM SERPL-SCNC: 132 MMOL/L (ref 136–145)
WBC # BLD AUTO: 23.24 K/UL (ref 3.9–12.7)

## 2025-08-22 PROCEDURE — 27000207 HC ISOLATION: Mod: HCNC

## 2025-08-22 PROCEDURE — 85025 COMPLETE CBC W/AUTO DIFF WBC: CPT | Mod: HCNC

## 2025-08-22 PROCEDURE — 80053 COMPREHEN METABOLIC PANEL: CPT | Mod: HCNC

## 2025-08-22 PROCEDURE — 99232 SBSQ HOSP IP/OBS MODERATE 35: CPT | Mod: HCNC,,, | Performed by: PHYSICIAN ASSISTANT

## 2025-08-22 PROCEDURE — 25000003 PHARM REV CODE 250: Mod: HCNC

## 2025-08-22 PROCEDURE — 20600001 HC STEP DOWN PRIVATE ROOM: Mod: HCNC

## 2025-08-22 PROCEDURE — 63600175 PHARM REV CODE 636 W HCPCS: Mod: HCNC | Performed by: STUDENT IN AN ORGANIZED HEALTH CARE EDUCATION/TRAINING PROGRAM

## 2025-08-22 PROCEDURE — 84100 ASSAY OF PHOSPHORUS: CPT | Mod: HCNC

## 2025-08-22 PROCEDURE — 94761 N-INVAS EAR/PLS OXIMETRY MLT: CPT | Mod: HCNC

## 2025-08-22 PROCEDURE — 25000003 PHARM REV CODE 250: Mod: HCNC | Performed by: PHYSICIAN ASSISTANT

## 2025-08-22 PROCEDURE — 36415 COLL VENOUS BLD VENIPUNCTURE: CPT | Mod: HCNC

## 2025-08-22 PROCEDURE — 25000003 PHARM REV CODE 250: Mod: HCNC | Performed by: HOSPITALIST

## 2025-08-22 PROCEDURE — 97530 THERAPEUTIC ACTIVITIES: CPT | Mod: HCNC,CQ

## 2025-08-22 PROCEDURE — 25000242 PHARM REV CODE 250 ALT 637 W/ HCPCS: Mod: HCNC | Performed by: INTERNAL MEDICINE

## 2025-08-22 PROCEDURE — 63600175 PHARM REV CODE 636 W HCPCS: Mod: HCNC | Performed by: PHYSICIAN ASSISTANT

## 2025-08-22 RX ORDER — MEROPENEM 1 G/1
1 INJECTION, POWDER, FOR SOLUTION INTRAVENOUS EVERY 24 HOURS
Status: DISCONTINUED | OUTPATIENT
Start: 2025-08-23 | End: 2025-08-26 | Stop reason: HOSPADM

## 2025-08-22 RX ADMIN — INSULIN GLARGINE 12 UNITS: 100 INJECTION, SOLUTION SUBCUTANEOUS at 10:08

## 2025-08-22 RX ADMIN — INSULIN ASPART 3 UNITS: 100 INJECTION, SOLUTION INTRAVENOUS; SUBCUTANEOUS at 10:08

## 2025-08-22 RX ADMIN — LEVOTHYROXINE SODIUM 75 MCG: 75 TABLET ORAL at 07:08

## 2025-08-22 RX ADMIN — LEVETIRACETAM 500 MG: 500 SOLUTION ORAL at 10:08

## 2025-08-22 RX ADMIN — MICONAZOLE NITRATE 2 % TOPICAL POWDER: at 10:08

## 2025-08-22 RX ADMIN — COLLAGENASE SANTYL: 250 OINTMENT TOPICAL at 10:08

## 2025-08-22 RX ADMIN — PSYLLIUM HUSK 1 PACKET: 3.4 POWDER ORAL at 10:08

## 2025-08-22 RX ADMIN — INSULIN ASPART 3 UNITS: 100 INJECTION, SOLUTION INTRAVENOUS; SUBCUTANEOUS at 06:08

## 2025-08-22 RX ADMIN — PANTOPRAZOLE SODIUM 40 MG: 40 GRANULE, DELAYED RELEASE ORAL at 10:08

## 2025-08-22 RX ADMIN — HEPARIN SODIUM 5000 UNITS: 5000 INJECTION INTRAVENOUS; SUBCUTANEOUS at 04:08

## 2025-08-22 RX ADMIN — HEPARIN SODIUM 5000 UNITS: 5000 INJECTION INTRAVENOUS; SUBCUTANEOUS at 07:08

## 2025-08-22 RX ADMIN — FOLIC ACID 2 MG: 1 TABLET ORAL at 10:08

## 2025-08-22 RX ADMIN — MEROPENEM 2 G: 2 INJECTION, POWDER, FOR SOLUTION INTRAVENOUS at 10:08

## 2025-08-23 PROBLEM — R06.02 SHORTNESS OF BREATH: Status: RESOLVED | Noted: 2025-08-13 | Resolved: 2025-08-23

## 2025-08-23 LAB
ABSOLUTE EOSINOPHIL (OHS): 0.28 K/UL
ABSOLUTE MONOCYTE (OHS): 1.61 K/UL (ref 0.3–1)
ABSOLUTE NEUTROPHIL COUNT (OHS): 13.61 K/UL (ref 1.8–7.7)
ALBUMIN SERPL BCP-MCNC: 1.5 G/DL (ref 3.5–5.2)
ALP SERPL-CCNC: 225 UNIT/L (ref 40–150)
ALT SERPL W/O P-5'-P-CCNC: <8 UNIT/L (ref 0–55)
ANION GAP (OHS): 8 MMOL/L (ref 8–16)
AST SERPL-CCNC: 22 UNIT/L (ref 0–50)
BASOPHILS # BLD AUTO: 0.08 K/UL
BASOPHILS NFR BLD AUTO: 0.4 %
BILIRUB SERPL-MCNC: 0.1 MG/DL (ref 0.1–1)
BUN SERPL-MCNC: 36 MG/DL (ref 8–23)
CALCIUM SERPL-MCNC: 7.8 MG/DL (ref 8.7–10.5)
CHLORIDE SERPL-SCNC: 99 MMOL/L (ref 95–110)
CO2 SERPL-SCNC: 22 MMOL/L (ref 23–29)
CREAT SERPL-MCNC: 1.5 MG/DL (ref 0.5–1.4)
CRP SERPL-MCNC: 123 MG/L
ERYTHROCYTE [DISTWIDTH] IN BLOOD BY AUTOMATED COUNT: 17.4 % (ref 11.5–14.5)
GFR SERPLBLD CREATININE-BSD FMLA CKD-EPI: 35 ML/MIN/1.73/M2
GLUCOSE SERPL-MCNC: 208 MG/DL (ref 70–110)
HCT VFR BLD AUTO: 28.3 % (ref 37–48.5)
HGB BLD-MCNC: 9.5 GM/DL (ref 12–16)
IMM GRANULOCYTES # BLD AUTO: 0.18 K/UL (ref 0–0.04)
IMM GRANULOCYTES NFR BLD AUTO: 0.9 % (ref 0–0.5)
LYMPHOCYTES # BLD AUTO: 3.21 K/UL (ref 1–4.8)
MCH RBC QN AUTO: 30.8 PG (ref 27–31)
MCHC RBC AUTO-ENTMCNC: 33.6 G/DL (ref 32–36)
MCV RBC AUTO: 92 FL (ref 82–98)
NUCLEATED RBC (/100WBC) (OHS): 1 /100 WBC
PHOSPHATE SERPL-MCNC: 1.9 MG/DL (ref 2.7–4.5)
PLATELET # BLD AUTO: 578 K/UL (ref 150–450)
PMV BLD AUTO: 9.5 FL (ref 9.2–12.9)
POCT GLUCOSE: 202 MG/DL (ref 70–110)
POCT GLUCOSE: 228 MG/DL (ref 70–110)
POCT GLUCOSE: 257 MG/DL (ref 70–110)
POTASSIUM SERPL-SCNC: 3.9 MMOL/L (ref 3.5–5.1)
PROT SERPL-MCNC: 6.6 GM/DL (ref 6–8.4)
RBC # BLD AUTO: 3.08 M/UL (ref 4–5.4)
RELATIVE EOSINOPHIL (OHS): 1.5 %
RELATIVE LYMPHOCYTE (OHS): 16.9 % (ref 18–48)
RELATIVE MONOCYTE (OHS): 8.5 % (ref 4–15)
RELATIVE NEUTROPHIL (OHS): 71.8 % (ref 38–73)
SODIUM SERPL-SCNC: 129 MMOL/L (ref 136–145)
WBC # BLD AUTO: 18.97 K/UL (ref 3.9–12.7)

## 2025-08-23 PROCEDURE — 25000003 PHARM REV CODE 250: Mod: HCNC

## 2025-08-23 PROCEDURE — 25000242 PHARM REV CODE 250 ALT 637 W/ HCPCS: Mod: HCNC | Performed by: INTERNAL MEDICINE

## 2025-08-23 PROCEDURE — 27000207 HC ISOLATION: Mod: HCNC

## 2025-08-23 PROCEDURE — 25000003 PHARM REV CODE 250: Mod: HCNC | Performed by: INTERNAL MEDICINE

## 2025-08-23 PROCEDURE — 85025 COMPLETE CBC W/AUTO DIFF WBC: CPT | Mod: HCNC

## 2025-08-23 PROCEDURE — 80100014 HC HEMODIALYSIS 1:1: Mod: HCNC

## 2025-08-23 PROCEDURE — 99233 SBSQ HOSP IP/OBS HIGH 50: CPT | Mod: HCNC,,, | Performed by: PHYSICIAN ASSISTANT

## 2025-08-23 PROCEDURE — 25000003 PHARM REV CODE 250: Mod: HCNC | Performed by: STUDENT IN AN ORGANIZED HEALTH CARE EDUCATION/TRAINING PROGRAM

## 2025-08-23 PROCEDURE — 25000003 PHARM REV CODE 250: Mod: HCNC | Performed by: NURSE PRACTITIONER

## 2025-08-23 PROCEDURE — 25000003 PHARM REV CODE 250: Mod: HCNC | Performed by: HOSPITALIST

## 2025-08-23 PROCEDURE — 84100 ASSAY OF PHOSPHORUS: CPT | Mod: HCNC

## 2025-08-23 PROCEDURE — 63600175 PHARM REV CODE 636 W HCPCS: Mod: TB,HCNC | Performed by: NURSE PRACTITIONER

## 2025-08-23 PROCEDURE — 90935 HEMODIALYSIS ONE EVALUATION: CPT | Mod: HCNC,,, | Performed by: NURSE PRACTITIONER

## 2025-08-23 PROCEDURE — 63600175 PHARM REV CODE 636 W HCPCS: Mod: HCNC | Performed by: STUDENT IN AN ORGANIZED HEALTH CARE EDUCATION/TRAINING PROGRAM

## 2025-08-23 PROCEDURE — 36415 COLL VENOUS BLD VENIPUNCTURE: CPT | Mod: HCNC

## 2025-08-23 PROCEDURE — 86140 C-REACTIVE PROTEIN: CPT | Mod: HCNC | Performed by: PHYSICIAN ASSISTANT

## 2025-08-23 PROCEDURE — 20600001 HC STEP DOWN PRIVATE ROOM: Mod: HCNC

## 2025-08-23 PROCEDURE — 80053 COMPREHEN METABOLIC PANEL: CPT | Mod: HCNC

## 2025-08-23 RX ORDER — SODIUM CHLORIDE 9 MG/ML
INJECTION, SOLUTION INTRAVENOUS ONCE
Status: COMPLETED | OUTPATIENT
Start: 2025-08-23 | End: 2025-08-23

## 2025-08-23 RX ORDER — SODIUM,POTASSIUM PHOSPHATES 280-250MG
2 POWDER IN PACKET (EA) ORAL 4 TIMES DAILY
Status: COMPLETED | OUTPATIENT
Start: 2025-08-23 | End: 2025-08-23

## 2025-08-23 RX ADMIN — EPOETIN ALFA-EPBX 4300 UNITS: 10000 INJECTION, SOLUTION INTRAVENOUS; SUBCUTANEOUS at 11:08

## 2025-08-23 RX ADMIN — FOLIC ACID 2 MG: 1 TABLET ORAL at 11:08

## 2025-08-23 RX ADMIN — INSULIN ASPART 2 UNITS: 100 INJECTION, SOLUTION INTRAVENOUS; SUBCUTANEOUS at 12:08

## 2025-08-23 RX ADMIN — LEVETIRACETAM 500 MG: 500 SOLUTION ORAL at 02:08

## 2025-08-23 RX ADMIN — POTASSIUM & SODIUM PHOSPHATES POWDER PACK 280-160-250 MG 2 PACKET: 280-160-250 PACK at 12:08

## 2025-08-23 RX ADMIN — POTASSIUM & SODIUM PHOSPHATES POWDER PACK 280-160-250 MG 2 PACKET: 280-160-250 PACK at 09:08

## 2025-08-23 RX ADMIN — POTASSIUM & SODIUM PHOSPHATES POWDER PACK 280-160-250 MG 2 PACKET: 280-160-250 PACK at 11:08

## 2025-08-23 RX ADMIN — LEVETIRACETAM 500 MG: 500 SOLUTION ORAL at 11:08

## 2025-08-23 RX ADMIN — HEPARIN SODIUM 5000 UNITS: 5000 INJECTION INTRAVENOUS; SUBCUTANEOUS at 02:08

## 2025-08-23 RX ADMIN — HEPARIN SODIUM 5000 UNITS: 5000 INJECTION INTRAVENOUS; SUBCUTANEOUS at 09:08

## 2025-08-23 RX ADMIN — MICONAZOLE NITRATE 2 % TOPICAL POWDER: at 02:08

## 2025-08-23 RX ADMIN — PSYLLIUM HUSK 1 PACKET: 3.4 POWDER ORAL at 11:08

## 2025-08-23 RX ADMIN — HEPARIN SODIUM 5000 UNITS: 5000 INJECTION INTRAVENOUS; SUBCUTANEOUS at 06:08

## 2025-08-23 RX ADMIN — MICONAZOLE NITRATE 2 % TOPICAL POWDER: at 11:08

## 2025-08-23 RX ADMIN — HEPARIN SODIUM 4000 UNITS: 1000 INJECTION INTRAVENOUS; SUBCUTANEOUS at 11:08

## 2025-08-23 RX ADMIN — COLLAGENASE SANTYL: 250 OINTMENT TOPICAL at 11:08

## 2025-08-23 RX ADMIN — INSULIN ASPART 3 UNITS: 100 INJECTION, SOLUTION INTRAVENOUS; SUBCUTANEOUS at 09:08

## 2025-08-23 RX ADMIN — MELATONIN TAB 3 MG 6 MG: 3 TAB at 02:08

## 2025-08-23 RX ADMIN — ACETAMINOPHEN 650 MG: 325 TABLET ORAL at 02:08

## 2025-08-23 RX ADMIN — LEVOTHYROXINE SODIUM 75 MCG: 75 TABLET ORAL at 06:08

## 2025-08-23 RX ADMIN — ACETAMINOPHEN 650 MG: 325 TABLET ORAL at 09:08

## 2025-08-23 RX ADMIN — PANTOPRAZOLE SODIUM 40 MG: 40 GRANULE, DELAYED RELEASE ORAL at 11:08

## 2025-08-23 RX ADMIN — POTASSIUM & SODIUM PHOSPHATES POWDER PACK 280-160-250 MG 2 PACKET: 280-160-250 PACK at 04:08

## 2025-08-23 RX ADMIN — MELATONIN TAB 3 MG 6 MG: 3 TAB at 09:08

## 2025-08-23 RX ADMIN — INSULIN GLARGINE 12 UNITS: 100 INJECTION, SOLUTION SUBCUTANEOUS at 11:08

## 2025-08-23 RX ADMIN — SODIUM CHLORIDE: 9 INJECTION, SOLUTION INTRAVENOUS at 07:08

## 2025-08-23 RX ADMIN — LEVETIRACETAM 500 MG: 500 SOLUTION ORAL at 09:08

## 2025-08-23 RX ADMIN — MICONAZOLE NITRATE 2 % TOPICAL POWDER: at 09:08

## 2025-08-24 LAB
ABSOLUTE EOSINOPHIL (OHS): 0.32 K/UL
ABSOLUTE MONOCYTE (OHS): 1.86 K/UL (ref 0.3–1)
ABSOLUTE NEUTROPHIL COUNT (OHS): 11.9 K/UL (ref 1.8–7.7)
ALBUMIN SERPL BCP-MCNC: 1.6 G/DL (ref 3.5–5.2)
ALP SERPL-CCNC: 230 UNIT/L (ref 40–150)
ALT SERPL W/O P-5'-P-CCNC: <8 UNIT/L (ref 0–55)
ANION GAP (OHS): 10 MMOL/L (ref 8–16)
AST SERPL-CCNC: 15 UNIT/L (ref 0–50)
BASOPHILS # BLD AUTO: 0.1 K/UL
BASOPHILS NFR BLD AUTO: 0.6 %
BILIRUB SERPL-MCNC: 0.2 MG/DL (ref 0.1–1)
BUN SERPL-MCNC: 27 MG/DL (ref 8–23)
CALCIUM SERPL-MCNC: 8.1 MG/DL (ref 8.7–10.5)
CHLORIDE SERPL-SCNC: 102 MMOL/L (ref 95–110)
CO2 SERPL-SCNC: 20 MMOL/L (ref 23–29)
CREAT SERPL-MCNC: 1.3 MG/DL (ref 0.5–1.4)
ERYTHROCYTE [DISTWIDTH] IN BLOOD BY AUTOMATED COUNT: 18.3 % (ref 11.5–14.5)
GFR SERPLBLD CREATININE-BSD FMLA CKD-EPI: 42 ML/MIN/1.73/M2
GLUCOSE SERPL-MCNC: 210 MG/DL (ref 70–110)
HCT VFR BLD AUTO: 29 % (ref 37–48.5)
HGB BLD-MCNC: 9.4 GM/DL (ref 12–16)
IMM GRANULOCYTES # BLD AUTO: 0.16 K/UL (ref 0–0.04)
IMM GRANULOCYTES NFR BLD AUTO: 0.9 % (ref 0–0.5)
LYMPHOCYTES # BLD AUTO: 2.85 K/UL (ref 1–4.8)
MCH RBC QN AUTO: 30.6 PG (ref 27–31)
MCHC RBC AUTO-ENTMCNC: 32.4 G/DL (ref 32–36)
MCV RBC AUTO: 95 FL (ref 82–98)
NUCLEATED RBC (/100WBC) (OHS): 1 /100 WBC
PHOSPHATE SERPL-MCNC: 2.2 MG/DL (ref 2.7–4.5)
PLATELET # BLD AUTO: 560 K/UL (ref 150–450)
PMV BLD AUTO: 9.6 FL (ref 9.2–12.9)
POCT GLUCOSE: 218 MG/DL (ref 70–110)
POCT GLUCOSE: 253 MG/DL (ref 70–110)
POCT GLUCOSE: 257 MG/DL (ref 70–110)
POCT GLUCOSE: 304 MG/DL (ref 70–110)
POTASSIUM SERPL-SCNC: 4 MMOL/L (ref 3.5–5.1)
PROT SERPL-MCNC: 6.7 GM/DL (ref 6–8.4)
RBC # BLD AUTO: 3.07 M/UL (ref 4–5.4)
RELATIVE EOSINOPHIL (OHS): 1.9 %
RELATIVE LYMPHOCYTE (OHS): 16.6 % (ref 18–48)
RELATIVE MONOCYTE (OHS): 10.8 % (ref 4–15)
RELATIVE NEUTROPHIL (OHS): 69.2 % (ref 38–73)
SODIUM SERPL-SCNC: 132 MMOL/L (ref 136–145)
WBC # BLD AUTO: 17.19 K/UL (ref 3.9–12.7)

## 2025-08-24 PROCEDURE — 25000003 PHARM REV CODE 250: Mod: HCNC | Performed by: HOSPITALIST

## 2025-08-24 PROCEDURE — 27000207 HC ISOLATION: Mod: HCNC

## 2025-08-24 PROCEDURE — 25000003 PHARM REV CODE 250: Mod: HCNC

## 2025-08-24 PROCEDURE — 25000003 PHARM REV CODE 250: Mod: HCNC | Performed by: STUDENT IN AN ORGANIZED HEALTH CARE EDUCATION/TRAINING PROGRAM

## 2025-08-24 PROCEDURE — 25000242 PHARM REV CODE 250 ALT 637 W/ HCPCS: Mod: HCNC | Performed by: INTERNAL MEDICINE

## 2025-08-24 PROCEDURE — 85025 COMPLETE CBC W/AUTO DIFF WBC: CPT | Mod: HCNC

## 2025-08-24 PROCEDURE — 36415 COLL VENOUS BLD VENIPUNCTURE: CPT | Mod: HCNC

## 2025-08-24 PROCEDURE — 63600175 PHARM REV CODE 636 W HCPCS: Mod: HCNC | Performed by: PHYSICIAN ASSISTANT

## 2025-08-24 PROCEDURE — 63600175 PHARM REV CODE 636 W HCPCS: Mod: HCNC | Performed by: STUDENT IN AN ORGANIZED HEALTH CARE EDUCATION/TRAINING PROGRAM

## 2025-08-24 PROCEDURE — 80053 COMPREHEN METABOLIC PANEL: CPT | Mod: HCNC

## 2025-08-24 PROCEDURE — 20600001 HC STEP DOWN PRIVATE ROOM: Mod: HCNC

## 2025-08-24 PROCEDURE — 84100 ASSAY OF PHOSPHORUS: CPT | Mod: HCNC

## 2025-08-24 RX ADMIN — INSULIN GLARGINE 12 UNITS: 100 INJECTION, SOLUTION SUBCUTANEOUS at 09:08

## 2025-08-24 RX ADMIN — COLLAGENASE SANTYL: 250 OINTMENT TOPICAL at 10:08

## 2025-08-24 RX ADMIN — LEVETIRACETAM 500 MG: 500 SOLUTION ORAL at 08:08

## 2025-08-24 RX ADMIN — INSULIN ASPART 1 UNITS: 100 INJECTION, SOLUTION INTRAVENOUS; SUBCUTANEOUS at 06:08

## 2025-08-24 RX ADMIN — PANTOPRAZOLE SODIUM 40 MG: 40 GRANULE, DELAYED RELEASE ORAL at 09:08

## 2025-08-24 RX ADMIN — MICONAZOLE NITRATE 2 % TOPICAL POWDER: at 10:08

## 2025-08-24 RX ADMIN — HEPARIN SODIUM 5000 UNITS: 5000 INJECTION INTRAVENOUS; SUBCUTANEOUS at 02:08

## 2025-08-24 RX ADMIN — INSULIN ASPART 3 UNITS: 100 INJECTION, SOLUTION INTRAVENOUS; SUBCUTANEOUS at 05:08

## 2025-08-24 RX ADMIN — HEPARIN SODIUM 5000 UNITS: 5000 INJECTION INTRAVENOUS; SUBCUTANEOUS at 10:08

## 2025-08-24 RX ADMIN — MEROPENEM 1 G: 1 INJECTION, POWDER, FOR SOLUTION INTRAVENOUS at 09:08

## 2025-08-24 RX ADMIN — INSULIN ASPART 1 UNITS: 100 INJECTION, SOLUTION INTRAVENOUS; SUBCUTANEOUS at 10:08

## 2025-08-24 RX ADMIN — HEPARIN SODIUM 5000 UNITS: 5000 INJECTION INTRAVENOUS; SUBCUTANEOUS at 06:08

## 2025-08-24 RX ADMIN — PSYLLIUM HUSK 1 PACKET: 3.4 POWDER ORAL at 09:08

## 2025-08-24 RX ADMIN — LEVOTHYROXINE SODIUM 75 MCG: 75 TABLET ORAL at 06:08

## 2025-08-24 RX ADMIN — FOLIC ACID 2 MG: 1 TABLET ORAL at 09:08

## 2025-08-24 RX ADMIN — LEVETIRACETAM 500 MG: 500 SOLUTION ORAL at 09:08

## 2025-08-24 RX ADMIN — INSULIN ASPART 4 UNITS: 100 INJECTION, SOLUTION INTRAVENOUS; SUBCUTANEOUS at 12:08

## 2025-08-24 RX ADMIN — ACETAMINOPHEN 650 MG: 325 TABLET ORAL at 03:08

## 2025-08-25 LAB
ABSOLUTE EOSINOPHIL (OHS): 0.4 K/UL
ABSOLUTE MONOCYTE (OHS): 1.73 K/UL (ref 0.3–1)
ABSOLUTE NEUTROPHIL COUNT (OHS): 9.26 K/UL (ref 1.8–7.7)
ALBUMIN SERPL BCP-MCNC: 1.5 G/DL (ref 3.5–5.2)
ALP SERPL-CCNC: 214 UNIT/L (ref 40–150)
ALT SERPL W/O P-5'-P-CCNC: <8 UNIT/L (ref 0–55)
ANION GAP (OHS): 10 MMOL/L (ref 8–16)
AST SERPL-CCNC: 13 UNIT/L (ref 0–50)
BASOPHILS # BLD AUTO: 0.06 K/UL
BASOPHILS NFR BLD AUTO: 0.4 %
BILIRUB SERPL-MCNC: 0.1 MG/DL (ref 0.1–1)
BUN SERPL-MCNC: 45 MG/DL (ref 8–23)
CALCIUM SERPL-MCNC: 7.8 MG/DL (ref 8.7–10.5)
CHLORIDE SERPL-SCNC: 102 MMOL/L (ref 95–110)
CO2 SERPL-SCNC: 21 MMOL/L (ref 23–29)
CREAT SERPL-MCNC: 1.7 MG/DL (ref 0.5–1.4)
ERYTHROCYTE [DISTWIDTH] IN BLOOD BY AUTOMATED COUNT: 17.7 % (ref 11.5–14.5)
GFR SERPLBLD CREATININE-BSD FMLA CKD-EPI: 30 ML/MIN/1.73/M2
GLUCOSE SERPL-MCNC: 208 MG/DL (ref 70–110)
HCT VFR BLD AUTO: 28.7 % (ref 37–48.5)
HGB BLD-MCNC: 9.1 GM/DL (ref 12–16)
IMM GRANULOCYTES # BLD AUTO: 0.1 K/UL (ref 0–0.04)
IMM GRANULOCYTES NFR BLD AUTO: 0.7 % (ref 0–0.5)
LYMPHOCYTES # BLD AUTO: 3.32 K/UL (ref 1–4.8)
MCH RBC QN AUTO: 29.9 PG (ref 27–31)
MCHC RBC AUTO-ENTMCNC: 31.7 G/DL (ref 32–36)
MCV RBC AUTO: 94 FL (ref 82–98)
NUCLEATED RBC (/100WBC) (OHS): 0 /100 WBC
OHS QRS DURATION: 82 MS
OHS QTC CALCULATION: 440 MS
PHOSPHATE SERPL-MCNC: 2.6 MG/DL (ref 2.7–4.5)
PLATELET # BLD AUTO: 587 K/UL (ref 150–450)
PMV BLD AUTO: 9.5 FL (ref 9.2–12.9)
POCT GLUCOSE: 180 MG/DL (ref 70–110)
POCT GLUCOSE: 235 MG/DL (ref 70–110)
POTASSIUM SERPL-SCNC: 4.3 MMOL/L (ref 3.5–5.1)
PROT SERPL-MCNC: 6.3 GM/DL (ref 6–8.4)
RBC # BLD AUTO: 3.04 M/UL (ref 4–5.4)
RELATIVE EOSINOPHIL (OHS): 2.7 %
RELATIVE LYMPHOCYTE (OHS): 22.3 % (ref 18–48)
RELATIVE MONOCYTE (OHS): 11.6 % (ref 4–15)
RELATIVE NEUTROPHIL (OHS): 62.3 % (ref 38–73)
SODIUM SERPL-SCNC: 133 MMOL/L (ref 136–145)
WBC # BLD AUTO: 14.87 K/UL (ref 3.9–12.7)

## 2025-08-25 PROCEDURE — 20600001 HC STEP DOWN PRIVATE ROOM: Mod: HCNC

## 2025-08-25 PROCEDURE — 63600175 PHARM REV CODE 636 W HCPCS: Mod: HCNC | Performed by: STUDENT IN AN ORGANIZED HEALTH CARE EDUCATION/TRAINING PROGRAM

## 2025-08-25 PROCEDURE — 25000003 PHARM REV CODE 250: Mod: HCNC

## 2025-08-25 PROCEDURE — 99232 SBSQ HOSP IP/OBS MODERATE 35: CPT | Mod: HCNC,,, | Performed by: NURSE PRACTITIONER

## 2025-08-25 PROCEDURE — 27000207 HC ISOLATION: Mod: HCNC

## 2025-08-25 PROCEDURE — 97110 THERAPEUTIC EXERCISES: CPT | Mod: HCNC

## 2025-08-25 PROCEDURE — 63600175 PHARM REV CODE 636 W HCPCS: Mod: HCNC | Performed by: PHYSICIAN ASSISTANT

## 2025-08-25 PROCEDURE — 84100 ASSAY OF PHOSPHORUS: CPT | Mod: HCNC

## 2025-08-25 PROCEDURE — 36415 COLL VENOUS BLD VENIPUNCTURE: CPT | Mod: HCNC

## 2025-08-25 PROCEDURE — 25000242 PHARM REV CODE 250 ALT 637 W/ HCPCS: Mod: HCNC | Performed by: INTERNAL MEDICINE

## 2025-08-25 PROCEDURE — 85025 COMPLETE CBC W/AUTO DIFF WBC: CPT | Mod: HCNC

## 2025-08-25 PROCEDURE — 97530 THERAPEUTIC ACTIVITIES: CPT | Mod: HCNC

## 2025-08-25 PROCEDURE — 93010 ELECTROCARDIOGRAM REPORT: CPT | Mod: HCNC,,, | Performed by: INTERNAL MEDICINE

## 2025-08-25 PROCEDURE — 84460 ALANINE AMINO (ALT) (SGPT): CPT | Mod: HCNC

## 2025-08-25 PROCEDURE — 93005 ELECTROCARDIOGRAM TRACING: CPT | Mod: HCNC

## 2025-08-25 PROCEDURE — 25000003 PHARM REV CODE 250: Mod: HCNC | Performed by: HOSPITALIST

## 2025-08-25 RX ORDER — ERTAPENEM 1 G/1
1 INJECTION, POWDER, LYOPHILIZED, FOR SOLUTION INTRAMUSCULAR; INTRAVENOUS
Status: ON HOLD
Start: 2025-08-25 | End: 2025-09-30

## 2025-08-25 RX ORDER — PANTOPRAZOLE SODIUM 40 MG/1
40 FOR SUSPENSION ORAL DAILY
Status: ON HOLD
Start: 2025-08-26 | End: 2026-08-26

## 2025-08-25 RX ORDER — ACETAMINOPHEN 325 MG/1
650 TABLET ORAL EVERY 6 HOURS PRN
Status: ON HOLD
Start: 2025-08-25

## 2025-08-25 RX ORDER — LEVETIRACETAM 100 MG/ML
500 SOLUTION ORAL 2 TIMES DAILY
Qty: 300 ML | Refills: 11 | Status: ON HOLD | OUTPATIENT
Start: 2025-08-25 | End: 2026-08-25

## 2025-08-25 RX ORDER — MIDODRINE HYDROCHLORIDE 5 MG/1
15 TABLET ORAL DAILY PRN
Status: ON HOLD
Start: 2025-08-25 | End: 2026-08-25

## 2025-08-25 RX ORDER — FOLIC ACID 1 MG/1
2 TABLET ORAL DAILY
Status: ON HOLD
Start: 2025-08-26 | End: 2026-08-26

## 2025-08-25 RX ORDER — DIPHENHYDRAMINE HCL 25 MG
25 CAPSULE ORAL EVERY 6 HOURS PRN
Status: ON HOLD
Start: 2025-08-25

## 2025-08-25 RX ORDER — TALC
6 POWDER (GRAM) TOPICAL NIGHTLY PRN
Status: ON HOLD
Start: 2025-08-25

## 2025-08-25 RX ORDER — SODIUM CHLORIDE 9 MG/ML
INJECTION, SOLUTION INTRAVENOUS ONCE
Status: CANCELLED | OUTPATIENT
Start: 2025-08-26

## 2025-08-25 RX ORDER — HYDROXYZINE HYDROCHLORIDE 25 MG/1
25 TABLET, FILM COATED ORAL 4 TIMES DAILY PRN
Status: DISCONTINUED | OUTPATIENT
Start: 2025-08-25 | End: 2025-08-26 | Stop reason: HOSPADM

## 2025-08-25 RX ADMIN — HEPARIN SODIUM 5000 UNITS: 5000 INJECTION INTRAVENOUS; SUBCUTANEOUS at 06:08

## 2025-08-25 RX ADMIN — INSULIN ASPART 2 UNITS: 100 INJECTION, SOLUTION INTRAVENOUS; SUBCUTANEOUS at 06:08

## 2025-08-25 RX ADMIN — LEVETIRACETAM 500 MG: 500 SOLUTION ORAL at 08:08

## 2025-08-25 RX ADMIN — INSULIN GLARGINE 12 UNITS: 100 INJECTION, SOLUTION SUBCUTANEOUS at 08:08

## 2025-08-25 RX ADMIN — HEPARIN SODIUM 5000 UNITS: 5000 INJECTION INTRAVENOUS; SUBCUTANEOUS at 09:08

## 2025-08-25 RX ADMIN — COLLAGENASE SANTYL: 250 OINTMENT TOPICAL at 08:08

## 2025-08-25 RX ADMIN — LEVETIRACETAM 500 MG: 500 SOLUTION ORAL at 09:08

## 2025-08-25 RX ADMIN — MICONAZOLE NITRATE 2 % TOPICAL POWDER: at 09:08

## 2025-08-25 RX ADMIN — INSULIN ASPART 3 UNITS: 100 INJECTION, SOLUTION INTRAVENOUS; SUBCUTANEOUS at 12:08

## 2025-08-25 RX ADMIN — PSYLLIUM HUSK 1 PACKET: 3.4 POWDER ORAL at 08:08

## 2025-08-25 RX ADMIN — MICONAZOLE NITRATE 2 % TOPICAL POWDER: at 08:08

## 2025-08-25 RX ADMIN — MEROPENEM 1 G: 1 INJECTION, POWDER, FOR SOLUTION INTRAVENOUS at 08:08

## 2025-08-25 RX ADMIN — FOLIC ACID 2 MG: 1 TABLET ORAL at 08:08

## 2025-08-25 RX ADMIN — HEPARIN SODIUM 5000 UNITS: 5000 INJECTION INTRAVENOUS; SUBCUTANEOUS at 02:08

## 2025-08-25 RX ADMIN — HYDROXYZINE HYDROCHLORIDE 25 MG: 25 TABLET ORAL at 11:08

## 2025-08-25 RX ADMIN — PANTOPRAZOLE SODIUM 40 MG: 40 GRANULE, DELAYED RELEASE ORAL at 08:08

## 2025-08-25 RX ADMIN — LEVOTHYROXINE SODIUM 75 MCG: 75 TABLET ORAL at 06:08

## 2025-08-26 VITALS
BODY MASS INDEX: 37.58 KG/M2 | HEART RATE: 84 BPM | OXYGEN SATURATION: 99 % | RESPIRATION RATE: 17 BRPM | DIASTOLIC BLOOD PRESSURE: 58 MMHG | SYSTOLIC BLOOD PRESSURE: 121 MMHG | HEIGHT: 61 IN | TEMPERATURE: 96 F | WEIGHT: 199.06 LBS

## 2025-08-26 PROBLEM — G93.40 ACUTE ENCEPHALOPATHY: Status: RESOLVED | Noted: 2025-05-03 | Resolved: 2025-08-26

## 2025-08-26 LAB
ABSOLUTE EOSINOPHIL (OHS): 0.4 K/UL
ABSOLUTE MONOCYTE (OHS): 1.78 K/UL (ref 0.3–1)
ABSOLUTE NEUTROPHIL COUNT (OHS): 9.64 K/UL (ref 1.8–7.7)
ALBUMIN SERPL BCP-MCNC: 1.6 G/DL (ref 3.5–5.2)
ALP SERPL-CCNC: 236 UNIT/L (ref 40–150)
ALT SERPL W/O P-5'-P-CCNC: <8 UNIT/L (ref 0–55)
ANION GAP (OHS): 11 MMOL/L (ref 8–16)
AST SERPL-CCNC: 16 UNIT/L (ref 0–50)
BASOPHILS # BLD AUTO: 0.07 K/UL
BASOPHILS NFR BLD AUTO: 0.5 %
BILIRUB SERPL-MCNC: 0.1 MG/DL (ref 0.1–1)
BUN SERPL-MCNC: 54 MG/DL (ref 8–23)
CALCIUM SERPL-MCNC: 8.2 MG/DL (ref 8.7–10.5)
CHLORIDE SERPL-SCNC: 101 MMOL/L (ref 95–110)
CO2 SERPL-SCNC: 19 MMOL/L (ref 23–29)
CREAT SERPL-MCNC: 1.9 MG/DL (ref 0.5–1.4)
ERYTHROCYTE [DISTWIDTH] IN BLOOD BY AUTOMATED COUNT: 17.6 % (ref 11.5–14.5)
GFR SERPLBLD CREATININE-BSD FMLA CKD-EPI: 26 ML/MIN/1.73/M2
GLUCOSE SERPL-MCNC: 171 MG/DL (ref 70–110)
HCT VFR BLD AUTO: 30.7 % (ref 37–48.5)
HGB BLD-MCNC: 9.7 GM/DL (ref 12–16)
IMM GRANULOCYTES # BLD AUTO: 0.09 K/UL (ref 0–0.04)
IMM GRANULOCYTES NFR BLD AUTO: 0.6 % (ref 0–0.5)
LYMPHOCYTES # BLD AUTO: 3.12 K/UL (ref 1–4.8)
MCH RBC QN AUTO: 29.8 PG (ref 27–31)
MCHC RBC AUTO-ENTMCNC: 31.6 G/DL (ref 32–36)
MCV RBC AUTO: 94 FL (ref 82–98)
NUCLEATED RBC (/100WBC) (OHS): 0 /100 WBC
PHOSPHATE SERPL-MCNC: 3.2 MG/DL (ref 2.7–4.5)
PLATELET # BLD AUTO: 635 K/UL (ref 150–450)
PMV BLD AUTO: 9.7 FL (ref 9.2–12.9)
POCT GLUCOSE: 128 MG/DL (ref 70–110)
POCT GLUCOSE: 279 MG/DL (ref 70–110)
POTASSIUM SERPL-SCNC: 4.4 MMOL/L (ref 3.5–5.1)
PROT SERPL-MCNC: 7 GM/DL (ref 6–8.4)
RBC # BLD AUTO: 3.26 M/UL (ref 4–5.4)
RELATIVE EOSINOPHIL (OHS): 2.6 %
RELATIVE LYMPHOCYTE (OHS): 20.7 % (ref 18–48)
RELATIVE MONOCYTE (OHS): 11.8 % (ref 4–15)
RELATIVE NEUTROPHIL (OHS): 63.8 % (ref 38–73)
SODIUM SERPL-SCNC: 131 MMOL/L (ref 136–145)
WBC # BLD AUTO: 15.1 K/UL (ref 3.9–12.7)

## 2025-08-26 PROCEDURE — 63600175 PHARM REV CODE 636 W HCPCS: Mod: HCNC | Performed by: STUDENT IN AN ORGANIZED HEALTH CARE EDUCATION/TRAINING PROGRAM

## 2025-08-26 PROCEDURE — 25000003 PHARM REV CODE 250: Mod: HCNC | Performed by: INTERNAL MEDICINE

## 2025-08-26 PROCEDURE — 84100 ASSAY OF PHOSPHORUS: CPT | Mod: HCNC

## 2025-08-26 PROCEDURE — 85025 COMPLETE CBC W/AUTO DIFF WBC: CPT | Mod: HCNC

## 2025-08-26 PROCEDURE — 36415 COLL VENOUS BLD VENIPUNCTURE: CPT | Mod: HCNC

## 2025-08-26 PROCEDURE — 25000003 PHARM REV CODE 250: Mod: HCNC

## 2025-08-26 PROCEDURE — 80053 COMPREHEN METABOLIC PANEL: CPT | Mod: HCNC

## 2025-08-26 PROCEDURE — 25000242 PHARM REV CODE 250 ALT 637 W/ HCPCS: Mod: HCNC | Performed by: INTERNAL MEDICINE

## 2025-08-26 PROCEDURE — 25000003 PHARM REV CODE 250: Mod: HCNC | Performed by: HOSPITALIST

## 2025-08-26 PROCEDURE — 90935 HEMODIALYSIS ONE EVALUATION: CPT | Mod: HCNC,,, | Performed by: NURSE PRACTITIONER

## 2025-08-26 PROCEDURE — 63600175 PHARM REV CODE 636 W HCPCS: Mod: TB,HCNC | Performed by: NURSE PRACTITIONER

## 2025-08-26 PROCEDURE — 80100014 HC HEMODIALYSIS 1:1: Mod: HCNC

## 2025-08-26 RX ORDER — ONDANSETRON 4 MG/1
4 TABLET, FILM COATED ORAL EVERY 6 HOURS PRN
Status: CANCELLED | OUTPATIENT
Start: 2025-08-26

## 2025-08-26 RX ORDER — IPRATROPIUM BROMIDE AND ALBUTEROL SULFATE 2.5; .5 MG/3ML; MG/3ML
3 SOLUTION RESPIRATORY (INHALATION) EVERY 6 HOURS PRN
Status: CANCELLED | OUTPATIENT
Start: 2025-08-26

## 2025-08-26 RX ORDER — LEVETIRACETAM 100 MG/ML
500 SOLUTION ORAL 2 TIMES DAILY
Status: CANCELLED | OUTPATIENT
Start: 2025-08-26

## 2025-08-26 RX ORDER — ACETAMINOPHEN 325 MG/1
650 TABLET ORAL EVERY 6 HOURS PRN
Status: CANCELLED | OUTPATIENT
Start: 2025-08-26

## 2025-08-26 RX ORDER — MIDODRINE HYDROCHLORIDE 5 MG/1
15 TABLET ORAL DAILY PRN
Status: CANCELLED | OUTPATIENT
Start: 2025-08-26

## 2025-08-26 RX ORDER — MICONAZOLE NITRATE 2 G/100G
POWDER TOPICAL 2 TIMES DAILY
Status: CANCELLED | OUTPATIENT
Start: 2025-08-26

## 2025-08-26 RX ORDER — HEPARIN SODIUM 5000 [USP'U]/ML
5000 INJECTION, SOLUTION INTRAVENOUS; SUBCUTANEOUS EVERY 8 HOURS
Status: CANCELLED | OUTPATIENT
Start: 2025-08-26

## 2025-08-26 RX ORDER — PANTOPRAZOLE SODIUM 40 MG/1
40 FOR SUSPENSION ORAL DAILY
Status: CANCELLED | OUTPATIENT
Start: 2025-08-26

## 2025-08-26 RX ORDER — NALOXONE HCL 0.4 MG/ML
0.02 VIAL (ML) INJECTION
Status: CANCELLED | OUTPATIENT
Start: 2025-08-26

## 2025-08-26 RX ORDER — FOLIC ACID 1 MG/1
2 TABLET ORAL DAILY
Status: CANCELLED | OUTPATIENT
Start: 2025-08-26

## 2025-08-26 RX ORDER — LEVOTHYROXINE SODIUM 75 UG/1
75 TABLET ORAL DAILY
Status: CANCELLED | OUTPATIENT
Start: 2025-08-27

## 2025-08-26 RX ORDER — HYDROXYZINE HYDROCHLORIDE 25 MG/1
25 TABLET, FILM COATED ORAL 4 TIMES DAILY PRN
Status: CANCELLED | OUTPATIENT
Start: 2025-08-26

## 2025-08-26 RX ORDER — INSULIN ASPART 100 [IU]/ML
0-5 INJECTION, SOLUTION INTRAVENOUS; SUBCUTANEOUS EVERY 6 HOURS PRN
Status: CANCELLED | OUTPATIENT
Start: 2025-08-26

## 2025-08-26 RX ORDER — INSULIN GLARGINE 100 [IU]/ML
12 INJECTION, SOLUTION SUBCUTANEOUS DAILY
Status: CANCELLED | OUTPATIENT
Start: 2025-08-26

## 2025-08-26 RX ORDER — TALC
6 POWDER (GRAM) TOPICAL NIGHTLY PRN
Status: CANCELLED | OUTPATIENT
Start: 2025-08-26

## 2025-08-26 RX ORDER — GLUCAGON 1 MG
1 KIT INJECTION
Status: CANCELLED | OUTPATIENT
Start: 2025-08-26

## 2025-08-26 RX ORDER — ONDANSETRON 4 MG/1
4 TABLET, FILM COATED ORAL EVERY 6 HOURS PRN
Status: DISCONTINUED | OUTPATIENT
Start: 2025-08-26 | End: 2025-08-26 | Stop reason: HOSPADM

## 2025-08-26 RX ORDER — HEPARIN SODIUM 1000 [USP'U]/ML
4000 INJECTION, SOLUTION INTRAVENOUS; SUBCUTANEOUS
Status: CANCELLED | OUTPATIENT
Start: 2025-08-26

## 2025-08-26 RX ORDER — IBUPROFEN 200 MG
16 TABLET ORAL
Status: CANCELLED | OUTPATIENT
Start: 2025-08-26

## 2025-08-26 RX ADMIN — EPOETIN ALFA-EPBX 4300 UNITS: 10000 INJECTION, SOLUTION INTRAVENOUS; SUBCUTANEOUS at 11:08

## 2025-08-26 RX ADMIN — LEVETIRACETAM 500 MG: 500 SOLUTION ORAL at 01:08

## 2025-08-26 RX ADMIN — PANTOPRAZOLE SODIUM 40 MG: 40 GRANULE, DELAYED RELEASE ORAL at 01:08

## 2025-08-26 RX ADMIN — HEPARIN SODIUM 4000 UNITS: 1000 INJECTION INTRAVENOUS; SUBCUTANEOUS at 12:08

## 2025-08-26 RX ADMIN — MICONAZOLE NITRATE 2 % TOPICAL POWDER: at 01:08

## 2025-08-26 RX ADMIN — INSULIN GLARGINE 12 UNITS: 100 INJECTION, SOLUTION SUBCUTANEOUS at 01:08

## 2025-08-26 RX ADMIN — LEVOTHYROXINE SODIUM 75 MCG: 75 TABLET ORAL at 05:08

## 2025-08-26 RX ADMIN — MIDODRINE HYDROCHLORIDE 15 MG: 5 TABLET ORAL at 07:08

## 2025-08-26 RX ADMIN — PSYLLIUM HUSK 1 PACKET: 3.4 POWDER ORAL at 01:08

## 2025-08-26 RX ADMIN — HEPARIN SODIUM 5000 UNITS: 5000 INJECTION INTRAVENOUS; SUBCUTANEOUS at 05:08

## 2025-08-26 RX ADMIN — FOLIC ACID 2 MG: 1 TABLET ORAL at 01:08

## 2025-08-26 RX ADMIN — HEPARIN SODIUM 5000 UNITS: 5000 INJECTION INTRAVENOUS; SUBCUTANEOUS at 01:08

## 2025-08-26 RX ADMIN — COLLAGENASE SANTYL: 250 OINTMENT TOPICAL at 01:08

## 2025-08-28 ENCOUNTER — PATIENT OUTREACH (OUTPATIENT)
Dept: ADMINISTRATIVE | Facility: CLINIC | Age: 80
End: 2025-08-28
Payer: MEDICARE

## 2025-08-29 PROBLEM — M46.28 SACRAL OSTEOMYELITIS: Status: ACTIVE | Noted: 2025-08-29

## 2025-08-29 PROBLEM — L89.154 PRESSURE INJURY OF SACRAL REGION, STAGE 4: Status: ACTIVE | Noted: 2025-07-10

## (undated) DEVICE — ELECTRODE REM PLYHSV RETURN 9

## (undated) DEVICE — DRAPE LAP T SHT W/ INSTR PAD

## (undated) DEVICE — GLOVE GAMMEX SURG LF PI SZ 7.5

## (undated) DEVICE — GOWN SMART IMP BREATHABLE XXLG

## (undated) DEVICE — BLADE SURG CARBON STEEL SZ11

## (undated) DEVICE — ELECTRODE MEGADYNE RETURN DUAL

## (undated) DEVICE — PENCIL ROCKER SWITCH 10FT CORD

## (undated) DEVICE — GOWN POLY REINF BRTH SLV XL

## (undated) DEVICE — TRAY MINOR GEN SURG OMC

## (undated) DEVICE — PENCIL SMK EVAC CONNECTOR 10FT

## (undated) DEVICE — SYR 10CC LUER LOCK